# Patient Record
Sex: MALE | Race: WHITE | NOT HISPANIC OR LATINO | Employment: OTHER | URBAN - METROPOLITAN AREA
[De-identification: names, ages, dates, MRNs, and addresses within clinical notes are randomized per-mention and may not be internally consistent; named-entity substitution may affect disease eponyms.]

---

## 2017-11-16 ENCOUNTER — LAB VISIT (OUTPATIENT)
Dept: LAB | Facility: HOSPITAL | Age: 62
End: 2017-11-16
Attending: NURSE PRACTITIONER
Payer: MEDICARE

## 2017-11-16 ENCOUNTER — OFFICE VISIT (OUTPATIENT)
Dept: GASTROENTEROLOGY | Facility: CLINIC | Age: 62
End: 2017-11-16
Payer: MEDICARE

## 2017-11-16 VITALS
BODY MASS INDEX: 17.02 KG/M2 | SYSTOLIC BLOOD PRESSURE: 120 MMHG | HEIGHT: 72 IN | DIASTOLIC BLOOD PRESSURE: 70 MMHG | WEIGHT: 125.69 LBS | HEART RATE: 68 BPM

## 2017-11-16 DIAGNOSIS — R10.13 EPIGASTRIC ABDOMINAL PAIN: ICD-10-CM

## 2017-11-16 DIAGNOSIS — Z78.9 ALCOHOL USE: ICD-10-CM

## 2017-11-16 DIAGNOSIS — R63.4 ABNORMAL WEIGHT LOSS: ICD-10-CM

## 2017-11-16 DIAGNOSIS — R10.13 EPIGASTRIC ABDOMINAL PAIN: Primary | ICD-10-CM

## 2017-11-16 DIAGNOSIS — K70.30 ALCOHOLIC CIRRHOSIS OF LIVER WITHOUT ASCITES: ICD-10-CM

## 2017-11-16 LAB
AFP SERPL-MCNC: 3.1 NG/ML
ALBUMIN SERPL BCP-MCNC: 3.6 G/DL
ALP SERPL-CCNC: 78 U/L
ALT SERPL W/O P-5'-P-CCNC: 14 U/L
ANION GAP SERPL CALC-SCNC: 6 MMOL/L
AST SERPL-CCNC: 17 U/L
BASOPHILS # BLD AUTO: 0.06 K/UL
BASOPHILS NFR BLD: 0.5 %
BILIRUB SERPL-MCNC: 0.2 MG/DL
BUN SERPL-MCNC: 8 MG/DL
CALCIUM SERPL-MCNC: 9.6 MG/DL
CHLORIDE SERPL-SCNC: 102 MMOL/L
CO2 SERPL-SCNC: 29 MMOL/L
CREAT SERPL-MCNC: 0.7 MG/DL
DIFFERENTIAL METHOD: ABNORMAL
EOSINOPHIL # BLD AUTO: 0.3 K/UL
EOSINOPHIL NFR BLD: 2.1 %
ERYTHROCYTE [DISTWIDTH] IN BLOOD BY AUTOMATED COUNT: 20.5 %
EST. GFR  (AFRICAN AMERICAN): >60 ML/MIN/1.73 M^2
EST. GFR  (NON AFRICAN AMERICAN): >60 ML/MIN/1.73 M^2
GLUCOSE SERPL-MCNC: 86 MG/DL
HCT VFR BLD AUTO: 30.5 %
HGB BLD-MCNC: 8.6 G/DL
IMM GRANULOCYTES # BLD AUTO: 0.04 K/UL
IMM GRANULOCYTES NFR BLD AUTO: 0.3 %
INR PPP: 0.9
LYMPHOCYTES # BLD AUTO: 2 K/UL
LYMPHOCYTES NFR BLD: 16.5 %
MCH RBC QN AUTO: 20.1 PG
MCHC RBC AUTO-ENTMCNC: 28.2 G/DL
MCV RBC AUTO: 71 FL
MONOCYTES # BLD AUTO: 1 K/UL
MONOCYTES NFR BLD: 8.6 %
NEUTROPHILS # BLD AUTO: 8.6 K/UL
NEUTROPHILS NFR BLD: 72 %
NRBC BLD-RTO: 0 /100 WBC
PLATELET # BLD AUTO: 649 K/UL
PMV BLD AUTO: 10.5 FL
POTASSIUM SERPL-SCNC: 4.7 MMOL/L
PROT SERPL-MCNC: 7.5 G/DL
PROTHROMBIN TIME: 10.1 SEC
RBC # BLD AUTO: 4.27 M/UL
SODIUM SERPL-SCNC: 137 MMOL/L
WBC # BLD AUTO: 11.97 K/UL

## 2017-11-16 PROCEDURE — 80053 COMPREHEN METABOLIC PANEL: CPT

## 2017-11-16 PROCEDURE — 82105 ALPHA-FETOPROTEIN SERUM: CPT

## 2017-11-16 PROCEDURE — 99204 OFFICE O/P NEW MOD 45 MIN: CPT | Mod: S$GLB,,, | Performed by: NURSE PRACTITIONER

## 2017-11-16 PROCEDURE — 85025 COMPLETE CBC W/AUTO DIFF WBC: CPT

## 2017-11-16 PROCEDURE — 36415 COLL VENOUS BLD VENIPUNCTURE: CPT | Mod: PO

## 2017-11-16 PROCEDURE — 85610 PROTHROMBIN TIME: CPT | Mod: PO

## 2017-11-16 PROCEDURE — 99999 PR PBB SHADOW E&M-EST. PATIENT-LVL III: CPT | Mod: PBBFAC,,, | Performed by: NURSE PRACTITIONER

## 2017-11-16 RX ORDER — OXYCODONE AND ACETAMINOPHEN 10; 325 MG/1; MG/1
TABLET ORAL
Status: ON HOLD | COMMUNITY
Start: 2016-09-29 | End: 2019-09-03 | Stop reason: HOSPADM

## 2017-11-16 RX ORDER — PANTOPRAZOLE SODIUM 40 MG/1
40 TABLET, DELAYED RELEASE ORAL DAILY
Qty: 30 TABLET | Refills: 11 | Status: SHIPPED | OUTPATIENT
Start: 2017-11-16 | End: 2018-04-05 | Stop reason: SDUPTHER

## 2017-11-17 ENCOUNTER — HOSPITAL ENCOUNTER (OUTPATIENT)
Dept: RADIOLOGY | Facility: HOSPITAL | Age: 62
Discharge: HOME | End: 2017-11-17
Attending: NURSE PRACTITIONER
Payer: MEDICARE

## 2017-11-17 DIAGNOSIS — R63.4 ABNORMAL WEIGHT LOSS: ICD-10-CM

## 2017-11-17 DIAGNOSIS — Z78.9 ALCOHOL USE: ICD-10-CM

## 2017-11-17 DIAGNOSIS — R10.13 EPIGASTRIC ABDOMINAL PAIN: ICD-10-CM

## 2017-11-18 ENCOUNTER — HOSPITAL ENCOUNTER (OUTPATIENT)
Dept: RADIOLOGY | Facility: HOSPITAL | Age: 62
Discharge: HOME | End: 2017-11-18
Attending: NURSE PRACTITIONER
Payer: MEDICARE

## 2017-11-18 PROCEDURE — 25500020 PHARM REV CODE 255: Performed by: NURSE PRACTITIONER

## 2017-11-18 PROCEDURE — 74170 CT ABD WO CNTRST FLWD CNTRST: CPT | Mod: TC

## 2017-11-18 RX ADMIN — IOHEXOL 30 ML: 350 INJECTION, SOLUTION INTRAVENOUS at 11:11

## 2017-11-18 RX ADMIN — IOHEXOL 75 ML: 350 INJECTION, SOLUTION INTRAVENOUS at 02:11

## 2017-11-20 NOTE — PROGRESS NOTES
"Clinic Consult:  [facility] Consultation Note    Reason for Consult:  The primary encounter diagnosis was Epigastric abdominal pain. Diagnoses of Alcohol use, Abnormal weight loss, and Alcoholic cirrhosis of liver without ascites  were also pertinent to this visit.    PCP: Primary Doctor No       HPI:  This is a 62 y.o. male here for evaluation of the above  He states that over the last few months, he has had intermittent, severe epigastric abdominal pain of unknown cause.  He denies any known exacerbating or reliving factors.  He describes the pain as a burning, stabbing sensation that lasts a few days and then stops without known reason.  He does admit to daily ETOH intake, but reports that he has stopped since mid-October.  He recalls being told that he has "cirrhosis of his liver" but is unsure of what or where he was told.  He has also had a weight loss of 15 pounds over the last month without a clear reason why.   He denies any melena or hematochezia.  No nausea or vomiting.      Review of Systems   Constitutional: Positive for weight loss. Negative for chills, fever and malaise/fatigue.   Respiratory: Negative for cough.    Cardiovascular: Negative for chest pain.   Gastrointestinal:        Per HPI   Musculoskeletal: Negative for myalgias.   Skin: Negative for itching and rash.   Neurological: Negative for headaches.   Psychiatric/Behavioral: The patient is not nervous/anxious.        Medical History:   Past Medical History:   Diagnosis Date    Alcoholism     Back pain     Hiatal hernia     Hypertension        Surgical History:  Past Surgical History:   Procedure Laterality Date    HIP FRACTURE SURGERY      left hip       Family History:   Family History   Problem Relation Age of Onset    Cataracts Mother        Social History:   Social History   Substance Use Topics    Smoking status: Never Smoker    Smokeless tobacco: Never Used    Alcohol use Yes      Comment: states he drinks " frequently everyday/quit approx 10/2017       Allergies: Reviewed    Home Medications:   Current Outpatient Prescriptions on File Prior to Visit   Medication Sig Dispense Refill    losartan (COZAAR) 50 MG tablet Take 50 mg by mouth.      omeprazole (PRILOSEC) 20 MG capsule Take 1 capsule (20 mg total) by mouth once daily. 30 capsule 0     No current facility-administered medications on file prior to visit.        Physical Exam:  Vital Signs:  /70   Pulse 68   Ht 6' (1.829 m)   Wt 57 kg (125 lb 10.6 oz)   BMI 17.04 kg/m²   Body mass index is 17.04 kg/m².  Physical Exam   Constitutional: He is oriented to person, place, and time. He appears well-developed and well-nourished.   HENT:   Head: Normocephalic.   Eyes: No scleral icterus.   Neck: Normal range of motion.   Cardiovascular: Normal rate and regular rhythm.    Pulmonary/Chest: Effort normal and breath sounds normal.   Abdominal: Soft. Bowel sounds are normal. He exhibits no distension. There is no tenderness.   Musculoskeletal: Normal range of motion.   Neurological: He is alert and oriented to person, place, and time.   Skin: Skin is warm and dry.   Psychiatric: He has a normal mood and affect.   Vitals reviewed.      Labs: Pertinent labs reviewed.      Assessment:  1. Epigastric abdominal pain    2. Alcohol use    3. Abnormal weight loss    4. Alcoholic cirrhosis of liver without ascites          Recommendations:  - Unclear etiology of symptoms.    - GERD vs PUD vs other etiologies of the pain and weight loss  - Will get labs and imaging today for initial workup.  Will likely need an EGD   - Start on PPI once daily  - Continue with cessation of all ETOH  - GERD diet discussed.   Epigastric abdominal pain  -     CBC auto differential; Future; Expected date: 11/16/2017  -     Comprehensive metabolic panel; Future; Expected date: 11/16/2017  -     Protime-INR; Future; Expected date: 11/16/2017  -     AFP tumor marker; Future; Expected date:  11/16/2017  -     CT Abdomen With Without Contrast; Future; Expected date: 11/16/2017  -     pantoprazole (PROTONIX) 40 MG tablet; Take 1 tablet (40 mg total) by mouth once daily.  Dispense: 30 tablet; Refill: 11    Alcohol use  -     CBC auto differential; Future; Expected date: 11/16/2017  -     Comprehensive metabolic panel; Future; Expected date: 11/16/2017  -     Protime-INR; Future; Expected date: 11/16/2017  -     AFP tumor marker; Future; Expected date: 11/16/2017  -     CT Abdomen With Without Contrast; Future; Expected date: 11/16/2017  -     pantoprazole (PROTONIX) 40 MG tablet; Take 1 tablet (40 mg total) by mouth once daily.  Dispense: 30 tablet; Refill: 11    Abnormal weight loss  -     CBC auto differential; Future; Expected date: 11/16/2017  -     Comprehensive metabolic panel; Future; Expected date: 11/16/2017  -     Protime-INR; Future; Expected date: 11/16/2017  -     AFP tumor marker; Future; Expected date: 11/16/2017  -     CT Abdomen With Without Contrast; Future; Expected date: 11/16/2017  -     pantoprazole (PROTONIX) 40 MG tablet; Take 1 tablet (40 mg total) by mouth once daily.  Dispense: 30 tablet; Refill: 11    Alcoholic cirrhosis of liver without ascites   -     AFP tumor marker; Future; Expected date: 11/16/2017        Return in about 4 weeks (around 12/14/2017).        Thank you so much for allowing me to participate in the care of AMY Ferreira

## 2018-04-05 ENCOUNTER — OFFICE VISIT (OUTPATIENT)
Dept: INTERNAL MEDICINE | Facility: CLINIC | Age: 63
End: 2018-04-05
Payer: MEDICARE

## 2018-04-05 VITALS
BODY MASS INDEX: 19.3 KG/M2 | WEIGHT: 130.31 LBS | HEIGHT: 69 IN | RESPIRATION RATE: 12 BRPM | TEMPERATURE: 98 F | DIASTOLIC BLOOD PRESSURE: 58 MMHG | SYSTOLIC BLOOD PRESSURE: 114 MMHG | HEART RATE: 63 BPM

## 2018-04-05 DIAGNOSIS — R63.4 ABNORMAL WEIGHT LOSS: ICD-10-CM

## 2018-04-05 DIAGNOSIS — Z11.59 ENCOUNTER FOR HEPATITIS C SCREENING TEST FOR LOW RISK PATIENT: ICD-10-CM

## 2018-04-05 DIAGNOSIS — E78.5 HYPERLIPIDEMIA, UNSPECIFIED HYPERLIPIDEMIA TYPE: ICD-10-CM

## 2018-04-05 DIAGNOSIS — G89.29 OTHER CHRONIC PAIN: ICD-10-CM

## 2018-04-05 DIAGNOSIS — Z78.9 ALCOHOL USE: ICD-10-CM

## 2018-04-05 DIAGNOSIS — D64.9 ANEMIA, UNSPECIFIED TYPE: ICD-10-CM

## 2018-04-05 DIAGNOSIS — R10.13 EPIGASTRIC ABDOMINAL PAIN: ICD-10-CM

## 2018-04-05 DIAGNOSIS — R79.89 ABNORMAL CBC: Primary | ICD-10-CM

## 2018-04-05 PROCEDURE — 99999 PR PBB SHADOW E&M-EST. PATIENT-LVL IV: CPT | Mod: PBBFAC,,, | Performed by: FAMILY MEDICINE

## 2018-04-05 PROCEDURE — 99203 OFFICE O/P NEW LOW 30 MIN: CPT | Mod: S$GLB,,, | Performed by: FAMILY MEDICINE

## 2018-04-05 RX ORDER — PANTOPRAZOLE SODIUM 40 MG/1
40 TABLET, DELAYED RELEASE ORAL DAILY
Qty: 30 TABLET | Refills: 11 | Status: SHIPPED | OUTPATIENT
Start: 2018-04-05 | End: 2018-10-29 | Stop reason: SDUPTHER

## 2018-04-05 NOTE — PROGRESS NOTES
"Subjective:       Patient ID: Vincent Benton is a 62 y.o. male.    Chief Complaint: Establish Care    HPI   61 yo M    Presents to establish care    Broke hip in '09 - had surgery.  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    HTN  - Takes Losartan  Has history of BP.  Noted BP is low.  I have concerns to high a dose.      Takes Protonix -   Seen GI  Suspected GERD vs PUD  Helps him significantly  States was having bad stomach problems.    Currently taking pain medication   States he is switching MDs.  Pain mngt doctor.      Alcohol :  Drinks between 3-6 beers / day.    Was meant to see Gogo in Dec - He " canceled".     4 months ago H/H 8/6 / 305        Review of Systems   Constitutional: Negative for chills and fever.   HENT: Negative for trouble swallowing.    Respiratory: Negative for cough and shortness of breath.    Cardiovascular: Negative for chest pain.   Gastrointestinal: Negative for blood in stool and constipation.   Skin: Negative for color change.       Objective:       Vitals:    04/05/18 0810   BP: (!) 114/58   Pulse: 63   Resp: 12   Temp: 97.9 °F (36.6 °C)       Physical Exam   Constitutional: He is oriented to person, place, and time. He appears well-developed and well-nourished. No distress.   HENT:   Head: Normocephalic and atraumatic.   Right Ear: Hearing, tympanic membrane, external ear and ear canal normal.   Left Ear: Hearing, tympanic membrane, external ear and ear canal normal.   Nose: Nose normal. Right sinus exhibits no maxillary sinus tenderness and no frontal sinus tenderness. Left sinus exhibits no maxillary sinus tenderness and no frontal sinus tenderness.   Mouth/Throat: Uvula is midline, oropharynx is clear and moist and mucous membranes are normal.   Eyes: Conjunctivae are normal. Right eye exhibits no discharge. Left eye exhibits no discharge.   Neck: Trachea normal, normal range of motion and full passive range of motion without pain.   Cardiovascular: Normal rate, regular " rhythm, normal heart sounds and intact distal pulses.    Pulmonary/Chest: Effort normal and breath sounds normal. No respiratory distress. He has no decreased breath sounds. He has no wheezes.   Abdominal: Soft. Normal appearance and bowel sounds are normal. He exhibits no distension and no mass. There is no tenderness. There is no guarding. No hernia.   Musculoskeletal: Normal range of motion. He exhibits no edema or deformity.   Lymphadenopathy:     He has no cervical adenopathy.   Neurological: He is alert and oriented to person, place, and time.   Skin: Skin is warm, dry and intact. No rash noted. No erythema. No pallor.   Psychiatric: He has a normal mood and affect. His speech is normal and behavior is normal. Thought content normal.       Assessment:       1. Abnormal CBC    2. Anemia, unspecified type    3. Other chronic pain    4. Epigastric abdominal pain    5. Alcohol use    6. Abnormal weight loss    7. Encounter for hepatitis C screening test for low risk patient        Plan:   Abnormal CBC  Anemia, unspecified type    Few months ago was significantly anemic  Suspect gastritis given his description and heavy alcohol use.   PPI has helped him significantly.  Will check CBC today to monitor for improvement.  Advised No NSAID.  Reports had scopes - 6 year or more years ago.    Other chronic pain  Long term opioid  Discussed risk and my preference he stops  Will send to [facility]    HTN  Concerned his BP is too low  Have some lightheadedness.  Has no BP monitor at home.  Will do trial period off medication  Encourage him to check at home.  Will return in 1 week of medication.  Advised no NSAID    Screen Hep C    Weight loss  TSH    Patient to get labs this week. Then return next week for review and BP check.    No Follow-up on file.

## 2018-04-07 ENCOUNTER — LAB VISIT (OUTPATIENT)
Dept: LAB | Facility: HOSPITAL | Age: 63
End: 2018-04-07
Attending: FAMILY MEDICINE
Payer: MEDICARE

## 2018-04-07 DIAGNOSIS — R79.89 ABNORMAL CBC: ICD-10-CM

## 2018-04-07 DIAGNOSIS — Z11.59 ENCOUNTER FOR HEPATITIS C SCREENING TEST FOR LOW RISK PATIENT: ICD-10-CM

## 2018-04-07 DIAGNOSIS — R63.4 ABNORMAL WEIGHT LOSS: ICD-10-CM

## 2018-04-07 DIAGNOSIS — E78.5 HYPERLIPIDEMIA, UNSPECIFIED HYPERLIPIDEMIA TYPE: ICD-10-CM

## 2018-04-07 LAB
ALBUMIN SERPL BCP-MCNC: 3.6 G/DL
ALP SERPL-CCNC: 71 U/L
ALT SERPL W/O P-5'-P-CCNC: 19 U/L
ANION GAP SERPL CALC-SCNC: 10 MMOL/L
AST SERPL-CCNC: 23 U/L
BASOPHILS # BLD AUTO: 0.07 K/UL
BASOPHILS NFR BLD: 0.7 %
BILIRUB SERPL-MCNC: 0.5 MG/DL
BUN SERPL-MCNC: 10 MG/DL
CALCIUM SERPL-MCNC: 9.4 MG/DL
CHLORIDE SERPL-SCNC: 104 MMOL/L
CHOLEST SERPL-MCNC: 138 MG/DL
CHOLEST/HDLC SERPL: 1.9 {RATIO}
CO2 SERPL-SCNC: 26 MMOL/L
CREAT SERPL-MCNC: 0.7 MG/DL
DIFFERENTIAL METHOD: ABNORMAL
EOSINOPHIL # BLD AUTO: 0.2 K/UL
EOSINOPHIL NFR BLD: 1.5 %
ERYTHROCYTE [DISTWIDTH] IN BLOOD BY AUTOMATED COUNT: 20.5 %
EST. GFR  (AFRICAN AMERICAN): >60 ML/MIN/1.73 M^2
EST. GFR  (NON AFRICAN AMERICAN): >60 ML/MIN/1.73 M^2
GLUCOSE SERPL-MCNC: 82 MG/DL
HCT VFR BLD AUTO: 26.4 %
HDLC SERPL-MCNC: 72 MG/DL
HDLC SERPL: 52.2 %
HGB BLD-MCNC: 6.7 G/DL
IMM GRANULOCYTES # BLD AUTO: 0.03 K/UL
IMM GRANULOCYTES NFR BLD AUTO: 0.3 %
LDLC SERPL CALC-MCNC: 58.4 MG/DL
LYMPHOCYTES # BLD AUTO: 1.5 K/UL
LYMPHOCYTES NFR BLD: 15 %
MCH RBC QN AUTO: 17.1 PG
MCHC RBC AUTO-ENTMCNC: 25.4 G/DL
MCV RBC AUTO: 68 FL
MONOCYTES # BLD AUTO: 0.8 K/UL
MONOCYTES NFR BLD: 8.6 %
NEUTROPHILS # BLD AUTO: 7.2 K/UL
NEUTROPHILS NFR BLD: 73.9 %
NONHDLC SERPL-MCNC: 66 MG/DL
NRBC BLD-RTO: 0 /100 WBC
PLATELET # BLD AUTO: 618 K/UL
PMV BLD AUTO: 9.6 FL
POTASSIUM SERPL-SCNC: 4.3 MMOL/L
PROT SERPL-MCNC: 7.1 G/DL
RBC # BLD AUTO: 3.91 M/UL
SODIUM SERPL-SCNC: 140 MMOL/L
TRIGL SERPL-MCNC: 38 MG/DL
TSH SERPL DL<=0.005 MIU/L-ACNC: 0.6 UIU/ML
WBC # BLD AUTO: 9.74 K/UL

## 2018-04-07 PROCEDURE — 84443 ASSAY THYROID STIM HORMONE: CPT

## 2018-04-07 PROCEDURE — 86803 HEPATITIS C AB TEST: CPT

## 2018-04-07 PROCEDURE — 85025 COMPLETE CBC W/AUTO DIFF WBC: CPT

## 2018-04-07 PROCEDURE — 80061 LIPID PANEL: CPT

## 2018-04-07 PROCEDURE — 80053 COMPREHEN METABOLIC PANEL: CPT

## 2018-04-07 PROCEDURE — 36415 COLL VENOUS BLD VENIPUNCTURE: CPT | Mod: PO

## 2018-04-09 LAB — HCV AB SERPL QL IA: NEGATIVE

## 2018-04-13 ENCOUNTER — TELEPHONE (OUTPATIENT)
Dept: INTERNAL MEDICINE | Facility: CLINIC | Age: 63
End: 2018-04-13

## 2018-04-13 ENCOUNTER — HOSPITAL ENCOUNTER (EMERGENCY)
Facility: HOSPITAL | Age: 63
Discharge: HOME | End: 2018-04-13
Attending: EMERGENCY MEDICINE
Payer: MEDICARE

## 2018-04-13 VITALS
BODY MASS INDEX: 19.26 KG/M2 | RESPIRATION RATE: 22 BRPM | TEMPERATURE: 99 F | SYSTOLIC BLOOD PRESSURE: 125 MMHG | OXYGEN SATURATION: 100 % | WEIGHT: 130 LBS | DIASTOLIC BLOOD PRESSURE: 65 MMHG | HEIGHT: 69 IN | HEART RATE: 79 BPM

## 2018-04-13 DIAGNOSIS — D50.9 MICROCYTIC ANEMIA: ICD-10-CM

## 2018-04-13 DIAGNOSIS — D64.9 ANEMIA: Primary | ICD-10-CM

## 2018-04-13 LAB
ABO + RH BLD: NORMAL
ALBUMIN SERPL BCP-MCNC: 3.6 G/DL
ALP SERPL-CCNC: 67 U/L
ALT SERPL W/O P-5'-P-CCNC: 14 U/L
ANION GAP SERPL CALC-SCNC: 5 MMOL/L
ANISOCYTOSIS BLD QL SMEAR: SLIGHT
APTT BLDCRRT: 27.7 SEC
AST SERPL-CCNC: 18 U/L
BASOPHILS # BLD AUTO: 0.06 K/UL
BASOPHILS NFR BLD: 0.7 %
BILIRUB SERPL-MCNC: 0.4 MG/DL
BLD GP AB SCN CELLS X3 SERPL QL: NORMAL
BLD PROD TYP BPU: NORMAL
BLOOD UNIT EXPIRATION DATE: NORMAL
BLOOD UNIT TYPE CODE: 5100
BLOOD UNIT TYPE: NORMAL
BUN SERPL-MCNC: 11 MG/DL
BURR CELLS BLD QL SMEAR: ABNORMAL
CALCIUM SERPL-MCNC: 8.9 MG/DL
CHLORIDE SERPL-SCNC: 104 MMOL/L
CO2 SERPL-SCNC: 29 MMOL/L
CODING SYSTEM: NORMAL
CREAT SERPL-MCNC: 0.7 MG/DL
DIFFERENTIAL METHOD: ABNORMAL
DISPENSE STATUS: NORMAL
EOSINOPHIL # BLD AUTO: 0.1 K/UL
EOSINOPHIL NFR BLD: 1.3 %
ERYTHROCYTE [DISTWIDTH] IN BLOOD BY AUTOMATED COUNT: 19.2 %
EST. GFR  (AFRICAN AMERICAN): >60 ML/MIN/1.73 M^2
EST. GFR  (NON AFRICAN AMERICAN): >60 ML/MIN/1.73 M^2
FERRITIN SERPL-MCNC: 6 NG/ML
GLUCOSE SERPL-MCNC: 115 MG/DL
HCT VFR BLD AUTO: 26 %
HGB BLD-MCNC: 6.8 G/DL
HYPOCHROMIA BLD QL SMEAR: ABNORMAL
INR PPP: 1
IRON SERPL-MCNC: <10 UG/DL
LYMPHOCYTES # BLD AUTO: 1.7 K/UL
LYMPHOCYTES NFR BLD: 20 %
MCH RBC QN AUTO: 17 PG
MCHC RBC AUTO-ENTMCNC: 26.2 G/DL
MCV RBC AUTO: 65 FL
MONOCYTES # BLD AUTO: 1 K/UL
MONOCYTES NFR BLD: 12 %
NEUTROPHILS # BLD AUTO: 5.4 K/UL
NEUTROPHILS NFR BLD: 66.2 %
NUM UNITS TRANS PACKED RBC: NORMAL
OVALOCYTES BLD QL SMEAR: ABNORMAL
PLATELET # BLD AUTO: 471 K/UL
PLATELET BLD QL SMEAR: ABNORMAL
PMV BLD AUTO: 8.9 FL
POIKILOCYTOSIS BLD QL SMEAR: SLIGHT
POTASSIUM SERPL-SCNC: 4.5 MMOL/L
PROT SERPL-MCNC: 7.1 G/DL
PROTHROMBIN TIME: 10.4 SEC
RBC # BLD AUTO: 4 M/UL
SATURATED IRON: ABNORMAL %
SCHISTOCYTES BLD QL SMEAR: PRESENT
SODIUM SERPL-SCNC: 138 MMOL/L
SPHEROCYTES BLD QL SMEAR: ABNORMAL
TARGETS BLD QL SMEAR: ABNORMAL
TOTAL IRON BINDING CAPACITY: 527 UG/DL
TRANSFERRIN SERPL-MCNC: 356 MG/DL
TROPONIN I SERPL DL<=0.01 NG/ML-MCNC: <0.006 NG/ML
WBC # BLD AUTO: 8.23 K/UL

## 2018-04-13 PROCEDURE — 80053 COMPREHEN METABOLIC PANEL: CPT

## 2018-04-13 PROCEDURE — 36430 TRANSFUSION BLD/BLD COMPNT: CPT

## 2018-04-13 PROCEDURE — 86920 COMPATIBILITY TEST SPIN: CPT

## 2018-04-13 PROCEDURE — 25000003 PHARM REV CODE 250: Performed by: EMERGENCY MEDICINE

## 2018-04-13 PROCEDURE — 85025 COMPLETE CBC W/AUTO DIFF WBC: CPT

## 2018-04-13 PROCEDURE — 84484 ASSAY OF TROPONIN QUANT: CPT

## 2018-04-13 PROCEDURE — 99284 EMERGENCY DEPT VISIT MOD MDM: CPT | Mod: 25

## 2018-04-13 PROCEDURE — 93010 ELECTROCARDIOGRAM REPORT: CPT | Mod: ,,, | Performed by: INTERNAL MEDICINE

## 2018-04-13 PROCEDURE — 82728 ASSAY OF FERRITIN: CPT

## 2018-04-13 PROCEDURE — 85610 PROTHROMBIN TIME: CPT

## 2018-04-13 PROCEDURE — 86901 BLOOD TYPING SEROLOGIC RH(D): CPT

## 2018-04-13 PROCEDURE — 85730 THROMBOPLASTIN TIME PARTIAL: CPT

## 2018-04-13 PROCEDURE — P9016 RBC LEUKOCYTES REDUCED: HCPCS

## 2018-04-13 PROCEDURE — 83540 ASSAY OF IRON: CPT

## 2018-04-13 RX ORDER — FERROUS SULFATE 324(65)MG
325 TABLET, DELAYED RELEASE (ENTERIC COATED) ORAL 2 TIMES DAILY
Qty: 60 TABLET | Refills: 0 | Status: SHIPPED | OUTPATIENT
Start: 2018-04-13 | End: 2021-04-22

## 2018-04-13 RX ORDER — HYDROCODONE BITARTRATE AND ACETAMINOPHEN 500; 5 MG/1; MG/1
TABLET ORAL
Status: DISCONTINUED | OUTPATIENT
Start: 2018-04-13 | End: 2018-04-13 | Stop reason: HOSPADM

## 2018-04-13 RX ORDER — DIPHENHYDRAMINE HCL 25 MG
25 CAPSULE ORAL
Status: COMPLETED | OUTPATIENT
Start: 2018-04-13 | End: 2018-04-13

## 2018-04-13 RX ADMIN — DIPHENHYDRAMINE HYDROCHLORIDE 25 MG: 25 CAPSULE ORAL at 10:04

## 2018-04-13 NOTE — ED PROVIDER NOTES
"SCRIBE #1 NOTE: I, Monroe Huizar, am scribing for, and in the presence of, Chris Adhikari Jr., MD. I have scribed the entire note.      History      Chief Complaint   Patient presents with    Abnormal Lab     Pt reports he was told to come here last week for blood transfusion for low "blood count". Reports no symptoms associated.       Review of patient's allergies indicates:  No Known Allergies     HPI   HPI    4/13/2018, 7:05 AM   History obtained from the patient      History of Present Illness: Vincent Benton is a 62 y.o. male patient who presents to the Emergency Department for abnormal labs. Pt was called to come to ED for anemia after pt had lab work drawn 4/7/18. Pt has no current complaints or sxs. There are no mitigating or exacerbating factors noted.  Pt denies any fever, N/V/D, blood in stool, hematuria, ABD pain, CP, SOB, LOC, and all other sxs at this time. No further complaints or concerns at this time.         Arrival mode: Personal vehicle      PCP: Primary Doctor No       Past Medical History:  Past Medical History:   Diagnosis Date    Alcoholism     Back pain     Hiatal hernia     Hypertension        Past Surgical History:  Past Surgical History:   Procedure Laterality Date    HIP FRACTURE SURGERY      left hip         Family History:  Family History   Problem Relation Age of Onset    Cataracts Mother        Social History:  Social History     Social History Main Topics    Smoking status: Never Smoker    Smokeless tobacco: Never Used    Alcohol use Yes      Comment: states he drinks frequently everyday/quit approx 10/2017    Drug use: No    Sexual activity: No       ROS   Review of Systems   Constitutional: Negative for fever.        (+) anemia   HENT: Negative for sore throat.    Respiratory: Negative for shortness of breath.    Cardiovascular: Negative for chest pain.   Gastrointestinal: Negative for nausea.   Genitourinary: Negative for dysuria.   Musculoskeletal: " "Negative for back pain.   Skin: Negative for rash.   Neurological: Negative for weakness.   Hematological: Does not bruise/bleed easily.       Physical Exam      Initial Vitals [04/13/18 0654]   BP Pulse Resp Temp SpO2   (!) 123/91 69 20 99.2 °F (37.3 °C) 98 %      MAP       101.67          Physical Exam  Nursing Notes and Vital Signs Reviewed.  Constitutional: Patient is in no acute distress. Well-developed and well-nourished.  Head: Atraumatic. Normocephalic.  Eyes: PERRL. EOM intact. Conjunctivae are not pale. No scleral icterus.  ENT: Mucous membranes are moist. Oropharynx is clear and symmetric.    Neck: Supple. Full ROM. No lymphadenopathy.  Cardiovascular: Regular rate. Regular rhythm. No murmurs, rubs, or gallops. Distal pulses are 2+ and symmetric.  Pulmonary/Chest: No respiratory distress. Clear to auscultation bilaterally. No wheezing or rales.  Abdominal: Soft and non-distended.  There is no tenderness.  No rebound, guarding, or rigidity. Good bowel sounds.  Genitourinary: No CVA tenderness  Musculoskeletal: Moves all extremities. No obvious deformities. No edema. No calf tenderness.  Skin: Warm and dry.  Neurological:  Alert, awake, and appropriate.  Normal speech.  No acute focal neurological deficits are appreciated.  Psychiatric: Normal affect. Good eye contact. Appropriate in content.    ED Course    Procedures  ED Vital Signs:  Vitals:    04/13/18 0654 04/13/18 0724 04/13/18 0726 04/13/18 0846   BP: (!) 123/91 139/81  128/83   Pulse: 69 66 67 65   Resp: 20 16  16   Temp: 99.2 °F (37.3 °C)      TempSrc: Oral      SpO2: 98% 100%  100%   Weight: 59 kg (130 lb)      Height: 5' 9" (1.753 m)       04/13/18 0901 04/13/18 1017 04/13/18 1032 04/13/18 1117   BP: 128/87 118/78 130/72 113/67   Pulse: 110 77 74 69   Resp: 19 18 16 15   Temp:  98.4 °F (36.9 °C) 98.5 °F (36.9 °C) 99.3 °F (37.4 °C)   TempSrc:  Oral Oral    SpO2: 100% 100% 100% 100%   Weight:       Height:        04/13/18 1326 04/13/18 1336   BP: " 130/75 125/65   Pulse: 85 79   Resp:  (!) 22   Temp:     TempSrc:     SpO2: 100% 100%   Weight:     Height:         Abnormal Lab Results:  Labs Reviewed   CBC W/ AUTO DIFFERENTIAL - Abnormal; Notable for the following:        Result Value    RBC 4.00 (*)     Hemoglobin 6.8 (*)     Hematocrit 26.0 (*)     MCV 65 (*)     MCH 17.0 (*)     MCHC 26.2 (*)     RDW 19.2 (*)     Platelets 471 (*)     MPV 8.9 (*)     Platelet Estimate Increased (*)     All other components within normal limits   COMPREHENSIVE METABOLIC PANEL - Abnormal; Notable for the following:     Glucose 115 (*)     Anion Gap 5 (*)     All other components within normal limits   TROPONIN I   PROTIME-INR   APTT   IRON AND TIBC   FERRITIN   TYPE & SCREEN   PREPARE RBC SOFT        All Lab Results:  Results for orders placed or performed during the hospital encounter of 04/13/18   CBC auto differential   Result Value Ref Range    WBC 8.23 3.90 - 12.70 K/uL    RBC 4.00 (L) 4.60 - 6.20 M/uL    Hemoglobin 6.8 (L) 14.0 - 18.0 g/dL    Hematocrit 26.0 (L) 40.0 - 54.0 %    MCV 65 (L) 82 - 98 fL    MCH 17.0 (L) 27.0 - 31.0 pg    MCHC 26.2 (L) 32.0 - 36.0 g/dL    RDW 19.2 (H) 11.5 - 14.5 %    Platelets 471 (H) 150 - 350 K/uL    MPV 8.9 (L) 9.2 - 12.9 fL    Gran # (ANC) 5.4 1.8 - 7.7 K/uL    Lymph # 1.7 1.0 - 4.8 K/uL    Mono # 1.0 0.3 - 1.0 K/uL    Eos # 0.1 0.0 - 0.5 K/uL    Baso # 0.06 0.00 - 0.20 K/uL    Gran% 66.2 38.0 - 73.0 %    Lymph% 20.0 18.0 - 48.0 %    Mono% 12.0 4.0 - 15.0 %    Eosinophil% 1.3 0.0 - 8.0 %    Basophil% 0.7 0.0 - 1.9 %    Platelet Estimate Increased (A)     Aniso Slight     Poik Slight     Hypo Marked     Ovalocytes Occasional     Target Cells Occasional     Katherine Cells Moderate     Spherocytes Occasional     Schistocytes Present     Differential Method Automated    Comprehensive metabolic panel   Result Value Ref Range    Sodium 138 136 - 145 mmol/L    Potassium 4.5 3.5 - 5.1 mmol/L    Chloride 104 95 - 110 mmol/L    CO2 29 23 - 29 mmol/L     Glucose 115 (H) 70 - 110 mg/dL    BUN, Bld 11 6 - 20 mg/dL    Creatinine 0.7 0.5 - 1.4 mg/dL    Calcium 8.9 8.7 - 10.5 mg/dL    Total Protein 7.1 6.0 - 8.4 g/dL    Albumin 3.6 3.5 - 5.2 g/dL    Total Bilirubin 0.4 0.1 - 1.0 mg/dL    Alkaline Phosphatase 67 55 - 135 U/L    AST 18 10 - 40 U/L    ALT 14 10 - 44 U/L    Anion Gap 5 (L) 8 - 16 mmol/L    eGFR if African American >60 >60 mL/min/1.73 m^2    eGFR if non African American >60 >60 mL/min/1.73 m^2   Troponin I   Result Value Ref Range    Troponin I <0.006 0.000 - 0.026 ng/mL   Protime-INR   Result Value Ref Range    Prothrombin Time 10.4 9.0 - 12.5 sec    INR 1.0 0.8 - 1.2   APTT   Result Value Ref Range    aPTT 27.7 21.0 - 32.0 sec   Type & Screen   Result Value Ref Range    Group & Rh O POS     Indirect Corie NEG    Prepare RBC 1 Unit   Result Value Ref Range    UNIT NUMBER [id number]     PRODUCT CODE D4936T14     DISPENSE STATUS ISSUED     CODING SYSTEM CQFI037     Unit Blood Type Code 5100     Unit Blood Type O POS     Unit Expiration [id number]          Imaging Results:  Imaging Results          X-Ray Chest AP Portable (Final result)  Result time 04/13/18 07:55:02    Final result by Avila Irving MD (04/13/18 07:55:02)                 Impression:       Age indeterminate posterior left rib fractures are noted at the sixth through eighth ribs.        Electronically signed by: AVILA IRVING MD  Date:     04/13/18  Time:    07:55              Narrative:    Clinical Data:Anemia    Comparison:  none    Findings:  Single view of the chest.  Aorta demonstrates atherosclerotic disease.    Cardiac silhouette is normal.  The lungs demonstrate no evidence of active disease.  No evidence of pleural effusion or pneumothorax.  Bones demonstrate moderate degenerative changes. Age indeterminate posterior left rib fractures are noted at the sixth through eighth ribs.                                      The EKG was ordered, reviewed, and independently interpreted by  the ED provider.  Interpretation time: 7:17  Rate: 64 BPM  Rhythm: normal sinus rhythm  Interpretation: No acute ST changes. No STEMI.      The Emergency Provider reviewed the vital signs and test results, which are outlined above.    ED Discussion     2:26 PM: Reassessed pt. Pt states their condition has improved at this time.  Discussed with pt all pertinent ED information and results. Discussed plan of treatment with pt. Gave pt all f/u and return to the ED instructions. All questions and concerns were addressed at this time. Pt understands and agrees to plan as discussed. Pt is stable for discharge.       ED Medication(s):  Medications   0.9%  NaCl infusion (for blood administration) (not administered)   diphenhydrAMINE capsule 25 mg (25 mg Oral Given 4/13/18 1009)       Discharge Medication List as of 4/13/2018  8:34 AM      START taking these medications    Details   ferrous sulfate 324 mg (65 mg iron) TbEC Take 1 tablet (324 mg total) by mouth 2 (two) times daily., Starting Fri 4/13/2018, Print             Follow-up Information     Kolby Carpio MD. Call today.    Specialty:  Family Medicine  Why:  to schedule appt for recheck and for further evaluation of anemia  Contact information:  [address] [facility] [address] [phone]             [facility]. Call today.    Why:  to schedule appt to see GI specialist here at [facility] regarding low blood counts and weakness  Contact information:  [facility] [place]                   Medical Decision Making    Medical Decision Making:   Clinical Tests:   Lab Tests: Reviewed and Ordered  Radiological Study: Reviewed and Ordered           Scribe Attestation:   Scribe #1: I performed the above scribed service and the documentation accurately describes the services I performed. I attest to the accuracy of the note.    Attending:   Physician Attestation Statement for Scribe #1: I, Chris Adhikari Jr., MD, personally performed the  services described in this documentation, as scribed by Monroe Huizar, in my presence, and it is both accurate and complete.          Clinical Impression       ICD-10-CM ICD-9-CM   1. Anemia D64.9 285.9   2. Microcytic anemia D50.9 280.9       Disposition:   Disposition: Discharged  Condition: Stable         Chris Adhikari Jr., MD  04/13/18 5015

## 2018-04-13 NOTE — TELEPHONE ENCOUNTER
Tried calling pt to let him know that he missed his appt with  this morning at 7:20. Pt did not answer and no way to leave a msg. Looks like pt is currently admitted to the hospital.

## 2018-05-21 RX ORDER — LOSARTAN POTASSIUM 50 MG/1
TABLET ORAL
Qty: 30 TABLET | Refills: 2 | Status: SHIPPED | OUTPATIENT
Start: 2018-05-21 | End: 2018-08-13 | Stop reason: SDUPTHER

## 2018-08-02 PROBLEM — D64.9 ANEMIA: Status: ACTIVE | Noted: 2018-08-02

## 2018-08-13 RX ORDER — LOSARTAN POTASSIUM 50 MG/1
TABLET ORAL
Qty: 90 TABLET | Refills: 0 | Status: SHIPPED | OUTPATIENT
Start: 2018-08-13 | End: 2019-02-18 | Stop reason: SDUPTHER

## 2018-08-13 RX ORDER — LOSARTAN POTASSIUM 50 MG/1
TABLET ORAL
Qty: 30 TABLET | Refills: 0 | Status: SHIPPED | OUTPATIENT
Start: 2018-08-13 | End: 2018-08-13 | Stop reason: SDUPTHER

## 2018-10-29 DIAGNOSIS — R10.13 EPIGASTRIC ABDOMINAL PAIN: ICD-10-CM

## 2018-10-29 DIAGNOSIS — R63.4 ABNORMAL WEIGHT LOSS: ICD-10-CM

## 2018-10-29 DIAGNOSIS — Z78.9 ALCOHOL USE: ICD-10-CM

## 2018-10-29 RX ORDER — PANTOPRAZOLE SODIUM 40 MG/1
40 TABLET, DELAYED RELEASE ORAL DAILY
Qty: 30 TABLET | Refills: 0 | Status: SHIPPED | OUTPATIENT
Start: 2018-10-29 | End: 2018-11-06 | Stop reason: SDUPTHER

## 2018-10-29 NOTE — TELEPHONE ENCOUNTER
Pt was last seen in the office in November 2017.  We are receiving a refill request for Pantoprazole.  Called pt and asked that he make an appt to receive further refills, but that we would send in a 30-day supply.  His appt is 11/06/2018 and he is very happy to come in as he wants Lola to know how much he appreciates what she has done for him.

## 2018-11-06 ENCOUNTER — LAB VISIT (OUTPATIENT)
Dept: LAB | Facility: HOSPITAL | Age: 63
End: 2018-11-06
Attending: NURSE PRACTITIONER
Payer: MEDICARE

## 2018-11-06 ENCOUNTER — OFFICE VISIT (OUTPATIENT)
Dept: GASTROENTEROLOGY | Facility: CLINIC | Age: 63
End: 2018-11-06
Payer: MEDICARE

## 2018-11-06 VITALS
SYSTOLIC BLOOD PRESSURE: 134 MMHG | WEIGHT: 132.94 LBS | DIASTOLIC BLOOD PRESSURE: 86 MMHG | HEART RATE: 90 BPM | HEIGHT: 69 IN | BODY MASS INDEX: 19.69 KG/M2

## 2018-11-06 DIAGNOSIS — R10.13 EPIGASTRIC ABDOMINAL PAIN: ICD-10-CM

## 2018-11-06 DIAGNOSIS — K73.9 CHRONIC HEPATITIS: ICD-10-CM

## 2018-11-06 DIAGNOSIS — K21.9 GASTROESOPHAGEAL REFLUX DISEASE, ESOPHAGITIS PRESENCE NOT SPECIFIED: ICD-10-CM

## 2018-11-06 DIAGNOSIS — Z78.9 ALCOHOL USE: ICD-10-CM

## 2018-11-06 DIAGNOSIS — R63.4 ABNORMAL WEIGHT LOSS: ICD-10-CM

## 2018-11-06 DIAGNOSIS — D50.0 IRON DEFICIENCY ANEMIA DUE TO CHRONIC BLOOD LOSS: ICD-10-CM

## 2018-11-06 DIAGNOSIS — D50.0 IRON DEFICIENCY ANEMIA DUE TO CHRONIC BLOOD LOSS: Primary | ICD-10-CM

## 2018-11-06 LAB
INR PPP: 0.9
PROTHROMBIN TIME: 9.8 SEC

## 2018-11-06 PROCEDURE — 3008F BODY MASS INDEX DOCD: CPT | Mod: CPTII,S$GLB,, | Performed by: NURSE PRACTITIONER

## 2018-11-06 PROCEDURE — 80053 COMPREHEN METABOLIC PANEL: CPT

## 2018-11-06 PROCEDURE — 82105 ALPHA-FETOPROTEIN SERUM: CPT

## 2018-11-06 PROCEDURE — 99214 OFFICE O/P EST MOD 30 MIN: CPT | Mod: S$GLB,,, | Performed by: NURSE PRACTITIONER

## 2018-11-06 PROCEDURE — 99999 PR PBB SHADOW E&M-EST. PATIENT-LVL III: CPT | Mod: PBBFAC,,, | Performed by: NURSE PRACTITIONER

## 2018-11-06 PROCEDURE — 36415 COLL VENOUS BLD VENIPUNCTURE: CPT | Mod: PO

## 2018-11-06 PROCEDURE — 85025 COMPLETE CBC W/AUTO DIFF WBC: CPT

## 2018-11-06 PROCEDURE — 85610 PROTHROMBIN TIME: CPT | Mod: PO

## 2018-11-06 RX ORDER — PANTOPRAZOLE SODIUM 40 MG/1
40 TABLET, DELAYED RELEASE ORAL DAILY
Qty: 30 TABLET | Refills: 11 | Status: SHIPPED | OUTPATIENT
Start: 2018-11-06 | End: 2018-12-03 | Stop reason: SDUPTHER

## 2018-11-06 RX ORDER — LANOLIN ALCOHOL/MO/W.PET/CERES
100 CREAM (GRAM) TOPICAL DAILY
Status: ON HOLD | COMMUNITY
End: 2022-08-16 | Stop reason: HOSPADM

## 2018-11-07 PROBLEM — Z78.9 ALCOHOL USE: Status: ACTIVE | Noted: 2018-11-07

## 2018-11-07 PROBLEM — K21.9 GASTROESOPHAGEAL REFLUX DISEASE: Status: ACTIVE | Noted: 2018-11-07

## 2018-11-07 LAB
AFP SERPL-MCNC: 3.4 NG/ML
ALBUMIN SERPL BCP-MCNC: 3.6 G/DL
ALP SERPL-CCNC: 90 U/L
ALT SERPL W/O P-5'-P-CCNC: 20 U/L
ANION GAP SERPL CALC-SCNC: 11 MMOL/L
AST SERPL-CCNC: 29 U/L
BASOPHILS # BLD AUTO: 0.1 K/UL
BASOPHILS NFR BLD: 0.8 %
BILIRUB SERPL-MCNC: 0.2 MG/DL
BUN SERPL-MCNC: 8 MG/DL
CALCIUM SERPL-MCNC: 9.6 MG/DL
CHLORIDE SERPL-SCNC: 101 MMOL/L
CO2 SERPL-SCNC: 26 MMOL/L
CREAT SERPL-MCNC: 0.6 MG/DL
DIFFERENTIAL METHOD: ABNORMAL
EOSINOPHIL # BLD AUTO: 0.2 K/UL
EOSINOPHIL NFR BLD: 1.3 %
ERYTHROCYTE [DISTWIDTH] IN BLOOD BY AUTOMATED COUNT: 18.1 %
EST. GFR  (AFRICAN AMERICAN): >60 ML/MIN/1.73 M^2
EST. GFR  (NON AFRICAN AMERICAN): >60 ML/MIN/1.73 M^2
GLUCOSE SERPL-MCNC: 76 MG/DL
HCT VFR BLD AUTO: 32.1 %
HGB BLD-MCNC: 9.1 G/DL
IMM GRANULOCYTES # BLD AUTO: 0.11 K/UL
IMM GRANULOCYTES NFR BLD AUTO: 0.9 %
LYMPHOCYTES # BLD AUTO: 2.1 K/UL
LYMPHOCYTES NFR BLD: 17 %
MCH RBC QN AUTO: 23.3 PG
MCHC RBC AUTO-ENTMCNC: 28.3 G/DL
MCV RBC AUTO: 82 FL
MONOCYTES # BLD AUTO: 0.8 K/UL
MONOCYTES NFR BLD: 6.1 %
NEUTROPHILS # BLD AUTO: 9.1 K/UL
NEUTROPHILS NFR BLD: 73.9 %
NRBC BLD-RTO: 0 /100 WBC
PLATELET # BLD AUTO: 614 K/UL
PMV BLD AUTO: 10.7 FL
POTASSIUM SERPL-SCNC: 4.6 MMOL/L
PROT SERPL-MCNC: 7.6 G/DL
RBC # BLD AUTO: 3.9 M/UL
SODIUM SERPL-SCNC: 138 MMOL/L
WBC # BLD AUTO: 12.31 K/UL

## 2018-11-07 NOTE — PROGRESS NOTES
Clinic Follow Up:  [facility] Follow Up Note    Reason for Follow Up:  The primary encounter diagnosis was Iron deficiency anemia due to chronic blood loss. Diagnoses of Gastroesophageal reflux disease, esophagitis presence not specified, Alcohol use, Epigastric abdominal pain, Abnormal weight loss, and Chronic hepatitis  were also pertinent to this visit.    PCP: Primary Doctor No       HPI:  This is a 63 y.o. male here for follow up of the above  Pt states that since his last visit, he has been feeling well.  He has had not additional episodes of vomiting since starting the PPI.  He has had recent episodes of rectal bleeding and anemia.  He was seen in the ER for HGB of 6.8 where he received one unit of PRBCs.  He has not had any follow up labs or investigation since then.   He has had one episode of rectal bleeding over the last 6 weeks.  Reports that he stopped all ETOH at that time and has had no additional bleeding since then  He states that his last EGD and colonoscopy were over 5 years ago.  Does not recall the outcome of those procedures or where they were completed.       Review of Systems   Constitutional: Negative for chills, fever, malaise/fatigue and weight loss.   Respiratory: Negative for cough.    Cardiovascular: Negative for chest pain.   Gastrointestinal:        Per HPI   Musculoskeletal: Negative for myalgias.   Skin: Negative for itching and rash.   Neurological: Negative for headaches.   Psychiatric/Behavioral: The patient is not nervous/anxious.        Medical History:  Past Medical History:   Diagnosis Date    Alcoholism     Back pain     Hiatal hernia     Hypertension        Surgical History:   Past Surgical History:   Procedure Laterality Date    HIP FRACTURE SURGERY      left hip       Family History:   Family History   Problem Relation Age of Onset    Cataracts Mother        Social History:   Social History     Tobacco Use    Smoking status: Never Smoker     "Smokeless tobacco: Never Used   Substance Use Topics    Alcohol use: Yes     Comment: states he drinks frequently everyday/quit approx 10/2017    Drug use: No       Allergies: Reviewed    Home Medications:  Current Outpatient Medications on File Prior to Visit   Medication Sig Dispense Refill    cyanocobalamin (VITAMIN B-12) 1000 MCG tablet Take 100 mcg by mouth once daily.      ferrous sulfate 324 mg (65 mg iron) TbEC Take 1 tablet (324 mg total) by mouth 2 (two) times daily. 60 tablet 0    losartan (COZAAR) 50 MG tablet TAKE 1 TABLET BY MOUTH EVERY MORNING 90 tablet 0    oxyCODONE-acetaminophen (PERCOCET)  mg per tablet        No current facility-administered medications on file prior to visit.        Physical Exam:  Vital Signs:  /86   Pulse 90   Ht 5' 9" (1.753 m)   Wt 60.3 kg (132 lb 15 oz)   BMI 19.63 kg/m²   Body mass index is 19.63 kg/m².  Physical Exam   Constitutional: He is oriented to person, place, and time. He appears well-developed.   thin   HENT:   Head: Normocephalic.   Eyes: No scleral icterus.   Neck: Normal range of motion.   Cardiovascular: Normal rate and regular rhythm.   Pulmonary/Chest: Effort normal and breath sounds normal.   Abdominal: Soft. Bowel sounds are normal. He exhibits no distension. There is no tenderness.   Musculoskeletal: Normal range of motion.   Neurological: He is alert and oriented to person, place, and time.   Skin: Skin is warm and dry.   Psychiatric: He has a normal mood and affect.   Vitals reviewed.      Labs: Pertinent labs reviewed.  MELD-Na score: 6 at 4/13/2018  7:24 AM  MELD score: 6 at 4/13/2018  7:24 AM  Calculated from:  Serum Creatinine: 0.7 mg/dL (Rounded to 1 mg/dL) at 4/13/2018  7:24 AM  Serum Sodium: 138 mmol/L (Rounded to 137 mmol/L) at 4/13/2018  7:24 AM  Total Bilirubin: 0.4 mg/dL (Rounded to 1 mg/dL) at 4/13/2018  7:24 AM  INR(ratio): 1 at 4/13/2018  7:24 AM  Age: 62 years    Assessment:  1. Iron deficiency anemia due to chronic " blood loss    2. Gastroesophageal reflux disease, esophagitis presence not specified    3. Alcohol use    4. Epigastric abdominal pain    5. Abnormal weight loss    6. Chronic hepatitis         Recommendations:  -  Labs today for stability  - continue PPI  - Plan for EGD and colonoscopy given his previous anemia and bleeding  - ? Advanced liver disease given his ETOH intake.    - WIll get updated imaging of the liver given his hx of ETOH intake.   Iron deficiency anemia due to chronic blood loss  -     CBC auto differential; Future; Expected date: 11/06/2018  -     Comprehensive metabolic panel; Future; Expected date: 11/06/2018  -     Protime-INR; Future; Expected date: 11/06/2018  -     AFP tumor marker; Future; Expected date: 11/06/2018  -     Case request GI: ESOPHAGOGASTRODUODENOSCOPY (EGD), COLONOSCOPY  -     US Abdomen Complete; Future; Expected date: 11/07/2018    Gastroesophageal reflux disease, esophagitis presence not specified  -     CBC auto differential; Future; Expected date: 11/06/2018  -     Comprehensive metabolic panel; Future; Expected date: 11/06/2018  -     Protime-INR; Future; Expected date: 11/06/2018  -     AFP tumor marker; Future; Expected date: 11/06/2018  -     Case request GI: ESOPHAGOGASTRODUODENOSCOPY (EGD), COLONOSCOPY    Alcohol use  -     CBC auto differential; Future; Expected date: 11/06/2018  -     Comprehensive metabolic panel; Future; Expected date: 11/06/2018  -     Protime-INR; Future; Expected date: 11/06/2018  -     AFP tumor marker; Future; Expected date: 11/06/2018  -     Case request GI: ESOPHAGOGASTRODUODENOSCOPY (EGD), COLONOSCOPY  -     pantoprazole (PROTONIX) 40 MG tablet; Take 1 tablet (40 mg total) by mouth once daily.  Dispense: 30 tablet; Refill: 11  -     US Abdomen Complete; Future; Expected date: 11/07/2018    Epigastric abdominal pain  -     pantoprazole (PROTONIX) 40 MG tablet; Take 1 tablet (40 mg total) by mouth once daily.  Dispense: 30 tablet; Refill:  11    Abnormal weight loss  -     pantoprazole (PROTONIX) 40 MG tablet; Take 1 tablet (40 mg total) by mouth once daily.  Dispense: 30 tablet; Refill: 11    Chronic hepatitis   -     AFP tumor marker; Future; Expected date: 11/06/2018  -     US Abdomen Complete; Future; Expected date: 11/07/2018          Return to Clinic:  2-3 weeks after endoscopy completed for review

## 2018-11-08 ENCOUNTER — TELEPHONE (OUTPATIENT)
Dept: RADIOLOGY | Facility: HOSPITAL | Age: 63
End: 2018-11-08

## 2018-11-09 ENCOUNTER — HOSPITAL ENCOUNTER (OUTPATIENT)
Dept: RADIOLOGY | Facility: HOSPITAL | Age: 63
Discharge: HOME | End: 2018-11-09
Attending: NURSE PRACTITIONER
Payer: MEDICARE

## 2018-11-09 DIAGNOSIS — K73.9 CHRONIC HEPATITIS: ICD-10-CM

## 2018-11-09 DIAGNOSIS — D50.0 IRON DEFICIENCY ANEMIA DUE TO CHRONIC BLOOD LOSS: ICD-10-CM

## 2018-11-09 DIAGNOSIS — Z78.9 ALCOHOL USE: ICD-10-CM

## 2018-11-09 PROCEDURE — 76700 US EXAM ABDOM COMPLETE: CPT | Mod: TC,PO

## 2018-11-09 PROCEDURE — 76700 US EXAM ABDOM COMPLETE: CPT | Mod: 26,,, | Performed by: RADIOLOGY

## 2018-11-27 ENCOUNTER — TELEPHONE (OUTPATIENT)
Dept: GASTROENTEROLOGY | Facility: CLINIC | Age: 63
End: 2018-11-27

## 2018-11-27 RX ORDER — LOSARTAN POTASSIUM 50 MG/1
TABLET ORAL
Qty: 90 TABLET | Refills: 0 | OUTPATIENT
Start: 2018-11-27

## 2018-11-27 NOTE — TELEPHONE ENCOUNTER
Returned pt's call - he is asking if Lola could refill his blood pressure medication for him.  He was seeing a PCP at [facility], but wants to change to another one and needed the refill in the interim.  Told that Lola would not be able to refill his Losartan and that he really needed to find another PCP.  He is given some names of internal medicine doctors with [facility] and he will make an appt today.

## 2018-11-27 NOTE — TELEPHONE ENCOUNTER
----- Message from Neena Brooke sent at 11/27/2018 11:39 AM CST -----  Contact: Patient  Patient called to speak with the nurse; he wants to know if Mrs. Bowens can fill his BP  Medication (Losartan 50 mg - 1 tablet daily - 90 day supply).    [id number] [place] - [address] AT [facility]  [address]  [place] [address]  Phone: [phone] Fax: [phone]    He can be contacted at [phone].    Thanks,  Neena

## 2018-12-03 ENCOUNTER — TELEPHONE (OUTPATIENT)
Dept: GASTROENTEROLOGY | Facility: CLINIC | Age: 63
End: 2018-12-03

## 2018-12-03 DIAGNOSIS — R10.13 EPIGASTRIC ABDOMINAL PAIN: ICD-10-CM

## 2018-12-03 DIAGNOSIS — R63.4 ABNORMAL WEIGHT LOSS: ICD-10-CM

## 2018-12-03 DIAGNOSIS — Z78.9 ALCOHOL USE: ICD-10-CM

## 2018-12-04 RX ORDER — PANTOPRAZOLE SODIUM 40 MG/1
40 TABLET, DELAYED RELEASE ORAL DAILY
Qty: 30 TABLET | Refills: 11 | Status: SHIPPED | OUTPATIENT
Start: 2018-12-04 | End: 2020-06-16 | Stop reason: SDUPTHER

## 2019-02-18 ENCOUNTER — OFFICE VISIT (OUTPATIENT)
Dept: INTERNAL MEDICINE | Facility: CLINIC | Age: 64
End: 2019-02-18
Payer: MEDICARE

## 2019-02-18 VITALS
HEIGHT: 69 IN | HEART RATE: 88 BPM | TEMPERATURE: 98 F | WEIGHT: 139.13 LBS | SYSTOLIC BLOOD PRESSURE: 142 MMHG | BODY MASS INDEX: 20.61 KG/M2 | DIASTOLIC BLOOD PRESSURE: 86 MMHG | OXYGEN SATURATION: 98 %

## 2019-02-18 DIAGNOSIS — Z12.5 PROSTATE CANCER SCREENING: ICD-10-CM

## 2019-02-18 DIAGNOSIS — D50.9 IRON DEFICIENCY ANEMIA, UNSPECIFIED IRON DEFICIENCY ANEMIA TYPE: ICD-10-CM

## 2019-02-18 DIAGNOSIS — I10 HYPERTENSION GOAL BP (BLOOD PRESSURE) < 140/90: Primary | ICD-10-CM

## 2019-02-18 DIAGNOSIS — K21.9 CHRONIC GERD: ICD-10-CM

## 2019-02-18 PROCEDURE — 99999 PR PBB SHADOW E&M-EST. PATIENT-LVL III: CPT | Mod: PBBFAC,,, | Performed by: INTERNAL MEDICINE

## 2019-02-18 PROCEDURE — 99999 PR PBB SHADOW E&M-EST. PATIENT-LVL III: ICD-10-PCS | Mod: PBBFAC,,, | Performed by: INTERNAL MEDICINE

## 2019-02-18 PROCEDURE — 99214 OFFICE O/P EST MOD 30 MIN: CPT | Mod: S$GLB,,, | Performed by: INTERNAL MEDICINE

## 2019-02-18 PROCEDURE — 3077F SYST BP >= 140 MM HG: CPT | Mod: CPTII,S$GLB,, | Performed by: INTERNAL MEDICINE

## 2019-02-18 PROCEDURE — 3008F BODY MASS INDEX DOCD: CPT | Mod: CPTII,S$GLB,, | Performed by: INTERNAL MEDICINE

## 2019-02-18 PROCEDURE — 3008F PR BODY MASS INDEX (BMI) DOCUMENTED: ICD-10-PCS | Mod: CPTII,S$GLB,, | Performed by: INTERNAL MEDICINE

## 2019-02-18 PROCEDURE — 3079F DIAST BP 80-89 MM HG: CPT | Mod: CPTII,S$GLB,, | Performed by: INTERNAL MEDICINE

## 2019-02-18 PROCEDURE — 99214 PR OFFICE/OUTPT VISIT, EST, LEVL IV, 30-39 MIN: ICD-10-PCS | Mod: S$GLB,,, | Performed by: INTERNAL MEDICINE

## 2019-02-18 PROCEDURE — 3077F PR MOST RECENT SYSTOLIC BLOOD PRESSURE >= 140 MM HG: ICD-10-PCS | Mod: CPTII,S$GLB,, | Performed by: INTERNAL MEDICINE

## 2019-02-18 PROCEDURE — 3079F PR MOST RECENT DIASTOLIC BLOOD PRESSURE 80-89 MM HG: ICD-10-PCS | Mod: CPTII,S$GLB,, | Performed by: INTERNAL MEDICINE

## 2019-02-18 RX ORDER — LOSARTAN POTASSIUM 50 MG/1
50 TABLET ORAL EVERY MORNING
Qty: 90 TABLET | Refills: 1 | Status: SHIPPED | OUTPATIENT
Start: 2019-02-18 | End: 2019-08-12 | Stop reason: SDUPTHER

## 2019-02-18 NOTE — PROGRESS NOTES
Subjective:       Patient ID: Vincent Benton is a 63 y.o. male.    Chief Complaint: Establish Care    63 y.o. White male     Patient presents with:  Establish Care    HPI: Here today to establish care.  HTN--slightly elevated. He has been out of losartan for one month. He denies symptoms.                      LDLCALC                  58.4 (L)            04/07/2018            GERD--stable on pantoprazole.   Anemia--compliant with iron. He is scheduled for upper and lower endoscopies next month. He denies symptoms.                HGB                      9.1 (L)             11/06/2018                 HCT                      32.1 (L)            11/06/2018                 MCV                      82                  11/06/2018                             IRON                     <10 (L)             04/13/2018                 TIBC                     527 (H)             04/13/2018                 FERRITIN                 6 (L)               04/13/2018              Past Medical History:  Alcoholism  Anemia  Back pain  GERD (gastroesophageal reflux disease)  Hiatal hernia  Hypertension    Past Surgical History:  HIP FRACTURE SURGERY      Comment:  left hip    Review of patient's family history indicates:  Problem: Cataracts      Relation: Mother          Age of Onset: (Not Specified)  Problem: Stroke      Relation: Father          Age of Onset: (Not Specified)  Problem: Hypertension      Relation: Father          Age of Onset: (Not Specified)      Current Outpatient Medications on File Prior to Visit:  cyanocobalamin (VITAMIN B-12) 1000 MCG tablet, Take 100 mcg by mouth once daily., Disp: , Rfl:   ferrous sulfate 324 mg (65 mg iron) TbEC, Take 1 tablet (324 mg total) by mouth 2 (two) times daily., Disp: 60 tablet, Rfl: 0  oxyCODONE-acetaminophen (PERCOCET)  mg per tablet, , Disp: , Rfl:   pantoprazole (PROTONIX) 40 MG tablet, Take 1 tablet (40 mg total) by mouth once daily., Disp: 30 tablet, Rfl: 11  losartan  (COZAAR) 50 MG tablet, TAKE 1 TABLET BY MOUTH EVERY MORNING, Disp: 90 tablet, Rfl: 0    Allergies:   Review of patient's allergies indicates:  No Known Allergies              Review of Systems   Constitutional: Negative for fatigue, fever and unexpected weight change.   HENT: Negative for trouble swallowing and voice change.    Eyes: Positive for visual disturbance.   Respiratory: Negative for cough and shortness of breath.    Cardiovascular: Negative for chest pain, palpitations and leg swelling.   Gastrointestinal: Negative for abdominal pain, blood in stool, constipation and diarrhea.   Genitourinary: Negative for difficulty urinating.   Musculoskeletal: Positive for back pain. Negative for arthralgias and gait problem.   Neurological: Negative for dizziness and headaches.   Psychiatric/Behavioral: Negative for dysphoric mood and sleep disturbance. The patient is not nervous/anxious.        Objective:      Physical Exam   Constitutional: He is oriented to person, place, and time. He appears well-developed and well-nourished. No distress.   Eyes: No scleral icterus.   Cardiovascular: Normal rate, regular rhythm and normal heart sounds.   Pulmonary/Chest: Effort normal and breath sounds normal. No respiratory distress. He has no wheezes. He has no rales.   Abdominal: Soft. Bowel sounds are normal.   Musculoskeletal: He exhibits no edema.   Neurological: He is alert and oriented to person, place, and time.   Skin: Skin is warm and dry.   Psychiatric: He has a normal mood and affect.   Vitals reviewed.      Assessment:       1. Hypertension goal BP (blood pressure) < 140/90    2. Chronic GERD    3. Iron deficiency anemia, unspecified iron deficiency anemia type    4. Prostate cancer screening        Plan:       Vincent was seen today for establish care.    Diagnoses and all orders for this visit:    Hypertension goal BP (blood pressure) < 140/90  -     Refill losartan (COZAAR) 50 MG tablet; Take 1 tablet (50 mg  total) by mouth every morning.  -     Basic metabolic panel; Standing  -     Lipid panel; Standing    Chronic GERD  -     Continue pantoprazole    Iron deficiency anemia, unspecified iron deficiency anemia type  -     CBC auto differential; Standing  -     Ferritin; Standing    Prostate cancer screening  -     PSA, Screening; Future    RTC in 2 weeks for b/p recheck.

## 2019-03-04 ENCOUNTER — CLINICAL SUPPORT (OUTPATIENT)
Dept: INTERNAL MEDICINE | Facility: CLINIC | Age: 64
End: 2019-03-04
Payer: MEDICARE

## 2019-03-04 VITALS — SYSTOLIC BLOOD PRESSURE: 134 MMHG | DIASTOLIC BLOOD PRESSURE: 82 MMHG

## 2019-03-04 NOTE — PROGRESS NOTES
Patient came in this morning for a blood pressure check.  He said he takes his losartan in the evening because he doesn't want to take it on an empty stomach.    Right arm  134/82

## 2019-06-18 ENCOUNTER — PATIENT OUTREACH (OUTPATIENT)
Dept: ADMINISTRATIVE | Facility: HOSPITAL | Age: 64
End: 2019-06-18

## 2019-08-12 DIAGNOSIS — I10 HYPERTENSION GOAL BP (BLOOD PRESSURE) < 140/90: ICD-10-CM

## 2019-08-14 RX ORDER — LOSARTAN POTASSIUM 50 MG/1
TABLET ORAL
Qty: 90 TABLET | Refills: 0 | Status: SHIPPED | OUTPATIENT
Start: 2019-08-14 | End: 2020-01-07

## 2019-08-29 ENCOUNTER — TELEPHONE (OUTPATIENT)
Dept: ENDOSCOPY | Facility: HOSPITAL | Age: 64
End: 2019-08-29

## 2019-08-29 NOTE — TELEPHONE ENCOUNTER
----- Message from Eleanor Crowder sent at 8/29/2019  9:16 AM CDT -----  Contact: pt   Call pt in regards to some questions that he has about procedure that is schedule for 09/03/19.   [phone] (home)

## 2019-09-02 PROBLEM — D50.0 IRON DEFICIENCY ANEMIA DUE TO CHRONIC BLOOD LOSS: Status: ACTIVE | Noted: 2019-09-02

## 2019-09-03 ENCOUNTER — HOSPITAL ENCOUNTER (OUTPATIENT)
Facility: HOSPITAL | Age: 64
Discharge: HOME | End: 2019-09-03
Attending: FAMILY MEDICINE | Admitting: FAMILY MEDICINE
Payer: MEDICARE

## 2019-09-03 ENCOUNTER — OFFICE VISIT (OUTPATIENT)
Dept: SURGERY | Facility: CLINIC | Age: 64
End: 2019-09-03
Payer: MEDICARE

## 2019-09-03 ENCOUNTER — ANESTHESIA EVENT (OUTPATIENT)
Dept: ENDOSCOPY | Facility: HOSPITAL | Age: 64
End: 2019-09-03
Payer: MEDICARE

## 2019-09-03 ENCOUNTER — ANESTHESIA (OUTPATIENT)
Dept: ENDOSCOPY | Facility: HOSPITAL | Age: 64
End: 2019-09-03
Payer: MEDICARE

## 2019-09-03 VITALS
DIASTOLIC BLOOD PRESSURE: 88 MMHG | TEMPERATURE: 98 F | SYSTOLIC BLOOD PRESSURE: 150 MMHG | HEART RATE: 63 BPM | WEIGHT: 153.88 LBS | BODY MASS INDEX: 22.72 KG/M2

## 2019-09-03 DIAGNOSIS — D50.0 IRON DEFICIENCY ANEMIA DUE TO CHRONIC BLOOD LOSS: Primary | ICD-10-CM

## 2019-09-03 DIAGNOSIS — K29.70 GASTRITIS, PRESENCE OF BLEEDING UNSPECIFIED, UNSPECIFIED CHRONICITY, UNSPECIFIED GASTRITIS TYPE: ICD-10-CM

## 2019-09-03 DIAGNOSIS — D50.9 IRON DEFICIENCY ANEMIA, UNSPECIFIED IRON DEFICIENCY ANEMIA TYPE: ICD-10-CM

## 2019-09-03 DIAGNOSIS — K92.1 HEMATOCHEZIA: ICD-10-CM

## 2019-09-03 DIAGNOSIS — C20 MALIGNANT NEOPLASM OF RECTUM: Primary | ICD-10-CM

## 2019-09-03 DIAGNOSIS — K62.89 RECTAL MASS: ICD-10-CM

## 2019-09-03 DIAGNOSIS — D50.0 IRON DEFICIENCY ANEMIA DUE TO CHRONIC BLOOD LOSS: ICD-10-CM

## 2019-09-03 DIAGNOSIS — K64.8 INTERNAL HEMORRHOIDS: ICD-10-CM

## 2019-09-03 DIAGNOSIS — K44.9 HIATAL HERNIA: ICD-10-CM

## 2019-09-03 DIAGNOSIS — D50.9 IDA (IRON DEFICIENCY ANEMIA): ICD-10-CM

## 2019-09-03 PROCEDURE — 88305 TISSUE SPECIMEN TO PATHOLOGY - SURGERY: ICD-10-PCS | Mod: 26,,, | Performed by: PATHOLOGY

## 2019-09-03 PROCEDURE — 3079F PR MOST RECENT DIASTOLIC BLOOD PRESSURE 80-89 MM HG: ICD-10-PCS | Mod: CPTII,S$GLB,, | Performed by: COLON & RECTAL SURGERY

## 2019-09-03 PROCEDURE — 45380 COLONOSCOPY AND BIOPSY: CPT | Performed by: FAMILY MEDICINE

## 2019-09-03 PROCEDURE — 88341 IMHCHEM/IMCYTCHM EA ADD ANTB: CPT | Mod: 26,,, | Performed by: PATHOLOGY

## 2019-09-03 PROCEDURE — 63600175 PHARM REV CODE 636 W HCPCS: Performed by: FAMILY MEDICINE

## 2019-09-03 PROCEDURE — 99999 PR PBB SHADOW E&M-EST. PATIENT-LVL III: CPT | Mod: PBBFAC,,, | Performed by: COLON & RECTAL SURGERY

## 2019-09-03 PROCEDURE — 45300 PROCTOSIGMOIDOSCOPY DX: CPT | Mod: S$GLB,,, | Performed by: COLON & RECTAL SURGERY

## 2019-09-03 PROCEDURE — 37000008 HC ANESTHESIA 1ST 15 MINUTES: Performed by: FAMILY MEDICINE

## 2019-09-03 PROCEDURE — 3079F DIAST BP 80-89 MM HG: CPT | Mod: CPTII,S$GLB,, | Performed by: COLON & RECTAL SURGERY

## 2019-09-03 PROCEDURE — 43239 PR EGD, FLEX, W/BIOPSY, SGL/MULTI: ICD-10-PCS | Mod: 51,,, | Performed by: FAMILY MEDICINE

## 2019-09-03 PROCEDURE — 45380 PR COLONOSCOPY,BIOPSY: ICD-10-PCS | Mod: 52,,, | Performed by: FAMILY MEDICINE

## 2019-09-03 PROCEDURE — 88342 TISSUE SPECIMEN TO PATHOLOGY - SURGERY: ICD-10-PCS | Mod: 26,,, | Performed by: PATHOLOGY

## 2019-09-03 PROCEDURE — 63600175 PHARM REV CODE 636 W HCPCS: Performed by: NURSE ANESTHETIST, CERTIFIED REGISTERED

## 2019-09-03 PROCEDURE — 27201012 HC FORCEPS, HOT/COLD, DISP: Performed by: FAMILY MEDICINE

## 2019-09-03 PROCEDURE — 37000009 HC ANESTHESIA EA ADD 15 MINS: Performed by: FAMILY MEDICINE

## 2019-09-03 PROCEDURE — 63600175 PHARM REV CODE 636 W HCPCS: Performed by: INTERNAL MEDICINE

## 2019-09-03 PROCEDURE — 88305 TISSUE EXAM BY PATHOLOGIST: CPT | Mod: 26,,, | Performed by: PATHOLOGY

## 2019-09-03 PROCEDURE — 99204 PR OFFICE/OUTPT VISIT, NEW, LEVL IV, 45-59 MIN: ICD-10-PCS | Mod: 25,S$GLB,, | Performed by: COLON & RECTAL SURGERY

## 2019-09-03 PROCEDURE — 43239 EGD BIOPSY SINGLE/MULTIPLE: CPT | Mod: 51,,, | Performed by: FAMILY MEDICINE

## 2019-09-03 PROCEDURE — 45300 PR PROCTOSIGMOIDOSCOPY,RIGID,DIAG2S: ICD-10-PCS | Mod: S$GLB,,, | Performed by: COLON & RECTAL SURGERY

## 2019-09-03 PROCEDURE — 99999 PR PBB SHADOW E&M-EST. PATIENT-LVL III: ICD-10-PCS | Mod: PBBFAC,,, | Performed by: COLON & RECTAL SURGERY

## 2019-09-03 PROCEDURE — 3008F PR BODY MASS INDEX (BMI) DOCUMENTED: ICD-10-PCS | Mod: CPTII,S$GLB,, | Performed by: COLON & RECTAL SURGERY

## 2019-09-03 PROCEDURE — 88342 IMHCHEM/IMCYTCHM 1ST ANTB: CPT | Mod: 26,,, | Performed by: PATHOLOGY

## 2019-09-03 PROCEDURE — 43239 EGD BIOPSY SINGLE/MULTIPLE: CPT | Performed by: FAMILY MEDICINE

## 2019-09-03 PROCEDURE — 3077F SYST BP >= 140 MM HG: CPT | Mod: CPTII,S$GLB,, | Performed by: COLON & RECTAL SURGERY

## 2019-09-03 PROCEDURE — 99204 OFFICE O/P NEW MOD 45 MIN: CPT | Mod: 25,S$GLB,, | Performed by: COLON & RECTAL SURGERY

## 2019-09-03 PROCEDURE — 88341 PR IHC OR ICC EACH ADD'L SINGLE ANTIBODY  STAINPR: ICD-10-PCS | Mod: 26,,, | Performed by: PATHOLOGY

## 2019-09-03 PROCEDURE — 88305 TISSUE EXAM BY PATHOLOGIST: CPT | Mod: 59 | Performed by: PATHOLOGY

## 2019-09-03 PROCEDURE — 45380 COLONOSCOPY AND BIOPSY: CPT | Mod: 52,,, | Performed by: FAMILY MEDICINE

## 2019-09-03 PROCEDURE — 3077F PR MOST RECENT SYSTOLIC BLOOD PRESSURE >= 140 MM HG: ICD-10-PCS | Mod: CPTII,S$GLB,, | Performed by: COLON & RECTAL SURGERY

## 2019-09-03 PROCEDURE — 3008F BODY MASS INDEX DOCD: CPT | Mod: CPTII,S$GLB,, | Performed by: COLON & RECTAL SURGERY

## 2019-09-03 RX ORDER — SODIUM CHLORIDE 0.9 % (FLUSH) 0.9 %
3 SYRINGE (ML) INJECTION
Status: ACTIVE | OUTPATIENT
Start: 2019-09-03

## 2019-09-03 RX ORDER — SODIUM CHLORIDE, SODIUM LACTATE, POTASSIUM CHLORIDE, CALCIUM CHLORIDE 600; 310; 30; 20 MG/100ML; MG/100ML; MG/100ML; MG/100ML
INJECTION, SOLUTION INTRAVENOUS CONTINUOUS
Status: DISCONTINUED | OUTPATIENT
Start: 2019-09-03 | End: 2020-02-05 | Stop reason: ALTCHOICE

## 2019-09-03 RX ORDER — SODIUM CHLORIDE, SODIUM LACTATE, POTASSIUM CHLORIDE, CALCIUM CHLORIDE 600; 310; 30; 20 MG/100ML; MG/100ML; MG/100ML; MG/100ML
INJECTION, SOLUTION INTRAVENOUS CONTINUOUS
Status: DISCONTINUED | OUTPATIENT
Start: 2019-09-03 | End: 2019-09-03 | Stop reason: HOSPADM

## 2019-09-03 RX ORDER — PROPOFOL 10 MG/ML
INJECTION, EMULSION INTRAVENOUS
Status: DISCONTINUED | OUTPATIENT
Start: 2019-09-03 | End: 2019-09-03

## 2019-09-03 RX ORDER — SODIUM CHLORIDE 0.9 % (FLUSH) 0.9 %
10 SYRINGE (ML) INJECTION
Status: DISCONTINUED | OUTPATIENT
Start: 2019-09-03 | End: 2019-09-03 | Stop reason: HOSPADM

## 2019-09-03 RX ORDER — SUCRALFATE 1 G/1
1 TABLET ORAL 4 TIMES DAILY
Qty: 120 TABLET | Refills: 1 | Status: SHIPPED | OUTPATIENT
Start: 2019-09-03 | End: 2019-11-02

## 2019-09-03 RX ADMIN — PROPOFOL 50 MG: 10 INJECTION, EMULSION INTRAVENOUS at 10:09

## 2019-09-03 RX ADMIN — PROPOFOL 100 MG: 10 INJECTION, EMULSION INTRAVENOUS at 10:09

## 2019-09-03 RX ADMIN — SODIUM CHLORIDE, SODIUM LACTATE, POTASSIUM CHLORIDE, AND CALCIUM CHLORIDE: .6; .31; .03; .02 INJECTION, SOLUTION INTRAVENOUS at 09:09

## 2019-09-03 RX ADMIN — SODIUM CHLORIDE, SODIUM LACTATE, POTASSIUM CHLORIDE, AND CALCIUM CHLORIDE: 600; 310; 30; 20 INJECTION, SOLUTION INTRAVENOUS at 09:09

## 2019-09-03 NOTE — ANESTHESIA POSTPROCEDURE EVALUATION
Anesthesia Post Evaluation    Patient: Vincent Benton    Procedure(s) Performed: Procedure(s) (LRB):  ESOPHAGOGASTRODUODENOSCOPY (EGD) (N/A)  COLONOSCOPY (N/A)    Final Anesthesia Type: MAC  Patient location during evaluation: PACU  Patient participation: Yes- Able to Participate  Level of consciousness: awake and alert and oriented  Post-procedure vital signs: reviewed and stable  Pain management: adequate  Airway patency: patent  PONV status at discharge: No PONV  Anesthetic complications: no      Cardiovascular status: blood pressure returned to baseline and stable  Respiratory status: unassisted, room air and spontaneous ventilation  Hydration status: euvolemic  Follow-up not needed.          Vitals Value Taken Time   /99 9/3/2019  9:24 AM   Temp 36.4 °C (97.5 °F) 9/3/2019  9:24 AM   Pulse 67 9/3/2019  9:24 AM   Resp 20 9/3/2019  9:24 AM   SpO2 97 % 9/3/2019  9:24 AM         No case tracking events are documented in the log.      Pain/Ana Score: No data recorded

## 2019-09-03 NOTE — ANESTHESIA RELEASE NOTE
"Anesthesia Release from PACU Note    Patient: Vincent Benton    Procedure(s) Performed: Procedure(s) (LRB):  ESOPHAGOGASTRODUODENOSCOPY (EGD) (N/A)  COLONOSCOPY (N/A)    Anesthesia type: MAC    Post pain: Adequate analgesia    Post assessment: no apparent anesthetic complications and tolerated procedure well    Last Vitals:   Visit Vitals  BP (!) 153/99 (Patient Position: Lying)   Pulse 67   Temp 36.4 °C (97.5 °F)   Resp 20   Ht 5' 9" (1.753 m)   Wt 69.3 kg (152 lb 12.5 oz)   SpO2 97%   BMI 22.56 kg/m²       Post vital signs: stable    Level of consciousness: awake, alert  and oriented    Nausea/Vomiting: no nausea/no vomiting    Complications: none    Airway Patency: patent    Respiratory: unassisted, spontaneous ventilation, room air    Cardiovascular: stable and blood pressure at baseline    Hydration: euvolemic  "

## 2019-09-03 NOTE — H&P
Short Stay Endoscopy History and Physical    PCP - Shakila Moran, DO    Procedure - EGD and colonoscopy  ASA - 2  Mallampati - per anesthesia  History of Anesthesia problems - no  Family history Anesthesia problems -  no     HPI:  This is a 63 y.o. male here for evaluation of :   Active Hospital Problems    Diagnosis  POA    *Iron deficiency anemia due to chronic blood loss [D50.0]  Yes    BASILIA (iron deficiency anemia) [D50.9]  Yes      Resolved Hospital Problems   No resolved problems to display.         Health Maintenance       Date Due Completion Date    TETANUS VACCINE 09/30/1975 ---    Pneumococcal Vaccine (Medium Risk) (1 of 1 - PPSV23) 09/30/1976 ---    Shingles Vaccine (1 of 2) 09/30/2007 ---    Colonoscopy 09/30/2007 ---    Influenza Vaccine (1) 09/01/2019 11/26/2018    Lipid Panel 04/07/2023 4/7/2018          EGD  Reflux - no  Dysphagia - no  Abdominal pain - no  Diarrhea - no  Anemia - yes  GI bleeding - no  Other - no    Colonoscopy  Screening - no  History of polyps - no  Diarrhea - no  Anemia - yes  Blood in stool-no  Abdominal pain - no  Other - no    ROS:  CONSTITUTIONAL: Denies weight change,  fatigue, fevers, chills, night sweats.  CARDIOVASCULAR: Denies chest pain, shortness of breath, orthopnea and edema.  RESPIRATORY: Denies cough, hemoptysis, dyspnea, and wheezing.  GI: See HPI.    Medical History:   Past Medical History:   Diagnosis Date    Alcoholism     Anemia     Back pain     Encounter for blood transfusion     GERD (gastroesophageal reflux disease)     Hiatal hernia     Hypertension        Surgical History:   Past Surgical History:   Procedure Laterality Date    HIP FRACTURE SURGERY      left hip    JOINT REPLACEMENT Left        Family History:   Family History   Problem Relation Age of Onset    Cataracts Mother     Stroke Father     Hypertension Father        Social History:   Social History     Tobacco Use    Smoking status: Never Smoker    Smokeless tobacco: Never  Used   Substance Use Topics    Alcohol use: Yes     Comment: states he drinks frequently everyday/quit approx 10/2017    Drug use: No       Allergies:   Review of patient's allergies indicates:  No Known Allergies    Medications:   No current facility-administered medications on file prior to encounter.      Current Outpatient Medications on File Prior to Encounter   Medication Sig Dispense Refill    cyanocobalamin (VITAMIN B-12) 1000 MCG tablet Take 100 mcg by mouth once daily.      ferrous sulfate 324 mg (65 mg iron) TbEC Take 1 tablet (324 mg total) by mouth 2 (two) times daily. 60 tablet 0    oxyCODONE-acetaminophen (PERCOCET)  mg per tablet          Physical Exam:  Vital Signs:   Vitals:    09/03/19 0924   BP: (!) 153/99   Pulse: 67   Resp: 20   Temp: 97.5 °F (36.4 °C)     General Appearance: Well appearing in no acute distress  ENT: OP clear  Chest: CTA B  CV: RRR, no m/r/g  Abd: s/nt/nd/nabs  Ext: no edema    Labs:Reviewed    IMP:  Active Hospital Problems    Diagnosis  POA    *Iron deficiency anemia due to chronic blood loss [D50.0]  Yes    BASILIA (iron deficiency anemia) [D50.9]  Yes      Resolved Hospital Problems   No resolved problems to display.         Plan:   I have explained the risks and benefits of upper endoscopy and colonoscopy to the patient including but not limited to bleeding, perforation, infection, and death. The patient wishes to proceed.

## 2019-09-03 NOTE — [employer] PATIENT INSTRUCTIONS
Discharge Summary/Instructions after an Endoscopic Procedure  Patient Name: Vincent Benton  Patient MRN: [id number]  Patient YOB: 1955 Tuesday, September 03, 2019 Isai Centeno MD  RESTRICTIONS:  During your procedure today, you received medications for sedation.  These   medications may affect your judgment, balance and coordination.  Therefore,   for 24 hours, you have the following restrictions:   - DO NOT drive a car, operate machinery, make legal/financial decisions,   sign important papers or drink alcohol.    ACTIVITY:  Today: no heavy lifting, straining or running due to procedural   sedation/anesthesia.  The following day: return to full activity including work.  DIET:  Eat and drink normally unless instructed otherwise.     TREATMENT FOR COMMON SIDE EFFECTS:  - Mild abdominal pain, nausea, belching, bloating or excessive gas:  rest,   eat lightly and use a heating pad.  - Sore Throat: treat with throat lozenges and/or gargle with warm salt   water.  - Because air was used during the procedure, expelling large amounts of air   from your rectum or belching is normal.  - If a bowel prep was taken, you may not have a bowel movement for 1-3 days.    This is normal.  SYMPTOMS TO WATCH FOR AND REPORT TO YOUR PHYSICIAN:  1. Abdominal pain or bloating, other than gas cramps.  2. Chest pain.  3. Back pain.  4. Signs of infection such as: chills or fever occurring within 24 hours   after the procedure.  5. Rectal bleeding, which would show as bright red, maroon, or black stools.   (A tablespoon of blood from the rectum is not serious, especially if   hemorrhoids are present.)  6. Vomiting.  7. Weakness or dizziness.  GO DIRECTLY TO THE NEAREST EMERGENCY ROOM IF YOU HAVE ANY OF THE FOLLOWING:      Difficulty breathing              Chills and/or fever over 101 F   Persistent vomiting and/or vomiting blood   Severe abdominal pain   Severe chest pain   Black, tarry stools   Bleeding- more than one  tablespoon   Any other symptom or condition that you feel may need urgent attention  Your doctor recommends these additional instructions:  If any biopsies were taken, your doctors clinic will contact you in 1 to 2   weeks with any results.  - Patient has a contact number available for emergencies.  The signs and   symptoms of potential delayed complications were discussed with the   patient.  Return to normal activities tomorrow.  Written discharge   instructions were provided to the patient.   - The patient should eat a bland diet and avoid caffeine, carbonation,   alcohol, nicotine, aspirin and NSAID's including ibuprofen and advil.    - Continue present medications.   - Use sucralfate tablets 1 gram PO QID for 2 months.   - Telephone my office for pathology results in 1 week.   - Discharge patient to home (via wheelchair).  For questions, problems or results please call your physician Isai Centeno MD at Work:  [phone]  If you have any questions about the above instructions, call the GI   department at [phone] or call the endoscopy unit at [phone]   from 7am until 3 pm.  [facility] [place], EMERGENCY ROOM PHONE NUMBER:   [phone]  IF A COMPLICATION OR EMERGENCY SITUATION ARISES AND YOU ARE UNABLE TO REACH   YOUR PHYSICIAN - GO DIRECTLY TO THE EMERGENCY ROOM.  I have read or have had read to me these discharge instructions for my   procedure and have received a written copy.  I understand these   instructions and will follow-up with my physician if I have any questions.     __________________________________       _____________________________________  Nurse Signature                                          Patient/Designated   Responsible Party Signature  Isai Centeno MD  9/3/2019 10:35:48 AM  This report has been verified and signed electronically.  [employer]

## 2019-09-03 NOTE — ANESTHESIA PREPROCEDURE EVALUATION
09/03/2019  Vincent Benton is a 63 y.o., male.    Anesthesia Evaluation    I have reviewed the Patient Summary Reports.    I have reviewed the Nursing Notes.   I have reviewed the Medications.     Review of Systems  Anesthesia Hx:  No problems with previous Anesthesia  Neg history of prior surgery. Denies Family Hx of Anesthesia complications.   Denies Personal Hx of Anesthesia complications.   Hematology/Oncology:     Oncology Normal    -- Anemia:   EENT/Dental:EENT/Dental Normal   Cardiovascular:   Hypertension, well controlled    Pulmonary:  Pulmonary Normal    Renal/:  Renal/ Normal     Hepatic/GI:   Hiatal Hernia, GERD    Musculoskeletal:  Musculoskeletal Normal    Neurological:   Neuromuscular Disease,    Endocrine:  Endocrine Normal    Dermatological:  Skin Normal    Psych:   Psychiatric History          Physical Exam  General:  Well nourished    Airway/Jaw/Neck:       Eyes/Ears/Nose:  EYES/EARS/NOSE FINDINGS: Normal   Dental:  Dental Findings: In tact   Chest/Lungs:  Chest/Lungs Clear    Heart/Vascular:  Heart Findings: Normal Vascular Findings: Normal    Abdomen:  Abdomen Findings: Normal    Musculoskeletal:  Musculoskeletal Findings: Normal   Skin:  Skin Findings: Normal    Mental Status:  Mental Status Findings: Normal        Anesthesia Plan  Type of Anesthesia, risks & benefits discussed:  Anesthesia Type:  MAC  Patient's Preference:   Intra-op Monitoring Plan: standard ASA monitors  Intra-op Monitoring Plan Comments:   Post Op Pain Control Plan: multimodal analgesia  Post Op Pain Control Plan Comments:   Induction:   IV  Beta Blocker:  Patient is not currently on a Beta-Blocker (No further documentation required).       Informed Consent: Patient understands risks and agrees with Anesthesia plan.  Questions answered. Anesthesia consent signed with patient.  ASA Score: 2     Day of  Surgery Review of History & Physical: I have interviewed and examined the patient. I have reviewed the patient's H&P dated:  There are no significant changes.      Anesthesia Plan Notes: Pt states teeth intact and a few missing. Poor dentition        Ready For Surgery From Anesthesia Perspective.

## 2019-09-03 NOTE — PROGRESS NOTES
History & Physical    SUBJECTIVE:     Chief Complaint   Patient presents with    Consult     Rectal Mass // Obstructing   Ref MD: Isai Centeno MD    History of Present Illness:  Patient is a 63 y.o. male presents for evaluation of a rectal mass.  Patient has been having iron deficiency anemia that did require transfusion around 1 year ago.  He also has had associated hematochezia for over a year now but did improve after he quit drinking beer at 8 months ago but is still intermittently present. This prompted a colonoscopy which was performed on 09/03/2019 where an obstructing rectal mass was seen that appeared malignant was biopsied and could not be traversed.  Patient reports that he had a colonoscopy around 6-7 years ago were some benign polyps were found but no malignancy.  These records are not available.  He states that the hematochezia has recently improved after he quit drinking beer, but is still intermittently present and worsen with the bowel prep from the colonoscopy recently.  He is not on any chronic anticoagulation.  He states he has had normal caliber bowel movements does not feel constipated or obstructed.  He denies any fever, chills, nausea, vomiting, diarrhea, constipation, weight loss, night sweats or any other signs or symptoms.    PMHx: HTN, GERD  PSHx: L hip sx  Fam Hx: No CRC or IBD; +colonic polyps in brother  All: NKDA  SocHx: previous etoh; no tob or illicits; works as a     Review of patient's allergies indicates:  No Known Allergies    Current Outpatient Medications   Medication Sig Dispense Refill    cyanocobalamin (VITAMIN B-12) 1000 MCG tablet Take 100 mcg by mouth once daily.      ferrous sulfate 324 mg (65 mg iron) TbEC Take 1 tablet (324 mg total) by mouth 2 (two) times daily. 60 tablet 0    losartan (COZAAR) 50 MG tablet TAKE 1 TABLET BY MOUTH EVERY MORNING 90 tablet 0    pantoprazole (PROTONIX) 40 MG tablet Take 1 tablet (40 mg total) by mouth once daily. 30 tablet 11     sucralfate (CARAFATE) 1 gram tablet Take 1 tablet (1 g total) by mouth 4 (four) times daily. 120 tablet 1     No current facility-administered medications for this visit.      Facility-Administered Medications Ordered in Other Visits   Medication Dose Route Frequency Provider Last Rate Last Dose    lactated ringers infusion   Intravenous Continuous Shilpa Yee MD        sodium chloride 0.9% flush 3 mL  3 mL Intravenous PRN Shilpa Yee MD           Past Medical History:   Diagnosis Date    Alcoholism     Anemia     Back pain     Encounter for blood transfusion     GERD (gastroesophageal reflux disease)     Hiatal hernia     Hypertension      Past Surgical History:   Procedure Laterality Date    HIP FRACTURE SURGERY      left hip    JOINT REPLACEMENT Left      Family History   Problem Relation Age of Onset    Cataracts Mother     Stroke Father     Hypertension Father      Social History     Tobacco Use    Smoking status: Never Smoker    Smokeless tobacco: Never Used   Substance Use Topics    Alcohol use: Yes     Comment: states he drinks frequently everyday/quit approx 10/2017    Drug use: No        Review of Systems:  Review of Systems   Constitutional: Negative for activity change, appetite change, chills, fatigue, fever and unexpected weight change.   HENT: Negative for congestion, ear pain, sore throat and trouble swallowing.    Eyes: Negative for pain, redness and itching.   Respiratory: Negative for cough, shortness of breath and wheezing.    Cardiovascular: Negative for chest pain, palpitations and leg swelling.   Gastrointestinal: Positive for anal bleeding and blood in stool. Negative for abdominal distention, abdominal pain, constipation, diarrhea, nausea, rectal pain and vomiting.   Endocrine: Negative for cold intolerance, heat intolerance and polyuria.   Genitourinary: Negative for dysuria, flank pain, frequency and hematuria.   Musculoskeletal: Negative for gait problem, joint  swelling and neck pain.   Skin: Negative for color change, rash and wound.   Allergic/Immunologic: Negative for environmental allergies and immunocompromised state.   Neurological: Negative for dizziness, speech difficulty, weakness and numbness.   Psychiatric/Behavioral: Negative for agitation, confusion and hallucinations.       OBJECTIVE:     Vital Signs (Most Recent)  Temp: 97.9 °F (36.6 °C) (09/03/19 1159)  Pulse: 63 (09/03/19 1159)  BP: (!) 150/88 (09/03/19 1159)     69.8 kg (153 lb 14.1 oz)     Physical Exam:  Physical Exam   Constitutional: He is oriented to person, place, and time. He appears well-developed.   HENT:   Head: Normocephalic and atraumatic.   Eyes: Conjunctivae and EOM are normal.   Neck: Normal range of motion. No thyromegaly present.   Cardiovascular: Normal rate and regular rhythm.   Pulmonary/Chest: Effort normal. No respiratory distress.   Abdominal: Soft. He exhibits no distension and no mass. There is no tenderness.   Genitourinary:   Genitourinary Comments: Anorectal: no external lesions; STEVEN: good tone, no blood; no palpable masses or defects   Musculoskeletal: Normal range of motion. He exhibits no edema or tenderness.   Neurological: He is alert and oriented to person, place, and time.   Skin: Skin is warm and dry. Capillary refill takes less than 2 seconds. No rash noted.   Psychiatric: He has a normal mood and affect.     Proctoscopy Procedure Note    Pre-procedure diagnosis: Rectal mass    Post-procedure diagnosis:  Rectal mass    Procedure: Anoscopy    Surgeon: Familia Gonzalez MD    Assistant: Alejandra Sampson LPN    Specimen: none    Findings:  Proctoscope inserted to 10 cm.  Upon insufflation, there is a visible malignant appearing lesion at 9-10 cm from the anal verge.  It is non circumferential but is mostly located in the anterior position and left lateral position. There is oozing and bleeding present from recent biopsies.  The upper extent of this lesion could not be seen due  to patient discomfort.  No lesion distal to this was appreciated.    Patient tolerated procedure well.      Laboratory  Lab Results   Component Value Date    WBC 12.31 11/06/2018    HGB 9.1 (L) 11/06/2018    HCT 32.1 (L) 11/06/2018     (H) 11/06/2018    CHOL 138 04/07/2018    TRIG 38 04/07/2018    HDL 72 04/07/2018    ALT 20 11/06/2018    AST 29 11/06/2018     11/06/2018    K 4.6 11/06/2018     11/06/2018    CREATININE 0.6 11/06/2018    BUN 8 11/06/2018    CO2 26 11/06/2018    TSH 0.601 04/07/2018    INR 0.9 11/06/2018         Diagnostic Results:  Reviewed colonoscopy report and images with rectal mass appreciated    ASSESSMENT/PLAN:     63-year-old male with a rectal mass that appears to be in the mid rectum and malignant-appearing    - discussed with the patient that we would need to await the biopsy results of his rectal mass biopsies on colonoscopy and proceed from there.  Even if the biopsy show that the lesion is non malignant, this is not exclude malignancy.  Discussed that regardless of the biopsy, patient is likely to benefit from management with either surgery versus chemo radiation versus chemotherapy or combination of the 3.  - he will need complete staging workup for this likely malignancy  - will plan for MRI of the pelvis rectal cancer protocol to evaluate the invasion to the rectal wall as well as any suspicious lymph nodes  - will sent for CT chest abdomen pelvis to rule out metastatic disease  - will send for lab workup to include CBC, CMP and CEA level  - once workup is complete, will discuss at [facility] multidisciplinary rectal cancer tumor board  - RTC in 2-3 weeks after staging is complete into a were evaluation is complete for final determination regarding treatment of rectal cancer with either neoadjuvant chemotherapy radiation versus chemotherapy versus surgery    Malignant neoplasm of rectum  -     MRI Rectal Cancer W W/O Contrast; Future; Expected date:  09/03/2019  -     CT Chest Abdoment Pelvis With Contrast; Future; Expected date: 09/03/2019  -     CBC auto differential; Future; Expected date: 09/03/2019  -     Comprehensive metabolic panel; Future; Expected date: 09/03/2019  -     CEA; Future; Expected date: 09/03/2019      Familia Gonzalez MD  Colon and Rectal Surgery  [facility]

## 2019-09-03 NOTE — DISCHARGE INSTRUCTIONS
What Is a Hiatal Hernia?    Hiatal hernia is when the area where the stomach and esophagus meet bulges up through the diaphragm into the chest cavity. In some cases, part of the stomach may bulge above the diaphragm. Stomach acid may move up into the esophagus and cause symptoms. The symptoms are often blamed on gastroesophageal reflux disease (GERD). You may only know about the hernia when it shows up on an X-ray taken for other reasons.   What you may feel  The hiatus is a normal hole in the diaphragm. The esophagus passes through this hole and leads to the stomach. In some cases, part of the stomach may bulge above the diaphragm. This bulge is called a hernia. Stomach acid may move up into the esophagus and cause symptoms.  When you eat, the muscle at the hiatus relaxes to allow food to pass into the stomach. It tightens again to keep food and digestive acids in the stomach.  Many people with hiatal hernias have mild symptoms. You may notice the following GERD symptoms:  · Heartburn or other chest discomfort  · A feeling of chest fullness after a meal  · Frequent burping  · Acid taste in the mouth  · Trouble swallowing  Treating symptoms  If you have been diagnosed with hiatal hernia, these suggestions may help improve symptoms:  · Lose excess weight. Extra weight puts pressure on the stomach and esophagus.  · Dont lie down after eating. Sit up for at least an hour after eating. Lying down after eating can increase symptoms.  · Avoid certain foods and drinks. These include fatty foods, chocolate, coffee, mint, and other foods that cause symptoms for you.  · Dont smoke or drink alcohol. These can worsen symptoms.  · Look at your medicines. Discuss your medicines with your healthcare provider. Many medicines can cause symptoms.  · Consider an antacid medicine. Ask your healthcare provider about over-the-counter and prescription medicines that may help.  · Ask about surgery, if needed. Surgery is a treatment  choice for some people. Your healthcare provider can determine if surgery is an option for you.    Date Last Reviewed: 10/1/2016  © 9378-9578 [employer]. [address] [place] [address], [place] [address]. All rights reserved. This information is not intended as a substitute for professional medical care. Always follow your healthcare professional's instructions.        Gastritis (Adult)    Gastritis is inflammation and irritation of the stomach lining. It can be present for a short time (acute) or be long lasting (chronic). Gastritis is often caused by infection with bacteria called H pylori. More than a third of people in the [place] have this bacteria in their bodies. In many cases, H pylori causes no problems or symptoms. In some people, though, the infection irritates the stomach lining and causes gastritis. Other causes of stomach irritation include drinking alcohol or taking pain-relieving medicines called NSAIDs (such as aspirin or ibuprofen).   Symptoms of gastritis can include:  · Abdominal pain or bloating  · Loss of appetite  · Nausea or vomiting  · Vomiting blood or having black stools  · Feeling more tired than usual  An inflamed and irritated stomach lining is more likely to develop a sore called an ulcer. To help prevent this, gastritis should be treated.  Home care  If needed, medicines may be prescribed. If you have H pylori infection, treating it will likely relieve your symptoms. Other changes can help reduce stomach irritation and help it heal.  · If you have been prescribed medicines for H pylori infection, take them as directed. Take all of the medicine until it is finished or your healthcare provider tells you to stop, even if you feel better.  · Your healthcare provider may recommend avoiding NSAIDs. If you take daily aspirin for your heart or other medical reasons, do not stop without talking to your healthcare provider first.  · Avoid drinking alcohol.  · Stop smoking. Smoking can  irritate the stomach and delay healing. As much as possible, stay away from second hand smoke.  Follow-up care  Follow up with your healthcare provider, or as advised by our staff. Testing may be needed to check for inflammation or an ulcer.  When to seek medical advice  Call your healthcare provider for any of the following:  · Stomach pain that gets worse or moves to the lower right abdomen (appendix area)  · Chest pain that appears or gets worse, or spreads to the back, neck, shoulder, or arm  · Frequent vomiting (cant keep down liquids)  · Blood in the stool or vomit (red or black in color)  · Feeling weak or dizzy  · Fever of 100.4ºF (38ºC) or higher, or as directed by your healthcare provider  Date Last Reviewed: 6/22/2015  © 0531-3332 [employer]. [address] [place] [address], [place] [address]. All rights reserved. This information is not intended as a substitute for professional medical care. Always follow your healthcare professional's instructions.

## 2019-09-03 NOTE — TRANSFER OF CARE
"Anesthesia Transfer of Care Note    Patient: Vincent Benton    Procedure(s) Performed: Procedure(s) (LRB):  ESOPHAGOGASTRODUODENOSCOPY (EGD) (N/A)  COLONOSCOPY (N/A)    Patient location: PACU    Anesthesia Type: MAC    Transport from OR: Transported from OR on room air with adequate spontaneous ventilation    Post pain: adequate analgesia    Post assessment: no apparent anesthetic complications and tolerated procedure well    Post vital signs: stable    Level of consciousness: awake, alert and oriented    Nausea/Vomiting: no nausea/vomiting    Complications: none    Transfer of care protocol was followed      Last vitals:   Visit Vitals  BP (!) 153/99 (Patient Position: Lying)   Pulse 67   Temp 36.4 °C (97.5 °F)   Resp 20   Ht 5' 9" (1.753 m)   Wt 69.3 kg (152 lb 12.5 oz)   SpO2 97%   BMI 22.56 kg/m²     "

## 2019-09-03 NOTE — DISCHARGE SUMMARY
Endoscopy Discharge Summary      Admit Date: 9/3/2019    Discharge Date and Time:  9/3/2019 10:32 AM    Attending Physician: Isai Centeno MD     Discharge Physician: Isai Centeno MD     Principal Admitting Diagnoses: Iron deficiency anemia due to chronic blood loss         Discharge Diagnosis: The primary encounter diagnosis was Iron deficiency anemia due to chronic blood loss. Diagnoses of Iron deficiency anemia, unspecified iron deficiency anemia type, BASILIA (iron deficiency anemia), Rectal mass, Gastritis, presence of bleeding unspecified, unspecified chronicity, unspecified gastritis type, Hiatal hernia, and Internal hemorrhoids were also pertinent to this visit.     Discharged Condition: Good    Indication for Admission: Iron deficiency anemia due to chronic blood loss     Hospital Course: Patient was admitted for an inpatient procedure and tolerated the procedure well with no complications.    Significant Diagnostic Studies: flex sig with biopsy (colonoscopy had to be aborted due to constricting mass) and EGD with cold biopsy    Pathology (if any):  Specimen (12h ago, onward)    Start     Ordered    09/03/19 1008  Specimen to Pathology - Surgery  Once     Comments:  #1 small bowel bx r/o celiac#2 antral gastritis bx r/o h.pylori#3 bx rectal mass     Start Status     09/03/19 1008 Collected (09/03/19 1023) Order ID: [id number]       09/03/19 1022          Estimated Blood Loss: 2 ml.    Discussed with: patient and family.    Disposition: Home.    Follow Up/Patient Instructions:   Current Discharge Medication List      START taking these medications    Details   sucralfate (CARAFATE) 1 gram tablet Take 1 tablet (1 g total) by mouth 4 (four) times daily.  Qty: 120 tablet, Refills: 1         CONTINUE these medications which have NOT CHANGED    Details   cyanocobalamin (VITAMIN B-12) 1000 MCG tablet Take 100 mcg by mouth once daily.      ferrous sulfate 324 mg (65 mg iron) TbEC Take 1 tablet (324 mg total) by  mouth 2 (two) times daily.  Qty: 60 tablet, Refills: 0      losartan (COZAAR) 50 MG tablet TAKE 1 TABLET BY MOUTH EVERY MORNING  Qty: 90 tablet, Refills: 0    Associated Diagnoses: Hypertension goal BP (blood pressure) < 140/90      pantoprazole (PROTONIX) 40 MG tablet Take 1 tablet (40 mg total) by mouth once daily.  Qty: 30 tablet, Refills: 11    Associated Diagnoses: Epigastric abdominal pain; Alcohol use; Abnormal weight loss         STOP taking these medications       oxyCODONE-acetaminophen (PERCOCET)  mg per tablet Comments:   Reason for Stopping:               Discharge Procedure Orders   Diet general     Call MD for:  temperature >100.4     Call MD for:  persistent nausea and vomiting     Call MD for:  severe uncontrolled pain     Call MD for:  difficulty breathing, headache or visual disturbances     Call MD for:  redness, tenderness, or signs of infection (pain, swelling, redness, odor or green/yellow discharge around incision site)     Call MD for:  hives     Call MD for:  persistent dizziness or light-headedness     No dressing needed       Follow-up Information     Isai Centeno MD. Call in 1 week.    Specialty:  Family Medicine  Why:  To receive pathology results.  Contact information:  [id number] [facility] [address]  [place] [address] [phone]             Familia Gonzalez MD. Go today.    Specialty:  Colon and Rectal Surgery  Contact information:  [id number] [place] [address] [address] [place] [address] [phone]

## 2019-09-03 NOTE — [employer] PATIENT INSTRUCTIONS
Discharge Summary/Instructions after an Endoscopic Procedure  Patient Name: Vincent Benton  Patient MRN: [id number]  Patient YOB: 1955 Tuesday, September 03, 2019 Isai Centeno MD  RESTRICTIONS:  During your procedure today, you received medications for sedation.  These   medications may affect your judgment, balance and coordination.  Therefore,   for 24 hours, you have the following restrictions:   - DO NOT drive a car, operate machinery, make legal/financial decisions,   sign important papers or drink alcohol.    ACTIVITY:  Today: no heavy lifting, straining or running due to procedural   sedation/anesthesia.  The following day: return to full activity including work.  DIET:  Eat and drink normally unless instructed otherwise.     TREATMENT FOR COMMON SIDE EFFECTS:  - Mild abdominal pain, nausea, belching, bloating or excessive gas:  rest,   eat lightly and use a heating pad.  - Sore Throat: treat with throat lozenges and/or gargle with warm salt   water.  - Because air was used during the procedure, expelling large amounts of air   from your rectum or belching is normal.  - If a bowel prep was taken, you may not have a bowel movement for 1-3 days.    This is normal.  SYMPTOMS TO WATCH FOR AND REPORT TO YOUR PHYSICIAN:  1. Abdominal pain or bloating, other than gas cramps.  2. Chest pain.  3. Back pain.  4. Signs of infection such as: chills or fever occurring within 24 hours   after the procedure.  5. Rectal bleeding, which would show as bright red, maroon, or black stools.   (A tablespoon of blood from the rectum is not serious, especially if   hemorrhoids are present.)  6. Vomiting.  7. Weakness or dizziness.  GO DIRECTLY TO THE NEAREST EMERGENCY ROOM IF YOU HAVE ANY OF THE FOLLOWING:      Difficulty breathing              Chills and/or fever over 101 F   Persistent vomiting and/or vomiting blood   Severe abdominal pain   Severe chest pain   Black, tarry stools   Bleeding- more than one  tablespoon   Any other symptom or condition that you feel may need urgent attention  Your doctor recommends these additional instructions:  If any biopsies were taken, your doctors clinic will contact you in 1 to 2   weeks with any results.  - Patient has a contact number available for emergencies.  The signs and   symptoms of potential delayed complications were discussed with the   patient.  Return to normal activities tomorrow.  Written discharge   instructions were provided to the patient.   - Resume previous diet.   - Continue present medications.   - Await pathology results.   - Repeat colonoscopy in 1 year for surveillance.   - Telephone my office for pathology results in 1 week.   - Refer to a colo-rectal surgeon today.   - Discharge patient to home (via wheelchair).  For questions, problems or results please call your physician Isai Centeno MD at Work:  [phone]  If you have any questions about the above instructions, call the GI   department at [phone] or call the endoscopy unit at [phone]   from 7am until 3 pm.  [facility], EMERGENCY ROOM PHONE NUMBER:   [phone]  IF A COMPLICATION OR EMERGENCY SITUATION ARISES AND YOU ARE UNABLE TO REACH   YOUR PHYSICIAN - GO DIRECTLY TO THE EMERGENCY ROOM.  I have read or have had read to me these discharge instructions for my   procedure and have received a written copy.  I understand these   instructions and will follow-up with my physician if I have any questions.     __________________________________       _____________________________________  Nurse Signature                                          Patient/Designated   Responsible Party Signature  Isai Centeno MD  9/3/2019 10:29:22 AM  This report has been verified and signed electronically.  [employer]

## 2019-09-04 VITALS
RESPIRATION RATE: 18 BRPM | TEMPERATURE: 98 F | BODY MASS INDEX: 22.63 KG/M2 | DIASTOLIC BLOOD PRESSURE: 87 MMHG | HEART RATE: 70 BPM | SYSTOLIC BLOOD PRESSURE: 156 MMHG | WEIGHT: 152.75 LBS | OXYGEN SATURATION: 97 % | HEIGHT: 69 IN

## 2019-09-06 ENCOUNTER — TELEPHONE (OUTPATIENT)
Dept: RADIOLOGY | Facility: HOSPITAL | Age: 64
End: 2019-09-06

## 2019-09-09 ENCOUNTER — HOSPITAL ENCOUNTER (OUTPATIENT)
Dept: RADIOLOGY | Facility: HOSPITAL | Age: 64
Discharge: HOME | End: 2019-09-09
Attending: COLON & RECTAL SURGERY
Payer: MEDICARE

## 2019-09-09 ENCOUNTER — TELEPHONE (OUTPATIENT)
Dept: RADIOLOGY | Facility: HOSPITAL | Age: 64
End: 2019-09-09

## 2019-09-09 ENCOUNTER — PATIENT OUTREACH (OUTPATIENT)
Dept: ADMINISTRATIVE | Facility: HOSPITAL | Age: 64
End: 2019-09-09

## 2019-09-09 DIAGNOSIS — C20 MALIGNANT NEOPLASM OF RECTUM: ICD-10-CM

## 2019-09-09 PROCEDURE — 74177 CT ABD & PELVIS W/CONTRAST: CPT | Mod: 26,,, | Performed by: RADIOLOGY

## 2019-09-09 PROCEDURE — 71260 CT THORAX DX C+: CPT | Mod: 26,,, | Performed by: RADIOLOGY

## 2019-09-09 PROCEDURE — 25500020 PHARM REV CODE 255: Performed by: COLON & RECTAL SURGERY

## 2019-09-09 PROCEDURE — 72197 MRI RECTAL CANCER W W/O CONTRAST: ICD-10-PCS | Mod: 26,,, | Performed by: RADIOLOGY

## 2019-09-09 PROCEDURE — 74177 CT ABD & PELVIS W/CONTRAST: CPT | Mod: TC

## 2019-09-09 PROCEDURE — 72197 MRI PELVIS W/O & W/DYE: CPT | Mod: TC

## 2019-09-09 PROCEDURE — 72197 MRI PELVIS W/O & W/DYE: CPT | Mod: 26,,, | Performed by: RADIOLOGY

## 2019-09-09 PROCEDURE — A9585 GADOBUTROL INJECTION: HCPCS | Performed by: COLON & RECTAL SURGERY

## 2019-09-09 PROCEDURE — 71260 CT CHEST ABDOMEN PELVIS WITH CONTRAST (XPD): ICD-10-PCS | Mod: 26,,, | Performed by: RADIOLOGY

## 2019-09-09 PROCEDURE — 74177 CT CHEST ABDOMEN PELVIS WITH CONTRAST (XPD): ICD-10-PCS | Mod: 26,,, | Performed by: RADIOLOGY

## 2019-09-09 RX ORDER — GADOBUTROL 604.72 MG/ML
7 INJECTION INTRAVENOUS
Status: COMPLETED | OUTPATIENT
Start: 2019-09-09 | End: 2019-09-09

## 2019-09-09 RX ADMIN — IOHEXOL 30 ML: 350 INJECTION, SOLUTION INTRAVENOUS at 01:09

## 2019-09-09 RX ADMIN — GADOBUTROL 7 ML: 604.72 INJECTION INTRAVENOUS at 12:09

## 2019-09-09 RX ADMIN — IOHEXOL 75 ML: 350 INJECTION, SOLUTION INTRAVENOUS at 02:09

## 2019-09-09 NOTE — PROGRESS NOTES
Uli Jhaveri I called and spoke with the patient to confirm with him that the pathology showed that he has adenocarcinoma.  He has recently had multiple scans and has followed up with Dr. Gonzalez.  He has an appointment next week to follow up with him on all of his testing to make a treatment plan.  I have set his repeat colonoscopy in health maintenance and probation to 1 year.  If that changes after surgery, we will need to change that target date.  Isai Centeno MD

## 2019-09-11 PROBLEM — C20 RECTAL CANCER: Status: ACTIVE | Noted: 2019-09-11

## 2019-09-12 ENCOUNTER — TELEPHONE (OUTPATIENT)
Dept: SURGERY | Facility: CLINIC | Age: 64
End: 2019-09-12

## 2019-09-12 NOTE — TELEPHONE ENCOUNTER
----- Message from Inge Hunter sent at 9/12/2019 12:29 PM CDT -----  Contact: pt   Type:  Needs Medical Advice    Who Called: KATT FAROOQ   Symptoms (please be specific):   How long has patient had these symptoms:   Pharmacy name and phone #:    Would the patient rather a call back or a response via [facility]?  Call   Best Call Back Number:  [phone] (home)    Additional Information: pt is requesting a call back from the nurse in regards to the pt apt not being on the 09/17/52019

## 2019-09-12 NOTE — TELEPHONE ENCOUNTER
----- Message from Leigh Ann Chavarria sent at 9/12/2019  3:23 PM CDT -----  Contact: Pt  Pt would like nurse to contact him regarding an upcoming appointment.

## 2019-09-13 ENCOUNTER — TELEPHONE (OUTPATIENT)
Dept: SURGERY | Facility: CLINIC | Age: 64
End: 2019-09-13

## 2019-09-13 NOTE — TELEPHONE ENCOUNTER
Called pt - No answer // Voicemail box did not  to leave message. Will attempt to reach pt again throughout the day.     ----- Message from Jeni Cadet MA sent at 9/12/2019  3:31 PM CDT -----    Contact: Pt   Please call patient. Has a question about his appointment    ----- Message -----  From: Leigh Ann Chavarria  Sent: 9/12/2019   3:23 PM  To: [facility] Staff    Pt would like nurse to contact him regarding an upcoming appointment.

## 2019-09-17 ENCOUNTER — OFFICE VISIT (OUTPATIENT)
Dept: SURGERY | Facility: CLINIC | Age: 64
End: 2019-09-17
Payer: MEDICARE

## 2019-09-17 VITALS
SYSTOLIC BLOOD PRESSURE: 152 MMHG | WEIGHT: 152.75 LBS | BODY MASS INDEX: 22.56 KG/M2 | HEART RATE: 82 BPM | DIASTOLIC BLOOD PRESSURE: 96 MMHG

## 2019-09-17 DIAGNOSIS — C20 RECTAL ADENOCARCINOMA: Primary | ICD-10-CM

## 2019-09-17 PROCEDURE — 99214 OFFICE O/P EST MOD 30 MIN: CPT | Mod: S$GLB,,, | Performed by: COLON & RECTAL SURGERY

## 2019-09-17 PROCEDURE — 99214 PR OFFICE/OUTPT VISIT, EST, LEVL IV, 30-39 MIN: ICD-10-PCS | Mod: S$GLB,,, | Performed by: COLON & RECTAL SURGERY

## 2019-09-17 PROCEDURE — 3077F SYST BP >= 140 MM HG: CPT | Mod: CPTII,S$GLB,, | Performed by: COLON & RECTAL SURGERY

## 2019-09-17 PROCEDURE — 99999 PR PBB SHADOW E&M-EST. PATIENT-LVL III: CPT | Mod: PBBFAC,,, | Performed by: COLON & RECTAL SURGERY

## 2019-09-17 PROCEDURE — 3080F DIAST BP >= 90 MM HG: CPT | Mod: CPTII,S$GLB,, | Performed by: COLON & RECTAL SURGERY

## 2019-09-17 PROCEDURE — 3008F PR BODY MASS INDEX (BMI) DOCUMENTED: ICD-10-PCS | Mod: CPTII,S$GLB,, | Performed by: COLON & RECTAL SURGERY

## 2019-09-17 PROCEDURE — 3077F PR MOST RECENT SYSTOLIC BLOOD PRESSURE >= 140 MM HG: ICD-10-PCS | Mod: CPTII,S$GLB,, | Performed by: COLON & RECTAL SURGERY

## 2019-09-17 PROCEDURE — 99999 PR PBB SHADOW E&M-EST. PATIENT-LVL III: ICD-10-PCS | Mod: PBBFAC,,, | Performed by: COLON & RECTAL SURGERY

## 2019-09-17 PROCEDURE — 3080F PR MOST RECENT DIASTOLIC BLOOD PRESSURE >= 90 MM HG: ICD-10-PCS | Mod: CPTII,S$GLB,, | Performed by: COLON & RECTAL SURGERY

## 2019-09-17 PROCEDURE — 3008F BODY MASS INDEX DOCD: CPT | Mod: CPTII,S$GLB,, | Performed by: COLON & RECTAL SURGERY

## 2019-09-17 NOTE — PROGRESS NOTES
History & Physical    SUBJECTIVE:     Chief Complaint   Patient presents with    Established Patient     Follow Up    Ref MD: Isai Centeno MD    History of Present Illness:  Patient is a 63 y.o. male presents for evaluation of a rectal mass.  Patient has been having iron deficiency anemia that did require transfusion around 1 year ago.  He also has had associated hematochezia for over a year now but did improve after he quit drinking beer at 8 months ago but is still intermittently present. This prompted a colonoscopy which was performed on 09/03/2019 where an obstructing rectal mass was seen that appeared malignant was biopsied and could not be traversed.  Patient reports that he had a colonoscopy around 6-7 years ago were some benign polyps were found but no malignancy.  These records are not available.  He states that the hematochezia has recently improved after he quit drinking beer, but is still intermittently present and worsen with the bowel prep from the colonoscopy recently.  He is not on any chronic anticoagulation.  He states he has had normal caliber bowel movements does not feel constipated or obstructed.  He denies any fever, chills, nausea, vomiting, diarrhea, constipation, weight loss, night sweats or any other signs or symptoms.    Interval history:  Since last clinic visit, pathology resulted in rectal adenocarcinoma.  The patient has undergone laboratory workup which includes a normal CEA level. Patient also underwent a CT scan of chest abdomen and pelvis concerning for enlarged lymph nodes, as well as a possible lesion in the liver and a possible lesion in the left lower lung.  Patient has continued tolerating regular diet although he has had some increase in epigastric pain. He denies any abdominal distention however.  He is still having bowel movements that are bloody.  He is passing normal flatus.    PMHx: HTN, GERD  PSHx: L hip sx  Fam Hx: No CRC or IBD; +colonic polyps in brother  All:  NKDA  SocHx: previous etoh; no tob or illicits; works as a     Review of patient's allergies indicates:  No Known Allergies    Current Outpatient Medications   Medication Sig Dispense Refill    cyanocobalamin (VITAMIN B-12) 1000 MCG tablet Take 100 mcg by mouth once daily.      ferrous sulfate 324 mg (65 mg iron) TbEC Take 1 tablet (324 mg total) by mouth 2 (two) times daily. 60 tablet 0    losartan (COZAAR) 50 MG tablet TAKE 1 TABLET BY MOUTH EVERY MORNING 90 tablet 0    pantoprazole (PROTONIX) 40 MG tablet Take 1 tablet (40 mg total) by mouth once daily. 30 tablet 11    sucralfate (CARAFATE) 1 gram tablet Take 1 tablet (1 g total) by mouth 4 (four) times daily. 120 tablet 1     No current facility-administered medications for this visit.      Facility-Administered Medications Ordered in Other Visits   Medication Dose Route Frequency Provider Last Rate Last Dose    lactated ringers infusion   Intravenous Continuous Shilpa Yee MD        sodium chloride 0.9% flush 3 mL  3 mL Intravenous PRN Shilpa Yee MD           Past Medical History:   Diagnosis Date    Alcoholism     Anemia     Back pain     Encounter for blood transfusion     GERD (gastroesophageal reflux disease)     Hiatal hernia     Hypertension     Rectal cancer 9/11/2019     Past Surgical History:   Procedure Laterality Date    COLONOSCOPY N/A 9/3/2019    [facility]    ESOPHAGOGASTRODUODENOSCOPY (EGD) N/A 9/3/2019    Performed by [facility] at [facility]    HIP FRACTURE SURGERY      left hip    JOINT REPLACEMENT Left      Family History   Problem Relation Age of Onset    Cataracts Mother     Stroke Father     Hypertension Father      Social History     Tobacco Use    Smoking status: Never Smoker    Smokeless tobacco: Never Used   Substance Use Topics    Alcohol use: Yes     Comment: states he drinks frequently everyday/quit approx 10/2017    Drug use: No        Review of  Systems:  Review of Systems   Constitutional: Negative for activity change, appetite change, chills, fatigue, fever and unexpected weight change.   HENT: Negative for congestion, ear pain, sore throat and trouble swallowing.    Eyes: Negative for pain, redness and itching.   Respiratory: Negative for cough, shortness of breath and wheezing.    Cardiovascular: Negative for chest pain, palpitations and leg swelling.   Gastrointestinal: Positive for anal bleeding and blood in stool. Negative for abdominal distention, abdominal pain, constipation, diarrhea, nausea, rectal pain and vomiting.   Endocrine: Negative for cold intolerance, heat intolerance and polyuria.   Genitourinary: Negative for dysuria, flank pain, frequency and hematuria.   Musculoskeletal: Negative for gait problem, joint swelling and neck pain.   Skin: Negative for color change, rash and wound.   Allergic/Immunologic: Negative for environmental allergies and immunocompromised state.   Neurological: Negative for dizziness, speech difficulty, weakness and numbness.   Psychiatric/Behavioral: Negative for agitation, confusion and hallucinations.       OBJECTIVE:     Vital Signs (Most Recent)  Pulse: 82 (09/17/19 1444)  BP: (!) 152/96 (09/17/19 1444)     69.3 kg (152 lb 12.5 oz)     Physical Exam:  Physical Exam   Constitutional: He is oriented to person, place, and time. He appears well-developed.   HENT:   Head: Normocephalic and atraumatic.   Eyes: Conjunctivae and EOM are normal.   Neck: Normal range of motion. No thyromegaly present.   Cardiovascular: Normal rate and regular rhythm.   Pulmonary/Chest: Effort normal. No respiratory distress.   Abdominal: Soft. He exhibits no distension and no mass. There is tenderness.   Musculoskeletal: Normal range of motion. He exhibits no edema or tenderness.   Neurological: He is alert and oriented to person, place, and time.   Skin: Skin is warm and dry. Capillary refill takes less than 2 seconds. No rash noted.    Psychiatric: He has a normal mood and affect.     Laboratory  Lab Results   Component Value Date    WBC 9.17 09/03/2019    HGB 12.7 (L) 09/03/2019    HCT 41.4 09/03/2019     (H) 09/03/2019    CHOL 138 04/07/2018    TRIG 38 04/07/2018    HDL 72 04/07/2018    ALT 22 09/03/2019    AST 24 09/03/2019     09/03/2019    K 4.2 09/03/2019     09/03/2019    CREATININE 0.7 09/03/2019    BUN 10 09/03/2019    CO2 23 09/03/2019    TSH 0.601 04/07/2018    INR 0.9 11/06/2018         Diagnostic Results:  Reviewed colonoscopy report and images with rectal mass appreciated     MRI Pelvis Sept 2019:  FINDINGS:  Approximately 6.5 cm from the anal verge is a T2 hyperintense mass in the mid rectum.  This results in circumferential narrowing of the rectum.  Tumor extends for approximately 3 cm in oblique craniocaudal dimension.  It measures 3.6 cm and greater such transverse dimension by approximately 2.7 cm AP.  The mass demonstrates a somewhat mushroom appearance along its inferior margin.  There is hyperenhancement and stranding in the adjacent fat with spiculation/nodularity suggestive of extramural spread of disease.  This extends for approximately 5.5 mm minimum.  Distance from the tumor to the mesorectal fascia measures on the order of 2.1 cm    There is a 5.2 mm left perirectal lymph node seen on image 11 of series 8 with heterogeneous appearance.  This is located approximately 1.7 mm from the mesorectal fascia.  Margins appear slightly irregular.    Additional lymph node is seen inferior to the rectum on image 12 of series 8.  This measures 4.7 mm.  Other rounded appearing lymph nodes are seen in the right mesorectum on image 16 of series 8 1 of these measures 5 mm and the other measures 5.1 mm.    The intersphincteric complex is maintained.    Relationship to anterior peritoneal reflection: Appears to partially straddle the APR    Tumor Extent: Appears to extend beyond mucosa.  There is spiculation perirectal  fat.  Distance tumor to the mesorectal fascia is 2.1 cm    Lymph Nodes:    There are perirectal lymph nodes greater than 5 mmin the mesorectal fat. The distance of the closest lymph node to the mesorectal fascia is 1.7 mm.  Please see above discussion.    There are no extra-mesorectal nodes lymph nodes in the visualized pelvis.    There is no evident vascular invasion.      Impression       Mid rectal mass with findings suspicious for extension into the mesorectal fat with tumor extending approximately 5.5 mm with associated spiculation of the fat noted.  There are suspicious mesorectal lymph nodes with the largest 1 measuring just over 5 mm and located 1.7 mm from the mesorectal fascia.       CT C/A/P Sept 2019:  FINDINGS:  Chest:    There is elevation the right hemidiaphragm and bibasilar areas of parenchymal lung scarring or atelectasis.  A more confluent area of irregular masslike opacity and air bronchograms is identified in the posterior left lower lobe which has increased from prior.  There are low-grade ground-glass opacities in the bilateral lower lobes, right middle lobe, and to a lesser degree in the upper lobes.  No pleural effusion.    There is a borderline enlarged 10 mm in short axis precarinal lymph node.  Subcentimeter nodes are present elsewhere in the mediastinum and bilateral axilla.  Aorta, heart, and pericardium are unremarkable.    Abdomen/pelvis:    There is fatty infiltration of the liver.  There is a newly developed ill-defined focal hypodensity in the posteromedial segment of the left hepatic lobe measuring 3.1 x 2.7 x 1.3 cm.  No radiopaque gallstones.  Prominence of the common bile duct measuring up to 12 mm and pancreatic duct measuring up to 6 mm, unchanged from prior.  No visible intraductal stones.  Main portal vein is patent.    The spleen is nonenlarged.  No evidence of pancreatic mass or inflammation.  Kidneys enhance symmetrically.  Adrenals are unremarkable.    There is  circumferential masslike thickening of the rectum.  No evidence of bowel obstruction.  Multiple bilateral subcentimeter in short axis mesorectal and internal iliac lymph nodes are identified.  No evidence of pathologic inguinal or external iliac lymphadenopathy.  There is sigmoid diverticulosis.  Stomach and visualized small bowel loops are unremarkable.  Normal appendix.  No intraperitoneal free air or fluid.  Fat containing periumbilical hernia.    There is aortoiliac atherosclerosis.  No evidence of aneurysm.  Shotty subcentimeter lymph nodes in the periaortic retroperitoneum.    The bladder is unremarkable.  Prostate mildly enlarged with calcifications.  Seminal vesicles unremarkable.    Skeletal:    There is degenerative change in the spine and right hip.  Intramedullary nail present in the left hip.  No suspicious intraosseous lesions.      Impression       1. Rectal mass highly suspicious for malignancy.  2. Multiple bilateral mesorectal and internal iliac lymph nodes concerning for metastasis.  3. Ill-defined hypodensity in the left lobe of the liver, new since 2017 and concerning for metastasis.  4. Indeterminate developing area of masslike opacity in the posterior left lung base.  Possible infection/inflammation versus neoplasm.         ASSESSMENT/PLAN:     63-year-old male with mid-rectal adenocarcinoma with concern for lymphatic spread and possible liver/lung lesions on CT scan    - Will send for MRI liver to evaluate liver area more definitively  - Will send for PET scan to evaluate metastatic disease in lung as well as liver/lymphatics  - Will discuss at [facility] multidisciplinary rectal cancer tumor board for recommendations  - RTC in 2-3 weeks after staging is complete for final determination regarding treatment of rectal cancer with either neoadjuvant chemotherapy radiation versus chemotherapy versus surgery. Instructed the patient to contact us immediately or present to emergency  department if he develops signs or symptoms consistent with obstruction with abdominal distention, increasing abdominal pain, cessation of BM/flatus or lack of tolerance of p.o. intake    Malignant neoplasm of rectum  -     Cancel: MRI Abdomen With Contrast; Future; Expected date: 09/17/2019  -     NM PET CT Whole Body; Future; Expected date: 09/17/2019  -     MRI Abdomen W WO Contrast; Future; Expected date: 09/17/2019      Familia Gonzalez MD  Colon and Rectal Surgery  [facility]

## 2019-09-18 ENCOUNTER — DOCUMENTATION ONLY (OUTPATIENT)
Dept: SURGERY | Facility: HOSPITAL | Age: 64
End: 2019-09-18

## 2019-09-19 ENCOUNTER — HOSPITAL ENCOUNTER (OUTPATIENT)
Dept: RADIOLOGY | Facility: HOSPITAL | Age: 64
Discharge: HOME | End: 2019-09-19
Attending: COLON & RECTAL SURGERY
Payer: MEDICARE

## 2019-09-19 DIAGNOSIS — C20 RECTAL ADENOCARCINOMA: ICD-10-CM

## 2019-09-19 PROCEDURE — 74183 MRI ABD W/O CNTR FLWD CNTR: CPT | Mod: TC

## 2019-09-19 PROCEDURE — A9581 GADOXETATE DISODIUM INJ: HCPCS | Performed by: COLON & RECTAL SURGERY

## 2019-09-19 PROCEDURE — 25500020 PHARM REV CODE 255: Performed by: COLON & RECTAL SURGERY

## 2019-09-19 RX ADMIN — GADOXETATE DISODIUM 6 ML: 181.43 INJECTION, SOLUTION INTRAVENOUS at 07:09

## 2019-09-19 NOTE — PROGRESS NOTES
Multidisciplinary Rectal Cancer Conference - Evaluation and Recommendation Summary    9/17/2019  Vincent Benton  [id number]  63 y.o. male    1. Evaluation    C scope 9/3/19: Obstructing Rectal mass that couldn't be crossed.     Rigid Procto 9/3/19: Left and anterior lesion at 9-10 cm.     Path: Invasive adenocarcinoma, no LVI.     MRI date: 9/9/2019    Tumor Location in Rectum: middle third     Size: 3.6x2.7 cm mass    Nodes: 2 suspected nodes.     Indication of Sphincter Involvement:  Uninvolved    Pretreatment Circumferential Resection Margin (CRM) status:  Not Threatened    Pretreatment (clinical) AJCC Stage:III B vs  IV ?  Stage I  [] I: T1N0M0  [] I: T2N0M0  Stage II  [] IIA: T3N0M0  [] IIB D3dP9J2  [] IIC: N3iU4S3  Stage III  [] IIIA: T1-2N1M0  [] IIIA: K3C7oC4  [] IIIB: T3-P6sS8Q9  [x] IIIB: T2-3N2aM0  [] IIIB: T1-2N2bM0  [] IIIC: F8lK8qJ3  [] IIIC: T3-9xB5nE1  [] IIIC: M3mP2-2Q9   Stage IV  [x] IV: N3-8T4-5I3u-b    CEA level:     Lab Results   Component Value Date    CEA 3.5 09/03/2019       2. Treatment Recommendation    Neoadjuvant Therapy Recommendation:     Short course radiation and Chemotherapy.     PLAN:    MRI and PET if not able to obtain PET will proceed with biopsy of the lung biopsy.     Colonoscopy during Chemotherapy to rule out any other lesions.     Anticipated date and type of surgical procedure:     LAR after    Clinical research study eligibility and/or enrollment: Not eligible

## 2019-09-23 ENCOUNTER — OFFICE VISIT (OUTPATIENT)
Dept: INTERNAL MEDICINE | Facility: CLINIC | Age: 64
End: 2019-09-23
Payer: MEDICARE

## 2019-09-23 VITALS
DIASTOLIC BLOOD PRESSURE: 82 MMHG | TEMPERATURE: 97 F | SYSTOLIC BLOOD PRESSURE: 138 MMHG | HEART RATE: 78 BPM | OXYGEN SATURATION: 98 % | WEIGHT: 155 LBS | HEIGHT: 69 IN | BODY MASS INDEX: 22.96 KG/M2

## 2019-09-23 DIAGNOSIS — C20 RECTAL ADENOCARCINOMA: ICD-10-CM

## 2019-09-23 DIAGNOSIS — I10 HYPERTENSION GOAL BP (BLOOD PRESSURE) < 140/90: Primary | ICD-10-CM

## 2019-09-23 DIAGNOSIS — R91.1 LESION OF LEFT LUNG: ICD-10-CM

## 2019-09-23 DIAGNOSIS — D50.9 IRON DEFICIENCY ANEMIA, UNSPECIFIED IRON DEFICIENCY ANEMIA TYPE: ICD-10-CM

## 2019-09-23 DIAGNOSIS — K76.9 LIVER LESION: ICD-10-CM

## 2019-09-23 PROCEDURE — 3075F SYST BP GE 130 - 139MM HG: CPT | Mod: CPTII,S$GLB,, | Performed by: INTERNAL MEDICINE

## 2019-09-23 PROCEDURE — 3008F PR BODY MASS INDEX (BMI) DOCUMENTED: ICD-10-PCS | Mod: CPTII,S$GLB,, | Performed by: INTERNAL MEDICINE

## 2019-09-23 PROCEDURE — 3079F PR MOST RECENT DIASTOLIC BLOOD PRESSURE 80-89 MM HG: ICD-10-PCS | Mod: CPTII,S$GLB,, | Performed by: INTERNAL MEDICINE

## 2019-09-23 PROCEDURE — 3008F BODY MASS INDEX DOCD: CPT | Mod: CPTII,S$GLB,, | Performed by: INTERNAL MEDICINE

## 2019-09-23 PROCEDURE — 99999 PR PBB SHADOW E&M-EST. PATIENT-LVL III: ICD-10-PCS | Mod: PBBFAC,,, | Performed by: INTERNAL MEDICINE

## 2019-09-23 PROCEDURE — 99214 OFFICE O/P EST MOD 30 MIN: CPT | Mod: S$GLB,,, | Performed by: INTERNAL MEDICINE

## 2019-09-23 PROCEDURE — 3079F DIAST BP 80-89 MM HG: CPT | Mod: CPTII,S$GLB,, | Performed by: INTERNAL MEDICINE

## 2019-09-23 PROCEDURE — 99214 PR OFFICE/OUTPT VISIT, EST, LEVL IV, 30-39 MIN: ICD-10-PCS | Mod: S$GLB,,, | Performed by: INTERNAL MEDICINE

## 2019-09-23 PROCEDURE — 3075F PR MOST RECENT SYSTOLIC BLOOD PRESS GE 130-139MM HG: ICD-10-PCS | Mod: CPTII,S$GLB,, | Performed by: INTERNAL MEDICINE

## 2019-09-23 PROCEDURE — 99999 PR PBB SHADOW E&M-EST. PATIENT-LVL III: CPT | Mod: PBBFAC,,, | Performed by: INTERNAL MEDICINE

## 2019-09-23 NOTE — PROGRESS NOTES
Subjective:       Patient ID: Vincent Benton is a 63 y.o. male.    Chief Complaint: Follow-up (3 month)    Vincent Benton  63 y.o. White male    Patient presents with:  Follow-up: 3 month    HPI: Here today to follow up on chronic conditions.  HTN--stable on losartan.                 LDLCALC                  58.4 (L)            04/07/2018            He was recently found to have a rectal mass and pathology confirms adenocarcinoma. He has a lesion in his liver and left lower lung. He will be having a PET scan this week.   Anemia--iron deficient. He is taking iron.                    IRON                     <10 (L)             04/13/2018                 TIBC                     527 (H)             04/13/2018                 FERRITIN                 6 (L)               04/13/2018                   HGB                      12.7 (L)            09/03/2019                 HCT                      41.4                09/03/2019                 MCV                      92                  09/03/2019              Past Medical History:  Alcoholism  Anemia  Back pain  Encounter for blood transfusion  2/4/2016: Essential hypertension  GERD (gastroesophageal reflux disease)  Hiatal hernia  Hypertension  9/11/2019: Rectal cancer    Current Outpatient Medications on File Prior to Visit:  cyanocobalamin (VITAMIN B-12) 1000 MCG tablet, Take 100 mcg by mouth once daily., Disp: , Rfl:   ferrous sulfate 324 mg (65 mg iron) TbEC, Take 1 tablet (324 mg total) by mouth 2 (two) times daily., Disp: 60 tablet, Rfl: 0  losartan (COZAAR) 50 MG tablet, TAKE 1 TABLET BY MOUTH EVERY MORNING, Disp: 90 tablet, Rfl: 0  pantoprazole (PROTONIX) 40 MG tablet, Take 1 tablet (40 mg total) by mouth once daily., Disp: 30 tablet, Rfl: 11  sucralfate (CARAFATE) 1 gram tablet, Take 1 tablet (1 g total) by mouth 4 (four) times daily., Disp: 120 tablet, Rfl: 1    Allergies:  Review of patient's allergies indicates:  No Known  Allergies          Review of Systems   Constitutional: Negative for fever.   Respiratory: Negative for shortness of breath.    Cardiovascular: Negative for chest pain.   Gastrointestinal: Positive for blood in stool and rectal pain.   Musculoskeletal: Negative for gait problem.   Neurological: Negative for dizziness and headaches.       Objective:      Physical Exam   Constitutional: He is oriented to person, place, and time. He appears well-developed and well-nourished. No distress.   Eyes: No scleral icterus.   Cardiovascular: Normal rate, regular rhythm and normal heart sounds.   Pulmonary/Chest: Effort normal and breath sounds normal. No respiratory distress.   Neurological: He is alert and oriented to person, place, and time.   Skin: Skin is warm and dry.   Psychiatric: He has a normal mood and affect.   Vitals reviewed.      Assessment:       1. Hypertension goal BP (blood pressure) < 140/90    2. Rectal adenocarcinoma    3. Liver lesion    4. Lesion of left lung    5. Iron deficiency anemia, unspecified iron deficiency anemia type        Plan:       Vincent was seen today for follow-up.    Diagnoses and all orders for this visit:    Hypertension goal BP (blood pressure) < 140/90  -     Continue losartan    Rectal adenocarcinoma  -     Management per colorectal surgery    Liver lesion  -     PET scan scheduled    Lesion of left lung  -     PET scan scheduled    Iron deficiency anemia, unspecified iron deficiency anemia type  -     Continue iron    Labs and f/u in 6 months.

## 2019-09-25 ENCOUNTER — HOSPITAL ENCOUNTER (OUTPATIENT)
Dept: RADIOLOGY | Facility: HOSPITAL | Age: 64
Discharge: HOME | End: 2019-09-25
Attending: COLON & RECTAL SURGERY
Payer: MEDICARE

## 2019-09-25 DIAGNOSIS — C20 RECTAL ADENOCARCINOMA: ICD-10-CM

## 2019-09-25 PROCEDURE — 78815 PET IMAGE W/CT SKULL-THIGH: CPT | Mod: TC

## 2019-09-25 PROCEDURE — 78815 PET IMAGE W/CT SKULL-THIGH: CPT | Mod: 26,PS,, | Performed by: RADIOLOGY

## 2019-09-25 PROCEDURE — 78815 NM PET CT ROUTINE: ICD-10-PCS | Mod: 26,PS,, | Performed by: RADIOLOGY

## 2019-09-25 PROCEDURE — A9552 F18 FDG: HCPCS

## 2019-09-26 ENCOUNTER — TELEPHONE (OUTPATIENT)
Dept: SURGERY | Facility: CLINIC | Age: 64
End: 2019-09-26

## 2019-09-26 NOTE — TELEPHONE ENCOUNTER
----- Message from Familia Gonzalez MD sent at 9/26/2019  4:15 PM CDT -----  Please moved this patient has follow-up appointment up to the next 1-2 weeks.  Thank you.

## 2019-10-07 ENCOUNTER — OFFICE VISIT (OUTPATIENT)
Dept: SURGERY | Facility: CLINIC | Age: 64
End: 2019-10-07
Payer: MEDICARE

## 2019-10-07 VITALS
WEIGHT: 156.31 LBS | DIASTOLIC BLOOD PRESSURE: 92 MMHG | SYSTOLIC BLOOD PRESSURE: 130 MMHG | TEMPERATURE: 97 F | BODY MASS INDEX: 23.08 KG/M2 | HEART RATE: 87 BPM

## 2019-10-07 DIAGNOSIS — C20 RECTAL CANCER: Primary | ICD-10-CM

## 2019-10-07 PROCEDURE — 3080F PR MOST RECENT DIASTOLIC BLOOD PRESSURE >= 90 MM HG: ICD-10-PCS | Mod: CPTII,S$GLB,, | Performed by: COLON & RECTAL SURGERY

## 2019-10-07 PROCEDURE — 99999 PR PBB SHADOW E&M-EST. PATIENT-LVL III: ICD-10-PCS | Mod: PBBFAC,,, | Performed by: COLON & RECTAL SURGERY

## 2019-10-07 PROCEDURE — 3080F DIAST BP >= 90 MM HG: CPT | Mod: CPTII,S$GLB,, | Performed by: COLON & RECTAL SURGERY

## 2019-10-07 PROCEDURE — 99214 PR OFFICE/OUTPT VISIT, EST, LEVL IV, 30-39 MIN: ICD-10-PCS | Mod: S$GLB,,, | Performed by: COLON & RECTAL SURGERY

## 2019-10-07 PROCEDURE — 3075F SYST BP GE 130 - 139MM HG: CPT | Mod: CPTII,S$GLB,, | Performed by: COLON & RECTAL SURGERY

## 2019-10-07 PROCEDURE — 3075F PR MOST RECENT SYSTOLIC BLOOD PRESS GE 130-139MM HG: ICD-10-PCS | Mod: CPTII,S$GLB,, | Performed by: COLON & RECTAL SURGERY

## 2019-10-07 PROCEDURE — 3008F BODY MASS INDEX DOCD: CPT | Mod: CPTII,S$GLB,, | Performed by: COLON & RECTAL SURGERY

## 2019-10-07 PROCEDURE — 3008F PR BODY MASS INDEX (BMI) DOCUMENTED: ICD-10-PCS | Mod: CPTII,S$GLB,, | Performed by: COLON & RECTAL SURGERY

## 2019-10-07 PROCEDURE — 99214 OFFICE O/P EST MOD 30 MIN: CPT | Mod: S$GLB,,, | Performed by: COLON & RECTAL SURGERY

## 2019-10-07 PROCEDURE — 99999 PR PBB SHADOW E&M-EST. PATIENT-LVL III: CPT | Mod: PBBFAC,,, | Performed by: COLON & RECTAL SURGERY

## 2019-10-07 NOTE — PROGRESS NOTES
History & Physical    SUBJECTIVE:     Chief Complaint   Patient presents with    Established Patient     Follow Up    Ref MD: Isai Centeno MD    History of Present Illness:  Patient is a 64 y.o. male presents for evaluation of a rectal mass.  Patient has been having iron deficiency anemia that did require transfusion around 1 year ago.  He also has had associated hematochezia for over a year now but did improve after he quit drinking beer at 8 months ago but is still intermittently present. This prompted a colonoscopy which was performed on 09/03/2019 where an obstructing rectal mass was seen that appeared malignant was biopsied and could not be traversed.  Patient reports that he had a colonoscopy around 6-7 years ago were some benign polyps were found but no malignancy.  These records are not available.  He states that the hematochezia has recently improved after he quit drinking beer, but is still intermittently present and worsen with the bowel prep from the colonoscopy recently.  He is not on any chronic anticoagulation.  He states he has had normal caliber bowel movements does not feel constipated or obstructed.  He denies any fever, chills, nausea, vomiting, diarrhea, constipation, weight loss, night sweats or any other signs or symptoms.    Interval history:  Since last clinic visit, patient underwent a PET scan which was not concerning for metastatic disease outside of the rectum.  Also underwent liver MRI that was negative for concern for spread of disease on the liver lesions seen on CT scan.  Continues tolerate a regular diet although still having some rectal pain and frequency issues.  Passing flatus and some bloody stool.    PMHx: HTN, GERD  PSHx: L hip sx  Fam Hx: No CRC or IBD; +colonic polyps in brother  All: NKDA  SocHx: previous etoh; no tob or illicits; works as a     Review of patient's allergies indicates:  No Known Allergies    Current Outpatient Medications   Medication Sig Dispense  Refill    cyanocobalamin (VITAMIN B-12) 1000 MCG tablet Take 100 mcg by mouth once daily.      ferrous sulfate 324 mg (65 mg iron) TbEC Take 1 tablet (324 mg total) by mouth 2 (two) times daily. 60 tablet 0    losartan (COZAAR) 50 MG tablet TAKE 1 TABLET BY MOUTH EVERY MORNING 90 tablet 0    pantoprazole (PROTONIX) 40 MG tablet Take 1 tablet (40 mg total) by mouth once daily. 30 tablet 11    sucralfate (CARAFATE) 1 gram tablet Take 1 tablet (1 g total) by mouth 4 (four) times daily. 120 tablet 1     No current facility-administered medications for this visit.      Facility-Administered Medications Ordered in Other Visits   Medication Dose Route Frequency Provider Last Rate Last Dose    lactated ringers infusion   Intravenous Continuous Shilpa Yee MD        sodium chloride 0.9% flush 3 mL  3 mL Intravenous PRN Shilpa Yee MD           Past Medical History:   Diagnosis Date    Alcoholism     Anemia     Back pain     Encounter for blood transfusion     Essential hypertension 2/4/2016    GERD (gastroesophageal reflux disease)     Hiatal hernia     Hypertension     Rectal cancer 9/11/2019     Past Surgical History:   Procedure Laterality Date    COLONOSCOPY N/A 9/3/2019    Procedure: COLONOSCOPY;  Surgeon: Isai Centeno MD;  Location: [facility];  Service: Endoscopy;  Laterality: N/A;    ESOPHAGOGASTRODUODENOSCOPY N/A 9/3/2019    Procedure: ESOPHAGOGASTRODUODENOSCOPY (EGD);  Surgeon: Isai Centeno MD;  Location: [facility];  Service: Endoscopy;  Laterality: N/A;    HIP FRACTURE SURGERY      left hip    JOINT REPLACEMENT Left      Family History   Problem Relation Age of Onset    Cataracts Mother     Stroke Father     Hypertension Father      Social History     Tobacco Use    Smoking status: Never Smoker    Smokeless tobacco: Never Used   Substance Use Topics    Alcohol use: Yes     Comment: states he drinks frequently everyday/quit approx 10/2017    Drug use: No        Review of  Systems:  Review of Systems   Constitutional: Negative for activity change, appetite change, chills, fatigue, fever and unexpected weight change.   HENT: Negative for congestion, ear pain, sore throat and trouble swallowing.    Eyes: Negative for pain, redness and itching.   Respiratory: Negative for cough, shortness of breath and wheezing.    Cardiovascular: Negative for chest pain, palpitations and leg swelling.   Gastrointestinal: Positive for anal bleeding and blood in stool. Negative for abdominal distention, abdominal pain, constipation, diarrhea, nausea, rectal pain and vomiting.   Endocrine: Negative for cold intolerance, heat intolerance and polyuria.   Genitourinary: Negative for dysuria, flank pain, frequency and hematuria.   Musculoskeletal: Negative for gait problem, joint swelling and neck pain.   Skin: Negative for color change, rash and wound.   Allergic/Immunologic: Negative for environmental allergies and immunocompromised state.   Neurological: Negative for dizziness, speech difficulty, weakness and numbness.   Psychiatric/Behavioral: Negative for agitation, confusion and hallucinations.       OBJECTIVE:     Vital Signs (Most Recent)  Temp: 97.1 °F (36.2 °C) (10/07/19 1611)  Pulse: 87 (10/07/19 1611)  BP: (!) 130/92 (10/07/19 1611)     70.9 kg (156 lb 4.9 oz)     Physical Exam:  Physical Exam   Constitutional: He is oriented to person, place, and time. He appears well-developed.   HENT:   Head: Normocephalic and atraumatic.   Eyes: Conjunctivae and EOM are normal.   Neck: Normal range of motion. No thyromegaly present.   Cardiovascular: Normal rate and regular rhythm.   Pulmonary/Chest: Effort normal. No respiratory distress.   Abdominal: Soft. He exhibits no distension and no mass. There is tenderness.   Musculoskeletal: Normal range of motion. He exhibits no edema or tenderness.   Neurological: He is alert and oriented to person, place, and time.   Skin: Skin is warm and dry. Capillary refill  takes less than 2 seconds. No rash noted.   Psychiatric: He has a normal mood and affect.     Laboratory  Lab Results   Component Value Date    WBC 9.17 09/03/2019    HGB 12.7 (L) 09/03/2019    HCT 41.4 09/03/2019     (H) 09/03/2019    CHOL 138 04/07/2018    TRIG 38 04/07/2018    HDL 72 04/07/2018    ALT 22 09/03/2019    AST 24 09/03/2019     09/03/2019    K 4.2 09/03/2019     09/03/2019    CREATININE 0.7 09/03/2019    BUN 10 09/03/2019    CO2 23 09/03/2019    TSH 0.601 04/07/2018    INR 0.9 11/06/2018         Diagnostic Results:  Reviewed colonoscopy report and images with rectal mass appreciated     MRI Pelvis Sept 2019:  FINDINGS:  Approximately 6.5 cm from the anal verge is a T2 hyperintense mass in the mid rectum.  This results in circumferential narrowing of the rectum.  Tumor extends for approximately 3 cm in oblique craniocaudal dimension.  It measures 3.6 cm and greater such transverse dimension by approximately 2.7 cm AP.  The mass demonstrates a somewhat mushroom appearance along its inferior margin.  There is hyperenhancement and stranding in the adjacent fat with spiculation/nodularity suggestive of extramural spread of disease.  This extends for approximately 5.5 mm minimum.  Distance from the tumor to the mesorectal fascia measures on the order of 2.1 cm    There is a 5.2 mm left perirectal lymph node seen on image 11 of series 8 with heterogeneous appearance.  This is located approximately 1.7 mm from the mesorectal fascia.  Margins appear slightly irregular.    Additional lymph node is seen inferior to the rectum on image 12 of series 8.  This measures 4.7 mm.  Other rounded appearing lymph nodes are seen in the right mesorectum on image 16 of series 8 1 of these measures 5 mm and the other measures 5.1 mm.    The intersphincteric complex is maintained.    Relationship to anterior peritoneal reflection: Appears to partially straddle the APR    Tumor Extent: Appears to extend  beyond mucosa.  There is spiculation perirectal fat.  Distance tumor to the mesorectal fascia is 2.1 cm    Lymph Nodes:    There are perirectal lymph nodes greater than 5 mmin the mesorectal fat. The distance of the closest lymph node to the mesorectal fascia is 1.7 mm.  Please see above discussion.    There are no extra-mesorectal nodes lymph nodes in the visualized pelvis.    There is no evident vascular invasion.      Impression       Mid rectal mass with findings suspicious for extension into the mesorectal fat with tumor extending approximately 5.5 mm with associated spiculation of the fat noted.  There are suspicious mesorectal lymph nodes with the largest 1 measuring just over 5 mm and located 1.7 mm from the mesorectal fascia.       CT C/A/P Sept 2019:  FINDINGS:  Chest:    There is elevation the right hemidiaphragm and bibasilar areas of parenchymal lung scarring or atelectasis.  A more confluent area of irregular masslike opacity and air bronchograms is identified in the posterior left lower lobe which has increased from prior.  There are low-grade ground-glass opacities in the bilateral lower lobes, right middle lobe, and to a lesser degree in the upper lobes.  No pleural effusion.    There is a borderline enlarged 10 mm in short axis precarinal lymph node.  Subcentimeter nodes are present elsewhere in the mediastinum and bilateral axilla.  Aorta, heart, and pericardium are unremarkable.    Abdomen/pelvis:    There is fatty infiltration of the liver.  There is a newly developed ill-defined focal hypodensity in the posteromedial segment of the left hepatic lobe measuring 3.1 x 2.7 x 1.3 cm.  No radiopaque gallstones.  Prominence of the common bile duct measuring up to 12 mm and pancreatic duct measuring up to 6 mm, unchanged from prior.  No visible intraductal stones.  Main portal vein is patent.    The spleen is nonenlarged.  No evidence of pancreatic mass or inflammation.  Kidneys enhance symmetrically.   Adrenals are unremarkable.    There is circumferential masslike thickening of the rectum.  No evidence of bowel obstruction.  Multiple bilateral subcentimeter in short axis mesorectal and internal iliac lymph nodes are identified.  No evidence of pathologic inguinal or external iliac lymphadenopathy.  There is sigmoid diverticulosis.  Stomach and visualized small bowel loops are unremarkable.  Normal appendix.  No intraperitoneal free air or fluid.  Fat containing periumbilical hernia.    There is aortoiliac atherosclerosis.  No evidence of aneurysm.  Shotty subcentimeter lymph nodes in the periaortic retroperitoneum.    The bladder is unremarkable.  Prostate mildly enlarged with calcifications.  Seminal vesicles unremarkable.    Skeletal:    There is degenerative change in the spine and right hip.  Intramedullary nail present in the left hip.  No suspicious intraosseous lesions.      Impression       1. Rectal mass highly suspicious for malignancy.  2. Multiple bilateral mesorectal and internal iliac lymph nodes concerning for metastasis.  3. Ill-defined hypodensity in the left lobe of the liver, new since 2017 and concerning for metastasis.  4. Indeterminate developing area of masslike opacity in the posterior left lung base.  Possible infection/inflammation versus neoplasm.     MRI Liver:  FINDINGS:  The liver demonstrates a subtle hypointense T2 isointense T1 signal area in the deep aspect of the left hepatic lobe, medial segment.  This area measures approximately 11 x 18 mm axially.    The lesion displays a decrease in signal intensity on the out of phase gradient echo sequence suggesting fat containing focal fatty infiltration.    Following contrast administration there is displays mild relative hypoenhancement with respect to the adjacent parenchyma.  No hyperenhancement is seen.    The adjacent gallbladder appears normal.    Bile ducts are nondilated.      Impression       Probable focal fatty infiltration  of the medial segment of the left hepatic lobe.          PET Scan:  FINDINGS:  Head/neck: There is normal physiologic FDG uptake noted within the visualized brain parenchyma. No FDG avid lymphadenopathy within the neck.    Chest: There are ill-defined patchy and nodular parenchymal opacities again noted in the left lower lobe exhibiting low level FDG uptake with an SUV max of 2.5.  A new 13 mm ground-glass opacity is noted in the lateral left upper lobe exhibiting weak FDG avidity with an SUV max of 1.9.  Similar newly developed ground-glass opacity present in the mid right lung adjacent to the major fissure with SUV max of 1.6. No FDG avid mediastinal, hilar or axillary lymph nodes.    Abdomen/Pelvis: Normal physiologic FDG uptake noted within the liver, spleen, urinary tract, and bowel.Focal hypodensity is again noted in the left hepatic lobe which is non FDG avid and favored to represent focal fatty infiltration.  An intermediate length segment of circumferential mural thickening is again noted at the rectosigmoid junction which exhibits intense hypermetabolism and a SUV max of 11.8.  A hypermetabolic lymph node adjacent to the left pelvic sidewall has a SUV max of 5.3.    Skeletal: No FDG avid osseus lesions identified.      Impression       1. Hypermetabolic rectal mass consistent with known rectal malignancy.  2. Hypermetabolic regional lymph node adjacent to left pelvic sidewall.  3. Non FDG avid focal hypodensity in the liver favored to represent focal fatty infiltration.  4. Bilateral lung opacities most likely infectious or inflammatory etiology.  Follow-up recommended.         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 intake    Familia Gonzalez MD  Colon and Rectal Surgery  [facility]

## 2019-10-08 ENCOUNTER — INITIAL CONSULT (OUTPATIENT)
Dept: HEMATOLOGY/ONCOLOGY | Facility: CLINIC | Age: 64
End: 2019-10-08
Payer: MEDICARE

## 2019-10-08 VITALS
DIASTOLIC BLOOD PRESSURE: 90 MMHG | BODY MASS INDEX: 22.15 KG/M2 | RESPIRATION RATE: 14 BRPM | SYSTOLIC BLOOD PRESSURE: 144 MMHG | WEIGHT: 154.75 LBS | HEIGHT: 70 IN | OXYGEN SATURATION: 97 % | TEMPERATURE: 98 F | HEART RATE: 88 BPM

## 2019-10-08 DIAGNOSIS — C20 RECTAL CANCER: Primary | ICD-10-CM

## 2019-10-08 PROCEDURE — 3080F PR MOST RECENT DIASTOLIC BLOOD PRESSURE >= 90 MM HG: ICD-10-PCS | Mod: CPTII,S$GLB,, | Performed by: INTERNAL MEDICINE

## 2019-10-08 PROCEDURE — 99205 PR OFFICE/OUTPT VISIT, NEW, LEVL V, 60-74 MIN: ICD-10-PCS | Mod: S$GLB,,, | Performed by: INTERNAL MEDICINE

## 2019-10-08 PROCEDURE — 99205 OFFICE O/P NEW HI 60 MIN: CPT | Mod: S$GLB,,, | Performed by: INTERNAL MEDICINE

## 2019-10-08 PROCEDURE — 99999 PR PBB SHADOW E&M-EST. PATIENT-LVL IV: CPT | Mod: PBBFAC,,, | Performed by: INTERNAL MEDICINE

## 2019-10-08 PROCEDURE — 3077F SYST BP >= 140 MM HG: CPT | Mod: CPTII,S$GLB,, | Performed by: INTERNAL MEDICINE

## 2019-10-08 PROCEDURE — 3008F PR BODY MASS INDEX (BMI) DOCUMENTED: ICD-10-PCS | Mod: CPTII,S$GLB,, | Performed by: INTERNAL MEDICINE

## 2019-10-08 PROCEDURE — 3077F PR MOST RECENT SYSTOLIC BLOOD PRESSURE >= 140 MM HG: ICD-10-PCS | Mod: CPTII,S$GLB,, | Performed by: INTERNAL MEDICINE

## 2019-10-08 PROCEDURE — 3008F BODY MASS INDEX DOCD: CPT | Mod: CPTII,S$GLB,, | Performed by: INTERNAL MEDICINE

## 2019-10-08 PROCEDURE — 99999 PR PBB SHADOW E&M-EST. PATIENT-LVL IV: ICD-10-PCS | Mod: PBBFAC,,, | Performed by: INTERNAL MEDICINE

## 2019-10-08 PROCEDURE — 3080F DIAST BP >= 90 MM HG: CPT | Mod: CPTII,S$GLB,, | Performed by: INTERNAL MEDICINE

## 2019-10-08 RX ORDER — CAPECITABINE 500 MG/1
825 TABLET, FILM COATED ORAL 2 TIMES DAILY
Qty: 180 TABLET | Refills: 11 | Status: ON HOLD | OUTPATIENT
Start: 2019-10-08 | End: 2020-02-09 | Stop reason: HOSPADM

## 2019-10-08 RX ORDER — HYDROCODONE BITARTRATE AND ACETAMINOPHEN 10; 325 MG/1; MG/1
1 TABLET ORAL EVERY 6 HOURS PRN
Qty: 60 TABLET | Refills: 0 | Status: SHIPPED | OUTPATIENT
Start: 2019-10-08 | End: 2019-10-28

## 2019-10-08 NOTE — LETTER
October 8, 2019      Familia Gonzalez MD  [address] [place] [address] [address] [place] [id number]            [facility]  [address] [place] [address] [address]  [place] [address]  Phone: [phone]  Fax: [phone]          Patient: Vincent Benton   MR Number: [id number]   YOB: 1955   Date of Visit: 10/8/2019       Dear Dr. Familia Gonzalez:    Thank you for referring Vincent Benton to me for evaluation. Attached you will find relevant portions of my assessment and plan of care.    If you have questions, please do not hesitate to call me. I look forward to following Vincent Benton along with you.    Sincerely,    Mikel Sams MD    Enclosure  CC:  No Recipients    If you would like to receive this communication electronically, please contact [email or web address] or [phone] to request more information on [employer] Link access.    For providers and/or their staff who would like to refer a patient to [facility], please contact us through our one-stop-shop provider referral line, [facility] , at [phone].    If you feel you have received this communication in error or would no longer like to receive these types of communications, please e-mail [email or web address]

## 2019-10-08 NOTE — PROGRESS NOTES
Subjective:   Date of Visit: 10/8/19   ?   ?    REFERRING PROVIDER: Familia Gonzalez MD  [address] [place] [address] [place] [address]   ?   CHIEF COMPLAINT:  Locally advanced rectal cancer, Stage IIIB (cT3,cN2a, CM0)  ?     HPI:   was seen at [facility] today. He is a 64 yr old male with recently diagnosed locally advanced rectal cancer.  According to patient for over a year he has been having iron deficiency anemia with associated hematochezia.  Colonoscopy was performed in September 2019 that that was biopsy proven to be rectal adenocarcinoma.  He claims that he has normal bowel movement and does not feel constipated the obstructed.  He also denies any fever, chills, nausea or vomiting, chest pain or shortness of breath, diarrhea, unintentional weight loss, night sweats or dysuria.  Patient underwent PET scan which showed no evidence of metastatic disease. He also underwent liver MRI that was negative for the liver lesion that was seen on CT scan previously.  He was seen by surgery service who referred him to Med Onc and Rad Onc for discussion with regards to neoadjuvant therapy.    Review of Systems   Constitutional: Negative for activity change, appetite change, chills, fatigue, fever and unexpected weight change.   HENT: Negative for hearing loss, mouth sores, nosebleeds, sore throat, tinnitus, trouble swallowing and voice change.    Eyes: Negative for visual disturbance.   Respiratory: Negative for cough, chest tightness, shortness of breath and wheezing.    Cardiovascular: Negative for chest pain, palpitations and leg swelling.   Gastrointestinal: Positive for rectal pain. Negative for abdominal pain, anal bleeding, blood in stool, constipation, diarrhea, nausea and vomiting.   Genitourinary: Negative for frequency, hematuria and testicular pain.   Musculoskeletal: Negative for arthralgias, back pain, gait problem and neck pain.   Skin: Negative for color change, pallor, rash and wound.    Allergic/Immunologic: Negative for immunocompromised state.   Neurological: Negative for seizures, syncope, weakness, numbness and headaches.   Hematological: Negative for adenopathy. Does not bruise/bleed easily.   Psychiatric/Behavioral: Negative for agitation, confusion, decreased concentration, hallucinations and sleep disturbance. The patient is not nervous/anxious.        ?   PAST MEDICAL HISTORY:   Past Medical History:   Diagnosis Date    Alcoholism     Anemia     Back pain     Encounter for blood transfusion     Essential hypertension 2/4/2016    GERD (gastroesophageal reflux disease)     Hiatal hernia     Hypertension     Rectal cancer 9/11/2019    ?     PAST SURGICAL HISTORY:   Past Surgical History:   Procedure Laterality Date    COLONOSCOPY N/A 9/3/2019    Procedure: COLONOSCOPY;  Surgeon: Isai Centeno MD;  Location: [facility];  Service: Endoscopy;  Laterality: N/A;    ESOPHAGOGASTRODUODENOSCOPY N/A 9/3/2019    Procedure: ESOPHAGOGASTRODUODENOSCOPY (EGD);  Surgeon: Isai Centeno MD;  Location: [facility];  Service: Endoscopy;  Laterality: N/A;    HIP FRACTURE SURGERY      left hip    JOINT REPLACEMENT Left       ?   ALLERGIES:   Allergies as of 10/08/2019    (No Known Allergies)      ?   MEDICATIONS:?   Outpatient Medications Marked as Taking for the 10/8/19 encounter (Initial consult) with Mikel Sams MD   Medication Sig Dispense Refill    cyanocobalamin (VITAMIN B-12) 1000 MCG tablet Take 100 mcg by mouth once daily.      ferrous sulfate 324 mg (65 mg iron) TbEC Take 1 tablet (324 mg total) by mouth 2 (two) times daily. 60 tablet 0    losartan (COZAAR) 50 MG tablet TAKE 1 TABLET BY MOUTH EVERY MORNING 90 tablet 0    pantoprazole (PROTONIX) 40 MG tablet Take 1 tablet (40 mg total) by mouth once daily. 30 tablet 11    sucralfate (CARAFATE) 1 gram tablet Take 1 tablet (1 g total) by mouth 4 (four) times daily. 120 tablet 1      ?   SOCIAL HISTORY:?   Social History      Tobacco Use    Smoking status: Never Smoker    Smokeless tobacco: Never Used   Substance Use Topics    Alcohol use: Yes     Comment: states he drinks frequently everyday/quit approx 10/2017        ?   FAMILY HISTORY:   family history includes Cataracts in his mother; Hypertension in his father; Stroke in his father.   ?     Objective:      Physical Exam   Constitutional: He is oriented to person, place, and time. He appears well-developed and well-nourished. No distress.   HENT:   Head: Normocephalic and atraumatic.   Right Ear: External ear normal.   Left Ear: External ear normal.   Mouth/Throat: No oropharyngeal exudate.   Eyes: Pupils are equal, round, and reactive to light. Conjunctivae are normal. Right eye exhibits no discharge. Left eye exhibits no discharge. No scleral icterus.   Neck: Normal range of motion. Neck supple. No thyromegaly present.   Cardiovascular: Normal rate, regular rhythm and normal heart sounds.   No murmur heard.  Pulmonary/Chest: Effort normal and breath sounds normal. No respiratory distress. He has no wheezes. He exhibits no tenderness.   Abdominal: Soft. Bowel sounds are normal. He exhibits no distension and no mass. There is tenderness. There is no rebound.   Musculoskeletal: Normal range of motion. He exhibits no edema or tenderness.   Lymphadenopathy:     He has no cervical adenopathy.        Right cervical: No superficial cervical adenopathy present.       Left cervical: No superficial cervical adenopathy present.        Right axillary: No pectoral adenopathy present.        Left axillary: No pectoral adenopathy present.       Right: No inguinal and no supraclavicular adenopathy present.        Left: No inguinal and no supraclavicular adenopathy present.   Neurological: He is alert and oriented to person, place, and time. No cranial nerve deficit or sensory deficit.   Skin: Skin is warm and dry. Capillary refill takes 2 to 3 seconds. No rash noted. No erythema. No pallor.    Psychiatric: He has a normal mood and affect. His behavior is normal. Judgment normal.       ?   Vitals:    10/08/19 1315   BP: (!) 144/90   Pulse: 88   Resp: 14   Temp: 97.8 °F (36.6 °C)      ?     ECOG SCORE               ?   Laboratory:  ?   No visits with results within 1 Day(s) from this visit.   Latest known visit with results is:   Lab Visit on 09/03/2019   Component Date Value Ref Range Status    WBC 09/03/2019 9.17  3.90 - 12.70 K/uL Final    RBC 09/03/2019 4.50* 4.60 - 6.20 M/uL Final    Hemoglobin 09/03/2019 12.7* 14.0 - 18.0 g/dL Final    Hematocrit 09/03/2019 41.4  40.0 - 54.0 % Final    Mean Corpuscular Volume 09/03/2019 92  82 - 98 fL Final    Mean Corpuscular Hemoglobin 09/03/2019 28.2  27.0 - 31.0 pg Final    Mean Corpuscular Hemoglobin Conc 09/03/2019 30.7* 32.0 - 36.0 g/dL Final    RDW 09/03/2019 15.5* 11.5 - 14.5 % Final    Platelets 09/03/2019 534* 150 - 350 K/uL Final    MPV 09/03/2019 10.3  9.2 - 12.9 fL Final    Immature Granulocytes 09/03/2019 0.3  0.0 - 0.5 % Final    Gran # (ANC) 09/03/2019 5.9  1.8 - 7.7 K/uL Final    Immature Grans (Abs) 09/03/2019 0.03  0.00 - 0.04 K/uL Final    Lymph # 09/03/2019 2.0  1.0 - 4.8 K/uL Final    Mono # 09/03/2019 1.1* 0.3 - 1.0 K/uL Final    Eos # 09/03/2019 0.1  0.0 - 0.5 K/uL Final    Baso # 09/03/2019 0.09  0.00 - 0.20 K/uL Final    nRBC 09/03/2019 0  0 /100 WBC Final    Gran% 09/03/2019 64.3  38.0 - 73.0 % Final    Lymph% 09/03/2019 21.9  18.0 - 48.0 % Final    Mono% 09/03/2019 11.6  4.0 - 15.0 % Final    Eosinophil% 09/03/2019 0.9  0.0 - 8.0 % Final    Basophil% 09/03/2019 1.0  0.0 - 1.9 % Final    Differential Method 09/03/2019 Automated   Final    Sodium 09/03/2019 144  136 - 145 mmol/L Final    Potassium 09/03/2019 4.2  3.5 - 5.1 mmol/L Final    Chloride 09/03/2019 106  95 - 110 mmol/L Final    CO2 09/03/2019 23  23 - 29 mmol/L Final    Glucose 09/03/2019 87  70 - 110 mg/dL Final    BUN, Bld 09/03/2019 10  8 - 23  mg/dL Final    Creatinine 09/03/2019 0.7  0.5 - 1.4 mg/dL Final    Calcium 09/03/2019 9.4  8.7 - 10.5 mg/dL Final    Total Protein 09/03/2019 8.1  6.0 - 8.4 g/dL Final    Albumin 09/03/2019 4.0  3.5 - 5.2 g/dL Final    Total Bilirubin 09/03/2019 0.3  0.1 - 1.0 mg/dL Final    Alkaline Phosphatase 09/03/2019 106  55 - 135 U/L Final    AST 09/03/2019 24  10 - 40 U/L Final    ALT 09/03/2019 22  10 - 44 U/L Final    Anion Gap 09/03/2019 15  8 - 16 mmol/L Final    eGFR if African American 09/03/2019 >60.0  >60 mL/min/1.73 m^2 Final    eGFR if non African American 09/03/2019 >60.0  >60 mL/min/1.73 m^2 Final    CEA 09/03/2019 3.5  0.0 - 5.0 ng/mL Final      ?   Tumor markers   ?   ?   Imaging: NM PET CT Routine FDG  Narrative: EXAMINATION:  NM PET CT ROUTINE    CLINICAL HISTORY:  eval for metastatic disease in lung, liver and lymph nodes seen on recent CT scan for rectal adenocarcinoma;  Malignant neoplasm of rectum    TECHNIQUE:  Segmented attenuation corrected 3-D PET imaging was obtained from the skull base through the mid thighs utilizing 12.20 mCi F-18-FDG.  Noncontrast CT imaging was performed for attenuation correction, diagnosis, and anatomical fusion with PET.    COMPARISON:  CT 09/09/2019.    FINDINGS:  Head/neck: There is normal physiologic FDG uptake noted within the visualized brain parenchyma. No FDG avid lymphadenopathy within the neck.    Chest: There are ill-defined patchy and nodular parenchymal opacities again noted in the left lower lobe exhibiting low level FDG uptake with an SUV max of 2.5.  A new 13 mm ground-glass opacity is noted in the lateral left upper lobe exhibiting weak FDG avidity with an SUV max of 1.9.  Similar newly developed ground-glass opacity present in the mid right lung adjacent to the major fissure with SUV max of 1.6. No FDG avid mediastinal, hilar or axillary lymph nodes.    Abdomen/Pelvis: Normal physiologic FDG uptake noted within the liver, spleen, urinary tract,  and bowel.Focal hypodensity is again noted in the left hepatic lobe which is non FDG avid and favored to represent focal fatty infiltration.  An intermediate length segment of circumferential mural thickening is again noted at the rectosigmoid junction which exhibits intense hypermetabolism and a SUV max of 11.8.  A hypermetabolic lymph node adjacent to the left pelvic sidewall has a SUV max of 5.3.    Skeletal: No FDG avid osseus lesions identified.  Impression: 1. Hypermetabolic rectal mass consistent with known rectal malignancy.  2. Hypermetabolic regional lymph node adjacent to left pelvic sidewall.  3. Non FDG avid focal hypodensity in the liver favored to represent focal fatty infiltration.  4. Bilateral lung opacities most likely infectious or inflammatory etiology.  Follow-up recommended.    Electronically signed by: LORENA Green MD  Date:    09/25/2019  Time:    16:16     ?      Pathology:  Pathology Results  (Last 10 years)    None           ?   Assessment/Plan:       1. Rectal cancer     Had a detailed conversation with patient regarding the prognosis of his recent diagnosis.  Patient is noted to have stage IIIB rectal adenocarcinoma.  Rectal biopsy showed moderately differentiated invasive adenocarcinoma, lymphovascular invasion and perineural invasion where not identified. Initial staging CT scan showed hypodense liver lesion in the left lobe of the liver that was new and concerning for metastasis, however subsequent images including MRI of the liver suggested that the hypodense lesion in the liver was probably focal fatty infiltrate.  PET scan did not show avidity of the liver lesion.  Based on the imaging studies, patient is staged as IIIB (cT3, cN2a, cM0).  I discussed with patient the idea of neoadjuvant treatment with chemotherapy and radiation.  Our plan going forward is to concurrently administer capecitabine 825 milligram/meter sq p.o. b.i.d. Monday through Friday for the duration of  radiation therapy.This will be followed by a reimaging and discussion regarding resection after which we plan on adjuvant chemotherapy with FOLFOX for period of 12-16 weeks.  I discussed some of the common side effects associated with capecitabine including hand and foot syndrome, diarrhea, rash, cytopenia. I will go ahead an order for MediPort placement as well as arrange for chemo teaching with her nurse practitioner.  I have updated the radiation oncologist regarding plan for neoadjuvant concurrent chemo and radiation.  Ample time was given for patient to ask questions.  He knows to contact the clinic with any question or concern.  I also refilled [place] for pain control today.  ?   Follow-Up: Follow up in about 1 week (around 10/15/2019).    HOMA GAMBOA Md., Ph.D  Hematology & Oncology Department  Phone #: [phone]

## 2019-10-09 ENCOUNTER — TELEPHONE (OUTPATIENT)
Dept: PHARMACY | Facility: CLINIC | Age: 64
End: 2019-10-09

## 2019-10-09 ENCOUNTER — TELEPHONE (OUTPATIENT)
Dept: HEMATOLOGY/ONCOLOGY | Facility: CLINIC | Age: 64
End: 2019-10-09

## 2019-10-09 NOTE — TELEPHONE ENCOUNTER
Informed Patient  that [facility] received prescription for Capecitabine and prior authorization is required.  [facility] will be back in touch once insurance determination is received.

## 2019-10-09 NOTE — PLAN OF CARE
START ON PATHWAY REGIMEN - Colorectal    [id number]        Capecitabine (Xeloda(R))     **Always confirm dose/schedule in your pharmacy ordering system**    Patient Characteristics:  Preoperative or Nonsurgical Candidate (Clinical Staging), Rectal, cT3 - cT4, cN0   or Any cT, cN+  Therapeutic Status: Preoperative or Nonsurgical Candidate (Clinical Staging)  Tumor Location: Rectal  AJCC T Category: cT3  AJCC N Category: cN2a  AJCC M Category: cM0  AJCC 8 Stage Grouping: IIIB  Intent of Therapy:  Curative Intent, Discussed with Patient

## 2019-10-09 NOTE — TELEPHONE ENCOUNTER
Called pt to speak with him regarding his treatment plan placed by Dr. Sams yesterday.  Explained to pt my role in the dept as nurse navigator and gave my direct office number.  Discussed with pt that the providers are going to give him the chemotherapy pills Xeloda for him to take daily on his radiation days only.  Notified pt that they had talked about giving it IV at first but have since decided that the oral form would be best prior to surgery.  Explained that he will also need chemotherapy after surgery but that would include multiple drugs given through his port.  Notified pt that I cancelled his mediport consult for tomorrow with surgeon bc it is not needed at this time.  Instructed pt on the process for filling the chemotherapy medication through [facility].  Also notified pt on the need to schedule chemotherapy teaching about this medication for him to know all the info regarding it as well as side effects.  Pt stated that he was supposed to see rad onc yesterday but was tired of being there so he rescheduled the appt to Friday.  Explained to pt that I will meet with him Friday so that we can schedule the teaching appt once we have a better time frame from [facility].  Pt verbalized understanding of all information given.

## 2019-10-11 ENCOUNTER — OFFICE VISIT (OUTPATIENT)
Dept: RADIATION ONCOLOGY | Facility: CLINIC | Age: 64
End: 2019-10-11
Payer: MEDICARE

## 2019-10-11 VITALS
TEMPERATURE: 98 F | HEART RATE: 87 BPM | WEIGHT: 155.81 LBS | OXYGEN SATURATION: 95 % | HEIGHT: 70 IN | SYSTOLIC BLOOD PRESSURE: 139 MMHG | RESPIRATION RATE: 18 BRPM | DIASTOLIC BLOOD PRESSURE: 95 MMHG | BODY MASS INDEX: 22.3 KG/M2

## 2019-10-11 DIAGNOSIS — C20 RECTAL CANCER: Primary | ICD-10-CM

## 2019-10-11 PROCEDURE — 3080F PR MOST RECENT DIASTOLIC BLOOD PRESSURE >= 90 MM HG: ICD-10-PCS | Mod: CPTII,S$GLB,, | Performed by: RADIOLOGY

## 2019-10-11 PROCEDURE — 99999 PR PBB SHADOW E&M-EST. PATIENT-LVL III: CPT | Mod: PBBFAC,,, | Performed by: RADIOLOGY

## 2019-10-11 PROCEDURE — 3008F BODY MASS INDEX DOCD: CPT | Mod: CPTII,S$GLB,, | Performed by: RADIOLOGY

## 2019-10-11 PROCEDURE — 99999 PR PBB SHADOW E&M-EST. PATIENT-LVL III: ICD-10-PCS | Mod: PBBFAC,,, | Performed by: RADIOLOGY

## 2019-10-11 PROCEDURE — 3075F PR MOST RECENT SYSTOLIC BLOOD PRESS GE 130-139MM HG: ICD-10-PCS | Mod: CPTII,S$GLB,, | Performed by: RADIOLOGY

## 2019-10-11 PROCEDURE — 3075F SYST BP GE 130 - 139MM HG: CPT | Mod: CPTII,S$GLB,, | Performed by: RADIOLOGY

## 2019-10-11 PROCEDURE — 99205 OFFICE O/P NEW HI 60 MIN: CPT | Mod: S$GLB,,, | Performed by: RADIOLOGY

## 2019-10-11 PROCEDURE — 3008F PR BODY MASS INDEX (BMI) DOCUMENTED: ICD-10-PCS | Mod: CPTII,S$GLB,, | Performed by: RADIOLOGY

## 2019-10-11 PROCEDURE — 3080F DIAST BP >= 90 MM HG: CPT | Mod: CPTII,S$GLB,, | Performed by: RADIOLOGY

## 2019-10-11 PROCEDURE — 99205 PR OFFICE/OUTPT VISIT, NEW, LEVL V, 60-74 MIN: ICD-10-PCS | Mod: S$GLB,,, | Performed by: RADIOLOGY

## 2019-10-11 NOTE — LETTER
October 11, 2019      Faisal Arias MD  [address]  [place] [address]            [facility]cology  [address] [facility] [address]  Phone: [phone]  Fax: [phone]          Patient: Vincent Benton   MR Number: [id number]   YOB: 1955   Date of Visit: 10/11/2019       Dear Dr. Faisal Arias:    Thank you for referring Vincent Benton to me for evaluation. Attached you will find relevant portions of my assessment and plan of care.    If you have questions, please do not hesitate to call me. I look forward to following Vincent Benton along with you.    Sincerely,    Steven Valles II, MD    Enclosure  CC:  No Recipients    If you would like to receive this communication electronically, please contact [email or web address] or [phone] to request more information on [employer] access.    For providers and/or their staff who would like to refer a patient to [facility], please contact us through our one-stop-shop provider referral line, [facility] , at [phone].    If you feel you have received this communication in error or would no longer like to receive these types of communications, please e-mail [email or web address]

## 2019-10-11 NOTE — TELEPHONE ENCOUNTER
DOCUMENTATION ONLY:  Prior authorization for  Capecitabine 500 mg #120/28 approved from 10/10/2019 to 10/11/2021    Co-pay: $24.46    Patient Assistance IS required. Sending to the financial assistance team to investigate assistance options. Alan ESTRADA

## 2019-10-11 NOTE — PROGRESS NOTES
Name: Vincent Benton  : 1955      Patient Referred To Radiation Oncology By:  Dr. Familia Gonzalez MD  [address] [place] [address] [place] [address]    DIAGNOSIS:  Rectal adenocarcinoma cIIIB: cT3 cN2a cM0  1. 9/3/19 he had a colonoscopy and upper GI endoscopy.  Digital rectal exam showed a palpable rectal mass and non thrombosed internal hemorrhoids with normal sphincter tone.  There was a fungating, infiltrative partially obstructing large mass in the mid rectum which was circumferential and created a stenosis 2nd not be traversed.  There was blood in the rectum.  EGD showed chronic alcoholic gastritis.  Rectal biopsy returned moderately differentiated invasive adenocarcinoma, MMR proteins intact.   2. 9/3/19 proctoscopy showed a malignant lesion 9-10 cm from anal verge, non circumferential, mostly anterior and left lateral with oozing and bleeding recent biopsies.    3. 9/3/19 CEA 3.5.    4. 19 rectal MRI showed a mass 6.5 cm from the anal verge in the mid rectum with circumferential narrowing.  The mass extended 3 cm cranial caudal.  There was spiculation nodularity of adjacent fat suggestive of extramural disease spread extending for 5.5 mm. Distance from tumor to mesorectal fascia was 2.1 cm.  There was a 5.2 mmleft perirectal lymph node.  There was an additional lymph node inferior to the rectum measuring 4.7 mm.  There were other round lymph nodes in the right mesial rectum measuring 5 mm and 5.1 mm.  5. 19 CT chest, abdomen pelvis was compared with 17.  Posterior left lower lobe lung that increase irregular masslike opacity.  There was 10 mm short axis precarinal lymph node.  There was a new focal hypodensity in the posterior medial left hepatic lobe 3.1 x 2.7 x 1.3 cm.  There was circumferential masslike thickening of the rectum.  There were bilateral subcentimeter mesorectal internal iliac lymph nodes.  There was no pathologic inguinal or external iliac adenopathy.     6. 9/19/19 MRI abdomen showed focal fatty infiltration medial segment left hepatic lobe.   7. 9/25/19 PET showed 2.5 SUV in the left lower lobe, new 13 mm ground-glass opacity left upper lobe 1.9 SUV and mid right lung new ground-glass opacity 1.6 SUV.  No FDG avid mediastinal adenopathy.  No FDG uptake in the left hepatic lobe.  11.8 SUV max circumferential mural thickening of rectosigmoid junction, 5.3 SUV left pelvic sidewall lymph node.  He was presented at rectal tumor board.    HISTORY OF PRESENT ILLNESS:  Vincent Benton is a pleasant 64 y.o. male who was diagnosed 1 year ago with iron-deficiency anemia requiring transfusion, and 1 year of hematochezia.  9/3/19 he had a colonoscopy and upper GI endoscopy.  Digital rectal exam showed a palpable rectal mass and non thrombosed internal hemorrhoids with normal sphincter tone.  There was a fungating, infiltrative partially obstructing large mass in the mid rectum which was circumferential and created a stenosis 2nd not be traversed.  There was blood in the rectum.  EGD showed chronic alcoholic gastritis.  Rectal biopsy returned moderately differentiated invasive adenocarcinoma, MMR proteins intact. 9/3/19 proctoscopy showed a malignant lesion 9-10 cm from anal verge, non circumferential, mostly anterior and left lateral with oozing and bleeding recent biopsies.  9/3/19 CEA 3.5.  9/9/19 rectal MRI showed a mass 6.5 cm from the anal verge in the mid rectum with circumferential narrowing.  The mass extended 3 cm cranial caudal.  There was spiculation nodularity of adjacent fat suggestive of extramural disease spread extending for 5.5 mm. Distance from tumor to mesorectal fascia was 2.1 cm.  There was a 5.2 cm left perirectal lymph node.  There was an additional lymph node inferior to the rectum measuring 4.7 cm.  There were other round lymph nodes in the right mesial rectum measuring 5 mm and 5.1 mm. 9/9/19 CT chest, abdomen pelvis was compared with 11/18/17.   Posterior left lower lobe lung that increase irregular masslike opacity.  There was 10 mm short axis precarinal lymph node.  There was a new focal hypodensity in the posterior medial left hepatic lobe 3.1 x 2.7 x 1.3 cm.  There was circumferential masslike thickening of the rectum.  There were bilateral subcentimeter mesorectal internal iliac lymph nodes.  There was no pathologic inguinal or external iliac adenopathy.  9/19/19 MRI abdomen showed focal fatty infiltration medial segment left hepatic lobe.  9/25/19 PET showed 2.5 SUV in the left lower lobe, new 13 mm ground-glass opacity left upper lobe 1.9 SUV and mid right lung new ground-glass opacity 1.6 SUV.  No FDG avid mediastinal adenopathy.  No FDG uptake in the left hepatic lobe.  11.8 SUV max circumferential mural thickening of rectosigmoid junction, 5.3 SUV left pelvic sidewall lymph node.  He was presented at rectal tumor board.    REVIEW OF SYSTEMS: (Positive findings bold, otherwise negative)   Constitutional: fever, fatigue, weight change  Eyes: blurred vision in the past 3 months, double vision   ENT: ear pain, new mouth lesions, jaw pain, difficulty swallowing, sore throat  Cardiovascular: chest pain on exertion, reflux, leg swelling  Respiratory: shortness of breath, dyspnea, cough, hemoptysis.   GI: abdominal pain, diarrhea, constipation, blood in stool, painful bowel movements  : painful or burning urination, blood in urine  Musculoskeletal: new bone or joint pains  Neurologic: headache, seizure, focal numbness or tingling, balance changes, speech changes  Lymph: new or enlarged lymph nodes  Psychiatric: depression, anxiety    PRIOR RADIATION HISTORY: None    PAST MEDICAL HISTORY:  Past Medical History:   Diagnosis Date    Alcoholism     Anemia     Back pain     Encounter for blood transfusion     Essential hypertension 2/4/2016    GERD (gastroesophageal reflux disease)     Hiatal hernia     Hypertension     Rectal cancer 9/11/2019        PAST SURGICAL HISTORY:  Past Surgical History:   Procedure Laterality Date    COLONOSCOPY N/A 9/3/2019    Procedure: COLONOSCOPY;  Surgeon: Isai Centeno MD;  Location: [facility];  Service: Endoscopy;  Laterality: N/A;    ESOPHAGOGASTRODUODENOSCOPY N/A 9/3/2019    Procedure: ESOPHAGOGASTRODUODENOSCOPY (EGD);  Surgeon: Isai Centeno MD;  Location: [facility];  Service: Endoscopy;  Laterality: N/A;    HIP FRACTURE SURGERY      left hip    JOINT REPLACEMENT Left        ALLERGIES:   Review of patient's allergies indicates:  No Known Allergies    MEDICATIONS:    Current Outpatient Medications:     cyanocobalamin (VITAMIN B-12) 1000 MCG tablet, Take 100 mcg by mouth once daily., Disp: , Rfl:     ferrous sulfate 324 mg (65 mg iron) TbEC, Take 1 tablet (324 mg total) by mouth 2 (two) times daily., Disp: 60 tablet, Rfl: 0    losartan (COZAAR) 50 MG tablet, TAKE 1 TABLET BY MOUTH EVERY MORNING, Disp: 90 tablet, Rfl: 0    pantoprazole (PROTONIX) 40 MG tablet, Take 1 tablet (40 mg total) by mouth once daily., Disp: 30 tablet, Rfl: 11    sucralfate (CARAFATE) 1 gram tablet, Take 1 tablet (1 g total) by mouth 4 (four) times daily., Disp: 120 tablet, Rfl: 1  No current facility-administered medications for this visit.     Facility-Administered Medications Ordered in Other Visits:     lactated ringers infusion, , Intravenous, Continuous, Shilpa Yee MD    sodium chloride 0.9% flush 3 mL, 3 mL, Intravenous, PRN, Shilpa Yee MD    SOCIAL HISTORY:  Social History     Socioeconomic History    Marital status:      Spouse name: Not on file    Number of children: Not on file    Years of education: Not on file    Highest education level: Not on file   Occupational History    Not on file   Social Needs    Financial resource strain: Not on file    Food insecurity:     Worry: Not on file     Inability: Not on file    Transportation needs:     Medical: Not on file     Non-medical: Not on file   Tobacco  Use    Smoking status: Never Smoker    Smokeless tobacco: Never Used   Substance and Sexual Activity    Alcohol use: Yes     Comment: states he drinks frequently everyday/quit approx 10/2017    Drug use: No    Sexual activity: Never     Partners: Female   Lifestyle    Physical activity:     Days per week: Not on file     Minutes per session: Not on file    Stress: Not on file   Relationships    Social connections:     Talks on phone: Not on file     Gets together: Not on file     Attends Jew service: Not on file     Active member of club or organization: Not on file     Attends meetings of clubs or organizations: Not on file     Relationship status: Not on file   Other Topics Concern    Not on file   Social History Narrative    Not on file       FAMILY HISTORY:  Family History   Problem Relation Age of Onset    Cataracts Mother     Stroke Father     Hypertension Father        PHYSICAL EXAMINATION:  Constitutional/Vitals: well appearing, no acute distress, ECOG 0 - Fully Active, There were no vitals taken for this visit.  Eyes: sclera anicteric  Lymphatic: no cervical, supraclavicular or inguinal adenopathy  Cardiovascular: regular rate, no murmurs, no edema of the upper or lower extremities, radial pulse 2+  Respiratory: unlabored effort, clear to auscultation, no wheezes  Abdomen: soft, non-tender, no rigidity, no masses, no hepatomegaly  Rectal:  Rectal mass palpable at the distal extent of the inspecting digit, brown stool, tender exam  Musc: non-tender to percussion cervical, thoracic and lumbosacral spine    IMAGING AND LABORATORY FINDINGS: As per HPI; images, labs and medical records reviewed personally in EPIC.    ASSESSMENT:  Node positive rectal adenocarcinoma    PLAN:  We discussed the role of radiation in the treatment of rectal adenocarcinoma.  I do recommend neoadjuvant radiation due to the positive lymph nodes and invasion through the rectal wall.  I have discussed the case with  Medical Oncology, who is planning on Xeloda.  We discussed the rationale for neoadjuvant therapy which is tumor down staging to increased resection, increased sphincter preservation and decrease local failure and toxicity.  The dose will be 45 Gy to the pelvis with a boost of 5.4 Gy to the tumor and positive lymph nodes.  I may also treat the entire pelvis to 50.4 Gy and forego the boost.  I recommend IMRT to spare the small bowel and will treat him prone with a belly board to also spare the small bowel.    We discussed the techniques, toxicities and indications of radiation and I answered the patient's questions to their apparent satisfaction.    CLARA Valles M.D.  Radiation Oncology  [facility] [address] [place] [address]  Ph: [phone]  [email or web address]

## 2019-10-15 ENCOUNTER — TELEPHONE (OUTPATIENT)
Dept: HEMATOLOGY/ONCOLOGY | Facility: CLINIC | Age: 64
End: 2019-10-15

## 2019-10-15 DIAGNOSIS — C20 RECTAL CANCER: ICD-10-CM

## 2019-10-15 DIAGNOSIS — C20 RECTAL CANCER: Primary | ICD-10-CM

## 2019-10-15 RX ORDER — ONDANSETRON HYDROCHLORIDE 8 MG/1
8 TABLET, FILM COATED ORAL EVERY 8 HOURS PRN
Qty: 30 TABLET | Refills: 2 | Status: SHIPPED | OUTPATIENT
Start: 2019-10-15 | End: 2019-12-23 | Stop reason: SDUPTHER

## 2019-10-15 RX ORDER — PROCHLORPERAZINE MALEATE 5 MG
5 TABLET ORAL EVERY 6 HOURS PRN
Qty: 30 TABLET | Refills: 1 | Status: SHIPPED | OUTPATIENT
Start: 2019-10-15 | End: 2019-10-15 | Stop reason: SDUPTHER

## 2019-10-15 NOTE — TELEPHONE ENCOUNTER
Initial capecitabine consult completed on 10/15 . Capecitabine will be shipped on 10/16 to arrive at patient's home on 10/17 via [employer]. $ 24.46 copay. Patient plans to start capecitabine when he starts XRT. Address confirmed. Confirmed 2 patient identifiers - name and . Therapy Appropriate.     Patient was counseled on the administration directions:  -Take 3 tablets (1500mg) by mouth twice daily within 30 minutes of a meal on days of XRT  -Do not chew, crush, or break the tablets.   If possible, patient was instructed tip the tablets from the RX bottle to the cap, and take directly from the cap to the mouth.  Patient may handle the medication with their hands if they wear with a latex or nitrile glove and wash their hands before and after handling the tablets.    Patient was counseled on the following possible side effects, which include, but are not limited to:  swelling, fatigue, weakness/dizziness, hand-foot syndrome, skin irritation, diarrhea/constipation, nausea, vomiting, loss of appetite, mouth sores, hair loss (6%), insomnia, changes in taste, indigestion, increased risk for infection, shortness of breath, and may bleed more easily.  Patient was given Eucerin cream for Hand-Foot Syndrome, and hydrocortisone cream for dermatitis.     DDIs:  Medication list reviewed. Discussed DDI with pantoprazole and Xeloda. Patient aware that pantoprazole can decrease the efficacy of the Xeloda. Discussed with patient to try to use TUMS  as needed as there is no DDI with TUMS.     Patient was given 2 patient education handouts on how to handle oral chemotherapy and specific recommendations- do's and don'ts. Instructed the patient that if they have any remaining oral chemotherapy, not to flush down the toilet or throw away in the trash; The patient or caregiver should return the unused oral chemotherapy to either the clinic or to myself in the Pharmacy where the oral chemotherapy can be disposed of properly.     Patient  confirmed understanding. Patient did not have additional questions.   Consultation included: indication; goals of treatment; administration; storage and handling; side effects; how to handle side effects; the importance of compliance; how to handle missed doses; the importance of laboratory monitoring; the importance of keeping all follow up appointments.  Patient understands to report any medication changes to [facility] and provider. All questions answered and addressed to patients satisfaction. I will f/u with patient in 1 week from start, [facility] to contact patient in 3 weeks for refills.

## 2019-10-15 NOTE — TELEPHONE ENCOUNTER
Called patient to inform him that  Has called in medication to assist w his n/v.  And that medication has been called in to joe at [facility]. Patient verbalized understanding

## 2019-10-15 NOTE — TELEPHONE ENCOUNTER
----- Message from Mikel Sams MD sent at 10/15/2019  2:56 PM CDT -----  Regarding: RE: Xeloda initial consultation  Called in. Please let patient know to go pick it up. Thanks    ----- Message -----  From: Opal Broussard LPN  Sent: 10/15/2019   2:43 PM CDT  To: Mikel Sams MD  Subject: FW: Xeloda initial consultation                  Please advise..   This is  His preferred pharmacy of choice:    [id number] - [place], [place] - [address] AT [facility] [phone] (Phone)  [phone] (Fax)    Thank you    ----- Message -----  From: Juliet Adler PharmD  Sent: 10/15/2019   2:22 PM CDT  To: Fátima Higuera PharmD, #  Subject: Xeloda initial consultation                      Good afternoon,    We were able to reach out to Mr. Benton and complete his initial consultation for his Xeloda. He will receive the medication on 10/17 and is aware to only start the medication once he starts XRT and he will only take the medication on XRT days.     After reviewing his medication list, he did not have any anti-nausea medication on file. Patient is interested in having something on hand if needed. If appropriate, can a script for anti-nausea be sent to his local pharmacy before he starts therapy?    Thank you!    Juliet Adler, PharmD  [facility]   [phone]

## 2019-10-16 RX ORDER — PROCHLORPERAZINE MALEATE 5 MG
TABLET ORAL
Qty: 385 TABLET | Refills: 1 | Status: SHIPPED | OUTPATIENT
Start: 2019-10-16 | End: 2019-12-23 | Stop reason: SDUPTHER

## 2019-10-17 ENCOUNTER — HOSPITAL ENCOUNTER (OUTPATIENT)
Dept: RADIATION THERAPY | Facility: HOSPITAL | Age: 64
Discharge: HOME | End: 2019-10-17
Attending: RADIOLOGY
Payer: MEDICARE

## 2019-10-17 ENCOUNTER — HOSPITAL ENCOUNTER (OUTPATIENT)
Dept: RADIOLOGY | Facility: HOSPITAL | Age: 64
Discharge: HOME | End: 2019-10-17
Attending: RADIOLOGY
Payer: MEDICARE

## 2019-10-17 PROCEDURE — 77263 THER RADIOLOGY TX PLNG CPLX: CPT | Mod: ,,, | Performed by: RADIOLOGY

## 2019-10-17 PROCEDURE — 77290 THER RAD SIMULAJ FIELD CPLX: CPT | Mod: 26,,, | Performed by: RADIOLOGY

## 2019-10-17 PROCEDURE — 77334 RADIATION TREATMENT AID(S): CPT | Mod: TC | Performed by: RADIOLOGY

## 2019-10-17 PROCEDURE — 77290 PR  SET RADN THERAPY FIELD COMPLEX: ICD-10-PCS | Mod: 26,,, | Performed by: RADIOLOGY

## 2019-10-17 PROCEDURE — 77290 THER RAD SIMULAJ FIELD CPLX: CPT | Mod: TC | Performed by: RADIOLOGY

## 2019-10-17 PROCEDURE — 77334 RADIATION TREATMENT AID(S): CPT | Mod: 26,,, | Performed by: RADIOLOGY

## 2019-10-17 PROCEDURE — 77014 HC CT GUIDANCE RADIATION THERAPY FLDS PLACEMENT: CPT | Mod: TC | Performed by: RADIOLOGY

## 2019-10-17 PROCEDURE — 77263 PR  RADIATION THERAPY PLAN COMPLEX: ICD-10-PCS | Mod: ,,, | Performed by: RADIOLOGY

## 2019-10-17 PROCEDURE — 77334 PR  RADN TREATMENT AID(S) COMPLX: ICD-10-PCS | Mod: 26,,, | Performed by: RADIOLOGY

## 2019-10-18 ENCOUNTER — OFFICE VISIT (OUTPATIENT)
Dept: HEMATOLOGY/ONCOLOGY | Facility: CLINIC | Age: 64
End: 2019-10-18
Payer: MEDICARE

## 2019-10-18 DIAGNOSIS — C20 RECTAL CANCER: Primary | ICD-10-CM

## 2019-10-18 PROCEDURE — 99215 PR OFFICE/OUTPT VISIT, EST, LEVL V, 40-54 MIN: ICD-10-PCS | Mod: 25,S$GLB,, | Performed by: NURSE PRACTITIONER

## 2019-10-18 PROCEDURE — 99215 OFFICE O/P EST HI 40 MIN: CPT | Mod: 25,S$GLB,, | Performed by: NURSE PRACTITIONER

## 2019-10-18 PROCEDURE — G0008 ADMIN INFLUENZA VIRUS VAC: HCPCS | Mod: S$GLB,,, | Performed by: NURSE PRACTITIONER

## 2019-10-18 PROCEDURE — 90686 FLU VACCINE (QUAD) GREATER THAN OR EQUAL TO 3YO PRESERVATIVE FREE IM: ICD-10-PCS | Mod: S$GLB,,, | Performed by: NURSE PRACTITIONER

## 2019-10-18 PROCEDURE — 99999 PR PBB SHADOW E&M-EST. PATIENT-LVL I: ICD-10-PCS | Mod: PBBFAC,,, | Performed by: NURSE PRACTITIONER

## 2019-10-18 PROCEDURE — 90686 IIV4 VACC NO PRSV 0.5 ML IM: CPT | Mod: S$GLB,,, | Performed by: NURSE PRACTITIONER

## 2019-10-18 PROCEDURE — 99999 PR PBB SHADOW E&M-EST. PATIENT-LVL I: CPT | Mod: PBBFAC,,, | Performed by: NURSE PRACTITIONER

## 2019-10-18 PROCEDURE — G0008 FLU VACCINE (QUAD) GREATER THAN OR EQUAL TO 3YO PRESERVATIVE FREE IM: ICD-10-PCS | Mod: S$GLB,,, | Performed by: NURSE PRACTITIONER

## 2019-10-18 NOTE — PROGRESS NOTES
65 y/o male for chemotherapy teaching. Patient given the Navigation Notebook. Explained the contents of the chemotherapy binder. Discussed with patient the rationale for chemotherapy, how it works, the process of treatment, potential side effects and symptoms to report. Consent signed, witnessed, and scanned.    Reviewed the specific medication and gave them a handout describing the potential side effects of Xeloda      Discussed potential side effects such as:  Nausea and vomiting  Myelosuppression  Fatigue  Anorexia  Stomatitis  Diarrhea  Gastritis  Fever > 100.4  Antiemetics instructions  Skin care  Rash  Small freq meals  Increased protein  Increased calories  Vitamin support   Taste alterations  Hepatic Toxicity  Hydration  Renal toxicity  Port management  Community resources  Thrombocytopenia precautions  Hand and foot syndrome- symptoms and self care tips    Phone numbers to contact healthcare team provided to patient.  Patient verbalized understanding plan of care and how to contact healthcare team if needed.

## 2019-10-23 ENCOUNTER — TELEPHONE (OUTPATIENT)
Dept: INFUSION THERAPY | Facility: HOSPITAL | Age: 64
End: 2019-10-23

## 2019-10-23 ENCOUNTER — TELEPHONE (OUTPATIENT)
Dept: HEMATOLOGY/ONCOLOGY | Facility: CLINIC | Age: 64
End: 2019-10-23

## 2019-10-23 NOTE — TELEPHONE ENCOUNTER
SW intern spoke w/ pt in regards to distress score. Pt expressed being ready to start treatment and still in shock w/ diagnosis. SW intern offered emotional support and validated feelings. 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 SW intern reviewed SW services and will f/u next visit or as needed.

## 2019-10-24 ENCOUNTER — DOCUMENTATION ONLY (OUTPATIENT)
Dept: RADIATION ONCOLOGY | Facility: CLINIC | Age: 64
End: 2019-10-24

## 2019-10-24 PROCEDURE — 77301 RADIOTHERAPY DOSE PLAN IMRT: CPT | Mod: 26,,, | Performed by: RADIOLOGY

## 2019-10-24 PROCEDURE — 77386 HC IMRT, COMPLEX: CPT | Performed by: RADIOLOGY

## 2019-10-24 PROCEDURE — 77338 PR  MLC IMRT DESIGN & CONSTRUCTION PER IMRT PLAN: ICD-10-PCS | Mod: 26,,, | Performed by: RADIOLOGY

## 2019-10-24 PROCEDURE — 77300 RADIATION THERAPY DOSE PLAN: CPT | Mod: 26,,, | Performed by: RADIOLOGY

## 2019-10-24 PROCEDURE — 77014 HC CT GUIDANCE RADIATION THERAPY FLDS PLACEMENT: CPT | Mod: TC | Performed by: RADIOLOGY

## 2019-10-24 PROCEDURE — 77300 RADIATION THERAPY DOSE PLAN: CPT | Mod: TC | Performed by: RADIOLOGY

## 2019-10-24 PROCEDURE — 77301 RADIOTHERAPY DOSE PLAN IMRT: CPT | Mod: TC | Performed by: RADIOLOGY

## 2019-10-24 PROCEDURE — 77338 DESIGN MLC DEVICE FOR IMRT: CPT | Mod: 26,,, | Performed by: RADIOLOGY

## 2019-10-24 PROCEDURE — 77338 DESIGN MLC DEVICE FOR IMRT: CPT | Mod: TC,59 | Performed by: RADIOLOGY

## 2019-10-24 PROCEDURE — 77300 PR RADIATION THERAPY,DOSIMETRY PLAN: ICD-10-PCS | Mod: 26,,, | Performed by: RADIOLOGY

## 2019-10-24 PROCEDURE — 77301 PR  INTEN MOD RADIOTHER PLAN W/DOSE VOL HIST: ICD-10-PCS | Mod: 26,,, | Performed by: RADIOLOGY

## 2019-10-25 PROCEDURE — 77014 HC CT GUIDANCE RADIATION THERAPY FLDS PLACEMENT: CPT | Mod: TC | Performed by: RADIOLOGY

## 2019-10-25 PROCEDURE — 77386 HC IMRT, COMPLEX: CPT | Performed by: RADIOLOGY

## 2019-10-28 ENCOUNTER — OFFICE VISIT (OUTPATIENT)
Dept: HEMATOLOGY/ONCOLOGY | Facility: CLINIC | Age: 64
End: 2019-10-28
Payer: MEDICARE

## 2019-10-28 VITALS
BODY MASS INDEX: 23.15 KG/M2 | TEMPERATURE: 98 F | HEIGHT: 69 IN | WEIGHT: 156.31 LBS | DIASTOLIC BLOOD PRESSURE: 86 MMHG | SYSTOLIC BLOOD PRESSURE: 139 MMHG | HEART RATE: 74 BPM | OXYGEN SATURATION: 98 %

## 2019-10-28 DIAGNOSIS — C20 RECTAL CANCER: Primary | ICD-10-CM

## 2019-10-28 PROCEDURE — 3075F SYST BP GE 130 - 139MM HG: CPT | Mod: CPTII,S$GLB,, | Performed by: INTERNAL MEDICINE

## 2019-10-28 PROCEDURE — 3079F PR MOST RECENT DIASTOLIC BLOOD PRESSURE 80-89 MM HG: ICD-10-PCS | Mod: CPTII,S$GLB,, | Performed by: INTERNAL MEDICINE

## 2019-10-28 PROCEDURE — 77014 HC CT GUIDANCE RADIATION THERAPY FLDS PLACEMENT: CPT | Mod: TC | Performed by: RADIOLOGY

## 2019-10-28 PROCEDURE — 3075F PR MOST RECENT SYSTOLIC BLOOD PRESS GE 130-139MM HG: ICD-10-PCS | Mod: CPTII,S$GLB,, | Performed by: INTERNAL MEDICINE

## 2019-10-28 PROCEDURE — 3079F DIAST BP 80-89 MM HG: CPT | Mod: CPTII,S$GLB,, | Performed by: INTERNAL MEDICINE

## 2019-10-28 PROCEDURE — 99215 PR OFFICE/OUTPT VISIT, EST, LEVL V, 40-54 MIN: ICD-10-PCS | Mod: S$GLB,,, | Performed by: INTERNAL MEDICINE

## 2019-10-28 PROCEDURE — 99999 PR PBB SHADOW E&M-EST. PATIENT-LVL III: CPT | Mod: PBBFAC,,, | Performed by: INTERNAL MEDICINE

## 2019-10-28 PROCEDURE — 99215 OFFICE O/P EST HI 40 MIN: CPT | Mod: S$GLB,,, | Performed by: INTERNAL MEDICINE

## 2019-10-28 PROCEDURE — 99999 PR PBB SHADOW E&M-EST. PATIENT-LVL III: ICD-10-PCS | Mod: PBBFAC,,, | Performed by: INTERNAL MEDICINE

## 2019-10-28 PROCEDURE — 3008F BODY MASS INDEX DOCD: CPT | Mod: CPTII,S$GLB,, | Performed by: INTERNAL MEDICINE

## 2019-10-28 PROCEDURE — 77386 HC IMRT, COMPLEX: CPT | Performed by: RADIOLOGY

## 2019-10-28 PROCEDURE — 3008F PR BODY MASS INDEX (BMI) DOCUMENTED: ICD-10-PCS | Mod: CPTII,S$GLB,, | Performed by: INTERNAL MEDICINE

## 2019-10-28 RX ORDER — HYDROCODONE BITARTRATE AND ACETAMINOPHEN 10; 325 MG/1; MG/1
1 TABLET ORAL EVERY 6 HOURS PRN
Qty: 60 TABLET | Refills: 0 | Status: SHIPPED | OUTPATIENT
Start: 2019-10-28 | End: 2019-11-11 | Stop reason: SDUPTHER

## 2019-10-28 NOTE — PROGRESS NOTES
Subjective:   Date of Visit: 10/28/19   ?   ?   CHIEF COMPLAINT:  Locally advanced rectal cancer, Stage IIIB (cT3,cN2a, CM0)  ?     HPI:   was seen at [facility] today. He is a 64 yr old male with recently diagnosed locally advanced rectal cancer.  Currently on capecitabine Monday through Friday for neoadjuvant therapy.  Seems to be tolerating effectively well with minimal toxicity including minimal cytopenia.  He also denies any fever, chills, nausea or vomiting, chest pain or shortness of breath, diarrhea, unintentional weight loss, night sweats or dysuria.    Review of Systems   Constitutional: Negative for activity change, appetite change, chills, fatigue, fever and unexpected weight change.   HENT: Negative for hearing loss, mouth sores, nosebleeds, sore throat, tinnitus, trouble swallowing and voice change.    Eyes: Negative for visual disturbance.   Respiratory: Negative for cough, chest tightness, shortness of breath and wheezing.    Cardiovascular: Negative for chest pain, palpitations and leg swelling.   Gastrointestinal: Positive for rectal pain. Negative for abdominal pain, anal bleeding, blood in stool, constipation, diarrhea, nausea and vomiting.   Genitourinary: Negative for frequency, hematuria and testicular pain.   Musculoskeletal: Negative for arthralgias, back pain, gait problem and neck pain.   Skin: Negative for color change, pallor, rash and wound.   Allergic/Immunologic: Negative for immunocompromised state.   Neurological: Negative for seizures, syncope, weakness, numbness and headaches.   Hematological: Negative for adenopathy. Does not bruise/bleed easily.   Psychiatric/Behavioral: Negative for agitation, confusion, decreased concentration, hallucinations and sleep disturbance. The patient is not nervous/anxious.        ?   PAST MEDICAL HISTORY:   Past Medical History:   Diagnosis Date    Alcoholism     Anemia     Back pain     Encounter for blood transfusion      Essential hypertension 2/4/2016    GERD (gastroesophageal reflux disease)     Hiatal hernia     Hypertension     Rectal cancer 9/11/2019    ?     PAST SURGICAL HISTORY:   Past Surgical History:   Procedure Laterality Date    COLONOSCOPY N/A 9/3/2019    Procedure: COLONOSCOPY;  Surgeon: Isai Centeno MD;  Location: [facility];  Service: Endoscopy;  Laterality: N/A;    ESOPHAGOGASTRODUODENOSCOPY N/A 9/3/2019    Procedure: ESOPHAGOGASTRODUODENOSCOPY (EGD);  Surgeon: Isai Centeno MD;  Location: [facility];  Service: Endoscopy;  Laterality: N/A;    HIP FRACTURE SURGERY      left hip    JOINT REPLACEMENT Left       ?   ALLERGIES:   Allergies as of 10/28/2019    (No Known Allergies)      ?   MEDICATIONS:?   Outpatient Medications Marked as Taking for the 10/28/19 encounter (Office Visit) with Mikel Sams MD   Medication Sig Dispense Refill    capecitabine (XELODA) 500 MG Tab Take 3 tablets (1,500 mg total) by mouth 2 (two) times daily on days of radiation only for a total of six weeks. 180 tablet 11    cyanocobalamin (VITAMIN B-12) 1000 MCG tablet Take 100 mcg by mouth once daily.      ferrous sulfate 324 mg (65 mg iron) TbEC Take 1 tablet (324 mg total) by mouth 2 (two) times daily. 60 tablet 0    losartan (COZAAR) 50 MG tablet TAKE 1 TABLET BY MOUTH EVERY MORNING 90 tablet 0    ondansetron (ZOFRAN) 8 MG tablet Take 1 tablet (8 mg total) by mouth every 8 (eight) hours as needed for Nausea. 30 tablet 2    pantoprazole (PROTONIX) 40 MG tablet Take 1 tablet (40 mg total) by mouth once daily. 30 tablet 11    prochlorperazine (COMPAZINE) 5 MG tablet TAKE 1 TABLET(5 MG) BY MOUTH EVERY 6 HOURS AS NEEDED FOR NAUSEA 385 tablet 1    sucralfate (CARAFATE) 1 gram tablet Take 1 tablet (1 g total) by mouth 4 (four) times daily. 120 tablet 1    [DISCONTINUED] HYDROcodone-acetaminophen (NORCO)  mg per tablet Take 1 tablet by mouth every 6 (six) hours as needed for Pain. 60 tablet 0      ?   SOCIAL  HISTORY:?   Social History     Tobacco Use    Smoking status: Never Smoker    Smokeless tobacco: Never Used   Substance Use Topics    Alcohol use: Yes     Comment: states he drinks frequently everyday/quit approx 10/2017        ?   FAMILY HISTORY:   family history includes Cataracts in his mother; Hypertension in his father; Stroke in his father.   ?     Objective:      Physical Exam   Constitutional: He is oriented to person, place, and time. He appears well-developed and well-nourished. No distress.   HENT:   Head: Normocephalic and atraumatic.   Right Ear: External ear normal.   Left Ear: External ear normal.   Mouth/Throat: No oropharyngeal exudate.   Eyes: Pupils are equal, round, and reactive to light. Conjunctivae are normal. Right eye exhibits no discharge. Left eye exhibits no discharge. No scleral icterus.   Neck: Normal range of motion. Neck supple. No thyromegaly present.   Cardiovascular: Normal rate, regular rhythm and normal heart sounds.   No murmur heard.  Pulmonary/Chest: Effort normal and breath sounds normal. No respiratory distress. He has no wheezes. He exhibits no tenderness.   Abdominal: Soft. Bowel sounds are normal. He exhibits no distension and no mass. There is tenderness. There is no rebound.   Musculoskeletal: Normal range of motion. He exhibits no edema or tenderness.   Lymphadenopathy:     He has no cervical adenopathy.        Right cervical: No superficial cervical adenopathy present.       Left cervical: No superficial cervical adenopathy present.        Right axillary: No pectoral adenopathy present.        Left axillary: No pectoral adenopathy present.       Right: No inguinal and no supraclavicular adenopathy present.        Left: No inguinal and no supraclavicular adenopathy present.   Neurological: He is alert and oriented to person, place, and time. No cranial nerve deficit or sensory deficit.   Skin: Skin is warm and dry. Capillary refill takes 2 to 3 seconds. No rash  noted. No erythema. No pallor.   Psychiatric: He has a normal mood and affect. His behavior is normal. Judgment normal.       ?   Vitals:    10/28/19 1322   BP: 139/86   Pulse: 74   Temp: 98.2 °F (36.8 °C)      ?     ECOG SCORE    0 - Fully active-able to carry on all pre-disease performance without restriction             ?   Laboratory:  ?   No visits with results within 1 Day(s) from this visit.   Latest known visit with results is:   Lab Visit on 09/03/2019   Component Date Value Ref Range Status    WBC 09/03/2019 9.17  3.90 - 12.70 K/uL Final    RBC 09/03/2019 4.50* 4.60 - 6.20 M/uL Final    Hemoglobin 09/03/2019 12.7* 14.0 - 18.0 g/dL Final    Hematocrit 09/03/2019 41.4  40.0 - 54.0 % Final    Mean Corpuscular Volume 09/03/2019 92  82 - 98 fL Final    Mean Corpuscular Hemoglobin 09/03/2019 28.2  27.0 - 31.0 pg Final    Mean Corpuscular Hemoglobin Conc 09/03/2019 30.7* 32.0 - 36.0 g/dL Final    RDW 09/03/2019 15.5* 11.5 - 14.5 % Final    Platelets 09/03/2019 534* 150 - 350 K/uL Final    MPV 09/03/2019 10.3  9.2 - 12.9 fL Final    Immature Granulocytes 09/03/2019 0.3  0.0 - 0.5 % Final    Gran # (ANC) 09/03/2019 5.9  1.8 - 7.7 K/uL Final    Immature Grans (Abs) 09/03/2019 0.03  0.00 - 0.04 K/uL Final    Lymph # 09/03/2019 2.0  1.0 - 4.8 K/uL Final    Mono # 09/03/2019 1.1* 0.3 - 1.0 K/uL Final    Eos # 09/03/2019 0.1  0.0 - 0.5 K/uL Final    Baso # 09/03/2019 0.09  0.00 - 0.20 K/uL Final    nRBC 09/03/2019 0  0 /100 WBC Final    Gran% 09/03/2019 64.3  38.0 - 73.0 % Final    Lymph% 09/03/2019 21.9  18.0 - 48.0 % Final    Mono% 09/03/2019 11.6  4.0 - 15.0 % Final    Eosinophil% 09/03/2019 0.9  0.0 - 8.0 % Final    Basophil% 09/03/2019 1.0  0.0 - 1.9 % Final    Differential Method 09/03/2019 Automated   Final    Sodium 09/03/2019 144  136 - 145 mmol/L Final    Potassium 09/03/2019 4.2  3.5 - 5.1 mmol/L Final    Chloride 09/03/2019 106  95 - 110 mmol/L Final    CO2 09/03/2019 23  23 - 29  mmol/L Final    Glucose 09/03/2019 87  70 - 110 mg/dL Final    BUN, Bld 09/03/2019 10  8 - 23 mg/dL Final    Creatinine 09/03/2019 0.7  0.5 - 1.4 mg/dL Final    Calcium 09/03/2019 9.4  8.7 - 10.5 mg/dL Final    Total Protein 09/03/2019 8.1  6.0 - 8.4 g/dL Final    Albumin 09/03/2019 4.0  3.5 - 5.2 g/dL Final    Total Bilirubin 09/03/2019 0.3  0.1 - 1.0 mg/dL Final    Alkaline Phosphatase 09/03/2019 106  55 - 135 U/L Final    AST 09/03/2019 24  10 - 40 U/L Final    ALT 09/03/2019 22  10 - 44 U/L Final    Anion Gap 09/03/2019 15  8 - 16 mmol/L Final    eGFR if African American 09/03/2019 >60.0  >60 mL/min/1.73 m^2 Final    eGFR if non African American 09/03/2019 >60.0  >60 mL/min/1.73 m^2 Final    CEA 09/03/2019 3.5  0.0 - 5.0 ng/mL Final      ?   Tumor markers   ?   ?   Imaging: NM PET CT Routine FDG  Narrative: EXAMINATION:  NM PET CT ROUTINE    CLINICAL HISTORY:  eval for metastatic disease in lung, liver and lymph nodes seen on recent CT scan for rectal adenocarcinoma;  Malignant neoplasm of rectum    TECHNIQUE:  Segmented attenuation corrected 3-D PET imaging was obtained from the skull base through the mid thighs utilizing 12.20 mCi F-18-FDG.  Noncontrast CT imaging was performed for attenuation correction, diagnosis, and anatomical fusion with PET.    COMPARISON:  CT 09/09/2019.    FINDINGS:  Head/neck: There is normal physiologic FDG uptake noted within the visualized brain parenchyma. No FDG avid lymphadenopathy within the neck.    Chest: There are ill-defined patchy and nodular parenchymal opacities again noted in the left lower lobe exhibiting low level FDG uptake with an SUV max of 2.5.  A new 13 mm ground-glass opacity is noted in the lateral left upper lobe exhibiting weak FDG avidity with an SUV max of 1.9.  Similar newly developed ground-glass opacity present in the mid right lung adjacent to the major fissure with SUV max of 1.6. No FDG avid mediastinal, hilar or axillary lymph  nodes.    Abdomen/Pelvis: Normal physiologic FDG uptake noted within the liver, spleen, urinary tract, and bowel.Focal hypodensity is again noted in the left hepatic lobe which is non FDG avid and favored to represent focal fatty infiltration.  An intermediate length segment of circumferential mural thickening is again noted at the rectosigmoid junction which exhibits intense hypermetabolism and a SUV max of 11.8.  A hypermetabolic lymph node adjacent to the left pelvic sidewall has a SUV max of 5.3.    Skeletal: No FDG avid osseus lesions identified.  Impression: 1. Hypermetabolic rectal mass consistent with known rectal malignancy.  2. Hypermetabolic regional lymph node adjacent to left pelvic sidewall.  3. Non FDG avid focal hypodensity in the liver favored to represent focal fatty infiltration.  4. Bilateral lung opacities most likely infectious or inflammatory etiology.  Follow-up recommended.    Electronically signed by: LORENA Green MD  Date:    09/25/2019  Time:    16:16     ?      Pathology:  Pathology Results  (Last 10 years)    None           ?   Assessment/Plan:       1. Rectal cancer     continue capecitabine @ 825 milligram/meter sq p.o. b.i.d. Monday through Friday for the duration of radiation therapy.  Will plan on adjuvant FOLFOX post surgery.  Reviewed labs today.  No concerning cytopenia.  Patient denies any symptomatology from capecitabine.  I reiterated some of the common side effects including GI symptomsm, hand and foot disease as cytopenia.  Ample time was given for patient to ask questions.  He knows to contact the clinic with any question or concern.  I also refilled [place] for pain control today.  See him back in 2 weeks.  ?   Follow-Up: Follow up in about 2 weeks (around 11/11/2019).    HOMA GAMBOA Md., Ph.D  Hematology & Oncology Department  Phone #: [phone]

## 2019-10-29 PROCEDURE — 77014 HC CT GUIDANCE RADIATION THERAPY FLDS PLACEMENT: CPT | Mod: TC | Performed by: RADIOLOGY

## 2019-10-29 PROCEDURE — 77386 HC IMRT, COMPLEX: CPT | Performed by: RADIOLOGY

## 2019-10-30 ENCOUNTER — DOCUMENTATION ONLY (OUTPATIENT)
Dept: RADIATION ONCOLOGY | Facility: CLINIC | Age: 64
End: 2019-10-30

## 2019-10-30 PROCEDURE — 77014 HC CT GUIDANCE RADIATION THERAPY FLDS PLACEMENT: CPT | Mod: TC | Performed by: RADIOLOGY

## 2019-10-30 PROCEDURE — 77386 HC IMRT, COMPLEX: CPT | Performed by: RADIOLOGY

## 2019-10-30 NOTE — PLAN OF CARE
Day 1 of outpatient xrt to the pelvis. Pelvis handout & verbal instructions given. Skin care & side effects were reviewed. Contact info was provided. Patient verbalized understanding.

## 2019-10-31 PROCEDURE — 77014 HC CT GUIDANCE RADIATION THERAPY FLDS PLACEMENT: CPT | Mod: TC | Performed by: RADIOLOGY

## 2019-10-31 PROCEDURE — 77386 HC IMRT, COMPLEX: CPT | Performed by: RADIOLOGY

## 2019-10-31 PROCEDURE — 77336 RADIATION PHYSICS CONSULT: CPT | Performed by: RADIOLOGY

## 2019-10-31 NOTE — PLAN OF CARE
Day 5 of outpatient xrt to the rectum. Doing well, denies any issues today, no pain. Will continue to monitor.

## 2019-11-01 ENCOUNTER — HOSPITAL ENCOUNTER (OUTPATIENT)
Dept: RADIATION THERAPY | Facility: HOSPITAL | Age: 64
Discharge: HOME | End: 2019-11-01
Attending: RADIOLOGY
Payer: MEDICARE

## 2019-11-01 PROCEDURE — 77014 HC CT GUIDANCE RADIATION THERAPY FLDS PLACEMENT: CPT | Mod: TC | Performed by: RADIOLOGY

## 2019-11-01 PROCEDURE — 77386 HC IMRT, COMPLEX: CPT | Performed by: RADIOLOGY

## 2019-11-04 PROCEDURE — 77386 HC IMRT, COMPLEX: CPT | Performed by: RADIOLOGY

## 2019-11-04 PROCEDURE — 77014 HC CT GUIDANCE RADIATION THERAPY FLDS PLACEMENT: CPT | Mod: TC | Performed by: RADIOLOGY

## 2019-11-05 PROCEDURE — 77386 HC IMRT, COMPLEX: CPT | Performed by: RADIOLOGY

## 2019-11-05 PROCEDURE — 77014 HC CT GUIDANCE RADIATION THERAPY FLDS PLACEMENT: CPT | Mod: TC | Performed by: RADIOLOGY

## 2019-11-06 ENCOUNTER — DOCUMENTATION ONLY (OUTPATIENT)
Dept: RADIATION ONCOLOGY | Facility: CLINIC | Age: 64
End: 2019-11-06

## 2019-11-06 ENCOUNTER — SOCIAL WORK (OUTPATIENT)
Dept: HEMATOLOGY/ONCOLOGY | Facility: CLINIC | Age: 64
End: 2019-11-06

## 2019-11-06 PROCEDURE — 77014 HC CT GUIDANCE RADIATION THERAPY FLDS PLACEMENT: CPT | Mod: TC | Performed by: RADIOLOGY

## 2019-11-06 PROCEDURE — 77386 HC IMRT, COMPLEX: CPT | Performed by: RADIOLOGY

## 2019-11-06 NOTE — PLAN OF CARE
Day 10 outpatient xrt to rectum. C/o pain 8-10 to rectum, taking Norco. No blood in stool or urine. Will continue to monitor.

## 2019-11-07 ENCOUNTER — TELEPHONE (OUTPATIENT)
Dept: PHARMACY | Facility: CLINIC | Age: 64
End: 2019-11-07

## 2019-11-07 PROCEDURE — 77336 RADIATION PHYSICS CONSULT: CPT | Performed by: RADIOLOGY

## 2019-11-07 PROCEDURE — 77014 HC CT GUIDANCE RADIATION THERAPY FLDS PLACEMENT: CPT | Mod: TC | Performed by: RADIOLOGY

## 2019-11-07 PROCEDURE — 77386 HC IMRT, COMPLEX: CPT | Performed by: RADIOLOGY

## 2019-11-07 NOTE — PROGRESS NOTES
MSW Intern met with pt after receiving a referral from Dr. Hartman per the pt's request. The pt had concerns about finances and asked for assistance with financial aid. MSW Intern listened to pt's concerns and will be looking into financial assistance for the pt. MSW Intern will f/u with pt in regards to financial assistance and what is available at next appt.

## 2019-11-08 PROCEDURE — 77014 HC CT GUIDANCE RADIATION THERAPY FLDS PLACEMENT: CPT | Mod: TC | Performed by: RADIOLOGY

## 2019-11-08 PROCEDURE — 77386 HC IMRT, COMPLEX: CPT | Performed by: RADIOLOGY

## 2019-11-11 ENCOUNTER — OFFICE VISIT (OUTPATIENT)
Dept: HEMATOLOGY/ONCOLOGY | Facility: CLINIC | Age: 64
End: 2019-11-11
Payer: MEDICARE

## 2019-11-11 ENCOUNTER — LAB VISIT (OUTPATIENT)
Dept: LAB | Facility: HOSPITAL | Age: 64
End: 2019-11-11
Attending: INTERNAL MEDICINE
Payer: MEDICARE

## 2019-11-11 VITALS
HEIGHT: 69 IN | HEART RATE: 72 BPM | OXYGEN SATURATION: 98 % | TEMPERATURE: 97 F | BODY MASS INDEX: 23.02 KG/M2 | SYSTOLIC BLOOD PRESSURE: 129 MMHG | WEIGHT: 155.44 LBS | DIASTOLIC BLOOD PRESSURE: 90 MMHG

## 2019-11-11 DIAGNOSIS — C20 RECTAL CANCER: Primary | ICD-10-CM

## 2019-11-11 DIAGNOSIS — C20 RECTAL CANCER: ICD-10-CM

## 2019-11-11 LAB
ALBUMIN SERPL BCP-MCNC: 3.7 G/DL (ref 3.5–5.2)
ALP SERPL-CCNC: 100 U/L (ref 55–135)
ALT SERPL W/O P-5'-P-CCNC: 13 U/L (ref 10–44)
ANION GAP SERPL CALC-SCNC: 11 MMOL/L (ref 8–16)
AST SERPL-CCNC: 18 U/L (ref 10–40)
BASOPHILS # BLD AUTO: 0.04 K/UL (ref 0–0.2)
BASOPHILS NFR BLD: 0.6 % (ref 0–1.9)
BILIRUB SERPL-MCNC: 0.5 MG/DL (ref 0.1–1)
BUN SERPL-MCNC: 9 MG/DL (ref 8–23)
CALCIUM SERPL-MCNC: 10 MG/DL (ref 8.7–10.5)
CHLORIDE SERPL-SCNC: 100 MMOL/L (ref 95–110)
CO2 SERPL-SCNC: 25 MMOL/L (ref 23–29)
CREAT SERPL-MCNC: 0.7 MG/DL (ref 0.5–1.4)
DIFFERENTIAL METHOD: ABNORMAL
EOSINOPHIL # BLD AUTO: 0.3 K/UL (ref 0–0.5)
EOSINOPHIL NFR BLD: 3.7 % (ref 0–8)
ERYTHROCYTE [DISTWIDTH] IN BLOOD BY AUTOMATED COUNT: 18 % (ref 11.5–14.5)
EST. GFR  (AFRICAN AMERICAN): >60 ML/MIN/1.73 M^2
EST. GFR  (NON AFRICAN AMERICAN): >60 ML/MIN/1.73 M^2
GLUCOSE SERPL-MCNC: 99 MG/DL (ref 70–110)
HCT VFR BLD AUTO: 39.3 % (ref 40–54)
HGB BLD-MCNC: 12.3 G/DL (ref 14–18)
IMM GRANULOCYTES # BLD AUTO: 0.03 K/UL (ref 0–0.04)
IMM GRANULOCYTES NFR BLD AUTO: 0.4 % (ref 0–0.5)
LYMPHOCYTES # BLD AUTO: 0.7 K/UL (ref 1–4.8)
LYMPHOCYTES NFR BLD: 9.1 % (ref 18–48)
MCH RBC QN AUTO: 27.8 PG (ref 27–31)
MCHC RBC AUTO-ENTMCNC: 31.3 G/DL (ref 32–36)
MCV RBC AUTO: 89 FL (ref 82–98)
MONOCYTES # BLD AUTO: 0.9 K/UL (ref 0.3–1)
MONOCYTES NFR BLD: 12.7 % (ref 4–15)
NEUTROPHILS # BLD AUTO: 5.3 K/UL (ref 1.8–7.7)
NEUTROPHILS NFR BLD: 73.5 % (ref 38–73)
NRBC BLD-RTO: 0 /100 WBC
PLATELET # BLD AUTO: 305 K/UL (ref 150–350)
PMV BLD AUTO: 9.3 FL (ref 9.2–12.9)
POTASSIUM SERPL-SCNC: 4.5 MMOL/L (ref 3.5–5.1)
PROT SERPL-MCNC: 7.4 G/DL (ref 6–8.4)
RBC # BLD AUTO: 4.42 M/UL (ref 4.6–6.2)
SODIUM SERPL-SCNC: 136 MMOL/L (ref 136–145)
WBC # BLD AUTO: 7.24 K/UL (ref 3.9–12.7)

## 2019-11-11 PROCEDURE — 3080F PR MOST RECENT DIASTOLIC BLOOD PRESSURE >= 90 MM HG: ICD-10-PCS | Mod: CPTII,S$GLB,, | Performed by: INTERNAL MEDICINE

## 2019-11-11 PROCEDURE — 3080F DIAST BP >= 90 MM HG: CPT | Mod: CPTII,S$GLB,, | Performed by: INTERNAL MEDICINE

## 2019-11-11 PROCEDURE — 99999 PR PBB SHADOW E&M-EST. PATIENT-LVL III: ICD-10-PCS | Mod: PBBFAC,,, | Performed by: INTERNAL MEDICINE

## 2019-11-11 PROCEDURE — 77386 HC IMRT, COMPLEX: CPT | Performed by: RADIOLOGY

## 2019-11-11 PROCEDURE — 99215 PR OFFICE/OUTPT VISIT, EST, LEVL V, 40-54 MIN: ICD-10-PCS | Mod: S$GLB,,, | Performed by: INTERNAL MEDICINE

## 2019-11-11 PROCEDURE — 3008F BODY MASS INDEX DOCD: CPT | Mod: CPTII,S$GLB,, | Performed by: INTERNAL MEDICINE

## 2019-11-11 PROCEDURE — 3074F PR MOST RECENT SYSTOLIC BLOOD PRESSURE < 130 MM HG: ICD-10-PCS | Mod: CPTII,S$GLB,, | Performed by: INTERNAL MEDICINE

## 2019-11-11 PROCEDURE — 80053 COMPREHEN METABOLIC PANEL: CPT

## 2019-11-11 PROCEDURE — 99215 OFFICE O/P EST HI 40 MIN: CPT | Mod: S$GLB,,, | Performed by: INTERNAL MEDICINE

## 2019-11-11 PROCEDURE — 77014 HC CT GUIDANCE RADIATION THERAPY FLDS PLACEMENT: CPT | Mod: TC | Performed by: RADIOLOGY

## 2019-11-11 PROCEDURE — 99999 PR PBB SHADOW E&M-EST. PATIENT-LVL III: CPT | Mod: PBBFAC,,, | Performed by: INTERNAL MEDICINE

## 2019-11-11 PROCEDURE — 3008F PR BODY MASS INDEX (BMI) DOCUMENTED: ICD-10-PCS | Mod: CPTII,S$GLB,, | Performed by: INTERNAL MEDICINE

## 2019-11-11 PROCEDURE — 3074F SYST BP LT 130 MM HG: CPT | Mod: CPTII,S$GLB,, | Performed by: INTERNAL MEDICINE

## 2019-11-11 PROCEDURE — 36415 COLL VENOUS BLD VENIPUNCTURE: CPT

## 2019-11-11 RX ORDER — HYDROCODONE BITARTRATE AND ACETAMINOPHEN 10; 325 MG/1; MG/1
1 TABLET ORAL EVERY 6 HOURS PRN
Qty: 60 TABLET | Refills: 0 | Status: SHIPPED | OUTPATIENT
Start: 2019-11-11 | End: 2019-11-25 | Stop reason: SDUPTHER

## 2019-11-11 NOTE — PROGRESS NOTES
Subjective:   Date of Visit: 11/11/19   ?   ?   CHIEF COMPLAINT:  Locally advanced rectal cancer, Stage IIIB (cT3,cN2a, CM0)  ?     HPI:   was seen at [facility] today. He is a 64 yr old male with recently diagnosed locally advanced rectal cancer.  Currently on capecitabine Monday through Friday for neoadjuvant therapy. He also denies any fever, chills, nausea or vomiting, chest pain or shortness of breath, diarrhea, unintentional weight loss, night sweats or dysuria.    Review of Systems   Constitutional: Negative for activity change, appetite change, chills, fatigue, fever and unexpected weight change.   HENT: Negative for hearing loss, mouth sores, nosebleeds, sore throat, tinnitus, trouble swallowing and voice change.    Eyes: Negative for visual disturbance.   Respiratory: Negative for cough, chest tightness, shortness of breath and wheezing.    Cardiovascular: Negative for chest pain, palpitations and leg swelling.   Gastrointestinal: Positive for rectal pain. Negative for abdominal pain, anal bleeding, blood in stool, constipation, diarrhea, nausea and vomiting.   Genitourinary: Negative for frequency, hematuria and testicular pain.   Musculoskeletal: Negative for arthralgias, back pain, gait problem and neck pain.   Skin: Negative for color change, pallor, rash and wound.   Allergic/Immunologic: Negative for immunocompromised state.   Neurological: Negative for seizures, syncope, weakness, numbness and headaches.   Hematological: Negative for adenopathy. Does not bruise/bleed easily.   Psychiatric/Behavioral: Negative for agitation, confusion, decreased concentration, hallucinations and sleep disturbance. The patient is not nervous/anxious.        ?   PAST MEDICAL HISTORY:   Past Medical History:   Diagnosis Date    Alcoholism     Anemia     Back pain     Encounter for blood transfusion     Essential hypertension 2/4/2016    GERD (gastroesophageal reflux disease)     Hiatal hernia      Hypertension     Rectal cancer 9/11/2019    ?     PAST SURGICAL HISTORY:   Past Surgical History:   Procedure Laterality Date    COLONOSCOPY N/A 9/3/2019    Procedure: COLONOSCOPY;  Surgeon: Isai Centeno MD;  Location: [facility];  Service: Endoscopy;  Laterality: N/A;    ESOPHAGOGASTRODUODENOSCOPY N/A 9/3/2019    Procedure: ESOPHAGOGASTRODUODENOSCOPY (EGD);  Surgeon: Isai Centeno MD;  Location: [facility];  Service: Endoscopy;  Laterality: N/A;    HIP FRACTURE SURGERY      left hip    JOINT REPLACEMENT Left       ?   ALLERGIES:   Allergies as of 11/11/2019    (No Known Allergies)      ?   MEDICATIONS:?   Outpatient Medications Marked as Taking for the 11/11/19 encounter (Office Visit) with Mikel Sams MD   Medication Sig Dispense Refill    capecitabine (XELODA) 500 MG Tab Take 3 tablets (1,500 mg total) by mouth 2 (two) times daily on days of radiation only for a total of six weeks. 180 tablet 11    cyanocobalamin (VITAMIN B-12) 1000 MCG tablet Take 100 mcg by mouth once daily.      ferrous sulfate 324 mg (65 mg iron) TbEC Take 1 tablet (324 mg total) by mouth 2 (two) times daily. 60 tablet 0    HYDROcodone-acetaminophen (NORCO)  mg per tablet Take 1 tablet by mouth every 6 (six) hours as needed for Pain. 60 tablet 0    losartan (COZAAR) 50 MG tablet TAKE 1 TABLET BY MOUTH EVERY MORNING 90 tablet 0    ondansetron (ZOFRAN) 8 MG tablet Take 1 tablet (8 mg total) by mouth every 8 (eight) hours as needed for Nausea. 30 tablet 2    pantoprazole (PROTONIX) 40 MG tablet Take 1 tablet (40 mg total) by mouth once daily. 30 tablet 11    prochlorperazine (COMPAZINE) 5 MG tablet TAKE 1 TABLET(5 MG) BY MOUTH EVERY 6 HOURS AS NEEDED FOR NAUSEA 385 tablet 1    [DISCONTINUED] HYDROcodone-acetaminophen (NORCO)  mg per tablet Take 1 tablet by mouth every 6 (six) hours as needed for Pain. 60 tablet 0      ?   SOCIAL HISTORY:?   Social History     Tobacco Use    Smoking status: Never  Smoker    Smokeless tobacco: Never Used   Substance Use Topics    Alcohol use: Yes     Comment: states he drinks frequently everyday/quit approx 10/2017        ?   FAMILY HISTORY:   family history includes Cataracts in his mother; Hypertension in his father; Stroke in his father.   ?     Objective:      Physical Exam   Constitutional: He is oriented to person, place, and time. He appears well-developed and well-nourished. No distress.   HENT:   Head: Normocephalic and atraumatic.   Right Ear: External ear normal.   Left Ear: External ear normal.   Mouth/Throat: No oropharyngeal exudate.   Eyes: Pupils are equal, round, and reactive to light. Conjunctivae are normal. Right eye exhibits no discharge. Left eye exhibits no discharge. No scleral icterus.   Neck: Normal range of motion. Neck supple. No thyromegaly present.   Cardiovascular: Normal rate, regular rhythm and normal heart sounds.   No murmur heard.  Pulmonary/Chest: Effort normal and breath sounds normal. No respiratory distress. He has no wheezes. He exhibits no tenderness.   Abdominal: Soft. Bowel sounds are normal. He exhibits no distension and no mass. There is tenderness. There is no rebound.   Musculoskeletal: Normal range of motion. He exhibits no edema or tenderness.   Lymphadenopathy:     He has no cervical adenopathy.        Right cervical: No superficial cervical adenopathy present.       Left cervical: No superficial cervical adenopathy present.        Right axillary: No pectoral adenopathy present.        Left axillary: No pectoral adenopathy present.No inguinal adenopathy noted on the right or left side.        Right: No supraclavicular adenopathy present.        Left: No supraclavicular adenopathy present.   Neurological: He is alert and oriented to person, place, and time. No cranial nerve deficit or sensory deficit.   Skin: Skin is warm and dry. Capillary refill takes 2 to 3 seconds. No rash noted. No erythema. No pallor.   Psychiatric: He  has a normal mood and affect. His behavior is normal. Judgment normal.       ?   Vitals:    11/11/19 1101   BP: (!) 129/90   Pulse: 72   Temp: 97.3 °F (36.3 °C)      ?     ECOG SCORE    1 - Restricted in strenuous activity-ambulatory and able to carry out work of a light nature             ?   Laboratory:  ?   Lab Visit on 11/11/2019   Component Date Value Ref Range Status    WBC 11/11/2019 7.24  3.90 - 12.70 K/uL Final    RBC 11/11/2019 4.42* 4.60 - 6.20 M/uL Final    Hemoglobin 11/11/2019 12.3* 14.0 - 18.0 g/dL Final    Hematocrit 11/11/2019 39.3* 40.0 - 54.0 % Final    Mean Corpuscular Volume 11/11/2019 89  82 - 98 fL Final    Mean Corpuscular Hemoglobin 11/11/2019 27.8  27.0 - 31.0 pg Final    Mean Corpuscular Hemoglobin Conc 11/11/2019 31.3* 32.0 - 36.0 g/dL Final    RDW 11/11/2019 18.0* 11.5 - 14.5 % Final    Platelets 11/11/2019 305  150 - 350 K/uL Final    MPV 11/11/2019 9.3  9.2 - 12.9 fL Final    Immature Granulocytes 11/11/2019 0.4  0.0 - 0.5 % Final    Gran # (ANC) 11/11/2019 5.3  1.8 - 7.7 K/uL Final    Immature Grans (Abs) 11/11/2019 0.03  0.00 - 0.04 K/uL Final    Lymph # 11/11/2019 0.7* 1.0 - 4.8 K/uL Final    Mono # 11/11/2019 0.9  0.3 - 1.0 K/uL Final    Eos # 11/11/2019 0.3  0.0 - 0.5 K/uL Final    Baso # 11/11/2019 0.04  0.00 - 0.20 K/uL Final    nRBC 11/11/2019 0  0 /100 WBC Final    Gran% 11/11/2019 73.5* 38.0 - 73.0 % Final    Lymph% 11/11/2019 9.1* 18.0 - 48.0 % Final    Mono% 11/11/2019 12.7  4.0 - 15.0 % Final    Eosinophil% 11/11/2019 3.7  0.0 - 8.0 % Final    Basophil% 11/11/2019 0.6  0.0 - 1.9 % Final    Differential Method 11/11/2019 Automated   Final    Sodium 11/11/2019 136  136 - 145 mmol/L Final    Potassium 11/11/2019 4.5  3.5 - 5.1 mmol/L Final    Chloride 11/11/2019 100  95 - 110 mmol/L Final    CO2 11/11/2019 25  23 - 29 mmol/L Final    Glucose 11/11/2019 99  70 - 110 mg/dL Final    BUN, Bld 11/11/2019 9  8 - 23 mg/dL Final    Creatinine 11/11/2019  0.7  0.5 - 1.4 mg/dL Final    Calcium 11/11/2019 10.0  8.7 - 10.5 mg/dL Final    Total Protein 11/11/2019 7.4  6.0 - 8.4 g/dL Final    Albumin 11/11/2019 3.7  3.5 - 5.2 g/dL Final    Total Bilirubin 11/11/2019 0.5  0.1 - 1.0 mg/dL Final    Alkaline Phosphatase 11/11/2019 100  55 - 135 U/L Final    AST 11/11/2019 18  10 - 40 U/L Final    ALT 11/11/2019 13  10 - 44 U/L Final    Anion Gap 11/11/2019 11  8 - 16 mmol/L Final    eGFR if African American 11/11/2019 >60  >60 mL/min/1.73 m^2 Final    eGFR if non African American 11/11/2019 >60  >60 mL/min/1.73 m^2 Final      ?   Tumor markers   ?   ?   Imaging: NM PET CT Routine FDG  Narrative: EXAMINATION:  NM PET CT ROUTINE    CLINICAL HISTORY:  eval for metastatic disease in lung, liver and lymph nodes seen on recent CT scan for rectal adenocarcinoma;  Malignant neoplasm of rectum    TECHNIQUE:  Segmented attenuation corrected 3-D PET imaging was obtained from the skull base through the mid thighs utilizing 12.20 mCi F-18-FDG.  Noncontrast CT imaging was performed for attenuation correction, diagnosis, and anatomical fusion with PET.    COMPARISON:  CT 09/09/2019.    FINDINGS:  Head/neck: There is normal physiologic FDG uptake noted within the visualized brain parenchyma. No FDG avid lymphadenopathy within the neck.    Chest: There are ill-defined patchy and nodular parenchymal opacities again noted in the left lower lobe exhibiting low level FDG uptake with an SUV max of 2.5.  A new 13 mm ground-glass opacity is noted in the lateral left upper lobe exhibiting weak FDG avidity with an SUV max of 1.9.  Similar newly developed ground-glass opacity present in the mid right lung adjacent to the major fissure with SUV max of 1.6. No FDG avid mediastinal, hilar or axillary lymph nodes.    Abdomen/Pelvis: Normal physiologic FDG uptake noted within the liver, spleen, urinary tract, and bowel.Focal hypodensity is again noted in the left hepatic lobe which is non FDG avid  and favored to represent focal fatty infiltration.  An intermediate length segment of circumferential mural thickening is again noted at the rectosigmoid junction which exhibits intense hypermetabolism and a SUV max of 11.8.  A hypermetabolic lymph node adjacent to the left pelvic sidewall has a SUV max of 5.3.    Skeletal: No FDG avid osseus lesions identified.  Impression: 1. Hypermetabolic rectal mass consistent with known rectal malignancy.  2. Hypermetabolic regional lymph node adjacent to left pelvic sidewall.  3. Non FDG avid focal hypodensity in the liver favored to represent focal fatty infiltration.  4. Bilateral lung opacities most likely infectious or inflammatory etiology.  Follow-up recommended.    Electronically signed by: LORENA Green MD  Date:    09/25/2019  Time:    16:16     ?      Pathology:  Pathology Results  (Last 10 years)    None           ?   Assessment/Plan:       1. Rectal cancer     reviewed labs today and they appear stable.  Continue capecitabine @ 825 milligram/meter sq p.o. b.i.d. Monday through Friday for the duration of radiation therapy.  Will plan on adjuvant FOLFOX post surgery.  Patient denies any symptomatology from capecitabine.  I reiterated some of the common side effects including GI symptomsm, hand and foot disease as cytopenia.    Ordered Norco for rectal pain. Will plan on seeing him back in 2 weeks with repeat labs.    Ample time was given for patient to ask questions.  He knows to contact the clinic with any question or concern.       Follow-Up: Follow up in about 2 weeks (around 11/25/2019).    HOMA GAMBOA Md., Ph.D  Hematology & Oncology Department  Phone #: [phone]

## 2019-11-13 ENCOUNTER — SOCIAL WORK (OUTPATIENT)
Dept: HEMATOLOGY/ONCOLOGY | Facility: CLINIC | Age: 64
End: 2019-11-13

## 2019-11-13 ENCOUNTER — DOCUMENTATION ONLY (OUTPATIENT)
Dept: RADIATION ONCOLOGY | Facility: CLINIC | Age: 64
End: 2019-11-13

## 2019-11-13 PROCEDURE — 77014 HC CT GUIDANCE RADIATION THERAPY FLDS PLACEMENT: CPT | Mod: TC | Performed by: RADIOLOGY

## 2019-11-13 PROCEDURE — 77386 HC IMRT, COMPLEX: CPT | Performed by: RADIOLOGY

## 2019-11-13 NOTE — PROGRESS NOTES
MSW Intern met with patient to talk about financial aid. MSW Intern needed information regarding household income and size in order to apply for grants. Pt specified that three members lived in the house, but was unsure about the total income. MSW Intern will f/u tomorrow via phone call to get total household income from pt.

## 2019-11-13 NOTE — PLAN OF CARE
Day 14 outpatient xrt to rectum. Watery diarrhea yesterday, took imodium and drank Gatorade. No diarrhea today. No blood in stools and no pain with BM. Will continue to monitor.

## 2019-11-14 ENCOUNTER — SOCIAL WORK (OUTPATIENT)
Dept: HEMATOLOGY/ONCOLOGY | Facility: CLINIC | Age: 64
End: 2019-11-14

## 2019-11-14 ENCOUNTER — TELEPHONE (OUTPATIENT)
Dept: HEMATOLOGY/ONCOLOGY | Facility: CLINIC | Age: 64
End: 2019-11-14

## 2019-11-14 PROCEDURE — 77386 HC IMRT, COMPLEX: CPT | Performed by: RADIOLOGY

## 2019-11-14 PROCEDURE — 77014 HC CT GUIDANCE RADIATION THERAPY FLDS PLACEMENT: CPT | Mod: TC | Performed by: RADIOLOGY

## 2019-11-14 NOTE — TELEPHONE ENCOUNTER
MSW Intern spoke with pt over the phone in regards to financial aid. MSW Intern inquired about total household income in order to apply for some financial assistance on behalf of the pt. MSW Intern will be applying for [employer] on behalf of the pt. MSW Intern will f/u as needed

## 2019-11-14 NOTE — PROGRESS NOTES
SW intern briefly met w/ pt on behalf of colleague, Dalia FRAGA to obtain signature for [employer] application and email address. Pt stated he does not have an email address to provide. SW will continue to f/u w/ pt as needed.

## 2019-11-15 PROCEDURE — 77014 HC CT GUIDANCE RADIATION THERAPY FLDS PLACEMENT: CPT | Mod: TC | Performed by: RADIOLOGY

## 2019-11-15 PROCEDURE — 77336 RADIATION PHYSICS CONSULT: CPT | Performed by: RADIOLOGY

## 2019-11-15 PROCEDURE — 77386 HC IMRT, COMPLEX: CPT | Performed by: RADIOLOGY

## 2019-11-15 NOTE — TELEPHONE ENCOUNTER
"Attempted to contact patient in regards to Xeloda refill/follow up - no answer, VM or patient portal. Will continue to attempt to reach.  --  The patient returned call in regards to Xeloda refill and 7 day touchbase. He confirmed start date of 10/24 with concurrent RT. He was unable to obtain an on-hand count but should need a refill around 11/21 based on start date. Will ship refill 11/18 for patient to receive 11/19. We reviewed this will be the final refill from [facility] and will contain the remaining 14 days of tx. $12.23 copay, patient will mail check. No changes in medications, allergies, health conditions or missed doses.    Administration: Patient confirms taking medication as prescribed: 3 tabs BID on Mon-Fri (days of radiation) for six weeks total. Patient takes medication within 30 mins of food. Proper chemo handling procedures are followed (gloves). Medication is stored at room temp.  Adherence: Patient confirms missing a few tablets in the evening time (but not full doses) due to difficulty swallowing that many tablets. We reviewed the importance of taking full prescribed dose - the patient verbalized understanding.  Side effects/disease state management: Patient denies notable side effects such as rash, HF syndrome or stomatitis. He had a "bad case" of diarrhea accompanied by nausea that was resolved by medications from oncologist.    He reported overall doing well on Xeloda. Estimated completion date is around 12/5. Will reassess chart after this date to confirm Xeloda will not be needed again in future and close patient out of [facility] as appropriate. The patient asked how effective Xeloda-RT will be for him - we reviewed that each patient is unique and will be monitored closely for response. Encouraged him to discuss further with oncologist. No further questions/concerns - patient is aware to reach out at any time.  "

## 2019-11-18 PROCEDURE — 77014 HC CT GUIDANCE RADIATION THERAPY FLDS PLACEMENT: CPT | Mod: TC | Performed by: RADIOLOGY

## 2019-11-18 PROCEDURE — 77386 HC IMRT, COMPLEX: CPT | Performed by: RADIOLOGY

## 2019-11-19 PROCEDURE — 77386 HC IMRT, COMPLEX: CPT | Performed by: RADIOLOGY

## 2019-11-19 PROCEDURE — 77014 HC CT GUIDANCE RADIATION THERAPY FLDS PLACEMENT: CPT | Mod: TC | Performed by: RADIOLOGY

## 2019-11-20 ENCOUNTER — DOCUMENTATION ONLY (OUTPATIENT)
Dept: RADIATION ONCOLOGY | Facility: CLINIC | Age: 64
End: 2019-11-20

## 2019-11-20 DIAGNOSIS — L58.9 RADIATION DERMATITIS: Primary | ICD-10-CM

## 2019-11-20 PROCEDURE — 77386 HC IMRT, COMPLEX: CPT | Performed by: RADIOLOGY

## 2019-11-20 PROCEDURE — 77014 HC CT GUIDANCE RADIATION THERAPY FLDS PLACEMENT: CPT | Mod: TC | Performed by: RADIOLOGY

## 2019-11-20 NOTE — PLAN OF CARE
Day 19 outpatient xrt to rectum. C/o pain 8/10 to rectal area. Given lidocaine. No diarrhea today, had diarrhea last week but it is better now. Has fatigue, says he stays tired. No bleeding noted. Will continue to monitor.

## 2019-11-21 PROCEDURE — 77014 HC CT GUIDANCE RADIATION THERAPY FLDS PLACEMENT: CPT | Mod: TC | Performed by: RADIOLOGY

## 2019-11-21 PROCEDURE — 77386 HC IMRT, COMPLEX: CPT | Performed by: RADIOLOGY

## 2019-11-22 DIAGNOSIS — N34.2 URETHRITIS: Primary | ICD-10-CM

## 2019-11-22 PROCEDURE — 77014 HC CT GUIDANCE RADIATION THERAPY FLDS PLACEMENT: CPT | Performed by: RADIOLOGY

## 2019-11-22 PROCEDURE — 77386 HC IMRT, COMPLEX: CPT | Performed by: RADIOLOGY

## 2019-11-22 RX ORDER — TAMSULOSIN HYDROCHLORIDE 0.4 MG/1
0.4 CAPSULE ORAL DAILY
Qty: 30 CAPSULE | Refills: 6 | Status: SHIPPED | OUTPATIENT
Start: 2019-11-22 | End: 2020-06-22

## 2019-11-24 PROCEDURE — 77336 RADIATION PHYSICS CONSULT: CPT | Performed by: RADIOLOGY

## 2019-11-24 PROCEDURE — 77386 HC IMRT, COMPLEX: CPT | Performed by: RADIOLOGY

## 2019-11-24 PROCEDURE — 77014 HC CT GUIDANCE RADIATION THERAPY FLDS PLACEMENT: CPT | Performed by: RADIOLOGY

## 2019-11-24 NOTE — PROGRESS NOTES
Subjective:   Date of Visit: 11/25/19   ?   ?   CHIEF COMPLAINT:  Locally advanced rectal cancer, Stage IIIB (cT3,cN2a, CM0)  ?     HPI:   was seen at [facility] today. He is a 64 yr old male with recently diagnosed locally advanced rectal cancer.  Currently on capecitabine Monday through Friday for neoadjuvant therapy.    Denies any fever, chills, nausea or vomiting, chest pain or shortness of breath, diarrhea, unintentional weight loss, night sweats or dysuria.    Review of Systems   Constitutional: Negative for activity change, appetite change, chills, fatigue, fever and unexpected weight change.   HENT: Negative for hearing loss, mouth sores, nosebleeds, sore throat, tinnitus, trouble swallowing and voice change.    Eyes: Negative for visual disturbance.   Respiratory: Negative for cough, chest tightness, shortness of breath and wheezing.    Cardiovascular: Negative for chest pain, palpitations and leg swelling.   Gastrointestinal: Positive for rectal pain. Negative for abdominal pain, anal bleeding, blood in stool, constipation, diarrhea, nausea and vomiting.   Genitourinary: Negative for frequency, hematuria and testicular pain.   Musculoskeletal: Negative for arthralgias, back pain, gait problem and neck pain.   Skin: Negative for color change, pallor, rash and wound.   Allergic/Immunologic: Negative for immunocompromised state.   Neurological: Negative for seizures, syncope, weakness, numbness and headaches.   Hematological: Negative for adenopathy. Does not bruise/bleed easily.   Psychiatric/Behavioral: Negative for agitation, confusion, decreased concentration, hallucinations and sleep disturbance. The patient is not nervous/anxious.        ?   PAST MEDICAL HISTORY:   Past Medical History:   Diagnosis Date    Alcoholism     Anemia     Back pain     Encounter for blood transfusion     Essential hypertension 2/4/2016    GERD (gastroesophageal reflux disease)     Hiatal hernia      Hypertension     Rectal cancer 9/11/2019    ?     PAST SURGICAL HISTORY:   Past Surgical History:   Procedure Laterality Date    COLONOSCOPY N/A 9/3/2019    Procedure: COLONOSCOPY;  Surgeon: Isai Centeno MD;  Location: [facility];  Service: Endoscopy;  Laterality: N/A;    ESOPHAGOGASTRODUODENOSCOPY N/A 9/3/2019    Procedure: ESOPHAGOGASTRODUODENOSCOPY (EGD);  Surgeon: Isai Centeno MD;  Location: [facility];  Service: Endoscopy;  Laterality: N/A;    HIP FRACTURE SURGERY      left hip    JOINT REPLACEMENT Left       ?   ALLERGIES:   Allergies as of 11/25/2019    (No Known Allergies)      ?   MEDICATIONS:?   Outpatient Medications Marked as Taking for the 11/25/19 encounter (Office Visit) with Mikel Sams MD   Medication Sig Dispense Refill    capecitabine (XELODA) 500 MG Tab Take 3 tablets (1,500 mg total) by mouth 2 (two) times daily on days of radiation only for a total of six weeks. 180 tablet 11    cyanocobalamin (VITAMIN B-12) 1000 MCG tablet Take 100 mcg by mouth once daily.      ferrous sulfate 324 mg (65 mg iron) TbEC Take 1 tablet (324 mg total) by mouth 2 (two) times daily. 60 tablet 0    HYDROcodone-acetaminophen (NORCO)  mg per tablet Take 1 tablet by mouth every 6 (six) hours as needed for Pain. 60 tablet 0    lidocaine 4 % Gel Apply 1 application topically 4 (four) times daily. 30 g 2    losartan (COZAAR) 50 MG tablet TAKE 1 TABLET BY MOUTH EVERY MORNING 90 tablet 0    ondansetron (ZOFRAN) 8 MG tablet Take 1 tablet (8 mg total) by mouth every 8 (eight) hours as needed for Nausea. 30 tablet 2    pantoprazole (PROTONIX) 40 MG tablet Take 1 tablet (40 mg total) by mouth once daily. 30 tablet 11    prochlorperazine (COMPAZINE) 5 MG tablet TAKE 1 TABLET(5 MG) BY MOUTH EVERY 6 HOURS AS NEEDED FOR NAUSEA 385 tablet 1    tamsulosin (FLOMAX) 0.4 mg Cap Take 1 capsule (0.4 mg total) by mouth once daily. 30 capsule 6    [DISCONTINUED] HYDROcodone-acetaminophen (NORCO)  mg  per tablet Take 1 tablet by mouth every 6 (six) hours as needed for Pain. 60 tablet 0      ?   SOCIAL HISTORY:?   Social History     Tobacco Use    Smoking status: Never Smoker    Smokeless tobacco: Never Used   Substance Use Topics    Alcohol use: Yes     Comment: states he drinks frequently everyday/quit approx 10/2017        ?   FAMILY HISTORY:   family history includes Cataracts in his mother; Hypertension in his father; Stroke in his father.   ?     Objective:      Physical Exam   Constitutional: He is oriented to person, place, and time. He appears well-developed and well-nourished. No distress.   HENT:   Head: Normocephalic and atraumatic.   Right Ear: External ear normal.   Left Ear: External ear normal.   Mouth/Throat: No oropharyngeal exudate.   Eyes: Pupils are equal, round, and reactive to light. Conjunctivae are normal. Right eye exhibits no discharge. Left eye exhibits no discharge. No scleral icterus.   Neck: Normal range of motion. Neck supple. No thyromegaly present.   Cardiovascular: Normal rate, regular rhythm and normal heart sounds.   No murmur heard.  Pulmonary/Chest: Effort normal and breath sounds normal. No respiratory distress. He has no wheezes. He exhibits no tenderness.   Abdominal: Soft. Bowel sounds are normal. He exhibits no distension and no mass. There is tenderness. There is no rebound.   Musculoskeletal: Normal range of motion. He exhibits no edema or tenderness.   Lymphadenopathy:     He has no cervical adenopathy.        Right cervical: No superficial cervical adenopathy present.       Left cervical: No superficial cervical adenopathy present.        Right axillary: No pectoral adenopathy present.        Left axillary: No pectoral adenopathy present.No inguinal adenopathy noted on the right or left side.        Right: No supraclavicular adenopathy present.        Left: No supraclavicular adenopathy present.   Neurological: He is alert and oriented to person, place, and time.  No cranial nerve deficit or sensory deficit.   Skin: Skin is warm and dry. Capillary refill takes 2 to 3 seconds. No rash noted. No erythema. No pallor.   Psychiatric: He has a normal mood and affect. His behavior is normal. Judgment normal.       ?   Vitals:    11/25/19 1109   BP: 123/86   Pulse: 103   Resp: 16   Temp: 98.2 °F (36.8 °C)      ?     ECOG SCORE    0 - Fully active-able to carry on all pre-disease performance without restriction             ?   Laboratory:  ?   Lab Visit on 11/25/2019   Component Date Value Ref Range Status    WBC 11/25/2019 7.43  3.90 - 12.70 K/uL Final    RBC 11/25/2019 4.58* 4.60 - 6.20 M/uL Final    Hemoglobin 11/25/2019 13.1* 14.0 - 18.0 g/dL Final    Hematocrit 11/25/2019 42.1  40.0 - 54.0 % Final    Mean Corpuscular Volume 11/25/2019 92  82 - 98 fL Final    Mean Corpuscular Hemoglobin 11/25/2019 28.6  27.0 - 31.0 pg Final    Mean Corpuscular Hemoglobin Conc 11/25/2019 31.1* 32.0 - 36.0 g/dL Final    RDW 11/25/2019 20.3* 11.5 - 14.5 % Final    Platelets 11/25/2019 377* 150 - 350 K/uL Final    MPV 11/25/2019 9.2  9.2 - 12.9 fL Final    Immature Granulocytes 11/25/2019 0.7* 0.0 - 0.5 % Final    Gran # (ANC) 11/25/2019 6.1  1.8 - 7.7 K/uL Final    Immature Grans (Abs) 11/25/2019 0.05* 0.00 - 0.04 K/uL Final    Lymph # 11/25/2019 0.4* 1.0 - 4.8 K/uL Final    Mono # 11/25/2019 0.8  0.3 - 1.0 K/uL Final    Eos # 11/25/2019 0.2  0.0 - 0.5 K/uL Final    Baso # 11/25/2019 0.04  0.00 - 0.20 K/uL Final    nRBC 11/25/2019 0  0 /100 WBC Final    Gran% 11/25/2019 81.9* 38.0 - 73.0 % Final    Lymph% 11/25/2019 4.8* 18.0 - 48.0 % Final    Mono% 11/25/2019 10.1  4.0 - 15.0 % Final    Eosinophil% 11/25/2019 2.7  0.0 - 8.0 % Final    Basophil% 11/25/2019 0.5  0.0 - 1.9 % Final    Differential Method 11/25/2019 Automated   Final    Sodium 11/25/2019 137  136 - 145 mmol/L Final    Potassium 11/25/2019 4.7  3.5 - 5.1 mmol/L Final    Chloride 11/25/2019 101  95 - 110 mmol/L  Final    CO2 11/25/2019 26  23 - 29 mmol/L Final    Glucose 11/25/2019 118* 70 - 110 mg/dL Final    BUN, Bld 11/25/2019 7* 8 - 23 mg/dL Final    Creatinine 11/25/2019 0.7  0.5 - 1.4 mg/dL Final    Calcium 11/25/2019 9.7  8.7 - 10.5 mg/dL Final    Total Protein 11/25/2019 7.6  6.0 - 8.4 g/dL Final    Albumin 11/25/2019 3.7  3.5 - 5.2 g/dL Final    Total Bilirubin 11/25/2019 0.4  0.1 - 1.0 mg/dL Final    Alkaline Phosphatase 11/25/2019 93  55 - 135 U/L Final    AST 11/25/2019 15  10 - 40 U/L Final    ALT 11/25/2019 12  10 - 44 U/L Final    Anion Gap 11/25/2019 10  8 - 16 mmol/L Final    eGFR if African American 11/25/2019 >60  >60 mL/min/1.73 m^2 Final    eGFR if non African American 11/25/2019 >60  >60 mL/min/1.73 m^2 Final      ?   Tumor markers   ?   ?   Imaging: NM PET CT Routine FDG  Narrative: EXAMINATION:  NM PET CT ROUTINE    CLINICAL HISTORY:  eval for metastatic disease in lung, liver and lymph nodes seen on recent CT scan for rectal adenocarcinoma;  Malignant neoplasm of rectum    TECHNIQUE:  Segmented attenuation corrected 3-D PET imaging was obtained from the skull base through the mid thighs utilizing 12.20 mCi [id number].  Noncontrast CT imaging was performed for attenuation correction, diagnosis, and anatomical fusion with PET.    COMPARISON:  CT 09/09/2019.    FINDINGS:  Head/neck: There is normal physiologic FDG uptake noted within the visualized brain parenchyma. No FDG avid lymphadenopathy within the neck.    Chest: There are ill-defined patchy and nodular parenchymal opacities again noted in the left lower lobe exhibiting low level FDG uptake with an SUV max of 2.5.  A new 13 mm ground-glass opacity is noted in the lateral left upper lobe exhibiting weak FDG avidity with an SUV max of 1.9.  Similar newly developed ground-glass opacity present in the mid right lung adjacent to the major fissure with SUV max of 1.6. No FDG avid mediastinal, hilar or axillary lymph  nodes.    Abdomen/Pelvis: Normal physiologic FDG uptake noted within the liver, spleen, urinary tract, and bowel.Focal hypodensity is again noted in the left hepatic lobe which is non FDG avid and favored to represent focal fatty infiltration.  An intermediate length segment of circumferential mural thickening is again noted at the rectosigmoid junction which exhibits intense hypermetabolism and a SUV max of 11.8.  A hypermetabolic lymph node adjacent to the left pelvic sidewall has a SUV max of 5.3.    Skeletal: No FDG avid osseus lesions identified.  Impression: 1. Hypermetabolic rectal mass consistent with known rectal malignancy.  2. Hypermetabolic regional lymph node adjacent to left pelvic sidewall.  3. Non FDG avid focal hypodensity in the liver favored to represent focal fatty infiltration.  4. Bilateral lung opacities most likely infectious or inflammatory etiology.  Follow-up recommended.    Electronically signed by: LORENA Green MD  Date:    09/25/2019  Time:    16:16     ?      Pathology:  Pathology Results  (Last 10 years)    None           ?   Assessment/Plan:         Rectal cancer  reviewed labs today and they appear stable.  Continue capecitabine @ 825 milligram/meter sq p.o. b.i.d. Monday through Friday for the duration of radiation therapy.  Will plan on adjuvant FOLFOX post surgery.  Patient denies any symptomatology from capecitabine.  I reiterated some of the common side effects including GI symptomsm, hand and foot disease as cytopenia.      Rectal pain  Likely related to malignancy versus radiation effect.  Refilled [place] for rectal pain. Will plan on seeing him back in 2 weeks with repeat labs.    Anemia  Anemia noted to be improving.  CBC showed normocytic anemia.  No evidence of rectal bleed.  Will continue to monitor.        Follow-Up: Follow up in about 2 weeks (around 12/9/2019).    HOMA GAMBOA Md., Ph.D  Hematology & Oncology Department  Phone #: [phone]

## 2019-11-25 ENCOUNTER — OFFICE VISIT (OUTPATIENT)
Dept: HEMATOLOGY/ONCOLOGY | Facility: CLINIC | Age: 64
End: 2019-11-25
Payer: MEDICARE

## 2019-11-25 ENCOUNTER — LAB VISIT (OUTPATIENT)
Dept: LAB | Facility: HOSPITAL | Age: 64
End: 2019-11-25
Attending: INTERNAL MEDICINE
Payer: MEDICARE

## 2019-11-25 VITALS
SYSTOLIC BLOOD PRESSURE: 123 MMHG | RESPIRATION RATE: 16 BRPM | TEMPERATURE: 98 F | DIASTOLIC BLOOD PRESSURE: 86 MMHG | OXYGEN SATURATION: 99 % | HEIGHT: 70 IN | HEART RATE: 103 BPM | BODY MASS INDEX: 22.73 KG/M2 | WEIGHT: 158.75 LBS

## 2019-11-25 DIAGNOSIS — C20 RECTAL CANCER: ICD-10-CM

## 2019-11-25 DIAGNOSIS — K62.89 RECTAL PAIN: ICD-10-CM

## 2019-11-25 DIAGNOSIS — D64.9 ANEMIA, UNSPECIFIED TYPE: ICD-10-CM

## 2019-11-25 DIAGNOSIS — C20 RECTAL CANCER: Primary | ICD-10-CM

## 2019-11-25 LAB
ALBUMIN SERPL BCP-MCNC: 3.7 G/DL (ref 3.5–5.2)
ALP SERPL-CCNC: 93 U/L (ref 55–135)
ALT SERPL W/O P-5'-P-CCNC: 12 U/L (ref 10–44)
ANION GAP SERPL CALC-SCNC: 10 MMOL/L (ref 8–16)
AST SERPL-CCNC: 15 U/L (ref 10–40)
BASOPHILS # BLD AUTO: 0.04 K/UL (ref 0–0.2)
BASOPHILS NFR BLD: 0.5 % (ref 0–1.9)
BILIRUB SERPL-MCNC: 0.4 MG/DL (ref 0.1–1)
BUN SERPL-MCNC: 7 MG/DL (ref 8–23)
CALCIUM SERPL-MCNC: 9.7 MG/DL (ref 8.7–10.5)
CHLORIDE SERPL-SCNC: 101 MMOL/L (ref 95–110)
CO2 SERPL-SCNC: 26 MMOL/L (ref 23–29)
CREAT SERPL-MCNC: 0.7 MG/DL (ref 0.5–1.4)
DIFFERENTIAL METHOD: ABNORMAL
EOSINOPHIL # BLD AUTO: 0.2 K/UL (ref 0–0.5)
EOSINOPHIL NFR BLD: 2.7 % (ref 0–8)
ERYTHROCYTE [DISTWIDTH] IN BLOOD BY AUTOMATED COUNT: 20.3 % (ref 11.5–14.5)
EST. GFR  (AFRICAN AMERICAN): >60 ML/MIN/1.73 M^2
EST. GFR  (NON AFRICAN AMERICAN): >60 ML/MIN/1.73 M^2
GLUCOSE SERPL-MCNC: 118 MG/DL (ref 70–110)
HCT VFR BLD AUTO: 42.1 % (ref 40–54)
HGB BLD-MCNC: 13.1 G/DL (ref 14–18)
IMM GRANULOCYTES # BLD AUTO: 0.05 K/UL (ref 0–0.04)
IMM GRANULOCYTES NFR BLD AUTO: 0.7 % (ref 0–0.5)
LYMPHOCYTES # BLD AUTO: 0.4 K/UL (ref 1–4.8)
LYMPHOCYTES NFR BLD: 4.8 % (ref 18–48)
MCH RBC QN AUTO: 28.6 PG (ref 27–31)
MCHC RBC AUTO-ENTMCNC: 31.1 G/DL (ref 32–36)
MCV RBC AUTO: 92 FL (ref 82–98)
MONOCYTES # BLD AUTO: 0.8 K/UL (ref 0.3–1)
MONOCYTES NFR BLD: 10.1 % (ref 4–15)
NEUTROPHILS # BLD AUTO: 6.1 K/UL (ref 1.8–7.7)
NEUTROPHILS NFR BLD: 81.9 % (ref 38–73)
NRBC BLD-RTO: 0 /100 WBC
PLATELET # BLD AUTO: 377 K/UL (ref 150–350)
PMV BLD AUTO: 9.2 FL (ref 9.2–12.9)
POTASSIUM SERPL-SCNC: 4.7 MMOL/L (ref 3.5–5.1)
PROT SERPL-MCNC: 7.6 G/DL (ref 6–8.4)
RBC # BLD AUTO: 4.58 M/UL (ref 4.6–6.2)
SODIUM SERPL-SCNC: 137 MMOL/L (ref 136–145)
WBC # BLD AUTO: 7.43 K/UL (ref 3.9–12.7)

## 2019-11-25 PROCEDURE — 36415 COLL VENOUS BLD VENIPUNCTURE: CPT

## 2019-11-25 PROCEDURE — 99215 PR OFFICE/OUTPT VISIT, EST, LEVL V, 40-54 MIN: ICD-10-PCS | Mod: 25,S$GLB,, | Performed by: INTERNAL MEDICINE

## 2019-11-25 PROCEDURE — 3079F PR MOST RECENT DIASTOLIC BLOOD PRESSURE 80-89 MM HG: ICD-10-PCS | Mod: CPTII,S$GLB,, | Performed by: INTERNAL MEDICINE

## 2019-11-25 PROCEDURE — 85025 COMPLETE CBC W/AUTO DIFF WBC: CPT

## 2019-11-25 PROCEDURE — 99999 PR PBB SHADOW E&M-EST. PATIENT-LVL IV: ICD-10-PCS | Mod: PBBFAC,,, | Performed by: INTERNAL MEDICINE

## 2019-11-25 PROCEDURE — 77386 HC IMRT, COMPLEX: CPT | Performed by: RADIOLOGY

## 2019-11-25 PROCEDURE — 3008F PR BODY MASS INDEX (BMI) DOCUMENTED: ICD-10-PCS | Mod: CPTII,S$GLB,, | Performed by: INTERNAL MEDICINE

## 2019-11-25 PROCEDURE — 3074F SYST BP LT 130 MM HG: CPT | Mod: CPTII,S$GLB,, | Performed by: INTERNAL MEDICINE

## 2019-11-25 PROCEDURE — 80053 COMPREHEN METABOLIC PANEL: CPT

## 2019-11-25 PROCEDURE — 77014 HC CT GUIDANCE RADIATION THERAPY FLDS PLACEMENT: CPT | Performed by: RADIOLOGY

## 2019-11-25 PROCEDURE — 99999 PR PBB SHADOW E&M-EST. PATIENT-LVL IV: CPT | Mod: PBBFAC,,, | Performed by: INTERNAL MEDICINE

## 2019-11-25 PROCEDURE — 99215 OFFICE O/P EST HI 40 MIN: CPT | Mod: 25,S$GLB,, | Performed by: INTERNAL MEDICINE

## 2019-11-25 PROCEDURE — 3008F BODY MASS INDEX DOCD: CPT | Mod: CPTII,S$GLB,, | Performed by: INTERNAL MEDICINE

## 2019-11-25 PROCEDURE — 3079F DIAST BP 80-89 MM HG: CPT | Mod: CPTII,S$GLB,, | Performed by: INTERNAL MEDICINE

## 2019-11-25 PROCEDURE — 3074F PR MOST RECENT SYSTOLIC BLOOD PRESSURE < 130 MM HG: ICD-10-PCS | Mod: CPTII,S$GLB,, | Performed by: INTERNAL MEDICINE

## 2019-11-25 RX ORDER — HYDROCODONE BITARTRATE AND ACETAMINOPHEN 10; 325 MG/1; MG/1
1 TABLET ORAL EVERY 6 HOURS PRN
Qty: 60 TABLET | Refills: 0 | Status: SHIPPED | OUTPATIENT
Start: 2019-11-25 | End: 2019-12-09 | Stop reason: SDUPTHER

## 2019-11-25 NOTE — ASSESSMENT & PLAN NOTE
reviewed labs today and they appear stable.  Continue capecitabine @ 825 milligram/meter sq p.o. b.i.d. Monday through Friday for the duration of radiation therapy.  Will plan on adjuvant FOLFOX post surgery.  Patient denies any symptomatology from capecitabine.  I reiterated some of the common side effects including GI symptomsm, hand and foot disease as cytopenia.

## 2019-11-25 NOTE — ASSESSMENT & PLAN NOTE
Likely related to malignancy versus radiation effect.  Refilled [place] for rectal pain. Will plan on seeing him back in 2 weeks with repeat labs.

## 2019-11-25 NOTE — ASSESSMENT & PLAN NOTE
Anemia noted to be improving.  CBC showed normocytic anemia.  No evidence of rectal bleed.  Will continue to monitor.

## 2019-11-26 PROCEDURE — 77386 HC IMRT, COMPLEX: CPT | Performed by: RADIOLOGY

## 2019-11-26 PROCEDURE — 77014 HC CT GUIDANCE RADIATION THERAPY FLDS PLACEMENT: CPT | Performed by: RADIOLOGY

## 2019-11-27 ENCOUNTER — DOCUMENTATION ONLY (OUTPATIENT)
Dept: RADIATION ONCOLOGY | Facility: CLINIC | Age: 64
End: 2019-11-27

## 2019-11-27 ENCOUNTER — SOCIAL WORK (OUTPATIENT)
Dept: HEMATOLOGY/ONCOLOGY | Facility: CLINIC | Age: 64
End: 2019-11-27

## 2019-11-27 PROCEDURE — 77014 HC CT GUIDANCE RADIATION THERAPY FLDS PLACEMENT: CPT | Performed by: RADIOLOGY

## 2019-11-27 PROCEDURE — 77386 HC IMRT, COMPLEX: CPT | Performed by: RADIOLOGY

## 2019-11-27 NOTE — PROGRESS NOTES
LORENZO intern briefly met with pt to obtain Laura parikh signature on behalf of Cathleen. SW will f/u will pt as needed.

## 2019-11-27 NOTE — PLAN OF CARE
Day 25 of outpatient xrt to the rectum. C/o pain 8/10 to the rectal area. Had diarrhea but Lomitil helped. [place] symptoms improved with Flomax. Will continue to monitor.

## 2019-12-02 ENCOUNTER — HOSPITAL ENCOUNTER (OUTPATIENT)
Dept: RADIATION THERAPY | Facility: HOSPITAL | Age: 64
Discharge: HOME | End: 2019-12-02
Attending: RADIOLOGY
Payer: MEDICARE

## 2019-12-02 PROCEDURE — 77014 HC CT GUIDANCE RADIATION THERAPY FLDS PLACEMENT: CPT | Mod: TC | Performed by: RADIOLOGY

## 2019-12-02 PROCEDURE — 77386 HC IMRT, COMPLEX: CPT | Performed by: RADIOLOGY

## 2019-12-03 PROCEDURE — 77386 HC IMRT, COMPLEX: CPT | Performed by: RADIOLOGY

## 2019-12-03 PROCEDURE — 77336 RADIATION PHYSICS CONSULT: CPT | Performed by: RADIOLOGY

## 2019-12-03 PROCEDURE — 77014 HC CT GUIDANCE RADIATION THERAPY FLDS PLACEMENT: CPT | Mod: TC | Performed by: RADIOLOGY

## 2019-12-04 ENCOUNTER — DOCUMENTATION ONLY (OUTPATIENT)
Dept: RADIATION ONCOLOGY | Facility: CLINIC | Age: 64
End: 2019-12-04

## 2019-12-04 PROCEDURE — 77014 HC CT GUIDANCE RADIATION THERAPY FLDS PLACEMENT: CPT | Mod: TC | Performed by: RADIOLOGY

## 2019-12-04 PROCEDURE — 77386 HC IMRT, COMPLEX: CPT | Performed by: RADIOLOGY

## 2019-12-09 ENCOUNTER — OFFICE VISIT (OUTPATIENT)
Dept: HEMATOLOGY/ONCOLOGY | Facility: CLINIC | Age: 64
End: 2019-12-09
Payer: MEDICARE

## 2019-12-09 ENCOUNTER — LAB VISIT (OUTPATIENT)
Dept: LAB | Facility: HOSPITAL | Age: 64
End: 2019-12-09
Attending: INTERNAL MEDICINE
Payer: MEDICARE

## 2019-12-09 VITALS
SYSTOLIC BLOOD PRESSURE: 132 MMHG | TEMPERATURE: 97 F | HEART RATE: 79 BPM | OXYGEN SATURATION: 99 % | DIASTOLIC BLOOD PRESSURE: 84 MMHG | WEIGHT: 158.94 LBS | HEIGHT: 70 IN | RESPIRATION RATE: 12 BRPM | BODY MASS INDEX: 22.75 KG/M2

## 2019-12-09 DIAGNOSIS — C20 RECTAL CANCER: ICD-10-CM

## 2019-12-09 DIAGNOSIS — K62.89 RECTAL PAIN: ICD-10-CM

## 2019-12-09 LAB
ALBUMIN SERPL BCP-MCNC: 3.6 G/DL (ref 3.5–5.2)
ALP SERPL-CCNC: 107 U/L (ref 55–135)
ALT SERPL W/O P-5'-P-CCNC: 16 U/L (ref 10–44)
ANION GAP SERPL CALC-SCNC: 11 MMOL/L (ref 8–16)
AST SERPL-CCNC: 18 U/L (ref 10–40)
BASOPHILS # BLD AUTO: 0.03 K/UL (ref 0–0.2)
BASOPHILS NFR BLD: 0.4 % (ref 0–1.9)
BILIRUB SERPL-MCNC: 0.3 MG/DL (ref 0.1–1)
BUN SERPL-MCNC: 7 MG/DL (ref 8–23)
CALCIUM SERPL-MCNC: 9.5 MG/DL (ref 8.7–10.5)
CHLORIDE SERPL-SCNC: 99 MMOL/L (ref 95–110)
CO2 SERPL-SCNC: 26 MMOL/L (ref 23–29)
CREAT SERPL-MCNC: 0.7 MG/DL (ref 0.5–1.4)
DIFFERENTIAL METHOD: ABNORMAL
EOSINOPHIL # BLD AUTO: 0.1 K/UL (ref 0–0.5)
EOSINOPHIL NFR BLD: 1.9 % (ref 0–8)
ERYTHROCYTE [DISTWIDTH] IN BLOOD BY AUTOMATED COUNT: 21.6 % (ref 11.5–14.5)
EST. GFR  (AFRICAN AMERICAN): >60 ML/MIN/1.73 M^2
EST. GFR  (NON AFRICAN AMERICAN): >60 ML/MIN/1.73 M^2
GLUCOSE SERPL-MCNC: 90 MG/DL (ref 70–110)
HCT VFR BLD AUTO: 38.8 % (ref 40–54)
HGB BLD-MCNC: 11.7 G/DL (ref 14–18)
IMM GRANULOCYTES # BLD AUTO: 0.04 K/UL (ref 0–0.04)
IMM GRANULOCYTES NFR BLD AUTO: 0.5 % (ref 0–0.5)
LYMPHOCYTES # BLD AUTO: 0.5 K/UL (ref 1–4.8)
LYMPHOCYTES NFR BLD: 6.4 % (ref 18–48)
MCH RBC QN AUTO: 28.1 PG (ref 27–31)
MCHC RBC AUTO-ENTMCNC: 30.2 G/DL (ref 32–36)
MCV RBC AUTO: 93 FL (ref 82–98)
MONOCYTES # BLD AUTO: 1 K/UL (ref 0.3–1)
MONOCYTES NFR BLD: 14.1 % (ref 4–15)
NEUTROPHILS # BLD AUTO: 5.6 K/UL (ref 1.8–7.7)
NEUTROPHILS NFR BLD: 76.7 % (ref 38–73)
NRBC BLD-RTO: 0 /100 WBC
PLATELET # BLD AUTO: 331 K/UL (ref 150–350)
PMV BLD AUTO: 9.4 FL (ref 9.2–12.9)
POTASSIUM SERPL-SCNC: 4 MMOL/L (ref 3.5–5.1)
PROT SERPL-MCNC: 7.5 G/DL (ref 6–8.4)
RBC # BLD AUTO: 4.16 M/UL (ref 4.6–6.2)
SODIUM SERPL-SCNC: 136 MMOL/L (ref 136–145)
WBC # BLD AUTO: 7.36 K/UL (ref 3.9–12.7)

## 2019-12-09 PROCEDURE — 99999 PR PBB SHADOW E&M-EST. PATIENT-LVL IV: CPT | Mod: PBBFAC,,, | Performed by: INTERNAL MEDICINE

## 2019-12-09 PROCEDURE — 3008F BODY MASS INDEX DOCD: CPT | Mod: CPTII,S$GLB,, | Performed by: INTERNAL MEDICINE

## 2019-12-09 PROCEDURE — 36415 COLL VENOUS BLD VENIPUNCTURE: CPT

## 2019-12-09 PROCEDURE — 3075F SYST BP GE 130 - 139MM HG: CPT | Mod: CPTII,S$GLB,, | Performed by: INTERNAL MEDICINE

## 2019-12-09 PROCEDURE — 3079F DIAST BP 80-89 MM HG: CPT | Mod: CPTII,S$GLB,, | Performed by: INTERNAL MEDICINE

## 2019-12-09 PROCEDURE — 3008F PR BODY MASS INDEX (BMI) DOCUMENTED: ICD-10-PCS | Mod: CPTII,S$GLB,, | Performed by: INTERNAL MEDICINE

## 2019-12-09 PROCEDURE — 99999 PR PBB SHADOW E&M-EST. PATIENT-LVL IV: ICD-10-PCS | Mod: PBBFAC,,, | Performed by: INTERNAL MEDICINE

## 2019-12-09 PROCEDURE — 99214 OFFICE O/P EST MOD 30 MIN: CPT | Mod: S$GLB,,, | Performed by: INTERNAL MEDICINE

## 2019-12-09 PROCEDURE — 3075F PR MOST RECENT SYSTOLIC BLOOD PRESS GE 130-139MM HG: ICD-10-PCS | Mod: CPTII,S$GLB,, | Performed by: INTERNAL MEDICINE

## 2019-12-09 PROCEDURE — 3079F PR MOST RECENT DIASTOLIC BLOOD PRESSURE 80-89 MM HG: ICD-10-PCS | Mod: CPTII,S$GLB,, | Performed by: INTERNAL MEDICINE

## 2019-12-09 PROCEDURE — 99214 PR OFFICE/OUTPT VISIT, EST, LEVL IV, 30-39 MIN: ICD-10-PCS | Mod: S$GLB,,, | Performed by: INTERNAL MEDICINE

## 2019-12-09 PROCEDURE — 80053 COMPREHEN METABOLIC PANEL: CPT

## 2019-12-09 RX ORDER — HYDROCODONE BITARTRATE AND ACETAMINOPHEN 10; 325 MG/1; MG/1
1 TABLET ORAL EVERY 6 HOURS PRN
Qty: 60 TABLET | Refills: 0 | Status: SHIPPED | OUTPATIENT
Start: 2019-12-09 | End: 2019-12-23 | Stop reason: SDUPTHER

## 2019-12-09 NOTE — PROGRESS NOTES
Subjective:   Date of Visit: 12/9/19   ?   ?   CHIEF COMPLAINT:  Locally advanced rectal cancer, Stage IIIB (cT3,cN2a, CM0)  ?     HPI:   was seen at [facility] today. He is a 64 yr old male with recently diagnosed locally advanced rectal cancer.  Patient has completed concurrent chemoradiation with capecitabine.  Presents today for routine follow-up.  He has appointment with the General surgery on 12/16/2019 for discussion regarding resection.  He complains of pain around his buttock which she attributed to radiation effect.  Denies any rectal bleed.Denies any fever, chills, nausea or vomiting, chest pain or shortness of breath, diarrhea, unintentional weight loss, night sweats or dysuria.    Review of Systems   Constitutional: Negative for activity change, appetite change, chills, fatigue, fever and unexpected weight change.   HENT: Negative for hearing loss, mouth sores, nosebleeds, sore throat, tinnitus, trouble swallowing and voice change.    Eyes: Negative for visual disturbance.   Respiratory: Negative for cough, chest tightness, shortness of breath and wheezing.    Cardiovascular: Negative for chest pain, palpitations and leg swelling.   Gastrointestinal: Positive for rectal pain. Negative for abdominal pain, anal bleeding, blood in stool, constipation, diarrhea, nausea and vomiting.   Genitourinary: Negative for frequency, hematuria and testicular pain.   Musculoskeletal: Negative for arthralgias, back pain, gait problem and neck pain.   Skin: Negative for color change, pallor, rash and wound.   Allergic/Immunologic: Negative for immunocompromised state.   Neurological: Negative for seizures, syncope, weakness, numbness and headaches.   Hematological: Negative for adenopathy. Does not bruise/bleed easily.   Psychiatric/Behavioral: Negative for agitation, confusion, decreased concentration, hallucinations and sleep disturbance. The patient is not nervous/anxious.        ?   PAST MEDICAL  HISTORY:   Past Medical History:   Diagnosis Date    Alcoholism     Anemia     Back pain     Encounter for blood transfusion     Essential hypertension 2/4/2016    GERD (gastroesophageal reflux disease)     Hiatal hernia     Hypertension     Rectal cancer 9/11/2019    ?     PAST SURGICAL HISTORY:   Past Surgical History:   Procedure Laterality Date    COLONOSCOPY N/A 9/3/2019    Procedure: COLONOSCOPY;  Surgeon: Isai Centeno MD;  Location: [facility];  Service: Endoscopy;  Laterality: N/A;    ESOPHAGOGASTRODUODENOSCOPY N/A 9/3/2019    Procedure: ESOPHAGOGASTRODUODENOSCOPY (EGD);  Surgeon: Isai Centeno MD;  Location: [facility];  Service: Endoscopy;  Laterality: N/A;    HIP FRACTURE SURGERY      left hip    JOINT REPLACEMENT Left       ?   ALLERGIES:   Allergies as of 12/09/2019    (No Known Allergies)      ?   MEDICATIONS:?   Outpatient Medications Marked as Taking for the 12/9/19 encounter (Office Visit) with Mikel Sams MD   Medication Sig Dispense Refill    capecitabine (XELODA) 500 MG Tab Take 3 tablets (1,500 mg total) by mouth 2 (two) times daily on days of radiation only for a total of six weeks. 180 tablet 11    cyanocobalamin (VITAMIN B-12) 1000 MCG tablet Take 100 mcg by mouth once daily.      ferrous sulfate 324 mg (65 mg iron) TbEC Take 1 tablet (324 mg total) by mouth 2 (two) times daily. 60 tablet 0    HYDROcodone-acetaminophen (NORCO)  mg per tablet Take 1 tablet by mouth every 6 (six) hours as needed for Pain. 60 tablet 0    lidocaine 4 % Gel Apply 1 application topically 4 (four) times daily. 30 g 2    losartan (COZAAR) 50 MG tablet TAKE 1 TABLET BY MOUTH EVERY MORNING 90 tablet 0    ondansetron (ZOFRAN) 8 MG tablet Take 1 tablet (8 mg total) by mouth every 8 (eight) hours as needed for Nausea. 30 tablet 2    prochlorperazine (COMPAZINE) 5 MG tablet TAKE 1 TABLET(5 MG) BY MOUTH EVERY 6 HOURS AS NEEDED FOR NAUSEA 385 tablet 1    tamsulosin (FLOMAX) 0.4 mg  Cap Take 1 capsule (0.4 mg total) by mouth once daily. 30 capsule 6    [DISCONTINUED] HYDROcodone-acetaminophen (NORCO)  mg per tablet Take 1 tablet by mouth every 6 (six) hours as needed for Pain. 60 tablet 0      ?   SOCIAL HISTORY:?   Social History     Tobacco Use    Smoking status: Never Smoker    Smokeless tobacco: Never Used   Substance Use Topics    Alcohol use: Yes     Comment: states he drinks frequently everyday/quit approx 10/2017        ?   FAMILY HISTORY:   family history includes Cataracts in his mother; Hypertension in his father; Stroke in his father.   ?     Objective:      Physical Exam   Constitutional: He is oriented to person, place, and time. He appears well-developed and well-nourished. No distress.   HENT:   Head: Normocephalic and atraumatic.   Right Ear: External ear normal.   Left Ear: External ear normal.   Mouth/Throat: No oropharyngeal exudate.   Eyes: Pupils are equal, round, and reactive to light. Conjunctivae are normal. Right eye exhibits no discharge. Left eye exhibits no discharge. No scleral icterus.   Neck: Normal range of motion. Neck supple. No thyromegaly present.   Cardiovascular: Normal rate, regular rhythm and normal heart sounds.   No murmur heard.  Pulmonary/Chest: Effort normal and breath sounds normal. No respiratory distress. He has no wheezes. He exhibits no tenderness.   Abdominal: Soft. Bowel sounds are normal. He exhibits no distension and no mass. There is tenderness. There is no rebound.   Musculoskeletal: Normal range of motion. He exhibits no edema or tenderness.   Lymphadenopathy:     He has no cervical adenopathy.        Right cervical: No superficial cervical adenopathy present.       Left cervical: No superficial cervical adenopathy present.        Right axillary: No pectoral adenopathy present.        Left axillary: No pectoral adenopathy present.No inguinal adenopathy noted on the right or left side.        Right: No supraclavicular adenopathy  present.        Left: No supraclavicular adenopathy present.   Neurological: He is alert and oriented to person, place, and time. No cranial nerve deficit or sensory deficit.   Skin: Skin is warm and dry. Capillary refill takes 2 to 3 seconds. No rash noted. No erythema. No pallor.   Psychiatric: He has a normal mood and affect. His behavior is normal. Judgment normal.       ?   Vitals:    12/09/19 1321   BP: 132/84   Pulse: 79   Resp: 12   Temp: 97.3 °F (36.3 °C)      ?     ECOG SCORE    0 - Fully active-able to carry on all pre-disease performance without restriction             ?   Laboratory:  ?   Lab Visit on 12/09/2019   Component Date Value Ref Range Status    WBC 12/09/2019 7.36  3.90 - 12.70 K/uL Final    RBC 12/09/2019 4.16* 4.60 - 6.20 M/uL Final    Hemoglobin 12/09/2019 11.7* 14.0 - 18.0 g/dL Final    Hematocrit 12/09/2019 38.8* 40.0 - 54.0 % Final    Mean Corpuscular Volume 12/09/2019 93  82 - 98 fL Final    Mean Corpuscular Hemoglobin 12/09/2019 28.1  27.0 - 31.0 pg Final    Mean Corpuscular Hemoglobin Conc 12/09/2019 30.2* 32.0 - 36.0 g/dL Final    RDW 12/09/2019 21.6* 11.5 - 14.5 % Final    Platelets 12/09/2019 331  150 - 350 K/uL Final    MPV 12/09/2019 9.4  9.2 - 12.9 fL Final    Immature Granulocytes 12/09/2019 0.5  0.0 - 0.5 % Final    Gran # (ANC) 12/09/2019 5.6  1.8 - 7.7 K/uL Final    Immature Grans (Abs) 12/09/2019 0.04  0.00 - 0.04 K/uL Final    Lymph # 12/09/2019 0.5* 1.0 - 4.8 K/uL Final    Mono # 12/09/2019 1.0  0.3 - 1.0 K/uL Final    Eos # 12/09/2019 0.1  0.0 - 0.5 K/uL Final    Baso # 12/09/2019 0.03  0.00 - 0.20 K/uL Final    nRBC 12/09/2019 0  0 /100 WBC Final    Gran% 12/09/2019 76.7* 38.0 - 73.0 % Final    Lymph% 12/09/2019 6.4* 18.0 - 48.0 % Final    Mono% 12/09/2019 14.1  4.0 - 15.0 % Final    Eosinophil% 12/09/2019 1.9  0.0 - 8.0 % Final    Basophil% 12/09/2019 0.4  0.0 - 1.9 % Final    Differential Method 12/09/2019 Automated   Final    Sodium  12/09/2019 136  136 - 145 mmol/L Final    Potassium 12/09/2019 4.0  3.5 - 5.1 mmol/L Final    Chloride 12/09/2019 99  95 - 110 mmol/L Final    CO2 12/09/2019 26  23 - 29 mmol/L Final    Glucose 12/09/2019 90  70 - 110 mg/dL Final    BUN, Bld 12/09/2019 7* 8 - 23 mg/dL Final    Creatinine 12/09/2019 0.7  0.5 - 1.4 mg/dL Final    Calcium 12/09/2019 9.5  8.7 - 10.5 mg/dL Final    Total Protein 12/09/2019 7.5  6.0 - 8.4 g/dL Final    Albumin 12/09/2019 3.6  3.5 - 5.2 g/dL Final    Total Bilirubin 12/09/2019 0.3  0.1 - 1.0 mg/dL Final    Alkaline Phosphatase 12/09/2019 107  55 - 135 U/L Final    AST 12/09/2019 18  10 - 40 U/L Final    ALT 12/09/2019 16  10 - 44 U/L Final    Anion Gap 12/09/2019 11  8 - 16 mmol/L Final    eGFR if African American 12/09/2019 >60  >60 mL/min/1.73 m^2 Final    eGFR if non African American 12/09/2019 >60  >60 mL/min/1.73 m^2 Final      ?   Tumor markers   ?   ?   Imaging: NM PET CT Routine FDG  Narrative: EXAMINATION:  NM PET CT ROUTINE    CLINICAL HISTORY:  eval for metastatic disease in lung, liver and lymph nodes seen on recent CT scan for rectal adenocarcinoma;  Malignant neoplasm of rectum    TECHNIQUE:  Segmented attenuation corrected 3-D PET imaging was obtained from the skull base through the mid thighs utilizing 12.20 mCi [id number].  Noncontrast CT imaging was performed for attenuation correction, diagnosis, and anatomical fusion with PET.    COMPARISON:  CT 09/09/2019.    FINDINGS:  Head/neck: There is normal physiologic FDG uptake noted within the visualized brain parenchyma. No FDG avid lymphadenopathy within the neck.    Chest: There are ill-defined patchy and nodular parenchymal opacities again noted in the left lower lobe exhibiting low level FDG uptake with an SUV max of 2.5.  A new 13 mm ground-glass opacity is noted in the lateral left upper lobe exhibiting weak FDG avidity with an SUV max of 1.9.  Similar newly developed ground-glass opacity present in the  mid right lung adjacent to the major fissure with SUV max of 1.6. No FDG avid mediastinal, hilar or axillary lymph nodes.    Abdomen/Pelvis: Normal physiologic FDG uptake noted within the liver, spleen, urinary tract, and bowel.Focal hypodensity is again noted in the left hepatic lobe which is non FDG avid and favored to represent focal fatty infiltration.  An intermediate length segment of circumferential mural thickening is again noted at the rectosigmoid junction which exhibits intense hypermetabolism and a SUV max of 11.8.  A hypermetabolic lymph node adjacent to the left pelvic sidewall has a SUV max of 5.3.    Skeletal: No FDG avid osseus lesions identified.  Impression: 1. Hypermetabolic rectal mass consistent with known rectal malignancy.  2. Hypermetabolic regional lymph node adjacent to left pelvic sidewall.  3. Non FDG avid focal hypodensity in the liver favored to represent focal fatty infiltration.  4. Bilateral lung opacities most likely infectious or inflammatory etiology.  Follow-up recommended.    Electronically signed by: LORENA Green MD  Date:    09/25/2019  Time:    16:16     ?      Pathology:  Pathology Results  (Last 10 years)    None           ?   Assessment/Plan:         Rectal cancer  reviewed labs today and they appear stable.   completed capecitabine concurrently with radiation.   Patient denies any symptomatology from capecitabine. Patient is scheduled with General surgery on 12/16/2019 for discussion regarding resection.    Ordered restaging MRI of abdomen with and without contrast. Plan for adjuvant therapy with dependent on surgical outcome.     Rectal pain  Refilled pain medication.      Follow-Up: Follow up in about 2 weeks (around 12/23/2019).    HOMA GAMBOA Md., Ph.D  Hematology & Oncology Department  Phone #: [phone]

## 2019-12-09 NOTE — ASSESSMENT & PLAN NOTE
reviewed labs today and they appear stable.   completed capecitabine concurrently with radiation.   Patient denies any symptomatology from capecitabine. Patient is scheduled with General surgery on 12/16/2019 for discussion regarding resection.    Ordered restaging MRI of abdomen with and without contrast. Plan for adjuvant therapy with dependent on surgical outcome.

## 2019-12-11 ENCOUNTER — TELEPHONE (OUTPATIENT)
Dept: RADIATION ONCOLOGY | Facility: CLINIC | Age: 64
End: 2019-12-11

## 2019-12-11 NOTE — TELEPHONE ENCOUNTER
----- Message from Rebecca Perez sent at 12/11/2019 12:14 PM CST -----  Contact: pt  .Type:  Patient Returning Call    Who Called:pt  Who Left Message for Patient:Cecilia  Does the patient know what this is regarding?:no  Would the patient rather a call back or a response via [facility]? Call back  Best Call Back Number:[phone]  Additional Information:

## 2019-12-16 ENCOUNTER — OFFICE VISIT (OUTPATIENT)
Dept: SURGERY | Facility: CLINIC | Age: 64
End: 2019-12-16
Payer: MEDICARE

## 2019-12-16 ENCOUNTER — TELEPHONE (OUTPATIENT)
Dept: ENDOSCOPY | Facility: HOSPITAL | Age: 64
End: 2019-12-16

## 2019-12-16 VITALS
WEIGHT: 158.94 LBS | HEART RATE: 77 BPM | BODY MASS INDEX: 22.81 KG/M2 | DIASTOLIC BLOOD PRESSURE: 84 MMHG | TEMPERATURE: 98 F | SYSTOLIC BLOOD PRESSURE: 134 MMHG

## 2019-12-16 DIAGNOSIS — C20 RECTAL ADENOCARCINOMA: Primary | ICD-10-CM

## 2019-12-16 PROCEDURE — 3079F PR MOST RECENT DIASTOLIC BLOOD PRESSURE 80-89 MM HG: ICD-10-PCS | Mod: CPTII,S$GLB,, | Performed by: COLON & RECTAL SURGERY

## 2019-12-16 PROCEDURE — 3008F BODY MASS INDEX DOCD: CPT | Mod: CPTII,S$GLB,, | Performed by: COLON & RECTAL SURGERY

## 2019-12-16 PROCEDURE — 3008F PR BODY MASS INDEX (BMI) DOCUMENTED: ICD-10-PCS | Mod: CPTII,S$GLB,, | Performed by: COLON & RECTAL SURGERY

## 2019-12-16 PROCEDURE — 3075F PR MOST RECENT SYSTOLIC BLOOD PRESS GE 130-139MM HG: ICD-10-PCS | Mod: CPTII,S$GLB,, | Performed by: COLON & RECTAL SURGERY

## 2019-12-16 PROCEDURE — 3079F DIAST BP 80-89 MM HG: CPT | Mod: CPTII,S$GLB,, | Performed by: COLON & RECTAL SURGERY

## 2019-12-16 PROCEDURE — 99999 PR PBB SHADOW E&M-EST. PATIENT-LVL V: ICD-10-PCS | Mod: PBBFAC,,, | Performed by: COLON & RECTAL SURGERY

## 2019-12-16 PROCEDURE — 99214 PR OFFICE/OUTPT VISIT, EST, LEVL IV, 30-39 MIN: ICD-10-PCS | Mod: S$GLB,,, | Performed by: COLON & RECTAL SURGERY

## 2019-12-16 PROCEDURE — 3075F SYST BP GE 130 - 139MM HG: CPT | Mod: CPTII,S$GLB,, | Performed by: COLON & RECTAL SURGERY

## 2019-12-16 PROCEDURE — 99999 PR PBB SHADOW E&M-EST. PATIENT-LVL V: CPT | Mod: PBBFAC,,, | Performed by: COLON & RECTAL SURGERY

## 2019-12-16 PROCEDURE — 99214 OFFICE O/P EST MOD 30 MIN: CPT | Mod: S$GLB,,, | Performed by: COLON & RECTAL SURGERY

## 2019-12-16 RX ORDER — METRONIDAZOLE 500 MG/100ML
500 INJECTION, SOLUTION INTRAVENOUS
Status: CANCELLED | OUTPATIENT
Start: 2019-12-16

## 2019-12-16 RX ORDER — ONDANSETRON 2 MG/ML
4 INJECTION INTRAMUSCULAR; INTRAVENOUS EVERY 12 HOURS PRN
Status: CANCELLED | OUTPATIENT
Start: 2019-12-16

## 2019-12-16 RX ORDER — SODIUM, POTASSIUM,MAG SULFATES 17.5-3.13G
1 SOLUTION, RECONSTITUTED, ORAL ORAL DAILY
Qty: 1 KIT | Refills: 0 | Status: SHIPPED | OUTPATIENT
Start: 2019-12-16 | End: 2019-12-18

## 2019-12-16 RX ORDER — CELECOXIB 100 MG/1
400 CAPSULE ORAL
Status: CANCELLED | OUTPATIENT
Start: 2019-12-16 | End: 2019-12-16

## 2019-12-16 RX ORDER — SODIUM CHLORIDE 9 MG/ML
INJECTION, SOLUTION INTRAVENOUS CONTINUOUS
Status: CANCELLED | OUTPATIENT
Start: 2019-12-16

## 2019-12-16 RX ORDER — LIDOCAINE HYDROCHLORIDE 10 MG/ML
1 INJECTION, SOLUTION EPIDURAL; INFILTRATION; INTRACAUDAL; PERINEURAL ONCE
Status: DISCONTINUED | OUTPATIENT
Start: 2019-12-16 | End: 2020-02-09 | Stop reason: HOSPADM

## 2019-12-16 RX ORDER — HEPARIN SODIUM 5000 [USP'U]/ML
5000 INJECTION, SOLUTION INTRAVENOUS; SUBCUTANEOUS
Status: CANCELLED | OUTPATIENT
Start: 2019-12-16 | End: 2019-12-17

## 2019-12-16 RX ORDER — GABAPENTIN 100 MG/1
800 CAPSULE ORAL
Status: CANCELLED | OUTPATIENT
Start: 2019-12-16 | End: 2019-12-16

## 2019-12-16 RX ORDER — ACETAMINOPHEN 325 MG/1
1000 TABLET ORAL ONCE
Status: CANCELLED | OUTPATIENT
Start: 2019-12-16 | End: 2019-12-16

## 2019-12-16 NOTE — PROGRESS NOTES
History & Physical    SUBJECTIVE:     Chief Complaint   Patient presents with    Follow-up   Ref MD: Isai Centeno MD    History of Present Illness:  Patient is a 64 y.o. male presents for evaluation of a rectal mass.  Patient has been having iron deficiency anemia that did require transfusion around 1 year ago.  He also has had associated hematochezia for over a year now but did improve after he quit drinking beer at 8 months ago but is still intermittently present. This prompted a colonoscopy which was performed on 09/03/2019 where an obstructing rectal mass was seen that appeared malignant was biopsied and could not be traversed.  Patient reports that he had a colonoscopy around 6-7 years ago were some benign polyps were found but no malignancy.  These records are not available.  He states that the hematochezia has recently improved after he quit drinking beer, but is still intermittently present and worsen with the bowel prep from the colonoscopy recently.  He is not on any chronic anticoagulation.  He states he has had normal caliber bowel movements does not feel constipated or obstructed.  He denies any fever, chills, nausea, vomiting, diarrhea, constipation, weight loss, night sweats or any other signs or symptoms.    12/4/19: Completed Neoadj chemoXRT    Interval history:  Since last clinic visit, patient completed neoadjuvant chemo radiation on 12/04/2019.  Since completion, his continued to suffer from some perianal irritation and discomfort.  He does not have any discomfort or pain when having bowel movements.  His frequency and pain with bowel movements from before his treatment has completely resolved.  He states that the stool is more normal size and caliber now.  He denies any current fever, chills, nausea, vomiting.    PMHx: HTN, GERD  PSHx: L hip sx  Fam Hx: No CRC or IBD; +colonic polyps in brother  All: NKDA  SocHx: previous etoh; no tob or illicits; works as a     Review of patient's  allergies indicates:  No Known Allergies    Current Outpatient Medications   Medication Sig Dispense Refill    capecitabine (XELODA) 500 MG Tab Take 3 tablets (1,500 mg total) by mouth 2 (two) times daily on days of radiation only for a total of six weeks. 180 tablet 11    cyanocobalamin (VITAMIN B-12) 1000 MCG tablet Take 100 mcg by mouth once daily.      ferrous sulfate 324 mg (65 mg iron) TbEC Take 1 tablet (324 mg total) by mouth 2 (two) times daily. 60 tablet 0    HYDROcodone-acetaminophen (NORCO)  mg per tablet Take 1 tablet by mouth every 6 (six) hours as needed for Pain. 60 tablet 0    lidocaine 4 % Gel Apply 1 application topically 4 (four) times daily. 30 g 2    losartan (COZAAR) 50 MG tablet TAKE 1 TABLET BY MOUTH EVERY MORNING 90 tablet 0    ondansetron (ZOFRAN) 8 MG tablet Take 1 tablet (8 mg total) by mouth every 8 (eight) hours as needed for Nausea. 30 tablet 2    prochlorperazine (COMPAZINE) 5 MG tablet TAKE 1 TABLET(5 MG) BY MOUTH EVERY 6 HOURS AS NEEDED FOR NAUSEA 385 tablet 1    tamsulosin (FLOMAX) 0.4 mg Cap Take 1 capsule (0.4 mg total) by mouth once daily. 30 capsule 6    pantoprazole (PROTONIX) 40 MG tablet Take 1 tablet (40 mg total) by mouth once daily. 30 tablet 11    sodium,potassium,mag sulfates (SUPREP BOWEL PREP KIT) 17.5-3.13-1.6 gram SolR Take 177 mLs by mouth once daily. for 2 days 1 kit 0     Current Facility-Administered Medications   Medication Dose Route Frequency Provider Last Rate Last Dose    lidocaine (PF) 10 mg/ml (1%) injection 10 mg  1 mL Intradermal Once Familia Gonzalez MD         Facility-Administered Medications Ordered in Other Visits   Medication Dose Route Frequency Provider Last Rate Last Dose    lactated ringers infusion   Intravenous Continuous Shilpa Yee MD        sodium chloride 0.9% flush 3 mL  3 mL Intravenous PRN Shilpa Yee MD           Past Medical History:   Diagnosis Date    Alcoholism     Anemia     Back pain     Encounter  for blood transfusion     Essential hypertension 2/4/2016    GERD (gastroesophageal reflux disease)     Hiatal hernia     Hypertension     Rectal cancer 9/11/2019     Past Surgical History:   Procedure Laterality Date    COLONOSCOPY N/A 9/3/2019    Procedure: COLONOSCOPY;  Surgeon: Isai Centeno MD;  Location: [facility];  Service: Endoscopy;  Laterality: N/A;    ESOPHAGOGASTRODUODENOSCOPY N/A 9/3/2019    Procedure: ESOPHAGOGASTRODUODENOSCOPY (EGD);  Surgeon: Isai Centeno MD;  Location: [facility];  Service: Endoscopy;  Laterality: N/A;    HIP FRACTURE SURGERY      left hip    JOINT REPLACEMENT Left      Family History   Problem Relation Age of Onset    Cataracts Mother     Stroke Father     Hypertension Father      Social History     Tobacco Use    Smoking status: Never Smoker    Smokeless tobacco: Never Used   Substance Use Topics    Alcohol use: Yes     Comment: states he drinks frequently everyday/quit approx 10/2017    Drug use: No        Review of Systems:  Review of Systems   Constitutional: Negative for activity change, appetite change, chills, fatigue, fever and unexpected weight change.   HENT: Negative for congestion, ear pain, sore throat and trouble swallowing.    Eyes: Negative for pain, redness and itching.   Respiratory: Negative for cough, shortness of breath and wheezing.    Cardiovascular: Negative for chest pain, palpitations and leg swelling.   Gastrointestinal: Negative for abdominal distention, abdominal pain, anal bleeding, blood in stool, constipation, diarrhea, nausea, rectal pain and vomiting.        +perianal irritation and pain   Endocrine: Negative for cold intolerance, heat intolerance and polyuria.   Genitourinary: Negative for dysuria, flank pain, frequency and hematuria.   Musculoskeletal: Negative for gait problem, joint swelling and neck pain.   Skin: Negative for color change, rash and wound.   Allergic/Immunologic: Negative for environmental allergies and  immunocompromised state.   Neurological: Negative for dizziness, speech difficulty, weakness and numbness.   Psychiatric/Behavioral: Negative for agitation, confusion and hallucinations.       OBJECTIVE:     Vital Signs (Most Recent)  Temp: 98.1 °F (36.7 °C) (12/16/19 1521)  Pulse: 77 (12/16/19 1521)  BP: 134/84 (12/16/19 1521)     72.1 kg (158 lb 15.2 oz)     Physical Exam:  Physical Exam   Constitutional: He is oriented to person, place, and time. He appears well-developed.   HENT:   Head: Normocephalic and atraumatic.   Eyes: Conjunctivae and EOM are normal.   Neck: Normal range of motion. No thyromegaly present.   Cardiovascular: Normal rate and regular rhythm.   Pulmonary/Chest: Effort normal. No respiratory distress.   Abdominal: Soft. He exhibits no distension and no mass. There is no tenderness. There is no rebound and no guarding. No hernia.   Genitourinary:   Genitourinary Comments: Anorectal: STEVEN deferred due to patient request   Musculoskeletal: Normal range of motion. He exhibits no edema or tenderness.   Neurological: He is alert and oriented to person, place, and time.   Skin: Skin is warm and dry. Capillary refill takes less than 2 seconds. No rash noted.   Psychiatric: He has a normal mood and affect.     Laboratory  Lab Results   Component Value Date    WBC 7.36 12/09/2019    HGB 11.7 (L) 12/09/2019    HCT 38.8 (L) 12/09/2019     12/09/2019    CHOL 138 04/07/2018    TRIG 38 04/07/2018    HDL 72 04/07/2018    ALT 16 12/09/2019    AST 18 12/09/2019     12/09/2019    K 4.0 12/09/2019    CL 99 12/09/2019    CREATININE 0.7 12/09/2019    BUN 7 (L) 12/09/2019    CO2 26 12/09/2019    TSH 0.601 04/07/2018    INR 0.9 11/06/2018         Diagnostic Results:  Reviewed colonoscopy report and images with rectal mass appreciated     MRI Pelvis Sept 2019:  FINDINGS:  Approximately 6.5 cm from the anal verge is a T2 hyperintense mass in the mid rectum.  This results in circumferential narrowing of the  rectum.  Tumor extends for approximately 3 cm in oblique craniocaudal dimension.  It measures 3.6 cm and greater such transverse dimension by approximately 2.7 cm AP.  The mass demonstrates a somewhat mushroom appearance along its inferior margin.  There is hyperenhancement and stranding in the adjacent fat with spiculation/nodularity suggestive of extramural spread of disease.  This extends for approximately 5.5 mm minimum.  Distance from the tumor to the mesorectal fascia measures on the order of 2.1 cm    There is a 5.2 mm left perirectal lymph node seen on image 11 of series 8 with heterogeneous appearance.  This is located approximately 1.7 mm from the mesorectal fascia.  Margins appear slightly irregular.    Additional lymph node is seen inferior to the rectum on image 12 of series 8.  This measures 4.7 mm.  Other rounded appearing lymph nodes are seen in the right mesorectum on image 16 of series 8 1 of these measures 5 mm and the other measures 5.1 mm.    The intersphincteric complex is maintained.    Relationship to anterior peritoneal reflection: Appears to partially straddle the APR    Tumor Extent: Appears to extend beyond mucosa.  There is spiculation perirectal fat.  Distance tumor to the mesorectal fascia is 2.1 cm    Lymph Nodes:    There are perirectal lymph nodes greater than 5 mmin the mesorectal fat. The distance of the closest lymph node to the mesorectal fascia is 1.7 mm.  Please see above discussion.    There are no extra-mesorectal nodes lymph nodes in the visualized pelvis.    There is no evident vascular invasion.      Impression       Mid rectal mass with findings suspicious for extension into the mesorectal fat with tumor extending approximately 5.5 mm with associated spiculation of the fat noted.  There are suspicious mesorectal lymph nodes with the largest 1 measuring just over 5 mm and located 1.7 mm from the mesorectal fascia.       CT C/A/P Sept 2019:  FINDINGS:  Chest:    There is  elevation the right hemidiaphragm and bibasilar areas of parenchymal lung scarring or atelectasis.  A more confluent area of irregular masslike opacity and air bronchograms is identified in the posterior left lower lobe which has increased from prior.  There are low-grade ground-glass opacities in the bilateral lower lobes, right middle lobe, and to a lesser degree in the upper lobes.  No pleural effusion.    There is a borderline enlarged 10 mm in short axis precarinal lymph node.  Subcentimeter nodes are present elsewhere in the mediastinum and bilateral axilla.  Aorta, heart, and pericardium are unremarkable.    Abdomen/pelvis:    There is fatty infiltration of the liver.  There is a newly developed ill-defined focal hypodensity in the posteromedial segment of the left hepatic lobe measuring 3.1 x 2.7 x 1.3 cm.  No radiopaque gallstones.  Prominence of the common bile duct measuring up to 12 mm and pancreatic duct measuring up to 6 mm, unchanged from prior.  No visible intraductal stones.  Main portal vein is patent.    The spleen is nonenlarged.  No evidence of pancreatic mass or inflammation.  Kidneys enhance symmetrically.  Adrenals are unremarkable.    There is circumferential masslike thickening of the rectum.  No evidence of bowel obstruction.  Multiple bilateral subcentimeter in short axis mesorectal and internal iliac lymph nodes are identified.  No evidence of pathologic inguinal or external iliac lymphadenopathy.  There is sigmoid diverticulosis.  Stomach and visualized small bowel loops are unremarkable.  Normal appendix.  No intraperitoneal free air or fluid.  Fat containing periumbilical hernia.    There is aortoiliac atherosclerosis.  No evidence of aneurysm.  Shotty subcentimeter lymph nodes in the periaortic retroperitoneum.    The bladder is unremarkable.  Prostate mildly enlarged with calcifications.  Seminal vesicles unremarkable.    Skeletal:    There is degenerative change in the spine and  right hip.  Intramedullary nail present in the left hip.  No suspicious intraosseous lesions.      Impression       1. Rectal mass highly suspicious for malignancy.  2. Multiple bilateral mesorectal and internal iliac lymph nodes concerning for metastasis.  3. Ill-defined hypodensity in the left lobe of the liver, new since 2017 and concerning for metastasis.  4. Indeterminate developing area of masslike opacity in the posterior left lung base.  Possible infection/inflammation versus neoplasm.     MRI Liver:  FINDINGS:  The liver demonstrates a subtle hypointense T2 isointense T1 signal area in the deep aspect of the left hepatic lobe, medial segment.  This area measures approximately 11 x 18 mm axially.    The lesion displays a decrease in signal intensity on the out of phase gradient echo sequence suggesting fat containing focal fatty infiltration.    Following contrast administration there is displays mild relative hypoenhancement with respect to the adjacent parenchyma.  No hyperenhancement is seen.    The adjacent gallbladder appears normal.    Bile ducts are nondilated.      Impression       Probable focal fatty infiltration of the medial segment of the left hepatic lobe.          PET Scan:  FINDINGS:  Head/neck: There is normal physiologic FDG uptake noted within the visualized brain parenchyma. No FDG avid lymphadenopathy within the neck.    Chest: There are ill-defined patchy and nodular parenchymal opacities again noted in the left lower lobe exhibiting low level FDG uptake with an SUV max of 2.5.  A new 13 mm ground-glass opacity is noted in the lateral left upper lobe exhibiting weak FDG avidity with an SUV max of 1.9.  Similar newly developed ground-glass opacity present in the mid right lung adjacent to the major fissure with SUV max of 1.6. No FDG avid mediastinal, hilar or axillary lymph nodes.    Abdomen/Pelvis: Normal physiologic FDG uptake noted within the liver, spleen, urinary tract, and  bowel.Focal hypodensity is again noted in the left hepatic lobe which is non FDG avid and favored to represent focal fatty infiltration.  An intermediate length segment of circumferential mural thickening is again noted at the rectosigmoid junction which exhibits intense hypermetabolism and a SUV max of 11.8.  A hypermetabolic lymph node adjacent to the left pelvic sidewall has a SUV max of 5.3.    Skeletal: No FDG avid osseus lesions identified.      Impression       1. Hypermetabolic rectal mass consistent with known rectal malignancy.  2. Hypermetabolic regional lymph node adjacent to left pelvic sidewall.  3. Non FDG avid focal hypodensity in the liver favored to represent focal fatty infiltration.  4. Bilateral lung opacities most likely infectious or inflammatory etiology.  Follow-up recommended.         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  This will help rule out any proximal lesions in the colon prior to any surgical intervention.  - will also re-stage the patient with a repeat CT scan of the chest abdomen and pelvis in early January  - will also send for repeat MRI of the pelvis in mid January to evaluate tumor response in the rectum as well as the yuniel disease  - discussed that based upon the repeat imaging in findings, we may proceed with surgical intervention after 8-12 weeks following chemo XRT versus additional chemotherapy and then surgery. We will determine this after the colonoscopy is completed and the repeat staging is completed  - RTC in 4-6 weeks    Familia Gonzalez MD  Colon and Rectal Surgery  [facility] [facility]

## 2019-12-16 NOTE — H&P (VIEW-ONLY)
History & Physical    SUBJECTIVE:     Chief Complaint   Patient presents with    Follow-up   Ref MD: Isai Centeno MD    History of Present Illness:  Patient is a 64 y.o. male presents for evaluation of a rectal mass.  Patient has been having iron deficiency anemia that did require transfusion around 1 year ago.  He also has had associated hematochezia for over a year now but did improve after he quit drinking beer at 8 months ago but is still intermittently present. This prompted a colonoscopy which was performed on 09/03/2019 where an obstructing rectal mass was seen that appeared malignant was biopsied and could not be traversed.  Patient reports that he had a colonoscopy around 6-7 years ago were some benign polyps were found but no malignancy.  These records are not available.  He states that the hematochezia has recently improved after he quit drinking beer, but is still intermittently present and worsen with the bowel prep from the colonoscopy recently.  He is not on any chronic anticoagulation.  He states he has had normal caliber bowel movements does not feel constipated or obstructed.  He denies any fever, chills, nausea, vomiting, diarrhea, constipation, weight loss, night sweats or any other signs or symptoms.    12/4/19: Completed Neoadj chemoXRT    Interval history:  Since last clinic visit, patient completed neoadjuvant chemo radiation on 12/04/2019.  Since completion, his continued to suffer from some perianal irritation and discomfort.  He does not have any discomfort or pain when having bowel movements.  His frequency and pain with bowel movements from before his treatment has completely resolved.  He states that the stool is more normal size and caliber now.  He denies any current fever, chills, nausea, vomiting.    PMHx: HTN, GERD  PSHx: L hip sx  Fam Hx: No CRC or IBD; +colonic polyps in brother  All: NKDA  SocHx: previous etoh; no tob or illicits; works as a     Review of patient's  allergies indicates:  No Known Allergies    Current Outpatient Medications   Medication Sig Dispense Refill    capecitabine (XELODA) 500 MG Tab Take 3 tablets (1,500 mg total) by mouth 2 (two) times daily on days of radiation only for a total of six weeks. 180 tablet 11    cyanocobalamin (VITAMIN B-12) 1000 MCG tablet Take 100 mcg by mouth once daily.      ferrous sulfate 324 mg (65 mg iron) TbEC Take 1 tablet (324 mg total) by mouth 2 (two) times daily. 60 tablet 0    HYDROcodone-acetaminophen (NORCO)  mg per tablet Take 1 tablet by mouth every 6 (six) hours as needed for Pain. 60 tablet 0    lidocaine 4 % Gel Apply 1 application topically 4 (four) times daily. 30 g 2    losartan (COZAAR) 50 MG tablet TAKE 1 TABLET BY MOUTH EVERY MORNING 90 tablet 0    ondansetron (ZOFRAN) 8 MG tablet Take 1 tablet (8 mg total) by mouth every 8 (eight) hours as needed for Nausea. 30 tablet 2    prochlorperazine (COMPAZINE) 5 MG tablet TAKE 1 TABLET(5 MG) BY MOUTH EVERY 6 HOURS AS NEEDED FOR NAUSEA 385 tablet 1    tamsulosin (FLOMAX) 0.4 mg Cap Take 1 capsule (0.4 mg total) by mouth once daily. 30 capsule 6    pantoprazole (PROTONIX) 40 MG tablet Take 1 tablet (40 mg total) by mouth once daily. 30 tablet 11    sodium,potassium,mag sulfates (SUPREP BOWEL PREP KIT) 17.5-3.13-1.6 gram SolR Take 177 mLs by mouth once daily. for 2 days 1 kit 0     Current Facility-Administered Medications   Medication Dose Route Frequency Provider Last Rate Last Dose    lidocaine (PF) 10 mg/ml (1%) injection 10 mg  1 mL Intradermal Once Familia Gonzalez MD         Facility-Administered Medications Ordered in Other Visits   Medication Dose Route Frequency Provider Last Rate Last Dose    lactated ringers infusion   Intravenous Continuous Shilpa Yee MD        sodium chloride 0.9% flush 3 mL  3 mL Intravenous PRN Shilpa Yee MD           Past Medical History:   Diagnosis Date    Alcoholism     Anemia     Back pain     Encounter  for blood transfusion     Essential hypertension 2/4/2016    GERD (gastroesophageal reflux disease)     Hiatal hernia     Hypertension     Rectal cancer 9/11/2019     Past Surgical History:   Procedure Laterality Date    COLONOSCOPY N/A 9/3/2019    Procedure: COLONOSCOPY;  Surgeon: Isai Centeno MD;  Location: [facility];  Service: Endoscopy;  Laterality: N/A;    ESOPHAGOGASTRODUODENOSCOPY N/A 9/3/2019    Procedure: ESOPHAGOGASTRODUODENOSCOPY (EGD);  Surgeon: Isai Centeno MD;  Location: [facility];  Service: Endoscopy;  Laterality: N/A;    HIP FRACTURE SURGERY      left hip    JOINT REPLACEMENT Left      Family History   Problem Relation Age of Onset    Cataracts Mother     Stroke Father     Hypertension Father      Social History     Tobacco Use    Smoking status: Never Smoker    Smokeless tobacco: Never Used   Substance Use Topics    Alcohol use: Yes     Comment: states he drinks frequently everyday/quit approx 10/2017    Drug use: No        Review of Systems:  Review of Systems   Constitutional: Negative for activity change, appetite change, chills, fatigue, fever and unexpected weight change.   HENT: Negative for congestion, ear pain, sore throat and trouble swallowing.    Eyes: Negative for pain, redness and itching.   Respiratory: Negative for cough, shortness of breath and wheezing.    Cardiovascular: Negative for chest pain, palpitations and leg swelling.   Gastrointestinal: Negative for abdominal distention, abdominal pain, anal bleeding, blood in stool, constipation, diarrhea, nausea, rectal pain and vomiting.        +perianal irritation and pain   Endocrine: Negative for cold intolerance, heat intolerance and polyuria.   Genitourinary: Negative for dysuria, flank pain, frequency and hematuria.   Musculoskeletal: Negative for gait problem, joint swelling and neck pain.   Skin: Negative for color change, rash and wound.   Allergic/Immunologic: Negative for environmental allergies and  immunocompromised state.   Neurological: Negative for dizziness, speech difficulty, weakness and numbness.   Psychiatric/Behavioral: Negative for agitation, confusion and hallucinations.       OBJECTIVE:     Vital Signs (Most Recent)  Temp: 98.1 °F (36.7 °C) (12/16/19 1521)  Pulse: 77 (12/16/19 1521)  BP: 134/84 (12/16/19 1521)     72.1 kg (158 lb 15.2 oz)     Physical Exam:  Physical Exam   Constitutional: He is oriented to person, place, and time. He appears well-developed.   HENT:   Head: Normocephalic and atraumatic.   Eyes: Conjunctivae and EOM are normal.   Neck: Normal range of motion. No thyromegaly present.   Cardiovascular: Normal rate and regular rhythm.   Pulmonary/Chest: Effort normal. No respiratory distress.   Abdominal: Soft. He exhibits no distension and no mass. There is no tenderness. There is no rebound and no guarding. No hernia.   Genitourinary:   Genitourinary Comments: Anorectal: STEVEN deferred due to patient request   Musculoskeletal: Normal range of motion. He exhibits no edema or tenderness.   Neurological: He is alert and oriented to person, place, and time.   Skin: Skin is warm and dry. Capillary refill takes less than 2 seconds. No rash noted.   Psychiatric: He has a normal mood and affect.     Laboratory  Lab Results   Component Value Date    WBC 7.36 12/09/2019    HGB 11.7 (L) 12/09/2019    HCT 38.8 (L) 12/09/2019     12/09/2019    CHOL 138 04/07/2018    TRIG 38 04/07/2018    HDL 72 04/07/2018    ALT 16 12/09/2019    AST 18 12/09/2019     12/09/2019    K 4.0 12/09/2019    CL 99 12/09/2019    CREATININE 0.7 12/09/2019    BUN 7 (L) 12/09/2019    CO2 26 12/09/2019    TSH 0.601 04/07/2018    INR 0.9 11/06/2018         Diagnostic Results:  Reviewed colonoscopy report and images with rectal mass appreciated     MRI Pelvis Sept 2019:  FINDINGS:  Approximately 6.5 cm from the anal verge is a T2 hyperintense mass in the mid rectum.  This results in circumferential narrowing of the  rectum.  Tumor extends for approximately 3 cm in oblique craniocaudal dimension.  It measures 3.6 cm and greater such transverse dimension by approximately 2.7 cm AP.  The mass demonstrates a somewhat mushroom appearance along its inferior margin.  There is hyperenhancement and stranding in the adjacent fat with spiculation/nodularity suggestive of extramural spread of disease.  This extends for approximately 5.5 mm minimum.  Distance from the tumor to the mesorectal fascia measures on the order of 2.1 cm    There is a 5.2 mm left perirectal lymph node seen on image 11 of series 8 with heterogeneous appearance.  This is located approximately 1.7 mm from the mesorectal fascia.  Margins appear slightly irregular.    Additional lymph node is seen inferior to the rectum on image 12 of series 8.  This measures 4.7 mm.  Other rounded appearing lymph nodes are seen in the right mesorectum on image 16 of series 8 1 of these measures 5 mm and the other measures 5.1 mm.    The intersphincteric complex is maintained.    Relationship to anterior peritoneal reflection: Appears to partially straddle the APR    Tumor Extent: Appears to extend beyond mucosa.  There is spiculation perirectal fat.  Distance tumor to the mesorectal fascia is 2.1 cm    Lymph Nodes:    There are perirectal lymph nodes greater than 5 mmin the mesorectal fat. The distance of the closest lymph node to the mesorectal fascia is 1.7 mm.  Please see above discussion.    There are no extra-mesorectal nodes lymph nodes in the visualized pelvis.    There is no evident vascular invasion.      Impression       Mid rectal mass with findings suspicious for extension into the mesorectal fat with tumor extending approximately 5.5 mm with associated spiculation of the fat noted.  There are suspicious mesorectal lymph nodes with the largest 1 measuring just over 5 mm and located 1.7 mm from the mesorectal fascia.       CT C/A/P Sept 2019:  FINDINGS:  Chest:    There is  elevation the right hemidiaphragm and bibasilar areas of parenchymal lung scarring or atelectasis.  A more confluent area of irregular masslike opacity and air bronchograms is identified in the posterior left lower lobe which has increased from prior.  There are low-grade ground-glass opacities in the bilateral lower lobes, right middle lobe, and to a lesser degree in the upper lobes.  No pleural effusion.    There is a borderline enlarged 10 mm in short axis precarinal lymph node.  Subcentimeter nodes are present elsewhere in the mediastinum and bilateral axilla.  Aorta, heart, and pericardium are unremarkable.    Abdomen/pelvis:    There is fatty infiltration of the liver.  There is a newly developed ill-defined focal hypodensity in the posteromedial segment of the left hepatic lobe measuring 3.1 x 2.7 x 1.3 cm.  No radiopaque gallstones.  Prominence of the common bile duct measuring up to 12 mm and pancreatic duct measuring up to 6 mm, unchanged from prior.  No visible intraductal stones.  Main portal vein is patent.    The spleen is nonenlarged.  No evidence of pancreatic mass or inflammation.  Kidneys enhance symmetrically.  Adrenals are unremarkable.    There is circumferential masslike thickening of the rectum.  No evidence of bowel obstruction.  Multiple bilateral subcentimeter in short axis mesorectal and internal iliac lymph nodes are identified.  No evidence of pathologic inguinal or external iliac lymphadenopathy.  There is sigmoid diverticulosis.  Stomach and visualized small bowel loops are unremarkable.  Normal appendix.  No intraperitoneal free air or fluid.  Fat containing periumbilical hernia.    There is aortoiliac atherosclerosis.  No evidence of aneurysm.  Shotty subcentimeter lymph nodes in the periaortic retroperitoneum.    The bladder is unremarkable.  Prostate mildly enlarged with calcifications.  Seminal vesicles unremarkable.    Skeletal:    There is degenerative change in the spine and  right hip.  Intramedullary nail present in the left hip.  No suspicious intraosseous lesions.      Impression       1. Rectal mass highly suspicious for malignancy.  2. Multiple bilateral mesorectal and internal iliac lymph nodes concerning for metastasis.  3. Ill-defined hypodensity in the left lobe of the liver, new since 2017 and concerning for metastasis.  4. Indeterminate developing area of masslike opacity in the posterior left lung base.  Possible infection/inflammation versus neoplasm.     MRI Liver:  FINDINGS:  The liver demonstrates a subtle hypointense T2 isointense T1 signal area in the deep aspect of the left hepatic lobe, medial segment.  This area measures approximately 11 x 18 mm axially.    The lesion displays a decrease in signal intensity on the out of phase gradient echo sequence suggesting fat containing focal fatty infiltration.    Following contrast administration there is displays mild relative hypoenhancement with respect to the adjacent parenchyma.  No hyperenhancement is seen.    The adjacent gallbladder appears normal.    Bile ducts are nondilated.      Impression       Probable focal fatty infiltration of the medial segment of the left hepatic lobe.          PET Scan:  FINDINGS:  Head/neck: There is normal physiologic FDG uptake noted within the visualized brain parenchyma. No FDG avid lymphadenopathy within the neck.    Chest: There are ill-defined patchy and nodular parenchymal opacities again noted in the left lower lobe exhibiting low level FDG uptake with an SUV max of 2.5.  A new 13 mm ground-glass opacity is noted in the lateral left upper lobe exhibiting weak FDG avidity with an SUV max of 1.9.  Similar newly developed ground-glass opacity present in the mid right lung adjacent to the major fissure with SUV max of 1.6. No FDG avid mediastinal, hilar or axillary lymph nodes.    Abdomen/Pelvis: Normal physiologic FDG uptake noted within the liver, spleen, urinary tract, and  bowel.Focal hypodensity is again noted in the left hepatic lobe which is non FDG avid and favored to represent focal fatty infiltration.  An intermediate length segment of circumferential mural thickening is again noted at the rectosigmoid junction which exhibits intense hypermetabolism and a SUV max of 11.8.  A hypermetabolic lymph node adjacent to the left pelvic sidewall has a SUV max of 5.3.    Skeletal: No FDG avid osseus lesions identified.      Impression       1. Hypermetabolic rectal mass consistent with known rectal malignancy.  2. Hypermetabolic regional lymph node adjacent to left pelvic sidewall.  3. Non FDG avid focal hypodensity in the liver favored to represent focal fatty infiltration.  4. Bilateral lung opacities most likely infectious or inflammatory etiology.  Follow-up recommended.         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  This will help rule out any proximal lesions in the colon prior to any surgical intervention.  - will also re-stage the patient with a repeat CT scan of the chest abdomen and pelvis in early January  - will also send for repeat MRI of the pelvis in mid January to evaluate tumor response in the rectum as well as the yuniel disease  - discussed that based upon the repeat imaging in findings, we may proceed with surgical intervention after 8-12 weeks following chemo XRT versus additional chemotherapy and then surgery. We will determine this after the colonoscopy is completed and the repeat staging is completed  - RTC in 4-6 weeks    Familia Gonzalez MD  Colon and Rectal Surgery  [facility]

## 2019-12-17 ENCOUNTER — TELEPHONE (OUTPATIENT)
Dept: ENDOSCOPY | Facility: HOSPITAL | Age: 64
End: 2019-12-17

## 2019-12-17 NOTE — TELEPHONE ENCOUNTER
Endoscopy Scheduling Questionnaire:    1. Have you been admitted overnight to the hospital in the past 3 months? no  2. Have you had a cardiac stent placed in the past 12 months? no  3. Have you had a stroke or heart attack in the past 6 months? no  4. Have you had any chest pain in the past 3 months? no      If so, have you been evaluated by your PCP or Cardiologist? no  5. Do you take prescription weight loss medication? no  6. Have you been diagnosed with Diverticulitis within the past 3 months? no  7. Are you on dialysis? no  8. Are you diabetic? no Do you have an insulin pump? no  9. Do you have any other health issues that you feel might limit your ability to safely have the procedure and/or sedation? no  10. Is the patient over 79 yo? no        If so, has the patient been seen by their PCP or GI in the last 6 months? N/A       -I have reviewed the last colonoscopy for recommendations regarding surveillance and bowel prep  Yes  -I have reviewed the patient's medications and allergies. He is not on high risk medication, A clearance request NA  -I have verified the pharmacy information. The prep being used is Suprep. The patient's prep instructions were sent via mail..    Date Endoscopy Scheduled: Date: 01- Bishop

## 2019-12-23 ENCOUNTER — OFFICE VISIT (OUTPATIENT)
Dept: HEMATOLOGY/ONCOLOGY | Facility: CLINIC | Age: 64
End: 2019-12-23
Payer: MEDICARE

## 2019-12-23 ENCOUNTER — LAB VISIT (OUTPATIENT)
Dept: LAB | Facility: HOSPITAL | Age: 64
End: 2019-12-23
Attending: INTERNAL MEDICINE
Payer: MEDICARE

## 2019-12-23 VITALS
HEART RATE: 97 BPM | BODY MASS INDEX: 22.91 KG/M2 | SYSTOLIC BLOOD PRESSURE: 123 MMHG | TEMPERATURE: 98 F | WEIGHT: 160.06 LBS | DIASTOLIC BLOOD PRESSURE: 84 MMHG | HEIGHT: 70 IN | OXYGEN SATURATION: 97 %

## 2019-12-23 DIAGNOSIS — D50.0 IRON DEFICIENCY ANEMIA DUE TO CHRONIC BLOOD LOSS: ICD-10-CM

## 2019-12-23 DIAGNOSIS — D64.9 ANEMIA, UNSPECIFIED TYPE: ICD-10-CM

## 2019-12-23 DIAGNOSIS — K62.89 RECTAL PAIN: ICD-10-CM

## 2019-12-23 DIAGNOSIS — C20 RECTAL CANCER: ICD-10-CM

## 2019-12-23 LAB
ALBUMIN SERPL BCP-MCNC: 3.7 G/DL (ref 3.5–5.2)
ALP SERPL-CCNC: 128 U/L (ref 55–135)
ALT SERPL W/O P-5'-P-CCNC: 17 U/L (ref 10–44)
ANION GAP SERPL CALC-SCNC: 14 MMOL/L (ref 8–16)
AST SERPL-CCNC: 20 U/L (ref 10–40)
BASOPHILS # BLD AUTO: 0.06 K/UL (ref 0–0.2)
BASOPHILS NFR BLD: 0.7 % (ref 0–1.9)
BILIRUB SERPL-MCNC: 0.4 MG/DL (ref 0.1–1)
BUN SERPL-MCNC: 9 MG/DL (ref 8–23)
CALCIUM SERPL-MCNC: 9.4 MG/DL (ref 8.7–10.5)
CHLORIDE SERPL-SCNC: 101 MMOL/L (ref 95–110)
CO2 SERPL-SCNC: 22 MMOL/L (ref 23–29)
CREAT SERPL-MCNC: 0.7 MG/DL (ref 0.5–1.4)
DIFFERENTIAL METHOD: ABNORMAL
EOSINOPHIL # BLD AUTO: 0.1 K/UL (ref 0–0.5)
EOSINOPHIL NFR BLD: 0.6 % (ref 0–8)
ERYTHROCYTE [DISTWIDTH] IN BLOOD BY AUTOMATED COUNT: 21.1 % (ref 11.5–14.5)
EST. GFR  (AFRICAN AMERICAN): >60 ML/MIN/1.73 M^2
EST. GFR  (NON AFRICAN AMERICAN): >60 ML/MIN/1.73 M^2
GLUCOSE SERPL-MCNC: 144 MG/DL (ref 70–110)
HCT VFR BLD AUTO: 41 % (ref 40–54)
HGB BLD-MCNC: 13.1 G/DL (ref 14–18)
IMM GRANULOCYTES # BLD AUTO: 0.05 K/UL (ref 0–0.04)
IMM GRANULOCYTES NFR BLD AUTO: 0.6 % (ref 0–0.5)
LYMPHOCYTES # BLD AUTO: 0.5 K/UL (ref 1–4.8)
LYMPHOCYTES NFR BLD: 6.2 % (ref 18–48)
MCH RBC QN AUTO: 29.4 PG (ref 27–31)
MCHC RBC AUTO-ENTMCNC: 32 G/DL (ref 32–36)
MCV RBC AUTO: 92 FL (ref 82–98)
MONOCYTES # BLD AUTO: 0.7 K/UL (ref 0.3–1)
MONOCYTES NFR BLD: 8.7 % (ref 4–15)
NEUTROPHILS # BLD AUTO: 6.7 K/UL (ref 1.8–7.7)
NEUTROPHILS NFR BLD: 83.8 % (ref 38–73)
NRBC BLD-RTO: 0 /100 WBC
PLATELET # BLD AUTO: 389 K/UL (ref 150–350)
PMV BLD AUTO: 9.4 FL (ref 9.2–12.9)
POTASSIUM SERPL-SCNC: 4.5 MMOL/L (ref 3.5–5.1)
PROT SERPL-MCNC: 8.3 G/DL (ref 6–8.4)
RBC # BLD AUTO: 4.45 M/UL (ref 4.6–6.2)
SODIUM SERPL-SCNC: 137 MMOL/L (ref 136–145)
WBC # BLD AUTO: 8.04 K/UL (ref 3.9–12.7)

## 2019-12-23 PROCEDURE — 99215 OFFICE O/P EST HI 40 MIN: CPT | Mod: S$GLB,,, | Performed by: INTERNAL MEDICINE

## 2019-12-23 PROCEDURE — 3008F PR BODY MASS INDEX (BMI) DOCUMENTED: ICD-10-PCS | Mod: CPTII,S$GLB,, | Performed by: INTERNAL MEDICINE

## 2019-12-23 PROCEDURE — 3079F DIAST BP 80-89 MM HG: CPT | Mod: CPTII,S$GLB,, | Performed by: INTERNAL MEDICINE

## 2019-12-23 PROCEDURE — 3008F BODY MASS INDEX DOCD: CPT | Mod: CPTII,S$GLB,, | Performed by: INTERNAL MEDICINE

## 2019-12-23 PROCEDURE — 80053 COMPREHEN METABOLIC PANEL: CPT

## 2019-12-23 PROCEDURE — 99215 PR OFFICE/OUTPT VISIT, EST, LEVL V, 40-54 MIN: ICD-10-PCS | Mod: S$GLB,,, | Performed by: INTERNAL MEDICINE

## 2019-12-23 PROCEDURE — 99999 PR PBB SHADOW E&M-EST. PATIENT-LVL III: ICD-10-PCS | Mod: PBBFAC,,, | Performed by: INTERNAL MEDICINE

## 2019-12-23 PROCEDURE — 99999 PR PBB SHADOW E&M-EST. PATIENT-LVL III: CPT | Mod: PBBFAC,,, | Performed by: INTERNAL MEDICINE

## 2019-12-23 PROCEDURE — 3074F PR MOST RECENT SYSTOLIC BLOOD PRESSURE < 130 MM HG: ICD-10-PCS | Mod: CPTII,S$GLB,, | Performed by: INTERNAL MEDICINE

## 2019-12-23 PROCEDURE — 36415 COLL VENOUS BLD VENIPUNCTURE: CPT

## 2019-12-23 PROCEDURE — 3074F SYST BP LT 130 MM HG: CPT | Mod: CPTII,S$GLB,, | Performed by: INTERNAL MEDICINE

## 2019-12-23 PROCEDURE — 85025 COMPLETE CBC W/AUTO DIFF WBC: CPT

## 2019-12-23 PROCEDURE — 3079F PR MOST RECENT DIASTOLIC BLOOD PRESSURE 80-89 MM HG: ICD-10-PCS | Mod: CPTII,S$GLB,, | Performed by: INTERNAL MEDICINE

## 2019-12-23 RX ORDER — ONDANSETRON HYDROCHLORIDE 8 MG/1
8 TABLET, FILM COATED ORAL EVERY 8 HOURS PRN
Qty: 30 TABLET | Refills: 2 | Status: SHIPPED | OUTPATIENT
Start: 2019-12-23 | End: 2020-12-22

## 2019-12-23 RX ORDER — PROCHLORPERAZINE MALEATE 5 MG
TABLET ORAL
Qty: 385 TABLET | Refills: 1 | Status: SHIPPED | OUTPATIENT
Start: 2019-12-23 | End: 2021-04-22

## 2019-12-23 RX ORDER — HYDROCODONE BITARTRATE AND ACETAMINOPHEN 10; 325 MG/1; MG/1
1 TABLET ORAL EVERY 6 HOURS PRN
Qty: 60 TABLET | Refills: 0 | Status: SHIPPED | OUTPATIENT
Start: 2019-12-23 | End: 2020-01-06 | Stop reason: SDUPTHER

## 2019-12-23 NOTE — ASSESSMENT & PLAN NOTE
Improving, hemoglobin noted at 13.1 grams/deciliter.  No evidence of active bleed.  I suspect the anemia was related to chemotherapy.  Will continue to monitor.

## 2019-12-23 NOTE — ASSESSMENT & PLAN NOTE
Ago her other CBC, CMP and iron studies.  Patient's iron study performing April of 2019 showed severely low iron storage capacity with ferritin at 4.  Vicente repeat those tests to determine benefit for IV iron therapy.

## 2019-12-23 NOTE — ASSESSMENT & PLAN NOTE
Advanced rectal cancer, status post chemo RT with Xeloda.  Surgery planned for February 20/20.  Will go ahead and hold further chemotherapy.  Patient is being scheduled for MRI of rectal as well as CT chest abdomen and pelvis for restaging.    Given high risk features, our plan is to initiate adjuvant chemotherapy with FOLFOX x8 following surgery.

## 2019-12-23 NOTE — ASSESSMENT & PLAN NOTE
Likely related to radiation side effect.  Continue palliative management with pain medication.  Claims the pain has significantly improved.

## 2019-12-23 NOTE — PROGRESS NOTES
Subjective:   Date of Visit: 12/23/19   ?   ?   CHIEF COMPLAINT:  Locally advanced rectal cancer, Stage IIIB (cT3,cN2a, CM0)  ?     HPI:   was seen at [facility] today. He is a 64 yr old male with recently diagnosed locally advanced rectal cancer.  Patient has completed concurrent chemoradiation with capecitabine.  Presents today for routine follow-up.     He was seen by Dr. Gonzalez on 12/16/2019 at that time was scheduled for restaging MRI of the abdomen as well as CT chest and abdomen.    He has completed concurrent chemotherapy and radiation.  He states that the pain around his buttock has improved.  He denies any rectal bleed.Denies any fever, chills, nausea or vomiting, chest pain or shortness of breath, diarrhea, unintentional weight loss, night sweats or dysuria.    Review of Systems   Constitutional: Negative for activity change, appetite change, chills, fatigue, fever and unexpected weight change.   HENT: Negative for hearing loss, mouth sores, nosebleeds, sore throat, tinnitus, trouble swallowing and voice change.    Eyes: Negative for visual disturbance.   Respiratory: Negative for cough, chest tightness, shortness of breath and wheezing.    Cardiovascular: Negative for chest pain, palpitations and leg swelling.   Gastrointestinal: Positive for rectal pain. Negative for abdominal pain, anal bleeding, blood in stool, constipation, diarrhea, nausea and vomiting.   Genitourinary: Negative for frequency, hematuria and testicular pain.   Musculoskeletal: Negative for arthralgias, back pain, gait problem and neck pain.   Skin: Negative for color change, pallor, rash and wound.   Allergic/Immunologic: Negative for immunocompromised state.   Neurological: Negative for seizures, syncope, weakness, numbness and headaches.   Hematological: Negative for adenopathy. Does not bruise/bleed easily.   Psychiatric/Behavioral: Negative for agitation, confusion, decreased concentration, hallucinations and  sleep disturbance. The patient is not nervous/anxious.        ?   PAST MEDICAL HISTORY:   Past Medical History:   Diagnosis Date    Alcoholism     Anemia     Back pain     Encounter for blood transfusion     Essential hypertension 2/4/2016    GERD (gastroesophageal reflux disease)     Hiatal hernia     Hypertension     Rectal cancer 9/11/2019    ?     PAST SURGICAL HISTORY:   Past Surgical History:   Procedure Laterality Date    COLONOSCOPY N/A 9/3/2019    Procedure: COLONOSCOPY;  Surgeon: Isai Centeno MD;  Location: [facility];  Service: Endoscopy;  Laterality: N/A;    ESOPHAGOGASTRODUODENOSCOPY N/A 9/3/2019    Procedure: ESOPHAGOGASTRODUODENOSCOPY (EGD);  Surgeon: Isai Centeno MD;  Location: [facility];  Service: Endoscopy;  Laterality: N/A;    HIP FRACTURE SURGERY      left hip    JOINT REPLACEMENT Left       ?   ALLERGIES:   Allergies as of 12/23/2019    (No Known Allergies)      ?   MEDICATIONS:?   Outpatient Medications Marked as Taking for the 12/23/19 encounter (Office Visit) with Mikel Sams MD   Medication Sig Dispense Refill    capecitabine (XELODA) 500 MG Tab Take 3 tablets (1,500 mg total) by mouth 2 (two) times daily on days of radiation only for a total of six weeks. 180 tablet 11    cyanocobalamin (VITAMIN B-12) 1000 MCG tablet Take 100 mcg by mouth once daily.      ferrous sulfate 324 mg (65 mg iron) TbEC Take 1 tablet (324 mg total) by mouth 2 (two) times daily. 60 tablet 0    HYDROcodone-acetaminophen (NORCO)  mg per tablet Take 1 tablet by mouth every 6 (six) hours as needed for Pain. 60 tablet 0    lidocaine 4 % Gel Apply 1 application topically 4 (four) times daily. 30 g 2    losartan (COZAAR) 50 MG tablet TAKE 1 TABLET BY MOUTH EVERY MORNING 90 tablet 0    ondansetron (ZOFRAN) 8 MG tablet Take 1 tablet (8 mg total) by mouth every 8 (eight) hours as needed for Nausea. 30 tablet 2    prochlorperazine (COMPAZINE) 5 MG tablet TAKE 1 TABLET(5 MG) BY MOUTH  EVERY 6 HOURS AS NEEDED FOR NAUSEA 385 tablet 1    tamsulosin (FLOMAX) 0.4 mg Cap Take 1 capsule (0.4 mg total) by mouth once daily. 30 capsule 6    [DISCONTINUED] HYDROcodone-acetaminophen (NORCO)  mg per tablet Take 1 tablet by mouth every 6 (six) hours as needed for Pain. 60 tablet 0    [DISCONTINUED] ondansetron (ZOFRAN) 8 MG tablet Take 1 tablet (8 mg total) by mouth every 8 (eight) hours as needed for Nausea. 30 tablet 2    [DISCONTINUED] prochlorperazine (COMPAZINE) 5 MG tablet TAKE 1 TABLET(5 MG) BY MOUTH EVERY 6 HOURS AS NEEDED FOR NAUSEA 385 tablet 1     Current Facility-Administered Medications for the 12/23/19 encounter (Office Visit) with Mikel Sams MD   Medication Dose Route Frequency Provider Last Rate Last Dose    lidocaine (PF) 10 mg/ml (1%) injection 10 mg  1 mL Intradermal Once Familia Gonzalez MD          ?   SOCIAL HISTORY:?   Social History     Tobacco Use    Smoking status: Never Smoker    Smokeless tobacco: Never Used   Substance Use Topics    Alcohol use: Yes     Comment: states he drinks frequently everyday/quit approx 10/2017        ?   FAMILY HISTORY:   family history includes Cataracts in his mother; Hypertension in his father; Stroke in his father.   ?     Objective:      Physical Exam   Constitutional: He is oriented to person, place, and time. He appears well-developed and well-nourished. No distress.   HENT:   Head: Normocephalic and atraumatic.   Right Ear: External ear normal.   Left Ear: External ear normal.   Mouth/Throat: No oropharyngeal exudate.   Eyes: Pupils are equal, round, and reactive to light. Conjunctivae are normal. Right eye exhibits no discharge. Left eye exhibits no discharge. No scleral icterus.   Neck: Normal range of motion. Neck supple. No thyromegaly present.   Cardiovascular: Normal rate, regular rhythm and normal heart sounds.   No murmur heard.  Pulmonary/Chest: Effort normal and breath sounds normal. No respiratory distress. He has  no wheezes. He exhibits no tenderness.   Abdominal: Soft. Bowel sounds are normal. He exhibits no distension and no mass. There is tenderness. There is no rebound.   Musculoskeletal: Normal range of motion. He exhibits no edema or tenderness.   Lymphadenopathy:     He has no cervical adenopathy.        Right cervical: No superficial cervical adenopathy present.       Left cervical: No superficial cervical adenopathy present.        Right axillary: No pectoral adenopathy present.        Left axillary: No pectoral adenopathy present.No inguinal adenopathy noted on the right or left side.        Right: No supraclavicular adenopathy present.        Left: No supraclavicular adenopathy present.   Neurological: He is alert and oriented to person, place, and time. No cranial nerve deficit or sensory deficit.   Skin: Skin is warm and dry. Capillary refill takes 2 to 3 seconds. No rash noted. No erythema. No pallor.   Psychiatric: He has a normal mood and affect. His behavior is normal. Judgment normal.       ?   Vitals:    12/23/19 1311   BP: 123/84   Pulse: 97   Temp: 98.1 °F (36.7 °C)      ?     ECOG SCORE    0 - Fully active-able to carry on all pre-disease performance without restriction             ?   Laboratory:  ?   Lab Visit on 12/23/2019   Component Date Value Ref Range Status    WBC 12/23/2019 8.04  3.90 - 12.70 K/uL Final    RBC 12/23/2019 4.45* 4.60 - 6.20 M/uL Final    Hemoglobin 12/23/2019 13.1* 14.0 - 18.0 g/dL Final    Hematocrit 12/23/2019 41.0  40.0 - 54.0 % Final    Mean Corpuscular Volume 12/23/2019 92  82 - 98 fL Final    Mean Corpuscular Hemoglobin 12/23/2019 29.4  27.0 - 31.0 pg Final    Mean Corpuscular Hemoglobin Conc 12/23/2019 32.0  32.0 - 36.0 g/dL Final    RDW 12/23/2019 21.1* 11.5 - 14.5 % Final    Platelets 12/23/2019 389* 150 - 350 K/uL Final    MPV 12/23/2019 9.4  9.2 - 12.9 fL Final    Immature Granulocytes 12/23/2019 0.6* 0.0 - 0.5 % Final    Gran # (ANC) 12/23/2019 6.7  1.8 -  7.7 K/uL Final    Immature Grans (Abs) 12/23/2019 0.05* 0.00 - 0.04 K/uL Final    Lymph # 12/23/2019 0.5* 1.0 - 4.8 K/uL Final    Mono # 12/23/2019 0.7  0.3 - 1.0 K/uL Final    Eos # 12/23/2019 0.1  0.0 - 0.5 K/uL Final    Baso # 12/23/2019 0.06  0.00 - 0.20 K/uL Final    nRBC 12/23/2019 0  0 /100 WBC Final    Gran% 12/23/2019 83.8* 38.0 - 73.0 % Final    Lymph% 12/23/2019 6.2* 18.0 - 48.0 % Final    Mono% 12/23/2019 8.7  4.0 - 15.0 % Final    Eosinophil% 12/23/2019 0.6  0.0 - 8.0 % Final    Basophil% 12/23/2019 0.7  0.0 - 1.9 % Final    Differential Method 12/23/2019 Automated   Final    Sodium 12/23/2019 137  136 - 145 mmol/L Final    Potassium 12/23/2019 4.5  3.5 - 5.1 mmol/L Final    Chloride 12/23/2019 101  95 - 110 mmol/L Final    CO2 12/23/2019 22* 23 - 29 mmol/L Final    Glucose 12/23/2019 144* 70 - 110 mg/dL Final    BUN, Bld 12/23/2019 9  8 - 23 mg/dL Final    Creatinine 12/23/2019 0.7  0.5 - 1.4 mg/dL Final    Calcium 12/23/2019 9.4  8.7 - 10.5 mg/dL Final    Total Protein 12/23/2019 8.3  6.0 - 8.4 g/dL Final    Albumin 12/23/2019 3.7  3.5 - 5.2 g/dL Final    Total Bilirubin 12/23/2019 0.4  0.1 - 1.0 mg/dL Final    Alkaline Phosphatase 12/23/2019 128  55 - 135 U/L Final    AST 12/23/2019 20  10 - 40 U/L Final    ALT 12/23/2019 17  10 - 44 U/L Final    Anion Gap 12/23/2019 14  8 - 16 mmol/L Final    eGFR if African American 12/23/2019 >60  >60 mL/min/1.73 m^2 Final    eGFR if non African American 12/23/2019 >60  >60 mL/min/1.73 m^2 Final      ?   Tumor markers   ?   ?   Imaging: NM PET CT Routine FDG  Narrative: EXAMINATION:  NM PET CT ROUTINE    CLINICAL HISTORY:  eval for metastatic disease in lung, liver and lymph nodes seen on recent CT scan for rectal adenocarcinoma;  Malignant neoplasm of rectum    TECHNIQUE:  Segmented attenuation corrected 3-D PET imaging was obtained from the skull base through the mid thighs utilizing 12.20 mCi F-[id number].  Noncontrast CT imaging was  performed for attenuation correction, diagnosis, and anatomical fusion with PET.    COMPARISON:  CT 09/09/2019.    FINDINGS:  Head/neck: There is normal physiologic FDG uptake noted within the visualized brain parenchyma. No FDG avid lymphadenopathy within the neck.    Chest: There are ill-defined patchy and nodular parenchymal opacities again noted in the left lower lobe exhibiting low level FDG uptake with an SUV max of 2.5.  A new 13 mm ground-glass opacity is noted in the lateral left upper lobe exhibiting weak FDG avidity with an SUV max of 1.9.  Similar newly developed ground-glass opacity present in the mid right lung adjacent to the major fissure with SUV max of 1.6. No FDG avid mediastinal, hilar or axillary lymph nodes.    Abdomen/Pelvis: Normal physiologic FDG uptake noted within the liver, spleen, urinary tract, and bowel.Focal hypodensity is again noted in the left hepatic lobe which is non FDG avid and favored to represent focal fatty infiltration.  An intermediate length segment of circumferential mural thickening is again noted at the rectosigmoid junction which exhibits intense hypermetabolism and a SUV max of 11.8.  A hypermetabolic lymph node adjacent to the left pelvic sidewall has a SUV max of 5.3.    Skeletal: No FDG avid osseus lesions identified.  Impression: 1. Hypermetabolic rectal mass consistent with known rectal malignancy.  2. Hypermetabolic regional lymph node adjacent to left pelvic sidewall.  3. Non FDG avid focal hypodensity in the liver favored to represent focal fatty infiltration.  4. Bilateral lung opacities most likely infectious or inflammatory etiology.  Follow-up recommended.    Electronically signed by: LORENA Green MD  Date:    09/25/2019  Time:    16:16     ?      Pathology:  Pathology Results  (Last 10 years)    None           ?   Assessment/Plan:         Rectal cancer  Advanced rectal cancer, status post chemo RT with Xeloda.  Surgery planned for February 20/20.   Will go ahead and hold further chemotherapy.  Patient is being scheduled for MRI of rectal as well as CT chest abdomen and pelvis for restaging.    Given high risk features, our plan is to initiate adjuvant chemotherapy with FOLFOX x8 following surgery.        BASILIA (iron deficiency anemia)  Ago her other CBC, CMP and iron studies.  Patient's iron study performing April of 2019 showed severely low iron storage capacity with ferritin at 4.  Vicente repeat those tests to determine benefit for IV iron therapy.    Rectal pain  Likely related to radiation side effect.  Continue palliative management with pain medication.  Claims the pain has significantly improved.    Anemia  Improving, hemoglobin noted at 13.1 grams/deciliter.  No evidence of active bleed.  I suspect the anemia was related to chemotherapy.  Will continue to monitor.      Follow-Up: Follow up in about 4 weeks (around 1/20/2020).    HOMA GAMBOA Md., Ph.D  Hematology & Oncology Department  Phone #: [phone]

## 2019-12-24 ENCOUNTER — TELEPHONE (OUTPATIENT)
Dept: RADIOLOGY | Facility: HOSPITAL | Age: 64
End: 2019-12-24

## 2019-12-24 ENCOUNTER — TELEPHONE (OUTPATIENT)
Dept: HEMATOLOGY/ONCOLOGY | Facility: CLINIC | Age: 64
End: 2019-12-24

## 2019-12-24 NOTE — TELEPHONE ENCOUNTER
Pt contacted SW on yesterday and left a brief voicemail requesting a call back. SW attempted to contact pt today but there was not an answer. A voicemail was not offered as well.

## 2019-12-30 ENCOUNTER — TELEPHONE (OUTPATIENT)
Dept: HEMATOLOGY/ONCOLOGY | Facility: CLINIC | Age: 64
End: 2019-12-30

## 2019-12-30 ENCOUNTER — DOCUMENTATION ONLY (OUTPATIENT)
Dept: HEMATOLOGY/ONCOLOGY | Facility: CLINIC | Age: 64
End: 2019-12-30

## 2019-12-30 ENCOUNTER — HOSPITAL ENCOUNTER (EMERGENCY)
Facility: HOSPITAL | Age: 64
Discharge: HOME | End: 2019-12-30
Attending: EMERGENCY MEDICINE
Payer: MEDICARE

## 2019-12-30 VITALS
RESPIRATION RATE: 21 BRPM | TEMPERATURE: 98 F | HEART RATE: 63 BPM | HEIGHT: 70 IN | SYSTOLIC BLOOD PRESSURE: 164 MMHG | WEIGHT: 153.88 LBS | OXYGEN SATURATION: 98 % | DIASTOLIC BLOOD PRESSURE: 79 MMHG | BODY MASS INDEX: 22.03 KG/M2

## 2019-12-30 DIAGNOSIS — R10.30 LOWER ABDOMINAL PAIN: Primary | ICD-10-CM

## 2019-12-30 DIAGNOSIS — R10.9 ABDOMINAL PAIN: ICD-10-CM

## 2019-12-30 LAB
ALBUMIN SERPL BCP-MCNC: 3.7 G/DL (ref 3.5–5.2)
ALP SERPL-CCNC: 132 U/L (ref 55–135)
ALT SERPL W/O P-5'-P-CCNC: 16 U/L (ref 10–44)
ANION GAP SERPL CALC-SCNC: 13 MMOL/L (ref 8–16)
AST SERPL-CCNC: 17 U/L (ref 10–40)
BASOPHILS # BLD AUTO: 0.05 K/UL (ref 0–0.2)
BASOPHILS NFR BLD: 0.5 % (ref 0–1.9)
BILIRUB SERPL-MCNC: 0.5 MG/DL (ref 0.1–1)
BUN SERPL-MCNC: 12 MG/DL (ref 8–23)
CALCIUM SERPL-MCNC: 9.9 MG/DL (ref 8.7–10.5)
CHLORIDE SERPL-SCNC: 104 MMOL/L (ref 95–110)
CO2 SERPL-SCNC: 23 MMOL/L (ref 23–29)
CREAT SERPL-MCNC: 0.7 MG/DL (ref 0.5–1.4)
DIFFERENTIAL METHOD: ABNORMAL
EOSINOPHIL # BLD AUTO: 0.1 K/UL (ref 0–0.5)
EOSINOPHIL NFR BLD: 0.5 % (ref 0–8)
ERYTHROCYTE [DISTWIDTH] IN BLOOD BY AUTOMATED COUNT: 20.3 % (ref 11.5–14.5)
EST. GFR  (AFRICAN AMERICAN): >60 ML/MIN/1.73 M^2
EST. GFR  (NON AFRICAN AMERICAN): >60 ML/MIN/1.73 M^2
GLUCOSE SERPL-MCNC: 118 MG/DL (ref 70–110)
HCT VFR BLD AUTO: 41.7 % (ref 40–54)
HGB BLD-MCNC: 13.2 G/DL (ref 14–18)
IMM GRANULOCYTES # BLD AUTO: 0.06 K/UL (ref 0–0.04)
IMM GRANULOCYTES NFR BLD AUTO: 0.6 % (ref 0–0.5)
LIPASE SERPL-CCNC: 20 U/L (ref 4–60)
LYMPHOCYTES # BLD AUTO: 0.6 K/UL (ref 1–4.8)
LYMPHOCYTES NFR BLD: 5.7 % (ref 18–48)
MCH RBC QN AUTO: 28.8 PG (ref 27–31)
MCHC RBC AUTO-ENTMCNC: 31.7 G/DL (ref 32–36)
MCV RBC AUTO: 91 FL (ref 82–98)
MONOCYTES # BLD AUTO: 0.8 K/UL (ref 0.3–1)
MONOCYTES NFR BLD: 7.7 % (ref 4–15)
NEUTROPHILS # BLD AUTO: 9.2 K/UL (ref 1.8–7.7)
NEUTROPHILS NFR BLD: 85 % (ref 38–73)
NRBC BLD-RTO: 0 /100 WBC
PLATELET # BLD AUTO: 415 K/UL (ref 150–350)
PMV BLD AUTO: 9.4 FL (ref 9.2–12.9)
POTASSIUM SERPL-SCNC: 4.2 MMOL/L (ref 3.5–5.1)
PROT SERPL-MCNC: 8.4 G/DL (ref 6–8.4)
RBC # BLD AUTO: 4.59 M/UL (ref 4.6–6.2)
SODIUM SERPL-SCNC: 140 MMOL/L (ref 136–145)
WBC # BLD AUTO: 10.86 K/UL (ref 3.9–12.7)

## 2019-12-30 PROCEDURE — 96376 TX/PRO/DX INJ SAME DRUG ADON: CPT

## 2019-12-30 PROCEDURE — 63600175 PHARM REV CODE 636 W HCPCS: Performed by: EMERGENCY MEDICINE

## 2019-12-30 PROCEDURE — 96365 THER/PROPH/DIAG IV INF INIT: CPT | Mod: 59

## 2019-12-30 PROCEDURE — 96361 HYDRATE IV INFUSION ADD-ON: CPT

## 2019-12-30 PROCEDURE — 96375 TX/PRO/DX INJ NEW DRUG ADDON: CPT

## 2019-12-30 PROCEDURE — 80053 COMPREHEN METABOLIC PANEL: CPT

## 2019-12-30 PROCEDURE — 63600175 PHARM REV CODE 636 W HCPCS: Performed by: FAMILY MEDICINE

## 2019-12-30 PROCEDURE — 25500020 PHARM REV CODE 255: Performed by: FAMILY MEDICINE

## 2019-12-30 PROCEDURE — 99285 EMERGENCY DEPT VISIT HI MDM: CPT | Mod: 25

## 2019-12-30 PROCEDURE — 83690 ASSAY OF LIPASE: CPT

## 2019-12-30 PROCEDURE — 85025 COMPLETE CBC W/AUTO DIFF WBC: CPT

## 2019-12-30 RX ORDER — OXYCODONE AND ACETAMINOPHEN 10; 325 MG/1; MG/1
2 TABLET ORAL
Status: DISCONTINUED | OUTPATIENT
Start: 2019-12-30 | End: 2019-12-30

## 2019-12-30 RX ORDER — KETOROLAC TROMETHAMINE 30 MG/ML
30 INJECTION, SOLUTION INTRAMUSCULAR; INTRAVENOUS
Status: COMPLETED | OUTPATIENT
Start: 2019-12-30 | End: 2019-12-30

## 2019-12-30 RX ORDER — MORPHINE SULFATE 4 MG/ML
4 INJECTION, SOLUTION INTRAMUSCULAR; INTRAVENOUS
Status: COMPLETED | OUTPATIENT
Start: 2019-12-30 | End: 2019-12-30

## 2019-12-30 RX ORDER — OXYCODONE AND ACETAMINOPHEN 5; 325 MG/1; MG/1
1 TABLET ORAL EVERY 4 HOURS PRN
Qty: 18 TABLET | Refills: 0 | Status: SHIPPED | OUTPATIENT
Start: 2019-12-30 | End: 2020-02-09

## 2019-12-30 RX ADMIN — LORAZEPAM 1 MG: 2 INJECTION INTRAMUSCULAR; INTRAVENOUS at 05:12

## 2019-12-30 RX ADMIN — IOHEXOL 30 ML: 350 INJECTION, SOLUTION INTRAVENOUS at 04:12

## 2019-12-30 RX ADMIN — MORPHINE SULFATE 4 MG: 4 INJECTION INTRAVENOUS at 03:12

## 2019-12-30 RX ADMIN — MORPHINE SULFATE 4 MG: 4 INJECTION INTRAVENOUS at 02:12

## 2019-12-30 RX ADMIN — KETOROLAC TROMETHAMINE 30 MG: 30 INJECTION, SOLUTION INTRAMUSCULAR at 05:12

## 2019-12-30 RX ADMIN — PROMETHAZINE HYDROCHLORIDE 12.5 MG: 25 INJECTION INTRAMUSCULAR; INTRAVENOUS at 02:12

## 2019-12-30 RX ADMIN — SODIUM CHLORIDE 1000 ML: 0.9 INJECTION, SOLUTION INTRAVENOUS at 02:12

## 2019-12-30 RX ADMIN — IOHEXOL 75 ML: 350 INJECTION, SOLUTION INTRAVENOUS at 04:12

## 2019-12-30 NOTE — PROGRESS NOTES
"Nurse received a call from Mr Serenity wife, and she stated that he was in the tube with nausea and vomiting noted its clear, and + SOB, with severe Abd pain, having incontinent episode of bowel movement on himself. Pt was in the back ground of the phone saying " I am not going to make it" nurse informed with to call 911 to so he can get eval and treated at [facility], Dr Sams is out of town, however, Mr. Kaur is a pt of Dr Lopez, and he is in clinic today, his nurse was notified of this conversation,  is taking call for Dr Parsons and he is aware as well.   "

## 2019-12-30 NOTE — ED PROVIDER NOTES
SCRIBE #1 NOTE: I, Ulysses Early, am scribing for, and in the presence of, Rian Hill MD. I have scribed the HPI, ROS, and PEx.     SCRIBE #2 NOTE: I, Familia Eason, am scribing for, and in the presence of,  Sherri Fischer MD. I have scribed the remaining portions of the note not scribed by Scribe #1.     History      Chief Complaint   Patient presents with    Abdominal Pain     colon CA, receiving radiation. pain unrelieved by norco. 80 MCG fent IVP given by EMS       Review of patient's allergies indicates:  No Known Allergies     HPI   HPI    12/30/2019, 2:01 PM   History obtained from the patient      History of Present Illness: Vincent Bentno is a 64 y.o. male patient with a PMHx of colon cancer who presents to the Emergency Department for generalized abdominal pain, onset this morning. Symptoms are constant and moderate in severity. No mitigating or exacerbating factors reported. No associated sxs reported. Patient denies any fever, chills, n/v/d, SOB, CP, weakness, numbness, dizziness, headache, and all other sxs at this time. Pt was given 80 mcg of Fentanyl en route to the ED. No further complaints or concerns at this time.     Arrival mode: EMS    PCP: Shakila Moran DO       Past Medical History:  Past Medical History:   Diagnosis Date    Alcoholism     Anemia     Back pain     Encounter for blood transfusion     Essential hypertension 2/4/2016    GERD (gastroesophageal reflux disease)     Hiatal hernia     Hypertension     Rectal cancer 9/11/2019       Past Surgical History:  Past Surgical History:   Procedure Laterality Date    COLONOSCOPY N/A 9/3/2019    Procedure: COLONOSCOPY;  Surgeon: Isai Centeno MD;  Location: [facility];  Service: Endoscopy;  Laterality: N/A;    ESOPHAGOGASTRODUODENOSCOPY N/A 9/3/2019    Procedure: ESOPHAGOGASTRODUODENOSCOPY (EGD);  Surgeon: Isai Centeno MD;  Location: [facility];  Service: Endoscopy;  Laterality: N/A;    HIP FRACTURE SURGERY       left hip    JOINT REPLACEMENT Left          Family History:  Family History   Problem Relation Age of Onset    Cataracts Mother     Stroke Father     Hypertension Father        Social History:  Social History     Tobacco Use    Smoking status: Never Smoker    Smokeless tobacco: Never Used   Substance and Sexual Activity    Alcohol use: Yes     Comment: states he drinks frequently everyday/quit approx 10/2017    Drug use: No    Sexual activity: Never     Partners: Female       ROS   Review of Systems   Constitutional: Negative for chills, diaphoresis, fatigue and fever.   HENT: Negative for sore throat.    Respiratory: Negative for shortness of breath.    Cardiovascular: Negative for chest pain.   Gastrointestinal: Positive for abdominal pain (generalized). Negative for diarrhea, nausea and vomiting.   Genitourinary: Negative for dysuria.   Musculoskeletal: Negative for back pain.   Skin: Negative for rash.   Neurological: Negative for dizziness, weakness, light-headedness, numbness and headaches.   Hematological: Does not bruise/bleed easily.   All other systems reviewed and are negative.    Physical Exam      Initial Vitals [12/30/19 1355]   BP Pulse Resp Temp SpO2   (!) 146/89 80 (!) 26 97.7 °F (36.5 °C) 97 %      MAP       --          Physical Exam  Nursing Notes and Vital Signs Reviewed.  Constitutional: Patient is in mild distress. Well-developed and well-nourished.  Head: Atraumatic. Normocephalic.  Eyes: PERRL. EOM intact. Conjunctivae are not pale. No scleral icterus.  ENT: Mucous membranes are moist. Oropharynx is clear and symmetric.    Neck: Supple. Full ROM. No lymphadenopathy.  Cardiovascular: Regular rate. Regular rhythm. No murmurs, rubs, or gallops. Distal pulses are 2+ and symmetric.  Pulmonary/Chest: No respiratory distress. Clear to auscultation bilaterally. No wheezing or rales.  Abdominal: Soft and non-distended.  There is mild diffuse abdominal tenderness. Umbilical hernia. No  "rebound, guarding, or rigidity.   Musculoskeletal: Moves all extremities. No obvious deformities. No edema.  Skin: Warm and dry.  Neurological:  Alert, awake, and appropriate.  Normal speech.  No acute focal neurological deficits are appreciated.  Psychiatric: Normal affect. Good eye contact. Appropriate in content.    ED Course    Procedures  ED Vital Signs:  Vitals:    12/30/19 1355 12/30/19 1402 12/30/19 1430 12/30/19 1521   BP: (!) 146/89  (!) 165/92 (!) 158/91   Pulse: 80 75 63 61   Resp: (!) 26  (!) 25 (!) 21   Temp: 97.7 °F (36.5 °C)      TempSrc: Oral      SpO2: 97%  98% 100%   Weight:       Height: 5' 10" (1.778 m)       12/30/19 1611 12/30/19 1752 12/30/19 1803 12/30/19 1831   BP: (!) 166/96 (!) 140/89 (!) 158/67 (!) 147/76   Pulse: 67 60 (!) 57 61   Resp: (!) 26 20 (!) 23 (!) 22   Temp:       TempSrc:       SpO2: 98% 99% 98% 97%   Weight: 69.8 kg (153 lb 14.4 oz)      Height:        12/30/19 1900 12/30/19 1902   BP:  (!) 164/79   Pulse: 63    Resp: (!) 21    Temp:     TempSrc:     SpO2: 98%    Weight:     Height:         Abnormal Lab Results:  Labs Reviewed   CBC W/ AUTO DIFFERENTIAL - Abnormal; Notable for the following components:       Result Value    RBC 4.59 (*)     Hemoglobin 13.2 (*)     Mean Corpuscular Hemoglobin Conc 31.7 (*)     RDW 20.3 (*)     Platelets 415 (*)     Immature Granulocytes 0.6 (*)     Gran # (ANC) 9.2 (*)     Immature Grans (Abs) 0.06 (*)     Lymph # 0.6 (*)     Gran% 85.0 (*)     Lymph% 5.7 (*)     All other components within normal limits   COMPREHENSIVE METABOLIC PANEL - Abnormal; Notable for the following components:    Glucose 118 (*)     All other components within normal limits   LIPASE        All Lab Results:  Results for orders placed or performed during the hospital encounter of 12/30/19   CBC auto differential   Result Value Ref Range    WBC 10.86 3.90 - 12.70 K/uL    RBC 4.59 (L) 4.60 - 6.20 M/uL    Hemoglobin 13.2 (L) 14.0 - 18.0 g/dL    Hematocrit 41.7 40.0 - 54.0 " %    Mean Corpuscular Volume 91 82 - 98 fL    Mean Corpuscular Hemoglobin 28.8 27.0 - 31.0 pg    Mean Corpuscular Hemoglobin Conc 31.7 (L) 32.0 - 36.0 g/dL    RDW 20.3 (H) 11.5 - 14.5 %    Platelets 415 (H) 150 - 350 K/uL    MPV 9.4 9.2 - 12.9 fL    Immature Granulocytes 0.6 (H) 0.0 - 0.5 %    Gran # (ANC) 9.2 (H) 1.8 - 7.7 K/uL    Immature Grans (Abs) 0.06 (H) 0.00 - 0.04 K/uL    Lymph # 0.6 (L) 1.0 - 4.8 K/uL    Mono # 0.8 0.3 - 1.0 K/uL    Eos # 0.1 0.0 - 0.5 K/uL    Baso # 0.05 0.00 - 0.20 K/uL    nRBC 0 0 /100 WBC    Gran% 85.0 (H) 38.0 - 73.0 %    Lymph% 5.7 (L) 18.0 - 48.0 %    Mono% 7.7 4.0 - 15.0 %    Eosinophil% 0.5 0.0 - 8.0 %    Basophil% 0.5 0.0 - 1.9 %    Differential Method Automated    Comprehensive metabolic panel   Result Value Ref Range    Sodium 140 136 - 145 mmol/L    Potassium 4.2 3.5 - 5.1 mmol/L    Chloride 104 95 - 110 mmol/L    CO2 23 23 - 29 mmol/L    Glucose 118 (H) 70 - 110 mg/dL    BUN, Bld 12 8 - 23 mg/dL    Creatinine 0.7 0.5 - 1.4 mg/dL    Calcium 9.9 8.7 - 10.5 mg/dL    Total Protein 8.4 6.0 - 8.4 g/dL    Albumin 3.7 3.5 - 5.2 g/dL    Total Bilirubin 0.5 0.1 - 1.0 mg/dL    Alkaline Phosphatase 132 55 - 135 U/L    AST 17 10 - 40 U/L    ALT 16 10 - 44 U/L    Anion Gap 13 8 - 16 mmol/L    eGFR if African American >60 >60 mL/min/1.73 m^2    eGFR if non African American >60 >60 mL/min/1.73 m^2   Lipase   Result Value Ref Range    Lipase 20 4 - 60 U/L     Imaging Results:  Imaging Results          CT Abdomen Pelvis With Contrast (Final result)  Result time 12/30/19 16:59:33    Final result by Avila Ku MD (12/30/19 16:59:33)                 Impression:      No evidence of a small-bowel obstruction.  Distension of the duodenum to the level of the 2nd stage.  Distal to that point no small bowel obstruction.  Diverticulosis without radiographic evidence of diverticulitis.  Normal appendix.  Normal gallbladder.  Low-density fatty infiltrated liver.  Increased density at the left lung  base can not exclude a focal infiltrate/pneumonia versus atelectasis.    All CT scans at this facility use dose modulation, iterative reconstruction and/or weight based dosing when appropriate to reduce radiation dose to as low as reasonably achievable.      Electronically signed by: Avila Ku MD  Date:    12/30/2019  Time:    16:59             Narrative:    EXAMINATION:  CT ABDOMEN PELVIS WITH CONTRAST    CLINICAL HISTORY:  Bowel obstruction, high-grade;    TECHNIQUE:  Low dose axial images, sagittal and coronal reformations were obtained from the lung bases to the pubic symphysis following the IV administration of 100 mL of Omnipaque 350 and the oral administration of 30 ml of Omnipaque 350.    COMPARISON:  None.    FINDINGS:  ABDOMEN:    - Lung bases: Atelectasis at the lung bases.  Airspace disease at the left lung base consistent with a left lower lobe infiltrate/pneumonia.    - Liver: Low-density fatty infiltrated liver.    - Gallbladder: No calcified gallstones.    - Bile Ducts: No evidence of intra or extra hepatic biliary ductal dilation.    - Spleen: Negative.    - Kidneys: No mass or hydronephrosis.    - Adrenals: Unremarkable.    - Pancreas: Fatty infiltrated pancreas.    - Retroperitoneum:  No significant adenopathy.    - Vascular: Scattered atherosclerotic calcifications of the abdominal aorta and proximal iliac vessels.    - Abdominal wall:  4 cm fat containing periumbilical hernia.    PELVIS:    No pelvic mass, adenopathy, or free fluid.    BOWEL/MESENTERY:    Scattered sigmoidal diverticulum.  Diverticulum of the ascending transverse and descending colon.  No radiographic evidence of diverticulitis.  The appendix appears normal.    BONES:  Multilevel degenerative changes of the thoracolumbar spine with disc space narrowing seen most significantly at the L1-2 L4-5 and L5-S1 levels.  Dynamic hip screw placement on the left                               X-Ray Abdomen Flat And Erect (Final result)   Result time 12/30/19 14:33:33    Final result by Avila Ku MD (12/30/19 14:33:33)                 Impression:      Distention of the duodenum and proximal jejunum.      Electronically signed by: Avila Ku MD  Date:    12/30/2019  Time:    14:33             Narrative:    EXAMINATION:  XR ABDOMEN FLAT AND ERECT    CLINICAL HISTORY:  Unspecified abdominal pain    TECHNIQUE:  Flat and erect AP views of the abdomen were performed.    COMPARISON:  None    FINDINGS:  Nonspecific abdominal gas pattern with evidence of distension of the duodenum and proximal jejunum.  Lung bases are clear.No abnormal calcifications.                                        The Emergency Provider reviewed the vital signs and test results, which are outlined above.    ED Discussion     4:00 PM: Dr. Hill transfers care of pt to Dr. Fischer pending lab and imaging results.    6:10 PM: Reassessed pt at this time.  Pt states his condition has improved at this time. Discussed with pt all pertinent ED information and results. Discussed pt dx and plan of tx. Gave pt all f/u and return to the ED instructions. All questions and concerns were addressed at this time. Pt expresses understanding of information and instructions, and is comfortable with plan to discharge. Pt is stable for discharge.    I discussed with patient and/or family/caretaker that evaluation in the ED does not suggest any emergent or life threatening medical conditions requiring immediate intervention beyond what was provided in the ED, and I believe patient is safe for discharge.  Regardless, an unremarkable evaluation in the ED does not preclude the development or presence of a serious of life threatening condition. As such, patient was instructed to return immediately for any worsening or change in current symptoms.    Driving or other activities under influence of medications - Patient and/or family/caretaker was given a prescription for, or instructed to use a medicine  that may impair ability to drive, operate machinery, or participate in other potentially dangerous activities.  Patient was instructed not to participate in these activities while under the influence of these medications.      ED Medication(s):  Medications   sodium chloride 0.9% bolus 1,000 mL (0 mLs Intravenous Stopped 12/30/19 1556)   morphine injection 4 mg (4 mg Intravenous Given 12/30/19 1410)   promethazine (PHENERGAN) 12.5 mg in dextrose 5 % 50 mL IVPB (0 mg Intravenous Stopped 12/30/19 1429)   morphine injection 4 mg (4 mg Intravenous Given 12/30/19 1552)   iohexol (OMNIPAQUE 350) injection 100 mL (75 mLs Intravenous Given 12/30/19 1647)   iohexol (OMNIPAQUE 350) injection 30 mL (30 mLs Oral Given 12/30/19 1646)   ketorolac injection 30 mg (30 mg Intravenous Given 12/30/19 1749)   lorazepam (ATIVAN) injection 1 mg (1 mg Intravenous Given 12/30/19 1750)       Follow-up Information     Schedule an appointment as soon as possible for a visit  with Shakila Moran DO.    Specialty:  Internal Medicine  Contact information:  [address] [place] [address] [address] Rolan [place] [id number]  [phone]                  Discharge Medication List as of 12/30/2019  6:55 PM      START taking these medications    Details   oxyCODONE-acetaminophen (PERCOCET) 5-325 mg per tablet Take 1 tablet by mouth every 4 (four) hours as needed for Pain., Starting Mon 12/30/2019, Print                Medication List      START taking these medications    oxyCODONE-acetaminophen 5-325 mg per tablet  Commonly known as:  PERCOCET  Take 1 tablet by mouth every 4 (four) hours as needed for Pain.        ASK your doctor about these medications    capecitabine 500 MG Tab  Commonly known as:  XELODA  Take 3 tablets (1,500 mg total) by mouth 2 (two) times daily on days of radiation only for a total of six weeks.     cyanocobalamin 1000 MCG tablet  Commonly known as:  VITAMIN B-12     ferrous sulfate 324 mg (65 mg iron) Tbec  Take 1 tablet (324 mg total) by mouth  2 (two) times daily.     HYDROcodone-acetaminophen  mg per tablet  Commonly known as:  NORCO  Take 1 tablet by mouth every 6 (six) hours as needed for Pain.     lidocaine 4 % Gel  Apply 1 application topically 4 (four) times daily.     losartan 50 MG tablet  Commonly known as:  COZAAR  TAKE 1 TABLET BY MOUTH EVERY MORNING     ondansetron 8 MG tablet  Commonly known as:  ZOFRAN  Take 1 tablet (8 mg total) by mouth every 8 (eight) hours as needed for Nausea.     pantoprazole 40 MG tablet  Commonly known as:  PROTONIX  Take 1 tablet (40 mg total) by mouth once daily.     prochlorperazine 5 MG tablet  Commonly known as:  COMPAZINE  TAKE 1 TABLET(5 MG) BY MOUTH EVERY 6 HOURS AS NEEDED FOR NAUSEA     tamsulosin 0.4 mg Cap  Commonly known as:  FLOMAX  Take 1 capsule (0.4 mg total) by mouth once daily.           Where to Get Your Medications      You can get these medications from any pharmacy    Bring a paper prescription for each of these medications  · oxyCODONE-acetaminophen 5-325 mg per tablet           Medical Decision Making    Medical Decision Making:   Clinical Tests:   Lab Tests: Ordered and Reviewed  Radiological Study: Ordered and Reviewed           Scribe Attestation:   Scribe #1: I performed the above scribed service and the documentation accurately describes the services I performed. I attest to the accuracy of the note.    Attending:   Physician Attestation Statement for Scribe #1: I, Rian Hill MD, personally performed the services described in this documentation, as scribed by Ulysses Early, in my presence, and it is both accurate and complete.       Scribe Attestation:   Scribe #2: I performed the above scribed service and the documentation accurately describes the services I performed. I attest to the accuracy of the note.    Attending Attestation:           Physician Attestation for Scribe:    Physician Attestation Statement for Scribe #2: I, Sherri Fischer MD, reviewed documentation, as  scribed by Familia Eason in my presence, and it is both accurate and complete. I also acknowledge and confirm the content of the note done by Scribe #1.          Clinical Impression       ICD-10-CM ICD-9-CM   1. Lower abdominal pain R10.30 789.09   2. Abdominal pain R10.9 789.00       Disposition:   Disposition: Discharged  Condition: Stable         Sherri Fischer MD  01/01/20 1021

## 2019-12-31 NOTE — ED NOTES
Entered room to find patient leaning over siderail, emesis bag in hand, with finger down throat attempting to induce vomiting.  Stopped promptly as I entered room.

## 2020-01-03 ENCOUNTER — TELEPHONE (OUTPATIENT)
Dept: ENDOSCOPY | Facility: HOSPITAL | Age: 65
End: 2020-01-03

## 2020-01-03 NOTE — TELEPHONE ENCOUNTER
----- Message from Eleanor Crowder sent at 1/3/2020  9:04 AM CST -----  Contact: pt   Call pt in regards to his procedure that is schedule for today. Pt states that he has a question.  .[phone] (home)

## 2020-01-03 NOTE — TELEPHONE ENCOUNTER
Spoke with patient regarding colonoscopy scheduled for 1-3-2020.  Patient stated he was still passing stool and needed to reschedule.  Reschedule procedure to 1-8-2020 with dr villareal and new prescription sent to pharmacy on file.

## 2020-01-05 RX ORDER — SODIUM, POTASSIUM,MAG SULFATES 17.5-3.13G
1 SOLUTION, RECONSTITUTED, ORAL ORAL DAILY
Qty: 354 ML | Refills: 0 | Status: SHIPPED | OUTPATIENT
Start: 2020-01-05 | End: 2020-01-07

## 2020-01-06 DIAGNOSIS — C20 RECTAL CANCER: ICD-10-CM

## 2020-01-06 DIAGNOSIS — I10 HYPERTENSION GOAL BP (BLOOD PRESSURE) < 140/90: ICD-10-CM

## 2020-01-06 RX ORDER — HYDROCODONE BITARTRATE AND ACETAMINOPHEN 10; 325 MG/1; MG/1
1 TABLET ORAL EVERY 6 HOURS PRN
Qty: 60 TABLET | Refills: 0 | Status: SHIPPED | OUTPATIENT
Start: 2020-01-06 | End: 2020-01-20 | Stop reason: SDUPTHER

## 2020-01-06 NOTE — TELEPHONE ENCOUNTER
Called pt to inform I did receive his request for norco refill, and it has been submitted to Dr Arreola. And that script will be sent to [facility]. Patient verbalized understanding.

## 2020-01-06 NOTE — TELEPHONE ENCOUNTER
----- Message from Jesus Albright sent at 1/6/2020  9:35 AM CST -----  Contact: self  Type:  RX Refill Request    Who Called: pt  Refill or New Rx:refill  RX Name and Strength:narco-10mg  How is the patient currently taking it? (ex. 1XDay):4xday  Is this a 30 day or 90 day RX:n/a  Preferred Pharmacy with phone number:  [facility] [address] [place] [address]  Phone: [phone] Fax: [phone]  Local or Mail Order:local  Ordering Provider:naveen  Would the patient rather a call back or a response via [facility]? Call back  Best Call Back Number:[phone]  Additional Information: none    Thanks,  Jesus Albright

## 2020-01-07 ENCOUNTER — TELEPHONE (OUTPATIENT)
Dept: SURGERY | Facility: CLINIC | Age: 65
End: 2020-01-07

## 2020-01-07 RX ORDER — LOSARTAN POTASSIUM 50 MG/1
TABLET ORAL
Qty: 90 TABLET | Refills: 1 | Status: SHIPPED | OUTPATIENT
Start: 2020-01-07 | End: 2021-04-22

## 2020-01-07 NOTE — TELEPHONE ENCOUNTER
After speaking to pt I called and spoke to Lorri in the Endoscopy Dept - Advised her that pt is stating he will not be able to make his scheduled Colonoscopy with Dr Gonzalez tomorrow 1/8/2020 - Advised Lorri that pt needs to reschedule but it needs to be prior to the scheduled   Sx he is having on 2/4/2020 - Lorri advised me she would contact pt and take care of it    ----- Message from Lola Rodriguez sent at 1/7/2020 10:53 AM CST -----  Contact: Patient   Patient called in regards to r/s his colonoscopy . Please call back at [phone].      Thanks,  Lola Rodriguez

## 2020-01-10 ENCOUNTER — HOSPITAL ENCOUNTER (OUTPATIENT)
Facility: HOSPITAL | Age: 65
Discharge: HOME | End: 2020-01-10
Attending: COLON & RECTAL SURGERY | Admitting: COLON & RECTAL SURGERY
Payer: MEDICARE

## 2020-01-10 ENCOUNTER — ANESTHESIA EVENT (OUTPATIENT)
Dept: ENDOSCOPY | Facility: HOSPITAL | Age: 65
End: 2020-01-10
Payer: MEDICARE

## 2020-01-10 ENCOUNTER — ANESTHESIA (OUTPATIENT)
Dept: ENDOSCOPY | Facility: HOSPITAL | Age: 65
End: 2020-01-10
Payer: MEDICARE

## 2020-01-10 ENCOUNTER — TELEPHONE (OUTPATIENT)
Dept: RADIOLOGY | Facility: HOSPITAL | Age: 65
End: 2020-01-10

## 2020-01-10 VITALS
TEMPERATURE: 99 F | HEART RATE: 65 BPM | OXYGEN SATURATION: 95 % | DIASTOLIC BLOOD PRESSURE: 84 MMHG | WEIGHT: 156.31 LBS | RESPIRATION RATE: 19 BRPM | HEIGHT: 70 IN | BODY MASS INDEX: 22.38 KG/M2 | SYSTOLIC BLOOD PRESSURE: 139 MMHG

## 2020-01-10 DIAGNOSIS — Z12.11 SCREENING FOR COLON CANCER: ICD-10-CM

## 2020-01-10 DIAGNOSIS — C20 RECTAL CANCER: Primary | ICD-10-CM

## 2020-01-10 PROCEDURE — 45381 PR COLONOSCPY,FLEX,W/DIR SUBMUC INJECT: ICD-10-PCS | Mod: 51,,, | Performed by: COLON & RECTAL SURGERY

## 2020-01-10 PROCEDURE — 45385 COLONOSCOPY W/LESION REMOVAL: CPT | Performed by: COLON & RECTAL SURGERY

## 2020-01-10 PROCEDURE — 88305 TISSUE EXAM BY PATHOLOGIST: CPT | Mod: 26,,, | Performed by: PATHOLOGY

## 2020-01-10 PROCEDURE — 37000009 HC ANESTHESIA EA ADD 15 MINS: Performed by: COLON & RECTAL SURGERY

## 2020-01-10 PROCEDURE — 45385 PR COLONOSCOPY,REMV LESN,SNARE: ICD-10-PCS | Mod: ,,, | Performed by: COLON & RECTAL SURGERY

## 2020-01-10 PROCEDURE — 63600175 PHARM REV CODE 636 W HCPCS: Performed by: NURSE ANESTHETIST, CERTIFIED REGISTERED

## 2020-01-10 PROCEDURE — 37000008 HC ANESTHESIA 1ST 15 MINUTES: Performed by: COLON & RECTAL SURGERY

## 2020-01-10 PROCEDURE — 45381 COLONOSCOPY SUBMUCOUS NJX: CPT | Mod: 51,,, | Performed by: COLON & RECTAL SURGERY

## 2020-01-10 PROCEDURE — 88305 TISSUE EXAM BY PATHOLOGIST: ICD-10-PCS | Mod: 26,,, | Performed by: PATHOLOGY

## 2020-01-10 PROCEDURE — 88305 TISSUE EXAM BY PATHOLOGIST: CPT | Performed by: PATHOLOGY

## 2020-01-10 PROCEDURE — 45381 COLONOSCOPY SUBMUCOUS NJX: CPT | Performed by: COLON & RECTAL SURGERY

## 2020-01-10 PROCEDURE — 45385 COLONOSCOPY W/LESION REMOVAL: CPT | Mod: ,,, | Performed by: COLON & RECTAL SURGERY

## 2020-01-10 PROCEDURE — 27201089 HC SNARE, DISP (ANY): Performed by: COLON & RECTAL SURGERY

## 2020-01-10 PROCEDURE — 25000003 PHARM REV CODE 250: Performed by: NURSE ANESTHETIST, CERTIFIED REGISTERED

## 2020-01-10 RX ORDER — LIDOCAINE HYDROCHLORIDE 10 MG/ML
INJECTION, SOLUTION EPIDURAL; INFILTRATION; INTRACAUDAL; PERINEURAL
Status: DISCONTINUED | OUTPATIENT
Start: 2020-01-10 | End: 2020-01-10

## 2020-01-10 RX ORDER — PROPOFOL 10 MG/ML
VIAL (ML) INTRAVENOUS
Status: DISCONTINUED | OUTPATIENT
Start: 2020-01-10 | End: 2020-01-10

## 2020-01-10 RX ORDER — SODIUM CHLORIDE, SODIUM LACTATE, POTASSIUM CHLORIDE, CALCIUM CHLORIDE 600; 310; 30; 20 MG/100ML; MG/100ML; MG/100ML; MG/100ML
INJECTION, SOLUTION INTRAVENOUS CONTINUOUS
Status: DISCONTINUED | OUTPATIENT
Start: 2020-01-10 | End: 2020-01-10 | Stop reason: SDUPTHER

## 2020-01-10 RX ORDER — SODIUM CHLORIDE, SODIUM LACTATE, POTASSIUM CHLORIDE, CALCIUM CHLORIDE 600; 310; 30; 20 MG/100ML; MG/100ML; MG/100ML; MG/100ML
INJECTION, SOLUTION INTRAVENOUS CONTINUOUS PRN
Status: DISCONTINUED | OUTPATIENT
Start: 2020-01-10 | End: 2020-01-10

## 2020-01-10 RX ORDER — SODIUM CHLORIDE, SODIUM LACTATE, POTASSIUM CHLORIDE, CALCIUM CHLORIDE 600; 310; 30; 20 MG/100ML; MG/100ML; MG/100ML; MG/100ML
INJECTION, SOLUTION INTRAVENOUS CONTINUOUS
Status: DISCONTINUED | OUTPATIENT
Start: 2020-01-10 | End: 2020-02-05 | Stop reason: ALTCHOICE

## 2020-01-10 RX ADMIN — PROPOFOL 40 MG: 10 INJECTION, EMULSION INTRAVENOUS at 01:01

## 2020-01-10 RX ADMIN — PROPOFOL 50 MG: 10 INJECTION, EMULSION INTRAVENOUS at 01:01

## 2020-01-10 RX ADMIN — LIDOCAINE HYDROCHLORIDE 50 MG: 10 INJECTION, SOLUTION EPIDURAL; INFILTRATION; INTRACAUDAL; PERINEURAL at 01:01

## 2020-01-10 RX ADMIN — SODIUM CHLORIDE, SODIUM LACTATE, POTASSIUM CHLORIDE, AND CALCIUM CHLORIDE: 600; 310; 30; 20 INJECTION, SOLUTION INTRAVENOUS at 01:01

## 2020-01-10 RX ADMIN — PROPOFOL 20 MG: 10 INJECTION, EMULSION INTRAVENOUS at 01:01

## 2020-01-10 NOTE — ANESTHESIA RELEASE NOTE
"Anesthesia Release from PACU Note    Patient: Vincent Benton    Procedure(s) Performed: Procedure(s) (LRB):  COLONOSCOPY (N/A)    Anesthesia type: MAC    Post pain: Adequate analgesia    Post assessment: no apparent anesthetic complications and tolerated procedure well    Last Vitals:   Visit Vitals  BP (!) 128/94 (BP Location: Left arm, Patient Position: Sitting)   Pulse 76   Temp 37 °C (98.6 °F) (Temporal)   Resp 18   Ht 5' 10" (1.778 m)   Wt 70.9 kg (156 lb 4.9 oz)   SpO2 98%   BMI 22.43 kg/m²       Post vital signs: stable    Level of consciousness: awake, alert  and oriented    Nausea/Vomiting: no nausea/no vomiting    Complications: none    Airway Patency: patent    Respiratory: unassisted, spontaneous ventilation, room air    Cardiovascular: stable and blood pressure at baseline    Hydration: euvolemic  "

## 2020-01-10 NOTE — ANESTHESIA POSTPROCEDURE EVALUATION
Anesthesia Post Evaluation    Patient: Vincent Benton    Procedure(s) Performed: Procedure(s) (LRB):  COLONOSCOPY (N/A)    Final Anesthesia Type: MAC    Patient location during evaluation: GI PACU  Patient participation: Yes- Able to Participate  Level of consciousness: awake and alert and oriented  Post-procedure vital signs: reviewed and stable  Pain management: adequate  Airway patency: patent    PONV status at discharge: No PONV  Anesthetic complications: no      Cardiovascular status: hemodynamically stable  Respiratory status: unassisted, spontaneous ventilation and room air  Hydration status: euvolemic  Follow-up not needed.          Vitals Value Taken Time   /94 1/10/2020 12:08 PM   Temp 37 °C (98.6 °F) 1/10/2020 12:08 PM   Pulse 76 1/10/2020 12:08 PM   Resp 18 1/10/2020 12:08 PM   SpO2 98 % 1/10/2020 12:08 PM         No case tracking events are documented in the log.      Pain/Ana Score: Ana Score: 8 (1/10/2020  1:55 PM)

## 2020-01-10 NOTE — [employer] PATIENT INSTRUCTIONS
Discharge Summary/Instructions after an Endoscopic Procedure  Patient Name: Vincent Benton  Patient MRN: [id number]  Patient YOB: 1955  Friday, January 10, 2020 Familia Gonzalez MD  RESTRICTIONS:  During your procedure today, you received medications for sedation.  These   medications may affect your judgment, balance and coordination.  Therefore,   for 24 hours, you have the following restrictions:   - DO NOT drive a car, operate machinery, make legal/financial decisions,   sign important papers or drink alcohol.    ACTIVITY:  Today: no heavy lifting, straining or running due to procedural   sedation/anesthesia.  The following day: return to full activity including work.  DIET:  Eat and drink normally unless instructed otherwise.     TREATMENT FOR COMMON SIDE EFFECTS:  - Mild abdominal pain, nausea, belching, bloating or excessive gas:  rest,   eat lightly and use a heating pad.  - Sore Throat: treat with throat lozenges and/or gargle with warm salt   water.  - Because air was used during the procedure, expelling large amounts of air   from your rectum or belching is normal.  - If a bowel prep was taken, you may not have a bowel movement for 1-3 days.    This is normal.  SYMPTOMS TO WATCH FOR AND REPORT TO YOUR PHYSICIAN:  1. Abdominal pain or bloating, other than gas cramps.  2. Chest pain.  3. Back pain.  4. Signs of infection such as: chills or fever occurring within 24 hours   after the procedure.  5. Rectal bleeding, which would show as bright red, maroon, or black stools.   (A tablespoon of blood from the rectum is not serious, especially if   hemorrhoids are present.)  6. Vomiting.  7. Weakness or dizziness.  GO DIRECTLY TO THE NEAREST EMERGENCY ROOM IF YOU HAVE ANY OF THE FOLLOWING:      Difficulty breathing              Chills and/or fever over 101 F   Persistent vomiting and/or vomiting blood   Severe abdominal pain   Severe chest pain   Black, tarry stools   Bleeding- more than one  tablespoon   Any other symptom or condition that you feel may need urgent attention  Your doctor recommends these additional instructions:  If any biopsies were taken, your doctors clinic will contact you in 1 to 2   weeks with any results.  - Discharge patient to home.   - High fiber diet.   - Continue present medications.   - Await pathology results.   - Repeat colonoscopy in 1 year for surveillance.   - Return to my office as previously scheduled.  For questions, problems or results please call your physician Familia Gonzalez MD at Work:  [phone]  If you have any questions about the above instructions, call the GI   department at [phone] or call the endoscopy unit at [phone]   from 7am until 3 pm.  [facility], EMERGENCY ROOM PHONE NUMBER:   [phone]  IF A COMPLICATION OR EMERGENCY SITUATION ARISES AND YOU ARE UNABLE TO REACH   YOUR PHYSICIAN - GO DIRECTLY TO THE EMERGENCY ROOM.  I have read or have had read to me these discharge instructions for my   procedure and have received a written copy.  I understand these   instructions and will follow-up with my physician if I have any questions.     __________________________________       _____________________________________  Nurse Signature                                          Patient/Designated   Responsible Party Signature  MD Familia Mcintyre MD  1/10/2020 2:03:44 PM  This report has been verified and signed electronically.  [employer]

## 2020-01-10 NOTE — TRANSFER OF CARE
"Anesthesia Transfer of Care Note    Patient: Vincent Benton    Procedure(s) Performed: Procedure(s) (LRB):  COLONOSCOPY (N/A)    Patient location: GI    Anesthesia Type: MAC    Transport from OR: Transported from OR on room air with adequate spontaneous ventilation    Post pain: adequate analgesia    Post assessment: no apparent anesthetic complications and tolerated procedure well    Post vital signs: stable    Level of consciousness: awake, alert and oriented    Nausea/Vomiting: no nausea/vomiting    Complications: none    Transfer of care protocol was followed      Last vitals:   Visit Vitals  BP (!) 128/94 (BP Location: Left arm, Patient Position: Sitting)   Pulse 76   Temp 37 °C (98.6 °F) (Temporal)   Resp 18   Ht 5' 10" (1.778 m)   Wt 70.9 kg (156 lb 4.9 oz)   SpO2 98%   BMI 22.43 kg/m²     "

## 2020-01-10 NOTE — BRIEF OP NOTE
[facility] -   Brief Operative Note     SUMMARY     Surgery Date: 1/10/2020     Surgeon(s) and Role:     * Familia Gonzalez MD - Primary    Assisting Surgeon: None    Pre-op Diagnosis:  Rectal adenocarcinoma [C20]    Post-op Diagnosis:  Post-Op Diagnosis Codes:     * Rectal adenocarcinoma [C20]    Procedure(s) (LRB):  COLONOSCOPY (N/A)    Anesthesia: Choice    Description of the findings of the procedure: See Op Note    Findings/Key Components: See Op Note    Estimated Blood Loss: * No values recorded between 1/10/2020 12:00 AM and 1/10/2020  2:04 PM *         Specimens:   Specimen (12h ago, onward)    None          Discharge Note    SUMMARY     Admit Date: 1/10/2020    Discharge Date and Time: 1/10/2020 2:04 PM    Hospital Course Patient was seen in the preoperative area by both myself and anesthesia. All consents were verified and all questions appropriately answered. All risks, benefits and alternatives explained to patient. Patient proceeded to endoscopy suite for colonoscopy and was discharged home postoperative once cleared by anesthesia.    Final Diagnosis: Post-Op Diagnosis Codes:     * Rectal adenocarcinoma [C20]    Disposition: Home or Self Care    Follow Up/Patient Instructions: See Provation report    Medications:  Reconciled Home Medications:      Medication List      ASK your doctor about these medications    capecitabine 500 MG Tab  Commonly known as:  XELODA  Take 3 tablets (1,500 mg total) by mouth 2 (two) times daily on days of radiation only for a total of six weeks.     cyanocobalamin 1000 MCG tablet  Commonly known as:  VITAMIN B-12  Take 100 mcg by mouth once daily.     ferrous sulfate 324 mg (65 mg iron) Tbec  Take 1 tablet (324 mg total) by mouth 2 (two) times daily.     HYDROcodone-acetaminophen  mg per tablet  Commonly known as:  NORCO  Take 1 tablet by mouth every 6 (six) hours as needed for Pain.     lidocaine 4 % Gel  Apply 1 application topically 4 (four) times  daily.     losartan 50 MG tablet  Commonly known as:  COZAAR  TAKE 1 TABLET BY MOUTH EVERY MORNING     ondansetron 8 MG tablet  Commonly known as:  ZOFRAN  Take 1 tablet (8 mg total) by mouth every 8 (eight) hours as needed for Nausea.     oxyCODONE-acetaminophen 5-325 mg per tablet  Commonly known as:  PERCOCET  Take 1 tablet by mouth every 4 (four) hours as needed for Pain.     pantoprazole 40 MG tablet  Commonly known as:  PROTONIX  Take 1 tablet (40 mg total) by mouth once daily.     prochlorperazine 5 MG tablet  Commonly known as:  COMPAZINE  TAKE 1 TABLET(5 MG) BY MOUTH EVERY 6 HOURS AS NEEDED FOR NAUSEA     tamsulosin 0.4 mg Cap  Commonly known as:  FLOMAX  Take 1 capsule (0.4 mg total) by mouth once daily.          No discharge procedures on file.

## 2020-01-10 NOTE — DISCHARGE INSTRUCTIONS
Diverticulosis    Diverticulosis means that small pouches have formed in the wall of your large intestine (colon). Most often, this problem causes no symptoms and is common as people age. But the pouches in the colon are at risk of becoming infected. When this happens, the condition is called diverticulitis. Although most people with diverticulosis never develop diverticulitis, it is still not uncommon. Rectal bleeding can also occur and in less common situations, a type of colon inflammation called colitis.  While most people do not have symptoms, some people with diverticulosis may have:  · Abdominal cramps and pain  · Bloating  · Constipation  · Change in bowel habits  Causes  The exact cause of diverticulosis (and diverticulitis) has not been proved, but a few things are associated with the condition:  · Low-fiber diet  · Constipation  · Lack of exercise  Your healthcare provider will talk with you about how to manage your condition. Diet changes may be all that are needed to help control diverticulosis and prevent progression to diverticulitis. If you develop diverticulitis, you will likely need other treatments.  Home care  You may be told to take fiber supplements daily. Fiber adds bulk to the stool so that it passes through the colon more easily. Stool softeners may be recommended. You may also be given medications for pain relief. Be sure to take all medications as directed.  In the past, people were told to avoid corn, nuts, and seeds. This is no longer necessary.  Follow these guidelines when caring for yourself at home:  · Eat unprocessed foods that are high in fiber. Whole grains, fruits, and vegetables are good choices.  · Drink 6 to 8 glasses of water every day unless your healthcare provider has you limit how much fluid you should have.  · Watch for changes in your bowel movements. Tell your provider if you notice any changes.  · Begin an exercise program. Ask your provider how to get started.  Generally, walking is the best.  · Get plenty of rest and sleep.  Follow-up care  Follow up with your healthcare provider, or as advised. Regular visits may be needed to check on your health. Sometimes special procedures such as colonoscopy, are needed after an episode of diverticulitis or blooding. Be sure to keep all your appointments.  If a stool sample was taken, or cultures were done, you should be told if they are positive, or if your treatment needs to be changed. You can call as directed for the results.  If X-rays were done, a radiologist will look at them. You will be told if there is a change in your treatment.  If antibiotics were prescribed, be sure to finish them all.  When to seek medical advice  Call your healthcare provider right away if any of these occur:  · Fever of 100.4°F (38°C) or higher, or as directed by your healthcare provider  · Severe cramps in the lower left side of the abdomen or pain that is getting worse  · Tenderness in the lower left side of the abdomen or worsening pain throughout the abdomen  · Diarrhea or constipation that doesn't get better within 24 hours  · Nausea and vomiting  · Bleeding from the rectum  Call 911  Call emergency services if any of the following occur:  · Trouble breathing  · Confusion  · Very drowsy or trouble awakening  · Fainting or loss of consciousness  · Rapid heart rate  · Chest pain  Date Last Reviewed: 12/30/2015 © 2000-2017 [employer] [address] [place] [address]. All rights reserved. This information is not intended as a substitute for professional medical care. Always follow your healthcare professional's instructions.        Understanding Colon and Rectal Polyps    The colon (also called the large intestine) is a muscular tube that forms the last part of the digestive tract. It absorbs water and stores food waste. The colon is about 4 to 6 feet long. The rectum is the last 6 inches of the colon. The colon and rectum  have a smooth lining composed of millions of cells. Changes in these cells can lead to growths in the colon that can become cancerous and should be removed. Multiple tests are available to screen for colon cancer, but the colonoscopy is the most recommended test. During colonoscopy, these polyps can be removed. How often you need this test depends on many things including your condition, your family history, symptoms, and what the findings were at the previous colonoscopy.   When the colon lining changes  Changes that happen in the cells that line the colon or rectum can lead to growths called polyps. Over a period of years, polyps can turn cancerous. Removing polyps early may prevent cancer from ever forming.  Polyps  Polyps are fleshy clumps of tissue that form on the lining of the colon or rectum. Small polyps are usually benign (not cancerous). However, over time, cells in a polyp can change and become cancerous. Certain types of polyps known as adenomatous polyps are premalignant. The risk for invasive cancer increases with the size of the polyp and certain cell and gene features. This means that they can become cancerous if they're not removed. Hyperplastic polyps are benign. They can grow quite large and not turn cancerous.   Cancer  Almost all colorectal cancers start when polyp cells begin growing abnormally. As a cancerous tumor grows, it may involve more and more of the colon or rectum. In time, cancer can also grow beyond the colon or rectum and spread to nearby organs or to glands called lymph nodes. The cells can also travel to other parts of the body. This is known as metastasis. The earlier a cancerous tumor is removed, the better the chance of preventing its spread.    Date Last Reviewed: 8/1/2016  © 0751-8290 [employer]. [address] [place] [address], [place] [address]. All rights reserved. This information is not intended as a substitute for professional medical care. Always follow your  healthcare professional's instructions.

## 2020-01-10 NOTE — ANESTHESIA PREPROCEDURE EVALUATION
01/10/2020  Vincent Benton is a 64 y.o., male.    Anesthesia Evaluation    I have reviewed the Patient Summary Reports.    I have reviewed the Nursing Notes.   I have reviewed the Medications.     Review of Systems  Anesthesia Hx:  No problems with previous Anesthesia    Social:  Non-Smoker, Alcohol Use    Hematology/Oncology:  Hematology Normal       -- Cancer in past history:  Other (see Oncology comments) radiation and chemotherapy    EENT/Dental:EENT/Dental Normal   Cardiovascular:   Hypertension, well controlled    Pulmonary:  Pulmonary Normal    Renal/:  Renal/ Normal     Hepatic/GI:   Hiatal Hernia, GERD, well controlled    Musculoskeletal:  Musculoskeletal Normal    Neurological:  Neurology Normal    Endocrine:  Endocrine Normal    Dermatological:  Skin Normal    Psych:  Psychiatric Normal           Physical Exam  General:  Well nourished    Airway/Jaw/Neck:  Airway Findings: Mouth Opening: Small, but > 3cm Tongue: Normal  General Airway Assessment: Adult  Mallampati: III  TM Distance: Normal, at least 6 cm  Jaw/Neck Findings:  Neck ROM: Normal ROM      Dental:  Dental Findings: In tact   Chest/Lungs:  Chest/Lungs Findings: Clear to auscultation, Normal Respiratory Rate     Heart/Vascular:  Heart Findings: Rate: Normal  Rhythm: Regular Rhythm  Sounds: Normal        Mental Status:  Mental Status Findings:  Cooperative, Alert and Oriented         Anesthesia Plan  Type of Anesthesia, risks & benefits discussed:  Anesthesia Type:  MAC  Patient's Preference:   Intra-op Monitoring Plan: standard ASA monitors  Intra-op Monitoring Plan Comments:   Post Op Pain Control Plan:   Post Op Pain Control Plan Comments:   Induction:   IV  Beta Blocker:  Patient is not currently on a Beta-Blocker (No further documentation required).       Informed Consent: Patient understands risks and agrees with Anesthesia  plan.  Questions answered. Anesthesia consent signed with patient.  ASA Score: 3     Day of Surgery Review of History & Physical: I have interviewed and examined the patient. I have reviewed the patient's H&P dated:  There are no significant changes.          Ready For Surgery From Anesthesia Perspective.

## 2020-01-10 NOTE — INTERVAL H&P NOTE
The patient has been examined and the H&P has been reviewed:    I concur with the findings and no changes have occurred since H&P was written.   - Ok to proceed to endoscopy suite for colonoscopy  - Consent obtained. All risks, benefits and alternatives fully explained to patient, including but not limited to bleeding, infection, perforation, and missed polyps. All questions appropriately answered to patient's satisfaction. Consent signed and placed on chart.    Anesthesia/Surgery risks, benefits and alternative options discussed and understood by patient/family.          There are no hospital problems to display for this patient.

## 2020-01-13 ENCOUNTER — HOSPITAL ENCOUNTER (OUTPATIENT)
Dept: RADIOLOGY | Facility: HOSPITAL | Age: 65
Discharge: HOME | End: 2020-01-13
Attending: COLON & RECTAL SURGERY
Payer: MEDICARE

## 2020-01-13 ENCOUNTER — HOSPITAL ENCOUNTER (OUTPATIENT)
Dept: RADIOLOGY | Facility: HOSPITAL | Age: 65
Discharge: HOME | End: 2020-01-13
Attending: INTERNAL MEDICINE
Payer: MEDICARE

## 2020-01-13 DIAGNOSIS — C20 RECTAL ADENOCARCINOMA: ICD-10-CM

## 2020-01-13 DIAGNOSIS — C20 RECTAL CANCER: ICD-10-CM

## 2020-01-13 PROCEDURE — 72197 MRI PELVIS W/O & W/DYE: CPT | Mod: 26,,, | Performed by: RADIOLOGY

## 2020-01-13 PROCEDURE — 74183 MRI ABDOMEN W WO CONTRAST: ICD-10-PCS | Mod: 26,,, | Performed by: RADIOLOGY

## 2020-01-13 PROCEDURE — 72197 MRI RECTAL CANCER W W/O CONTRAST: ICD-10-PCS | Mod: 26,,, | Performed by: RADIOLOGY

## 2020-01-13 PROCEDURE — 72197 MRI PELVIS W/O & W/DYE: CPT | Mod: TC

## 2020-01-13 PROCEDURE — A9585 GADOBUTROL INJECTION: HCPCS | Performed by: INTERNAL MEDICINE

## 2020-01-13 PROCEDURE — 74183 MRI ABD W/O CNTR FLWD CNTR: CPT | Mod: TC

## 2020-01-13 PROCEDURE — 25500020 PHARM REV CODE 255: Performed by: INTERNAL MEDICINE

## 2020-01-13 PROCEDURE — 74183 MRI ABD W/O CNTR FLWD CNTR: CPT | Mod: 26,,, | Performed by: RADIOLOGY

## 2020-01-13 RX ORDER — GADOBUTROL 604.72 MG/ML
7 INJECTION INTRAVENOUS
Status: COMPLETED | OUTPATIENT
Start: 2020-01-13 | End: 2020-01-13

## 2020-01-13 RX ADMIN — GADOBUTROL 7 ML: 604.72 INJECTION INTRAVENOUS at 11:01

## 2020-01-15 LAB
FINAL PATHOLOGIC DIAGNOSIS: NORMAL
GROSS: NORMAL

## 2020-01-20 DIAGNOSIS — C20 RECTAL CANCER: ICD-10-CM

## 2020-01-20 RX ORDER — HYDROCODONE BITARTRATE AND ACETAMINOPHEN 10; 325 MG/1; MG/1
1 TABLET ORAL EVERY 6 HOURS PRN
Qty: 60 TABLET | Refills: 0 | Status: SHIPPED | OUTPATIENT
Start: 2020-01-20 | End: 2020-01-31 | Stop reason: SDUPTHER

## 2020-01-20 NOTE — TELEPHONE ENCOUNTER
Called patient to inform that med refill has been sent to [facility] for refill. Patient verbalized understanding

## 2020-01-20 NOTE — TELEPHONE ENCOUNTER
----- Message from Staci Fuentes sent at 1/20/2020 11:11 AM CST -----  .Type:  RX Refill Request    Who Called: SELF  Refill or New Rx:REFILL  RX Name and Strength:NORCO 10MG  How is the patient currently taking it? (ex. 1XDay):4 EVERY 12 HRS  Is this a 30 day or 90 day RX:30  Preferred Pharmacy with phone number:.  [address] [address]  Phone: [phone] Fax: [phone]    [address] [address]  Phone: [phone] Fax: [phone]    [facility] [address] [place] [address] [place] [address]  Phone: [phone] Fax: [phone]    Local or Mail Order:LOCAL  Ordering Provider:BEE  Would the patient rather a call back or a response via [facility]?CALL  Best Call Back Number:.[phone] ([place])   Additional Information:

## 2020-01-21 ENCOUNTER — DOCUMENTATION ONLY (OUTPATIENT)
Dept: HEMATOLOGY/ONCOLOGY | Facility: CLINIC | Age: 65
End: 2020-01-21

## 2020-01-21 ENCOUNTER — LAB VISIT (OUTPATIENT)
Dept: LAB | Facility: HOSPITAL | Age: 65
End: 2020-01-21
Attending: INTERNAL MEDICINE
Payer: MEDICARE

## 2020-01-21 ENCOUNTER — OFFICE VISIT (OUTPATIENT)
Dept: HEMATOLOGY/ONCOLOGY | Facility: CLINIC | Age: 65
End: 2020-01-21
Payer: MEDICARE

## 2020-01-21 VITALS
HEART RATE: 81 BPM | RESPIRATION RATE: 18 BRPM | BODY MASS INDEX: 22.79 KG/M2 | OXYGEN SATURATION: 97 % | WEIGHT: 159.19 LBS | DIASTOLIC BLOOD PRESSURE: 81 MMHG | TEMPERATURE: 98 F | SYSTOLIC BLOOD PRESSURE: 134 MMHG | HEIGHT: 70 IN

## 2020-01-21 DIAGNOSIS — C20 RECTAL CANCER: ICD-10-CM

## 2020-01-21 DIAGNOSIS — K62.89 RECTAL PAIN: ICD-10-CM

## 2020-01-21 LAB
ALBUMIN SERPL BCP-MCNC: 3.5 G/DL (ref 3.5–5.2)
ALP SERPL-CCNC: 115 U/L (ref 55–135)
ALT SERPL W/O P-5'-P-CCNC: 7 U/L (ref 10–44)
ANION GAP SERPL CALC-SCNC: 13 MMOL/L (ref 8–16)
AST SERPL-CCNC: 11 U/L (ref 10–40)
BASOPHILS # BLD AUTO: 0.04 K/UL (ref 0–0.2)
BASOPHILS NFR BLD: 0.6 % (ref 0–1.9)
BILIRUB SERPL-MCNC: 0.4 MG/DL (ref 0.1–1)
BUN SERPL-MCNC: 10 MG/DL (ref 8–23)
CALCIUM SERPL-MCNC: 9.4 MG/DL (ref 8.7–10.5)
CHLORIDE SERPL-SCNC: 100 MMOL/L (ref 95–110)
CO2 SERPL-SCNC: 24 MMOL/L (ref 23–29)
CREAT SERPL-MCNC: 0.7 MG/DL (ref 0.5–1.4)
DIFFERENTIAL METHOD: ABNORMAL
EOSINOPHIL # BLD AUTO: 0.1 K/UL (ref 0–0.5)
EOSINOPHIL NFR BLD: 1.1 % (ref 0–8)
ERYTHROCYTE [DISTWIDTH] IN BLOOD BY AUTOMATED COUNT: 18.2 % (ref 11.5–14.5)
EST. GFR  (AFRICAN AMERICAN): >60 ML/MIN/1.73 M^2
EST. GFR  (NON AFRICAN AMERICAN): >60 ML/MIN/1.73 M^2
GLUCOSE SERPL-MCNC: 132 MG/DL (ref 70–110)
HCT VFR BLD AUTO: 39.9 % (ref 40–54)
HGB BLD-MCNC: 12.3 G/DL (ref 14–18)
IMM GRANULOCYTES # BLD AUTO: 0.04 K/UL (ref 0–0.04)
IMM GRANULOCYTES NFR BLD AUTO: 0.6 % (ref 0–0.5)
LYMPHOCYTES # BLD AUTO: 0.6 K/UL (ref 1–4.8)
LYMPHOCYTES NFR BLD: 9.5 % (ref 18–48)
MCH RBC QN AUTO: 28.6 PG (ref 27–31)
MCHC RBC AUTO-ENTMCNC: 30.8 G/DL (ref 32–36)
MCV RBC AUTO: 93 FL (ref 82–98)
MONOCYTES # BLD AUTO: 0.6 K/UL (ref 0.3–1)
MONOCYTES NFR BLD: 8.6 % (ref 4–15)
NEUTROPHILS # BLD AUTO: 5.1 K/UL (ref 1.8–7.7)
NEUTROPHILS NFR BLD: 79.6 % (ref 38–73)
NRBC BLD-RTO: 0 /100 WBC
PLATELET # BLD AUTO: 341 K/UL (ref 150–350)
PMV BLD AUTO: 9.1 FL (ref 9.2–12.9)
POTASSIUM SERPL-SCNC: 3.9 MMOL/L (ref 3.5–5.1)
PROT SERPL-MCNC: 7.8 G/DL (ref 6–8.4)
RBC # BLD AUTO: 4.3 M/UL (ref 4.6–6.2)
SODIUM SERPL-SCNC: 137 MMOL/L (ref 136–145)
WBC # BLD AUTO: 6.42 K/UL (ref 3.9–12.7)

## 2020-01-21 PROCEDURE — 99999 PR PBB SHADOW E&M-EST. PATIENT-LVL IV: ICD-10-PCS | Mod: PBBFAC,,, | Performed by: INTERNAL MEDICINE

## 2020-01-21 PROCEDURE — 80053 COMPREHEN METABOLIC PANEL: CPT

## 2020-01-21 PROCEDURE — 36415 COLL VENOUS BLD VENIPUNCTURE: CPT

## 2020-01-21 PROCEDURE — 3008F PR BODY MASS INDEX (BMI) DOCUMENTED: ICD-10-PCS | Mod: CPTII,S$GLB,, | Performed by: INTERNAL MEDICINE

## 2020-01-21 PROCEDURE — 3075F SYST BP GE 130 - 139MM HG: CPT | Mod: CPTII,S$GLB,, | Performed by: INTERNAL MEDICINE

## 2020-01-21 PROCEDURE — 3079F DIAST BP 80-89 MM HG: CPT | Mod: CPTII,S$GLB,, | Performed by: INTERNAL MEDICINE

## 2020-01-21 PROCEDURE — 99215 OFFICE O/P EST HI 40 MIN: CPT | Mod: S$GLB,,, | Performed by: INTERNAL MEDICINE

## 2020-01-21 PROCEDURE — 99215 PR OFFICE/OUTPT VISIT, EST, LEVL V, 40-54 MIN: ICD-10-PCS | Mod: S$GLB,,, | Performed by: INTERNAL MEDICINE

## 2020-01-21 PROCEDURE — 3079F PR MOST RECENT DIASTOLIC BLOOD PRESSURE 80-89 MM HG: ICD-10-PCS | Mod: CPTII,S$GLB,, | Performed by: INTERNAL MEDICINE

## 2020-01-21 PROCEDURE — 3008F BODY MASS INDEX DOCD: CPT | Mod: CPTII,S$GLB,, | Performed by: INTERNAL MEDICINE

## 2020-01-21 PROCEDURE — 3075F PR MOST RECENT SYSTOLIC BLOOD PRESS GE 130-139MM HG: ICD-10-PCS | Mod: CPTII,S$GLB,, | Performed by: INTERNAL MEDICINE

## 2020-01-21 PROCEDURE — 99999 PR PBB SHADOW E&M-EST. PATIENT-LVL IV: CPT | Mod: PBBFAC,,, | Performed by: INTERNAL MEDICINE

## 2020-01-21 NOTE — ASSESSMENT & PLAN NOTE
Plan for surgical resection on 2/4/2020. Recent MRI of the rectum showed redemonstration of an enhancing rectal mass with persistent spiculation in the mesorectal fat. The mass measures slightly smaller. There is interval decrease in restricted diffusion in the mass suggesting at least partial treatment response.  Interval decrease in size of multiple mesorectal lymph nodes.     Plan is to initiate adjuvant therapy with FOLFOX. D/w surgery service.

## 2020-01-21 NOTE — PROGRESS NOTES
Subjective:   Date of Visit: 1/21/20   ?   ?   CHIEF COMPLAINT:  Locally advanced rectal cancer, Stage IIIB (cT3,cN2a, CM0)  ?     HPI:   was seen at [facility] today. He is a 64 yr old male with recently diagnosed locally advanced rectal cancer.  Patient has completed concurrent chemoradiation with capecitabine.  Presents today for routine follow-up.     He was seen by Dr. Gonzalez on 12/16/2019 at that time, MRI rectal cancer as well as MRI of the abdomen and without contrast were ordered.  He did undergo surgical resection on 02/04/2020.    He has completed concurrent chemotherapy and radiation.  He states that the pain around his buttock has improved.  He denies any rectal bleed.Denies any fever, chills, nausea or vomiting, chest pain or shortness of breath, diarrhea, unintentional weight loss, night sweats or dysuria.    Review of Systems   Constitutional: Negative for activity change, appetite change, chills, fatigue, fever and unexpected weight change.   HENT: Negative for hearing loss, mouth sores, nosebleeds, sore throat, tinnitus, trouble swallowing and voice change.    Eyes: Negative for visual disturbance.   Respiratory: Negative for cough, chest tightness, shortness of breath and wheezing.    Cardiovascular: Negative for chest pain, palpitations and leg swelling.   Gastrointestinal: Positive for rectal pain. Negative for abdominal pain, anal bleeding, blood in stool, constipation, diarrhea, nausea and vomiting.   Genitourinary: Negative for frequency, hematuria and testicular pain.   Musculoskeletal: Negative for arthralgias, back pain, gait problem and neck pain.   Skin: Negative for color change, pallor, rash and wound.   Allergic/Immunologic: Negative for immunocompromised state.   Neurological: Negative for seizures, syncope, weakness, numbness and headaches.   Hematological: Negative for adenopathy. Does not bruise/bleed easily.   Psychiatric/Behavioral: Negative for agitation,  confusion, decreased concentration, hallucinations and sleep disturbance. The patient is not nervous/anxious.        ?   PAST MEDICAL HISTORY:   Past Medical History:   Diagnosis Date    Alcoholism     Anemia     Back pain     Encounter for blood transfusion     Essential hypertension 2/4/2016    GERD (gastroesophageal reflux disease)     Hiatal hernia     Hypertension     Rectal cancer 9/11/2019    ?     PAST SURGICAL HISTORY:   Past Surgical History:   Procedure Laterality Date    COLONOSCOPY N/A 9/3/2019    Procedure: COLONOSCOPY;  Surgeon: Isai Centeno MD;  Location: [facility];  Service: Endoscopy;  Laterality: N/A;    COLONOSCOPY N/A 1/10/2020    Procedure: COLONOSCOPY;  Surgeon: Familia Gonzalez MD;  Location: [facility];  Service: General;  Laterality: N/A;    ESOPHAGOGASTRODUODENOSCOPY N/A 9/3/2019    Procedure: ESOPHAGOGASTRODUODENOSCOPY (EGD);  Surgeon: Isai Centeno MD;  Location: [facility];  Service: Endoscopy;  Laterality: N/A;    HIP FRACTURE SURGERY      left hip    JOINT REPLACEMENT Left       ?   ALLERGIES:   Allergies as of 01/21/2020    (No Known Allergies)      ?   MEDICATIONS:?   Outpatient Medications Marked as Taking for the 1/21/20 encounter (Office Visit) with Mikel Sams MD   Medication Sig Dispense Refill    capecitabine (XELODA) 500 MG Tab Take 3 tablets (1,500 mg total) by mouth 2 (two) times daily on days of radiation only for a total of six weeks. 180 tablet 11    cyanocobalamin (VITAMIN B-12) 1000 MCG tablet Take 100 mcg by mouth once daily.      ferrous sulfate 324 mg (65 mg iron) TbEC Take 1 tablet (324 mg total) by mouth 2 (two) times daily. 60 tablet 0    HYDROcodone-acetaminophen (NORCO)  mg per tablet Take 1 tablet by mouth every 6 (six) hours as needed for Pain. 60 tablet 0    lidocaine 4 % Gel Apply 1 application topically 4 (four) times daily. 30 g 2    losartan (COZAAR) 50 MG tablet TAKE 1 TABLET BY MOUTH EVERY MORNING 90 tablet 1     ondansetron (ZOFRAN) 8 MG tablet Take 1 tablet (8 mg total) by mouth every 8 (eight) hours as needed for Nausea. 30 tablet 2    oxyCODONE-acetaminophen (PERCOCET) 5-325 mg per tablet Take 1 tablet by mouth every 4 (four) hours as needed for Pain. 18 tablet 0    prochlorperazine (COMPAZINE) 5 MG tablet TAKE 1 TABLET(5 MG) BY MOUTH EVERY 6 HOURS AS NEEDED FOR NAUSEA 385 tablet 1    tamsulosin (FLOMAX) 0.4 mg Cap Take 1 capsule (0.4 mg total) by mouth once daily. 30 capsule 6     Current Facility-Administered Medications for the 1/21/20 encounter (Office Visit) with Mikel Sams MD   Medication Dose Route Frequency Provider Last Rate Last Dose    lidocaine (PF) 10 mg/ml (1%) injection 10 mg  1 mL Intradermal Once Familia Gonzalez MD          ?   SOCIAL HISTORY:?   Social History     Tobacco Use    Smoking status: Never Smoker    Smokeless tobacco: Never Used   Substance Use Topics    Alcohol use: Yes     Comment: states he drinks frequently everyday/quit approx 10/2017        ?   FAMILY HISTORY:   family history includes Cataracts in his mother; Hypertension in his father; Stroke in his father.   ?     Objective:      Physical Exam   Constitutional: He is oriented to person, place, and time. He appears well-developed and well-nourished. No distress.   HENT:   Head: Normocephalic and atraumatic.   Right Ear: External ear normal.   Left Ear: External ear normal.   Mouth/Throat: No oropharyngeal exudate.   Eyes: Pupils are equal, round, and reactive to light. Conjunctivae are normal. Right eye exhibits no discharge. Left eye exhibits no discharge. No scleral icterus.   Neck: Normal range of motion. Neck supple. No thyromegaly present.   Cardiovascular: Normal rate, regular rhythm and normal heart sounds.   No murmur heard.  Pulmonary/Chest: Effort normal and breath sounds normal. No respiratory distress. He has no wheezes. He exhibits no tenderness.   Abdominal: Soft. Bowel sounds are normal. He  exhibits no distension and no mass. There is tenderness. There is no rebound.   Musculoskeletal: Normal range of motion. He exhibits no edema or tenderness.   Lymphadenopathy:     He has no cervical adenopathy.        Right cervical: No superficial cervical adenopathy present.       Left cervical: No superficial cervical adenopathy present.        Right axillary: No pectoral adenopathy present.        Left axillary: No pectoral adenopathy present.No inguinal adenopathy noted on the right or left side.        Right: No supraclavicular adenopathy present.        Left: No supraclavicular adenopathy present.   Neurological: He is alert and oriented to person, place, and time. No cranial nerve deficit or sensory deficit.   Skin: Skin is warm and dry. Capillary refill takes 2 to 3 seconds. No rash noted. No erythema. No pallor.   Psychiatric: He has a normal mood and affect. His behavior is normal. Judgment normal.       ?   Vitals:    01/21/20 1013   BP: 134/81   Pulse: 81   Resp: 18   Temp: 97.9 °F (36.6 °C)      ?     ECOG SCORE               ?   Laboratory:  ?   Lab Visit on 01/21/2020   Component Date Value Ref Range Status    WBC 01/21/2020 6.42  3.90 - 12.70 K/uL Final    RBC 01/21/2020 4.30* 4.60 - 6.20 M/uL Final    Hemoglobin 01/21/2020 12.3* 14.0 - 18.0 g/dL Final    Hematocrit 01/21/2020 39.9* 40.0 - 54.0 % Final    Mean Corpuscular Volume 01/21/2020 93  82 - 98 fL Final    Mean Corpuscular Hemoglobin 01/21/2020 28.6  27.0 - 31.0 pg Final    Mean Corpuscular Hemoglobin Conc 01/21/2020 30.8* 32.0 - 36.0 g/dL Final    RDW 01/21/2020 18.2* 11.5 - 14.5 % Final    Platelets 01/21/2020 341  150 - 350 K/uL Final    MPV 01/21/2020 9.1* 9.2 - 12.9 fL Final    Immature Granulocytes 01/21/2020 0.6* 0.0 - 0.5 % Final    Gran # (ANC) 01/21/2020 5.1  1.8 - 7.7 K/uL Final    Immature Grans (Abs) 01/21/2020 0.04  0.00 - 0.04 K/uL Final    Lymph # 01/21/2020 0.6* 1.0 - 4.8 K/uL Final    Mono # 01/21/2020 0.6   0.3 - 1.0 K/uL Final    Eos # 01/21/2020 0.1  0.0 - 0.5 K/uL Final    Baso # 01/21/2020 0.04  0.00 - 0.20 K/uL Final    nRBC 01/21/2020 0  0 /100 WBC Final    Gran% 01/21/2020 79.6* 38.0 - 73.0 % Final    Lymph% 01/21/2020 9.5* 18.0 - 48.0 % Final    Mono% 01/21/2020 8.6  4.0 - 15.0 % Final    Eosinophil% 01/21/2020 1.1  0.0 - 8.0 % Final    Basophil% 01/21/2020 0.6  0.0 - 1.9 % Final    Differential Method 01/21/2020 Automated   Final      ?   Tumor markers   ?   ?   Imaging: MRI Rectal Cancer W W/O Contrast  Narrative: EXAMINATION:  MRI RECTAL CANCER W W/O CONTRAST    CLINICAL HISTORY:  known rectal cancer, s/p chemoXRT;  Malignant neoplasm of rectum    TECHNIQUE:  Multiplanar multi sequence images of the pelvis were obtained per rectal tumor protocol. 7 cc of IV Gadavist was injected.    COMPARISON:  None    FINDINGS:  Again seen is mid rectal mass.  There is persistent spiculation within the surrounding fat adjacent to the mass.  Spiculation extends to within 7 mm from the mesorectal fascia.  The mass demonstrates persistent enhancement with interval decrease in restricted diffusion suggesting partial treatment response.  No significant loss of normal mass signal intensity or fibrosis.  The mass currently on the order of 3.7 x 3.2 cm.  I remeasure the mass on the previous exam at 4.0 x 3.0 cm in axial dimension.    There is interval decrease in size of a 5.2 mm left perirectal lymph node with the node not clearly visualized on today's exam.  Additional lymph node in the right mesorectum measures 2.4 mm, decreased from 4.7 mm.  A lymph node in the right mesorectum on image 5 of series 12 measures 3.5 mm in short axis, also decreased.  It previously measured 5 mm in short axis.  Other nodes also appear smaller.  Small amount of pelvic fluid is noted.  Impression: Redemonstration of an enhancing rectal mass with persistent spiculation in the mesorectal fat.  The mass measures slightly smaller. There is  interval decrease in restricted diffusion in the mass suggesting at least partial treatment response.  Interval decrease in size of multiple mesorectal lymph nodes as above.    Electronically signed by: Yariel Frank MD  Date:    01/13/2020  Time:    12:23  MRI Abdomen W WO Contrast  Narrative: EXAMINATION:  MRI ABDOMEN W WO CONTRAST    CLINICAL HISTORY:  rectal cancer;  Malignant neoplasm of rectum    TECHNIQUE:  Multiplanar multisequence the abdomen was performed before and after the administration of 7 cc IV Gadavist    COMPARISON:  CT from last and from September 2019. PET-CT from 09/25/2019.    FINDINGS:  A previously noted area of focal fatty infiltration in the left hepatic lobe is less conspicuous on today's exam.  No corresponding enhancement is seen in this region.  No concerning focal hepatic lesion.  Hepatic steatosis is noted.    No gallstones.  The spleen is within normal limits.  Mild prominence of the pancreatic duct which measures up to 3.8 mm.  No obstructing lesion is seen.  Please correlate with history and lab values.  Adrenals and kidneys are within normal limits.  No bowel obstruction.  No ascites.  No lymphadenopathy.    Aorta and IVC are within normal limits.  Main portal vein remains patent.  Common bile duct tapers normally.    Patchy opacities at the left base.  This may relate to scarring infiltrate could also mimic such an appearance..  Bones remain intact with scoliosis of the spine visualized.  Impression: No detrimental change since 09/19/2019. No evidence of metastatic disease.  Additional findings as above.    Electronically signed by: Yariel Frank MD  Date:    01/13/2020  Time:    11:49     ?      Pathology:  Pathology Results  (Last 10 years)               01/10/20 1354  Specimen to Pathology, Surgery Gastrointestinal tract Final result    Narrative:  Pre-op Diagnosis: Rectal adenocarcinoma [C20]   Procedure(s):   COLONOSCOPY   Number of specimens: 1   Name of specimens:   1.   Descending colon polyp   Specimen total (fresh, frozen, permanent):->1              ?   Assessment/Plan:         Rectal cancer  Plan for surgical resection on 2/4/2020. Recent MRI of the rectum showed redemonstration of an enhancing rectal mass with persistent spiculation in the mesorectal fat. The mass measures slightly smaller. There is interval decrease in restricted diffusion in the mass suggesting at least partial treatment response.  Interval decrease in size of multiple mesorectal lymph nodes.     Plan is to initiate adjuvant therapy with FOLFOX. D/w surgery service.     Rectal pain  Improving per patient.       Follow-Up: No follow-ups on file.    HOMA GAMBOA Md., Ph.D  Hematology & Oncology Department  Phone #: [phone]

## 2020-01-21 NOTE — PROGRESS NOTES
Met with pt to f/u. He asked about additional financial assistance. [employer] helped with [employer] last year; will assist with [employer] jl to help offset cost of treatment by keeping mortgage current. He was made aware this is a one-time jl. He will bring a mortgage receipt to [facility] this week and he signed the application. Should he need continued financial assistance, he can work with [facility] and [facility] will also look to see if anything else comes available to assist him. He expressed appreciation. Will continue to f/u.

## 2020-01-27 ENCOUNTER — LAB VISIT (OUTPATIENT)
Dept: LAB | Facility: HOSPITAL | Age: 65
End: 2020-01-27
Attending: COLON & RECTAL SURGERY
Payer: MEDICARE

## 2020-01-27 ENCOUNTER — OFFICE VISIT (OUTPATIENT)
Dept: SURGERY | Facility: CLINIC | Age: 65
End: 2020-01-27
Payer: MEDICARE

## 2020-01-27 ENCOUNTER — CLINICAL SUPPORT (OUTPATIENT)
Dept: CARDIOLOGY | Facility: CLINIC | Age: 65
End: 2020-01-27
Payer: MEDICARE

## 2020-01-27 VITALS
DIASTOLIC BLOOD PRESSURE: 75 MMHG | WEIGHT: 156.94 LBS | SYSTOLIC BLOOD PRESSURE: 129 MMHG | HEART RATE: 102 BPM | BODY MASS INDEX: 22.52 KG/M2 | TEMPERATURE: 98 F

## 2020-01-27 DIAGNOSIS — C20 RECTAL ADENOCARCINOMA: ICD-10-CM

## 2020-01-27 DIAGNOSIS — Z01.818 PRE-OP TESTING: ICD-10-CM

## 2020-01-27 DIAGNOSIS — C20 RECTAL ADENOCARCINOMA: Primary | ICD-10-CM

## 2020-01-27 DIAGNOSIS — Z01.818 PRE-OP TESTING: Primary | ICD-10-CM

## 2020-01-27 DIAGNOSIS — D50.0 IRON DEFICIENCY ANEMIA DUE TO CHRONIC BLOOD LOSS: ICD-10-CM

## 2020-01-27 PROCEDURE — 3008F PR BODY MASS INDEX (BMI) DOCUMENTED: ICD-10-PCS | Mod: CPTII,S$GLB,, | Performed by: COLON & RECTAL SURGERY

## 2020-01-27 PROCEDURE — 3008F BODY MASS INDEX DOCD: CPT | Mod: CPTII,S$GLB,, | Performed by: COLON & RECTAL SURGERY

## 2020-01-27 PROCEDURE — 99214 OFFICE O/P EST MOD 30 MIN: CPT | Mod: S$GLB,,, | Performed by: COLON & RECTAL SURGERY

## 2020-01-27 PROCEDURE — 99214 PR OFFICE/OUTPT VISIT, EST, LEVL IV, 30-39 MIN: ICD-10-PCS | Mod: S$GLB,,, | Performed by: COLON & RECTAL SURGERY

## 2020-01-27 PROCEDURE — 93010 EKG 12-LEAD: ICD-10-PCS | Mod: S$GLB,,, | Performed by: INTERNAL MEDICINE

## 2020-01-27 PROCEDURE — 93010 ELECTROCARDIOGRAM REPORT: CPT | Mod: S$GLB,,, | Performed by: INTERNAL MEDICINE

## 2020-01-27 PROCEDURE — 3074F SYST BP LT 130 MM HG: CPT | Mod: CPTII,S$GLB,, | Performed by: COLON & RECTAL SURGERY

## 2020-01-27 PROCEDURE — 36415 COLL VENOUS BLD VENIPUNCTURE: CPT

## 2020-01-27 PROCEDURE — 3078F PR MOST RECENT DIASTOLIC BLOOD PRESSURE < 80 MM HG: ICD-10-PCS | Mod: CPTII,S$GLB,, | Performed by: COLON & RECTAL SURGERY

## 2020-01-27 PROCEDURE — 3074F PR MOST RECENT SYSTOLIC BLOOD PRESSURE < 130 MM HG: ICD-10-PCS | Mod: CPTII,S$GLB,, | Performed by: COLON & RECTAL SURGERY

## 2020-01-27 PROCEDURE — 82378 CARCINOEMBRYONIC ANTIGEN: CPT

## 2020-01-27 PROCEDURE — 93005 EKG 12-LEAD: ICD-10-PCS | Mod: S$GLB,,, | Performed by: COLON & RECTAL SURGERY

## 2020-01-27 PROCEDURE — 3078F DIAST BP <80 MM HG: CPT | Mod: CPTII,S$GLB,, | Performed by: COLON & RECTAL SURGERY

## 2020-01-27 PROCEDURE — 93005 ELECTROCARDIOGRAM TRACING: CPT | Mod: S$GLB,,, | Performed by: COLON & RECTAL SURGERY

## 2020-01-27 PROCEDURE — 99999 PR PBB SHADOW E&M-EST. PATIENT-LVL III: CPT | Mod: PBBFAC,,, | Performed by: COLON & RECTAL SURGERY

## 2020-01-27 PROCEDURE — 99999 PR PBB SHADOW E&M-EST. PATIENT-LVL III: ICD-10-PCS | Mod: PBBFAC,,, | Performed by: COLON & RECTAL SURGERY

## 2020-01-27 RX ORDER — NEOMYCIN SULFATE 500 MG/1
1000 TABLET ORAL 3 TIMES DAILY
Qty: 6 TABLET | Refills: 0 | Status: ON HOLD | OUTPATIENT
Start: 2020-01-27 | End: 2020-02-04

## 2020-01-27 RX ORDER — METRONIDAZOLE 500 MG/1
500 TABLET ORAL 3 TIMES DAILY
Qty: 3 TABLET | Refills: 0 | Status: ON HOLD | OUTPATIENT
Start: 2020-01-27 | End: 2020-02-04

## 2020-01-27 RX ORDER — POLYETHYLENE GLYCOL 3350 17 G/17G
238 POWDER, FOR SOLUTION ORAL DAILY
Qty: 238 G | Refills: 0 | Status: ON HOLD | OUTPATIENT
Start: 2020-01-27 | End: 2020-02-04

## 2020-01-27 NOTE — PROGRESS NOTES
History & Physical    SUBJECTIVE:     Chief Complaint   Patient presents with    Post-op Evaluation     6 Week Follow Up   Ref MD: Isai Centeno MD    History of Present Illness:  Patient is a 64 y.o. male presents for evaluation of a rectal mass.  Patient has been having iron deficiency anemia that did require transfusion around 1 year ago.  He also has had associated hematochezia for over a year now but did improve after he quit drinking beer at 8 months ago but is still intermittently present. This prompted a colonoscopy which was performed on 09/03/2019 where an obstructing rectal mass was seen that appeared malignant was biopsied and could not be traversed.  Patient reports that he had a colonoscopy around 6-7 years ago were some benign polyps were found but no malignancy.  These records are not available.  He states that the hematochezia has recently improved after he quit drinking beer, but is still intermittently present and worsen with the bowel prep from the colonoscopy recently.  He is not on any chronic anticoagulation.  He states he has had normal caliber bowel movements does not feel constipated or obstructed.  He denies any fever, chills, nausea, vomiting, diarrhea, constipation, weight loss, night sweats or any other signs or symptoms.    12/4/19: Completed Neoadj chemoXRT    Interval history:  Since last clinic visit, the patient underwent repeat MRI showing partial response to neoadjuvant chemo XRT.  Continues to have some difficulties with bowel movements although the stools more normal in size in caliber than pre treatment.  He is tolerating regular diet.  He denies any fever, chills, nausea, vomiting.    PMHx: HTN, GERD  PSHx: L hip sx  Fam Hx: No CRC or IBD; +colonic polyps in brother  All: NKDA  SocHx: previous etoh; no tob or illicits; works as a     Review of patient's allergies indicates:  No Known Allergies    Current Outpatient Medications   Medication Sig Dispense Refill     capecitabine (XELODA) 500 MG Tab Take 3 tablets (1,500 mg total) by mouth 2 (two) times daily on days of radiation only for a total of six weeks. 180 tablet 11    cyanocobalamin (VITAMIN B-12) 1000 MCG tablet Take 100 mcg by mouth once daily.      ferrous sulfate 324 mg (65 mg iron) TbEC Take 1 tablet (324 mg total) by mouth 2 (two) times daily. 60 tablet 0    HYDROcodone-acetaminophen (NORCO)  mg per tablet Take 1 tablet by mouth every 6 (six) hours as needed for Pain. 60 tablet 0    lidocaine 4 % Gel Apply 1 application topically 4 (four) times daily. 30 g 2    losartan (COZAAR) 50 MG tablet TAKE 1 TABLET BY MOUTH EVERY MORNING 90 tablet 1    ondansetron (ZOFRAN) 8 MG tablet Take 1 tablet (8 mg total) by mouth every 8 (eight) hours as needed for Nausea. 30 tablet 2    oxyCODONE-acetaminophen (PERCOCET) 5-325 mg per tablet Take 1 tablet by mouth every 4 (four) hours as needed for Pain. 18 tablet 0    prochlorperazine (COMPAZINE) 5 MG tablet TAKE 1 TABLET(5 MG) BY MOUTH EVERY 6 HOURS AS NEEDED FOR NAUSEA 385 tablet 1    tamsulosin (FLOMAX) 0.4 mg Cap Take 1 capsule (0.4 mg total) by mouth once daily. 30 capsule 6    metroNIDAZOLE (FLAGYL) 500 MG tablet Take 1 tablet (500 mg total) by mouth 3 (three) times daily. Take initial dose@2pm, second dose@3pm and final dose@10pm day before surgery 3 tablet 0    neomycin (MYCIFRADIN) 500 mg Tab Take 2 tablets (1,000 mg total) by mouth 3 (three) times daily. Take 1st dose@2pm,take 2nd dose@3pm, take last dose@10pm day before surgery 6 tablet 0    pantoprazole (PROTONIX) 40 MG tablet Take 1 tablet (40 mg total) by mouth once daily. 30 tablet 11    polyethylene glycol (GLYCOLAX) 17 gram/dose powder Take 238 g by mouth once daily. Mix with 2L of gatorade, drink all mixed solution starting@12pm day before surgery, finish by 10pm 238 g 0     Current Facility-Administered Medications   Medication Dose Route Frequency Provider Last Rate Last Dose    lidocaine (PF)  10 mg/ml (1%) injection 10 mg  1 mL Intradermal Once Familia Gonzalez MD         Facility-Administered Medications Ordered in Other Visits   Medication Dose Route Frequency Provider Last Rate Last Dose    lactated ringers infusion   Intravenous Continuous Shilpa Yee MD        lactated ringers infusion   Intravenous Continuous Familia Gonzalez MD        sodium chloride 0.9% flush 3 mL  3 mL Intravenous PRN Shilpa Yee MD           Past Medical History:   Diagnosis Date    Alcoholism     Anemia     Back pain     Encounter for blood transfusion     Essential hypertension 2/4/2016    GERD (gastroesophageal reflux disease)     Hiatal hernia     Hypertension     Rectal cancer 9/11/2019     Past Surgical History:   Procedure Laterality Date    COLONOSCOPY N/A 9/3/2019    Procedure: COLONOSCOPY;  Surgeon: Isai Centeno MD;  Location: [facility];  Service: Endoscopy;  Laterality: N/A;    COLONOSCOPY N/A 1/10/2020    Procedure: COLONOSCOPY;  Surgeon: Familia Gonzalez MD;  Location: [facility];  Service: General;  Laterality: N/A;    ESOPHAGOGASTRODUODENOSCOPY N/A 9/3/2019    Procedure: ESOPHAGOGASTRODUODENOSCOPY (EGD);  Surgeon: Isai Centeno MD;  Location: [facility];  Service: Endoscopy;  Laterality: N/A;    HIP FRACTURE SURGERY      left hip    JOINT REPLACEMENT Left      Family History   Problem Relation Age of Onset    Cataracts Mother     Stroke Father     Hypertension Father      Social History     Tobacco Use    Smoking status: Never Smoker    Smokeless tobacco: Never Used   Substance Use Topics    Alcohol use: Yes     Comment: states he drinks frequently everyday/quit approx 10/2017    Drug use: No        Review of Systems:  Review of Systems   Constitutional: Negative for activity change, appetite change, chills, fatigue, fever and unexpected weight change.   HENT: Negative for congestion, ear pain, sore throat and trouble swallowing.    Eyes: Negative for pain, redness and  itching.   Respiratory: Negative for cough, shortness of breath and wheezing.    Cardiovascular: Negative for chest pain, palpitations and leg swelling.   Gastrointestinal: Negative for abdominal distention, abdominal pain, anal bleeding, blood in stool, constipation, diarrhea, nausea, rectal pain and vomiting.   Endocrine: Negative for cold intolerance, heat intolerance and polyuria.   Genitourinary: Negative for dysuria, flank pain, frequency and hematuria.   Musculoskeletal: Negative for gait problem, joint swelling and neck pain.   Skin: Negative for color change, rash and wound.   Allergic/Immunologic: Negative for environmental allergies and immunocompromised state.   Neurological: Negative for dizziness, speech difficulty, weakness and numbness.   Psychiatric/Behavioral: Negative for agitation, confusion and hallucinations.       OBJECTIVE:     Vital Signs (Most Recent)  Temp: 97.6 °F (36.4 °C) (01/27/20 1021)  Pulse: 102 (01/27/20 1021)  BP: 129/75 (01/27/20 1021)     71.2 kg (156 lb 15.5 oz)     Physical Exam:  Physical Exam   Constitutional: He is oriented to person, place, and time. He appears well-developed.   HENT:   Head: Normocephalic and atraumatic.   Eyes: Conjunctivae and EOM are normal.   Neck: Normal range of motion. No thyromegaly present.   Cardiovascular: Normal rate and regular rhythm.   Pulmonary/Chest: Effort normal. No respiratory distress.   Abdominal: Soft. He exhibits no distension and no mass. There is no tenderness. There is no rebound and no guarding. No hernia.   Genitourinary:   Genitourinary Comments: Anorectal: STEVEN deferred due to patient request   Musculoskeletal: Normal range of motion. He exhibits no edema or tenderness.   Neurological: He is alert and oriented to person, place, and time.   Skin: Skin is warm and dry. Capillary refill takes less than 2 seconds. No rash noted.   Psychiatric: He has a normal mood and affect.     Laboratory  Lab Results   Component Value Date     WBC 6.42 01/21/2020    HGB 12.3 (L) 01/21/2020    HCT 39.9 (L) 01/21/2020     01/21/2020    CHOL 138 04/07/2018    TRIG 38 04/07/2018    HDL 72 04/07/2018    ALT 7 (L) 01/21/2020    AST 11 01/21/2020     01/21/2020    K 3.9 01/21/2020     01/21/2020    CREATININE 0.7 01/21/2020    BUN 10 01/21/2020    CO2 24 01/21/2020    TSH 0.601 04/07/2018    INR 0.9 11/06/2018         Diagnostic Results:  Reviewed colonoscopy report and images with rectal mass appreciated     MRI Pelvis Sept 2019:  FINDINGS:  Approximately 6.5 cm from the anal verge is a T2 hyperintense mass in the mid rectum.  This results in circumferential narrowing of the rectum.  Tumor extends for approximately 3 cm in oblique craniocaudal dimension.  It measures 3.6 cm and greater such transverse dimension by approximately 2.7 cm AP.  The mass demonstrates a somewhat mushroom appearance along its inferior margin.  There is hyperenhancement and stranding in the adjacent fat with spiculation/nodularity suggestive of extramural spread of disease.  This extends for approximately 5.5 mm minimum.  Distance from the tumor to the mesorectal fascia measures on the order of 2.1 cm    There is a 5.2 mm left perirectal lymph node seen on image 11 of series 8 with heterogeneous appearance.  This is located approximately 1.7 mm from the mesorectal fascia.  Margins appear slightly irregular.    Additional lymph node is seen inferior to the rectum on image 12 of series 8.  This measures 4.7 mm.  Other rounded appearing lymph nodes are seen in the right mesorectum on image 16 of series 8 1 of these measures 5 mm and the other measures 5.1 mm.    The intersphincteric complex is maintained.    Relationship to anterior peritoneal reflection: Appears to partially straddle the APR    Tumor Extent: Appears to extend beyond mucosa.  There is spiculation perirectal fat.  Distance tumor to the mesorectal fascia is 2.1 cm    Lymph Nodes:    There are perirectal  lymph nodes greater than 5 mmin the mesorectal fat. The distance of the closest lymph node to the mesorectal fascia is 1.7 mm.  Please see above discussion.    There are no extra-mesorectal nodes lymph nodes in the visualized pelvis.    There is no evident vascular invasion.      Impression       Mid rectal mass with findings suspicious for extension into the mesorectal fat with tumor extending approximately 5.5 mm with associated spiculation of the fat noted.  There are suspicious mesorectal lymph nodes with the largest 1 measuring just over 5 mm and located 1.7 mm from the mesorectal fascia.       CT C/A/P Sept 2019:  FINDINGS:  Chest:    There is elevation the right hemidiaphragm and bibasilar areas of parenchymal lung scarring or atelectasis.  A more confluent area of irregular masslike opacity and air bronchograms is identified in the posterior left lower lobe which has increased from prior.  There are low-grade ground-glass opacities in the bilateral lower lobes, right middle lobe, and to a lesser degree in the upper lobes.  No pleural effusion.    There is a borderline enlarged 10 mm in short axis precarinal lymph node.  Subcentimeter nodes are present elsewhere in the mediastinum and bilateral axilla.  Aorta, heart, and pericardium are unremarkable.    Abdomen/pelvis:    There is fatty infiltration of the liver.  There is a newly developed ill-defined focal hypodensity in the posteromedial segment of the left hepatic lobe measuring 3.1 x 2.7 x 1.3 cm.  No radiopaque gallstones.  Prominence of the common bile duct measuring up to 12 mm and pancreatic duct measuring up to 6 mm, unchanged from prior.  No visible intraductal stones.  Main portal vein is patent.    The spleen is nonenlarged.  No evidence of pancreatic mass or inflammation.  Kidneys enhance symmetrically.  Adrenals are unremarkable.    There is circumferential masslike thickening of the rectum.  No evidence of bowel obstruction.  Multiple  bilateral subcentimeter in short axis mesorectal and internal iliac lymph nodes are identified.  No evidence of pathologic inguinal or external iliac lymphadenopathy.  There is sigmoid diverticulosis.  Stomach and visualized small bowel loops are unremarkable.  Normal appendix.  No intraperitoneal free air or fluid.  Fat containing periumbilical hernia.    There is aortoiliac atherosclerosis.  No evidence of aneurysm.  Shotty subcentimeter lymph nodes in the periaortic retroperitoneum.    The bladder is unremarkable.  Prostate mildly enlarged with calcifications.  Seminal vesicles unremarkable.    Skeletal:    There is degenerative change in the spine and right hip.  Intramedullary nail present in the left hip.  No suspicious intraosseous lesions.      Impression       1. Rectal mass highly suspicious for malignancy.  2. Multiple bilateral mesorectal and internal iliac lymph nodes concerning for metastasis.  3. Ill-defined hypodensity in the left lobe of the liver, new since 2017 and concerning for metastasis.  4. Indeterminate developing area of masslike opacity in the posterior left lung base.  Possible infection/inflammation versus neoplasm.     MRI Liver:  FINDINGS:  The liver demonstrates a subtle hypointense T2 isointense T1 signal area in the deep aspect of the left hepatic lobe, medial segment.  This area measures approximately 11 x 18 mm axially.    The lesion displays a decrease in signal intensity on the out of phase gradient echo sequence suggesting fat containing focal fatty infiltration.    Following contrast administration there is displays mild relative hypoenhancement with respect to the adjacent parenchyma.  No hyperenhancement is seen.    The adjacent gallbladder appears normal.    Bile ducts are nondilated.      Impression       Probable focal fatty infiltration of the medial segment of the left hepatic lobe.          PET Scan:  FINDINGS:  Head/neck: There is normal physiologic FDG uptake noted  within the visualized brain parenchyma. No FDG avid lymphadenopathy within the neck.    Chest: There are ill-defined patchy and nodular parenchymal opacities again noted in the left lower lobe exhibiting low level FDG uptake with an SUV max of 2.5.  A new 13 mm ground-glass opacity is noted in the lateral left upper lobe exhibiting weak FDG avidity with an SUV max of 1.9.  Similar newly developed ground-glass opacity present in the mid right lung adjacent to the major fissure with SUV max of 1.6. No FDG avid mediastinal, hilar or axillary lymph nodes.    Abdomen/Pelvis: Normal physiologic FDG uptake noted within the liver, spleen, urinary tract, and bowel.Focal hypodensity is again noted in the left hepatic lobe which is non FDG avid and favored to represent focal fatty infiltration.  An intermediate length segment of circumferential mural thickening is again noted at the rectosigmoid junction which exhibits intense hypermetabolism and a SUV max of 11.8.  A hypermetabolic lymph node adjacent to the left pelvic sidewall has a SUV max of 5.3.    Skeletal: No FDG avid osseus lesions identified.      Impression       1. Hypermetabolic rectal mass consistent with known rectal malignancy.  2. Hypermetabolic regional lymph node adjacent to left pelvic sidewall.  3. Non FDG avid focal hypodensity in the liver favored to represent focal fatty infiltration.  4. Bilateral lung opacities most likely infectious or inflammatory etiology.  Follow-up recommended.     MRI Rectal Cancer/Pelvis Jan 2020:  FINDINGS:  Again seen is mid rectal mass.  There is persistent spiculation within the surrounding fat adjacent to the mass.  Spiculation extends to within 7 mm from the mesorectal fascia.  The mass demonstrates persistent enhancement with interval decrease in restricted diffusion suggesting partial treatment response.  No significant loss of normal mass signal intensity or fibrosis.  The mass currently on the order of 3.7 x 3.2 cm.   I remeasure the mass on the previous exam at 4.0 x 3.0 cm in axial dimension.    There is interval decrease in size of a 5.2 mm left perirectal lymph node with the node not clearly visualized on today's exam.  Additional lymph node in the right mesorectum measures 2.4 mm, decreased from 4.7 mm.  A lymph node in the right mesorectum on image 5 of series 12 measures 3.5 mm in short axis, also decreased.  It previously measured 5 mm in short axis.  Other nodes also appear smaller.  Small amount of pelvic fluid is noted.      Impression       Redemonstration of an enhancing rectal mass with persistent spiculation in the mesorectal fat.  The mass measures slightly smaller. There is interval decrease in restricted diffusion in the mass suggesting at least partial treatment response.  Interval decrease in size of multiple mesorectal lymph nodes as above.     CT Abd/Pelvis (Dec 2020):  FINDINGS:  ABDOMEN:    - Lung bases: Atelectasis at the lung bases.  Airspace disease at the left lung base consistent with a left lower lobe infiltrate/pneumonia.    - Liver: Low-density fatty infiltrated liver.    - Gallbladder: No calcified gallstones.    - Bile Ducts: No evidence of intra or extra hepatic biliary ductal dilation.    - Spleen: Negative.    - Kidneys: No mass or hydronephrosis.    - Adrenals: Unremarkable.    - Pancreas: Fatty infiltrated pancreas.    - Retroperitoneum:  No significant adenopathy.    - Vascular: Scattered atherosclerotic calcifications of the abdominal aorta and proximal iliac vessels.    - Abdominal wall:  4 cm fat containing periumbilical hernia.    PELVIS:    No pelvic mass, adenopathy, or free fluid.    BOWEL/MESENTERY:    Scattered sigmoidal diverticulum.  Diverticulum of the ascending transverse and descending colon.  No radiographic evidence of diverticulitis.  The appendix appears normal.    BONES:  Multilevel degenerative changes of the thoracolumbar spine with disc space narrowing seen most  significantly at the L1-2 L4-5 and L5-S1 levels.  Dynamic hip screw placement on the left      Impression       No evidence of a small-bowel obstruction.  Distension of the duodenum to the level of the 2nd stage.  Distal to that point no small bowel obstruction.  Diverticulosis without radiographic evidence of diverticulitis.  Normal appendix.  Normal gallbladder.  Low-density fatty infiltrated liver.  Increased density at the left lung base can not exclude a focal infiltrate/pneumonia versus atelectasis.       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  - Pt will require CT chest to rule out any metastatic disease of the lungs is was not included on his recent CT scan  - will send CEA level as well today  - All risks, benefits and alternatives fully explained to patient. Risks include, but are not limited to, bleeding, infection, anastomotic leak, damage to ureter, damage to other intra-abdominal organs such as colon, rectum, small bowel, stomach, liver, bladder, reproductive organs, sexual dysfunction, urinary dysfunction, postoperative abscess, conversion to open operation, perioperative MI, CVA and death. Mediport insertion carries additional risk of damage to vessels, arrhythmias, port malposition, port infection requiring removal, postoperative abscess, pneumothorax requiring chest tube insertion, among others.  All questions field and appropriately answered to patient's satisfaction.  Consent signed and placed on chart.  - mechanical and oral antibiotic bowel prep prior to surgery  - ERAS protocol  - RTC postop    Familia Gonzalez MD  Colon and Rectal Surgery  [facility]  [place] [facility] [place]

## 2020-01-27 NOTE — H&P (VIEW-ONLY)
History & Physical    SUBJECTIVE:     Chief Complaint   Patient presents with    Post-op Evaluation     6 Week Follow Up   Ref MD: Isai Centeno MD    History of Present Illness:  Patient is a 64 y.o. male presents for evaluation of a rectal mass.  Patient has been having iron deficiency anemia that did require transfusion around 1 year ago.  He also has had associated hematochezia for over a year now but did improve after he quit drinking beer at 8 months ago but is still intermittently present. This prompted a colonoscopy which was performed on 09/03/2019 where an obstructing rectal mass was seen that appeared malignant was biopsied and could not be traversed.  Patient reports that he had a colonoscopy around 6-7 years ago were some benign polyps were found but no malignancy.  These records are not available.  He states that the hematochezia has recently improved after he quit drinking beer, but is still intermittently present and worsen with the bowel prep from the colonoscopy recently.  He is not on any chronic anticoagulation.  He states he has had normal caliber bowel movements does not feel constipated or obstructed.  He denies any fever, chills, nausea, vomiting, diarrhea, constipation, weight loss, night sweats or any other signs or symptoms.    12/4/19: Completed Neoadj chemoXRT    Interval history:  Since last clinic visit, the patient underwent repeat MRI showing partial response to neoadjuvant chemo XRT.  Continues to have some difficulties with bowel movements although the stools more normal in size in caliber than pre treatment.  He is tolerating regular diet.  He denies any fever, chills, nausea, vomiting.    PMHx: HTN, GERD  PSHx: L hip sx  Fam Hx: No CRC or IBD; +colonic polyps in brother  All: NKDA  SocHx: previous etoh; no tob or illicits; works as a     Review of patient's allergies indicates:  No Known Allergies    Current Outpatient Medications   Medication Sig Dispense Refill     capecitabine (XELODA) 500 MG Tab Take 3 tablets (1,500 mg total) by mouth 2 (two) times daily on days of radiation only for a total of six weeks. 180 tablet 11    cyanocobalamin (VITAMIN B-12) 1000 MCG tablet Take 100 mcg by mouth once daily.      ferrous sulfate 324 mg (65 mg iron) TbEC Take 1 tablet (324 mg total) by mouth 2 (two) times daily. 60 tablet 0    HYDROcodone-acetaminophen (NORCO)  mg per tablet Take 1 tablet by mouth every 6 (six) hours as needed for Pain. 60 tablet 0    lidocaine 4 % Gel Apply 1 application topically 4 (four) times daily. 30 g 2    losartan (COZAAR) 50 MG tablet TAKE 1 TABLET BY MOUTH EVERY MORNING 90 tablet 1    ondansetron (ZOFRAN) 8 MG tablet Take 1 tablet (8 mg total) by mouth every 8 (eight) hours as needed for Nausea. 30 tablet 2    oxyCODONE-acetaminophen (PERCOCET) 5-325 mg per tablet Take 1 tablet by mouth every 4 (four) hours as needed for Pain. 18 tablet 0    prochlorperazine (COMPAZINE) 5 MG tablet TAKE 1 TABLET(5 MG) BY MOUTH EVERY 6 HOURS AS NEEDED FOR NAUSEA 385 tablet 1    tamsulosin (FLOMAX) 0.4 mg Cap Take 1 capsule (0.4 mg total) by mouth once daily. 30 capsule 6    metroNIDAZOLE (FLAGYL) 500 MG tablet Take 1 tablet (500 mg total) by mouth 3 (three) times daily. Take initial dose@2pm, second dose@3pm and final dose@10pm day before surgery 3 tablet 0    neomycin (MYCIFRADIN) 500 mg Tab Take 2 tablets (1,000 mg total) by mouth 3 (three) times daily. Take 1st dose@2pm,take 2nd dose@3pm, take last dose@10pm day before surgery 6 tablet 0    pantoprazole (PROTONIX) 40 MG tablet Take 1 tablet (40 mg total) by mouth once daily. 30 tablet 11    polyethylene glycol (GLYCOLAX) 17 gram/dose powder Take 238 g by mouth once daily. Mix with 2L of gatorade, drink all mixed solution starting@12pm day before surgery, finish by 10pm 238 g 0     Current Facility-Administered Medications   Medication Dose Route Frequency Provider Last Rate Last Dose    lidocaine (PF)  10 mg/ml (1%) injection 10 mg  1 mL Intradermal Once Familia Gonzalez MD         Facility-Administered Medications Ordered in Other Visits   Medication Dose Route Frequency Provider Last Rate Last Dose    lactated ringers infusion   Intravenous Continuous Shilpa Yee MD        lactated ringers infusion   Intravenous Continuous Familia Gonzalez MD        sodium chloride 0.9% flush 3 mL  3 mL Intravenous PRN Shilpa Yee MD           Past Medical History:   Diagnosis Date    Alcoholism     Anemia     Back pain     Encounter for blood transfusion     Essential hypertension 2/4/2016    GERD (gastroesophageal reflux disease)     Hiatal hernia     Hypertension     Rectal cancer 9/11/2019     Past Surgical History:   Procedure Laterality Date    COLONOSCOPY N/A 9/3/2019    Procedure: COLONOSCOPY;  Surgeon: Isai Centeno MD;  Location: [facility];  Service: Endoscopy;  Laterality: N/A;    COLONOSCOPY N/A 1/10/2020    Procedure: COLONOSCOPY;  Surgeon: Familia Gonzalez MD;  Location: [facility];  Service: General;  Laterality: N/A;    ESOPHAGOGASTRODUODENOSCOPY N/A 9/3/2019    Procedure: ESOPHAGOGASTRODUODENOSCOPY (EGD);  Surgeon: Isai Centeno MD;  Location: [facility];  Service: Endoscopy;  Laterality: N/A;    HIP FRACTURE SURGERY      left hip    JOINT REPLACEMENT Left      Family History   Problem Relation Age of Onset    Cataracts Mother     Stroke Father     Hypertension Father      Social History     Tobacco Use    Smoking status: Never Smoker    Smokeless tobacco: Never Used   Substance Use Topics    Alcohol use: Yes     Comment: states he drinks frequently everyday/quit approx 10/2017    Drug use: No        Review of Systems:  Review of Systems   Constitutional: Negative for activity change, appetite change, chills, fatigue, fever and unexpected weight change.   HENT: Negative for congestion, ear pain, sore throat and trouble swallowing.    Eyes: Negative for pain, redness and  itching.   Respiratory: Negative for cough, shortness of breath and wheezing.    Cardiovascular: Negative for chest pain, palpitations and leg swelling.   Gastrointestinal: Negative for abdominal distention, abdominal pain, anal bleeding, blood in stool, constipation, diarrhea, nausea, rectal pain and vomiting.   Endocrine: Negative for cold intolerance, heat intolerance and polyuria.   Genitourinary: Negative for dysuria, flank pain, frequency and hematuria.   Musculoskeletal: Negative for gait problem, joint swelling and neck pain.   Skin: Negative for color change, rash and wound.   Allergic/Immunologic: Negative for environmental allergies and immunocompromised state.   Neurological: Negative for dizziness, speech difficulty, weakness and numbness.   Psychiatric/Behavioral: Negative for agitation, confusion and hallucinations.       OBJECTIVE:     Vital Signs (Most Recent)  Temp: 97.6 °F (36.4 °C) (01/27/20 1021)  Pulse: 102 (01/27/20 1021)  BP: 129/75 (01/27/20 1021)     71.2 kg (156 lb 15.5 oz)     Physical Exam:  Physical Exam   Constitutional: He is oriented to person, place, and time. He appears well-developed.   HENT:   Head: Normocephalic and atraumatic.   Eyes: Conjunctivae and EOM are normal.   Neck: Normal range of motion. No thyromegaly present.   Cardiovascular: Normal rate and regular rhythm.   Pulmonary/Chest: Effort normal. No respiratory distress.   Abdominal: Soft. He exhibits no distension and no mass. There is no tenderness. There is no rebound and no guarding. No hernia.   Genitourinary:   Genitourinary Comments: Anorectal: STEVEN deferred due to patient request   Musculoskeletal: Normal range of motion. He exhibits no edema or tenderness.   Neurological: He is alert and oriented to person, place, and time.   Skin: Skin is warm and dry. Capillary refill takes less than 2 seconds. No rash noted.   Psychiatric: He has a normal mood and affect.     Laboratory  Lab Results   Component Value Date     WBC 6.42 01/21/2020    HGB 12.3 (L) 01/21/2020    HCT 39.9 (L) 01/21/2020     01/21/2020    CHOL 138 04/07/2018    TRIG 38 04/07/2018    HDL 72 04/07/2018    ALT 7 (L) 01/21/2020    AST 11 01/21/2020     01/21/2020    K 3.9 01/21/2020     01/21/2020    CREATININE 0.7 01/21/2020    BUN 10 01/21/2020    CO2 24 01/21/2020    TSH 0.601 04/07/2018    INR 0.9 11/06/2018         Diagnostic Results:  Reviewed colonoscopy report and images with rectal mass appreciated     MRI Pelvis Sept 2019:  FINDINGS:  Approximately 6.5 cm from the anal verge is a T2 hyperintense mass in the mid rectum.  This results in circumferential narrowing of the rectum.  Tumor extends for approximately 3 cm in oblique craniocaudal dimension.  It measures 3.6 cm and greater such transverse dimension by approximately 2.7 cm AP.  The mass demonstrates a somewhat mushroom appearance along its inferior margin.  There is hyperenhancement and stranding in the adjacent fat with spiculation/nodularity suggestive of extramural spread of disease.  This extends for approximately 5.5 mm minimum.  Distance from the tumor to the mesorectal fascia measures on the order of 2.1 cm    There is a 5.2 mm left perirectal lymph node seen on image 11 of series 8 with heterogeneous appearance.  This is located approximately 1.7 mm from the mesorectal fascia.  Margins appear slightly irregular.    Additional lymph node is seen inferior to the rectum on image 12 of series 8.  This measures 4.7 mm.  Other rounded appearing lymph nodes are seen in the right mesorectum on image 16 of series 8 1 of these measures 5 mm and the other measures 5.1 mm.    The intersphincteric complex is maintained.    Relationship to anterior peritoneal reflection: Appears to partially straddle the APR    Tumor Extent: Appears to extend beyond mucosa.  There is spiculation perirectal fat.  Distance tumor to the mesorectal fascia is 2.1 cm    Lymph Nodes:    There are perirectal  lymph nodes greater than 5 mmin the mesorectal fat. The distance of the closest lymph node to the mesorectal fascia is 1.7 mm.  Please see above discussion.    There are no extra-mesorectal nodes lymph nodes in the visualized pelvis.    There is no evident vascular invasion.      Impression       Mid rectal mass with findings suspicious for extension into the mesorectal fat with tumor extending approximately 5.5 mm with associated spiculation of the fat noted.  There are suspicious mesorectal lymph nodes with the largest 1 measuring just over 5 mm and located 1.7 mm from the mesorectal fascia.       CT C/A/P Sept 2019:  FINDINGS:  Chest:    There is elevation the right hemidiaphragm and bibasilar areas of parenchymal lung scarring or atelectasis.  A more confluent area of irregular masslike opacity and air bronchograms is identified in the posterior left lower lobe which has increased from prior.  There are low-grade ground-glass opacities in the bilateral lower lobes, right middle lobe, and to a lesser degree in the upper lobes.  No pleural effusion.    There is a borderline enlarged 10 mm in short axis precarinal lymph node.  Subcentimeter nodes are present elsewhere in the mediastinum and bilateral axilla.  Aorta, heart, and pericardium are unremarkable.    Abdomen/pelvis:    There is fatty infiltration of the liver.  There is a newly developed ill-defined focal hypodensity in the posteromedial segment of the left hepatic lobe measuring 3.1 x 2.7 x 1.3 cm.  No radiopaque gallstones.  Prominence of the common bile duct measuring up to 12 mm and pancreatic duct measuring up to 6 mm, unchanged from prior.  No visible intraductal stones.  Main portal vein is patent.    The spleen is nonenlarged.  No evidence of pancreatic mass or inflammation.  Kidneys enhance symmetrically.  Adrenals are unremarkable.    There is circumferential masslike thickening of the rectum.  No evidence of bowel obstruction.  Multiple  bilateral subcentimeter in short axis mesorectal and internal iliac lymph nodes are identified.  No evidence of pathologic inguinal or external iliac lymphadenopathy.  There is sigmoid diverticulosis.  Stomach and visualized small bowel loops are unremarkable.  Normal appendix.  No intraperitoneal free air or fluid.  Fat containing periumbilical hernia.    There is aortoiliac atherosclerosis.  No evidence of aneurysm.  Shotty subcentimeter lymph nodes in the periaortic retroperitoneum.    The bladder is unremarkable.  Prostate mildly enlarged with calcifications.  Seminal vesicles unremarkable.    Skeletal:    There is degenerative change in the spine and right hip.  Intramedullary nail present in the left hip.  No suspicious intraosseous lesions.      Impression       1. Rectal mass highly suspicious for malignancy.  2. Multiple bilateral mesorectal and internal iliac lymph nodes concerning for metastasis.  3. Ill-defined hypodensity in the left lobe of the liver, new since 2017 and concerning for metastasis.  4. Indeterminate developing area of masslike opacity in the posterior left lung base.  Possible infection/inflammation versus neoplasm.     MRI Liver:  FINDINGS:  The liver demonstrates a subtle hypointense T2 isointense T1 signal area in the deep aspect of the left hepatic lobe, medial segment.  This area measures approximately 11 x 18 mm axially.    The lesion displays a decrease in signal intensity on the out of phase gradient echo sequence suggesting fat containing focal fatty infiltration.    Following contrast administration there is displays mild relative hypoenhancement with respect to the adjacent parenchyma.  No hyperenhancement is seen.    The adjacent gallbladder appears normal.    Bile ducts are nondilated.      Impression       Probable focal fatty infiltration of the medial segment of the left hepatic lobe.          PET Scan:  FINDINGS:  Head/neck: There is normal physiologic FDG uptake noted  within the visualized brain parenchyma. No FDG avid lymphadenopathy within the neck.    Chest: There are ill-defined patchy and nodular parenchymal opacities again noted in the left lower lobe exhibiting low level FDG uptake with an SUV max of 2.5.  A new 13 mm ground-glass opacity is noted in the lateral left upper lobe exhibiting weak FDG avidity with an SUV max of 1.9.  Similar newly developed ground-glass opacity present in the mid right lung adjacent to the major fissure with SUV max of 1.6. No FDG avid mediastinal, hilar or axillary lymph nodes.    Abdomen/Pelvis: Normal physiologic FDG uptake noted within the liver, spleen, urinary tract, and bowel.Focal hypodensity is again noted in the left hepatic lobe which is non FDG avid and favored to represent focal fatty infiltration.  An intermediate length segment of circumferential mural thickening is again noted at the rectosigmoid junction which exhibits intense hypermetabolism and a SUV max of 11.8.  A hypermetabolic lymph node adjacent to the left pelvic sidewall has a SUV max of 5.3.    Skeletal: No FDG avid osseus lesions identified.      Impression       1. Hypermetabolic rectal mass consistent with known rectal malignancy.  2. Hypermetabolic regional lymph node adjacent to left pelvic sidewall.  3. Non FDG avid focal hypodensity in the liver favored to represent focal fatty infiltration.  4. Bilateral lung opacities most likely infectious or inflammatory etiology.  Follow-up recommended.     MRI Rectal Cancer/Pelvis Jan 2020:  FINDINGS:  Again seen is mid rectal mass.  There is persistent spiculation within the surrounding fat adjacent to the mass.  Spiculation extends to within 7 mm from the mesorectal fascia.  The mass demonstrates persistent enhancement with interval decrease in restricted diffusion suggesting partial treatment response.  No significant loss of normal mass signal intensity or fibrosis.  The mass currently on the order of 3.7 x 3.2 cm.   I remeasure the mass on the previous exam at 4.0 x 3.0 cm in axial dimension.    There is interval decrease in size of a 5.2 mm left perirectal lymph node with the node not clearly visualized on today's exam.  Additional lymph node in the right mesorectum measures 2.4 mm, decreased from 4.7 mm.  A lymph node in the right mesorectum on image 5 of series 12 measures 3.5 mm in short axis, also decreased.  It previously measured 5 mm in short axis.  Other nodes also appear smaller.  Small amount of pelvic fluid is noted.      Impression       Redemonstration of an enhancing rectal mass with persistent spiculation in the mesorectal fat.  The mass measures slightly smaller. There is interval decrease in restricted diffusion in the mass suggesting at least partial treatment response.  Interval decrease in size of multiple mesorectal lymph nodes as above.     CT Abd/Pelvis (Dec 2020):  FINDINGS:  ABDOMEN:    - Lung bases: Atelectasis at the lung bases.  Airspace disease at the left lung base consistent with a left lower lobe infiltrate/pneumonia.    - Liver: Low-density fatty infiltrated liver.    - Gallbladder: No calcified gallstones.    - Bile Ducts: No evidence of intra or extra hepatic biliary ductal dilation.    - Spleen: Negative.    - Kidneys: No mass or hydronephrosis.    - Adrenals: Unremarkable.    - Pancreas: Fatty infiltrated pancreas.    - Retroperitoneum:  No significant adenopathy.    - Vascular: Scattered atherosclerotic calcifications of the abdominal aorta and proximal iliac vessels.    - Abdominal wall:  4 cm fat containing periumbilical hernia.    PELVIS:    No pelvic mass, adenopathy, or free fluid.    BOWEL/MESENTERY:    Scattered sigmoidal diverticulum.  Diverticulum of the ascending transverse and descending colon.  No radiographic evidence of diverticulitis.  The appendix appears normal.    BONES:  Multilevel degenerative changes of the thoracolumbar spine with disc space narrowing seen most  significantly at the L1-2 L4-5 and L5-S1 levels.  Dynamic hip screw placement on the left      Impression       No evidence of a small-bowel obstruction.  Distension of the duodenum to the level of the 2nd stage.  Distal to that point no small bowel obstruction.  Diverticulosis without radiographic evidence of diverticulitis.  Normal appendix.  Normal gallbladder.  Low-density fatty infiltrated liver.  Increased density at the left lung base can not exclude a focal infiltrate/pneumonia versus atelectasis.       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  - Pt will require CT chest to rule out any metastatic disease of the lungs is was not included on his recent CT scan  - will send CEA level as well today  - All risks, benefits and alternatives fully explained to patient. Risks include, but are not limited to, bleeding, infection, anastomotic leak, damage to ureter, damage to other intra-abdominal organs such as colon, rectum, small bowel, stomach, liver, bladder, reproductive organs, sexual dysfunction, urinary dysfunction, postoperative abscess, conversion to open operation, perioperative MI, CVA and death. Mediport insertion carries additional risk of damage to vessels, arrhythmias, port malposition, port infection requiring removal, postoperative abscess, pneumothorax requiring chest tube insertion, among others.  All questions field and appropriately answered to patient's satisfaction.  Consent signed and placed on chart.  - mechanical and oral antibiotic bowel prep prior to surgery  - ERAS protocol  - RTC postop    Familia Gonzalez MD  Colon and Rectal Surgery  [facility] [place]

## 2020-01-28 LAB — CEA SERPL-MCNC: 4.5 NG/ML (ref 0–5)

## 2020-01-31 ENCOUNTER — HOSPITAL ENCOUNTER (OUTPATIENT)
Dept: RADIOLOGY | Facility: HOSPITAL | Age: 65
Discharge: HOME | End: 2020-01-31
Attending: COLON & RECTAL SURGERY
Payer: MEDICARE

## 2020-01-31 DIAGNOSIS — C20 RECTAL CANCER: ICD-10-CM

## 2020-01-31 DIAGNOSIS — C20 RECTAL ADENOCARCINOMA: ICD-10-CM

## 2020-01-31 PROCEDURE — 71250 CT THORAX DX C-: CPT | Mod: TC

## 2020-01-31 RX ORDER — HYDROCODONE BITARTRATE AND ACETAMINOPHEN 10; 325 MG/1; MG/1
1 TABLET ORAL EVERY 6 HOURS PRN
Qty: 60 TABLET | Refills: 0 | Status: ON HOLD | OUTPATIENT
Start: 2020-01-31 | End: 2020-02-09 | Stop reason: HOSPADM

## 2020-01-31 NOTE — PRE ADMISSION SCREENING
Pre op instructions reviewed with patient per phone:    To confirm, Your surgeon has instructed you:  Surgery is scheduled 02/04/20 at 0700.      [facility] main lobby by 0530.   Pre admit office to call afternoon prior to surgery with final arrival time      INSTRUCTIONS IMPORTANT!!!  ¨ Do not eat, drink, or smoke after 12 midnight-including water. OK to brush teeth, no gum, candy or mints!    Pt instructed to be on a clear liquid diet the entire day prior to surgery, 02/03/20. Pt verbalized understanding.     ¨ Take only these medicines with a small swallow of water-morning of surgery.    INSTRUCTED PT TO TAKE THE FOLLOWING MEDICATIONS THE NIGHT PRIOR TO SURGERY, 01/03/20: FLAGYL: 1 TAB @1400, 1500 & 2200; NEOMYCIN: 2 TABS @ 1400, 1500 & 2200. FURTHER INSTRUCTED TO MIX GLYCOLAX POWDER WITH 2L OF GATORADE, BEGIN DRINKING @ 1200 AND FINISH BY 2200. PT VERBALIZED UNDERSTANDING ALL INSTRUCTIONS.    N/A  ____  Do not wear makeup, including mascara.  ____  No powder, lotions or creams to surgical area.  ____  Please remove all jewelry, including piercings and leave at home.  ____  No money or valuables needed. Please leave at home.  ____  Please bring identification and insurance information to hospital.  ____  If going home the same day, arrange for a ride home. You will not be able to   drive if Anesthesia was used.  ____  Children, under 12 years old, must remain in the waiting room with an adult.  They are not allowed in patient areas.  ____  Wear loose fitting clothing. Allow for dressings, bandages.  ____  Stop Aspirin, Ibuprofen, Motrin and Aleve at least 5-7 days before surgery, unless otherwise instructed by your doctor, or the nurse.   You MAY use Tylenol/acetaminophen until day of surgery.  ____  If you take diabetic medication, do not take am of surgery unless instructed by   Doctor.  ____ Stop taking any Fish Oil supplement or any Vitamins that contain  Vitamin E at least 5 days prior to surgery.          Bathing Instructions-- The night before surgery and the morning prior to coming to the hospital:   -Do not shave the surgical area.   -Shower and wash your hair and body as usual with anti-bacterial  soap and shampoo.   -Rinse your hair and body completely.   -Use one packet of hibiclens to wash the surgical site (using your hand) gently for 5 minutes.  Do not scrub you skin too hard.   -Do not use hibiclens on your head, face, or genitals.   -Do not wash with anti-bacterial soap after you use the hibiclens.   -Rinse your body thoroughly.   -Dry with clean, soft towel.  Do not use lotion, cream, deodorant, or powders on   the surgical site.    Use antibacterial soap in place of hibiclens if your surgery is on the head, face or genitals.         Surgical Site Infection    Prevention of surgical site infections:     -Keep incisions clean and dry.   -Do not soak/submerge incisions in water until completely healed.   -Do not apply lotions, powders, creams, or deodorants to site.   -Always make sure hands are cleaned with antibacterial soap/ alcohol-based   prior to touching the surgical site.  (This includes doctors, nurses, staff, and yourself.)    Signs and symptoms:   -Redness and pain around the area where you had surgery   -Drainage of cloudy fluid from your surgical wound   -Fever over 100.4  I have read or had read and explained to me, and understand the above information.

## 2020-02-03 DIAGNOSIS — C20 RECTAL CANCER: ICD-10-CM

## 2020-02-03 DIAGNOSIS — R91.1 LUNG NODULE: ICD-10-CM

## 2020-02-03 DIAGNOSIS — C20 RECTAL CANCER: Primary | ICD-10-CM

## 2020-02-03 RX ORDER — HYDROCODONE BITARTRATE AND ACETAMINOPHEN 10; 325 MG/1; MG/1
1 TABLET ORAL EVERY 6 HOURS PRN
Qty: 60 TABLET | Refills: 0 | OUTPATIENT
Start: 2020-02-03

## 2020-02-04 ENCOUNTER — ANESTHESIA EVENT (OUTPATIENT)
Dept: SURGERY | Facility: HOSPITAL | Age: 65
DRG: 331 | End: 2020-02-04
Payer: MEDICARE

## 2020-02-04 ENCOUNTER — HOSPITAL ENCOUNTER (INPATIENT)
Facility: HOSPITAL | Age: 65
LOS: 5 days | Discharge: HOME HEALTH | DRG: 331 | End: 2020-02-09
Attending: COLON & RECTAL SURGERY | Admitting: COLON & RECTAL SURGERY
Payer: MEDICARE

## 2020-02-04 ENCOUNTER — ANESTHESIA (OUTPATIENT)
Dept: SURGERY | Facility: HOSPITAL | Age: 65
DRG: 331 | End: 2020-02-04
Payer: MEDICARE

## 2020-02-04 DIAGNOSIS — C20 RECTAL ADENOCARCINOMA: ICD-10-CM

## 2020-02-04 DIAGNOSIS — C20 RECTAL CANCER: Primary | ICD-10-CM

## 2020-02-04 PROBLEM — I10 ESSENTIAL HYPERTENSION: Chronic | Status: ACTIVE | Noted: 2020-02-04

## 2020-02-04 LAB
ABO + RH BLD: NORMAL
BLD GP AB SCN CELLS X3 SERPL QL: NORMAL

## 2020-02-04 PROCEDURE — 63600175 PHARM REV CODE 636 W HCPCS: Performed by: NURSE ANESTHETIST, CERTIFIED REGISTERED

## 2020-02-04 PROCEDURE — 36561 INSERT TUNNELED CV CATH: CPT | Mod: 51,RT,, | Performed by: COLON & RECTAL SURGERY

## 2020-02-04 PROCEDURE — 36561 PR INSERT TUNNELED CV CATH WITH PORT: ICD-10-PCS | Mod: 51,RT,, | Performed by: COLON & RECTAL SURGERY

## 2020-02-04 PROCEDURE — 77001 FLUOROGUIDE FOR VEIN DEVICE: CPT | Mod: 26,59,, | Performed by: COLON & RECTAL SURGERY

## 2020-02-04 PROCEDURE — 88309 TISSUE EXAM BY PATHOLOGIST: CPT | Performed by: PATHOLOGY

## 2020-02-04 PROCEDURE — 44213 LAP MOBIL SPLENIC FL ADD-ON: CPT | Mod: ,,, | Performed by: COLON & RECTAL SURGERY

## 2020-02-04 PROCEDURE — 88305 TISSUE EXAM BY PATHOLOGIST: ICD-10-PCS | Mod: 26,,, | Performed by: PATHOLOGY

## 2020-02-04 PROCEDURE — 36000712 HC OR TIME LEV V 1ST 15 MIN: Performed by: COLON & RECTAL SURGERY

## 2020-02-04 PROCEDURE — 86850 RBC ANTIBODY SCREEN: CPT

## 2020-02-04 PROCEDURE — C1788 PORT, INDWELLING, IMP: HCPCS | Performed by: COLON & RECTAL SURGERY

## 2020-02-04 PROCEDURE — 25000003 PHARM REV CODE 250: Performed by: NURSE ANESTHETIST, CERTIFIED REGISTERED

## 2020-02-04 PROCEDURE — 25000003 PHARM REV CODE 250: Performed by: COLON & RECTAL SURGERY

## 2020-02-04 PROCEDURE — 88309 PR  SURG PATH,LEVEL VI: ICD-10-PCS | Mod: 26,,, | Performed by: PATHOLOGY

## 2020-02-04 PROCEDURE — S0030 INJECTION, METRONIDAZOLE: HCPCS | Performed by: COLON & RECTAL SURGERY

## 2020-02-04 PROCEDURE — 63600175 PHARM REV CODE 636 W HCPCS: Performed by: COLON & RECTAL SURGERY

## 2020-02-04 PROCEDURE — 27201423 OPTIME MED/SURG SUP & DEVICES STERILE SUPPLY: Performed by: COLON & RECTAL SURGERY

## 2020-02-04 PROCEDURE — 44213 PR LAP, SURG MOBIL SPLENIC FL DUR PTL COLECTOMY: ICD-10-PCS | Mod: ,,, | Performed by: COLON & RECTAL SURGERY

## 2020-02-04 PROCEDURE — 88305 TISSUE EXAM BY PATHOLOGIST: CPT | Mod: 59 | Performed by: PATHOLOGY

## 2020-02-04 PROCEDURE — 63600175 PHARM REV CODE 636 W HCPCS: Performed by: ANESTHESIOLOGY

## 2020-02-04 PROCEDURE — 44187 PR LAP, SURG ILEO/JEJUNO-STOMY: ICD-10-PCS | Mod: 51,,, | Performed by: COLON & RECTAL SURGERY

## 2020-02-04 PROCEDURE — 44187 LAP ILEO/JEJUNO-STOMY: CPT | Mod: 51,,, | Performed by: COLON & RECTAL SURGERY

## 2020-02-04 PROCEDURE — 99900035 HC TECH TIME PER 15 MIN (STAT)

## 2020-02-04 PROCEDURE — 36000713 HC OR TIME LEV V EA ADD 15 MIN: Performed by: COLON & RECTAL SURGERY

## 2020-02-04 PROCEDURE — 44207 PR LAP,SURG,COLECTOMY,W/ANAST: ICD-10-PCS | Mod: ,,, | Performed by: COLON & RECTAL SURGERY

## 2020-02-04 PROCEDURE — 88309 TISSUE EXAM BY PATHOLOGIST: CPT | Mod: 26,,, | Performed by: PATHOLOGY

## 2020-02-04 PROCEDURE — 37000008 HC ANESTHESIA 1ST 15 MINUTES: Performed by: COLON & RECTAL SURGERY

## 2020-02-04 PROCEDURE — 11000001 HC ACUTE MED/SURG PRIVATE ROOM

## 2020-02-04 PROCEDURE — 37000009 HC ANESTHESIA EA ADD 15 MINS: Performed by: COLON & RECTAL SURGERY

## 2020-02-04 PROCEDURE — 44207 L COLECTOMY/COLOPROCTOSTOMY: CPT | Mod: ,,, | Performed by: COLON & RECTAL SURGERY

## 2020-02-04 PROCEDURE — 71000033 HC RECOVERY, INTIAL HOUR: Performed by: COLON & RECTAL SURGERY

## 2020-02-04 PROCEDURE — 77001 CHG FLUOROGUIDE CNTRL VEN ACCESS,PLACE,REPLACE,REMOVE: ICD-10-PCS | Mod: 26,59,, | Performed by: COLON & RECTAL SURGERY

## 2020-02-04 PROCEDURE — 71000039 HC RECOVERY, EACH ADD'L HOUR: Performed by: COLON & RECTAL SURGERY

## 2020-02-04 PROCEDURE — C9290 INJ, BUPIVACAINE LIPOSOME: HCPCS | Performed by: COLON & RECTAL SURGERY

## 2020-02-04 PROCEDURE — 88305 TISSUE EXAM BY PATHOLOGIST: CPT | Mod: 26,,, | Performed by: PATHOLOGY

## 2020-02-04 DEVICE — PORT POWER CLEAR VIEW
Type: IMPLANTABLE DEVICE | Site: SUBCLAVIAN | Status: NON-FUNCTIONAL
Removed: 2020-10-09

## 2020-02-04 RX ORDER — METRONIDAZOLE 500 MG/100ML
500 INJECTION, SOLUTION INTRAVENOUS
Status: COMPLETED | OUTPATIENT
Start: 2020-02-04 | End: 2020-02-04

## 2020-02-04 RX ORDER — PANTOPRAZOLE SODIUM 40 MG/1
40 TABLET, DELAYED RELEASE ORAL DAILY
Status: DISCONTINUED | OUTPATIENT
Start: 2020-02-05 | End: 2020-02-08

## 2020-02-04 RX ORDER — PROPOFOL 10 MG/ML
VIAL (ML) INTRAVENOUS
Status: DISCONTINUED | OUTPATIENT
Start: 2020-02-04 | End: 2020-02-04

## 2020-02-04 RX ORDER — GABAPENTIN 300 MG/1
300 CAPSULE ORAL 3 TIMES DAILY
Status: DISCONTINUED | OUTPATIENT
Start: 2020-02-04 | End: 2020-02-09 | Stop reason: HOSPADM

## 2020-02-04 RX ORDER — AMOXICILLIN 250 MG
1 CAPSULE ORAL 2 TIMES DAILY
Status: DISCONTINUED | OUTPATIENT
Start: 2020-02-04 | End: 2020-02-09 | Stop reason: HOSPADM

## 2020-02-04 RX ORDER — LIDOCAINE HYDROCHLORIDE 10 MG/ML
INJECTION, SOLUTION EPIDURAL; INFILTRATION; INTRACAUDAL; PERINEURAL
Status: DISCONTINUED | OUTPATIENT
Start: 2020-02-04 | End: 2020-02-04

## 2020-02-04 RX ORDER — CELECOXIB 100 MG/1
400 CAPSULE ORAL
Status: COMPLETED | OUTPATIENT
Start: 2020-02-04 | End: 2020-02-04

## 2020-02-04 RX ORDER — OXYCODONE HYDROCHLORIDE 5 MG/1
5 TABLET ORAL EVERY 4 HOURS PRN
Status: DISCONTINUED | OUTPATIENT
Start: 2020-02-04 | End: 2020-02-05

## 2020-02-04 RX ORDER — ACETAMINOPHEN 500 MG
1000 TABLET ORAL ONCE
Status: COMPLETED | OUTPATIENT
Start: 2020-02-04 | End: 2020-02-04

## 2020-02-04 RX ORDER — LOSARTAN POTASSIUM 50 MG/1
50 TABLET ORAL DAILY
Status: DISCONTINUED | OUTPATIENT
Start: 2020-02-05 | End: 2020-02-08

## 2020-02-04 RX ORDER — HEPARIN SODIUM 5000 [USP'U]/ML
5000 INJECTION, SOLUTION INTRAVENOUS; SUBCUTANEOUS
Status: COMPLETED | OUTPATIENT
Start: 2020-02-04 | End: 2020-02-04

## 2020-02-04 RX ORDER — ONDANSETRON 2 MG/ML
4 INJECTION INTRAMUSCULAR; INTRAVENOUS EVERY 6 HOURS PRN
Status: DISCONTINUED | OUTPATIENT
Start: 2020-02-04 | End: 2020-02-09 | Stop reason: HOSPADM

## 2020-02-04 RX ORDER — FENTANYL CITRATE 50 UG/ML
INJECTION, SOLUTION INTRAMUSCULAR; INTRAVENOUS
Status: DISCONTINUED | OUTPATIENT
Start: 2020-02-04 | End: 2020-02-04

## 2020-02-04 RX ORDER — BUPIVACAINE HYDROCHLORIDE 2.5 MG/ML
INJECTION, SOLUTION EPIDURAL; INFILTRATION; INTRACAUDAL
Status: DISCONTINUED | OUTPATIENT
Start: 2020-02-04 | End: 2020-02-04 | Stop reason: HOSPADM

## 2020-02-04 RX ORDER — SODIUM CHLORIDE, SODIUM LACTATE, POTASSIUM CHLORIDE, CALCIUM CHLORIDE 600; 310; 30; 20 MG/100ML; MG/100ML; MG/100ML; MG/100ML
INJECTION, SOLUTION INTRAVENOUS CONTINUOUS
Status: DISCONTINUED | OUTPATIENT
Start: 2020-02-04 | End: 2020-02-07

## 2020-02-04 RX ORDER — SODIUM CHLORIDE, SODIUM LACTATE, POTASSIUM CHLORIDE, CALCIUM CHLORIDE 600; 310; 30; 20 MG/100ML; MG/100ML; MG/100ML; MG/100ML
INJECTION, SOLUTION INTRAVENOUS CONTINUOUS PRN
Status: DISCONTINUED | OUTPATIENT
Start: 2020-02-04 | End: 2020-02-04

## 2020-02-04 RX ORDER — ONDANSETRON 2 MG/ML
4 INJECTION INTRAMUSCULAR; INTRAVENOUS EVERY 12 HOURS PRN
Status: DISCONTINUED | OUTPATIENT
Start: 2020-02-04 | End: 2020-02-04 | Stop reason: HOSPADM

## 2020-02-04 RX ORDER — ONDANSETRON 2 MG/ML
INJECTION INTRAMUSCULAR; INTRAVENOUS
Status: DISCONTINUED | OUTPATIENT
Start: 2020-02-04 | End: 2020-02-04

## 2020-02-04 RX ORDER — TAMSULOSIN HYDROCHLORIDE 0.4 MG/1
0.4 CAPSULE ORAL DAILY
Status: DISCONTINUED | OUTPATIENT
Start: 2020-02-05 | End: 2020-02-08

## 2020-02-04 RX ORDER — OXYCODONE HYDROCHLORIDE 5 MG/1
10 TABLET ORAL EVERY 4 HOURS PRN
Status: DISCONTINUED | OUTPATIENT
Start: 2020-02-04 | End: 2020-02-05

## 2020-02-04 RX ORDER — DEXAMETHASONE SODIUM PHOSPHATE 4 MG/ML
INJECTION, SOLUTION INTRA-ARTICULAR; INTRALESIONAL; INTRAMUSCULAR; INTRAVENOUS; SOFT TISSUE
Status: DISCONTINUED | OUTPATIENT
Start: 2020-02-04 | End: 2020-02-04

## 2020-02-04 RX ORDER — CHLORHEXIDINE GLUCONATE ORAL RINSE 1.2 MG/ML
10 SOLUTION DENTAL 2 TIMES DAILY
Status: DISCONTINUED | OUTPATIENT
Start: 2020-02-04 | End: 2020-02-09 | Stop reason: HOSPADM

## 2020-02-04 RX ORDER — ACETAMINOPHEN 10 MG/ML
1000 INJECTION, SOLUTION INTRAVENOUS EVERY 8 HOURS
Status: COMPLETED | OUTPATIENT
Start: 2020-02-04 | End: 2020-02-05

## 2020-02-04 RX ORDER — GABAPENTIN 400 MG/1
800 CAPSULE ORAL
Status: COMPLETED | OUTPATIENT
Start: 2020-02-04 | End: 2020-02-04

## 2020-02-04 RX ORDER — HYDROMORPHONE HYDROCHLORIDE 2 MG/ML
0.2 INJECTION, SOLUTION INTRAMUSCULAR; INTRAVENOUS; SUBCUTANEOUS EVERY 5 MIN PRN
Status: DISCONTINUED | OUTPATIENT
Start: 2020-02-04 | End: 2020-02-04 | Stop reason: HOSPADM

## 2020-02-04 RX ORDER — ENOXAPARIN SODIUM 100 MG/ML
40 INJECTION SUBCUTANEOUS EVERY 24 HOURS
Status: DISCONTINUED | OUTPATIENT
Start: 2020-02-05 | End: 2020-02-09 | Stop reason: HOSPADM

## 2020-02-04 RX ORDER — KETOROLAC TROMETHAMINE 30 MG/ML
INJECTION, SOLUTION INTRAMUSCULAR; INTRAVENOUS
Status: DISCONTINUED | OUTPATIENT
Start: 2020-02-04 | End: 2020-02-04

## 2020-02-04 RX ORDER — SODIUM CHLORIDE 9 MG/ML
INJECTION, SOLUTION INTRAVENOUS CONTINUOUS
Status: DISCONTINUED | OUTPATIENT
Start: 2020-02-04 | End: 2020-02-04

## 2020-02-04 RX ORDER — DIPHENHYDRAMINE HYDROCHLORIDE 50 MG/ML
12.5 INJECTION INTRAMUSCULAR; INTRAVENOUS EVERY 6 HOURS PRN
Status: DISCONTINUED | OUTPATIENT
Start: 2020-02-04 | End: 2020-02-09 | Stop reason: HOSPADM

## 2020-02-04 RX ORDER — ONDANSETRON 2 MG/ML
4 INJECTION INTRAMUSCULAR; INTRAVENOUS DAILY PRN
Status: DISCONTINUED | OUTPATIENT
Start: 2020-02-04 | End: 2020-02-04 | Stop reason: HOSPADM

## 2020-02-04 RX ORDER — MIDAZOLAM HYDROCHLORIDE 1 MG/ML
INJECTION, SOLUTION INTRAMUSCULAR; INTRAVENOUS
Status: DISCONTINUED | OUTPATIENT
Start: 2020-02-04 | End: 2020-02-04

## 2020-02-04 RX ORDER — HEPARIN 100 UNIT/ML
SYRINGE INTRAVENOUS
Status: DISCONTINUED | OUTPATIENT
Start: 2020-02-04 | End: 2020-02-04 | Stop reason: HOSPADM

## 2020-02-04 RX ORDER — ROCURONIUM BROMIDE 10 MG/ML
INJECTION, SOLUTION INTRAVENOUS
Status: DISCONTINUED | OUTPATIENT
Start: 2020-02-04 | End: 2020-02-04

## 2020-02-04 RX ADMIN — FENTANYL CITRATE 100 MCG: 50 INJECTION, SOLUTION INTRAMUSCULAR; INTRAVENOUS at 02:02

## 2020-02-04 RX ADMIN — DEXAMETHASONE SODIUM PHOSPHATE 8 MG: 4 INJECTION, SOLUTION INTRA-ARTICULAR; INTRALESIONAL; INTRAMUSCULAR; INTRAVENOUS; SOFT TISSUE at 01:02

## 2020-02-04 RX ADMIN — FENTANYL CITRATE 150 MCG: 50 INJECTION, SOLUTION INTRAMUSCULAR; INTRAVENOUS at 07:02

## 2020-02-04 RX ADMIN — ROCURONIUM BROMIDE 40 MG: 10 INJECTION, SOLUTION INTRAVENOUS at 07:02

## 2020-02-04 RX ADMIN — ROCURONIUM BROMIDE 10 MG: 10 INJECTION, SOLUTION INTRAVENOUS at 12:02

## 2020-02-04 RX ADMIN — ROCURONIUM BROMIDE 10 MG: 10 INJECTION, SOLUTION INTRAVENOUS at 01:02

## 2020-02-04 RX ADMIN — ONDANSETRON 4 MG: 2 INJECTION INTRAMUSCULAR; INTRAVENOUS at 08:02

## 2020-02-04 RX ADMIN — OXYCODONE HYDROCHLORIDE 10 MG: 5 TABLET ORAL at 11:02

## 2020-02-04 RX ADMIN — ROCURONIUM BROMIDE 10 MG: 10 INJECTION, SOLUTION INTRAVENOUS at 08:02

## 2020-02-04 RX ADMIN — ACETAMINOPHEN 1000 MG: 500 TABLET ORAL at 06:02

## 2020-02-04 RX ADMIN — HYDROMORPHONE HYDROCHLORIDE 0.2 MG: 2 INJECTION INTRAMUSCULAR; INTRAVENOUS; SUBCUTANEOUS at 06:02

## 2020-02-04 RX ADMIN — ROCURONIUM BROMIDE 20 MG: 10 INJECTION, SOLUTION INTRAVENOUS at 03:02

## 2020-02-04 RX ADMIN — SODIUM CHLORIDE, SODIUM LACTATE, POTASSIUM CHLORIDE, AND CALCIUM CHLORIDE: 600; 310; 30; 20 INJECTION, SOLUTION INTRAVENOUS at 12:02

## 2020-02-04 RX ADMIN — MIDAZOLAM 2 MG: 1 INJECTION INTRAMUSCULAR; INTRAVENOUS at 07:02

## 2020-02-04 RX ADMIN — KETOROLAC TROMETHAMINE 30 MG: 30 INJECTION, SOLUTION INTRAMUSCULAR; INTRAVENOUS at 05:02

## 2020-02-04 RX ADMIN — SODIUM CHLORIDE, SODIUM LACTATE, POTASSIUM CHLORIDE, AND CALCIUM CHLORIDE: 600; 310; 30; 20 INJECTION, SOLUTION INTRAVENOUS at 05:02

## 2020-02-04 RX ADMIN — METRONIDAZOLE 500 MG: 500 SOLUTION INTRAVENOUS at 07:02

## 2020-02-04 RX ADMIN — FENTANYL CITRATE 50 MCG: 50 INJECTION, SOLUTION INTRAMUSCULAR; INTRAVENOUS at 04:02

## 2020-02-04 RX ADMIN — CELECOXIB 400 MG: 100 CAPSULE ORAL at 06:02

## 2020-02-04 RX ADMIN — PROPOFOL 50 MG: 10 INJECTION, EMULSION INTRAVENOUS at 04:02

## 2020-02-04 RX ADMIN — FENTANYL CITRATE 100 MCG: 50 INJECTION, SOLUTION INTRAMUSCULAR; INTRAVENOUS at 08:02

## 2020-02-04 RX ADMIN — PHENYLEPHRINE HYDROCHLORIDE 5 MCG/MIN: 10 INJECTION INTRAVENOUS at 09:02

## 2020-02-04 RX ADMIN — PROPOFOL 150 MG: 10 INJECTION, EMULSION INTRAVENOUS at 07:02

## 2020-02-04 RX ADMIN — SODIUM CHLORIDE, SODIUM LACTATE, POTASSIUM CHLORIDE, AND CALCIUM CHLORIDE: 600; 310; 30; 20 INJECTION, SOLUTION INTRAVENOUS at 08:02

## 2020-02-04 RX ADMIN — HYDROMORPHONE HYDROCHLORIDE 0.2 MG: 2 INJECTION INTRAMUSCULAR; INTRAVENOUS; SUBCUTANEOUS at 07:02

## 2020-02-04 RX ADMIN — LIDOCAINE HYDROCHLORIDE 50 MG: 10 INJECTION, SOLUTION EPIDURAL; INFILTRATION; INTRACAUDAL; PERINEURAL at 07:02

## 2020-02-04 RX ADMIN — BUPIVACAINE 266 MG: 13.3 INJECTION, SUSPENSION, LIPOSOMAL INFILTRATION at 08:02

## 2020-02-04 RX ADMIN — ROCURONIUM BROMIDE 20 MG: 10 INJECTION, SOLUTION INTRAVENOUS at 11:02

## 2020-02-04 RX ADMIN — SODIUM CHLORIDE, SODIUM LACTATE, POTASSIUM CHLORIDE, AND CALCIUM CHLORIDE: 600; 310; 30; 20 INJECTION, SOLUTION INTRAVENOUS at 10:02

## 2020-02-04 RX ADMIN — HEPARIN SODIUM 5000 UNITS: 5000 INJECTION INTRAVENOUS; SUBCUTANEOUS at 06:02

## 2020-02-04 RX ADMIN — GABAPENTIN 800 MG: 400 CAPSULE ORAL at 06:02

## 2020-02-04 RX ADMIN — CEFTRIAXONE SODIUM 2 G: 2 INJECTION, SOLUTION INTRAVENOUS at 07:02

## 2020-02-04 RX ADMIN — ACETAMINOPHEN 1000 MG: 10 INJECTION, SOLUTION INTRAVENOUS at 09:02

## 2020-02-04 RX ADMIN — SODIUM CHLORIDE, SODIUM LACTATE, POTASSIUM CHLORIDE, AND CALCIUM CHLORIDE: .6; .31; .03; .02 INJECTION, SOLUTION INTRAVENOUS at 08:02

## 2020-02-04 RX ADMIN — ONDANSETRON 4 MG: 2 INJECTION, SOLUTION INTRAMUSCULAR; INTRAVENOUS at 01:02

## 2020-02-04 RX ADMIN — ROCURONIUM BROMIDE 10 MG: 10 INJECTION, SOLUTION INTRAVENOUS at 09:02

## 2020-02-04 RX ADMIN — SODIUM CHLORIDE, SODIUM LACTATE, POTASSIUM CHLORIDE, AND CALCIUM CHLORIDE: 600; 310; 30; 20 INJECTION, SOLUTION INTRAVENOUS at 07:02

## 2020-02-04 NOTE — CONSULTS
Verbal consult received from Dr. Gonzalez for pre-op Ileostomy marking. Patient awaiting creation of loop-ileostomy. Patient dx. With mid-rectal adenocarcinoma now s/p long-course neoadj chemoXRT, completed 12/4/19. Today, he is to undergo tumor resection, loop ileostomy construction, and mediport placement.  He is awake and alert, denies pain presently. He is alone today, his wife is at work. He states he is disabled, but works part-time as a . He lives at home with his wife. They are primary caregivers for his granddaughter who lives with them. His wife works as a . Abdomen assessed. Small umbilical hernia noted to abdomen. No obvious folds or creases to abdomen, mild distention is noted. Patient states he wears his pants just below the umbilicus.  Abdominal hair clipped with surgical clippers. Scrubbed with chloraprep and allowed to air dry. Sterile surgical marker used to gil RUQ and RLQ for Ileostomy site. Covered with sterile tegaderm. Plans made with patient for new Ileostomy education to begin tomorrow.

## 2020-02-04 NOTE — INTERVAL H&P NOTE
The patient has been examined and the H&P has been reviewed:    I concur with the findings and changes have been noted since the H&P was written: Pt did undergo CT chest which did show a new 0.6cm nodule. [facility] in [place], felt best to still proceed with surgery as there is not a safe way to biopsy this at this point and it is likely too small to evaluate with PET scan, as well as this is the best surgical window following chemoXRT. Discussed with patient as well and he is agreeable to proceed. No other changes to medical history since seen in the office. No recent illnesses. Did prep well. Ready for surgery this AM.    Anesthesia/Surgery risks, benefits and alternative options discussed and understood by patient/family.          Active Hospital Problems    Diagnosis  POA    Rectal adenocarcinoma [C20]  Yes      Resolved Hospital Problems   No resolved problems to display.

## 2020-02-04 NOTE — ANESTHESIA PREPROCEDURE EVALUATION
02/04/2020  Vincent Benton is a 64 y.o., male.    Anesthesia Evaluation    I have reviewed the Patient Summary Reports.    I have reviewed the Nursing Notes.   I have reviewed the Medications.     Review of Systems  Anesthesia Hx:  No problems with previous Anesthesia Denies Hx of Anesthetic complications  Denies Family Hx of Anesthesia complications.   Denies Personal Hx of Anesthesia complications.   Social:  Non-Smoker  Alcohol Use:   Alcohol Abuse:   Cardiovascular:   Hypertension ECG has been reviewed.    Pulmonary:  Pulmonary Normal    Renal/:  Renal/ Normal     Hepatic/GI:   Hiatal Hernia, GERD    Neurological:  Neurology Normal    Endocrine:  Endocrine Normal    Psych:   Psychiatric History        Patient Active Problem List   Diagnosis    Chronic pain    Anemia    Gastroesophageal reflux disease    Alcohol use    Iron deficiency anemia due to chronic blood loss    BASILIA (iron deficiency anemia)    Rectal cancer    Rectal pain    Screening for colon cancer    Rectal adenocarcinoma           Physical Exam  General:  Well nourished    Airway/Jaw/Neck:  Airway Findings: Mouth Opening: Normal Tongue: Normal  General Airway Assessment: Adult  Mallampati: II  TM Distance: 4 - 6 cm  Jaw/Neck Findings:  Neck ROM: Normal ROM      Dental:  Dental Findings: In tact   Chest/Lungs:  Chest/Lungs Findings: Clear to auscultation, Normal Respiratory Rate     Heart/Vascular:  Heart Findings: Rate: Normal  Rhythm: Regular Rhythm  Sounds: Normal        Mental Status:  Mental Status Findings:  Cooperative, Alert and Oriented       Lab Results   Component Value Date    WBC 6.42 01/21/2020    HGB 12.3 (L) 01/21/2020    HCT 39.9 (L) 01/21/2020    MCV 93 01/21/2020     01/21/2020         Chemistry        Component Value Date/Time     01/21/2020 1003    K 3.9 01/21/2020 1003     01/21/2020  1003    CO2 24 01/21/2020 1003    BUN 10 01/21/2020 1003    CREATININE 0.7 01/21/2020 1003     (H) 01/21/2020 1003        Component Value Date/Time    CALCIUM 9.4 01/21/2020 1003    ALKPHOS 115 01/21/2020 1003    AST 11 01/21/2020 1003    ALT 7 (L) 01/21/2020 1003    BILITOT 0.4 01/21/2020 1003    ESTGFRAFRICA >60 01/21/2020 1003    EGFRNONAA >60 01/21/2020 1003        ECG  Normal sinus rhythm  Incomplete right bundle branch block  Borderline Abnormal ECG  When compared with ECG of 13-APR-2018 07:17,  No significant change was found  Confirmed by DELIA WHITTEN MD ([id number]) on 1/28/2020 6:38:27 PM      Anesthesia Plan  Type of Anesthesia, risks & benefits discussed:  Anesthesia Type:  general  Patient's Preference:   Intra-op Monitoring Plan: standard ASA monitors  Intra-op Monitoring Plan Comments:   Post Op Pain Control Plan: per primary service following discharge from PACU  Post Op Pain Control Plan Comments:   Induction:   IV  Beta Blocker:  Patient is not currently on a Beta-Blocker (No further documentation required).       Informed Consent: Patient understands risks and agrees with Anesthesia plan.  Questions answered. Anesthesia consent signed with patient.  ASA Score: 2     Day of Surgery Review of History & Physical: I have interviewed and examined the patient. I have reviewed the patient's H&P dated:  There are no significant changes.  H&P update referred to the surgeon.         Ready For Surgery From Anesthesia Perspective.

## 2020-02-04 NOTE — OP NOTE
[facility]  Surgery Department  Operative Note    SUMMARY     Date of Procedure: 2/4/2020     Procedure:  1.  Robotic ultra-low anterior resection  2.  Diverting loop ileostomy construction  3.  Mobilization of splenic flexure  4.  MediPort placement  5.  Transverse abdominis plane block  6.  Flexible sigmoidoscopy  7.  Fluoroscopic guidance for MediPort insertion    Surgeon(s) and Role:     * Familia Gonzalez MD - Primary    Assisting Surgeon: None    Pre-Operative Diagnosis: Rectal adenocarcinoma [C20]    Post-Operative Diagnosis: Post-Op Diagnosis Codes:     * Rectal adenocarcinoma [C20]    Anesthesia: General    Technical Procedures Used:   1.  Robotic ultra-low anterior resection  2.  Diverting loop ileostomy construction  3.  Mobilization of splenic flexure  4.  MediPort placement  5.  Transverse abdominis plane block  6.  Flexible sigmoidoscopy  7.  Fluoroscopic guidance for MediPort insertion    Indications for Procedure:  64-year-old male who was previously diagnosed with mid to low rectal adenocarcinoma was completed neoadjuvant chemoradiation who presents for definitive surgical management    Findings of the Procedure:  No evidence of metastatic disease.  Tumor at the expected location the mid to low rectum with tattoo present distal to the tumor.  Normal anatomy of the right subclavian vein where the MediPort was placed    Description of the Procedure:    Patient brought to the operating placed supine on table. General tracheal anesthesia was then induced. The chest and neck was then prepped and draped in usual sterile fashion. A preoperative surgical time-out was then performed confirming correct patient, procedure and preoperative medications given.  Small incision was made just beneath the right clavicle lateral to the curve of the clavicle.  A needle and syringe was then used to access the right subclavian vein on the 1st attempt, where dark red blood and non pulsatile flow  observed.  A guidewire was inserted through the needle until ectopy was confirmed on cardiac monitoring.  Fluoroscopy was then used to confirm location of the wire into the subclavian vein terminating at the SVC.  Needle was removed and guidewire left in place. The vein dilator and insertion sheath were then guided over the guidewire.  The guidewire and inner portions of the insertion sheath/dilator were then removed leaving only the insertion sheath present.  A finger was placed over the opening to the insertion sheath to prevent air embolus.  Catheter for the port was then injected with heparinized saline and inserted directly into the insertion sheath.  Fluoroscopy was then used to confirm location of the catheter at the tip of the SVC and right atrium.  The catheter was held in place with forceps while the insertion sheath was peeled away until was fully removed. The distal end of the catheter remained clamped with the hemostats to prevent air embolus.  An incision 2 cm beneath the needlestick site was then fashioned horizontal direction that was approximately 4 cm wide.  A subcutaneous pocket was then created with sharp and blunt dissection above the level of the muscle fascia beneath the subcutaneous fat.  This was fashion wide enough to accommodate the port. The catheter was then attached to a tunneling device was then tunneled from the needle insertion site to the port site. Once there it was cut to length to allow no kinking after insertion into the port.  Prior to cutting, a hemostat was placed on the proximal portion of the catheter to prevent air embolus.  The remaining catheter was then attached to the port which was inserted beneath the skin in the pocket previously created and the hemostat was removed. The Courtney needle was then used to access the port through the skin and blood was easily aspirated and was then flushed with heparinized saline. A final fluoroscopic image was obtained showing the port  in good position without kinking of the catheter with termination near the SVC and right atrium junction.  Hemostasis was ensured in the port pocket.  Deep dermal stitches of 3-0 Vicryl suture then used to close the defect where the port placement was made.  Skin was then closed with a running 4-0 Monocryl suture.  Interrupted 4-0 Monocryl suture was then used to close the site at the needle insertion. Skin glue was then applied to the sites.     Attention was then turned to the low anterior resection portion of the operation.  Patient was moved to lithotomy position. A rectal washout was performed in usual fashion.  The abdomen was then prepped and draped usual sterile fashion. A left upper quadrant 8 mm incision was made beneath the left subcostal margin and the abdomen was insufflated using a Veress needle which was confirmed good location using saline drop test and aspiration.  The abdomen is insufflated to a pressure 15 mm of mercury.  The 8 mm robotic trocar was inserted through this defect using Optiview technique in usual fashion. Once in place, the abdomen was checked for any signs of metastatic disease which there was none.  A laparoscopic transversus abdominis plane block was then performed using a mixture of 20 cc of Exparel, 30 cc of 0.25% bupivacaine plain and 10 cc of injectable saline. 12.5 cc was injected into each abdominal quadrant under direct visualization into the transversus abdominis plain for a total of 50 cc given for the block.  Remaining 10 cc was injected at the end of the case for local infiltration.  Additional 8 mm trocars were then placed in usual fashion for a left upper quadrant to the right lower quadrant for total of 4 trocars placed. There was no injury upon entry of any trocar.     Attention was turned to the splenic flexure 1st for mobilization.  Robot was then docked in usual fashion. There was somewhat limited visibility in the abdominal cavity at this time given the lack of  large insufflation of his abdominal wall therefore only 3 arms of the robot could be used.  Additional 5 mm laparoscopic trocar was placed in the low right upper quadrant for bedside assistance.  There was no injury upon entry.  The ligament of Treitz was identified and the colon was elevated.  The IMV was identified near the ligament of Treitz and a medial to lateral dissection of the splenic flexure was undertaken via a retro IMV approach.  The peritoneum was incised just medial to the IMV but lateral to the ligament of Treitz. The ligament Treitz was mobilized off this peritoneum and the plane posterior to the IMV was developed  the IMV and colonic mesentery from the retroperitoneal structures.  This dissection was carried out laterally to the lateral abdominal wall and up to the splenic flexure via blunt dissection. Once this was fully completed, the IMV was taken via high ligation above the ligament of Treitz using the robotic vessel sealer.  The IMV stump was found to be hemostatic. The dissection was then turned to the superior portion above the transverse colon.  The lesser sac was entered in usual fashion.  The remaining attachments between the colon and superior structures were then divided using the vessel sealer including the gastrocolic, splenocolic and phrenocolic ligaments.  Once all adhesions of the transverse colon and splenic flexure portion of the colon were completely lysed, attention was turned to descending colon mobilization.  The dissection was carried around laterally to the white line of Toldt along the descending colon from the splenic flexure and this was sharply divided using the vessel sealer.  Once this was accomplished, all adhesions between the splenic flexure were taken down.  With the splenic flexure fully mobilized, attention was then turned to the CHER.    The robot was then moved into the pelvic position. The sigmoid colon and CHER pedicle was grasped and elevated and a  medial to lateral dissection was undertaken in usual fashion.  The ureter could not be identified safely through this medial to lateral dissection and therefore a lateral to medial dissection was undertaken.  The white line of Toldt was incised down the descending colon to the sigmoid colon and all the retroperitoneal structures were protected during this time.  Once the sigmoid colon was fully mobilized laterally, the ureter was easily identified in the retroperitoneum and had not been injured.  With the ureter identified, attention was turned back to the medial dissection and the ureter was then identified and a medial to lateral dissection in the same area underneath the CHER pedicle.  With the ureter safely dissected out, a high ligation of the CHER was performed with the vessel sealer just distal to the takeoff from the aorta.  There was a small bleed from the CHER stump which did require suture ligation with a 2-0 silk suture. This was likely due to calcifications within artery.  Once the suture was then placed, the CHER pedicle was found to be completely hemostatic. The remaining mesentery between the CHER pedicle and the previous dissection of the IMV near the ligament Treitz was then connected using the vessel sealer taking care to re-take the IMV in usual fashion. With this completed, the left-sided colonic mobilization was completed.    Attention was then turned to the rectosigmoid junction. The posterior plane in the rectosigmoid junction was developed and the retrorectal space was entered.  A total mesorectal excision was then performed using sharp dissection all the way down posteriorly as far as could be done and then laterally. Care was taken to identify both the left and right ureters which were protected throughout.  Once the dissection reach the anterior peritoneal reflection, the peritoneal reflection was incised and the anterior plane was developed in usual fashion. The tattoo was identified distal  to the anterior peritoneal reflection and there was palpable tumor just proximal to this tattoo site. The posterior dissection was carried down to the level of the pelvic floor.  The lateral dissection was carried down to similar location on either side. The area beneath the tumor where the tattoo was was chosen as a point of transection as this was at least 1 cm below the tumor site. A mesenteric window was made beneath this location and the rectal mesentery was taken using the vessel sealer to isolate the rectal lumen.  A 12 mm robotic trocar was then used in the right lower quadrant by up sizing it from the 8 mm 1 was then placed. A robotic 45 mm blue load was then used to clamp across the rectum at the chosen point of transection. A digital rectal exam was then performed confirming that this was below the level of the tumor.  The stapler was then fired.  Additional 2 loads of the stapler were used to transect the rectum at this location.  Once this was completed, the proctectomy portion of the operation have been completed. The CHER pedicle and the rectal stump were found to be completely hemostatic at this point.    Attention was then turned to exteriorization of the specimen for reanastomosis.  The robot was then de docked.  The right lower quadrant 12 mm port was removed and this incision was lengthened medially to allow for a small Ethan wound protector to be placed. The specimen was exteriorized in usual fashion. An area at the descending/sigmoid junction was chosen for transection and the mesentery was flashed this location confirming good pulsatile blood flow.  The remaining mesentery was taken with 0 silk suture. The bowel was clamped using Anali clamps and was divided with electrocautery. The specimen was passed off the field for final pathology. It was opened on the back table and found to have at least a 1.5 cm margin at the distal resection margin.  Attention was then turned to the descending  colon.  The Anali clamp was removed and the 29 mm EEA stapler was brought onto the field and the anvil was brought through a point on the anti mesenteric border 5 cm from the distal resection.  The distal resection area was then stapled using a FORTINO 75 blue load. This suture line was then oversewn with a 2-0 Vicryl suture. The anvil was then reinforced with a 2-0 Prolene pursestring stitch. The descending colon was found to be hemostatic and placed back within the abdominal cavity with the anvil in place. The abdomen was then re-insufflated.  The pelvis was checked and found to be hemostatic. The 29 mm EEA stapler was then inserted through the anus to the short rectal stump and the spike brought out just posterior to the staple line. The anvil was then  to the spike for a side-to-end 29 mm EEA anastomosis. The stapler was then fired.  The stapler was then removed from the anus and the anastomotic donuts were checked and found to be completely intact. The pelvis was then filled with irrigation and the proximal descending colon was clamped.  A flexible sigmoidoscope was inserted through the anus and the anastomosis was found to be completely hemostatic and there was a negative air leak test. The colon and rectum were then desufflated.  The pelvis was then irrigated and found to be hemostatic again.  The remainder of the abdomen was checked and found to be hemostatic as well as irrigated.    The abdomen was then desufflated. All trocars were removed, as was the wound protector.  A previous point of marking for the right lower quadrant ileostomy was identified. This was at the site of 1 of the trocar sites which was circumferentially extended to allow for full visualization of the ileostomy site. Once the skin was excised, dissection was carried down to level the fascia which was sharply incised vertically, the rectus muscle was split with a Juliet clamp and the posterior fascia and peritoneum were then incised  sharply using electrocautery vertically with a sponge being placed up against the abdominal wall to protect the undersurface of the abdominal contents which were.  Two fingerbreadths easily fit through this defect. The terminal ileum was identified through the right lower quadrant extraction site where inserted into the cecum.  A point 20 cm proximal to this was chosen for the loop ileostomy and was brought through the ileostomy defect using a Perry clamp.  It was not matured at this time.    The remaining local infiltration was then inserted. The peritoneum of the right lower quadrant incision was then run closed using a 0 Vicryl suture. The fascia of the right lower quadrant scissor was then run closed using 0 Prolene suture. The subcutaneous tissues were copiously irrigated. The skin overlying the port sites and extraction site were then closed with 4-0 Monocryl suture.  Skin glue was applied. Attention was then turned to the ileostomy which was matured in standard Christine fashion. 4 Brooking sutures using 3-0 Vicryl suture were placed for the loop ileostomy after the bowel was opened 75% of the way.  Once the Brooking stitches were completed, the remaining mucocutaneous junction was approximated using 3-0 Vicryl suture in usual fashion. The ostomy was found to be viable and hemostatic.  An ostomy appliance was applied. The patient was then moved back into the supine position.  He was woken from general endotracheal anesthesia and taken to the postanesthesia care in stable condition. He tolerated procedure well.  All sponge, needle and instrument counts were correct at the end of the case. A chest x-ray will be obtained in the recovery room to confirm good port placement and rule out pneumothorax.    Significant Surgical Tasks Conducted by the Assistant(s), if Applicable: N/A    Complications: No    Estimated Blood Loss (EBL): 100mL           Implants:   Implant Name Type Inv. Item Serial No.  Lot  No. LRB No. Used   PORT POWER CLEAR VIEW - [id number]  PORT POWER CLEAR VIEW  [employer] [place] [id number] 1       Specimens:   Specimen (12h ago, onward)    None                  Condition: Good    Disposition: PACU - hemodynamically stable.    Attestation: I performed the procedure.

## 2020-02-04 NOTE — PLAN OF CARE
Ambulated without difficulty to preop. Pt's wife dropped him off and will come back later. Clothes in locker 1.

## 2020-02-04 NOTE — TRANSFER OF CARE
"Anesthesia Transfer of Care Note    Patient: iVncent Benton    Procedure(s) Performed: Procedure(s) (LRB):  XI ROBOTIC LOW ANTERIOR RESECTION (N/A)  INSERTION, PORT-A-CATH (Right)  BLOCK, TRANSVERSUS ABDOMINIS PLANE (N/A)  CREATION, ILEOSTOMY (Right)    Patient location: PACU    Anesthesia Type: general    Transport from OR: Transported from OR on room air with adequate spontaneous ventilation    Post pain: adequate analgesia    Post assessment: no apparent anesthetic complications    Post vital signs: stable    Level of consciousness: awake, alert and oriented    Nausea/Vomiting: no nausea/vomiting    Complications: none    Transfer of care protocol was followed      Last vitals:   Visit Vitals  /71 (Patient Position: Sitting)   Pulse 81   Temp 36.8 °C (98.2 °F) (Skin)   Resp 16   Ht 5' 10" (1.778 m)   Wt 69.6 kg (153 lb 7 oz)   SpO2 99%   BMI 22.02 kg/m²     "

## 2020-02-05 LAB
ANION GAP SERPL CALC-SCNC: 13 MMOL/L (ref 8–16)
BASOPHILS # BLD AUTO: 0.02 K/UL (ref 0–0.2)
BASOPHILS NFR BLD: 0.2 % (ref 0–1.9)
BUN SERPL-MCNC: 4 MG/DL (ref 8–23)
CALCIUM SERPL-MCNC: 8.6 MG/DL (ref 8.7–10.5)
CHLORIDE SERPL-SCNC: 101 MMOL/L (ref 95–110)
CO2 SERPL-SCNC: 23 MMOL/L (ref 23–29)
CREAT SERPL-MCNC: 0.6 MG/DL (ref 0.5–1.4)
DIFFERENTIAL METHOD: ABNORMAL
EOSINOPHIL # BLD AUTO: 0 K/UL (ref 0–0.5)
EOSINOPHIL NFR BLD: 0 % (ref 0–8)
ERYTHROCYTE [DISTWIDTH] IN BLOOD BY AUTOMATED COUNT: 15.8 % (ref 11.5–14.5)
EST. GFR  (AFRICAN AMERICAN): >60 ML/MIN/1.73 M^2
EST. GFR  (NON AFRICAN AMERICAN): >60 ML/MIN/1.73 M^2
GLUCOSE SERPL-MCNC: 94 MG/DL (ref 70–110)
HCT VFR BLD AUTO: 36.3 % (ref 40–54)
HGB BLD-MCNC: 11.1 G/DL (ref 14–18)
IMM GRANULOCYTES # BLD AUTO: 0.05 K/UL (ref 0–0.04)
IMM GRANULOCYTES NFR BLD AUTO: 0.5 % (ref 0–0.5)
LYMPHOCYTES # BLD AUTO: 0.4 K/UL (ref 1–4.8)
LYMPHOCYTES NFR BLD: 4.5 % (ref 18–48)
MAGNESIUM SERPL-MCNC: 1.6 MG/DL (ref 1.6–2.6)
MCH RBC QN AUTO: 28.3 PG (ref 27–31)
MCHC RBC AUTO-ENTMCNC: 30.6 G/DL (ref 32–36)
MCV RBC AUTO: 93 FL (ref 82–98)
MONOCYTES # BLD AUTO: 1 K/UL (ref 0.3–1)
MONOCYTES NFR BLD: 10.3 % (ref 4–15)
NEUTROPHILS # BLD AUTO: 8.2 K/UL (ref 1.8–7.7)
NEUTROPHILS NFR BLD: 84.5 % (ref 38–73)
NRBC BLD-RTO: 0 /100 WBC
PHOSPHATE SERPL-MCNC: 3.1 MG/DL (ref 2.7–4.5)
PLATELET # BLD AUTO: 366 K/UL (ref 150–350)
PMV BLD AUTO: 10.5 FL (ref 9.2–12.9)
POTASSIUM SERPL-SCNC: 3.5 MMOL/L (ref 3.5–5.1)
RBC # BLD AUTO: 3.92 M/UL (ref 4.6–6.2)
SODIUM SERPL-SCNC: 137 MMOL/L (ref 136–145)
WBC # BLD AUTO: 9.68 K/UL (ref 3.9–12.7)

## 2020-02-05 PROCEDURE — 63600175 PHARM REV CODE 636 W HCPCS: Performed by: INTERNAL MEDICINE

## 2020-02-05 PROCEDURE — 83735 ASSAY OF MAGNESIUM: CPT

## 2020-02-05 PROCEDURE — 36415 COLL VENOUS BLD VENIPUNCTURE: CPT

## 2020-02-05 PROCEDURE — 25000003 PHARM REV CODE 250: Performed by: COLON & RECTAL SURGERY

## 2020-02-05 PROCEDURE — 63600175 PHARM REV CODE 636 W HCPCS: Performed by: COLON & RECTAL SURGERY

## 2020-02-05 PROCEDURE — 63600175 PHARM REV CODE 636 W HCPCS: Performed by: PHYSICIAN ASSISTANT

## 2020-02-05 PROCEDURE — 94799 UNLISTED PULMONARY SVC/PX: CPT

## 2020-02-05 PROCEDURE — 85025 COMPLETE CBC W/AUTO DIFF WBC: CPT

## 2020-02-05 PROCEDURE — 99900035 HC TECH TIME PER 15 MIN (STAT)

## 2020-02-05 PROCEDURE — 80048 BASIC METABOLIC PNL TOTAL CA: CPT

## 2020-02-05 PROCEDURE — 84100 ASSAY OF PHOSPHORUS: CPT

## 2020-02-05 PROCEDURE — 11000001 HC ACUTE MED/SURG PRIVATE ROOM

## 2020-02-05 PROCEDURE — 25000003 PHARM REV CODE 250: Performed by: NURSE PRACTITIONER

## 2020-02-05 PROCEDURE — 27000221 HC OXYGEN, UP TO 24 HOURS

## 2020-02-05 RX ORDER — HYDROMORPHONE HYDROCHLORIDE 1 MG/ML
0.5 INJECTION, SOLUTION INTRAMUSCULAR; INTRAVENOUS; SUBCUTANEOUS EVERY 6 HOURS PRN
Status: DISCONTINUED | OUTPATIENT
Start: 2020-02-05 | End: 2020-02-05

## 2020-02-05 RX ORDER — NALOXONE HCL 0.4 MG/ML
0.02 VIAL (ML) INJECTION
Status: DISCONTINUED | OUTPATIENT
Start: 2020-02-05 | End: 2020-02-09 | Stop reason: HOSPADM

## 2020-02-05 RX ORDER — HYDROMORPHONE HCL IN 0.9% NACL 6 MG/30 ML
PATIENT CONTROLLED ANALGESIA SYRINGE INTRAVENOUS CONTINUOUS
Status: DISCONTINUED | OUTPATIENT
Start: 2020-02-05 | End: 2020-02-07

## 2020-02-05 RX ORDER — MORPHINE SULFATE 2 MG/ML
2 INJECTION, SOLUTION INTRAMUSCULAR; INTRAVENOUS EVERY 4 HOURS PRN
Status: DISCONTINUED | OUTPATIENT
Start: 2020-02-05 | End: 2020-02-05

## 2020-02-05 RX ADMIN — GABAPENTIN 300 MG: 300 CAPSULE ORAL at 02:02

## 2020-02-05 RX ADMIN — GABAPENTIN 300 MG: 300 CAPSULE ORAL at 08:02

## 2020-02-05 RX ADMIN — Medication: at 03:02

## 2020-02-05 RX ADMIN — ACETAMINOPHEN 1000 MG: 10 INJECTION, SOLUTION INTRAVENOUS at 01:02

## 2020-02-05 RX ADMIN — CHLORHEXIDINE GLUCONATE 0.12% ORAL RINSE 10 ML: 1.2 LIQUID ORAL at 08:02

## 2020-02-05 RX ADMIN — SENNOSIDES, DOCUSATE SODIUM 1 TABLET: 50; 8.6 TABLET, FILM COATED ORAL at 08:02

## 2020-02-05 RX ADMIN — PANTOPRAZOLE SODIUM 40 MG: 40 TABLET, DELAYED RELEASE ORAL at 08:02

## 2020-02-05 RX ADMIN — MORPHINE SULFATE 2 MG: 2 INJECTION, SOLUTION INTRAMUSCULAR; INTRAVENOUS at 02:02

## 2020-02-05 RX ADMIN — Medication: at 08:02

## 2020-02-05 RX ADMIN — ENOXAPARIN SODIUM 40 MG: 100 INJECTION SUBCUTANEOUS at 08:02

## 2020-02-05 RX ADMIN — SODIUM CHLORIDE, SODIUM LACTATE, POTASSIUM CHLORIDE, AND CALCIUM CHLORIDE: .6; .31; .03; .02 INJECTION, SOLUTION INTRAVENOUS at 04:02

## 2020-02-05 RX ADMIN — ACETAMINOPHEN 1000 MG: 10 INJECTION, SOLUTION INTRAVENOUS at 05:02

## 2020-02-05 RX ADMIN — Medication: at 11:02

## 2020-02-05 RX ADMIN — PROMETHAZINE HYDROCHLORIDE 6.25 MG: 25 INJECTION INTRAMUSCULAR; INTRAVENOUS at 08:02

## 2020-02-05 RX ADMIN — ONDANSETRON 4 MG: 2 INJECTION INTRAMUSCULAR; INTRAVENOUS at 04:02

## 2020-02-05 RX ADMIN — SODIUM CHLORIDE, SODIUM LACTATE, POTASSIUM CHLORIDE, AND CALCIUM CHLORIDE: .6; .31; .03; .02 INJECTION, SOLUTION INTRAVENOUS at 05:02

## 2020-02-05 RX ADMIN — OXYCODONE HYDROCHLORIDE 10 MG: 5 TABLET ORAL at 08:02

## 2020-02-05 RX ADMIN — HYDROMORPHONE HYDROCHLORIDE 0.5 MG: 1 INJECTION, SOLUTION INTRAMUSCULAR; INTRAVENOUS; SUBCUTANEOUS at 06:02

## 2020-02-05 RX ADMIN — OXYCODONE HYDROCHLORIDE 10 MG: 5 TABLET ORAL at 04:02

## 2020-02-05 NOTE — ANESTHESIA POSTPROCEDURE EVALUATION
Anesthesia Post Evaluation    Patient: Vincent Benton    Procedure(s) Performed: Procedure(s) (LRB):  XI ROBOTIC LOW ANTERIOR RESECTION (N/A)  INSERTION, PORT-A-CATH (Right)  BLOCK, TRANSVERSUS ABDOMINIS PLANE (N/A)  CREATION, ILEOSTOMY (Right)    Final Anesthesia Type: general    Patient location during evaluation: PACU  Patient participation: Yes- Able to Participate  Level of consciousness: awake and alert  Post-procedure vital signs: reviewed and stable  Pain management: adequate  Airway patency: patent    PONV status at discharge: No PONV  Anesthetic complications: no      Cardiovascular status: hemodynamically stable  Respiratory status: spontaneous ventilation  Hydration status: euvolemic  Follow-up not needed.          Vitals Value Taken Time   /78 2/5/2020  7:57 AM   Temp 36.5 °C (97.7 °F) 2/5/2020  7:57 AM   Pulse 82 2/5/2020  7:57 AM   Resp 21 2/5/2020  7:57 AM   SpO2 97 % 2/5/2020  7:57 AM         Event Time     Out of Recovery 19:33:13          Pain/Ana Score: Pain Rating Prior to Med Admin: 10 (2/5/2020  8:04 AM)  Pain Rating Post Med Admin: 7 (2/5/2020  5:00 AM)  Ana Score: 10 (2/4/2020  7:15 PM)

## 2020-02-05 NOTE — HPI
He presented for robotic ultra low anterior resection with DLI for rectal adenocarcinoma following completion of neoadj chemoXRT.

## 2020-02-05 NOTE — SUBJECTIVE & OBJECTIVE
Interval History: c/o severe, poorly controlled pain. No nausea. Tearful 2/2 pain    Medications:  Continuous Infusions:   hydromorphone in 0.9 % NaCl 6 mg/30 ml      lactated ringers 75 mL/hr at 02/05/20 0454     Scheduled Meds:   acetaminophen  1,000 mg Intravenous Q8H    chlorhexidine  10 mL Mouth/Throat BID    enoxaparin  40 mg Subcutaneous Daily    gabapentin  300 mg Oral TID    losartan  50 mg Oral Daily    nozaseptin   Each Nostril BID    pantoprazole  40 mg Oral Daily    senna-docusate 8.6-50 mg  1 tablet Oral BID    tamsulosin  0.4 mg Oral Daily     PRN Meds:diphenhydrAMINE, naloxone, ondansetron, promethazine (PHENERGAN) IVPB     Review of patient's allergies indicates:  No Known Allergies  Objective:     Vital Signs (Most Recent):  Temp: 97.7 °F (36.5 °C) (02/05/20 0757)  Pulse: 82 (02/05/20 0757)  Resp: (!) 21 (02/05/20 0757)  BP: (!) 143/78 (02/05/20 0757)  SpO2: 97 % (02/05/20 0757) Vital Signs (24h Range):  Temp:  [97.6 °F (36.4 °C)-98.1 °F (36.7 °C)] 97.7 °F (36.5 °C)  Pulse:  [74-86] 82  Resp:  [7-25] 21  SpO2:  [93 %-100 %] 97 %  BP: (122-177)/(69-92) 143/78     Weight: 69.6 kg (153 lb 7 oz)  Body mass index is 22.02 kg/m².    Intake/Output - Last 3 Shifts       02/03 0700 - 02/04 0659 02/04 0700 - 02/05 0659 02/05 0700 - 02/06 0659    P.O.  300     I.V. (mL/kg)  4736.3 (68)     IV Piggyback  200     Total Intake(mL/kg)  5236.3 (75.2)     Urine (mL/kg/hr)  850 (0.5)     Stool  150 50    Blood  200     Total Output  1200 50    Net  +4036.3 -50                 Physical Exam   Constitutional: He appears well-developed and well-nourished.   Tearful 2/2 pain   HENT:   Head: Normocephalic and atraumatic.   Eyes: EOM are normal.   Cardiovascular: Normal rate and regular rhythm.   Pulmonary/Chest: Effort normal. No respiratory distress.   Abdominal: He exhibits no distension. There is tenderness.   Ostomy pink, viable with liquid brown effluent. Incisions c/d/i   Musculoskeletal: Normal range of  motion.   Skin: Skin is warm and dry.   Psychiatric: He has a normal mood and affect. Thought content normal.   Vitals reviewed.      Significant Labs:  CBC:   Recent Labs   Lab 02/05/20  0439   WBC 9.68   RBC 3.92*   HGB 11.1*   HCT 36.3*   *   MCV 93   MCH 28.3   MCHC 30.6*     CMP:   Recent Labs   Lab 02/05/20  0439   GLU 94   CALCIUM 8.6*      K 3.5   CO2 23      BUN 4*   CREATININE 0.6       Significant Diagnostics:  I have reviewed all pertinent imaging results/findings within the past 24 hours.

## 2020-02-05 NOTE — PLAN OF CARE
02/05/20 1332   Discharge Assessment   Assessment Type Discharge Planning Assessment   Confirmed/corrected address and phone number on facesheet? Yes   Assessment information obtained from? Patient   Prior to hospitilization cognitive status: Alert/Oriented   Prior to hospitalization functional status: Independent   Current cognitive status: Alert/Oriented   Current Functional Status: Assistive Equipment;Needs Assistance   Lives With spouse;grandchild(jean pierre)   Able to Return to Prior Arrangements yes   Is patient able to care for self after discharge? Yes   Who are your caregiver(s) and their phone number(s)? Fara Benton (sister) [phone]   Patient's perception of discharge disposition home health   Readmission Within the Last 30 Days no previous admission in last 30 days   Patient currently being followed by outpatient case management? No   Patient currently receives any other outside agency services? No   Equipment Currently Used at Home none   Do you have any problems affording any of your prescribed medications? No   Is the patient taking medications as prescribed? yes   Does the patient have transportation home? Yes   Transportation Anticipated family or friend will provide   Does the patient receive services at the [facility]? No   Discharge Plan A Home Health   Discharge Plan B Skilled Nursing Facility   DME Needed Upon Discharge  other (see comments)  (TBD)   Patient/Family in Agreement with Plan yes     Met with pt at bedside for DC assessment. Pt lives at home with his wife and grand daughter (13) and previously did not make use of any DME. Pt is presently complaining of severe pain and a malfunctioning hospital bed. Complaints forwarded to pt's RN. Pt will need to DC with ostomy supplies but other DME needs are unknown at this time. Pt's DC plan is likely to be home vs . SWer provided a transitional care folder, information on advanced directives, information on pharmacy bedside delivery,  and discharge planning begins on admission with contact information for any needs/questions. Pt opted for bedside medication delivery. He typically uses [facility].  PCP:DO Sohail Henson LMSW 2/5/2020 1:40 PM

## 2020-02-05 NOTE — CONSULTS
[facility] Hospital Medicine  Consult Note    Patient Name: Vincent Benton  MRN: [id number]  Admission Date: 2/4/2020  Hospital Length of Stay: 0 days  Attending Physician: Familia Gonzalez MD   Primary Care Provider: Shakila Moran DO           Patient information was obtained from patient and past medical records.     Inpatient consult to Internal Medicine  Consult performed by: Bhumi Baez NP  Consult ordered by: Familia Gonzalez MD        Subjective:     Principal Problem: Rectal adenocarcinoma    Chief Complaint: Post-op ABD pain.       HPI: Mr. Benton is a 63yo male with a PMHx of HTN, HLD, GERD, anemia, remote EtOH abuse (quit 8 months ago), and rectal CA currently receiving chemo and radiation.  Today, he underwent elective robotic resection with ileostomy construction and MediPort insertion for his mid-rectal adenocarcinoma.  He tolerated the procedure well, and was transferred to the Med-Surg unit for recovery.  Patient c/o post-op ABD pain that is adequately controlled by ordered oral analgesics.  He denies any N/V, CP, SOB, back pain, HA, lightheadedness, dizziness, fever or chills.  Hospital Medicine was consulted for post-op medical management.      Past Medical History:   Diagnosis Date    Alcoholism     Anemia     Back pain     Encounter for blood transfusion 2018    Essential hypertension 2/4/2016    GERD (gastroesophageal reflux disease)     Hiatal hernia     Hypertension     Rectal cancer 9/11/2019       Past Surgical History:   Procedure Laterality Date    COLONOSCOPY N/A 9/3/2019    Procedure: COLONOSCOPY;  Surgeon: Isai Centeno MD;  Location: [facility];  Service: Endoscopy;  Laterality: N/A;    COLONOSCOPY N/A 1/10/2020    Procedure: COLONOSCOPY;  Surgeon: Familia Gonzalez MD;  Location: [facility];  Service: General;  Laterality: N/A;    ESOPHAGOGASTRODUODENOSCOPY N/A 9/3/2019    Procedure: ESOPHAGOGASTRODUODENOSCOPY (EGD);  Surgeon: Isai ELIZABETH  MD Taryn;  Location: [facility];  Service: Endoscopy;  Laterality: N/A;    JOINT REPLACEMENT Left 2009       Review of patient's allergies indicates:  No Known Allergies    Current Facility-Administered Medications on File Prior to Encounter   Medication    lactated ringers infusion    lactated ringers infusion    sodium chloride 0.9% flush 3 mL     Current Outpatient Medications on File Prior to Encounter   Medication Sig    cyanocobalamin (VITAMIN B-12) 1000 MCG tablet Take 100 mcg by mouth once daily.    tamsulosin (FLOMAX) 0.4 mg Cap Take 1 capsule (0.4 mg total) by mouth once daily.    capecitabine (XELODA) 500 MG Tab Take 3 tablets (1,500 mg total) by mouth 2 (two) times daily on days of radiation only for a total of six weeks.    ferrous sulfate 324 mg (65 mg iron) TbEC Take 1 tablet (324 mg total) by mouth 2 (two) times daily.    lidocaine 4 % Gel Apply 1 application topically 4 (four) times daily.    pantoprazole (PROTONIX) 40 MG tablet Take 1 tablet (40 mg total) by mouth once daily.     Family History     Problem Relation (Age of Onset)    Cataracts Mother    Hypertension Father    Stroke Father        Tobacco Use    Smoking status: Never Smoker    Smokeless tobacco: Never Used   Substance and Sexual Activity    Alcohol use: Yes     Comment: HOLD 72H PRIOR TO SURGERY    Drug use: No    Sexual activity: Never     Partners: Female     Review of Systems   Constitutional: Negative for chills, diaphoresis, fatigue and fever.   Respiratory: Negative for cough, shortness of breath and wheezing.    Cardiovascular: Negative for chest pain, palpitations and leg swelling.   Gastrointestinal: Positive for abdominal pain. Negative for nausea and vomiting.   Genitourinary: Negative for dysuria, frequency, hematuria and urgency.   Musculoskeletal: Negative for arthralgias and myalgias.   Skin: Negative for pallor and rash.   Neurological: Negative for dizziness, weakness, light-headedness, numbness and  headaches.   All other systems reviewed and are negative.    Objective:     Vital Signs (Most Recent):  Temp: 97.6 °F (36.4 °C) (02/04/20 1946)  Pulse: 74 (02/04/20 1946)  Resp: 16 (02/04/20 2054)  BP: 133/77 (02/04/20 1946)  SpO2: 98 % (02/04/20 2054) Vital Signs (24h Range):  Temp:  [97.6 °F (36.4 °C)-98.2 °F (36.8 °C)] 97.6 °F (36.4 °C)  Pulse:  [74-86] 74  Resp:  [7-25] 16  SpO2:  [96 %-100 %] 98 %  BP: (122-158)/(69-92) 133/77     Weight: 69.6 kg (153 lb 7 oz)  Body mass index is 22.02 kg/m².    Physical Exam   Constitutional: He is oriented to person, place, and time. He appears well-developed and well-nourished. No distress.   HENT:   Head: Normocephalic and atraumatic.   Eyes: Conjunctivae are normal.   PERRL; EOM intact.   Neck: Normal range of motion. Neck supple.   Cardiovascular: Normal rate, regular rhythm, S1 normal, S2 normal and intact distal pulses.  No extrasystoles are present. Exam reveals no gallop.   No murmur heard.  Pulses:       Radial pulses are 2+ on the right side, and 2+ on the left side.        Dorsalis pedis pulses are 2+ on the right side, and 2+ on the left side.        Posterior tibial pulses are 2+ on the right side, and 2+ on the left side.   Pulmonary/Chest: Effort normal and breath sounds normal. No accessory muscle usage. No tachypnea. No respiratory distress. He has no wheezes. He has no rhonchi. He has no rales.   Abdominal: Soft. Bowel sounds are decreased. There is generalized tenderness. There is no rebound, no guarding and no CVA tenderness.   RLQ ileostomy in place, stoma pink.   Musculoskeletal: Normal range of motion. He exhibits no edema, tenderness or deformity.   Neurological: He is alert and oriented to person, place, and time. No cranial nerve deficit or sensory deficit.   Skin: Skin is warm, dry and intact. Capillary refill takes less than 2 seconds. No rash noted. He is not diaphoretic. No cyanosis or erythema.   ABD surgical incision sites CDI.   Psychiatric:  He has a normal mood and affect. His speech is normal and behavior is normal. Cognition and memory are normal.   Nursing note and vitals reviewed.      Significant Labs:  Results for orders placed or performed during the hospital encounter of 02/04/20   Type And Screen Preop   Result Value Ref Range    Group & Rh O POS     Indirect Croie NEG       All pertinent labs within the past 24 hours have been reviewed.    Significant Imaging:     I have reviewed all pertinent imaging results/findings within the past 24 hours.             Assessment/Plan:     * Rectal adenocarcinoma  - s/p robotic resection with ileostomy construction and MediPort insertion on 2/4.  - Routine post-op care per primary team.  - On continuous IV fluids.  - Analgesics as needed.    Essential hypertension  - BP stable.  Continue home losartan.      VTE Risk Mitigation (From admission, onward)         Ordered     enoxaparin injection 40 mg  Daily      02/04/20 1949     IP VTE HIGH RISK PATIENT  Once      02/04/20 1949     Place sequential compression device  Until discontinued      02/04/20 1949                    Thank you for your consult. I will follow-up with patient. Please contact us if you have any additional questions.    Bhumi Baez NP  Department of Hospital Medicine   [facility]

## 2020-02-05 NOTE — SUBJECTIVE & OBJECTIVE
Interval History: pt stable post procedure.  Pt reports pain controlled on PCA.  Colostomy intact with ostomy pink with viable tissue.  Vital signs and H/H stable post procedure.  General Surgery primary team.         Review of Systems   Constitutional: Negative for chills, diaphoresis, fatigue and fever.   Respiratory: Negative for cough, shortness of breath and wheezing.    Cardiovascular: Negative for chest pain, palpitations and leg swelling.   Gastrointestinal: Positive for abdominal pain. Negative for nausea and vomiting.   Genitourinary: Negative for dysuria, frequency, hematuria and urgency.   Musculoskeletal: Negative for arthralgias and myalgias.   Skin: Positive for wound. Negative for color change, pallor and rash.   Neurological: Negative for dizziness, weakness, light-headedness, numbness and headaches.   Psychiatric/Behavioral: Negative for agitation, confusion and sleep disturbance. The patient is not nervous/anxious.    All other systems reviewed and are negative.    Objective:     Vital Signs (Most Recent):  Temp: 97.8 °F (36.6 °C) (02/05/20 1206)  Pulse: 82 (02/05/20 1454)  Resp: 20 (02/05/20 1454)  BP: (!) 140/79 (02/05/20 1454)  SpO2: 97 % (02/05/20 1454) Vital Signs (24h Range):  Temp:  [97.6 °F (36.4 °C)-98.1 °F (36.7 °C)] 97.8 °F (36.6 °C)  Pulse:  [74-86] 82  Resp:  [7-25] 20  SpO2:  [93 %-100 %] 97 %  BP: (122-177)/(69-92) 140/79     Weight: 69.6 kg (153 lb 7 oz)  Body mass index is 22.02 kg/m².    Intake/Output Summary (Last 24 hours) at 2/5/2020 1531  Last data filed at 2/5/2020 1527  Gross per 24 hour   Intake 3169 ml   Output 1405 ml   Net 1764 ml      Physical Exam   Constitutional: He is oriented to person, place, and time. He appears well-developed and well-nourished. He appears lethargic.   HENT:   Head: Normocephalic and atraumatic.   Eyes: EOM are normal.   Neck: Normal range of motion. Neck supple.   Cardiovascular: Normal rate and regular rhythm.   Pulmonary/Chest: Effort  normal. No respiratory distress.   Abdominal: He exhibits no distension. There is tenderness.   Ostomy pink, viable with liquid brown effluent. Incisions c/d/i   Genitourinary:   Genitourinary Comments: Deferred    Musculoskeletal: Normal range of motion.   Neurological: He is oriented to person, place, and time. He appears lethargic.   Skin: Skin is warm and dry.   Psychiatric: He has a normal mood and affect. Thought content normal.   Vitals reviewed.      Significant Labs:   CBC:   Recent Labs   Lab 02/05/20  0439   WBC 9.68   HGB 11.1*   HCT 36.3*   *     CMP:   Recent Labs   Lab 02/05/20  0439      K 3.5      CO2 23   GLU 94   BUN 4*   CREATININE 0.6   CALCIUM 8.6*   ANIONGAP 13   EGFRNONAA >60       Significant Imaging:

## 2020-02-05 NOTE — CONSULTS
"   02/05/20 1400        Ileostomy 02/04/20 1623 RLQ   Placement Date/Time: 02/04/20 1623   Present Prior to Hospital Arrival?: No  Inserted by: MD  Location: RLQ   Stoma Appearance round;moist;pink   Appliance 1-piece;intact;no leakage   Stoma Function green;thin liquid  (bilious effluent)     Consulted on this 65 y/o M patient for new Ileostomy education and management. He is lying with eyes closed, moaning, tearful, c/o poorly controlled pain and has just been started on PCA. Family present at bedside. Due to pain, unable to initiate education. Printed educational material left at bedside. Abdomen assessed. One piece flat KAMARI pouch intact, no leak noted. Small amount bilious effluent noted in pouch. Stoma is moist and pink. Will re-visit tomorrow to start education.    Please review Kiana's procedure "Pouching : Colostomy or Ileostomy" for instructions on ostomy.stoma care. Change ostomy appliance weekly or IMMEDIATELY IF LEAKING. All ostomy care supplies can be requested from materials management.    OSTOMY CARE SUPPLIES:  One Piece Flat Kamari Pouch #[id number]   Brava Stoma Ring #[id number]   Cavilon Spray #[id number]              "

## 2020-02-05 NOTE — HOSPITAL COURSE
Pt admitted to Medical Surgical Unit s/p S/p robotic ultra LAR with loop ileostomy construction and mediport placement on 2/4/2020 per General Surgery in the setting of rectal adenocarcinoma.   Hospital Medicine contacted for medical management.  Vital signs stable on prescribed medications and H/H stable post procedure. PCA continued.  On 2/6/2020, H/H stable with mild decline noted.  BP controlled with episodic elevated likely due to pain.  Pt reports pain controlled on prescribed regime. Pt reports difficulty ambulating today.  On 2/7/2020, pt verbalized symptom improvement on POD3.    PCA discontinued and transitioned to oral dosing with IV for breakthrough.  More frequent ambulation and compliance with IS encouraged.  Ostomy output monitored.

## 2020-02-05 NOTE — PROGRESS NOTES
Secure message sent to Dr. Gonzalez about Rapid/ MEWs score popping up due to patient's VS. No rapid need ryley called at this time. Will monitor.

## 2020-02-05 NOTE — PROGRESS NOTES
Upon shift assessment, patient's sister at bedside asked primary nurse if everything could wait because patient just fell asleep. Primary nurse explained that VS and a full assessment are done at the beginning of each shift and that no medication can be administered until VS are taken and an assessment of patient is done. Patient's sister agreed. Primary nurse will try again later.

## 2020-02-05 NOTE — ASSESSMENT & PLAN NOTE
- s/p robotic resection with ileostomy construction and MediPort insertion on 2/4.  - Routine post-op care per primary team.  - On continuous IV fluids.  - Analgesics as needed.

## 2020-02-05 NOTE — HPI
Mr. Benton is a 65yo male with a PMHx of HTN, HLD, GERD, anemia, remote EtOH abuse (quit 8 months ago), and rectal CA currently receiving chemo and radiation.  Today, he underwent elective robotic resection with ileostomy construction and MediPort insertion for his mid-rectal adenocarcinoma.  He tolerated the procedure well, and was transferred to the Med-Surg unit for recovery.  Patient c/o post-op ABD pain that is adequately controlled by ordered oral analgesics.  He denies any N/V, CP, SOB, back pain, HA, lightheadedness, dizziness, fever or chills.  Hospital Medicine was consulted for post-op medical management.

## 2020-02-05 NOTE — PLAN OF CARE
Patient remains free of falls and safety precautions maintained. Afebrile. Pain controlled with PCA. IV fluids per order. Q2 turn with wedge in place. RLQ ileostomy intact with small drainage throughout shift. Green in color. X5 lap sites. Will continue to monitor.12 hour chart check.

## 2020-02-05 NOTE — ASSESSMENT & PLAN NOTE
S/p robotic ultra LAR with loop ileostomy construction. Mediport placement  POD1  No ptx on post op CXR  Start dilaudid PCA for analgesia  Regular diet as tolerated  Ambulate, OOB once pain control improved  IS

## 2020-02-05 NOTE — SUBJECTIVE & OBJECTIVE
Past Medical History:   Diagnosis Date    Alcoholism     Anemia     Back pain     Encounter for blood transfusion 2018    Essential hypertension 2/4/2016    GERD (gastroesophageal reflux disease)     Hiatal hernia     Hypertension     Rectal cancer 9/11/2019       Past Surgical History:   Procedure Laterality Date    COLONOSCOPY N/A 9/3/2019    Procedure: COLONOSCOPY;  Surgeon: Isai Centeno MD;  Location: [facility];  Service: Endoscopy;  Laterality: N/A;    COLONOSCOPY N/A 1/10/2020    Procedure: COLONOSCOPY;  Surgeon: Familia Gonzalez MD;  Location: [facility] ENDO;  Service: General;  Laterality: N/A;    ESOPHAGOGASTRODUODENOSCOPY N/A 9/3/2019    Procedure: ESOPHAGOGASTRODUODENOSCOPY (EGD);  Surgeon: Isai Centeno MD;  Location: [facility];  Service: Endoscopy;  Laterality: N/A;    JOINT REPLACEMENT Left 2009       Review of patient's allergies indicates:  No Known Allergies    Current Facility-Administered Medications on File Prior to Encounter   Medication    lactated ringers infusion    lactated ringers infusion    sodium chloride 0.9% flush 3 mL     Current Outpatient Medications on File Prior to Encounter   Medication Sig    cyanocobalamin (VITAMIN B-12) 1000 MCG tablet Take 100 mcg by mouth once daily.    tamsulosin (FLOMAX) 0.4 mg Cap Take 1 capsule (0.4 mg total) by mouth once daily.    capecitabine (XELODA) 500 MG Tab Take 3 tablets (1,500 mg total) by mouth 2 (two) times daily on days of radiation only for a total of six weeks.    ferrous sulfate 324 mg (65 mg iron) TbEC Take 1 tablet (324 mg total) by mouth 2 (two) times daily.    lidocaine 4 % Gel Apply 1 application topically 4 (four) times daily.    pantoprazole (PROTONIX) 40 MG tablet Take 1 tablet (40 mg total) by mouth once daily.     Family History     Problem Relation (Age of Onset)    Cataracts Mother    Hypertension Father    Stroke Father        Tobacco Use    Smoking status: Never Smoker    Smokeless tobacco: Never  Used   Substance and Sexual Activity    Alcohol use: Yes     Comment: HOLD 72H PRIOR TO SURGERY    Drug use: No    Sexual activity: Never     Partners: Female     Review of Systems   Constitutional: Negative for chills, diaphoresis, fatigue and fever.   Respiratory: Negative for cough, shortness of breath and wheezing.    Cardiovascular: Negative for chest pain, palpitations and leg swelling.   Gastrointestinal: Positive for abdominal pain. Negative for nausea and vomiting.   Genitourinary: Negative for dysuria, frequency, hematuria and urgency.   Musculoskeletal: Negative for arthralgias and myalgias.   Skin: Negative for pallor and rash.   Neurological: Negative for dizziness, weakness, light-headedness, numbness and headaches.   All other systems reviewed and are negative.    Objective:     Vital Signs (Most Recent):  Temp: 97.6 °F (36.4 °C) (02/04/20 1946)  Pulse: 74 (02/04/20 1946)  Resp: 16 (02/04/20 2054)  BP: 133/77 (02/04/20 1946)  SpO2: 98 % (02/04/20 2054) Vital Signs (24h Range):  Temp:  [97.6 °F (36.4 °C)-98.2 °F (36.8 °C)] 97.6 °F (36.4 °C)  Pulse:  [74-86] 74  Resp:  [7-25] 16  SpO2:  [96 %-100 %] 98 %  BP: (122-158)/(69-92) 133/77     Weight: 69.6 kg (153 lb 7 oz)  Body mass index is 22.02 kg/m².    Physical Exam   Constitutional: He is oriented to person, place, and time. He appears well-developed and well-nourished. No distress.   HENT:   Head: Normocephalic and atraumatic.   Eyes: Conjunctivae are normal.   PERRL; EOM intact.   Neck: Normal range of motion. Neck supple.   Cardiovascular: Normal rate, regular rhythm, S1 normal, S2 normal and intact distal pulses.  No extrasystoles are present. Exam reveals no gallop.   No murmur heard.  Pulses:       Radial pulses are 2+ on the right side, and 2+ on the left side.        Dorsalis pedis pulses are 2+ on the right side, and 2+ on the left side.        Posterior tibial pulses are 2+ on the right side, and 2+ on the left side.   Pulmonary/Chest:  Effort normal and breath sounds normal. No accessory muscle usage. No tachypnea. No respiratory distress. He has no wheezes. He has no rhonchi. He has no rales.   Abdominal: Soft. Bowel sounds are decreased. There is generalized tenderness. There is no rebound, no guarding and no CVA tenderness.   RLQ ileostomy in place, stoma pink.   Musculoskeletal: Normal range of motion. He exhibits no edema, tenderness or deformity.   Neurological: He is alert and oriented to person, place, and time. No cranial nerve deficit or sensory deficit.   Skin: Skin is warm, dry and intact. Capillary refill takes less than 2 seconds. No rash noted. He is not diaphoretic. No cyanosis or erythema.   ABD surgical incision sites CDI.   Psychiatric: He has a normal mood and affect. His speech is normal and behavior is normal. Cognition and memory are normal.   Nursing note and vitals reviewed.      Significant Labs:  Results for orders placed or performed during the hospital encounter of 02/04/20   Type And Screen Preop   Result Value Ref Range    Group & Rh O POS     Indirect Corie NEG       All pertinent labs within the past 24 hours have been reviewed.    Significant Imaging:     I have reviewed all pertinent imaging results/findings within the past 24 hours.

## 2020-02-05 NOTE — PROGRESS NOTES
[facility] -   General Surgery  Progress Note    Subjective:     History of Present Illness:  He presented for LAR for rectal adenocarcinoma.     Post-Op Info:  Procedure(s) (LRB):  XI ROBOTIC LOW ANTERIOR RESECTION (N/A)  INSERTION, PORT-A-CATH (Right)  BLOCK, TRANSVERSUS ABDOMINIS PLANE (N/A)  CREATION, ILEOSTOMY (Right)   1 Day Post-Op     Interval History: c/o severe, poorly controlled pain. No nausea. Tearful 2/2 pain    Medications:  Continuous Infusions:   hydromorphone in 0.9 % NaCl 6 mg/30 ml      lactated ringers 75 mL/hr at 02/05/20 0454     Scheduled Meds:   acetaminophen  1,000 mg Intravenous Q8H    chlorhexidine  10 mL Mouth/Throat BID    enoxaparin  40 mg Subcutaneous Daily    gabapentin  300 mg Oral TID    losartan  50 mg Oral Daily    nozaseptin   Each Nostril BID    pantoprazole  40 mg Oral Daily    senna-docusate 8.6-50 mg  1 tablet Oral BID    tamsulosin  0.4 mg Oral Daily     PRN Meds:diphenhydrAMINE, naloxone, ondansetron, promethazine (PHENERGAN) IVPB     Review of patient's allergies indicates:  No Known Allergies  Objective:     Vital Signs (Most Recent):  Temp: 97.7 °F (36.5 °C) (02/05/20 0757)  Pulse: 82 (02/05/20 0757)  Resp: (!) 21 (02/05/20 0757)  BP: (!) 143/78 (02/05/20 0757)  SpO2: 97 % (02/05/20 0757) Vital Signs (24h Range):  Temp:  [97.6 °F (36.4 °C)-98.1 °F (36.7 °C)] 97.7 °F (36.5 °C)  Pulse:  [74-86] 82  Resp:  [7-25] 21  SpO2:  [93 %-100 %] 97 %  BP: (122-177)/(69-92) 143/78     Weight: 69.6 kg (153 lb 7 oz)  Body mass index is 22.02 kg/m².    Intake/Output - Last 3 Shifts       02/03 0700 - 02/04 0659 02/04 0700 - 02/05 0659 02/05 0700 - 02/06 0659    P.O.  300     I.V. (mL/kg)  4736.3 (68)     IV Piggyback  200     Total Intake(mL/kg)  5236.3 (75.2)     Urine (mL/kg/hr)  850 (0.5)     Stool  150 50    Blood  200     Total Output  1200 50    Net  +4036.3 -50                 Physical Exam   Constitutional: He appears well-developed and well-nourished.    Tearful 2/2 pain   HENT:   Head: Normocephalic and atraumatic.   Eyes: EOM are normal.   Cardiovascular: Normal rate and regular rhythm.   Pulmonary/Chest: Effort normal. No respiratory distress.   Abdominal: He exhibits no distension. There is tenderness.   Ostomy pink, viable with liquid brown effluent. Incisions c/d/i   Musculoskeletal: Normal range of motion.   Skin: Skin is warm and dry.   Psychiatric: He has a normal mood and affect. Thought content normal.   Vitals reviewed.      Significant Labs:  CBC:   Recent Labs   Lab 02/05/20  0439   WBC 9.68   RBC 3.92*   HGB 11.1*   HCT 36.3*   *   MCV 93   MCH 28.3   MCHC 30.6*     CMP:   Recent Labs   Lab 02/05/20  0439   GLU 94   CALCIUM 8.6*      K 3.5   CO2 23      BUN 4*   CREATININE 0.6       Significant Diagnostics:  I have reviewed all pertinent imaging results/findings within the past 24 hours.    Assessment/Plan:     * Rectal adenocarcinoma  S/p robotic ultra LAR with loop ileostomy construction. Mediport placement  POD1  No ptx on post op CXR  Start dilaudid PCA for analgesia  Regular diet as tolerated  Ambulate, OOB once pain control improved  IS        Essential hypertension  hospital medicine was consulted.     Alcohol use  Monitor for withdrawal signs, symptoms          Cathleen Skinner PA-C  General Surgery  [facility]

## 2020-02-05 NOTE — PROGRESS NOTES
[facility] Hospital Medicine  Progress Note    Patient Name: Vincent Benton  MRN: [id number]  Patient Class: IP- Surgery Admit   Admission Date: 2/4/2020  Length of Stay: 1 days  Attending Physician: Familia Gonzalez MD  Primary Care Provider: Shakila Moran DO        Subjective:     Principal Problem:Rectal adenocarcinoma        HPI:  Mr. Benton is a 63yo male with a PMHx of HTN, HLD, GERD, anemia, remote EtOH abuse (quit 8 months ago), and rectal CA currently receiving chemo and radiation.  Today, he underwent elective robotic resection with ileostomy construction and MediPort insertion for his mid-rectal adenocarcinoma.  He tolerated the procedure well, and was transferred to the Med-Surg unit for recovery.  Patient c/o post-op ABD pain that is adequately controlled by ordered oral analgesics.  He denies any N/V, CP, SOB, back pain, HA, lightheadedness, dizziness, fever or chills.  Hospital Medicine was consulted for post-op medical management.      Overview/Hospital Course:  Pt admitted to Medical Surgical Unit s/p S/p robotic ultra LAR with loop ileostomy construction and mediport placement on 2/4/2020 per General Surgery in the setting of rectal adenocarcinoma.   Hospital Medicine contacted for medical management.  Vital signs stable on prescribed medications and H/H stable post procedure. PCA continued.      Interval History: pt stable post procedure.  Pt reports pain controlled on PCA.  Colostomy intact with ostomy pink with viable tissue.  Vital signs and H/H stable post procedure.  General Surgery primary team.         Review of Systems   Constitutional: Negative for chills, diaphoresis, fatigue and fever.   Respiratory: Negative for cough, shortness of breath and wheezing.    Cardiovascular: Negative for chest pain, palpitations and leg swelling.   Gastrointestinal: Positive for abdominal pain. Negative for nausea and vomiting.   Genitourinary: Negative for dysuria, frequency,  hematuria and urgency.   Musculoskeletal: Negative for arthralgias and myalgias.   Skin: Positive for wound. Negative for color change, pallor and rash.   Neurological: Negative for dizziness, weakness, light-headedness, numbness and headaches.   Psychiatric/Behavioral: Negative for agitation, confusion and sleep disturbance. The patient is not nervous/anxious.    All other systems reviewed and are negative.    Objective:     Vital Signs (Most Recent):  Temp: 97.8 °F (36.6 °C) (02/05/20 1206)  Pulse: 82 (02/05/20 1454)  Resp: 20 (02/05/20 1454)  BP: (!) 140/79 (02/05/20 1454)  SpO2: 97 % (02/05/20 1454) Vital Signs (24h Range):  Temp:  [97.6 °F (36.4 °C)-98.1 °F (36.7 °C)] 97.8 °F (36.6 °C)  Pulse:  [74-86] 82  Resp:  [7-25] 20  SpO2:  [93 %-100 %] 97 %  BP: (122-177)/(69-92) 140/79     Weight: 69.6 kg (153 lb 7 oz)  Body mass index is 22.02 kg/m².    Intake/Output Summary (Last 24 hours) at 2/5/2020 1531  Last data filed at 2/5/2020 1527  Gross per 24 hour   Intake 3169 ml   Output 1405 ml   Net 1764 ml      Physical Exam   Constitutional: He is oriented to person, place, and time. He appears well-developed and well-nourished. He appears lethargic.   HENT:   Head: Normocephalic and atraumatic.   Eyes: EOM are normal.   Neck: Normal range of motion. Neck supple.   Cardiovascular: Normal rate and regular rhythm.   Pulmonary/Chest: Effort normal. No respiratory distress.   Abdominal: He exhibits no distension. There is tenderness.   Ostomy pink, viable with liquid brown effluent. Incisions c/d/i   Genitourinary:   Genitourinary Comments: Deferred    Musculoskeletal: Normal range of motion.   Neurological: He is oriented to person, place, and time. He appears lethargic.   Skin: Skin is warm and dry.   Psychiatric: He has a normal mood and affect. Thought content normal.   Vitals reviewed.      Significant Labs:   CBC:   Recent Labs   Lab 02/05/20  0439   WBC 9.68   HGB 11.1*   HCT 36.3*   *     CMP:   Recent  Labs   Lab 02/05/20  0439      K 3.5      CO2 23   GLU 94   BUN 4*   CREATININE 0.6   CALCIUM 8.6*   ANIONGAP 13   EGFRNONAA >60       Significant Imaging:           Assessment/Plan:      * Rectal adenocarcinoma  - s/p robotic resection with ileostomy construction and MediPort insertion on 2/4.  - Routine post-op care per primary team.  - On continuous IV fluids.  - Analgesics as needed  -antiemetics as needed     Essential hypertension  - BP stable.  Continue home losartan.    Alcohol use  -will monitor for signs of withdrawal       Anemia  H/H stable post procedure   -will monitor   -repeat CBC in am         VTE Risk Mitigation (From admission, onward)         Ordered     enoxaparin injection 40 mg  Daily      02/04/20 1949     IP VTE HIGH RISK PATIENT  Once      02/04/20 1949     Place sequential compression device  Until discontinued      02/04/20 1949                      Beatriz Elder NP  Department of Hospital Medicine   [facility]

## 2020-02-05 NOTE — PLAN OF CARE
Pt complains of generalized abdominal pain. Pain medication given per order. Abdomen is distended. Ileostomy output is bile. Pt complains of nausea, pt is not tolerating diet. Lap sites are intact. Iv fluids are infusing. No injuries. Will continue to monitor. 12 hour chart check is completed.

## 2020-02-05 NOTE — PROGRESS NOTES
Wasted 5.2ml of dilauded PCA syringe in pyxsis with LESLEE Nagel. New syringe of dilaudid PCA administered.

## 2020-02-05 NOTE — HOSPITAL COURSE
02/04/2020: Underwent robotic ultra low anterior resection with DLI.   02/05/2020: POD1 s/p Robotic ultra low LAR with construction of loop ileostomy. Mediport placement. He c/o severe uncontrolled pain so PCA started. He denied nausea. Ostomy pink, viable with brown liquid effluent  02/06/2020: pod2 he is tolerating a diet and having ostomy output. No nausea. Pain well controlled with PCA.   02/07/2020: POD3 not ambulating much. D/c pca. Discharge planning  02/08/2020.  Postop day 4.  Not feeling well.  Feels nauseated and bloated.  Did not eat much of his breakfast.  There is liquid ileostomy output approximately 800  02/09/2020.  Tolerating a regular diet.  Approximately 1 L of semi-liquid stool of the ileostomy.  Will discharge home.  Discussed ileostomy care

## 2020-02-06 LAB
ANION GAP SERPL CALC-SCNC: 8 MMOL/L (ref 8–16)
BASOPHILS # BLD AUTO: 0.05 K/UL (ref 0–0.2)
BASOPHILS NFR BLD: 0.7 % (ref 0–1.9)
BUN SERPL-MCNC: 3 MG/DL (ref 8–23)
CALCIUM SERPL-MCNC: 8.5 MG/DL (ref 8.7–10.5)
CHLORIDE SERPL-SCNC: 103 MMOL/L (ref 95–110)
CO2 SERPL-SCNC: 27 MMOL/L (ref 23–29)
CREAT SERPL-MCNC: 0.5 MG/DL (ref 0.5–1.4)
DIFFERENTIAL METHOD: ABNORMAL
EOSINOPHIL # BLD AUTO: 0.2 K/UL (ref 0–0.5)
EOSINOPHIL NFR BLD: 2.9 % (ref 0–8)
ERYTHROCYTE [DISTWIDTH] IN BLOOD BY AUTOMATED COUNT: 15.9 % (ref 11.5–14.5)
EST. GFR  (AFRICAN AMERICAN): >60 ML/MIN/1.73 M^2
EST. GFR  (NON AFRICAN AMERICAN): >60 ML/MIN/1.73 M^2
GLUCOSE SERPL-MCNC: 86 MG/DL (ref 70–110)
HCT VFR BLD AUTO: 31.8 % (ref 40–54)
HGB BLD-MCNC: 9.4 G/DL (ref 14–18)
IMM GRANULOCYTES # BLD AUTO: 0.03 K/UL (ref 0–0.04)
IMM GRANULOCYTES NFR BLD AUTO: 0.4 % (ref 0–0.5)
LYMPHOCYTES # BLD AUTO: 0.5 K/UL (ref 1–4.8)
LYMPHOCYTES NFR BLD: 7.2 % (ref 18–48)
MAGNESIUM SERPL-MCNC: 1.7 MG/DL (ref 1.6–2.6)
MCH RBC QN AUTO: 28.1 PG (ref 27–31)
MCHC RBC AUTO-ENTMCNC: 29.6 G/DL (ref 32–36)
MCV RBC AUTO: 95 FL (ref 82–98)
MONOCYTES # BLD AUTO: 0.9 K/UL (ref 0.3–1)
MONOCYTES NFR BLD: 12.2 % (ref 4–15)
NEUTROPHILS # BLD AUTO: 5.3 K/UL (ref 1.8–7.7)
NEUTROPHILS NFR BLD: 76.6 % (ref 38–73)
NRBC BLD-RTO: 0 /100 WBC
PHOSPHATE SERPL-MCNC: 2.2 MG/DL (ref 2.7–4.5)
PLATELET # BLD AUTO: 332 K/UL (ref 150–350)
PMV BLD AUTO: 10 FL (ref 9.2–12.9)
POTASSIUM SERPL-SCNC: 4.1 MMOL/L (ref 3.5–5.1)
RBC # BLD AUTO: 3.34 M/UL (ref 4.6–6.2)
SODIUM SERPL-SCNC: 138 MMOL/L (ref 136–145)
WBC # BLD AUTO: 6.96 K/UL (ref 3.9–12.7)

## 2020-02-06 PROCEDURE — 94770 HC EXHALED C02 TEST: CPT

## 2020-02-06 PROCEDURE — 11000001 HC ACUTE MED/SURG PRIVATE ROOM

## 2020-02-06 PROCEDURE — 94761 N-INVAS EAR/PLS OXIMETRY MLT: CPT

## 2020-02-06 PROCEDURE — 36415 COLL VENOUS BLD VENIPUNCTURE: CPT

## 2020-02-06 PROCEDURE — 94799 UNLISTED PULMONARY SVC/PX: CPT

## 2020-02-06 PROCEDURE — 63600175 PHARM REV CODE 636 W HCPCS: Performed by: COLON & RECTAL SURGERY

## 2020-02-06 PROCEDURE — 83735 ASSAY OF MAGNESIUM: CPT

## 2020-02-06 PROCEDURE — 63600175 PHARM REV CODE 636 W HCPCS: Performed by: PHYSICIAN ASSISTANT

## 2020-02-06 PROCEDURE — 84100 ASSAY OF PHOSPHORUS: CPT

## 2020-02-06 PROCEDURE — 96372 THER/PROPH/DIAG INJ SC/IM: CPT

## 2020-02-06 PROCEDURE — 80048 BASIC METABOLIC PNL TOTAL CA: CPT

## 2020-02-06 PROCEDURE — 85025 COMPLETE CBC W/AUTO DIFF WBC: CPT

## 2020-02-06 PROCEDURE — 25000003 PHARM REV CODE 250: Performed by: NURSE PRACTITIONER

## 2020-02-06 PROCEDURE — 25000003 PHARM REV CODE 250: Performed by: COLON & RECTAL SURGERY

## 2020-02-06 PROCEDURE — 99900035 HC TECH TIME PER 15 MIN (STAT)

## 2020-02-06 RX ADMIN — GABAPENTIN 300 MG: 300 CAPSULE ORAL at 09:02

## 2020-02-06 RX ADMIN — CHLORHEXIDINE GLUCONATE 0.12% ORAL RINSE 10 ML: 1.2 LIQUID ORAL at 09:02

## 2020-02-06 RX ADMIN — TAMSULOSIN HYDROCHLORIDE 0.4 MG: 0.4 CAPSULE ORAL at 09:02

## 2020-02-06 RX ADMIN — PROMETHAZINE HYDROCHLORIDE 6.25 MG: 25 INJECTION INTRAMUSCULAR; INTRAVENOUS at 10:02

## 2020-02-06 RX ADMIN — PANTOPRAZOLE SODIUM 40 MG: 40 TABLET, DELAYED RELEASE ORAL at 09:02

## 2020-02-06 RX ADMIN — Medication: at 07:02

## 2020-02-06 RX ADMIN — ENOXAPARIN SODIUM 40 MG: 100 INJECTION SUBCUTANEOUS at 04:02

## 2020-02-06 RX ADMIN — SENNOSIDES, DOCUSATE SODIUM 1 TABLET: 50; 8.6 TABLET, FILM COATED ORAL at 09:02

## 2020-02-06 RX ADMIN — Medication: at 09:02

## 2020-02-06 RX ADMIN — ONDANSETRON 4 MG: 2 INJECTION INTRAMUSCULAR; INTRAVENOUS at 07:02

## 2020-02-06 RX ADMIN — GABAPENTIN 300 MG: 300 CAPSULE ORAL at 04:02

## 2020-02-06 RX ADMIN — LOSARTAN POTASSIUM 50 MG: 50 TABLET, FILM COATED ORAL at 09:02

## 2020-02-06 RX ADMIN — SODIUM CHLORIDE, SODIUM LACTATE, POTASSIUM CHLORIDE, AND CALCIUM CHLORIDE: .6; .31; .03; .02 INJECTION, SOLUTION INTRAVENOUS at 07:02

## 2020-02-06 NOTE — SUBJECTIVE & OBJECTIVE
Interval History: H/H stable with decline noted.  BP mostly controlled with episodic increase likely due to pain.  Pt reports pain controlled on PCA.  Pt states he had difficulty ambulating today.  Ileostomy intact.  General Surgery following.      Review of Systems   Constitutional: Negative for chills, diaphoresis, fatigue and fever.   Respiratory: Negative for cough, shortness of breath and wheezing.    Cardiovascular: Negative for chest pain, palpitations and leg swelling.   Gastrointestinal: Positive for abdominal pain. Negative for nausea and vomiting.   Genitourinary: Negative for dysuria, frequency, hematuria and urgency.   Musculoskeletal: Negative for arthralgias and myalgias.   Skin: Positive for wound. Negative for color change, pallor and rash.   Neurological: Negative for dizziness, weakness, light-headedness, numbness and headaches.   Psychiatric/Behavioral: Negative for agitation, confusion and sleep disturbance. The patient is not nervous/anxious.    All other systems reviewed and are negative.    Objective:     Vital Signs (Most Recent):  Temp: 98 °F (36.7 °C) (02/06/20 1150)  Pulse: 84 (02/06/20 1401)  Resp: 20 (02/06/20 1401)  BP: 139/73 (02/06/20 1401)  SpO2: 98 % (02/06/20 1401) Vital Signs (24h Range):  Temp:  [97.6 °F (36.4 °C)-98.5 °F (36.9 °C)] 98 °F (36.7 °C)  Pulse:  [69-84] 84  Resp:  [12-20] 20  SpO2:  [96 %-100 %] 98 %  BP: (124-160)/(71-85) 139/73     Weight: 69.6 kg (153 lb 7 oz)  Body mass index is 22.02 kg/m².    Intake/Output Summary (Last 24 hours) at 2/6/2020 1545  Last data filed at 2/6/2020 1100  Gross per 24 hour   Intake 1847.5 ml   Output 2350 ml   Net -502.5 ml      Physical Exam   Constitutional: He is oriented to person, place, and time. He appears well-developed and well-nourished. He appears lethargic.   HENT:   Head: Normocephalic and atraumatic.   Eyes: EOM are normal.   Neck: Normal range of motion. Neck supple.   Cardiovascular: Normal rate and regular rhythm.    Pulmonary/Chest: Effort normal. No respiratory distress.   Abdominal: He exhibits no distension. There is tenderness.   Ostomy pink, viable with liquid brown effluent. Incisions c/d/i   Genitourinary:   Genitourinary Comments: Deferred    Musculoskeletal: Normal range of motion.   Neurological: He is oriented to person, place, and time. He appears lethargic.   Skin: Skin is warm and dry.   Psychiatric: He has a normal mood and affect. Thought content normal.   Vitals reviewed.      Significant Labs:   CBC:   Recent Labs   Lab 02/05/20 0439 02/06/20 0435   WBC 9.68 6.96   HGB 11.1* 9.4*   HCT 36.3* 31.8*   * 332     CMP:   Recent Labs   Lab 02/05/20 0439 02/06/20 0435    138   K 3.5 4.1    103   CO2 23 27   GLU 94 86   BUN 4* 3*   CREATININE 0.6 0.5   CALCIUM 8.6* 8.5*   ANIONGAP 13 8   EGFRNONAA >60 >60       Significant Imaging:

## 2020-02-06 NOTE — PROGRESS NOTES
[facility] -   General Surgery  Progress Note    Subjective:     History of Present Illness:  He presented for LAR for rectal adenocarcinoma.     Post-Op Info:  Procedure(s) (LRB):  XI ROBOTIC LOW ANTERIOR RESECTION (N/A)  INSERTION, PORT-A-CATH (Right)  BLOCK, TRANSVERSUS ABDOMINIS PLANE (N/A)  CREATION, ILEOSTOMY (Right)  SIGMOIDOSCOPY, FLEXIBLE  MOBILIZATION, SPLENIC FLEXURE   2 Days Post-Op     Interval History: no complaints. Pain controlled. No nausea. Neumann removed.    Medications:  Continuous Infusions:   hydromorphone in 0.9 % NaCl 6 mg/30 ml      lactated ringers 75 mL/hr at 02/05/20 5118     Scheduled Meds:   chlorhexidine  10 mL Mouth/Throat BID    enoxaparin  40 mg Subcutaneous Daily    gabapentin  300 mg Oral TID    losartan  50 mg Oral Daily    nozaseptin   Each Nostril BID    pantoprazole  40 mg Oral Daily    senna-docusate 8.6-50 mg  1 tablet Oral BID    tamsulosin  0.4 mg Oral Daily     PRN Meds:diphenhydrAMINE, naloxone, ondansetron, promethazine (PHENERGAN) IVPB     Review of patient's allergies indicates:  No Known Allergies  Objective:     Vital Signs (Most Recent):  Temp: 98.5 °F (36.9 °C) (02/06/20 0726)  Pulse: 78 (02/06/20 0752)  Resp: 18 (02/06/20 0752)  BP: (!) 160/81 (02/06/20 0726)  SpO2: 98 % (02/06/20 0752) Vital Signs (24h Range):  Temp:  [97.6 °F (36.4 °C)-98.5 °F (36.9 °C)] 98.5 °F (36.9 °C)  Pulse:  [69-84] 78  Resp:  [12-20] 18  SpO2:  [97 %-100 %] 98 %  BP: (124-170)/(74-87) 160/81     Weight: 69.6 kg (153 lb 7 oz)  Body mass index is 22.02 kg/m².    Intake/Output - Last 3 Shifts       02/04 0700 - 02/05 0659 02/05 0700 - 02/06 0659 02/06 0700 - 02/07 0659    P.O. 300 840     I.V. (mL/kg) 4736.3 (68) 1875.3 (26.9) 8 (0.1)    IV Piggyback 200 50     Total Intake(mL/kg) 5236.3 (75.2) 2765.3 (39.7) 8 (0.1)    Urine (mL/kg/hr) 850 (0.5) 1350 (0.8)     Stool 150 975 75    Blood 200      Total Output 1200 2325 75    Net +4036.3 +440.3 -67                  Physical Exam   Constitutional: He is oriented to person, place, and time. He appears well-developed and well-nourished.   HENT:   Head: Normocephalic and atraumatic.   Eyes: EOM are normal.   Cardiovascular: Normal rate and regular rhythm.   Pulmonary/Chest: Effort normal. No respiratory distress.   Right chest wall port site is clean, dry, intact   Abdominal: Soft. There is tenderness (appropriate incisional  ttp. ostomy viable and functioning with liquid brown stool. ).   Incisions c/d/i   Musculoskeletal: Normal range of motion.   Neurological: He is alert and oriented to person, place, and time.   Skin: Skin is warm and dry.   Psychiatric: He has a normal mood and affect. Thought content normal.   Vitals reviewed.      Significant Labs:  CBC:   Recent Labs   Lab 02/06/20  0435   WBC 6.96   RBC 3.34*   HGB 9.4*   HCT 31.8*      MCV 95   MCH 28.1   MCHC 29.6*     CMP:   Recent Labs   Lab 02/06/20  0435   GLU 86   CALCIUM 8.5*      K 4.1   CO2 27      BUN 3*   CREATININE 0.5       Significant Diagnostics:  I have reviewed all pertinent imaging results/findings within the past 24 hours.    Assessment/Plan:     * Rectal adenocarcinoma  S/p robotic ultra LAR with loop ileostomy construction. Mediport placement  POD2  continue dilaudid PCA for analgesia  Regular diet as tolerated  Ambulate, OOB once pain control improved  IS  Neumann removed  Monitor ostomy output        Essential hypertension  hospital medicine was consulted.     Alcohol use  Monitor for withdrawal signs, symptoms      Gastroesophageal reflux disease  PPI        Cathleen Skinner PA-C  General Surgery  [facility]

## 2020-02-06 NOTE — NURSING
Pt ambulated to bathroom. Now c/o severe abdominal pain, 30/10. Abdomen is round, firm, distended, and extremely tender to touch. Dr. Gonzalez notified via secure chat.

## 2020-02-06 NOTE — PROGRESS NOTES
Follow up visit with Mr. Benton this morning for continued Ileostomy management and education. He is awake and alert, states his pain is well controlled with PCA today. Abdomen assessed. One piece flat KAMARI pouch remains intact with no leak. Stoma is moist and pink, mildly engorged. Moderate amount bilious effluent noted in pouch, + flatus. Ileostomy education initiated including: diet, hygiene, and activity guidelines, how/when to change pouch, how/when to empty pouch. Demonstrated emptying and re-sealing pouch and patient return-demonstrated. Pouch left at bedside for practice. Plans made to change pouch tomorrow and continue education. Received verbal consent enroll in sample program x3. Printed educational material left at bedside.

## 2020-02-06 NOTE — ASSESSMENT & PLAN NOTE
H/H stable post procedure- decline noted   -will monitor and transfuse as needed   -repeat CBC in am

## 2020-02-06 NOTE — SUBJECTIVE & OBJECTIVE
Interval History: no complaints. Pain controlled. No nausea. Neumann removed.    Medications:  Continuous Infusions:   hydromorphone in 0.9 % NaCl 6 mg/30 ml      lactated ringers 75 mL/hr at 02/05/20 1734     Scheduled Meds:   chlorhexidine  10 mL Mouth/Throat BID    enoxaparin  40 mg Subcutaneous Daily    gabapentin  300 mg Oral TID    losartan  50 mg Oral Daily    nozaseptin   Each Nostril BID    pantoprazole  40 mg Oral Daily    senna-docusate 8.6-50 mg  1 tablet Oral BID    tamsulosin  0.4 mg Oral Daily     PRN Meds:diphenhydrAMINE, naloxone, ondansetron, promethazine (PHENERGAN) IVPB     Review of patient's allergies indicates:  No Known Allergies  Objective:     Vital Signs (Most Recent):  Temp: 98.5 °F (36.9 °C) (02/06/20 0726)  Pulse: 78 (02/06/20 0752)  Resp: 18 (02/06/20 0752)  BP: (!) 160/81 (02/06/20 0726)  SpO2: 98 % (02/06/20 0752) Vital Signs (24h Range):  Temp:  [97.6 °F (36.4 °C)-98.5 °F (36.9 °C)] 98.5 °F (36.9 °C)  Pulse:  [69-84] 78  Resp:  [12-20] 18  SpO2:  [97 %-100 %] 98 %  BP: (124-170)/(74-87) 160/81     Weight: 69.6 kg (153 lb 7 oz)  Body mass index is 22.02 kg/m².    Intake/Output - Last 3 Shifts       02/04 0700 - 02/05 0659 02/05 0700 - 02/06 0659 02/06 0700 - 02/07 0659    P.O. 300 840     I.V. (mL/kg) 4736.3 (68) 1875.3 (26.9) 8 (0.1)    IV Piggyback 200 50     Total Intake(mL/kg) 5236.3 (75.2) 2765.3 (39.7) 8 (0.1)    Urine (mL/kg/hr) 850 (0.5) 1350 (0.8)     Stool 150 975 75    Blood 200      Total Output 1200 2325 75    Net +4036.3 +440.3 -67                 Physical Exam   Constitutional: He is oriented to person, place, and time. He appears well-developed and well-nourished.   HENT:   Head: Normocephalic and atraumatic.   Eyes: EOM are normal.   Cardiovascular: Normal rate and regular rhythm.   Pulmonary/Chest: Effort normal. No respiratory distress.   Right chest wall port site is clean, dry, intact   Abdominal: Soft. There is tenderness (appropriate incisional  ttp.  ostomy viable and functioning with liquid brown stool. ).   Incisions c/d/i   Musculoskeletal: Normal range of motion.   Neurological: He is alert and oriented to person, place, and time.   Skin: Skin is warm and dry.   Psychiatric: He has a normal mood and affect. Thought content normal.   Vitals reviewed.      Significant Labs:  CBC:   Recent Labs   Lab 02/06/20  0435   WBC 6.96   RBC 3.34*   HGB 9.4*   HCT 31.8*      MCV 95   MCH 28.1   MCHC 29.6*     CMP:   Recent Labs   Lab 02/06/20  0435   GLU 86   CALCIUM 8.5*      K 4.1   CO2 27      BUN 3*   CREATININE 0.5       Significant Diagnostics:  I have reviewed all pertinent imaging results/findings within the past 24 hours.

## 2020-02-06 NOTE — PLAN OF CARE
Fall prevention precautions in place, pt remains free of falls throughout shift, call bell and personal items within reach, pt's pain adequately controlled with pca pump, IV fluids per order, pt turned q2 with wedge in place, ileostomy intact and producing stool and flatus, 24 hour chart check completed. Will continue to monitor.

## 2020-02-06 NOTE — PROGRESS NOTES
[facility] Hospital Medicine  Progress Note    Patient Name: Vincent Benton  MRN: [id number]  Patient Class: IP- Surgery Admit   Admission Date: 2/4/2020  Length of Stay: 2 days  Attending Physician: Familia Gonzalez MD  Primary Care Provider: Shakila Moran DO        Subjective:     Principal Problem:Rectal adenocarcinoma        HPI:  Mr. Benton is a 65yo male with a PMHx of HTN, HLD, GERD, anemia, remote EtOH abuse (quit 8 months ago), and rectal CA currently receiving chemo and radiation.  Today, he underwent elective robotic resection with ileostomy construction and MediPort insertion for his mid-rectal adenocarcinoma.  He tolerated the procedure well, and was transferred to the Med-Surg unit for recovery.  Patient c/o post-op ABD pain that is adequately controlled by ordered oral analgesics.  He denies any N/V, CP, SOB, back pain, HA, lightheadedness, dizziness, fever or chills.  Hospital Medicine was consulted for post-op medical management.      Overview/Hospital Course:  Pt admitted to Medical Surgical Unit s/p S/p robotic ultra LAR with loop ileostomy construction and mediport placement on 2/4/2020 per General Surgery in the setting of rectal adenocarcinoma.   Hospital Medicine contacted for medical management.  Vital signs stable on prescribed medications and H/H stable post procedure. PCA continued.  On 2/6/2020, H/H stable with mild decline noted.  BP controlled with episodic elevated likely due to pain.  Pt reports pain controlled on prescribed regime. Pt reports difficulty ambulating today.      Interval History: H/H stable with decline noted.  BP mostly controlled with episodic increase likely due to pain.  Pt reports pain controlled on PCA.  Pt states he had difficulty ambulating today.  Ileostomy intact.  General Surgery following.      Review of Systems   Constitutional: Negative for chills, diaphoresis, fatigue and fever.   Respiratory: Negative for cough, shortness of  breath and wheezing.    Cardiovascular: Negative for chest pain, palpitations and leg swelling.   Gastrointestinal: Positive for abdominal pain. Negative for nausea and vomiting.   Genitourinary: Negative for dysuria, frequency, hematuria and urgency.   Musculoskeletal: Negative for arthralgias and myalgias.   Skin: Positive for wound. Negative for color change, pallor and rash.   Neurological: Negative for dizziness, weakness, light-headedness, numbness and headaches.   Psychiatric/Behavioral: Negative for agitation, confusion and sleep disturbance. The patient is not nervous/anxious.    All other systems reviewed and are negative.    Objective:     Vital Signs (Most Recent):  Temp: 98 °F (36.7 °C) (02/06/20 1150)  Pulse: 84 (02/06/20 1401)  Resp: 20 (02/06/20 1401)  BP: 139/73 (02/06/20 1401)  SpO2: 98 % (02/06/20 1401) Vital Signs (24h Range):  Temp:  [97.6 °F (36.4 °C)-98.5 °F (36.9 °C)] 98 °F (36.7 °C)  Pulse:  [69-84] 84  Resp:  [12-20] 20  SpO2:  [96 %-100 %] 98 %  BP: (124-160)/(71-85) 139/73     Weight: 69.6 kg (153 lb 7 oz)  Body mass index is 22.02 kg/m².    Intake/Output Summary (Last 24 hours) at 2/6/2020 1545  Last data filed at 2/6/2020 1100  Gross per 24 hour   Intake 1847.5 ml   Output 2350 ml   Net -502.5 ml      Physical Exam   Constitutional: He is oriented to person, place, and time. He appears well-developed and well-nourished. He appears lethargic.   HENT:   Head: Normocephalic and atraumatic.   Eyes: EOM are normal.   Neck: Normal range of motion. Neck supple.   Cardiovascular: Normal rate and regular rhythm.   Pulmonary/Chest: Effort normal. No respiratory distress.   Abdominal: He exhibits no distension. There is tenderness.   Ostomy pink, viable with liquid brown effluent. Incisions c/d/i   Genitourinary:   Genitourinary Comments: Deferred    Musculoskeletal: Normal range of motion.   Neurological: He is oriented to person, place, and time. He appears lethargic.   Skin: Skin is warm and  dry.   Psychiatric: He has a normal mood and affect. Thought content normal.   Vitals reviewed.      Significant Labs:   CBC:   Recent Labs   Lab 02/05/20 0439 02/06/20 0435   WBC 9.68 6.96   HGB 11.1* 9.4*   HCT 36.3* 31.8*   * 332     CMP:   Recent Labs   Lab 02/05/20 0439 02/06/20 0435    138   K 3.5 4.1    103   CO2 23 27   GLU 94 86   BUN 4* 3*   CREATININE 0.6 0.5   CALCIUM 8.6* 8.5*   ANIONGAP 13 8   EGFRNONAA >60 >60       Significant Imaging:           Assessment/Plan:      * Rectal adenocarcinoma  - s/p robotic resection with ileostomy construction and MediPort insertion on 2/4.  - Routine post-op care per primary team.  - On continuous IV fluids.  - Analgesics as needed per PCA  -antiemetics as needed   -ambulation encouraged     Essential hypertension  - BP stable.  Continue home losartan.    Alcohol use  -will monitor for signs of withdrawal       Gastroesophageal reflux disease  -PPI       Anemia  H/H stable post procedure- decline noted   -will monitor and transfuse as needed   -repeat CBC in am         VTE Risk Mitigation (From admission, onward)         Ordered     enoxaparin injection 40 mg  Daily      02/04/20 1949     IP VTE HIGH RISK PATIENT  Once      02/04/20 1949     Place sequential compression device  Until discontinued      02/04/20 1949                      Beatriz Elder NP  Department of Hospital Medicine   [facility]

## 2020-02-06 NOTE — ASSESSMENT & PLAN NOTE
- s/p robotic resection with ileostomy construction and MediPort insertion on 2/4.  - Routine post-op care per primary team.  - On continuous IV fluids.  - Analgesics as needed per PCA  -antiemetics as needed   -ambulation encouraged

## 2020-02-06 NOTE — ASSESSMENT & PLAN NOTE
S/p robotic ultra LAR with loop ileostomy construction. Mediport placement  POD2  continue dilaudid PCA for analgesia  Regular diet as tolerated  Ambulate, OOB once pain control improved  IS  Neumann removed  Monitor ostomy output

## 2020-02-07 LAB
ANION GAP SERPL CALC-SCNC: 12 MMOL/L (ref 8–16)
BASOPHILS # BLD AUTO: 0.04 K/UL (ref 0–0.2)
BASOPHILS NFR BLD: 0.5 % (ref 0–1.9)
BUN SERPL-MCNC: 2 MG/DL (ref 8–23)
CALCIUM SERPL-MCNC: 8.9 MG/DL (ref 8.7–10.5)
CHLORIDE SERPL-SCNC: 98 MMOL/L (ref 95–110)
CO2 SERPL-SCNC: 29 MMOL/L (ref 23–29)
CREAT SERPL-MCNC: 0.6 MG/DL (ref 0.5–1.4)
DIFFERENTIAL METHOD: ABNORMAL
EOSINOPHIL # BLD AUTO: 0.1 K/UL (ref 0–0.5)
EOSINOPHIL NFR BLD: 1.2 % (ref 0–8)
ERYTHROCYTE [DISTWIDTH] IN BLOOD BY AUTOMATED COUNT: 15.4 % (ref 11.5–14.5)
EST. GFR  (AFRICAN AMERICAN): >60 ML/MIN/1.73 M^2
EST. GFR  (NON AFRICAN AMERICAN): >60 ML/MIN/1.73 M^2
GLUCOSE SERPL-MCNC: 93 MG/DL (ref 70–110)
HCT VFR BLD AUTO: 34.7 % (ref 40–54)
HGB BLD-MCNC: 10.4 G/DL (ref 14–18)
IMM GRANULOCYTES # BLD AUTO: 0.04 K/UL (ref 0–0.04)
IMM GRANULOCYTES NFR BLD AUTO: 0.5 % (ref 0–0.5)
LYMPHOCYTES # BLD AUTO: 0.3 K/UL (ref 1–4.8)
LYMPHOCYTES NFR BLD: 3.8 % (ref 18–48)
MAGNESIUM SERPL-MCNC: 1.7 MG/DL (ref 1.6–2.6)
MCH RBC QN AUTO: 28 PG (ref 27–31)
MCHC RBC AUTO-ENTMCNC: 30 G/DL (ref 32–36)
MCV RBC AUTO: 94 FL (ref 82–98)
MONOCYTES # BLD AUTO: 0.8 K/UL (ref 0.3–1)
MONOCYTES NFR BLD: 9.6 % (ref 4–15)
NEUTROPHILS # BLD AUTO: 7 K/UL (ref 1.8–7.7)
NEUTROPHILS NFR BLD: 84.4 % (ref 38–73)
NRBC BLD-RTO: 0 /100 WBC
PHOSPHATE SERPL-MCNC: 2.4 MG/DL (ref 2.7–4.5)
PLATELET # BLD AUTO: 373 K/UL (ref 150–350)
PMV BLD AUTO: 10.2 FL (ref 9.2–12.9)
POTASSIUM SERPL-SCNC: 4.1 MMOL/L (ref 3.5–5.1)
RBC # BLD AUTO: 3.71 M/UL (ref 4.6–6.2)
SODIUM SERPL-SCNC: 139 MMOL/L (ref 136–145)
WBC # BLD AUTO: 8.24 K/UL (ref 3.9–12.7)

## 2020-02-07 PROCEDURE — 85025 COMPLETE CBC W/AUTO DIFF WBC: CPT

## 2020-02-07 PROCEDURE — 36415 COLL VENOUS BLD VENIPUNCTURE: CPT

## 2020-02-07 PROCEDURE — 25000003 PHARM REV CODE 250: Performed by: PHYSICIAN ASSISTANT

## 2020-02-07 PROCEDURE — 94770 HC EXHALED C02 TEST: CPT

## 2020-02-07 PROCEDURE — 94799 UNLISTED PULMONARY SVC/PX: CPT

## 2020-02-07 PROCEDURE — 25000003 PHARM REV CODE 250: Performed by: NURSE PRACTITIONER

## 2020-02-07 PROCEDURE — 63600175 PHARM REV CODE 636 W HCPCS: Performed by: PHYSICIAN ASSISTANT

## 2020-02-07 PROCEDURE — 11000001 HC ACUTE MED/SURG PRIVATE ROOM

## 2020-02-07 PROCEDURE — 63600175 PHARM REV CODE 636 W HCPCS: Performed by: COLON & RECTAL SURGERY

## 2020-02-07 PROCEDURE — 80048 BASIC METABOLIC PNL TOTAL CA: CPT

## 2020-02-07 PROCEDURE — 99900035 HC TECH TIME PER 15 MIN (STAT)

## 2020-02-07 PROCEDURE — 25000003 PHARM REV CODE 250: Performed by: COLON & RECTAL SURGERY

## 2020-02-07 PROCEDURE — 84100 ASSAY OF PHOSPHORUS: CPT

## 2020-02-07 PROCEDURE — 83735 ASSAY OF MAGNESIUM: CPT

## 2020-02-07 PROCEDURE — 94761 N-INVAS EAR/PLS OXIMETRY MLT: CPT

## 2020-02-07 RX ORDER — OXYCODONE AND ACETAMINOPHEN 10; 325 MG/1; MG/1
1 TABLET ORAL EVERY 4 HOURS PRN
Status: DISCONTINUED | OUTPATIENT
Start: 2020-02-07 | End: 2020-02-09

## 2020-02-07 RX ORDER — HYDROMORPHONE HYDROCHLORIDE 1 MG/ML
0.5 INJECTION, SOLUTION INTRAMUSCULAR; INTRAVENOUS; SUBCUTANEOUS
Status: DISCONTINUED | OUTPATIENT
Start: 2020-02-07 | End: 2020-02-09 | Stop reason: HOSPADM

## 2020-02-07 RX ORDER — HYDRALAZINE HYDROCHLORIDE 20 MG/ML
10 INJECTION INTRAMUSCULAR; INTRAVENOUS EVERY 8 HOURS PRN
Status: DISCONTINUED | OUTPATIENT
Start: 2020-02-07 | End: 2020-02-09 | Stop reason: HOSPADM

## 2020-02-07 RX ADMIN — OXYCODONE HYDROCHLORIDE AND ACETAMINOPHEN 1 TABLET: 10; 325 TABLET ORAL at 02:02

## 2020-02-07 RX ADMIN — SODIUM CHLORIDE, SODIUM LACTATE, POTASSIUM CHLORIDE, AND CALCIUM CHLORIDE: .6; .31; .03; .02 INJECTION, SOLUTION INTRAVENOUS at 09:02

## 2020-02-07 RX ADMIN — GABAPENTIN 300 MG: 300 CAPSULE ORAL at 08:02

## 2020-02-07 RX ADMIN — PANTOPRAZOLE SODIUM 40 MG: 40 TABLET, DELAYED RELEASE ORAL at 08:02

## 2020-02-07 RX ADMIN — HYDROMORPHONE HYDROCHLORIDE 0.5 MG: 1 INJECTION, SOLUTION INTRAMUSCULAR; INTRAVENOUS; SUBCUTANEOUS at 03:02

## 2020-02-07 RX ADMIN — SENNOSIDES, DOCUSATE SODIUM 1 TABLET: 50; 8.6 TABLET, FILM COATED ORAL at 08:02

## 2020-02-07 RX ADMIN — LOSARTAN POTASSIUM 50 MG: 50 TABLET, FILM COATED ORAL at 08:02

## 2020-02-07 RX ADMIN — Medication: at 09:02

## 2020-02-07 RX ADMIN — GABAPENTIN 300 MG: 300 CAPSULE ORAL at 02:02

## 2020-02-07 RX ADMIN — DIPHENHYDRAMINE HYDROCHLORIDE 12.5 MG: 50 INJECTION, SOLUTION INTRAMUSCULAR; INTRAVENOUS at 03:02

## 2020-02-07 RX ADMIN — CHLORHEXIDINE GLUCONATE 0.12% ORAL RINSE 10 ML: 1.2 LIQUID ORAL at 08:02

## 2020-02-07 RX ADMIN — ONDANSETRON 4 MG: 2 INJECTION INTRAMUSCULAR; INTRAVENOUS at 03:02

## 2020-02-07 RX ADMIN — TAMSULOSIN HYDROCHLORIDE 0.4 MG: 0.4 CAPSULE ORAL at 08:02

## 2020-02-07 RX ADMIN — ENOXAPARIN SODIUM 40 MG: 100 INJECTION SUBCUTANEOUS at 05:02

## 2020-02-07 RX ADMIN — OXYCODONE HYDROCHLORIDE AND ACETAMINOPHEN 1 TABLET: 10; 325 TABLET ORAL at 07:02

## 2020-02-07 RX ADMIN — ONDANSETRON 4 MG: 2 INJECTION INTRAMUSCULAR; INTRAVENOUS at 05:02

## 2020-02-07 RX ADMIN — PROMETHAZINE HYDROCHLORIDE 6.25 MG: 25 INJECTION INTRAMUSCULAR; INTRAVENOUS at 05:02

## 2020-02-07 NOTE — SUBJECTIVE & OBJECTIVE
Interval History: c/o pain, denies nausea or emesis. + ostomy function.     Medications:  Continuous Infusions:   lactated ringers 75 mL/hr at 02/07/20 0900     Scheduled Meds:   chlorhexidine  10 mL Mouth/Throat BID    enoxaparin  40 mg Subcutaneous Daily    gabapentin  300 mg Oral TID    losartan  50 mg Oral Daily    nozaseptin   Each Nostril BID    pantoprazole  40 mg Oral Daily    senna-docusate 8.6-50 mg  1 tablet Oral BID    tamsulosin  0.4 mg Oral Daily     PRN Meds:diphenhydrAMINE, HYDROmorphone, naloxone, ondansetron, oxyCODONE-acetaminophen, promethazine (PHENERGAN) IVPB     Review of patient's allergies indicates:  No Known Allergies  Objective:     Vital Signs (Most Recent):  Temp: 97.9 °F (36.6 °C) (02/07/20 0706)  Pulse: 75 (02/07/20 0734)  Resp: 20 (02/07/20 0706)  BP: (!) 167/91 (02/07/20 0734)  SpO2: 95 % (02/07/20 0706) Vital Signs (24h Range):  Temp:  [97.9 °F (36.6 °C)-98.9 °F (37.2 °C)] 97.9 °F (36.6 °C)  Pulse:  [72-84] 75  Resp:  [14-20] 20  SpO2:  [92 %-98 %] 95 %  BP: (120-170)/(63-91) 167/91     Weight: 69.6 kg (153 lb 7 oz)  Body mass index is 22.02 kg/m².    Intake/Output - Last 3 Shifts       02/05 0700 - 02/06 0659 02/06 0700 - 02/07 0659 02/07 0700 - 02/08 0659    P.O. 840 360     I.V. (mL/kg) 1875.3 (26.9) 1828 (26.3) 7.5 (0.1)    IV Piggyback 50 100     Total Intake(mL/kg) 2765.3 (39.7) 2288 (32.9) 7.5 (0.1)    Urine (mL/kg/hr) 1350 (0.8) 700 (0.4) 425 (1.5)    Stool 975 325 250    Blood       Total Output 2325 1025 675    Net +440.3 +1263 -667.5           Urine Occurrence  1 x           Physical Exam   Constitutional: He is oriented to person, place, and time. He appears well-developed and well-nourished.   HENT:   Head: Normocephalic and atraumatic.   Eyes: EOM are normal.   Cardiovascular: Normal rate and regular rhythm.   Pulmonary/Chest: Effort normal. No respiratory distress.   Abdominal: Soft. There is tenderness.   Ostomy viable, liquid brown effluent. Incisions  c/d/i   Musculoskeletal: Normal range of motion.   Neurological: He is alert and oriented to person, place, and time.   Skin: Skin is warm and dry.   Psychiatric: He has a normal mood and affect. Thought content normal.   Vitals reviewed.      Significant Labs:  CBC:   Recent Labs   Lab 02/07/20 0418   WBC 8.24   RBC 3.71*   HGB 10.4*   HCT 34.7*   *   MCV 94   MCH 28.0   MCHC 30.0*     CMP:   Recent Labs   Lab 02/07/20 0418   GLU 93   CALCIUM 8.9      K 4.1   CO2 29   CL 98   BUN 2*   CREATININE 0.6       Significant Diagnostics:  I have reviewed all pertinent imaging results/findings within the past 24 hours.

## 2020-02-07 NOTE — PLAN OF CARE
Fall prevention precautions in place, pt remains free of falls throughout shift, call bell and personal items within reach, pt's pain adequately controlled with PCA pump, IV fluids administered per order, 24 hour chart check completed. Will continue to monitor.

## 2020-02-07 NOTE — PLAN OF CARE
Consult received for home health. Met with patient, reviewed CMS and post acute tool of list of home health agencies Preference letter obtained for [facility]. Referral faxed via [employer] to [facility]. Message sent to provider.       02/07/20 3414   Post-Acute Status   Post-Acute Authorization Placement;Home Health/Hospice   Home Health/Hospice Status Referrals Sent   Patient choice form signed by patient/caregiver List from CMS Compare;List from System Post-Acute Care

## 2020-02-07 NOTE — PLAN OF CARE
Pt remains free from falls/injuries this shift. Safety precautions maintained. Pain being managed with oral meds and IV meds. Ileostomy checked every two hours and emptied as needed. Pt demonstrated emptying ileostomy bag. No s/s of acute distress noted. Will continue to monitor. 12 hour chart check completed.

## 2020-02-07 NOTE — ASSESSMENT & PLAN NOTE
- s/p robotic resection with ileostomy construction and MediPort insertion on 2/4.  - Routine post-op care per primary team.  - On continuous IV fluids.  - Analgesics as needed per PCA  -antiemetics as needed   -ambulation encouraged   -compliance with IS encouraged

## 2020-02-07 NOTE — SUBJECTIVE & OBJECTIVE
Interval History: pt stable and verbalized symptom improvement.  H/H stable.  IS and more frequent ambulation encouraged.  PCA dose discontinued and transitioned to oral dosing.  Ostomy site intact with liquid stool.  BP mildly elevated in the setting of pain and anxiety.  General Surgery primary team.        Review of Systems   Constitutional: Negative for chills, diaphoresis, fatigue and fever.   Respiratory: Negative for cough, shortness of breath and wheezing.    Cardiovascular: Negative for chest pain, palpitations and leg swelling.   Gastrointestinal: Positive for abdominal pain. Negative for nausea and vomiting.   Endocrine: Negative for cold intolerance and heat intolerance.   Genitourinary: Negative for dysuria, frequency, hematuria and urgency.   Musculoskeletal: Negative for arthralgias and myalgias.   Skin: Positive for wound. Negative for color change, pallor and rash.   Allergic/Immunologic: Negative for environmental allergies and food allergies.   Neurological: Negative for dizziness, weakness, light-headedness, numbness and headaches.   Psychiatric/Behavioral: Negative for agitation, confusion and sleep disturbance. The patient is not nervous/anxious.    All other systems reviewed and are negative.    Objective:     Vital Signs (Most Recent):  Temp: 98.3 °F (36.8 °C) (02/07/20 1203)  Pulse: 71 (02/07/20 1203)  Resp: 18 (02/07/20 1203)  BP: (!) 147/85 (02/07/20 1203)  SpO2: 97 % (02/07/20 1203) Vital Signs (24h Range):  Temp:  [97.9 °F (36.6 °C)-98.9 °F (37.2 °C)] 98.3 °F (36.8 °C)  Pulse:  [71-83] 71  Resp:  [14-20] 18  SpO2:  [92 %-97 %] 97 %  BP: (120-170)/(63-91) 147/85     Weight: 69.6 kg (153 lb 7 oz)  Body mass index is 22.02 kg/m².    Intake/Output Summary (Last 24 hours) at 2/7/2020 1437  Last data filed at 2/7/2020 1105  Gross per 24 hour   Intake 2289.5 ml   Output 1275 ml   Net 1014.5 ml      Physical Exam   Constitutional: He is oriented to person, place, and time. He appears  well-developed and well-nourished.   HENT:   Head: Normocephalic and atraumatic.   Eyes: EOM are normal.   Neck: Normal range of motion. Neck supple.   Cardiovascular: Normal rate and regular rhythm.   Pulmonary/Chest: Effort normal. No respiratory distress.   Abdominal: He exhibits no distension. There is tenderness.   Ostomy pink, viable with liquid brown effluent. Incisions c/d/i   Genitourinary:   Genitourinary Comments: Deferred    Musculoskeletal: Normal range of motion.   Neurological: He is alert and oriented to person, place, and time.   Skin: Skin is warm and dry.   Psychiatric: He has a normal mood and affect. Thought content normal.   Vitals reviewed.      Significant Labs:   CBC:   Recent Labs   Lab 02/06/20  0435 02/07/20  0418   WBC 6.96 8.24   HGB 9.4* 10.4*   HCT 31.8* 34.7*    373*     CMP:   Recent Labs   Lab 02/06/20  0435 02/07/20  0418    139   K 4.1 4.1    98   CO2 27 29   GLU 86 93   BUN 3* 2*   CREATININE 0.5 0.6   CALCIUM 8.5* 8.9   ANIONGAP 8 12   EGFRNONAA >60 >60       Significant Imaging:

## 2020-02-07 NOTE — PLAN OF CARE
Remains injury free. Pain managed with PCA. Ambulated today, c/o abdominal pain afterwards. Shortly after returning to bed, stated relief. Tolerating diet. Ileostomy with gas and liquid stool. No s/s acute distress.

## 2020-02-07 NOTE — PLAN OF CARE
Ostomy POC reviewed with patient including self-care of ileostomy, self-emptying of stool, recording I&O, discharge follow up with HH.

## 2020-02-07 NOTE — PROGRESS NOTES
[facility] Hospital Medicine  Progress Note    Patient Name: Vincent Benton  MRN: [id number]  Patient Class: IP- Surgery Admit   Admission Date: 2/4/2020  Length of Stay: 3 days  Attending Physician: Familia Gonzalez MD  Primary Care Provider: Shakila Moran DO        Subjective:     Principal Problem:Rectal adenocarcinoma        HPI:  Mr. Benton is a 63yo male with a PMHx of HTN, HLD, GERD, anemia, remote EtOH abuse (quit 8 months ago), and rectal CA currently receiving chemo and radiation.  Today, he underwent elective robotic resection with ileostomy construction and MediPort insertion for his mid-rectal adenocarcinoma.  He tolerated the procedure well, and was transferred to the Med-Surg unit for recovery.  Patient c/o post-op ABD pain that is adequately controlled by ordered oral analgesics.  He denies any N/V, CP, SOB, back pain, HA, lightheadedness, dizziness, fever or chills.  Hospital Medicine was consulted for post-op medical management.      Overview/Hospital Course:  Pt admitted to Medical Surgical Unit s/p S/p robotic ultra LAR with loop ileostomy construction and mediport placement on 2/4/2020 per General Surgery in the setting of rectal adenocarcinoma.   Hospital Medicine contacted for medical management.  Vital signs stable on prescribed medications and H/H stable post procedure. PCA continued.  On 2/6/2020, H/H stable with mild decline noted.  BP controlled with episodic elevated likely due to pain.  Pt reports pain controlled on prescribed regime. Pt reports difficulty ambulating today.  On 2/7/2020, pt verbalized symptom improvement on POD3.  PCA discontinued and transitioned to oral dosing with IV for breakthrough.  More frequent ambulation and compliance with IS encouraged.  Ostomy output monitored.        Interval History: pt stable and verbalized symptom improvement.  H/H stable.  IS and more frequent ambulation encouraged.  PCA dose discontinued and transitioned to  oral dosing.  Ostomy site intact with liquid stool.  BP mildly elevated in the setting of pain and anxiety.  General Surgery primary team.        Review of Systems   Constitutional: Negative for chills, diaphoresis, fatigue and fever.   Respiratory: Negative for cough, shortness of breath and wheezing.    Cardiovascular: Negative for chest pain, palpitations and leg swelling.   Gastrointestinal: Positive for abdominal pain. Negative for nausea and vomiting.   Endocrine: Negative for cold intolerance and heat intolerance.   Genitourinary: Negative for dysuria, frequency, hematuria and urgency.   Musculoskeletal: Negative for arthralgias and myalgias.   Skin: Positive for wound. Negative for color change, pallor and rash.   Allergic/Immunologic: Negative for environmental allergies and food allergies.   Neurological: Negative for dizziness, weakness, light-headedness, numbness and headaches.   Psychiatric/Behavioral: Negative for agitation, confusion and sleep disturbance. The patient is not nervous/anxious.    All other systems reviewed and are negative.    Objective:     Vital Signs (Most Recent):  Temp: 98.3 °F (36.8 °C) (02/07/20 1203)  Pulse: 71 (02/07/20 1203)  Resp: 18 (02/07/20 1203)  BP: (!) 147/85 (02/07/20 1203)  SpO2: 97 % (02/07/20 1203) Vital Signs (24h Range):  Temp:  [97.9 °F (36.6 °C)-98.9 °F (37.2 °C)] 98.3 °F (36.8 °C)  Pulse:  [71-83] 71  Resp:  [14-20] 18  SpO2:  [92 %-97 %] 97 %  BP: (120-170)/(63-91) 147/85     Weight: 69.6 kg (153 lb 7 oz)  Body mass index is 22.02 kg/m².    Intake/Output Summary (Last 24 hours) at 2/7/2020 1437  Last data filed at 2/7/2020 1105  Gross per 24 hour   Intake 2289.5 ml   Output 1275 ml   Net 1014.5 ml      Physical Exam   Constitutional: He is oriented to person, place, and time. He appears well-developed and well-nourished.   HENT:   Head: Normocephalic and atraumatic.   Eyes: EOM are normal. Ptosis to R eye  Neck: Normal range of motion. Neck supple.    Cardiovascular: Normal rate and regular rhythm.   Pulmonary/Chest: Effort normal. No respiratory distress.   Abdominal: He exhibits no distension. There is tenderness.   Ostomy pink, viable with liquid brown effluent. Incisions c/d/i   Genitourinary:   Genitourinary Comments: Deferred    Musculoskeletal: Normal range of motion.   Neurological: He is alert and oriented to person, place, and time.   Skin: Skin is warm and dry.   Psychiatric: He has a normal mood and affect. Thought content normal.   Vitals reviewed.      Significant Labs:   CBC:   Recent Labs   Lab 02/06/20 0435 02/07/20 0418   WBC 6.96 8.24   HGB 9.4* 10.4*   HCT 31.8* 34.7*    373*     CMP:   Recent Labs   Lab 02/06/20 0435 02/07/20 0418    139   K 4.1 4.1    98   CO2 27 29   GLU 86 93   BUN 3* 2*   CREATININE 0.5 0.6   CALCIUM 8.5* 8.9   ANIONGAP 8 12   EGFRNONAA >60 >60       Significant Imaging:           Assessment/Plan:      * Rectal adenocarcinoma  - s/p robotic resection with ileostomy construction and MediPort insertion on 2/4.  - Routine post-op care per primary team.  - On continuous IV fluids.  - Analgesics as needed per PCA  -antiemetics as needed   -ambulation encouraged   -compliance with IS encouraged     Essential hypertension  - BP intermittently elevated   - Continue home losartan.    Alcohol use  -will monitor for signs of withdrawal       Gastroesophageal reflux disease  -PPI       Anemia  H/H stable post procedure- decline noted   -will monitor and transfuse as needed   -repeat CBC in am         VTE Risk Mitigation (From admission, onward)         Ordered     enoxaparin injection 40 mg  Daily      02/04/20 1949     IP VTE HIGH RISK PATIENT  Once      02/04/20 1949     Place sequential compression device  Until discontinued      02/04/20 1949                      Beatriz Elder NP  Department of Hospital Medicine   [facility]

## 2020-02-07 NOTE — PROGRESS NOTES
[facility]  General Surgery  Progress Note    Subjective:     History of Present Illness:  He presented for robotic ultra low anterior resection with DLI for rectal adenocarcinoma following completion of neoadj chemoXRT.    Post-Op Info:  Procedure(s) (LRB):  XI ROBOTIC LOW ANTERIOR RESECTION (N/A)  INSERTION, PORT-A-CATH (Right)  BLOCK, TRANSVERSUS ABDOMINIS PLANE (N/A)  CREATION, ILEOSTOMY (Right)  SIGMOIDOSCOPY, FLEXIBLE  MOBILIZATION, SPLENIC FLEXURE   3 Days Post-Op     Interval History: c/o pain, denies nausea or emesis. + ostomy function.     Medications:  Continuous Infusions:   lactated ringers 75 mL/hr at 02/07/20 0900     Scheduled Meds:   chlorhexidine  10 mL Mouth/Throat BID    enoxaparin  40 mg Subcutaneous Daily    gabapentin  300 mg Oral TID    losartan  50 mg Oral Daily    nozaseptin   Each Nostril BID    pantoprazole  40 mg Oral Daily    senna-docusate 8.6-50 mg  1 tablet Oral BID    tamsulosin  0.4 mg Oral Daily     PRN Meds:diphenhydrAMINE, HYDROmorphone, naloxone, ondansetron, oxyCODONE-acetaminophen, promethazine (PHENERGAN) IVPB     Review of patient's allergies indicates:  No Known Allergies  Objective:     Vital Signs (Most Recent):  Temp: 97.9 °F (36.6 °C) (02/07/20 0706)  Pulse: 75 (02/07/20 0734)  Resp: 20 (02/07/20 0706)  BP: (!) 167/91 (02/07/20 0734)  SpO2: 95 % (02/07/20 0706) Vital Signs (24h Range):  Temp:  [97.9 °F (36.6 °C)-98.9 °F (37.2 °C)] 97.9 °F (36.6 °C)  Pulse:  [72-84] 75  Resp:  [14-20] 20  SpO2:  [92 %-98 %] 95 %  BP: (120-170)/(63-91) 167/91     Weight: 69.6 kg (153 lb 7 oz)  Body mass index is 22.02 kg/m².    Intake/Output - Last 3 Shifts       02/05 0700 - 02/06 0659 02/06 0700 - 02/07 0659 02/07 0700 - 02/08 0659    P.O. 840 360     I.V. (mL/kg) 1875.3 (26.9) 1828 (26.3) 7.5 (0.1)    IV Piggyback 50 100     Total Intake(mL/kg) 2765.3 (39.7) 2288 (32.9) 7.5 (0.1)    Urine (mL/kg/hr) 1350 (0.8) 700 (0.4) 425 (1.5)    Stool 975 325 250    Blood        Total Output 2325 1025 675    Net +440.3 +1263 -667.5           Urine Occurrence  1 x           Physical Exam   Constitutional: He is oriented to person, place, and time. He appears well-developed and well-nourished.   HENT:   Head: Normocephalic and atraumatic.   Eyes: EOM are normal.   Cardiovascular: Normal rate and regular rhythm.   Pulmonary/Chest: Effort normal. No respiratory distress.   Abdominal: Soft. There is tenderness.   Ostomy viable, liquid brown effluent. Incisions c/d/i   Musculoskeletal: Normal range of motion.   Neurological: He is alert and oriented to person, place, and time.   Skin: Skin is warm and dry.   Psychiatric: He has a normal mood and affect. Thought content normal.   Vitals reviewed.      Significant Labs:  CBC:   Recent Labs   Lab 02/07/20 0418   WBC 8.24   RBC 3.71*   HGB 10.4*   HCT 34.7*   *   MCV 94   MCH 28.0   MCHC 30.0*     CMP:   Recent Labs   Lab 02/07/20 0418   GLU 93   CALCIUM 8.9      K 4.1   CO2 29   CL 98   BUN 2*   CREATININE 0.6       Significant Diagnostics:  I have reviewed all pertinent imaging results/findings within the past 24 hours.    Assessment/Plan:     * Rectal adenocarcinoma  S/p robotic ultra LAR with loop ileostomy construction. Mediport placement  POD3  D/c PCA and start PO with IV breakthrough  Ambulate, OOB. Encouraged more frequent ambulation  IS  Monitor ostomy output  Discharge planning      Essential hypertension  hospital medicine was consulted.     Alcohol use  Monitor for withdrawal signs, symptoms      Gastroesophageal reflux disease  PPI        Cathleen Skinner PA-C  General Surgery  [facility]

## 2020-02-07 NOTE — ASSESSMENT & PLAN NOTE
S/p robotic ultra LAR with loop ileostomy construction. Mediport placement  POD3  D/c PCA and start PO with IV breakthrough  Ambulate, OOB. Encouraged more frequent ambulation  IS  Monitor ostomy output  Discharge planning

## 2020-02-07 NOTE — PROGRESS NOTES
Follow up visit with Mr. Benton this morning for continued Ileostomy management and education. He is awake and alert,sitting up in bed, denies pain. Abdomen assessed. One piece flat KAMARI pouch remains intact with no leak. Stoma is moist and pink, mildly engorged. Moderate amount brown liquid stool noted, + flatus. Ileostomy education continued including: diet, hygiene, and activity guidelines, how/when to change pouch, how/when to empty pouch. Patient emptied, cleansed, and re-sealed pouch with min coaching. Reviewed how and when to change pouch. Pouch change then completed by patient with my assistance/coaching. Patient removed pouch and cleansed pj-stomal skin with water, gauze. Patted dry with gauze. Patient sprayed pj-stomal skin with cavilon skin barrier film spray. Stoma measuring demonstrated by this nurse: measures [id number], oval shape. Moldable ostomy ring applied to stoma by this nurse. One piece KAMARI flat pouch cut to size, warmed with heat pack, and applied with patient assistance to RLQ Ileostomy.  Patient participated in care. Patient will need HH at discharged for continued Ileostomy management. I requested patient to arrange for his wife to be present Monday morning for further education, however he is unsure if she will be able to come due to her work schedule. Discussed with Dr. Gonzalez and patient may discharge over the weekend. He has 1 box KAMARI flat pouches, 1 box moldable rings, 1 cavilon spray at bedside. will enroll x3 in sample programs, received verbal consent.  Printed educational material left at bedside.

## 2020-02-08 PROBLEM — E87.6 HYPOKALEMIA: Status: ACTIVE | Noted: 2020-02-08

## 2020-02-08 LAB
ANION GAP SERPL CALC-SCNC: 15 MMOL/L (ref 8–16)
BASOPHILS # BLD AUTO: 0.03 K/UL (ref 0–0.2)
BASOPHILS NFR BLD: 0.5 % (ref 0–1.9)
BUN SERPL-MCNC: 3 MG/DL (ref 8–23)
CALCIUM SERPL-MCNC: 8.5 MG/DL (ref 8.7–10.5)
CHLORIDE SERPL-SCNC: 100 MMOL/L (ref 95–110)
CO2 SERPL-SCNC: 22 MMOL/L (ref 23–29)
CREAT SERPL-MCNC: 0.6 MG/DL (ref 0.5–1.4)
DIFFERENTIAL METHOD: ABNORMAL
EOSINOPHIL # BLD AUTO: 0.3 K/UL (ref 0–0.5)
EOSINOPHIL NFR BLD: 4.5 % (ref 0–8)
ERYTHROCYTE [DISTWIDTH] IN BLOOD BY AUTOMATED COUNT: 15 % (ref 11.5–14.5)
EST. GFR  (AFRICAN AMERICAN): >60 ML/MIN/1.73 M^2
EST. GFR  (NON AFRICAN AMERICAN): >60 ML/MIN/1.73 M^2
GLUCOSE SERPL-MCNC: 74 MG/DL (ref 70–110)
HCT VFR BLD AUTO: 34 % (ref 40–54)
HGB BLD-MCNC: 10.5 G/DL (ref 14–18)
IMM GRANULOCYTES # BLD AUTO: 0.04 K/UL (ref 0–0.04)
IMM GRANULOCYTES NFR BLD AUTO: 0.6 % (ref 0–0.5)
LYMPHOCYTES # BLD AUTO: 0.6 K/UL (ref 1–4.8)
LYMPHOCYTES NFR BLD: 9.6 % (ref 18–48)
MAGNESIUM SERPL-MCNC: 1.7 MG/DL (ref 1.6–2.6)
MCH RBC QN AUTO: 28.5 PG (ref 27–31)
MCHC RBC AUTO-ENTMCNC: 30.9 G/DL (ref 32–36)
MCV RBC AUTO: 92 FL (ref 82–98)
MONOCYTES # BLD AUTO: 0.7 K/UL (ref 0.3–1)
MONOCYTES NFR BLD: 11.1 % (ref 4–15)
NEUTROPHILS # BLD AUTO: 4.8 K/UL (ref 1.8–7.7)
NEUTROPHILS NFR BLD: 73.7 % (ref 38–73)
NRBC BLD-RTO: 0 /100 WBC
PHOSPHATE SERPL-MCNC: 3 MG/DL (ref 2.7–4.5)
PLATELET # BLD AUTO: 392 K/UL (ref 150–350)
PMV BLD AUTO: 9.4 FL (ref 9.2–12.9)
POTASSIUM SERPL-SCNC: 3.3 MMOL/L (ref 3.5–5.1)
RBC # BLD AUTO: 3.68 M/UL (ref 4.6–6.2)
SODIUM SERPL-SCNC: 137 MMOL/L (ref 136–145)
WBC # BLD AUTO: 6.49 K/UL (ref 3.9–12.7)

## 2020-02-08 PROCEDURE — 63600175 PHARM REV CODE 636 W HCPCS: Performed by: SURGERY

## 2020-02-08 PROCEDURE — 94761 N-INVAS EAR/PLS OXIMETRY MLT: CPT

## 2020-02-08 PROCEDURE — 25000003 PHARM REV CODE 250: Performed by: COLON & RECTAL SURGERY

## 2020-02-08 PROCEDURE — 80048 BASIC METABOLIC PNL TOTAL CA: CPT

## 2020-02-08 PROCEDURE — 63600175 PHARM REV CODE 636 W HCPCS: Performed by: COLON & RECTAL SURGERY

## 2020-02-08 PROCEDURE — 83735 ASSAY OF MAGNESIUM: CPT

## 2020-02-08 PROCEDURE — 36415 COLL VENOUS BLD VENIPUNCTURE: CPT

## 2020-02-08 PROCEDURE — 11000001 HC ACUTE MED/SURG PRIVATE ROOM

## 2020-02-08 PROCEDURE — 84100 ASSAY OF PHOSPHORUS: CPT

## 2020-02-08 PROCEDURE — 85025 COMPLETE CBC W/AUTO DIFF WBC: CPT

## 2020-02-08 PROCEDURE — 25000003 PHARM REV CODE 250: Performed by: SURGERY

## 2020-02-08 PROCEDURE — 25000003 PHARM REV CODE 250: Performed by: PHYSICIAN ASSISTANT

## 2020-02-08 PROCEDURE — 25000003 PHARM REV CODE 250: Performed by: NURSE PRACTITIONER

## 2020-02-08 RX ORDER — POTASSIUM CHLORIDE 7.45 MG/ML
10 INJECTION INTRAVENOUS EVERY 6 HOURS
Status: COMPLETED | OUTPATIENT
Start: 2020-02-08 | End: 2020-02-08

## 2020-02-08 RX ORDER — DEXTROSE MONOHYDRATE, SODIUM CHLORIDE, AND POTASSIUM CHLORIDE 50; 1.49; 4.5 G/1000ML; G/1000ML; G/1000ML
INJECTION, SOLUTION INTRAVENOUS CONTINUOUS
Status: DISCONTINUED | OUTPATIENT
Start: 2020-02-08 | End: 2020-02-09 | Stop reason: HOSPADM

## 2020-02-08 RX ORDER — LOSARTAN POTASSIUM 50 MG/1
50 TABLET ORAL DAILY
Status: DISCONTINUED | OUTPATIENT
Start: 2020-02-09 | End: 2020-02-09 | Stop reason: HOSPADM

## 2020-02-08 RX ORDER — TAMSULOSIN HYDROCHLORIDE 0.4 MG/1
0.4 CAPSULE ORAL DAILY
Status: DISCONTINUED | OUTPATIENT
Start: 2020-02-09 | End: 2020-02-09 | Stop reason: HOSPADM

## 2020-02-08 RX ADMIN — CHLORHEXIDINE GLUCONATE 0.12% ORAL RINSE 10 ML: 1.2 LIQUID ORAL at 08:02

## 2020-02-08 RX ADMIN — OXYCODONE HYDROCHLORIDE AND ACETAMINOPHEN 1 TABLET: 10; 325 TABLET ORAL at 08:02

## 2020-02-08 RX ADMIN — OXYCODONE HYDROCHLORIDE AND ACETAMINOPHEN 1 TABLET: 10; 325 TABLET ORAL at 09:02

## 2020-02-08 RX ADMIN — ENOXAPARIN SODIUM 40 MG: 100 INJECTION SUBCUTANEOUS at 05:02

## 2020-02-08 RX ADMIN — CHLORHEXIDINE GLUCONATE 0.12% ORAL RINSE 10 ML: 1.2 LIQUID ORAL at 09:02

## 2020-02-08 RX ADMIN — LOSARTAN POTASSIUM 50 MG: 50 TABLET, FILM COATED ORAL at 08:02

## 2020-02-08 RX ADMIN — GABAPENTIN 300 MG: 300 CAPSULE ORAL at 02:02

## 2020-02-08 RX ADMIN — OXYCODONE HYDROCHLORIDE AND ACETAMINOPHEN 1 TABLET: 10; 325 TABLET ORAL at 12:02

## 2020-02-08 RX ADMIN — DEXTROSE, SODIUM CHLORIDE, AND POTASSIUM CHLORIDE: 5; .45; .15 INJECTION INTRAVENOUS at 11:02

## 2020-02-08 RX ADMIN — POTASSIUM CHLORIDE 10 MEQ: 7.46 INJECTION, SOLUTION INTRAVENOUS at 05:02

## 2020-02-08 RX ADMIN — OXYCODONE HYDROCHLORIDE AND ACETAMINOPHEN 1 TABLET: 10; 325 TABLET ORAL at 05:02

## 2020-02-08 RX ADMIN — SENNOSIDES, DOCUSATE SODIUM 1 TABLET: 50; 8.6 TABLET, FILM COATED ORAL at 08:02

## 2020-02-08 RX ADMIN — TAMSULOSIN HYDROCHLORIDE 0.4 MG: 0.4 CAPSULE ORAL at 08:02

## 2020-02-08 RX ADMIN — GABAPENTIN 300 MG: 300 CAPSULE ORAL at 09:02

## 2020-02-08 RX ADMIN — POTASSIUM CHLORIDE 10 MEQ: 7.46 INJECTION, SOLUTION INTRAVENOUS at 11:02

## 2020-02-08 RX ADMIN — ONDANSETRON 4 MG: 2 INJECTION INTRAMUSCULAR; INTRAVENOUS at 09:02

## 2020-02-08 RX ADMIN — GABAPENTIN 300 MG: 300 CAPSULE ORAL at 08:02

## 2020-02-08 RX ADMIN — SENNOSIDES, DOCUSATE SODIUM 1 TABLET: 50; 8.6 TABLET, FILM COATED ORAL at 09:02

## 2020-02-08 RX ADMIN — PANTOPRAZOLE SODIUM 40 MG: 40 TABLET, DELAYED RELEASE ORAL at 08:02

## 2020-02-08 NOTE — NURSING
Informed dr burnett again about pt wanting to eat and stating he only got nauseated this am but has now resolved and thast he isnt a morning person and didn't want to eat brkfast earlier

## 2020-02-08 NOTE — PROGRESS NOTES
[facility] -   General Surgery  Progress Note    Subjective:     History of Present Illness:  He presented for robotic ultra low anterior resection with DLI for rectal adenocarcinoma following completion of neoadj chemoXRT.    Post-Op Info:  Procedure(s) (LRB):  XI ROBOTIC LOW ANTERIOR RESECTION (N/A)  INSERTION, PORT-A-CATH (Right)  BLOCK, TRANSVERSUS ABDOMINIS PLANE (N/A)  CREATION, ILEOSTOMY (Right)  SIGMOIDOSCOPY, FLEXIBLE  MOBILIZATION, SPLENIC FLEXURE   4 Days Post-Op     Interval History:  Patient feels bloated and nauseated.  He did not eat breakfast.  He has some pain. We will return to the NPO status.  IV fluids.  If the ileostomy has significant output is abdomen is softer restart liquids tomorrow.  Replace potassium    Medications:  Continuous Infusions:   dextrose 5 % and 0.45 % NaCl with KCl 20 mEq       Scheduled Meds:   chlorhexidine  10 mL Mouth/Throat BID    enoxaparin  40 mg Subcutaneous Daily    gabapentin  300 mg Oral TID    losartan  50 mg Oral Daily    nozaseptin   Each Nostril BID    pantoprazole  40 mg Oral Daily    senna-docusate 8.6-50 mg  1 tablet Oral BID    tamsulosin  0.4 mg Oral Daily     PRN Meds:diphenhydrAMINE, hydrALAZINE, HYDROmorphone, naloxone, ondansetron, oxyCODONE-acetaminophen, promethazine (PHENERGAN) IVPB     Review of patient's allergies indicates:  No Known Allergies  Objective:     Vital Signs (Most Recent):  Temp: 98.4 °F (36.9 °C) (02/08/20 0742)  Pulse: 66 (02/08/20 0742)  Resp: 18 (02/08/20 0742)  BP: (!) 160/77 (02/08/20 0742)  SpO2: 97 % (02/08/20 0742) Vital Signs (24h Range):  Temp:  [97.8 °F (36.6 °C)-98.4 °F (36.9 °C)] 98.4 °F (36.9 °C)  Pulse:  [66-78] 66  Resp:  [16-20] 18  SpO2:  [94 %-97 %] 97 %  BP: (131-166)/(77-94) 160/77     Weight: 69.6 kg (153 lb 7 oz)  Body mass index is 22.02 kg/m².    Intake/Output - Last 3 Shifts       02/06 0700 - 02/07 0659 02/07 0700 - 02/08 0659 02/08 0700 - 02/09 0659    P.O. 360 300 294    I.V.  (mL/kg) 1828 (26.3) 16.5 (0.2)     IV Piggyback 100 0.5     Total Intake(mL/kg) 2288 (32.9) 497 (7.1) 260 (3.7)    Urine (mL/kg/hr) 700 (0.4) 1075 (0.6) 300 (1.1)    Stool 325 1050 125    Total Output 1025 2125 425    Net +1263 -1628 -165           Urine Occurrence 1 x            Physical Exam   Constitutional: He is oriented to person, place, and time. No distress.   Thin   HENT:   Head: Normocephalic and atraumatic.   Neck: Normal range of motion.   Cardiovascular: Normal rate, regular rhythm and normal heart sounds.   Pulmonary/Chest: Effort normal and breath sounds normal.   Abdominal: He exhibits no distension. There is no tenderness.   Decreased bowel sounds, right-sided pink ileostomy with output, liquid    Incisions are clean   Neurological: He is alert and oriented to person, place, and time.   Skin: Skin is warm and dry. Capillary refill takes less than 2 seconds.   Psychiatric: He has a normal mood and affect. His behavior is normal. Judgment and thought content normal.   Vitals reviewed.      Significant Labs:  CBC:   Recent Labs   Lab 02/08/20  0536   WBC 6.49   RBC 3.68*   HGB 10.5*   HCT 34.0*   *   MCV 92   MCH 28.5   MCHC 30.9*     BMP:   Recent Labs   Lab 02/08/20  0536   GLU 74      K 3.3*      CO2 22*   BUN 3*   CREATININE 0.6   CALCIUM 8.5*   MG 1.7     CMP:   Recent Labs   Lab 02/08/20  0536   GLU 74   CALCIUM 8.5*      K 3.3*   CO2 22*      BUN 3*   CREATININE 0.6       Significant Diagnostics:  None    Assessment/Plan:     * Rectal adenocarcinoma  S/p robotic ultra LAR with loop ileostomy construction. Mediport placement  POD 4  D/c PCA and start PO with IV breakthrough  Ambulate, OOB. Encouraged more frequent ambulation  IS  Nauseated abdominal distension, returned to NPO and IV fluids  Monitor ostomy output  Discharge planning      Essential hypertension  hospital medicine was consulted.     Alcohol use  Monitor for withdrawal signs,  symptoms      Gastroesophageal reflux disease  PPI        Faisal Arias MD  General Surgery  [facility] [place]

## 2020-02-08 NOTE — PLAN OF CARE
Advised patient of rights to appeal , patient received written notification of discharge appeal process . Patient signed and dated PT understood rights.

## 2020-02-08 NOTE — PLAN OF CARE
Pt remained free of injury during shift, stable condition, pain adequately controlled with PRN medication and sleeping between care, Pt ambulated once in hallways in evening, no acute distress,incisions to abdomen CDI, ileostomy CDI with thin liquid green/brown output, and will continue to monitor. 24hr chart review performed.

## 2020-02-08 NOTE — SUBJECTIVE & OBJECTIVE
Interval History:  Patient feels bloated and nauseated.  He did not eat breakfast.  He has some pain. We will return to the NPO status.  IV fluids.  If the ileostomy has significant output is abdomen is softer restart liquids tomorrow.  Replace potassium    Medications:  Continuous Infusions:   dextrose 5 % and 0.45 % NaCl with KCl 20 mEq       Scheduled Meds:   chlorhexidine  10 mL Mouth/Throat BID    enoxaparin  40 mg Subcutaneous Daily    gabapentin  300 mg Oral TID    losartan  50 mg Oral Daily    nozaseptin   Each Nostril BID    pantoprazole  40 mg Oral Daily    senna-docusate 8.6-50 mg  1 tablet Oral BID    tamsulosin  0.4 mg Oral Daily     PRN Meds:diphenhydrAMINE, hydrALAZINE, HYDROmorphone, naloxone, ondansetron, oxyCODONE-acetaminophen, promethazine (PHENERGAN) IVPB     Review of patient's allergies indicates:  No Known Allergies  Objective:     Vital Signs (Most Recent):  Temp: 98.4 °F (36.9 °C) (02/08/20 0742)  Pulse: 66 (02/08/20 0742)  Resp: 18 (02/08/20 0742)  BP: (!) 160/77 (02/08/20 0742)  SpO2: 97 % (02/08/20 0742) Vital Signs (24h Range):  Temp:  [97.8 °F (36.6 °C)-98.4 °F (36.9 °C)] 98.4 °F (36.9 °C)  Pulse:  [66-78] 66  Resp:  [16-20] 18  SpO2:  [94 %-97 %] 97 %  BP: (131-166)/(77-94) 160/77     Weight: 69.6 kg (153 lb 7 oz)  Body mass index is 22.02 kg/m².    Intake/Output - Last 3 Shifts       02/06 0700 - 02/07 0659 02/07 0700 - 02/08 0659 02/08 0700 - 02/09 0659    P.O. 360 480 260    I.V. (mL/kg) 1828 (26.3) 16.5 (0.2)     IV Piggyback 100 0.5     Total Intake(mL/kg) 2288 (32.9) 497 (7.1) 260 (3.7)    Urine (mL/kg/hr) 700 (0.4) 1075 (0.6) 300 (1.1)    Stool 325 1050 125    Total Output 1025 2125 425    Net +1263 -1628 -165           Urine Occurrence 1 x            Physical Exam   Constitutional: He is oriented to person, place, and time. No distress.   Thin   HENT:   Head: Normocephalic and atraumatic.   Neck: Normal range of motion.   Cardiovascular: Normal rate, regular rhythm  and normal heart sounds.   Pulmonary/Chest: Effort normal and breath sounds normal.   Abdominal: He exhibits no distension. There is no tenderness.   Decreased bowel sounds, right-sided pink ileostomy with output, liquid    Incisions are clean   Neurological: He is alert and oriented to person, place, and time.   Skin: Skin is warm and dry. Capillary refill takes less than 2 seconds.   Psychiatric: He has a normal mood and affect. His behavior is normal. Judgment and thought content normal.   Vitals reviewed.      Significant Labs:  CBC:   Recent Labs   Lab 02/08/20  0536   WBC 6.49   RBC 3.68*   HGB 10.5*   HCT 34.0*   *   MCV 92   MCH 28.5   MCHC 30.9*     BMP:   Recent Labs   Lab 02/08/20  0536   GLU 74      K 3.3*      CO2 22*   BUN 3*   CREATININE 0.6   CALCIUM 8.5*   MG 1.7     CMP:   Recent Labs   Lab 02/08/20  0536   GLU 74   CALCIUM 8.5*      K 3.3*   CO2 22*      BUN 3*   CREATININE 0.6       Significant Diagnostics:  None

## 2020-02-08 NOTE — NURSING
otis burnett the following    alyson burnett i see you made this pt npo, i discussed this with him and he states he just isnt a morning brkfst person but is hungry and wants to eat later on, i still intiated his d5kcl drip but can we advance his diet back to regular?

## 2020-02-08 NOTE — NURSING
msyoel HM the following    per pt req, any chance we can change all of his daily meds to hs/at night, instead of 9am he states he doesnt like to take pills early in the am...cause he isnt a am person, has diff swallowing and eating early in the am.....[id number]

## 2020-02-08 NOTE — PLAN OF CARE
Discussed poc with pt, reg diet changed to npo status, ivfs started, kcl replacement initiated, vs wnl, no acute distress noted, pain under control with meds, IS trmnts reinforced, ileostomy output being recorded, remains injury free fall precautions inplace, chart check complete will cont to monitor

## 2020-02-08 NOTE — PROGRESS NOTES
Patient states he is feeling better.  The nausea is resolved.  At ileostomy output and feels less bloated.  Like to try eating regular food again.    We will order a regular diet.  However if the patient develops nausea, vomiting or becomes more bloated he will return to the NPO status, this was discussed with the patient

## 2020-02-08 NOTE — ASSESSMENT & PLAN NOTE
S/p robotic ultra LAR with loop ileostomy construction. Mediport placement  POD 4  D/c PCA and start PO with IV breakthrough  Ambulate, OOB. Encouraged more frequent ambulation  IS  Nauseated abdominal distension, returned to NPO and IV fluids  Monitor ostomy output  Discharge planning

## 2020-02-09 VITALS
OXYGEN SATURATION: 99 % | TEMPERATURE: 98 F | RESPIRATION RATE: 18 BRPM | BODY MASS INDEX: 21.97 KG/M2 | DIASTOLIC BLOOD PRESSURE: 68 MMHG | SYSTOLIC BLOOD PRESSURE: 139 MMHG | HEART RATE: 89 BPM | WEIGHT: 153.44 LBS | HEIGHT: 70 IN

## 2020-02-09 LAB
ALBUMIN SERPL BCP-MCNC: 2.6 G/DL (ref 3.5–5.2)
ANION GAP SERPL CALC-SCNC: 12 MMOL/L (ref 8–16)
BASOPHILS # BLD AUTO: 0.04 K/UL (ref 0–0.2)
BASOPHILS NFR BLD: 0.7 % (ref 0–1.9)
BUN SERPL-MCNC: 4 MG/DL (ref 8–23)
CALCIUM SERPL-MCNC: 8.8 MG/DL (ref 8.7–10.5)
CHLORIDE SERPL-SCNC: 99 MMOL/L (ref 95–110)
CO2 SERPL-SCNC: 26 MMOL/L (ref 23–29)
CREAT SERPL-MCNC: 0.6 MG/DL (ref 0.5–1.4)
DIFFERENTIAL METHOD: ABNORMAL
EOSINOPHIL # BLD AUTO: 0.3 K/UL (ref 0–0.5)
EOSINOPHIL NFR BLD: 5 % (ref 0–8)
ERYTHROCYTE [DISTWIDTH] IN BLOOD BY AUTOMATED COUNT: 15.1 % (ref 11.5–14.5)
EST. GFR  (AFRICAN AMERICAN): >60 ML/MIN/1.73 M^2
EST. GFR  (NON AFRICAN AMERICAN): >60 ML/MIN/1.73 M^2
GLUCOSE SERPL-MCNC: 89 MG/DL (ref 70–110)
HCT VFR BLD AUTO: 32 % (ref 40–54)
HGB BLD-MCNC: 9.9 G/DL (ref 14–18)
IMM GRANULOCYTES # BLD AUTO: 0.02 K/UL (ref 0–0.04)
IMM GRANULOCYTES NFR BLD AUTO: 0.4 % (ref 0–0.5)
LYMPHOCYTES # BLD AUTO: 0.6 K/UL (ref 1–4.8)
LYMPHOCYTES NFR BLD: 10.8 % (ref 18–48)
MAGNESIUM SERPL-MCNC: 1.5 MG/DL (ref 1.6–2.6)
MCH RBC QN AUTO: 28.6 PG (ref 27–31)
MCHC RBC AUTO-ENTMCNC: 30.9 G/DL (ref 32–36)
MCV RBC AUTO: 93 FL (ref 82–98)
MONOCYTES # BLD AUTO: 0.8 K/UL (ref 0.3–1)
MONOCYTES NFR BLD: 14.3 % (ref 4–15)
NEUTROPHILS # BLD AUTO: 3.9 K/UL (ref 1.8–7.7)
NEUTROPHILS NFR BLD: 68.8 % (ref 38–73)
NRBC BLD-RTO: 0 /100 WBC
PHOSPHATE SERPL-MCNC: 3 MG/DL (ref 2.7–4.5)
PLATELET # BLD AUTO: 402 K/UL (ref 150–350)
PMV BLD AUTO: 9.4 FL (ref 9.2–12.9)
POTASSIUM SERPL-SCNC: 3.7 MMOL/L (ref 3.5–5.1)
RBC # BLD AUTO: 3.46 M/UL (ref 4.6–6.2)
SODIUM SERPL-SCNC: 137 MMOL/L (ref 136–145)
WBC # BLD AUTO: 5.65 K/UL (ref 3.9–12.7)

## 2020-02-09 PROCEDURE — 63600175 PHARM REV CODE 636 W HCPCS: Performed by: NURSE PRACTITIONER

## 2020-02-09 PROCEDURE — 83735 ASSAY OF MAGNESIUM: CPT

## 2020-02-09 PROCEDURE — 85025 COMPLETE CBC W/AUTO DIFF WBC: CPT

## 2020-02-09 PROCEDURE — 25000003 PHARM REV CODE 250: Performed by: COLON & RECTAL SURGERY

## 2020-02-09 PROCEDURE — 25000003 PHARM REV CODE 250: Performed by: INTERNAL MEDICINE

## 2020-02-09 PROCEDURE — 94761 N-INVAS EAR/PLS OXIMETRY MLT: CPT

## 2020-02-09 PROCEDURE — 25000003 PHARM REV CODE 250: Performed by: PHYSICIAN ASSISTANT

## 2020-02-09 PROCEDURE — 80069 RENAL FUNCTION PANEL: CPT

## 2020-02-09 PROCEDURE — 36415 COLL VENOUS BLD VENIPUNCTURE: CPT

## 2020-02-09 RX ORDER — TAMSULOSIN HYDROCHLORIDE 0.4 MG/1
0.4 CAPSULE ORAL DAILY
Qty: 30 CAPSULE | Refills: 0 | Status: SHIPPED | OUTPATIENT
Start: 2020-02-09 | End: 2020-03-02 | Stop reason: SDUPTHER

## 2020-02-09 RX ORDER — TAMSULOSIN HYDROCHLORIDE 0.4 MG/1
0.4 CAPSULE ORAL DAILY
Qty: 30 CAPSULE | Refills: 0 | Status: SHIPPED | OUTPATIENT
Start: 2020-02-09 | End: 2020-06-22 | Stop reason: SDUPTHER

## 2020-02-09 RX ORDER — DIPHENOXYLATE HYDROCHLORIDE AND ATROPINE SULFATE 2.5; .025 MG/1; MG/1
1 TABLET ORAL
Qty: 90 TABLET | Refills: 0 | Status: SHIPPED | OUTPATIENT
Start: 2020-02-09 | End: 2020-02-19

## 2020-02-09 RX ORDER — DIPHENOXYLATE HYDROCHLORIDE AND ATROPINE SULFATE 2.5; .025 MG/1; MG/1
1 TABLET ORAL
Qty: 90 TABLET | Refills: 0 | Status: SHIPPED | OUTPATIENT
Start: 2020-02-09 | End: 2020-03-10

## 2020-02-09 RX ORDER — OXYCODONE AND ACETAMINOPHEN 7.5; 325 MG/1; MG/1
1 TABLET ORAL EVERY 6 HOURS PRN
Qty: 20 TABLET | Refills: 0 | Status: SHIPPED | OUTPATIENT
Start: 2020-02-09 | End: 2020-02-09

## 2020-02-09 RX ORDER — OXYCODONE AND ACETAMINOPHEN 7.5; 325 MG/1; MG/1
1 TABLET ORAL EVERY 4 HOURS PRN
Qty: 20 TABLET | Refills: 0 | Status: SHIPPED | OUTPATIENT
Start: 2020-02-09 | End: 2020-02-09

## 2020-02-09 RX ADMIN — GABAPENTIN 300 MG: 300 CAPSULE ORAL at 08:02

## 2020-02-09 RX ADMIN — HYDRALAZINE HYDROCHLORIDE 10 MG: 20 INJECTION INTRAMUSCULAR; INTRAVENOUS at 12:02

## 2020-02-09 RX ADMIN — LOSARTAN POTASSIUM 50 MG: 50 TABLET ORAL at 09:02

## 2020-02-09 RX ADMIN — CHLORHEXIDINE GLUCONATE 0.12% ORAL RINSE 10 ML: 1.2 LIQUID ORAL at 08:02

## 2020-02-09 RX ADMIN — OXYCODONE HYDROCHLORIDE AND ACETAMINOPHEN 1 TABLET: 10; 325 TABLET ORAL at 01:02

## 2020-02-09 RX ADMIN — SENNOSIDES, DOCUSATE SODIUM 1 TABLET: 50; 8.6 TABLET, FILM COATED ORAL at 08:02

## 2020-02-09 RX ADMIN — OXYCODONE HYDROCHLORIDE AND ACETAMINOPHEN 1 TABLET: 10; 325 TABLET ORAL at 09:02

## 2020-02-09 RX ADMIN — OXYCODONE HYDROCHLORIDE AND ACETAMINOPHEN 1 TABLET: 10; 325 TABLET ORAL at 05:02

## 2020-02-09 NOTE — PROGRESS NOTES
[facility] -   General Surgery  Progress Note    Subjective:     History of Present Illness:  He presented for robotic ultra low anterior resection with DLI for rectal adenocarcinoma following completion of neoadj chemoXRT.    Post-Op Info:  Procedure(s) (LRB):  XI ROBOTIC LOW ANTERIOR RESECTION (N/A)  INSERTION, PORT-A-CATH (Right)  BLOCK, TRANSVERSUS ABDOMINIS PLANE (N/A)  CREATION, ILEOSTOMY (Right)  SIGMOIDOSCOPY, FLEXIBLE  MOBILIZATION, SPLENIC FLEXURE   5 Days Post-Op     Interval History:  Tolerating a regular diet.  Able to change his ileostomy.  Will discharge home.  Discuss ileostomy output monitoring    Medications:  Continuous Infusions:   dextrose 5 % and 0.45 % NaCl with KCl 20 mEq Stopped (02/08/20 1920)     Scheduled Meds:   chlorhexidine  10 mL Mouth/Throat BID    enoxaparin  40 mg Subcutaneous Daily    gabapentin  300 mg Oral TID    losartan  50 mg Oral Daily    nozaseptin   Each Nostril BID    senna-docusate 8.6-50 mg  1 tablet Oral BID    tamsulosin  0.4 mg Oral Daily     PRN Meds:diphenhydrAMINE, hydrALAZINE, HYDROmorphone, naloxone, ondansetron, oxyCODONE-acetaminophen, promethazine (PHENERGAN) IVPB     Review of patient's allergies indicates:  No Known Allergies  Objective:     Vital Signs (Most Recent):  Temp: 97.5 °F (36.4 °C) (02/09/20 0804)  Pulse: 73 (02/09/20 0804)  Resp: 18 (02/09/20 0804)  BP: (!) 155/86 (02/09/20 0804)  SpO2: 95 % (02/09/20 0804) Vital Signs (24h Range):  Temp:  [97.5 °F (36.4 °C)-98.2 °F (36.8 °C)] 97.5 °F (36.4 °C)  Pulse:  [68-79] 73  Resp:  [16-18] 18  SpO2:  [95 %-99 %] 95 %  BP: (122-161)/(70-91) 155/86     Weight: 69.6 kg (153 lb 7 oz)  Body mass index is 22.02 kg/m².    Intake/Output - Last 3 Shifts       02/07 0700 - 02/08 0659 02/08 0700 - 02/09 0659 02/09 0700 - 02/10 0659    P.O. 480 260 360    I.V. (mL/kg) 16.5 (0.2) 798.3 (11.5)     IV Piggyback 0.5 200     Total Intake(mL/kg) 497 (7.1) 1258.3 (18.1) 360 (5.2)    Urine (mL/kg/hr)  1075 (0.6) 775 (0.5)     Stool 1050 800 200    Total Output 2125 1575 200    Net -1628 -316.7 +160           Urine Occurrence  500 x     Stool Occurrence  1 x           Physical Exam   Constitutional: He is oriented to person, place, and time. He appears well-developed and well-nourished.   HENT:   Head: Normocephalic and atraumatic.   Neck: Normal range of motion. Neck supple.   Cardiovascular: Normal rate, regular rhythm, normal heart sounds and intact distal pulses.   Pulmonary/Chest: Effort normal and breath sounds normal.   Abdominal: Soft. Bowel sounds are normal. He exhibits no distension. There is tenderness (Minimal incision).   All incisions are clean.  There is a well-formed ileostomy output   Neurological: He is alert and oriented to person, place, and time.   Skin: Skin is warm and dry. Capillary refill takes less than 2 seconds.   Psychiatric: He has a normal mood and affect. His behavior is normal. Judgment and thought content normal.   Vitals reviewed.      Significant Labs:  CBC:   Recent Labs   Lab 02/09/20  0503   WBC 5.65   RBC 3.46*   HGB 9.9*   HCT 32.0*   *   MCV 93   MCH 28.6   MCHC 30.9*     BMP:   Recent Labs   Lab 02/09/20  0503   GLU 89      K 3.7   CL 99   CO2 26   BUN 4*   CREATININE 0.6   CALCIUM 8.8   MG 1.5*     CMP:   Recent Labs   Lab 02/09/20  0503   GLU 89   CALCIUM 8.8   ALBUMIN 2.6*      K 3.7   CO2 26   CL 99   BUN 4*   CREATININE 0.6       Significant Diagnostics:  No new    Assessment/Plan:     * Rectal adenocarcinoma  S/p robotic ultra LAR with loop ileostomy construction. Mediport placement  POD 5  Patient stable to care for his ileostomy.  His Madhu and regular diet.  Will discharge home with home health      Essential hypertension  hospital medicine was consulted.     Alcohol use  Monitor for withdrawal signs, symptoms      Gastroesophageal reflux disease  PPI        Faisal Arias MD  General Surgery  [facility]

## 2020-02-09 NOTE — PLAN OF CARE
Pt remains free from falls/injuries this shift. Safety precautions maintained. Pain managed with oral meds. Ileostomy intact, emptied by patient. IV removed. Catheter intact. Patient educated on ostomy care and emptying. Supplies at bedside, will be sent with pt. Case management informed pt discharging today. Discharge instructions given to pt. Pt verbalized understanding.

## 2020-02-09 NOTE — DISCHARGE SUMMARY
[facility] -   General Surgery  Discharge Summary      Patient Name: Vincent Benton  MRN: [id number]  Admission Date: 2/4/2020  Hospital Length of Stay: 5 days  Discharge Date and Time:  02/09/2020 11:24 AM  Attending Physician: Familia Gonzalez MD   Discharging Provider: Faisal Arias MD  Primary Care Provider: Shakila Moran DO    HPI:   He presented for robotic ultra low anterior resection with DLI for rectal adenocarcinoma following completion of neoadj chemoXRT.    Procedure(s) (LRB):  XI ROBOTIC LOW ANTERIOR RESECTION (N/A)  INSERTION, PORT-A-CATH (Right)  BLOCK, TRANSVERSUS ABDOMINIS PLANE (N/A)  CREATION, ILEOSTOMY (Right)  SIGMOIDOSCOPY, FLEXIBLE  MOBILIZATION, SPLENIC FLEXURE      Indwelling Lines/Drains at time of discharge:   Lines/Drains/Airways     Central Venous Catheter Line                 Port A Cath Single Lumen 02/04/20 0800 right subclavian 5 days          Drain                 Ileostomy 02/04/20 1623 RLQ 4 days              Hospital Course: 02/04/2020: Underwent robotic ultra low anterior resection with DLI.   02/05/2020: POD1 s/p Robotic ultra low LAR with construction of loop ileostomy. Mediport placement. He c/o severe uncontrolled pain so PCA started. He denied nausea. Ostomy pink, viable with brown liquid effluent  02/06/2020: pod2 he is tolerating a diet and having ostomy output. No nausea. Pain well controlled with PCA.   02/07/2020: POD3 not ambulating much. D/c pca. Discharge planning  02/08/2020.  Postop day 4.  Not feeling well.  Feels nauseated and bloated.  Did not eat much of his breakfast.  There is liquid ileostomy output approximately 800  02/09/2020.  Tolerating a regular diet.  Approximately 1 L of semi-liquid stool of the ileostomy.  Will discharge home.  Discussed ileostomy care    Consults:   Consults (From admission, onward)        Status Ordering Provider     Inpatient consult to Internal Medicine  Once     Provider:  Bhumi Baez NP     Completed ESAU GONZALEZ     IP consult to case management  Once     Provider:  (Not yet assigned)    Completed ESAU GONZALEZ          Significant Diagnostic Studies: Labs:   BMP:   Recent Labs   Lab 02/08/20  0536 02/09/20  0503   GLU 74 89    137   K 3.3* 3.7    99   CO2 22* 26   BUN 3* 4*   CREATININE 0.6 0.6   CALCIUM 8.5* 8.8   MG 1.7 1.5*   , CMP   Recent Labs   Lab 02/08/20  0536 02/09/20  0503    137   K 3.3* 3.7    99   CO2 22* 26   GLU 74 89   BUN 3* 4*   CREATININE 0.6 0.6   CALCIUM 8.5* 8.8   ALBUMIN  --  2.6*   ANIONGAP 15 12   ESTGFRAFRICA >60 >60   EGFRNONAA >60 >60    and CBC   Recent Labs   Lab 02/08/20  0536 02/09/20  0503   WBC 6.49 5.65   HGB 10.5* 9.9*   HCT 34.0* 32.0*   * 402*       Pending Diagnostic Studies:     Procedure Component Value Units Date/Time    Specimen to Pathology, Surgery General Surgery [[id number]] Collected:  02/04/20 1704    Order Status:  Sent Lab Status:  In process Updated:  02/05/20 0848        Final Active Diagnoses:    Diagnosis Date Noted POA    PRINCIPAL PROBLEM:  Rectal adenocarcinoma [C20] 02/04/2020 Yes    Hypokalemia [E87.6] 02/08/2020 No    Essential hypertension [I10] 02/04/2020 Yes     Chronic    Alcohol use [Z72.89] 11/07/2018 Yes    Gastroesophageal reflux disease [K21.9] 11/07/2018 Yes    Anemia [D64.9] 08/02/2018 Yes      Problems Resolved During this Admission:      Discharged Condition: stable    Disposition: Home-Health Care [facility] [place]    Follow Up:  Follow-up Information     [facility]    Specialty:  Home Health Services  Why:  Home Health  Contact information:  [address] [facility], [address]  [place] [id number]  [phone]             Esau Gonzalez MD In 10 days.    Specialty:  Colon and Rectal Surgery  Why:  post op appt, or as scheduled  Contact information:  [id number] [place] [address] [address] [place] [id number]  [phone]                 Patient Instructions:      Diet Adult  Regular     HOME HEALTH ORDERS   Order Comments: Subsequent Home Health Orders    Current Medications:  Current Facility-Administered Medications:  chlorhexidine 0.12 % solution 10 mL, 10 mL, Mouth/Throat, BID, Familia Gonzalez MD, 10 mL at 02/07/20 0849  diphenhydrAMINE injection 12.5 mg, 12.5 mg, Intravenous, Q6H PRN, Familia Gonzalez MD, 12.5 mg at 02/07/20 0337  enoxaparin injection 40 mg, 40 mg, Subcutaneous, Daily, Familia Gonzalez MD, 40 mg at 02/06/20 1640  gabapentin capsule 300 mg, 300 mg, Oral, TID, Familia Gonzalez MD, 300 mg at 02/07/20 1425  hydrALAZINE injection 10 mg, 10 mg, Intravenous, Q8H PRN, Beatriz Elder, PETE  HYDROmorphone injection Syrg 0.5 mg, 0.5 mg, Intravenous, Q2H PRN, Cathleen Skinner PA-C  losartan tablet 50 mg, 50 mg, Oral, Daily, Bhumi Baez, PETE, 50 mg at 02/07/20 0848  naloxone 0.4 mg/mL injection 0.02 mg, 0.02 mg, Intravenous, PRN, Cathleen Skinner PA-C  nozaseptin (NOZIN) nasal , , Each Nostril, BID, Familia Gonzalez MD  ondansetron injection 4 mg, 4 mg, Intravenous, Q6H PRN, Familia Gonzalez MD, 4 mg at 02/07/20 0335  oxyCODONE-acetaminophen  mg per tablet 1 tablet, 1 tablet, Oral, Q4H PRN, Cathleen Skinner PA-C, 1 tablet at 02/07/20 1425  pantoprazole EC tablet 40 mg, 40 mg, Oral, Daily, Familia Gonzalez MD, 40 mg at 02/07/20 0848  promethazine (PHENERGAN) 6.25 mg in dextrose 5 % 50 mL IVPB, 6.25 mg, Intravenous, Q6H PRN, Familia Gonzalez MD, Last Rate: 150 mL/hr at 02/07/20 0532, 6.25 mg at 02/07/20 0532  senna-docusate 8.6-50 mg per tablet 1 tablet, 1 tablet, Oral, BID, Familia Gonzalez MD, 1 tablet at 02/06/20 2134  tamsulosin 24 hr capsule 0.4 mg, 0.4 mg, Oral, Daily, Familia Gonzalez MD, 0.4 mg at 02/07/20 0848    Facility-Administered Medications Ordered in Other Encounters:  sodium chloride 0.9% flush 3 mL, 3 mL, Intravenous, PRN, Shilpa Yee MD        Nursing:   Colostomy Care:  Instruct patient/caregiver to empty bag when full and  PRN., Change and clean site every 48 hours and Monitor skin integrity.    Diet:   regular diet    Activities:   no lifting for 6 weeks >10lbs    Referrals/ Consults   to evaluate for community resources/long-range planning.  Aide to provide assistance with personal care, ADLs, and vital signs.    Home Health Aide:  Nursing Three times weekly     Order Specific Question Answer Comments   What Home Health Agency is the patient currently using? [facility]      Lifting restrictions   Order Comments: No lifting more than 30 pounds for 2 weeks     Notify your health care provider if you experience any of the following:  temperature >100.4     Notify your health care provider if you experience any of the following:  persistent nausea and vomiting or diarrhea     Notify your health care provider if you experience any of the following:  severe uncontrolled pain     Notify your health care provider if you experience any of the following:  redness, tenderness, or signs of infection (pain, swelling, redness, odor or green/yellow discharge around incision site)     Notify your health care provider if you experience any of the following:   Order Comments: Ileostomy output of greater than 1000 mL /1 L for 2 consecutive days     No dressing needed     Medications:  Reconciled Home Medications:      Medication List      START taking these medications    diphenoxylate-atropine 2.5-0.025 mg 2.5-0.025 mg per tablet  Commonly known as:  LOMOTIL  Take 1 tablet by mouth 3 (three) times daily before meals.        CHANGE how you take these medications    * oxyCODONE-acetaminophen 5-325 mg per tablet  Commonly known as:  PERCOCET  Take 1 tablet by mouth every 4 (four) hours as needed for Pain.  What changed:  Another medication with the same name was added. Make sure you understand how and when to take each.     * oxyCODONE-acetaminophen 7.5-325 mg per tablet  Commonly known as:  PERCOCET  Take 1 tablet by mouth every 4  (four) hours as needed for Pain.  What changed:  You were already taking a medication with the same name, and this prescription was added. Make sure you understand how and when to take each.     * tamsulosin 0.4 mg Cap  Commonly known as:  FLOMAX  Take 1 capsule (0.4 mg total) by mouth once daily.  What changed:  Another medication with the same name was added. Make sure you understand how and when to take each.     * tamsulosin 0.4 mg Cap  Commonly known as:  FLOMAX  Take 1 capsule (0.4 mg total) by mouth once daily.  What changed:  You were already taking a medication with the same name, and this prescription was added. Make sure you understand how and when to take each.         * This list has 4 medication(s) that are the same as other medications prescribed for you. Read the directions carefully, and ask your doctor or other care provider to review them with you.            CONTINUE taking these medications    cyanocobalamin 1000 MCG tablet  Commonly known as:  VITAMIN B-12  Take 100 mcg by mouth once daily.     ferrous sulfate 324 mg (65 mg iron) Tbec  Take 1 tablet (324 mg total) by mouth 2 (two) times daily.     losartan 50 MG tablet  Commonly known as:  COZAAR  TAKE 1 TABLET BY MOUTH EVERY MORNING     ondansetron 8 MG tablet  Commonly known as:  ZOFRAN  Take 1 tablet (8 mg total) by mouth every 8 (eight) hours as needed for Nausea.     pantoprazole 40 MG tablet  Commonly known as:  PROTONIX  Take 1 tablet (40 mg total) by mouth once daily.     prochlorperazine 5 MG tablet  Commonly known as:  COMPAZINE  TAKE 1 TABLET(5 MG) BY MOUTH EVERY 6 HOURS AS NEEDED FOR NAUSEA        STOP taking these medications    capecitabine 500 MG Tab  Commonly known as:  XELODA     HYDROcodone-acetaminophen  mg per tablet  Commonly known as:  NORCO     lidocaine 4 % Gel     metroNIDAZOLE 500 MG tablet  Commonly known as:  FLAGYL     neomycin 500 mg Tab  Commonly known as:  MYCIFRADIN     polyethylene glycol 17 gram/dose  powder  Commonly known as:  GLYCOLAX          Time spent on the discharge of patient: 14 minutes    Faisal Arias MD  General Surgery  [facility]

## 2020-02-09 NOTE — PLAN OF CARE
02/09/20 1234   Final Note   Assessment Type Final Discharge Note   Anticipated Discharge Disposition Home-Health   Hospital Follow Up  Appt(s) scheduled? Yes   Right Care Referral Info   Post Acute Recommendation Home-care   Post-Acute Status   Post-Acute Authorization Home Health/Hospice   Home Health/Hospice Status Set-up Complete   Discharge Delays None known at this time

## 2020-02-09 NOTE — SUBJECTIVE & OBJECTIVE
Interval History:  Expected surgical site pain.  Ambulating well and tolerating normal meals.  Passing semi-solid stool into colostomy appliance.    Review of Systems   Constitutional: Negative for chills, diaphoresis, fatigue and fever.   Respiratory: Negative for cough, shortness of breath and wheezing.    Cardiovascular: Negative for chest pain, palpitations and leg swelling.   Gastrointestinal: Positive for abdominal pain. Negative for nausea and vomiting.   Endocrine: Negative for cold intolerance and heat intolerance.   Genitourinary: Negative for dysuria, frequency, hematuria and urgency.   Musculoskeletal: Negative for arthralgias and myalgias.   Skin: Positive for wound. Negative for color change, pallor and rash.   Allergic/Immunologic: Negative for environmental allergies and food allergies.   Neurological: Negative for dizziness, weakness, light-headedness, numbness and headaches.   Psychiatric/Behavioral: Negative for agitation, confusion and sleep disturbance. The patient is not nervous/anxious.    All other systems reviewed and are negative.    Objective:     Vital Signs (Most Recent):  Temp: 98.1 °F (36.7 °C) (02/08/20 2110)  Pulse: 79 (02/08/20 2110)  Resp: 16 (02/08/20 2110)  BP: 122/70 (02/08/20 2110)  SpO2: 97 % (02/08/20 2110) Vital Signs (24h Range):  Temp:  [97.5 °F (36.4 °C)-98.4 °F (36.9 °C)] 98.1 °F (36.7 °C)  Pulse:  [66-79] 79  Resp:  [16-18] 16  SpO2:  [94 %-99 %] 97 %  BP: (122-160)/(70-91) 122/70     Weight: 69.6 kg (153 lb 7 oz)  Body mass index is 22.02 kg/m².    Intake/Output Summary (Last 24 hours) at 2/8/2020 2208  Last data filed at 2/8/2020 1713  Gross per 24 hour   Intake 1205 ml   Output 1225 ml   Net -20 ml      Physical Exam   Constitutional: He is oriented to person, place, and time. He appears well-developed and well-nourished.   HENT:   Head: Normocephalic and atraumatic.   Eyes: EOM are normal.   Neck: Normal range of motion. Neck supple.   Cardiovascular: Normal rate  and regular rhythm.   Pulmonary/Chest: Effort normal. No respiratory distress.   Abdominal: He exhibits no distension. There is tenderness.   Ostomy pink, viable with liquid brown effluent. Incisions c/d/i   Genitourinary:   Genitourinary Comments: Deferred    Musculoskeletal: Normal range of motion.   Neurological: He is alert and oriented to person, place, and time.   Skin: Skin is warm and dry.   Psychiatric: He has a normal mood and affect. Thought content normal.   Vitals reviewed.      Significant Labs:   CBC:   Recent Labs   Lab 02/07/20  0418 02/08/20  0536   WBC 8.24 6.49   HGB 10.4* 10.5*   HCT 34.7* 34.0*   * 392*     CMP:   Recent Labs   Lab 02/07/20  0418 02/08/20  0536    137   K 4.1 3.3*   CL 98 100   CO2 29 22*   GLU 93 74   BUN 2* 3*   CREATININE 0.6 0.6   CALCIUM 8.9 8.5*   ANIONGAP 12 15   EGFRNONAA >60 >60       Significant Imaging:

## 2020-02-09 NOTE — ASSESSMENT & PLAN NOTE
- s/p robotic resection with ileostomy construction and MediPort insertion on 2/4.  - Routine post-op care per primary team.  - On continuous IV fluids.  - Weaned off PCA  -antiemetics as needed   -ambulation encouraged   -compliance with IS encouraged

## 2020-02-09 NOTE — PLAN OF CARE
Problem: Adult Inpatient Plan of Care  Goal: Plan of Care Review  Outcome: Ongoing, Progressing     POC reviewed, including indications and possible side effects of administered medications. Patient verbalized understanding and teach back. No adverse reactions noted. Patient c/o back and abdominal pain. Administered medications per order. Incisions remain CDI. Ostomy care performed. Tolerating diet well. Denies n/v. Ambulation and IS use encouraged. VSS. NADN. Patient remains free of falls and injuries during shift. Will continue to monitor.    Chart check complete.

## 2020-02-09 NOTE — ASSESSMENT & PLAN NOTE
S/p robotic ultra LAR with loop ileostomy construction. Mediport placement  POD 5  Patient stable to care for his ileostomy.  His Madhu and regular diet.  Will discharge home with home health

## 2020-02-09 NOTE — PROGRESS NOTES
[facility] Hospital Medicine  Progress Note    Patient Name: Vincent Benton  MRN: [id number]  Patient Class: IP- Surgery Admit   Admission Date: 2/4/2020  Length of Stay: 4 days  Attending Physician: Familia Gonzalez MD  Primary Care Provider: Shakila Moran DO        Subjective:     Principal Problem:Rectal adenocarcinoma    HPI:  Mr. Benton is a 65yo male with a PMHx of HTN, HLD, GERD, anemia, remote EtOH abuse (quit 8 months ago), and rectal CA currently receiving chemo and radiation.  Today, he underwent elective robotic resection with ileostomy construction and MediPort insertion for his mid-rectal adenocarcinoma.  He tolerated the procedure well, and was transferred to the Med-Surg unit for recovery.  Patient c/o post-op ABD pain that is adequately controlled by ordered oral analgesics.  He denies any N/V, CP, SOB, back pain, HA, lightheadedness, dizziness, fever or chills.  Hospital Medicine was consulted for post-op medical management.      Overview/Hospital Course:  Pt admitted to Medical Surgical Unit s/p S/p robotic ultra LAR with loop ileostomy construction and mediport placement on 2/4/2020 per General Surgery in the setting of rectal adenocarcinoma.   Hospital Medicine contacted for medical management.  Vital signs stable on prescribed medications and H/H stable post procedure. PCA continued.  On 2/6/2020, H/H stable with mild decline noted.  BP controlled with episodic elevated likely due to pain.  Pt reports pain controlled on prescribed regime. Pt reports difficulty ambulating today.  On 2/7/2020, pt verbalized symptom improvement on POD3.    PCA discontinued and transitioned to oral dosing with IV for breakthrough.  More frequent ambulation and compliance with IS encouraged.  Ostomy output monitored.        Interval History:  Expected surgical site pain.  Ambulating well and tolerating normal meals.  Passing semi-solid stool into colostomy appliance.    Review of Systems    Constitutional: Negative for chills, diaphoresis, fatigue and fever.   Respiratory: Negative for cough, shortness of breath and wheezing.    Cardiovascular: Negative for chest pain, palpitations and leg swelling.   Gastrointestinal: Positive for abdominal pain. Negative for nausea and vomiting.   Endocrine: Negative for cold intolerance and heat intolerance.   Genitourinary: Negative for dysuria, frequency, hematuria and urgency.   Musculoskeletal: Negative for arthralgias and myalgias.   Skin: Positive for wound. Negative for color change, pallor and rash.   Allergic/Immunologic: Negative for environmental allergies and food allergies.   Neurological: Negative for dizziness, weakness, light-headedness, numbness and headaches.   Psychiatric/Behavioral: Negative for agitation, confusion and sleep disturbance. The patient is not nervous/anxious.    All other systems reviewed and are negative.    Objective:     Vital Signs (Most Recent):  Temp: 98.1 °F (36.7 °C) (02/08/20 2110)  Pulse: 79 (02/08/20 2110)  Resp: 16 (02/08/20 2110)  BP: 122/70 (02/08/20 2110)  SpO2: 97 % (02/08/20 2110) Vital Signs (24h Range):  Temp:  [97.5 °F (36.4 °C)-98.4 °F (36.9 °C)] 98.1 °F (36.7 °C)  Pulse:  [66-79] 79  Resp:  [16-18] 16  SpO2:  [94 %-99 %] 97 %  BP: (122-160)/(70-91) 122/70     Weight: 69.6 kg (153 lb 7 oz)  Body mass index is 22.02 kg/m².    Intake/Output Summary (Last 24 hours) at 2/8/2020 2208  Last data filed at 2/8/2020 1713  Gross per 24 hour   Intake 1205 ml   Output 1225 ml   Net -20 ml      Physical Exam   Constitutional: He is oriented to person, place, and time. He appears well-developed and well-nourished.   HENT:   Head: Normocephalic and atraumatic.   Eyes: EOM are normal.   Neck: Normal range of motion. Neck supple.   Cardiovascular: Normal rate and regular rhythm.   Pulmonary/Chest: Effort normal. No respiratory distress.   Abdominal: He exhibits no distension. There is tenderness.   Ostomy pink, viable with  liquid brown effluent. Incisions c/d/i   Genitourinary:   Genitourinary Comments: Deferred    Musculoskeletal: Normal range of motion.   Neurological: He is alert and oriented to person, place, and time.   Skin: Skin is warm and dry.   Psychiatric: He has a normal mood and affect. Thought content normal.   Vitals reviewed.      Significant Labs:   CBC:   Recent Labs   Lab 02/07/20  0418 02/08/20  0536   WBC 8.24 6.49   HGB 10.4* 10.5*   HCT 34.7* 34.0*   * 392*     CMP:   Recent Labs   Lab 02/07/20  0418 02/08/20  0536    137   K 4.1 3.3*   CL 98 100   CO2 29 22*   GLU 93 74   BUN 2* 3*   CREATININE 0.6 0.6   CALCIUM 8.9 8.5*   ANIONGAP 12 15   EGFRNONAA >60 >60       Significant Imaging:           Assessment/Plan:      * Rectal adenocarcinoma  - s/p robotic resection with ileostomy construction and MediPort insertion on 2/4.  - Routine post-op care per primary team.  - On continuous IV fluids.  - Weaned off PCA  -antiemetics as needed   -ambulation encouraged   -compliance with IS encouraged     Essential hypertension  - BP intermittently elevated   - Continue home losartan.    Alcohol use  -will monitor for signs of withdrawal       Gastroesophageal reflux disease  -PPI       Anemia  H/H stable post procedure- decline noted   -will monitor and transfuse as needed   -repeat CBC in am         VTE Risk Mitigation (From admission, onward)         Ordered     enoxaparin injection 40 mg  Daily      02/04/20 1949     IP VTE HIGH RISK PATIENT  Once      02/04/20 1949     Place sequential compression device  Until discontinued      02/04/20 1949                      James Keen MD  Department of Hospital Medicine   [facility]

## 2020-02-09 NOTE — SUBJECTIVE & OBJECTIVE
Interval History:  Tolerating a regular diet.  Able to change his ileostomy.  Will discharge home.  Discuss ileostomy output monitoring    Medications:  Continuous Infusions:   dextrose 5 % and 0.45 % NaCl with KCl 20 mEq Stopped (02/08/20 1920)     Scheduled Meds:   chlorhexidine  10 mL Mouth/Throat BID    enoxaparin  40 mg Subcutaneous Daily    gabapentin  300 mg Oral TID    losartan  50 mg Oral Daily    nozaseptin   Each Nostril BID    senna-docusate 8.6-50 mg  1 tablet Oral BID    tamsulosin  0.4 mg Oral Daily     PRN Meds:diphenhydrAMINE, hydrALAZINE, HYDROmorphone, naloxone, ondansetron, oxyCODONE-acetaminophen, promethazine (PHENERGAN) IVPB     Review of patient's allergies indicates:  No Known Allergies  Objective:     Vital Signs (Most Recent):  Temp: 97.5 °F (36.4 °C) (02/09/20 0804)  Pulse: 73 (02/09/20 0804)  Resp: 18 (02/09/20 0804)  BP: (!) 155/86 (02/09/20 0804)  SpO2: 95 % (02/09/20 0804) Vital Signs (24h Range):  Temp:  [97.5 °F (36.4 °C)-98.2 °F (36.8 °C)] 97.5 °F (36.4 °C)  Pulse:  [68-79] 73  Resp:  [16-18] 18  SpO2:  [95 %-99 %] 95 %  BP: (122-161)/(70-91) 155/86     Weight: 69.6 kg (153 lb 7 oz)  Body mass index is 22.02 kg/m².    Intake/Output - Last 3 Shifts       02/07 0700 - 02/08 0659 02/08 0700 - 02/09 0659 02/09 0700 - 02/10 0659    P.O. 480 260 360    I.V. (mL/kg) 16.5 (0.2) 798.3 (11.5)     IV Piggyback 0.5 200     Total Intake(mL/kg) 497 (7.1) 1258.3 (18.1) 360 (5.2)    Urine (mL/kg/hr) 1075 (0.6) 775 (0.5)     Stool 1050 800 200    Total Output 2125 1575 200    Net -1628 -316.7 +160           Urine Occurrence  500 x     Stool Occurrence  1 x           Physical Exam   Constitutional: He is oriented to person, place, and time. He appears well-developed and well-nourished.   HENT:   Head: Normocephalic and atraumatic.   Neck: Normal range of motion. Neck supple.   Cardiovascular: Normal rate, regular rhythm, normal heart sounds and intact distal pulses.   Pulmonary/Chest: Effort  normal and breath sounds normal.   Abdominal: Soft. Bowel sounds are normal. He exhibits no distension. There is tenderness (Minimal incision).   All incisions are clean.  There is a well-formed ileostomy output   Neurological: He is alert and oriented to person, place, and time.   Skin: Skin is warm and dry. Capillary refill takes less than 2 seconds.   Psychiatric: He has a normal mood and affect. His behavior is normal. Judgment and thought content normal.   Vitals reviewed.      Significant Labs:  CBC:   Recent Labs   Lab 02/09/20  0503   WBC 5.65   RBC 3.46*   HGB 9.9*   HCT 32.0*   *   MCV 93   MCH 28.6   MCHC 30.9*     BMP:   Recent Labs   Lab 02/09/20  0503   GLU 89      K 3.7   CL 99   CO2 26   BUN 4*   CREATININE 0.6   CALCIUM 8.8   MG 1.5*     CMP:   Recent Labs   Lab 02/09/20  0503   GLU 89   CALCIUM 8.8   ALBUMIN 2.6*      K 3.7   CO2 26   CL 99   BUN 4*   CREATININE 0.6       Significant Diagnostics:  No new

## 2020-02-10 PROCEDURE — G0180 MD CERTIFICATION HHA PATIENT: HCPCS | Mod: ,,, | Performed by: COLON & RECTAL SURGERY

## 2020-02-10 PROCEDURE — G0180 PR HOME HEALTH MD CERTIFICATION: ICD-10-PCS | Mod: ,,, | Performed by: COLON & RECTAL SURGERY

## 2020-02-10 NOTE — PROGRESS NOTES
[facility] Hospital Medicine  Progress Note    Patient Name: Vincent Benton  MRN: [id number]  Patient Class: IP- Surgery Admit   Admission Date: 2/4/2020  Length of Stay: 5 days  Attending Physician: No att. providers found  Primary Care Provider: Shakila Moran DO        Subjective:     Principal Problem:Rectal adenocarcinoma    HPI:  Mr. Benton is a 65yo male with a PMHx of HTN, HLD, GERD, anemia, remote EtOH abuse (quit 8 months ago), and rectal CA currently receiving chemo and radiation.  Today, he underwent elective robotic resection with ileostomy construction and MediPort insertion for his mid-rectal adenocarcinoma.  He tolerated the procedure well, and was transferred to the Med-Surg unit for recovery.  Patient c/o post-op ABD pain that is adequately controlled by ordered oral analgesics.  He denies any N/V, CP, SOB, back pain, HA, lightheadedness, dizziness, fever or chills.  Hospital Medicine was consulted for post-op medical management.      Overview/Hospital Course:  Pt admitted to Medical Surgical Unit s/p S/p robotic ultra LAR with loop ileostomy construction and mediport placement on 2/4/2020 per General Surgery in the setting of rectal adenocarcinoma.   Hospital Medicine contacted for medical management.  Vital signs stable on prescribed medications and H/H stable post procedure. PCA continued.  On 2/6/2020, H/H stable with mild decline noted.  BP controlled with episodic elevated likely due to pain.  Pt reports pain controlled on prescribed regime. Pt reports difficulty ambulating today.  On 2/7/2020, pt verbalized symptom improvement on POD3.    PCA discontinued and transitioned to oral dosing with IV for breakthrough.  More frequent ambulation and compliance with IS encouraged.  Ostomy output monitored.        Interval History:  Expected surgical site pain but tolerating.  Ambulating well and tolerating normal meals.  Passing semi-solid stool into colostomy  appliance.    Review of Systems   Constitutional: Negative for chills, diaphoresis, fatigue and fever.   Respiratory: Negative for cough, shortness of breath and wheezing.    Cardiovascular: Negative for chest pain, palpitations and leg swelling.   Gastrointestinal: Positive for abdominal pain. Negative for nausea and vomiting.   Endocrine: Negative for cold intolerance and heat intolerance.   Genitourinary: Negative for dysuria, frequency, hematuria and urgency.   Musculoskeletal: Negative for arthralgias and myalgias.   Skin: Positive for wound. Negative for color change, pallor and rash.   Allergic/Immunologic: Negative for environmental allergies and food allergies.   Neurological: Negative for dizziness, weakness, light-headedness, numbness and headaches.   Psychiatric/Behavioral: Negative for agitation, confusion and sleep disturbance. The patient is not nervous/anxious.    All other systems reviewed and are negative.    Objective:     Vital Signs (Most Recent):  Temp: 97.9 °F (36.6 °C) (02/09/20 1210)  Pulse: 89 (02/09/20 1321)  Resp: 18 (02/09/20 1210)  BP: 139/68 (02/09/20 1321)  SpO2: 99 % (02/09/20 1210) Vital Signs (24h Range):  Temp:  [97.5 °F (36.4 °C)-98.2 °F (36.8 °C)] 97.9 °F (36.6 °C)  Pulse:  [68-89] 89  Resp:  [16-18] 18  SpO2:  [95 %-99 %] 99 %  BP: (122-170)/(68-90) 139/68     Weight: 69.6 kg (153 lb 7 oz)  Body mass index is 22.02 kg/m².    Intake/Output Summary (Last 24 hours) at 2/9/2020 1838  Last data filed at 2/9/2020 1400  Gross per 24 hour   Intake 1633.33 ml   Output 625 ml   Net 1008.33 ml      Physical Exam   Constitutional: He is oriented to person, place, and time. He appears well-developed and well-nourished.   HENT:   Head: Normocephalic and atraumatic.   Eyes: EOM are normal.   Neck: Normal range of motion. Neck supple.   Cardiovascular: Normal rate and regular rhythm.   Pulmonary/Chest: Effort normal. No respiratory distress.   Abdominal: He exhibits no distension. There is  tenderness.   Ostomy pink, viable with liquid brown effluent. Incisions c/d/i   Genitourinary:   Genitourinary Comments: Deferred    Musculoskeletal: Normal range of motion.   Neurological: He is alert and oriented to person, place, and time.   Skin: Skin is warm and dry.   Psychiatric: He has a normal mood and affect. Thought content normal.   Vitals reviewed.      Significant Labs:   CBC:   Recent Labs   Lab 02/08/20  0536 02/09/20  0503   WBC 6.49 5.65   HGB 10.5* 9.9*   HCT 34.0* 32.0*   * 402*     CMP:   Recent Labs   Lab 02/08/20  0536 02/09/20  0503    137   K 3.3* 3.7    99   CO2 22* 26   GLU 74 89   BUN 3* 4*   CREATININE 0.6 0.6   CALCIUM 8.5* 8.8   ALBUMIN  --  2.6*   ANIONGAP 15 12   EGFRNONAA >60 >60       Significant Imaging:           Assessment/Plan:      * Rectal adenocarcinoma  - s/p robotic resection with ileostomy construction and MediPort insertion on 2/4.  - Routine post-op care per primary team.  - On continuous IV fluids.  - Weaned off PCA  -antiemetics as needed   -ambulation encouraged   -compliance with IS encouraged     Essential hypertension  - BP intermittently elevated   - Continue home losartan.    Alcohol use  -will monitor for signs of withdrawal       Gastroesophageal reflux disease  -PPI       Anemia  H/H stable post procedure- decline noted   -will monitor and transfuse as needed   -repeat CBC in am         VTE Risk Mitigation (From admission, onward)         Ordered     IP VTE HIGH RISK PATIENT  Once      02/04/20 1949                      James Keen MD  Department of Hospital Medicine   [facility]

## 2020-02-10 NOTE — SUBJECTIVE & OBJECTIVE
Interval History:  Expected surgical site pain but tolerating.  Ambulating well and tolerating normal meals.  Passing semi-solid stool into colostomy appliance.    Review of Systems   Constitutional: Negative for chills, diaphoresis, fatigue and fever.   Respiratory: Negative for cough, shortness of breath and wheezing.    Cardiovascular: Negative for chest pain, palpitations and leg swelling.   Gastrointestinal: Positive for abdominal pain. Negative for nausea and vomiting.   Endocrine: Negative for cold intolerance and heat intolerance.   Genitourinary: Negative for dysuria, frequency, hematuria and urgency.   Musculoskeletal: Negative for arthralgias and myalgias.   Skin: Positive for wound. Negative for color change, pallor and rash.   Allergic/Immunologic: Negative for environmental allergies and food allergies.   Neurological: Negative for dizziness, weakness, light-headedness, numbness and headaches.   Psychiatric/Behavioral: Negative for agitation, confusion and sleep disturbance. The patient is not nervous/anxious.    All other systems reviewed and are negative.    Objective:     Vital Signs (Most Recent):  Temp: 97.9 °F (36.6 °C) (02/09/20 1210)  Pulse: 89 (02/09/20 1321)  Resp: 18 (02/09/20 1210)  BP: 139/68 (02/09/20 1321)  SpO2: 99 % (02/09/20 1210) Vital Signs (24h Range):  Temp:  [97.5 °F (36.4 °C)-98.2 °F (36.8 °C)] 97.9 °F (36.6 °C)  Pulse:  [68-89] 89  Resp:  [16-18] 18  SpO2:  [95 %-99 %] 99 %  BP: (122-170)/(68-90) 139/68     Weight: 69.6 kg (153 lb 7 oz)  Body mass index is 22.02 kg/m².    Intake/Output Summary (Last 24 hours) at 2/9/2020 1838  Last data filed at 2/9/2020 1400  Gross per 24 hour   Intake 1633.33 ml   Output 625 ml   Net 1008.33 ml      Physical Exam   Constitutional: He is oriented to person, place, and time. He appears well-developed and well-nourished.   HENT:   Head: Normocephalic and atraumatic.   Eyes: EOM are normal.   Neck: Normal range of motion. Neck supple.    Cardiovascular: Normal rate and regular rhythm.   Pulmonary/Chest: Effort normal. No respiratory distress.   Abdominal: He exhibits no distension. There is tenderness.   Ostomy pink, viable with liquid brown effluent. Incisions c/d/i   Genitourinary:   Genitourinary Comments: Deferred    Musculoskeletal: Normal range of motion.   Neurological: He is alert and oriented to person, place, and time.   Skin: Skin is warm and dry.   Psychiatric: He has a normal mood and affect. Thought content normal.   Vitals reviewed.      Significant Labs:   CBC:   Recent Labs   Lab 02/08/20  0536 02/09/20  0503   WBC 6.49 5.65   HGB 10.5* 9.9*   HCT 34.0* 32.0*   * 402*     CMP:   Recent Labs   Lab 02/08/20  0536 02/09/20  0503    137   K 3.3* 3.7    99   CO2 22* 26   GLU 74 89   BUN 3* 4*   CREATININE 0.6 0.6   CALCIUM 8.5* 8.8   ALBUMIN  --  2.6*   ANIONGAP 15 12   EGFRNONAA >60 >60       Significant Imaging:

## 2020-02-10 NOTE — PLAN OF CARE
02/10/20 1428   Final Note   Assessment Type Final Discharge Note   Anticipated Discharge Disposition Home-Health   Right Care Referral Info   Post Acute Recommendation Home-care   Facility Name [facility]

## 2020-02-11 ENCOUNTER — TELEPHONE (OUTPATIENT)
Dept: INTERNAL MEDICINE | Facility: CLINIC | Age: 65
End: 2020-02-11

## 2020-02-11 LAB
FINAL PATHOLOGIC DIAGNOSIS: ABNORMAL
GROSS: ABNORMAL
MICROSCOPIC EXAM: ABNORMAL

## 2020-02-11 NOTE — TELEPHONE ENCOUNTER
----- Message from Elaine Cadet sent at 2/11/2020  9:49 AM CST -----  Contact: Mr Bishop [facility] [phone]  Would like to consult with the nurse, Mr Bishop will be sending some papers over Concerning the  Please call back at  [phone], thanks sj

## 2020-02-13 ENCOUNTER — EXTERNAL HOME HEALTH (OUTPATIENT)
Dept: HOME HEALTH SERVICES | Facility: HOSPITAL | Age: 65
End: 2020-02-13
Payer: MEDICARE

## 2020-02-17 ENCOUNTER — DOCUMENTATION ONLY (OUTPATIENT)
Dept: HEMATOLOGY/ONCOLOGY | Facility: CLINIC | Age: 65
End: 2020-02-17

## 2020-02-17 DIAGNOSIS — C20 RECTAL CANCER: Primary | ICD-10-CM

## 2020-02-17 RX ORDER — HYDROCODONE BITARTRATE AND ACETAMINOPHEN 10; 325 MG/1; MG/1
1 TABLET ORAL EVERY 6 HOURS PRN
Qty: 60 TABLET | Refills: 0 | Status: SHIPPED | OUTPATIENT
Start: 2020-02-17 | End: 2020-03-02 | Stop reason: SDUPTHER

## 2020-02-17 NOTE — PROGRESS NOTES
Checked status of pt's assistance check. It was unfortunately overlooked so it will be processed immediately. Will update co-worker on pt's case and/or f/u when pt comes in or calls with an update.

## 2020-02-17 NOTE — TELEPHONE ENCOUNTER
----- Message from Rebecca Perez sent at 2/17/2020 11:36 AM CST -----  Contact: pt   Would like to consult with nurse regarding something personal, will not give any additional  information. Please give a call back at [phone].            Thanks,  Rebecca ALTMAN

## 2020-02-17 NOTE — TELEPHONE ENCOUNTER
Called pt in regards to norco refill. Informed pt that Dr. PUENTES Has auth refill request for norco.

## 2020-02-24 ENCOUNTER — LAB VISIT (OUTPATIENT)
Dept: LAB | Facility: HOSPITAL | Age: 65
End: 2020-02-24
Attending: INTERNAL MEDICINE
Payer: MEDICARE

## 2020-02-24 ENCOUNTER — OFFICE VISIT (OUTPATIENT)
Dept: HEMATOLOGY/ONCOLOGY | Facility: CLINIC | Age: 65
End: 2020-02-24
Payer: MEDICARE

## 2020-02-24 ENCOUNTER — DOCUMENTATION ONLY (OUTPATIENT)
Dept: HEMATOLOGY/ONCOLOGY | Facility: CLINIC | Age: 65
End: 2020-02-24

## 2020-02-24 VITALS
TEMPERATURE: 98 F | HEIGHT: 70 IN | SYSTOLIC BLOOD PRESSURE: 124 MMHG | DIASTOLIC BLOOD PRESSURE: 85 MMHG | OXYGEN SATURATION: 99 % | WEIGHT: 142.19 LBS | BODY MASS INDEX: 20.36 KG/M2 | HEART RATE: 98 BPM

## 2020-02-24 DIAGNOSIS — I10 ESSENTIAL HYPERTENSION: Chronic | ICD-10-CM

## 2020-02-24 DIAGNOSIS — C20 RECTAL CANCER: ICD-10-CM

## 2020-02-24 DIAGNOSIS — D64.9 ANEMIA, UNSPECIFIED TYPE: ICD-10-CM

## 2020-02-24 LAB
ALBUMIN SERPL BCP-MCNC: 4 G/DL (ref 3.5–5.2)
ALP SERPL-CCNC: 120 U/L (ref 55–135)
ALT SERPL W/O P-5'-P-CCNC: 13 U/L (ref 10–44)
ANION GAP SERPL CALC-SCNC: 13 MMOL/L (ref 8–16)
AST SERPL-CCNC: 17 U/L (ref 10–40)
BASOPHILS # BLD AUTO: 0.08 K/UL (ref 0–0.2)
BASOPHILS NFR BLD: 1.1 % (ref 0–1.9)
BILIRUB SERPL-MCNC: 0.7 MG/DL (ref 0.1–1)
BUN SERPL-MCNC: 8 MG/DL (ref 8–23)
CALCIUM SERPL-MCNC: 10.3 MG/DL (ref 8.7–10.5)
CHLORIDE SERPL-SCNC: 97 MMOL/L (ref 95–110)
CO2 SERPL-SCNC: 22 MMOL/L (ref 23–29)
CREAT SERPL-MCNC: 0.8 MG/DL (ref 0.5–1.4)
DIFFERENTIAL METHOD: ABNORMAL
EOSINOPHIL # BLD AUTO: 0.1 K/UL (ref 0–0.5)
EOSINOPHIL NFR BLD: 1 % (ref 0–8)
ERYTHROCYTE [DISTWIDTH] IN BLOOD BY AUTOMATED COUNT: 14.6 % (ref 11.5–14.5)
EST. GFR  (AFRICAN AMERICAN): >60 ML/MIN/1.73 M^2
EST. GFR  (NON AFRICAN AMERICAN): >60 ML/MIN/1.73 M^2
GLUCOSE SERPL-MCNC: 134 MG/DL (ref 70–110)
HCT VFR BLD AUTO: 40.3 % (ref 40–54)
HGB BLD-MCNC: 12.6 G/DL (ref 14–18)
IMM GRANULOCYTES # BLD AUTO: 0.06 K/UL (ref 0–0.04)
IMM GRANULOCYTES NFR BLD AUTO: 0.9 % (ref 0–0.5)
LYMPHOCYTES # BLD AUTO: 0.9 K/UL (ref 1–4.8)
LYMPHOCYTES NFR BLD: 12.3 % (ref 18–48)
MCH RBC QN AUTO: 28.2 PG (ref 27–31)
MCHC RBC AUTO-ENTMCNC: 31.3 G/DL (ref 32–36)
MCV RBC AUTO: 90 FL (ref 82–98)
MONOCYTES # BLD AUTO: 0.9 K/UL (ref 0.3–1)
MONOCYTES NFR BLD: 12.1 % (ref 4–15)
NEUTROPHILS # BLD AUTO: 5.1 K/UL (ref 1.8–7.7)
NEUTROPHILS NFR BLD: 72.6 % (ref 38–73)
NRBC BLD-RTO: 0 /100 WBC
PLATELET # BLD AUTO: 550 K/UL (ref 150–350)
PMV BLD AUTO: 9.4 FL (ref 9.2–12.9)
POTASSIUM SERPL-SCNC: 4.6 MMOL/L (ref 3.5–5.1)
PROT SERPL-MCNC: 8.4 G/DL (ref 6–8.4)
RBC # BLD AUTO: 4.47 M/UL (ref 4.6–6.2)
SODIUM SERPL-SCNC: 132 MMOL/L (ref 136–145)
WBC # BLD AUTO: 7.01 K/UL (ref 3.9–12.7)

## 2020-02-24 PROCEDURE — 80053 COMPREHEN METABOLIC PANEL: CPT

## 2020-02-24 PROCEDURE — 3008F BODY MASS INDEX DOCD: CPT | Mod: CPTII,S$GLB,, | Performed by: INTERNAL MEDICINE

## 2020-02-24 PROCEDURE — 99999 PR PBB SHADOW E&M-EST. PATIENT-LVL III: ICD-10-PCS | Mod: PBBFAC,,, | Performed by: INTERNAL MEDICINE

## 2020-02-24 PROCEDURE — 36415 COLL VENOUS BLD VENIPUNCTURE: CPT

## 2020-02-24 PROCEDURE — 3074F SYST BP LT 130 MM HG: CPT | Mod: CPTII,S$GLB,, | Performed by: INTERNAL MEDICINE

## 2020-02-24 PROCEDURE — 3079F PR MOST RECENT DIASTOLIC BLOOD PRESSURE 80-89 MM HG: ICD-10-PCS | Mod: CPTII,S$GLB,, | Performed by: INTERNAL MEDICINE

## 2020-02-24 PROCEDURE — 99215 OFFICE O/P EST HI 40 MIN: CPT | Mod: S$GLB,,, | Performed by: INTERNAL MEDICINE

## 2020-02-24 PROCEDURE — 3008F PR BODY MASS INDEX (BMI) DOCUMENTED: ICD-10-PCS | Mod: CPTII,S$GLB,, | Performed by: INTERNAL MEDICINE

## 2020-02-24 PROCEDURE — 99999 PR PBB SHADOW E&M-EST. PATIENT-LVL III: CPT | Mod: PBBFAC,,, | Performed by: INTERNAL MEDICINE

## 2020-02-24 PROCEDURE — 99215 PR OFFICE/OUTPT VISIT, EST, LEVL V, 40-54 MIN: ICD-10-PCS | Mod: S$GLB,,, | Performed by: INTERNAL MEDICINE

## 2020-02-24 PROCEDURE — 3079F DIAST BP 80-89 MM HG: CPT | Mod: CPTII,S$GLB,, | Performed by: INTERNAL MEDICINE

## 2020-02-24 PROCEDURE — 3074F PR MOST RECENT SYSTOLIC BLOOD PRESSURE < 130 MM HG: ICD-10-PCS | Mod: CPTII,S$GLB,, | Performed by: INTERNAL MEDICINE

## 2020-02-24 NOTE — PROGRESS NOTES
Subjective:   Date of Visit: 2/24/20   ?   ?   CHIEF COMPLAINT:  Locally advanced rectal cancer, Stage IIIB (cT3,cN2a, CM0)  ?     HPI:   was seen at [facility] today. He is a 64 yr old male with recently diagnosed locally advanced rectal cancer.  Patient has completed concurrent chemoradiation with capecitabine.  Status post low anterior resection.  Final path noted at ypT3N0.  Presents today to discuss adjuvant therapy.  Overall doing well denies any systemic symptoms including fever, chills, mucositis, worsening rectal pain, chest pain, shortness of breath, abdominal pain, diarrhea or dysuria.      Review of Systems   Constitutional: Negative for activity change, appetite change, chills, fatigue, fever and unexpected weight change.   HENT: Negative for hearing loss, mouth sores, nosebleeds, sore throat, tinnitus, trouble swallowing and voice change.    Eyes: Negative for visual disturbance.   Respiratory: Negative for cough, chest tightness, shortness of breath and wheezing.    Cardiovascular: Negative for chest pain, palpitations and leg swelling.   Gastrointestinal: Positive for rectal pain. Negative for abdominal pain, anal bleeding, blood in stool, constipation, diarrhea, nausea and vomiting.   Genitourinary: Negative for frequency, hematuria and testicular pain.   Musculoskeletal: Negative for arthralgias, back pain, gait problem and neck pain.   Skin: Negative for color change, pallor, rash and wound.   Allergic/Immunologic: Negative for immunocompromised state.   Neurological: Negative for seizures, syncope, weakness, numbness and headaches.   Hematological: Negative for adenopathy. Does not bruise/bleed easily.   Psychiatric/Behavioral: Negative for agitation, confusion, decreased concentration, hallucinations and sleep disturbance. The patient is not nervous/anxious.        ?   PAST MEDICAL HISTORY:   Past Medical History:   Diagnosis Date    Alcoholism     Anemia     Back pain      Encounter for blood transfusion 2018    Essential hypertension 2/4/2016    GERD (gastroesophageal reflux disease)     Hiatal hernia     Hypertension     Rectal cancer 9/11/2019    ?     PAST SURGICAL HISTORY:   Past Surgical History:   Procedure Laterality Date    COLONOSCOPY N/A 9/3/2019    Procedure: COLONOSCOPY;  Surgeon: Isai Centeno MD;  Location: [facility] ENDO;  Service: Endoscopy;  Laterality: N/A;    COLONOSCOPY N/A 1/10/2020    Procedure: COLONOSCOPY;  Surgeon: Familia Gonzalez MD;  Location: [facility] ENDO;  Service: General;  Laterality: N/A;    ESOPHAGOGASTRODUODENOSCOPY N/A 9/3/2019    Procedure: ESOPHAGOGASTRODUODENOSCOPY (EGD);  Surgeon: Isai Centeno MD;  Location: [facility] ENDO;  Service: Endoscopy;  Laterality: N/A;    FLEXIBLE SIGMOIDOSCOPY  2/4/2020    Procedure: SIGMOIDOSCOPY, FLEXIBLE;  Surgeon: Familia Gonzalez MD;  Location: [facility] OR;  Service: General;;    ILEOSTOMY Right 2/4/2020    Procedure: CREATION, ILEOSTOMY;  Surgeon: Familia Gonzalez MD;  Location: [facility] OR;  Service: General;  Laterality: Right;    INJECTION OF ANESTHETIC AGENT INTO TISSUE PLANE DEFINED BY TRANSVERSUS ABDOMINIS MUSCLE N/A 2/4/2020    Procedure: BLOCK, TRANSVERSUS ABDOMINIS PLANE;  Surgeon: Familia Gonzalez MD;  Location: [facility] OR;  Service: General;  Laterality: N/A;    INSERTION OF TUNNELED CENTRAL VENOUS CATHETER (CVC) WITH SUBCUTANEOUS PORT Right 2/4/2020    Procedure: INSERTION, PORT-A-CATH;  Surgeon: Familia Gonzalez MD;  Location: [facility] OR;  Service: General;  Laterality: Right;    JOINT REPLACEMENT Left 2009    MOBILIZATION OF SPLENIC FLEXURE  2/4/2020    Procedure: MOBILIZATION, SPLENIC FLEXURE;  Surgeon: Familia Gonzalez MD;  Location: [facility] OR;  Service: General;;      ?   ALLERGIES:   Allergies as of 02/24/2020    (No Known Allergies)      ?   MEDICATIONS:?   Outpatient Medications Marked as Taking for the 2/24/20 encounter (Office Visit) with Mikel Sams MD   Medication Sig  Dispense Refill    cyanocobalamin (VITAMIN B-12) 1000 MCG tablet Take 100 mcg by mouth once daily.      diphenoxylate-atropine 2.5-0.025 mg (LOMOTIL) 2.5-0.025 mg per tablet Take 1 tablet by mouth 3 (three) times daily before meals. 90 tablet 0    ferrous sulfate 324 mg (65 mg iron) TbEC Take 1 tablet (324 mg total) by mouth 2 (two) times daily. 60 tablet 0    HYDROcodone-acetaminophen (NORCO)  mg per tablet Take 1 tablet by mouth every 6 (six) hours as needed for Pain. 60 tablet 0    losartan (COZAAR) 50 MG tablet TAKE 1 TABLET BY MOUTH EVERY MORNING 90 tablet 1    ondansetron (ZOFRAN) 8 MG tablet Take 1 tablet (8 mg total) by mouth every 8 (eight) hours as needed for Nausea. 30 tablet 2    prochlorperazine (COMPAZINE) 5 MG tablet TAKE 1 TABLET(5 MG) BY MOUTH EVERY 6 HOURS AS NEEDED FOR NAUSEA 385 tablet 1    tamsulosin (FLOMAX) 0.4 mg Cap Take 1 capsule (0.4 mg total) by mouth once daily. 30 capsule 6    tamsulosin (FLOMAX) 0.4 mg Cap Take 1 capsule (0.4 mg total) by mouth once daily. 30 capsule 0    tamsulosin (FLOMAX) 0.4 mg Cap Take 1 capsule (0.4 mg total) by mouth once daily. 30 capsule 0      ?   SOCIAL HISTORY:?   Social History     Tobacco Use    Smoking status: Never Smoker    Smokeless tobacco: Never Used   Substance Use Topics    Alcohol use: Yes     Comment: HOLD 72H PRIOR TO SURGERY        ?   FAMILY HISTORY:   family history includes Cataracts in his mother; Hypertension in his father; Stroke in his father.   ?     Objective:      Physical Exam   Constitutional: He is oriented to person, place, and time. He appears well-developed and well-nourished. No distress.   HENT:   Head: Normocephalic and atraumatic.   Right Ear: External ear normal.   Left Ear: External ear normal.   Mouth/Throat: No oropharyngeal exudate.   Eyes: Pupils are equal, round, and reactive to light. Conjunctivae are normal. Right eye exhibits no discharge. Left eye exhibits no discharge. No scleral icterus.    Neck: Normal range of motion. Neck supple. No thyromegaly present.   Cardiovascular: Normal rate, regular rhythm and normal heart sounds.   No murmur heard.  Pulmonary/Chest: Effort normal and breath sounds normal. No respiratory distress. He has no wheezes. He exhibits no tenderness.   Abdominal: Soft. Bowel sounds are normal. He exhibits no distension and no mass. There is tenderness. There is no rebound.   Musculoskeletal: Normal range of motion. He exhibits no edema or tenderness.   Lymphadenopathy:     He has no cervical adenopathy.        Right cervical: No superficial cervical adenopathy present.       Left cervical: No superficial cervical adenopathy present.        Right axillary: No pectoral adenopathy present.        Left axillary: No pectoral adenopathy present.No inguinal adenopathy noted on the right or left side.        Right: No supraclavicular adenopathy present.        Left: No supraclavicular adenopathy present.   Neurological: He is alert and oriented to person, place, and time. No cranial nerve deficit or sensory deficit.   Skin: Skin is warm and dry. Capillary refill takes 2 to 3 seconds. No rash noted. No erythema. No pallor.   Psychiatric: He has a normal mood and affect. His behavior is normal. Judgment normal.       ?   Vitals:    02/24/20 0951   BP: 124/85   Pulse: 98   Temp: 98.2 °F (36.8 °C)      ?     ECOG SCORE    0 - Fully active-able to carry on all pre-disease performance without restriction             ?   Laboratory:  ?   Lab Visit on 02/24/2020   Component Date Value Ref Range Status    WBC 02/24/2020 7.01  3.90 - 12.70 K/uL Final    RBC 02/24/2020 4.47* 4.60 - 6.20 M/uL Final    Hemoglobin 02/24/2020 12.6* 14.0 - 18.0 g/dL Final    Hematocrit 02/24/2020 40.3  40.0 - 54.0 % Final    Mean Corpuscular Volume 02/24/2020 90  82 - 98 fL Final    Mean Corpuscular Hemoglobin 02/24/2020 28.2  27.0 - 31.0 pg Final    Mean Corpuscular Hemoglobin Conc 02/24/2020 31.3* 32.0 - 36.0  g/dL Final    RDW 02/24/2020 14.6* 11.5 - 14.5 % Final    Platelets 02/24/2020 550* 150 - 350 K/uL Final    MPV 02/24/2020 9.4  9.2 - 12.9 fL Final    Immature Granulocytes 02/24/2020 0.9* 0.0 - 0.5 % Final    Gran # (ANC) 02/24/2020 5.1  1.8 - 7.7 K/uL Final    Immature Grans (Abs) 02/24/2020 0.06* 0.00 - 0.04 K/uL Final    Lymph # 02/24/2020 0.9* 1.0 - 4.8 K/uL Final    Mono # 02/24/2020 0.9  0.3 - 1.0 K/uL Final    Eos # 02/24/2020 0.1  0.0 - 0.5 K/uL Final    Baso # 02/24/2020 0.08  0.00 - 0.20 K/uL Final    nRBC 02/24/2020 0  0 /100 WBC Final    Gran% 02/24/2020 72.6  38.0 - 73.0 % Final    Lymph% 02/24/2020 12.3* 18.0 - 48.0 % Final    Mono% 02/24/2020 12.1  4.0 - 15.0 % Final    Eosinophil% 02/24/2020 1.0  0.0 - 8.0 % Final    Basophil% 02/24/2020 1.1  0.0 - 1.9 % Final    Differential Method 02/24/2020 Automated   Final    Sodium 02/24/2020 132* 136 - 145 mmol/L Final    Potassium 02/24/2020 4.6  3.5 - 5.1 mmol/L Final    Chloride 02/24/2020 97  95 - 110 mmol/L Final    CO2 02/24/2020 22* 23 - 29 mmol/L Final    Glucose 02/24/2020 134* 70 - 110 mg/dL Final    BUN, Bld 02/24/2020 8  8 - 23 mg/dL Final    Creatinine 02/24/2020 0.8  0.5 - 1.4 mg/dL Final    Calcium 02/24/2020 10.3  8.7 - 10.5 mg/dL Final    Total Protein 02/24/2020 8.4  6.0 - 8.4 g/dL Final    Albumin 02/24/2020 4.0  3.5 - 5.2 g/dL Final    Total Bilirubin 02/24/2020 0.7  0.1 - 1.0 mg/dL Final    Alkaline Phosphatase 02/24/2020 120  55 - 135 U/L Final    AST 02/24/2020 17  10 - 40 U/L Final    ALT 02/24/2020 13  10 - 44 U/L Final    Anion Gap 02/24/2020 13  8 - 16 mmol/L Final    eGFR if  02/24/2020 >60  >60 mL/min/1.73 m^2 Final    eGFR if non African American 02/24/2020 >60  >60 mL/min/1.73 m^2 Final      ?   Tumor markers   ?   ?   Imaging: X-Ray Chest PA And Lateral  Narrative: EXAMINATION:  XR CHEST PA AND LATERAL    CLINICAL HISTORY:  s/p port placement, rule out ptx;    TECHNIQUE:  Single  frontal view of the chest was performed.    COMPARISON:  02/04/2020    FINDINGS:  Right subclavian chest port placement with the tip overlying the SVC.  No pneumothorax.  In comparison to the prior study, there is no adverse interval changes  Impression: In comparison to the prior study, there is no adverse interval changes    Electronically signed by: Avila Edward MD  Date:    02/05/2020  Time:    08:00     ?      Pathology:  Pathology Results  (Last 10 years)               02/04/20 1704 (A) Specimen to Pathology, Surgery General Surgery (Abnormal) Final result    Narrative:  Pre-op Diagnosis: Rectal adenocarcinoma [C20]   Procedure(s):   XI ROBOTIC LOW ANTERIOR RESECTION   INSERTION, PORT-A-CATH   BLOCK, TRANSVERSUS ABDOMINIS PLANE   Number of specimens: 3   Name of specimens: 1. Sigmoid colon and rectum (perm) 2.   Proximal margin (perm) 3. Anastomotic ring (perm)   Specimen total (fresh, frozen, permanent):->3       01/10/20 1354  Specimen to Pathology, Surgery Gastrointestinal tract Final result    Narrative:  Pre-op Diagnosis: Rectal adenocarcinoma [C20]   Procedure(s):   COLONOSCOPY   Number of specimens: 1   Name of specimens:   1.  Descending colon polyp   Specimen total (fresh, frozen, permanent):->1              ?   Assessment/Plan:         Rectal cancer  Stage IIA- [id number]- postop diagnosis.  Plan for adjuvant treatment with chemotherapy.  Discussed the option of modified FOLFOX x 4 months.  Patient will be set up with our nurse practitioner for chemotherapy teaching which be followed by chemotherapy initiation.  MediPort placed.    Anemia  Stable.  Hemoglobin noted at 12.6 grams/deciliter.  Normal MCV.  No evidence of active bleed.  Will continue to monitor.    Essential hypertension  Management per PCP.      Follow-Up: Follow up in about 1 week (around 3/2/2020).    HOMA GAMBOA Md., Ph.D  Hematology & Oncology Department  Phone #: [phone]

## 2020-02-24 NOTE — PLAN OF CARE
DISCONTINUE ON PATHWAY REGIMEN - Colorectal    [id number]        Capecitabine (Xeloda(R))     **Always confirm dose/schedule in your pharmacy ordering system**    REASON: Other Reason  PRIOR TREATMENT: [id number]: Capecitabine 825 mg/m2  PO BID (Monday - Friday) for   Duration of Radiation Therapy  TREATMENT RESPONSE: Partial Response (GA)    START ON PATHWAY REGIMEN - Colorectal    [id number]        Oxaliplatin (Eloxatin(R))       Leucovorin       5-Fluorouracil       5-Fluorouracil           Additional Orders: **Note: order sheet contains two q14 day cycles**    **Always confirm dose/schedule in your pharmacy ordering system**    Patient Characteristics:  Post-Neoadjuvant Therapy and Resection (Neoadjuvant Pathologic Staging), Rectal,   Prior Neoadjuvant ChemoRT Only, Node Positive (Preoperative or Pathological)  Therapeutic Status: Post-Neoadjuvant Therapy and Resection (Neoadjuvant   Pathologic Staging)  Tumor Location: Rectal  AJCC M Category: cM0  AJCC T Category: ypT3  AJCC N Category: ypN0  AJCC 8 Stage Grouping: IIA  Intent of Therapy:  Curative Intent, Discussed with Patient

## 2020-02-24 NOTE — NURSING
Met with pt following appt with Dr. Sams.  Discussed the need for new chemotherapy following the surgery.  Pt verbalized understanding.  Pt scheduled by clinic staff for teaching on 2/28 which the pt was in agreement with.  Pt stated that he would like to start his treatment next Monday 3/2.  Notified pt that he has an appt with Dr. Gonzalez that morning here at the [facility].  Explained that Dr. Sams would be at the [facility].  Pt reported that he would like to go to the [place] following the appt with Dr. Gonzalez at the [facility].  Pt scheduled as requested.  Copy of schedule given and gone over with pt.  Pt stated that he would like appts at the [facility] when possible because he lives right off of [place]. Explained to pt tat we can schedule him to have the pump off at the [facility] on Wednesday.  Pt stated that would be great.  Pt to call with any questions or concerns.  Message sent to Bhanu Shah to work medication authorization.

## 2020-02-24 NOTE — ASSESSMENT & PLAN NOTE
Stage IIA- [id number]- postop diagnosis.  Plan for adjuvant treatment with chemotherapy.  Discussed the option of modified FOLFOX x 4 months.  Patient will be set up with our nurse practitioner for chemotherapy teaching which be followed by chemotherapy initiation.  MediPort placed.

## 2020-02-24 NOTE — ASSESSMENT & PLAN NOTE
Stable.  Hemoglobin noted at 12.6 grams/deciliter.  Normal MCV.  No evidence of active bleed.  Will continue to monitor.

## 2020-02-26 ENCOUNTER — DOCUMENTATION ONLY (OUTPATIENT)
Dept: PHARMACY | Facility: HOSPITAL | Age: 65
End: 2020-02-26

## 2020-03-02 ENCOUNTER — OFFICE VISIT (OUTPATIENT)
Dept: HEMATOLOGY/ONCOLOGY | Facility: CLINIC | Age: 65
End: 2020-03-02
Payer: MEDICARE

## 2020-03-02 ENCOUNTER — OFFICE VISIT (OUTPATIENT)
Dept: SURGERY | Facility: CLINIC | Age: 65
End: 2020-03-02
Payer: MEDICARE

## 2020-03-02 ENCOUNTER — INFUSION (OUTPATIENT)
Dept: INFUSION THERAPY | Facility: HOSPITAL | Age: 65
End: 2020-03-02
Attending: INTERNAL MEDICINE
Payer: MEDICARE

## 2020-03-02 VITALS
WEIGHT: 143.31 LBS | BODY MASS INDEX: 20.56 KG/M2 | TEMPERATURE: 98 F | HEART RATE: 88 BPM | SYSTOLIC BLOOD PRESSURE: 102 MMHG | DIASTOLIC BLOOD PRESSURE: 78 MMHG

## 2020-03-02 VITALS
WEIGHT: 145.5 LBS | OXYGEN SATURATION: 97 % | SYSTOLIC BLOOD PRESSURE: 93 MMHG | RESPIRATION RATE: 18 BRPM | HEART RATE: 97 BPM | DIASTOLIC BLOOD PRESSURE: 61 MMHG | HEIGHT: 70 IN | BODY MASS INDEX: 20.83 KG/M2 | TEMPERATURE: 97 F

## 2020-03-02 VITALS — HEIGHT: 70 IN | WEIGHT: 145 LBS | BODY MASS INDEX: 20.76 KG/M2

## 2020-03-02 DIAGNOSIS — I10 ESSENTIAL HYPERTENSION: Chronic | ICD-10-CM

## 2020-03-02 DIAGNOSIS — C20 RECTAL ADENOCARCINOMA: Primary | ICD-10-CM

## 2020-03-02 DIAGNOSIS — K62.89 RECTAL PAIN: ICD-10-CM

## 2020-03-02 DIAGNOSIS — C20 RECTAL CANCER: Primary | ICD-10-CM

## 2020-03-02 DIAGNOSIS — C20 RECTAL CANCER: ICD-10-CM

## 2020-03-02 PROCEDURE — 3078F PR MOST RECENT DIASTOLIC BLOOD PRESSURE < 80 MM HG: ICD-10-PCS | Mod: CPTII,S$GLB,, | Performed by: INTERNAL MEDICINE

## 2020-03-02 PROCEDURE — 3074F SYST BP LT 130 MM HG: CPT | Mod: CPTII,S$GLB,, | Performed by: INTERNAL MEDICINE

## 2020-03-02 PROCEDURE — 96367 TX/PROPH/DG ADDL SEQ IV INF: CPT

## 2020-03-02 PROCEDURE — 63600175 PHARM REV CODE 636 W HCPCS: Performed by: INTERNAL MEDICINE

## 2020-03-02 PROCEDURE — 3008F BODY MASS INDEX DOCD: CPT | Mod: CPTII,S$GLB,, | Performed by: INTERNAL MEDICINE

## 2020-03-02 PROCEDURE — 96411 CHEMO IV PUSH ADDL DRUG: CPT

## 2020-03-02 PROCEDURE — 99215 OFFICE O/P EST HI 40 MIN: CPT | Mod: 25,S$GLB,, | Performed by: INTERNAL MEDICINE

## 2020-03-02 PROCEDURE — 99999 PR PBB SHADOW E&M-EST. PATIENT-LVL IV: CPT | Mod: PBBFAC,,, | Performed by: INTERNAL MEDICINE

## 2020-03-02 PROCEDURE — 96415 CHEMO IV INFUSION ADDL HR: CPT

## 2020-03-02 PROCEDURE — 99024 PR POST-OP FOLLOW-UP VISIT: ICD-10-PCS | Mod: S$GLB,,, | Performed by: COLON & RECTAL SURGERY

## 2020-03-02 PROCEDURE — 99024 POSTOP FOLLOW-UP VISIT: CPT | Mod: S$GLB,,, | Performed by: COLON & RECTAL SURGERY

## 2020-03-02 PROCEDURE — 3078F DIAST BP <80 MM HG: CPT | Mod: CPTII,S$GLB,, | Performed by: INTERNAL MEDICINE

## 2020-03-02 PROCEDURE — 99999 PR PBB SHADOW E&M-EST. PATIENT-LVL III: ICD-10-PCS | Mod: PBBFAC,,, | Performed by: COLON & RECTAL SURGERY

## 2020-03-02 PROCEDURE — 96368 THER/DIAG CONCURRENT INF: CPT

## 2020-03-02 PROCEDURE — 96413 CHEMO IV INFUSION 1 HR: CPT

## 2020-03-02 PROCEDURE — 99999 PR PBB SHADOW E&M-EST. PATIENT-LVL IV: ICD-10-PCS | Mod: PBBFAC,,, | Performed by: INTERNAL MEDICINE

## 2020-03-02 PROCEDURE — 3008F PR BODY MASS INDEX (BMI) DOCUMENTED: ICD-10-PCS | Mod: CPTII,S$GLB,, | Performed by: INTERNAL MEDICINE

## 2020-03-02 PROCEDURE — 99999 PR PBB SHADOW E&M-EST. PATIENT-LVL III: CPT | Mod: PBBFAC,,, | Performed by: COLON & RECTAL SURGERY

## 2020-03-02 PROCEDURE — 99215 PR OFFICE/OUTPT VISIT, EST, LEVL V, 40-54 MIN: ICD-10-PCS | Mod: 25,S$GLB,, | Performed by: INTERNAL MEDICINE

## 2020-03-02 PROCEDURE — 96416 CHEMO PROLONG INFUSE W/PUMP: CPT

## 2020-03-02 PROCEDURE — 3074F PR MOST RECENT SYSTOLIC BLOOD PRESSURE < 130 MM HG: ICD-10-PCS | Mod: CPTII,S$GLB,, | Performed by: INTERNAL MEDICINE

## 2020-03-02 RX ORDER — FLUOROURACIL 50 MG/ML
400 INJECTION, SOLUTION INTRAVENOUS
Status: COMPLETED | OUTPATIENT
Start: 2020-03-02 | End: 2020-03-02

## 2020-03-02 RX ORDER — HEPARIN 100 UNIT/ML
500 SYRINGE INTRAVENOUS
Status: CANCELLED | OUTPATIENT
Start: 2020-03-02

## 2020-03-02 RX ORDER — SODIUM CHLORIDE 0.9 % (FLUSH) 0.9 %
10 SYRINGE (ML) INJECTION
Status: CANCELLED | OUTPATIENT
Start: 2020-03-02

## 2020-03-02 RX ORDER — FLUOROURACIL 50 MG/ML
400 INJECTION, SOLUTION INTRAVENOUS
Status: CANCELLED | OUTPATIENT
Start: 2020-03-02

## 2020-03-02 RX ORDER — HYDROCODONE BITARTRATE AND ACETAMINOPHEN 10; 325 MG/1; MG/1
1 TABLET ORAL EVERY 6 HOURS PRN
Qty: 60 TABLET | Refills: 0 | Status: SHIPPED | OUTPATIENT
Start: 2020-03-02 | End: 2020-03-16 | Stop reason: SDUPTHER

## 2020-03-02 RX ORDER — EPINEPHRINE 0.3 MG/.3ML
0.3 INJECTION SUBCUTANEOUS ONCE AS NEEDED
Status: CANCELLED | OUTPATIENT
Start: 2020-03-02

## 2020-03-02 RX ORDER — SODIUM CHLORIDE 0.9 % (FLUSH) 0.9 %
10 SYRINGE (ML) INJECTION
Status: CANCELLED | OUTPATIENT
Start: 2020-03-04

## 2020-03-02 RX ORDER — HEPARIN 100 UNIT/ML
500 SYRINGE INTRAVENOUS
Status: CANCELLED | OUTPATIENT
Start: 2020-03-04

## 2020-03-02 RX ORDER — DIPHENHYDRAMINE HYDROCHLORIDE 50 MG/ML
50 INJECTION INTRAMUSCULAR; INTRAVENOUS ONCE AS NEEDED
Status: CANCELLED | OUTPATIENT
Start: 2020-03-02

## 2020-03-02 RX ADMIN — FLUOROURACIL 4270 MG: 50 INJECTION, SOLUTION INTRAVENOUS at 02:03

## 2020-03-02 RX ADMIN — OXALIPLATIN 151 MG: 5 INJECTION, SOLUTION INTRAVENOUS at 12:03

## 2020-03-02 RX ADMIN — DEXTROSE: 5 SOLUTION INTRAVENOUS at 11:03

## 2020-03-02 RX ADMIN — DEXAMETHASONE SODIUM PHOSPHATE: 4 INJECTION, SOLUTION INTRAMUSCULAR; INTRAVENOUS at 11:03

## 2020-03-02 RX ADMIN — FLUOROURACIL 710 MG: 50 INJECTION, SOLUTION INTRAVENOUS at 02:03

## 2020-03-02 RX ADMIN — LEUCOVORIN CALCIUM 710 MG: 350 INJECTION, POWDER, LYOPHILIZED, FOR SOLUTION INTRAMUSCULAR; INTRAVENOUS at 12:03

## 2020-03-02 NOTE — DISCHARGE INSTRUCTIONS
.[facility]  [address] [facility]  [address]  [phone] phone     [phone] fax  Hours of Operation: Monday- Friday 8:00am- 5:00pm  After hours phone  [phone]  Hematology / Oncology Physicians on call      Dr. Thor Boone, PETE Miller NP Tyesha Taylor, NP    Please call with any concerns regarding your appointment today.  .FALL PREVENTION   Falls often occur due to slipping, tripping or losing your balance. Here are ways to reduce your risk of falling again.   Was there anything that caused your fall that can be fixed, removed or replaced?   Make your home safe by keeping walkways clear of objects you may trip over.   Use non-slip pads under rugs.   Do not walk in poorly lit areas.   Do not stand on chairs or wobbly ladders.   Use caution when reaching overhead or looking upward. This position can cause a loss of balance.   Be sure your shoes fit properly, have non-slip bottoms and are in good condition.   Be cautious when going up and down stairs, curbs, and when walking on uneven sidewalks.   If your balance is poor, consider using a cane or walker.   If your fall was related to alcohol use, stop or limit alcohol intake.   If your fall was related to use of sleeping medicines, talk to your doctor about this. You may need to reduce your dosage at bedtime if you awaken during the night to go to the bathroom.   To reduce the need for nighttime bathroom trips:   Avoid drinking fluids for several hours before going to bed   Empty your bladder before going to bed   Men can keep a urinal at the bedside   © 5659-7208 [facility], [address] [place] [address], [place] [address]. All rights reserved. This information is not intended as a substitute for professional medical care. Always follow your healthcare professional's instructions.    .WAYS TO HELP PREVENT  INFECTION         WASH YOUR HANDS OFTEN DURING THE DAY, ESPECIALLY BEFORE YOU EAT, AFTER USING THE BATHROOM, AND AFTER TOUCHING ANIMALS     STAY AWAY FROM PEOPLE WHO HAVE ILLNESSES YOU CAN CATCH; SUCH AS COLDS, FLU, CHICKEN POX     TRY TO AVOID CROWDS     STAY AWAY FROM CHILDREN WHO RECENTLY HAVE RECEIVED LIVE VIRUS VACCINES     MAINTAIN GOOD MOUTH CARE     DO NOT SQUEEZE OR SCRATCH PIMPLES     CLEAN CUTS & SCRAPES RIGHT AWAY AND DAILY UNTIL HEALED WITH WARM WATER, SOAP & AN ANTISEPTIC     AVOID CONTACT WITH LITTER BOXES, BIRD CAGES, & FISH TANKS     AVOID STANDING WATER, IE., BIRD BATHS, FLOWER POTS/VASES, OR HUMIDIFIERS     WEAR GLOVES WHEN GARDENING OR CLEANING UP AFTER OTHERS, ESPECIALLY BABIES & SMALL CHILDREN     DO NOT EAT RAW FISH, SEAFOOD, MEAT, OR EGGS  .HOME CARE AFTER CHEMOTHERAPY   Meals   Many patients feel sick and lose their appetites during treatment. Eat small meals several times a day. Choose bland foods with little taste or smell if you have problems with nausea. Be sure to cook all food thoroughly. This kills bacteria and helps you avoid intestinal infection. Soft foods are easier to swallow and digest.   Activity   Exercise keeps you strong and keeps your heart and lungs active. Talk to your doctor about an appropriate exercise program for you.   Skin Care   To prevent a skin infection, bathe or shower once a day. Use a moisturizing soap and wash with warm water. Avoid very hot or cold water. Chemotherapy can make your skin dry . Apply moisturizing lotion to help relieve dry skin. Some drugs used in high doses can cause slight burns to appear (like sunburn). Ask for a special cream to help relieve the burn and protect your skin.   Prevent Mouth Sores   During chemotherapy, many people get mouth sores. Do the following to help prevent mouth sores or to ease discomfort.   Brush your teeth with a soft-bristle toothbrush after every meal.  Don't use dental floss if your platelet count  "is below 50,000. Your doctor or nurse will tell you if this is the case.  Use an oral swab or special soft toothbrush if your gums bleed during regular brushing.  Use mouthwash as directed. If you can't tolerate commercial mouthwash, use salt and baking soda to clean your mouth. Mix 1 teaspoon of salt and 1 teaspoon of baking soda into a glass of water. Swish and spit.  Call your doctor or return to this facility if you develop any of the following:   Sore throat   White patches in the mouth or throat   Fever of 100.4ºF (38ºC) or higher, or as directed by your healthcare provider  © 4800-5204 [facility], [address] [place] [address], [place] [id number]. All rights reserved. This information is not intended as a substitute for professional medical care. Always follow your healthcare professional's     .Support Groups/Classes    Support groups and classes are being offered at the   [facility] and [facility]!!    "Cooking with Cancer" (Nutrition Class):  Second Wednesday of each month   at noon at the [facility].  Metastatic Support Group:  Third Tuesday of each month   at noon at the [facility].  Next Steps Class/Group: Second and fourth Thursday of each month at noon at the [facility].  [facility] (Breast Cancer Support Group): First Tuesday of each month   at 5:30pm at the [facility] location.  [facility]: [facility]: Second and third Tuesday of each month from 7:30am - 2pm.  [facility]: First and fourth Tuesday of each month from 7:30am - 2pm    If you are interested in attending or would like more information please ask our social workers or your nurse!    "

## 2020-03-02 NOTE — PROGRESS NOTES
History & Physical    SUBJECTIVE:     Chief Complaint   Patient presents with    Post-op Evaluation   Ref MD: Isai Centeno MD    History of Present Illness:  Patient is a 64 y.o. male presents for evaluation of a rectal mass.  Patient has been having iron deficiency anemia that did require transfusion around 1 year ago.  He also has had associated hematochezia for over a year now but did improve after he quit drinking beer at 8 months ago but is still intermittently present. This prompted a colonoscopy which was performed on 09/03/2019 where an obstructing rectal mass was seen that appeared malignant was biopsied and could not be traversed.  Patient reports that he had a colonoscopy around 6-7 years ago were some benign polyps were found but no malignancy.  These records are not available.  He states that the hematochezia has recently improved after he quit drinking beer, but is still intermittently present and worsen with the bowel prep from the colonoscopy recently.  He is not on any chronic anticoagulation.  He states he has had normal caliber bowel movements does not feel constipated or obstructed.  He denies any fever, chills, nausea, vomiting, diarrhea, constipation, weight loss, night sweats or any other signs or symptoms.    12/4/19: Completed Neoadj chemoXRT  2/4/2020 : Robotic ultra-low anterior resection with DLI and Mediport placement    Interval history:  Since last clinic visit, the patient robotic ultra-low anterior resection with DLI and Mediport placement on 2/4/2020.  He did well hospital postoperatively and has recovered completely at home.  He is doing well with his ostomy having good function and changing his bag without leaks.  He is tolerating regular diet.  He denies any fever, chills, nausea, vomiting.  He is scheduled to start chemotherapy this week.    PMHx: HTN, GERD  PSHx: L hip sx  Fam Hx: No CRC or IBD; +colonic polyps in brother  All: NKDA  SocHx: previous etoh; no tob or illicits;  works as a     Review of patient's allergies indicates:  No Known Allergies    Current Outpatient Medications   Medication Sig Dispense Refill    cyanocobalamin (VITAMIN B-12) 1000 MCG tablet Take 100 mcg by mouth once daily.      diphenoxylate-atropine 2.5-0.025 mg (LOMOTIL) 2.5-0.025 mg per tablet Take 1 tablet by mouth 3 (three) times daily before meals. 90 tablet 0    ferrous sulfate 324 mg (65 mg iron) TbEC Take 1 tablet (324 mg total) by mouth 2 (two) times daily. 60 tablet 0    HYDROcodone-acetaminophen (NORCO)  mg per tablet Take 1 tablet by mouth every 6 (six) hours as needed for Pain. 60 tablet 0    losartan (COZAAR) 50 MG tablet TAKE 1 TABLET BY MOUTH EVERY MORNING 90 tablet 1    ondansetron (ZOFRAN) 8 MG tablet Take 1 tablet (8 mg total) by mouth every 8 (eight) hours as needed for Nausea. 30 tablet 2    prochlorperazine (COMPAZINE) 5 MG tablet TAKE 1 TABLET(5 MG) BY MOUTH EVERY 6 HOURS AS NEEDED FOR NAUSEA 385 tablet 1    tamsulosin (FLOMAX) 0.4 mg Cap Take 1 capsule (0.4 mg total) by mouth once daily. 30 capsule 6    tamsulosin (FLOMAX) 0.4 mg Cap Take 1 capsule (0.4 mg total) by mouth once daily. 30 capsule 0    tamsulosin (FLOMAX) 0.4 mg Cap Take 1 capsule (0.4 mg total) by mouth once daily. 30 capsule 0    pantoprazole (PROTONIX) 40 MG tablet Take 1 tablet (40 mg total) by mouth once daily. 30 tablet 11     No current facility-administered medications for this visit.      Facility-Administered Medications Ordered in Other Visits   Medication Dose Route Frequency Provider Last Rate Last Dose    sodium chloride 0.9% flush 3 mL  3 mL Intravenous PRN Shilpa Yee MD           Past Medical History:   Diagnosis Date    Alcoholism     Anemia     Back pain     Encounter for blood transfusion 2018    Essential hypertension 2/4/2016    GERD (gastroesophageal reflux disease)     Hiatal hernia     Hypertension     Rectal cancer 9/11/2019     Past Surgical History:   Procedure  Laterality Date    COLONOSCOPY N/A 9/3/2019    Procedure: COLONOSCOPY;  Surgeon: Isai Centeno MD;  Location: [facility] ENDO;  Service: Endoscopy;  Laterality: N/A;    COLONOSCOPY N/A 1/10/2020    Procedure: COLONOSCOPY;  Surgeon: Familia Gonzalez MD;  Location: [facility] ENDO;  Service: General;  Laterality: N/A;    ESOPHAGOGASTRODUODENOSCOPY N/A 9/3/2019    Procedure: ESOPHAGOGASTRODUODENOSCOPY (EGD);  Surgeon: Isai Centeno MD;  Location: [facility] ENDO;  Service: Endoscopy;  Laterality: N/A;    FLEXIBLE SIGMOIDOSCOPY  2/4/2020    Procedure: SIGMOIDOSCOPY, FLEXIBLE;  Surgeon: Familia Gonzalez MD;  Location: [facility] OR;  Service: General;;    ILEOSTOMY Right 2/4/2020    Procedure: CREATION, ILEOSTOMY;  Surgeon: Familia Gonzalez MD;  Location: [facility] OR;  Service: General;  Laterality: Right;    INJECTION OF ANESTHETIC AGENT INTO TISSUE PLANE DEFINED BY TRANSVERSUS ABDOMINIS MUSCLE N/A 2/4/2020    Procedure: BLOCK, TRANSVERSUS ABDOMINIS PLANE;  Surgeon: Familia Gonzalez MD;  Location: [facility] OR;  Service: General;  Laterality: N/A;    INSERTION OF TUNNELED CENTRAL VENOUS CATHETER (CVC) WITH SUBCUTANEOUS PORT Right 2/4/2020    Procedure: INSERTION, PORT-A-CATH;  Surgeon: Familia Gonzalez MD;  Location: [facility] OR;  Service: General;  Laterality: Right;    JOINT REPLACEMENT Left 2009    MOBILIZATION OF SPLENIC FLEXURE  2/4/2020    Procedure: MOBILIZATION, SPLENIC FLEXURE;  Surgeon: Familia Gonzalez MD;  Location: [facility] OR;  Service: General;;     Family History   Problem Relation Age of Onset    Cataracts Mother     Stroke Father     Hypertension Father      Social History     Tobacco Use    Smoking status: Never Smoker    Smokeless tobacco: Never Used   Substance Use Topics    Alcohol use: Yes     Comment: HOLD 72H PRIOR TO SURGERY    Drug use: No        Review of Systems:  Review of Systems   Constitutional: Negative for activity change, appetite change, chills, fatigue, fever and unexpected weight change.    HENT: Negative for congestion, ear pain, sore throat and trouble swallowing.    Eyes: Negative for pain, redness and itching.   Respiratory: Negative for cough, shortness of breath and wheezing.    Cardiovascular: Negative for chest pain, palpitations and leg swelling.   Gastrointestinal: Negative for abdominal distention, abdominal pain, anal bleeding, blood in stool, constipation, diarrhea, nausea, rectal pain and vomiting.   Endocrine: Negative for cold intolerance, heat intolerance and polyuria.   Genitourinary: Negative for dysuria, flank pain, frequency and hematuria.   Musculoskeletal: Negative for gait problem, joint swelling and neck pain.   Skin: Negative for color change, rash and wound.   Allergic/Immunologic: Negative for environmental allergies and immunocompromised state.   Neurological: Negative for dizziness, speech difficulty, weakness and numbness.   Psychiatric/Behavioral: Negative for agitation, confusion and hallucinations.       OBJECTIVE:     Vital Signs (Most Recent)  Temp: 98.3 °F (36.8 °C) (03/02/20 0846)  Pulse: 88 (03/02/20 0846)  BP: 102/78 (03/02/20 0846)     65 kg (143 lb 4.8 oz)     Physical Exam:  Physical Exam   Constitutional: He is oriented to person, place, and time. He appears well-developed.   HENT:   Head: Normocephalic and atraumatic.   Eyes: Conjunctivae and EOM are normal.   Neck: Normal range of motion. No thyromegaly present.   Cardiovascular: Normal rate and regular rhythm.   Pulmonary/Chest: Effort normal. No respiratory distress.   R Chest mediport in place with incision completely healed   Abdominal: Soft. He exhibits no distension and no mass. There is no tenderness. There is no rebound and no guarding. No hernia.   Incisions clean and dry, well-healed without open defects; ostomy pink and viable with semisolid stool in bag   Musculoskeletal: Normal range of motion. He exhibits no edema or tenderness.   Neurological: He is alert and oriented to person, place, and  time.   Skin: Skin is warm and dry. Capillary refill takes less than 2 seconds. No rash noted.   Psychiatric: He has a normal mood and affect.     Laboratory  Lab Results   Component Value Date    WBC 7.01 02/24/2020    HGB 12.6 (L) 02/24/2020    HCT 40.3 02/24/2020     (H) 02/24/2020    CHOL 138 04/07/2018    TRIG 38 04/07/2018    HDL 72 04/07/2018    ALT 13 02/24/2020    AST 17 02/24/2020     (L) 02/24/2020    K 4.6 02/24/2020    CL 97 02/24/2020    CREATININE 0.8 02/24/2020    BUN 8 02/24/2020    CO2 22 (L) 02/24/2020    TSH 0.601 04/07/2018    INR 0.9 11/06/2018         Diagnostic Results:  Reviewed colonoscopy report and images with rectal mass appreciated     MRI Pelvis Sept 2019:  FINDINGS:  Approximately 6.5 cm from the anal verge is a T2 hyperintense mass in the mid rectum.  This results in circumferential narrowing of the rectum.  Tumor extends for approximately 3 cm in oblique craniocaudal dimension.  It measures 3.6 cm and greater such transverse dimension by approximately 2.7 cm AP.  The mass demonstrates a somewhat mushroom appearance along its inferior margin.  There is hyperenhancement and stranding in the adjacent fat with spiculation/nodularity suggestive of extramural spread of disease.  This extends for approximately 5.5 mm minimum.  Distance from the tumor to the mesorectal fascia measures on the order of 2.1 cm    There is a 5.2 mm left perirectal lymph node seen on image 11 of series 8 with heterogeneous appearance.  This is located approximately 1.7 mm from the mesorectal fascia.  Margins appear slightly irregular.    Additional lymph node is seen inferior to the rectum on image 12 of series 8.  This measures 4.7 mm.  Other rounded appearing lymph nodes are seen in the right mesorectum on image 16 of series 8 1 of these measures 5 mm and the other measures 5.1 mm.    The intersphincteric complex is maintained.    Relationship to anterior peritoneal reflection: Appears to  partially straddle the APR    Tumor Extent: Appears to extend beyond mucosa.  There is spiculation perirectal fat.  Distance tumor to the mesorectal fascia is 2.1 cm    Lymph Nodes:    There are perirectal lymph nodes greater than 5 mmin the mesorectal fat. The distance of the closest lymph node to the mesorectal fascia is 1.7 mm.  Please see above discussion.    There are no extra-mesorectal nodes lymph nodes in the visualized pelvis.    There is no evident vascular invasion.      Impression       Mid rectal mass with findings suspicious for extension into the mesorectal fat with tumor extending approximately 5.5 mm with associated spiculation of the fat noted.  There are suspicious mesorectal lymph nodes with the largest 1 measuring just over 5 mm and located 1.7 mm from the mesorectal fascia.       CT C/A/P Sept 2019:  FINDINGS:  Chest:    There is elevation the right hemidiaphragm and bibasilar areas of parenchymal lung scarring or atelectasis.  A more confluent area of irregular masslike opacity and air bronchograms is identified in the posterior left lower lobe which has increased from prior.  There are low-grade ground-glass opacities in the bilateral lower lobes, right middle lobe, and to a lesser degree in the upper lobes.  No pleural effusion.    There is a borderline enlarged 10 mm in short axis precarinal lymph node.  Subcentimeter nodes are present elsewhere in the mediastinum and bilateral axilla.  Aorta, heart, and pericardium are unremarkable.    Abdomen/pelvis:    There is fatty infiltration of the liver.  There is a newly developed ill-defined focal hypodensity in the posteromedial segment of the left hepatic lobe measuring 3.1 x 2.7 x 1.3 cm.  No radiopaque gallstones.  Prominence of the common bile duct measuring up to 12 mm and pancreatic duct measuring up to 6 mm, unchanged from prior.  No visible intraductal stones.  Main portal vein is patent.    The spleen is nonenlarged.  No evidence of  pancreatic mass or inflammation.  Kidneys enhance symmetrically.  Adrenals are unremarkable.    There is circumferential masslike thickening of the rectum.  No evidence of bowel obstruction.  Multiple bilateral subcentimeter in short axis mesorectal and internal iliac lymph nodes are identified.  No evidence of pathologic inguinal or external iliac lymphadenopathy.  There is sigmoid diverticulosis.  Stomach and visualized small bowel loops are unremarkable.  Normal appendix.  No intraperitoneal free air or fluid.  Fat containing periumbilical hernia.    There is aortoiliac atherosclerosis.  No evidence of aneurysm.  Shotty subcentimeter lymph nodes in the periaortic retroperitoneum.    The bladder is unremarkable.  Prostate mildly enlarged with calcifications.  Seminal vesicles unremarkable.    Skeletal:    There is degenerative change in the spine and right hip.  Intramedullary nail present in the left hip.  No suspicious intraosseous lesions.      Impression       1. Rectal mass highly suspicious for malignancy.  2. Multiple bilateral mesorectal and internal iliac lymph nodes concerning for metastasis.  3. Ill-defined hypodensity in the left lobe of the liver, new since 2017 and concerning for metastasis.  4. Indeterminate developing area of masslike opacity in the posterior left lung base.  Possible infection/inflammation versus neoplasm.     MRI Liver:  FINDINGS:  The liver demonstrates a subtle hypointense T2 isointense T1 signal area in the deep aspect of the left hepatic lobe, medial segment.  This area measures approximately 11 x 18 mm axially.    The lesion displays a decrease in signal intensity on the out of phase gradient echo sequence suggesting fat containing focal fatty infiltration.    Following contrast administration there is displays mild relative hypoenhancement with respect to the adjacent parenchyma.  No hyperenhancement is seen.    The adjacent gallbladder appears normal.    Bile ducts are  nondilated.      Impression       Probable focal fatty infiltration of the medial segment of the left hepatic lobe.          PET Scan:  FINDINGS:  Head/neck: There is normal physiologic FDG uptake noted within the visualized brain parenchyma. No FDG avid lymphadenopathy within the neck.    Chest: There are ill-defined patchy and nodular parenchymal opacities again noted in the left lower lobe exhibiting low level FDG uptake with an SUV max of 2.5.  A new 13 mm ground-glass opacity is noted in the lateral left upper lobe exhibiting weak FDG avidity with an SUV max of 1.9.  Similar newly developed ground-glass opacity present in the mid right lung adjacent to the major fissure with SUV max of 1.6. No FDG avid mediastinal, hilar or axillary lymph nodes.    Abdomen/Pelvis: Normal physiologic FDG uptake noted within the liver, spleen, urinary tract, and bowel.Focal hypodensity is again noted in the left hepatic lobe which is non FDG avid and favored to represent focal fatty infiltration.  An intermediate length segment of circumferential mural thickening is again noted at the rectosigmoid junction which exhibits intense hypermetabolism and a SUV max of 11.8.  A hypermetabolic lymph node adjacent to the left pelvic sidewall has a SUV max of 5.3.    Skeletal: No FDG avid osseus lesions identified.      Impression       1. Hypermetabolic rectal mass consistent with known rectal malignancy.  2. Hypermetabolic regional lymph node adjacent to left pelvic sidewall.  3. Non FDG avid focal hypodensity in the liver favored to represent focal fatty infiltration.  4. Bilateral lung opacities most likely infectious or inflammatory etiology.  Follow-up recommended.     MRI Rectal Cancer/Pelvis Jan 2020:  FINDINGS:  Again seen is mid rectal mass.  There is persistent spiculation within the surrounding fat adjacent to the mass.  Spiculation extends to within 7 mm from the mesorectal fascia.  The mass demonstrates persistent enhancement  with interval decrease in restricted diffusion suggesting partial treatment response.  No significant loss of normal mass signal intensity or fibrosis.  The mass currently on the order of 3.7 x 3.2 cm.  I remeasure the mass on the previous exam at 4.0 x 3.0 cm in axial dimension.    There is interval decrease in size of a 5.2 mm left perirectal lymph node with the node not clearly visualized on today's exam.  Additional lymph node in the right mesorectum measures 2.4 mm, decreased from 4.7 mm.  A lymph node in the right mesorectum on image 5 of series 12 measures 3.5 mm in short axis, also decreased.  It previously measured 5 mm in short axis.  Other nodes also appear smaller.  Small amount of pelvic fluid is noted.      Impression       Redemonstration of an enhancing rectal mass with persistent spiculation in the mesorectal fat.  The mass measures slightly smaller. There is interval decrease in restricted diffusion in the mass suggesting at least partial treatment response.  Interval decrease in size of multiple mesorectal lymph nodes as above.     CT Abd/Pelvis (Dec 2020):  FINDINGS:  ABDOMEN:    - Lung bases: Atelectasis at the lung bases.  Airspace disease at the left lung base consistent with a left lower lobe infiltrate/pneumonia.    - Liver: Low-density fatty infiltrated liver.    - Gallbladder: No calcified gallstones.    - Bile Ducts: No evidence of intra or extra hepatic biliary ductal dilation.    - Spleen: Negative.    - Kidneys: No mass or hydronephrosis.    - Adrenals: Unremarkable.    - Pancreas: Fatty infiltrated pancreas.    - Retroperitoneum:  No significant adenopathy.    - Vascular: Scattered atherosclerotic calcifications of the abdominal aorta and proximal iliac vessels.    - Abdominal wall:  4 cm fat containing periumbilical hernia.    PELVIS:    No pelvic mass, adenopathy, or free fluid.    BOWEL/MESENTERY:    Scattered sigmoidal diverticulum.  Diverticulum of the ascending transverse and  descending colon.  No radiographic evidence of diverticulitis.  The appendix appears normal.    BONES:  Multilevel degenerative changes of the thoracolumbar spine with disc space narrowing seen most significantly at the L1-2 L4-5 and L5-S1 levels.  Dynamic hip screw placement on the left      Impression       No evidence of a small-bowel obstruction.  Distension of the duodenum to the level of the 2nd stage.  Distal to that point no small bowel obstruction.  Diverticulosis without radiographic evidence of diverticulitis.  Normal appendix.  Normal gallbladder.  Low-density fatty infiltrated liver.  Increased density at the left lung base can not exclude a focal infiltrate/pneumonia versus atelectasis.     PATHOLOGY Feb 2020:  1. Sigmoid colon and rectum, low anterior resection:  Residual moderately differentiated invasive adenocarcinoma, 4 cm greatest dimension  Tumor is located in the rectum  The distal, radial, and proximal margin are uninvolved by carcinoma  15 lymph nodes are negative for metastatic carcinoma (0/15)  Pathologic staging (pTNM): [id number]  See surgical pathology cancer case summary below  Surgical pathology cancer case summary: Colon and rectum. Protocol posting date: June 2017  Procedure: Low anterior resection  Tumor site: Rectum  Tumor size: 4 x 3 cm  Macroscopic tumor perforation: Not identified  Macroscopic intactness of mesorectum: Incomplete  Histologic type: Adenocarcinoma  Histologic grade: G2: Moderately differentiated  Tumor extension: Tumor invades through the muscularis propria into pericolorectal tissue  Margins: All margins are uninvolved by invasive carcinoma, high-grade dysplasia, intramucosal  adenocarcinoma, and adenoma  Margins examined: Proximal, distal, and radial  Distance of tumor from radial margin: 0.4 cm  Distance of tumor from distal margin: 0.5 cm  Distance of tumor from proximal margin: 17 cm  Treatment effect: Present: Residual cancer with evident tumor  regression  Lymphovascular invasion: Present: Large vessel: Intramural  Perineural invasion: Not identified  Tumor deposits: Not identified  Regional lymph nodes: Number of lymph nodes involved: 0. Number of lymph nodes examined: 15  Pathologic stage classification (pTNM): [id number]  Immunohistochemistry testing for mismatch repair (MMR) proteins was performed on previous biopsy of the  rectal mass (surgical pathology case [id number]. Result as follows: No loss of nuclear expression of MMR  proteins: Low probability of microsatellite instability.  2. Proximal margin:  Fragment of colon with no evidence of malignancy  3. Anastomotic ring:  Fragment of colon with no evidence of malignancy  ASSESSMENT/PLAN:     64-year-old male with mid-rectal adenocarcinoma who is now s/p long-course neoadj chemoXRT which completed on 12/4/19 with partial treatment response on repeat MRI and no evidence of metastatic disease on repeat CT Abd/pelvis who is now s/p robotic ultra-low anterior resection, DLI and mediport placement on 2/4/2020 who has recovered well postop with final path showing [id number]    - Okay to start chemotx from surgical perspective  - RTC after chemotx completed to schedule ostomy reversal if no progression  - Will need GGE prior to ostomy reversal to evaluate anastomosis    Familia Gonzalez MD  Colon and Rectal Surgery  [facility] [facility]

## 2020-03-02 NOTE — PROGRESS NOTES
Subjective:   Date of Visit: 3/2/20   ?   ?   CHIEF COMPLAINT:  Locally advanced rectal cancer, Stage IIIB (cT3,cN2a, CM0)  ?     HPI:   was seen at [facility] today. He is a 64 yr old male with recently diagnosed locally advanced rectal cancer.  Patient has completed concurrent chemoradiation with capecitabine.  Status post low anterior resection.  Final path noted at ypT3N0.      Presents today to initiate cycle 1 of her his adjuvant therapy with FOLFOX.  Denies systemic symptoms including fever, chills, nausea or vomiting, chest pain, shortness of breath, abdominal pain, diarrhea or dysuria.  He was recently seen by Colorectal surgery service who agreed with adjuvant therapy with FOLFOX x4 months.    Review of Systems   Constitutional: Negative for activity change, appetite change, chills, fatigue, fever and unexpected weight change.   HENT: Negative for hearing loss, mouth sores, nosebleeds, sore throat, tinnitus, trouble swallowing and voice change.    Eyes: Negative for visual disturbance.   Respiratory: Negative for cough, chest tightness, shortness of breath and wheezing.    Cardiovascular: Negative for chest pain, palpitations and leg swelling.   Gastrointestinal: Positive for rectal pain. Negative for abdominal pain, anal bleeding, blood in stool, constipation, diarrhea, nausea and vomiting.   Genitourinary: Negative for frequency, hematuria and testicular pain.   Musculoskeletal: Negative for arthralgias, back pain, gait problem and neck pain.   Skin: Negative for color change, pallor, rash and wound.   Allergic/Immunologic: Negative for immunocompromised state.   Neurological: Negative for seizures, syncope, weakness, numbness and headaches.   Hematological: Negative for adenopathy. Does not bruise/bleed easily.   Psychiatric/Behavioral: Negative for agitation, confusion, decreased concentration, hallucinations and sleep disturbance. The patient is not nervous/anxious.        ?   PAST  MEDICAL HISTORY:   Past Medical History:   Diagnosis Date    Alcoholism     Anemia     Back pain     Encounter for blood transfusion 2018    Essential hypertension 2/4/2016    GERD (gastroesophageal reflux disease)     Hiatal hernia     Hypertension     Rectal cancer 9/11/2019    ?     PAST SURGICAL HISTORY:   Past Surgical History:   Procedure Laterality Date    COLONOSCOPY N/A 9/3/2019    Procedure: COLONOSCOPY;  Surgeon: Isai Centeno MD;  Location: [facility] ENDO;  Service: Endoscopy;  Laterality: N/A;    COLONOSCOPY N/A 1/10/2020    Procedure: COLONOSCOPY;  Surgeon: Familia Gonzalez MD;  Location: [facility];  Service: General;  Laterality: N/A;    ESOPHAGOGASTRODUODENOSCOPY N/A 9/3/2019    Procedure: ESOPHAGOGASTRODUODENOSCOPY (EGD);  Surgeon: Isai Centeno MD;  Location: [facility];  Service: Endoscopy;  Laterality: N/A;    FLEXIBLE SIGMOIDOSCOPY  2/4/2020    Procedure: SIGMOIDOSCOPY, FLEXIBLE;  Surgeon: Familia Gonzalez MD;  Location: [facility] OR;  Service: General;;    ILEOSTOMY Right 2/4/2020    Procedure: CREATION, ILEOSTOMY;  Surgeon: Familia Gonzalez MD;  Location: [facility];  Service: General;  Laterality: Right;    INJECTION OF ANESTHETIC AGENT INTO TISSUE PLANE DEFINED BY TRANSVERSUS ABDOMINIS MUSCLE N/A 2/4/2020    Procedure: BLOCK, TRANSVERSUS ABDOMINIS PLANE;  Surgeon: Familia Gonzalez MD;  Location: [facility];  Service: General;  Laterality: N/A;    INSERTION OF TUNNELED CENTRAL VENOUS CATHETER (CVC) WITH SUBCUTANEOUS PORT Right 2/4/2020    Procedure: INSERTION, PORT-A-CATH;  Surgeon: Familia Gonzalez MD;  Location: [facility];  Service: General;  Laterality: Right;    JOINT REPLACEMENT Left 2009    MOBILIZATION OF SPLENIC FLEXURE  2/4/2020    Procedure: MOBILIZATION, SPLENIC FLEXURE;  Surgeon: Familia Gonzalez MD;  Location: [facility] OR;  Service: General;;      ?   ALLERGIES:   Allergies as of 03/02/2020    (No Known Allergies)      ?   MEDICATIONS:?   Outpatient Medications  Marked as Taking for the 3/2/20 encounter (Office Visit) with Mikel Sams MD   Medication Sig Dispense Refill    cyanocobalamin (VITAMIN B-12) 1000 MCG tablet Take 100 mcg by mouth once daily.      diphenoxylate-atropine 2.5-0.025 mg (LOMOTIL) 2.5-0.025 mg per tablet Take 1 tablet by mouth 3 (three) times daily before meals. 90 tablet 0    ferrous sulfate 324 mg (65 mg iron) TbEC Take 1 tablet (324 mg total) by mouth 2 (two) times daily. 60 tablet 0    HYDROcodone-acetaminophen (NORCO)  mg per tablet Take 1 tablet by mouth every 6 (six) hours as needed for Pain. 60 tablet 0    losartan (COZAAR) 50 MG tablet TAKE 1 TABLET BY MOUTH EVERY MORNING 90 tablet 1    ondansetron (ZOFRAN) 8 MG tablet Take 1 tablet (8 mg total) by mouth every 8 (eight) hours as needed for Nausea. 30 tablet 2    prochlorperazine (COMPAZINE) 5 MG tablet TAKE 1 TABLET(5 MG) BY MOUTH EVERY 6 HOURS AS NEEDED FOR NAUSEA 385 tablet 1    tamsulosin (FLOMAX) 0.4 mg Cap Take 1 capsule (0.4 mg total) by mouth once daily. 30 capsule 6    tamsulosin (FLOMAX) 0.4 mg Cap Take 1 capsule (0.4 mg total) by mouth once daily. 30 capsule 0    [DISCONTINUED] HYDROcodone-acetaminophen (NORCO)  mg per tablet Take 1 tablet by mouth every 6 (six) hours as needed for Pain. 60 tablet 0    [DISCONTINUED] tamsulosin (FLOMAX) 0.4 mg Cap Take 1 capsule (0.4 mg total) by mouth once daily. 30 capsule 0      ?   SOCIAL HISTORY:?   Social History     Tobacco Use    Smoking status: Never Smoker    Smokeless tobacco: Never Used   Substance Use Topics    Alcohol use: Yes     Comment: HOLD 72H PRIOR TO SURGERY        ?   FAMILY HISTORY:   family history includes Cataracts in his mother; Hypertension in his father; Stroke in his father.   ?     Objective:      Physical Exam   Constitutional: He is oriented to person, place, and time. He appears well-developed and well-nourished. No distress.   HENT:   Head: Normocephalic and atraumatic.   Right Ear:  External ear normal.   Left Ear: External ear normal.   Mouth/Throat: No oropharyngeal exudate.   Eyes: Pupils are equal, round, and reactive to light. Conjunctivae are normal. Right eye exhibits no discharge. Left eye exhibits no discharge. No scleral icterus.   Neck: Normal range of motion. Neck supple. No thyromegaly present.   Cardiovascular: Normal rate, regular rhythm and normal heart sounds.   No murmur heard.  Pulmonary/Chest: Effort normal and breath sounds normal. No respiratory distress. He has no wheezes. He exhibits no tenderness.   Abdominal: Soft. Bowel sounds are normal. He exhibits no distension and no mass. There is tenderness. There is no rebound.   Musculoskeletal: Normal range of motion. He exhibits no edema or tenderness.   Lymphadenopathy:     He has no cervical adenopathy.        Right cervical: No superficial cervical adenopathy present.       Left cervical: No superficial cervical adenopathy present.        Right axillary: No pectoral adenopathy present.        Left axillary: No pectoral adenopathy present.No inguinal adenopathy noted on the right or left side.        Right: No supraclavicular adenopathy present.        Left: No supraclavicular adenopathy present.   Neurological: He is alert and oriented to person, place, and time. No cranial nerve deficit or sensory deficit.   Skin: Skin is warm and dry. Capillary refill takes 2 to 3 seconds. No rash noted. No erythema. No pallor.   Psychiatric: He has a normal mood and affect. His behavior is normal. Judgment normal.       ?   Vitals:    03/02/20 1048   BP: 93/61   Pulse: 97   Resp: 18   Temp: 97.1 °F (36.2 °C)      ?     ECOG SCORE    0 - Fully active-able to carry on all pre-disease performance without restriction             ?   Laboratory:  ?   Lab Visit on 03/02/2020   Component Date Value Ref Range Status    WBC 03/02/2020 6.72  3.90 - 12.70 K/uL Final    RBC 03/02/2020 4.43* 4.60 - 6.20 M/uL Final    Hemoglobin 03/02/2020 12.6*  14.0 - 18.0 g/dL Final    Hematocrit 03/02/2020 39.7* 40.0 - 54.0 % Final    Mean Corpuscular Volume 03/02/2020 90  82 - 98 fL Final    Mean Corpuscular Hemoglobin 03/02/2020 28.4  27.0 - 31.0 pg Final    Mean Corpuscular Hemoglobin Conc 03/02/2020 31.7* 32.0 - 36.0 g/dL Final    RDW 03/02/2020 14.7* 11.5 - 14.5 % Final    Platelets 03/02/2020 343  150 - 350 K/uL Final    MPV 03/02/2020 9.2  9.2 - 12.9 fL Final    Immature Granulocytes 03/02/2020 0.4  0.0 - 0.5 % Final    Gran # (ANC) 03/02/2020 5.3  1.8 - 7.7 K/uL Final    Immature Grans (Abs) 03/02/2020 0.03  0.00 - 0.04 K/uL Final    Lymph # 03/02/2020 0.6* 1.0 - 4.8 K/uL Final    Mono # 03/02/2020 0.8  0.3 - 1.0 K/uL Final    Eos # 03/02/2020 0.1  0.0 - 0.5 K/uL Final    Baso # 03/02/2020 0.05  0.00 - 0.20 K/uL Final    nRBC 03/02/2020 0  0 /100 WBC Final    Gran% 03/02/2020 78.3* 38.0 - 73.0 % Final    Lymph% 03/02/2020 8.8* 18.0 - 48.0 % Final    Mono% 03/02/2020 11.2  4.0 - 15.0 % Final    Eosinophil% 03/02/2020 1.0  0.0 - 8.0 % Final    Basophil% 03/02/2020 0.7  0.0 - 1.9 % Final    Differential Method 03/02/2020 Automated   Final    Sodium 03/02/2020 132* 136 - 145 mmol/L Final    Potassium 03/02/2020 5.2* 3.5 - 5.1 mmol/L Final    Chloride 03/02/2020 97  95 - 110 mmol/L Final    CO2 03/02/2020 24  23 - 29 mmol/L Final    Glucose 03/02/2020 98  70 - 110 mg/dL Final    BUN, Bld 03/02/2020 17  8 - 23 mg/dL Final    Creatinine 03/02/2020 0.8  0.5 - 1.4 mg/dL Final    Calcium 03/02/2020 10.1  8.7 - 10.5 mg/dL Final    Total Protein 03/02/2020 8.3  6.0 - 8.4 g/dL Final    Albumin 03/02/2020 3.8  3.5 - 5.2 g/dL Final    Total Bilirubin 03/02/2020 0.4  0.1 - 1.0 mg/dL Final    Alkaline Phosphatase 03/02/2020 119  55 - 135 U/L Final    AST 03/02/2020 14  10 - 40 U/L Final    ALT 03/02/2020 12  10 - 44 U/L Final    Anion Gap 03/02/2020 11  8 - 16 mmol/L Final    eGFR if African American 03/02/2020 >60  >60 mL/min/1.73 m^2 Final     eGFR if non African American 03/02/2020 >60  >60 mL/min/1.73 m^2 Final      ?   Tumor markers   ?   ?   Imaging: X-Ray Chest PA And Lateral  Narrative: EXAMINATION:  XR CHEST PA AND LATERAL    CLINICAL HISTORY:  s/p port placement, rule out ptx;    TECHNIQUE:  Single frontal view of the chest was performed.    COMPARISON:  02/04/2020    FINDINGS:  Right subclavian chest port placement with the tip overlying the SVC.  No pneumothorax.  In comparison to the prior study, there is no adverse interval changes  Impression: In comparison to the prior study, there is no adverse interval changes    Electronically signed by: Avila Edward MD  Date:    02/05/2020  Time:    08:00     ?      Pathology:  Pathology Results  (Last 10 years)               02/04/20 1704 (A) Specimen to Pathology, Surgery General Surgery (Abnormal) Final result    Narrative:  Pre-op Diagnosis: Rectal adenocarcinoma [C20]   Procedure(s):   XI ROBOTIC LOW ANTERIOR RESECTION   INSERTION, PORT-A-CATH   BLOCK, TRANSVERSUS ABDOMINIS PLANE   Number of specimens: 3   Name of specimens: 1. Sigmoid colon and rectum (perm) 2.   Proximal margin (perm) 3. Anastomotic ring (perm)   Specimen total (fresh, frozen, permanent):->3       01/10/20 1354  Specimen to Pathology, Surgery Gastrointestinal tract Final result    Narrative:  Pre-op Diagnosis: Rectal adenocarcinoma [C20]   Procedure(s):   COLONOSCOPY   Number of specimens: 1   Name of specimens:   1.  Descending colon polyp   Specimen total (fresh, frozen, permanent):->1              ?   Assessment/Plan:         Rectal cancer  Advanced stage rectal adenocarcinoma-stage IIIB.  Reviewed labs today, no concern cytopenia.  Will proceed with treatments with cycle 1 of FOLFOX.  Adequate antiemetics provided.  Chemo consent obtained.  Plan to see him back in 2 weeks with repeat labs.    Rectal pain  Referral placed to palliative service for pain management.    Essential hypertension  Management per  PCP.      Follow-Up: Follow up in about 2 weeks (around 3/16/2020).    HOMA GAMBOA Md., Ph.D  Hematology & Oncology Department  Phone #: [phone]

## 2020-03-02 NOTE — ASSESSMENT & PLAN NOTE
Advanced stage rectal adenocarcinoma-stage IIIB.  Reviewed labs today, no concern cytopenia.  Will proceed with treatments with cycle 1 of FOLFOX.  Adequate antiemetics provided.  Chemo consent obtained.  Plan to see him back in 2 weeks with repeat labs.

## 2020-03-04 ENCOUNTER — INFUSION (OUTPATIENT)
Dept: INFUSION THERAPY | Facility: HOSPITAL | Age: 65
End: 2020-03-04
Attending: INTERNAL MEDICINE
Payer: MEDICARE

## 2020-03-04 VITALS
HEIGHT: 70 IN | BODY MASS INDEX: 20.77 KG/M2 | RESPIRATION RATE: 20 BRPM | TEMPERATURE: 99 F | WEIGHT: 145.06 LBS | SYSTOLIC BLOOD PRESSURE: 117 MMHG | DIASTOLIC BLOOD PRESSURE: 75 MMHG | OXYGEN SATURATION: 99 % | HEART RATE: 86 BPM

## 2020-03-04 DIAGNOSIS — C20 RECTAL CANCER: Primary | ICD-10-CM

## 2020-03-04 PROCEDURE — A4216 STERILE WATER/SALINE, 10 ML: HCPCS | Performed by: INTERNAL MEDICINE

## 2020-03-04 PROCEDURE — 25000003 PHARM REV CODE 250: Performed by: INTERNAL MEDICINE

## 2020-03-04 PROCEDURE — 63600175 PHARM REV CODE 636 W HCPCS: Performed by: INTERNAL MEDICINE

## 2020-03-04 PROCEDURE — 96523 IRRIG DRUG DELIVERY DEVICE: CPT

## 2020-03-04 RX ORDER — HEPARIN 100 UNIT/ML
500 SYRINGE INTRAVENOUS
Status: DISCONTINUED | OUTPATIENT
Start: 2020-03-04 | End: 2020-03-04 | Stop reason: HOSPADM

## 2020-03-04 RX ORDER — SODIUM CHLORIDE 0.9 % (FLUSH) 0.9 %
10 SYRINGE (ML) INJECTION
Status: DISCONTINUED | OUTPATIENT
Start: 2020-03-04 | End: 2020-03-04 | Stop reason: HOSPADM

## 2020-03-04 RX ADMIN — Medication 10 ML: at 12:03

## 2020-03-04 RX ADMIN — HEPARIN 500 UNITS: 100 SYRINGE at 12:03

## 2020-03-04 NOTE — NURSING
1236pm: Pt here for 5FU continuous pump d/c. Pump stopped and disconnected.  Existing dressing appeared clean and intact.  Right chestwall mediport  Flushed with 10ml NS and 5 ml heparin solution.  Needle D/C, site without redness, swelling, or drainage noted.  Dressing applied.  Patient tolerated well.  Patient to return to clinic in 2 weeks.

## 2020-03-04 NOTE — DISCHARGE INSTRUCTIONS
[facility]  [address] [address]  [address]  [phone] phone     [phone] fax  Hours of Operation: Monday- Friday 8:00am- 5:00pm  After hours phone  [phone]  Hematology / Oncology Physicians on call      PETE Kumar Dr., Dr., Dr., Dr., NP Sydney Prescott, NP Tyesha Taylor, NP    Please call with any concerns regarding your appointment today.HOME CARE AFTER CHEMOTHERAPY   Meals   Many patients feel sick and lose their appetites during treatment. Eat small meals several times a day. Choose bland foods with little taste or smell if you have problems with nausea. Be sure to cook all food thoroughly. This kills bacteria and helps you avoid intestinal infection. Soft foods are easier to swallow and digest.   Activity   Exercise keeps you strong and keeps your heart and lungs active. Talk to your doctor about an appropriate exercise program for you.   Skin Care   To prevent a skin infection, bathe or shower once a day. Use a moisturizing soap and wash with warm water. Avoid very hot or cold water. Chemotherapy can make your skin dry . Apply moisturizing lotion to help relieve dry skin. Some drugs used in high doses can cause slight burns to appear (like sunburn). Ask for a special cream to help relieve the burn and protect your skin.   Prevent Mouth Sores   During chemotherapy, many people get mouth sores. Do the following to help prevent mouth sores or to ease discomfort.   Brush your teeth with a soft-bristle toothbrush after every meal.  Don't use dental floss if your platelet count is below 50,000. Your doctor or nurse will tell you if this is the case.  Use an oral swab or special soft toothbrush if your gums bleed during regular brushing.  Use mouthwash as directed. If you can't tolerate commercial mouthwash, use salt and baking soda to clean your mouth. Mix 1 teaspoon of salt and 1  teaspoon of baking soda into a glass of water. Swish and spit.  Call your doctor or return to this facility if you develop any of the following:   Sore throat   White patches in the mouth or throat   Fever of 100.4ºF (38ºC) or higher, or as directed by your healthcare provider  © 2000-2011 [facility], [address] [place] [address]. All rights reserved. This information is not intended as a substitute for professional medical care. Always follow your healthcare professional's   FALL PREVENTION   Falls often occur due to slipping, tripping or losing your balance. Here are ways to reduce your risk of falling again.   Was there anything that caused your fall that can be fixed, removed or replaced?   Make your home safe by keeping walkways clear of objects you may trip over.   Use non-slip pads under rugs.   Do not walk in poorly lit areas.   Do not stand on chairs or wobbly ladders.   Use caution when reaching overhead or looking upward. This position can cause a loss of balance.   Be sure your shoes fit properly, have non-slip bottoms and are in good condition.   Be cautious when going up and down stairs, curbs, and when walking on uneven sidewalks.   If your balance is poor, consider using a cane or walker.   If your fall was related to alcohol use, stop or limit alcohol intake.   If your fall was related to use of sleeping medicines, talk to your doctor about this. You may need to reduce your dosage at bedtime if you awaken during the night to go to the bathroom.   To reduce the need for nighttime bathroom trips:   Avoid drinking fluids for several hours before going to bed   Empty your bladder before going to bed   Men can keep a urinal at the bedside   © 2000-2011 [facility], [address] [place] [address]. All rights reserved. This information is not intended as a substitute for professional medical care. Always follow your healthcare professional's instructions.  Support  "Groups/Classes    Support groups and classes are being offered at the   [facility] and [facility]!!    "Cooking with Cancer" (Nutrition Class):  Second Wednesday of each month   at noon at the Cancer Center.  Metastatic Support Group:  Third Tuesday of each month   at noon at the Cancer Center.  Next Steps Class/Group: Second and fourth Thursday of each month at noon at the Cancer Center.  [facility] (Breast Cancer Support Group): First Tuesday of each month   at 5:30pm at the [facility] location.  [email or web address]: [facility]: Second and third Tuesday of each month from 7:30am - 2pm.  [facility]: First and fourth Tuesday of each month from 7:30am - 2pm    If you are interested in attending or would like more information please ask our social workers or your nurse!  "

## 2020-03-09 ENCOUNTER — OFFICE VISIT (OUTPATIENT)
Dept: HEMATOLOGY/ONCOLOGY | Facility: CLINIC | Age: 65
End: 2020-03-09
Payer: MEDICARE

## 2020-03-09 DIAGNOSIS — C20 RECTAL CANCER: ICD-10-CM

## 2020-03-09 DIAGNOSIS — Z71.9 ENCOUNTER FOR EDUCATION: Primary | ICD-10-CM

## 2020-03-09 PROCEDURE — 99215 OFFICE O/P EST HI 40 MIN: CPT | Mod: S$GLB,,, | Performed by: NURSE PRACTITIONER

## 2020-03-09 PROCEDURE — 99999 PR PBB SHADOW E&M-EST. PATIENT-LVL I: CPT | Mod: PBBFAC,,, | Performed by: NURSE PRACTITIONER

## 2020-03-09 PROCEDURE — 99215 PR OFFICE/OUTPT VISIT, EST, LEVL V, 40-54 MIN: ICD-10-PCS | Mod: S$GLB,,, | Performed by: NURSE PRACTITIONER

## 2020-03-09 PROCEDURE — 99999 PR PBB SHADOW E&M-EST. PATIENT-LVL I: ICD-10-PCS | Mod: PBBFAC,,, | Performed by: NURSE PRACTITIONER

## 2020-03-10 ENCOUNTER — DOCUMENTATION ONLY (OUTPATIENT)
Dept: HEMATOLOGY/ONCOLOGY | Facility: CLINIC | Age: 65
End: 2020-03-10

## 2020-03-10 NOTE — ASSESSMENT & PLAN NOTE
Chemotherapy education done. He knows to keep all follow up appointments as previously scheduled.

## 2020-03-10 NOTE — PROGRESS NOTES
Patient came in on yesterday for financial assistance. Sw reports he applied for both of the in house grants and they are one time only. Sw referred patient to Cancer Services. He reports they provided him with $150. Sw reports she can apply him for [employer]. Pt provided Sw his monthly income. Ela reports she will apply and print him out a copy of application for his record.

## 2020-03-10 NOTE — PROGRESS NOTES
63 y/o male who presents to the [facility] today for chemotherapy teaching.  Patient given the Navigation Notebook.  I had a detailed discussion with patient in regards to how to use notebook.  I had a detailed discussion with patient regarding the rationale for chemotherapy, how the chemotherapy works, the process of treatment, potential side effects along with managing the potential side effects.  Also discussed with patient signs and symptoms to report.     I had a detailed discussion with the patient and reviewed the specific medication(s) and gave them a handout describing the potential side effects of FOLFOX along with the management of those potential side effects. Also discussed with patient signs and symptoms to report.     Discussed in detail potential side effects such as:  Nausea and vomiting  Bone Marrow Suppression  Thrombocytopenia precautions  Anemia  Leukopenia  Fatigue  Anorexia  Alopecia  Stomatitis  Diarrhea  Cystitis  Gastritis  Fever > 100.4  Antiemetics instructions  Skin care  Constipation  Hyperpigmentation  Rash  Photosensitivity  Sunscreen SPF 30   Small frequent meals  Increased protein  Increased calories  Vitamin support   Taste alterations  Neuropathys  Increased Hydration  Renal toxicity   DVTs  Stress   Depression   Port management  Peripheral IV   Community resources  Hand and foot syndrome- symptoms and self care tips  Importance of monitoring blood sugars if diabetic and reporting elevations or lows    60 minutes total face to face time spent with patient. 55 minutes spent completing chemotherapy education. 5 minutes spent addressing questions & concerns

## 2020-03-16 ENCOUNTER — DOCUMENTATION ONLY (OUTPATIENT)
Dept: HEMATOLOGY/ONCOLOGY | Facility: CLINIC | Age: 65
End: 2020-03-16

## 2020-03-16 ENCOUNTER — OFFICE VISIT (OUTPATIENT)
Dept: HEMATOLOGY/ONCOLOGY | Facility: CLINIC | Age: 65
End: 2020-03-16
Payer: MEDICARE

## 2020-03-16 ENCOUNTER — INFUSION (OUTPATIENT)
Dept: INFUSION THERAPY | Facility: HOSPITAL | Age: 65
End: 2020-03-16
Attending: INTERNAL MEDICINE
Payer: MEDICARE

## 2020-03-16 VITALS
OXYGEN SATURATION: 99 % | WEIGHT: 140.88 LBS | DIASTOLIC BLOOD PRESSURE: 65 MMHG | SYSTOLIC BLOOD PRESSURE: 94 MMHG | RESPIRATION RATE: 18 BRPM | BODY MASS INDEX: 20.17 KG/M2 | HEART RATE: 74 BPM | TEMPERATURE: 97 F | HEIGHT: 70 IN

## 2020-03-16 VITALS
TEMPERATURE: 98 F | HEIGHT: 70 IN | SYSTOLIC BLOOD PRESSURE: 107 MMHG | DIASTOLIC BLOOD PRESSURE: 75 MMHG | HEART RATE: 86 BPM | WEIGHT: 140.88 LBS | BODY MASS INDEX: 20.17 KG/M2 | OXYGEN SATURATION: 98 %

## 2020-03-16 DIAGNOSIS — D64.9 ANEMIA, UNSPECIFIED TYPE: ICD-10-CM

## 2020-03-16 DIAGNOSIS — R63.0 NO APPETITE: Primary | ICD-10-CM

## 2020-03-16 DIAGNOSIS — C20 RECTAL CANCER: Primary | ICD-10-CM

## 2020-03-16 DIAGNOSIS — I10 ESSENTIAL HYPERTENSION: Chronic | ICD-10-CM

## 2020-03-16 DIAGNOSIS — K62.89 RECTAL PAIN: ICD-10-CM

## 2020-03-16 DIAGNOSIS — R63.0 DECREASED APPETITE: ICD-10-CM

## 2020-03-16 DIAGNOSIS — C20 RECTAL CANCER: ICD-10-CM

## 2020-03-16 PROCEDURE — 3074F SYST BP LT 130 MM HG: CPT | Mod: CPTII,S$GLB,, | Performed by: INTERNAL MEDICINE

## 2020-03-16 PROCEDURE — 3078F PR MOST RECENT DIASTOLIC BLOOD PRESSURE < 80 MM HG: ICD-10-PCS | Mod: CPTII,S$GLB,, | Performed by: INTERNAL MEDICINE

## 2020-03-16 PROCEDURE — 99215 OFFICE O/P EST HI 40 MIN: CPT | Mod: 25,S$GLB,, | Performed by: INTERNAL MEDICINE

## 2020-03-16 PROCEDURE — 99999 PR PBB SHADOW E&M-EST. PATIENT-LVL III: CPT | Mod: PBBFAC,,, | Performed by: INTERNAL MEDICINE

## 2020-03-16 PROCEDURE — 3078F DIAST BP <80 MM HG: CPT | Mod: CPTII,S$GLB,, | Performed by: INTERNAL MEDICINE

## 2020-03-16 PROCEDURE — 3074F PR MOST RECENT SYSTOLIC BLOOD PRESSURE < 130 MM HG: ICD-10-PCS | Mod: CPTII,S$GLB,, | Performed by: INTERNAL MEDICINE

## 2020-03-16 PROCEDURE — 3008F BODY MASS INDEX DOCD: CPT | Mod: CPTII,S$GLB,, | Performed by: INTERNAL MEDICINE

## 2020-03-16 PROCEDURE — 99215 PR OFFICE/OUTPT VISIT, EST, LEVL V, 40-54 MIN: ICD-10-PCS | Mod: 25,S$GLB,, | Performed by: INTERNAL MEDICINE

## 2020-03-16 PROCEDURE — 96415 CHEMO IV INFUSION ADDL HR: CPT

## 2020-03-16 PROCEDURE — 63600175 PHARM REV CODE 636 W HCPCS: Performed by: INTERNAL MEDICINE

## 2020-03-16 PROCEDURE — 96411 CHEMO IV PUSH ADDL DRUG: CPT

## 2020-03-16 PROCEDURE — 3008F PR BODY MASS INDEX (BMI) DOCUMENTED: ICD-10-PCS | Mod: CPTII,S$GLB,, | Performed by: INTERNAL MEDICINE

## 2020-03-16 PROCEDURE — 96413 CHEMO IV INFUSION 1 HR: CPT

## 2020-03-16 PROCEDURE — 96368 THER/DIAG CONCURRENT INF: CPT

## 2020-03-16 PROCEDURE — 96416 CHEMO PROLONG INFUSE W/PUMP: CPT

## 2020-03-16 PROCEDURE — 96367 TX/PROPH/DG ADDL SEQ IV INF: CPT

## 2020-03-16 PROCEDURE — 99999 PR PBB SHADOW E&M-EST. PATIENT-LVL III: ICD-10-PCS | Mod: PBBFAC,,, | Performed by: INTERNAL MEDICINE

## 2020-03-16 RX ORDER — HEPARIN 100 UNIT/ML
500 SYRINGE INTRAVENOUS
Status: CANCELLED | OUTPATIENT
Start: 2020-03-16

## 2020-03-16 RX ORDER — SODIUM CHLORIDE 0.9 % (FLUSH) 0.9 %
10 SYRINGE (ML) INJECTION
Status: CANCELLED | OUTPATIENT
Start: 2020-03-18

## 2020-03-16 RX ORDER — HEPARIN 100 UNIT/ML
500 SYRINGE INTRAVENOUS
Status: CANCELLED | OUTPATIENT
Start: 2020-03-18

## 2020-03-16 RX ORDER — FLUOROURACIL 50 MG/ML
400 INJECTION, SOLUTION INTRAVENOUS
Status: CANCELLED | OUTPATIENT
Start: 2020-03-16

## 2020-03-16 RX ORDER — EPINEPHRINE 0.3 MG/.3ML
0.3 INJECTION SUBCUTANEOUS ONCE AS NEEDED
Status: CANCELLED | OUTPATIENT
Start: 2020-03-16

## 2020-03-16 RX ORDER — DIPHENHYDRAMINE HYDROCHLORIDE 50 MG/ML
50 INJECTION INTRAMUSCULAR; INTRAVENOUS ONCE AS NEEDED
Status: CANCELLED | OUTPATIENT
Start: 2020-03-16

## 2020-03-16 RX ORDER — DRONABINOL 2.5 MG/1
2.5 CAPSULE ORAL
Qty: 30 CAPSULE | Refills: 0 | Status: SHIPPED | OUTPATIENT
Start: 2020-03-16 | End: 2021-04-22

## 2020-03-16 RX ORDER — DIPHENHYDRAMINE HYDROCHLORIDE 50 MG/ML
50 INJECTION INTRAMUSCULAR; INTRAVENOUS ONCE AS NEEDED
Status: DISCONTINUED | OUTPATIENT
Start: 2020-03-16 | End: 2020-03-16 | Stop reason: HOSPADM

## 2020-03-16 RX ORDER — EPINEPHRINE 1 MG/ML
0.3 INJECTION, SOLUTION INTRACARDIAC; INTRAMUSCULAR; INTRAVENOUS; SUBCUTANEOUS ONCE AS NEEDED
Status: DISCONTINUED | OUTPATIENT
Start: 2020-03-16 | End: 2020-03-16 | Stop reason: HOSPADM

## 2020-03-16 RX ORDER — SODIUM CHLORIDE 0.9 % (FLUSH) 0.9 %
10 SYRINGE (ML) INJECTION
Status: CANCELLED | OUTPATIENT
Start: 2020-03-16

## 2020-03-16 RX ORDER — FLUOROURACIL 50 MG/ML
400 INJECTION, SOLUTION INTRAVENOUS
Status: COMPLETED | OUTPATIENT
Start: 2020-03-16 | End: 2020-03-16

## 2020-03-16 RX ORDER — HYDROCODONE BITARTRATE AND ACETAMINOPHEN 10; 325 MG/1; MG/1
1 TABLET ORAL EVERY 6 HOURS PRN
Qty: 60 TABLET | Refills: 0 | Status: SHIPPED | OUTPATIENT
Start: 2020-03-16 | End: 2020-03-30 | Stop reason: SDUPTHER

## 2020-03-16 RX ADMIN — FLUOROURACIL 4270 MG: 50 INJECTION, SOLUTION INTRAVENOUS at 02:03

## 2020-03-16 RX ADMIN — DEXTROSE: 5 SOLUTION INTRAVENOUS at 11:03

## 2020-03-16 RX ADMIN — FLUOROURACIL 710 MG: 50 INJECTION, SOLUTION INTRAVENOUS at 02:03

## 2020-03-16 RX ADMIN — OXALIPLATIN 151 MG: 5 INJECTION, SOLUTION INTRAVENOUS at 12:03

## 2020-03-16 RX ADMIN — LEUCOVORIN CALCIUM 710 MG: 200 INJECTION, POWDER, LYOPHILIZED, FOR SOLUTION INTRAMUSCULAR; INTRAVENOUS at 12:03

## 2020-03-16 RX ADMIN — PALONOSETRON HYDROCHLORIDE: 0.25 INJECTION, SOLUTION INTRAVENOUS at 11:03

## 2020-03-16 NOTE — DISCHARGE INSTRUCTIONS
[facility]  [address] [facility]  [address] [address]  [phone] phone     [phone] fax  Hours of Operation: Monday- Friday 8:00am- 5:00pm  After hours phone  [phone]  Hematology / Oncology Physicians on call      PETE Kumar Dr., Dr., Dr., Dr., NP Sydney Prescott, NP Tyesha Taylor, NP    Please call with any concerns regarding your appointment today.    Discharge Instructions for Chemotherapy   Your doctor prescribed a type of medication therapy for you called chemotherapy. Doctors prescribe chemotherapy for many different types of illnesses, including cancer. There are many types of chemotherapy. This sheet provides general guidelines on how you can take care of yourself after your chemotherapy.   Mouth Care   Dont be discouraged if you get mouth sores, even if you are following all your doctors instructions. Many people get mouth sores as a side effect of chemotherapy. Heres what you can do to prevent mouth sores:   Keep your mouth clean. Brush your teeth with a soft-bristle toothbrush after every meal.   Use an oral swab or special soft toothbrush if your gums bleed during regular brushing.   Dont use dental floss if your platelet count is below 50,000. Your doctor or nurse will tell you if this is the case.   Use any mouthwashes given to you as directed.   If you cant tolerate regular methods, use salt and baking soda to clean your mouth. Mix 1 teaspoon(s) of salt and 1 teaspoon(s) of baking soda into an 8-ounce glass of warm water. Swish and spit.   Watch your mouth and tongue for white patches. This is a sign of fungal infection, a common side effect of chemotherapy. Be sure to tell your doctor about these patches. Medication can be prescribed to help you fight the fungal infection.  Other Home Care   Try to exercise. Exercise keeps you strong and keeps your heart and lungs active.  Walk as much as you can without becoming dizzy or weak.   Keep clean. During chemotherapy, your body cant fight infection very well. Take short baths or showers.   Use moisturizing soap. Chemotherapy can make your skin dry.   Apply moisturizing lotion several times a day to help relieve dry skin.   Dont take very hot or very cold showers or baths.   Dont be surprised if your chemotherapy causes slight burns to your skin--usually on the hands and feet. Some drugs used in high doses cause this to happen. Ask for a special cream to help relieve the burn and protect your skin.   Let your doctor know if your throat is sore. You may have an infection that needs treatment.   Remember, many patients feel sick and lose their appetites during treatment. Eat small meals several times a day to keep your strength up.   Choose bland foods with little taste or smell if you are reacting strongly to food.   Be sure to cook all food thoroughly. This kills bacteria and helps you avoid infection.   Eat foods that are soft. Soft foods are less likely to cause stomach irritation.   Follow-Up   Make a follow-up appointment as directed by our staff.   When to Call Your Doctor   Call your doctor right away if you have any of the following:   Unexplained bleeding   Trouble concentrating   Ongoing fatigue   Shortness of breath, wheezing, or trouble breathing   Rapid, irregular heartbeat; chest pain   Dizziness, lightheadedness   Constant feeling of being cold   Hives or a cut or rash that swells, turns red, feels hot or painful, or begins to ooze   Fever of 100.4°F or higher, or chills    © 9939-3858 [facility], [address] [place] [address], [place] [address]. All rights reserved. This information is not intended as a substitute for professional medical care. Always follow your    FALL PREVENTION   Falls often occur due to slipping, tripping or losing your balance. Here are ways to reduce your risk of falling again.   Was there anything  that caused your fall that can be fixed, removed or replaced?   Make your home safe by keeping walkways clear of objects you may trip over.   Use non-slip pads under rugs.   Do not walk in poorly lit areas.   Do not stand on chairs or wobbly ladders.   Use caution when reaching overhead or looking upward. This position can cause a loss of balance.   Be sure your shoes fit properly, have non-slip bottoms and are in good condition.   Be cautious when going up and down stairs, curbs, and when walking on uneven sidewalks.   If your balance is poor, consider using a cane or walker.   If your fall was related to alcohol use, stop or limit alcohol intake.   If your fall was related to use of sleeping medicines, talk to your doctor about this. You may need to reduce your dosage at bedtime if you awaken during the night to go to the bathroom.   To reduce the need for nighttime bathroom trips:   Avoid drinking fluids for several hours before going to bed   Empty your bladder before going to bed   Men can keep a urinal at the bedside   © 3312-8199 [facility], [address] [place] [address], [place] [address]. All rights reserved. This information is not intended as a substitute for professional medical care. Always follow your healthcare professional's instructions.

## 2020-03-16 NOTE — ASSESSMENT & PLAN NOTE
Advanced stage rectal cancer on adjuvant chemotherapy with FOLFOX.  Seems to be tolerating well with no concerning side effect.  I have reviewed labs today, no significant cytopenia.  Will proceed with treatment.    Plan to see him back in 2 weeks repeat labs.

## 2020-03-16 NOTE — PROGRESS NOTES
Sw called patient on today to obtain his email address. Pt reports he does not have an email address. Sw used coworker email address to apply patient for [employer] financial assistance. Sw will place print out of application in patient's file. Sw will continue to follow up.

## 2020-03-16 NOTE — NURSING
Pt tolerated FOLFOX well. No adverse reaction noted. Pt education reinforced on chemo regimen, side effects, what to expect, and when to call . Pt verbalized understanding. I reviewed pt calendar w/ pt and understanding verbalized. Right chest PAC remains accessed with 5FU pump connected infusing at a continuous rate of 2.2ml/hr.  Dressing clean, dry, and intact.

## 2020-03-16 NOTE — PROGRESS NOTES
Subjective:   Date of Visit: 3/16/20   ?   ?   CHIEF COMPLAINT:  Locally advanced rectal cancer, Stage IIIB (cT3,cN2a, CM0)  ?     HPI:   was seen at [facility] today. He is a 64 yr old male with recently diagnosed locally advanced rectal cancer.  Patient has completed concurrent chemoradiation with capecitabine.  Status post low anterior resection.  Final path noted at ypT3N0.      Status post cycle 1 of adjuvant therapy with FOLFOX.  He did complain of decreased appetite but denies fever, chills, nausea or vomiting, chest pain, shortness of breath, abdominal pain, diarrhea or dysuria.     Review of Systems   Constitutional: Negative for activity change, appetite change, chills, fatigue, fever and unexpected weight change.   HENT: Negative for hearing loss, mouth sores, nosebleeds, sore throat, tinnitus, trouble swallowing and voice change.    Eyes: Negative for visual disturbance.   Respiratory: Negative for cough, chest tightness, shortness of breath and wheezing.    Cardiovascular: Negative for chest pain, palpitations and leg swelling.   Gastrointestinal: Positive for rectal pain. Negative for abdominal pain, anal bleeding, blood in stool, constipation, diarrhea, nausea and vomiting.   Genitourinary: Negative for frequency, hematuria and testicular pain.   Musculoskeletal: Negative for arthralgias, back pain, gait problem and neck pain.   Skin: Negative for color change, pallor, rash and wound.   Allergic/Immunologic: Negative for immunocompromised state.   Neurological: Negative for seizures, syncope, weakness, numbness and headaches.   Hematological: Negative for adenopathy. Does not bruise/bleed easily.   Psychiatric/Behavioral: Negative for agitation, confusion, decreased concentration, hallucinations and sleep disturbance. The patient is not nervous/anxious.        ?   PAST MEDICAL HISTORY:   Past Medical History:   Diagnosis Date    Alcoholism     Anemia     Back pain     Encounter  for blood transfusion 2018    Essential hypertension 2/4/2016    GERD (gastroesophageal reflux disease)     Hiatal hernia     Hypertension     Rectal cancer 9/11/2019    ?     PAST SURGICAL HISTORY:   Past Surgical History:   Procedure Laterality Date    COLONOSCOPY N/A 9/3/2019    Procedure: COLONOSCOPY;  Surgeon: Isai Centeno MD;  Location: [facility] ENDO;  Service: Endoscopy;  Laterality: N/A;    COLONOSCOPY N/A 1/10/2020    Procedure: COLONOSCOPY;  Surgeon: Familia Gonzalez MD;  Location: [facility] ENDO;  Service: General;  Laterality: N/A;    ESOPHAGOGASTRODUODENOSCOPY N/A 9/3/2019    Procedure: ESOPHAGOGASTRODUODENOSCOPY (EGD);  Surgeon: Isai Centeno MD;  Location: [facility] ENDO;  Service: Endoscopy;  Laterality: N/A;    FLEXIBLE SIGMOIDOSCOPY  2/4/2020    Procedure: SIGMOIDOSCOPY, FLEXIBLE;  Surgeon: Familia Gonzalez MD;  Location: [facility] OR;  Service: General;;    ILEOSTOMY Right 2/4/2020    Procedure: CREATION, ILEOSTOMY;  Surgeon: Familia Gonzalez MD;  Location: [facility] OR;  Service: General;  Laterality: Right;    INJECTION OF ANESTHETIC AGENT INTO TISSUE PLANE DEFINED BY TRANSVERSUS ABDOMINIS MUSCLE N/A 2/4/2020    Procedure: BLOCK, TRANSVERSUS ABDOMINIS PLANE;  Surgeon: Familia Gonzalez MD;  Location: [facility] OR;  Service: General;  Laterality: N/A;    INSERTION OF TUNNELED CENTRAL VENOUS CATHETER (CVC) WITH SUBCUTANEOUS PORT Right 2/4/2020    Procedure: INSERTION, PORT-A-CATH;  Surgeon: Familia Gonzalez MD;  Location: [facility] OR;  Service: General;  Laterality: Right;    JOINT REPLACEMENT Left 2009    MOBILIZATION OF SPLENIC FLEXURE  2/4/2020    Procedure: MOBILIZATION, SPLENIC FLEXURE;  Surgeon: Familia Gonzalez MD;  Location: [facility] OR;  Service: General;;      ?   ALLERGIES:   Allergies as of 03/16/2020    (No Known Allergies)      ?   MEDICATIONS:?   Outpatient Medications Marked as Taking for the 3/16/20 encounter (Office Visit) with Mikel Sams MD   Medication Sig Dispense Refill     cyanocobalamin (VITAMIN B-12) 1000 MCG tablet Take 100 mcg by mouth once daily.      ferrous sulfate 324 mg (65 mg iron) TbEC Take 1 tablet (324 mg total) by mouth 2 (two) times daily. 60 tablet 0    HYDROcodone-acetaminophen (NORCO)  mg per tablet Take 1 tablet by mouth every 6 (six) hours as needed for Pain. 60 tablet 0    losartan (COZAAR) 50 MG tablet TAKE 1 TABLET BY MOUTH EVERY MORNING 90 tablet 1    ondansetron (ZOFRAN) 8 MG tablet Take 1 tablet (8 mg total) by mouth every 8 (eight) hours as needed for Nausea. 30 tablet 2    prochlorperazine (COMPAZINE) 5 MG tablet TAKE 1 TABLET(5 MG) BY MOUTH EVERY 6 HOURS AS NEEDED FOR NAUSEA 385 tablet 1    tamsulosin (FLOMAX) 0.4 mg Cap Take 1 capsule (0.4 mg total) by mouth once daily. 30 capsule 6    tamsulosin (FLOMAX) 0.4 mg Cap Take 1 capsule (0.4 mg total) by mouth once daily. 30 capsule 0    [DISCONTINUED] HYDROcodone-acetaminophen (NORCO)  mg per tablet Take 1 tablet by mouth every 6 (six) hours as needed for Pain. 60 tablet 0      ?   SOCIAL HISTORY:?   Social History     Tobacco Use    Smoking status: Never Smoker    Smokeless tobacco: Never Used   Substance Use Topics    Alcohol use: Yes     Comment: HOLD 72H PRIOR TO SURGERY        ?   FAMILY HISTORY:   family history includes Cataracts in his mother; Hypertension in his father; Stroke in his father.   ?     Objective:      Physical Exam   Constitutional: He is oriented to person, place, and time. He appears well-developed and well-nourished. No distress.   HENT:   Head: Normocephalic and atraumatic.   Right Ear: External ear normal.   Left Ear: External ear normal.   Mouth/Throat: No oropharyngeal exudate.   Eyes: Pupils are equal, round, and reactive to light. Conjunctivae are normal. Right eye exhibits no discharge. Left eye exhibits no discharge. No scleral icterus.   Neck: Normal range of motion. Neck supple. No thyromegaly present.   Cardiovascular: Normal rate, regular rhythm  and normal heart sounds.   No murmur heard.  Pulmonary/Chest: Effort normal and breath sounds normal. No respiratory distress. He has no wheezes. He exhibits no tenderness.   Abdominal: Soft. Bowel sounds are normal. He exhibits no distension and no mass. There is tenderness. There is no rebound.   Musculoskeletal: Normal range of motion. He exhibits no edema or tenderness.   Lymphadenopathy:     He has no cervical adenopathy.        Right cervical: No superficial cervical adenopathy present.       Left cervical: No superficial cervical adenopathy present.        Right axillary: No pectoral adenopathy present.        Left axillary: No pectoral adenopathy present.No inguinal adenopathy noted on the right or left side.        Right: No supraclavicular adenopathy present.        Left: No supraclavicular adenopathy present.   Neurological: He is alert and oriented to person, place, and time. No cranial nerve deficit or sensory deficit.   Skin: Skin is warm and dry. Capillary refill takes 2 to 3 seconds. No rash noted. No erythema. No pallor.   Psychiatric: He has a normal mood and affect. His behavior is normal. Judgment normal.       ?   Vitals:    03/16/20 0949   BP: 107/75   Pulse: 86   Temp: 98 °F (36.7 °C)      ?     ECOG SCORE    1 - Restricted in strenuous activity-ambulatory and able to carry out work of a light nature             ?   Laboratory:  ?   Lab Visit on 03/16/2020   Component Date Value Ref Range Status    Sodium 03/16/2020 133* 136 - 145 mmol/L Final    Potassium 03/16/2020 4.9  3.5 - 5.1 mmol/L Final    Chloride 03/16/2020 98  95 - 110 mmol/L Final    CO2 03/16/2020 24  23 - 29 mmol/L Final    Glucose 03/16/2020 122* 70 - 110 mg/dL Final    BUN, Bld 03/16/2020 7* 8 - 23 mg/dL Final    Creatinine 03/16/2020 0.7  0.5 - 1.4 mg/dL Final    Calcium 03/16/2020 10.2  8.7 - 10.5 mg/dL Final    Anion Gap 03/16/2020 11  8 - 16 mmol/L Final    eGFR if African American 03/16/2020 >60  >60 mL/min/1.73  m^2 Final    eGFR if non African American 03/16/2020 >60  >60 mL/min/1.73 m^2 Final    WBC 03/16/2020 4.21  3.90 - 12.70 K/uL Final    RBC 03/16/2020 4.15* 4.60 - 6.20 M/uL Final    Hemoglobin 03/16/2020 11.3* 14.0 - 18.0 g/dL Final    Hematocrit 03/16/2020 36.4* 40.0 - 54.0 % Final    Mean Corpuscular Volume 03/16/2020 88  82 - 98 fL Final    Mean Corpuscular Hemoglobin 03/16/2020 27.2  27.0 - 31.0 pg Final    Mean Corpuscular Hemoglobin Conc 03/16/2020 31.0* 32.0 - 36.0 g/dL Final    RDW 03/16/2020 15.0* 11.5 - 14.5 % Final    Platelets 03/16/2020 365* 150 - 350 K/uL Final    MPV 03/16/2020 9.2  9.2 - 12.9 fL Final    Immature Granulocytes 03/16/2020 0.2  0.0 - 0.5 % Final    Gran # (ANC) 03/16/2020 3.1  1.8 - 7.7 K/uL Final    Immature Grans (Abs) 03/16/2020 0.01  0.00 - 0.04 K/uL Final    Lymph # 03/16/2020 0.4* 1.0 - 4.8 K/uL Final    Mono # 03/16/2020 0.7  0.3 - 1.0 K/uL Final    Eos # 03/16/2020 0.0  0.0 - 0.5 K/uL Final    Baso # 03/16/2020 0.01  0.00 - 0.20 K/uL Final    nRBC 03/16/2020 0  0 /100 WBC Final    Gran% 03/16/2020 74.1* 38.0 - 73.0 % Final    Lymph% 03/16/2020 8.8* 18.0 - 48.0 % Final    Mono% 03/16/2020 15.7* 4.0 - 15.0 % Final    Eosinophil% 03/16/2020 1.0  0.0 - 8.0 % Final    Basophil% 03/16/2020 0.2  0.0 - 1.9 % Final    Differential Method 03/16/2020 Automated   Final    WBC 03/16/2020 4.21  3.90 - 12.70 K/uL Final    RBC 03/16/2020 4.15* 4.60 - 6.20 M/uL Final    Hemoglobin 03/16/2020 11.3* 14.0 - 18.0 g/dL Final    Hematocrit 03/16/2020 36.4* 40.0 - 54.0 % Final    Mean Corpuscular Volume 03/16/2020 88  82 - 98 fL Final    Mean Corpuscular Hemoglobin 03/16/2020 27.2  27.0 - 31.0 pg Final    Mean Corpuscular Hemoglobin Conc 03/16/2020 31.0* 32.0 - 36.0 g/dL Final    RDW 03/16/2020 15.0* 11.5 - 14.5 % Final    Platelets 03/16/2020 365* 150 - 350 K/uL Final    MPV 03/16/2020 9.2  9.2 - 12.9 fL Final    Immature Granulocytes 03/16/2020 0.2  0.0 - 0.5 %  Final    Gran # (ANC) 03/16/2020 3.1  1.8 - 7.7 K/uL Final    Immature Grans (Abs) 03/16/2020 0.01  0.00 - 0.04 K/uL Final    Lymph # 03/16/2020 0.4* 1.0 - 4.8 K/uL Final    Mono # 03/16/2020 0.7  0.3 - 1.0 K/uL Final    Eos # 03/16/2020 0.0  0.0 - 0.5 K/uL Final    Baso # 03/16/2020 0.01  0.00 - 0.20 K/uL Final    nRBC 03/16/2020 0  0 /100 WBC Final    Gran% 03/16/2020 74.1* 38.0 - 73.0 % Final    Lymph% 03/16/2020 8.8* 18.0 - 48.0 % Final    Mono% 03/16/2020 15.7* 4.0 - 15.0 % Final    Eosinophil% 03/16/2020 1.0  0.0 - 8.0 % Final    Basophil% 03/16/2020 0.2  0.0 - 1.9 % Final    Differential Method 03/16/2020 Automated   Final    Sodium 03/16/2020 133* 136 - 145 mmol/L Final    Potassium 03/16/2020 4.9  3.5 - 5.1 mmol/L Final    Chloride 03/16/2020 98  95 - 110 mmol/L Final    CO2 03/16/2020 24  23 - 29 mmol/L Final    Glucose 03/16/2020 122* 70 - 110 mg/dL Final    BUN, Bld 03/16/2020 7* 8 - 23 mg/dL Final    Creatinine 03/16/2020 0.7  0.5 - 1.4 mg/dL Final    Calcium 03/16/2020 10.2  8.7 - 10.5 mg/dL Final    Total Protein 03/16/2020 8.2  6.0 - 8.4 g/dL Final    Albumin 03/16/2020 3.7  3.5 - 5.2 g/dL Final    Total Bilirubin 03/16/2020 0.3  0.1 - 1.0 mg/dL Final    Alkaline Phosphatase 03/16/2020 107  55 - 135 U/L Final    AST 03/16/2020 22  10 - 40 U/L Final    ALT 03/16/2020 14  10 - 44 U/L Final    Anion Gap 03/16/2020 11  8 - 16 mmol/L Final    eGFR if African American 03/16/2020 >60  >60 mL/min/1.73 m^2 Final    eGFR if non African American 03/16/2020 >60  >60 mL/min/1.73 m^2 Final      ?   Tumor markers   ?   ?   Imaging: X-Ray Chest PA And Lateral  Narrative: EXAMINATION:  XR CHEST PA AND LATERAL    CLINICAL HISTORY:  s/p port placement, rule out ptx;    TECHNIQUE:  Single frontal view of the chest was performed.    COMPARISON:  02/04/2020    FINDINGS:  Right subclavian chest port placement with the tip overlying the SVC.  No pneumothorax.  In comparison to the prior study,  there is no adverse interval changes  Impression: In comparison to the prior study, there is no adverse interval changes    Electronically signed by: Avila Edward MD  Date:    02/05/2020  Time:    08:00     ?      Pathology:  Pathology Results  (Last 10 years)               02/04/20 1704 (A) Specimen to Pathology, Surgery General Surgery (Abnormal) Final result    Narrative:  Pre-op Diagnosis: Rectal adenocarcinoma [C20]   Procedure(s):   XI ROBOTIC LOW ANTERIOR RESECTION   INSERTION, PORT-A-CATH   BLOCK, TRANSVERSUS ABDOMINIS PLANE   Number of specimens: 3   Name of specimens: 1. Sigmoid colon and rectum (perm) 2.   Proximal margin (perm) 3. Anastomotic ring (perm)   Specimen total (fresh, frozen, permanent):->3       01/10/20 1354  Specimen to Pathology, Surgery Gastrointestinal tract Final result    Narrative:  Pre-op Diagnosis: Rectal adenocarcinoma [C20]   Procedure(s):   COLONOSCOPY   Number of specimens: 1   Name of specimens:   1.  Descending colon polyp   Specimen total (fresh, frozen, permanent):->1              ?   Assessment/Plan:         Rectal cancer  Advanced stage rectal cancer on adjuvant chemotherapy with FOLFOX.  Seems to be tolerating well with no concerning side effect.  I have reviewed labs today, no significant cytopenia.  Will proceed with treatment.    Plan to see him back in 2 weeks repeat labs.    Rectal pain  Refilled Norco.    Essential hypertension  Management per PCP.  On Ace inhibitors.    Anemia  Likely secondary to anemia of chronic inflammation.  No active bleed will monitor.    Decreased appetite is likely related to chemotherapy.  Will prescribe Marinol to improve appetite and maintained weight.    Follow-Up: Follow up in about 2 weeks (around 3/30/2020).    HOMA GAMBOA Md., Ph.D  Hematology & Oncology Department  Phone #: [phone]

## 2020-03-17 ENCOUNTER — TELEPHONE (OUTPATIENT)
Dept: PALLIATIVE MEDICINE | Facility: CLINIC | Age: 65
End: 2020-03-17

## 2020-03-17 NOTE — TELEPHONE ENCOUNTER
[facility] - Palliative Care  Medical Specialty       Patient Name: Vincent Benton  MRN: [id number]  Primary Care Physician: Shakila Moran DO    Attempted to contact patient to inform him his palliative care appt has been rescheduled. No answer/no voicemaile available.       Keerthi Bowen, MSW, [facility]  [phone]

## 2020-03-18 ENCOUNTER — INFUSION (OUTPATIENT)
Dept: INFUSION THERAPY | Facility: HOSPITAL | Age: 65
End: 2020-03-18
Attending: INTERNAL MEDICINE
Payer: MEDICARE

## 2020-03-18 VITALS
OXYGEN SATURATION: 98 % | DIASTOLIC BLOOD PRESSURE: 77 MMHG | RESPIRATION RATE: 18 BRPM | SYSTOLIC BLOOD PRESSURE: 118 MMHG | TEMPERATURE: 98 F | HEART RATE: 68 BPM

## 2020-03-18 DIAGNOSIS — C20 RECTAL CANCER: Primary | ICD-10-CM

## 2020-03-18 PROCEDURE — 63600175 PHARM REV CODE 636 W HCPCS: Performed by: INTERNAL MEDICINE

## 2020-03-18 PROCEDURE — 25000003 PHARM REV CODE 250: Performed by: INTERNAL MEDICINE

## 2020-03-18 PROCEDURE — A4216 STERILE WATER/SALINE, 10 ML: HCPCS | Performed by: INTERNAL MEDICINE

## 2020-03-18 PROCEDURE — 96523 IRRIG DRUG DELIVERY DEVICE: CPT

## 2020-03-18 RX ORDER — HEPARIN 100 UNIT/ML
500 SYRINGE INTRAVENOUS
Status: DISCONTINUED | OUTPATIENT
Start: 2020-03-18 | End: 2020-03-18 | Stop reason: HOSPADM

## 2020-03-18 RX ORDER — SODIUM CHLORIDE 0.9 % (FLUSH) 0.9 %
10 SYRINGE (ML) INJECTION
Status: DISCONTINUED | OUTPATIENT
Start: 2020-03-18 | End: 2020-03-18 | Stop reason: HOSPADM

## 2020-03-18 RX ADMIN — Medication 10 ML: at 11:03

## 2020-03-18 RX ADMIN — HEPARIN 500 UNITS: 100 SYRINGE at 11:03

## 2020-03-18 NOTE — DISCHARGE INSTRUCTIONS
[facility]  [address] [address]  [address] [address]  [phone] phone     [phone] fax  Hours of Operation: Monday- Friday 8:00am- 5:00pm  After hours phone  [phone]  Hematology / Oncology Physicians on call      PETE Kumar Dr., Dr., Dr., Dr., NP Sydney Prescott, NP Tyesha Taylor, NP    Please call with any concerns regarding your appointment today.

## 2020-03-18 NOTE — NURSING
Pt here for 5FU continuous pump d/c. Pump stopped and disconnected.  Existing dressing appeared clean and intact.  Right chestwall mediport  Flushed with 10ml NS and 5 ml heparin solution.  Needle D/C, site without redness, swelling, or drainage noted.  Dressing applied.  Patient tolerated well.  Patient to return to clinic in 2 weeks.

## 2020-03-30 ENCOUNTER — INFUSION (OUTPATIENT)
Dept: INFUSION THERAPY | Facility: HOSPITAL | Age: 65
End: 2020-03-30
Attending: INTERNAL MEDICINE
Payer: MEDICARE

## 2020-03-30 ENCOUNTER — OFFICE VISIT (OUTPATIENT)
Dept: HEMATOLOGY/ONCOLOGY | Facility: CLINIC | Age: 65
End: 2020-03-30
Payer: MEDICARE

## 2020-03-30 VITALS
TEMPERATURE: 98 F | HEART RATE: 98 BPM | BODY MASS INDEX: 18.91 KG/M2 | OXYGEN SATURATION: 98 % | WEIGHT: 132.06 LBS | SYSTOLIC BLOOD PRESSURE: 106 MMHG | DIASTOLIC BLOOD PRESSURE: 73 MMHG | HEIGHT: 70 IN

## 2020-03-30 VITALS
BODY MASS INDEX: 18.91 KG/M2 | RESPIRATION RATE: 16 BRPM | DIASTOLIC BLOOD PRESSURE: 73 MMHG | HEART RATE: 60 BPM | SYSTOLIC BLOOD PRESSURE: 107 MMHG | OXYGEN SATURATION: 99 % | WEIGHT: 132.06 LBS | HEIGHT: 70 IN

## 2020-03-30 DIAGNOSIS — D50.0 IRON DEFICIENCY ANEMIA DUE TO CHRONIC BLOOD LOSS: ICD-10-CM

## 2020-03-30 DIAGNOSIS — C20 RECTAL CANCER: ICD-10-CM

## 2020-03-30 DIAGNOSIS — G89.3 CHRONIC PAIN DUE TO NEOPLASM: ICD-10-CM

## 2020-03-30 DIAGNOSIS — Z51.11 CHEMOTHERAPY MANAGEMENT, ENCOUNTER FOR: Primary | ICD-10-CM

## 2020-03-30 DIAGNOSIS — Z43.3 COLOSTOMY CARE: ICD-10-CM

## 2020-03-30 PROCEDURE — 3074F SYST BP LT 130 MM HG: CPT | Mod: CPTII,S$GLB,, | Performed by: INTERNAL MEDICINE

## 2020-03-30 PROCEDURE — 99999 PR PBB SHADOW E&M-EST. PATIENT-LVL III: CPT | Mod: PBBFAC,,, | Performed by: INTERNAL MEDICINE

## 2020-03-30 PROCEDURE — 3078F PR MOST RECENT DIASTOLIC BLOOD PRESSURE < 80 MM HG: ICD-10-PCS | Mod: CPTII,S$GLB,, | Performed by: INTERNAL MEDICINE

## 2020-03-30 PROCEDURE — 3008F BODY MASS INDEX DOCD: CPT | Mod: CPTII,S$GLB,, | Performed by: INTERNAL MEDICINE

## 2020-03-30 PROCEDURE — 99215 PR OFFICE/OUTPT VISIT, EST, LEVL V, 40-54 MIN: ICD-10-PCS | Mod: 25,S$GLB,, | Performed by: INTERNAL MEDICINE

## 2020-03-30 PROCEDURE — 99215 OFFICE O/P EST HI 40 MIN: CPT | Mod: 25,S$GLB,, | Performed by: INTERNAL MEDICINE

## 2020-03-30 PROCEDURE — 63600175 PHARM REV CODE 636 W HCPCS: Performed by: INTERNAL MEDICINE

## 2020-03-30 PROCEDURE — 3078F DIAST BP <80 MM HG: CPT | Mod: CPTII,S$GLB,, | Performed by: INTERNAL MEDICINE

## 2020-03-30 PROCEDURE — 96360 HYDRATION IV INFUSION INIT: CPT

## 2020-03-30 PROCEDURE — 96361 HYDRATE IV INFUSION ADD-ON: CPT

## 2020-03-30 PROCEDURE — 3008F PR BODY MASS INDEX (BMI) DOCUMENTED: ICD-10-PCS | Mod: CPTII,S$GLB,, | Performed by: INTERNAL MEDICINE

## 2020-03-30 PROCEDURE — 3074F PR MOST RECENT SYSTOLIC BLOOD PRESSURE < 130 MM HG: ICD-10-PCS | Mod: CPTII,S$GLB,, | Performed by: INTERNAL MEDICINE

## 2020-03-30 PROCEDURE — 99999 PR PBB SHADOW E&M-EST. PATIENT-LVL III: ICD-10-PCS | Mod: PBBFAC,,, | Performed by: INTERNAL MEDICINE

## 2020-03-30 RX ORDER — HYDROCODONE BITARTRATE AND ACETAMINOPHEN 10; 325 MG/1; MG/1
1 TABLET ORAL EVERY 6 HOURS PRN
Qty: 60 TABLET | Refills: 0 | Status: SHIPPED | OUTPATIENT
Start: 2020-03-30 | End: 2020-04-13 | Stop reason: SDUPTHER

## 2020-03-30 RX ORDER — HEPARIN 100 UNIT/ML
500 SYRINGE INTRAVENOUS
Status: DISCONTINUED | OUTPATIENT
Start: 2020-03-30 | End: 2020-03-30 | Stop reason: HOSPADM

## 2020-03-30 RX ORDER — SODIUM CHLORIDE 0.9 % (FLUSH) 0.9 %
10 SYRINGE (ML) INJECTION
Status: CANCELLED | OUTPATIENT
Start: 2020-03-30

## 2020-03-30 RX ORDER — HEPARIN 100 UNIT/ML
500 SYRINGE INTRAVENOUS
Status: CANCELLED | OUTPATIENT
Start: 2020-03-30

## 2020-03-30 RX ORDER — SODIUM CHLORIDE 9 MG/ML
1000 INJECTION, SOLUTION INTRAVENOUS
Status: COMPLETED | OUTPATIENT
Start: 2020-03-30 | End: 2020-03-30

## 2020-03-30 RX ADMIN — HEPARIN 500 UNITS: 100 SYRINGE at 12:03

## 2020-03-30 RX ADMIN — SODIUM CHLORIDE 1000 ML: 0.9 INJECTION, SOLUTION INTRAVENOUS at 10:03

## 2020-03-30 NOTE — PROGRESS NOTES
Subjective:       Patient ID: Vincent Benton is a 64 y.o. male.    Chief Complaint: Results; Chemotherapy; and Colon Cancer    HPI 64-year-old male history of T3 N2 colon carcinoma receiving adjuvant FOLFOX therapy resected rectal carcinoma with colostomy present ECOG status 2; patient seen during Coronavirus event cycle 2 day 1 of treatment adjuvant    Past Medical History:   Diagnosis Date    Alcoholism     Anemia     Back pain     Encounter for blood transfusion 2018    Essential hypertension 2/4/2016    GERD (gastroesophageal reflux disease)     Hiatal hernia     Hypertension     Rectal cancer 9/11/2019     Family History   Problem Relation Age of Onset    Cataracts Mother     Stroke Father     Hypertension Father      Social History     Socioeconomic History    Marital status:      Spouse name: Not on file    Number of children: Not on file    Years of education: Not on file    Highest education level: Not on file   Occupational History    Not on file   Social Needs    Financial resource strain: Not on file    Food insecurity:     Worry: Not on file     Inability: Not on file    Transportation needs:     Medical: Not on file     Non-medical: Not on file   Tobacco Use    Smoking status: Never Smoker    Smokeless tobacco: Never Used   Substance and Sexual Activity    Alcohol use: Yes     Comment: HOLD 72H PRIOR TO SURGERY    Drug use: No    Sexual activity: Never     Partners: Female   Lifestyle    Physical activity:     Days per week: Not on file     Minutes per session: Not on file    Stress: Not on file   Relationships    Social connections:     Talks on phone: Not on file     Gets together: Not on file     Attends [employer] service: Not on file     Active member of club or organization: Not on file     Attends meetings of clubs or organizations: Not on file     Relationship status: Not on file   Other Topics Concern    Not on file   Social History Narrative    Not on  file     Past Surgical History:   Procedure Laterality Date    COLONOSCOPY N/A 9/3/2019    Procedure: COLONOSCOPY;  Surgeon: Isai Centeno MD;  Location: [facility];  Service: Endoscopy;  Laterality: N/A;    COLONOSCOPY N/A 1/10/2020    Procedure: COLONOSCOPY;  Surgeon: Familia Gonzalez MD;  Location: [facility] ENDO;  Service: General;  Laterality: N/A;    ESOPHAGOGASTRODUODENOSCOPY N/A 9/3/2019    Procedure: ESOPHAGOGASTRODUODENOSCOPY (EGD);  Surgeon: Isai Centeno MD;  Location: [facility] END[facility];  Service: Endoscopy;  Laterality: N/A;    FLEXIBLE SIGMOIDOSCOPY  2/4/2020    Procedure: SIGMOIDOSCOPY, FLEXIBLE;  Surgeon: Familia Gonzalez MD;  Location: [facility] OR;  Service: General;;    ILEOSTOMY Right 2/4/2020    Procedure: CREATION, ILEOSTOMY;  Surgeon: Familia Gonzalez MD;  Location: [facility] OR;  Service: General;  Laterality: Right;    INJECTION OF ANESTHETIC AGENT INTO TISSUE PLANE DEFINED BY TRANSVERSUS ABDOMINIS MUSCLE N/A 2/4/2020    Procedure: BLOCK, TRANSVERSUS ABDOMINIS PLANE;  Surgeon: Familia Gonzalez MD;  Location: [facility] OR;  Service: General;  Laterality: N/A;    INSERTION OF TUNNELED CENTRAL VENOUS CATHETER (CVC) WITH SUBCUTANEOUS PORT Right 2/4/2020    Procedure: INSERTION, PORT-A-CATH;  Surgeon: Familia Gonzalez MD;  Location: [facility] OR;  Service: General;  Laterality: Right;    JOINT REPLACEMENT Left 2009    MOBILIZATION OF SPLENIC FLEXURE  2/4/2020    Procedure: MOBILIZATION, SPLENIC FLEXURE;  Surgeon: Familia Gonzalez MD;  Location: [facility] OR;  Service: General;;       Labs:  Lab Results   Component Value Date    WBC 5.87 03/30/2020    HGB 13.1 (L) 03/30/2020    HCT 40.9 03/30/2020    MCV 86 03/30/2020     03/30/2020     BMP  Lab Results   Component Value Date     (L) 03/16/2020     (L) 03/16/2020    K 4.9 03/16/2020    K 4.9 03/16/2020    CL 98 03/16/2020    CL 98 03/16/2020    CO2 24 03/16/2020    CO2 24 03/16/2020    BUN 7 (L) 03/16/2020    BUN 7 (L) 03/16/2020     CREATININE 0.7 03/16/2020    CREATININE 0.7 03/16/2020    CALCIUM 10.2 03/16/2020    CALCIUM 10.2 03/16/2020    ANIONGAP 11 03/16/2020    ANIONGAP 11 03/16/2020    ESTGFRAFRICA >60 03/16/2020    ESTGFRAFRICA >60 03/16/2020    EGFRNONAA >60 03/16/2020    EGFRNONAA >60 03/16/2020     Lab Results   Component Value Date    ALT 14 03/16/2020    AST 22 03/16/2020    ALKPHOS 107 03/16/2020    BILITOT 0.3 03/16/2020       Lab Results   Component Value Date    IRON <10 (L) 04/13/2018    TIBC 527 (H) 04/13/2018    FERRITIN 241 03/16/2020     No results found for: FHEXTQSP65  No results found for: FOLATE  Lab Results   Component Value Date    TSH 0.601 04/07/2018         Review of Systems   Constitutional: Positive for activity change, appetite change and fatigue. Negative for chills, diaphoresis, fever and unexpected weight change.   HENT: Negative for congestion, dental problem, drooling, ear discharge, ear pain, facial swelling, hearing loss, mouth sores, nosebleeds, postnasal drip, rhinorrhea, sinus pressure, sneezing, sore throat, tinnitus, trouble swallowing and voice change.    Eyes: Negative for photophobia, pain, discharge, redness, itching and visual disturbance.   Respiratory: Negative for apnea, cough, choking, chest tightness, shortness of breath, wheezing and stridor.    Cardiovascular: Negative for chest pain, palpitations and leg swelling.   Gastrointestinal: Negative for abdominal distention, abdominal pain, anal bleeding, blood in stool, constipation, diarrhea, nausea, rectal pain and vomiting.        Colostomy present and functioning   Endocrine: Negative for cold intolerance, heat intolerance, polydipsia, polyphagia and polyuria.   Genitourinary: Negative for decreased urine volume, difficulty urinating, discharge, dysuria, enuresis, flank pain, frequency, genital sores, hematuria, penile pain, penile swelling, scrotal swelling, testicular pain and urgency.   Musculoskeletal: Negative for arthralgias, back  pain, gait problem, joint swelling, myalgias, neck pain and neck stiffness.   Skin: Negative for color change, pallor, rash and wound.   Allergic/Immunologic: Negative for environmental allergies, food allergies and immunocompromised state.   Neurological: Positive for weakness. Negative for dizziness, tremors, seizures, syncope, facial asymmetry, speech difficulty, light-headedness, numbness and headaches.   Hematological: Negative for adenopathy. Does not bruise/bleed easily.   Psychiatric/Behavioral: Positive for dysphoric mood. Negative for agitation, behavioral problems, confusion, decreased concentration, hallucinations, self-injury, sleep disturbance and suicidal ideas. The patient is nervous/anxious. The patient is not hyperactive.        Objective:      Physical Exam   Constitutional: He is oriented to person, place, and time. He appears cachectic. He has a sickly appearance. He appears ill. He appears distressed.   HENT:   Head: Normocephalic.   Right Ear: External ear normal.   Left Ear: External ear normal.   Nose: Nose normal. Right sinus exhibits no maxillary sinus tenderness and no frontal sinus tenderness. Left sinus exhibits no maxillary sinus tenderness and no frontal sinus tenderness.   Mouth/Throat: Oropharynx is clear and moist. No oropharyngeal exudate.   Eyes: Pupils are equal, round, and reactive to light. EOM and lids are normal. Right eye exhibits no discharge. Left eye exhibits no discharge. Right conjunctiva is not injected. Right conjunctiva has no hemorrhage. Left conjunctiva is not injected. Left conjunctiva has no hemorrhage. No scleral icterus. Right eye exhibits normal extraocular motion. Left eye exhibits normal extraocular motion.   Neck: Normal range of motion. Neck supple. No JVD present. No tracheal deviation present. No thyromegaly present.   Cardiovascular: Normal rate and regular rhythm.   Pulmonary/Chest: Effort normal. No stridor. No respiratory distress.   Abdominal:  Soft. He exhibits no mass. There is no hepatosplenomegaly, splenomegaly or hepatomegaly. There is no tenderness.   Colostomy present and functioning   Musculoskeletal: Normal range of motion. He exhibits no edema or tenderness.   Lymphadenopathy:        Head (right side): No posterior auricular and no occipital adenopathy present.        Head (left side): No posterior auricular and no occipital adenopathy present.     He has no cervical adenopathy.        Right cervical: No superficial cervical, no deep cervical and no posterior cervical adenopathy present.       Left cervical: No superficial cervical, no deep cervical and no posterior cervical adenopathy present.     He has no axillary adenopathy.        Right: No supraclavicular adenopathy present.        Left: No supraclavicular adenopathy present.   Neurological: He is alert and oriented to person, place, and time. He has normal strength. No cranial nerve deficit. Coordination normal.   Skin: Skin is dry. No rash noted. He is not diaphoretic. No cyanosis or erythema. Nails show no clubbing.   Psychiatric: His behavior is normal. Judgment and thought content normal. His mood appears anxious. Cognition and memory are normal. He exhibits a depressed mood.   Vitals reviewed.          Assessment:      1. Rectal cancer    2. Chemotherapy management, encounter for    3. Colostomy care    4. Iron deficiency anemia due to chronic blood loss    5. Chronic pain due to neoplasm           Plan:     Patient tolerating therapy well without major complications other than persistent anorexia.  Discussed implications with him counts are adequate for therapy proceed with cycle 2 day 1 of treatment continue with chronic stable narcotic use state  M checked sent [facility] patient seen evaluated and treated during corona virus event; infusion nurse brought to me results of chemistry studies today demonstrating pre renal azotemia.  At this point hold chemotherapy today will  proceed with 2 L of fluids over 2 hr and return in 1 week for next cycle of therapy        Timbo Mcrae Jr, MD FACP

## 2020-04-06 ENCOUNTER — INFUSION (OUTPATIENT)
Dept: INFUSION THERAPY | Facility: HOSPITAL | Age: 65
End: 2020-04-06
Attending: INTERNAL MEDICINE
Payer: MEDICARE

## 2020-04-06 ENCOUNTER — DOCUMENTATION ONLY (OUTPATIENT)
Dept: HEMATOLOGY/ONCOLOGY | Facility: CLINIC | Age: 65
End: 2020-04-06

## 2020-04-06 ENCOUNTER — OFFICE VISIT (OUTPATIENT)
Dept: HEMATOLOGY/ONCOLOGY | Facility: CLINIC | Age: 65
End: 2020-04-06
Payer: MEDICARE

## 2020-04-06 VITALS
HEIGHT: 70 IN | BODY MASS INDEX: 19.22 KG/M2 | DIASTOLIC BLOOD PRESSURE: 75 MMHG | HEART RATE: 78 BPM | RESPIRATION RATE: 14 BRPM | WEIGHT: 134.25 LBS | SYSTOLIC BLOOD PRESSURE: 119 MMHG | OXYGEN SATURATION: 100 % | TEMPERATURE: 98 F

## 2020-04-06 VITALS
SYSTOLIC BLOOD PRESSURE: 116 MMHG | WEIGHT: 134.25 LBS | DIASTOLIC BLOOD PRESSURE: 74 MMHG | HEART RATE: 58 BPM | BODY MASS INDEX: 19.26 KG/M2

## 2020-04-06 DIAGNOSIS — C20 RECTAL CANCER: ICD-10-CM

## 2020-04-06 DIAGNOSIS — D50.0 IRON DEFICIENCY ANEMIA DUE TO CHRONIC BLOOD LOSS: ICD-10-CM

## 2020-04-06 DIAGNOSIS — C20 RECTAL CANCER: Primary | ICD-10-CM

## 2020-04-06 PROCEDURE — 96415 CHEMO IV INFUSION ADDL HR: CPT

## 2020-04-06 PROCEDURE — 96368 THER/DIAG CONCURRENT INF: CPT

## 2020-04-06 PROCEDURE — 3074F PR MOST RECENT SYSTOLIC BLOOD PRESSURE < 130 MM HG: ICD-10-PCS | Mod: CPTII,S$GLB,, | Performed by: INTERNAL MEDICINE

## 2020-04-06 PROCEDURE — 3074F SYST BP LT 130 MM HG: CPT | Mod: CPTII,S$GLB,, | Performed by: INTERNAL MEDICINE

## 2020-04-06 PROCEDURE — 99215 PR OFFICE/OUTPT VISIT, EST, LEVL V, 40-54 MIN: ICD-10-PCS | Mod: 25,S$GLB,, | Performed by: INTERNAL MEDICINE

## 2020-04-06 PROCEDURE — 96411 CHEMO IV PUSH ADDL DRUG: CPT

## 2020-04-06 PROCEDURE — 96413 CHEMO IV INFUSION 1 HR: CPT

## 2020-04-06 PROCEDURE — 99215 OFFICE O/P EST HI 40 MIN: CPT | Mod: 25,S$GLB,, | Performed by: INTERNAL MEDICINE

## 2020-04-06 PROCEDURE — 99999 PR PBB SHADOW E&M-EST. PATIENT-LVL III: CPT | Mod: PBBFAC,,, | Performed by: INTERNAL MEDICINE

## 2020-04-06 PROCEDURE — 63600175 PHARM REV CODE 636 W HCPCS: Performed by: INTERNAL MEDICINE

## 2020-04-06 PROCEDURE — 3008F PR BODY MASS INDEX (BMI) DOCUMENTED: ICD-10-PCS | Mod: CPTII,S$GLB,, | Performed by: INTERNAL MEDICINE

## 2020-04-06 PROCEDURE — 3078F PR MOST RECENT DIASTOLIC BLOOD PRESSURE < 80 MM HG: ICD-10-PCS | Mod: CPTII,S$GLB,, | Performed by: INTERNAL MEDICINE

## 2020-04-06 PROCEDURE — 3008F BODY MASS INDEX DOCD: CPT | Mod: CPTII,S$GLB,, | Performed by: INTERNAL MEDICINE

## 2020-04-06 PROCEDURE — 25000003 PHARM REV CODE 250: Performed by: INTERNAL MEDICINE

## 2020-04-06 PROCEDURE — 3078F DIAST BP <80 MM HG: CPT | Mod: CPTII,S$GLB,, | Performed by: INTERNAL MEDICINE

## 2020-04-06 PROCEDURE — 99999 PR PBB SHADOW E&M-EST. PATIENT-LVL III: ICD-10-PCS | Mod: PBBFAC,,, | Performed by: INTERNAL MEDICINE

## 2020-04-06 PROCEDURE — 96416 CHEMO PROLONG INFUSE W/PUMP: CPT

## 2020-04-06 PROCEDURE — 96367 TX/PROPH/DG ADDL SEQ IV INF: CPT

## 2020-04-06 RX ORDER — SODIUM CHLORIDE 0.9 % (FLUSH) 0.9 %
10 SYRINGE (ML) INJECTION
Status: CANCELLED | OUTPATIENT
Start: 2020-04-08

## 2020-04-06 RX ORDER — EPINEPHRINE 0.3 MG/.3ML
0.3 INJECTION SUBCUTANEOUS ONCE AS NEEDED
Status: CANCELLED | OUTPATIENT
Start: 2020-04-06

## 2020-04-06 RX ORDER — FLUOROURACIL 50 MG/ML
400 INJECTION, SOLUTION INTRAVENOUS
Status: COMPLETED | OUTPATIENT
Start: 2020-04-06 | End: 2020-04-06

## 2020-04-06 RX ORDER — DIPHENHYDRAMINE HYDROCHLORIDE 50 MG/ML
50 INJECTION INTRAMUSCULAR; INTRAVENOUS ONCE AS NEEDED
Status: CANCELLED | OUTPATIENT
Start: 2020-04-06

## 2020-04-06 RX ORDER — FLUOROURACIL 50 MG/ML
400 INJECTION, SOLUTION INTRAVENOUS
Status: CANCELLED | OUTPATIENT
Start: 2020-04-06

## 2020-04-06 RX ORDER — HEPARIN 100 UNIT/ML
500 SYRINGE INTRAVENOUS
Status: CANCELLED | OUTPATIENT
Start: 2020-04-06

## 2020-04-06 RX ORDER — SODIUM CHLORIDE 0.9 % (FLUSH) 0.9 %
10 SYRINGE (ML) INJECTION
Status: CANCELLED | OUTPATIENT
Start: 2020-04-06

## 2020-04-06 RX ORDER — HEPARIN 100 UNIT/ML
500 SYRINGE INTRAVENOUS
Status: CANCELLED | OUTPATIENT
Start: 2020-04-08

## 2020-04-06 RX ADMIN — FLUOROURACIL 710 MG: 50 INJECTION, SOLUTION INTRAVENOUS at 04:04

## 2020-04-06 RX ADMIN — LEUCOVORIN CALCIUM 700 MG: 500 INJECTION, POWDER, LYOPHILIZED, FOR SOLUTION INTRAMUSCULAR; INTRAVENOUS at 02:04

## 2020-04-06 RX ADMIN — PALONOSETRON HYDROCHLORIDE: 0.25 INJECTION, SOLUTION INTRAVENOUS at 01:04

## 2020-04-06 RX ADMIN — FLUOROURACIL 4270 MG: 50 INJECTION, SOLUTION INTRAVENOUS at 04:04

## 2020-04-06 RX ADMIN — OXALIPLATIN 150 MG: 5 INJECTION, SOLUTION, CONCENTRATE INTRAVENOUS at 02:04

## 2020-04-06 NOTE — PROGRESS NOTES
Ela met with patient today at chairside to provide copy of [place] jl she applied for him. Sw also discussed cancer care transportation jl with patient. Sw printed out directions on how he can apply. Ela will continue to f/u as needed.

## 2020-04-06 NOTE — ASSESSMENT & PLAN NOTE
Stage IIIB rectal cancer, currently on adjuvant FOLFOX.  Status post 2 cycles.  Reviewed labs today, no concern cytopenia will proceed with treatment.  Plan to see him back in 2 weeks with repeat labs.  Will plan for restaging scan after 2 -3 more cycles.

## 2020-04-06 NOTE — PROGRESS NOTES
Subjective:   Date of Visit: 4/6/20   ?   ?   CHIEF COMPLAINT:  Locally advanced rectal cancer, Stage IIIB (cT3,cN2a, CM0)  ?     HPI:   was seen at [facility] today. He is a 64 yr old male with recently diagnosed locally advanced rectal cancer.  Patient has completed concurrent chemoradiation with capecitabine.  Status post low anterior resection.  Final path noted at ypT3N0.      Due to COVID-19 concerns, patient is joining through secured telephone for this visit.  Status post cycle 2 of adjuvant therapy with FOLFOX.  Presents for cycle 3.  Today.  Denies denies fever, chills, nausea or vomiting, chest pain, shortness of breath, abdominal pain, diarrhea or dysuria.  Appetite seems stable per patient.    Review of Systems   Constitutional: Negative for activity change, appetite change, chills, fatigue, fever and unexpected weight change.   HENT: Negative for hearing loss, mouth sores, nosebleeds, sore throat, tinnitus, trouble swallowing and voice change.    Eyes: Negative for visual disturbance.   Respiratory: Negative for cough, chest tightness, shortness of breath and wheezing.    Cardiovascular: Negative for chest pain, palpitations and leg swelling.   Gastrointestinal: Positive for rectal pain. Negative for abdominal pain, anal bleeding, blood in stool, constipation, diarrhea, nausea and vomiting.   Genitourinary: Negative for frequency, hematuria and testicular pain.   Musculoskeletal: Negative for arthralgias, back pain, gait problem and neck pain.   Skin: Negative for color change, pallor, rash and wound.   Allergic/Immunologic: Negative for immunocompromised state.   Neurological: Negative for seizures, syncope, weakness, numbness and headaches.   Hematological: Negative for adenopathy. Does not bruise/bleed easily.   Psychiatric/Behavioral: Negative for agitation, confusion, decreased concentration, hallucinations and sleep disturbance. The patient is not nervous/anxious.        ?    PAST MEDICAL HISTORY:   Past Medical History:   Diagnosis Date    Alcoholism     Anemia     Back pain     Encounter for blood transfusion 2018    Essential hypertension 2/4/2016    GERD (gastroesophageal reflux disease)     Hiatal hernia     Hypertension     Rectal cancer 9/11/2019    ?     PAST SURGICAL HISTORY:   Past Surgical History:   Procedure Laterality Date    COLONOSCOPY N/A 9/3/2019    Procedure: COLONOSCOPY;  Surgeon: Isai Centeno MD;  Location: [facility] ENDO;  Service: Endoscopy;  Laterality: N/A;    COLONOSCOPY N/A 1/10/2020    Procedure: COLONOSCOPY;  Surgeon: Familia Gonzalez MD;  Location: [facility];  Service: General;  Laterality: N/A;    ESOPHAGOGASTRODUODENOSCOPY N/A 9/3/2019    Procedure: ESOPHAGOGASTRODUODENOSCOPY (EGD);  Surgeon: Isai Centeno MD;  Location: [facility];  Service: Endoscopy;  Laterality: N/A;    FLEXIBLE SIGMOIDOSCOPY  2/4/2020    Procedure: SIGMOIDOSCOPY, FLEXIBLE;  Surgeon: Familia Gonzalez MD;  Location: [facility] OR;  Service: General;;    ILEOSTOMY Right 2/4/2020    Procedure: CREATION, ILEOSTOMY;  Surgeon: Familia Gonzalez MD;  Location: [facility];  Service: General;  Laterality: Right;    INJECTION OF ANESTHETIC AGENT INTO TISSUE PLANE DEFINED BY TRANSVERSUS ABDOMINIS MUSCLE N/A 2/4/2020    Procedure: BLOCK, TRANSVERSUS ABDOMINIS PLANE;  Surgeon: Familia Gonzalez MD;  Location: [facility];  Service: General;  Laterality: N/A;    INSERTION OF TUNNELED CENTRAL VENOUS CATHETER (CVC) WITH SUBCUTANEOUS PORT Right 2/4/2020    Procedure: INSERTION, PORT-A-CATH;  Surgeon: Familia Gonzalez MD;  Location: [facility];  Service: General;  Laterality: Right;    JOINT REPLACEMENT Left 2009    MOBILIZATION OF SPLENIC FLEXURE  2/4/2020    Procedure: MOBILIZATION, SPLENIC FLEXURE;  Surgeon: Familia Gonzalez MD;  Location: [facility] OR;  Service: General;;      ?   ALLERGIES:   Allergies as of 04/06/2020    (No Known Allergies)      ?   MEDICATIONS:?   No outpatient  medications have been marked as taking for the 4/6/20 encounter (Office Visit) with Mikel Sams MD.      ?   SOCIAL HISTORY:?   Social History     Tobacco Use    Smoking status: Never Smoker    Smokeless tobacco: Never Used   Substance Use Topics    Alcohol use: Yes     Comment: HOLD 72H PRIOR TO SURGERY        ?   FAMILY HISTORY:   family history includes Cataracts in his mother; Hypertension in his father; Stroke in his father.   ?     Objective:      Physical exam:  Unable to perform  ?   Vitals:    04/06/20 1313   BP: 119/75   Pulse: 78   Resp: 14   Temp: 97.7 °F (36.5 °C)      ?     ECOG SCORE               ?   Laboratory:  ?   Lab Visit on 04/06/2020   Component Date Value Ref Range Status    WBC 04/06/2020 4.33  3.90 - 12.70 K/uL Final    RBC 04/06/2020 4.20* 4.60 - 6.20 M/uL Final    Hemoglobin 04/06/2020 11.4* 14.0 - 18.0 g/dL Final    Hematocrit 04/06/2020 37.0* 40.0 - 54.0 % Final    Mean Corpuscular Volume 04/06/2020 88  82 - 98 fL Final    Mean Corpuscular Hemoglobin 04/06/2020 27.1  27.0 - 31.0 pg Final    Mean Corpuscular Hemoglobin Conc 04/06/2020 30.8* 32.0 - 36.0 g/dL Final    RDW 04/06/2020 17.6* 11.5 - 14.5 % Final    Platelets 04/06/2020 397* 150 - 350 K/uL Final    MPV 04/06/2020 9.4  9.2 - 12.9 fL Final    Immature Granulocytes 04/06/2020 0.7* 0.0 - 0.5 % Final    Gran # (ANC) 04/06/2020 3.0  1.8 - 7.7 K/uL Final    Immature Grans (Abs) 04/06/2020 0.03  0.00 - 0.04 K/uL Final    Lymph # 04/06/2020 0.5* 1.0 - 4.8 K/uL Final    Mono # 04/06/2020 0.9  0.3 - 1.0 K/uL Final    Eos # 04/06/2020 0.0  0.0 - 0.5 K/uL Final    Baso # 04/06/2020 0.02  0.00 - 0.20 K/uL Final    nRBC 04/06/2020 0  0 /100 WBC Final    Gran% 04/06/2020 68.0  38.0 - 73.0 % Final    Lymph% 04/06/2020 10.4* 18.0 - 48.0 % Final    Mono% 04/06/2020 19.9* 4.0 - 15.0 % Final    Eosinophil% 04/06/2020 0.5  0.0 - 8.0 % Final    Basophil% 04/06/2020 0.5  0.0 - 1.9 % Final    Differential Method  04/06/2020 Automated   Final      ?   Tumor markers   ?   ?   Imaging: X-Ray Chest PA And Lateral  Narrative: EXAMINATION:  XR CHEST PA AND LATERAL    CLINICAL HISTORY:  s/p port placement, rule out ptx;    TECHNIQUE:  Single frontal view of the chest was performed.    COMPARISON:  02/04/2020    FINDINGS:  Right subclavian chest port placement with the tip overlying the SVC.  No pneumothorax.  In comparison to the prior study, there is no adverse interval changes  Impression: In comparison to the prior study, there is no adverse interval changes    Electronically signed by: Avila Edward MD  Date:    02/05/2020  Time:    08:00     ?      Pathology:  Pathology Results  (Last 10 years)               02/04/20 1704 (A) Specimen to Pathology, Surgery General Surgery (Abnormal) Final result    Narrative:  Pre-op Diagnosis: Rectal adenocarcinoma [C20]   Procedure(s):   XI ROBOTIC LOW ANTERIOR RESECTION   INSERTION, PORT-A-CATH   BLOCK, TRANSVERSUS ABDOMINIS PLANE   Number of specimens: 3   Name of specimens: 1. Sigmoid colon and rectum (perm) 2.   Proximal margin (perm) 3. Anastomotic ring (perm)   Specimen total (fresh, frozen, permanent):->3       01/10/20 1354  Specimen to Pathology, Surgery Gastrointestinal tract Final result    Narrative:  Pre-op Diagnosis: Rectal adenocarcinoma [C20]   Procedure(s):   COLONOSCOPY   Number of specimens: 1   Name of specimens:   1.  Descending colon polyp   Specimen total (fresh, frozen, permanent):->1              ?   Assessment/Plan:         Rectal cancer  Stage IIIB rectal cancer, currently on adjuvant FOLFOX.  Status post 2 cycles.  Reviewed labs today, no concern cytopenia will proceed with treatment.  Plan to see him back in 2 weeks with repeat labs.  Will plan for restaging scan after 2 -3 more cycles.    Iron deficiency anemia due to chronic blood loss  Stable hemoglobin noted at 11 grams/deciliter.  No evidence of active bleed.    Appetite improving with Marinol.    Follow-Up:  Follow up in about 2 weeks (around 4/20/2020).    Consult Start Time: 04/06/2020 13:00  Consult End Time: 04/06/2020 13:20      The patient location is: office  The chief complaint leading to consultation is:  Stage IIIB rectal cancer  Visit type: Virtual visit with synchronous audio and video  Total time spent with patient:  20 min  Each patient to whom he or she provides medical services by telemedicine is:  (1) informed of the relationship between the physician and patient and the respective role of any other health care provider with respect to management of the patient; and (2) notified that he or she may decline to receive medical services by telemedicine and may withdraw from such care at any time.    HOMA GAMBOA Md., Ph.D  Hematology & Oncology Department  Phone #: [phone]

## 2020-04-08 ENCOUNTER — INFUSION (OUTPATIENT)
Dept: INFUSION THERAPY | Facility: HOSPITAL | Age: 65
End: 2020-04-08
Attending: INTERNAL MEDICINE
Payer: MEDICARE

## 2020-04-08 VITALS
RESPIRATION RATE: 18 BRPM | OXYGEN SATURATION: 98 % | DIASTOLIC BLOOD PRESSURE: 73 MMHG | SYSTOLIC BLOOD PRESSURE: 107 MMHG | TEMPERATURE: 99 F | HEART RATE: 73 BPM

## 2020-04-08 DIAGNOSIS — C20 RECTAL CANCER: Primary | ICD-10-CM

## 2020-04-08 PROCEDURE — 96523 IRRIG DRUG DELIVERY DEVICE: CPT

## 2020-04-08 PROCEDURE — A4216 STERILE WATER/SALINE, 10 ML: HCPCS | Performed by: INTERNAL MEDICINE

## 2020-04-08 PROCEDURE — 63600175 PHARM REV CODE 636 W HCPCS: Performed by: INTERNAL MEDICINE

## 2020-04-08 PROCEDURE — 25000003 PHARM REV CODE 250: Performed by: INTERNAL MEDICINE

## 2020-04-08 RX ORDER — SODIUM CHLORIDE 0.9 % (FLUSH) 0.9 %
10 SYRINGE (ML) INJECTION
Status: DISCONTINUED | OUTPATIENT
Start: 2020-04-08 | End: 2020-04-08 | Stop reason: HOSPADM

## 2020-04-08 RX ORDER — HEPARIN 100 UNIT/ML
500 SYRINGE INTRAVENOUS
Status: DISCONTINUED | OUTPATIENT
Start: 2020-04-08 | End: 2020-04-08 | Stop reason: HOSPADM

## 2020-04-08 RX ADMIN — Medication 10 ML: at 02:04

## 2020-04-08 RX ADMIN — HEPARIN 500 UNITS: 100 SYRINGE at 02:04

## 2020-04-13 DIAGNOSIS — C20 RECTAL CANCER: ICD-10-CM

## 2020-04-13 DIAGNOSIS — C20 RECTAL CANCER: Primary | ICD-10-CM

## 2020-04-13 RX ORDER — HYDROCODONE BITARTRATE AND ACETAMINOPHEN 10; 325 MG/1; MG/1
1 TABLET ORAL EVERY 6 HOURS PRN
Qty: 60 TABLET | Refills: 0 | Status: SHIPPED | OUTPATIENT
Start: 2020-04-13 | End: 2020-04-13 | Stop reason: SDUPTHER

## 2020-04-13 RX ORDER — HYDROCODONE BITARTRATE AND ACETAMINOPHEN 10; 325 MG/1; MG/1
1 TABLET ORAL EVERY 6 HOURS PRN
Qty: 60 TABLET | Refills: 0 | Status: SHIPPED | OUTPATIENT
Start: 2020-04-13 | End: 2020-04-27 | Stop reason: SDUPTHER

## 2020-04-13 NOTE — TELEPHONE ENCOUNTER
----- Message from Elis Crowder sent at 4/13/2020  2:59 PM CDT -----  Contact: Patient   Vincent would like a call back at [phone], Regards to the status of his pain med refills.    Thanks  td

## 2020-04-13 NOTE — TELEPHONE ENCOUNTER
----- Message from Frank Harris sent at 4/13/2020 12:03 PM CDT -----  ..Type:  RX Refill Request    Who Called:  Pt   Refill or New Rx: refill   RX Name and Strength: norco  How is the patient currently taking it? (ex. 1XDay): call back   Is this a 30 day or 90 day RX: 30  Preferred Pharmacy with phone number:   Local or Mail Order:local   Ordering Provider:  Would the patient rather a call back or a response via [facility]? Call back  Best Call Back Number:[phone]  Additional Information:  Refill

## 2020-04-13 NOTE — TELEPHONE ENCOUNTER
----- Message from Elis Crowder sent at 4/13/2020  2:59 PM CDT -----  Contact: Patient   Vincent would like a call back at [phone], Regards to the status of his pain med refills.    Thanks  td

## 2020-04-13 NOTE — TELEPHONE ENCOUNTER
----- Message from Nuris Beebe sent at 4/13/2020  8:21 AM CDT -----  Contact: pt  Pt would like to get refill of HYDROcodone-acetaminophen (NORCO)  mg per tablet today and can be reached at [phone]    [facility] [address] [place] [address]  Phone: [phone] Fax: [phone]    Thanks,  Nuris Beebe

## 2020-04-15 ENCOUNTER — DOCUMENTATION ONLY (OUTPATIENT)
Dept: HEMATOLOGY/ONCOLOGY | Facility: CLINIC | Age: 65
End: 2020-04-15

## 2020-04-15 DIAGNOSIS — Z03.818 ENCOUNTER FOR OBSERVATION FOR SUSPECTED EXPOSURE TO OTHER BIOLOGICAL AGENTS RULED OUT: ICD-10-CM

## 2020-04-15 DIAGNOSIS — C20 RECTAL CANCER: Primary | ICD-10-CM

## 2020-04-15 NOTE — PROGRESS NOTES
Ela applied patient on today for COVID assistance jl through [facility] Financial Assistance for colorectal patients. Ela will f/u.

## 2020-04-16 ENCOUNTER — TELEPHONE (OUTPATIENT)
Dept: HEMATOLOGY/ONCOLOGY | Facility: CLINIC | Age: 65
End: 2020-04-16

## 2020-04-16 NOTE — TELEPHONE ENCOUNTER
Sw received call from patient on today. Pt called to provide SW with an email address she can use to apply for his jl. Pt provided Sw with his granddaughter's email which is [email or web address].    Sw email Ms. Saldana from [facility] and provided her with his email address.    No

## 2020-04-20 ENCOUNTER — INFUSION (OUTPATIENT)
Dept: INFUSION THERAPY | Facility: HOSPITAL | Age: 65
End: 2020-04-20
Attending: INTERNAL MEDICINE
Payer: MEDICARE

## 2020-04-20 ENCOUNTER — OFFICE VISIT (OUTPATIENT)
Dept: HEMATOLOGY/ONCOLOGY | Facility: CLINIC | Age: 65
End: 2020-04-20
Payer: MEDICARE

## 2020-04-20 VITALS
HEART RATE: 96 BPM | SYSTOLIC BLOOD PRESSURE: 114 MMHG | BODY MASS INDEX: 19.34 KG/M2 | OXYGEN SATURATION: 100 % | RESPIRATION RATE: 14 BRPM | HEIGHT: 70 IN | TEMPERATURE: 97 F | WEIGHT: 135.13 LBS | DIASTOLIC BLOOD PRESSURE: 74 MMHG

## 2020-04-20 VITALS
DIASTOLIC BLOOD PRESSURE: 65 MMHG | SYSTOLIC BLOOD PRESSURE: 102 MMHG | OXYGEN SATURATION: 99 % | TEMPERATURE: 97 F | HEART RATE: 66 BPM | RESPIRATION RATE: 18 BRPM

## 2020-04-20 DIAGNOSIS — Z03.818 ENCNTR FOR OBS FOR SUSP EXPSR TO OTH BIOLG AGENTS RULED OUT: Primary | ICD-10-CM

## 2020-04-20 DIAGNOSIS — C20 RECTAL CANCER: ICD-10-CM

## 2020-04-20 DIAGNOSIS — D50.0 IRON DEFICIENCY ANEMIA DUE TO CHRONIC BLOOD LOSS: ICD-10-CM

## 2020-04-20 DIAGNOSIS — C20 RECTAL CANCER: Primary | ICD-10-CM

## 2020-04-20 PROCEDURE — 99999 PR PBB SHADOW E&M-EST. PATIENT-LVL III: CPT | Mod: PBBFAC,,, | Performed by: INTERNAL MEDICINE

## 2020-04-20 PROCEDURE — 96368 THER/DIAG CONCURRENT INF: CPT

## 2020-04-20 PROCEDURE — 3078F PR MOST RECENT DIASTOLIC BLOOD PRESSURE < 80 MM HG: ICD-10-PCS | Mod: CPTII,S$GLB,, | Performed by: INTERNAL MEDICINE

## 2020-04-20 PROCEDURE — 99215 OFFICE O/P EST HI 40 MIN: CPT | Mod: 25,S$GLB,, | Performed by: INTERNAL MEDICINE

## 2020-04-20 PROCEDURE — 3074F PR MOST RECENT SYSTOLIC BLOOD PRESSURE < 130 MM HG: ICD-10-PCS | Mod: CPTII,S$GLB,, | Performed by: INTERNAL MEDICINE

## 2020-04-20 PROCEDURE — 96415 CHEMO IV INFUSION ADDL HR: CPT

## 2020-04-20 PROCEDURE — 96411 CHEMO IV PUSH ADDL DRUG: CPT

## 2020-04-20 PROCEDURE — 99999 PR PBB SHADOW E&M-EST. PATIENT-LVL III: ICD-10-PCS | Mod: PBBFAC,,, | Performed by: INTERNAL MEDICINE

## 2020-04-20 PROCEDURE — 96416 CHEMO PROLONG INFUSE W/PUMP: CPT

## 2020-04-20 PROCEDURE — 25000003 PHARM REV CODE 250: Performed by: INTERNAL MEDICINE

## 2020-04-20 PROCEDURE — 96413 CHEMO IV INFUSION 1 HR: CPT

## 2020-04-20 PROCEDURE — 3008F BODY MASS INDEX DOCD: CPT | Mod: CPTII,S$GLB,, | Performed by: INTERNAL MEDICINE

## 2020-04-20 PROCEDURE — 3078F DIAST BP <80 MM HG: CPT | Mod: CPTII,S$GLB,, | Performed by: INTERNAL MEDICINE

## 2020-04-20 PROCEDURE — 63600175 PHARM REV CODE 636 W HCPCS: Performed by: INTERNAL MEDICINE

## 2020-04-20 PROCEDURE — 3008F PR BODY MASS INDEX (BMI) DOCUMENTED: ICD-10-PCS | Mod: CPTII,S$GLB,, | Performed by: INTERNAL MEDICINE

## 2020-04-20 PROCEDURE — 99215 PR OFFICE/OUTPT VISIT, EST, LEVL V, 40-54 MIN: ICD-10-PCS | Mod: 25,S$GLB,, | Performed by: INTERNAL MEDICINE

## 2020-04-20 PROCEDURE — 3074F SYST BP LT 130 MM HG: CPT | Mod: CPTII,S$GLB,, | Performed by: INTERNAL MEDICINE

## 2020-04-20 PROCEDURE — 96367 TX/PROPH/DG ADDL SEQ IV INF: CPT

## 2020-04-20 RX ORDER — FLUOROURACIL 50 MG/ML
400 INJECTION, SOLUTION INTRAVENOUS
Status: COMPLETED | OUTPATIENT
Start: 2020-04-20 | End: 2020-04-20

## 2020-04-20 RX ORDER — HEPARIN 100 UNIT/ML
500 SYRINGE INTRAVENOUS
Status: CANCELLED | OUTPATIENT
Start: 2020-04-22

## 2020-04-20 RX ORDER — EPINEPHRINE 0.3 MG/.3ML
0.3 INJECTION SUBCUTANEOUS ONCE AS NEEDED
Status: CANCELLED | OUTPATIENT
Start: 2020-04-20

## 2020-04-20 RX ORDER — FLUOROURACIL 50 MG/ML
400 INJECTION, SOLUTION INTRAVENOUS
Status: CANCELLED | OUTPATIENT
Start: 2020-04-20

## 2020-04-20 RX ORDER — SODIUM CHLORIDE 0.9 % (FLUSH) 0.9 %
10 SYRINGE (ML) INJECTION
Status: DISCONTINUED | OUTPATIENT
Start: 2020-04-20 | End: 2020-04-20 | Stop reason: HOSPADM

## 2020-04-20 RX ORDER — DIPHENHYDRAMINE HYDROCHLORIDE 50 MG/ML
50 INJECTION INTRAMUSCULAR; INTRAVENOUS ONCE AS NEEDED
Status: CANCELLED | OUTPATIENT
Start: 2020-04-20

## 2020-04-20 RX ORDER — EPINEPHRINE 1 MG/ML
0.3 INJECTION, SOLUTION INTRACARDIAC; INTRAMUSCULAR; INTRAVENOUS; SUBCUTANEOUS ONCE AS NEEDED
Status: DISCONTINUED | OUTPATIENT
Start: 2020-04-20 | End: 2020-04-20 | Stop reason: HOSPADM

## 2020-04-20 RX ORDER — HEPARIN 100 UNIT/ML
500 SYRINGE INTRAVENOUS
Status: CANCELLED | OUTPATIENT
Start: 2020-04-20

## 2020-04-20 RX ORDER — SODIUM CHLORIDE 0.9 % (FLUSH) 0.9 %
10 SYRINGE (ML) INJECTION
Status: CANCELLED | OUTPATIENT
Start: 2020-04-22

## 2020-04-20 RX ORDER — DIPHENHYDRAMINE HYDROCHLORIDE 50 MG/ML
50 INJECTION INTRAMUSCULAR; INTRAVENOUS ONCE AS NEEDED
Status: DISCONTINUED | OUTPATIENT
Start: 2020-04-20 | End: 2020-04-20 | Stop reason: HOSPADM

## 2020-04-20 RX ORDER — SODIUM CHLORIDE 0.9 % (FLUSH) 0.9 %
10 SYRINGE (ML) INJECTION
Status: CANCELLED | OUTPATIENT
Start: 2020-04-20

## 2020-04-20 RX ADMIN — LEUCOVORIN CALCIUM 710 MG: 500 INJECTION, POWDER, LYOPHILIZED, FOR SOLUTION INTRAMUSCULAR; INTRAVENOUS at 12:04

## 2020-04-20 RX ADMIN — FLUOROURACIL 710 MG: 50 INJECTION, SOLUTION INTRAVENOUS at 02:04

## 2020-04-20 RX ADMIN — FLUOROURACIL 4270 MG: 50 INJECTION, SOLUTION INTRAVENOUS at 02:04

## 2020-04-20 RX ADMIN — OXALIPLATIN 151 MG: 5 INJECTION, SOLUTION, CONCENTRATE INTRAVENOUS at 12:04

## 2020-04-20 RX ADMIN — SODIUM CHLORIDE: 0.9 INJECTION, SOLUTION INTRAVENOUS at 11:04

## 2020-04-20 RX ADMIN — DEXTROSE MONOHYDRATE: 50 INJECTION, SOLUTION INTRAVENOUS at 12:04

## 2020-04-20 RX ADMIN — DEXAMETHASONE SODIUM PHOSPHATE: 4 INJECTION, SOLUTION INTRA-ARTICULAR; INTRALESIONAL; INTRAMUSCULAR; INTRAVENOUS; SOFT TISSUE at 11:04

## 2020-04-20 NOTE — DISCHARGE INSTRUCTIONS
[facility]  [id number] [facility]  [address]  [phone] phone     [phone] fax  Hours of Operation: Monday- Friday 8:00am- 5:00pm  After hours phone  [phone]  Hematology / Oncology Physicians on call      PETE Kumar Dr., Dr., Dr., Dr., NP Sydney Prescott, NP Tyesha Taylor, NP    Please call with any concerns regarding your appointment today.        FALL PREVENTION   Falls often occur due to slipping, tripping or losing your balance. Here are ways to reduce your risk of falling again.   Was there anything that caused your fall that can be fixed, removed or replaced?   Make your home safe by keeping walkways clear of objects you may trip over.   Use non-slip pads under rugs.   Do not walk in poorly lit areas.   Do not stand on chairs or wobbly ladders.   Use caution when reaching overhead or looking upward. This position can cause a loss of balance.   Be sure your shoes fit properly, have non-slip bottoms and are in good condition.   Be cautious when going up and down stairs, curbs, and when walking on uneven sidewalks.   If your balance is poor, consider using a cane or walker.   If your fall was related to alcohol use, stop or limit alcohol intake.   If your fall was related to use of sleeping medicines, talk to your doctor about this. You may need to reduce your dosage at bedtime if you awaken during the night to go to the bathroom.   To reduce the need for nighttime bathroom trips:   Avoid drinking fluids for several hours before going to bed   Empty your bladder before going to bed   Men can keep a urinal at the bedside   © [id number] [facility], [address] [place] [address], [place] [address]. All rights reserved. This information is not intended as a substitute for professional medical care. Always follow your healthcare professional's instructions.          HOME CARE AFTER  CHEMOTHERAPY   Meals   Many patients feel sick and lose their appetites during treatment. Eat small meals several times a day. Choose bland foods with little taste or smell if you have problems with nausea. Be sure to cook all food thoroughly. This kills bacteria and helps you avoid intestinal infection. Soft foods are easier to swallow and digest.   Activity   Exercise keeps you strong and keeps your heart and lungs active. Talk to your doctor about an appropriate exercise program for you.   Skin Care   To prevent a skin infection, bathe or shower once a day. Use a moisturizing soap and wash with warm water. Avoid very hot or cold water. Chemotherapy can make your skin dry . Apply moisturizing lotion to help relieve dry skin. Some drugs used in high doses can cause slight burns to appear (like sunburn). Ask for a special cream to help relieve the burn and protect your skin.   Prevent Mouth Sores   During chemotherapy, many people get mouth sores. Do the following to help prevent mouth sores or to ease discomfort.   Brush your teeth with a soft-bristle toothbrush after every meal.  Don't use dental floss if your platelet count is below 50,000. Your doctor or nurse will tell you if this is the case.  Use an oral swab or special soft toothbrush if your gums bleed during regular brushing.  Use mouthwash as directed. If you can't tolerate commercial mouthwash, use salt and baking soda to clean your mouth. Mix 1 teaspoon of salt and 1 teaspoon of baking soda into a glass of water. Swish and spit.  Call your doctor or return to this facility if you develop any of the following:   Sore throat   White patches in the mouth or throat   Fever of 100.4ºF (38ºC) or higher, or as directed by your healthcare provider  © 9118-0934 [employer], [address] [place] [address], [place] [address]. All rights reserved. This information is not intended as a substitute for professional medical care. Always follow your healthcare  professional's

## 2020-04-20 NOTE — ASSESSMENT & PLAN NOTE
Stage IIIB rectal adenocarcinoma, status post surgical resection and currently on adjuvant therapy with FOLFOX.  Present for cycle 4, reviewed labs, no concern cytopenia, proceed with treatment.  Plan to see him back in 2 weeks with repeat labs.

## 2020-04-20 NOTE — PLAN OF CARE
"  Problem: Adult Inpatient Plan of Care  Goal: Plan of Care Review  Outcome: Ongoing, Progressing  Flowsheets (Taken 4/20/2020 1523)  Plan of Care Reviewed With: patient  Goal: Patient-Specific Goal (Individualization)  Outcome: Ongoing, Progressing  Flowsheets (Taken 4/20/2020 1523)  Individualized Care Needs: pillow, blanket, juice, snack  Anxieties, Fears or Concerns: None  Patient-Specific Goals (Include Timeframe): patient will tolerate chemo today    Patient states, "I feel much better today than last visit."     "

## 2020-04-20 NOTE — PROGRESS NOTES
Subjective:   Date of Visit: 4/20/20   ?   ?   CHIEF COMPLAINT:  Locally advanced rectal cancer, Stage IIIB (cT3,cN2a, CM0)  ?     HPI:   was seen at [facility] today. He is a 64 yr old male with recently diagnosed locally advanced rectal cancer.  Patient has completed concurrent chemoradiation with capecitabine.  Status post low anterior resection.  Final path noted at ypT3N0.      Status post 3 cycles of adjuvant therapy with FOLFOX and presents for cycle 4.  He did complain of decreased appetite but denies fever, chills, nausea or vomiting, chest pain, shortness of breath, abdominal pain, diarrhea or dysuria.     Review of Systems   Constitutional: Negative for activity change, appetite change, chills, fatigue, fever and unexpected weight change.   HENT: Negative for hearing loss, mouth sores, nosebleeds, sore throat, tinnitus, trouble swallowing and voice change.    Eyes: Negative for visual disturbance.   Respiratory: Negative for cough, chest tightness, shortness of breath and wheezing.    Cardiovascular: Negative for chest pain, palpitations and leg swelling.   Gastrointestinal: Positive for rectal pain. Negative for abdominal pain, anal bleeding, blood in stool, constipation, diarrhea, nausea and vomiting.   Genitourinary: Negative for frequency, hematuria and testicular pain.   Musculoskeletal: Negative for arthralgias, back pain, gait problem and neck pain.   Skin: Negative for color change, pallor, rash and wound.   Allergic/Immunologic: Negative for immunocompromised state.   Neurological: Negative for seizures, syncope, weakness, numbness and headaches.   Hematological: Negative for adenopathy. Does not bruise/bleed easily.   Psychiatric/Behavioral: Negative for agitation, confusion, decreased concentration, hallucinations and sleep disturbance. The patient is not nervous/anxious.        ?   PAST MEDICAL HISTORY:   Past Medical History:   Diagnosis Date    Alcoholism     Anemia      Back pain     Encounter for blood transfusion 2018    Essential hypertension 2/4/2016    GERD (gastroesophageal reflux disease)     Hiatal hernia     Hypertension     Rectal cancer 9/11/2019    ?     PAST SURGICAL HISTORY:   Past Surgical History:   Procedure Laterality Date    COLONOSCOPY N/A 9/3/2019    Procedure: COLONOSCOPY;  Surgeon: Isai Centeno MD;  Location: [facility] ENDO;  Service: Endoscopy;  Laterality: N/A;    COLONOSCOPY N/A 1/10/2020    Procedure: COLONOSCOPY;  Surgeon: Familia Gonzalez MD;  Location: [facility] ENDO;  Service: General;  Laterality: N/A;    ESOPHAGOGASTRODUODENOSCOPY N/A 9/3/2019    Procedure: ESOPHAGOGASTRODUODENOSCOPY (EGD);  Surgeon: Isai Centeno MD;  Location: [facility] ENDO;  Service: Endoscopy;  Laterality: N/A;    FLEXIBLE SIGMOIDOSCOPY  2/4/2020    Procedure: SIGMOIDOSCOPY, FLEXIBLE;  Surgeon: Familia Gonzalez MD;  Location: [facility] OR;  Service: General;;    ILEOSTOMY Right 2/4/2020    Procedure: CREATION, ILEOSTOMY;  Surgeon: Familia Gonzalez MD;  Location: [facility] OR;  Service: General;  Laterality: Right;    INJECTION OF ANESTHETIC AGENT INTO TISSUE PLANE DEFINED BY TRANSVERSUS ABDOMINIS MUSCLE N/A 2/4/2020    Procedure: BLOCK, TRANSVERSUS ABDOMINIS PLANE;  Surgeon: Familia Gonzalez MD;  Location: [facility] OR;  Service: General;  Laterality: N/A;    INSERTION OF TUNNELED CENTRAL VENOUS CATHETER (CVC) WITH SUBCUTANEOUS PORT Right 2/4/2020    Procedure: INSERTION, PORT-A-CATH;  Surgeon: Familia Gonzalez MD;  Location: [facility] OR;  Service: General;  Laterality: Right;    JOINT REPLACEMENT Left 2009    MOBILIZATION OF SPLENIC FLEXURE  2/4/2020    Procedure: MOBILIZATION, SPLENIC FLEXURE;  Surgeon: Familia Gonzalez MD;  Location: [facility] OR;  Service: General;;      ?   ALLERGIES:   Allergies as of 04/20/2020    (No Known Allergies)      ?   MEDICATIONS:?   Outpatient Medications Marked as Taking for the 4/20/20 encounter (Office Visit) with Mikel Sams MD    Medication Sig Dispense Refill    cyanocobalamin (VITAMIN B-12) 1000 MCG tablet Take 100 mcg by mouth once daily.      dronabinoL (MARINOL) 2.5 MG capsule Take 1 capsule (2.5 mg total) by mouth 2 (two) times daily before meals. 30 capsule 0    ferrous sulfate 324 mg (65 mg iron) TbEC Take 1 tablet (324 mg total) by mouth 2 (two) times daily. 60 tablet 0    HYDROcodone-acetaminophen (NORCO)  mg per tablet Take 1 tablet by mouth every 6 (six) hours as needed for Pain. 60 tablet 0    losartan (COZAAR) 50 MG tablet TAKE 1 TABLET BY MOUTH EVERY MORNING 90 tablet 1    ondansetron (ZOFRAN) 8 MG tablet Take 1 tablet (8 mg total) by mouth every 8 (eight) hours as needed for Nausea. 30 tablet 2    prochlorperazine (COMPAZINE) 5 MG tablet TAKE 1 TABLET(5 MG) BY MOUTH EVERY 6 HOURS AS NEEDED FOR NAUSEA 385 tablet 1    tamsulosin (FLOMAX) 0.4 mg Cap Take 1 capsule (0.4 mg total) by mouth once daily. 30 capsule 6    tamsulosin (FLOMAX) 0.4 mg Cap Take 1 capsule (0.4 mg total) by mouth once daily. 30 capsule 0      ?   SOCIAL HISTORY:?   Social History     Tobacco Use    Smoking status: Never Smoker    Smokeless tobacco: Never Used   Substance Use Topics    Alcohol use: Yes     Comment: HOLD 72H PRIOR TO SURGERY        ?   FAMILY HISTORY:   family history includes Cataracts in his mother; Hypertension in his father; Stroke in his father.   ?     Objective:      Physical Exam   Constitutional: He is oriented to person, place, and time. He appears well-developed and well-nourished. No distress.   HENT:   Head: Normocephalic and atraumatic.   Right Ear: External ear normal.   Left Ear: External ear normal.   Mouth/Throat: No oropharyngeal exudate.   Eyes: Pupils are equal, round, and reactive to light. Conjunctivae are normal. Right eye exhibits no discharge. Left eye exhibits no discharge. No scleral icterus.   Neck: Normal range of motion. Neck supple. No thyromegaly present.   Cardiovascular: Normal rate,  regular rhythm and normal heart sounds.   No murmur heard.  Pulmonary/Chest: Effort normal and breath sounds normal. No respiratory distress. He has no wheezes. He exhibits no tenderness.   Abdominal: Soft. Bowel sounds are normal. He exhibits no distension and no mass. There is no rebound.   Musculoskeletal: Normal range of motion. He exhibits no edema or tenderness.   Lymphadenopathy:     He has no cervical adenopathy.        Right cervical: No superficial cervical adenopathy present.       Left cervical: No superficial cervical adenopathy present.        Right axillary: No pectoral adenopathy present.        Left axillary: No pectoral adenopathy present.No inguinal adenopathy noted on the right or left side.        Right: No supraclavicular adenopathy present.        Left: No supraclavicular adenopathy present.   Neurological: He is alert and oriented to person, place, and time. No cranial nerve deficit or sensory deficit.   Skin: Skin is warm and dry. Capillary refill takes 2 to 3 seconds. No rash noted. No erythema. No pallor.   Psychiatric: He has a normal mood and affect. His behavior is normal. Judgment normal.       ?   Vitals:    04/20/20 1014   BP: 114/74   Pulse: 96   Resp: 14   Temp: 97.3 °F (36.3 °C)      ?     ECOG SCORE    1 - Restricted in strenuous activity-ambulatory and able to carry out work of a light nature           Laboratory:  ?   Lab Visit on 04/20/2020   Component Date Value Ref Range Status    WBC 04/20/2020 3.32* 3.90 - 12.70 K/uL Final    RBC 04/20/2020 3.95* 4.60 - 6.20 M/uL Final    Hemoglobin 04/20/2020 11.0* 14.0 - 18.0 g/dL Final    Hematocrit 04/20/2020 34.3* 40.0 - 54.0 % Final    Mean Corpuscular Volume 04/20/2020 87  82 - 98 fL Final    Mean Corpuscular Hemoglobin 04/20/2020 27.8  27.0 - 31.0 pg Final    Mean Corpuscular Hemoglobin Conc 04/20/2020 32.1  32.0 - 36.0 g/dL Final    RDW 04/20/2020 18.9* 11.5 - 14.5 % Final    Platelets 04/20/2020 271  150 - 350 K/uL Final     MPV 04/20/2020 8.9* 9.2 - 12.9 fL Final    Immature Granulocytes 04/20/2020 0.6* 0.0 - 0.5 % Final    Gran # (ANC) 04/20/2020 2.2  1.8 - 7.7 K/uL Final    Immature Grans (Abs) 04/20/2020 0.02  0.00 - 0.04 K/uL Final    Lymph # 04/20/2020 0.3* 1.0 - 4.8 K/uL Final    Mono # 04/20/2020 0.7  0.3 - 1.0 K/uL Final    Eos # 04/20/2020 0.0  0.0 - 0.5 K/uL Final    Baso # 04/20/2020 0.02  0.00 - 0.20 K/uL Final    nRBC 04/20/2020 0  0 /100 WBC Final    Gran% 04/20/2020 66.0  38.0 - 73.0 % Final    Lymph% 04/20/2020 9.9* 18.0 - 48.0 % Final    Mono% 04/20/2020 21.7* 4.0 - 15.0 % Final    Eosinophil% 04/20/2020 1.2  0.0 - 8.0 % Final    Basophil% 04/20/2020 0.6  0.0 - 1.9 % Final    Differential Method 04/20/2020 Automated   Final      ?   Tumor markers   ?   ?   Imaging: X-Ray Chest PA And Lateral  Narrative: EXAMINATION:  XR CHEST PA AND LATERAL    CLINICAL HISTORY:  s/p port placement, rule out ptx;    TECHNIQUE:  Single frontal view of the chest was performed.    COMPARISON:  02/04/2020    FINDINGS:  Right subclavian chest port placement with the tip overlying the SVC.  No pneumothorax.  In comparison to the prior study, there is no adverse interval changes  Impression: In comparison to the prior study, there is no adverse interval changes    Electronically signed by: Avila Edward MD  Date:    02/05/2020  Time:    08:00     ?      Pathology:  Pathology Results  (Last 10 years)               02/04/20 1704 (A) Specimen to Pathology, Surgery General Surgery (Abnormal) Final result    Narrative:  Pre-op Diagnosis: Rectal adenocarcinoma [C20]   Procedure(s):   XI ROBOTIC LOW ANTERIOR RESECTION   INSERTION, PORT-A-CATH   BLOCK, TRANSVERSUS ABDOMINIS PLANE   Number of specimens: 3   Name of specimens: 1. Sigmoid colon and rectum (perm) 2.   Proximal margin (perm) 3. Anastomotic ring (perm)   Specimen total (fresh, frozen, permanent):->3       01/10/20 1352  Specimen to Pathology, Surgery Gastrointestinal tract  Final result    Narrative:  Pre-op Diagnosis: Rectal adenocarcinoma [C20]   Procedure(s):   COLONOSCOPY   Number of specimens: 1   Name of specimens:   1.  Descending colon polyp   Specimen total (fresh, frozen, permanent):->1              ?   Assessment/Plan:         Rectal cancer  Stage IIIB rectal adenocarcinoma, status post surgical resection and currently on adjuvant therapy with FOLFOX.  Present for cycle 4, reviewed labs, no concern cytopenia, proceed with treatment.  Plan to see him back in 2 weeks with repeat labs.    Iron deficiency anemia due to chronic blood loss  Stable.  Hemoglobin noted at 11 grams/deciliter.  No evidence of active bleed.        Follow-Up: Follow up in about 2 weeks (around 5/4/2020).    HOMA GAMBOA Md., Ph.D  Hematology & Oncology Department  Phone #: [phone]

## 2020-04-22 ENCOUNTER — INFUSION (OUTPATIENT)
Dept: INFUSION THERAPY | Facility: HOSPITAL | Age: 65
End: 2020-04-22
Attending: INTERNAL MEDICINE
Payer: MEDICARE

## 2020-04-22 VITALS
DIASTOLIC BLOOD PRESSURE: 78 MMHG | RESPIRATION RATE: 18 BRPM | TEMPERATURE: 98 F | HEART RATE: 86 BPM | SYSTOLIC BLOOD PRESSURE: 100 MMHG | OXYGEN SATURATION: 99 %

## 2020-04-22 DIAGNOSIS — C20 RECTAL CANCER: Primary | ICD-10-CM

## 2020-04-22 PROCEDURE — 63600175 PHARM REV CODE 636 W HCPCS: Performed by: INTERNAL MEDICINE

## 2020-04-22 PROCEDURE — 96523 IRRIG DRUG DELIVERY DEVICE: CPT

## 2020-04-22 PROCEDURE — A4216 STERILE WATER/SALINE, 10 ML: HCPCS | Performed by: INTERNAL MEDICINE

## 2020-04-22 PROCEDURE — 25000003 PHARM REV CODE 250: Performed by: INTERNAL MEDICINE

## 2020-04-22 RX ORDER — SODIUM CHLORIDE 0.9 % (FLUSH) 0.9 %
10 SYRINGE (ML) INJECTION
Status: DISCONTINUED | OUTPATIENT
Start: 2020-04-22 | End: 2020-04-22 | Stop reason: HOSPADM

## 2020-04-22 RX ORDER — HEPARIN 100 UNIT/ML
500 SYRINGE INTRAVENOUS
Status: DISCONTINUED | OUTPATIENT
Start: 2020-04-22 | End: 2020-04-22 | Stop reason: HOSPADM

## 2020-04-22 RX ADMIN — HEPARIN 500 UNITS: 100 SYRINGE at 01:04

## 2020-04-22 RX ADMIN — Medication 10 ML: at 01:04

## 2020-04-27 DIAGNOSIS — C20 RECTAL CANCER: ICD-10-CM

## 2020-04-27 RX ORDER — HYDROCODONE BITARTRATE AND ACETAMINOPHEN 10; 325 MG/1; MG/1
1 TABLET ORAL EVERY 6 HOURS PRN
Qty: 60 TABLET | Refills: 0 | Status: SHIPPED | OUTPATIENT
Start: 2020-04-27 | End: 2020-05-11 | Stop reason: SDUPTHER

## 2020-04-27 NOTE — TELEPHONE ENCOUNTER
----- Message from Lou Wiggins sent at 4/27/2020  8:11 AM CDT -----  Contact: pt   Type:  RX Refill Request    Who Called: pt  Refill or New Rx:refill  RX Name and Strength:HYDROcodone-acetaminophen (NORCO)  mg per tablet  How is the patient currently taking it? (ex. 1XDay):4XDay  Is this a 30 day or 90 day RX:   Preferred Pharmacy with phone number: [facility]   Local or Mail Order: local   Ordering Provider: Dr Sams   Would the patient rather a call back or a response via [facility]? Call back   Best Call Back Number: [phone]  Additional Information:

## 2020-04-27 NOTE — TELEPHONE ENCOUNTER
Returned pt's call bk,. Informed him that refill req. Has been sent to Dr. Sams for auth..the patient verbalized understanding, ok. Thank you.

## 2020-04-28 ENCOUNTER — TELEPHONE (OUTPATIENT)
Dept: HEMATOLOGY/ONCOLOGY | Facility: CLINIC | Age: 65
End: 2020-04-28

## 2020-04-28 ENCOUNTER — TELEPHONE (OUTPATIENT)
Dept: INTERNAL MEDICINE | Facility: CLINIC | Age: 65
End: 2020-04-28

## 2020-04-28 NOTE — TELEPHONE ENCOUNTER
called patient to report on open fund through [employer] that patient qualifies for. Sw reports she will email information to patient on how to apply. Pt verified spouse email address. He reports his granddaughter run the account due to he and spouse being elderly. Ela reports granddaughter can call and apply for him. Ela emailed patient instructions for jl at   [email or web address]. Ela will continue to f/u.

## 2020-04-28 NOTE — TELEPHONE ENCOUNTER
----- Message from Genny Nguyen RN sent at 4/28/2020 11:58 AM CDT -----  Dr. Hill,  This patient is a schedule consult tomorrow at 11:20am from Hem/Onc.  He does not have virtual capability but is experiencing some problems related to his condition that he would like to address with you so I left him on the schedule as a phone call.  Just wanted to give you a heads up.    Genny

## 2020-04-28 NOTE — TELEPHONE ENCOUNTER
I tried calling the patient earlier but the phone just rang . I will change him to a audio only visit

## 2020-04-29 ENCOUNTER — OFFICE VISIT (OUTPATIENT)
Dept: INTERNAL MEDICINE | Facility: CLINIC | Age: 65
End: 2020-04-29
Payer: MEDICARE

## 2020-04-29 ENCOUNTER — LAB VISIT (OUTPATIENT)
Dept: OTOLARYNGOLOGY | Facility: CLINIC | Age: 65
End: 2020-04-29
Attending: INTERNAL MEDICINE
Payer: MEDICARE

## 2020-04-29 DIAGNOSIS — R13.10 DYSPHAGIA, UNSPECIFIED TYPE: ICD-10-CM

## 2020-04-29 DIAGNOSIS — G89.3 CANCER ASSOCIATED PAIN: Primary | ICD-10-CM

## 2020-04-29 DIAGNOSIS — Z03.818 ENCNTR FOR OBS FOR SUSP EXPSR TO OTH BIOLG AGENTS RULED OUT: ICD-10-CM

## 2020-04-29 PROCEDURE — 99443 PR PHYSICIAN TELEPHONE EVALUATION 21-30 MIN: ICD-10-PCS | Mod: 95,,, | Performed by: FAMILY MEDICINE

## 2020-04-29 PROCEDURE — 99443 PR PHYSICIAN TELEPHONE EVALUATION 21-30 MIN: CPT | Mod: 95,,, | Performed by: FAMILY MEDICINE

## 2020-04-29 PROCEDURE — U0002 COVID-19 LAB TEST NON-CDC: HCPCS

## 2020-04-29 RX ORDER — MIRTAZAPINE 15 MG/1
15 TABLET, FILM COATED ORAL NIGHTLY
Qty: 30 TABLET | Refills: 11 | Status: SHIPPED | OUTPATIENT
Start: 2020-04-29 | End: 2020-11-03

## 2020-04-29 RX ORDER — MORPHINE SULFATE 30 MG/1
30 TABLET, FILM COATED, EXTENDED RELEASE ORAL 2 TIMES DAILY
Qty: 60 TABLET | Refills: 0 | Status: SHIPPED | OUTPATIENT
Start: 2020-04-29 | End: 2020-06-01 | Stop reason: SDUPTHER

## 2020-04-29 NOTE — PROGRESS NOTES
Established Patient - Audio Only Telehealth Visit     The patient location is: [place]  The chief complaint leading to consultation is: palliative medicine  Visit type: Virtual visit with audio only (telephone)     The reason for the audio only service rather than synchronous audio and video virtual visit was related to technical difficulties or patient preference/necessity.     Each patient to whom I provide medical services by telemedicine is:  (1) informed of the relationship between the physician and patient and the respective role of any other health care provider with respect to management of the patient; and (2) notified that they may decline to receive medical services by telemedicine and may withdraw from such care at any time. Patient verbally consented to receive this service via voice-only telephone call.       HPI: 63 yo male with rectal cancer (stage III) currently receiving chemotherapy. Main concerns today are uncontrolled pain, poor appetite and weight loss, as well; Walks about 30 minutes per day. Has gone from 160 lbs. down 125 lbs. Due to poor appetite and difficult recovery from surgery; has pscyhological challenge related to the colostomy pouch and home chemotherapy infusions which seem to be constant external reminders of his illness.      He takes Norco 4 tablets per day without much relief. Reports difficulty with swallowing foods as well as pills since his surgery but has not had ST evaluation.      Assessment and plan:  Will start on long-acting pain medication (MS 30) based on daily PRN usage; continue Norco if needed for breakthrough; mirtazapine added for sleep and appetite;  Patient to let me know if he has difficulty swallowing the MS Contin; fentanyl TD may be problematic due to low body fat.   Will see back next week.                      This service was not originating from a related E/M service provided within the previous 7 days nor will  to an E/M service or procedure within  the next 24 hours or my soonest available appointment.  Prevailing standard of care was able to be met in this audio-only visit.      30 minutes spent with the patient in consultation.

## 2020-04-30 LAB — SARS-COV-2 RNA RESP QL NAA+PROBE: NOT DETECTED

## 2020-05-04 ENCOUNTER — INFUSION (OUTPATIENT)
Dept: INFUSION THERAPY | Facility: HOSPITAL | Age: 65
End: 2020-05-04
Attending: INTERNAL MEDICINE
Payer: MEDICARE

## 2020-05-04 ENCOUNTER — OFFICE VISIT (OUTPATIENT)
Dept: HEMATOLOGY/ONCOLOGY | Facility: CLINIC | Age: 65
End: 2020-05-04
Payer: MEDICARE

## 2020-05-04 VITALS
TEMPERATURE: 97 F | HEIGHT: 70 IN | OXYGEN SATURATION: 98 % | HEART RATE: 93 BPM | SYSTOLIC BLOOD PRESSURE: 101 MMHG | DIASTOLIC BLOOD PRESSURE: 77 MMHG | WEIGHT: 133.38 LBS | BODY MASS INDEX: 19.09 KG/M2

## 2020-05-04 VITALS
HEART RATE: 65 BPM | TEMPERATURE: 97 F | SYSTOLIC BLOOD PRESSURE: 94 MMHG | RESPIRATION RATE: 18 BRPM | DIASTOLIC BLOOD PRESSURE: 63 MMHG | OXYGEN SATURATION: 100 %

## 2020-05-04 DIAGNOSIS — R64 CACHEXIA: ICD-10-CM

## 2020-05-04 DIAGNOSIS — D50.0 IRON DEFICIENCY ANEMIA DUE TO CHRONIC BLOOD LOSS: ICD-10-CM

## 2020-05-04 DIAGNOSIS — Z51.11 CHEMOTHERAPY MANAGEMENT, ENCOUNTER FOR: ICD-10-CM

## 2020-05-04 DIAGNOSIS — Z51.11 CHEMOTHERAPY MANAGEMENT, ENCOUNTER FOR: Primary | ICD-10-CM

## 2020-05-04 DIAGNOSIS — K21.9 GASTROESOPHAGEAL REFLUX DISEASE, ESOPHAGITIS PRESENCE NOT SPECIFIED: ICD-10-CM

## 2020-05-04 DIAGNOSIS — C20 RECTAL CANCER: Primary | ICD-10-CM

## 2020-05-04 PROCEDURE — 3008F BODY MASS INDEX DOCD: CPT | Mod: CPTII,S$GLB,, | Performed by: NURSE PRACTITIONER

## 2020-05-04 PROCEDURE — 3074F SYST BP LT 130 MM HG: CPT | Mod: CPTII,S$GLB,, | Performed by: NURSE PRACTITIONER

## 2020-05-04 PROCEDURE — 3074F PR MOST RECENT SYSTOLIC BLOOD PRESSURE < 130 MM HG: ICD-10-PCS | Mod: CPTII,S$GLB,, | Performed by: NURSE PRACTITIONER

## 2020-05-04 PROCEDURE — 96361 HYDRATE IV INFUSION ADD-ON: CPT

## 2020-05-04 PROCEDURE — 99215 PR OFFICE/OUTPT VISIT, EST, LEVL V, 40-54 MIN: ICD-10-PCS | Mod: S$GLB,,, | Performed by: NURSE PRACTITIONER

## 2020-05-04 PROCEDURE — 25000003 PHARM REV CODE 250: Performed by: NURSE PRACTITIONER

## 2020-05-04 PROCEDURE — 63600175 PHARM REV CODE 636 W HCPCS: Performed by: NURSE PRACTITIONER

## 2020-05-04 PROCEDURE — 99215 OFFICE O/P EST HI 40 MIN: CPT | Mod: S$GLB,,, | Performed by: NURSE PRACTITIONER

## 2020-05-04 PROCEDURE — 3078F PR MOST RECENT DIASTOLIC BLOOD PRESSURE < 80 MM HG: ICD-10-PCS | Mod: CPTII,S$GLB,, | Performed by: NURSE PRACTITIONER

## 2020-05-04 PROCEDURE — 3008F PR BODY MASS INDEX (BMI) DOCUMENTED: ICD-10-PCS | Mod: CPTII,S$GLB,, | Performed by: NURSE PRACTITIONER

## 2020-05-04 PROCEDURE — 99999 PR PBB SHADOW E&M-EST. PATIENT-LVL III: CPT | Mod: PBBFAC,,, | Performed by: NURSE PRACTITIONER

## 2020-05-04 PROCEDURE — 99999 PR PBB SHADOW E&M-EST. PATIENT-LVL III: ICD-10-PCS | Mod: PBBFAC,,, | Performed by: NURSE PRACTITIONER

## 2020-05-04 PROCEDURE — 96360 HYDRATION IV INFUSION INIT: CPT

## 2020-05-04 PROCEDURE — 3078F DIAST BP <80 MM HG: CPT | Mod: CPTII,S$GLB,, | Performed by: NURSE PRACTITIONER

## 2020-05-04 RX ORDER — HEPARIN 100 UNIT/ML
500 SYRINGE INTRAVENOUS
Status: CANCELLED | OUTPATIENT
Start: 2020-05-04

## 2020-05-04 RX ORDER — SODIUM CHLORIDE 9 MG/ML
1000 INJECTION, SOLUTION INTRAVENOUS
Status: COMPLETED | OUTPATIENT
Start: 2020-05-04 | End: 2020-05-04

## 2020-05-04 RX ORDER — SODIUM CHLORIDE 0.9 % (FLUSH) 0.9 %
10 SYRINGE (ML) INJECTION
Status: CANCELLED | OUTPATIENT
Start: 2020-05-04

## 2020-05-04 RX ORDER — HEPARIN 100 UNIT/ML
500 SYRINGE INTRAVENOUS
Status: DISCONTINUED | OUTPATIENT
Start: 2020-05-04 | End: 2020-05-04 | Stop reason: HOSPADM

## 2020-05-04 RX ADMIN — HEPARIN 500 UNITS: 100 SYRINGE at 01:05

## 2020-05-04 RX ADMIN — SODIUM CHLORIDE 1000 ML: 0.9 INJECTION, SOLUTION INTRAVENOUS at 10:05

## 2020-05-04 RX ADMIN — SODIUM CHLORIDE 1000 ML: 0.9 INJECTION, SOLUTION INTRAVENOUS at 12:05

## 2020-05-04 NOTE — PLAN OF CARE
"  Problem: Adult Inpatient Plan of Care  Goal: Plan of Care Review  Outcome: Ongoing, Progressing  Flowsheets (Taken 5/4/2020 1418)  Plan of Care Reviewed With: patient  Goal: Patient-Specific Goal (Individualization)  Outcome: Ongoing, Progressing  Flowsheets (Taken 5/4/2020 1418)  Individualized Care Needs: pillow, blanket, juice, snack  Anxieties, Fears or Concerns: None  Patient-Specific Goals (Include Timeframe): patient will "feel better" after NS infusion today.     Problem: Fluid Volume Deficit  Goal: Fluid Balance  Outcome: Ongoing, Progressing    Patient states, "I just feel really tired today."     "

## 2020-05-04 NOTE — DISCHARGE INSTRUCTIONS
[facility]  [address] [facility]  [address]  [phone] phone     [phone] fax  Hours of Operation: Monday- Friday 8:00am- 5:00pm  After hours phone  [phone]  Hematology / Oncology Physicians on call      Dr. Thor Boone, PETE Miller NP Tyesha Taylor, NP    Please call with any concerns regarding your appointment today.              FALL PREVENTION   Falls often occur due to slipping, tripping or losing your balance. Here are ways to reduce your risk of falling again.   Was there anything that caused your fall that can be fixed, removed or replaced?   Make your home safe by keeping walkways clear of objects you may trip over.   Use non-slip pads under rugs.   Do not walk in poorly lit areas.   Do not stand on chairs or wobbly ladders.   Use caution when reaching overhead or looking upward. This position can cause a loss of balance.   Be sure your shoes fit properly, have non-slip bottoms and are in good condition.   Be cautious when going up and down stairs, curbs, and when walking on uneven sidewalks.   If your balance is poor, consider using a cane or walker.   If your fall was related to alcohol use, stop or limit alcohol intake.   If your fall was related to use of sleeping medicines, talk to your doctor about this. You may need to reduce your dosage at bedtime if you awaken during the night to go to the bathroom.   To reduce the need for nighttime bathroom trips:   Avoid drinking fluids for several hours before going to bed   Empty your bladder before going to bed   Men can keep a urinal at the bedside   © 7099-7339 [facility], [address] [place] [address], [place] [address]. All rights reserved. This information is not intended as a substitute for professional medical care. Always follow your healthcare professional's instructions.        DEHYDRATION [Adult]    Dehydration occurs when the body loses too much fluid. This may be the result of vomiting a lot or from diarrhea, profuse sweating or a high fever. It may also occur if you dont drink enough fluid when youre sick. Misuse of diuretics (water pills) can also be a cause.   Symptoms include thirst and feeling dizzy, weak, fatigued, or very drowsy. The diet described below is usually enough to treat most cases. Sometimes medicine is also needed.   HOME CARE:   Adults should drink at least 12 eight-ounce glasses of fluid per day to correct dehydration. The fluid may include water, orange juice and lemonade, apple, grape and cranberry juice, clear fruit drinks, electrolyte replacement and sports drinks, and teas and coffee without caffeine.   If you have fever, muscle aching or headache from a viral syndrome, you may use Tylenol (acetaminophen) or ibuprofen (Motrin, Advil), unless another medicine was prescribed for this. [ NOTE : If you have chronic liver or kidney disease or ever had a stomach ulcer or GI bleeding, talk with your doctor before using these medicines.] (If under 18 years old, do not use aspirin for fever. There is a chance of severe liver injury.)  FOLLOW UP with your doctor or this facility if you do not improve over the next 24-48 hours.   GET PROMPT MEDICAL ATTENTION if any of the following occur:   Continued vomiting (cant keep liquids down)   Frequent diarrhea (more than 5 times a day); blood (red or black color) or mucus in diarrhea   Blood in vomit or stool   Swollen abdomen or increasing abdominal pain   Weakness, dizziness or fainting   Unusually drowsy or confused   Reduced urine output or extreme thirst   Fever of 100.4°F (38°C) oral or higher, not better with fever medication  © 1363-1634 [employer], [address] [place] [address], [place] [address]. All rights reserved. This information is not intended as a substitute for professional medical care. Always follow your healthcare  professional's instructions.

## 2020-05-04 NOTE — PATIENT INSTRUCTIONS
COVID-19 and Cancer Patients    What is COVID-19?  COVID-19 is a new type of virus that can cause mild to severe infections in the lungs. Like other viruses, it can lead to serious infections for people with weakened immune systems. COVID-19 may cause more severe infections than other viruses. We do not have a vaccine to help control its spread, but experts are working to make a vaccine.    How does COVID-19 spread?  The virus can spread easily, just like the common cold or flu. It spreads when an infected person coughs or sneezes droplets that can get into the eyes, nose, or mouth of people nearby. Droplets also land on surfaces that people touch before touching their own eyes, nose, or mouth.    How can I protect myself?  These are some of the best ways to protect yourself and others from the virus:  - Wash your hands often with soap and water for at least 20 seconds.  - Use hand  with 60% or more alcohol until you can wash your hands with soap and water.  - Avoid touching your eyes, nose, and mouth without washing your hands first.  - Clean and disinfect surfaces often. Regular household wipes and sprays will kill the virus. Be sure to  clean places that people touch a lot, such as door handles, phones, keyboards, and light switches.  - Avoid handshakes, hugging, and standing or sitting close to people who are coughing or sneezing.  - Be as healthy as you can. Get plenty of sleep, eat healthy, exercise, and manage your stress.  If you are sick, follow these steps:  - Stay home.  - Cover your nose and mouth when you cough or sneeze. If you use a tissue, put it in the garbage right  away. If you do not have a tissue, cough or sneeze into your elbow crease.  - Call before going to your medical appointments. Let them know about recent travel or if you have had  contact with a person with COVID-19.    What should I do if I have cancer?  Some people with cancer might have a higher risk of getting COVID-19 or  having a serious infection from it. Do  your best to follow the steps listed above to protect yourself. Ask your doctor or nurse if they have special  recommendations based on your health or type of treatment.  Call your doctor right away if any of these happen to you:  - You have a fever higher than 100.4 degrees F.  - You feel short of breath.  - You develop a cough, runny nose, or congestion.    Call anytime [phone]--day, night, or weekend.     As of 3/12/2020  Should I wear a mask?  Experts don't believe that wearing a mask is helpful for the general public. Some people should use certain types of masks because of their own health or the type of work they do. Talk to your doctor or nurse to see if you would benefit from wearing a mask. If you arrive at the hospital with respiratory symptoms, please ask for a mask.    What if I care for or live with a cancer patient?  If you are caring for or living with someone with cancer, do your best to keep them from getting the virus.  Follow the steps to protect yourself listed on this sheet.  If you become sick yourself, call your doctor to see what more you should do to protect your loved one.    What about people visiting?   If you are sick or have been exposed to COVID-19, we ask that you not come to [facility].    If patients have symptoms, call the [facility] Covid-19 Line ([phone])    We ask that you find someone who isn't sick to join your loved one at their appointments.  To protect all patients, we may limit the number of people who can visit someone staying in the hospital.  Please check with your care team.    How will [facility] protect me from getting COVID-19?  Our hospital and clinics are taking steps to keep infected patients separate from those who may be at risk. At every appointment, your care team will ask questions about overall health and recent travel. We may ask some patients to wait in a separate room or to  reschedule until they are feeling better if they have symptoms.  We are also taking extra steps to clean and disinfect surfaces throughout our hospital and clinics.     Will you still care for me if I get sick?  Yes. Your care is our top priority. Although we may change some ways we care for you, we will never put your care or health at risk.            CALL BEFORE YOU ACT   Dial 211 or Text keyword LACOVID to [phone] for general questions and information.   If patients have symptoms, call the [facility] Covid-19 Line ([phone])               [facility] ([email or web address])    [email or web address]

## 2020-05-04 NOTE — PROGRESS NOTES
Subjective:       Patient ID: Vincent Benton is a 64 y.o. male.    Chief Complaint: Chemotherapy    64 yr old male with recently diagnosed locally advanced rectal cancer.  Patient has completed concurrent chemoradiation with capecitabine.  Status post low anterior resection.  Final path noted at ypT3N0.       Status post 3 cycles of adjuvant therapy with FOLFOX and presents for cycle 5.  He did complain of decreased appetite but denies fever, chills, nausea or vomiting, chest pain, shortness of breath, abdominal pain, diarrhea or dysuria.     Review of Systems   Constitutional: Positive for activity change, appetite change, fatigue and unexpected weight change. Negative for chills, diaphoresis and fever.   HENT: Positive for trouble swallowing. Negative for congestion, hearing loss, nosebleeds and postnasal drip.    Eyes: Negative for discharge and visual disturbance.   Respiratory: Positive for shortness of breath. Negative for cough and chest tightness.    Cardiovascular: Negative for chest pain, palpitations and leg swelling.   Gastrointestinal: Positive for abdominal distention, abdominal pain and diarrhea. Negative for constipation, nausea and vomiting.   Endocrine: Negative for cold intolerance and heat intolerance.   Genitourinary: Negative for difficulty urinating, dysuria, flank pain and hematuria.   Musculoskeletal: Positive for arthralgias and back pain. Negative for myalgias.   Skin: Negative.    Neurological: Negative for dizziness, weakness, light-headedness and headaches.   Hematological: Negative for adenopathy. Does not bruise/bleed easily.   Psychiatric/Behavioral: Negative for agitation, behavioral problems and confusion. The patient is nervous/anxious.        Medication List with Changes/Refills   Current Medications    CYANOCOBALAMIN (VITAMIN B-12) 1000 MCG TABLET    Take 100 mcg by mouth once daily.    DRONABINOL (MARINOL) 2.5 MG CAPSULE    Take 1 capsule (2.5 mg total) by mouth 2 (two)  times daily before meals.    FERROUS SULFATE 324 MG (65 MG IRON) TBEC    Take 1 tablet (324 mg total) by mouth 2 (two) times daily.    HYDROCODONE-ACETAMINOPHEN (NORCO)  MG PER TABLET    Take 1 tablet by mouth every 6 (six) hours as needed for Pain.    LOSARTAN (COZAAR) 50 MG TABLET    TAKE 1 TABLET BY MOUTH EVERY MORNING    MIRTAZAPINE (REMERON) 15 MG TABLET    Take 1 tablet (15 mg total) by mouth every evening. For sleep and appetite    MORPHINE (MS CONTIN) 30 MG 12 HR TABLET    Take 1 tablet (30 mg total) by mouth 2 (two) times daily.    ONDANSETRON (ZOFRAN) 8 MG TABLET    Take 1 tablet (8 mg total) by mouth every 8 (eight) hours as needed for Nausea.    PANTOPRAZOLE (PROTONIX) 40 MG TABLET    Take 1 tablet (40 mg total) by mouth once daily.    PROCHLORPERAZINE (COMPAZINE) 5 MG TABLET    TAKE 1 TABLET(5 MG) BY MOUTH EVERY 6 HOURS AS NEEDED FOR NAUSEA    TAMSULOSIN (FLOMAX) 0.4 MG CAP    Take 1 capsule (0.4 mg total) by mouth once daily.    TAMSULOSIN (FLOMAX) 0.4 MG CAP    Take 1 capsule (0.4 mg total) by mouth once daily.     Objective:     Vitals:    05/04/20 0917   BP: 101/77   Pulse: 93   Temp: 97.3 °F (36.3 °C)     Physical Exam   Constitutional: He is oriented to person, place, and time. He appears cachectic. No distress.   HENT:   Head: Normocephalic and atraumatic.   Right Ear: Hearing and external ear normal.   Left Ear: Hearing and external ear normal.   Nose: Nose normal. No mucosal edema or rhinorrhea. No epistaxis.   Mouth/Throat: Uvula is midline, oropharynx is clear and moist and mucous membranes are normal.   Eyes: Pupils are equal, round, and reactive to light. Conjunctivae and EOM are normal. Right eye exhibits no chemosis and no discharge. Left eye exhibits no chemosis and no discharge.   Neck: Trachea normal and normal range of motion. Neck supple. No thyroid mass and no thyromegaly present.   Cardiovascular: Normal rate, regular rhythm, normal heart sounds and intact distal pulses.   No  murmur heard.  Pulses:       Dorsalis pedis pulses are 2+ on the right side, and 2+ on the left side.   Pulmonary/Chest: Effort normal and breath sounds normal. No respiratory distress. He has no decreased breath sounds. He has no wheezes.   Abdominal: Soft. Bowel sounds are normal. He exhibits no distension. There is no tenderness.       Musculoskeletal: Normal range of motion.   Lymphadenopathy:     He has no cervical adenopathy.     He has no axillary adenopathy.        Right: No supraclavicular adenopathy present.        Left: No supraclavicular adenopathy present.   Neurological: He is alert and oriented to person, place, and time. He has normal strength.   Skin: Skin is warm, dry and intact. Capillary refill takes less than 2 seconds. No rash noted. He is not diaphoretic. There is pallor.   Psychiatric: His speech is normal and behavior is normal. Judgment and thought content normal. His mood appears anxious. Cognition and memory are normal.   Vitals reviewed.      Assessment:       Problem List Items Addressed This Visit        Oncology    Iron deficiency anemia due to chronic blood loss    Relevant Orders    CBC auto differential    Comprehensive metabolic panel    Rectal cancer - Primary    Relevant Orders    CBC auto differential    Comprehensive metabolic panel    Chemotherapy management, encounter for       GI    Gastroesophageal reflux disease       Other    Cachexia          Plan:         Rectal Cancer:  --WBC count decreased to 2.11 since last treatment  --Discussed case with Dr. Mcrae, recommended to hold FOLFOX today and return in 1 week with labs and possible FOLFOX  --Patient reports decrease in weight and decreased appetite, consumes 1 meal per day, does not have appetite until late afternoon, unable to eat meat due to fatigue in chewing and difficulty swallowing. Discussed making gravy with ground meat and mashed potatoes for ease of swallowing and to obtain needed calories and protein.  Consumes some boost shakes, can not tolerate more than 1-2 per day. Educated patient to snack or graze all day day, eating small amounts throughout the day may be more tolerable than full meals. Patient stated he will attempt to add these strategies in.    Dehydration:  --Patient reports difficulty in consuming adequate fluids due to foul taste when drinking water, discussed options including crystal light for flavoring and Body Allgood for adequate hydration. Will give 1 ltr NS today over 2 hours can give additional liter if needed.

## 2020-05-10 NOTE — PROGRESS NOTES
Subjective:       Patient ID: Vincent Benton is a 64 y.o. male.    Chief Complaint: Rectal cancer [C20]  HPI: We have an opportunity to see Mr. Vincent Benton in Hematology Oncology clinic at [facility] on 05/11/2020.  Mr. Vincent Benton is a 64 y.o. gentleman with rectal cancer.  Patient has completed concurrent chemoradiation with capecitabine.  Status post low anterior resection.  Final path noted at ypT3N0. Status post 4 cycles of adjuvant therapy with FOLFOX.    Patient was last seen on 05/04/2020 and at that time chemotherapy was withheld due to difficulty swallowing, shortness of breath, abdominal pain and overall debility.  He received 1 L of normal saline and was discharged home.  He presents today for resumption of chemotherapy.  Denies dysphagia or odynophagia, mucositis, fever, chills, nausea or vomiting, chest pain, worsening shortness of breath, abdominal pain.  Also denies any abnormal bleeding.       Rectal cancer    9/11/2019 Initial Diagnosis     Rectal cancer      9/18/2019 Tumor Conference     Multidisciplinary Rectal Cancer Conference - Evaluation and Recommendation Summary    9/17/2019  Vincent Benton  [id number]  63 y.o. male    1. Evaluation    C scope 9/3/19: Obstructing Rectal mass that couldn't be crossed.     Rigid Procto 9/3/19: Left and anterior lesion at 9-10 cm.     Path: Invasive adenocarcinoma, no LVI.     MRI date: 9/9/2019    Tumor Location in Rectum: middle third     Size: 3.6x2.7 cm mass    Nodes: 2 suspected nodes.     Indication of Sphincter Involvement:  Uninvolved    Pretreatment Circumferential Resection Margin (CRM) status:  Not Threatened    Pretreatment (clinical) AJCC Stage:III B vs  IV ?  Stage I  [] I: T1N0M0  [] I: T2N0M0  Stage II  [] IIA: T3N0M0  [] IIB X9tQ1U0  [] IIC: E2qZ2Q3  Stage III  [] IIIA: T1-2N1M0  [] IIIA: C3J9sQ5  [] IIIB: T3-S5dI7E6  [x] IIIB: T2-3N2aM0  [] IIIB: T1-2N2bM0  [] IIIC: Y3lU5fT4  [] IIIC: T3-8yH6gR6  []  IIIC: T8uF4-1V8   Stage IV  [x] IV: E1-6Q8-7X0o-b    CEA level:     Lab Results   Component Value Date    CEA 3.5 09/03/2019       2. Treatment Recommendation    Neoadjuvant Therapy Recommendation:     Short course radiation and Chemotherapy.     PLAN:    MRI and PET if not able to obtain PET will proceed with biopsy of the lung biopsy.     Colonoscopy during Chemotherapy to rule out any other lesions.     Anticipated date and type of surgical procedure:     LAR after    Clinical research study eligibility and/or enrollment: Not eligible      10/8/2019 Cancer Staged     Staging form: Colon and Rectum, AJCC 8th Edition  - Clinical stage from 10/8/2019: Stage IIIB (cT3, cN2a, cM0)      10/15/2019 - 10/15/2019 Chemotherapy     Treatment Summary   Plan Name: OP CAPECITABINE 5 DAYS + RADIOTHERAPY  Treatment Goal: Curative  Status: Inactive  Start Date: [No treatment day found]  End Date: [No treatment day found]  Provider: Mikel Sams MD  Chemotherapy: [No matching medication found in this treatment plan]      10/24/2019 - 12/4/2019 Radiation Therapy     Treatment Site Ref. ID Energy Dose/Fx (Gy) #Fx Dose Correction (Gy) Total Dose (Gy) Start Date End Date Elapsed Days   Pelvis Pelvis 6X 1.8 28 / 28 0 50.4 10/24/2019 12/4/2019 41           3/2/2020 -  Chemotherapy     Treatment Summary   Plan Name: OP FOLFOX 6 Q2W  Treatment Goal: Curative  Status: Active  Start Date: 3/2/2020  End Date: 8/12/2020 (Planned)  Provider: Mikel Sams MD  Chemotherapy: fluorouracil injection 710 mg, 400 mg/m2 = 710 mg, Intravenous, Clinic/HOD 1 time, 4 of 12 cycles  Administration: 710 mg (3/2/2020), 710 mg (3/16/2020), 710 mg (4/6/2020), 710 mg (4/20/2020)  fluorouracil (ADRUCIL) 2,400 mg/m2 = 4,270 mg in sodium chloride 0.9% 101.2 mL chemo infusion, 2,400 mg/m2 = 4,270 mg, Intravenous, Over 46 hours, 4 of 12 cycles  Administration: 4,270 mg (3/2/2020), 4,270 mg (3/16/2020), 4,270 mg (4/6/2020), 4,270 mg  (4/20/2020)  leucovorin calcium 400 mg/m2 = 710 mg in dextrose 5 % 285.5 mL infusion, 400 mg/m2 = 710 mg, Intravenous, Clinic/HOD 1 time, 4 of 12 cycles  Administration: 710 mg (3/2/2020), 710 mg (3/16/2020), 700 mg (4/6/2020), 710 mg (4/20/2020)  oxaliplatin (ELOXATIN) 85 mg/m2 = 151 mg in dextrose 5 % 530.2 mL chemo infusion, 85 mg/m2 = 151 mg, Intravenous, Clinic/HOD 1 time, 4 of 12 cycles  Administration: 151 mg (3/2/2020), 151 mg (3/16/2020), 150 mg (4/6/2020), 151 mg (4/20/2020)       Past Medical History:   Diagnosis Date    Alcoholism     Anemia     Back pain     Encounter for blood transfusion 2018    Essential hypertension 2/4/2016    GERD (gastroesophageal reflux disease)     Hiatal hernia     Hypertension     Rectal cancer 9/11/2019     Family History   Problem Relation Age of Onset    Cataracts Mother     Stroke Father     Hypertension Father      Social History     Socioeconomic History    Marital status:      Spouse name: Not on file    Number of children: Not on file    Years of education: Not on file    Highest education level: Not on file   Occupational History    Not on file   Social Needs    Financial resource strain: Not on file    Food insecurity:     Worry: Not on file     Inability: Not on file    Transportation needs:     Medical: Not on file     Non-medical: Not on file   Tobacco Use    Smoking status: Never Smoker    Smokeless tobacco: Never Used   Substance and Sexual Activity    Alcohol use: Yes     Comment: HOLD 72H PRIOR TO SURGERY    Drug use: No    Sexual activity: Never     Partners: Female   Lifestyle    Physical activity:     Days per week: Not on file     Minutes per session: Not on file    Stress: Not on file   Relationships    Social connections:     Talks on phone: Not on file     Gets together: Not on file     Attends L[employer]-day service: Not on file     Active member of club or organization: Not on file     Attends meetings of clubs or  organizations: Not on file     Relationship status: Not on file   Other Topics Concern    Not on file   Social History Narrative    Not on file     Past Surgical History:   Procedure Laterality Date    COLONOSCOPY N/A 9/3/2019    Procedure: COLONOSCOPY;  Surgeon: Isai Centeno MD;  Location: [facility] ENDO;  Service: Endoscopy;  Laterality: N/A;    COLONOSCOPY N/A 1/10/2020    Procedure: COLONOSCOPY;  Surgeon: Familia Gonzalez MD;  Location: [facility] ENDO;  Service: General;  Laterality: N/A;    ESOPHAGOGASTRODUODENOSCOPY N/A 9/3/2019    Procedure: ESOPHAGOGASTRODUODENOSCOPY (EGD);  Surgeon: Isai Centeno MD;  Location: [facility] ENDO;  Service: Endoscopy;  Laterality: N/A;    FLEXIBLE SIGMOIDOSCOPY  2/4/2020    Procedure: SIGMOIDOSCOPY, FLEXIBLE;  Surgeon: Familia Gonzalez MD;  Location: [facility] OR;  Service: General;;    ILEOSTOMY Right 2/4/2020    Procedure: CREATION, ILEOSTOMY;  Surgeon: Familia Gonzalez MD;  Location: [facility] OR;  Service: General;  Laterality: Right;    INJECTION OF ANESTHETIC AGENT INTO TISSUE PLANE DEFINED BY TRANSVERSUS ABDOMINIS MUSCLE N/A 2/4/2020    Procedure: BLOCK, TRANSVERSUS ABDOMINIS PLANE;  Surgeon: Familia Gonzalez MD;  Location: [facility] OR;  Service: General;  Laterality: N/A;    INSERTION OF TUNNELED CENTRAL VENOUS CATHETER (CVC) WITH SUBCUTANEOUS PORT Right 2/4/2020    Procedure: INSERTION, PORT-A-CATH;  Surgeon: Familia Gonzalez MD;  Location: [facility] OR;  Service: General;  Laterality: Right;    JOINT REPLACEMENT Left 2009    MOBILIZATION OF SPLENIC FLEXURE  2/4/2020    Procedure: MOBILIZATION, SPLENIC FLEXURE;  Surgeon: Familia Gonzalez MD;  Location: [facility] OR;  Service: General;;     Current Outpatient Medications   Medication Sig Dispense Refill    cyanocobalamin (VITAMIN B-12) 1000 MCG tablet Take 100 mcg by mouth once daily.      dronabinoL (MARINOL) 2.5 MG capsule Take 1 capsule (2.5 mg total) by mouth 2 (two) times daily before meals. 30 capsule 0    ferrous  sulfate 324 mg (65 mg iron) TbEC Take 1 tablet (324 mg total) by mouth 2 (two) times daily. 60 tablet 0    HYDROcodone-acetaminophen (NORCO)  mg per tablet Take 1 tablet by mouth every 6 (six) hours as needed for Pain. 60 tablet 0    losartan (COZAAR) 50 MG tablet TAKE 1 TABLET BY MOUTH EVERY MORNING 90 tablet 1    mirtazapine (REMERON) 15 MG tablet Take 1 tablet (15 mg total) by mouth every evening. For sleep and appetite 30 tablet 11    morphine (MS CONTIN) 30 MG 12 hr tablet Take 1 tablet (30 mg total) by mouth 2 (two) times daily. 60 tablet 0    ondansetron (ZOFRAN) 8 MG tablet Take 1 tablet (8 mg total) by mouth every 8 (eight) hours as needed for Nausea. 30 tablet 2    prochlorperazine (COMPAZINE) 5 MG tablet TAKE 1 TABLET(5 MG) BY MOUTH EVERY 6 HOURS AS NEEDED FOR NAUSEA 385 tablet 1    tamsulosin (FLOMAX) 0.4 mg Cap Take 1 capsule (0.4 mg total) by mouth once daily. 30 capsule 6    tamsulosin (FLOMAX) 0.4 mg Cap Take 1 capsule (0.4 mg total) by mouth once daily. 30 capsule 0    pantoprazole (PROTONIX) 40 MG tablet Take 1 tablet (40 mg total) by mouth once daily. 30 tablet 11     No current facility-administered medications for this visit.      Facility-Administered Medications Ordered in Other Visits   Medication Dose Route Frequency Provider Last Rate Last Dose    sodium chloride 0.9% flush 3 mL  3 mL Intravenous PRN Shilpa Yee MD           Labs:  Lab Results   Component Value Date    WBC 12.45 05/11/2020    HGB 10.8 (L) 05/11/2020    HCT 34.3 (L) 05/11/2020    MCV 88 05/11/2020     (H) 05/11/2020     BMP  Lab Results   Component Value Date     (L) 05/04/2020    K 5.8 (H) 05/04/2020    CL 90 (L) 05/04/2020    CO2 25 05/04/2020    BUN 17 05/04/2020    CREATININE 0.9 05/04/2020    CALCIUM 10.9 (H) 05/04/2020    ANIONGAP 9 05/04/2020    ESTGFRAFRICA >60 05/04/2020    EGFRNONAA >60 05/04/2020     Lab Results   Component Value Date    ALT 22 05/04/2020    AST 28 05/04/2020     ALKPHOS 183 (H) 05/04/2020    BILITOT 0.3 05/04/2020       Lab Results   Component Value Date    IRON <10 (L) 04/13/2018    TIBC 527 (H) 04/13/2018    FERRITIN 241 03/16/2020     No results found for: [id number]  No results found for: FOLATE  Lab Results   Component Value Date    TSH 0.601 04/07/2018       I have reviewed the radiology reports and examined the scan/xray images.    Review of Systems  ECOG SCORE    1 - Restricted in strenuous activity-ambulatory and able to carry out work of a light nature            Objective:     Vitals:    05/11/20 0909   BP: 99/69   Pulse: 87   Resp: 18   Temp: 98 °F (36.7 °C)   Body mass index is 19.99 kg/m².  Physical Exam      Assessment:      1. Rectal cancer    2. Iron deficiency anemia due to chronic blood loss           Plan:     Rectal cancer  Stage IIIB rectal adenocarcinoma, status post surgical resection and currently on adjuvant therapy with FOLFOX.  Present for cycle 5, reviewed labs, no concern cytopenia, proceed with treatment.  Clinically stable compared to 05/04/2020.   Plan to see him back in 2 weeks with repeat labs.    Iron deficiency anemia due to chronic blood loss  Stable hemoglobin.  No abnormal bleed.  Continue to monitor.  Noted evidence of reactive thrombocytosis likely due to iron deficiency.      Mikel Sams MD

## 2020-05-11 ENCOUNTER — INFUSION (OUTPATIENT)
Dept: INFUSION THERAPY | Facility: HOSPITAL | Age: 65
End: 2020-05-11
Attending: INTERNAL MEDICINE
Payer: MEDICARE

## 2020-05-11 ENCOUNTER — OFFICE VISIT (OUTPATIENT)
Dept: HEMATOLOGY/ONCOLOGY | Facility: CLINIC | Age: 65
End: 2020-05-11
Payer: MEDICARE

## 2020-05-11 ENCOUNTER — TELEPHONE (OUTPATIENT)
Dept: HEMATOLOGY/ONCOLOGY | Facility: CLINIC | Age: 65
End: 2020-05-11

## 2020-05-11 VITALS
RESPIRATION RATE: 18 BRPM | TEMPERATURE: 98 F | WEIGHT: 139.31 LBS | SYSTOLIC BLOOD PRESSURE: 99 MMHG | DIASTOLIC BLOOD PRESSURE: 69 MMHG | HEART RATE: 87 BPM | BODY MASS INDEX: 19.94 KG/M2 | HEIGHT: 70 IN | OXYGEN SATURATION: 97 %

## 2020-05-11 VITALS
SYSTOLIC BLOOD PRESSURE: 93 MMHG | OXYGEN SATURATION: 98 % | TEMPERATURE: 98 F | DIASTOLIC BLOOD PRESSURE: 67 MMHG | RESPIRATION RATE: 18 BRPM | HEART RATE: 64 BPM

## 2020-05-11 DIAGNOSIS — E87.5 HYPERKALEMIA: ICD-10-CM

## 2020-05-11 DIAGNOSIS — Z03.818 ENCOUNTER FOR OBSERVATION FOR SUSPECTED EXPOSURE TO OTHER BIOLOGICAL AGENTS RULED OUT: ICD-10-CM

## 2020-05-11 DIAGNOSIS — C20 RECTAL CANCER: Primary | ICD-10-CM

## 2020-05-11 DIAGNOSIS — D50.0 IRON DEFICIENCY ANEMIA DUE TO CHRONIC BLOOD LOSS: ICD-10-CM

## 2020-05-11 LAB — GLUCOSE SERPL-MCNC: 100 MG/DL (ref 70–110)

## 2020-05-11 PROCEDURE — 96372 THER/PROPH/DIAG INJ SC/IM: CPT

## 2020-05-11 PROCEDURE — 3074F PR MOST RECENT SYSTOLIC BLOOD PRESSURE < 130 MM HG: ICD-10-PCS | Mod: CPTII,S$GLB,, | Performed by: INTERNAL MEDICINE

## 2020-05-11 PROCEDURE — 3074F SYST BP LT 130 MM HG: CPT | Mod: CPTII,S$GLB,, | Performed by: INTERNAL MEDICINE

## 2020-05-11 PROCEDURE — 3008F PR BODY MASS INDEX (BMI) DOCUMENTED: ICD-10-PCS | Mod: CPTII,S$GLB,, | Performed by: INTERNAL MEDICINE

## 2020-05-11 PROCEDURE — 99215 OFFICE O/P EST HI 40 MIN: CPT | Mod: 25,S$GLB,, | Performed by: INTERNAL MEDICINE

## 2020-05-11 PROCEDURE — 96365 THER/PROPH/DIAG IV INF INIT: CPT

## 2020-05-11 PROCEDURE — 3008F BODY MASS INDEX DOCD: CPT | Mod: CPTII,S$GLB,, | Performed by: INTERNAL MEDICINE

## 2020-05-11 PROCEDURE — 3078F PR MOST RECENT DIASTOLIC BLOOD PRESSURE < 80 MM HG: ICD-10-PCS | Mod: CPTII,S$GLB,, | Performed by: INTERNAL MEDICINE

## 2020-05-11 PROCEDURE — 25000003 PHARM REV CODE 250: Performed by: INTERNAL MEDICINE

## 2020-05-11 PROCEDURE — 99999 PR PBB SHADOW E&M-EST. PATIENT-LVL III: CPT | Mod: PBBFAC,,, | Performed by: INTERNAL MEDICINE

## 2020-05-11 PROCEDURE — 3078F DIAST BP <80 MM HG: CPT | Mod: CPTII,S$GLB,, | Performed by: INTERNAL MEDICINE

## 2020-05-11 PROCEDURE — 63600175 PHARM REV CODE 636 W HCPCS: Performed by: INTERNAL MEDICINE

## 2020-05-11 PROCEDURE — 96361 HYDRATE IV INFUSION ADD-ON: CPT

## 2020-05-11 PROCEDURE — 99215 PR OFFICE/OUTPT VISIT, EST, LEVL V, 40-54 MIN: ICD-10-PCS | Mod: 25,S$GLB,, | Performed by: INTERNAL MEDICINE

## 2020-05-11 PROCEDURE — 99999 PR PBB SHADOW E&M-EST. PATIENT-LVL III: ICD-10-PCS | Mod: PBBFAC,,, | Performed by: INTERNAL MEDICINE

## 2020-05-11 RX ORDER — FLUOROURACIL 50 MG/ML
400 INJECTION, SOLUTION INTRAVENOUS
Status: CANCELLED | OUTPATIENT
Start: 2020-05-11

## 2020-05-11 RX ORDER — EPINEPHRINE 1 MG/ML
0.3 INJECTION, SOLUTION INTRACARDIAC; INTRAMUSCULAR; INTRAVENOUS; SUBCUTANEOUS ONCE AS NEEDED
Status: DISCONTINUED | OUTPATIENT
Start: 2020-05-11 | End: 2020-05-11 | Stop reason: HOSPADM

## 2020-05-11 RX ORDER — HYDROCODONE BITARTRATE AND ACETAMINOPHEN 10; 325 MG/1; MG/1
1 TABLET ORAL EVERY 6 HOURS PRN
Qty: 60 TABLET | Refills: 0 | Status: SHIPPED | OUTPATIENT
Start: 2020-05-11 | End: 2020-05-25 | Stop reason: SDUPTHER

## 2020-05-11 RX ORDER — DIPHENHYDRAMINE HYDROCHLORIDE 50 MG/ML
50 INJECTION INTRAMUSCULAR; INTRAVENOUS ONCE AS NEEDED
Status: DISCONTINUED | OUTPATIENT
Start: 2020-05-11 | End: 2020-05-11 | Stop reason: HOSPADM

## 2020-05-11 RX ORDER — INSULIN ASPART 100 [IU]/ML
10 INJECTION, SOLUTION INTRAVENOUS; SUBCUTANEOUS
Status: COMPLETED | OUTPATIENT
Start: 2020-05-11 | End: 2020-05-11

## 2020-05-11 RX ORDER — SODIUM CHLORIDE 0.9 % (FLUSH) 0.9 %
10 SYRINGE (ML) INJECTION
Status: CANCELLED | OUTPATIENT
Start: 2020-05-13

## 2020-05-11 RX ORDER — HEPARIN 100 UNIT/ML
500 SYRINGE INTRAVENOUS
Status: CANCELLED | OUTPATIENT
Start: 2020-05-11

## 2020-05-11 RX ORDER — HEPARIN 100 UNIT/ML
500 SYRINGE INTRAVENOUS
Status: CANCELLED | OUTPATIENT
Start: 2020-05-13

## 2020-05-11 RX ORDER — SODIUM CHLORIDE 0.9 % (FLUSH) 0.9 %
10 SYRINGE (ML) INJECTION
Status: CANCELLED | OUTPATIENT
Start: 2020-05-11

## 2020-05-11 RX ORDER — EPINEPHRINE 0.3 MG/.3ML
0.3 INJECTION SUBCUTANEOUS ONCE AS NEEDED
Status: CANCELLED | OUTPATIENT
Start: 2020-05-11

## 2020-05-11 RX ORDER — SODIUM CHLORIDE 9 MG/ML
500 INJECTION, SOLUTION INTRAVENOUS
Status: COMPLETED | OUTPATIENT
Start: 2020-05-11 | End: 2020-05-11

## 2020-05-11 RX ORDER — HEPARIN 100 UNIT/ML
500 SYRINGE INTRAVENOUS
Status: DISCONTINUED | OUTPATIENT
Start: 2020-05-11 | End: 2020-05-11 | Stop reason: HOSPADM

## 2020-05-11 RX ORDER — FLUOROURACIL 50 MG/ML
400 INJECTION, SOLUTION INTRAVENOUS
Status: DISCONTINUED | OUTPATIENT
Start: 2020-05-11 | End: 2020-05-11 | Stop reason: HOSPADM

## 2020-05-11 RX ORDER — DIPHENHYDRAMINE HYDROCHLORIDE 50 MG/ML
50 INJECTION INTRAMUSCULAR; INTRAVENOUS ONCE AS NEEDED
Status: CANCELLED | OUTPATIENT
Start: 2020-05-11

## 2020-05-11 RX ADMIN — INSULIN ASPART 10 UNITS: 100 INJECTION, SOLUTION INTRAVENOUS; SUBCUTANEOUS at 10:05

## 2020-05-11 RX ADMIN — SODIUM CHLORIDE 500 ML: 0.9 INJECTION, SOLUTION INTRAVENOUS at 10:05

## 2020-05-11 RX ADMIN — SODIUM CHLORIDE: 9 INJECTION, SOLUTION INTRAVENOUS at 12:05

## 2020-05-11 RX ADMIN — HEPARIN 500 UNITS: 100 SYRINGE at 02:05

## 2020-05-11 RX ADMIN — PALONOSETRON HYDROCHLORIDE: 0.25 INJECTION, SOLUTION INTRAVENOUS at 12:05

## 2020-05-11 RX ADMIN — SODIUM CHLORIDE 500 ML: 9 INJECTION, SOLUTION INTRAVENOUS at 12:05

## 2020-05-11 NOTE — ASSESSMENT & PLAN NOTE
Stable hemoglobin.  No abnormal bleed.  Continue to monitor.  Noted evidence of reactive thrombocytosis likely due to iron deficiency.

## 2020-05-11 NOTE — PLAN OF CARE
"  Problem: Adult Inpatient Plan of Care  Goal: Plan of Care Review  Outcome: Ongoing, Progressing  Flowsheets (Taken 5/11/2020 1817)  Plan of Care Reviewed With: patient  Goal: Patient-Specific Goal (Individualization)  Outcome: Ongoing, Progressing  Flowsheets (Taken 5/11/2020 1457)  Individualized Care Needs: pillow, blanket, juice, snack  Anxieties, Fears or Concerns: None  Patient-Specific Goals (Include Timeframe): patient will tolerate infusion today.      Patient states, "I have my normal pain to my right abdomen today." Patient is tired and drowsy today.      "

## 2020-05-11 NOTE — DISCHARGE INSTRUCTIONS
[facility]  [address] [facility]  [address]  [phone] phone     [phone] fax  Hours of Operation: Monday- Friday 8:00am- 5:00pm  After hours phone  [phone]  Hematology / Oncology Physicians on call      Dr. Thor Boone, PETE Miller NP Tyesha Taylor, NP    Please call with any concerns regarding your appointment today.        FALL PREVENTION   Falls often occur due to slipping, tripping or losing your balance. Here are ways to reduce your risk of falling again.   Was there anything that caused your fall that can be fixed, removed or replaced?   Make your home safe by keeping walkways clear of objects you may trip over.   Use non-slip pads under rugs.   Do not walk in poorly lit areas.   Do not stand on chairs or wobbly ladders.   Use caution when reaching overhead or looking upward. This position can cause a loss of balance.   Be sure your shoes fit properly, have non-slip bottoms and are in good condition.   Be cautious when going up and down stairs, curbs, and when walking on uneven sidewalks.   If your balance is poor, consider using a cane or walker.   If your fall was related to alcohol use, stop or limit alcohol intake.   If your fall was related to use of sleeping medicines, talk to your doctor about this. You may need to reduce your dosage at bedtime if you awaken during the night to go to the bathroom.   To reduce the need for nighttime bathroom trips:   Avoid drinking fluids for several hours before going to bed   Empty your bladder before going to bed   Men can keep a urinal at the bedside   © 7976-4435 [facility], [address] [place] [address], [place] [address]. All rights reserved. This information is not intended as a substitute for professional medical care. Always follow your healthcare professional's instructions.

## 2020-05-11 NOTE — NURSING
Patient BP low today, two 500 cc NS bolus given. Patient denies dizziness or weakness, but reports he took a MS Contin at approximately 0830 this morning, and it causes him drowsiness. Patient requested to come back tomorrow, 5/12/2020, for chemo infusion. Patient states he has been here all day, and is tired and hungry. Dr. Sams notified of patient request to receive chemo tomorrow, and he is ok with patient returning tomorrow. Chong Hernandez in pharmacy notified as well. Patient instructed to return to infusion tomorrow at 0815 for infusion.

## 2020-05-11 NOTE — TELEPHONE ENCOUNTER
Called pt., informed him  Advises to seek his dentisit, bc if infection, he wouldn't be able to resume w the chemo. If infection in the body.. Pt verbalized understanding..

## 2020-05-11 NOTE — TELEPHONE ENCOUNTER
----- Message from Mikel Sams MD sent at 5/8/2020  8:05 AM CDT -----  Contact: self  Called patient and no answer. Please ask him to see a dentist immediately. He cannot continue to receive chemo if there is an infection going on. Chemo will suppress his immune system further. Thanks      ----- Message -----  From: Opal Broussard LPN  Sent: 5/7/2020   4:20 PM CDT  To: Mikel Sams MD    I returned patients call. He asks for advice. Wants you to know that he's with a toothache and has high concerns about infection. With him receiving chemo, will infection make it worst for him and his body. Has not been able to make an appt w his dentist..  ----- Message -----  From: Staci Fuentes  Sent: 5/7/2020   3:52 PM CDT  To: Latrell RAMEY Staff    states that he has toothache, wants to know if he'll be infected....[phone] (urgent per pt)

## 2020-05-11 NOTE — ASSESSMENT & PLAN NOTE
Stage IIIB rectal adenocarcinoma, status post surgical resection and currently on adjuvant therapy with FOLFOX.  Present for cycle 5, reviewed labs, no concern cytopenia, proceed with treatment.  Clinically stable compared to 05/04/2020.   Plan to see him back in 2 weeks with repeat labs.

## 2020-05-12 ENCOUNTER — DOCUMENTATION ONLY (OUTPATIENT)
Dept: HEMATOLOGY/ONCOLOGY | Facility: CLINIC | Age: 65
End: 2020-05-12

## 2020-05-12 ENCOUNTER — INFUSION (OUTPATIENT)
Dept: INFUSION THERAPY | Facility: HOSPITAL | Age: 65
End: 2020-05-12
Attending: INTERNAL MEDICINE
Payer: MEDICARE

## 2020-05-12 VITALS
OXYGEN SATURATION: 98 % | SYSTOLIC BLOOD PRESSURE: 123 MMHG | RESPIRATION RATE: 18 BRPM | HEART RATE: 57 BPM | DIASTOLIC BLOOD PRESSURE: 71 MMHG | TEMPERATURE: 98 F

## 2020-05-12 DIAGNOSIS — C20 RECTAL CANCER: Primary | ICD-10-CM

## 2020-05-12 PROCEDURE — 96368 THER/DIAG CONCURRENT INF: CPT

## 2020-05-12 PROCEDURE — 96411 CHEMO IV PUSH ADDL DRUG: CPT

## 2020-05-12 PROCEDURE — 96367 TX/PROPH/DG ADDL SEQ IV INF: CPT

## 2020-05-12 PROCEDURE — 96416 CHEMO PROLONG INFUSE W/PUMP: CPT

## 2020-05-12 PROCEDURE — 25000003 PHARM REV CODE 250: Performed by: INTERNAL MEDICINE

## 2020-05-12 PROCEDURE — 96413 CHEMO IV INFUSION 1 HR: CPT

## 2020-05-12 PROCEDURE — 63600175 PHARM REV CODE 636 W HCPCS: Performed by: INTERNAL MEDICINE

## 2020-05-12 PROCEDURE — 96415 CHEMO IV INFUSION ADDL HR: CPT

## 2020-05-12 RX ORDER — SODIUM CHLORIDE 0.9 % (FLUSH) 0.9 %
10 SYRINGE (ML) INJECTION
Status: CANCELLED | OUTPATIENT
Start: 2020-05-12

## 2020-05-12 RX ORDER — FLUOROURACIL 50 MG/ML
400 INJECTION, SOLUTION INTRAVENOUS
Status: COMPLETED | OUTPATIENT
Start: 2020-05-12 | End: 2020-05-12

## 2020-05-12 RX ORDER — EPINEPHRINE 1 MG/ML
0.3 INJECTION, SOLUTION INTRACARDIAC; INTRAMUSCULAR; INTRAVENOUS; SUBCUTANEOUS ONCE AS NEEDED
Status: CANCELLED | OUTPATIENT
Start: 2020-05-12

## 2020-05-12 RX ORDER — DIPHENHYDRAMINE HYDROCHLORIDE 50 MG/ML
50 INJECTION INTRAMUSCULAR; INTRAVENOUS ONCE AS NEEDED
Status: CANCELLED | OUTPATIENT
Start: 2020-05-12

## 2020-05-12 RX ORDER — HEPARIN 100 UNIT/ML
500 SYRINGE INTRAVENOUS
Status: CANCELLED | OUTPATIENT
Start: 2020-05-12

## 2020-05-12 RX ORDER — FLUOROURACIL 50 MG/ML
400 INJECTION, SOLUTION INTRAVENOUS
Status: CANCELLED | OUTPATIENT
Start: 2020-05-12

## 2020-05-12 RX ADMIN — DEXTROSE MONOHYDRATE: 50 INJECTION, SOLUTION INTRAVENOUS at 09:05

## 2020-05-12 RX ADMIN — SODIUM CHLORIDE 4270 MG: 9 INJECTION, SOLUTION INTRAVENOUS at 12:05

## 2020-05-12 RX ADMIN — LEUCOVORIN CALCIUM 710 MG: 500 INJECTION, POWDER, LYOPHILIZED, FOR SOLUTION INTRAMUSCULAR; INTRAVENOUS at 10:05

## 2020-05-12 RX ADMIN — FLUOROURACIL 710 MG: 50 INJECTION, SOLUTION INTRAVENOUS at 12:05

## 2020-05-12 RX ADMIN — SODIUM CHLORIDE: 9 INJECTION, SOLUTION INTRAVENOUS at 09:05

## 2020-05-12 RX ADMIN — DEXAMETHASONE SODIUM PHOSPHATE: 4 INJECTION, SOLUTION INTRA-ARTICULAR; INTRALESIONAL; INTRAMUSCULAR; INTRAVENOUS; SOFT TISSUE at 09:05

## 2020-05-12 RX ADMIN — OXALIPLATIN 151 MG: 5 INJECTION, SOLUTION INTRAVENOUS at 10:05

## 2020-05-12 NOTE — DISCHARGE INSTRUCTIONS
[facility]  [address] [facility]  [address]  [phone] phone     [phone] fax  Hours of Operation: Monday- Friday 8:00am- 5:00pm  After hours phone  [phone]  Hematology / Oncology Physicians on call      Dr. Thor Boone, PETE Miller NP Tyesha Taylor, NP    Please call with any concerns regarding your appointment today.        FALL PREVENTION   Falls often occur due to slipping, tripping or losing your balance. Here are ways to reduce your risk of falling again.   Was there anything that caused your fall that can be fixed, removed or replaced?   Make your home safe by keeping walkways clear of objects you may trip over.   Use non-slip pads under rugs.   Do not walk in poorly lit areas.   Do not stand on chairs or wobbly ladders.   Use caution when reaching overhead or looking upward. This position can cause a loss of balance.   Be sure your shoes fit properly, have non-slip bottoms and are in good condition.   Be cautious when going up and down stairs, curbs, and when walking on uneven sidewalks.   If your balance is poor, consider using a cane or walker.   If your fall was related to alcohol use, stop or limit alcohol intake.   If your fall was related to use of sleeping medicines, talk to your doctor about this. You may need to reduce your dosage at bedtime if you awaken during the night to go to the bathroom.   To reduce the need for nighttime bathroom trips:   Avoid drinking fluids for several hours before going to bed   Empty your bladder before going to bed   Men can keep a urinal at the bedside   © 1169-3015 [employer] [facility], [address] [place] [address], [place] [address]. All rights reserved. This information is not intended as a substitute for professional medical care. Always follow your healthcare professional's instructions.        HOME CARE AFTER CHEMOTHERAPY    Meals   Many patients feel sick and lose their appetites during treatment. Eat small meals several times a day. Choose bland foods with little taste or smell if you have problems with nausea. Be sure to cook all food thoroughly. This kills bacteria and helps you avoid intestinal infection. Soft foods are easier to swallow and digest.   Activity   Exercise keeps you strong and keeps your heart and lungs active. Talk to your doctor about an appropriate exercise program for you.   Skin Care   To prevent a skin infection, bathe or shower once a day. Use a moisturizing soap and wash with warm water. Avoid very hot or cold water. Chemotherapy can make your skin dry . Apply moisturizing lotion to help relieve dry skin. Some drugs used in high doses can cause slight burns to appear (like sunburn). Ask for a special cream to help relieve the burn and protect your skin.   Prevent Mouth Sores   During chemotherapy, many people get mouth sores. Do the following to help prevent mouth sores or to ease discomfort.   Brush your teeth with a soft-bristle toothbrush after every meal.  Don't use dental floss if your platelet count is below 50,000. Your doctor or nurse will tell you if this is the case.  Use an oral swab or special soft toothbrush if your gums bleed during regular brushing.  Use mouthwash as directed. If you can't tolerate commercial mouthwash, use salt and baking soda to clean your mouth. Mix 1 teaspoon of salt and 1 teaspoon of baking soda into a glass of water. Swish and spit.  Call your doctor or return to this facility if you develop any of the following:   Sore throat   White patches in the mouth or throat   Fever of 100.4ºF (38ºC) or higher, or as directed by your healthcare provider  © 6159-1984 [employer], [address] [place] [address], [place] [address]. All rights reserved. This information is not intended as a substitute for professional medical care. Always follow your healthcare professional's  instructions.

## 2020-05-12 NOTE — PLAN OF CARE
"  Problem: Adult Inpatient Plan of Care  Goal: Plan of Care Review  Outcome: Ongoing, Progressing  Flowsheets (Taken 5/12/2020 1016)  Plan of Care Reviewed With: patient  Goal: Patient-Specific Goal (Individualization)  Outcome: Ongoing, Progressing  Flowsheets (Taken 5/12/2020 1016)  Individualized Care Needs: pillow, blanket, juice, snack  Anxieties, Fears or Concerns: None  Patient-Specific Goals (Include Timeframe): patient will tolerate chemo today.      Patient states, "I feel much better today than yesterday."   "

## 2020-05-12 NOTE — PROGRESS NOTES
Ela met with patient at chairside to offer donated Vanilla Bean meal replacement shake to try. Patient reports he liked it. Ela provided patient with a few to take home. Ela will f/u with pt at a later date.

## 2020-05-14 ENCOUNTER — INFUSION (OUTPATIENT)
Dept: INFUSION THERAPY | Facility: HOSPITAL | Age: 65
End: 2020-05-14
Attending: INTERNAL MEDICINE
Payer: MEDICARE

## 2020-05-14 VITALS — SYSTOLIC BLOOD PRESSURE: 99 MMHG | DIASTOLIC BLOOD PRESSURE: 72 MMHG | TEMPERATURE: 98 F | OXYGEN SATURATION: 98 %

## 2020-05-14 DIAGNOSIS — C20 RECTAL CANCER: Primary | ICD-10-CM

## 2020-05-14 PROCEDURE — A4216 STERILE WATER/SALINE, 10 ML: HCPCS | Performed by: INTERNAL MEDICINE

## 2020-05-14 PROCEDURE — 63600175 PHARM REV CODE 636 W HCPCS: Performed by: INTERNAL MEDICINE

## 2020-05-14 PROCEDURE — 25000003 PHARM REV CODE 250: Performed by: INTERNAL MEDICINE

## 2020-05-14 PROCEDURE — 96523 IRRIG DRUG DELIVERY DEVICE: CPT

## 2020-05-14 RX ORDER — HEPARIN 100 UNIT/ML
500 SYRINGE INTRAVENOUS
Status: DISCONTINUED | OUTPATIENT
Start: 2020-05-14 | End: 2020-05-14 | Stop reason: HOSPADM

## 2020-05-14 RX ORDER — SODIUM CHLORIDE 0.9 % (FLUSH) 0.9 %
10 SYRINGE (ML) INJECTION
Status: DISCONTINUED | OUTPATIENT
Start: 2020-05-14 | End: 2020-05-14 | Stop reason: HOSPADM

## 2020-05-14 RX ADMIN — Medication 10 ML: at 11:05

## 2020-05-14 RX ADMIN — HEPARIN 500 UNITS: 100 SYRINGE at 11:05

## 2020-05-15 ENCOUNTER — DOCUMENTATION ONLY (OUTPATIENT)
Dept: HEMATOLOGY/ONCOLOGY | Facility: CLINIC | Age: 65
End: 2020-05-15

## 2020-05-15 NOTE — PROGRESS NOTES
reapplied patient today for [employer] Sw printed copy of application and placed in patient's folder. Sw will continue to follow up.

## 2020-05-22 ENCOUNTER — LAB VISIT (OUTPATIENT)
Dept: OTOLARYNGOLOGY | Facility: CLINIC | Age: 65
End: 2020-05-22
Payer: MEDICARE

## 2020-05-22 DIAGNOSIS — Z03.818 ENCOUNTER FOR OBSERVATION FOR SUSPECTED EXPOSURE TO OTHER BIOLOGICAL AGENTS RULED OUT: ICD-10-CM

## 2020-05-22 PROCEDURE — U0003 INFECTIOUS AGENT DETECTION BY NUCLEIC ACID (DNA OR RNA); SEVERE ACUTE RESPIRATORY SYNDROME CORONAVIRUS 2 (SARS-COV-2) (CORONAVIRUS DISEASE [COVID-19]), AMPLIFIED PROBE TECHNIQUE, MAKING USE OF HIGH THROUGHPUT TECHNOLOGIES AS DESCRIBED BY CMS-2020-01-R: HCPCS

## 2020-05-23 LAB — SARS-COV-2 RNA RESP QL NAA+PROBE: NOT DETECTED

## 2020-05-25 ENCOUNTER — OFFICE VISIT (OUTPATIENT)
Dept: HEMATOLOGY/ONCOLOGY | Facility: CLINIC | Age: 65
End: 2020-05-25
Payer: MEDICARE

## 2020-05-25 ENCOUNTER — INFUSION (OUTPATIENT)
Dept: INFUSION THERAPY | Facility: HOSPITAL | Age: 65
End: 2020-05-25
Attending: INTERNAL MEDICINE
Payer: MEDICARE

## 2020-05-25 VITALS
DIASTOLIC BLOOD PRESSURE: 80 MMHG | RESPIRATION RATE: 18 BRPM | WEIGHT: 136.25 LBS | SYSTOLIC BLOOD PRESSURE: 142 MMHG | BODY MASS INDEX: 19.51 KG/M2 | HEIGHT: 70 IN | TEMPERATURE: 97 F | OXYGEN SATURATION: 99 % | HEART RATE: 65 BPM

## 2020-05-25 VITALS
TEMPERATURE: 97 F | HEIGHT: 70 IN | SYSTOLIC BLOOD PRESSURE: 137 MMHG | BODY MASS INDEX: 19.51 KG/M2 | RESPIRATION RATE: 14 BRPM | HEART RATE: 76 BPM | WEIGHT: 136.25 LBS | OXYGEN SATURATION: 98 % | DIASTOLIC BLOOD PRESSURE: 87 MMHG

## 2020-05-25 DIAGNOSIS — C20 RECTAL CANCER: Primary | ICD-10-CM

## 2020-05-25 DIAGNOSIS — I10 ESSENTIAL HYPERTENSION: Chronic | ICD-10-CM

## 2020-05-25 DIAGNOSIS — Z03.818 ENCOUNTER FOR OBSERVATION FOR SUSPECTED EXPOSURE TO OTHER BIOLOGICAL AGENTS RULED OUT: ICD-10-CM

## 2020-05-25 DIAGNOSIS — C20 RECTAL CANCER: ICD-10-CM

## 2020-05-25 PROCEDURE — 96413 CHEMO IV INFUSION 1 HR: CPT

## 2020-05-25 PROCEDURE — 96367 TX/PROPH/DG ADDL SEQ IV INF: CPT

## 2020-05-25 PROCEDURE — 96416 CHEMO PROLONG INFUSE W/PUMP: CPT

## 2020-05-25 PROCEDURE — 99999 PR PBB SHADOW E&M-EST. PATIENT-LVL III: ICD-10-PCS | Mod: PBBFAC,,, | Performed by: NURSE PRACTITIONER

## 2020-05-25 PROCEDURE — 99214 PR OFFICE/OUTPT VISIT, EST, LEVL IV, 30-39 MIN: ICD-10-PCS | Mod: 25,S$GLB,, | Performed by: NURSE PRACTITIONER

## 2020-05-25 PROCEDURE — 96415 CHEMO IV INFUSION ADDL HR: CPT

## 2020-05-25 PROCEDURE — 25000003 PHARM REV CODE 250: Performed by: INTERNAL MEDICINE

## 2020-05-25 PROCEDURE — 96411 CHEMO IV PUSH ADDL DRUG: CPT

## 2020-05-25 PROCEDURE — 3008F PR BODY MASS INDEX (BMI) DOCUMENTED: ICD-10-PCS | Mod: CPTII,S$GLB,, | Performed by: NURSE PRACTITIONER

## 2020-05-25 PROCEDURE — 99214 OFFICE O/P EST MOD 30 MIN: CPT | Mod: 25,S$GLB,, | Performed by: NURSE PRACTITIONER

## 2020-05-25 PROCEDURE — 3075F SYST BP GE 130 - 139MM HG: CPT | Mod: CPTII,S$GLB,, | Performed by: NURSE PRACTITIONER

## 2020-05-25 PROCEDURE — 96368 THER/DIAG CONCURRENT INF: CPT

## 2020-05-25 PROCEDURE — 3079F DIAST BP 80-89 MM HG: CPT | Mod: CPTII,S$GLB,, | Performed by: NURSE PRACTITIONER

## 2020-05-25 PROCEDURE — 3008F BODY MASS INDEX DOCD: CPT | Mod: CPTII,S$GLB,, | Performed by: NURSE PRACTITIONER

## 2020-05-25 PROCEDURE — 99999 PR PBB SHADOW E&M-EST. PATIENT-LVL III: CPT | Mod: PBBFAC,,, | Performed by: NURSE PRACTITIONER

## 2020-05-25 PROCEDURE — 3075F PR MOST RECENT SYSTOLIC BLOOD PRESS GE 130-139MM HG: ICD-10-PCS | Mod: CPTII,S$GLB,, | Performed by: NURSE PRACTITIONER

## 2020-05-25 PROCEDURE — 63600175 PHARM REV CODE 636 W HCPCS: Performed by: INTERNAL MEDICINE

## 2020-05-25 PROCEDURE — 3079F PR MOST RECENT DIASTOLIC BLOOD PRESSURE 80-89 MM HG: ICD-10-PCS | Mod: CPTII,S$GLB,, | Performed by: NURSE PRACTITIONER

## 2020-05-25 RX ORDER — HEPARIN 100 UNIT/ML
500 SYRINGE INTRAVENOUS
Status: CANCELLED | OUTPATIENT
Start: 2020-05-25

## 2020-05-25 RX ORDER — EPINEPHRINE 0.3 MG/.3ML
0.3 INJECTION SUBCUTANEOUS ONCE AS NEEDED
Status: CANCELLED | OUTPATIENT
Start: 2020-05-25

## 2020-05-25 RX ORDER — SODIUM CHLORIDE 0.9 % (FLUSH) 0.9 %
10 SYRINGE (ML) INJECTION
Status: CANCELLED | OUTPATIENT
Start: 2020-05-25

## 2020-05-25 RX ORDER — HEPARIN 100 UNIT/ML
500 SYRINGE INTRAVENOUS
Status: CANCELLED | OUTPATIENT
Start: 2020-05-27

## 2020-05-25 RX ORDER — FLUOROURACIL 50 MG/ML
400 INJECTION, SOLUTION INTRAVENOUS
Status: COMPLETED | OUTPATIENT
Start: 2020-05-25 | End: 2020-05-25

## 2020-05-25 RX ORDER — HYDROCODONE BITARTRATE AND ACETAMINOPHEN 10; 325 MG/1; MG/1
1 TABLET ORAL EVERY 6 HOURS PRN
Qty: 60 TABLET | Refills: 0 | Status: SHIPPED | OUTPATIENT
Start: 2020-05-25 | End: 2020-06-08 | Stop reason: SDUPTHER

## 2020-05-25 RX ORDER — SODIUM CHLORIDE 0.9 % (FLUSH) 0.9 %
10 SYRINGE (ML) INJECTION
Status: CANCELLED | OUTPATIENT
Start: 2020-05-27

## 2020-05-25 RX ORDER — DIPHENHYDRAMINE HYDROCHLORIDE 50 MG/ML
50 INJECTION INTRAMUSCULAR; INTRAVENOUS ONCE AS NEEDED
Status: CANCELLED | OUTPATIENT
Start: 2020-05-25

## 2020-05-25 RX ORDER — FLUOROURACIL 50 MG/ML
400 INJECTION, SOLUTION INTRAVENOUS
Status: CANCELLED | OUTPATIENT
Start: 2020-05-25

## 2020-05-25 RX ADMIN — PALONOSETRON HYDROCHLORIDE: 0.25 INJECTION, SOLUTION INTRAVENOUS at 09:05

## 2020-05-25 RX ADMIN — OXALIPLATIN 151 MG: 5 INJECTION, SOLUTION, CONCENTRATE INTRAVENOUS at 09:05

## 2020-05-25 RX ADMIN — FLUOROURACIL 710 MG: 50 INJECTION, SOLUTION INTRAVENOUS at 12:05

## 2020-05-25 RX ADMIN — FLUOROURACIL 4270 MG: 50 INJECTION, SOLUTION INTRAVENOUS at 12:05

## 2020-05-25 RX ADMIN — LEUCOVORIN CALCIUM 710 MG: 500 INJECTION, POWDER, LYOPHILIZED, FOR SOLUTION INTRAMUSCULAR; INTRAVENOUS at 09:05

## 2020-05-25 RX ADMIN — DEXTROSE: 5 SOLUTION INTRAVENOUS at 09:05

## 2020-05-25 NOTE — DISCHARGE INSTRUCTIONS
[facility]  [address] [facility]  [address]  [phone] phone     [phone] fax  Hours of Operation: Monday- Friday 8:00am- 5:00pm  After hours phone  [phone]  Hematology / Oncology Physicians on call      PETE Kumar Dr., Dr., Dr., Dr., NP Sydney Prescott, NP Tyesha Taylor, NP    Please call with any concerns regarding your appointment today.

## 2020-05-25 NOTE — NURSING
Pt tolerated chemo well. No adverse reaction noted. Pt education reinforced on chemo regimen, side effects, what to expect, and when to call his MD. Pt verbalized understanding. I reviewed pt calendar w/ pt and understanding verbalized.   Right chest PAC remains accessed with 5FU pump connected infusing at a continuous rate of 2.2 ml/hr.  Dressing clean, dry, and intact.

## 2020-05-25 NOTE — ASSESSMENT & PLAN NOTE
Laboratory review stable. Patient reports feeling well overall.     Proceed with Cycle 6 FOLFOX chemotherapy today. Infusion appointment 5/27 for pump disconnect. Refilled Norco prescription.  F/u 2 weeks with Dr. Sams for next chemotherapy treatment. Discussed S&S to report sooner

## 2020-05-25 NOTE — PROGRESS NOTES
Subjective:       Patient ID: Vincent Benton is a 64 y.o. male.    Chief Complaint: Lab results. FOLFOX chemo    HPI: 64 y.o male with rectal cancer.  Patient has completed concurrent chemoradiation with capecitabine.  Status post low anterior resection.  Final path noted at ypT3N0. Status post 4 cycles of adjuvant therapy with FOLFOX.     Patient was last seen on 05/04/2020 and at that time chemotherapy was withheld due to difficulty swallowing, shortness of breath, abdominal pain and overall debility.  He received 1 L of normal saline and was discharged home.  On 5/11 patient presented with resolution of symptoms and proceeded with cycle 5 chemotherapy. He reports tolerating this well without any significant side effects    Patient presents today for labs and to proceed with cycle 6 FOLFOX. Feels well overall and is without complaints except chronic pain.   Social History     Socioeconomic History    Marital status:      Spouse name: Not on file    Number of children: Not on file    Years of education: Not on file    Highest education level: Not on file   Occupational History    Not on file   Social Needs    Financial resource strain: Not on file    Food insecurity:     Worry: Not on file     Inability: Not on file    Transportation needs:     Medical: Not on file     Non-medical: Not on file   Tobacco Use    Smoking status: Never Smoker    Smokeless tobacco: Never Used   Substance and Sexual Activity    Alcohol use: Yes     Comment: HOLD 72H PRIOR TO SURGERY    Drug use: No    Sexual activity: Never     Partners: Female   Lifestyle    Physical activity:     Days per week: Not on file     Minutes per session: Not on file    Stress: Not on file   Relationships    Social connections:     Talks on phone: Not on file     Gets together: Not on file     Attends Gnosticism service: Not on file     Active member of club or organization: Not on file     Attends meetings of clubs or  organizations: Not on file     Relationship status: Not on file   Other Topics Concern    Not on file   Social History Narrative    Not on file       Past Medical History:   Diagnosis Date    Alcoholism     Anemia     Back pain     Encounter for blood transfusion 2018    Essential hypertension 2/4/2016    GERD (gastroesophageal reflux disease)     Hiatal hernia     Hypertension     Rectal cancer 9/11/2019       Family History   Problem Relation Age of Onset    Cataracts Mother     Stroke Father     Hypertension Father        Past Surgical History:   Procedure Laterality Date    COLONOSCOPY N/A 9/3/2019    Procedure: COLONOSCOPY;  Surgeon: Isai Centeno MD;  Location: [facility] ENDO;  Service: Endoscopy;  Laterality: N/A;    COLONOSCOPY N/A 1/10/2020    Procedure: COLONOSCOPY;  Surgeon: Familia Gonzalez MD;  Location: [facility] ENDO;  Service: General;  Laterality: N/A;    ESOPHAGOGASTRODUODENOSCOPY N/A 9/3/2019    Procedure: ESOPHAGOGASTRODUODENOSCOPY (EGD);  Surgeon: Isai Centeno MD;  Location: [facility] ENDO;  Service: Endoscopy;  Laterality: N/A;    FLEXIBLE SIGMOIDOSCOPY  2/4/2020    Procedure: SIGMOIDOSCOPY, FLEXIBLE;  Surgeon: Familia Gonzalez MD;  Location: [facility] OR;  Service: General;;    ILEOSTOMY Right 2/4/2020    Procedure: CREATION, ILEOSTOMY;  Surgeon: Familia Gonzalez MD;  Location: [facility] OR;  Service: General;  Laterality: Right;    INJECTION OF ANESTHETIC AGENT INTO TISSUE PLANE DEFINED BY TRANSVERSUS ABDOMINIS MUSCLE N/A 2/4/2020    Procedure: BLOCK, TRANSVERSUS ABDOMINIS PLANE;  Surgeon: Familia Gonzalez MD;  Location: [facility] OR;  Service: General;  Laterality: N/A;    INSERTION OF TUNNELED CENTRAL VENOUS CATHETER (CVC) WITH SUBCUTANEOUS PORT Right 2/4/2020    Procedure: INSERTION, PORT-A-CATH;  Surgeon: Familia Gonzalez MD;  Location: [facility] OR;  Service: General;  Laterality: Right;    JOINT REPLACEMENT Left 2009    MOBILIZATION OF SPLENIC FLEXURE  2/4/2020    Procedure:  MOBILIZATION, SPLENIC FLEXURE;  Surgeon: Familia Gonzalez MD;  Location: [facility] OR;  Service: General;;       Review of Systems   Constitutional: Negative for activity change, appetite change, chills, diaphoresis, fatigue, fever and unexpected weight change.   HENT: Negative for congestion, mouth sores, nosebleeds, sore throat, trouble swallowing and voice change.    Eyes: Negative for photophobia and visual disturbance.   Respiratory: Negative for cough, chest tightness, shortness of breath and wheezing.    Cardiovascular: Negative for chest pain, palpitations and leg swelling.   Gastrointestinal: Negative for abdominal distention, abdominal pain, anal bleeding, blood in stool, constipation, diarrhea, nausea and vomiting.   Genitourinary: Negative for difficulty urinating, dysuria and hematuria.   Musculoskeletal: Positive for arthralgias. Negative for back pain and myalgias.   Skin: Negative for pallor, rash and wound.   Neurological: Negative for dizziness, syncope, weakness and headaches.   Hematological: Negative for adenopathy. Does not bruise/bleed easily.   Psychiatric/Behavioral: The patient is not nervous/anxious.          Medication List with Changes/Refills   Current Medications    CYANOCOBALAMIN (VITAMIN B-12) 1000 MCG TABLET    Take 100 mcg by mouth once daily.    DRONABINOL (MARINOL) 2.5 MG CAPSULE    Take 1 capsule (2.5 mg total) by mouth 2 (two) times daily before meals.    FERROUS SULFATE 324 MG (65 MG IRON) TBEC    Take 1 tablet (324 mg total) by mouth 2 (two) times daily.    LOSARTAN (COZAAR) 50 MG TABLET    TAKE 1 TABLET BY MOUTH EVERY MORNING    MIRTAZAPINE (REMERON) 15 MG TABLET    Take 1 tablet (15 mg total) by mouth every evening. For sleep and appetite    MORPHINE (MS CONTIN) 30 MG 12 HR TABLET    Take 1 tablet (30 mg total) by mouth 2 (two) times daily.    ONDANSETRON (ZOFRAN) 8 MG TABLET    Take 1 tablet (8 mg total) by mouth every 8 (eight) hours as needed for Nausea.    PANTOPRAZOLE  (PROTONIX) 40 MG TABLET    Take 1 tablet (40 mg total) by mouth once daily.    PROCHLORPERAZINE (COMPAZINE) 5 MG TABLET    TAKE 1 TABLET(5 MG) BY MOUTH EVERY 6 HOURS AS NEEDED FOR NAUSEA    TAMSULOSIN (FLOMAX) 0.4 MG CAP    Take 1 capsule (0.4 mg total) by mouth once daily.    TAMSULOSIN (FLOMAX) 0.4 MG CAP    Take 1 capsule (0.4 mg total) by mouth once daily.   Changed and/or Refilled Medications    Modified Medication Previous Medication    HYDROCODONE-ACETAMINOPHEN (NORCO)  MG PER TABLET HYDROcodone-acetaminophen (NORCO)  mg per tablet       Take 1 tablet by mouth every 6 (six) hours as needed for Pain.    Take 1 tablet by mouth every 6 (six) hours as needed for Pain.     Objective:     Vitals:    05/25/20 0820   BP: 137/87   Pulse: 76   Resp: 14   Temp: 97.3 °F (36.3 °C)     Lab Results   Component Value Date    WBC 5.55 05/25/2020    HGB 10.3 (L) 05/25/2020    HCT 33.3 (L) 05/25/2020    MCV 92 05/25/2020     05/25/2020       BMP  Lab Results   Component Value Date     (L) 05/25/2020    K 4.7 05/25/2020    CL 97 05/25/2020    CO2 26 05/25/2020    BUN 14 05/25/2020    CREATININE 0.9 05/25/2020    CALCIUM 9.8 05/25/2020    ANIONGAP 9 05/25/2020    ESTGFRAFRICA >60 05/25/2020    EGFRNONAA >60 05/25/2020     Lab Results   Component Value Date    ALT 20 05/25/2020    AST 30 05/25/2020    ALKPHOS 193 (H) 05/25/2020    BILITOT 0.3 05/25/2020         Physical Exam   Constitutional: He is oriented to person, place, and time. He appears well-developed. He is cooperative.   HENT:   Head: Normocephalic.   Right Ear: External ear normal.   Left Ear: External ear normal.   Nose: Nose normal.   Mouth/Throat: Oropharynx is clear and moist.   Eyes: Conjunctivae, EOM and lids are normal. Right eye exhibits no discharge. Left eye exhibits no discharge. No scleral icterus.   Neck: Normal range of motion. No thyroid mass present.   Cardiovascular: Normal rate, regular rhythm and normal heart sounds.   No  murmur heard.  Pulmonary/Chest: Effort normal and breath sounds normal. No respiratory distress. He has no wheezes. He has no rhonchi. He has no rales.   Abdominal: Soft. Bowel sounds are normal. He exhibits no distension. There is no tenderness.       Genitourinary:   Genitourinary Comments: deferred   Musculoskeletal: Normal range of motion. He exhibits no edema.   Lymphadenopathy:        Head (right side): No submandibular, no preauricular and no posterior auricular adenopathy present.        Head (left side): No submandibular, no preauricular and no posterior auricular adenopathy present.        Right cervical: No superficial cervical adenopathy present.       Left cervical: No superficial cervical adenopathy present.   Neurological: He is alert and oriented to person, place, and time.   Skin: Skin is warm, dry and intact.   Psychiatric: He has a normal mood and affect. His speech is normal and behavior is normal. Thought content normal.   Vitals reviewed.       Assessment:     Problem List Items Addressed This Visit        Cardiac/Vascular    Essential hypertension (Chronic)     --currently off BP meds due to hypotension since starting chemotherapy. BP stable            Oncology    Rectal cancer     Laboratory review stable. Patient reports feeling well overall.     Proceed with Cycle 6 FOLFOX chemotherapy today. Infusion appointment 5/27 for pump disconnect. Refilled Norco prescription.  F/u 2 weeks with Dr. Sams for next chemotherapy treatment. Discussed S&S to report sooner         Relevant Medications    HYDROcodone-acetaminophen (NORCO)  mg per tablet    Other Relevant Orders    CBC auto differential    Comprehensive metabolic panel            Plan:     Rectal cancer  -     CBC auto differential; Future; Expected date: 05/25/2020  -     Comprehensive metabolic panel; Future; Expected date: 05/25/2020  -     HYDROcodone-acetaminophen (NORCO)  mg per tablet; Take 1 tablet by mouth every 6  (six) hours as needed for Pain.  Dispense: 60 tablet; Refill: 0    Essential hypertension              SKYLAR Brody-C

## 2020-05-27 ENCOUNTER — INFUSION (OUTPATIENT)
Dept: INFUSION THERAPY | Facility: HOSPITAL | Age: 65
End: 2020-05-27
Attending: INTERNAL MEDICINE
Payer: MEDICARE

## 2020-05-27 ENCOUNTER — DOCUMENTATION ONLY (OUTPATIENT)
Dept: HEMATOLOGY/ONCOLOGY | Facility: CLINIC | Age: 65
End: 2020-05-27

## 2020-05-27 VITALS
OXYGEN SATURATION: 99 % | RESPIRATION RATE: 18 BRPM | HEART RATE: 90 BPM | SYSTOLIC BLOOD PRESSURE: 104 MMHG | DIASTOLIC BLOOD PRESSURE: 71 MMHG | TEMPERATURE: 98 F

## 2020-05-27 DIAGNOSIS — C20 RECTAL CANCER: Primary | ICD-10-CM

## 2020-05-27 PROCEDURE — 63600175 PHARM REV CODE 636 W HCPCS: Performed by: INTERNAL MEDICINE

## 2020-05-27 PROCEDURE — 96523 IRRIG DRUG DELIVERY DEVICE: CPT

## 2020-05-27 RX ORDER — HEPARIN 100 UNIT/ML
500 SYRINGE INTRAVENOUS
Status: DISCONTINUED | OUTPATIENT
Start: 2020-05-27 | End: 2020-05-27 | Stop reason: HOSPADM

## 2020-05-27 RX ADMIN — HEPARIN 500 UNITS: 100 SYRINGE at 10:05

## 2020-05-27 NOTE — PROGRESS NOTES
Patient requested to see Sw. Patient had questions about the code on his AVS. Sw explained to pt it is to sign up for the patient portal. Pt has an outdated phone and he cannot download apps. Sw reports he does not have to use it. Ela provided patient with a $25 gas card on today to assist with transportation to and from treatment. Ela will continue to f/u.

## 2020-05-27 NOTE — NURSING
..Pt here for 5FU continuous pump d/c. Pump stopped and disconnected.  Existing dressing appeared clean and intact.  Right chestwall mediport  Flushed with 10ml NS and 5 ml heparin solution.  Needle D/C, site without redness, swelling, or drainage noted.  Dressing applied.  Patient tolerated well.  Patient to return to clinic in 2 weeks.

## 2020-05-29 ENCOUNTER — TELEPHONE (OUTPATIENT)
Dept: INTERNAL MEDICINE | Facility: CLINIC | Age: 65
End: 2020-05-29

## 2020-05-29 DIAGNOSIS — R73.01 IMPAIRED FASTING GLUCOSE: Primary | ICD-10-CM

## 2020-05-29 NOTE — TELEPHONE ENCOUNTER
Spoke with patient and scheduled him for labs on Monday at 3:10pm. Patient thanked me for calling.

## 2020-05-30 NOTE — TELEPHONE ENCOUNTER
----- Message from Richard Pickard sent at 5/29/2020  3:18 PM CDT -----  Contact: PT   Type:  RX Refill Request    Who Called: Pt   Refill or New Rx:refill   RX Name and Strength:morphine (MS CONTIN) 30 MG 12 hr tablet   How is the patient currently taking it? (ex. 1XDay): two times daily   Is this a 30 day or 90 day RX: 30  Preferred Pharmacy with phone number:  [facility]  [id number] [place] [address] Dr Elam [address] [place] [id number]  Phone: [phone] Fax: [phone]  Local or Mail Order:local   Ordering Provider: holli   Would the patient rather a call back or a response via [facility]? Call back   Best Call Back Number: [phone] (home)   Additional Information: n/a

## 2020-06-01 RX ORDER — MORPHINE SULFATE 30 MG/1
30 TABLET, FILM COATED, EXTENDED RELEASE ORAL 2 TIMES DAILY
Qty: 60 TABLET | Refills: 0 | OUTPATIENT
Start: 2020-06-01

## 2020-06-01 RX ORDER — MORPHINE SULFATE 30 MG/1
30 TABLET, FILM COATED, EXTENDED RELEASE ORAL 2 TIMES DAILY
Qty: 60 TABLET | Refills: 0 | Status: SHIPPED | OUTPATIENT
Start: 2020-06-01 | End: 2020-06-22 | Stop reason: SDUPTHER

## 2020-06-01 NOTE — TELEPHONE ENCOUNTER
----- Message from Jennyfer Honeycutt sent at 6/1/2020 11:22 AM CDT -----  Contact: Pt self Mobile [phone] or Home [phone]  Requesting an RX refill or new RX.  Is this a refill or new RX:  Refill  RX name and strength: morphine (MS CONTIN) 30 MG 12 hr tablet  Directions (copy/paste from chart):  N/A  Is this a 30 day or 90 day RX:  30  Local pharmacy or mail order pharmacy:  [facility]  Pharmacy name and phone # [phone] Fax# [phone]   Comment: Patient would like for you to give him a call when his script is filled please.

## 2020-06-07 NOTE — PROGRESS NOTES
Subjective:       Patient ID: Vincent Benton is a 64 y.o. male.    Chief Complaint: No primary diagnosis found.  HPI: We have an opportunity to see Mr. Vincent Benton in Hematology Oncology clinic at [facility] on 06/08/2020.  Mr. Vincent Benton is a 64 y.o. gentleman with rectal cancer.  Patient has completed concurrent chemoradiation with capecitabine.  Status post low anterior resection.  Final path noted at ypT3N0. Status post 6 cycles of adjuvant therapy with FOLFOX.    Interval history:  64-year-old male with stage III rectal cancer, status post neoadjuvant chemotherapy and surgery and currently on adjuvant FOLFOX.  Plan for 6 months of pj op chemotherapy.  Overall tolerating well except for decreased appetite.  Patient takes about 2 protein boost daily.  Denies nausea, vomiting or diarrhea.  Also denies fever, mucositis, chills, shortness of breath or abdominal pain.         Rectal cancer    9/11/2019 Initial Diagnosis     Rectal cancer      9/18/2019 Tumor Conference     Multidisciplinary Rectal Cancer Conference - Evaluation and Recommendation Summary    9/17/2019  Vincent Benton  [id number]  63 y.o. male    1. Evaluation    C scope 9/3/19: Obstructing Rectal mass that couldn't be crossed.     Rigid Procto 9/3/19: Left and anterior lesion at 9-10 cm.     Path: Invasive adenocarcinoma, no LVI.     MRI date: 9/9/2019    Tumor Location in Rectum: middle third     Size: 3.6x2.7 cm mass    Nodes: 2 suspected nodes.     Indication of Sphincter Involvement:  Uninvolved    Pretreatment Circumferential Resection Margin (CRM) status:  Not Threatened    Pretreatment (clinical) AJCC Stage:III B vs  IV ?  Stage I  [] I: T1N0M0  [] I: T2N0M0  Stage II  [] IIA: T3N0M0  [] IIB W0dH4X1  [] IIC: W1sG5D4  Stage III  [] IIIA: T1-2N1M0  [] IIIA: X4Q3qE4  [] IIIB: T3-C1eR1I8  [x] IIIB: T2-3N2aM0  [] IIIB: T1-2N2bM0  [] IIIC: A0cB7bN9  [] IIIC: T3-0yE8oN7  [] IIIC: Z1iZ5-8M0   Stage IV  [x] IV:  [id number]-b    CEA level:     Lab Results   Component Value Date    CEA 3.5 09/03/2019       2. Treatment Recommendation    Neoadjuvant Therapy Recommendation:     Short course radiation and Chemotherapy.     PLAN:    MRI and PET if not able to obtain PET will proceed with biopsy of the lung biopsy.     Colonoscopy during Chemotherapy to rule out any other lesions.     Anticipated date and type of surgical procedure:     LAR after    Clinical research study eligibility and/or enrollment: Not eligible      10/8/2019 Cancer Staged     Staging form: Colon and Rectum, AJCC 8th Edition  - Clinical stage from 10/8/2019: Stage IIIB (cT3, cN2a, cM0)      10/15/2019 - 10/15/2019 Chemotherapy     Treatment Summary   Plan Name: OP CAPECITABINE 5 DAYS + RADIOTHERAPY  Treatment Goal: Curative  Status: Inactive  Start Date: [No treatment day found]  End Date: [No treatment day found]  Provider: Mikel Sams MD  Chemotherapy: [No matching medication found in this treatment plan]      10/24/2019 - 12/4/2019 Radiation Therapy     Treatment Site Ref. ID Energy Dose/Fx (Gy) #Fx Dose Correction (Gy) Total Dose (Gy) Start Date End Date Elapsed Days   Pelvis Pelvis 6X 1.8 28 / 28 0 50.4 10/24/2019 12/4/2019 41           3/2/2020 -  Chemotherapy     Treatment Summary   Plan Name: OP FOLFOX 6 Q2W  Treatment Goal: Curative  Status: Active  Start Date: 3/2/2020  End Date: 8/19/2020 (Planned)  Provider: Mikel Sams MD  Chemotherapy: fluorouracil injection 710 mg, 400 mg/m2 = 710 mg, Intravenous, Clinic/HOD 1 time, 6 of 12 cycles  Administration: 710 mg (3/2/2020), 710 mg (3/16/2020), 710 mg (4/6/2020), 710 mg (4/20/2020), 710 mg (5/25/2020), 710 mg (5/12/2020)  fluorouracil (ADRUCIL) 2,400 mg/m2 = 4,270 mg in sodium chloride 0.9% 101.2 mL chemo infusion, 2,400 mg/m2 = 4,270 mg, Intravenous, Over 46 hours, 6 of 12 cycles  Administration: 4,270 mg (3/2/2020), 4,270 mg (3/16/2020), 4,270 mg (4/6/2020), 4,270 mg (4/20/2020),  4,270 mg (5/25/2020), 4,270 mg (5/12/2020)  leucovorin calcium 400 mg/m2 = 710 mg in dextrose 5 % 285.5 mL infusion, 400 mg/m2 = 710 mg, Intravenous, Clinic/HOD 1 time, 6 of 12 cycles  Administration: 710 mg (3/2/2020), 710 mg (3/16/2020), 700 mg (4/6/2020), 710 mg (4/20/2020), 710 mg (5/25/2020), 710 mg (5/12/2020)  oxaliplatin (ELOXATIN) 85 mg/m2 = 151 mg in dextrose 5 % 530.2 mL chemo infusion, 85 mg/m2 = 151 mg, Intravenous, Clinic/HOD 1 time, 6 of 12 cycles  Administration: 151 mg (3/2/2020), 151 mg (3/16/2020), 150 mg (4/6/2020), 151 mg (4/20/2020), 151 mg (5/25/2020), 151 mg (5/12/2020)       Past Medical History:   Diagnosis Date    Alcoholism     Anemia     Back pain     Encounter for blood transfusion 2018    Essential hypertension 2/4/2016    GERD (gastroesophageal reflux disease)     Hiatal hernia     Hypertension     Rectal cancer 9/11/2019     Family History   Problem Relation Age of Onset    Cataracts Mother     Stroke Father     Hypertension Father      Social History     Socioeconomic History    Marital status:      Spouse name: Not on file    Number of children: Not on file    Years of education: Not on file    Highest education level: Not on file   Occupational History    Not on file   Social Needs    Financial resource strain: Not on file    Food insecurity:     Worry: Not on file     Inability: Not on file    Transportation needs:     Medical: Not on file     Non-medical: Not on file   Tobacco Use    Smoking status: Never Smoker    Smokeless tobacco: Never Used   Substance and Sexual Activity    Alcohol use: Yes     Comment: HOLD 72H PRIOR TO SURGERY    Drug use: No    Sexual activity: Never     Partners: Female   Lifestyle    Physical activity:     Days per week: Not on file     Minutes per session: Not on file    Stress: Not on file   Relationships    Social connections:     Talks on phone: Not on file     Gets together: Not on file     Attends Buddhism  service: Not on file     Active member of club or organization: Not on file     Attends meetings of clubs or organizations: Not on file     Relationship status: Not on file   Other Topics Concern    Not on file   Social History Narrative    Not on file     Past Surgical History:   Procedure Laterality Date    COLONOSCOPY N/A 9/3/2019    Procedure: COLONOSCOPY;  Surgeon: Isai Centeno MD;  Location: [facility];  Service: Endoscopy;  Laterality: N/A;    COLONOSCOPY N/A 1/10/2020    Procedure: COLONOSCOPY;  Surgeon: Familia Gonzalez MD;  Location: [facility];  Service: General;  Laterality: N/A;    ESOPHAGOGASTRODUODENOSCOPY N/A 9/3/2019    Procedure: ESOPHAGOGASTRODUODENOSCOPY (EGD);  Surgeon: Isai Centeno MD;  Location: [facility];  Service: Endoscopy;  Laterality: N/A;    FLEXIBLE SIGMOIDOSCOPY  2/4/2020    Procedure: SIGMOIDOSCOPY, FLEXIBLE;  Surgeon: Familia Gonzalez MD;  Location: [facility];  Service: General;;    ILEOSTOMY Right 2/4/2020    Procedure: CREATION, ILEOSTOMY;  Surgeon: Familia Gonzalez MD;  Location: [facility];  Service: General;  Laterality: Right;    INJECTION OF ANESTHETIC AGENT INTO TISSUE PLANE DEFINED BY TRANSVERSUS ABDOMINIS MUSCLE N/A 2/4/2020    Procedure: BLOCK, TRANSVERSUS ABDOMINIS PLANE;  Surgeon: Familia Gonzalez MD;  Location: [facility];  Service: General;  Laterality: N/A;    INSERTION OF TUNNELED CENTRAL VENOUS CATHETER (CVC) WITH SUBCUTANEOUS PORT Right 2/4/2020    Procedure: INSERTION, PORT-A-CATH;  Surgeon: Familia Gonzalez MD;  Location: [facility];  Service: General;  Laterality: Right;    JOINT REPLACEMENT Left 2009    MOBILIZATION OF SPLENIC FLEXURE  2/4/2020    Procedure: MOBILIZATION, SPLENIC FLEXURE;  Surgeon: Familia Gonzalez MD;  Location: [facility];  Service: General;;     Current Outpatient Medications   Medication Sig Dispense Refill    cyanocobalamin (VITAMIN B-12) 1000 MCG tablet Take 100 mcg by mouth once daily.      dronabinoL (MARINOL) 2.5 MG  capsule Take 1 capsule (2.5 mg total) by mouth 2 (two) times daily before meals. 30 capsule 0    ferrous sulfate 324 mg (65 mg iron) TbEC Take 1 tablet (324 mg total) by mouth 2 (two) times daily. 60 tablet 0    HYDROcodone-acetaminophen (NORCO)  mg per tablet Take 1 tablet by mouth every 6 (six) hours as needed for Pain. 60 tablet 0    losartan (COZAAR) 50 MG tablet TAKE 1 TABLET BY MOUTH EVERY MORNING 90 tablet 1    mirtazapine (REMERON) 15 MG tablet Take 1 tablet (15 mg total) by mouth every evening. For sleep and appetite 30 tablet 11    morphine (MS CONTIN) 30 MG 12 hr tablet Take 1 tablet (30 mg total) by mouth 2 (two) times daily. 60 tablet 0    ondansetron (ZOFRAN) 8 MG tablet Take 1 tablet (8 mg total) by mouth every 8 (eight) hours as needed for Nausea. 30 tablet 2    pantoprazole (PROTONIX) 40 MG tablet Take 1 tablet (40 mg total) by mouth once daily. 30 tablet 11    prochlorperazine (COMPAZINE) 5 MG tablet TAKE 1 TABLET(5 MG) BY MOUTH EVERY 6 HOURS AS NEEDED FOR NAUSEA 385 tablet 1    tamsulosin (FLOMAX) 0.4 mg Cap Take 1 capsule (0.4 mg total) by mouth once daily. 30 capsule 6    tamsulosin (FLOMAX) 0.4 mg Cap Take 1 capsule (0.4 mg total) by mouth once daily. 30 capsule 0     No current facility-administered medications for this visit.      Facility-Administered Medications Ordered in Other Visits   Medication Dose Route Frequency Provider Last Rate Last Dose    sodium chloride 0.9% flush 3 mL  3 mL Intravenous PRN Shilpa Yee MD           Labs:  Lab Results   Component Value Date    WBC 4.39 06/08/2020    HGB 10.1 (L) 06/08/2020    HCT 30.8 (L) 06/08/2020    MCV 92 06/08/2020     (L) 06/08/2020     BMP  Lab Results   Component Value Date     (L) 06/08/2020    K 5.0 06/08/2020    CL 96 06/08/2020    CO2 25 06/08/2020    BUN 6 (L) 06/08/2020    CREATININE 0.8 06/08/2020    CALCIUM 9.8 06/08/2020    ANIONGAP 9 06/08/2020    ESTGFRAFRICA >60 06/08/2020    EGFRNONAA >60  06/08/2020     Lab Results   Component Value Date    ALT 17 06/08/2020    AST 34 06/08/2020    ALKPHOS 167 (H) 06/08/2020    BILITOT 0.5 06/08/2020       Lab Results   Component Value Date    IRON <10 (L) 04/13/2018    TIBC 527 (H) 04/13/2018    FERRITIN 241 03/16/2020     No results found for: [id number]  No results found for: FOLATE  Lab Results   Component Value Date    TSH 0.601 04/07/2018       I have reviewed the radiology reports and examined the scan/xray images.    Review of Systems   Constitutional: Positive for appetite change (Decreased ). Negative for activity change, chills, fatigue, fever and unexpected weight change.   HENT: Negative for hearing loss, mouth sores, nosebleeds, sore throat, tinnitus, trouble swallowing and voice change.    Eyes: Negative for visual disturbance.   Respiratory: Negative for cough, chest tightness, shortness of breath and wheezing.    Cardiovascular: Negative for chest pain, palpitations and leg swelling.   Gastrointestinal: Negative for abdominal pain, anal bleeding, blood in stool, constipation, diarrhea, nausea and vomiting.   Genitourinary: Negative for frequency, hematuria and testicular pain.   Musculoskeletal: Negative for arthralgias, back pain, gait problem and neck pain.   Skin: Negative for color change, pallor, rash and wound.   Allergic/Immunologic: Negative for immunocompromised state.   Neurological: Negative for seizures, syncope, weakness, numbness and headaches.   Hematological: Negative for adenopathy. Does not bruise/bleed easily.   Psychiatric/Behavioral: Negative for agitation, confusion, decreased concentration, hallucinations and sleep disturbance. The patient is not nervous/anxious.      ECOG SCORE    1 - Restricted in strenuous activity-ambulatory and able to carry out work of a light nature            Objective:     Vitals:    06/08/20 0802   BP: 111/77   Pulse: 81   Resp: 19   Temp: 98.3 °F (36.8 °C)   Body mass index is 19.55 kg/m².  Physical  Exam   Constitutional: He is oriented to person, place, and time. He appears well-developed and well-nourished. He appears cachectic. He appears ill. He appears distressed.   HENT:   Head: Normocephalic and atraumatic.   Right Ear: External ear normal.   Left Ear: External ear normal.   Mouth/Throat: No oropharyngeal exudate.   Eyes: Pupils are equal, round, and reactive to light. Conjunctivae are normal. Right eye exhibits no discharge. Left eye exhibits no discharge. No scleral icterus.   Neck: Normal range of motion. Neck supple. No thyromegaly present.   Cardiovascular: Normal rate, regular rhythm and normal heart sounds.   No murmur heard.  Pulmonary/Chest: Effort normal and breath sounds normal. No respiratory distress. He has no wheezes. He exhibits no tenderness.   Abdominal: Soft. Bowel sounds are normal. He exhibits no distension and no mass. There is no tenderness. There is no rebound.   Musculoskeletal: Normal range of motion. He exhibits no edema or tenderness.   Lymphadenopathy:     He has no cervical adenopathy.        Right cervical: No superficial cervical adenopathy present.       Left cervical: No superficial cervical adenopathy present.        Right axillary: No pectoral adenopathy present.        Left axillary: No pectoral adenopathy present.       Right: No inguinal and no supraclavicular adenopathy present.        Left: No inguinal and no supraclavicular adenopathy present.   Neurological: He is alert and oriented to person, place, and time. No cranial nerve deficit or sensory deficit.   Skin: Skin is warm and dry. Capillary refill takes 2 to 3 seconds. No rash noted. No erythema. No pallor.   Psychiatric: He has a normal mood and affect. His behavior is normal. Judgment normal.         Assessment:      1. Rectal cancer    2. Iron deficiency anemia due to chronic blood loss    3. Cachexia           Plan:     Rectal cancer  Stage IIIB rectal adenocarcinoma, status post 6 cycles of adjuvant  chemotherapy and presents today for cycle 7.  Reviewed labs, no concerning cytopenia.  Will proceed with treatment.    In addition will also obtain a CT scan of chest abdomen and pelvis for restaging to assess for disease response to therapy.  Plan to see him back in 2 weeks with repeat labs or sooner if needed.        Iron deficiency anemia due to chronic blood loss  Of normocytic anemia, likely related to chemotherapy versus chronic inflammation.  No evidence of abnormal bleed.  Will continue to monitor.  Noted hemoglobin at 10.1 grams/deciliter.    Cachexia  Decreased appetite.  Recommend increasing boost intake to 3 per day.  Discussed with , supplemental protein boost to be provided.      Mikel Sams MD

## 2020-06-07 NOTE — ASSESSMENT & PLAN NOTE
Stage IIIB rectal adenocarcinoma, status post 6 cycles of adjuvant chemotherapy and presents today for cycle 7.  Reviewed labs, no concerning cytopenia.  Will proceed with treatment.    In addition will also obtain a CT scan of chest abdomen and pelvis for restaging to assess for disease response to therapy.  Plan to see him back in 2 weeks with repeat labs or sooner if needed.

## 2020-06-08 ENCOUNTER — DOCUMENTATION ONLY (OUTPATIENT)
Dept: HEMATOLOGY/ONCOLOGY | Facility: CLINIC | Age: 65
End: 2020-06-08

## 2020-06-08 ENCOUNTER — INFUSION (OUTPATIENT)
Dept: INFUSION THERAPY | Facility: HOSPITAL | Age: 65
End: 2020-06-08
Attending: INTERNAL MEDICINE
Payer: MEDICARE

## 2020-06-08 ENCOUNTER — OFFICE VISIT (OUTPATIENT)
Dept: HEMATOLOGY/ONCOLOGY | Facility: CLINIC | Age: 65
End: 2020-06-08
Payer: MEDICARE

## 2020-06-08 VITALS
DIASTOLIC BLOOD PRESSURE: 77 MMHG | RESPIRATION RATE: 19 BRPM | WEIGHT: 136.25 LBS | SYSTOLIC BLOOD PRESSURE: 111 MMHG | HEART RATE: 81 BPM | HEIGHT: 70 IN | TEMPERATURE: 98 F | OXYGEN SATURATION: 96 % | BODY MASS INDEX: 19.51 KG/M2

## 2020-06-08 VITALS
TEMPERATURE: 98 F | RESPIRATION RATE: 18 BRPM | DIASTOLIC BLOOD PRESSURE: 72 MMHG | BODY MASS INDEX: 19.51 KG/M2 | HEIGHT: 70 IN | SYSTOLIC BLOOD PRESSURE: 124 MMHG | HEART RATE: 80 BPM | WEIGHT: 136.25 LBS | OXYGEN SATURATION: 98 %

## 2020-06-08 DIAGNOSIS — R64 CACHEXIA: ICD-10-CM

## 2020-06-08 DIAGNOSIS — C20 RECTAL CANCER: ICD-10-CM

## 2020-06-08 DIAGNOSIS — C20 RECTAL CANCER: Primary | ICD-10-CM

## 2020-06-08 DIAGNOSIS — D50.0 IRON DEFICIENCY ANEMIA DUE TO CHRONIC BLOOD LOSS: ICD-10-CM

## 2020-06-08 PROCEDURE — 99215 OFFICE O/P EST HI 40 MIN: CPT | Mod: 25,S$GLB,, | Performed by: INTERNAL MEDICINE

## 2020-06-08 PROCEDURE — 3078F DIAST BP <80 MM HG: CPT | Mod: CPTII,S$GLB,, | Performed by: INTERNAL MEDICINE

## 2020-06-08 PROCEDURE — 96367 TX/PROPH/DG ADDL SEQ IV INF: CPT

## 2020-06-08 PROCEDURE — 96413 CHEMO IV INFUSION 1 HR: CPT

## 2020-06-08 PROCEDURE — 96411 CHEMO IV PUSH ADDL DRUG: CPT

## 2020-06-08 PROCEDURE — 3074F SYST BP LT 130 MM HG: CPT | Mod: CPTII,S$GLB,, | Performed by: INTERNAL MEDICINE

## 2020-06-08 PROCEDURE — 99999 PR PBB SHADOW E&M-EST. PATIENT-LVL III: CPT | Mod: PBBFAC,,, | Performed by: INTERNAL MEDICINE

## 2020-06-08 PROCEDURE — 99999 PR PBB SHADOW E&M-EST. PATIENT-LVL III: ICD-10-PCS | Mod: PBBFAC,,, | Performed by: INTERNAL MEDICINE

## 2020-06-08 PROCEDURE — 96416 CHEMO PROLONG INFUSE W/PUMP: CPT

## 2020-06-08 PROCEDURE — 3008F PR BODY MASS INDEX (BMI) DOCUMENTED: ICD-10-PCS | Mod: CPTII,S$GLB,, | Performed by: INTERNAL MEDICINE

## 2020-06-08 PROCEDURE — 3078F PR MOST RECENT DIASTOLIC BLOOD PRESSURE < 80 MM HG: ICD-10-PCS | Mod: CPTII,S$GLB,, | Performed by: INTERNAL MEDICINE

## 2020-06-08 PROCEDURE — 3008F BODY MASS INDEX DOCD: CPT | Mod: CPTII,S$GLB,, | Performed by: INTERNAL MEDICINE

## 2020-06-08 PROCEDURE — 99499 RISK ADDL DX/OHS AUDIT: ICD-10-PCS | Mod: HCNC,S$GLB,, | Performed by: INTERNAL MEDICINE

## 2020-06-08 PROCEDURE — 96415 CHEMO IV INFUSION ADDL HR: CPT

## 2020-06-08 PROCEDURE — 63600175 PHARM REV CODE 636 W HCPCS: Performed by: INTERNAL MEDICINE

## 2020-06-08 PROCEDURE — 99215 PR OFFICE/OUTPT VISIT, EST, LEVL V, 40-54 MIN: ICD-10-PCS | Mod: 25,S$GLB,, | Performed by: INTERNAL MEDICINE

## 2020-06-08 PROCEDURE — 99499 UNLISTED E&M SERVICE: CPT | Mod: HCNC,S$GLB,, | Performed by: INTERNAL MEDICINE

## 2020-06-08 PROCEDURE — 3074F PR MOST RECENT SYSTOLIC BLOOD PRESSURE < 130 MM HG: ICD-10-PCS | Mod: CPTII,S$GLB,, | Performed by: INTERNAL MEDICINE

## 2020-06-08 PROCEDURE — 25000003 PHARM REV CODE 250: Performed by: INTERNAL MEDICINE

## 2020-06-08 PROCEDURE — 96368 THER/DIAG CONCURRENT INF: CPT

## 2020-06-08 RX ORDER — EPINEPHRINE 0.3 MG/.3ML
0.3 INJECTION SUBCUTANEOUS ONCE AS NEEDED
Status: CANCELLED | OUTPATIENT
Start: 2020-06-08

## 2020-06-08 RX ORDER — HEPARIN 100 UNIT/ML
500 SYRINGE INTRAVENOUS
Status: CANCELLED | OUTPATIENT
Start: 2020-06-10

## 2020-06-08 RX ORDER — DIPHENHYDRAMINE HYDROCHLORIDE 50 MG/ML
50 INJECTION INTRAMUSCULAR; INTRAVENOUS ONCE AS NEEDED
Status: CANCELLED | OUTPATIENT
Start: 2020-06-08

## 2020-06-08 RX ORDER — HYDROCODONE BITARTRATE AND ACETAMINOPHEN 10; 325 MG/1; MG/1
1 TABLET ORAL EVERY 6 HOURS PRN
Qty: 60 TABLET | Refills: 0 | Status: SHIPPED | OUTPATIENT
Start: 2020-06-08 | End: 2020-06-22 | Stop reason: SDUPTHER

## 2020-06-08 RX ORDER — HEPARIN 100 UNIT/ML
500 SYRINGE INTRAVENOUS
Status: CANCELLED | OUTPATIENT
Start: 2020-06-08

## 2020-06-08 RX ORDER — SODIUM CHLORIDE 0.9 % (FLUSH) 0.9 %
10 SYRINGE (ML) INJECTION
Status: CANCELLED | OUTPATIENT
Start: 2020-06-08

## 2020-06-08 RX ORDER — FLUOROURACIL 50 MG/ML
400 INJECTION, SOLUTION INTRAVENOUS
Status: COMPLETED | OUTPATIENT
Start: 2020-06-08 | End: 2020-06-08

## 2020-06-08 RX ORDER — FLUOROURACIL 50 MG/ML
400 INJECTION, SOLUTION INTRAVENOUS
Status: CANCELLED | OUTPATIENT
Start: 2020-06-08

## 2020-06-08 RX ORDER — SODIUM CHLORIDE 0.9 % (FLUSH) 0.9 %
10 SYRINGE (ML) INJECTION
Status: CANCELLED | OUTPATIENT
Start: 2020-06-10

## 2020-06-08 RX ADMIN — DEXTROSE MONOHYDRATE 710 MG: 50 INJECTION, SOLUTION INTRAVENOUS at 09:06

## 2020-06-08 RX ADMIN — DEXAMETHASONE SODIUM PHOSPHATE: 4 INJECTION, SOLUTION INTRA-ARTICULAR; INTRALESIONAL; INTRAMUSCULAR; INTRAVENOUS; SOFT TISSUE at 09:06

## 2020-06-08 RX ADMIN — DEXTROSE: 5 SOLUTION INTRAVENOUS at 09:06

## 2020-06-08 RX ADMIN — FLUOROURACIL 710 MG: 50 INJECTION, SOLUTION INTRAVENOUS at 12:06

## 2020-06-08 RX ADMIN — OXALIPLATIN 151 MG: 5 INJECTION, SOLUTION, CONCENTRATE INTRAVENOUS at 09:06

## 2020-06-08 RX ADMIN — SODIUM CHLORIDE 4270 MG: 9 INJECTION, SOLUTION INTRAVENOUS at 12:06

## 2020-06-08 NOTE — PROGRESS NOTES
Rec'd referral from Dr. Sams about pt needing to increase his Boost intake. SW met with pt, he is in good spirits. He is getting [facility] but it will not be enough to increase to 2/day. Unfortunately we are awaiting a shipment and do not have pt's preferred flavor (Vanilla) in stock. Let him know someone from SW team would reach out to him when next shipment is rec'd, hopefully within the week, and he can  some Boost. Meanwhile, he had no other needs for SW today. SW team will remain available to assist pt with ongoing needs as they arise.

## 2020-06-08 NOTE — NURSING
Right chest PAC remains accessed with 5FU pump connected infusing at a continuous rate of 2.2 ml/hr for 46 hours.  Dressing clean, dry, and intact.

## 2020-06-08 NOTE — PROGRESS NOTES
Patient, Vincent Benton (MRN #[id number]), presented with a recent Platelet count less than 150 K/uL consistent with the definition of thrombocytopenia (ICD10 - D69.6).    Platelets   Date Value Ref Range Status   06/08/2020 143 (L) 150 - 350 K/uL Final     The patient's thrombocytopenia was monitored, evaluated, addressed and/or treated. This addendum to the medical record is made on 06/08/2020.

## 2020-06-08 NOTE — ASSESSMENT & PLAN NOTE
Of normocytic anemia, likely related to chemotherapy versus chronic inflammation.  No evidence of abnormal bleed.  Will continue to monitor.  Noted hemoglobin at 10.1 grams/deciliter.

## 2020-06-08 NOTE — DISCHARGE INSTRUCTIONS
[facility]  [address] [facility]  [address]  [phone] phone     [phone] fax  Hours of Operation: Monday- Friday 8:00am- 5:00pm  After hours phone  [phone]  Hematology / Oncology Physicians on call      PETE Kumar Dr., Dr., Dr., Dr., NP Sydney Prescott, NP Tyesha Taylor, NP    Please call with any concerns regarding your appointment today.    Discharge Instructions for Chemotherapy   Your doctor prescribed a type of medication therapy for you called chemotherapy. Doctors prescribe chemotherapy for many different types of illnesses, including cancer. There are many types of chemotherapy. This sheet provides general guidelines on how you can take care of yourself after your chemotherapy.   Mouth Care   Dont be discouraged if you get mouth sores, even if you are following all your doctors instructions. Many people get mouth sores as a side effect of chemotherapy. Heres what you can do to prevent mouth sores:   Keep your mouth clean. Brush your teeth with a soft-bristle toothbrush after every meal.   Use an oral swab or special soft toothbrush if your gums bleed during regular brushing.   Dont use dental floss if your platelet count is below 50,000. Your doctor or nurse will tell you if this is the case.   Use any mouthwashes given to you as directed.   If you cant tolerate regular methods, use salt and baking soda to clean your mouth. Mix 1 teaspoon(s) of salt and 1 teaspoon(s) of baking soda into an 8-ounce glass of warm water. Swish and spit.   Watch your mouth and tongue for white patches. This is a sign of fungal infection, a common side effect of chemotherapy. Be sure to tell your doctor about these patches. Medication can be prescribed to help you fight the fungal infection.  Other Home Care   Try to exercise. Exercise keeps you strong and keeps your heart and lungs active.  Walk as much as you can without becoming dizzy or weak.   Keep clean. During chemotherapy, your body cant fight infection very well. Take short baths or showers.   Use moisturizing soap. Chemotherapy can make your skin dry.   Apply moisturizing lotion several times a day to help relieve dry skin.   Dont take very hot or very cold showers or baths.   Dont be surprised if your chemotherapy causes slight burns to your skin--usually on the hands and feet. Some drugs used in high doses cause this to happen. Ask for a special cream to help relieve the burn and protect your skin.   Let your doctor know if your throat is sore. You may have an infection that needs treatment.   Remember, many patients feel sick and lose their appetites during treatment. Eat small meals several times a day to keep your strength up.   Choose bland foods with little taste or smell if you are reacting strongly to food.   Be sure to cook all food thoroughly. This kills bacteria and helps you avoid infection.   Eat foods that are soft. Soft foods are less likely to cause stomach irritation.   Follow-Up   Make a follow-up appointment as directed by our staff.   When to Call Your Doctor   Call your doctor right away if you have any of the following:   Unexplained bleeding   Trouble concentrating   Ongoing fatigue   Shortness of breath, wheezing, or trouble breathing   Rapid, irregular heartbeat; chest pain   Dizziness, lightheadedness   Constant feeling of being cold   Hives or a cut or rash that swells, turns red, feels hot or painful, or begins to ooze   Fever of 100.4°F or higher, or chills    © 0070-4939 [facility], [address] [place] [address], [place] [address]. All rights reserved. This information is not intended as a substitute for professional medical care. Always follow your

## 2020-06-08 NOTE — ASSESSMENT & PLAN NOTE
Decreased appetite.  Recommend increasing boost intake to 3 per day.  Discussed with , supplemental protein boost to be provided.

## 2020-06-08 NOTE — PLAN OF CARE
"Pt states "I feel weak and tired all the time because of this chemo in my body".  Infection precautions reviewed.  Pt states full understanding to call with any sign of infection, including temp >100.4.    "

## 2020-06-09 ENCOUNTER — HOSPITAL ENCOUNTER (OUTPATIENT)
Dept: RADIOLOGY | Facility: HOSPITAL | Age: 65
Discharge: HOME | End: 2020-06-09
Attending: INTERNAL MEDICINE
Payer: MEDICARE

## 2020-06-09 DIAGNOSIS — C20 RECTAL CANCER: ICD-10-CM

## 2020-06-09 PROCEDURE — 74177 CT ABD & PELVIS W/CONTRAST: CPT | Mod: TC

## 2020-06-09 PROCEDURE — 74177 CT ABD & PELVIS W/CONTRAST: CPT | Mod: 26,,, | Performed by: RADIOLOGY

## 2020-06-09 PROCEDURE — 74177 CT CHEST ABDOMEN PELVIS WITH CONTRAST (XPD): ICD-10-PCS | Mod: 26,,, | Performed by: RADIOLOGY

## 2020-06-09 PROCEDURE — 25500020 PHARM REV CODE 255: Performed by: INTERNAL MEDICINE

## 2020-06-09 PROCEDURE — 71260 CT THORAX DX C+: CPT | Mod: 26,,, | Performed by: RADIOLOGY

## 2020-06-09 PROCEDURE — 71260 CT CHEST ABDOMEN PELVIS WITH CONTRAST (XPD): ICD-10-PCS | Mod: 26,,, | Performed by: RADIOLOGY

## 2020-06-09 RX ADMIN — IOHEXOL 30 ML: 350 INJECTION, SOLUTION INTRAVENOUS at 10:06

## 2020-06-09 RX ADMIN — IOHEXOL 100 ML: 350 INJECTION, SOLUTION INTRAVENOUS at 11:06

## 2020-06-10 ENCOUNTER — INFUSION (OUTPATIENT)
Dept: INFUSION THERAPY | Facility: HOSPITAL | Age: 65
End: 2020-06-10
Attending: INTERNAL MEDICINE
Payer: MEDICARE

## 2020-06-10 ENCOUNTER — TELEPHONE (OUTPATIENT)
Dept: INTERNAL MEDICINE | Facility: CLINIC | Age: 65
End: 2020-06-10

## 2020-06-10 VITALS
DIASTOLIC BLOOD PRESSURE: 75 MMHG | SYSTOLIC BLOOD PRESSURE: 113 MMHG | HEART RATE: 75 BPM | TEMPERATURE: 98 F | OXYGEN SATURATION: 97 % | RESPIRATION RATE: 18 BRPM

## 2020-06-10 DIAGNOSIS — C20 RECTAL CANCER: Primary | ICD-10-CM

## 2020-06-10 DIAGNOSIS — R73.03 PREDIABETES: Primary | ICD-10-CM

## 2020-06-10 DIAGNOSIS — I10 ESSENTIAL HYPERTENSION: ICD-10-CM

## 2020-06-10 PROCEDURE — 96523 IRRIG DRUG DELIVERY DEVICE: CPT | Mod: HCNC

## 2020-06-10 PROCEDURE — 63600175 PHARM REV CODE 636 W HCPCS: Mod: HCNC | Performed by: INTERNAL MEDICINE

## 2020-06-10 PROCEDURE — A4216 STERILE WATER/SALINE, 10 ML: HCPCS | Mod: HCNC | Performed by: INTERNAL MEDICINE

## 2020-06-10 PROCEDURE — 25000003 PHARM REV CODE 250: Mod: HCNC | Performed by: INTERNAL MEDICINE

## 2020-06-10 RX ORDER — SODIUM CHLORIDE 0.9 % (FLUSH) 0.9 %
10 SYRINGE (ML) INJECTION
Status: DISCONTINUED | OUTPATIENT
Start: 2020-06-10 | End: 2020-06-10 | Stop reason: HOSPADM

## 2020-06-10 RX ORDER — HEPARIN 100 UNIT/ML
500 SYRINGE INTRAVENOUS
Status: DISCONTINUED | OUTPATIENT
Start: 2020-06-10 | End: 2020-06-10 | Stop reason: HOSPADM

## 2020-06-10 RX ADMIN — HEPARIN 500 UNITS: 100 SYRINGE at 11:06

## 2020-06-10 RX ADMIN — Medication 10 ML: at 11:06

## 2020-06-11 ENCOUNTER — TELEPHONE (OUTPATIENT)
Dept: INTERNAL MEDICINE | Facility: CLINIC | Age: 65
End: 2020-06-11

## 2020-06-11 NOTE — TELEPHONE ENCOUNTER
Spoke with pt about recent labs and ' recommendations. Also, scheduled pt labs and f/u appt with . Pt verbalized understanding. Appt reminder mailed today.

## 2020-06-11 NOTE — TELEPHONE ENCOUNTER
----- Message from Shakila Moran DO sent at 6/10/2020  6:34 PM CDT -----  Notify patient labs show prediabetes. I recommend he follow a diet low in carbohydrates (sweets/starches), fat and cholesterol along with regular physical activity to prevent progression to diabetes.   Schedule A1C, CMP, lipid panel and f/u in 6 months.

## 2020-06-15 ENCOUNTER — TELEPHONE (OUTPATIENT)
Dept: HEMATOLOGY/ONCOLOGY | Facility: CLINIC | Age: 65
End: 2020-06-15

## 2020-06-15 NOTE — TELEPHONE ENCOUNTER
----- Message from Christen George sent at 6/15/2020 11:48 AM CDT -----  Regarding: heartburn  Contact: patient  Type:  Needs Medical Advice    Who Called: patient  Symptoms (please be specific): heart burn severe   How long has patient had these symptoms:  week  Pharmacy name and phone #:    [facility]  [address]  [facility] [address]  Phone: [phone] Fax: [phone]    Would the patient rather a call back or a response via [facility]? call  Best Call Back Number: [phone]  Additional Information: please call asap today

## 2020-06-16 ENCOUNTER — DOCUMENTATION ONLY (OUTPATIENT)
Dept: HEMATOLOGY/ONCOLOGY | Facility: CLINIC | Age: 65
End: 2020-06-16

## 2020-06-16 ENCOUNTER — TELEPHONE (OUTPATIENT)
Dept: HEMATOLOGY/ONCOLOGY | Facility: CLINIC | Age: 65
End: 2020-06-16

## 2020-06-16 DIAGNOSIS — R63.4 ABNORMAL WEIGHT LOSS: ICD-10-CM

## 2020-06-16 DIAGNOSIS — Z78.9 ALCOHOL USE: ICD-10-CM

## 2020-06-16 DIAGNOSIS — R10.13 EPIGASTRIC ABDOMINAL PAIN: ICD-10-CM

## 2020-06-16 RX ORDER — PANTOPRAZOLE SODIUM 40 MG/1
40 TABLET, DELAYED RELEASE ORAL DAILY
Qty: 30 TABLET | Refills: 11 | Status: SHIPPED | OUTPATIENT
Start: 2020-06-16 | End: 2021-04-22

## 2020-06-16 RX ORDER — PANTOPRAZOLE SODIUM 40 MG/1
40 TABLET, DELAYED RELEASE ORAL DAILY
Qty: 30 TABLET | Refills: 11 | Status: SHIPPED | OUTPATIENT
Start: 2020-06-16 | End: 2020-06-16 | Stop reason: SDUPTHER

## 2020-06-16 NOTE — TELEPHONE ENCOUNTER
----- Message from Mikel Sams MD sent at 6/16/2020 11:52 AM CDT -----  Regarding: RE: heartburn  Contact: patient  Called in for protonix at [facility]  ----- Message -----  From: Lisa Cannon LPN  Sent: 6/15/2020   4:29 PM CDT  To: Mikel Sams MD  Subject: FW: heartburn                                    Spoke to patient request something for heart burn x1 week.     ----- Message -----  From: Christen George  Sent: 6/15/2020  11:48 AM CDT  To: [facility] Staff  Subject: heartburn                                        Type:  Needs Medical Advice    Who Called: patient  Symptoms (please be specific): heart burn severe   How long has patient had these symptoms:  week  Pharmacy name and phone #:    [facility] [address]  [facility] [address]  Phone: [phone] Fax: [phone]    Would the patient rather a call back or a response via [facility]? call  Best Call Back Number: [phone]  Additional Information: please call asap today

## 2020-06-16 NOTE — PROGRESS NOTES
applied for [employer] assistance jl for patient on today. Sw printed copy of completed application and placed in patient's folder. Sw will continue to f/u.

## 2020-06-22 ENCOUNTER — OFFICE VISIT (OUTPATIENT)
Dept: HEMATOLOGY/ONCOLOGY | Facility: CLINIC | Age: 65
End: 2020-06-22
Payer: MEDICARE

## 2020-06-22 ENCOUNTER — LAB VISIT (OUTPATIENT)
Dept: LAB | Facility: HOSPITAL | Age: 65
End: 2020-06-22
Attending: INTERNAL MEDICINE
Payer: MEDICARE

## 2020-06-22 VITALS
WEIGHT: 132.06 LBS | DIASTOLIC BLOOD PRESSURE: 78 MMHG | OXYGEN SATURATION: 97 % | BODY MASS INDEX: 18.91 KG/M2 | HEIGHT: 70 IN | SYSTOLIC BLOOD PRESSURE: 115 MMHG | TEMPERATURE: 98 F | HEART RATE: 75 BPM

## 2020-06-22 DIAGNOSIS — C20 RECTAL CANCER: ICD-10-CM

## 2020-06-22 DIAGNOSIS — Z43.3 COLOSTOMY CARE: ICD-10-CM

## 2020-06-22 DIAGNOSIS — C20 RECTAL CANCER: Primary | ICD-10-CM

## 2020-06-22 DIAGNOSIS — Z51.11 CHEMOTHERAPY MANAGEMENT, ENCOUNTER FOR: ICD-10-CM

## 2020-06-22 LAB
ALBUMIN SERPL BCP-MCNC: 3.2 G/DL (ref 3.5–5.2)
ALP SERPL-CCNC: 174 U/L (ref 55–135)
ALT SERPL W/O P-5'-P-CCNC: 16 U/L (ref 10–44)
ANION GAP SERPL CALC-SCNC: 10 MMOL/L (ref 8–16)
AST SERPL-CCNC: 31 U/L (ref 10–40)
BASOPHILS # BLD AUTO: 0.02 K/UL (ref 0–0.2)
BASOPHILS NFR BLD: 0.9 % (ref 0–1.9)
BILIRUB SERPL-MCNC: 0.4 MG/DL (ref 0.1–1)
BUN SERPL-MCNC: 8 MG/DL (ref 8–23)
CALCIUM SERPL-MCNC: 9.8 MG/DL (ref 8.7–10.5)
CEA SERPL-MCNC: 5.7 NG/ML (ref 0–5)
CHLORIDE SERPL-SCNC: 101 MMOL/L (ref 95–110)
CO2 SERPL-SCNC: 25 MMOL/L (ref 23–29)
CREAT SERPL-MCNC: 0.7 MG/DL (ref 0.5–1.4)
DIFFERENTIAL METHOD: ABNORMAL
EOSINOPHIL # BLD AUTO: 0 K/UL (ref 0–0.5)
EOSINOPHIL NFR BLD: 1.9 % (ref 0–8)
ERYTHROCYTE [DISTWIDTH] IN BLOOD BY AUTOMATED COUNT: 22.9 % (ref 11.5–14.5)
EST. GFR  (AFRICAN AMERICAN): >60 ML/MIN/1.73 M^2
EST. GFR  (NON AFRICAN AMERICAN): >60 ML/MIN/1.73 M^2
GLUCOSE SERPL-MCNC: 106 MG/DL (ref 70–110)
HCT VFR BLD AUTO: 33 % (ref 40–54)
HGB BLD-MCNC: 10 G/DL (ref 14–18)
IMM GRANULOCYTES # BLD AUTO: 0.01 K/UL (ref 0–0.04)
IMM GRANULOCYTES NFR BLD AUTO: 0.5 % (ref 0–0.5)
LYMPHOCYTES # BLD AUTO: 0.6 K/UL (ref 1–4.8)
LYMPHOCYTES NFR BLD: 26 % (ref 18–48)
MCH RBC QN AUTO: 30.1 PG (ref 27–31)
MCHC RBC AUTO-ENTMCNC: 30.3 G/DL (ref 32–36)
MCV RBC AUTO: 99 FL (ref 82–98)
MONOCYTES # BLD AUTO: 0.7 K/UL (ref 0.3–1)
MONOCYTES NFR BLD: 33 % (ref 4–15)
NEUTROPHILS # BLD AUTO: 0.8 K/UL (ref 1.8–7.7)
NEUTROPHILS NFR BLD: 37.7 % (ref 38–73)
NRBC BLD-RTO: 0 /100 WBC
PLATELET # BLD AUTO: 218 K/UL (ref 150–350)
PMV BLD AUTO: 10 FL (ref 9.2–12.9)
POTASSIUM SERPL-SCNC: 5.1 MMOL/L (ref 3.5–5.1)
PROT SERPL-MCNC: 8 G/DL (ref 6–8.4)
RBC # BLD AUTO: 3.32 M/UL (ref 4.6–6.2)
SODIUM SERPL-SCNC: 136 MMOL/L (ref 136–145)
WBC # BLD AUTO: 2.15 K/UL (ref 3.9–12.7)

## 2020-06-22 PROCEDURE — 99999 PR PBB SHADOW E&M-EST. PATIENT-LVL IV: ICD-10-PCS | Mod: PBBFAC,HCNC,, | Performed by: INTERNAL MEDICINE

## 2020-06-22 PROCEDURE — 99215 PR OFFICE/OUTPT VISIT, EST, LEVL V, 40-54 MIN: ICD-10-PCS | Mod: 25,HCNC,S$GLB, | Performed by: INTERNAL MEDICINE

## 2020-06-22 PROCEDURE — 3074F SYST BP LT 130 MM HG: CPT | Mod: HCNC,CPTII,S$GLB, | Performed by: INTERNAL MEDICINE

## 2020-06-22 PROCEDURE — 85025 COMPLETE CBC W/AUTO DIFF WBC: CPT | Mod: HCNC

## 2020-06-22 PROCEDURE — 3074F PR MOST RECENT SYSTOLIC BLOOD PRESSURE < 130 MM HG: ICD-10-PCS | Mod: HCNC,CPTII,S$GLB, | Performed by: INTERNAL MEDICINE

## 2020-06-22 PROCEDURE — 80053 COMPREHEN METABOLIC PANEL: CPT | Mod: HCNC

## 2020-06-22 PROCEDURE — 3078F DIAST BP <80 MM HG: CPT | Mod: HCNC,CPTII,S$GLB, | Performed by: INTERNAL MEDICINE

## 2020-06-22 PROCEDURE — 99215 OFFICE O/P EST HI 40 MIN: CPT | Mod: 25,HCNC,S$GLB, | Performed by: INTERNAL MEDICINE

## 2020-06-22 PROCEDURE — 3008F PR BODY MASS INDEX (BMI) DOCUMENTED: ICD-10-PCS | Mod: HCNC,CPTII,S$GLB, | Performed by: INTERNAL MEDICINE

## 2020-06-22 PROCEDURE — 82378 CARCINOEMBRYONIC ANTIGEN: CPT | Mod: HCNC

## 2020-06-22 PROCEDURE — 3008F BODY MASS INDEX DOCD: CPT | Mod: HCNC,CPTII,S$GLB, | Performed by: INTERNAL MEDICINE

## 2020-06-22 PROCEDURE — 3078F PR MOST RECENT DIASTOLIC BLOOD PRESSURE < 80 MM HG: ICD-10-PCS | Mod: HCNC,CPTII,S$GLB, | Performed by: INTERNAL MEDICINE

## 2020-06-22 PROCEDURE — 36415 COLL VENOUS BLD VENIPUNCTURE: CPT | Mod: HCNC

## 2020-06-22 PROCEDURE — 99999 PR PBB SHADOW E&M-EST. PATIENT-LVL IV: CPT | Mod: PBBFAC,HCNC,, | Performed by: INTERNAL MEDICINE

## 2020-06-22 RX ORDER — MORPHINE SULFATE 30 MG/1
30 TABLET, FILM COATED, EXTENDED RELEASE ORAL 2 TIMES DAILY
Qty: 60 TABLET | Refills: 0 | Status: SHIPPED | OUTPATIENT
Start: 2020-06-22 | End: 2020-06-23

## 2020-06-22 RX ORDER — HYDROCODONE BITARTRATE AND ACETAMINOPHEN 10; 325 MG/1; MG/1
1 TABLET ORAL EVERY 6 HOURS PRN
Qty: 60 TABLET | Refills: 0 | Status: SHIPPED | OUTPATIENT
Start: 2020-06-22 | End: 2020-06-23

## 2020-06-22 RX ORDER — TAMSULOSIN HYDROCHLORIDE 0.4 MG/1
0.4 CAPSULE ORAL DAILY
Qty: 30 CAPSULE | Refills: 11 | Status: SHIPPED | OUTPATIENT
Start: 2020-06-22 | End: 2022-08-16

## 2020-06-22 NOTE — PROGRESS NOTES
Subjective:       Patient ID: Vincent Benton is a 64 y.o. male.    Chief Complaint: Results, Chemotherapy, and Colon Cancer    HPI 64-year-old male cycle FOLFOX plus Avastin recent CT demonstrated no evidence of progressive disease patient presents for additional systemic chemotherapy today ECOG status 1  Past Medical History:   Diagnosis Date    Alcoholism     Anemia     Back pain     Encounter for blood transfusion 2018    Essential hypertension 2/4/2016    GERD (gastroesophageal reflux disease)     Hiatal hernia     Hypertension     Rectal cancer 9/11/2019     Family History   Problem Relation Age of Onset    Cataracts Mother     Stroke Father     Hypertension Father      Social History     Socioeconomic History    Marital status:      Spouse name: Not on file    Number of children: Not on file    Years of education: Not on file    Highest education level: Not on file   Occupational History    Not on file   Social Needs    Financial resource strain: Not on file    Food insecurity     Worry: Not on file     Inability: Not on file    Transportation needs     Medical: Not on file     Non-medical: Not on file   Tobacco Use    Smoking status: Never Smoker    Smokeless tobacco: Never Used   Substance and Sexual Activity    Alcohol use: Yes     Comment: HOLD 72H PRIOR TO SURGERY    Drug use: No    Sexual activity: Never     Partners: Female   Lifestyle    Physical activity     Days per week: Not on file     Minutes per session: Not on file    Stress: Not on file   Relationships    Social connections     Talks on phone: Not on file     Gets together: Not on file     Attends Rastafarian service: Not on file     Active member of club or organization: Not on file     Attends meetings of clubs or organizations: Not on file     Relationship status: Not on file   Other Topics Concern    Not on file   Social History Narrative    Not on file     Past Surgical History:   Procedure  Laterality Date    COLONOSCOPY N/A 9/3/2019    Procedure: COLONOSCOPY;  Surgeon: Isai Centeno MD;  Location: [facility] ENDO;  Service: Endoscopy;  Laterality: N/A;    COLONOSCOPY N/A 1/10/2020    Procedure: COLONOSCOPY;  Surgeon: Familia Gonzalez MD;  Location: [facility] ENDO;  Service: General;  Laterality: N/A;    ESOPHAGOGASTRODUODENOSCOPY N/A 9/3/2019    Procedure: ESOPHAGOGASTRODUODENOSCOPY (EGD);  Surgeon: Isai Centeno MD;  Location: [facility] ENDO;  Service: Endoscopy;  Laterality: N/A;    FLEXIBLE SIGMOIDOSCOPY  2/4/2020    Procedure: SIGMOIDOSCOPY, FLEXIBLE;  Surgeon: Familia Gonzalez MD;  Location: [facility] OR;  Service: General;;    ILEOSTOMY Right 2/4/2020    Procedure: CREATION, ILEOSTOMY;  Surgeon: Familia Gonzalez MD;  Location: [facility] OR;  Service: General;  Laterality: Right;    INJECTION OF ANESTHETIC AGENT INTO TISSUE PLANE DEFINED BY TRANSVERSUS ABDOMINIS MUSCLE N/A 2/4/2020    Procedure: BLOCK, TRANSVERSUS ABDOMINIS PLANE;  Surgeon: Familia Gonzalez MD;  Location: [facility] OR;  Service: General;  Laterality: N/A;    INSERTION OF TUNNELED CENTRAL VENOUS CATHETER (CVC) WITH SUBCUTANEOUS PORT Right 2/4/2020    Procedure: INSERTION, PORT-A-CATH;  Surgeon: Familia Gonzalez MD;  Location: [facility] OR;  Service: General;  Laterality: Right;    JOINT REPLACEMENT Left 2009    MOBILIZATION OF SPLENIC FLEXURE  2/4/2020    Procedure: MOBILIZATION, SPLENIC FLEXURE;  Surgeon: Familia Gonzalez MD;  Location: [facility] OR;  Service: General;;       Labs:  Lab Results   Component Value Date    WBC 4.39 06/08/2020    HGB 10.1 (L) 06/08/2020    HCT 30.8 (L) 06/08/2020    MCV 92 06/08/2020     (L) 06/08/2020     BMP  Lab Results   Component Value Date     (L) 06/08/2020    K 5.0 06/08/2020    CL 96 06/08/2020    CO2 25 06/08/2020    BUN 6 (L) 06/08/2020    CREATININE 0.8 06/08/2020    CALCIUM 9.8 06/08/2020    ANIONGAP 9 06/08/2020    ESTGFRAFRICA >60 06/08/2020    EGFRNONAA >60 06/08/2020     Lab Results    Component Value Date    ALT 17 06/08/2020    AST 34 06/08/2020    ALKPHOS 167 (H) 06/08/2020    BILITOT 0.5 06/08/2020       Lab Results   Component Value Date    IRON <10 (L) 04/13/2018    TIBC 527 (H) 04/13/2018    FERRITIN 241 03/16/2020     No results found for: [id number]  No results found for: FOLATE  Lab Results   Component Value Date    TSH 0.601 04/07/2018         Review of Systems   Constitutional: Positive for fatigue. Negative for activity change, appetite change, chills, diaphoresis, fever and unexpected weight change.   HENT: Negative for congestion, dental problem, drooling, ear discharge, ear pain, facial swelling, hearing loss, mouth sores, nosebleeds, postnasal drip, rhinorrhea, sinus pressure, sneezing, sore throat, tinnitus, trouble swallowing and voice change.    Eyes: Negative for photophobia, pain, discharge, redness, itching and visual disturbance.   Respiratory: Negative for apnea, cough, choking, chest tightness, shortness of breath, wheezing and stridor.    Cardiovascular: Negative for chest pain, palpitations and leg swelling.   Gastrointestinal: Negative for abdominal distention, abdominal pain, anal bleeding, blood in stool, constipation, diarrhea, nausea, rectal pain and vomiting.   Endocrine: Negative for cold intolerance, heat intolerance, polydipsia, polyphagia and polyuria.   Genitourinary: Negative for decreased urine volume, difficulty urinating, discharge, dysuria, enuresis, flank pain, frequency, genital sores, hematuria, penile pain, penile swelling, scrotal swelling, testicular pain and urgency.   Musculoskeletal: Negative for arthralgias, back pain, gait problem, joint swelling, myalgias, neck pain and neck stiffness.   Skin: Negative for color change, pallor, rash and wound.   Allergic/Immunologic: Negative for environmental allergies, food allergies and immunocompromised state.   Neurological: Positive for weakness. Negative for dizziness, tremors, seizures, syncope,  facial asymmetry, speech difficulty, light-headedness, numbness and headaches.   Hematological: Negative for adenopathy. Does not bruise/bleed easily.   Psychiatric/Behavioral: Positive for dysphoric mood. Negative for agitation, behavioral problems, confusion, decreased concentration, hallucinations, self-injury, sleep disturbance and suicidal ideas. The patient is nervous/anxious. The patient is not hyperactive.        Objective:      Physical Exam  Vitals signs reviewed.   Constitutional:       General: He is not in acute distress.     Appearance: He is well-developed. He is ill-appearing. He is not diaphoretic.   HENT:      Head: Normocephalic.      Right Ear: External ear normal.      Left Ear: External ear normal.      Nose: Nose normal.      Right Sinus: No maxillary sinus tenderness or frontal sinus tenderness.      Left Sinus: No maxillary sinus tenderness or frontal sinus tenderness.      Mouth/Throat:      Pharynx: No oropharyngeal exudate.   Eyes:      General: Lids are normal. No scleral icterus.        Right eye: No discharge.         Left eye: No discharge.      Extraocular Movements:      Right eye: Normal extraocular motion.      Left eye: Normal extraocular motion.      Conjunctiva/sclera:      Right eye: Right conjunctiva is not injected. No hemorrhage.     Left eye: Left conjunctiva is not injected. No hemorrhage.     Pupils: Pupils are equal, round, and reactive to light.   Neck:      Musculoskeletal: Normal range of motion and neck supple.      Thyroid: No thyromegaly.      Vascular: No JVD.      Trachea: No tracheal deviation.   Cardiovascular:      Rate and Rhythm: Normal rate.   Pulmonary:      Effort: Pulmonary effort is normal. No respiratory distress.      Breath sounds: No stridor.   Abdominal:      General: Bowel sounds are normal.      Palpations: Abdomen is soft. There is no hepatomegaly, splenomegaly or mass.      Tenderness: There is no abdominal tenderness.   Musculoskeletal:  Normal range of motion.         General: No tenderness.   Lymphadenopathy:      Head:      Right side of head: No posterior auricular or occipital adenopathy.      Left side of head: No posterior auricular or occipital adenopathy.      Cervical: No cervical adenopathy.      Right cervical: No superficial, deep or posterior cervical adenopathy.     Left cervical: No superficial, deep or posterior cervical adenopathy.      Upper Body:      Right upper body: No supraclavicular adenopathy.      Left upper body: No supraclavicular adenopathy.   Skin:     General: Skin is dry.      Findings: No erythema or rash.      Nails: There is no clubbing.     Neurological:      Mental Status: He is alert and oriented to person, place, and time.      Cranial Nerves: No cranial nerve deficit.      Coordination: Coordination normal.   Psychiatric:         Behavior: Behavior normal.         Thought Content: Thought content normal.         Judgment: Judgment normal.             Assessment:      1. Rectal cancer    2. Chemotherapy management, encounter for    3. Colostomy care           Plan:       Will proceed with cycle 7/12 FOLFOX plus Avastin.  CT demonstrates no evidence of progressive disease at this time will proceed with follow-up with primary oncologist to discuss long-term plans and can number of cycles but at this point appear to be will treat with 12 cycles of therapy for N2 disease.  Palliative care consultation requested and for documentation stage III disease; results of laboratory study return today will not be able to treat would recommend return to see primary oncologist to discuss length of completion of treatment prior to initiation of next cycle      Timbo Mcrae Jr, MD FACP

## 2020-06-23 ENCOUNTER — INITIAL CONSULT (OUTPATIENT)
Dept: PALLIATIVE MEDICINE | Facility: CLINIC | Age: 65
End: 2020-06-23
Payer: MEDICARE

## 2020-06-23 VITALS
OXYGEN SATURATION: 99 % | WEIGHT: 134.5 LBS | HEIGHT: 70 IN | DIASTOLIC BLOOD PRESSURE: 71 MMHG | BODY MASS INDEX: 19.26 KG/M2 | SYSTOLIC BLOOD PRESSURE: 116 MMHG | HEART RATE: 80 BPM | RESPIRATION RATE: 18 BRPM | TEMPERATURE: 98 F

## 2020-06-23 DIAGNOSIS — C20 RECTAL CANCER: ICD-10-CM

## 2020-06-23 DIAGNOSIS — G89.3 CANCER ASSOCIATED PAIN: Primary | ICD-10-CM

## 2020-06-23 DIAGNOSIS — R23.4 SKIN FISSURE: ICD-10-CM

## 2020-06-23 DIAGNOSIS — Z51.11 CHEMOTHERAPY MANAGEMENT, ENCOUNTER FOR: ICD-10-CM

## 2020-06-23 PROCEDURE — 3078F PR MOST RECENT DIASTOLIC BLOOD PRESSURE < 80 MM HG: ICD-10-PCS | Mod: HCNC,CPTII,S$GLB, | Performed by: FAMILY MEDICINE

## 2020-06-23 PROCEDURE — 99497 PR ADVNCD CARE PLAN 30 MIN: ICD-10-PCS | Mod: 25,HCNC,S$GLB, | Performed by: FAMILY MEDICINE

## 2020-06-23 PROCEDURE — 3008F BODY MASS INDEX DOCD: CPT | Mod: HCNC,CPTII,S$GLB, | Performed by: FAMILY MEDICINE

## 2020-06-23 PROCEDURE — 3074F SYST BP LT 130 MM HG: CPT | Mod: HCNC,CPTII,S$GLB, | Performed by: FAMILY MEDICINE

## 2020-06-23 PROCEDURE — 3008F PR BODY MASS INDEX (BMI) DOCUMENTED: ICD-10-PCS | Mod: HCNC,CPTII,S$GLB, | Performed by: FAMILY MEDICINE

## 2020-06-23 PROCEDURE — 99214 OFFICE O/P EST MOD 30 MIN: CPT | Mod: HCNC,S$GLB,, | Performed by: FAMILY MEDICINE

## 2020-06-23 PROCEDURE — 99999 PR PBB SHADOW E&M-EST. PATIENT-LVL IV: CPT | Mod: PBBFAC,HCNC,, | Performed by: FAMILY MEDICINE

## 2020-06-23 PROCEDURE — 3078F DIAST BP <80 MM HG: CPT | Mod: HCNC,CPTII,S$GLB, | Performed by: FAMILY MEDICINE

## 2020-06-23 PROCEDURE — 3074F PR MOST RECENT SYSTOLIC BLOOD PRESSURE < 130 MM HG: ICD-10-PCS | Mod: HCNC,CPTII,S$GLB, | Performed by: FAMILY MEDICINE

## 2020-06-23 PROCEDURE — 99214 PR OFFICE/OUTPT VISIT, EST, LEVL IV, 30-39 MIN: ICD-10-PCS | Mod: HCNC,S$GLB,, | Performed by: FAMILY MEDICINE

## 2020-06-23 PROCEDURE — 99497 ADVNCD CARE PLAN 30 MIN: CPT | Mod: 25,HCNC,S$GLB, | Performed by: FAMILY MEDICINE

## 2020-06-23 PROCEDURE — 99999 PR PBB SHADOW E&M-EST. PATIENT-LVL IV: ICD-10-PCS | Mod: PBBFAC,HCNC,, | Performed by: FAMILY MEDICINE

## 2020-06-23 RX ORDER — OXYCODONE AND ACETAMINOPHEN 10; 325 MG/1; MG/1
1 TABLET ORAL EVERY 6 HOURS PRN
Qty: 45 TABLET | Refills: 0 | Status: SHIPPED | OUTPATIENT
Start: 2020-06-23 | End: 2020-07-06 | Stop reason: SDUPTHER

## 2020-06-23 RX ORDER — MORPHINE SULFATE 15 MG/1
15 TABLET, FILM COATED, EXTENDED RELEASE ORAL 2 TIMES DAILY
Qty: 60 TABLET | Refills: 0 | Status: SHIPPED | OUTPATIENT
Start: 2020-06-23 | End: 2020-07-23 | Stop reason: SDUPTHER

## 2020-06-23 RX ORDER — MORPHINE SULFATE 30 MG/1
30 TABLET, FILM COATED, EXTENDED RELEASE ORAL EVERY 12 HOURS
Qty: 60 TABLET | Refills: 0 | Status: SHIPPED | OUTPATIENT
Start: 2020-06-23 | End: 2020-07-23 | Stop reason: SDUPTHER

## 2020-06-23 NOTE — LETTER
June 24, 2020      Timbo Mcrae MD  [address]  [place] [address]           [address] [place] [address]  Phone: [phone]  Fax: [phone]          Patient: Vincent Benton   MR Number: [id number]   YOB: 1955   Date of Visit: 6/23/2020       Dear Dr. Timbo Mcrae:    Thank you for referring Vincent Benton to me for evaluation. Attached you will find relevant portions of my assessment and plan of care.    If you have questions, please do not hesitate to call me. I look forward to following Vincent Benton along with you.    Sincerely,    Fátima Hill MD    Enclosure  CC:  No Recipients    If you would like to receive this communication electronically, please contact [email or web address] or [phone] to request more information on [employer] access.    For providers and/or their staff who would like to refer a patient to [facility], please contact us through our one-stop-shop provider referral line, [facility], at [phone].    If you feel you have received this communication in error or would no longer like to receive these types of communications, please e-mail [email or web address]

## 2020-06-23 NOTE — PROGRESS NOTES
Subjective:       Patient ID: Vincent Benton is a 64 y.o. male.    Chief Complaint: palliative consult    63 yo male with rectal cancer currently undergoing chemotherapy. He has ongoing rectal pain as well as pain at ostomy site. He had no improvement with fentanyl TD, perhaps due to low subcutaneous fat. He is taking MS Contin 30 q12 and hydrocodone 10 4 times per day; states he does not get any relief from hydrocodone. He has tolerated chemo, although now neutropenic and having to delay next treatment.     Advance Care Planning    Power of   I initiated the process of advance care planning today and explained the importance of this process to the patient.  I introduced the concept of advance directives to the patient, as well. Then the patient received detailed information about the importance of designating a Health Care Power of  (HCPOA). He was also instructed to communicate with this person about their wishes for future healthcare, should he become sick and lose decision-making capacity. The patient has not previously appointed a HCPOA. After our discussion, the patient has decided to complete a HCPOA and has appointed his wife Fara Benton: I spent a total time of 20 minutes discussing this issue with the patient.       Advance Care Planning    Living Will  During this visit, I engaged the patient  in the advance care planning process.  The patient and I reviewed the role for advance directives and their purpose in directing future healthcare if the patient's unable to speak for him/herself.  At this point in time, the patient does have full decision-making capacity.  We discussed different extreme health states that he could experience, and reviewed what kind of medical care he would want in those situations.  The patient communicated that if he were comatose and had little chance of a meaningful recovery, he would not want machines/life-sustaining treatments used.  The patient has  completed a living will to reflect these preferences.  I spent a total of 15 minutes engaging the patient in this advance care planning discussion.                    does not have any pertinent problems on file.  Past Medical History:   Diagnosis Date    Alcoholism     Anemia     Back pain     Encounter for blood transfusion 2018    Essential hypertension 2/4/2016    GERD (gastroesophageal reflux disease)     Hiatal hernia     Hypertension     Rectal cancer 9/11/2019     Past Surgical History:   Procedure Laterality Date    COLONOSCOPY N/A 9/3/2019    Procedure: COLONOSCOPY;  Surgeon: Isai Centeno MD;  Location: [facility] ENDO;  Service: Endoscopy;  Laterality: N/A;    COLONOSCOPY N/A 1/10/2020    Procedure: COLONOSCOPY;  Surgeon: Familia Gonzalez MD;  Location: [facility] ENDO;  Service: General;  Laterality: N/A;    ESOPHAGOGASTRODUODENOSCOPY N/A 9/3/2019    Procedure: ESOPHAGOGASTRODUODENOSCOPY (EGD);  Surgeon: Isai Centeno MD;  Location: [facility];  Service: Endoscopy;  Laterality: N/A;    FLEXIBLE SIGMOIDOSCOPY  2/4/2020    Procedure: SIGMOIDOSCOPY, FLEXIBLE;  Surgeon: Familia Gonzalez MD;  Location: [facility] OR;  Service: General;;    ILEOSTOMY Right 2/4/2020    Procedure: CREATION, ILEOSTOMY;  Surgeon: Familia Gonzalez MD;  Location: [facility] OR;  Service: General;  Laterality: Right;    INJECTION OF ANESTHETIC AGENT INTO TISSUE PLANE DEFINED BY TRANSVERSUS ABDOMINIS MUSCLE N/A 2/4/2020    Procedure: BLOCK, TRANSVERSUS ABDOMINIS PLANE;  Surgeon: Familia Gonzalez MD;  Location: [facility] OR;  Service: General;  Laterality: N/A;    INSERTION OF TUNNELED CENTRAL VENOUS CATHETER (CVC) WITH SUBCUTANEOUS PORT Right 2/4/2020    Procedure: INSERTION, PORT-A-CATH;  Surgeon: Familia Gonzalez MD;  Location: [facility] OR;  Service: General;  Laterality: Right;    JOINT REPLACEMENT Left 2009    MOBILIZATION OF SPLENIC FLEXURE  2/4/2020    Procedure: MOBILIZATION, SPLENIC FLEXURE;  Surgeon: Familia Gonzalez MD;   Location: [facility] OR;  Service: General;;     Family History   Problem Relation Age of Onset    Cataracts Mother     Stroke Father     Hypertension Father      Social History     Socioeconomic History    Marital status:      Spouse name: Not on file    Number of children: Not on file    Years of education: Not on file    Highest education level: Not on file   Occupational History    Not on file   Social Needs    Financial resource strain: Not on file    Food insecurity     Worry: Not on file     Inability: Not on file    Transportation needs     Medical: Not on file     Non-medical: Not on file   Tobacco Use    Smoking status: Never Smoker    Smokeless tobacco: Never Used   Substance and Sexual Activity    Alcohol use: Yes     Comment: HOLD 72H PRIOR TO SURGERY    Drug use: No    Sexual activity: Never     Partners: Female   Lifestyle    Physical activity     Days per week: Not on file     Minutes per session: Not on file    Stress: Not on file   Relationships    Social connections     Talks on phone: Not on file     Gets together: Not on file     Attends [employer] service: Not on file     Active member of club or organization: Not on file     Attends meetings of clubs or organizations: Not on file     Relationship status: Not on file   Other Topics Concern    Not on file   Social History Narrative    Not on file     Review of Systems   A comprehensive 14-point review of systems was reviewed with patient and was negative other than as specified above.     Objective:     Vitals:    06/23/20 1536   BP: 116/71   Pulse: 80   Resp: 18   Temp: 97.5 °F (36.4 °C)        Physical Exam  Vitals signs and nursing note reviewed.   Constitutional:       General: He is not in acute distress.     Appearance: He is well-developed. He is not ill-appearing.      Comments: thin   HENT:      Head: Normocephalic and atraumatic.   Eyes:      Pupils: Pupils are equal, round, and reactive to light.   Neck:       Musculoskeletal: Normal range of motion and neck supple.   Cardiovascular:      Rate and Rhythm: Normal rate and regular rhythm.   Pulmonary:      Effort: Pulmonary effort is normal.      Breath sounds: Normal breath sounds.   Abdominal:      General: Bowel sounds are normal.      Palpations: Abdomen is soft.   Musculoskeletal: Normal range of motion.         General: No deformity.   Skin:     General: Skin is warm and dry.      Comments: Fissures to palm of hand   Neurological:      Mental Status: He is alert and oriented to person, place, and time.   Psychiatric:         Behavior: Behavior normal.         Assessment:       1. Cancer associated pain    2. Rectal cancer    3. Skin fissure    4. Chemotherapy management, encounter for        Plan:           Problem List Items Addressed This Visit        Oncology    Rectal cancer    Chemotherapy management, encounter for      Other Visit Diagnoses     Cancer associated pain    -  Primary    Relevant Medications    morphine (MS CONTIN) 30 MG 12 hr tablet    morphine (MS CONTIN) 15 MG 12 hr tablet    oxyCODONE-acetaminophen (PERCOCET)  mg per tablet    Skin fissure          Increase MS contin to 45 mg Q 12 based on hydrocodone use; will rotate to oxycodone for breakthrough pain. RTC in 2-4 weeks.     Offered lidocaine jelly for skin fissures; he declined. Has been using neosporin; advised adding aquaphor.

## 2020-06-25 ENCOUNTER — DOCUMENTATION ONLY (OUTPATIENT)
Dept: HEMATOLOGY/ONCOLOGY | Facility: CLINIC | Age: 65
End: 2020-06-25

## 2020-06-25 NOTE — PROGRESS NOTES
LORENZO was contacted by chemo staff on behalf of pt requesting vanilla boost. LORENZO notified chemo staff that a case of boost at  would be available for pt to pickup today. LORENZO notified registration at [facility] that pt will stop by for his case of vanilla boost. Registration verbalized understanding.

## 2020-06-29 ENCOUNTER — OFFICE VISIT (OUTPATIENT)
Dept: HEMATOLOGY/ONCOLOGY | Facility: CLINIC | Age: 65
End: 2020-06-29
Payer: MEDICARE

## 2020-06-29 ENCOUNTER — INFUSION (OUTPATIENT)
Dept: INFUSION THERAPY | Facility: HOSPITAL | Age: 65
End: 2020-06-29
Attending: PSYCHIATRY & NEUROLOGY
Payer: MEDICARE

## 2020-06-29 VITALS
OXYGEN SATURATION: 98 % | WEIGHT: 132.69 LBS | BODY MASS INDEX: 19 KG/M2 | HEIGHT: 70 IN | SYSTOLIC BLOOD PRESSURE: 104 MMHG | TEMPERATURE: 99 F | RESPIRATION RATE: 18 BRPM | DIASTOLIC BLOOD PRESSURE: 66 MMHG | HEART RATE: 81 BPM

## 2020-06-29 VITALS
DIASTOLIC BLOOD PRESSURE: 80 MMHG | TEMPERATURE: 98 F | HEART RATE: 76 BPM | HEIGHT: 70 IN | SYSTOLIC BLOOD PRESSURE: 116 MMHG | BODY MASS INDEX: 19 KG/M2 | WEIGHT: 132.69 LBS | OXYGEN SATURATION: 99 % | RESPIRATION RATE: 18 BRPM

## 2020-06-29 DIAGNOSIS — C20 RECTAL CANCER: Primary | ICD-10-CM

## 2020-06-29 DIAGNOSIS — C20 RECTAL CANCER: ICD-10-CM

## 2020-06-29 PROCEDURE — 25000003 PHARM REV CODE 250: Mod: HCNC | Performed by: INTERNAL MEDICINE

## 2020-06-29 PROCEDURE — 99999 PR PBB SHADOW E&M-EST. PATIENT-LVL III: CPT | Mod: PBBFAC,HCNC,, | Performed by: INTERNAL MEDICINE

## 2020-06-29 PROCEDURE — 3078F DIAST BP <80 MM HG: CPT | Mod: HCNC,CPTII,S$GLB, | Performed by: INTERNAL MEDICINE

## 2020-06-29 PROCEDURE — 99215 OFFICE O/P EST HI 40 MIN: CPT | Mod: 25,HCNC,S$GLB, | Performed by: INTERNAL MEDICINE

## 2020-06-29 PROCEDURE — 3074F PR MOST RECENT SYSTOLIC BLOOD PRESSURE < 130 MM HG: ICD-10-PCS | Mod: HCNC,CPTII,S$GLB, | Performed by: INTERNAL MEDICINE

## 2020-06-29 PROCEDURE — 96368 THER/DIAG CONCURRENT INF: CPT | Mod: HCNC

## 2020-06-29 PROCEDURE — 96367 TX/PROPH/DG ADDL SEQ IV INF: CPT | Mod: HCNC

## 2020-06-29 PROCEDURE — 99999 PR PBB SHADOW E&M-EST. PATIENT-LVL III: ICD-10-PCS | Mod: PBBFAC,HCNC,, | Performed by: INTERNAL MEDICINE

## 2020-06-29 PROCEDURE — 99215 PR OFFICE/OUTPT VISIT, EST, LEVL V, 40-54 MIN: ICD-10-PCS | Mod: 25,HCNC,S$GLB, | Performed by: INTERNAL MEDICINE

## 2020-06-29 PROCEDURE — 3078F PR MOST RECENT DIASTOLIC BLOOD PRESSURE < 80 MM HG: ICD-10-PCS | Mod: HCNC,CPTII,S$GLB, | Performed by: INTERNAL MEDICINE

## 2020-06-29 PROCEDURE — 63600175 PHARM REV CODE 636 W HCPCS: Mod: HCNC | Performed by: INTERNAL MEDICINE

## 2020-06-29 PROCEDURE — 96415 CHEMO IV INFUSION ADDL HR: CPT | Mod: HCNC

## 2020-06-29 PROCEDURE — 96411 CHEMO IV PUSH ADDL DRUG: CPT | Mod: HCNC

## 2020-06-29 PROCEDURE — 96416 CHEMO PROLONG INFUSE W/PUMP: CPT | Mod: HCNC

## 2020-06-29 PROCEDURE — 3074F SYST BP LT 130 MM HG: CPT | Mod: HCNC,CPTII,S$GLB, | Performed by: INTERNAL MEDICINE

## 2020-06-29 PROCEDURE — 3008F PR BODY MASS INDEX (BMI) DOCUMENTED: ICD-10-PCS | Mod: HCNC,CPTII,S$GLB, | Performed by: INTERNAL MEDICINE

## 2020-06-29 PROCEDURE — 96413 CHEMO IV INFUSION 1 HR: CPT | Mod: HCNC

## 2020-06-29 PROCEDURE — 3008F BODY MASS INDEX DOCD: CPT | Mod: HCNC,CPTII,S$GLB, | Performed by: INTERNAL MEDICINE

## 2020-06-29 RX ORDER — FLUOROURACIL 50 MG/ML
400 INJECTION, SOLUTION INTRAVENOUS
Status: COMPLETED | OUTPATIENT
Start: 2020-06-29 | End: 2020-06-29

## 2020-06-29 RX ORDER — EPINEPHRINE 0.3 MG/.3ML
0.3 INJECTION SUBCUTANEOUS ONCE AS NEEDED
Status: CANCELLED | OUTPATIENT
Start: 2020-06-29

## 2020-06-29 RX ORDER — SODIUM CHLORIDE 0.9 % (FLUSH) 0.9 %
10 SYRINGE (ML) INJECTION
Status: CANCELLED | OUTPATIENT
Start: 2020-06-29

## 2020-06-29 RX ORDER — DIPHENHYDRAMINE HYDROCHLORIDE 50 MG/ML
50 INJECTION INTRAMUSCULAR; INTRAVENOUS ONCE AS NEEDED
Status: CANCELLED | OUTPATIENT
Start: 2020-06-29

## 2020-06-29 RX ORDER — FLUOROURACIL 50 MG/ML
400 INJECTION, SOLUTION INTRAVENOUS
Status: CANCELLED | OUTPATIENT
Start: 2020-06-29

## 2020-06-29 RX ORDER — HEPARIN 100 UNIT/ML
500 SYRINGE INTRAVENOUS
Status: CANCELLED | OUTPATIENT
Start: 2020-06-29

## 2020-06-29 RX ADMIN — FLUOROURACIL 4270 MG: 50 INJECTION, SOLUTION INTRAVENOUS at 01:06

## 2020-06-29 RX ADMIN — FLUOROURACIL 710 MG: 50 INJECTION, SOLUTION INTRAVENOUS at 12:06

## 2020-06-29 RX ADMIN — DEXTROSE MONOHYDRATE 700 MG: 50 INJECTION, SOLUTION INTRAVENOUS at 10:06

## 2020-06-29 RX ADMIN — OXALIPLATIN 151 MG: 5 INJECTION, SOLUTION, CONCENTRATE INTRAVENOUS at 10:06

## 2020-06-29 RX ADMIN — PALONOSETRON: 0.25 INJECTION, SOLUTION INTRAVENOUS at 10:06

## 2020-06-29 RX ADMIN — DEXTROSE MONOHYDRATE: 50 INJECTION, SOLUTION INTRAVENOUS at 09:06

## 2020-06-29 NOTE — ASSESSMENT & PLAN NOTE
Stage IIIB rectal adenocarcinoma, status post 7 cycles of adjuvant chemotherapy and presents today for cycle 8. Reviewed labs, no concerning cytopenia.  Will proceed with treatment.  Today is cycle with complete 6 months of pj op chemotherapy.     Reviewed recent restaging CT scan of the chest abdomen and pelvis showed the following;     1. Surgical changes of the rectum with primary anastomosis.  No CT evidence of local recurrence with presacral postoperative fluid collection.  No pelvic bulky adenopathy appreciated.  2. No new pulmonary nodules visualized with previously described 6 mm lingular nodule not visualized.  3. Left greater than right lung patchy ground-glass findings with small patchy airspace changes within the left lower lobe.  Findings concerning for infectious or inflammatory etiology. He denies cough, fever or any sign of pneumonia.  At this time will hold off on antibiotics therapy.      Going forward, will continue with surveillance including H&P as well as basic blood work CBC, CMP and Ca levels every 3 months.  He is to follow-up with colorectal surgeon to discuss ostomy reversal and surveillance colonoscopy.    He is to follow up with us in about 2-3 weeks for post chemotherapy evaluation.  He knows to contact us sooner if needed.

## 2020-06-29 NOTE — PROGRESS NOTES
Subjective:       Patient ID: Vincent Benton is a 64 y.o. male.    Chief Complaint: No primary diagnosis found.  HPI: We have an opportunity to see Mr. Vincent Benton in Hematology Oncology clinic at [facility] on 06/29/2020.  Mr. Vincent Benton is a 64 y.o. gentleman with rectal cancer.  Patient has completed concurrent chemoradiation with capecitabine.  Status post low anterior resection.  Final path noted at ypT3N0. Status post 7 cycles of adjuvant therapy with FOLFOX.    Interval history:  64-year-old male with stage III rectal cancer, status post neoadjuvant chemotherapy and surgery and currently on adjuvant FOLFOX.  Plan for 6 months of pj op chemotherapy.  Presents today for cycle 8 which will gil the last cycle of adjuvant chemotherapy.  Overall tolerating well except for decreased appetite.  Patient takes about 2 protein boost daily.  Denies nausea, vomiting or diarrhea.  Also denies fever, mucositis, chills, shortness of breath or abdominal pain.      Oncology History   Rectal cancer   9/11/2019 Initial Diagnosis    Rectal cancer     9/18/2019 Tumor Conference    Multidisciplinary Rectal Cancer Conference - Evaluation and Recommendation Summary    9/17/2019  Vincent Benton  [id number]  63 y.o. male    1. Evaluation    C scope 9/3/19: Obstructing Rectal mass that couldn't be crossed.     Rigid Procto 9/3/19: Left and anterior lesion at 9-10 cm.     Path: Invasive adenocarcinoma, no LVI.     MRI date: 9/9/2019    Tumor Location in Rectum: middle third     Size: 3.6x2.7 cm mass    Nodes: 2 suspected nodes.     Indication of Sphincter Involvement:  Uninvolved    Pretreatment Circumferential Resection Margin (CRM) status:  Not Threatened    Pretreatment (clinical) AJCC Stage:III B vs  IV ?  Stage I  [] I: T1N0M0  [] I: T2N0M0  Stage II  [] IIA: T3N0M0  [] IIB J6gE1O4  [] IIC: H1gZ3V3  Stage III  [] IIIA: T1-2N1M0  [] IIIA: R8A0cO9  [] IIIB: T3-A0fT5D6  [x] IIIB: T2-3N2aM0  []  IIIB: T1-2N2bM0  [] IIIC: J8yN5fS0  [] IIIC: T3-1qP3fH0  [] IIIC: L1fN7-5R0   Stage IV  [x] IV: U2-3E3-2C2m-b    CEA level:     Lab Results   Component Value Date    CEA 3.5 09/03/2019       2. Treatment Recommendation    Neoadjuvant Therapy Recommendation:     Short course radiation and Chemotherapy.     PLAN:    MRI and PET if not able to obtain PET will proceed with biopsy of the lung biopsy.     Colonoscopy during Chemotherapy to rule out any other lesions.     Anticipated date and type of surgical procedure:     LAR after    Clinical research study eligibility and/or enrollment: Not eligible     10/8/2019 Cancer Staged    Staging form: Colon and Rectum, AJCC 8th Edition  - Clinical stage from 10/8/2019: Stage IIIB (cT3, cN2a, cM0)     10/15/2019 - 10/15/2019 Chemotherapy    Treatment Summary   Plan Name: OP CAPECITABINE 5 DAYS + RADIOTHERAPY  Treatment Goal: Curative  Status: Inactive  Start Date: [No treatment day found]  End Date: [No treatment day found]  Provider: Mikel Sams MD  Chemotherapy: [No matching medication found in this treatment plan]     10/24/2019 - 12/4/2019 Radiation Therapy    Treatment Site Ref. ID Energy Dose/Fx (Gy) #Fx Dose Correction (Gy) Total Dose (Gy) Start Date End Date Elapsed Days   Pelvis Pelvis 6X 1.8 28 / 28 0 50.4 10/24/2019 12/4/2019 41          3/2/2020 -  Chemotherapy    Treatment Summary   Plan Name: OP FOLFOX 6 Q2W  Treatment Goal: Curative  Status: Active  Start Date: 3/2/2020  End Date: 8/26/2020 (Planned)  Provider: Mikel Sams MD  Chemotherapy: fluorouracil injection 710 mg, 400 mg/m2 = 710 mg, Intravenous, Clinic/HOD 1 time, 8 of 12 cycles  Administration: 710 mg (3/2/2020), 710 mg (3/16/2020), 710 mg (4/6/2020), 710 mg (4/20/2020), 710 mg (5/25/2020), 710 mg (5/12/2020), 710 mg (6/8/2020)  fluorouracil (ADRUCIL) 2,400 mg/m2 = 4,270 mg in sodium chloride 0.9% 101.2 mL chemo infusion, 2,400 mg/m2 = 4,270 mg, Intravenous, Over 46 hours, 8 of 12  cycles  Administration: 4,270 mg (3/2/2020), 4,270 mg (3/16/2020), 4,270 mg (4/6/2020), 4,270 mg (4/20/2020), 4,270 mg (5/25/2020), 4,270 mg (5/12/2020), 4,270 mg (6/8/2020)  leucovorin calcium 400 mg/m2 = 710 mg in dextrose 5 % 285.5 mL infusion, 400 mg/m2 = 710 mg, Intravenous, Clinic/HOD 1 time, 8 of 12 cycles  Administration: 710 mg (3/2/2020), 710 mg (3/16/2020), 700 mg (4/6/2020), 710 mg (4/20/2020), 710 mg (5/25/2020), 710 mg (5/12/2020), 710 mg (6/8/2020)  oxaliplatin (ELOXATIN) 85 mg/m2 = 151 mg in dextrose 5 % 530.2 mL chemo infusion, 85 mg/m2 = 151 mg, Intravenous, Clinic/HOD 1 time, 8 of 12 cycles  Administration: 151 mg (3/2/2020), 151 mg (3/16/2020), 150 mg (4/6/2020), 151 mg (4/20/2020), 151 mg (5/25/2020), 151 mg (5/12/2020), 151 mg (6/8/2020)       Past Medical History:   Diagnosis Date    Alcoholism     Anemia     Back pain     Encounter for blood transfusion 2018    Essential hypertension 2/4/2016    GERD (gastroesophageal reflux disease)     Hiatal hernia     Hypertension     Rectal cancer 9/11/2019     Family History   Problem Relation Age of Onset    Cataracts Mother     Stroke Father     Hypertension Father      Social History     Socioeconomic History    Marital status:      Spouse name: Not on file    Number of children: Not on file    Years of education: Not on file    Highest education level: Not on file   Occupational History    Not on file   Social Needs    Financial resource strain: Not on file    Food insecurity     Worry: Not on file     Inability: Not on file    Transportation needs     Medical: Not on file     Non-medical: Not on file   Tobacco Use    Smoking status: Never Smoker    Smokeless tobacco: Never Used   Substance and Sexual Activity    Alcohol use: Yes     Comment: HOLD 72H PRIOR TO SURGERY    Drug use: No    Sexual activity: Never     Partners: Female   Lifestyle    Physical activity     Days per week: Not on file     Minutes per session:  Not on file    Stress: Not on file   Relationships    Social connections     Talks on phone: Not on file     Gets together: Not on file     Attends Rastafari service: Not on file     Active member of club or organization: Not on file     Attends meetings of clubs or organizations: Not on file     Relationship status: Not on file   Other Topics Concern    Not on file   Social History Narrative    Not on file     Past Surgical History:   Procedure Laterality Date    COLONOSCOPY N/A 9/3/2019    Procedure: COLONOSCOPY;  Surgeon: Isai Centeno MD;  Location: [facility] ENDO;  Service: Endoscopy;  Laterality: N/A;    COLONOSCOPY N/A 1/10/2020    Procedure: COLONOSCOPY;  Surgeon: Familia Gonzalez MD;  Location: [facility] ENDO;  Service: General;  Laterality: N/A;    ESOPHAGOGASTRODUODENOSCOPY N/A 9/3/2019    Procedure: ESOPHAGOGASTRODUODENOSCOPY (EGD);  Surgeon: Isai Centeno MD;  Location: [facility];  Service: Endoscopy;  Laterality: N/A;    FLEXIBLE SIGMOIDOSCOPY  2/4/2020    Procedure: SIGMOIDOSCOPY, FLEXIBLE;  Surgeon: Familia Gonzalez MD;  Location: [facility] OR;  Service: General;;    ILEOSTOMY Right 2/4/2020    Procedure: CREATION, ILEOSTOMY;  Surgeon: Familia Gonzalez MD;  Location: [facility] OR;  Service: General;  Laterality: Right;    INJECTION OF ANESTHETIC AGENT INTO TISSUE PLANE DEFINED BY TRANSVERSUS ABDOMINIS MUSCLE N/A 2/4/2020    Procedure: BLOCK, TRANSVERSUS ABDOMINIS PLANE;  Surgeon: Familia Gonzalez MD;  Location: [facility] OR;  Service: General;  Laterality: N/A;    INSERTION OF TUNNELED CENTRAL VENOUS CATHETER (CVC) WITH SUBCUTANEOUS PORT Right 2/4/2020    Procedure: INSERTION, PORT-A-CATH;  Surgeon: Familia Gonzalez MD;  Location: [facility] OR;  Service: General;  Laterality: Right;    JOINT REPLACEMENT Left 2009    MOBILIZATION OF SPLENIC FLEXURE  2/4/2020    Procedure: MOBILIZATION, SPLENIC FLEXURE;  Surgeon: Familia Gonzalez MD;  Location: [facility] OR;  Service: General;;     Current Outpatient  Medications   Medication Sig Dispense Refill    cyanocobalamin (VITAMIN B-12) 1000 MCG tablet Take 100 mcg by mouth once daily.      dronabinoL (MARINOL) 2.5 MG capsule Take 1 capsule (2.5 mg total) by mouth 2 (two) times daily before meals. 30 capsule 0    ferrous sulfate 324 mg (65 mg iron) TbEC Take 1 tablet (324 mg total) by mouth 2 (two) times daily. 60 tablet 0    losartan (COZAAR) 50 MG tablet TAKE 1 TABLET BY MOUTH EVERY MORNING 90 tablet 1    mirtazapine (REMERON) 15 MG tablet Take 1 tablet (15 mg total) by mouth every evening. For sleep and appetite 30 tablet 11    morphine (MS CONTIN) 15 MG 12 hr tablet Take 1 tablet (15 mg total) by mouth 2 (two) times daily. Take with Morphine 30 60 tablet 0    morphine (MS CONTIN) 30 MG 12 hr tablet Take 1 tablet (30 mg total) by mouth every 12 (twelve) hours. 60 tablet 0    ondansetron (ZOFRAN) 8 MG tablet Take 1 tablet (8 mg total) by mouth every 8 (eight) hours as needed for Nausea. 30 tablet 2    oxyCODONE-acetaminophen (PERCOCET)  mg per tablet Take 1 tablet by mouth every 6 (six) hours as needed for Pain. 45 tablet 0    pantoprazole (PROTONIX) 40 MG tablet Take 1 tablet (40 mg total) by mouth once daily. 30 tablet 11    prochlorperazine (COMPAZINE) 5 MG tablet TAKE 1 TABLET(5 MG) BY MOUTH EVERY 6 HOURS AS NEEDED FOR NAUSEA 385 tablet 1    tamsulosin (FLOMAX) 0.4 mg Cap Take 1 capsule (0.4 mg total) by mouth once daily. 30 capsule 11     No current facility-administered medications for this visit.      Facility-Administered Medications Ordered in Other Visits   Medication Dose Route Frequency Provider Last Rate Last Dose    fluorouracil (ADRUCIL) 2,400 mg/m2 = 4,270 mg in sodium chloride 0.9% 101.2 mL chemo infusion  2,400 mg/m2 (Treatment Plan Recorded) Intravenous over 46 hr Mikel Sams MD        fluorouraciL injection 710 mg  400 mg/m2 (Treatment Plan Recorded) Intravenous 1 time in Clinic/HOD Mikel Sams MD         leucovorin calcium 700 mg in dextrose 5 % 285 mL infusion  700 mg Intravenous 1 time in Clinic/HOD Mikel Sams .5 mL/hr at 06/29/20 1030 700 mg at 06/29/20 1030    oxaliplatin (ELOXATIN) 85 mg/m2 = 151 mg in dextrose 5 % 530.2 mL chemo infusion  85 mg/m2 (Treatment Plan Recorded) Intravenous 1 time in Clinic/HOD Mikel Sams .1 mL/hr at 06/29/20 1030 151 mg at 06/29/20 1030    sodium chloride 0.9% flush 3 mL  3 mL Intravenous PRN Shilpa Yee MD           Labs:  Lab Results   Component Value Date    WBC 10.57 06/29/2020    HGB 11.0 (L) 06/29/2020    HCT 34.7 (L) 06/29/2020    MCV 98 06/29/2020     (H) 06/29/2020     BMP  Lab Results   Component Value Date     (L) 06/29/2020    K 5.2 (H) 06/29/2020    CL 99 06/29/2020    CO2 25 06/29/2020    BUN 10 06/29/2020    CREATININE 0.7 06/29/2020    CALCIUM 10.1 06/29/2020    ANIONGAP 11 06/29/2020    ESTGFRAFRICA >60 06/29/2020    EGFRNONAA >60 06/29/2020     Lab Results   Component Value Date    ALT 17 06/29/2020    AST 33 06/29/2020    ALKPHOS 237 (H) 06/29/2020    BILITOT 0.3 06/29/2020       Lab Results   Component Value Date    IRON <10 (L) 04/13/2018    TIBC 527 (H) 04/13/2018    FERRITIN 241 03/16/2020     No results found for: [id number]  No results found for: FOLATE  Lab Results   Component Value Date    TSH 0.601 04/07/2018       I have reviewed the radiology reports and examined the scan/xray images.    Review of Systems   Constitutional: Positive for appetite change (Decreased ). Negative for activity change, chills, fatigue, fever and unexpected weight change.   HENT: Negative for hearing loss, mouth sores, nosebleeds, sore throat, tinnitus, trouble swallowing and voice change.    Eyes: Negative for visual disturbance.   Respiratory: Negative for cough, chest tightness, shortness of breath and wheezing.    Cardiovascular: Negative for chest pain, palpitations and leg swelling.   Gastrointestinal: Negative for abdominal  pain, anal bleeding, blood in stool, constipation, diarrhea, nausea and vomiting.   Genitourinary: Negative for frequency, hematuria and testicular pain.   Musculoskeletal: Negative for arthralgias, back pain, gait problem and neck pain.   Skin: Negative for color change, pallor, rash and wound.   Allergic/Immunologic: Negative for immunocompromised state.   Neurological: Negative for seizures, syncope, weakness, numbness and headaches.   Hematological: Negative for adenopathy. Does not bruise/bleed easily.   Psychiatric/Behavioral: Negative for agitation, confusion, decreased concentration, hallucinations and sleep disturbance. The patient is not nervous/anxious.      ECOG SCORE    1 - Restricted in strenuous activity-ambulatory and able to carry out work of a light nature            Objective:     Vitals:    06/29/20 0847   BP: 104/66   Pulse: 81   Resp: 18   Temp: 98.8 °F (37.1 °C)   Body mass index is 19.04 kg/m².  Physical Exam  Constitutional:       Appearance: He is well-developed. He is cachectic.   HENT:      Head: Normocephalic and atraumatic.      Right Ear: External ear normal.      Left Ear: External ear normal.      Mouth/Throat:      Pharynx: No oropharyngeal exudate.   Eyes:      General: No scleral icterus.        Right eye: No discharge.         Left eye: No discharge.      Conjunctiva/sclera: Conjunctivae normal.      Pupils: Pupils are equal, round, and reactive to light.   Neck:      Musculoskeletal: Normal range of motion and neck supple.      Thyroid: No thyromegaly.   Cardiovascular:      Rate and Rhythm: Normal rate and regular rhythm.      Heart sounds: Normal heart sounds. No murmur.   Pulmonary:      Effort: Pulmonary effort is normal. No respiratory distress.      Breath sounds: Normal breath sounds. No wheezing.   Chest:      Chest wall: No tenderness.   Abdominal:      General: Bowel sounds are normal. There is no distension.      Palpations: Abdomen is soft. There is no mass.       Tenderness: There is no abdominal tenderness. There is no rebound.   Musculoskeletal: Normal range of motion.         General: No tenderness.   Lymphadenopathy:      Cervical: No cervical adenopathy.      Right cervical: No superficial cervical adenopathy.     Left cervical: No superficial cervical adenopathy.      Upper Body:      Right upper body: No supraclavicular or pectoral adenopathy.      Left upper body: No supraclavicular or pectoral adenopathy.      Lower Body: No right inguinal adenopathy. No left inguinal adenopathy.   Skin:     General: Skin is warm and dry.      Capillary Refill: Capillary refill takes 2 to 3 seconds.      Coloration: Skin is not pale.      Findings: No erythema or rash.   Neurological:      Mental Status: He is alert and oriented to person, place, and time.      Cranial Nerves: No cranial nerve deficit.      Sensory: No sensory deficit.   Psychiatric:         Behavior: Behavior normal.         Judgment: Judgment normal.           Assessment:      1. Rectal cancer           Plan:     Rectal cancer  Stage IIIB rectal adenocarcinoma, status post 7 cycles of adjuvant chemotherapy and presents today for cycle 8. Reviewed labs, no concerning cytopenia.  Will proceed with treatment.  Today is cycle with complete 6 months of pj op chemotherapy.     Reviewed recent restaging CT scan of the chest abdomen and pelvis showed the following;     1. Surgical changes of the rectum with primary anastomosis.  No CT evidence of local recurrence with presacral postoperative fluid collection.  No pelvic bulky adenopathy appreciated.  2. No new pulmonary nodules visualized with previously described 6 mm lingular nodule not visualized.  3. Left greater than right lung patchy ground-glass findings with small patchy airspace changes within the left lower lobe.  Findings concerning for infectious or inflammatory etiology. He denies cough, fever or any sign of pneumonia.  At this time will hold off on  antibiotics therapy.      Going forward, will continue with surveillance including H&P as well as basic blood work CBC, CMP and Ca levels every 3 months.  He is to follow-up with colorectal surgeon to discuss ostomy reversal and surveillance colonoscopy.    He is to follow up with us in about 2-3 weeks for post chemotherapy evaluation.  He knows to contact us sooner if needed.      Mikel Sams MD

## 2020-07-01 ENCOUNTER — INFUSION (OUTPATIENT)
Dept: INFUSION THERAPY | Facility: HOSPITAL | Age: 65
End: 2020-07-01
Attending: INTERNAL MEDICINE
Payer: MEDICARE

## 2020-07-01 VITALS
TEMPERATURE: 98 F | RESPIRATION RATE: 18 BRPM | OXYGEN SATURATION: 99 % | DIASTOLIC BLOOD PRESSURE: 74 MMHG | HEART RATE: 74 BPM | SYSTOLIC BLOOD PRESSURE: 115 MMHG

## 2020-07-01 DIAGNOSIS — C20 RECTAL CANCER: Primary | ICD-10-CM

## 2020-07-01 PROCEDURE — A4216 STERILE WATER/SALINE, 10 ML: HCPCS | Mod: HCNC | Performed by: INTERNAL MEDICINE

## 2020-07-01 PROCEDURE — 63600175 PHARM REV CODE 636 W HCPCS: Mod: HCNC | Performed by: INTERNAL MEDICINE

## 2020-07-01 PROCEDURE — 25000003 PHARM REV CODE 250: Mod: HCNC | Performed by: INTERNAL MEDICINE

## 2020-07-01 PROCEDURE — 96523 IRRIG DRUG DELIVERY DEVICE: CPT | Mod: HCNC

## 2020-07-01 RX ORDER — HEPARIN 100 UNIT/ML
500 SYRINGE INTRAVENOUS
Status: DISCONTINUED | OUTPATIENT
Start: 2020-07-01 | End: 2020-07-01 | Stop reason: HOSPADM

## 2020-07-01 RX ORDER — SODIUM CHLORIDE 0.9 % (FLUSH) 0.9 %
10 SYRINGE (ML) INJECTION
Status: ACTIVE | OUTPATIENT
Start: 2020-07-01

## 2020-07-01 RX ORDER — SODIUM CHLORIDE 0.9 % (FLUSH) 0.9 %
10 SYRINGE (ML) INJECTION
Status: DISCONTINUED | OUTPATIENT
Start: 2020-07-01 | End: 2020-07-01 | Stop reason: HOSPADM

## 2020-07-01 RX ORDER — HEPARIN 100 UNIT/ML
500 SYRINGE INTRAVENOUS
Status: DISCONTINUED | OUTPATIENT
Start: 2020-07-01 | End: 2021-04-22

## 2020-07-01 RX ADMIN — Medication 10 ML: at 11:07

## 2020-07-01 RX ADMIN — HEPARIN 500 UNITS: 100 SYRINGE at 11:07

## 2020-07-01 NOTE — NURSING
Pt came in for pump D/C. Pt tolerated home 5FU well and had no complaints. PAC flushed w/ NS and heparin and deaccessed. Pt education reinforced and explained what to expect in the next couple of days. Pt verbalized understanding.

## 2020-07-06 ENCOUNTER — OFFICE VISIT (OUTPATIENT)
Dept: SURGERY | Facility: CLINIC | Age: 65
End: 2020-07-06
Payer: MEDICARE

## 2020-07-06 VITALS
BODY MASS INDEX: 18.32 KG/M2 | SYSTOLIC BLOOD PRESSURE: 133 MMHG | TEMPERATURE: 99 F | HEART RATE: 84 BPM | DIASTOLIC BLOOD PRESSURE: 90 MMHG | WEIGHT: 127.63 LBS

## 2020-07-06 DIAGNOSIS — G89.3 CANCER ASSOCIATED PAIN: ICD-10-CM

## 2020-07-06 DIAGNOSIS — Z01.818 PRE-OP TESTING: Primary | ICD-10-CM

## 2020-07-06 DIAGNOSIS — C20 RECTAL ADENOCARCINOMA: Primary | ICD-10-CM

## 2020-07-06 PROCEDURE — 3080F DIAST BP >= 90 MM HG: CPT | Mod: HCNC,CPTII,S$GLB, | Performed by: COLON & RECTAL SURGERY

## 2020-07-06 PROCEDURE — 99214 PR OFFICE/OUTPT VISIT, EST, LEVL IV, 30-39 MIN: ICD-10-PCS | Mod: HCNC,S$GLB,, | Performed by: COLON & RECTAL SURGERY

## 2020-07-06 PROCEDURE — 99999 PR PBB SHADOW E&M-EST. PATIENT-LVL IV: CPT | Mod: PBBFAC,HCNC,, | Performed by: COLON & RECTAL SURGERY

## 2020-07-06 PROCEDURE — 3008F PR BODY MASS INDEX (BMI) DOCUMENTED: ICD-10-PCS | Mod: HCNC,CPTII,S$GLB, | Performed by: COLON & RECTAL SURGERY

## 2020-07-06 PROCEDURE — 3075F SYST BP GE 130 - 139MM HG: CPT | Mod: HCNC,CPTII,S$GLB, | Performed by: COLON & RECTAL SURGERY

## 2020-07-06 PROCEDURE — 3080F PR MOST RECENT DIASTOLIC BLOOD PRESSURE >= 90 MM HG: ICD-10-PCS | Mod: HCNC,CPTII,S$GLB, | Performed by: COLON & RECTAL SURGERY

## 2020-07-06 PROCEDURE — 3008F BODY MASS INDEX DOCD: CPT | Mod: HCNC,CPTII,S$GLB, | Performed by: COLON & RECTAL SURGERY

## 2020-07-06 PROCEDURE — 99999 PR PBB SHADOW E&M-EST. PATIENT-LVL IV: ICD-10-PCS | Mod: PBBFAC,HCNC,, | Performed by: COLON & RECTAL SURGERY

## 2020-07-06 PROCEDURE — 99499 RISK ADDL DX/OHS AUDIT: ICD-10-PCS | Mod: HCNC,S$GLB,, | Performed by: COLON & RECTAL SURGERY

## 2020-07-06 PROCEDURE — 99214 OFFICE O/P EST MOD 30 MIN: CPT | Mod: HCNC,S$GLB,, | Performed by: COLON & RECTAL SURGERY

## 2020-07-06 PROCEDURE — 99499 UNLISTED E&M SERVICE: CPT | Mod: HCNC,S$GLB,, | Performed by: COLON & RECTAL SURGERY

## 2020-07-06 PROCEDURE — 3075F PR MOST RECENT SYSTOLIC BLOOD PRESS GE 130-139MM HG: ICD-10-PCS | Mod: HCNC,CPTII,S$GLB, | Performed by: COLON & RECTAL SURGERY

## 2020-07-06 RX ORDER — ONDANSETRON 2 MG/ML
4 INJECTION INTRAMUSCULAR; INTRAVENOUS EVERY 12 HOURS PRN
Status: CANCELLED | OUTPATIENT
Start: 2020-07-06

## 2020-07-06 RX ORDER — SODIUM CHLORIDE, SODIUM LACTATE, POTASSIUM CHLORIDE, CALCIUM CHLORIDE 600; 310; 30; 20 MG/100ML; MG/100ML; MG/100ML; MG/100ML
INJECTION, SOLUTION INTRAVENOUS CONTINUOUS
Status: CANCELLED | OUTPATIENT
Start: 2020-07-06

## 2020-07-06 RX ORDER — OXYCODONE AND ACETAMINOPHEN 10; 325 MG/1; MG/1
1 TABLET ORAL EVERY 6 HOURS PRN
Qty: 45 TABLET | Refills: 0 | Status: SHIPPED | OUTPATIENT
Start: 2020-07-06 | End: 2020-07-17 | Stop reason: SDUPTHER

## 2020-07-06 RX ORDER — HEPARIN SODIUM 5000 [USP'U]/ML
5000 INJECTION, SOLUTION INTRAVENOUS; SUBCUTANEOUS
Status: CANCELLED | OUTPATIENT
Start: 2020-07-06 | End: 2020-07-06

## 2020-07-06 RX ORDER — CELECOXIB 100 MG/1
400 CAPSULE ORAL
Status: CANCELLED | OUTPATIENT
Start: 2020-07-06 | End: 2020-07-06

## 2020-07-06 RX ORDER — METRONIDAZOLE 500 MG/100ML
500 INJECTION, SOLUTION INTRAVENOUS
Status: CANCELLED | OUTPATIENT
Start: 2020-07-06

## 2020-07-06 RX ORDER — ACETAMINOPHEN 325 MG/1
650 TABLET ORAL EVERY 6 HOURS
Status: DISCONTINUED | OUTPATIENT
Start: 2020-07-06 | End: 2021-04-22

## 2020-07-06 RX ORDER — GABAPENTIN 100 MG/1
800 CAPSULE ORAL
Status: CANCELLED | OUTPATIENT
Start: 2020-07-06 | End: 2020-07-06

## 2020-07-06 NOTE — TELEPHONE ENCOUNTER
----- Message from Inge Hunter sent at 7/6/2020  8:34 AM CDT -----  Type:  RX Refill Request    Who Called: KATT FAROOQ A   Refill or New Rx: refill   RX Name and Strength:oxyCODONE 10 mg  How is the patient currently taking it? (ex. 1XDay): 1 tab every 6 hours   Is this a 30 day or 90 day Rx: 2 week  Preferred Pharmacy with phone number:     [facility] [address] [place] [address] [place] [id number]  Phone: [phone] Fax: [phone]     Local or Mail Order:local  Ordering Provider: Dr john   Would the patient rather a call back or a response via [facility]?  Call   Best Call Back Number: [phone] (home)    Additional Information:

## 2020-07-06 NOTE — H&P (VIEW-ONLY)
History & Physical    SUBJECTIVE:     Chief Complaint   Patient presents with    Follow-up     4 Month Follow Up    Ref MD: Isai Centeno MD    History of Present Illness:  Patient is a 64 y.o. male presents for evaluation of a rectal mass.  Patient has been having iron deficiency anemia that did require transfusion around 1 year ago.  He also has had associated hematochezia for over a year now but did improve after he quit drinking beer at 8 months ago but is still intermittently present. This prompted a colonoscopy which was performed on 09/03/2019 where an obstructing rectal mass was seen that appeared malignant was biopsied and could not be traversed.  Patient reports that he had a colonoscopy around 6-7 years ago were some benign polyps were found but no malignancy.  These records are not available.  He states that the hematochezia has recently improved after he quit drinking beer, but is still intermittently present and worsen with the bowel prep from the colonoscopy recently.  He is not on any chronic anticoagulation.  He states he has had normal caliber bowel movements does not feel constipated or obstructed.  He denies any fever, chills, nausea, vomiting, diarrhea, constipation, weight loss, night sweats or any other signs or symptoms.    12/4/19: Completed Neoadj chemoXRT  2/4/2020 : Robotic ultra-low anterior resection with DLI and Mediport placement  June 2020: Completed adjuvant chemotx    Interval history:  Since last clinic visit, the patient completed a full course of adjuvant chemotherapy last week.  He continues to tolerate a regular diet without any nausea or vomiting.  He is having good ileostomy function.  He denies any fever, chills, nausea, vomiting.    PMHx: HTN, GERD  PSHx: L hip sx  Fam Hx: No CRC or IBD; +colonic polyps in brother  All: NKDA  SocHx: previous etoh; no tob or illicits; works as a     Review of patient's allergies indicates:  No Known Allergies    Current Outpatient  Medications   Medication Sig Dispense Refill    cyanocobalamin (VITAMIN B-12) 1000 MCG tablet Take 100 mcg by mouth once daily.      dronabinoL (MARINOL) 2.5 MG capsule Take 1 capsule (2.5 mg total) by mouth 2 (two) times daily before meals. 30 capsule 0    ferrous sulfate 324 mg (65 mg iron) TbEC Take 1 tablet (324 mg total) by mouth 2 (two) times daily. 60 tablet 0    losartan (COZAAR) 50 MG tablet TAKE 1 TABLET BY MOUTH EVERY MORNING 90 tablet 1    mirtazapine (REMERON) 15 MG tablet Take 1 tablet (15 mg total) by mouth every evening. For sleep and appetite 30 tablet 11    morphine (MS CONTIN) 15 MG 12 hr tablet Take 1 tablet (15 mg total) by mouth 2 (two) times daily. Take with Morphine 30 60 tablet 0    morphine (MS CONTIN) 30 MG 12 hr tablet Take 1 tablet (30 mg total) by mouth every 12 (twelve) hours. 60 tablet 0    ondansetron (ZOFRAN) 8 MG tablet Take 1 tablet (8 mg total) by mouth every 8 (eight) hours as needed for Nausea. 30 tablet 2    oxyCODONE-acetaminophen (PERCOCET)  mg per tablet Take 1 tablet by mouth every 6 (six) hours as needed for Pain. 45 tablet 0    pantoprazole (PROTONIX) 40 MG tablet Take 1 tablet (40 mg total) by mouth once daily. 30 tablet 11    prochlorperazine (COMPAZINE) 5 MG tablet TAKE 1 TABLET(5 MG) BY MOUTH EVERY 6 HOURS AS NEEDED FOR NAUSEA 385 tablet 1    tamsulosin (FLOMAX) 0.4 mg Cap Take 1 capsule (0.4 mg total) by mouth once daily. 30 capsule 11     Current Facility-Administered Medications   Medication Dose Route Frequency Provider Last Rate Last Dose    acetaminophen tablet 650 mg  650 mg Oral Q6H Familia Gonzalez MD         Facility-Administered Medications Ordered in Other Visits   Medication Dose Route Frequency Provider Last Rate Last Dose    alteplase injection 2 mg  2 mg Intra-Catheter PRN Mikel Sams MD        heparin, porcine (PF) 100 unit/mL injection flush 500 Units  500 Units Intravenous PRN Mikel Sams MD        sodium  chloride 0.9% flush 10 mL  10 mL Intravenous PRN Mikel Sams MD        sodium chloride 0.9% flush 3 mL  3 mL Intravenous PRN Shilpa Yee MD           Past Medical History:   Diagnosis Date    Alcoholism     Anemia     Back pain     Encounter for blood transfusion 2018    Essential hypertension 2/4/2016    GERD (gastroesophageal reflux disease)     Hiatal hernia     Hypertension     Rectal cancer 9/11/2019     Past Surgical History:   Procedure Laterality Date    COLONOSCOPY N/A 9/3/2019    Procedure: COLONOSCOPY;  Surgeon: Isai Centeno MD;  Location: [facility] ENDO;  Service: Endoscopy;  Laterality: N/A;    COLONOSCOPY N/A 1/10/2020    Procedure: COLONOSCOPY;  Surgeon: Familia Gonzalez MD;  Location: [facility] ENDO;  Service: General;  Laterality: N/A;    ESOPHAGOGASTRODUODENOSCOPY N/A 9/3/2019    Procedure: ESOPHAGOGASTRODUODENOSCOPY (EGD);  Surgeon: Isai Centeno MD;  Location: [facility] ENDO;  Service: Endoscopy;  Laterality: N/A;    FLEXIBLE SIGMOIDOSCOPY  2/4/2020    Procedure: SIGMOIDOSCOPY, FLEXIBLE;  Surgeon: Familia Gonzalez MD;  Location: [facility];  Service: General;;    ILEOSTOMY Right 2/4/2020    Procedure: CREATION, ILEOSTOMY;  Surgeon: Familia Gonzalez MD;  Location: [facility] OR;  Service: General;  Laterality: Right;    INJECTION OF ANESTHETIC AGENT INTO TISSUE PLANE DEFINED BY TRANSVERSUS ABDOMINIS MUSCLE N/A 2/4/2020    Procedure: BLOCK, TRANSVERSUS ABDOMINIS PLANE;  Surgeon: Familia Gonzalez MD;  Location: [facility] OR;  Service: General;  Laterality: N/A;    INSERTION OF TUNNELED CENTRAL VENOUS CATHETER (CVC) WITH SUBCUTANEOUS PORT Right 2/4/2020    Procedure: INSERTION, PORT-A-CATH;  Surgeon: Familia Gonzalez MD;  Location: [facility] OR;  Service: General;  Laterality: Right;    JOINT REPLACEMENT Left 2009    MOBILIZATION OF SPLENIC FLEXURE  2/4/2020    Procedure: MOBILIZATION, SPLENIC FLEXURE;  Surgeon: Familia Gonzalez MD;  Location: [facility] OR;  Service: General;;     Family  History   Problem Relation Age of Onset    Cataracts Mother     Stroke Father     Hypertension Father      Social History     Tobacco Use    Smoking status: Never Smoker    Smokeless tobacco: Never Used   Substance Use Topics    Alcohol use: Yes     Comment: HOLD 72H PRIOR TO SURGERY    Drug use: No        Review of Systems:  Review of Systems   Constitutional: Negative for activity change, appetite change, chills, fatigue, fever and unexpected weight change.   HENT: Negative for congestion, ear pain, sore throat and trouble swallowing.    Eyes: Negative for pain, redness and itching.   Respiratory: Negative for cough, shortness of breath and wheezing.    Cardiovascular: Negative for chest pain, palpitations and leg swelling.   Gastrointestinal: Negative for abdominal distention, abdominal pain, anal bleeding, blood in stool, constipation, diarrhea, nausea, rectal pain and vomiting.   Endocrine: Negative for cold intolerance, heat intolerance and polyuria.   Genitourinary: Negative for dysuria, flank pain, frequency and hematuria.   Musculoskeletal: Negative for gait problem, joint swelling and neck pain.   Skin: Negative for color change, rash and wound.   Allergic/Immunologic: Negative for environmental allergies and immunocompromised state.   Neurological: Negative for dizziness, speech difficulty, weakness and numbness.   Psychiatric/Behavioral: Negative for agitation, confusion and hallucinations.       OBJECTIVE:     Vital Signs (Most Recent)  Temp: 98.6 °F (37 °C) (07/06/20 0811)  Pulse: 84 (07/06/20 0811)  BP: (!) 133/90 (07/06/20 0811)     57.9 kg (127 lb 10.3 oz)     Physical Exam:  Physical Exam  Constitutional:       Appearance: He is well-developed.   HENT:      Head: Normocephalic and atraumatic.   Eyes:      Conjunctiva/sclera: Conjunctivae normal.   Neck:      Musculoskeletal: Normal range of motion.      Thyroid: No thyromegaly.   Cardiovascular:      Rate and Rhythm: Normal rate and regular  rhythm.   Pulmonary:      Effort: Pulmonary effort is normal. No respiratory distress.   Abdominal:      General: There is no distension.      Palpations: Abdomen is soft. There is no mass.      Tenderness: There is no abdominal tenderness. There is no guarding or rebound.      Hernia: No hernia is present.      Comments: Incisions well-healed; ostomy pink and viable with semisolid stool in bag   Genitourinary:     Comments: Anorectal: STEVEN with good tone, no blood, palpable anastomosis 5cm from verge which appears widely patent without defect or residual tumor  Musculoskeletal: Normal range of motion.         General: No tenderness.   Skin:     General: Skin is warm and dry.      Capillary Refill: Capillary refill takes less than 2 seconds.      Findings: No rash.   Neurological:      Mental Status: He is alert and oriented to person, place, and time.       Laboratory  Lab Results   Component Value Date    WBC 10.57 06/29/2020    HGB 11.0 (L) 06/29/2020    HCT 34.7 (L) 06/29/2020     (H) 06/29/2020    CHOL 180 03/16/2020    TRIG 43 03/16/2020    HDL 81 (H) 03/16/2020    ALT 17 06/29/2020    AST 33 06/29/2020     (L) 06/29/2020    K 5.2 (H) 06/29/2020    CL 99 06/29/2020    CREATININE 0.7 06/29/2020    BUN 10 06/29/2020    CO2 25 06/29/2020    TSH 0.601 04/07/2018    PSA 1.4 03/16/2020    INR 0.9 11/06/2018    HGBA1C 5.7 (H) 06/08/2020         Diagnostic Results:  Reviewed colonoscopy report and images with rectal mass appreciated     MRI Pelvis Sept 2019:  FINDINGS:  Approximately 6.5 cm from the anal verge is a T2 hyperintense mass in the mid rectum.  This results in circumferential narrowing of the rectum.  Tumor extends for approximately 3 cm in oblique craniocaudal dimension.  It measures 3.6 cm and greater such transverse dimension by approximately 2.7 cm AP.  The mass demonstrates a somewhat mushroom appearance along its inferior margin.  There is hyperenhancement and stranding in the adjacent fat  with spiculation/nodularity suggestive of extramural spread of disease.  This extends for approximately 5.5 mm minimum.  Distance from the tumor to the mesorectal fascia measures on the order of 2.1 cm    There is a 5.2 mm left perirectal lymph node seen on image 11 of series 8 with heterogeneous appearance.  This is located approximately 1.7 mm from the mesorectal fascia.  Margins appear slightly irregular.    Additional lymph node is seen inferior to the rectum on image 12 of series 8.  This measures 4.7 mm.  Other rounded appearing lymph nodes are seen in the right mesorectum on image 16 of series 8 1 of these measures 5 mm and the other measures 5.1 mm.    The intersphincteric complex is maintained.    Relationship to anterior peritoneal reflection: Appears to partially straddle the APR    Tumor Extent: Appears to extend beyond mucosa.  There is spiculation perirectal fat.  Distance tumor to the mesorectal fascia is 2.1 cm    Lymph Nodes:    There are perirectal lymph nodes greater than 5 mmin the mesorectal fat. The distance of the closest lymph node to the mesorectal fascia is 1.7 mm.  Please see above discussion.    There are no extra-mesorectal nodes lymph nodes in the visualized pelvis.    There is no evident vascular invasion.      Impression       Mid rectal mass with findings suspicious for extension into the mesorectal fat with tumor extending approximately 5.5 mm with associated spiculation of the fat noted.  There are suspicious mesorectal lymph nodes with the largest 1 measuring just over 5 mm and located 1.7 mm from the mesorectal fascia.       CT C/A/P Sept 2019:  FINDINGS:  Chest:    There is elevation the right hemidiaphragm and bibasilar areas of parenchymal lung scarring or atelectasis.  A more confluent area of irregular masslike opacity and air bronchograms is identified in the posterior left lower lobe which has increased from prior.  There are low-grade ground-glass opacities in the  bilateral lower lobes, right middle lobe, and to a lesser degree in the upper lobes.  No pleural effusion.    There is a borderline enlarged 10 mm in short axis precarinal lymph node.  Subcentimeter nodes are present elsewhere in the mediastinum and bilateral axilla.  Aorta, heart, and pericardium are unremarkable.    Abdomen/pelvis:    There is fatty infiltration of the liver.  There is a newly developed ill-defined focal hypodensity in the posteromedial segment of the left hepatic lobe measuring 3.1 x 2.7 x 1.3 cm.  No radiopaque gallstones.  Prominence of the common bile duct measuring up to 12 mm and pancreatic duct measuring up to 6 mm, unchanged from prior.  No visible intraductal stones.  Main portal vein is patent.    The spleen is nonenlarged.  No evidence of pancreatic mass or inflammation.  Kidneys enhance symmetrically.  Adrenals are unremarkable.    There is circumferential masslike thickening of the rectum.  No evidence of bowel obstruction.  Multiple bilateral subcentimeter in short axis mesorectal and internal iliac lymph nodes are identified.  No evidence of pathologic inguinal or external iliac lymphadenopathy.  There is sigmoid diverticulosis.  Stomach and visualized small bowel loops are unremarkable.  Normal appendix.  No intraperitoneal free air or fluid.  Fat containing periumbilical hernia.    There is aortoiliac atherosclerosis.  No evidence of aneurysm.  Shotty subcentimeter lymph nodes in the periaortic retroperitoneum.    The bladder is unremarkable.  Prostate mildly enlarged with calcifications.  Seminal vesicles unremarkable.    Skeletal:    There is degenerative change in the spine and right hip.  Intramedullary nail present in the left hip.  No suspicious intraosseous lesions.      Impression       1. Rectal mass highly suspicious for malignancy.  2. Multiple bilateral mesorectal and internal iliac lymph nodes concerning for metastasis.  3. Ill-defined hypodensity in the left lobe of  the liver, new since 2017 and concerning for metastasis.  4. Indeterminate developing area of masslike opacity in the posterior left lung base.  Possible infection/inflammation versus neoplasm.     MRI Liver:  FINDINGS:  The liver demonstrates a subtle hypointense T2 isointense T1 signal area in the deep aspect of the left hepatic lobe, medial segment.  This area measures approximately 11 x 18 mm axially.    The lesion displays a decrease in signal intensity on the out of phase gradient echo sequence suggesting fat containing focal fatty infiltration.    Following contrast administration there is displays mild relative hypoenhancement with respect to the adjacent parenchyma.  No hyperenhancement is seen.    The adjacent gallbladder appears normal.    Bile ducts are nondilated.      Impression       Probable focal fatty infiltration of the medial segment of the left hepatic lobe.          PET Scan:  FINDINGS:  Head/neck: There is normal physiologic FDG uptake noted within the visualized brain parenchyma. No FDG avid lymphadenopathy within the neck.    Chest: There are ill-defined patchy and nodular parenchymal opacities again noted in the left lower lobe exhibiting low level FDG uptake with an SUV max of 2.5.  A new 13 mm ground-glass opacity is noted in the lateral left upper lobe exhibiting weak FDG avidity with an SUV max of 1.9.  Similar newly developed ground-glass opacity present in the mid right lung adjacent to the major fissure with SUV max of 1.6. No FDG avid mediastinal, hilar or axillary lymph nodes.    Abdomen/Pelvis: Normal physiologic FDG uptake noted within the liver, spleen, urinary tract, and bowel.Focal hypodensity is again noted in the left hepatic lobe which is non FDG avid and favored to represent focal fatty infiltration.  An intermediate length segment of circumferential mural thickening is again noted at the rectosigmoid junction which exhibits intense hypermetabolism and a SUV max of 11.8.   A hypermetabolic lymph node adjacent to the left pelvic sidewall has a SUV max of 5.3.    Skeletal: No FDG avid osseus lesions identified.      Impression       1. Hypermetabolic rectal mass consistent with known rectal malignancy.  2. Hypermetabolic regional lymph node adjacent to left pelvic sidewall.  3. Non FDG avid focal hypodensity in the liver favored to represent focal fatty infiltration.  4. Bilateral lung opacities most likely infectious or inflammatory etiology.  Follow-up recommended.     MRI Rectal Cancer/Pelvis Jan 2020:  FINDINGS:  Again seen is mid rectal mass.  There is persistent spiculation within the surrounding fat adjacent to the mass.  Spiculation extends to within 7 mm from the mesorectal fascia.  The mass demonstrates persistent enhancement with interval decrease in restricted diffusion suggesting partial treatment response.  No significant loss of normal mass signal intensity or fibrosis.  The mass currently on the order of 3.7 x 3.2 cm.  I remeasure the mass on the previous exam at 4.0 x 3.0 cm in axial dimension.    There is interval decrease in size of a 5.2 mm left perirectal lymph node with the node not clearly visualized on today's exam.  Additional lymph node in the right mesorectum measures 2.4 mm, decreased from 4.7 mm.  A lymph node in the right mesorectum on image 5 of series 12 measures 3.5 mm in short axis, also decreased.  It previously measured 5 mm in short axis.  Other nodes also appear smaller.  Small amount of pelvic fluid is noted.      Impression       Redemonstration of an enhancing rectal mass with persistent spiculation in the mesorectal fat.  The mass measures slightly smaller. There is interval decrease in restricted diffusion in the mass suggesting at least partial treatment response.  Interval decrease in size of multiple mesorectal lymph nodes as above.     CT Abd/Pelvis (Dec 2020):  FINDINGS:  ABDOMEN:    - Lung bases: Atelectasis at the lung bases.  Airspace  disease at the left lung base consistent with a left lower lobe infiltrate/pneumonia.    - Liver: Low-density fatty infiltrated liver.    - Gallbladder: No calcified gallstones.    - Bile Ducts: No evidence of intra or extra hepatic biliary ductal dilation.    - Spleen: Negative.    - Kidneys: No mass or hydronephrosis.    - Adrenals: Unremarkable.    - Pancreas: Fatty infiltrated pancreas.    - Retroperitoneum:  No significant adenopathy.    - Vascular: Scattered atherosclerotic calcifications of the abdominal aorta and proximal iliac vessels.    - Abdominal wall:  4 cm fat containing periumbilical hernia.    PELVIS:    No pelvic mass, adenopathy, or free fluid.    BOWEL/MESENTERY:    Scattered sigmoidal diverticulum.  Diverticulum of the ascending transverse and descending colon.  No radiographic evidence of diverticulitis.  The appendix appears normal.    BONES:  Multilevel degenerative changes of the thoracolumbar spine with disc space narrowing seen most significantly at the L1-2 L4-5 and L5-S1 levels.  Dynamic hip screw placement on the left      Impression       No evidence of a small-bowel obstruction.  Distension of the duodenum to the level of the 2nd stage.  Distal to that point no small bowel obstruction.  Diverticulosis without radiographic evidence of diverticulitis.  Normal appendix.  Normal gallbladder.  Low-density fatty infiltrated liver.  Increased density at the left lung base can not exclude a focal infiltrate/pneumonia versus atelectasis.     PATHOLOGY Feb 2020:  1. Sigmoid colon and rectum, low anterior resection:  Residual moderately differentiated invasive adenocarcinoma, 4 cm greatest dimension  Tumor is located in the rectum  The distal, radial, and proximal margin are uninvolved by carcinoma  15 lymph nodes are negative for metastatic carcinoma (0/15)  Pathologic staging (pTNM): [id number]  See surgical pathology cancer case summary below  Surgical pathology cancer case summary: Colon and  rectum. Protocol posting date: June 2017  Procedure: Low anterior resection  Tumor site: Rectum  Tumor size: 4 x 3 cm  Macroscopic tumor perforation: Not identified  Macroscopic intactness of mesorectum: Incomplete  Histologic type: Adenocarcinoma  Histologic grade: G2: Moderately differentiated  Tumor extension: Tumor invades through the muscularis propria into pericolorectal tissue  Margins: All margins are uninvolved by invasive carcinoma, high-grade dysplasia, intramucosal  adenocarcinoma, and adenoma  Margins examined: Proximal, distal, and radial  Distance of tumor from radial margin: 0.4 cm  Distance of tumor from distal margin: 0.5 cm  Distance of tumor from proximal margin: 17 cm  Treatment effect: Present: Residual cancer with evident tumor regression  Lymphovascular invasion: Present: Large vessel: Intramural  Perineural invasion: Not identified  Tumor deposits: Not identified  Regional lymph nodes: Number of lymph nodes involved: 0. Number of lymph nodes examined: 15  Pathologic stage classification (pTNM): [id number]  Immunohistochemistry testing for mismatch repair (MMR) proteins was performed on previous biopsy of the  rectal mass (surgical pathology case [id number]. Result as follows: No loss of nuclear expression of MMR  proteins: Low probability of microsatellite instability.  2. Proximal margin:  Fragment of colon with no evidence of malignancy  3. Anastomotic ring:  Fragment of colon with no evidence of malignancy  ASSESSMENT/PLAN:     64-year-old male with mid-rectal adenocarcinoma who is now s/p long-course neoadj chemoXRT which completed on 12/4/19 with partial treatment response on repeat MRI and no evidence of metastatic disease on repeat CT Abd/pelvis who is now s/p robotic ultra-low anterior resection, DLI and mediport placement on 2/4/2020 who has recovered well postop with final path showing T3N0 who has now completed adjuvant chemotx in late June 2020    - needs GGE prior to ostomy  reversal. Will instruct radiology to not blow up the balloon during test  - if a GGE negative for leak, will proceed with ileostomy reversal  - All risks, benefits and alternatives fully explained to patient. Risks include, but are not limited to, bleeding, infection, anastomotic leak, damage to ureter, damage to other intra-abdominal organs such as colon, rectum, small bowel, stomach, liver, bladder, reproductive organs, sexual dysfunction, urinary dysfunction, postoperative abscess, conversion to open operation, perioperative MI, CVA and death.  All questions field and appropriately answered to patient's satisfaction.  Consent signed and placed on chart.  - No bowel prep necessary for ostomy reversal  - RTC postop or after GGE if needed    Familia Gonzalez MD  Colon and Rectal Surgery  [facility]

## 2020-07-06 NOTE — PROGRESS NOTES
History & Physical    SUBJECTIVE:     Chief Complaint   Patient presents with    Follow-up     4 Month Follow Up    Ref MD: Isai Centeno MD    History of Present Illness:  Patient is a 64 y.o. male presents for evaluation of a rectal mass.  Patient has been having iron deficiency anemia that did require transfusion around 1 year ago.  He also has had associated hematochezia for over a year now but did improve after he quit drinking beer at 8 months ago but is still intermittently present. This prompted a colonoscopy which was performed on 09/03/2019 where an obstructing rectal mass was seen that appeared malignant was biopsied and could not be traversed.  Patient reports that he had a colonoscopy around 6-7 years ago were some benign polyps were found but no malignancy.  These records are not available.  He states that the hematochezia has recently improved after he quit drinking beer, but is still intermittently present and worsen with the bowel prep from the colonoscopy recently.  He is not on any chronic anticoagulation.  He states he has had normal caliber bowel movements does not feel constipated or obstructed.  He denies any fever, chills, nausea, vomiting, diarrhea, constipation, weight loss, night sweats or any other signs or symptoms.    12/4/19: Completed Neoadj chemoXRT  2/4/2020 : Robotic ultra-low anterior resection with DLI and Mediport placement  June 2020: Completed adjuvant chemotx    Interval history:  Since last clinic visit, the patient completed a full course of adjuvant chemotherapy last week.  He continues to tolerate a regular diet without any nausea or vomiting.  He is having good ileostomy function.  He denies any fever, chills, nausea, vomiting.    PMHx: HTN, GERD  PSHx: L hip sx  Fam Hx: No CRC or IBD; +colonic polyps in brother  All: NKDA  SocHx: previous etoh; no tob or illicits; works as a     Review of patient's allergies indicates:  No Known Allergies    Current Outpatient  Medications   Medication Sig Dispense Refill    cyanocobalamin (VITAMIN B-12) 1000 MCG tablet Take 100 mcg by mouth once daily.      dronabinoL (MARINOL) 2.5 MG capsule Take 1 capsule (2.5 mg total) by mouth 2 (two) times daily before meals. 30 capsule 0    ferrous sulfate 324 mg (65 mg iron) TbEC Take 1 tablet (324 mg total) by mouth 2 (two) times daily. 60 tablet 0    losartan (COZAAR) 50 MG tablet TAKE 1 TABLET BY MOUTH EVERY MORNING 90 tablet 1    mirtazapine (REMERON) 15 MG tablet Take 1 tablet (15 mg total) by mouth every evening. For sleep and appetite 30 tablet 11    morphine (MS CONTIN) 15 MG 12 hr tablet Take 1 tablet (15 mg total) by mouth 2 (two) times daily. Take with Morphine 30 60 tablet 0    morphine (MS CONTIN) 30 MG 12 hr tablet Take 1 tablet (30 mg total) by mouth every 12 (twelve) hours. 60 tablet 0    ondansetron (ZOFRAN) 8 MG tablet Take 1 tablet (8 mg total) by mouth every 8 (eight) hours as needed for Nausea. 30 tablet 2    oxyCODONE-acetaminophen (PERCOCET)  mg per tablet Take 1 tablet by mouth every 6 (six) hours as needed for Pain. 45 tablet 0    pantoprazole (PROTONIX) 40 MG tablet Take 1 tablet (40 mg total) by mouth once daily. 30 tablet 11    prochlorperazine (COMPAZINE) 5 MG tablet TAKE 1 TABLET(5 MG) BY MOUTH EVERY 6 HOURS AS NEEDED FOR NAUSEA 385 tablet 1    tamsulosin (FLOMAX) 0.4 mg Cap Take 1 capsule (0.4 mg total) by mouth once daily. 30 capsule 11     Current Facility-Administered Medications   Medication Dose Route Frequency Provider Last Rate Last Dose    acetaminophen tablet 650 mg  650 mg Oral Q6H Familia Gonzalez MD         Facility-Administered Medications Ordered in Other Visits   Medication Dose Route Frequency Provider Last Rate Last Dose    alteplase injection 2 mg  2 mg Intra-Catheter PRN Mikel Sams MD        heparin, porcine (PF) 100 unit/mL injection flush 500 Units  500 Units Intravenous PRN Mikel Sams MD        sodium  chloride 0.9% flush 10 mL  10 mL Intravenous PRN Mikel Sams MD        sodium chloride 0.9% flush 3 mL  3 mL Intravenous PRN Shilpa Yee MD           Past Medical History:   Diagnosis Date    Alcoholism     Anemia     Back pain     Encounter for blood transfusion 2018    Essential hypertension 2/4/2016    GERD (gastroesophageal reflux disease)     Hiatal hernia     Hypertension     Rectal cancer 9/11/2019     Past Surgical History:   Procedure Laterality Date    COLONOSCOPY N/A 9/3/2019    Procedure: COLONOSCOPY;  Surgeon: Isai Centeno MD;  Location: [facility] ENDO;  Service: Endoscopy;  Laterality: N/A;    COLONOSCOPY N/A 1/10/2020    Procedure: COLONOSCOPY;  Surgeon: Familia Gonzalez MD;  Location: [facility] ENDO;  Service: General;  Laterality: N/A;    ESOPHAGOGASTRODUODENOSCOPY N/A 9/3/2019    Procedure: ESOPHAGOGASTRODUODENOSCOPY (EGD);  Surgeon: Isai Centeno MD;  Location: [facility] ENDO;  Service: Endoscopy;  Laterality: N/A;    FLEXIBLE SIGMOIDOSCOPY  2/4/2020    Procedure: SIGMOIDOSCOPY, FLEXIBLE;  Surgeon: Familia Gonzalez MD;  Location: [facility];  Service: General;;    ILEOSTOMY Right 2/4/2020    Procedure: CREATION, ILEOSTOMY;  Surgeon: Familia Gonzalez MD;  Location: [facility] OR;  Service: General;  Laterality: Right;    INJECTION OF ANESTHETIC AGENT INTO TISSUE PLANE DEFINED BY TRANSVERSUS ABDOMINIS MUSCLE N/A 2/4/2020    Procedure: BLOCK, TRANSVERSUS ABDOMINIS PLANE;  Surgeon: Familia Gonzalez MD;  Location: [facility] OR;  Service: General;  Laterality: N/A;    INSERTION OF TUNNELED CENTRAL VENOUS CATHETER (CVC) WITH SUBCUTANEOUS PORT Right 2/4/2020    Procedure: INSERTION, PORT-A-CATH;  Surgeon: Familia Gonzalez MD;  Location: [facility] OR;  Service: General;  Laterality: Right;    JOINT REPLACEMENT Left 2009    MOBILIZATION OF SPLENIC FLEXURE  2/4/2020    Procedure: MOBILIZATION, SPLENIC FLEXURE;  Surgeon: Familia Gonzalez MD;  Location: [facility] OR;  Service: General;;     Family  History   Problem Relation Age of Onset    Cataracts Mother     Stroke Father     Hypertension Father      Social History     Tobacco Use    Smoking status: Never Smoker    Smokeless tobacco: Never Used   Substance Use Topics    Alcohol use: Yes     Comment: HOLD 72H PRIOR TO SURGERY    Drug use: No        Review of Systems:  Review of Systems   Constitutional: Negative for activity change, appetite change, chills, fatigue, fever and unexpected weight change.   HENT: Negative for congestion, ear pain, sore throat and trouble swallowing.    Eyes: Negative for pain, redness and itching.   Respiratory: Negative for cough, shortness of breath and wheezing.    Cardiovascular: Negative for chest pain, palpitations and leg swelling.   Gastrointestinal: Negative for abdominal distention, abdominal pain, anal bleeding, blood in stool, constipation, diarrhea, nausea, rectal pain and vomiting.   Endocrine: Negative for cold intolerance, heat intolerance and polyuria.   Genitourinary: Negative for dysuria, flank pain, frequency and hematuria.   Musculoskeletal: Negative for gait problem, joint swelling and neck pain.   Skin: Negative for color change, rash and wound.   Allergic/Immunologic: Negative for environmental allergies and immunocompromised state.   Neurological: Negative for dizziness, speech difficulty, weakness and numbness.   Psychiatric/Behavioral: Negative for agitation, confusion and hallucinations.       OBJECTIVE:     Vital Signs (Most Recent)  Temp: 98.6 °F (37 °C) (07/06/20 0811)  Pulse: 84 (07/06/20 0811)  BP: (!) 133/90 (07/06/20 0811)     57.9 kg (127 lb 10.3 oz)     Physical Exam:  Physical Exam  Constitutional:       Appearance: He is well-developed.   HENT:      Head: Normocephalic and atraumatic.   Eyes:      Conjunctiva/sclera: Conjunctivae normal.   Neck:      Musculoskeletal: Normal range of motion.      Thyroid: No thyromegaly.   Cardiovascular:      Rate and Rhythm: Normal rate and regular  rhythm.   Pulmonary:      Effort: Pulmonary effort is normal. No respiratory distress.   Abdominal:      General: There is no distension.      Palpations: Abdomen is soft. There is no mass.      Tenderness: There is no abdominal tenderness. There is no guarding or rebound.      Hernia: No hernia is present.      Comments: Incisions well-healed; ostomy pink and viable with semisolid stool in bag   Genitourinary:     Comments: Anorectal: STEVEN with good tone, no blood, palpable anastomosis 5cm from verge which appears widely patent without defect or residual tumor  Musculoskeletal: Normal range of motion.         General: No tenderness.   Skin:     General: Skin is warm and dry.      Capillary Refill: Capillary refill takes less than 2 seconds.      Findings: No rash.   Neurological:      Mental Status: He is alert and oriented to person, place, and time.       Laboratory  Lab Results   Component Value Date    WBC 10.57 06/29/2020    HGB 11.0 (L) 06/29/2020    HCT 34.7 (L) 06/29/2020     (H) 06/29/2020    CHOL 180 03/16/2020    TRIG 43 03/16/2020    HDL 81 (H) 03/16/2020    ALT 17 06/29/2020    AST 33 06/29/2020     (L) 06/29/2020    K 5.2 (H) 06/29/2020    CL 99 06/29/2020    CREATININE 0.7 06/29/2020    BUN 10 06/29/2020    CO2 25 06/29/2020    TSH 0.601 04/07/2018    PSA 1.4 03/16/2020    INR 0.9 11/06/2018    HGBA1C 5.7 (H) 06/08/2020         Diagnostic Results:  Reviewed colonoscopy report and images with rectal mass appreciated     MRI Pelvis Sept 2019:  FINDINGS:  Approximately 6.5 cm from the anal verge is a T2 hyperintense mass in the mid rectum.  This results in circumferential narrowing of the rectum.  Tumor extends for approximately 3 cm in oblique craniocaudal dimension.  It measures 3.6 cm and greater such transverse dimension by approximately 2.7 cm AP.  The mass demonstrates a somewhat mushroom appearance along its inferior margin.  There is hyperenhancement and stranding in the adjacent fat  with spiculation/nodularity suggestive of extramural spread of disease.  This extends for approximately 5.5 mm minimum.  Distance from the tumor to the mesorectal fascia measures on the order of 2.1 cm    There is a 5.2 mm left perirectal lymph node seen on image 11 of series 8 with heterogeneous appearance.  This is located approximately 1.7 mm from the mesorectal fascia.  Margins appear slightly irregular.    Additional lymph node is seen inferior to the rectum on image 12 of series 8.  This measures 4.7 mm.  Other rounded appearing lymph nodes are seen in the right mesorectum on image 16 of series 8 1 of these measures 5 mm and the other measures 5.1 mm.    The intersphincteric complex is maintained.    Relationship to anterior peritoneal reflection: Appears to partially straddle the APR    Tumor Extent: Appears to extend beyond mucosa.  There is spiculation perirectal fat.  Distance tumor to the mesorectal fascia is 2.1 cm    Lymph Nodes:    There are perirectal lymph nodes greater than 5 mmin the mesorectal fat. The distance of the closest lymph node to the mesorectal fascia is 1.7 mm.  Please see above discussion.    There are no extra-mesorectal nodes lymph nodes in the visualized pelvis.    There is no evident vascular invasion.      Impression       Mid rectal mass with findings suspicious for extension into the mesorectal fat with tumor extending approximately 5.5 mm with associated spiculation of the fat noted.  There are suspicious mesorectal lymph nodes with the largest 1 measuring just over 5 mm and located 1.7 mm from the mesorectal fascia.       CT C/A/P Sept 2019:  FINDINGS:  Chest:    There is elevation the right hemidiaphragm and bibasilar areas of parenchymal lung scarring or atelectasis.  A more confluent area of irregular masslike opacity and air bronchograms is identified in the posterior left lower lobe which has increased from prior.  There are low-grade ground-glass opacities in the  bilateral lower lobes, right middle lobe, and to a lesser degree in the upper lobes.  No pleural effusion.    There is a borderline enlarged 10 mm in short axis precarinal lymph node.  Subcentimeter nodes are present elsewhere in the mediastinum and bilateral axilla.  Aorta, heart, and pericardium are unremarkable.    Abdomen/pelvis:    There is fatty infiltration of the liver.  There is a newly developed ill-defined focal hypodensity in the posteromedial segment of the left hepatic lobe measuring 3.1 x 2.7 x 1.3 cm.  No radiopaque gallstones.  Prominence of the common bile duct measuring up to 12 mm and pancreatic duct measuring up to 6 mm, unchanged from prior.  No visible intraductal stones.  Main portal vein is patent.    The spleen is nonenlarged.  No evidence of pancreatic mass or inflammation.  Kidneys enhance symmetrically.  Adrenals are unremarkable.    There is circumferential masslike thickening of the rectum.  No evidence of bowel obstruction.  Multiple bilateral subcentimeter in short axis mesorectal and internal iliac lymph nodes are identified.  No evidence of pathologic inguinal or external iliac lymphadenopathy.  There is sigmoid diverticulosis.  Stomach and visualized small bowel loops are unremarkable.  Normal appendix.  No intraperitoneal free air or fluid.  Fat containing periumbilical hernia.    There is aortoiliac atherosclerosis.  No evidence of aneurysm.  Shotty subcentimeter lymph nodes in the periaortic retroperitoneum.    The bladder is unremarkable.  Prostate mildly enlarged with calcifications.  Seminal vesicles unremarkable.    Skeletal:    There is degenerative change in the spine and right hip.  Intramedullary nail present in the left hip.  No suspicious intraosseous lesions.      Impression       1. Rectal mass highly suspicious for malignancy.  2. Multiple bilateral mesorectal and internal iliac lymph nodes concerning for metastasis.  3. Ill-defined hypodensity in the left lobe of  the liver, new since 2017 and concerning for metastasis.  4. Indeterminate developing area of masslike opacity in the posterior left lung base.  Possible infection/inflammation versus neoplasm.     MRI Liver:  FINDINGS:  The liver demonstrates a subtle hypointense T2 isointense T1 signal area in the deep aspect of the left hepatic lobe, medial segment.  This area measures approximately 11 x 18 mm axially.    The lesion displays a decrease in signal intensity on the out of phase gradient echo sequence suggesting fat containing focal fatty infiltration.    Following contrast administration there is displays mild relative hypoenhancement with respect to the adjacent parenchyma.  No hyperenhancement is seen.    The adjacent gallbladder appears normal.    Bile ducts are nondilated.      Impression       Probable focal fatty infiltration of the medial segment of the left hepatic lobe.          PET Scan:  FINDINGS:  Head/neck: There is normal physiologic FDG uptake noted within the visualized brain parenchyma. No FDG avid lymphadenopathy within the neck.    Chest: There are ill-defined patchy and nodular parenchymal opacities again noted in the left lower lobe exhibiting low level FDG uptake with an SUV max of 2.5.  A new 13 mm ground-glass opacity is noted in the lateral left upper lobe exhibiting weak FDG avidity with an SUV max of 1.9.  Similar newly developed ground-glass opacity present in the mid right lung adjacent to the major fissure with SUV max of 1.6. No FDG avid mediastinal, hilar or axillary lymph nodes.    Abdomen/Pelvis: Normal physiologic FDG uptake noted within the liver, spleen, urinary tract, and bowel.Focal hypodensity is again noted in the left hepatic lobe which is non FDG avid and favored to represent focal fatty infiltration.  An intermediate length segment of circumferential mural thickening is again noted at the rectosigmoid junction which exhibits intense hypermetabolism and a SUV max of 11.8.   A hypermetabolic lymph node adjacent to the left pelvic sidewall has a SUV max of 5.3.    Skeletal: No FDG avid osseus lesions identified.      Impression       1. Hypermetabolic rectal mass consistent with known rectal malignancy.  2. Hypermetabolic regional lymph node adjacent to left pelvic sidewall.  3. Non FDG avid focal hypodensity in the liver favored to represent focal fatty infiltration.  4. Bilateral lung opacities most likely infectious or inflammatory etiology.  Follow-up recommended.     MRI Rectal Cancer/Pelvis Jan 2020:  FINDINGS:  Again seen is mid rectal mass.  There is persistent spiculation within the surrounding fat adjacent to the mass.  Spiculation extends to within 7 mm from the mesorectal fascia.  The mass demonstrates persistent enhancement with interval decrease in restricted diffusion suggesting partial treatment response.  No significant loss of normal mass signal intensity or fibrosis.  The mass currently on the order of 3.7 x 3.2 cm.  I remeasure the mass on the previous exam at 4.0 x 3.0 cm in axial dimension.    There is interval decrease in size of a 5.2 mm left perirectal lymph node with the node not clearly visualized on today's exam.  Additional lymph node in the right mesorectum measures 2.4 mm, decreased from 4.7 mm.  A lymph node in the right mesorectum on image 5 of series 12 measures 3.5 mm in short axis, also decreased.  It previously measured 5 mm in short axis.  Other nodes also appear smaller.  Small amount of pelvic fluid is noted.      Impression       Redemonstration of an enhancing rectal mass with persistent spiculation in the mesorectal fat.  The mass measures slightly smaller. There is interval decrease in restricted diffusion in the mass suggesting at least partial treatment response.  Interval decrease in size of multiple mesorectal lymph nodes as above.     CT Abd/Pelvis (Dec 2020):  FINDINGS:  ABDOMEN:    - Lung bases: Atelectasis at the lung bases.  Airspace  disease at the left lung base consistent with a left lower lobe infiltrate/pneumonia.    - Liver: Low-density fatty infiltrated liver.    - Gallbladder: No calcified gallstones.    - Bile Ducts: No evidence of intra or extra hepatic biliary ductal dilation.    - Spleen: Negative.    - Kidneys: No mass or hydronephrosis.    - Adrenals: Unremarkable.    - Pancreas: Fatty infiltrated pancreas.    - Retroperitoneum:  No significant adenopathy.    - Vascular: Scattered atherosclerotic calcifications of the abdominal aorta and proximal iliac vessels.    - Abdominal wall:  4 cm fat containing periumbilical hernia.    PELVIS:    No pelvic mass, adenopathy, or free fluid.    BOWEL/MESENTERY:    Scattered sigmoidal diverticulum.  Diverticulum of the ascending transverse and descending colon.  No radiographic evidence of diverticulitis.  The appendix appears normal.    BONES:  Multilevel degenerative changes of the thoracolumbar spine with disc space narrowing seen most significantly at the L1-2 L4-5 and L5-S1 levels.  Dynamic hip screw placement on the left      Impression       No evidence of a small-bowel obstruction.  Distension of the duodenum to the level of the 2nd stage.  Distal to that point no small bowel obstruction.  Diverticulosis without radiographic evidence of diverticulitis.  Normal appendix.  Normal gallbladder.  Low-density fatty infiltrated liver.  Increased density at the left lung base can not exclude a focal infiltrate/pneumonia versus atelectasis.     PATHOLOGY Feb 2020:  1. Sigmoid colon and rectum, low anterior resection:  Residual moderately differentiated invasive adenocarcinoma, 4 cm greatest dimension  Tumor is located in the rectum  The distal, radial, and proximal margin are uninvolved by carcinoma  15 lymph nodes are negative for metastatic carcinoma (0/15)  Pathologic staging (pTNM): [id number]  See surgical pathology cancer case summary below  Surgical pathology cancer case summary: Colon and  rectum. Protocol posting date: June 2017  Procedure: Low anterior resection  Tumor site: Rectum  Tumor size: 4 x 3 cm  Macroscopic tumor perforation: Not identified  Macroscopic intactness of mesorectum: Incomplete  Histologic type: Adenocarcinoma  Histologic grade: G2: Moderately differentiated  Tumor extension: Tumor invades through the muscularis propria into pericolorectal tissue  Margins: All margins are uninvolved by invasive carcinoma, high-grade dysplasia, intramucosal  adenocarcinoma, and adenoma  Margins examined: Proximal, distal, and radial  Distance of tumor from radial margin: 0.4 cm  Distance of tumor from distal margin: 0.5 cm  Distance of tumor from proximal margin: 17 cm  Treatment effect: Present: Residual cancer with evident tumor regression  Lymphovascular invasion: Present: Large vessel: Intramural  Perineural invasion: Not identified  Tumor deposits: Not identified  Regional lymph nodes: Number of lymph nodes involved: 0. Number of lymph nodes examined: 15  Pathologic stage classification (pTNM): [id number]  Immunohistochemistry testing for mismatch repair (MMR) proteins was performed on previous biopsy of the  rectal mass (surgical pathology case [id number]. Result as follows: No loss of nuclear expression of MMR  proteins: Low probability of microsatellite instability.  2. Proximal margin:  Fragment of colon with no evidence of malignancy  3. Anastomotic ring:  Fragment of colon with no evidence of malignancy  ASSESSMENT/PLAN:     64-year-old male with mid-rectal adenocarcinoma who is now s/p long-course neoadj chemoXRT which completed on 12/4/19 with partial treatment response on repeat MRI and no evidence of metastatic disease on repeat CT Abd/pelvis who is now s/p robotic ultra-low anterior resection, DLI and mediport placement on 2/4/2020 who has recovered well postop with final path showing T3N0 who has now completed adjuvant chemotx in late June 2020    - needs GGE prior to ostomy  reversal. Will instruct radiology to not blow up the balloon during test  - if a GGE negative for leak, will proceed with ileostomy reversal  - All risks, benefits and alternatives fully explained to patient. Risks include, but are not limited to, bleeding, infection, anastomotic leak, damage to ureter, damage to other intra-abdominal organs such as colon, rectum, small bowel, stomach, liver, bladder, reproductive organs, sexual dysfunction, urinary dysfunction, postoperative abscess, conversion to open operation, perioperative MI, CVA and death.  All questions field and appropriately answered to patient's satisfaction.  Consent signed and placed on chart.  - No bowel prep necessary for ostomy reversal  - RTC postop or after GGE if needed    Familia Gonzalez MD  Colon and Rectal Surgery  [facility]

## 2020-07-12 NOTE — PROGRESS NOTES
Subjective:       Patient ID: Vincent Benton is a 64 y.o. male.    Chief Complaint: Rectal cancer [C20]  HPI: We have an opportunity to see Mr. Vincent Benton in Hematology Oncology clinic at [facility] on 07/13/2020.  Mr. Vincent Benton is a 64 y.o. gentleman with rectal cancer.  Patient has completed concurrent chemoradiation with capecitabine.  Status post low anterior resection.  Final path noted at ypT3N0. Status post 7 cycles of adjuvant therapy with FOLFOX.    Interval history:  64-year-old male with stage III rectal cancer status post pj op chemotherapy with FOLFOX.      Recently seen by colorectal surgeon and planning for reversal ileostomy.  Patient continues to complain of low appetite and weakness. Still takes about 2 protein boost daily.  Denies nausea, vomiting or diarrhea.  Also denies fever, mucositis, chills, shortness of breath or abdominal pain.      Oncology History   Rectal cancer   9/11/2019 Initial Diagnosis    Rectal cancer     9/18/2019 Tumor Conference    Multidisciplinary Rectal Cancer Conference - Evaluation and Recommendation Summary    9/17/2019  Vincent Benton  [id number]  63 y.o. male    1. Evaluation    C scope 9/3/19: Obstructing Rectal mass that couldn't be crossed.     Rigid Procto 9/3/19: Left and anterior lesion at 9-10 cm.     Path: Invasive adenocarcinoma, no LVI.     MRI date: 9/9/2019    Tumor Location in Rectum: middle third     Size: 3.6x2.7 cm mass    Nodes: 2 suspected nodes.     Indication of Sphincter Involvement:  Uninvolved    Pretreatment Circumferential Resection Margin (CRM) status:  Not Threatened    Pretreatment (clinical) AJCC Stage:III B vs  IV ?  Stage I  [] I: T1N0M0  [] I: T2N0M0  Stage II  [] IIA: T3N0M0  [] IIB G3vV5A4  [] IIC: B2oA9R9  Stage III  [] IIIA: T1-2N1M0  [] IIIA: G9D5wI2  [] IIIB: T3-R4sL2G8  [x] IIIB: T2-3N2aM0  [] IIIB: T1-2N2bM0  [] IIIC: B3lG6fL1  [] IIIC: T3-2tF8jP2  [] IIIC: H3hK1-6P9   Stage IV  [x]  IV: C6-3F8-7G2v-b    CEA level:     Lab Results   Component Value Date    CEA 3.5 09/03/2019       2. Treatment Recommendation    Neoadjuvant Therapy Recommendation:     Short course radiation and Chemotherapy.     PLAN:    MRI and PET if not able to obtain PET will proceed with biopsy of the lung biopsy.     Colonoscopy during Chemotherapy to rule out any other lesions.     Anticipated date and type of surgical procedure:     LAR after    Clinical research study eligibility and/or enrollment: Not eligible     10/8/2019 Cancer Staged    Staging form: Colon and Rectum, AJCC 8th Edition  - Clinical stage from 10/8/2019: Stage IIIB (cT3, cN2a, cM0)     10/15/2019 - 10/15/2019 Chemotherapy    Treatment Summary   Plan Name: OP CAPECITABINE 5 DAYS + RADIOTHERAPY  Treatment Goal: Curative  Status: Inactive  Start Date: [No treatment day found]  End Date: [No treatment day found]  Provider: Mikel Sams MD  Chemotherapy: [No matching medication found in this treatment plan]     10/24/2019 - 12/4/2019 Radiation Therapy    Treatment Site Ref. ID Energy Dose/Fx (Gy) #Fx Dose Correction (Gy) Total Dose (Gy) Start Date End Date Elapsed Days   Pelvis Pelvis 6X 1.8 28 / 28 0 50.4 10/24/2019 12/4/2019 41          3/2/2020 -  Chemotherapy    Treatment Summary   Plan Name: OP FOLFOX 6 Q2W  Treatment Goal: Curative  Status: Active  Start Date: 3/2/2020  End Date: 8/26/2020 (Planned)  Provider: Mikel Sams MD  Chemotherapy: fluorouracil injection 710 mg, 400 mg/m2 = 710 mg, Intravenous, Clinic/HOD 1 time, 8 of 12 cycles  Administration: 710 mg (3/2/2020), 710 mg (3/16/2020), 710 mg (4/6/2020), 710 mg (4/20/2020), 710 mg (5/25/2020), 710 mg (5/12/2020), 710 mg (6/8/2020), 710 mg (6/29/2020)  fluorouracil (ADRUCIL) 2,400 mg/m2 = 4,270 mg in sodium chloride 0.9% 101.2 mL chemo infusion, 2,400 mg/m2 = 4,270 mg, Intravenous, Over 46 hours, 8 of 12 cycles  Administration: 4,270 mg (3/2/2020), 4,270 mg (3/16/2020), 4,270 mg  (4/6/2020), 4,270 mg (4/20/2020), 4,270 mg (5/25/2020), 4,270 mg (5/12/2020), 4,270 mg (6/8/2020), 4,270 mg (6/29/2020)  leucovorin calcium 400 mg/m2 = 710 mg in dextrose 5 % 285.5 mL infusion, 400 mg/m2 = 710 mg, Intravenous, Clinic/HOD 1 time, 8 of 12 cycles  Administration: 710 mg (3/2/2020), 710 mg (3/16/2020), 700 mg (4/6/2020), 710 mg (4/20/2020), 710 mg (5/25/2020), 710 mg (5/12/2020), 710 mg (6/8/2020), 700 mg (6/29/2020)  oxaliplatin (ELOXATIN) 85 mg/m2 = 151 mg in dextrose 5 % 530.2 mL chemo infusion, 85 mg/m2 = 151 mg, Intravenous, Clinic/HOD 1 time, 8 of 12 cycles  Administration: 151 mg (3/2/2020), 151 mg (3/16/2020), 150 mg (4/6/2020), 151 mg (4/20/2020), 151 mg (5/25/2020), 151 mg (5/12/2020), 151 mg (6/8/2020), 151 mg (6/29/2020)       Past Medical History:   Diagnosis Date    Alcoholism     Anemia     Back pain     Encounter for blood transfusion 2018    Essential hypertension 2/4/2016    GERD (gastroesophageal reflux disease)     Hiatal hernia     Hypertension     Rectal cancer 9/11/2019     Family History   Problem Relation Age of Onset    Cataracts Mother     Stroke Father     Hypertension Father      Social History     Socioeconomic History    Marital status:      Spouse name: Not on file    Number of children: Not on file    Years of education: Not on file    Highest education level: Not on file   Occupational History    Not on file   Social Needs    Financial resource strain: Not on file    Food insecurity     Worry: Not on file     Inability: Not on file    Transportation needs     Medical: Not on file     Non-medical: Not on file   Tobacco Use    Smoking status: Never Smoker    Smokeless tobacco: Never Used   Substance and Sexual Activity    Alcohol use: Yes     Comment: HOLD 72H PRIOR TO SURGERY    Drug use: No    Sexual activity: Never     Partners: Female   Lifestyle    Physical activity     Days per week: Not on file     Minutes per session: Not on file     Stress: Not on file   Relationships    Social connections     Talks on phone: Not on file     Gets together: Not on file     Attends Yarsanism service: Not on file     Active member of club or organization: Not on file     Attends meetings of clubs or organizations: Not on file     Relationship status: Not on file   Other Topics Concern    Not on file   Social History Narrative    Not on file     Past Surgical History:   Procedure Laterality Date    COLONOSCOPY N/A 9/3/2019    Procedure: COLONOSCOPY;  Surgeon: Isai Centeno MD;  Location: [facility] ENDO;  Service: Endoscopy;  Laterality: N/A;    COLONOSCOPY N/A 1/10/2020    Procedure: COLONOSCOPY;  Surgeon: Familia Gonzalez MD;  Location: [facility] ENDO;  Service: General;  Laterality: N/A;    ESOPHAGOGASTRODUODENOSCOPY N/A 9/3/2019    Procedure: ESOPHAGOGASTRODUODENOSCOPY (EGD);  Surgeon: Isai Centeno MD;  Location: [facility];  Service: Endoscopy;  Laterality: N/A;    FLEXIBLE SIGMOIDOSCOPY  2/4/2020    Procedure: SIGMOIDOSCOPY, FLEXIBLE;  Surgeon: Familia Gonzalez MD;  Location: [facility] OR;  Service: General;;    ILEOSTOMY Right 2/4/2020    Procedure: CREATION, ILEOSTOMY;  Surgeon: Familia Gonzalez MD;  Location: [facility] OR;  Service: General;  Laterality: Right;    INJECTION OF ANESTHETIC AGENT INTO TISSUE PLANE DEFINED BY TRANSVERSUS ABDOMINIS MUSCLE N/A 2/4/2020    Procedure: BLOCK, TRANSVERSUS ABDOMINIS PLANE;  Surgeon: Familia Gonzalez MD;  Location: [facility] OR;  Service: General;  Laterality: N/A;    INSERTION OF TUNNELED CENTRAL VENOUS CATHETER (CVC) WITH SUBCUTANEOUS PORT Right 2/4/2020    Procedure: INSERTION, PORT-A-CATH;  Surgeon: Familia Gonzalez MD;  Location: [facility] OR;  Service: General;  Laterality: Right;    JOINT REPLACEMENT Left 2009    MOBILIZATION OF SPLENIC FLEXURE  2/4/2020    Procedure: MOBILIZATION, SPLENIC FLEXURE;  Surgeon: Familia Gonzalez MD;  Location: [facility] OR;  Service: General;;     Current Outpatient Medications    Medication Sig Dispense Refill    cyanocobalamin (VITAMIN B-12) 1000 MCG tablet Take 100 mcg by mouth once daily.      dronabinoL (MARINOL) 2.5 MG capsule Take 1 capsule (2.5 mg total) by mouth 2 (two) times daily before meals. 30 capsule 0    ferrous sulfate 324 mg (65 mg iron) TbEC Take 1 tablet (324 mg total) by mouth 2 (two) times daily. 60 tablet 0    losartan (COZAAR) 50 MG tablet TAKE 1 TABLET BY MOUTH EVERY MORNING 90 tablet 1    mirtazapine (REMERON) 15 MG tablet Take 1 tablet (15 mg total) by mouth every evening. For sleep and appetite 30 tablet 11    morphine (MS CONTIN) 15 MG 12 hr tablet Take 1 tablet (15 mg total) by mouth 2 (two) times daily. Take with Morphine 30 60 tablet 0    morphine (MS CONTIN) 30 MG 12 hr tablet Take 1 tablet (30 mg total) by mouth every 12 (twelve) hours. 60 tablet 0    ondansetron (ZOFRAN) 8 MG tablet Take 1 tablet (8 mg total) by mouth every 8 (eight) hours as needed for Nausea. 30 tablet 2    oxyCODONE-acetaminophen (PERCOCET)  mg per tablet Take 1 tablet by mouth every 6 (six) hours as needed for Pain. 45 tablet 0    pantoprazole (PROTONIX) 40 MG tablet Take 1 tablet (40 mg total) by mouth once daily. 30 tablet 11    prochlorperazine (COMPAZINE) 5 MG tablet TAKE 1 TABLET(5 MG) BY MOUTH EVERY 6 HOURS AS NEEDED FOR NAUSEA 385 tablet 1    tamsulosin (FLOMAX) 0.4 mg Cap Take 1 capsule (0.4 mg total) by mouth once daily. 30 capsule 11     Current Facility-Administered Medications   Medication Dose Route Frequency Provider Last Rate Last Dose    acetaminophen tablet 650 mg  650 mg Oral Q6H Familia Gonzalez MD         Facility-Administered Medications Ordered in Other Visits   Medication Dose Route Frequency Provider Last Rate Last Dose    alteplase injection 2 mg  2 mg Intra-Catheter PRN Mikel Sams MD        heparin, porcine (PF) 100 unit/mL injection flush 500 Units  500 Units Intravenous PRN Mikel Sams MD        sodium chloride 0.9%  flush 10 mL  10 mL Intravenous PRN Mikel Sams MD        sodium chloride 0.9% flush 3 mL  3 mL Intravenous PRN Shilpa Yee MD           Labs:  Lab Results   Component Value Date    WBC 4.69 07/13/2020    HGB 10.6 (L) 07/13/2020    HCT 34.6 (L) 07/13/2020     (H) 07/13/2020     07/13/2020     BMP  Lab Results   Component Value Date     (L) 07/13/2020    K 5.6 (H) 07/13/2020    CL 99 07/13/2020    CO2 23 07/13/2020    BUN 9 07/13/2020    CREATININE 0.8 07/13/2020    CALCIUM 10.3 07/13/2020    ANIONGAP 12 07/13/2020    ESTGFRAFRICA >60 07/13/2020    EGFRNONAA >60 07/13/2020     Lab Results   Component Value Date    ALT 18 07/13/2020    AST 33 07/13/2020    ALKPHOS 258 (H) 07/13/2020    BILITOT 0.5 07/13/2020       Lab Results   Component Value Date    IRON <10 (L) 04/13/2018    TIBC 527 (H) 04/13/2018    FERRITIN 241 03/16/2020     No results found for: [id number]  No results found for: FOLATE  Lab Results   Component Value Date    TSH 0.601 04/07/2018       I have reviewed the radiology reports and examined the scan/xray images.    Review of Systems   Constitutional: Positive for appetite change (Decreased ). Negative for activity change, chills, fatigue, fever and unexpected weight change.   HENT: Negative for hearing loss, mouth sores, nosebleeds, sore throat, tinnitus, trouble swallowing and voice change.    Eyes: Negative for visual disturbance.   Respiratory: Negative for cough, chest tightness, shortness of breath and wheezing.    Cardiovascular: Negative for chest pain, palpitations and leg swelling.   Gastrointestinal: Negative for abdominal pain, anal bleeding, blood in stool, constipation, diarrhea, nausea and vomiting.   Genitourinary: Negative for frequency, hematuria and testicular pain.   Musculoskeletal: Negative for arthralgias, back pain, gait problem and neck pain.   Skin: Negative for color change, pallor, rash and wound.   Allergic/Immunologic: Negative for  immunocompromised state.   Neurological: Negative for seizures, syncope, weakness, numbness and headaches.   Hematological: Negative for adenopathy. Does not bruise/bleed easily.   Psychiatric/Behavioral: Negative for agitation, confusion, decreased concentration, hallucinations and sleep disturbance. The patient is not nervous/anxious.      ECOG SCORE    1 - Restricted in strenuous activity-ambulatory and able to carry out work of a light nature            Objective:     Vitals:    07/13/20 1202   BP: 112/76   Pulse: 67   Resp: 18   Temp: 97.4 °F (36.3 °C)   Body mass index is 18.51 kg/m².  Physical Exam  Constitutional:       Appearance: He is well-developed. He is cachectic.   HENT:      Head: Normocephalic and atraumatic.      Right Ear: External ear normal.      Left Ear: External ear normal.      Mouth/Throat:      Pharynx: No oropharyngeal exudate.   Eyes:      General: No scleral icterus.        Right eye: No discharge.         Left eye: No discharge.      Conjunctiva/sclera: Conjunctivae normal.      Pupils: Pupils are equal, round, and reactive to light.   Neck:      Musculoskeletal: Normal range of motion and neck supple.      Thyroid: No thyromegaly.   Cardiovascular:      Rate and Rhythm: Normal rate and regular rhythm.      Heart sounds: Normal heart sounds. No murmur.   Pulmonary:      Effort: Pulmonary effort is normal. No respiratory distress.      Breath sounds: Normal breath sounds. No wheezing.   Chest:      Chest wall: No tenderness.   Abdominal:      General: Bowel sounds are normal. There is no distension.      Palpations: Abdomen is soft. There is no mass.      Tenderness: There is no abdominal tenderness. There is no rebound.   Musculoskeletal: Normal range of motion.         General: No tenderness.   Lymphadenopathy:      Cervical: No cervical adenopathy.      Right cervical: No superficial cervical adenopathy.     Left cervical: No superficial cervical adenopathy.      Upper Body:       Right upper body: No supraclavicular or pectoral adenopathy.      Left upper body: No supraclavicular or pectoral adenopathy.      Lower Body: No right inguinal adenopathy. No left inguinal adenopathy.   Skin:     General: Skin is warm and dry.      Capillary Refill: Capillary refill takes 2 to 3 seconds.      Coloration: Skin is not pale.      Findings: No erythema or rash.   Neurological:      Mental Status: He is alert and oriented to person, place, and time.      Cranial Nerves: No cranial nerve deficit.      Sensory: No sensory deficit.   Psychiatric:         Behavior: Behavior normal.         Judgment: Judgment normal.           Assessment:      1. Rectal cancer    2. Iron deficiency anemia due to chronic blood loss    3. Essential hypertension    4. Cachexia           Plan:     Rectal cancer  Stage IIIB rectal cancer, status post pj op chemotherapy.  Noted mild increase in recent CEA.  Will continue to trend.  Planned for refer so ileostomy with colorectal surgeon.  Reviewed recent CT that showed no evidence of metastatic disease.  Will see him back in about 6 weeks or sooner if needed.    Iron deficiency anemia due to chronic blood loss  Status post IV iron therapy with improvement in iron saturation.  Reviewed iron studies from March of 2020.  No evidence of active bleed.  Hemoglobin noted at above 10 grams/deciliter.  Will continue to monitor.    Essential hypertension  Management per PCP.  On Ace inhibitors.    Cachexia  Continue to encourage small but frequent meals.  Continue protein boost.      Mikel Sams MD

## 2020-07-13 ENCOUNTER — LAB VISIT (OUTPATIENT)
Dept: LAB | Facility: HOSPITAL | Age: 65
End: 2020-07-13
Attending: INTERNAL MEDICINE
Payer: MEDICARE

## 2020-07-13 ENCOUNTER — OFFICE VISIT (OUTPATIENT)
Dept: HEMATOLOGY/ONCOLOGY | Facility: CLINIC | Age: 65
End: 2020-07-13
Payer: MEDICARE

## 2020-07-13 VITALS
SYSTOLIC BLOOD PRESSURE: 112 MMHG | RESPIRATION RATE: 18 BRPM | WEIGHT: 129 LBS | OXYGEN SATURATION: 98 % | HEART RATE: 67 BPM | DIASTOLIC BLOOD PRESSURE: 76 MMHG | HEIGHT: 70 IN | BODY MASS INDEX: 18.47 KG/M2 | TEMPERATURE: 97 F

## 2020-07-13 DIAGNOSIS — R64 CACHEXIA: ICD-10-CM

## 2020-07-13 DIAGNOSIS — I10 ESSENTIAL HYPERTENSION: Chronic | ICD-10-CM

## 2020-07-13 DIAGNOSIS — C20 RECTAL CANCER: Primary | ICD-10-CM

## 2020-07-13 DIAGNOSIS — C20 RECTAL CANCER: ICD-10-CM

## 2020-07-13 DIAGNOSIS — D50.0 IRON DEFICIENCY ANEMIA DUE TO CHRONIC BLOOD LOSS: ICD-10-CM

## 2020-07-13 LAB
ALBUMIN SERPL BCP-MCNC: 3.6 G/DL (ref 3.5–5.2)
ALP SERPL-CCNC: 258 U/L (ref 55–135)
ALT SERPL W/O P-5'-P-CCNC: 18 U/L (ref 10–44)
ANION GAP SERPL CALC-SCNC: 12 MMOL/L (ref 8–16)
AST SERPL-CCNC: 33 U/L (ref 10–40)
BASOPHILS # BLD AUTO: 0.03 K/UL (ref 0–0.2)
BASOPHILS NFR BLD: 0.6 % (ref 0–1.9)
BILIRUB SERPL-MCNC: 0.5 MG/DL (ref 0.1–1)
BUN SERPL-MCNC: 9 MG/DL (ref 8–23)
CALCIUM SERPL-MCNC: 10.3 MG/DL (ref 8.7–10.5)
CEA SERPL-MCNC: 6 NG/ML (ref 0–5)
CHLORIDE SERPL-SCNC: 99 MMOL/L (ref 95–110)
CO2 SERPL-SCNC: 23 MMOL/L (ref 23–29)
CREAT SERPL-MCNC: 0.8 MG/DL (ref 0.5–1.4)
DIFFERENTIAL METHOD: ABNORMAL
EOSINOPHIL # BLD AUTO: 0.1 K/UL (ref 0–0.5)
EOSINOPHIL NFR BLD: 1.5 % (ref 0–8)
ERYTHROCYTE [DISTWIDTH] IN BLOOD BY AUTOMATED COUNT: 20.7 % (ref 11.5–14.5)
EST. GFR  (AFRICAN AMERICAN): >60 ML/MIN/1.73 M^2
EST. GFR  (NON AFRICAN AMERICAN): >60 ML/MIN/1.73 M^2
GLUCOSE SERPL-MCNC: 127 MG/DL (ref 70–110)
HCT VFR BLD AUTO: 34.6 % (ref 40–54)
HGB BLD-MCNC: 10.6 G/DL (ref 14–18)
IMM GRANULOCYTES # BLD AUTO: 0.02 K/UL (ref 0–0.04)
IMM GRANULOCYTES NFR BLD AUTO: 0.4 % (ref 0–0.5)
LYMPHOCYTES # BLD AUTO: 0.5 K/UL (ref 1–4.8)
LYMPHOCYTES NFR BLD: 9.6 % (ref 18–48)
MCH RBC QN AUTO: 30.8 PG (ref 27–31)
MCHC RBC AUTO-ENTMCNC: 30.6 G/DL (ref 32–36)
MCV RBC AUTO: 101 FL (ref 82–98)
MONOCYTES # BLD AUTO: 1.1 K/UL (ref 0.3–1)
MONOCYTES NFR BLD: 23.9 % (ref 4–15)
NEUTROPHILS # BLD AUTO: 3 K/UL (ref 1.8–7.7)
NEUTROPHILS NFR BLD: 64 % (ref 38–73)
NRBC BLD-RTO: 0 /100 WBC
PLATELET # BLD AUTO: 260 K/UL (ref 150–350)
PMV BLD AUTO: 9.8 FL (ref 9.2–12.9)
POTASSIUM SERPL-SCNC: 5.6 MMOL/L (ref 3.5–5.1)
PROT SERPL-MCNC: 8.9 G/DL (ref 6–8.4)
RBC # BLD AUTO: 3.44 M/UL (ref 4.6–6.2)
SODIUM SERPL-SCNC: 134 MMOL/L (ref 136–145)
WBC # BLD AUTO: 4.69 K/UL (ref 3.9–12.7)

## 2020-07-13 PROCEDURE — 3008F BODY MASS INDEX DOCD: CPT | Mod: HCNC,CPTII,S$GLB, | Performed by: INTERNAL MEDICINE

## 2020-07-13 PROCEDURE — 99499 UNLISTED E&M SERVICE: CPT | Mod: HCNC,S$GLB,, | Performed by: INTERNAL MEDICINE

## 2020-07-13 PROCEDURE — 85025 COMPLETE CBC W/AUTO DIFF WBC: CPT | Mod: HCNC

## 2020-07-13 PROCEDURE — 80053 COMPREHEN METABOLIC PANEL: CPT | Mod: HCNC

## 2020-07-13 PROCEDURE — 99215 OFFICE O/P EST HI 40 MIN: CPT | Mod: HCNC,S$GLB,, | Performed by: INTERNAL MEDICINE

## 2020-07-13 PROCEDURE — 82378 CARCINOEMBRYONIC ANTIGEN: CPT | Mod: HCNC

## 2020-07-13 PROCEDURE — 3074F PR MOST RECENT SYSTOLIC BLOOD PRESSURE < 130 MM HG: ICD-10-PCS | Mod: HCNC,CPTII,S$GLB, | Performed by: INTERNAL MEDICINE

## 2020-07-13 PROCEDURE — 3078F PR MOST RECENT DIASTOLIC BLOOD PRESSURE < 80 MM HG: ICD-10-PCS | Mod: HCNC,CPTII,S$GLB, | Performed by: INTERNAL MEDICINE

## 2020-07-13 PROCEDURE — 36415 COLL VENOUS BLD VENIPUNCTURE: CPT | Mod: HCNC

## 2020-07-13 PROCEDURE — 99499 RISK ADDL DX/OHS AUDIT: ICD-10-PCS | Mod: HCNC,S$GLB,, | Performed by: INTERNAL MEDICINE

## 2020-07-13 PROCEDURE — 99999 PR PBB SHADOW E&M-EST. PATIENT-LVL IV: ICD-10-PCS | Mod: PBBFAC,HCNC,, | Performed by: INTERNAL MEDICINE

## 2020-07-13 PROCEDURE — 3008F PR BODY MASS INDEX (BMI) DOCUMENTED: ICD-10-PCS | Mod: HCNC,CPTII,S$GLB, | Performed by: INTERNAL MEDICINE

## 2020-07-13 PROCEDURE — 3078F DIAST BP <80 MM HG: CPT | Mod: HCNC,CPTII,S$GLB, | Performed by: INTERNAL MEDICINE

## 2020-07-13 PROCEDURE — 3074F SYST BP LT 130 MM HG: CPT | Mod: HCNC,CPTII,S$GLB, | Performed by: INTERNAL MEDICINE

## 2020-07-13 PROCEDURE — 99215 PR OFFICE/OUTPT VISIT, EST, LEVL V, 40-54 MIN: ICD-10-PCS | Mod: HCNC,S$GLB,, | Performed by: INTERNAL MEDICINE

## 2020-07-13 PROCEDURE — 99999 PR PBB SHADOW E&M-EST. PATIENT-LVL IV: CPT | Mod: PBBFAC,HCNC,, | Performed by: INTERNAL MEDICINE

## 2020-07-13 NOTE — ASSESSMENT & PLAN NOTE
Stage IIIB rectal cancer, status post pj op chemotherapy.  Noted mild increase in recent CEA.  Will continue to trend.  Planned for refer so ileostomy with colorectal surgeon.  Reviewed recent CT that showed no evidence of metastatic disease.  Will see him back in about 6 weeks or sooner if needed.

## 2020-07-13 NOTE — ASSESSMENT & PLAN NOTE
Status post IV iron therapy with improvement in iron saturation.  Reviewed iron studies from March of 2020.  No evidence of active bleed.  Hemoglobin noted at above 10 grams/deciliter.  Will continue to monitor.

## 2020-07-16 DIAGNOSIS — G89.3 CANCER ASSOCIATED PAIN: ICD-10-CM

## 2020-07-16 RX ORDER — OXYCODONE AND ACETAMINOPHEN 10; 325 MG/1; MG/1
1 TABLET ORAL EVERY 6 HOURS PRN
Qty: 45 TABLET | Refills: 0 | Status: CANCELLED | OUTPATIENT
Start: 2020-07-16 | End: 2021-07-16

## 2020-07-16 NOTE — TELEPHONE ENCOUNTER
----- Message from Yady Fan sent at 7/16/2020 12:40 PM CDT -----  Contact: [phone]/Self  Patient is requesting a refill for oxyCODONE-acetaminophen (PERCOCET)  mg per tablet. [facility]. Please advise.

## 2020-07-17 DIAGNOSIS — G89.3 CANCER ASSOCIATED PAIN: ICD-10-CM

## 2020-07-17 NOTE — TELEPHONE ENCOUNTER
----- Message from Madi Rodriguez sent at 7/17/2020  2:30 PM CDT -----  Contact: self  Would like to consult with nurse regarding Rx refill.  Pt states he has tried to reach doctor twice and is currently out og his medication.  Please contact Vincent Benton @ [phone].  Thanks/As

## 2020-07-20 ENCOUNTER — TELEPHONE (OUTPATIENT)
Dept: PALLIATIVE MEDICINE | Facility: CLINIC | Age: 65
End: 2020-07-20

## 2020-07-20 RX ORDER — OXYCODONE AND ACETAMINOPHEN 10; 325 MG/1; MG/1
1 TABLET ORAL EVERY 6 HOURS PRN
Qty: 90 TABLET | Refills: 0 | Status: ON HOLD | OUTPATIENT
Start: 2020-07-20 | End: 2020-08-05 | Stop reason: SDUPTHER

## 2020-07-20 NOTE — TELEPHONE ENCOUNTER
Palliative Care:    Called patients home number and spoke with his spouse, Fara.  Let her know that Dr. Hill has sent in the requested refill on his pain medication. She states the patient is sleeping now but will let him know and will have him call back to schedule a follow up appointment. She read back my direct contact number.      Genny Nguyen RN  Palliative Care

## 2020-07-23 DIAGNOSIS — G89.3 CANCER ASSOCIATED PAIN: ICD-10-CM

## 2020-07-23 NOTE — TELEPHONE ENCOUNTER
----- Message from [facility] sent at 7/22/2020  4:51 PM CDT -----  Pt called to get refills on the following     morphine (MS CONTIN) 30 MG 12 hr tablet    morphine (MS CONTIN) 15 MG 12 hr tablet    Pharmacy [facility] [phone]

## 2020-07-24 ENCOUNTER — LAB VISIT (OUTPATIENT)
Dept: OTOLARYNGOLOGY | Facility: CLINIC | Age: 65
End: 2020-07-24
Payer: MEDICARE

## 2020-07-24 DIAGNOSIS — Z01.818 PRE-OP TESTING: ICD-10-CM

## 2020-07-24 PROCEDURE — U0003 INFECTIOUS AGENT DETECTION BY NUCLEIC ACID (DNA OR RNA); SEVERE ACUTE RESPIRATORY SYNDROME CORONAVIRUS 2 (SARS-COV-2) (CORONAVIRUS DISEASE [COVID-19]), AMPLIFIED PROBE TECHNIQUE, MAKING USE OF HIGH THROUGHPUT TECHNOLOGIES AS DESCRIBED BY CMS-2020-01-R: HCPCS | Mod: HCNC

## 2020-07-25 LAB — SARS-COV-2 RNA RESP QL NAA+PROBE: NOT DETECTED

## 2020-07-27 ENCOUNTER — HOSPITAL ENCOUNTER (OUTPATIENT)
Dept: RADIOLOGY | Facility: HOSPITAL | Age: 65
Discharge: HOME | End: 2020-07-27
Attending: COLON & RECTAL SURGERY
Payer: MEDICARE

## 2020-07-27 DIAGNOSIS — C20 RECTAL ADENOCARCINOMA: ICD-10-CM

## 2020-07-27 PROCEDURE — 25500020 PHARM REV CODE 255: Mod: HCNC | Performed by: COLON & RECTAL SURGERY

## 2020-07-27 PROCEDURE — 74270 X-RAY XM COLON 1CNTRST STD: CPT | Mod: TC,HCNC

## 2020-07-27 RX ADMIN — DIATRIZOATE MEGLUMINE AND DIATRIZOATE SODIUM 480 ML: 660; 100 LIQUID ORAL; RECTAL at 01:07

## 2020-07-28 DIAGNOSIS — Z01.818 PRE-OP TESTING: Primary | ICD-10-CM

## 2020-07-30 ENCOUNTER — TELEPHONE (OUTPATIENT)
Dept: SURGERY | Facility: CLINIC | Age: 65
End: 2020-07-30

## 2020-07-30 DIAGNOSIS — Z01.818 PREOP TESTING: Primary | ICD-10-CM

## 2020-07-30 DIAGNOSIS — G89.3 CANCER ASSOCIATED PAIN: ICD-10-CM

## 2020-07-30 NOTE — PRE ADMISSION SCREENING
Dr. Stanton informed of pt's Lab results. Orders for repeat CMP noted. Jeni with Dr. Gonzalez informed.

## 2020-07-30 NOTE — TELEPHONE ENCOUNTER
Called patient per Pre Admit to schedule a repeat CMP. Per Anesthesiologist Potassium level is elevated. Scheduled him for tomorrow

## 2020-07-30 NOTE — PRE ADMISSION SCREENING
Pre op instructions reviewed with patient per phone:    To confirm, Your surgeon has instructed you:  Surgery is scheduled 08/04/20 at 0700.      Please report to [facility] 1[facility] floor Emergency Entrance lobby by 0530  .   Pre admit office to call afternoon prior to surgery with final arrival time    Covid 19 testing is scheduled for 08/01/20 at 1150  @ [facility]  Please self quarantine after Covid testing, prior to surgery      INSTRUCTIONS IMPORTANT!!!  ¨ Do not eat, drink, or smoke after 12 midnight prior to surgery, including water. OK to brush teeth, no gum, candy or mints!    ¨ Take only these medicines with a small swallow of water-morning of surgery.  n/a      ____   Due to COVID 19 concerns, 1 visitor will be allowed in the pre operative area, and must adhere to social distancing guidelines.  One visitor/family member is currently allowed to visit in-patient rooms from 08:00 a.m - 6:00 p.m    ____   Family/caregivers will be updated re pt status via text/cell phone      ____  Do not wear makeup, including mascara.  ____  No powder, lotions or creams to surgical area.  ____  Please remove all jewelry, including piercings and leave at home.  ____  No money or valuables needed. Please leave at home.  ____  Please bring identification and insurance information to hospital.  ____  If going home the same day, arrange for a ride home. You will not be able to   drive if Anesthesia was used.  ____  Children, under 12 years old, must remain in the waiting room with an adult.  They are not allowed in patient areas.  ____  Wear loose fitting clothing. Allow for dressings, bandages.  ____  Stop Aspirin, Ibuprofen, Motrin and Aleve at least 5-7 days before surgery, unless otherwise instructed by your doctor, or the nurse.   You MAY use Tylenol/acetaminophen until day of surgery.  ____  If you take diabetic medication, do not take am of surgery unless instructed by   Doctor.  ____ Stop taking any Fish  Oil supplement or any Vitamins that contain Vitamin E at least 5 days prior to surgery.          Bathing Instructions-- The night before surgery and the morning prior to coming to the hospital:   -Do not shave the surgical area.   -Shower and wash your hair and body as usual with anti-bacterial  soap and shampoo.   -Rinse your hair and body completely.   -Use one packet of hibiclens to wash the surgical site (using your hand) gently for 5 minutes.  Do not scrub you skin too hard.   -Do not use hibiclens on your head, face, or genitals.   -Do not wash with anti-bacterial soap after you use the hibiclens.   -Rinse your body thoroughly.   -Dry with clean, soft towel.  Do not use lotion, cream, deodorant, or powders on   the surgical site.    Use antibacterial soap in place of hibiclens if your surgery is on the head, face or genitals.         Surgical Site Infection    Prevention of surgical site infections:     -Keep incisions clean and dry.   -Do not soak/submerge incisions in water until completely healed.   -Do not apply lotions, powders, creams, or deodorants to site.   -Always make sure hands are cleaned with antibacterial soap/ alcohol-based   prior to touching the surgical site.  (This includes doctors, nurses, staff, and yourself.)    Signs and symptoms:   -Redness and pain around the area where you had surgery   -Drainage of cloudy fluid from your surgical wound   -Fever over 100.4  I have read or had read and explained to me, and understand the above information.

## 2020-07-31 ENCOUNTER — LAB VISIT (OUTPATIENT)
Dept: LAB | Facility: HOSPITAL | Age: 65
End: 2020-07-31
Attending: ANESTHESIOLOGY
Payer: MEDICARE

## 2020-07-31 ENCOUNTER — TELEPHONE (OUTPATIENT)
Dept: SURGERY | Facility: CLINIC | Age: 65
End: 2020-07-31

## 2020-07-31 DIAGNOSIS — Z01.818 PREOP TESTING: ICD-10-CM

## 2020-07-31 LAB
ALBUMIN SERPL BCP-MCNC: 3.3 G/DL (ref 3.5–5.2)
ALP SERPL-CCNC: 217 U/L (ref 55–135)
ALT SERPL W/O P-5'-P-CCNC: 17 U/L (ref 10–44)
ANION GAP SERPL CALC-SCNC: 9 MMOL/L (ref 8–16)
AST SERPL-CCNC: 29 U/L (ref 10–40)
BILIRUB SERPL-MCNC: 0.3 MG/DL (ref 0.1–1)
BUN SERPL-MCNC: 12 MG/DL (ref 8–23)
CALCIUM SERPL-MCNC: 10.1 MG/DL (ref 8.7–10.5)
CHLORIDE SERPL-SCNC: 101 MMOL/L (ref 95–110)
CO2 SERPL-SCNC: 26 MMOL/L (ref 23–29)
CREAT SERPL-MCNC: 0.7 MG/DL (ref 0.5–1.4)
EST. GFR  (AFRICAN AMERICAN): >60 ML/MIN/1.73 M^2
EST. GFR  (NON AFRICAN AMERICAN): >60 ML/MIN/1.73 M^2
GLUCOSE SERPL-MCNC: 98 MG/DL (ref 70–110)
POTASSIUM SERPL-SCNC: 5 MMOL/L (ref 3.5–5.1)
PROT SERPL-MCNC: 8.2 G/DL (ref 6–8.4)
SODIUM SERPL-SCNC: 136 MMOL/L (ref 136–145)

## 2020-07-31 PROCEDURE — 36415 COLL VENOUS BLD VENIPUNCTURE: CPT | Mod: HCNC

## 2020-07-31 PROCEDURE — 80053 COMPREHEN METABOLIC PANEL: CPT | Mod: HCNC

## 2020-07-31 NOTE — TELEPHONE ENCOUNTER
----- Message from Kyara Hawk sent at 7/31/2020 12:09 PM CDT -----  Regarding: visitor policy  Type:  Needs Medical Advice    Who Called: pt  Symptoms (please be specific): n/a   How long has patient had these symptoms:  n/a  Pharmacy name and phone #:  n/a  Would the patient rather a call back or a response via [facility]? Call back   Best Call Back Number: [phone]  Additional Information: Please call back. Thanks

## 2020-08-03 ENCOUNTER — ANESTHESIA EVENT (OUTPATIENT)
Dept: SURGERY | Facility: HOSPITAL | Age: 65
DRG: 330 | End: 2020-08-03
Payer: MEDICARE

## 2020-08-03 ENCOUNTER — OFFICE VISIT (OUTPATIENT)
Dept: DERMATOLOGY | Facility: CLINIC | Age: 65
End: 2020-08-03
Payer: MEDICARE

## 2020-08-03 DIAGNOSIS — L72.0 EPIDERMAL INCLUSION CYST: Primary | ICD-10-CM

## 2020-08-03 PROCEDURE — 99202 OFFICE O/P NEW SF 15 MIN: CPT | Mod: HCNC,S$GLB,, | Performed by: DERMATOLOGY

## 2020-08-03 PROCEDURE — 99999 PR PBB SHADOW E&M-EST. PATIENT-LVL III: CPT | Mod: PBBFAC,HCNC,, | Performed by: DERMATOLOGY

## 2020-08-03 PROCEDURE — 99202 PR OFFICE/OUTPT VISIT, NEW, LEVL II, 15-29 MIN: ICD-10-PCS | Mod: HCNC,S$GLB,, | Performed by: DERMATOLOGY

## 2020-08-03 PROCEDURE — 99999 PR PBB SHADOW E&M-EST. PATIENT-LVL III: ICD-10-PCS | Mod: PBBFAC,HCNC,, | Performed by: DERMATOLOGY

## 2020-08-03 RX ORDER — DOXYCYCLINE 100 MG/1
100 CAPSULE ORAL 2 TIMES DAILY WITH MEALS
Qty: 28 CAPSULE | Refills: 0 | Status: SHIPPED | OUTPATIENT
Start: 2020-08-03 | End: 2020-08-17

## 2020-08-03 RX ORDER — MORPHINE SULFATE 30 MG/1
30 TABLET, FILM COATED, EXTENDED RELEASE ORAL EVERY 12 HOURS
Qty: 60 TABLET | Refills: 0 | Status: SHIPPED | OUTPATIENT
Start: 2020-08-03 | End: 2020-08-20 | Stop reason: SDUPTHER

## 2020-08-03 RX ORDER — MORPHINE SULFATE 15 MG/1
15 TABLET, FILM COATED, EXTENDED RELEASE ORAL 2 TIMES DAILY
Qty: 60 TABLET | Refills: 0 | Status: SHIPPED | OUTPATIENT
Start: 2020-08-03 | End: 2020-08-20 | Stop reason: SDUPTHER

## 2020-08-03 NOTE — PROGRESS NOTES
Subjective:       Patient ID:  Vincent Benton is a 64 y.o. male who presents for   Chief Complaint   Patient presents with    Mass     [id number] on left back      History of Present Illness: The patient presents with chief complaint of lumps.  Location: back  Duration: present for 20 years without issue, starting causing pain a couple days ago  Signs/Symptoms: redness, pain; denies drainage    Prior treatments: neosporin      Hx of stage IIIB rectal cancer s/p pj op chemo with FOLFOX      Review of Systems   Constitutional: Negative for fever.   Skin: Negative for rash.   Hematologic/Lymphatic: Positive for adenopathy.        Objective:    Physical Exam   Constitutional: He appears well-developed and well-nourished. No distress.   Neurological: He is alert and oriented to person, place, and time. He is not disoriented.   Psychiatric: He has a normal mood and affect.   Skin:   Areas Examined (abnormalities noted in diagram):   Back Inspection Performed              Diagram Legend     Erythematous scaling macule/papule c/w actinic keratosis       Vascular papule c/w angioma      Pigmented verrucoid papule/plaque c/w seborrheic keratosis      Yellow umbilicated papule c/w sebaceous hyperplasia      Irregularly shaped tan macule c/w lentigo     1-2 mm smooth white papules consistent with Milia      Movable subcutaneous cyst with punctum c/w epidermal inclusion cyst      Subcutaneous movable cyst c/w pilar cyst      Firm pink to brown papule c/w dermatofibroma      Pedunculated fleshy papule(s) c/w skin tag(s)      Evenly pigmented macule c/w junctional nevus     Mildly variegated pigmented, slightly irregular-bordered macule c/w mildly atypical nevus      Flesh colored to evenly pigmented papule c/w intradermal nevus       Pink pearly papule/plaque c/w basal cell carcinoma      Erythematous hyperkeratotic cursted plaque c/w SCC      Surgical scar with no sign of skin cancer recurrence      Open and closed  comedones      Inflammatory papules and pustules      Verrucoid papule consistent consistent with wart     Erythematous eczematous patches and plaques     Dystrophic onycholytic nail with subungual debris c/w onychomycosis     Umbilicated papule    Erythematous-base heme-crusted tan verrucoid plaque consistent with inflamed seborrheic keratosis     Erythematous Silvery Scaling Plaque c/w Psoriasis     See annotation      Assessment / Plan:        Epidermal inclusion cyst- inflamed  - discussed options: attempt to decrease inflammation with doxycycline x 2 weeks followed by excision with intent to remove entire cyst/cyst wall after inflammation has resolved, vs I&D today to relieve pressure, but would not be able to remove entire cyst and close with sutures.  Patient elects antibiotics followed by excision.  Given size, will refer to gen surg  -     doxycycline (VIBRAMYCIN) 100 MG Cap; Take 1 capsule (100 mg total) by mouth 2 (two) times daily with meals. Avoid lying down for 1 hour after taking. Avoid the sun. for 14 days  Dispense: 28 capsule; Refill: 0  -     Ambulatory referral/consult to General Surgery; Future; Expected date: 08/10/2020    Discussed benefits and risks of doxycyline therapy including but not limited to GI discomfort, esophageal irritation/ulceration, and increased sun sensitivity. Patient was counseled to take medicine with meals and at least 1 hour before lying down.          **20 minutes spent in counseling and coordination of care**    No follow-ups on file.

## 2020-08-03 NOTE — ANESTHESIA PREPROCEDURE EVALUATION
08/03/2020  Vincent Benton is a 64 y.o., male.  Patient Active Problem List   Diagnosis    Chronic pain    Gastroesophageal reflux disease    Alcohol use    Iron deficiency anemia due to chronic blood loss    Rectal cancer    Rectal pain    Essential hypertension    Chemotherapy management, encounter for    Colostomy care    Cachexia     Current Facility-Administered Medications on File Prior to Encounter   Medication Dose Route Frequency Provider Last Rate Last Dose    acetaminophen tablet 650 mg  650 mg Oral Q6H Familia Gonzalez MD        alteplase injection 2 mg  2 mg Intra-Catheter PRN Mikel Sams MD        heparin, porcine (PF) 100 unit/mL injection flush 500 Units  500 Units Intravenous PRN Mikel Sams MD        sodium chloride 0.9% flush 10 mL  10 mL Intravenous PRN Mikel Sams MD        sodium chloride 0.9% flush 3 mL  3 mL Intravenous PRN Shilpa Yee MD         Current Outpatient Medications on File Prior to Encounter   Medication Sig Dispense Refill    mirtazapine (REMERON) 15 MG tablet Take 1 tablet (15 mg total) by mouth every evening. For sleep and appetite 30 tablet 11    tamsulosin (FLOMAX) 0.4 mg Cap Take 1 capsule (0.4 mg total) by mouth once daily. 30 capsule 11    cyanocobalamin (VITAMIN B-12) 1000 MCG tablet Take 100 mcg by mouth once daily.      dronabinoL (MARINOL) 2.5 MG capsule Take 1 capsule (2.5 mg total) by mouth 2 (two) times daily before meals. 30 capsule 0    ferrous sulfate 324 mg (65 mg iron) TbEC Take 1 tablet (324 mg total) by mouth 2 (two) times daily. (Patient not taking: Reported on 8/3/2020) 60 tablet 0    losartan (COZAAR) 50 MG tablet TAKE 1 TABLET BY MOUTH EVERY MORNING 90 tablet 1    ondansetron (ZOFRAN) 8 MG tablet Take 1 tablet (8 mg total) by mouth every 8 (eight) hours as needed for Nausea. 30 tablet 2     pantoprazole (PROTONIX) 40 MG tablet Take 1 tablet (40 mg total) by mouth once daily. 30 tablet 11    prochlorperazine (COMPAZINE) 5 MG tablet TAKE 1 TABLET(5 MG) BY MOUTH EVERY 6 HOURS AS NEEDED FOR NAUSEA 385 tablet 1     Past Surgical History:   Procedure Laterality Date    COLONOSCOPY N/A 9/3/2019    Procedure: COLONOSCOPY;  Surgeon: Isai Centeno MD;  Location: [facility] ENDO;  Service: Endoscopy;  Laterality: N/A;    COLONOSCOPY N/A 1/10/2020    Procedure: COLONOSCOPY;  Surgeon: Familia Gonzalez MD;  Location: [facility] ENDO;  Service: General;  Laterality: N/A;    ESOPHAGOGASTRODUODENOSCOPY N/A 9/3/2019    Procedure: ESOPHAGOGASTRODUODENOSCOPY (EGD);  Surgeon: Isai Centeno MD;  Location: [facility] ENDO;  Service: Endoscopy;  Laterality: N/A;    FLEXIBLE SIGMOIDOSCOPY  2/4/2020    Procedure: SIGMOIDOSCOPY, FLEXIBLE;  Surgeon: Familia Gonzalez MD;  Location: [facility] OR;  Service: General;;    ILEOSTOMY Right 2/4/2020    Procedure: CREATION, ILEOSTOMY;  Surgeon: Familia Gonzalez MD;  Location: [facility] OR;  Service: General;  Laterality: Right;    INJECTION OF ANESTHETIC AGENT INTO TISSUE PLANE DEFINED BY TRANSVERSUS ABDOMINIS MUSCLE N/A 2/4/2020    Procedure: BLOCK, TRANSVERSUS ABDOMINIS PLANE;  Surgeon: Familia Gonzalez MD;  Location: [facility] OR;  Service: General;  Laterality: N/A;    INSERTION OF TUNNELED CENTRAL VENOUS CATHETER (CVC) WITH SUBCUTANEOUS PORT Right 2/4/2020    Procedure: INSERTION, PORT-A-CATH;  Surgeon: Familia Gonzalez MD;  Location: [facility] OR;  Service: General;  Laterality: Right;    JOINT REPLACEMENT Left 2009    MOBILIZATION OF SPLENIC FLEXURE  2/4/2020    Procedure: MOBILIZATION, SPLENIC FLEXURE;  Surgeon: Familia Gonzalez MD;  Location: [facility] OR;  Service: General;;       Anesthesia Evaluation    I have reviewed the Patient Summary Reports.    I have reviewed the Nursing Notes.    I have reviewed the Medications.     Review of Systems  Anesthesia Hx:  No problems with previous  Anesthesia  History of prior surgery of interest to airway management or planning: Previous anesthesia: General  Denies Personal Hx of Anesthesia complications.   Social:  Non-Smoker  Alcohol Use:    Hematology/Oncology:  Hematology Normal   Oncology Normal     EENT/Dental:EENT/Dental Normal   Cardiovascular:   Hypertension ECG has been reviewed.    Pulmonary:  Pulmonary Normal    Renal/:  Renal/ Normal     Hepatic/GI:   Hiatal Hernia, GERD    Musculoskeletal:  Musculoskeletal Normal    Neurological:  Neurology Normal    Endocrine:  Endocrine Normal    Dermatological:  Skin Normal    Psych:  Psychiatric Normal           Chemistry        Component Value Date/Time     07/31/2020 1031    K 5.0 07/31/2020 1031     07/31/2020 1031    CO2 26 07/31/2020 1031    BUN 12 07/31/2020 1031    CREATININE 0.7 07/31/2020 1031    GLU 98 07/31/2020 1031        Component Value Date/Time    CALCIUM 10.1 07/31/2020 1031    ALKPHOS 217 (H) 07/31/2020 1031    AST 29 07/31/2020 1031    ALT 17 07/31/2020 1031    BILITOT 0.3 07/31/2020 1031    ESTGFRAFRICA >60.0 07/31/2020 1031    EGFRNONAA >60.0 07/31/2020 1031        Lab Results   Component Value Date    WBC 4.69 07/13/2020    HGB 10.6 (L) 07/13/2020    HCT 34.6 (L) 07/13/2020     (H) 07/13/2020     07/13/2020       \    Physical Exam  General:  Well nourished    Airway/Jaw/Neck:  Airway Findings: Mouth Opening: Normal Tongue: Normal  Mallampati: III      Dental:  Dental Findings: In tact   Chest/Lungs:  Chest/Lungs Clear    Heart/Vascular:  Heart Findings: Normal Heart murmur: negative       Mental Status:  Mental Status Findings:  Cooperative, Alert and Oriented         Anesthesia Plan  Type of Anesthesia, risks & benefits discussed:  Anesthesia Type:  general  Patient's Preference:   Intra-op Monitoring Plan: standard ASA monitors  Intra-op Monitoring Plan Comments:   Post Op Pain Control Plan: multimodal analgesia  Post Op Pain Control Plan Comments:    Induction:   IV  Beta Blocker:  Patient is not currently on a Beta-Blocker (No further documentation required).       Informed Consent: Patient understands risks and agrees with Anesthesia plan.  Questions answered. Anesthesia consent signed with patient.  ASA Score: 2     Day of Surgery Review of History & Physical: I have interviewed and examined the patient. I have reviewed the patient's H&P dated:    H&P update referred to the surgeon.         Ready For Surgery From Anesthesia Perspective.

## 2020-08-04 ENCOUNTER — ANESTHESIA (OUTPATIENT)
Dept: SURGERY | Facility: HOSPITAL | Age: 65
DRG: 330 | End: 2020-08-04
Payer: MEDICARE

## 2020-08-04 ENCOUNTER — HOSPITAL ENCOUNTER (INPATIENT)
Facility: HOSPITAL | Age: 65
LOS: 3 days | Discharge: HOME HEALTH | DRG: 330 | End: 2020-08-07
Attending: COLON & RECTAL SURGERY | Admitting: COLON & RECTAL SURGERY
Payer: MEDICARE

## 2020-08-04 DIAGNOSIS — L02.212 BACK ABSCESS: ICD-10-CM

## 2020-08-04 DIAGNOSIS — C20 RECTAL ADENOCARCINOMA: Primary | ICD-10-CM

## 2020-08-04 DIAGNOSIS — G89.3 CANCER ASSOCIATED PAIN: ICD-10-CM

## 2020-08-04 DIAGNOSIS — Z93.2 ILEOSTOMY IN PLACE: Chronic | ICD-10-CM

## 2020-08-04 PROCEDURE — 63600175 PHARM REV CODE 636 W HCPCS: Mod: HCNC | Performed by: COLON & RECTAL SURGERY

## 2020-08-04 PROCEDURE — 11000001 HC ACUTE MED/SURG PRIVATE ROOM: Mod: HCNC

## 2020-08-04 PROCEDURE — S0030 INJECTION, METRONIDAZOLE: HCPCS | Mod: HCNC | Performed by: COLON & RECTAL SURGERY

## 2020-08-04 PROCEDURE — 44620 PR CLOSE ENTEROSTOMY: ICD-10-PCS | Mod: HCNC,,, | Performed by: COLON & RECTAL SURGERY

## 2020-08-04 PROCEDURE — C9290 INJ, BUPIVACAINE LIPOSOME: HCPCS | Mod: HCNC | Performed by: COLON & RECTAL SURGERY

## 2020-08-04 PROCEDURE — 94799 UNLISTED PULMONARY SVC/PX: CPT | Mod: HCNC

## 2020-08-04 PROCEDURE — 25000003 PHARM REV CODE 250: Mod: HCNC | Performed by: COLON & RECTAL SURGERY

## 2020-08-04 PROCEDURE — 71000039 HC RECOVERY, EACH ADD'L HOUR: Mod: HCNC | Performed by: COLON & RECTAL SURGERY

## 2020-08-04 PROCEDURE — 44620 REPAIR BOWEL OPENING: CPT | Mod: HCNC,,, | Performed by: COLON & RECTAL SURGERY

## 2020-08-04 PROCEDURE — 25000003 PHARM REV CODE 250: Mod: HCNC | Performed by: NURSE ANESTHETIST, CERTIFIED REGISTERED

## 2020-08-04 PROCEDURE — 27201423 OPTIME MED/SURG SUP & DEVICES STERILE SUPPLY: Mod: HCNC | Performed by: COLON & RECTAL SURGERY

## 2020-08-04 PROCEDURE — 71000033 HC RECOVERY, INTIAL HOUR: Mod: HCNC | Performed by: COLON & RECTAL SURGERY

## 2020-08-04 PROCEDURE — 88309 TISSUE EXAM BY PATHOLOGIST: CPT | Mod: HCNC | Performed by: PATHOLOGY

## 2020-08-04 PROCEDURE — 63600175 PHARM REV CODE 636 W HCPCS: Mod: HCNC | Performed by: ANESTHESIOLOGY

## 2020-08-04 PROCEDURE — 63600175 PHARM REV CODE 636 W HCPCS: Mod: HCNC | Performed by: NURSE ANESTHETIST, CERTIFIED REGISTERED

## 2020-08-04 PROCEDURE — 37000009 HC ANESTHESIA EA ADD 15 MINS: Mod: HCNC | Performed by: COLON & RECTAL SURGERY

## 2020-08-04 PROCEDURE — 36000706: Mod: HCNC | Performed by: COLON & RECTAL SURGERY

## 2020-08-04 PROCEDURE — 37000008 HC ANESTHESIA 1ST 15 MINUTES: Mod: HCNC | Performed by: COLON & RECTAL SURGERY

## 2020-08-04 PROCEDURE — 36000707: Mod: HCNC | Performed by: COLON & RECTAL SURGERY

## 2020-08-04 RX ORDER — PHENYLEPHRINE HYDROCHLORIDE 10 MG/ML
INJECTION INTRAVENOUS
Status: DISCONTINUED | OUTPATIENT
Start: 2020-08-04 | End: 2020-08-04

## 2020-08-04 RX ORDER — LIDOCAINE HYDROCHLORIDE 10 MG/ML
INJECTION, SOLUTION EPIDURAL; INFILTRATION; INTRACAUDAL; PERINEURAL
Status: DISCONTINUED | OUTPATIENT
Start: 2020-08-04 | End: 2020-08-04

## 2020-08-04 RX ORDER — SODIUM CHLORIDE, SODIUM LACTATE, POTASSIUM CHLORIDE, CALCIUM CHLORIDE 600; 310; 30; 20 MG/100ML; MG/100ML; MG/100ML; MG/100ML
INJECTION, SOLUTION INTRAVENOUS CONTINUOUS
Status: DISCONTINUED | OUTPATIENT
Start: 2020-08-04 | End: 2020-08-05

## 2020-08-04 RX ORDER — ENOXAPARIN SODIUM 100 MG/ML
40 INJECTION SUBCUTANEOUS EVERY 24 HOURS
Status: DISCONTINUED | OUTPATIENT
Start: 2020-08-05 | End: 2020-08-07 | Stop reason: HOSPADM

## 2020-08-04 RX ORDER — SODIUM CHLORIDE 0.9 % (FLUSH) 0.9 %
3 SYRINGE (ML) INJECTION EVERY 8 HOURS
Status: DISCONTINUED | OUTPATIENT
Start: 2020-08-04 | End: 2020-08-04 | Stop reason: HOSPADM

## 2020-08-04 RX ORDER — GABAPENTIN 400 MG/1
800 CAPSULE ORAL
Status: COMPLETED | OUTPATIENT
Start: 2020-08-04 | End: 2020-08-04

## 2020-08-04 RX ORDER — GLYCOPYRROLATE 0.2 MG/ML
INJECTION INTRAMUSCULAR; INTRAVENOUS
Status: DISCONTINUED | OUTPATIENT
Start: 2020-08-04 | End: 2020-08-04

## 2020-08-04 RX ORDER — HYDROMORPHONE HYDROCHLORIDE 2 MG/ML
0.2 INJECTION, SOLUTION INTRAMUSCULAR; INTRAVENOUS; SUBCUTANEOUS EVERY 5 MIN PRN
Status: DISCONTINUED | OUTPATIENT
Start: 2020-08-04 | End: 2020-08-04 | Stop reason: HOSPADM

## 2020-08-04 RX ORDER — HYDROMORPHONE HYDROCHLORIDE 2 MG/ML
0.2 INJECTION, SOLUTION INTRAMUSCULAR; INTRAVENOUS; SUBCUTANEOUS EVERY 5 MIN PRN
Status: COMPLETED | OUTPATIENT
Start: 2020-08-04 | End: 2020-08-04

## 2020-08-04 RX ORDER — ROCURONIUM BROMIDE 10 MG/ML
INJECTION, SOLUTION INTRAVENOUS
Status: DISCONTINUED | OUTPATIENT
Start: 2020-08-04 | End: 2020-08-04

## 2020-08-04 RX ORDER — DRONABINOL 2.5 MG/1
2.5 CAPSULE ORAL
Status: DISCONTINUED | OUTPATIENT
Start: 2020-08-04 | End: 2020-08-07 | Stop reason: HOSPADM

## 2020-08-04 RX ORDER — CELECOXIB 100 MG/1
400 CAPSULE ORAL
Status: COMPLETED | OUTPATIENT
Start: 2020-08-04 | End: 2020-08-04

## 2020-08-04 RX ORDER — ONDANSETRON 2 MG/ML
INJECTION INTRAMUSCULAR; INTRAVENOUS
Status: DISCONTINUED | OUTPATIENT
Start: 2020-08-04 | End: 2020-08-04

## 2020-08-04 RX ORDER — METOCLOPRAMIDE HYDROCHLORIDE 5 MG/ML
10 INJECTION INTRAMUSCULAR; INTRAVENOUS EVERY 10 MIN PRN
Status: COMPLETED | OUTPATIENT
Start: 2020-08-04 | End: 2020-08-04

## 2020-08-04 RX ORDER — PANTOPRAZOLE SODIUM 40 MG/1
40 TABLET, DELAYED RELEASE ORAL DAILY
Status: DISCONTINUED | OUTPATIENT
Start: 2020-08-04 | End: 2020-08-07 | Stop reason: HOSPADM

## 2020-08-04 RX ORDER — PNV NO.95/FERROUS FUM/FOLIC AC 28MG-0.8MG
100 TABLET ORAL DAILY
Status: DISCONTINUED | OUTPATIENT
Start: 2020-08-04 | End: 2020-08-07 | Stop reason: HOSPADM

## 2020-08-04 RX ORDER — OXYCODONE HYDROCHLORIDE 5 MG/1
5 TABLET ORAL EVERY 4 HOURS PRN
Status: DISCONTINUED | OUTPATIENT
Start: 2020-08-04 | End: 2020-08-05

## 2020-08-04 RX ORDER — OXYCODONE HYDROCHLORIDE 5 MG/1
10 TABLET ORAL EVERY 4 HOURS PRN
Status: DISCONTINUED | OUTPATIENT
Start: 2020-08-04 | End: 2020-08-05

## 2020-08-04 RX ORDER — SUCCINYLCHOLINE CHLORIDE 20 MG/ML
INJECTION INTRAMUSCULAR; INTRAVENOUS
Status: DISCONTINUED | OUTPATIENT
Start: 2020-08-04 | End: 2020-08-04

## 2020-08-04 RX ORDER — HEPARIN SODIUM 5000 [USP'U]/ML
5000 INJECTION, SOLUTION INTRAVENOUS; SUBCUTANEOUS
Status: COMPLETED | OUTPATIENT
Start: 2020-08-04 | End: 2020-08-04

## 2020-08-04 RX ORDER — DIPHENHYDRAMINE HYDROCHLORIDE 50 MG/ML
25 INJECTION INTRAMUSCULAR; INTRAVENOUS EVERY 6 HOURS PRN
Status: DISCONTINUED | OUTPATIENT
Start: 2020-08-04 | End: 2020-08-04 | Stop reason: HOSPADM

## 2020-08-04 RX ORDER — MEPERIDINE HYDROCHLORIDE 25 MG/ML
12.5 INJECTION INTRAMUSCULAR; INTRAVENOUS; SUBCUTANEOUS ONCE AS NEEDED
Status: COMPLETED | OUTPATIENT
Start: 2020-08-04 | End: 2020-08-04

## 2020-08-04 RX ORDER — ONDANSETRON 2 MG/ML
4 INJECTION INTRAMUSCULAR; INTRAVENOUS EVERY 6 HOURS PRN
Status: DISCONTINUED | OUTPATIENT
Start: 2020-08-04 | End: 2020-08-07 | Stop reason: HOSPADM

## 2020-08-04 RX ORDER — ACETAMINOPHEN 10 MG/ML
1000 INJECTION, SOLUTION INTRAVENOUS EVERY 8 HOURS
Status: DISPENSED | OUTPATIENT
Start: 2020-08-04 | End: 2020-08-05

## 2020-08-04 RX ORDER — CHLORHEXIDINE GLUCONATE ORAL RINSE 1.2 MG/ML
10 SOLUTION DENTAL 2 TIMES DAILY
Status: DISCONTINUED | OUTPATIENT
Start: 2020-08-04 | End: 2020-08-07 | Stop reason: HOSPADM

## 2020-08-04 RX ORDER — GABAPENTIN 300 MG/1
300 CAPSULE ORAL 3 TIMES DAILY
Status: DISCONTINUED | OUTPATIENT
Start: 2020-08-04 | End: 2020-08-07 | Stop reason: HOSPADM

## 2020-08-04 RX ORDER — BUPIVACAINE HYDROCHLORIDE 2.5 MG/ML
INJECTION, SOLUTION EPIDURAL; INFILTRATION; INTRACAUDAL
Status: DISCONTINUED | OUTPATIENT
Start: 2020-08-04 | End: 2020-08-04 | Stop reason: HOSPADM

## 2020-08-04 RX ORDER — MIDAZOLAM HYDROCHLORIDE 1 MG/ML
INJECTION, SOLUTION INTRAMUSCULAR; INTRAVENOUS
Status: DISCONTINUED | OUTPATIENT
Start: 2020-08-04 | End: 2020-08-04

## 2020-08-04 RX ORDER — DEXAMETHASONE SODIUM PHOSPHATE 4 MG/ML
INJECTION, SOLUTION INTRA-ARTICULAR; INTRALESIONAL; INTRAMUSCULAR; INTRAVENOUS; SOFT TISSUE
Status: DISCONTINUED | OUTPATIENT
Start: 2020-08-04 | End: 2020-08-04

## 2020-08-04 RX ORDER — SODIUM CHLORIDE, SODIUM LACTATE, POTASSIUM CHLORIDE, CALCIUM CHLORIDE 600; 310; 30; 20 MG/100ML; MG/100ML; MG/100ML; MG/100ML
INJECTION, SOLUTION INTRAVENOUS CONTINUOUS
Status: DISCONTINUED | OUTPATIENT
Start: 2020-08-04 | End: 2020-08-04

## 2020-08-04 RX ORDER — SODIUM CHLORIDE, SODIUM LACTATE, POTASSIUM CHLORIDE, CALCIUM CHLORIDE 600; 310; 30; 20 MG/100ML; MG/100ML; MG/100ML; MG/100ML
INJECTION, SOLUTION INTRAVENOUS CONTINUOUS PRN
Status: DISCONTINUED | OUTPATIENT
Start: 2020-08-04 | End: 2020-08-04

## 2020-08-04 RX ORDER — TAMSULOSIN HYDROCHLORIDE 0.4 MG/1
0.4 CAPSULE ORAL DAILY
Status: DISCONTINUED | OUTPATIENT
Start: 2020-08-04 | End: 2020-08-07 | Stop reason: HOSPADM

## 2020-08-04 RX ORDER — FENTANYL CITRATE 50 UG/ML
INJECTION, SOLUTION INTRAMUSCULAR; INTRAVENOUS
Status: DISCONTINUED | OUTPATIENT
Start: 2020-08-04 | End: 2020-08-04

## 2020-08-04 RX ORDER — PROPOFOL 10 MG/ML
VIAL (ML) INTRAVENOUS
Status: DISCONTINUED | OUTPATIENT
Start: 2020-08-04 | End: 2020-08-04

## 2020-08-04 RX ORDER — METRONIDAZOLE 500 MG/100ML
500 INJECTION, SOLUTION INTRAVENOUS
Status: COMPLETED | OUTPATIENT
Start: 2020-08-04 | End: 2020-08-04

## 2020-08-04 RX ORDER — NEOSTIGMINE METHYLSULFATE 1 MG/ML
INJECTION, SOLUTION INTRAVENOUS
Status: DISCONTINUED | OUTPATIENT
Start: 2020-08-04 | End: 2020-08-04

## 2020-08-04 RX ORDER — ONDANSETRON 2 MG/ML
4 INJECTION INTRAMUSCULAR; INTRAVENOUS EVERY 12 HOURS PRN
Status: DISCONTINUED | OUTPATIENT
Start: 2020-08-04 | End: 2020-08-04 | Stop reason: HOSPADM

## 2020-08-04 RX ADMIN — HYDROMORPHONE HYDROCHLORIDE 0.2 MG: 2 INJECTION INTRAMUSCULAR; INTRAVENOUS; SUBCUTANEOUS at 10:08

## 2020-08-04 RX ADMIN — VITAMIN B12 0.1 MG ORAL TABLET 100 MCG: 0.1 TABLET ORAL at 12:08

## 2020-08-04 RX ADMIN — SODIUM CHLORIDE, SODIUM LACTATE, POTASSIUM CHLORIDE, AND CALCIUM CHLORIDE: 600; 310; 30; 20 INJECTION, SOLUTION INTRAVENOUS at 09:08

## 2020-08-04 RX ADMIN — SUCCINYLCHOLINE CHLORIDE 100 MG: 20 INJECTION, SOLUTION INTRAMUSCULAR; INTRAVENOUS at 07:08

## 2020-08-04 RX ADMIN — PHENYLEPHRINE HYDROCHLORIDE 100 MCG: 10 INJECTION INTRAVENOUS at 07:08

## 2020-08-04 RX ADMIN — CHLORHEXIDINE GLUCONATE 0.12% ORAL RINSE 10 ML: 1.2 LIQUID ORAL at 12:08

## 2020-08-04 RX ADMIN — HYDROMORPHONE HYDROCHLORIDE 0.2 MG: 2 INJECTION INTRAMUSCULAR; INTRAVENOUS; SUBCUTANEOUS at 09:08

## 2020-08-04 RX ADMIN — PANTOPRAZOLE SODIUM 40 MG: 40 TABLET, DELAYED RELEASE ORAL at 12:08

## 2020-08-04 RX ADMIN — DRONABINOL 2.5 MG: 2.5 CAPSULE ORAL at 04:08

## 2020-08-04 RX ADMIN — CELECOXIB 400 MG: 100 CAPSULE ORAL at 06:08

## 2020-08-04 RX ADMIN — FENTANYL CITRATE 50 MCG: 50 INJECTION, SOLUTION INTRAMUSCULAR; INTRAVENOUS at 07:08

## 2020-08-04 RX ADMIN — CHLORHEXIDINE GLUCONATE 0.12% ORAL RINSE 10 ML: 1.2 LIQUID ORAL at 09:08

## 2020-08-04 RX ADMIN — BUPIVACAINE 266 MG: 13.3 INJECTION, SUSPENSION, LIPOSOMAL INFILTRATION at 08:08

## 2020-08-04 RX ADMIN — GABAPENTIN 800 MG: 400 CAPSULE ORAL at 06:08

## 2020-08-04 RX ADMIN — FENTANYL CITRATE 50 MCG: 50 INJECTION, SOLUTION INTRAMUSCULAR; INTRAVENOUS at 09:08

## 2020-08-04 RX ADMIN — CEFTRIAXONE SODIUM 2 G: 2 INJECTION, SOLUTION INTRAVENOUS at 07:08

## 2020-08-04 RX ADMIN — HEPARIN SODIUM 5000 UNITS: 5000 INJECTION INTRAVENOUS; SUBCUTANEOUS at 06:08

## 2020-08-04 RX ADMIN — TAMSULOSIN HYDROCHLORIDE 0.4 MG: 0.4 CAPSULE ORAL at 12:08

## 2020-08-04 RX ADMIN — PROPOFOL 140 MG: 10 INJECTION, EMULSION INTRAVENOUS at 07:08

## 2020-08-04 RX ADMIN — SODIUM CHLORIDE, SODIUM LACTATE, POTASSIUM CHLORIDE, AND CALCIUM CHLORIDE: .6; .31; .03; .02 INJECTION, SOLUTION INTRAVENOUS at 10:08

## 2020-08-04 RX ADMIN — MEPERIDINE HYDROCHLORIDE 12.5 MG: 25 INJECTION INTRAMUSCULAR; INTRAVENOUS; SUBCUTANEOUS at 09:08

## 2020-08-04 RX ADMIN — SODIUM CHLORIDE, SODIUM LACTATE, POTASSIUM CHLORIDE, AND CALCIUM CHLORIDE: 600; 310; 30; 20 INJECTION, SOLUTION INTRAVENOUS at 07:08

## 2020-08-04 RX ADMIN — METOCLOPRAMIDE 10 MG: 5 INJECTION, SOLUTION INTRAMUSCULAR; INTRAVENOUS at 09:08

## 2020-08-04 RX ADMIN — MIDAZOLAM 2 MG: 1 INJECTION INTRAMUSCULAR; INTRAVENOUS at 07:08

## 2020-08-04 RX ADMIN — SODIUM CHLORIDE, SODIUM LACTATE, POTASSIUM CHLORIDE, AND CALCIUM CHLORIDE: .6; .31; .03; .02 INJECTION, SOLUTION INTRAVENOUS at 11:08

## 2020-08-04 RX ADMIN — DEXAMETHASONE SODIUM PHOSPHATE 4 MG: 4 INJECTION, SOLUTION INTRA-ARTICULAR; INTRALESIONAL; INTRAMUSCULAR; INTRAVENOUS; SOFT TISSUE at 08:08

## 2020-08-04 RX ADMIN — OXYCODONE 10 MG: 5 TABLET ORAL at 12:08

## 2020-08-04 RX ADMIN — METRONIDAZOLE 500 MG: 500 SOLUTION INTRAVENOUS at 07:08

## 2020-08-04 RX ADMIN — ROCURONIUM BROMIDE 5 MG: 10 INJECTION, SOLUTION INTRAVENOUS at 07:08

## 2020-08-04 RX ADMIN — LIDOCAINE HYDROCHLORIDE 50 MG: 10 INJECTION, SOLUTION EPIDURAL; INFILTRATION; INTRACAUDAL; PERINEURAL at 07:08

## 2020-08-04 RX ADMIN — ROCURONIUM BROMIDE 25 MG: 10 INJECTION, SOLUTION INTRAVENOUS at 07:08

## 2020-08-04 RX ADMIN — OXYCODONE 5 MG: 5 TABLET ORAL at 07:08

## 2020-08-04 RX ADMIN — GABAPENTIN 300 MG: 300 CAPSULE ORAL at 09:08

## 2020-08-04 RX ADMIN — ACETAMINOPHEN 1000 MG: 10 INJECTION, SOLUTION INTRAVENOUS at 01:08

## 2020-08-04 RX ADMIN — ROBINUL 0.6 MG: 0.2 INJECTION INTRAMUSCULAR; INTRAVENOUS at 09:08

## 2020-08-04 RX ADMIN — NEOSTIGMINE METHYLSULFATE 4 MG: 1 INJECTION INTRAVENOUS at 09:08

## 2020-08-04 RX ADMIN — ACETAMINOPHEN 1000 MG: 10 INJECTION, SOLUTION INTRAVENOUS at 09:08

## 2020-08-04 RX ADMIN — ONDANSETRON 4 MG: 2 INJECTION, SOLUTION INTRAMUSCULAR; INTRAVENOUS at 08:08

## 2020-08-04 NOTE — ANESTHESIA PROCEDURE NOTES
Intubation  Performed by: Reed Avila CRNA  Authorized by: Adriel Stanton MD     Intubation:     Induction:  Intravenous    Intubated:  Postinduction    Mask Ventilation:  Easy mask    Attempts:  1    Attempted By:  CRNA    Method of Intubation:  Direct    Blade:  East 2    Laryngeal View Grade: Grade I - full view of chords      Difficult Airway Encountered?: No      Complications:  None    Airway Device:  Oral endotracheal tube    Airway Device Size:  7.5    Style/Cuff Inflation:  Cuffed

## 2020-08-04 NOTE — PLAN OF CARE
Patient remains free of falls and safety precautions maintained. Afebrile. Pain controlled. Dressing to RLQ intact. Patient voiding spontaneously. IV fluids per order. X2 abscess/boils noted to left upper back, wound care consult put in. Patient tolerating regular diet. Will continue to monitor. 12 hour chart check.

## 2020-08-04 NOTE — PLAN OF CARE
Ambulated slowly to preop. Pt has a little unsteady gait. Very frail. Also has difficulty swallowing r/t genetic trait. His mother had it. preop completed without incident.

## 2020-08-04 NOTE — OP NOTE
[facility]  Surgery Department  Operative Note    SUMMARY     Date of Procedure: 8/4/2020     Procedure:  1. Ileostomy closure     Surgeon(s) and Role:     * Familia Gonzalez MD - Primary    Assisting Surgeon: None    Pre-Operative Diagnosis: Rectal adenocarcinoma [C20]    Post-Operative Diagnosis: Post-Op Diagnosis Codes:     * Rectal adenocarcinoma [C20]    Anesthesia: General    Technical Procedures Used:   1. Ileostomy closure     Indications for Procedure:  64-year-old male with previous rectal adenocarcinoma and underwent ultra-low anterior resection with loop ileostomy who presents for definitive ileostomy closure    Findings of the Procedure:  Normal appearing ileostomy with some hernia sac around the loop of ileum consistent with a peristomal hernia without evidence of other organ herniation through this defect    Description of the Procedure:  Patient was brought to the operating placed supine on table.  General tracheal anesthesia was induced.  Patient was moved to lithotomy position.  The abdomen was then prepped and draped usual sterile fashion.  Preop surgical time-out was then performed confirming the correct patient, procedure and preop medications given.  Incision was made around the mucocutaneous junction circumferentially around the loop ileostomy.  Dissection was carried down and all of the adhesions were lysed between the loop of ileum and the fascia and subcutaneous tissues.  There was noted to be a small hernia sac associated with this consistent with a peristomal hernia although there was no other organs within this hernia sac.  Once the ileum was completely free of all adhesions with sharp dissection using a knife and Metzenbaum scissors, adhesions were checked within the abdominal cavity and there were none to the underlying fascia.  Loop of ileum was identified and an area just below where the subcutaneous adhesions were was chosen for an area of transection on each loop  of the ileum.  Mesenteric window was made at these locations and a FORTINO 75 blue load was used to transect the ileum on each side.  The mesentery between these cut ends were then taken with 0 silk sutures in usual fashion.  The terminal ileum small intestine were then taken and passed off the field for final pathology.  A side-to-side functional end-to-end stapled anastomosis was then performed between the cut ends of the ileum using a FORTINO 75 blue load for the common anastomosis and then a TA 60 blue load for the closure of the common enterotomy.  The TA staple line was then oversewn with a 2-0 Vicryl suture.  A 3-0 Vicryl suture was used to place a crotch stitch.  The mesenteric defect was then closed with a 3-0 Vicryl suture.  The anastomosis was found to be widely patent and hemostatic.  This placed back within the abdominal cavity.  Local infiltration was then completed with a mixture of Exparel, 0.25% bupivacaine plain and injectable saline.  Once this was accomplished, the fascia of the defect was closed with figure-of-eight 0 Prolene sutures.  Once this was accomplished the subcutaneous tissues were copiously irrigated.  The subcutaneous and skin was partially closed using a 2-0 Vicryl suture in a pursestring fashion in the deep dermal layer.  A Betadine-soaked Telfa wick was placed within the defect surgical dressings were applied.  The patient was then moved back in the supine position.  He was woken from general endotracheal anesthesia and taken to the postanesthesia care in stable condition.  He tolerated procedure well.  All sponge, needle and instrument counts were correct at the end of the case.    Significant Surgical Tasks Conducted by the Assistant(s), if Applicable: N/A    Complications: No    Estimated Blood Loss (EBL): 10 mL           Implants: * No implants in log *    Specimens:   Specimen (12h ago, onward)    None                  Condition: Good    Disposition: PACU - hemodynamically  stable.    Attestation: I performed the procedure.

## 2020-08-04 NOTE — ANESTHESIA RELEASE NOTE
"Anesthesia Release from PACU Note    Patient: Vincent Benton    Procedure(s) Performed: Procedure(s) (LRB):  CLOSURE, ILEOSTOMY (N/A)    Anesthesia type: general    Post pain: Adequate analgesia    Post assessment: no apparent anesthetic complications, tolerated procedure well and no evidence of recall    Last Vitals:   Visit Vitals  /74 (BP Location: Right arm, Patient Position: Lying)   Pulse 64   Temp 36.7 °C (98.1 °F) (Skin)   Resp 16   Ht 5' 10" (1.778 m)   Wt 58.8 kg (129 lb 10.1 oz)   SpO2 98%   BMI 18.60 kg/m²       Post vital signs: stable    Level of consciousness: responds to stimulation    Nausea/Vomiting: no nausea/no vomiting    Complications: none    Airway Patency: patent    Respiratory: unassisted    Cardiovascular: stable and blood pressure at baseline    Hydration: euvolemic     "

## 2020-08-04 NOTE — INTERVAL H&P NOTE
The patient has been examined and the H&P has been reviewed:    I concur with the findings and no changes have occurred since H&P was written.    Surgery risks, benefits and alternative options discussed and understood by patient/family.          Active Hospital Problems    Diagnosis  POA    Rectal adenocarcinoma [C20]  Yes      Resolved Hospital Problems   No resolved problems to display.

## 2020-08-04 NOTE — TRANSFER OF CARE
"Anesthesia Transfer of Care Note    Patient: Vincent Benton    Procedure(s) Performed: Procedure(s) (LRB):  CLOSURE, ILEOSTOMY (N/A)    Patient location: PACU    Anesthesia Type: general    Transport from OR: Transported from OR on room air with adequate spontaneous ventilation    Post pain: adequate analgesia    Post assessment: no apparent anesthetic complications    Post vital signs: stable    Level of consciousness: responds to stimulation    Nausea/Vomiting: no nausea/vomiting    Complications: none    Transfer of care protocol was followed      Last vitals:   Visit Vitals  /74 (BP Location: Right arm, Patient Position: Lying)   Pulse 64   Temp 36.7 °C (98.1 °F) (Skin)   Resp 16   Ht 5' 10" (1.778 m)   Wt 58.8 kg (129 lb 10.1 oz)   SpO2 98%   BMI 18.60 kg/m²     "

## 2020-08-04 NOTE — ANESTHESIA POSTPROCEDURE EVALUATION
Anesthesia Post Evaluation    Patient: Vincent Benton    Procedure(s) Performed: Procedure(s) (LRB):  CLOSURE, ILEOSTOMY (N/A)    Final Anesthesia Type: general    Patient location during evaluation: PACU  Patient participation: Yes- Able to Participate  Level of consciousness: awake and alert  Post-procedure vital signs: reviewed and stable  Pain management: adequate  Airway patency: patent  RENEA mitigation strategies: Extubation while patient is awake  PONV status at discharge: No PONV  Anesthetic complications: no      Cardiovascular status: hemodynamically stable  Respiratory status: spontaneous ventilation  Hydration status: euvolemic  Follow-up not needed.          Vitals Value Taken Time   BP 90/60 08/04/20 1553   Temp 36.6 °C (97.8 °F) 08/04/20 1553   Pulse 54 08/04/20 1553   Resp 18 08/04/20 1553   SpO2 97 % 08/04/20 1553         Event Time   Out of Recovery 11:40:00         Pain/Ana Score: Pain Rating Prior to Med Admin: 4 (8/4/2020  1:11 PM)  Pain Rating Post Med Admin: 4 (8/4/2020  1:11 PM)  Ana Score: 9 (8/4/2020 11:30 AM)

## 2020-08-05 ENCOUNTER — ANESTHESIA (OUTPATIENT)
Dept: SURGERY | Facility: HOSPITAL | Age: 65
DRG: 330 | End: 2020-08-05
Payer: MEDICARE

## 2020-08-05 ENCOUNTER — ANESTHESIA EVENT (OUTPATIENT)
Dept: SURGERY | Facility: HOSPITAL | Age: 65
DRG: 330 | End: 2020-08-05
Payer: MEDICARE

## 2020-08-05 PROBLEM — Z43.3 COLOSTOMY CARE: Status: RESOLVED | Noted: 2020-03-30 | Resolved: 2020-08-05

## 2020-08-05 PROBLEM — Z51.5 ENCOUNTER FOR PALLIATIVE CARE: Status: ACTIVE | Noted: 2020-08-05

## 2020-08-05 PROBLEM — L02.212 BACK ABSCESS: Status: ACTIVE | Noted: 2020-08-05

## 2020-08-05 PROBLEM — Z93.2 ILEOSTOMY IN PLACE: Chronic | Status: ACTIVE | Noted: 2020-08-05

## 2020-08-05 LAB
ANION GAP SERPL CALC-SCNC: 10 MMOL/L (ref 8–16)
BASOPHILS # BLD AUTO: 0.04 K/UL (ref 0–0.2)
BASOPHILS NFR BLD: 0.3 % (ref 0–1.9)
BUN SERPL-MCNC: 6 MG/DL (ref 8–23)
CALCIUM SERPL-MCNC: 8.9 MG/DL (ref 8.7–10.5)
CHLORIDE SERPL-SCNC: 101 MMOL/L (ref 95–110)
CO2 SERPL-SCNC: 26 MMOL/L (ref 23–29)
CREAT SERPL-MCNC: 0.6 MG/DL (ref 0.5–1.4)
CREAT SERPL-MCNC: 0.6 MG/DL (ref 0.5–1.4)
DIFFERENTIAL METHOD: ABNORMAL
EOSINOPHIL # BLD AUTO: 0.1 K/UL (ref 0–0.5)
EOSINOPHIL NFR BLD: 0.3 % (ref 0–8)
ERYTHROCYTE [DISTWIDTH] IN BLOOD BY AUTOMATED COUNT: 18.2 % (ref 11.5–14.5)
EST. GFR  (AFRICAN AMERICAN): >60 ML/MIN/1.73 M^2
EST. GFR  (AFRICAN AMERICAN): >60 ML/MIN/1.73 M^2
EST. GFR  (NON AFRICAN AMERICAN): >60 ML/MIN/1.73 M^2
EST. GFR  (NON AFRICAN AMERICAN): >60 ML/MIN/1.73 M^2
GLUCOSE SERPL-MCNC: 78 MG/DL (ref 70–110)
GRAM STN SPEC: NORMAL
HCT VFR BLD AUTO: 29.9 % (ref 40–54)
HGB BLD-MCNC: 8.9 G/DL (ref 14–18)
IMM GRANULOCYTES # BLD AUTO: 0.07 K/UL (ref 0–0.04)
IMM GRANULOCYTES NFR BLD AUTO: 0.5 % (ref 0–0.5)
LYMPHOCYTES # BLD AUTO: 0.6 K/UL (ref 1–4.8)
LYMPHOCYTES NFR BLD: 4.3 % (ref 18–48)
MCH RBC QN AUTO: 29.8 PG (ref 27–31)
MCHC RBC AUTO-ENTMCNC: 29.8 G/DL (ref 32–36)
MCV RBC AUTO: 100 FL (ref 82–98)
MONOCYTES # BLD AUTO: 1.2 K/UL (ref 0.3–1)
MONOCYTES NFR BLD: 8.1 % (ref 4–15)
NEUTROPHILS # BLD AUTO: 12.5 K/UL (ref 1.8–7.7)
NEUTROPHILS NFR BLD: 86.5 % (ref 38–73)
NRBC BLD-RTO: 0 /100 WBC
PLATELET # BLD AUTO: 287 K/UL (ref 150–350)
PMV BLD AUTO: 10 FL (ref 9.2–12.9)
POTASSIUM SERPL-SCNC: 3.8 MMOL/L (ref 3.5–5.1)
RBC # BLD AUTO: 2.99 M/UL (ref 4.6–6.2)
SODIUM SERPL-SCNC: 137 MMOL/L (ref 136–145)
WBC # BLD AUTO: 14.49 K/UL (ref 3.9–12.7)

## 2020-08-05 PROCEDURE — 99223 PR INITIAL HOSPITAL CARE,LEVL III: ICD-10-PCS | Mod: HCNC,,, | Performed by: PHYSICIAN ASSISTANT

## 2020-08-05 PROCEDURE — 87102 FUNGUS ISOLATION CULTURE: CPT | Mod: HCNC

## 2020-08-05 PROCEDURE — 63600175 PHARM REV CODE 636 W HCPCS: Mod: HCNC | Performed by: ANESTHESIOLOGY

## 2020-08-05 PROCEDURE — 99024 POSTOP FOLLOW-UP VISIT: CPT | Mod: HCNC,,, | Performed by: COLON & RECTAL SURGERY

## 2020-08-05 PROCEDURE — 10061 PR DRAIN SKIN ABSCESS COMPLIC: ICD-10-PCS | Mod: 79,HCNC,, | Performed by: COLON & RECTAL SURGERY

## 2020-08-05 PROCEDURE — 82565 ASSAY OF CREATININE: CPT | Mod: HCNC

## 2020-08-05 PROCEDURE — 99223 1ST HOSP IP/OBS HIGH 75: CPT | Mod: HCNC,,, | Performed by: PHYSICIAN ASSISTANT

## 2020-08-05 PROCEDURE — 63600175 PHARM REV CODE 636 W HCPCS: Mod: HCNC | Performed by: COLON & RECTAL SURGERY

## 2020-08-05 PROCEDURE — 36000704 HC OR TIME LEV I 1ST 15 MIN: Mod: HCNC | Performed by: COLON & RECTAL SURGERY

## 2020-08-05 PROCEDURE — 94760 N-INVAS EAR/PLS OXIMETRY 1: CPT | Mod: HCNC

## 2020-08-05 PROCEDURE — 87070 CULTURE OTHR SPECIMN AEROBIC: CPT | Mod: HCNC

## 2020-08-05 PROCEDURE — 37000009 HC ANESTHESIA EA ADD 15 MINS: Mod: HCNC | Performed by: COLON & RECTAL SURGERY

## 2020-08-05 PROCEDURE — 87076 CULTURE ANAEROBE IDENT EACH: CPT | Mod: 59,HCNC

## 2020-08-05 PROCEDURE — 37000008 HC ANESTHESIA 1ST 15 MINUTES: Mod: HCNC | Performed by: COLON & RECTAL SURGERY

## 2020-08-05 PROCEDURE — 10061 I&D ABSCESS COMP/MULTIPLE: CPT | Mod: 79,HCNC,, | Performed by: COLON & RECTAL SURGERY

## 2020-08-05 PROCEDURE — 25000003 PHARM REV CODE 250: Mod: HCNC | Performed by: PHYSICIAN ASSISTANT

## 2020-08-05 PROCEDURE — 87205 SMEAR GRAM STAIN: CPT | Mod: HCNC

## 2020-08-05 PROCEDURE — 36000705 HC OR TIME LEV I EA ADD 15 MIN: Mod: HCNC | Performed by: COLON & RECTAL SURGERY

## 2020-08-05 PROCEDURE — 25000003 PHARM REV CODE 250: Mod: HCNC | Performed by: COLON & RECTAL SURGERY

## 2020-08-05 PROCEDURE — 63600175 PHARM REV CODE 636 W HCPCS: Mod: HCNC | Performed by: NURSE ANESTHETIST, CERTIFIED REGISTERED

## 2020-08-05 PROCEDURE — 11000001 HC ACUTE MED/SURG PRIVATE ROOM: Mod: HCNC

## 2020-08-05 PROCEDURE — 71000033 HC RECOVERY, INTIAL HOUR: Mod: HCNC | Performed by: COLON & RECTAL SURGERY

## 2020-08-05 PROCEDURE — 87075 CULTR BACTERIA EXCEPT BLOOD: CPT | Mod: HCNC

## 2020-08-05 PROCEDURE — 99900035 HC TECH TIME PER 15 MIN (STAT): Mod: HCNC

## 2020-08-05 PROCEDURE — 25000003 PHARM REV CODE 250: Mod: HCNC | Performed by: NURSE ANESTHETIST, CERTIFIED REGISTERED

## 2020-08-05 PROCEDURE — 85025 COMPLETE CBC W/AUTO DIFF WBC: CPT | Mod: HCNC

## 2020-08-05 PROCEDURE — 80048 BASIC METABOLIC PNL TOTAL CA: CPT | Mod: HCNC

## 2020-08-05 PROCEDURE — 99024 PR POST-OP FOLLOW-UP VISIT: ICD-10-PCS | Mod: HCNC,,, | Performed by: COLON & RECTAL SURGERY

## 2020-08-05 RX ORDER — OXYCODONE AND ACETAMINOPHEN 10; 325 MG/1; MG/1
1 TABLET ORAL EVERY 6 HOURS PRN
Qty: 90 TABLET | Refills: 0 | Status: SHIPPED | OUTPATIENT
Start: 2020-08-05 | End: 2020-08-20 | Stop reason: SDUPTHER

## 2020-08-05 RX ORDER — OXYCODONE AND ACETAMINOPHEN 5; 325 MG/1; MG/1
1 TABLET ORAL
Status: DISCONTINUED | OUTPATIENT
Start: 2020-08-05 | End: 2020-08-05 | Stop reason: HOSPADM

## 2020-08-05 RX ORDER — MIDAZOLAM HYDROCHLORIDE 1 MG/ML
INJECTION, SOLUTION INTRAMUSCULAR; INTRAVENOUS
Status: DISCONTINUED | OUTPATIENT
Start: 2020-08-05 | End: 2020-08-05

## 2020-08-05 RX ORDER — ONDANSETRON 2 MG/ML
INJECTION INTRAMUSCULAR; INTRAVENOUS
Status: DISCONTINUED | OUTPATIENT
Start: 2020-08-05 | End: 2020-08-05

## 2020-08-05 RX ORDER — PROPOFOL 10 MG/ML
VIAL (ML) INTRAVENOUS
Status: DISCONTINUED | OUTPATIENT
Start: 2020-08-05 | End: 2020-08-05

## 2020-08-05 RX ORDER — OXYCODONE HYDROCHLORIDE 5 MG/1
10 TABLET ORAL EVERY 4 HOURS PRN
Status: DISCONTINUED | OUTPATIENT
Start: 2020-08-05 | End: 2020-08-07 | Stop reason: HOSPADM

## 2020-08-05 RX ORDER — BUPIVACAINE HYDROCHLORIDE AND EPINEPHRINE 2.5; 5 MG/ML; UG/ML
INJECTION, SOLUTION EPIDURAL; INFILTRATION; INTRACAUDAL; PERINEURAL
Status: DISCONTINUED | OUTPATIENT
Start: 2020-08-05 | End: 2020-08-05 | Stop reason: HOSPADM

## 2020-08-05 RX ORDER — HYDROMORPHONE HYDROCHLORIDE 2 MG/ML
0.2 INJECTION, SOLUTION INTRAMUSCULAR; INTRAVENOUS; SUBCUTANEOUS EVERY 5 MIN PRN
Status: DISCONTINUED | OUTPATIENT
Start: 2020-08-05 | End: 2020-08-05 | Stop reason: HOSPADM

## 2020-08-05 RX ORDER — KETOROLAC TROMETHAMINE 30 MG/ML
15 INJECTION, SOLUTION INTRAMUSCULAR; INTRAVENOUS EVERY 8 HOURS PRN
Status: DISCONTINUED | OUTPATIENT
Start: 2020-08-05 | End: 2020-08-05 | Stop reason: HOSPADM

## 2020-08-05 RX ORDER — MORPHINE SULFATE 2 MG/ML
5 INJECTION, SOLUTION INTRAMUSCULAR; INTRAVENOUS EVERY 6 HOURS PRN
Status: DISCONTINUED | OUTPATIENT
Start: 2020-08-05 | End: 2020-08-07 | Stop reason: HOSPADM

## 2020-08-05 RX ORDER — FENTANYL CITRATE 50 UG/ML
INJECTION, SOLUTION INTRAMUSCULAR; INTRAVENOUS
Status: DISCONTINUED | OUTPATIENT
Start: 2020-08-05 | End: 2020-08-05

## 2020-08-05 RX ORDER — KETAMINE HYDROCHLORIDE 10 MG/ML
INJECTION, SOLUTION INTRAMUSCULAR; INTRAVENOUS
Status: DISCONTINUED | OUTPATIENT
Start: 2020-08-05 | End: 2020-08-05

## 2020-08-05 RX ORDER — ONDANSETRON 2 MG/ML
4 INJECTION INTRAMUSCULAR; INTRAVENOUS DAILY PRN
Status: DISCONTINUED | OUTPATIENT
Start: 2020-08-05 | End: 2020-08-05 | Stop reason: HOSPADM

## 2020-08-05 RX ORDER — LIDOCAINE HYDROCHLORIDE 10 MG/ML
INJECTION, SOLUTION EPIDURAL; INFILTRATION; INTRACAUDAL; PERINEURAL
Status: DISCONTINUED | OUTPATIENT
Start: 2020-08-05 | End: 2020-08-05

## 2020-08-05 RX ORDER — SODIUM CHLORIDE, SODIUM LACTATE, POTASSIUM CHLORIDE, CALCIUM CHLORIDE 600; 310; 30; 20 MG/100ML; MG/100ML; MG/100ML; MG/100ML
INJECTION, SOLUTION INTRAVENOUS CONTINUOUS PRN
Status: DISCONTINUED | OUTPATIENT
Start: 2020-08-05 | End: 2020-08-05

## 2020-08-05 RX ADMIN — ENOXAPARIN SODIUM 40 MG: 40 INJECTION SUBCUTANEOUS at 09:08

## 2020-08-05 RX ADMIN — PANTOPRAZOLE SODIUM 40 MG: 40 TABLET, DELAYED RELEASE ORAL at 09:08

## 2020-08-05 RX ADMIN — HYDROMORPHONE HYDROCHLORIDE 0.2 MG: 2 INJECTION, SOLUTION INTRAMUSCULAR; INTRAVENOUS; SUBCUTANEOUS at 08:08

## 2020-08-05 RX ADMIN — HYDROMORPHONE HYDROCHLORIDE 0.2 MG: 2 INJECTION, SOLUTION INTRAMUSCULAR; INTRAVENOUS; SUBCUTANEOUS at 07:08

## 2020-08-05 RX ADMIN — PROPOFOL 50 MG: 10 INJECTION, EMULSION INTRAVENOUS at 07:08

## 2020-08-05 RX ADMIN — DRONABINOL 2.5 MG: 2.5 CAPSULE ORAL at 04:08

## 2020-08-05 RX ADMIN — KETAMINE HYDROCHLORIDE 20 MG: 10 INJECTION, SOLUTION INTRAMUSCULAR; INTRAVENOUS at 07:08

## 2020-08-05 RX ADMIN — MIDAZOLAM 2 MG: 1 INJECTION INTRAMUSCULAR; INTRAVENOUS at 06:08

## 2020-08-05 RX ADMIN — GABAPENTIN 300 MG: 300 CAPSULE ORAL at 09:08

## 2020-08-05 RX ADMIN — OXYCODONE 10 MG: 5 TABLET ORAL at 08:08

## 2020-08-05 RX ADMIN — GABAPENTIN 300 MG: 300 CAPSULE ORAL at 03:08

## 2020-08-05 RX ADMIN — CHLORHEXIDINE GLUCONATE 0.12% ORAL RINSE 10 ML: 1.2 LIQUID ORAL at 09:08

## 2020-08-05 RX ADMIN — LIDOCAINE HYDROCHLORIDE 50 MG: 10 INJECTION, SOLUTION EPIDURAL; INFILTRATION; INTRACAUDAL; PERINEURAL at 07:08

## 2020-08-05 RX ADMIN — VITAMIN B12 0.1 MG ORAL TABLET 100 MCG: 0.1 TABLET ORAL at 09:08

## 2020-08-05 RX ADMIN — FENTANYL CITRATE 50 MCG: 50 INJECTION, SOLUTION INTRAMUSCULAR; INTRAVENOUS at 07:08

## 2020-08-05 RX ADMIN — ONDANSETRON 4 MG: 2 INJECTION, SOLUTION INTRAMUSCULAR; INTRAVENOUS at 07:08

## 2020-08-05 RX ADMIN — MORPHINE SULFATE 45 MG: 30 TABLET, FILM COATED, EXTENDED RELEASE ORAL at 03:08

## 2020-08-05 RX ADMIN — OXYCODONE 5 MG: 5 TABLET ORAL at 04:08

## 2020-08-05 RX ADMIN — OXYCODONE 10 MG: 5 TABLET ORAL at 05:08

## 2020-08-05 RX ADMIN — TAMSULOSIN HYDROCHLORIDE 0.4 MG: 0.4 CAPSULE ORAL at 09:08

## 2020-08-05 RX ADMIN — SODIUM CHLORIDE, SODIUM LACTATE, POTASSIUM CHLORIDE, AND CALCIUM CHLORIDE: 600; 310; 30; 20 INJECTION, SOLUTION INTRAVENOUS at 07:08

## 2020-08-05 RX ADMIN — OXYCODONE 10 MG: 5 TABLET ORAL at 09:08

## 2020-08-05 NOTE — CONSULTS
Advance Care Planning    Consult Note  Palliative Medicine      Consult Requested By: Familia Gonzalez MD  Reason for Consult: Pain management     SUBJECTIVE:     History of Present Illness:  Vincent Bentno is a 64 y.o. year old with a history of stage III rectal cancer s/p completion of chemoradiation and low anterior resection who presented for planned ileostomy reversal. His neoplasm pain is managed in the Palliative Medicine clinic and we were consulted to assist with pain management while admitted. I met Mr. Benton in his room without family present. He reports his pain is currently unbearable at the anterior incision. He says that he was okay yesterday but today is so sore that is is preventing him from coughing and he is worried that getting up to walk will be intolerable. He reports a profound improvement in his back pain following I&D today. The pain was so severe that it was preventing him from sleeping because he could not lie on his back. He is prescribed MSER 45mg BID and oxycodone/ APAP 10mg q6hr PRN but was taking more than prescribed due to the abscess pain. He has since ran out of the Percocet and does not remember how many doses a day he was taking. He knows he should call us if he is needing more than prescribed in the future. He is hopeful with resuming his home regimen he will be able to get out of bed and abide by Dr. Gonzalez's recommendations. He has not passed gas yet and is hoping the Armenian food his daughter is bringing him today will help with bowel function. We discussed his pain regimen while admitted and my expectation that his pain will improve each day after surgery. Since he is out of the oxycodone/ APAP I have sent an early refill to the pharmacy. Since he does not have a code status on file I engaged him this discussion. He elects to be a FULL code especially since his cancer is now in remission. He has already completed a living will and HCPOA naming his wife Fara as  primary and sister Fara as alternate.      Past Medical History:   Diagnosis Date    Alcoholism     Anemia     Back pain     Encounter for blood transfusion 2018    Essential hypertension 2/4/2016    GERD (gastroesophageal reflux disease)     Hiatal hernia     Hypertension     Rectal cancer 9/11/2019     Past Surgical History:   Procedure Laterality Date    COLONOSCOPY N/A 9/3/2019    Procedure: COLONOSCOPY;  Surgeon: Isai Cetneno MD;  Location: [facility] ENDO;  Service: Endoscopy;  Laterality: N/A;    COLONOSCOPY N/A 1/10/2020    Procedure: COLONOSCOPY;  Surgeon: Familia Gonzalez MD;  Location: [facility] ENDO;  Service: General;  Laterality: N/A;    ESOPHAGOGASTRODUODENOSCOPY N/A 9/3/2019    Procedure: ESOPHAGOGASTRODUODENOSCOPY (EGD);  Surgeon: Isai Centeno MD;  Location: [facility];  Service: Endoscopy;  Laterality: N/A;    FLEXIBLE SIGMOIDOSCOPY  2/4/2020    Procedure: SIGMOIDOSCOPY, FLEXIBLE;  Surgeon: Familia Gonzalez MD;  Location: [facility];  Service: General;;    ILEOSTOMY Right 2/4/2020    Procedure: CREATION, ILEOSTOMY;  Surgeon: Familia Gonzalez MD;  Location: [facility] OR;  Service: General;  Laterality: Right;    ILEOSTOMY CLOSURE N/A 8/4/2020    Procedure: CLOSURE, ILEOSTOMY;  Surgeon: Familia Gonzalez MD;  Location: [facility] OR;  Service: General;  Laterality: N/A;    INJECTION OF ANESTHETIC AGENT INTO TISSUE PLANE DEFINED BY TRANSVERSUS ABDOMINIS MUSCLE N/A 2/4/2020    Procedure: BLOCK, TRANSVERSUS ABDOMINIS PLANE;  Surgeon: Familia Gonzalez MD;  Location: [facility] OR;  Service: General;  Laterality: N/A;    INSERTION OF TUNNELED CENTRAL VENOUS CATHETER (CVC) WITH SUBCUTANEOUS PORT Right 2/4/2020    Procedure: INSERTION, PORT-A-CATH;  Surgeon: Familia Gonzalez MD;  Location: [facility];  Service: General;  Laterality: Right;    JOINT REPLACEMENT Left 2009    MOBILIZATION OF SPLENIC FLEXURE  2/4/2020    Procedure: MOBILIZATION, SPLENIC FLEXURE;  Surgeon: Familia Gonzalez MD;  Location:  [facility] OR;  Service: General;;     Family History   Problem Relation Age of Onset    Cataracts Mother     Stroke Father     Hypertension Father      Social History     Socioeconomic History    Marital status:      Spouse name: Not on file    Number of children: Not on file    Years of education: Not on file    Highest education level: Not on file   Occupational History    Not on file   Social Needs    Financial resource strain: Not on file    Food insecurity     Worry: Not on file     Inability: Not on file    Transportation needs     Medical: Not on file     Non-medical: Not on file   Tobacco Use    Smoking status: Never Smoker    Smokeless tobacco: Never Used   Substance and Sexual Activity    Alcohol use: Yes     Comment: HOLD 72H PRIOR TO SURGERY    Drug use: No    Sexual activity: Never     Partners: Female   Lifestyle    Physical activity     Days per week: Not on file     Minutes per session: Not on file    Stress: Not on file   Relationships    Social connections     Talks on phone: Not on file     Gets together: Not on file     Attends Bahai service: Not on file     Active member of club or organization: Not on file     Attends meetings of clubs or organizations: Not on file     Relationship status: Not on file   Other Topics Concern    Not on file   Social History Narrative    Not on file      Review of patient's allergies indicates:  No Known Allergies    Medications:    Current Facility-Administered Medications:     acetaminophen 1,000 mg/100 mL (10 mg/mL) injection 1,000 mg, 1,000 mg, Intravenous, Q8H, Familia Gonzalez MD, Last Rate: 400 mL/hr at 08/04/20 2157, 1,000 mg at 08/04/20 2157    chlorhexidine 0.12 % solution 10 mL, 10 mL, Mouth/Throat, BID, Familia Gonzalez MD, 10 mL at 08/05/20 0947    cyanocobalamin tablet 100 mcg, 100 mcg, Oral, Daily, Familia Gonzalez MD, 100 mcg at 08/05/20 0947    dronabinoL capsule 2.5 mg, 2.5 mg, Oral, BID AC, Familia Gonzalez,  MD, 2.5 mg at 08/04/20 1649    enoxaparin injection 40 mg, 40 mg, Subcutaneous, Q24H, Familia Gonzalez MD, 40 mg at 08/05/20 0947    gabapentin capsule 300 mg, 300 mg, Oral, TID, Familia Gonzalez MD, 300 mg at 08/05/20 0947    morphine 12 hr tablet 45 mg, 45 mg, Oral, Once, Kristi Wing PA-C    [START ON 8/6/2020] morphine 12 hr tablet 45 mg, 45 mg, Oral, Q12H, Kristi Wing PA-C    morphine injection 5 mg, 5 mg, Intravenous, Q6H PRN, Kristi Wing PA-C    nozaseptin (NOZIN) nasal , , Each Nostril, BID, Familia Gonzalez MD    ondansetron injection 4 mg, 4 mg, Intravenous, Q6H PRN, Familia Gonzalez MD    oxyCODONE immediate release tablet 10 mg, 10 mg, Oral, Q4H PRN, Kristi Wing PA-C    pantoprazole EC tablet 40 mg, 40 mg, Oral, Daily, Familia Gonzalez MD, 40 mg at 08/05/20 0947    tamsulosin 24 hr capsule 0.4 mg, 0.4 mg, Oral, Daily, Familia Gonzalez MD, 0.4 mg at 08/05/20 0947    Facility-Administered Medications Ordered in Other Encounters:     alteplase injection 2 mg, 2 mg, Intra-Catheter, PRN, Mikel Sams MD    heparin, porcine (PF) 100 unit/mL injection flush 500 Units, 500 Units, Intravenous, PRN, Mikel Sams MD    sodium chloride 0.9% flush 10 mL, 10 mL, Intravenous, PRN, Mikel Sams MD    sodium chloride 0.9% flush 3 mL, 3 mL, Intravenous, PRN, Shilpa Yee MD    ROS:  Review of Systems   Constitutional: Positive for activity change. Negative for appetite change and fever.   HENT: Negative for sore throat and trouble swallowing.    Eyes: Negative for photophobia and visual disturbance.   Respiratory: Negative for cough and shortness of breath.    Cardiovascular: Negative for chest pain and leg swelling.   Gastrointestinal: Positive for abdominal pain. Negative for nausea.   Endocrine: Negative for polydipsia and polyphagia.   Genitourinary: Negative for difficulty urinating and dysuria.   Musculoskeletal: Negative for back  pain and gait problem.   Neurological: Negative for weakness and numbness.   Psychiatric/Behavioral: Negative for confusion. The patient is not nervous/anxious.        OBJECTIVE:     Physical Exam:  Vitals: Temp: 97.9 °F (36.6 °C) (08/05/20 1130)  Pulse: 65 (08/05/20 1130)  Resp: 18 (08/05/20 1130)  BP: 115/72 (08/05/20 1130)  SpO2: 97 % (08/05/20 1130)    Gen: well-developed, well-nourished, NAD  Head: atraumatic, normocephalic  Eyes: conjuctiva and sclera clear  Ears: hearing grossly intact with no external abnormality  Mouth: no mucositis, good dentition  Respiratory: CTAB, no wheezing or rhonchi  Heart: RRR  Abdomen: soft, NTTP, nondistended, normoactive BS  Pulses: 2+ in DP  Extremities: no edema, full ROM of all joints, no mottling  Neurologic: no focal deficits, CN II-XII grossly intact, normal coordination  Skin: no rashes or lesions  Psych: cooperative, normal mood and affect, normal attention span and concentration    Review of Symptoms    Symptom Assessment (ESAS 0-10 Scale)  Pain:  10  Dyspnea:  0  Anxiety:  0  Nausea:  0  Depression:  0  Anorexia:  0  Fatigue:  0  Insomnia:  0  Restlessness:  0  Agitation:  0     CAM / Delirium:  Negative  Diarrhea:  Negative    Bowel Management Plan (BMP):  No (postoperative bowel status)      Pain Assessment:  Location(s): abdomen    Abdomen       Abdomen Location:  Left and anterior       Quality (sore):        Quantity:  10/10 in intensity       Aggravating Factors:  Movement    ECOG Performance Status Grade:  1 - Ambulates, capable of light work    Advanced Directives:  Living Will:  Yes.  Copy on chart:  Yes    LaPOST: No    Do Not Resuscitate Status: No    Medical Power of : Yes     Agent's Name:  Fara Benton.  Agent's Contact Number:  [phone]    Decision Making:  Patient answered questions    Living Arrangements:  Lives with spouse      Labs:  WBC   Date Value Ref Range Status   08/05/2020 14.49 (H) 3.90 - 12.70 K/uL Final     Hemoglobin   Date  Value Ref Range Status   08/05/2020 8.9 (L) 14.0 - 18.0 g/dL Final     Hematocrit   Date Value Ref Range Status   08/05/2020 29.9 (L) 40.0 - 54.0 % Final     Mean Corpuscular Volume   Date Value Ref Range Status   08/05/2020 100 (H) 82 - 98 fL Final     Platelets   Date Value Ref Range Status   08/05/2020 287 150 - 350 K/uL Final       BMP  Lab Results   Component Value Date     08/05/2020    K 3.8 08/05/2020     08/05/2020    CO2 26 08/05/2020    BUN 6 (L) 08/05/2020    CREATININE 0.6 08/05/2020    CREATININE 0.6 08/05/2020    CALCIUM 8.9 08/05/2020    ANIONGAP 10 08/05/2020    ESTGFRAFRICA >60 08/05/2020    ESTGFRAFRICA >60 08/05/2020    EGFRNONAA >60 08/05/2020    EGFRNONAA >60 08/05/2020       Lab Results   Component Value Date    AST 29 07/31/2020    ALKPHOS 217 (H) 07/31/2020    BILITOT 0.3 07/31/2020       Albumin   Date Value Ref Range Status   07/31/2020 3.3 (L) 3.5 - 5.2 g/dL Final       Radiology:I have reviewed all pertinent imaging results/findings within the past 24 hours.  - Gastrograffin enema 7/6/20:  Ultra low rectal anastomosis appears intact. No contrast extravasation or leakage.    - CT chest, abdomen, pelvis 6/8/20:  1. Surgical changes of the rectum with primary anastomosis.  No CT evidence of local recurrence with presacral postoperative fluid collection.  No pelvic bulky adenopathy appreciated.  2. No new pulmonary nodules visualized with previously described 6 mm lingular nodule not visualized.  3. Left greater than right lung patchy ground-glass findings with small patchy airspace changes within the left lower lobe.  Findings concerning for infectious or inflammatory etiology.  4. Other findings as above.     ASSESSMENT   Vincent Benton is a 64 y.o. year old with a history of stage III rectal cancer s/p completion of chemoradiation and low anterior resection who presented for planned ileostomy reversal. His neoplasm pain is managed in the Palliative Medicine clinic and we  were consulted to assist with pain management while admitted.    PLAN   1. Neoplasm related pain  - Resume Morphine ER 45mg BID - one dose today since it is later in the day then start q12hr dosing tomorrow  - Agree with oxycodone 10mg q4hr PRN pain  - Start Morphine 5mg IV q6hr PRN pain unrelieved by oxycodone  - Continue gabapentin 300mg TID  - A record of the controlled substances prescribed and dispensed to the patient in [place] was reviewed in  by me (or my delegate). Last received MSER 15 and 30mg #60 on 8/2, and oxycodone/ APAP 10mg #90 on 7/20.   - I have refilled his oxycodone/ APAP 10mg and sent to [facility] pharmacy  - He understands we expect his pain to improve each day following surgery so will expect him to continue q6hr dosing with max 3 doses/ day when he is discharged.    2. Rectal cancer s/p ileostomy reversal  - Defer to Dr. Gonzalez  - He understands to resume bowel regimen once cleared by surgeon    3. Abscess on the back  - He reports immediate and profound improvement in pain following I&D  - Expect postoperative pain which is why I have increased his regimen from home regimen    4. Encounter for Palliative Care  - Code status: FULL  - HCPOA: wife Fara, alternate: sister Fara Benton  - We will arrange follow up in our clinic    Discussed case and visit details with Dr. Gonzalez.     Thank you for allowing Palliative Medicine to be involved in the care of Vincent Benton.         Medical decision making: HIGH based on high risk of death, untreated symptoms, high risk medications, managing side effects of medications or polypharmacy.      Plan required increased review of medication choice, interaction, dosing, frequency, and route due to patient complexity. Patient complexity increased by: Continuous use of opioids, At risk for delirium    16 min spent discussing code status and confirming surrogacy assignments.    Kristi Wing PA-C  Palliative Medicine

## 2020-08-05 NOTE — ANESTHESIA POSTPROCEDURE EVALUATION
Anesthesia Post Evaluation    Patient: Vincent Benton    Procedure(s) Performed: Procedure(s) (LRB):  INCISION AND DRAINAGE, BACK (Left)    Final Anesthesia Type: MAC    Patient location during evaluation: PACU  Patient participation: Yes- Able to Participate  Level of consciousness: awake and alert  Post-procedure vital signs: reviewed and stable  Pain management: adequate  Airway patency: patent  RENEA mitigation strategies: Extubation while patient is awake  PONV status at discharge: No PONV  Anesthetic complications: no      Cardiovascular status: hemodynamically stable  Respiratory status: spontaneous ventilation  Hydration status: euvolemic  Follow-up not needed.          Vitals Value Taken Time   /81 08/05/20 0807   Temp 36.2 °C (97.2 °F) 08/05/20 0807   Pulse 65 08/05/20 0809   Resp 24 08/05/20 0809   SpO2 96 % 08/05/20 0809   Vitals shown include unvalidated device data.      No case tracking events are documented in the log.      Pain/Ana Score: Pain Rating Prior to Med Admin: 7 (8/5/2020  8:05 AM)  Pain Rating Post Med Admin: 4 (8/4/2020 10:27 PM)  Ana Score: 10 (8/5/2020  8:00 AM)

## 2020-08-05 NOTE — ANESTHESIA PREPROCEDURE EVALUATION
08/05/2020  Vincent Benton is a 64 y.o., male.  Patient Active Problem List   Diagnosis    Chronic pain    Gastroesophageal reflux disease    Alcohol use    Iron deficiency anemia due to chronic blood loss    Rectal cancer    Rectal pain    Essential hypertension    Chemotherapy management, encounter for    Colostomy care    Cachexia    Rectal adenocarcinoma     Current Facility-Administered Medications on File Prior to Visit   Medication Dose Route Frequency Provider Last Rate Last Dose    acetaminophen 1,000 mg/100 mL (10 mg/mL) injection 1,000 mg  1,000 mg Intravenous Q8H Familia Gonzalez  mL/hr at 08/04/20 2157 1,000 mg at 08/04/20 2157    alteplase injection 2 mg  2 mg Intra-Catheter PRN Mikel Sams MD        chlorhexidine 0.12 % solution 10 mL  10 mL Mouth/Throat BID Familia Gonzalez MD   10 mL at 08/04/20 2157    cyanocobalamin tablet 100 mcg  100 mcg Oral Daily Familia oGnzalez MD   100 mcg at 08/04/20 1200    dronabinoL capsule 2.5 mg  2.5 mg Oral BID AC Familia Gonzalez MD   2.5 mg at 08/04/20 1649    enoxaparin injection 40 mg  40 mg Subcutaneous Q24H Familia Gonzalez MD        gabapentin capsule 300 mg  300 mg Oral TID Familia Gonzalez MD   300 mg at 08/04/20 2157    heparin, porcine (PF) 100 unit/mL injection flush 500 Units  500 Units Intravenous PRN Mikel Sams MD        lactated ringers infusion   Intravenous Continuous Familia Gonzalez MD 75 mL/hr at 08/04/20 2200      nozaseptin (NOZIN) nasal    Each Nostril BID Familia Gonzalez MD        ondansetron injection 4 mg  4 mg Intravenous Q6H PRN Familia Gonzalez MD        oxyCODONE immediate release tablet 10 mg  10 mg Oral Q4H PRN Familia Gonzalez MD   10 mg at 08/04/20 1200    oxyCODONE immediate release tablet 5 mg  5 mg Oral Q4H PRN Familia Gonzalez MD   5 mg  at 08/05/20 0445    pantoprazole EC tablet 40 mg  40 mg Oral Daily Familia Gonzalez MD   40 mg at 08/04/20 1200    sodium chloride 0.9% flush 10 mL  10 mL Intravenous PRN Mikel Sams MD        sodium chloride 0.9% flush 3 mL  3 mL Intravenous PRN Shilpa Yee MD        tamsulosin 24 hr capsule 0.4 mg  0.4 mg Oral Daily Familia Gonzalez MD   0.4 mg at 08/04/20 1200     Current Outpatient Medications on File Prior to Visit   Medication Sig Dispense Refill    amoxicillin (AMOXIL) 500 MG capsule Take 1 capsule (500 mg total) by mouth 3 (three) times daily. for 10 days 30 capsule 0    cyanocobalamin (VITAMIN B-12) 1000 MCG tablet Take 100 mcg by mouth once daily.      doxycycline (VIBRAMYCIN) 100 MG Cap Take 1 capsule (100 mg total) by mouth 2 (two) times daily with meals. Avoid lying down for 1 hour after taking. Avoid the sun. for 14 days 28 capsule 0    dronabinoL (MARINOL) 2.5 MG capsule Take 1 capsule (2.5 mg total) by mouth 2 (two) times daily before meals. 30 capsule 0    ferrous sulfate 324 mg (65 mg iron) TbEC Take 1 tablet (324 mg total) by mouth 2 (two) times daily. (Patient not taking: Reported on 8/3/2020) 60 tablet 0    losartan (COZAAR) 50 MG tablet TAKE 1 TABLET BY MOUTH EVERY MORNING 90 tablet 1    mirtazapine (REMERON) 15 MG tablet Take 1 tablet (15 mg total) by mouth every evening. For sleep and appetite 30 tablet 11    morphine (MS CONTIN) 15 MG 12 hr tablet Take 1 tablet (15 mg total) by mouth 2 (two) times daily. Take with Morphine 30 60 tablet 0    morphine (MS CONTIN) 30 MG 12 hr tablet Take 1 tablet (30 mg total) by mouth every 12 (twelve) hours. 60 tablet 0    ondansetron (ZOFRAN) 8 MG tablet Take 1 tablet (8 mg total) by mouth every 8 (eight) hours as needed for Nausea. 30 tablet 2    oxyCODONE-acetaminophen (PERCOCET)  mg per tablet Take 1 tablet by mouth every 6 (six) hours as needed for Pain. 90 tablet 0    pantoprazole (PROTONIX) 40 MG tablet Take 1 tablet  (40 mg total) by mouth once daily. 30 tablet 11    prochlorperazine (COMPAZINE) 5 MG tablet TAKE 1 TABLET(5 MG) BY MOUTH EVERY 6 HOURS AS NEEDED FOR NAUSEA 385 tablet 1    tamsulosin (FLOMAX) 0.4 mg Cap Take 1 capsule (0.4 mg total) by mouth once daily. 30 capsule 11     Past Surgical History:   Procedure Laterality Date    COLONOSCOPY N/A 9/3/2019    Procedure: COLONOSCOPY;  Surgeon: Isai Centeno MD;  Location: [facility] ENDO;  Service: Endoscopy;  Laterality: N/A;    COLONOSCOPY N/A 1/10/2020    Procedure: COLONOSCOPY;  Surgeon: Familia Gonzalez MD;  Location: [facility] ENDO;  Service: General;  Laterality: N/A;    ESOPHAGOGASTRODUODENOSCOPY N/A 9/3/2019    Procedure: ESOPHAGOGASTRODUODENOSCOPY (EGD);  Surgeon: Isai Centeno MD;  Location: [facility];  Service: Endoscopy;  Laterality: N/A;    FLEXIBLE SIGMOIDOSCOPY  2/4/2020    Procedure: SIGMOIDOSCOPY, FLEXIBLE;  Surgeon: Familia Gonzalez MD;  Location: [facility] OR;  Service: General;;    ILEOSTOMY Right 2/4/2020    Procedure: CREATION, ILEOSTOMY;  Surgeon: Familia Gonzalez MD;  Location: [facility] OR;  Service: General;  Laterality: Right;    INJECTION OF ANESTHETIC AGENT INTO TISSUE PLANE DEFINED BY TRANSVERSUS ABDOMINIS MUSCLE N/A 2/4/2020    Procedure: BLOCK, TRANSVERSUS ABDOMINIS PLANE;  Surgeon: Familia Gonzalez MD;  Location: [facility] OR;  Service: General;  Laterality: N/A;    INSERTION OF TUNNELED CENTRAL VENOUS CATHETER (CVC) WITH SUBCUTANEOUS PORT Right 2/4/2020    Procedure: INSERTION, PORT-A-CATH;  Surgeon: Familia Gonzalez MD;  Location: [facility] OR;  Service: General;  Laterality: Right;    JOINT REPLACEMENT Left 2009    MOBILIZATION OF SPLENIC FLEXURE  2/4/2020    Procedure: MOBILIZATION, SPLENIC FLEXURE;  Surgeon: Familia Gonzalez MD;  Location: [facility] OR;  Service: General;;       Pre-op Assessment    I have reviewed the Patient Summary Reports.     I have reviewed the Nursing Notes.    I have reviewed the Medications.     Review of  Systems  Anesthesia Hx:  No problems with previous Anesthesia  History of prior surgery of interest to airway management or planning: Previous anesthesia: General  Denies Personal Hx of Anesthesia complications.   Social:  Non-Smoker  Alcohol Use:    Hematology/Oncology:  Hematology Normal   Oncology Normal     EENT/Dental:EENT/Dental Normal   Cardiovascular:   Hypertension ECG has been reviewed.    Pulmonary:  Pulmonary Normal    Renal/:  Renal/ Normal     Hepatic/GI:   Hiatal Hernia, GERD    Musculoskeletal:  Musculoskeletal Normal    Neurological:  Neurology Normal    Endocrine:  Endocrine Normal    Dermatological:  Skin Normal    Psych:  Psychiatric Normal           Chemistry        Component Value Date/Time     07/31/2020 1031    K 5.0 07/31/2020 1031     07/31/2020 1031    CO2 26 07/31/2020 1031    BUN 12 07/31/2020 1031    CREATININE 0.7 07/31/2020 1031    GLU 98 07/31/2020 1031        Component Value Date/Time    CALCIUM 10.1 07/31/2020 1031    ALKPHOS 217 (H) 07/31/2020 1031    AST 29 07/31/2020 1031    ALT 17 07/31/2020 1031    BILITOT 0.3 07/31/2020 1031    ESTGFRAFRICA >60.0 07/31/2020 1031    EGFRNONAA >60.0 07/31/2020 1031          Chemistry        Component Value Date/Time     07/31/2020 1031    K 5.0 07/31/2020 1031     07/31/2020 1031    CO2 26 07/31/2020 1031    BUN 12 07/31/2020 1031    CREATININE 0.7 07/31/2020 1031    GLU 98 07/31/2020 1031        Component Value Date/Time    CALCIUM 10.1 07/31/2020 1031    ALKPHOS 217 (H) 07/31/2020 1031    AST 29 07/31/2020 1031    ALT 17 07/31/2020 1031    BILITOT 0.3 07/31/2020 1031    ESTGFRAFRICA >60.0 07/31/2020 1031    EGFRNONAA >60.0 07/31/2020 1031          \    Physical Exam  General:  Well nourished    Airway/Jaw/Neck:  Airway Findings: Mouth Opening: Normal Tongue: Normal  Mallampati: III      Dental:  Dental Findings: In tact   Chest/Lungs:  Chest/Lungs Clear    Heart/Vascular:  Heart Findings: Normal Heart murmur:  negative       Mental Status:  Mental Status Findings:  Cooperative, Alert and Oriented         Anesthesia Plan  Type of Anesthesia, risks & benefits discussed:  Anesthesia Type:  MAC  Patient's Preference:   Intra-op Monitoring Plan: standard ASA monitors  Intra-op Monitoring Plan Comments:   Post Op Pain Control Plan: multimodal analgesia  Post Op Pain Control Plan Comments:   Induction:   IV  Beta Blocker:  Patient is not currently on a Beta-Blocker (No further documentation required).       Informed Consent: Patient understands risks and agrees with Anesthesia plan.  Questions answered. Anesthesia consent signed with patient.  ASA Score: 2     Day of Surgery Review of History & Physical: I have interviewed and examined the patient. I have reviewed the patient's H&P dated:    H&P update referred to the surgeon.     Anesthesia Plan Notes: Grade I on 8/4/20        Ready For Surgery From Anesthesia Perspective.     Normal sinus rhythm   Incomplete right bundle branch block   Borderline Abnormal ECG   When compared with ECG of 13-APR-2018 07:17,   No significant change was found   Confirmed by DELIA WHITTEN MD ([id number]) on 1/28/2020 6:38:27 PM

## 2020-08-05 NOTE — PROGRESS NOTES
[facility]  Colorectal Surgery  Progress Note    Subjective:     History of Present Illness:  64-year-old male with previous rectal adenocarcinoma who previously had neoadjuvant chemo radiation and then underwent a low anterior resection with loop ileostomy followed by adjuvant chemotherapy who presents for ileostomy reversal after Gastrografin enema was negative for anastomotic leak    Post-Op Info:  Procedure(s) (LRB):  INCISION AND DRAINAGE, BACK (N/A)   Day of Surgery     Interval History: Tolerating diet. Pain well controlled. No bowel movement or flatus yet. Pt complained of pain at back abscess that was present prior to admission that was not mentioned prior to surgery.    Medications:  Continuous Infusions:   lactated ringers 75 mL/hr at 08/04/20 2200     Scheduled Meds:   acetaminophen  1,000 mg Intravenous Q8H    chlorhexidine  10 mL Mouth/Throat BID    cyanocobalamin  100 mcg Oral Daily    dronabinoL  2.5 mg Oral BID AC    enoxparin  40 mg Subcutaneous Q24H    gabapentin  300 mg Oral TID    nozaseptin   Each Nostril BID    pantoprazole  40 mg Oral Daily    tamsulosin  0.4 mg Oral Daily     PRN Meds:ondansetron, oxyCODONE, oxyCODONE     Review of patient's allergies indicates:  No Known Allergies  Objective:     Vital Signs (Most Recent):  Temp: 97.6 °F (36.4 °C) (08/05/20 0426)  Pulse: 67 (08/05/20 0426)  Resp: 18 (08/05/20 0445)  BP: (!) 142/76 (08/05/20 0426)  SpO2: 98 % (08/05/20 0426) Vital Signs (24h Range):  Temp:  [97.5 °F (36.4 °C)-98 °F (36.7 °C)] 97.6 °F (36.4 °C)  Pulse:  [50-75] 67  Resp:  [14-18] 18  SpO2:  [94 %-100 %] 98 %  BP: ()/(59-88) 142/76     Weight: 58.8 kg (129 lb 10.1 oz)  Body mass index is 18.6 kg/m².    Intake/Output - Last 3 Shifts       08/03 0700 - 08/04 0659 08/04 0700 - 08/05 0659    P.O.  100    I.V. (mL/kg)  2940 (50)    IV Piggyback  200    Total Intake(mL/kg)  3240 (55.1)    Urine (mL/kg/hr)  890 (0.6)    Blood  10    Total Output  900     Net  +2340          Urine Occurrence  1 x          Physical Exam  Constitutional:       Appearance: He is well-developed.   HENT:      Head: Normocephalic and atraumatic.   Eyes:      Conjunctiva/sclera: Conjunctivae normal.   Neck:      Musculoskeletal: Normal range of motion.      Thyroid: No thyromegaly.   Cardiovascular:      Rate and Rhythm: Normal rate and regular rhythm.   Pulmonary:      Effort: Pulmonary effort is normal. No respiratory distress.   Abdominal:      Comments: Soft, appropriately TTP; incision clean and dry with betadine soaked telf wick in place   Musculoskeletal: Normal range of motion.         General: No tenderness.   Skin:     General: Skin is warm and dry.      Capillary Refill: Capillary refill takes less than 2 seconds.      Findings: No rash.      Comments: + left lower back 7x4cm abscess which is fluctuant and very TTP; 2cm lipomatous mass 5cm superior to this which is round, soft and mobile, nontender   Neurological:      Mental Status: He is alert and oriented to person, place, and time.         Significant Labs:  CBC: No results for input(s): WBC, RBC, HGB, HCT, PLT, MCV, MCH, MCHC in the last 168 hours.  BMP:   Recent Labs   Lab 07/31/20  1031   GLU 98      K 5.0      CO2 26   BUN 12   CREATININE 0.7   CALCIUM 10.1     CMP:   Recent Labs   Lab 07/31/20  1031   GLU 98   CALCIUM 10.1   ALBUMIN 3.3*   PROT 8.2      K 5.0   CO2 26      BUN 12   CREATININE 0.7   ALKPHOS 217*   ALT 17   AST 29   BILITOT 0.3         Assessment/Plan:     * Ileostomy in place  S/p ileostomy closure on 8/4/2020    - Reg diet following I&D  - OOB, ambulation  - PO pain control  - Awaiting bowel function  - IS 10xs/hr while awake    Back abscess  Present prior to admission per patient, but not revealed to staff until following initial surgery for ileostomy closure.    - Will take to OR today for I&D of abscess  - NPO for now, diet after  - Will send cx's from OR  - Local wound care  postop     Rectal adenocarcinoma  Stable.  - no current treatment as pt has completed neoadj chemoXRT, Ultra low anterior resection with loop ileostomy, adj chemotx and now loop ileostomy reversal  - see plan for ileostomy in place    Essential hypertension  Continue appropriate home meds    Gastroesophageal reflux disease  Continue appropriate home meds    I&D of back abscess today.  -All risks, benefits and alternatives fully explained to patient. Risks include, but are not limited to, bleeding, infection, recurrent abscess, need for additional operation, damage to nerves, muscles perioperative MI, CVA and death.  All questions field and appropriately answered to patient's satisfaction.  Consent signed and placed on chart.      Familia Gonzalez MD  Colorectal Surgery  [facility]

## 2020-08-05 NOTE — CONSULTS
Received consult on this 65 y/o M patient due to present on admission wounds to left upper back. Per chart review, these wounds were large abscesses. Dr. Gonzalez was made aware and is bringing patient to OR this am for I&D. Will defer care to general surgery service. Please re-consult PRN.

## 2020-08-05 NOTE — TRANSFER OF CARE
"Anesthesia Transfer of Care Note    Patient: Vincent Benton    Procedure(s) Performed: Procedure(s) (LRB):  INCISION AND DRAINAGE, BACK (Left)    Patient location: PACU    Anesthesia Type: MAC    Transport from OR: Transported from OR on room air with adequate spontaneous ventilation    Post pain: adequate analgesia    Post assessment: no apparent anesthetic complications    Post vital signs: stable    Level of consciousness: responds to stimulation    Nausea/Vomiting: no nausea/vomiting    Complications: none    Transfer of care protocol was followed      Last vitals:   Visit Vitals  BP (!) 142/76 (BP Location: Right arm, Patient Position: Lying)   Pulse 67   Temp 36.4 °C (97.6 °F) (Oral)   Resp 18   Ht 5' 10" (1.778 m)   Wt 58.8 kg (129 lb 10.1 oz)   SpO2 98%   BMI 18.60 kg/m²     "

## 2020-08-05 NOTE — HPI
64-year-old male with previous rectal adenocarcinoma who previously had neoadjuvant chemo radiation and then underwent a low anterior resection with loop ileostomy followed by adjuvant chemotherapy who presents for ileostomy reversal after Gastrografin enema was negative for anastomotic leak

## 2020-08-05 NOTE — SUBJECTIVE & OBJECTIVE
Interval History: Tolerating diet. Pain well controlled. No bowel movement or flatus yet. Pt complained of pain at back abscess that was present prior to admission that was not mentioned prior to surgery.    Medications:  Continuous Infusions:   lactated ringers 75 mL/hr at 08/04/20 2200     Scheduled Meds:   acetaminophen  1,000 mg Intravenous Q8H    chlorhexidine  10 mL Mouth/Throat BID    cyanocobalamin  100 mcg Oral Daily    dronabinoL  2.5 mg Oral BID AC    enoxparin  40 mg Subcutaneous Q24H    gabapentin  300 mg Oral TID    nozaseptin   Each Nostril BID    pantoprazole  40 mg Oral Daily    tamsulosin  0.4 mg Oral Daily     PRN Meds:ondansetron, oxyCODONE, oxyCODONE     Review of patient's allergies indicates:  No Known Allergies  Objective:     Vital Signs (Most Recent):  Temp: 97.6 °F (36.4 °C) (08/05/20 0426)  Pulse: 67 (08/05/20 0426)  Resp: 18 (08/05/20 0445)  BP: (!) 142/76 (08/05/20 0426)  SpO2: 98 % (08/05/20 0426) Vital Signs (24h Range):  Temp:  [97.5 °F (36.4 °C)-98 °F (36.7 °C)] 97.6 °F (36.4 °C)  Pulse:  [50-75] 67  Resp:  [14-18] 18  SpO2:  [94 %-100 %] 98 %  BP: ()/(59-88) 142/76     Weight: 58.8 kg (129 lb 10.1 oz)  Body mass index is 18.6 kg/m².    Intake/Output - Last 3 Shifts       08/03 0700 - 08/04 0659 08/04 0700 - 08/05 0659    P.O.  100    I.V. (mL/kg)  2940 (50)    IV Piggyback  200    Total Intake(mL/kg)  3240 (55.1)    Urine (mL/kg/hr)  890 (0.6)    Blood  10    Total Output  900    Net  +2340          Urine Occurrence  1 x          Physical Exam  Constitutional:       Appearance: He is well-developed.   HENT:      Head: Normocephalic and atraumatic.   Eyes:      Conjunctiva/sclera: Conjunctivae normal.   Neck:      Musculoskeletal: Normal range of motion.      Thyroid: No thyromegaly.   Cardiovascular:      Rate and Rhythm: Normal rate and regular rhythm.   Pulmonary:      Effort: Pulmonary effort is normal. No respiratory distress.   Abdominal:      Comments: Soft,  appropriately TTP; incision clean and dry with betadine soaked telf wick in place   Musculoskeletal: Normal range of motion.         General: No tenderness.   Skin:     General: Skin is warm and dry.      Capillary Refill: Capillary refill takes less than 2 seconds.      Findings: No rash.      Comments: + left lower back 7x4cm abscess which is fluctuant and very TTP; 2cm lipomatous mass 5cm superior to this which is round, soft and mobile, nontender   Neurological:      Mental Status: He is alert and oriented to person, place, and time.         Significant Labs:  CBC: No results for input(s): WBC, RBC, HGB, HCT, PLT, MCV, MCH, MCHC in the last 168 hours.  BMP:   Recent Labs   Lab 07/31/20  1031   GLU 98      K 5.0      CO2 26   BUN 12   CREATININE 0.7   CALCIUM 10.1     CMP:   Recent Labs   Lab 07/31/20  1031   GLU 98   CALCIUM 10.1   ALBUMIN 3.3*   PROT 8.2      K 5.0   CO2 26      BUN 12   CREATININE 0.7   ALKPHOS 217*   ALT 17   AST 29   BILITOT 0.3

## 2020-08-05 NOTE — ASSESSMENT & PLAN NOTE
Stable.  - no current treatment as pt has completed neoadj chemoXRT, Ultra low anterior resection with loop ileostomy, adj chemotx and now loop ileostomy reversal  - see plan for ileostomy in place

## 2020-08-05 NOTE — PLAN OF CARE
Problem: Adult Inpatient Plan of Care  Goal: Plan of Care Review  Outcome: Ongoing, Not Progressing  Flowsheets (Taken 8/5/2020 9838)  Plan of Care Reviewed With: patient  Pt had no adverse events during shift. Pt free from falls. Call light in reach. SR's x2. Pain well controlled w/ PRN meds. Pt repositions independently and with encouragement. IVF's administered as ordered. VTE prophylaxis - pt refusing SCD's, ambulation encouraged, fluids promoted. NPO diet effective this morning, pt tolerating well. Abdominal incision dressing intact. Dried drainage noted, scant amount. Area marked. Abscesses on L upper back red with minimal moisture. VSS. Chart reviewed. Will continue to monitor.

## 2020-08-05 NOTE — ASSESSMENT & PLAN NOTE
Present prior to admission per patient, but not revealed to staff until following initial surgery for ileostomy closure.    - Will take to OR today for I&D of abscess  - NPO for now, diet after  - Will send cx's from OR  - Local wound care postop

## 2020-08-05 NOTE — HOSPITAL COURSE
08/04/2020: Underwent ileostomy closure.    08/05/2020: POD1. Tolerating diet. Pain well controlled. No bowel movement or flatus yet. Pt complained of pain at back abscess that was present prior to admission that was not mentioned prior to surgery. Taken to OR for left back abscess I&D.    08/06/2020: POD2. Tolerating diet. Pain much improved in back and abdomen. Passing flatus, no BM.    08/07/2020: POD3. Tolerating diet. Erythema and induration much improved, no discharge from wounds.Having BMs/flatus. Ready for discharge.

## 2020-08-05 NOTE — PLAN OF CARE
Pt resting on stretcher s/p I&D lt upper back performed under MAC anesthesia by Dr. Gonzalez. Respirations even and unlabored on room air with O2 sats of 96%. VSS. Will cont to monitor. See flow sheet for detailed assessment.

## 2020-08-05 NOTE — ASSESSMENT & PLAN NOTE
S/p ileostomy closure on 8/4/2020    - Reg diet following I&D  - OOB, ambulation  - PO pain control  - Awaiting bowel function  - IS 10xs/hr while awake

## 2020-08-06 LAB
ANION GAP SERPL CALC-SCNC: 10 MMOL/L (ref 8–16)
BASOPHILS # BLD AUTO: 0.03 K/UL (ref 0–0.2)
BASOPHILS NFR BLD: 0.3 % (ref 0–1.9)
BUN SERPL-MCNC: 4 MG/DL (ref 8–23)
CALCIUM SERPL-MCNC: 8.9 MG/DL (ref 8.7–10.5)
CHLORIDE SERPL-SCNC: 102 MMOL/L (ref 95–110)
CO2 SERPL-SCNC: 27 MMOL/L (ref 23–29)
CREAT SERPL-MCNC: 0.6 MG/DL (ref 0.5–1.4)
DIFFERENTIAL METHOD: ABNORMAL
EOSINOPHIL # BLD AUTO: 0.2 K/UL (ref 0–0.5)
EOSINOPHIL NFR BLD: 1.7 % (ref 0–8)
ERYTHROCYTE [DISTWIDTH] IN BLOOD BY AUTOMATED COUNT: 18.3 % (ref 11.5–14.5)
EST. GFR  (AFRICAN AMERICAN): >60 ML/MIN/1.73 M^2
EST. GFR  (NON AFRICAN AMERICAN): >60 ML/MIN/1.73 M^2
GLUCOSE SERPL-MCNC: 79 MG/DL (ref 70–110)
HCT VFR BLD AUTO: 30.5 % (ref 40–54)
HGB BLD-MCNC: 9.2 G/DL (ref 14–18)
IMM GRANULOCYTES # BLD AUTO: 0.06 K/UL (ref 0–0.04)
IMM GRANULOCYTES NFR BLD AUTO: 0.6 % (ref 0–0.5)
LYMPHOCYTES # BLD AUTO: 0.6 K/UL (ref 1–4.8)
LYMPHOCYTES NFR BLD: 6 % (ref 18–48)
MCH RBC QN AUTO: 30.3 PG (ref 27–31)
MCHC RBC AUTO-ENTMCNC: 30.2 G/DL (ref 32–36)
MCV RBC AUTO: 100 FL (ref 82–98)
MONOCYTES # BLD AUTO: 1 K/UL (ref 0.3–1)
MONOCYTES NFR BLD: 10 % (ref 4–15)
NEUTROPHILS # BLD AUTO: 7.8 K/UL (ref 1.8–7.7)
NEUTROPHILS NFR BLD: 81.4 % (ref 38–73)
NRBC BLD-RTO: 0 /100 WBC
PLATELET # BLD AUTO: 320 K/UL (ref 150–350)
PMV BLD AUTO: 10 FL (ref 9.2–12.9)
POTASSIUM SERPL-SCNC: 3.6 MMOL/L (ref 3.5–5.1)
RBC # BLD AUTO: 3.04 M/UL (ref 4.6–6.2)
SODIUM SERPL-SCNC: 139 MMOL/L (ref 136–145)
WBC # BLD AUTO: 9.61 K/UL (ref 3.9–12.7)

## 2020-08-06 PROCEDURE — 63600175 PHARM REV CODE 636 W HCPCS: Mod: HCNC | Performed by: COLON & RECTAL SURGERY

## 2020-08-06 PROCEDURE — 97116 GAIT TRAINING THERAPY: CPT | Mod: HCNC

## 2020-08-06 PROCEDURE — 25000003 PHARM REV CODE 250: Mod: HCNC | Performed by: PHYSICIAN ASSISTANT

## 2020-08-06 PROCEDURE — 99024 POSTOP FOLLOW-UP VISIT: CPT | Mod: HCNC,,, | Performed by: COLON & RECTAL SURGERY

## 2020-08-06 PROCEDURE — 99024 PR POST-OP FOLLOW-UP VISIT: ICD-10-PCS | Mod: HCNC,,, | Performed by: COLON & RECTAL SURGERY

## 2020-08-06 PROCEDURE — 97165 OT EVAL LOW COMPLEX 30 MIN: CPT | Mod: HCNC | Performed by: PHYSICAL THERAPIST

## 2020-08-06 PROCEDURE — 11000001 HC ACUTE MED/SURG PRIVATE ROOM: Mod: HCNC

## 2020-08-06 PROCEDURE — 36415 COLL VENOUS BLD VENIPUNCTURE: CPT | Mod: HCNC

## 2020-08-06 PROCEDURE — 85025 COMPLETE CBC W/AUTO DIFF WBC: CPT | Mod: HCNC

## 2020-08-06 PROCEDURE — 25000003 PHARM REV CODE 250: Mod: HCNC | Performed by: COLON & RECTAL SURGERY

## 2020-08-06 PROCEDURE — 63600175 PHARM REV CODE 636 W HCPCS: Mod: HCNC | Performed by: PHYSICIAN ASSISTANT

## 2020-08-06 PROCEDURE — 97530 THERAPEUTIC ACTIVITIES: CPT | Mod: HCNC | Performed by: PHYSICAL THERAPIST

## 2020-08-06 PROCEDURE — 99900035 HC TECH TIME PER 15 MIN (STAT): Mod: HCNC

## 2020-08-06 PROCEDURE — 21400001 HC TELEMETRY ROOM: Mod: HCNC

## 2020-08-06 PROCEDURE — 80048 BASIC METABOLIC PNL TOTAL CA: CPT | Mod: HCNC

## 2020-08-06 PROCEDURE — 97162 PT EVAL MOD COMPLEX 30 MIN: CPT | Mod: HCNC

## 2020-08-06 RX ORDER — VANCOMYCIN HCL IN 5 % DEXTROSE 1G/250ML
1000 PLASTIC BAG, INJECTION (ML) INTRAVENOUS
Status: DISCONTINUED | OUTPATIENT
Start: 2020-08-07 | End: 2020-08-07 | Stop reason: HOSPADM

## 2020-08-06 RX ADMIN — ENOXAPARIN SODIUM 40 MG: 40 INJECTION SUBCUTANEOUS at 04:08

## 2020-08-06 RX ADMIN — MORPHINE SULFATE 5 MG: 2 INJECTION, SOLUTION INTRAMUSCULAR; INTRAVENOUS at 06:08

## 2020-08-06 RX ADMIN — TAMSULOSIN HYDROCHLORIDE 0.4 MG: 0.4 CAPSULE ORAL at 08:08

## 2020-08-06 RX ADMIN — CHLORHEXIDINE GLUCONATE 0.12% ORAL RINSE 10 ML: 1.2 LIQUID ORAL at 08:08

## 2020-08-06 RX ADMIN — VITAMIN B12 0.1 MG ORAL TABLET 100 MCG: 0.1 TABLET ORAL at 08:08

## 2020-08-06 RX ADMIN — MORPHINE SULFATE 45 MG: 30 TABLET, FILM COATED, EXTENDED RELEASE ORAL at 08:08

## 2020-08-06 RX ADMIN — GABAPENTIN 300 MG: 300 CAPSULE ORAL at 02:08

## 2020-08-06 RX ADMIN — OXYCODONE 10 MG: 5 TABLET ORAL at 03:08

## 2020-08-06 RX ADMIN — OXYCODONE 10 MG: 5 TABLET ORAL at 04:08

## 2020-08-06 RX ADMIN — OXYCODONE 10 MG: 5 TABLET ORAL at 07:08

## 2020-08-06 RX ADMIN — DRONABINOL 2.5 MG: 2.5 CAPSULE ORAL at 06:08

## 2020-08-06 RX ADMIN — VANCOMYCIN HYDROCHLORIDE 1500 MG: 1.5 INJECTION, POWDER, LYOPHILIZED, FOR SOLUTION INTRAVENOUS at 02:08

## 2020-08-06 RX ADMIN — GABAPENTIN 300 MG: 300 CAPSULE ORAL at 08:08

## 2020-08-06 RX ADMIN — DRONABINOL 2.5 MG: 2.5 CAPSULE ORAL at 05:08

## 2020-08-06 RX ADMIN — MORPHINE SULFATE 5 MG: 2 INJECTION, SOLUTION INTRAMUSCULAR; INTRAVENOUS at 10:08

## 2020-08-06 RX ADMIN — OXYCODONE 10 MG: 5 TABLET ORAL at 12:08

## 2020-08-06 RX ADMIN — PANTOPRAZOLE SODIUM 40 MG: 40 TABLET, DELAYED RELEASE ORAL at 08:08

## 2020-08-06 NOTE — PLAN OF CARE
CM obtained preference for [facility] if indicated.    08/06/20 0800   Post-Acute Status   Post-Acute Authorization Home Health   Home Health Status   (preference for [facility])   Discharge Plan   Discharge Plan A Home with family   Discharge Plan B Home with family;Home Health

## 2020-08-06 NOTE — PLAN OF CARE
P.T. EVAL COMPLETE, PT CURRENTLY REQUIRES SBA FOR BED MOBILITY, CGA FOR TF'S AND GAIT, PT DECLINES RW USE, P.T. RECOMMENDS HHPT

## 2020-08-06 NOTE — PLAN OF CARE
Problem: Adult Inpatient Plan of Care  Goal: Plan of Care Review  Outcome: Ongoing, Progressing  Flowsheets (Taken 8/6/2020 0690)  Plan of Care Reviewed With: patient  Pt had no adverse events during shift. Pt free from falls. Call light in reach. SR's x2. Pain well controlled w/ PRN meds. Pt repositions independently and with encouragement. IV saline locked as ordered. VTE prophylaxis - SCD's in place, ambulation encouraged, fluids promoted. Abdominal incision dressing intact. Dried drainage noted, scant amount. Area marked. Back dressing intact. Moderate dried drainage noted, area marked, will continue to monitor. VSS. Chart reviewed. Will continue to monitor.

## 2020-08-06 NOTE — ASSESSMENT & PLAN NOTE
S/p ileostomy closure on 8/4/2020    - Reg diet   - HLIV  - OOB, ambulation  - PO pain control  - Awaiting full return of bowel function  - IS 10xs/hr while awake

## 2020-08-06 NOTE — PT/OT/SLP EVAL
Physical Therapy Evaluation    Patient Name:  Vincent Benton   MRN:  [id number]    Recommendations:     Discharge Recommendations:  home health PT   Discharge Equipment Recommendations: none(PT REPORTS HE DOES NOT WANT DME BECAUSE HE DOES NOT WANT TO BECOME DEPENDENT ON IT)   Barriers to discharge: None    Assessment:     Vincent Benton is a 64 y.o. male admitted with a medical diagnosis of Ileostomy in place.  He presents with the following impairments/functional limitations:  weakness, impaired endurance, impaired balance, gait instability, impaired functional mobilty, pain.    Rehab Prognosis: Good; patient would benefit from acute skilled PT services to address these deficits and reach maximum level of function.    Recent Surgery: Procedure(s) (LRB):  INCISION AND DRAINAGE, BACK (Left) 1 Day Post-Op    Plan:     During this hospitalization, patient to be seen 5 x/week to address the identified rehab impairments via therapeutic activities, therapeutic exercises, gait training and progress toward the following goals:    · Plan of Care Expires:  08/13/20    Subjective     Chief Complaint: PAIN  Patient/Family Comments/goals: RETURN HOME WITH FAMILY  Pain/Comfort:  · Pain Rating 1: 9/10  · Location 1: abdomen  · Pain Addressed 1: Nurse notified  · Pain Rating 2: 9/10  · Location 2: back  · Pain Addressed 2: Nurse notified    Patients cultural, spiritual, Druze conflicts given the current situation:      Living Environment:  PT LIVES WITH WIFE AND GRAND DAUGHTER, 1 STORY HOUSE NO STEPS, AMB INDEP LIMITED COMMUNITY DISTANCES, DOES NOT WORK, STILL DRIVES, INDEP WITH ADL'S  Prior to admission, patients level of function was INDEP.  Equipment used at home: grab bar.  DME owned (not currently used): none.  Upon discharge, patient will have assistance from WIFE.    Objective:     Communicated with NURSE JENNINSG prior to session.  Patient found supine with peripheral IV  upon PT entry to room.    General  Precautions: Standard, fall(PT HAS LLE LEG LENGTH DISCREPENCY, REQUIRED HEEL LIFT FOR LLE), PT HAS LAZY EYE FOR R EYE IN WHICH HE REPORTS VISION IS IMPAIRED  Orthopedic Precautions:    Braces: N/A     Exams:  · Cognitive Exam:  Patient is oriented to Person, Place, Time and Situation  · Postural Exam:  Patient presented with the following abnormalities:    · -       Rounded shoulders  · Sensation:    · -       Intact  · RLE ROM: WFL  · RLE Strength: GROSSLY 4/5  · LLE ROM: WFL  · LLE Strength: GROSSLY 4/5    Functional Mobility:  · Bed Mobility:     · Rolling Left:  stand by assistance  · Scooting: stand by assistance  · Supine to Sit: stand by assistance  · Transfers:     · Sit to Stand:  contact guard assistance with no AD  · Bed to Chair: contact guard assistance with  no AD  using  Step Transfer  · Gait: PT ' WITH CGA, NO AD PER HIS REQUEST, PT HOLDING ONTO RAIL IN HALLWAY FOR SAFETY, NO LOB OR SOB, C/O SEVERE PAIN TO ABD., LIMPING GAIT DUE TO NO HEEL LIFT PRESENT AT TIME OF EVAL  · Balance: FAIR    Therapeutic Activities and Exercises:   PT EDUCATED IN ROLE OF P.T. AND POC, PT DONNED SOCKS WITH JIMI, PT EDUCATED IN BLE THEREX TO PERFORM WHILE SEATED IN CHAIR, PT ENCOURAGED TO INCREASE TIME OOB IN CHAIR, PT AGREEABLE    AM-PAC 6 CLICK MOBILITY  Total Score:18     Patient left up in chair with all lines intact, call button in reach, chair alarm on and NURSE notified.    GOALS:   Multidisciplinary Problems     Physical Therapy Goals        Problem: Physical Therapy Goal    Goal Priority Disciplines Outcome Goal Variances Interventions   Physical Therapy Goal     PT, PT/OT      Description: LTG'S TO BE MET IN 7 DAYS (8-13-20)  1. PT WILL BE DANA WITH BED MOBILITY  2. PT WILL BE DANA WITH TF'S  3. PT WILL ' WITH SPV                   History:     Past Medical History:   Diagnosis Date    Alcoholism     Anemia     Back pain     Encounter for blood transfusion 2018    Essential hypertension 2/4/2016     GERD (gastroesophageal reflux disease)     Hiatal hernia     Hypertension     Rectal cancer 9/11/2019       Past Surgical History:   Procedure Laterality Date    COLONOSCOPY N/A 9/3/2019    Procedure: COLONOSCOPY;  Surgeon: Isai Centeno MD;  Location: [facility] ENDO;  Service: Endoscopy;  Laterality: N/A;    COLONOSCOPY N/A 1/10/2020    Procedure: COLONOSCOPY;  Surgeon: Familia Gonzalez MD;  Location: [facility] ENDO;  Service: General;  Laterality: N/A;    ESOPHAGOGASTRODUODENOSCOPY N/A 9/3/2019    Procedure: ESOPHAGOGASTRODUODENOSCOPY (EGD);  Surgeon: Isai Centeno MD;  Location: [facility] ENDO;  Service: Endoscopy;  Laterality: N/A;    FLEXIBLE SIGMOIDOSCOPY  2/4/2020    Procedure: SIGMOIDOSCOPY, FLEXIBLE;  Surgeon: Familia Gonzalez MD;  Location: [facility];  Service: General;;    ILEOSTOMY Right 2/4/2020    Procedure: CREATION, ILEOSTOMY;  Surgeon: Familia Gonzalez MD;  Location: [facility] OR;  Service: General;  Laterality: Right;    ILEOSTOMY CLOSURE N/A 8/4/2020    Procedure: CLOSURE, ILEOSTOMY;  Surgeon: Familia Gonzalez MD;  Location: [facility] OR;  Service: General;  Laterality: N/A;    INCISION AND DRAINAGE OF BACK Left 8/5/2020    Procedure: INCISION AND DRAINAGE, BACK;  Surgeon: Familia Gonzalez MD;  Location: [facility] OR;  Service: General;  Laterality: Left;    INJECTION OF ANESTHETIC AGENT INTO TISSUE PLANE DEFINED BY TRANSVERSUS ABDOMINIS MUSCLE N/A 2/4/2020    Procedure: BLOCK, TRANSVERSUS ABDOMINIS PLANE;  Surgeon: Familia Gonzalez MD;  Location: [facility] OR;  Service: General;  Laterality: N/A;    INSERTION OF TUNNELED CENTRAL VENOUS CATHETER (CVC) WITH SUBCUTANEOUS PORT Right 2/4/2020    Procedure: INSERTION, PORT-A-CATH;  Surgeon: Familia Gonzalez MD;  Location: [facility];  Service: General;  Laterality: Right;    JOINT REPLACEMENT Left 2009    MOBILIZATION OF SPLENIC FLEXURE  2/4/2020    Procedure: MOBILIZATION, SPLENIC FLEXURE;  Surgeon: Familia Gonzalez MD;  Location: [facility] OR;  Service:  General;;       Time Tracking:     PT Received On: 08/06/20  PT Start Time: 1025     PT Stop Time: 1050  PT Total Time (min): 25 min     Billable Minutes: Evaluation 15 and Gait Training 10    Sherri Worrell, PT  08/06/2020

## 2020-08-06 NOTE — PROGRESS NOTES
[facility]  Colorectal Surgery  Progress Note    Subjective:     History of Present Illness:  64-year-old male with previous rectal adenocarcinoma who previously had neoadjuvant chemo radiation and then underwent a low anterior resection with loop ileostomy followed by adjuvant chemotherapy who presents for ileostomy reversal after Gastrografin enema was negative for anastomotic leak    Post-Op Info:  Procedure(s) (LRB):  INCISION AND DRAINAGE, BACK (Left)   1 Day Post-Op     Interval History: Tolerating diet. Pain much improved in back and abdomen. Passing flatus, no BM.    Medications:  Continuous Infusions:  Scheduled Meds:   chlorhexidine  10 mL Mouth/Throat BID    cyanocobalamin  100 mcg Oral Daily    dronabinoL  2.5 mg Oral BID AC    enoxparin  40 mg Subcutaneous Q24H    gabapentin  300 mg Oral TID    morphine  45 mg Oral Q12H    nozaseptin   Each Nostril BID    pantoprazole  40 mg Oral Daily    tamsulosin  0.4 mg Oral Daily    vancomycin (VANCOCIN) IVPB  1,500 mg Intravenous Once     PRN Meds:morphine, ondansetron, oxyCODONE, Pharmacy to dose Vancomycin consult **AND** vancomycin - pharmacy to dose     Review of patient's allergies indicates:  No Known Allergies  Objective:     Vital Signs (Most Recent):  Temp: 98.5 °F (36.9 °C) (08/06/20 0734)  Pulse: 75 (08/06/20 0734)  Resp: 18 (08/06/20 1253)  BP: 128/74 (08/06/20 0734)  SpO2: 95 % (08/06/20 0734) Vital Signs (24h Range):  Temp:  [97.9 °F (36.6 °C)-98.5 °F (36.9 °C)] 98.5 °F (36.9 °C)  Pulse:  [63-88] 75  Resp:  [14-20] 18  SpO2:  [95 %-97 %] 95 %  BP: (106-128)/(61-74) 128/74     Weight: 58.8 kg (129 lb 10.1 oz)  Body mass index is 18.6 kg/m².    Intake/Output - Last 3 Shifts       08/04 0700 - 08/05 0659 08/05 0700 - 08/06 0659 08/06 0700 - 08/07 0659    P.O. 100 340     I.V. (mL/kg) 3355 (57.1) 900 (15.3)     IV Piggyback 200      Total Intake(mL/kg) 3655 (62.2) 1240 (21.1)     Urine (mL/kg/hr) 890 (0.6) 600 (0.4)     Blood  10 10     Total Output 900 610     Net +2755 +630            Urine Occurrence 1 x 2 x           Physical Exam  Constitutional:       Appearance: He is well-developed.   HENT:      Head: Normocephalic and atraumatic.   Eyes:      Conjunctiva/sclera: Conjunctivae normal.   Neck:      Musculoskeletal: Normal range of motion.      Thyroid: No thyromegaly.   Cardiovascular:      Rate and Rhythm: Normal rate and regular rhythm.   Pulmonary:      Effort: Pulmonary effort is normal. No respiratory distress.   Abdominal:      Comments: Soft, appropriately TTP; incision clean and dry without erythema or drainage   Musculoskeletal: Normal range of motion.         General: No tenderness.   Skin:     General: Skin is warm and dry.      Capillary Refill: Capillary refill takes less than 2 seconds.      Findings: No rash.      Comments: left lower back I&D site clean and dry with still significant surrounding erythema and induration, no fluctuance or purulence;2cm lipomatous mass 5cm superior to this which is round, soft and mobile, nontender   Neurological:      Mental Status: He is alert and oriented to person, place, and time.         Significant Labs:  CBC:   Recent Labs   Lab 08/06/20  0726   WBC 9.61   RBC 3.04*   HGB 9.2*   HCT 30.5*      *   MCH 30.3   MCHC 30.2*     BMP:   Recent Labs   Lab 08/06/20  0726   GLU 79      K 3.6      CO2 27   BUN 4*   CREATININE 0.6   CALCIUM 8.9     CMP:   Recent Labs   Lab 07/31/20  1031  08/06/20  0726   GLU 98   < > 79   CALCIUM 10.1   < > 8.9   ALBUMIN 3.3*  --   --    PROT 8.2  --   --       < > 139   K 5.0   < > 3.6   CO2 26   < > 27      < > 102   BUN 12   < > 4*   CREATININE 0.7   < > 0.6   ALKPHOS 217*  --   --    ALT 17  --   --    AST 29  --   --    BILITOT 0.3  --   --     < > = values in this interval not displayed.     Assessment/Plan:     * Ileostomy in place  S/p ileostomy closure on 8/4/2020    - Reg diet   - HLIV  - OOB, ambulation  -  PO pain control  - Awaiting full return of bowel function  - IS 10xs/hr while awake    Back abscess  Present prior to admission per patient, but not revealed to staff until following initial surgery for ileostomy closure, now s/p left back abscess I&D on 8/5/2020    - Cont Vanc given surrounding erythema/induration. No further drainage necessary given lack of purulence or fluctuance today  - Follow cultures  - Local wound care postop     Rectal adenocarcinoma  Stable.  - no current treatment as pt has completed neoadj chemoXRT, Ultra low anterior resection with loop ileostomy, adj chemotx and now loop ileostomy reversal  - see plan for ileostomy in place    Essential hypertension  Continue appropriate home meds    Gastroesophageal reflux disease  Continue appropriate home meds        Familia Gonzalez MD  Colorectal Surgery  [facility]

## 2020-08-06 NOTE — CONSULTS
Consulted to this 64 year old male patient who is s/p I&D to his back. PMHx of HTN, back pain, ileostomy. He underwent ileostomy reversal 8/4. S/P I&D to back yesterday for abscess. Left back I&D site measures about 1b9v7pc with moist red and yellow adipose wound bed. periwund indurated and erythematic, no fluctuance noted. Cleansed with saline and patted dry. Packed with saline moistened gauze, padded with dry gauze, then secured with tape. Patient tolerated well. Surgical dressing in place to abdomen, CDI. Will follow.    I&D site to left back:  1. Cleanse with saline  2. Pat dry  3. Pack with saline moistened gauze   4. Pad with dry gauze and secure with tape  5. Change daily

## 2020-08-06 NOTE — SUBJECTIVE & OBJECTIVE
Interval History: Tolerating diet. Pain much improved in back and abdomen. Passing flatus, no BM.    Medications:  Continuous Infusions:  Scheduled Meds:   chlorhexidine  10 mL Mouth/Throat BID    cyanocobalamin  100 mcg Oral Daily    dronabinoL  2.5 mg Oral BID AC    enoxparin  40 mg Subcutaneous Q24H    gabapentin  300 mg Oral TID    morphine  45 mg Oral Q12H    nozaseptin   Each Nostril BID    pantoprazole  40 mg Oral Daily    tamsulosin  0.4 mg Oral Daily    vancomycin (VANCOCIN) IVPB  1,500 mg Intravenous Once     PRN Meds:morphine, ondansetron, oxyCODONE, Pharmacy to dose Vancomycin consult **AND** vancomycin - pharmacy to dose     Review of patient's allergies indicates:  No Known Allergies  Objective:     Vital Signs (Most Recent):  Temp: 98.5 °F (36.9 °C) (08/06/20 0734)  Pulse: 75 (08/06/20 0734)  Resp: 18 (08/06/20 1253)  BP: 128/74 (08/06/20 0734)  SpO2: 95 % (08/06/20 0734) Vital Signs (24h Range):  Temp:  [97.9 °F (36.6 °C)-98.5 °F (36.9 °C)] 98.5 °F (36.9 °C)  Pulse:  [63-88] 75  Resp:  [14-20] 18  SpO2:  [95 %-97 %] 95 %  BP: (106-128)/(61-74) 128/74     Weight: 58.8 kg (129 lb 10.1 oz)  Body mass index is 18.6 kg/m².    Intake/Output - Last 3 Shifts       08/04 0700 - 08/05 0659 08/05 0700 - 08/06 0659 08/06 0700 - 08/07 0659    P.O. 100 340     I.V. (mL/kg) 3355 (57.1) 900 (15.3)     IV Piggyback 200      Total Intake(mL/kg) 3655 (62.2) 1240 (21.1)     Urine (mL/kg/hr) 890 (0.6) 600 (0.4)     Blood 10 10     Total Output 900 610     Net +2755 +630            Urine Occurrence 1 x 2 x           Physical Exam  Constitutional:       Appearance: He is well-developed.   HENT:      Head: Normocephalic and atraumatic.   Eyes:      Conjunctiva/sclera: Conjunctivae normal.   Neck:      Musculoskeletal: Normal range of motion.      Thyroid: No thyromegaly.   Cardiovascular:      Rate and Rhythm: Normal rate and regular rhythm.   Pulmonary:      Effort: Pulmonary effort is normal. No respiratory  distress.   Abdominal:      Comments: Soft, appropriately TTP; incision clean and dry without erythema or drainage   Musculoskeletal: Normal range of motion.         General: No tenderness.   Skin:     General: Skin is warm and dry.      Capillary Refill: Capillary refill takes less than 2 seconds.      Findings: No rash.      Comments: left lower back I&D site clean and dry with still significant surrounding erythema and induration, no fluctuance or purulence;2cm lipomatous mass 5cm superior to this which is round, soft and mobile, nontender   Neurological:      Mental Status: He is alert and oriented to person, place, and time.         Significant Labs:  CBC:   Recent Labs   Lab 08/06/20  0726   WBC 9.61   RBC 3.04*   HGB 9.2*   HCT 30.5*      *   MCH 30.3   MCHC 30.2*     BMP:   Recent Labs   Lab 08/06/20  0726   GLU 79      K 3.6      CO2 27   BUN 4*   CREATININE 0.6   CALCIUM 8.9     CMP:   Recent Labs   Lab 07/31/20  1031  08/06/20  0726   GLU 98   < > 79   CALCIUM 10.1   < > 8.9   ALBUMIN 3.3*  --   --    PROT 8.2  --   --       < > 139   K 5.0   < > 3.6   CO2 26   < > 27      < > 102   BUN 12   < > 4*   CREATININE 0.7   < > 0.6   ALKPHOS 217*  --   --    ALT 17  --   --    AST 29  --   --    BILITOT 0.3  --   --     < > = values in this interval not displayed.

## 2020-08-06 NOTE — PROGRESS NOTES
"[facility] -   Palliative Care       Patient Name: Vincent Benton  MRN: [id number]  Admission Date: 8/4/2020  Hospital Length of Stay: 2 days  Code Status: Full Code   Attending Provider: Familia Gonzalez MD  Palliative Care Provider: Kristi Wing PA-C  Primary Care Physician: Shakila Moran DO  Principal Problem:Ileostomy in place  Primary CM/SW: Elza Lorenzo, LESLEE    Met with patient to follow up from palliative med visit yesterday. Patient reports he is sore from surgery, but overall feeling better since Kristi Wing PA-C saw him. While he is feeling okay right now, he is nervous that getting up to walk around will cause pain to be intolerable. Patient also reports the food his daughter brought him did not help with bowel function as he thought it would. Patient states he knows he will have to "take things one day at a time". Provided support and encouragement. Scheduled palliative med f/u with Dr. Hill for same day as his next appt with Dr. Sams. Also provided patient with our direct contact information for any questions/needs that may arise prior to f/u.        Keerthi Bowen, MSW, [facility]  [phone]  "

## 2020-08-06 NOTE — PLAN OF CARE
Referral for [facility] Rolan Peng sent via go and Kimber, [facility] Liaison updated.    08/06/20 9514   Post-Acute Status   Post-Acute Authorization Home Health   Home Health Status Referrals Sent   Discharge Plan   Discharge Plan A Home Health

## 2020-08-06 NOTE — PLAN OF CARE
CM spoke with pt and to complete initial assessment. Independent of ADLs. Lives with wife and MPOA [phone] and 14 year old grand-daughter whom they are raising. Pt stated that his wife will be able to assist him when discharged home. Also states that he generally has HH when dc home. Informed will return to obtain preference if ordered by MD. Pt stated he would like to use [facility]. Denies discharge needs at this time. Updated white board with CM contact information provided. Transitional care folder given to pt, information on bedside delivery, [facility], discharge begins on admit pamphlet, and  advance directive information provided to pt. Instructed to call CM with questions or concerns.       D/c plan: home with family vs home health   Transport home: wife  PCP: [facility]  Bedside delivery: yes  [facility]: refused       08/06/20 0748   Discharge Assessment   Assessment Type Discharge Planning Assessment   Confirmed/corrected address and phone number on facesheet? Yes   Assessment information obtained from? Patient   Expected Length of Stay (days)   (TBD)   Communicated expected length of stay with patient/caregiver yes   Prior to hospitilization cognitive status: Alert/Oriented   Prior to hospitalization functional status: Independent   Current cognitive status: Alert/Oriented   Current Functional Status: Independent   Facility Arrived From: home   Lives With grandchild(jean pierre);spouse   Able to Return to Prior Arrangements yes   Is patient able to care for self after discharge? Yes   Who are your caregiver(s) and their phone number(s)? Akilah Ayala, wife and MPOA, [phone]   Patient's perception of discharge disposition home or selfcare   Readmission Within the Last 30 Days no previous admission in last 30 days   Patient currently being followed by outpatient case management? No   Patient currently receives any other outside agency services? No   Equipment Currently  Used at Home none   Do you have any problems affording any of your prescribed medications? No   Is the patient taking medications as prescribed? yes   Does the patient have transportation home? Yes   Transportation Anticipated family or friend will provide   Dialysis Name and Scheduled days na   Does the patient receive services at the [facility]? No   Discharge Plan A Home with family   Discharge Plan B Home with family;Home Health   DME Needed Upon Discharge  none   Patient/Family in Agreement with Plan yes

## 2020-08-06 NOTE — PROGRESS NOTES
Pharmacokinetic Initial Assessment: IV Vancomycin    Assessment/Plan:    Initiate intravenous vancomycin with loading dose of 1500 mg once followed by a maintenance dose of vancomycin 1000mg IV every 12 hours  Desired empiric serum trough concentration is 15 to 20 mcg/mL  Draw vancomycin trough level 30 min prior to fourth dose on 8/8 at approximately 0200  Pharmacy will continue to follow and monitor vancomycin.      Please contact pharmacy at extension [phone] with any questions regarding this assessment.     Thank you for the consult,   Vernell Peng       Patient brief summary:  Vincent Benton is a 64 y.o. male initiated on antimicrobial therapy with IV Vancomycin for treatment of suspected skin & soft tissue infection    Drug Allergies:   Review of patient's allergies indicates:  No Known Allergies    Actual Body Weight:   58.8kg    Renal Function:   Estimated Creatinine Clearance: 106.2 mL/min (based on SCr of 0.6 mg/dL).,     Dialysis Method (if applicable):  N/A    CBC (last 72 hours):  Recent Labs   Lab Result Units 08/05/20  0952 08/06/20  0726   WBC K/uL 14.49* 9.61   Hemoglobin g/dL 8.9* 9.2*   Hematocrit % 29.9* 30.5*   Platelets K/uL 287 320   Gran% % 86.5* 81.4*   Lymph% % 4.3* 6.0*   Mono% % 8.1 10.0   Eosinophil% % 0.3 1.7   Basophil% % 0.3 0.3   Differential Method  Automated Automated       Metabolic Panel (last 72 hours):  Recent Labs   Lab Result Units 08/05/20  0952 08/06/20  0726   Sodium mmol/L 137 139   Potassium mmol/L 3.8 3.6   Chloride mmol/L 101 102   CO2 mmol/L 26 27   Glucose mg/dL 78 79   BUN, Bld mg/dL 6* 4*   Creatinine mg/dL 0.6  0.6 0.6       Drug levels (last 3 results):  No results for input(s): VANCOMYCINRA, VANCOMYCINPE, VANCOMYCINTR in the last 72 hours.    Microbiologic Results:  Microbiology Results (last 7 days)     Procedure Component Value Units Date/Time    Fungus culture [[id number]] Collected: 08/05/20 0715    Order Status: Completed Specimen: Abscess from  Back Updated: 08/06/20 1353     Fungus (Mycology) Culture Culture in progress    Culture, Anaerobe [[id number]] Collected: 08/05/20 0715    Order Status: Completed Specimen: Abscess from Back Updated: 08/06/20 0731     Anaerobic Culture Culture in progress    Aerobic culture [[id number]] Collected: 08/05/20 0715    Order Status: Completed Specimen: Abscess from Back Updated: 08/06/20 0703     Aerobic Bacterial Culture No growth    Gram stain [[id number]] Collected: 08/05/20 0715    Order Status: Completed Specimen: Abscess from Back Updated: 08/05/20 1836     Gram Stain Result Few WBC's      Few Gram positive cocci      Few Gram negative rods

## 2020-08-06 NOTE — PT/OT/SLP EVAL
Occupational Therapy   Evaluation and Discharge Note    Name: Vincent Benton  MRN: [id number]  Admitting Diagnosis:  Ileostomy in place 1 Day Post-Op    Recommendations:     Discharge Recommendations: home health PT  Discharge Equipment Recommendations:  none  Barriers to discharge:  None    Assessment:     Vincent Benton is a 64 y.o. male with a medical diagnosis of Ileostomy in place. At this time, patient is functioning at their prior level of function and does not require further acute OT services.     Plan:     During this hospitalization, patient does not require further acute OT services.  Please re-consult if situation changes.    · Plan of Care Reviewed with: patient    Subjective     Chief Complaint: abdominal and back pain  Patient/Family Comments/goals: to return home     Occupational Profile:  Living Environment: lives with wife & 13 y/o grand-daughter in 1 story house with no steps  Previous level of function: (I) with ADLs and Ambulation  Roles and Routines: Drives, disabled  Equipment Used at home:  grab bar  Assistance upon Discharge: family    Pain/Comfort:  · Pain Rating 1: 9/10  · Location - Orientation 1: generalized  · Location 1: abdomen  · Pain Addressed 1: Nurse notified, Reposition, Distraction  · Pain Rating Post-Intervention 1: 9/10  · Pain Rating 2: 9/10  · Location - Orientation 2: upper  · Location 2: back  · Pain Addressed 2: Distraction, Reposition, Nurse notified  · Pain Rating Post-Intervention 2: 9/10    Patients cultural, spiritual, Church conflicts given the current situation:      Objective:     Communicated with: Nurse Caldwell and epic chart review prior to session.  Patient found HOB elevated with peripheral IV upon OT entry to room.    General Precautions: Standard, fall   Orthopedic Precautions:N/A   Braces: N/A     Occupational Performance:    Bed Mobility:    · Patient completed Rolling/Turning to Left with  stand by assistance  · Patient completed  "Rolling/Turning to Right with stand by assistance  · Patient completed Scooting/Bridging with stand by assistance  · Patient completed Supine to Sit with stand by assistance    Functional Mobility/Transfers:  · Patient completed Sit <> Stand Transfer with contact guard assistance  with  no assistive device   · Patient completed Bed <> Chair Transfer using Step Transfer technique with contact guard assistance with no assistive device  · Functional Mobility: ~150ft holding onto rail in [address] with CGA; pt refused RW and stated "I don't want to become dependent on that."     Activities of Daily Living:  · Grooming: independence - standing at sink  · Upper Body Dressing: modified independence - in standing  · Lower Body Dressing: modified independence - sitting in chair    Cognitive/Visual Perceptual:  Cognitive/Psychosocial Skills:     -       Oriented to: Person, Place, Time and Situation   -       Follows Commands/attention:Follows multistep  commands  -       Communication: clear/fluent  -       Memory: No Deficits noted  -       Safety awareness/insight to disability: intact   Visual/Perceptual:      -difficulty seeing out of (R) eye ,    Physical Exam:  Dominant hand:    -       right  Upper Extremity Range of Motion:     -       Right Upper Extremity: WFL  -       Left Upper Extremity: WFL  Upper Extremity Strength:    -       Right Upper Extremity: WFL  -       Left Upper Extremity: WFL   Strength:    -       Right Upper Extremity: WFL  -       Left Upper Extremity: WFL    AMPAC 6 Click ADL:  AMPAC Total Score: 24    Treatment & Education:  OT Eval completed as listed above. Pt functioning at high level and does not require OT services at this time, therefore discharged from OT.   Education:    Patient left up in chair with all lines intact, call button in reach, chair alarm on and Nurse Paulette notified    GOALS:   Multidisciplinary Problems     Occupational Therapy Goals     Not on file          "       History:     Past Medical History:   Diagnosis Date    Alcoholism     Anemia     Back pain     Encounter for blood transfusion 2018    Essential hypertension 2/4/2016    GERD (gastroesophageal reflux disease)     Hiatal hernia     Hypertension     Rectal cancer 9/11/2019       Past Surgical History:   Procedure Laterality Date    COLONOSCOPY N/A 9/3/2019    Procedure: COLONOSCOPY;  Surgeon: Isai Centeno MD;  Location: [facility] ENDO;  Service: Endoscopy;  Laterality: N/A;    COLONOSCOPY N/A 1/10/2020    Procedure: COLONOSCOPY;  Surgeon: Familia Gonzalez MD;  Location: [facility] ENDO;  Service: General;  Laterality: N/A;    ESOPHAGOGASTRODUODENOSCOPY N/A 9/3/2019    Procedure: ESOPHAGOGASTRODUODENOSCOPY (EGD);  Surgeon: Isai Centeno MD;  Location: [facility] ENDO;  Service: Endoscopy;  Laterality: N/A;    FLEXIBLE SIGMOIDOSCOPY  2/4/2020    Procedure: SIGMOIDOSCOPY, FLEXIBLE;  Surgeon: Familia Gonzalez MD;  Location: [facility];  Service: General;;    ILEOSTOMY Right 2/4/2020    Procedure: CREATION, ILEOSTOMY;  Surgeon: Familia Gonzalez MD;  Location: [facility] OR;  Service: General;  Laterality: Right;    ILEOSTOMY CLOSURE N/A 8/4/2020    Procedure: CLOSURE, ILEOSTOMY;  Surgeon: Familia Gonzalez MD;  Location: [facility] OR;  Service: General;  Laterality: N/A;    INCISION AND DRAINAGE OF BACK Left 8/5/2020    Procedure: INCISION AND DRAINAGE, BACK;  Surgeon: Familia Gonzaelz MD;  Location: [facility] OR;  Service: General;  Laterality: Left;    INJECTION OF ANESTHETIC AGENT INTO TISSUE PLANE DEFINED BY TRANSVERSUS ABDOMINIS MUSCLE N/A 2/4/2020    Procedure: BLOCK, TRANSVERSUS ABDOMINIS PLANE;  Surgeon: Familia Gonzalez MD;  Location: [facility] OR;  Service: General;  Laterality: N/A;    INSERTION OF TUNNELED CENTRAL VENOUS CATHETER (CVC) WITH SUBCUTANEOUS PORT Right 2/4/2020    Procedure: INSERTION, PORT-A-CATH;  Surgeon: Familia Gonzalez MD;  Location: [facility] OR;  Service: General;  Laterality: Right;    JOINT  REPLACEMENT Left 2009    MOBILIZATION OF SPLENIC FLEXURE  2/4/2020    Procedure: MOBILIZATION, SPLENIC FLEXURE;  Surgeon: Familia Gonzalez MD;  Location: [facility];  Service: General;;       Time Tracking:     OT Date of Treatment: 08/06/20  OT Start Time: 1020  OT Stop Time: 1045  OT Total Time (min): 25 min    Billable Minutes:Evaluation 15 min  Therapeutic Activity 10 min    Aleksandra Franco, PT/OT  8/6/2020

## 2020-08-06 NOTE — PLAN OF CARE
Patient remains free from falls and injuries. Iv antibiotics provided. Pain managed with oral and Iv pain medication. Surgical site clean dry and intact. Patient reports a bowel movement. Will continue to monitor.

## 2020-08-07 ENCOUNTER — TELEPHONE (OUTPATIENT)
Dept: SURGERY | Facility: CLINIC | Age: 65
End: 2020-08-07

## 2020-08-07 ENCOUNTER — DOCUMENTATION ONLY (OUTPATIENT)
Dept: HEMATOLOGY/ONCOLOGY | Facility: CLINIC | Age: 65
End: 2020-08-07

## 2020-08-07 VITALS
OXYGEN SATURATION: 97 % | HEART RATE: 77 BPM | BODY MASS INDEX: 18.56 KG/M2 | RESPIRATION RATE: 18 BRPM | HEIGHT: 70 IN | DIASTOLIC BLOOD PRESSURE: 76 MMHG | WEIGHT: 129.63 LBS | TEMPERATURE: 98 F | SYSTOLIC BLOOD PRESSURE: 127 MMHG

## 2020-08-07 LAB
ANION GAP SERPL CALC-SCNC: 9 MMOL/L (ref 8–16)
BASOPHILS # BLD AUTO: 0.05 K/UL (ref 0–0.2)
BASOPHILS NFR BLD: 0.8 % (ref 0–1.9)
BUN SERPL-MCNC: 3 MG/DL (ref 8–23)
CALCIUM SERPL-MCNC: 8.7 MG/DL (ref 8.7–10.5)
CHLORIDE SERPL-SCNC: 101 MMOL/L (ref 95–110)
CO2 SERPL-SCNC: 29 MMOL/L (ref 23–29)
CREAT SERPL-MCNC: 0.5 MG/DL (ref 0.5–1.4)
DIFFERENTIAL METHOD: ABNORMAL
EOSINOPHIL # BLD AUTO: 0.3 K/UL (ref 0–0.5)
EOSINOPHIL NFR BLD: 4.9 % (ref 0–8)
ERYTHROCYTE [DISTWIDTH] IN BLOOD BY AUTOMATED COUNT: 18.2 % (ref 11.5–14.5)
EST. GFR  (AFRICAN AMERICAN): >60 ML/MIN/1.73 M^2
EST. GFR  (NON AFRICAN AMERICAN): >60 ML/MIN/1.73 M^2
FINAL PATHOLOGIC DIAGNOSIS: NORMAL
GLUCOSE SERPL-MCNC: 85 MG/DL (ref 70–110)
GROSS: NORMAL
HCT VFR BLD AUTO: 29.7 % (ref 40–54)
HGB BLD-MCNC: 9 G/DL (ref 14–18)
IMM GRANULOCYTES # BLD AUTO: 0.04 K/UL (ref 0–0.04)
IMM GRANULOCYTES NFR BLD AUTO: 0.6 % (ref 0–0.5)
LYMPHOCYTES # BLD AUTO: 0.6 K/UL (ref 1–4.8)
LYMPHOCYTES NFR BLD: 9.3 % (ref 18–48)
MCH RBC QN AUTO: 30.4 PG (ref 27–31)
MCHC RBC AUTO-ENTMCNC: 30.3 G/DL (ref 32–36)
MCV RBC AUTO: 100 FL (ref 82–98)
MONOCYTES # BLD AUTO: 0.8 K/UL (ref 0.3–1)
MONOCYTES NFR BLD: 12.1 % (ref 4–15)
NEUTROPHILS # BLD AUTO: 4.6 K/UL (ref 1.8–7.7)
NEUTROPHILS NFR BLD: 72.3 % (ref 38–73)
NRBC BLD-RTO: 0 /100 WBC
PLATELET # BLD AUTO: 353 K/UL (ref 150–350)
PMV BLD AUTO: 10.1 FL (ref 9.2–12.9)
POTASSIUM SERPL-SCNC: 3.9 MMOL/L (ref 3.5–5.1)
RBC # BLD AUTO: 2.96 M/UL (ref 4.6–6.2)
SODIUM SERPL-SCNC: 139 MMOL/L (ref 136–145)
WBC # BLD AUTO: 6.37 K/UL (ref 3.9–12.7)

## 2020-08-07 PROCEDURE — 25000003 PHARM REV CODE 250: Mod: HCNC | Performed by: COLON & RECTAL SURGERY

## 2020-08-07 PROCEDURE — 25000003 PHARM REV CODE 250: Mod: HCNC | Performed by: PHYSICIAN ASSISTANT

## 2020-08-07 PROCEDURE — 80048 BASIC METABOLIC PNL TOTAL CA: CPT | Mod: HCNC

## 2020-08-07 PROCEDURE — 85025 COMPLETE CBC W/AUTO DIFF WBC: CPT | Mod: HCNC

## 2020-08-07 PROCEDURE — 99024 PR POST-OP FOLLOW-UP VISIT: ICD-10-PCS | Mod: HCNC,,, | Performed by: COLON & RECTAL SURGERY

## 2020-08-07 PROCEDURE — 63600175 PHARM REV CODE 636 W HCPCS: Mod: HCNC | Performed by: COLON & RECTAL SURGERY

## 2020-08-07 PROCEDURE — 97116 GAIT TRAINING THERAPY: CPT | Mod: HCNC

## 2020-08-07 PROCEDURE — 36415 COLL VENOUS BLD VENIPUNCTURE: CPT | Mod: HCNC

## 2020-08-07 PROCEDURE — 97110 THERAPEUTIC EXERCISES: CPT | Mod: HCNC

## 2020-08-07 PROCEDURE — 94799 UNLISTED PULMONARY SVC/PX: CPT | Mod: HCNC

## 2020-08-07 PROCEDURE — 99024 POSTOP FOLLOW-UP VISIT: CPT | Mod: HCNC,,, | Performed by: COLON & RECTAL SURGERY

## 2020-08-07 RX ORDER — SULFAMETHOXAZOLE AND TRIMETHOPRIM 800; 160 MG/1; MG/1
1 TABLET ORAL 2 TIMES DAILY
Qty: 20 TABLET | Refills: 0 | Status: SHIPPED | OUTPATIENT
Start: 2020-08-07 | End: 2020-08-17

## 2020-08-07 RX ADMIN — VITAMIN B12 0.1 MG ORAL TABLET 100 MCG: 0.1 TABLET ORAL at 08:08

## 2020-08-07 RX ADMIN — OXYCODONE 10 MG: 5 TABLET ORAL at 12:08

## 2020-08-07 RX ADMIN — MORPHINE SULFATE 45 MG: 30 TABLET, FILM COATED, EXTENDED RELEASE ORAL at 08:08

## 2020-08-07 RX ADMIN — CHLORHEXIDINE GLUCONATE 0.12% ORAL RINSE 10 ML: 1.2 LIQUID ORAL at 08:08

## 2020-08-07 RX ADMIN — OXYCODONE 10 MG: 5 TABLET ORAL at 11:08

## 2020-08-07 RX ADMIN — PANTOPRAZOLE SODIUM 40 MG: 40 TABLET, DELAYED RELEASE ORAL at 08:08

## 2020-08-07 RX ADMIN — VANCOMYCIN HYDROCHLORIDE 1000 MG: 1 INJECTION, POWDER, LYOPHILIZED, FOR SOLUTION INTRAVENOUS at 03:08

## 2020-08-07 RX ADMIN — OXYCODONE 10 MG: 5 TABLET ORAL at 06:08

## 2020-08-07 RX ADMIN — TAMSULOSIN HYDROCHLORIDE 0.4 MG: 0.4 CAPSULE ORAL at 08:08

## 2020-08-07 RX ADMIN — GABAPENTIN 300 MG: 300 CAPSULE ORAL at 11:08

## 2020-08-07 RX ADMIN — DRONABINOL 2.5 MG: 2.5 CAPSULE ORAL at 06:08

## 2020-08-07 NOTE — PT/OT/SLP PROGRESS
Physical Therapy  Treatment    Vincent Benton   MRN: [id number]   Admitting Diagnosis: Ileostomy in place    PT Received On: 08/07/20  PT Start Time: 0730     PT Stop Time: 0755    PT Total Time (min): 25 min       Billable Minutes:  Gait Training 15 and Therapeutic Exercise 10    Treatment Type: Treatment  PT/PTA: PT     PTA Visit Number: 0       General Precautions: Standard, fall  Orthopedic Precautions: N/A   Braces: N/A         Subjective:  Communicated with NURSE JENNINGS AND Epic CHART REVIEW  prior to session.  PT MET IN RM SUP IN BED AND AGREED TO TX     Pain/Comfort  Pain Rating 1: 7/10  Location 1: abdomen  Pain Rating Post-Intervention 1: 7/10  Pain Rating 2: 7/10  Location - Orientation 2: upper  Location 2: back  Pain Addressed 2: Reposition  Pain Rating Post-Intervention 2: 7/10    Objective:   Patient found with: peripheral IV    Functional Mobility:  PT MET IN RM SUP>SIT EOB WITH SBA. PT STOOD WITH NO AD AND GT BELT ON FOR SAFETY. PT GT TRAINED X 200' WITH NO AD HOWEVER USE OF HALLWAY RAILING WITH ANTALGIC GT WITH CGA>SBA. PT RETURNED TO RM T/F TO CHAIR WITH SBA. PT SEATED FOR REST AND THEN COMPLETED B LE TE X 15 REPS OF MIP, TKE, AP WITH EMPHASIS ON STRENGTHENING. PT LEFT SEATED IN CHAIR WITH ALL NEEDS MET AND CALL BELL IN REACH.     AM-PAC 6 CLICK MOBILITY  How much help from another person does this patient currently need?   1 = Unable, Total/Dependent Assistance  2 = A lot, Maximum/Moderate Assistance  3 = A little, Minimum/Contact Guard/Supervision  4 = None, Modified [place]/Independent    Turning over in bed (including adjusting bedclothes, sheets and blankets)?: 4  Sitting down on and standing up from a chair with arms (e.g., wheelchair, bedside commode, etc.): 4  Moving from lying on back to sitting on the side of the bed?: 4  Moving to and from a bed to a chair (including a wheelchair)?: 3  Need to walk in hospital room?: 3  Climbing 3-5 steps with a railing?: 1  Basic Mobility Total  Score: 19    AM-PAC Raw Score CMS G-Code Modifier Level of Impairment Assistance   6 % Total / Unable   7 - 9 CM 80 - 100% Maximal Assist   10 - 14 CL 60 - 80% Moderate Assist   15 - 19 CK 40 - 60% Moderate Assist   20 - 22 CJ 20 - 40% Minimal Assist   23 CI 1-20% SBA / CGA   24 CH 0% Independent/ Mod I     Patient left up in chair with call button in reach and chair alarm on.    Assessment:  PT DL TX WELL PROGRESSING WITH GT.     Rehab identified problem list/impairments: Rehab identified problem list/impairments: weakness, impaired endurance, gait instability, pain, impaired balance, impaired functional mobilty, decreased lower extremity function    Rehab potential is good.    Activity tolerance: Good    Discharge recommendations: Discharge Facility/Level of Care Needs: home health PT     Barriers to discharge:      Equipment recommendations: Equipment Needed After Discharge: walker, rolling     GOALS:   Multidisciplinary Problems     Physical Therapy Goals        Problem: Physical Therapy Goal    Goal Priority Disciplines Outcome Goal Variances Interventions   Physical Therapy Goal     PT, PT/OT Ongoing, Progressing     Description: LTG'S TO BE MET IN 7 DAYS (8-13-20)  1. PT WILL BE DANA WITH BED MOBILITY  2. PT WILL BE DANA WITH TF'S  3. PT WILL ' WITH SPV                   PLAN:    Patient to be seen 5 x/week  to address the above listed problems via gait training, therapeutic activities, therapeutic exercises  Plan of Care expires: 08/13/20  Plan of Care reviewed with: patient         Nataliia Hamilton, PT  08/07/2020

## 2020-08-07 NOTE — PLAN OF CARE
Pt to discharge home with [facility]. Set-up complete.    08/07/20 1400   Post-Acute Status   Post-Acute Authorization Home Health   Home Health Status Set-up Complete   Discharge Plan   Discharge Plan A Home with family;Home Health

## 2020-08-07 NOTE — TELEPHONE ENCOUNTER
----- Message from Paulette Adhikari RN sent at 8/7/2020  1:08 PM CDT -----  Patient will need a post op appointment set within 3 weeks.

## 2020-08-07 NOTE — NURSING
Patient stable for discharge per MD. IV removed. Patient received prescriptions from pharmacy and discharge instructions.Wet to dry dressing explained to wife. Patient discharging home.

## 2020-08-07 NOTE — PROGRESS NOTES
[facility] -   Palliative Care       Patient Name: Vincent Benton  MRN: [id number]  Admission Date: 8/4/2020  Hospital Length of Stay: 3 days  Code Status: Full Code   Attending Provider: Familia Gonzalez MD  Palliative Care Provider: Kristi Wing PA-C  Primary Care Physician: Shakila Moran DO  Principal Problem:Ileostomy in place  Primary CM/SW: Elza Lorenzo RN    Received call from patient regarding discharge medications. He states pharmacy did not have prescription for percocet. Contact pharmacy and resolved this issues. Called patient back to let him know percocet has been filled and will be delivered to bedside with his other medication. Also reminded patient about this f/u palliative care appt and encouraged him to contact us if any needs arise before then.        Keerthi Bowen, MSW, [facility]  [phone]

## 2020-08-07 NOTE — PLAN OF CARE
Plan of care reviewed with pt, pt verbalized understanding. IV site clean, dry, intact, no redness or swelling noted. Pt remains free of fall/trauma. Dressing to abdomen and back clean, dry, intact. Pain is controlled with current regimen. VSS, afebrile. Purposeful q2h rounding done throughout shift, safety maintained, call light in reach, bed locked and in lowest position, side rails up x2. Will continue to monitor, observe and report any changes.

## 2020-08-07 NOTE — PROGRESS NOTES
Sw met with patient at bedside to see if he had any discharge needs. Patient asked for a case of boost. Sw provided a case of Vanilla Boost to the bedside. Pt reports he feels much better and excited for discharge. Pt reports he is now cancer free and thanked  for all her help. Sw will f/u as needed.

## 2020-08-07 NOTE — PHYSICIAN QUERY
PT Name: Vincent Benton  MR #: [id number]    Pathology Findings Clarification     CDS/: Karol Naik               Contact information: [email or web address]  This form is a permanent document in the medical record.     Query Date: August 7, 2020    By submitting this query, we are merely seeking further clarification of documentation.  Please utilize your independent clinical judgment when addressing the question(s) below.    The medical record contains the following:  Pathology Findings Location in Medical Record   Final Pathologic Diagnosis:  Ileostomy and small intestines, segmental resection:  Intact ileostomy with mild chronic mucosal erosion at ileostomy site, minimal chronic serositis  and submucosal vascular congestion  Margins histologically viable  Negative for dysplasia or malignancy  Pathology Report Collection Date: 08/04, Reported Date: 08/07       Please clarify:  [x  ] Pathology findings noted above are ruled in/confirmed as diagnoses   [  ] Pathology findings noted above are not confirmed as diagnoses   [  ] Other diagnosis (please specify): ___________   [  ] Clinically Undetermined       Please document in your progress notes daily for the duration of treatment until resolved and include in your discharge summary.

## 2020-08-07 NOTE — DISCHARGE SUMMARY
[facility] -   Colorectal Surgery  Discharge Summary      Patient Name: Vincent Benton  MRN: [id number]  Admission Date: 8/4/2020  Hospital Length of Stay: 3 days  Discharge Date and Time:  08/07/2020 12:53 PM  Attending Physician: Esau Mendoza MD   Discharging Provider: Esau Mendoza MD  Primary Care Provider: Shakila Moran DO    HPI:   64-year-old male with previous rectal adenocarcinoma who previously had neoadjuvant chemo radiation and then underwent a low anterior resection with loop ileostomy followed by adjuvant chemotherapy who presents for ileostomy reversal after Gastrografin enema was negative for anastomotic leak    Procedure(s) (LRB):  INCISION AND DRAINAGE, BACK (Left)      Indwelling Lines/Drains at time of discharge:   Lines/Drains/Airways     Central Venous Catheter Line            Port A Cath Single Lumen right subclavian -- days         Port A Cath Single Lumen 02/04/20 0800 right subclavian 185 days          Drain                 Ileostomy 02/04/20 1623  days              Hospital Course: 08/04/2020: Underwent ileostomy closure.    08/05/2020: POD1. Tolerating diet. Pain well controlled. No bowel movement or flatus yet. Pt complained of pain at back abscess that was present prior to admission that was not mentioned prior to surgery. Taken to OR for left back abscess I&D.    08/06/2020: POD2. Tolerating diet. Pain much improved in back and abdomen. Passing flatus, no BM.    08/07/2020: POD3. Tolerating diet. Erythema and induration much improved, no discharge from wounds.Having BMs/flatus. Ready for discharge.     Consults:   Consults (From admission, onward)        Status Ordering Provider     Inpatient consult to Palliative Care  Once     Provider:  Kristi Wing PA-C    Completed ESAU MENDOZA     Pharmacy to dose Vancomycin consult  Once     Provider:  (Not yet assigned)    Acknowledged ESAU MENDOZA          Significant Diagnostic Studies:  Labs:   BMP:   Recent Labs   Lab 08/06/20  0726 08/07/20  0713   GLU 79 85    139   K 3.6 3.9    101   CO2 27 29   BUN 4* 3*   CREATININE 0.6 0.5   CALCIUM 8.9 8.7   , CMP   Recent Labs   Lab 08/06/20  0726 08/07/20  0713    139   K 3.6 3.9    101   CO2 27 29   GLU 79 85   BUN 4* 3*   CREATININE 0.6 0.5   CALCIUM 8.9 8.7   ANIONGAP 10 9   ESTGFRAFRICA >60 >60   EGFRNONAA >60 >60    and INR   Lab Results   Component Value Date    INR 0.9 11/06/2018    INR 1.0 04/13/2018    INR 0.9 11/16/2017       Pending Diagnostic Studies:     None        Final Active Diagnoses:    Diagnosis Date Noted POA    PRINCIPAL PROBLEM:  Ileostomy in place [Z93.2] 08/05/2020 Not Applicable     Chronic    Back abscess [L02.212] 08/05/2020 Yes    Encounter for palliative care [Z51.5] 08/05/2020 Not Applicable    Rectal adenocarcinoma [C20] 08/04/2020 Yes    Essential hypertension [I10] 02/04/2020 Yes     Chronic    Gastroesophageal reflux disease [K21.9] 11/07/2018 Yes    Neoplasm related pain [G89.3] 02/04/2016 Yes      Problems Resolved During this Admission:      Discharged Condition: good    Disposition: Home or Self Care    Follow Up:  Follow-up Information     Familia Gonzalez MD In 3 weeks.    Specialties: Colon and Rectal Surgery, General Surgery  Why: postop check  Contact information:  [address] [place] [address] [address] Rolan [place] [id number] [phone]                 Patient Instructions:      Diet Adult Regular     Other restrictions (specify):   Order Comments: No lifting greater than 10 lb for 6 weeks after surgery    Showers only for 4 weeks, no baths or submerging incisions underneath water     Notify your health care provider if you experience any of the following:  temperature >100.4     Notify your health care provider if you experience any of the following:  increased confusion or weakness     Notify your health care provider if you experience any of the following:  persistent dizziness,  light-headedness, or visual disturbances     Notify your health care provider if you experience any of the following:  worsening rash     Notify your health care provider if you experience any of the following:  severe persistent headache     Notify your health care provider if you experience any of the following:  difficulty breathing or increased cough     Notify your health care provider if you experience any of the following:  redness, tenderness, or signs of infection (pain, swelling, redness, odor or green/yellow discharge around incision site)     Notify your health care provider if you experience any of the following:  severe uncontrolled pain     Notify your health care provider if you experience any of the following:  persistent nausea and vomiting or diarrhea     Change dressing (specify)   Order Comments: Dressing change: 2 times per day using wet-to-dry.     Activity as tolerated     Medications:  Reconciled Home Medications:      Medication List      START taking these medications    nozaseptin  nasal   Commonly known as: NOZIN  1 each by Each Nostril route 2 (two) times daily.     sulfamethoxazole-trimethoprim 800-160mg 800-160 mg Tab  Commonly known as: BACTRIM DS  Take 1 tablet by mouth 2 (two) times daily. for 10 days        CONTINUE taking these medications    amoxicillin 500 MG capsule  Commonly known as: AMOXIL  Take 1 capsule (500 mg total) by mouth 3 (three) times daily. for 10 days     cyanocobalamin 1000 MCG tablet  Commonly known as: VITAMIN B-12  Take 100 mcg by mouth once daily.     doxycycline 100 MG Cap  Commonly known as: VIBRAMYCIN  Take 1 capsule (100 mg total) by mouth 2 (two) times daily with meals. Avoid lying down for 1 hour after taking. Avoid the sun. for 14 days     dronabinoL 2.5 MG capsule  Commonly known as: MARINOL  Take 1 capsule (2.5 mg total) by mouth 2 (two) times daily before meals.     ferrous sulfate 324 mg (65 mg iron) Tbec  Take 1 tablet (324 mg total) by  mouth 2 (two) times daily.     losartan 50 MG tablet  Commonly known as: COZAAR  TAKE 1 TABLET BY MOUTH EVERY MORNING     mirtazapine 15 MG tablet  Commonly known as: REMERON  Take 1 tablet (15 mg total) by mouth every evening. For sleep and appetite     * morphine 15 MG 12 hr tablet  Commonly known as: MS CONTIN  Take 1 tablet (15 mg total) by mouth 2 (two) times daily. Take with Morphine 30     * morphine 30 MG 12 hr tablet  Commonly known as: MS CONTIN  Take 1 tablet (30 mg total) by mouth every 12 (twelve) hours.     ondansetron 8 MG tablet  Commonly known as: ZOFRAN  Take 1 tablet (8 mg total) by mouth every 8 (eight) hours as needed for Nausea.     oxyCODONE-acetaminophen  mg per tablet  Commonly known as: PERCOCET  Take 1 tablet by mouth every 6 (six) hours as needed for Pain.     pantoprazole 40 MG tablet  Commonly known as: PROTONIX  Take 1 tablet (40 mg total) by mouth once daily.     prochlorperazine 5 MG tablet  Commonly known as: COMPAZINE  TAKE 1 TABLET(5 MG) BY MOUTH EVERY 6 HOURS AS NEEDED FOR NAUSEA     tamsulosin 0.4 mg Cap  Commonly known as: FLOMAX  Take 1 capsule (0.4 mg total) by mouth once daily.         * This list has 2 medication(s) that are the same as other medications prescribed for you. Read the directions carefully, and ask your doctor or other care provider to review them with you.              Time spent on the discharge of patient: 35 minutes    Familia Gonzalez MD  Colorectal Surgery  [facility]

## 2020-08-08 LAB — BACTERIA SPEC AEROBE CULT: NO GROWTH

## 2020-08-08 PROCEDURE — G0180 MD CERTIFICATION HHA PATIENT: HCPCS | Mod: ,,, | Performed by: COLON & RECTAL SURGERY

## 2020-08-08 PROCEDURE — G0180 PR HOME HEALTH MD CERTIFICATION: ICD-10-PCS | Mod: ,,, | Performed by: COLON & RECTAL SURGERY

## 2020-08-10 LAB
BACTERIA SPEC ANAEROBE CULT: ABNORMAL
BACTERIA SPEC ANAEROBE CULT: ABNORMAL

## 2020-08-10 NOTE — PHYSICIAN QUERY
PT Name: Vincent Benton  MR #: [id number]     Documentation Clarification      CDS/: Karol Naik               Contact information: [email or web address]    This form is a permanent document in the medical record.     Query Date: August 10, 2020    By submitting this query, we are merely seeking further clarification of documentation. Please utilize your independent clinical judgment when addressing the question(s) below.    The Medical Record reflects the following:    Supporting Clinical Findings Location in Medical Record   Ileostomy closure    Previous rectal adenocarcinoma and underwent ultra-low anterior resection with loop ileostomy who presents for definitive ileostomy closure    Findings of the Procedure:  Normal appearing ileostomy with some hernia sac around the loop of ileum consistent with a peristomal hernia without evidence of other organ herniation through this defect    The terminal ileum small intestine were then taken and passed off the field for final pathology.      Op Note 08/04   Final Pathologic Diagnosis:   Ileostomy and small intestines, segmental resection:  Intact ileostomy with mild chronic mucosal erosion at ileostomy site, minimal chronic serositis  and submucosal vascular congestion  Margins histologically viable  Negative for dysplasia or malignancy     Pathology Report Collection Date 08/04, Reported Date: 08/07   Pathology findings noted above are ruled in/confirmed as diagnoses     Other Documentation, Pathology Findings Clarification Physician Query 08/07                                                                            Provider, please provide diagnosis or diagnoses associated with above clinical findings.    Please further clarify the clinical significance of above diagnosis of mild, chronic mucosal erosion at ileostomy site:     [   ]  Present and clinically significant for this admission   [ x  ] Present but not clinically significant   [   ] Other  clarification (please specify): ____________   [  ] Clinically undetermined

## 2020-08-10 NOTE — PLAN OF CARE
Pt dc home with OH.   08/10/20 1527   Final Note   Assessment Type Final Discharge Note   Anticipated Discharge Disposition Home-Health   Right Care Referral Info   Post Acute Recommendation Home-care   Facility Name [facility]   Post-Acute Status   Post-Acute Authorization Home Health   Home Health Status Set-up Complete

## 2020-08-18 ENCOUNTER — EXTERNAL HOME HEALTH (OUTPATIENT)
Dept: HOME HEALTH SERVICES | Facility: HOSPITAL | Age: 65
End: 2020-08-18
Payer: MEDICARE

## 2020-08-19 ENCOUNTER — TELEPHONE (OUTPATIENT)
Dept: PALLIATIVE MEDICINE | Facility: CLINIC | Age: 65
End: 2020-08-19

## 2020-08-19 NOTE — TELEPHONE ENCOUNTER
[facility] - Palliative Care  Medical Specialty       Patient Name: Vincent Benton  MRN: [id number]  Primary Care Physician: Shakila Moran, DO    Patient called stating he is in need of refills on his pain medication. Patient has enough to get him to at least Friday, but he wanted to call before Friday as the pharmacy is closed on weekends. Message sent to Dr. Hill about refills. Also reminded patient of his next appt with Dr. Hill on 9/1. Patient verbalized understanding.       Keerthi Bowen, MSW, [facility]  [phone]

## 2020-08-20 ENCOUNTER — TELEPHONE (OUTPATIENT)
Dept: PALLIATIVE MEDICINE | Facility: CLINIC | Age: 65
End: 2020-08-20

## 2020-08-20 RX ORDER — OXYCODONE AND ACETAMINOPHEN 10; 325 MG/1; MG/1
1 TABLET ORAL EVERY 6 HOURS PRN
Qty: 90 TABLET | Refills: 0 | Status: SHIPPED | OUTPATIENT
Start: 2020-08-20 | End: 2020-08-21 | Stop reason: SDUPTHER

## 2020-08-20 RX ORDER — MORPHINE SULFATE 15 MG/1
15 TABLET, FILM COATED, EXTENDED RELEASE ORAL 2 TIMES DAILY
Qty: 60 TABLET | Refills: 0 | Status: SHIPPED | OUTPATIENT
Start: 2020-08-20 | End: 2020-10-02 | Stop reason: SDUPTHER

## 2020-08-20 RX ORDER — MORPHINE SULFATE 30 MG/1
30 TABLET, FILM COATED, EXTENDED RELEASE ORAL EVERY 12 HOURS
Qty: 60 TABLET | Refills: 0 | Status: SHIPPED | OUTPATIENT
Start: 2020-08-20 | End: 2020-10-02

## 2020-08-20 NOTE — TELEPHONE ENCOUNTER
[facility] - Palliative Care  Medical Specialty       Patient Name: Vincent Benton  MRN: [id number]  Primary Care Physician: Shakila Moran, DO    Received call back from patient stating pharmacy told him it is too soon to refill pain medication. Contacted pharmacy who confirmed this. Patient concerned, because he will be out by this weekend. Secure chat sent to Dr. Hill and Genny Nguyen RN as I am not sure if patient is taking his medication correctly. Genny will reach out to patient to discuss.    Keerthi Bowen, MSW, [facility]  [phone]

## 2020-08-21 ENCOUNTER — TELEPHONE (OUTPATIENT)
Dept: PALLIATIVE MEDICINE | Facility: CLINIC | Age: 65
End: 2020-08-21

## 2020-08-21 ENCOUNTER — TELEPHONE (OUTPATIENT)
Dept: INTERNAL MEDICINE | Facility: CLINIC | Age: 65
End: 2020-08-21

## 2020-08-21 DIAGNOSIS — G89.3 CANCER ASSOCIATED PAIN: ICD-10-CM

## 2020-08-21 RX ORDER — OXYCODONE AND ACETAMINOPHEN 10; 325 MG/1; MG/1
1 TABLET ORAL EVERY 4 HOURS PRN
Qty: 180 TABLET | Refills: 0 | Status: SHIPPED | OUTPATIENT
Start: 2020-08-21 | End: 2020-09-17 | Stop reason: SDUPTHER

## 2020-08-21 NOTE — TELEPHONE ENCOUNTER
Palliative Care:    Called patient to notify him that his new pain medicine prescription has been sent to [facility] ([place]).  No answer, no voicemail.    Genny Nguyen RN

## 2020-08-31 ENCOUNTER — OFFICE VISIT (OUTPATIENT)
Dept: SURGERY | Facility: CLINIC | Age: 65
End: 2020-08-31
Payer: MEDICARE

## 2020-08-31 VITALS
HEART RATE: 75 BPM | WEIGHT: 135.81 LBS | BODY MASS INDEX: 19.49 KG/M2 | DIASTOLIC BLOOD PRESSURE: 81 MMHG | TEMPERATURE: 98 F | SYSTOLIC BLOOD PRESSURE: 132 MMHG

## 2020-08-31 DIAGNOSIS — C20 RECTAL ADENOCARCINOMA: Primary | ICD-10-CM

## 2020-08-31 PROCEDURE — 99999 PR PBB SHADOW E&M-EST. PATIENT-LVL III: ICD-10-PCS | Mod: PBBFAC,HCNC,, | Performed by: COLON & RECTAL SURGERY

## 2020-08-31 PROCEDURE — 99024 POSTOP FOLLOW-UP VISIT: CPT | Mod: HCNC,S$GLB,, | Performed by: COLON & RECTAL SURGERY

## 2020-08-31 PROCEDURE — 99024 PR POST-OP FOLLOW-UP VISIT: ICD-10-PCS | Mod: HCNC,S$GLB,, | Performed by: COLON & RECTAL SURGERY

## 2020-08-31 PROCEDURE — 99499 RISK ADDL DX/OHS AUDIT: ICD-10-PCS | Mod: HCNC,S$GLB,, | Performed by: COLON & RECTAL SURGERY

## 2020-08-31 PROCEDURE — 99499 UNLISTED E&M SERVICE: CPT | Mod: HCNC,S$GLB,, | Performed by: COLON & RECTAL SURGERY

## 2020-08-31 PROCEDURE — 99999 PR PBB SHADOW E&M-EST. PATIENT-LVL III: CPT | Mod: PBBFAC,HCNC,, | Performed by: COLON & RECTAL SURGERY

## 2020-08-31 NOTE — PROGRESS NOTES
History & Physical    SUBJECTIVE:     No chief complaint on file.  Ref MD: Isai Centeno MD    History of Present Illness:  Patient is a 64 y.o. male presents for evaluation of a rectal mass.  Patient has been having iron deficiency anemia that did require transfusion around 1 year ago.  He also has had associated hematochezia for over a year now but did improve after he quit drinking beer at 8 months ago but is still intermittently present. This prompted a colonoscopy which was performed on 09/03/2019 where an obstructing rectal mass was seen that appeared malignant was biopsied and could not be traversed.  Patient reports that he had a colonoscopy around 6-7 years ago were some benign polyps were found but no malignancy.  These records are not available.  He states that the hematochezia has recently improved after he quit drinking beer, but is still intermittently present and worsen with the bowel prep from the colonoscopy recently.  He is not on any chronic anticoagulation.  He states he has had normal caliber bowel movements does not feel constipated or obstructed.  He denies any fever, chills, nausea, vomiting, diarrhea, constipation, weight loss, night sweats or any other signs or symptoms.    12/4/19: Completed Neoadj chemoXRT  2/4/2020 : Robotic ultra-low anterior resection with DLI and Mediport placement  June 2020: Completed adjuvant chemotx  8/4/2020: DLI reversal    Interval history:  Since last clinic visit, the patient underwent loop ileostomy reversal on 08/04/2020.  During the same hospitalization, he did require incision and drainage of a back abscess which was successful.  Since discharge, he has done very well.  He is tolerating regular diet without nausea or vomiting.  He is having normal bowel function that he is having good control of.  He states his left back incision has almost completely healed.  He denies any fever, chills, nausea, vomiting.      Review of patient's allergies indicates:  No  Known Allergies    Current Outpatient Medications   Medication Sig Dispense Refill    cyanocobalamin (VITAMIN B-12) 1000 MCG tablet Take 100 mcg by mouth once daily.      dronabinoL (MARINOL) 2.5 MG capsule Take 1 capsule (2.5 mg total) by mouth 2 (two) times daily before meals. 30 capsule 0    ferrous sulfate 324 mg (65 mg iron) TbEC Take 1 tablet (324 mg total) by mouth 2 (two) times daily. 60 tablet 0    losartan (COZAAR) 50 MG tablet TAKE 1 TABLET BY MOUTH EVERY MORNING 90 tablet 1    mirtazapine (REMERON) 15 MG tablet Take 1 tablet (15 mg total) by mouth every evening. For sleep and appetite 30 tablet 11    morphine (MS CONTIN) 15 MG 12 hr tablet Take 1 tablet (15 mg total) by mouth 2 (two) times daily. Take with Morphine 30 60 tablet 0    morphine (MS CONTIN) 30 MG 12 hr tablet Take 1 tablet (30 mg total) by mouth every 12 (twelve) hours. 60 tablet 0    nozaseptin (NOZIN) nasal  1 each by Each Nostril route 2 (two) times daily. 1 applicator 0    ondansetron (ZOFRAN) 8 MG tablet Take 1 tablet (8 mg total) by mouth every 8 (eight) hours as needed for Nausea. 30 tablet 2    oxyCODONE-acetaminophen (PERCOCET)  mg per tablet Take 1 tablet by mouth every 4 (four) hours as needed for Pain. 180 tablet 0    pantoprazole (PROTONIX) 40 MG tablet Take 1 tablet (40 mg total) by mouth once daily. 30 tablet 11    prochlorperazine (COMPAZINE) 5 MG tablet TAKE 1 TABLET(5 MG) BY MOUTH EVERY 6 HOURS AS NEEDED FOR NAUSEA 385 tablet 1    tamsulosin (FLOMAX) 0.4 mg Cap Take 1 capsule (0.4 mg total) by mouth once daily. 30 capsule 11     Current Facility-Administered Medications   Medication Dose Route Frequency Provider Last Rate Last Dose    acetaminophen tablet 650 mg  650 mg Oral Q6H Familia Gonzalez MD         Facility-Administered Medications Ordered in Other Visits   Medication Dose Route Frequency Provider Last Rate Last Dose    alteplase injection 2 mg  2 mg Intra-Catheter PRN Mikel PUENTES  MD Latrell        heparin, porcine (PF) 100 unit/mL injection flush 500 Units  500 Units Intravenous PRN Mikel Sams MD        sodium chloride 0.9% flush 10 mL  10 mL Intravenous PRN Mikel Sams MD        sodium chloride 0.9% flush 3 mL  3 mL Intravenous PRN Shilpa Yee MD           Past Medical History:   Diagnosis Date    Alcoholism     Anemia     Back pain     Encounter for blood transfusion 2018    Essential hypertension 2/4/2016    GERD (gastroesophageal reflux disease)     Hiatal hernia     Hypertension     Rectal cancer 9/11/2019     Past Surgical History:   Procedure Laterality Date    COLONOSCOPY N/A 9/3/2019    Procedure: COLONOSCOPY;  Surgeon: Isai Centeno MD;  Location: [facility];  Service: Endoscopy;  Laterality: N/A;    COLONOSCOPY N/A 1/10/2020    Procedure: COLONOSCOPY;  Surgeon: Familia Gonzalez MD;  Location: [facility];  Service: General;  Laterality: N/A;    ESOPHAGOGASTRODUODENOSCOPY N/A 9/3/2019    Procedure: ESOPHAGOGASTRODUODENOSCOPY (EGD);  Surgeon: Isai Centeno MD;  Location: [facility];  Service: Endoscopy;  Laterality: N/A;    FLEXIBLE SIGMOIDOSCOPY  2/4/2020    Procedure: SIGMOIDOSCOPY, FLEXIBLE;  Surgeon: Familia Gonzalez MD;  Location: [facility];  Service: General;;    ILEOSTOMY Right 2/4/2020    Procedure: CREATION, ILEOSTOMY;  Surgeon: Familia Gonzaelz MD;  Location: [facility] OR;  Service: General;  Laterality: Right;    ILEOSTOMY CLOSURE N/A 8/4/2020    Procedure: CLOSURE, ILEOSTOMY;  Surgeon: Familia Gonzalez MD;  Location: [facility] OR;  Service: General;  Laterality: N/A;    INCISION AND DRAINAGE OF BACK Left 8/5/2020    Procedure: INCISION AND DRAINAGE, BACK;  Surgeon: aFmilia Gonzalez MD;  Location: [facility];  Service: General;  Laterality: Left;    INJECTION OF ANESTHETIC AGENT INTO TISSUE PLANE DEFINED BY TRANSVERSUS ABDOMINIS MUSCLE N/A 2/4/2020    Procedure: BLOCK, TRANSVERSUS ABDOMINIS PLANE;  Surgeon: Familia Gonzalez MD;   Location: [facility] OR;  Service: General;  Laterality: N/A;    INSERTION OF TUNNELED CENTRAL VENOUS CATHETER (CVC) WITH SUBCUTANEOUS PORT Right 2/4/2020    Procedure: INSERTION, PORT-A-CATH;  Surgeon: Familia Gonzalez MD;  Location: [facility] OR;  Service: General;  Laterality: Right;    JOINT REPLACEMENT Left 2009    MOBILIZATION OF SPLENIC FLEXURE  2/4/2020    Procedure: MOBILIZATION, SPLENIC FLEXURE;  Surgeon: Familia Gonzalez MD;  Location: [facility] OR;  Service: General;;     Family History   Problem Relation Age of Onset    Cataracts Mother     Stroke Father     Hypertension Father      Social History     Tobacco Use    Smoking status: Never Smoker    Smokeless tobacco: Never Used   Substance Use Topics    Alcohol use: Yes     Comment: HOLD 72H PRIOR TO SURGERY    Drug use: No        Review of Systems:  Review of Systems   Constitutional: Negative for activity change, appetite change, chills, fatigue, fever and unexpected weight change.   HENT: Negative for congestion, ear pain, sore throat and trouble swallowing.    Eyes: Negative for pain, redness and itching.   Respiratory: Negative for cough, shortness of breath and wheezing.    Cardiovascular: Negative for chest pain, palpitations and leg swelling.   Gastrointestinal: Negative for abdominal distention, abdominal pain, anal bleeding, blood in stool, constipation, diarrhea, nausea, rectal pain and vomiting.   Endocrine: Negative for cold intolerance, heat intolerance and polyuria.   Genitourinary: Negative for dysuria, flank pain, frequency and hematuria.   Musculoskeletal: Negative for gait problem, joint swelling and neck pain.   Skin: Positive for wound. Negative for color change and rash.   Allergic/Immunologic: Negative for environmental allergies and immunocompromised state.   Neurological: Negative for dizziness, speech difficulty, weakness and numbness.   Psychiatric/Behavioral: Negative for agitation, confusion and hallucinations.       OBJECTIVE:      Vital Signs (Most Recent)  Temp: 97.8 °F (36.6 °C) (08/31/20 1116)  Pulse: 75 (08/31/20 1116)  BP: 132/81 (08/31/20 1116)     61.6 kg (135 lb 12.9 oz)     Physical Exam:  Physical Exam  Constitutional:       Appearance: He is well-developed.   HENT:      Head: Normocephalic and atraumatic.   Eyes:      Conjunctiva/sclera: Conjunctivae normal.   Neck:      Musculoskeletal: Normal range of motion.      Thyroid: No thyromegaly.   Cardiovascular:      Rate and Rhythm: Normal rate and regular rhythm.   Pulmonary:      Effort: Pulmonary effort is normal. No respiratory distress.   Abdominal:      General: There is no distension.      Palpations: Abdomen is soft. There is no mass.      Tenderness: There is no abdominal tenderness. There is no guarding or rebound.      Hernia: No hernia is present.      Comments: Ileostomy site clean and dry and almost completely healed with minor skin separation of medial <1cm aspect; no drainage or surrounding erythema   Musculoskeletal: Normal range of motion.         General: No tenderness.   Skin:     General: Skin is warm and dry.      Capillary Refill: Capillary refill takes less than 2 seconds.      Findings: No rash.      Comments: + L back I&D site clean and dry without active drainage and almost completely healed; 2cm cyst superior to this incision consistent with sebaceous cyst   Neurological:      Mental Status: He is alert and oriented to person, place, and time.       Laboratory  Lab Results   Component Value Date    WBC 6.37 08/07/2020    HGB 9.0 (L) 08/07/2020    HCT 29.7 (L) 08/07/2020     (H) 08/07/2020    CHOL 180 03/16/2020    TRIG 43 03/16/2020    HDL 81 (H) 03/16/2020    ALT 17 07/31/2020    AST 29 07/31/2020     08/07/2020    K 3.9 08/07/2020     08/07/2020    CREATININE 0.5 08/07/2020    BUN 3 (L) 08/07/2020    CO2 29 08/07/2020    TSH 0.601 04/07/2018    PSA 1.4 03/16/2020    INR 0.9 11/06/2018    HGBA1C 5.7 (H) 06/08/2020         Diagnostic  Results:  Reviewed colonoscopy report and images with rectal mass appreciated     MRI Pelvis Sept 2019:  FINDINGS:  Approximately 6.5 cm from the anal verge is a T2 hyperintense mass in the mid rectum.  This results in circumferential narrowing of the rectum.  Tumor extends for approximately 3 cm in oblique craniocaudal dimension.  It measures 3.6 cm and greater such transverse dimension by approximately 2.7 cm AP.  The mass demonstrates a somewhat mushroom appearance along its inferior margin.  There is hyperenhancement and stranding in the adjacent fat with spiculation/nodularity suggestive of extramural spread of disease.  This extends for approximately 5.5 mm minimum.  Distance from the tumor to the mesorectal fascia measures on the order of 2.1 cm    There is a 5.2 mm left perirectal lymph node seen on image 11 of series 8 with heterogeneous appearance.  This is located approximately 1.7 mm from the mesorectal fascia.  Margins appear slightly irregular.    Additional lymph node is seen inferior to the rectum on image 12 of series 8.  This measures 4.7 mm.  Other rounded appearing lymph nodes are seen in the right mesorectum on image 16 of series 8 1 of these measures 5 mm and the other measures 5.1 mm.    The intersphincteric complex is maintained.    Relationship to anterior peritoneal reflection: Appears to partially straddle the APR    Tumor Extent: Appears to extend beyond mucosa.  There is spiculation perirectal fat.  Distance tumor to the mesorectal fascia is 2.1 cm    Lymph Nodes:    There are perirectal lymph nodes greater than 5 mmin the mesorectal fat. The distance of the closest lymph node to the mesorectal fascia is 1.7 mm.  Please see above discussion.    There are no extra-mesorectal nodes lymph nodes in the visualized pelvis.    There is no evident vascular invasion.      Impression       Mid rectal mass with findings suspicious for extension into the mesorectal fat with tumor extending  approximately 5.5 mm with associated spiculation of the fat noted.  There are suspicious mesorectal lymph nodes with the largest 1 measuring just over 5 mm and located 1.7 mm from the mesorectal fascia.       CT C/A/P Sept 2019:  FINDINGS:  Chest:    There is elevation the right hemidiaphragm and bibasilar areas of parenchymal lung scarring or atelectasis.  A more confluent area of irregular masslike opacity and air bronchograms is identified in the posterior left lower lobe which has increased from prior.  There are low-grade ground-glass opacities in the bilateral lower lobes, right middle lobe, and to a lesser degree in the upper lobes.  No pleural effusion.    There is a borderline enlarged 10 mm in short axis precarinal lymph node.  Subcentimeter nodes are present elsewhere in the mediastinum and bilateral axilla.  Aorta, heart, and pericardium are unremarkable.    Abdomen/pelvis:    There is fatty infiltration of the liver.  There is a newly developed ill-defined focal hypodensity in the posteromedial segment of the left hepatic lobe measuring 3.1 x 2.7 x 1.3 cm.  No radiopaque gallstones.  Prominence of the common bile duct measuring up to 12 mm and pancreatic duct measuring up to 6 mm, unchanged from prior.  No visible intraductal stones.  Main portal vein is patent.    The spleen is nonenlarged.  No evidence of pancreatic mass or inflammation.  Kidneys enhance symmetrically.  Adrenals are unremarkable.    There is circumferential masslike thickening of the rectum.  No evidence of bowel obstruction.  Multiple bilateral subcentimeter in short axis mesorectal and internal iliac lymph nodes are identified.  No evidence of pathologic inguinal or external iliac lymphadenopathy.  There is sigmoid diverticulosis.  Stomach and visualized small bowel loops are unremarkable.  Normal appendix.  No intraperitoneal free air or fluid.  Fat containing periumbilical hernia.    There is aortoiliac atherosclerosis.  No  evidence of aneurysm.  Shotty subcentimeter lymph nodes in the periaortic retroperitoneum.    The bladder is unremarkable.  Prostate mildly enlarged with calcifications.  Seminal vesicles unremarkable.    Skeletal:    There is degenerative change in the spine and right hip.  Intramedullary nail present in the left hip.  No suspicious intraosseous lesions.      Impression       1. Rectal mass highly suspicious for malignancy.  2. Multiple bilateral mesorectal and internal iliac lymph nodes concerning for metastasis.  3. Ill-defined hypodensity in the left lobe of the liver, new since 2017 and concerning for metastasis.  4. Indeterminate developing area of masslike opacity in the posterior left lung base.  Possible infection/inflammation versus neoplasm.     MRI Liver:  FINDINGS:  The liver demonstrates a subtle hypointense T2 isointense T1 signal area in the deep aspect of the left hepatic lobe, medial segment.  This area measures approximately 11 x 18 mm axially.    The lesion displays a decrease in signal intensity on the out of phase gradient echo sequence suggesting fat containing focal fatty infiltration.    Following contrast administration there is displays mild relative hypoenhancement with respect to the adjacent parenchyma.  No hyperenhancement is seen.    The adjacent gallbladder appears normal.    Bile ducts are nondilated.      Impression       Probable focal fatty infiltration of the medial segment of the left hepatic lobe.          PET Scan:  FINDINGS:  Head/neck: There is normal physiologic FDG uptake noted within the visualized brain parenchyma. No FDG avid lymphadenopathy within the neck.    Chest: There are ill-defined patchy and nodular parenchymal opacities again noted in the left lower lobe exhibiting low level FDG uptake with an SUV max of 2.5.  A new 13 mm ground-glass opacity is noted in the lateral left upper lobe exhibiting weak FDG avidity with an SUV max of 1.9.  Similar newly developed  ground-glass opacity present in the mid right lung adjacent to the major fissure with SUV max of 1.6. No FDG avid mediastinal, hilar or axillary lymph nodes.    Abdomen/Pelvis: Normal physiologic FDG uptake noted within the liver, spleen, urinary tract, and bowel.Focal hypodensity is again noted in the left hepatic lobe which is non FDG avid and favored to represent focal fatty infiltration.  An intermediate length segment of circumferential mural thickening is again noted at the rectosigmoid junction which exhibits intense hypermetabolism and a SUV max of 11.8.  A hypermetabolic lymph node adjacent to the left pelvic sidewall has a SUV max of 5.3.    Skeletal: No FDG avid osseus lesions identified.      Impression       1. Hypermetabolic rectal mass consistent with known rectal malignancy.  2. Hypermetabolic regional lymph node adjacent to left pelvic sidewall.  3. Non FDG avid focal hypodensity in the liver favored to represent focal fatty infiltration.  4. Bilateral lung opacities most likely infectious or inflammatory etiology.  Follow-up recommended.     MRI Rectal Cancer/Pelvis Jan 2020:  FINDINGS:  Again seen is mid rectal mass.  There is persistent spiculation within the surrounding fat adjacent to the mass.  Spiculation extends to within 7 mm from the mesorectal fascia.  The mass demonstrates persistent enhancement with interval decrease in restricted diffusion suggesting partial treatment response.  No significant loss of normal mass signal intensity or fibrosis.  The mass currently on the order of 3.7 x 3.2 cm.  I remeasure the mass on the previous exam at 4.0 x 3.0 cm in axial dimension.    There is interval decrease in size of a 5.2 mm left perirectal lymph node with the node not clearly visualized on today's exam.  Additional lymph node in the right mesorectum measures 2.4 mm, decreased from 4.7 mm.  A lymph node in the right mesorectum on image 5 of series 12 measures 3.5 mm in short axis, also  decreased.  It previously measured 5 mm in short axis.  Other nodes also appear smaller.  Small amount of pelvic fluid is noted.      Impression       Redemonstration of an enhancing rectal mass with persistent spiculation in the mesorectal fat.  The mass measures slightly smaller. There is interval decrease in restricted diffusion in the mass suggesting at least partial treatment response.  Interval decrease in size of multiple mesorectal lymph nodes as above.     CT Abd/Pelvis (Dec 2020):  FINDINGS:  ABDOMEN:    - Lung bases: Atelectasis at the lung bases.  Airspace disease at the left lung base consistent with a left lower lobe infiltrate/pneumonia.    - Liver: Low-density fatty infiltrated liver.    - Gallbladder: No calcified gallstones.    - Bile Ducts: No evidence of intra or extra hepatic biliary ductal dilation.    - Spleen: Negative.    - Kidneys: No mass or hydronephrosis.    - Adrenals: Unremarkable.    - Pancreas: Fatty infiltrated pancreas.    - Retroperitoneum:  No significant adenopathy.    - Vascular: Scattered atherosclerotic calcifications of the abdominal aorta and proximal iliac vessels.    - Abdominal wall:  4 cm fat containing periumbilical hernia.    PELVIS:    No pelvic mass, adenopathy, or free fluid.    BOWEL/MESENTERY:    Scattered sigmoidal diverticulum.  Diverticulum of the ascending transverse and descending colon.  No radiographic evidence of diverticulitis.  The appendix appears normal.    BONES:  Multilevel degenerative changes of the thoracolumbar spine with disc space narrowing seen most significantly at the L1-2 L4-5 and L5-S1 levels.  Dynamic hip screw placement on the left      Impression       No evidence of a small-bowel obstruction.  Distension of the duodenum to the level of the 2nd stage.  Distal to that point no small bowel obstruction.  Diverticulosis without radiographic evidence of diverticulitis.  Normal appendix.  Normal gallbladder.  Low-density fatty infiltrated  liver.  Increased density at the left lung base can not exclude a focal infiltrate/pneumonia versus atelectasis.     PATHOLOGY Feb 2020:  1. Sigmoid colon and rectum, low anterior resection:  Residual moderately differentiated invasive adenocarcinoma, 4 cm greatest dimension  Tumor is located in the rectum  The distal, radial, and proximal margin are uninvolved by carcinoma  15 lymph nodes are negative for metastatic carcinoma (0/15)  Pathologic staging (pTNM): [id number]  See surgical pathology cancer case summary below  Surgical pathology cancer case summary: Colon and rectum. Protocol posting date: June 2017  Procedure: Low anterior resection  Tumor site: Rectum  Tumor size: 4 x 3 cm  Macroscopic tumor perforation: Not identified  Macroscopic intactness of mesorectum: Incomplete  Histologic type: Adenocarcinoma  Histologic grade: G2: Moderately differentiated  Tumor extension: Tumor invades through the muscularis propria into pericolorectal tissue  Margins: All margins are uninvolved by invasive carcinoma, high-grade dysplasia, intramucosal  adenocarcinoma, and adenoma  Margins examined: Proximal, distal, and radial  Distance of tumor from radial margin: 0.4 cm  Distance of tumor from distal margin: 0.5 cm  Distance of tumor from proximal margin: 17 cm  Treatment effect: Present: Residual cancer with evident tumor regression  Lymphovascular invasion: Present: Large vessel: Intramural  Perineural invasion: Not identified  Tumor deposits: Not identified  Regional lymph nodes: Number of lymph nodes involved: 0. Number of lymph nodes examined: 15  Pathologic stage classification (pTNM): [id number]  Immunohistochemistry testing for mismatch repair (MMR) proteins was performed on previous biopsy of the  rectal mass (surgical pathology case [id number]. Result as follows: No loss of nuclear expression of MMR  proteins: Low probability of microsatellite instability.  2. Proximal margin:  Fragment of colon with no evidence of  malignancy  3. Anastomotic ring:  Fragment of colon with no evidence of malignancy  ASSESSMENT/PLAN:     64-year-old male with mid-rectal adenocarcinoma who is now s/p long-course neoadj chemoXRT which completed on 12/4/19 with partial treatment response on repeat MRI and no evidence of metastatic disease on repeat CT Abd/pelvis who is now s/p robotic ultra-low anterior resection, DLI and mediport placement on 2/4/2020 who has recovered well postop with final path showing T3N0 who has now completed adjuvant chemotx in late June 2020 and is now s/p DLI closure on 8/4/2020    - Cont local wound care  - May need excision of back cysts in future once heals completely from I&D  - RTC in 3 months for surveillance    Familia Gonzalez MD  Colon and Rectal Surgery  [facility] [place]

## 2020-09-01 ENCOUNTER — OFFICE VISIT (OUTPATIENT)
Dept: PALLIATIVE MEDICINE | Facility: CLINIC | Age: 65
End: 2020-09-01
Payer: MEDICARE

## 2020-09-01 ENCOUNTER — INFUSION (OUTPATIENT)
Dept: INFUSION THERAPY | Facility: HOSPITAL | Age: 65
End: 2020-09-01
Attending: INTERNAL MEDICINE
Payer: MEDICARE

## 2020-09-01 ENCOUNTER — OFFICE VISIT (OUTPATIENT)
Dept: HEMATOLOGY/ONCOLOGY | Facility: CLINIC | Age: 65
End: 2020-09-01
Payer: MEDICARE

## 2020-09-01 VITALS
OXYGEN SATURATION: 97 % | DIASTOLIC BLOOD PRESSURE: 79 MMHG | HEIGHT: 70 IN | BODY MASS INDEX: 19.6 KG/M2 | HEART RATE: 76 BPM | WEIGHT: 136.88 LBS | SYSTOLIC BLOOD PRESSURE: 143 MMHG | TEMPERATURE: 97 F

## 2020-09-01 DIAGNOSIS — L02.212 BACK ABSCESS: ICD-10-CM

## 2020-09-01 DIAGNOSIS — C20 RECTAL CANCER: Primary | ICD-10-CM

## 2020-09-01 DIAGNOSIS — G89.3 NEOPLASM RELATED PAIN: ICD-10-CM

## 2020-09-01 DIAGNOSIS — C20 RECTAL CANCER: ICD-10-CM

## 2020-09-01 PROCEDURE — 3077F PR MOST RECENT SYSTOLIC BLOOD PRESSURE >= 140 MM HG: ICD-10-PCS | Mod: HCNC,CPTII,S$GLB, | Performed by: INTERNAL MEDICINE

## 2020-09-01 PROCEDURE — 3078F DIAST BP <80 MM HG: CPT | Mod: HCNC,CPTII,S$GLB, | Performed by: FAMILY MEDICINE

## 2020-09-01 PROCEDURE — 3077F PR MOST RECENT SYSTOLIC BLOOD PRESSURE >= 140 MM HG: ICD-10-PCS | Mod: HCNC,CPTII,S$GLB, | Performed by: FAMILY MEDICINE

## 2020-09-01 PROCEDURE — 99215 OFFICE O/P EST HI 40 MIN: CPT | Mod: HCNC,S$GLB,, | Performed by: INTERNAL MEDICINE

## 2020-09-01 PROCEDURE — 3078F DIAST BP <80 MM HG: CPT | Mod: HCNC,CPTII,S$GLB, | Performed by: INTERNAL MEDICINE

## 2020-09-01 PROCEDURE — 96523 IRRIG DRUG DELIVERY DEVICE: CPT | Mod: HCNC

## 2020-09-01 PROCEDURE — 3077F SYST BP >= 140 MM HG: CPT | Mod: HCNC,CPTII,S$GLB, | Performed by: FAMILY MEDICINE

## 2020-09-01 PROCEDURE — 3078F PR MOST RECENT DIASTOLIC BLOOD PRESSURE < 80 MM HG: ICD-10-PCS | Mod: HCNC,CPTII,S$GLB, | Performed by: FAMILY MEDICINE

## 2020-09-01 PROCEDURE — 3078F PR MOST RECENT DIASTOLIC BLOOD PRESSURE < 80 MM HG: ICD-10-PCS | Mod: HCNC,CPTII,S$GLB, | Performed by: INTERNAL MEDICINE

## 2020-09-01 PROCEDURE — A4216 STERILE WATER/SALINE, 10 ML: HCPCS | Mod: HCNC | Performed by: INTERNAL MEDICINE

## 2020-09-01 PROCEDURE — 63600175 PHARM REV CODE 636 W HCPCS: Mod: HCNC | Performed by: INTERNAL MEDICINE

## 2020-09-01 PROCEDURE — 3008F BODY MASS INDEX DOCD: CPT | Mod: HCNC,CPTII,S$GLB, | Performed by: INTERNAL MEDICINE

## 2020-09-01 PROCEDURE — 99999 PR PBB SHADOW E&M-EST. PATIENT-LVL IV: CPT | Mod: PBBFAC,HCNC,, | Performed by: INTERNAL MEDICINE

## 2020-09-01 PROCEDURE — 99999 PR PBB SHADOW E&M-EST. PATIENT-LVL II: ICD-10-PCS | Mod: PBBFAC,HCNC,, | Performed by: FAMILY MEDICINE

## 2020-09-01 PROCEDURE — 3008F PR BODY MASS INDEX (BMI) DOCUMENTED: ICD-10-PCS | Mod: HCNC,CPTII,S$GLB, | Performed by: INTERNAL MEDICINE

## 2020-09-01 PROCEDURE — 99999 PR PBB SHADOW E&M-EST. PATIENT-LVL IV: ICD-10-PCS | Mod: PBBFAC,HCNC,, | Performed by: INTERNAL MEDICINE

## 2020-09-01 PROCEDURE — 99215 PR OFFICE/OUTPT VISIT, EST, LEVL V, 40-54 MIN: ICD-10-PCS | Mod: HCNC,S$GLB,, | Performed by: INTERNAL MEDICINE

## 2020-09-01 PROCEDURE — 99499 RISK ADDL DX/OHS AUDIT: ICD-10-PCS | Mod: HCNC,S$GLB,, | Performed by: INTERNAL MEDICINE

## 2020-09-01 PROCEDURE — 99214 PR OFFICE/OUTPT VISIT, EST, LEVL IV, 30-39 MIN: ICD-10-PCS | Mod: HCNC,S$GLB,, | Performed by: FAMILY MEDICINE

## 2020-09-01 PROCEDURE — 25000003 PHARM REV CODE 250: Mod: HCNC | Performed by: INTERNAL MEDICINE

## 2020-09-01 PROCEDURE — 99499 UNLISTED E&M SERVICE: CPT | Mod: HCNC,S$GLB,, | Performed by: INTERNAL MEDICINE

## 2020-09-01 PROCEDURE — 99214 OFFICE O/P EST MOD 30 MIN: CPT | Mod: HCNC,S$GLB,, | Performed by: FAMILY MEDICINE

## 2020-09-01 PROCEDURE — 3077F SYST BP >= 140 MM HG: CPT | Mod: HCNC,CPTII,S$GLB, | Performed by: INTERNAL MEDICINE

## 2020-09-01 PROCEDURE — 99999 PR PBB SHADOW E&M-EST. PATIENT-LVL II: CPT | Mod: PBBFAC,HCNC,, | Performed by: FAMILY MEDICINE

## 2020-09-01 RX ORDER — SODIUM CHLORIDE 0.9 % (FLUSH) 0.9 %
10 SYRINGE (ML) INJECTION
Status: DISCONTINUED | OUTPATIENT
Start: 2020-09-01 | End: 2020-09-01 | Stop reason: HOSPADM

## 2020-09-01 RX ORDER — HEPARIN 100 UNIT/ML
5 SYRINGE INTRAVENOUS
Status: DISCONTINUED | OUTPATIENT
Start: 2020-09-01 | End: 2020-09-01 | Stop reason: HOSPADM

## 2020-09-01 RX ADMIN — HEPARIN 500 UNITS: 100 SYRINGE at 03:09

## 2020-09-01 RX ADMIN — SODIUM CHLORIDE, PRESERVATIVE FREE 10 ML: 5 INJECTION INTRAVENOUS at 03:09

## 2020-09-01 NOTE — PROGRESS NOTES
Subjective:       Patient ID: Vincent Benton is a 64 y.o. male.    Chief Complaint: palliative f/u    65 yo male with h/o rectal cancer who has completed excision, chemotherapy and most recently surgery to reverse his colostomy. He has ongoing post-operative rectal pain managed with MS Contin 45 mg Q12; he reports good relief with his current regimen and doesn't want to try to deescalate yet. Denies any nausea/vomit. His appetite remains fairly good. He has noticed a  in his appetite since starting mirtazapine.     does not have any pertinent problems on file.  Past Medical History:   Diagnosis Date    Alcoholism     Anemia     Back pain     Encounter for blood transfusion 2018    Essential hypertension 2/4/2016    GERD (gastroesophageal reflux disease)     Hiatal hernia     Hypertension     Rectal cancer 9/11/2019     Past Surgical History:   Procedure Laterality Date    COLONOSCOPY N/A 9/3/2019    Procedure: COLONOSCOPY;  Surgeon: Isai Centeno MD;  Location: [facility];  Service: Endoscopy;  Laterality: N/A;    COLONOSCOPY N/A 1/10/2020    Procedure: COLONOSCOPY;  Surgeon: Familia Gonzalez MD;  Location: [facility];  Service: General;  Laterality: N/A;    ESOPHAGOGASTRODUODENOSCOPY N/A 9/3/2019    Procedure: ESOPHAGOGASTRODUODENOSCOPY (EGD);  Surgeon: Isai Centeno MD;  Location: [facility];  Service: Endoscopy;  Laterality: N/A;    FLEXIBLE SIGMOIDOSCOPY  2/4/2020    Procedure: SIGMOIDOSCOPY, FLEXIBLE;  Surgeon: Familia Gonzalez MD;  Location: [facility] OR;  Service: General;;    ILEOSTOMY Right 2/4/2020    Procedure: CREATION, ILEOSTOMY;  Surgeon: Familia Gonzalez MD;  Location: [facility] OR;  Service: General;  Laterality: Right;    ILEOSTOMY CLOSURE N/A 8/4/2020    Procedure: CLOSURE, ILEOSTOMY;  Surgeon: Familia Gonzalez MD;  Location: [facility] OR;  Service: General;  Laterality: N/A;    INCISION AND DRAINAGE OF BACK Left 8/5/2020    Procedure: INCISION AND DRAINAGE, BACK;  Surgeon:  Familia Gonzalez MD;  Location: [facility];  Service: General;  Laterality: Left;    INJECTION OF ANESTHETIC AGENT INTO TISSUE PLANE DEFINED BY TRANSVERSUS ABDOMINIS MUSCLE N/A 2/4/2020    Procedure: BLOCK, TRANSVERSUS ABDOMINIS PLANE;  Surgeon: Familia Gonzalez MD;  Location: [facility];  Service: General;  Laterality: N/A;    INSERTION OF TUNNELED CENTRAL VENOUS CATHETER (CVC) WITH SUBCUTANEOUS PORT Right 2/4/2020    Procedure: INSERTION, PORT-A-CATH;  Surgeon: Familia Gonzalez MD;  Location: [facility] OR;  Service: General;  Laterality: Right;    JOINT REPLACEMENT Left 2009    MOBILIZATION OF SPLENIC FLEXURE  2/4/2020    Procedure: MOBILIZATION, SPLENIC FLEXURE;  Surgeon: Familia Gonzalez MD;  Location: [facility];  Service: General;;     Family History   Problem Relation Age of Onset    Cataracts Mother     Stroke Father     Hypertension Father      Social History     Socioeconomic History    Marital status:      Spouse name: Not on file    Number of children: Not on file    Years of education: Not on file    Highest education level: Not on file   Occupational History    Not on file   Social Needs    Financial resource strain: Not on file    Food insecurity     Worry: Not on file     Inability: Not on file    Transportation needs     Medical: Not on file     Non-medical: Not on file   Tobacco Use    Smoking status: Never Smoker    Smokeless tobacco: Never Used   Substance and Sexual Activity    Alcohol use: Yes     Comment: HOLD 72H PRIOR TO SURGERY    Drug use: No    Sexual activity: Never     Partners: Female   Lifestyle    Physical activity     Days per week: Not on file     Minutes per session: Not on file    Stress: Not on file   Relationships    Social connections     Talks on phone: Not on file     Gets together: Not on file     Attends Zoroastrian service: Not on file     Active member of club or organization: Not on file     Attends meetings of clubs or organizations: Not on file      Relationship status: Not on file   Other Topics Concern    Not on file   Social History Narrative    Not on file     Review of Systems   A comprehensive 14-point review of systems was reviewed with patient and was negative other than as specified above.     Objective:   There were no vitals filed for this visit.     Physical Exam  Vitals signs and nursing note reviewed.   Constitutional:       Appearance: He is well-developed.   HENT:      Head: Normocephalic and atraumatic.   Eyes:      Pupils: Pupils are equal, round, and reactive to light.   Neck:      Musculoskeletal: Normal range of motion and neck supple.   Cardiovascular:      Rate and Rhythm: Normal rate and regular rhythm.   Pulmonary:      Effort: Pulmonary effort is normal.      Breath sounds: Normal breath sounds.   Abdominal:      General: Bowel sounds are normal.      Palpations: Abdomen is soft.   Musculoskeletal: Normal range of motion.         General: No deformity.   Skin:     General: Skin is warm and dry.   Neurological:      Mental Status: He is alert and oriented to person, place, and time.   Psychiatric:         Behavior: Behavior normal.         Review of Symptoms    Symptom Assessment (ESAS 0-10 Scale)  Pain:  4         Comments:  Surgical pain today    Pain Assessment:  Location(s): abdomen      ECOG Performance Status Grade:  1 - Ambulates, capable of light work    Advanced Directives:  Living Will:  Yes.   Do Not Resuscitate Status: No    Medical Power of : Yes      Decision Making:  Patient answered questions    Living Arrangements:  Lives with spouse      Assessment:       1. Neoplasm related pain        Plan:           Problem List Items Addressed This Visit        Oncology    Neoplasm related pain    Overview     Overview:   Neck/Hip- Dr. Valentine         Current Assessment & Plan     Doing well on current MS Contin 45 mg q12; still with rectal pain and              Doing well on current medications; we talked about  tapering his pain medication dose at some point. Still with rectal pain and some postoperative pain. Will continue and see back in 2 months.

## 2020-09-01 NOTE — PROGRESS NOTES
Subjective:       Patient ID: Vincent Benton is a 64 y.o. male.    Chief Complaint: Rectal cancer [C20]  HPI: We have an opportunity to see Mr. Vincent Benton in Hematology Oncology clinic at [facility] on 09/01/2020.  Mr. Vincent Benton is a 64 y.o. gentleman with rectal cancer.  Patient has completed concurrent chemoradiation with capecitabine.  Status post low anterior resection.  Final path noted at ypT3N0. Status post 7 cycles of adjuvant therapy with FOLFOX.    Interval history:  64-year-old male with stage III rectal cancer status post pj op chemotherapy with FOLFOX.  Status post loop ileostomy and reversal on 08/04/2020.  Also had a recent incision and drainage of back abscess during the same hospitalization.    Presents today for routine follow-up.  Overall feels well except for mild bilateral lower extremity numbness and tingling of digits. Denies nausea, vomiting or diarrhea.  Also denies fever, mucositis, chills, shortness of breath or abdominal pain.  Appetite has improved per patient.    Oncology History   Rectal cancer   9/11/2019 Initial Diagnosis    Rectal cancer     9/18/2019 Tumor Conference    Multidisciplinary Rectal Cancer Conference - Evaluation and Recommendation Summary    9/17/2019  Vincent Benton  [id number]  63 y.o. male    1. Evaluation    C scope 9/3/19: Obstructing Rectal mass that couldn't be crossed.     Rigid Procto 9/3/19: Left and anterior lesion at 9-10 cm.     Path: Invasive adenocarcinoma, no LVI.     MRI date: 9/9/2019    Tumor Location in Rectum: middle third     Size: 3.6x2.7 cm mass    Nodes: 2 suspected nodes.     Indication of Sphincter Involvement:  Uninvolved    Pretreatment Circumferential Resection Margin (CRM) status:  Not Threatened    Pretreatment (clinical) AJCC Stage:III B vs  IV ?  Stage I  [] I: T1N0M0  [] I: T2N0M0  Stage II  [] IIA: T3N0M0  [] IIB J6zE5G7  [] IIC: M2eP5V2  Stage III  [] IIIA: T1-2N1M0  [] IIIA: I7M7zY7  []  IIIB: T3-V9yL5I1  [x] IIIB: T2-3N2aM0  [] IIIB: T1-2N2bM0  [] IIIC: V5rR3wL8  [] IIIC: T3-6jT2iR8  [] IIIC: X3fR4-3M0   Stage IV  [x] IV: H3-7L5-7Z9q-b    CEA level:     Lab Results   Component Value Date    CEA 3.5 09/03/2019       2. Treatment Recommendation    Neoadjuvant Therapy Recommendation:     Short course radiation and Chemotherapy.     PLAN:    MRI and PET if not able to obtain PET will proceed with biopsy of the lung biopsy.     Colonoscopy during Chemotherapy to rule out any other lesions.     Anticipated date and type of surgical procedure:     LAR after    Clinical research study eligibility and/or enrollment: Not eligible     10/8/2019 Cancer Staged    Staging form: Colon and Rectum, AJCC 8th Edition  - Clinical stage from 10/8/2019: Stage IIIB (cT3, cN2a, cM0)     10/15/2019 - 10/15/2019 Chemotherapy    Treatment Summary   Plan Name: OP CAPECITABINE 5 DAYS + RADIOTHERAPY  Treatment Goal: Curative  Status: Inactive  Start Date: [No treatment day found]  End Date: [No treatment day found]  Provider: Mikel Sams MD  Chemotherapy: [No matching medication found in this treatment plan]     10/24/2019 - 12/4/2019 Radiation Therapy    Treatment Site Ref. ID Energy Dose/Fx (Gy) #Fx Dose Correction (Gy) Total Dose (Gy) Start Date End Date Elapsed Days   Pelvis Pelvis 6X 1.8 28 / 28 0 50.4 10/24/2019 12/4/2019 41          3/2/2020 -  Chemotherapy    Treatment Summary   Plan Name: OP FOLFOX 6 Q2W  Treatment Goal: Curative  Status: Active  Start Date: 3/2/2020  End Date: 8/26/2020 (Planned)  Provider: Mikel Sams MD  Chemotherapy: fluorouracil injection 710 mg, 400 mg/m2 = 710 mg, Intravenous, Clinic/HOD 1 time, 8 of 12 cycles  Administration: 710 mg (3/2/2020), 710 mg (3/16/2020), 710 mg (4/6/2020), 710 mg (4/20/2020), 710 mg (5/25/2020), 710 mg (5/12/2020), 710 mg (6/8/2020), 710 mg (6/29/2020)  fluorouracil (ADRUCIL) 2,400 mg/m2 = 4,270 mg in sodium chloride 0.9% 101.2 mL chemo infusion, 2,400  mg/m2 = 4,270 mg, Intravenous, Over 46 hours, 8 of 12 cycles  Administration: 4,270 mg (3/2/2020), 4,270 mg (3/16/2020), 4,270 mg (4/6/2020), 4,270 mg (4/20/2020), 4,270 mg (5/25/2020), 4,270 mg (5/12/2020), 4,270 mg (6/8/2020), 4,270 mg (6/29/2020)  leucovorin calcium 400 mg/m2 = 710 mg in dextrose 5 % 285.5 mL infusion, 400 mg/m2 = 710 mg, Intravenous, Clinic/HOD 1 time, 8 of 12 cycles  Administration: 710 mg (3/2/2020), 710 mg (3/16/2020), 700 mg (4/6/2020), 710 mg (4/20/2020), 710 mg (5/25/2020), 710 mg (5/12/2020), 710 mg (6/8/2020), 700 mg (6/29/2020)  oxaliplatin (ELOXATIN) 85 mg/m2 = 151 mg in dextrose 5 % 530.2 mL chemo infusion, 85 mg/m2 = 151 mg, Intravenous, Clinic/HOD 1 time, 8 of 12 cycles  Administration: 151 mg (3/2/2020), 151 mg (3/16/2020), 150 mg (4/6/2020), 151 mg (4/20/2020), 151 mg (5/25/2020), 151 mg (5/12/2020), 151 mg (6/8/2020), 151 mg (6/29/2020)       Past Medical History:   Diagnosis Date    Alcoholism     Anemia     Back pain     Encounter for blood transfusion 2018    Essential hypertension 2/4/2016    GERD (gastroesophageal reflux disease)     Hiatal hernia     Hypertension     Rectal cancer 9/11/2019     Family History   Problem Relation Age of Onset    Cataracts Mother     Stroke Father     Hypertension Father      Social History     Socioeconomic History    Marital status:      Spouse name: Not on file    Number of children: Not on file    Years of education: Not on file    Highest education level: Not on file   Occupational History    Not on file   Social Needs    Financial resource strain: Not on file    Food insecurity     Worry: Not on file     Inability: Not on file    Transportation needs     Medical: Not on file     Non-medical: Not on file   Tobacco Use    Smoking status: Never Smoker    Smokeless tobacco: Never Used   Substance and Sexual Activity    Alcohol use: Yes     Comment: HOLD 72H PRIOR TO SURGERY    Drug use: No    Sexual activity:  Never     Partners: Female   Lifestyle    Physical activity     Days per week: Not on file     Minutes per session: Not on file    Stress: Not on file   Relationships    Social connections     Talks on phone: Not on file     Gets together: Not on file     Attends Hoahaoism service: Not on file     Active member of club or organization: Not on file     Attends meetings of clubs or organizations: Not on file     Relationship status: Not on file   Other Topics Concern    Not on file   Social History Narrative    Not on file     Past Surgical History:   Procedure Laterality Date    COLONOSCOPY N/A 9/3/2019    Procedure: COLONOSCOPY;  Surgeon: Isai Centeno MD;  Location: [facility] ENDO;  Service: Endoscopy;  Laterality: N/A;    COLONOSCOPY N/A 1/10/2020    Procedure: COLONOSCOPY;  Surgeon: Familia Gonzalez MD;  Location: [facility];  Service: General;  Laterality: N/A;    ESOPHAGOGASTRODUODENOSCOPY N/A 9/3/2019    Procedure: ESOPHAGOGASTRODUODENOSCOPY (EGD);  Surgeon: Isai Centeno MD;  Location: [facility];  Service: Endoscopy;  Laterality: N/A;    FLEXIBLE SIGMOIDOSCOPY  2/4/2020    Procedure: SIGMOIDOSCOPY, FLEXIBLE;  Surgeon: Familia Gonzalez MD;  Location: [facility];  Service: General;;    ILEOSTOMY Right 2/4/2020    Procedure: CREATION, ILEOSTOMY;  Surgeon: Familia Gonzalez MD;  Location: [facility] OR;  Service: General;  Laterality: Right;    ILEOSTOMY CLOSURE N/A 8/4/2020    Procedure: CLOSURE, ILEOSTOMY;  Surgeon: Familia Gonzalez MD;  Location: [facility] OR;  Service: General;  Laterality: N/A;    INCISION AND DRAINAGE OF BACK Left 8/5/2020    Procedure: INCISION AND DRAINAGE, BACK;  Surgeon: Familia Gonzalez MD;  Location: [facility] OR;  Service: General;  Laterality: Left;    INJECTION OF ANESTHETIC AGENT INTO TISSUE PLANE DEFINED BY TRANSVERSUS ABDOMINIS MUSCLE N/A 2/4/2020    Procedure: BLOCK, TRANSVERSUS ABDOMINIS PLANE;  Surgeon: Familia Gonzalez MD;  Location: [facility] OR;  Service: General;   Laterality: N/A;    INSERTION OF TUNNELED CENTRAL VENOUS CATHETER (CVC) WITH SUBCUTANEOUS PORT Right 2/4/2020    Procedure: INSERTION, PORT-A-CATH;  Surgeon: Familia Gonzalez MD;  Location: [facility] OR;  Service: General;  Laterality: Right;    JOINT REPLACEMENT Left 2009    MOBILIZATION OF SPLENIC FLEXURE  2/4/2020    Procedure: MOBILIZATION, SPLENIC FLEXURE;  Surgeon: Familia Gonzalez MD;  Location: [facility] OR;  Service: General;;     Current Outpatient Medications   Medication Sig Dispense Refill    cyanocobalamin (VITAMIN B-12) 1000 MCG tablet Take 100 mcg by mouth once daily.      dronabinoL (MARINOL) 2.5 MG capsule Take 1 capsule (2.5 mg total) by mouth 2 (two) times daily before meals. 30 capsule 0    ferrous sulfate 324 mg (65 mg iron) TbEC Take 1 tablet (324 mg total) by mouth 2 (two) times daily. 60 tablet 0    losartan (COZAAR) 50 MG tablet TAKE 1 TABLET BY MOUTH EVERY MORNING 90 tablet 1    mirtazapine (REMERON) 15 MG tablet Take 1 tablet (15 mg total) by mouth every evening. For sleep and appetite 30 tablet 11    morphine (MS CONTIN) 15 MG 12 hr tablet Take 1 tablet (15 mg total) by mouth 2 (two) times daily. Take with Morphine 30 60 tablet 0    morphine (MS CONTIN) 30 MG 12 hr tablet Take 1 tablet (30 mg total) by mouth every 12 (twelve) hours. 60 tablet 0    nozaseptin (NOZIN) nasal  1 each by Each Nostril route 2 (two) times daily. 1 applicator 0    ondansetron (ZOFRAN) 8 MG tablet Take 1 tablet (8 mg total) by mouth every 8 (eight) hours as needed for Nausea. 30 tablet 2    oxyCODONE-acetaminophen (PERCOCET)  mg per tablet Take 1 tablet by mouth every 4 (four) hours as needed for Pain. 180 tablet 0    pantoprazole (PROTONIX) 40 MG tablet Take 1 tablet (40 mg total) by mouth once daily. 30 tablet 11    prochlorperazine (COMPAZINE) 5 MG tablet TAKE 1 TABLET(5 MG) BY MOUTH EVERY 6 HOURS AS NEEDED FOR NAUSEA 385 tablet 1    tamsulosin (FLOMAX) 0.4 mg Cap Take 1 capsule (0.4  mg total) by mouth once daily. 30 capsule 11     Current Facility-Administered Medications   Medication Dose Route Frequency Provider Last Rate Last Dose    acetaminophen tablet 650 mg  650 mg Oral Q6H Familia Gonzalez MD         Facility-Administered Medications Ordered in Other Visits   Medication Dose Route Frequency Provider Last Rate Last Dose    alteplase injection 2 mg  2 mg Intra-Catheter PRN Mikel Sams MD        heparin, porcine (PF) 100 unit/mL injection flush 500 Units  500 Units Intravenous PRN Mikel Sams MD        sodium chloride 0.9% flush 10 mL  10 mL Intravenous PRN Mikel Sams MD        sodium chloride 0.9% flush 3 mL  3 mL Intravenous PRN Shilpa Yee MD           Labs:  Lab Results   Component Value Date    WBC 7.08 09/01/2020    HGB 10.0 (L) 09/01/2020    HCT 33.7 (L) 09/01/2020    MCV 96 09/01/2020     (H) 09/01/2020     BMP  Lab Results   Component Value Date     09/01/2020    K 4.1 09/01/2020    CL 97 09/01/2020    CO2 28 09/01/2020    BUN 7 (L) 09/01/2020    CREATININE 0.7 09/01/2020    CALCIUM 9.6 09/01/2020    ANIONGAP 12 09/01/2020    ESTGFRAFRICA >60 09/01/2020    EGFRNONAA >60 09/01/2020     Lab Results   Component Value Date    ALT 10 09/01/2020    AST 25 09/01/2020    ALKPHOS 190 (H) 09/01/2020    BILITOT 0.4 09/01/2020       Lab Results   Component Value Date    IRON <10 (L) 04/13/2018    TIBC 527 (H) 04/13/2018    FERRITIN 241 03/16/2020     No results found for: [id number]  No results found for: FOLATE  Lab Results   Component Value Date    TSH 0.601 04/07/2018       I have reviewed the radiology reports and examined the scan/xray images.    Review of Systems   Constitutional: Positive for appetite change (Improved). Negative for activity change, chills, fatigue, fever and unexpected weight change.   HENT: Negative for hearing loss, mouth sores, nosebleeds, sore throat, tinnitus, trouble swallowing and voice change.    Eyes: Negative for  visual disturbance.   Respiratory: Negative for cough, chest tightness, shortness of breath and wheezing.    Cardiovascular: Negative for chest pain, palpitations and leg swelling.   Gastrointestinal: Negative for abdominal pain, anal bleeding, blood in stool, constipation, diarrhea, nausea and vomiting.   Genitourinary: Negative for frequency, hematuria and testicular pain.   Musculoskeletal: Negative for arthralgias, back pain, gait problem and neck pain.   Skin: Positive for wound. Negative for color change, pallor and rash.   Allergic/Immunologic: Negative for immunocompromised state.   Neurological: Positive for numbness (Bilateral lower extremity). Negative for seizures, syncope, weakness and headaches.   Hematological: Negative for adenopathy. Does not bruise/bleed easily.   Psychiatric/Behavioral: Negative for agitation, confusion, decreased concentration, hallucinations and sleep disturbance. The patient is not nervous/anxious.      ECOG SCORE    2 - Capable of all selfcare but unable to carry out any work activities, active > 50% of hours            Objective:     Vitals:    09/01/20 1312   BP: (!) 143/79   Pulse: 76   Temp: 97.1 °F (36.2 °C)   Body mass index is 19.64 kg/m².  Physical Exam  Constitutional:       Appearance: He is well-developed. He is cachectic.   HENT:      Head: Normocephalic and atraumatic.      Right Ear: External ear normal.      Left Ear: External ear normal.      Mouth/Throat:      Pharynx: No oropharyngeal exudate.   Eyes:      General: No scleral icterus.        Right eye: No discharge.         Left eye: No discharge.      Conjunctiva/sclera: Conjunctivae normal.      Pupils: Pupils are equal, round, and reactive to light.   Neck:      Musculoskeletal: Normal range of motion and neck supple.      Thyroid: No thyromegaly.   Cardiovascular:      Rate and Rhythm: Normal rate and regular rhythm.      Heart sounds: Normal heart sounds. No murmur.   Pulmonary:      Effort: Pulmonary  effort is normal. No respiratory distress.      Breath sounds: Normal breath sounds. No wheezing.   Chest:      Chest wall: No tenderness.   Abdominal:      General: Bowel sounds are normal. There is no distension.      Palpations: Abdomen is soft. There is no mass.      Tenderness: There is no abdominal tenderness. There is no rebound.      Comments: Clean ileostomy, no drainage noted on exam.   Musculoskeletal: Normal range of motion.         General: No tenderness.   Lymphadenopathy:      Cervical: No cervical adenopathy.      Right cervical: No superficial cervical adenopathy.     Left cervical: No superficial cervical adenopathy.      Upper Body:      Right upper body: No supraclavicular or pectoral adenopathy.      Left upper body: No supraclavicular or pectoral adenopathy.      Lower Body: No right inguinal adenopathy. No left inguinal adenopathy.   Skin:     General: Skin is warm and dry.      Capillary Refill: Capillary refill takes 2 to 3 seconds.      Coloration: Skin is not pale.      Findings: Lesion (Left upper back, status post incision and drainage of abscess.  Noted proximal cyst to the incision site.) present. No erythema or rash.   Neurological:      Mental Status: He is alert and oriented to person, place, and time.      Cranial Nerves: No cranial nerve deficit.      Sensory: No sensory deficit.      Gait: Gait abnormal.   Psychiatric:         Behavior: Behavior normal.         Judgment: Judgment normal.           Assessment:      1. Rectal cancer    2. Back abscess           Plan:     Rectal cancer  Stage IIIB rectal cancer, status post pj op chemotherapy with FOLFOX.  Status post loop ileostomy and reversal on 08/04/2020.  Plan to continue surveillance.  Reviewed labs today, noted mild anemia and reactive thrombocytosis.  Will continue to monitor with subsequent labs in 1 month.  CEA pending.    Back abscess  Status post incision and drainage.    Due for MediPort flush today.    Mikel PUENTES  MD L[place]

## 2020-09-01 NOTE — DISCHARGE INSTRUCTIONS
[facility]  [address]  [address]  [phone] phone     [phone] fax  Hours of Operation: Monday- Friday 8:00am- 5:00pm  After hours phone  [phone]  Hematology / Oncology Physicians on call      Dr. Thor Arreola      Please call with any concerns regarding your appointment today.        WAYS TO HELP PREVENT INFECTION         WASH YOUR HANDS OFTEN DURING THE DAY, ESPECIALLY BEFORE YOU EAT, AFTER USING THE BATHROOM, AND AFTER TOUCHING ANIMALS     STAY AWAY FROM PEOPLE WHO HAVE ILLNESSES YOU CAN CATCH; SUCH AS COLDS, FLU, CHICKEN POX     TRY TO AVOID CROWDS     STAY AWAY FROM CHILDREN WHO RECENTLY HAVE RECEIVED LIVE VIRUS VACCINES     MAINTAIN GOOD MOUTH CARE     DO NOT SQUEEZE OR SCRATCH PIMPLES     CLEAN CUTS & SCRAPES RIGHT AWAY AND DAILY UNTIL HEALED WITH WARM WATER, SOAP & AN ANTISEPTIC     AVOID CONTACT WITH LITTER BOXES, BIRD CAGES, & FISH TANKS     AVOID STANDING WATER, IE., BIRD BATHS, FLOWER POTS/VASES, OR HUMIDIFIERS     WEAR GLOVES WHEN GARDENING OR CLEANING UP AFTER OTHERS, ESPECIALLY BABIES & SMALL CHILDREN     DO NOT EAT RAW FISH, SEAFOOD, MEAT, OR EGGS  

## 2020-09-01 NOTE — ASSESSMENT & PLAN NOTE
Stage IIIB rectal cancer, status post pj op chemotherapy with FOLFOX.  Status post loop ileostomy and reversal on 08/04/2020.  Plan to continue surveillance.  Reviewed labs today, noted mild anemia and reactive thrombocytosis.  Will continue to monitor with subsequent labs in 1 month.  CEA pending.

## 2020-09-03 ENCOUNTER — TELEPHONE (OUTPATIENT)
Dept: HEMATOLOGY/ONCOLOGY | Facility: CLINIC | Age: 65
End: 2020-09-03

## 2020-09-09 ENCOUNTER — DOCUMENT SCAN (OUTPATIENT)
Dept: HOME HEALTH SERVICES | Facility: HOSPITAL | Age: 65
End: 2020-09-09
Payer: MEDICARE

## 2020-09-09 LAB — FUNGUS SPEC CULT: NORMAL

## 2020-09-17 DIAGNOSIS — G89.3 CANCER ASSOCIATED PAIN: ICD-10-CM

## 2020-09-17 RX ORDER — OXYCODONE AND ACETAMINOPHEN 10; 325 MG/1; MG/1
1 TABLET ORAL EVERY 4 HOURS PRN
Qty: 180 TABLET | Refills: 0 | Status: SHIPPED | OUTPATIENT
Start: 2020-09-21 | End: 2020-10-19 | Stop reason: SDUPTHER

## 2020-09-17 RX ORDER — OXYCODONE AND ACETAMINOPHEN 10; 325 MG/1; MG/1
1 TABLET ORAL EVERY 4 HOURS PRN
Qty: 180 TABLET | Refills: 0 | OUTPATIENT
Start: 2020-09-21 | End: 2021-09-21

## 2020-09-17 NOTE — TELEPHONE ENCOUNTER
----- Message from Brenda Ramirez sent at 9/17/2020  8:30 AM CDT -----  Type:  RX Refill Request    Who Called: patient  Refill or New Rx:refill  RX Name and Strength:Perocet 10  How is the patient currently taking it? (ex. 1XDay):na  Is this a 30 day or 90 day RX:na  Preferred Pharmacy with phone number:[facility]  Local or Mail Order:local  Ordering Provider:Dr Hill  Would the patient rather a call back or a response via [facility]? Call back  Best Call Back [phone]  Additional Information: mark

## 2020-10-02 ENCOUNTER — TELEPHONE (OUTPATIENT)
Dept: PALLIATIVE MEDICINE | Facility: CLINIC | Age: 65
End: 2020-10-02

## 2020-10-02 DIAGNOSIS — G89.3 CANCER ASSOCIATED PAIN: ICD-10-CM

## 2020-10-02 NOTE — TELEPHONE ENCOUNTER
Palliative Care:    Patient calling to request a refill on his MS Contin, both 30mg and 15mg.  He states he has a few pills left and has Percocet on hand.  Reminded him to call several days in advance when possible to avoid running short on the weekend.  Patient states that he is aware of this however it slipped his mind.  Pending for review by Dr. Hill.    Genny Nguyen RN

## 2020-10-02 NOTE — TELEPHONE ENCOUNTER
[facility] - Palliative Care  Medical Specialty       Patient Name: Vincent Benton  MRN: [id number]  Primary Care Physician: Shakila Moran DO    Received call from patient requesting refill on ms contin 15mg and 30mg. Message sent to Dr. Hill and Genny CAMILO.    Keerthi Bowen, MSW, LCSW  [phone]

## 2020-10-03 ENCOUNTER — IMMUNIZATION (OUTPATIENT)
Dept: INTERNAL MEDICINE | Facility: CLINIC | Age: 65
End: 2020-10-03
Payer: MEDICARE

## 2020-10-03 PROCEDURE — G0008 FLU VACCINE (QUAD) GREATER THAN OR EQUAL TO 3YO PRESERVATIVE FREE IM: ICD-10-PCS | Mod: HCNC,S$GLB,, | Performed by: FAMILY MEDICINE

## 2020-10-03 PROCEDURE — 90686 FLU VACCINE (QUAD) GREATER THAN OR EQUAL TO 3YO PRESERVATIVE FREE IM: ICD-10-PCS | Mod: HCNC,S$GLB,, | Performed by: FAMILY MEDICINE

## 2020-10-03 PROCEDURE — G0008 ADMIN INFLUENZA VIRUS VAC: HCPCS | Mod: HCNC,S$GLB,, | Performed by: FAMILY MEDICINE

## 2020-10-03 PROCEDURE — 90686 IIV4 VACC NO PRSV 0.5 ML IM: CPT | Mod: HCNC,S$GLB,, | Performed by: FAMILY MEDICINE

## 2020-10-05 RX ORDER — MORPHINE SULFATE 30 MG/1
30 TABLET, FILM COATED, EXTENDED RELEASE ORAL EVERY 12 HOURS
Qty: 60 TABLET | Refills: 0 | Status: SHIPPED | OUTPATIENT
Start: 2020-10-05 | End: 2020-11-03 | Stop reason: SDUPTHER

## 2020-10-05 RX ORDER — MORPHINE SULFATE 15 MG/1
15 TABLET, FILM COATED, EXTENDED RELEASE ORAL 2 TIMES DAILY
Qty: 60 TABLET | Refills: 0 | Status: SHIPPED | OUTPATIENT
Start: 2020-10-05 | End: 2020-11-03

## 2020-10-05 NOTE — TELEPHONE ENCOUNTER
Palliative Care:    Called patient to let him know that his refill requests have been approved and sent to [facility] on [address].  Patient verbalizes understanding.      Genny Nguyen Rn

## 2020-10-19 DIAGNOSIS — G89.3 CANCER ASSOCIATED PAIN: ICD-10-CM

## 2020-10-19 RX ORDER — OXYCODONE AND ACETAMINOPHEN 10; 325 MG/1; MG/1
1 TABLET ORAL EVERY 4 HOURS PRN
Qty: 180 TABLET | Refills: 0 | Status: SHIPPED | OUTPATIENT
Start: 2020-10-19 | End: 2020-11-03 | Stop reason: SDUPTHER

## 2020-10-26 ENCOUNTER — TELEPHONE (OUTPATIENT)
Dept: HEMATOLOGY/ONCOLOGY | Facility: CLINIC | Age: 65
End: 2020-10-26

## 2020-11-03 ENCOUNTER — OFFICE VISIT (OUTPATIENT)
Dept: PALLIATIVE MEDICINE | Facility: CLINIC | Age: 65
End: 2020-11-03
Payer: MEDICARE

## 2020-11-03 VITALS
SYSTOLIC BLOOD PRESSURE: 126 MMHG | HEART RATE: 83 BPM | TEMPERATURE: 99 F | DIASTOLIC BLOOD PRESSURE: 82 MMHG | RESPIRATION RATE: 20 BRPM

## 2020-11-03 DIAGNOSIS — G89.3 CANCER ASSOCIATED PAIN: ICD-10-CM

## 2020-11-03 PROCEDURE — 99999 PR PBB SHADOW E&M-EST. PATIENT-LVL III: ICD-10-PCS | Mod: PBBFAC,HCNC,, | Performed by: FAMILY MEDICINE

## 2020-11-03 PROCEDURE — 99215 OFFICE O/P EST HI 40 MIN: CPT | Mod: HCNC,S$GLB,, | Performed by: FAMILY MEDICINE

## 2020-11-03 PROCEDURE — 3074F PR MOST RECENT SYSTOLIC BLOOD PRESSURE < 130 MM HG: ICD-10-PCS | Mod: HCNC,CPTII,S$GLB, | Performed by: FAMILY MEDICINE

## 2020-11-03 PROCEDURE — 3074F SYST BP LT 130 MM HG: CPT | Mod: HCNC,CPTII,S$GLB, | Performed by: FAMILY MEDICINE

## 2020-11-03 PROCEDURE — 3079F DIAST BP 80-89 MM HG: CPT | Mod: HCNC,CPTII,S$GLB, | Performed by: FAMILY MEDICINE

## 2020-11-03 PROCEDURE — 99999 PR PBB SHADOW E&M-EST. PATIENT-LVL III: CPT | Mod: PBBFAC,HCNC,, | Performed by: FAMILY MEDICINE

## 2020-11-03 PROCEDURE — 99215 PR OFFICE/OUTPT VISIT, EST, LEVL V, 40-54 MIN: ICD-10-PCS | Mod: HCNC,S$GLB,, | Performed by: FAMILY MEDICINE

## 2020-11-03 PROCEDURE — 3079F PR MOST RECENT DIASTOLIC BLOOD PRESSURE 80-89 MM HG: ICD-10-PCS | Mod: HCNC,CPTII,S$GLB, | Performed by: FAMILY MEDICINE

## 2020-11-03 RX ORDER — OXYCODONE AND ACETAMINOPHEN 10; 325 MG/1; MG/1
1 TABLET ORAL EVERY 8 HOURS PRN
Qty: 90 TABLET | Refills: 0 | Status: SHIPPED | OUTPATIENT
Start: 2020-11-19 | End: 2020-12-17 | Stop reason: SDUPTHER

## 2020-11-03 RX ORDER — MORPHINE SULFATE 30 MG/1
30 TABLET, FILM COATED, EXTENDED RELEASE ORAL EVERY 12 HOURS
Qty: 60 TABLET | Refills: 0 | Status: SHIPPED | OUTPATIENT
Start: 2020-11-03 | End: 2021-04-12

## 2020-11-03 NOTE — PROGRESS NOTES
Subjective:       Patient ID: Vincent Benton is a 65 y.o. male.    Chief Complaint: palliative f/u    66 yo male here for f/u palliative consult. He has been through treatment with Stage IIIB rectal cancer, status post pj op chemotherapy with FOLFOX.  Status post loop ileostomy and reversal on 08/04/2020.  Plan to continue surveillance. He reports resolution of rectal pain; now just with mild post-op pain when he bends over, such as to put on his shoes. He has been eating well and having normal bowel movements. We discussed wean off of his pain regimen as his cancer treatment is complete and he has done well post-op. 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     does not have any pertinent problems on file.  Past Medical History:   Diagnosis Date    Alcoholism     Anemia     Back pain     Encounter for blood transfusion 2018    Essential hypertension 2/4/2016    GERD (gastroesophageal reflux disease)     Hiatal hernia     Hypertension     Rectal cancer 9/11/2019     Past Surgical History:   Procedure Laterality Date    COLONOSCOPY N/A 9/3/2019    Procedure: COLONOSCOPY;  Surgeon: Isai Centeno MD;  Location: [facility] ENDO;  Service: Endoscopy;  Laterality: N/A;    COLONOSCOPY N/A 1/10/2020    Procedure: COLONOSCOPY;  Surgeon: Familia Gonzalez MD;  Location: [facility] ENDO;  Service: General;  Laterality: N/A;    ESOPHAGOGASTRODUODENOSCOPY N/A 9/3/2019    Procedure: ESOPHAGOGASTRODUODENOSCOPY (EGD);  Surgeon: Isai Centeno MD;  Location: [facility] ENDO;  Service: Endoscopy;  Laterality: N/A;    FLEXIBLE SIGMOIDOSCOPY  2/4/2020    Procedure: SIGMOIDOSCOPY, FLEXIBLE;  Surgeon: Familia Gonzalez MD;  Location: [facility] OR;  Service: General;;    ILEOSTOMY Right 2/4/2020    Procedure: CREATION, ILEOSTOMY;  Surgeon: Familia Gonzalez MD;  Location: [facility] OR;  Service:  General;  Laterality: Right;    ILEOSTOMY CLOSURE N/A 8/4/2020    Procedure: CLOSURE, ILEOSTOMY;  Surgeon: Familia Gonzalez MD;  Location: [facility] OR;  Service: General;  Laterality: N/A;    INCISION AND DRAINAGE OF BACK Left 8/5/2020    Procedure: INCISION AND DRAINAGE, BACK;  Surgeon: Familia Gonzalez MD;  Location: [facility] OR;  Service: General;  Laterality: Left;    INJECTION OF ANESTHETIC AGENT INTO TISSUE PLANE DEFINED BY TRANSVERSUS ABDOMINIS MUSCLE N/A 2/4/2020    Procedure: BLOCK, TRANSVERSUS ABDOMINIS PLANE;  Surgeon: Familia Gonzalez MD;  Location: [facility] OR;  Service: General;  Laterality: N/A;    INSERTION OF TUNNELED CENTRAL VENOUS CATHETER (CVC) WITH SUBCUTANEOUS PORT Right 2/4/2020    Procedure: INSERTION, PORT-A-CATH;  Surgeon: Familia Gonzalez MD;  Location: [facility];  Service: General;  Laterality: Right;    JOINT REPLACEMENT Left 2009    MOBILIZATION OF SPLENIC FLEXURE  2/4/2020    Procedure: MOBILIZATION, SPLENIC FLEXURE;  Surgeon: Familia Gonzalez MD;  Location: [facility];  Service: General;;     Family History   Problem Relation Age of Onset    Cataracts Mother     Stroke Father     Hypertension Father      Social History     Socioeconomic History    Marital status:      Spouse name: Not on file    Number of children: Not on file    Years of education: Not on file    Highest education level: Not on file   Occupational History    Not on file   Social Needs    Financial resource strain: Not on file    Food insecurity     Worry: Not on file     Inability: Not on file    Transportation needs     Medical: Not on file     Non-medical: Not on file   Tobacco Use    Smoking status: Never Smoker    Smokeless tobacco: Never Used   Substance and Sexual Activity    Alcohol use: Yes     Comment: HOLD [address]H PRIOR TO SURGERY    Drug use: No    Sexual activity: Never     Partners: Female   Lifestyle    Physical activity     Days per week: Not on file     Minutes per session: Not on  file    Stress: Not on file   Relationships    Social connections     Talks on phone: Not on file     Gets together: Not on file     Attends Rastafari service: Not on file     Active member of club or organization: Not on file     Attends meetings of clubs or organizations: Not on file     Relationship status: Not on file   Other Topics Concern    Not on file   Social History Narrative    Not on file     Review of Systems   A comprehensive 14-point review of systems was reviewed with patient and was negative other than as specified above.     Objective:     Vitals:    11/03/20 1316   BP: 126/82   Pulse: 83   Resp: 20   Temp: 98.6 °F (37 °C)        Physical Exam  Vitals signs and nursing note reviewed.   Constitutional:       Appearance: He is well-developed.   HENT:      Head: Normocephalic and atraumatic.   Eyes:      Pupils: Pupils are equal, round, and reactive to light.   Neck:      Musculoskeletal: Normal range of motion and neck supple.   Cardiovascular:      Rate and Rhythm: Normal rate and regular rhythm.   Pulmonary:      Effort: Pulmonary effort is normal.      Breath sounds: Normal breath sounds.   Abdominal:      General: Bowel sounds are normal.      Palpations: Abdomen is soft.      Tenderness: There is abdominal tenderness (RLQ pain).   Musculoskeletal: Normal range of motion.         General: No deformity.   Skin:     General: Skin is warm and dry.   Neurological:      Mental Status: He is alert and oriented to person, place, and time. Mental status is at baseline.   Psychiatric:         Mood and Affect: Mood normal.         Behavior: Behavior normal.         Review of Symptoms    Symptom Assessment (ESAS 0-10 Scale)  Pain:  0  Dyspnea:  0  Anxiety:  0  Nausea:  0  Depression:  0  Anorexia:  0  Fatigue:  0  Insomnia:  0  Restlessness:  0  Agitation:  0         Comments:  LBM today; takes imodium occasionally. Denies any episodes of constipation    Pain Assessment:  Location(s):  abdomen    Abdomen       Abdomen Location:  Right    ECOG Performance Status Grade:  0 - Fully Active    Living Arrangements:  Lives with spouse    Advance Care Planning   Advance Directives:   Living Will: Yes    Medical Power of : Yes      Decision Making:  Patient answered questions         Assessment:       1. Cancer associated pain        Plan:           Problem List Items Addressed This Visit     None      Visit Diagnoses     Cancer associated pain        Relevant Medications    morphine (MS CONTIN) 30 MG 12 hr tablet    oxyCODONE-acetaminophen (PERCOCET)  mg per tablet (Start on 11/19/2020)        Advised patient that with completion of his chemotherapy and uncomplicated post-op course, we should begin wean of opiate medication. He has follow-up with oncology and surgery planned. Will start by decreasing MS Contin form 45 mg to 30 mg and decreasing oxycodone utilization to no more than 3 times daily. Will plan to continue wean over next few months with goal of completely stopping opiates.     Will RTC in 3 months.     Complexity of decision making HIGH based on severity of patients illnesss, advanced age, chronic medical conditions. High risk medication use requires careful dosing and monitoring due to risk of serious side effects.

## 2020-11-10 ENCOUNTER — PATIENT OUTREACH (OUTPATIENT)
Dept: ADMINISTRATIVE | Facility: HOSPITAL | Age: 65
End: 2020-11-10

## 2020-11-16 ENCOUNTER — TELEPHONE (OUTPATIENT)
Dept: HEMATOLOGY/ONCOLOGY | Facility: CLINIC | Age: 65
End: 2020-11-16

## 2020-11-16 NOTE — TELEPHONE ENCOUNTER
----- Message from Sumaya West sent at 11/16/2020 12:08 PM CST -----  Contact: Vincent Tirado called in regards to rescheduling appt today. Please call back at [phone]. Thanks jh

## 2020-11-30 ENCOUNTER — TELEPHONE (OUTPATIENT)
Dept: INTERNAL MEDICINE | Facility: CLINIC | Age: 65
End: 2020-11-30

## 2020-11-30 ENCOUNTER — OFFICE VISIT (OUTPATIENT)
Dept: SURGERY | Facility: CLINIC | Age: 65
End: 2020-11-30
Payer: MEDICARE

## 2020-11-30 VITALS
BODY MASS INDEX: 21 KG/M2 | DIASTOLIC BLOOD PRESSURE: 90 MMHG | SYSTOLIC BLOOD PRESSURE: 122 MMHG | TEMPERATURE: 99 F | WEIGHT: 146.38 LBS

## 2020-11-30 DIAGNOSIS — L72.3 SEBACEOUS CYST: ICD-10-CM

## 2020-11-30 DIAGNOSIS — C20 RECTAL ADENOCARCINOMA: Primary | ICD-10-CM

## 2020-11-30 DIAGNOSIS — Z01.818 PRE-OP TESTING: Primary | ICD-10-CM

## 2020-11-30 DIAGNOSIS — G89.3 CANCER ASSOCIATED PAIN: ICD-10-CM

## 2020-11-30 PROCEDURE — 1126F PR PAIN SEVERITY QUANTIFIED, NO PAIN PRESENT: ICD-10-PCS | Mod: HCNC,S$GLB,, | Performed by: COLON & RECTAL SURGERY

## 2020-11-30 PROCEDURE — 99999 PR PBB SHADOW E&M-EST. PATIENT-LVL IV: CPT | Mod: PBBFAC,HCNC,, | Performed by: COLON & RECTAL SURGERY

## 2020-11-30 PROCEDURE — 1126F AMNT PAIN NOTED NONE PRSNT: CPT | Mod: HCNC,S$GLB,, | Performed by: COLON & RECTAL SURGERY

## 2020-11-30 PROCEDURE — 3080F DIAST BP >= 90 MM HG: CPT | Mod: HCNC,CPTII,S$GLB, | Performed by: COLON & RECTAL SURGERY

## 2020-11-30 PROCEDURE — 3074F PR MOST RECENT SYSTOLIC BLOOD PRESSURE < 130 MM HG: ICD-10-PCS | Mod: HCNC,CPTII,S$GLB, | Performed by: COLON & RECTAL SURGERY

## 2020-11-30 PROCEDURE — 3008F BODY MASS INDEX DOCD: CPT | Mod: HCNC,CPTII,S$GLB, | Performed by: COLON & RECTAL SURGERY

## 2020-11-30 PROCEDURE — 3008F PR BODY MASS INDEX (BMI) DOCUMENTED: ICD-10-PCS | Mod: HCNC,CPTII,S$GLB, | Performed by: COLON & RECTAL SURGERY

## 2020-11-30 PROCEDURE — 3074F SYST BP LT 130 MM HG: CPT | Mod: HCNC,CPTII,S$GLB, | Performed by: COLON & RECTAL SURGERY

## 2020-11-30 PROCEDURE — 99214 PR OFFICE/OUTPT VISIT, EST, LEVL IV, 30-39 MIN: ICD-10-PCS | Mod: HCNC,S$GLB,, | Performed by: COLON & RECTAL SURGERY

## 2020-11-30 PROCEDURE — 99214 OFFICE O/P EST MOD 30 MIN: CPT | Mod: HCNC,S$GLB,, | Performed by: COLON & RECTAL SURGERY

## 2020-11-30 PROCEDURE — 99499 UNLISTED E&M SERVICE: CPT | Mod: S$GLB,,, | Performed by: COLON & RECTAL SURGERY

## 2020-11-30 PROCEDURE — 99499 RISK ADDL DX/OHS AUDIT: ICD-10-PCS | Mod: S$GLB,,, | Performed by: COLON & RECTAL SURGERY

## 2020-11-30 PROCEDURE — 99999 PR PBB SHADOW E&M-EST. PATIENT-LVL IV: ICD-10-PCS | Mod: PBBFAC,HCNC,, | Performed by: COLON & RECTAL SURGERY

## 2020-11-30 PROCEDURE — 3080F PR MOST RECENT DIASTOLIC BLOOD PRESSURE >= 90 MM HG: ICD-10-PCS | Mod: HCNC,CPTII,S$GLB, | Performed by: COLON & RECTAL SURGERY

## 2020-11-30 RX ORDER — SODIUM CHLORIDE, SODIUM LACTATE, POTASSIUM CHLORIDE, CALCIUM CHLORIDE 600; 310; 30; 20 MG/100ML; MG/100ML; MG/100ML; MG/100ML
INJECTION, SOLUTION INTRAVENOUS CONTINUOUS
Status: CANCELLED | OUTPATIENT
Start: 2020-11-30

## 2020-11-30 RX ORDER — ONDANSETRON 2 MG/ML
4 INJECTION INTRAMUSCULAR; INTRAVENOUS EVERY 12 HOURS PRN
Status: CANCELLED | OUTPATIENT
Start: 2020-11-30

## 2020-11-30 NOTE — TELEPHONE ENCOUNTER
Palliative Care:    Patient requesting refill on MS Contin 30mg 12 hr tablets.  Pending request for Dr. Hill.  Preferred pharmacy: [facility].    Genny Nguyen RN

## 2020-11-30 NOTE — PROGRESS NOTES
History & Physical    SUBJECTIVE:     No chief complaint on file.  Ref MD: Isai Centeno MD    History of Present Illness:  Patient is a 65 y.o. male presents for evaluation of a rectal mass.  Patient has been having iron deficiency anemia that did require transfusion around 1 year ago.  He also has had associated hematochezia for over a year now but did improve after he quit drinking beer at 8 months ago but is still intermittently present. This prompted a colonoscopy which was performed on 09/03/2019 where an obstructing rectal mass was seen that appeared malignant was biopsied and could not be traversed.  Patient reports that he had a colonoscopy around 6-7 years ago were some benign polyps were found but no malignancy.  These records are not available.  He states that the hematochezia has recently improved after he quit drinking beer, but is still intermittently present and worsen with the bowel prep from the colonoscopy recently.  He is not on any chronic anticoagulation.  He states he has had normal caliber bowel movements does not feel constipated or obstructed.  He denies any fever, chills, nausea, vomiting, diarrhea, constipation, weight loss, night sweats or any other signs or symptoms.    12/4/19: Completed Neoadj chemoXRT  2/4/2020 : Robotic ultra-low anterior resection with DLI and Mediport placement  June 2020: Completed adjuvant chemotx  8/4/2020: DLI reversal    Interval history:  Since last clinic visit, the patient continues to do well.  His incision and drainage procedure site has healed well.  He still has the superiorly based benign appearing lesion on his back that he would like removed.  He is tolerating regular diet without nausea or vomiting.  He is having normal bowel function without evidence of incontinence.  He denies any hematochezia or melena.  He has gained a little weight.  No fever chills nausea or vomiting.      Review of patient's allergies indicates:  No Known Allergies    Current  Outpatient Medications   Medication Sig Dispense Refill    cyanocobalamin (VITAMIN B-12) 1000 MCG tablet Take 100 mcg by mouth once daily.      morphine (MS CONTIN) 30 MG 12 hr tablet Take 1 tablet (30 mg total) by mouth every 12 (twelve) hours. 60 tablet 0    nozaseptin (NOZIN) nasal  1  application      ondansetron (ZOFRAN) 8 MG tablet Take 1 tablet (8 mg total) by mouth every 8 (eight) hours as needed for Nausea. 30 tablet 2    oxyCODONE-acetaminophen (PERCOCET)  mg per tablet Take 1 tablet by mouth every 8 (eight) hours as needed for Pain. No more than 3 doses/day 90 tablet 0    pantoprazole (PROTONIX) 40 MG tablet Take 1 tablet (40 mg total) by mouth once daily. 30 tablet 11    tamsulosin (FLOMAX) 0.4 mg Cap Take 1 capsule (0.4 mg total) by mouth once daily. 30 capsule 11    dronabinoL (MARINOL) 2.5 MG capsule Take 1 capsule (2.5 mg total) by mouth 2 (two) times daily before meals. (Patient not taking: Reported on 11/30/2020) 30 capsule 0    ferrous sulfate 324 mg (65 mg iron) TbEC Take 1 tablet (324 mg total) by mouth 2 (two) times daily. (Patient not taking: Reported on 11/30/2020) 60 tablet 0    losartan (COZAAR) 50 MG tablet TAKE 1 TABLET BY MOUTH EVERY MORNING (Patient not taking: Reported on 11/30/2020) 90 tablet 1    mirtazapine (REMERON) 15 MG tablet 1 tablet      morphine sulfate (MORPHINE ORAL) 15 mg.      prochlorperazine (COMPAZINE) 5 MG tablet TAKE 1 TABLET(5 MG) BY MOUTH EVERY 6 HOURS AS NEEDED FOR NAUSEA (Patient not taking: Reported on 11/30/2020) 385 tablet 1     Current Facility-Administered Medications   Medication Dose Route Frequency Provider Last Rate Last Dose    acetaminophen tablet 650 mg  650 mg Oral Q6H Familia Gonzalez MD         Facility-Administered Medications Ordered in Other Visits   Medication Dose Route Frequency Provider Last Rate Last Dose    alteplase injection 2 mg  2 mg Intra-Catheter PRN Mikel Sams MD        heparin, porcine (PF)  100 unit/mL injection flush 500 Units  500 Units Intravenous PRN Mikel Sams MD        sodium chloride 0.9% flush 10 mL  10 mL Intravenous PRN Mikel Sams MD        sodium chloride 0.9% flush 3 mL  3 mL Intravenous PRN Shilpa Yee MD           Past Medical History:   Diagnosis Date    Alcoholism     Anemia     Back pain     Encounter for blood transfusion 2018    Essential hypertension 2/4/2016    GERD (gastroesophageal reflux disease)     Hiatal hernia     Hypertension     Rectal cancer 9/11/2019     Past Surgical History:   Procedure Laterality Date    COLONOSCOPY N/A 9/3/2019    Procedure: COLONOSCOPY;  Surgeon: Isai Centeno MD;  Location: [facility];  Service: Endoscopy;  Laterality: N/A;    COLONOSCOPY N/A 1/10/2020    Procedure: COLONOSCOPY;  Surgeon: Familia Gonzalez MD;  Location: [facility];  Service: General;  Laterality: N/A;    ESOPHAGOGASTRODUODENOSCOPY N/A 9/3/2019    Procedure: ESOPHAGOGASTRODUODENOSCOPY (EGD);  Surgeon: Isai Centeno MD;  Location: [facility];  Service: Endoscopy;  Laterality: N/A;    FLEXIBLE SIGMOIDOSCOPY  2/4/2020    Procedure: SIGMOIDOSCOPY, FLEXIBLE;  Surgeon: Familia Gonzalez MD;  Location: [facility];  Service: General;;    ILEOSTOMY Right 2/4/2020    Procedure: CREATION, ILEOSTOMY;  Surgeon: Familia Gonzalez MD;  Location: [facility];  Service: General;  Laterality: Right;    ILEOSTOMY CLOSURE N/A 8/4/2020    Procedure: CLOSURE, ILEOSTOMY;  Surgeon: Familia Gonzalez MD;  Location: [facility] OR;  Service: General;  Laterality: N/A;    INCISION AND DRAINAGE OF BACK Left 8/5/2020    Procedure: INCISION AND DRAINAGE, BACK;  Surgeon: Familia Gonzalez MD;  Location: [facility] OR;  Service: General;  Laterality: Left;    INJECTION OF ANESTHETIC AGENT INTO TISSUE PLANE DEFINED BY TRANSVERSUS ABDOMINIS MUSCLE N/A 2/4/2020    Procedure: BLOCK, TRANSVERSUS ABDOMINIS PLANE;  Surgeon: Familia Gonzalez MD;  Location: [facility] OR;  Service: General;   Laterality: N/A;    INSERTION OF TUNNELED CENTRAL VENOUS CATHETER (CVC) WITH SUBCUTANEOUS PORT Right 2/4/2020    Procedure: INSERTION, PORT-A-CATH;  Surgeon: Familia Gonzalez MD;  Location: [facility] OR;  Service: General;  Laterality: Right;    JOINT REPLACEMENT Left 2009    MOBILIZATION OF SPLENIC FLEXURE  2/4/2020    Procedure: MOBILIZATION, SPLENIC FLEXURE;  Surgeon: Familia Gonzalez MD;  Location: [facility] OR;  Service: General;;     Family History   Problem Relation Age of Onset    Cataracts Mother     Stroke Father     Hypertension Father      Social History     Tobacco Use    Smoking status: Never Smoker    Smokeless tobacco: Never Used   Substance Use Topics    Alcohol use: Yes     Comment: HOLD 72H PRIOR TO SURGERY    Drug use: No        Review of Systems:  Review of Systems   Constitutional: Negative for activity change, appetite change, chills, fatigue, fever and unexpected weight change.   HENT: Negative for congestion, ear pain, sore throat and trouble swallowing.    Eyes: Negative for pain, redness and itching.   Respiratory: Negative for cough, shortness of breath and wheezing.    Cardiovascular: Negative for chest pain, palpitations and leg swelling.   Gastrointestinal: Negative for abdominal distention, abdominal pain, anal bleeding, blood in stool, constipation, diarrhea, nausea, rectal pain and vomiting.   Endocrine: Negative for cold intolerance, heat intolerance and polyuria.   Genitourinary: Negative for dysuria, flank pain, frequency and hematuria.   Musculoskeletal: Negative for gait problem, joint swelling and neck pain.   Skin: Negative for color change, rash and wound.   Allergic/Immunologic: Negative for environmental allergies and immunocompromised state.   Neurological: Negative for dizziness, speech difficulty, weakness and numbness.   Psychiatric/Behavioral: Negative for agitation, confusion and hallucinations.       OBJECTIVE:     Vital Signs (Most Recent)  Temp: 98.6 °F (37  °C) (11/30/20 1046)  BP: (!) 122/90 (11/30/20 1046)     66.4 kg (146 lb 6.2 oz)     Physical Exam:  Physical Exam  Constitutional:       Appearance: He is well-developed.   HENT:      Head: Normocephalic and atraumatic.   Eyes:      Conjunctiva/sclera: Conjunctivae normal.   Neck:      Musculoskeletal: Normal range of motion.      Thyroid: No thyromegaly.   Cardiovascular:      Rate and Rhythm: Normal rate and regular rhythm.   Pulmonary:      Effort: Pulmonary effort is normal. No respiratory distress.   Abdominal:      General: There is no distension.      Palpations: Abdomen is soft. There is no mass.      Tenderness: There is no abdominal tenderness. There is no guarding or rebound.      Hernia: No hernia is present.      Comments: Ileostomy site well-healed; no drainage or surrounding erythema   Musculoskeletal: Normal range of motion.         General: No tenderness.   Skin:     General: Skin is warm and dry.      Capillary Refill: Capillary refill takes less than 2 seconds.      Findings: No rash.      Comments: + L back I&D site well-healed; 2cm subcutaneous cyst superior to this incision consistent with sebaceous cyst   Neurological:      Mental Status: He is alert and oriented to person, place, and time.       Laboratory  Lab Results   Component Value Date    WBC 7.08 09/01/2020    HGB 10.0 (L) 09/01/2020    HCT 33.7 (L) 09/01/2020     (H) 09/01/2020    CHOL 180 03/16/2020    TRIG 43 03/16/2020    HDL 81 (H) 03/16/2020    ALT 10 09/01/2020    AST 25 09/01/2020     09/01/2020    K 4.1 09/01/2020    CL 97 09/01/2020    CREATININE 0.7 09/01/2020    BUN 7 (L) 09/01/2020    CO2 28 09/01/2020    TSH 0.601 04/07/2018    PSA 1.4 03/16/2020    INR 0.9 11/06/2018    HGBA1C 5.7 (H) 06/08/2020         Diagnostic Results:  Reviewed colonoscopy report and images with rectal mass appreciated     MRI Pelvis Sept 2019:  FINDINGS:  Approximately 6.5 cm from the anal verge is a T2 hyperintense mass in the mid  rectum.  This results in circumferential narrowing of the rectum.  Tumor extends for approximately 3 cm in oblique craniocaudal dimension.  It measures 3.6 cm and greater such transverse dimension by approximately 2.7 cm AP.  The mass demonstrates a somewhat mushroom appearance along its inferior margin.  There is hyperenhancement and stranding in the adjacent fat with spiculation/nodularity suggestive of extramural spread of disease.  This extends for approximately 5.5 mm minimum.  Distance from the tumor to the mesorectal fascia measures on the order of 2.1 cm    There is a 5.2 mm left perirectal lymph node seen on image 11 of series 8 with heterogeneous appearance.  This is located approximately 1.7 mm from the mesorectal fascia.  Margins appear slightly irregular.    Additional lymph node is seen inferior to the rectum on image 12 of series 8.  This measures 4.7 mm.  Other rounded appearing lymph nodes are seen in the right mesorectum on image 16 of series 8 1 of these measures 5 mm and the other measures 5.1 mm.    The intersphincteric complex is maintained.    Relationship to anterior peritoneal reflection: Appears to partially straddle the APR    Tumor Extent: Appears to extend beyond mucosa.  There is spiculation perirectal fat.  Distance tumor to the mesorectal fascia is 2.1 cm    Lymph Nodes:    There are perirectal lymph nodes greater than 5 mmin the mesorectal fat. The distance of the closest lymph node to the mesorectal fascia is 1.7 mm.  Please see above discussion.    There are no extra-mesorectal nodes lymph nodes in the visualized pelvis.    There is no evident vascular invasion.      Impression       Mid rectal mass with findings suspicious for extension into the mesorectal fat with tumor extending approximately 5.5 mm with associated spiculation of the fat noted.  There are suspicious mesorectal lymph nodes with the largest 1 measuring just over 5 mm and located 1.7 mm from the mesorectal fascia.        CT C/A/P Sept 2019:  FINDINGS:  Chest:    There is elevation the right hemidiaphragm and bibasilar areas of parenchymal lung scarring or atelectasis.  A more confluent area of irregular masslike opacity and air bronchograms is identified in the posterior left lower lobe which has increased from prior.  There are low-grade ground-glass opacities in the bilateral lower lobes, right middle lobe, and to a lesser degree in the upper lobes.  No pleural effusion.    There is a borderline enlarged 10 mm in short axis precarinal lymph node.  Subcentimeter nodes are present elsewhere in the mediastinum and bilateral axilla.  Aorta, heart, and pericardium are unremarkable.    Abdomen/pelvis:    There is fatty infiltration of the liver.  There is a newly developed ill-defined focal hypodensity in the posteromedial segment of the left hepatic lobe measuring 3.1 x 2.7 x 1.3 cm.  No radiopaque gallstones.  Prominence of the common bile duct measuring up to 12 mm and pancreatic duct measuring up to 6 mm, unchanged from prior.  No visible intraductal stones.  Main portal vein is patent.    The spleen is nonenlarged.  No evidence of pancreatic mass or inflammation.  Kidneys enhance symmetrically.  Adrenals are unremarkable.    There is circumferential masslike thickening of the rectum.  No evidence of bowel obstruction.  Multiple bilateral subcentimeter in short axis mesorectal and internal iliac lymph nodes are identified.  No evidence of pathologic inguinal or external iliac lymphadenopathy.  There is sigmoid diverticulosis.  Stomach and visualized small bowel loops are unremarkable.  Normal appendix.  No intraperitoneal free air or fluid.  Fat containing periumbilical hernia.    There is aortoiliac atherosclerosis.  No evidence of aneurysm.  Shotty subcentimeter lymph nodes in the periaortic retroperitoneum.    The bladder is unremarkable.  Prostate mildly enlarged with calcifications.  Seminal vesicles  unremarkable.    Skeletal:    There is degenerative change in the spine and right hip.  Intramedullary nail present in the left hip.  No suspicious intraosseous lesions.      Impression       1. Rectal mass highly suspicious for malignancy.  2. Multiple bilateral mesorectal and internal iliac lymph nodes concerning for metastasis.  3. Ill-defined hypodensity in the left lobe of the liver, new since 2017 and concerning for metastasis.  4. Indeterminate developing area of masslike opacity in the posterior left lung base.  Possible infection/inflammation versus neoplasm.     MRI Liver:  FINDINGS:  The liver demonstrates a subtle hypointense T2 isointense T1 signal area in the deep aspect of the left hepatic lobe, medial segment.  This area measures approximately 11 x 18 mm axially.    The lesion displays a decrease in signal intensity on the out of phase gradient echo sequence suggesting fat containing focal fatty infiltration.    Following contrast administration there is displays mild relative hypoenhancement with respect to the adjacent parenchyma.  No hyperenhancement is seen.    The adjacent gallbladder appears normal.    Bile ducts are nondilated.      Impression       Probable focal fatty infiltration of the medial segment of the left hepatic lobe.          PET Scan:  FINDINGS:  Head/neck: There is normal physiologic FDG uptake noted within the visualized brain parenchyma. No FDG avid lymphadenopathy within the neck.    Chest: There are ill-defined patchy and nodular parenchymal opacities again noted in the left lower lobe exhibiting low level FDG uptake with an SUV max of 2.5.  A new 13 mm ground-glass opacity is noted in the lateral left upper lobe exhibiting weak FDG avidity with an SUV max of 1.9.  Similar newly developed ground-glass opacity present in the mid right lung adjacent to the major fissure with SUV max of 1.6. No FDG avid mediastinal, hilar or axillary lymph nodes.    Abdomen/Pelvis: Normal  physiologic FDG uptake noted within the liver, spleen, urinary tract, and bowel.Focal hypodensity is again noted in the left hepatic lobe which is non FDG avid and favored to represent focal fatty infiltration.  An intermediate length segment of circumferential mural thickening is again noted at the rectosigmoid junction which exhibits intense hypermetabolism and a SUV max of 11.8.  A hypermetabolic lymph node adjacent to the left pelvic sidewall has a SUV max of 5.3.    Skeletal: No FDG avid osseus lesions identified.      Impression       1. Hypermetabolic rectal mass consistent with known rectal malignancy.  2. Hypermetabolic regional lymph node adjacent to left pelvic sidewall.  3. Non FDG avid focal hypodensity in the liver favored to represent focal fatty infiltration.  4. Bilateral lung opacities most likely infectious or inflammatory etiology.  Follow-up recommended.     MRI Rectal Cancer/Pelvis Jan 2020:  FINDINGS:  Again seen is mid rectal mass.  There is persistent spiculation within the surrounding fat adjacent to the mass.  Spiculation extends to within 7 mm from the mesorectal fascia.  The mass demonstrates persistent enhancement with interval decrease in restricted diffusion suggesting partial treatment response.  No significant loss of normal mass signal intensity or fibrosis.  The mass currently on the order of 3.7 x 3.2 cm.  I remeasure the mass on the previous exam at 4.0 x 3.0 cm in axial dimension.    There is interval decrease in size of a 5.2 mm left perirectal lymph node with the node not clearly visualized on today's exam.  Additional lymph node in the right mesorectum measures 2.4 mm, decreased from 4.7 mm.  A lymph node in the right mesorectum on image 5 of series 12 measures 3.5 mm in short axis, also decreased.  It previously measured 5 mm in short axis.  Other nodes also appear smaller.  Small amount of pelvic fluid is noted.      Impression       Redemonstration of an enhancing rectal  mass with persistent spiculation in the mesorectal fat.  The mass measures slightly smaller. There is interval decrease in restricted diffusion in the mass suggesting at least partial treatment response.  Interval decrease in size of multiple mesorectal lymph nodes as above.     CT Abd/Pelvis (Dec 2020):  FINDINGS:  ABDOMEN:    - Lung bases: Atelectasis at the lung bases.  Airspace disease at the left lung base consistent with a left lower lobe infiltrate/pneumonia.    - Liver: Low-density fatty infiltrated liver.    - Gallbladder: No calcified gallstones.    - Bile Ducts: No evidence of intra or extra hepatic biliary ductal dilation.    - Spleen: Negative.    - Kidneys: No mass or hydronephrosis.    - Adrenals: Unremarkable.    - Pancreas: Fatty infiltrated pancreas.    - Retroperitoneum:  No significant adenopathy.    - Vascular: Scattered atherosclerotic calcifications of the abdominal aorta and proximal iliac vessels.    - Abdominal wall:  4 cm fat containing periumbilical hernia.    PELVIS:    No pelvic mass, adenopathy, or free fluid.    BOWEL/MESENTERY:    Scattered sigmoidal diverticulum.  Diverticulum of the ascending transverse and descending colon.  No radiographic evidence of diverticulitis.  The appendix appears normal.    BONES:  Multilevel degenerative changes of the thoracolumbar spine with disc space narrowing seen most significantly at the L1-2 L4-5 and L5-S1 levels.  Dynamic hip screw placement on the left      Impression       No evidence of a small-bowel obstruction.  Distension of the duodenum to the level of the 2nd stage.  Distal to that point no small bowel obstruction.  Diverticulosis without radiographic evidence of diverticulitis.  Normal appendix.  Normal gallbladder.  Low-density fatty infiltrated liver.  Increased density at the left lung base can not exclude a focal infiltrate/pneumonia versus atelectasis.     PATHOLOGY Feb 2020:  1. Sigmoid colon and rectum, low anterior  resection:  Residual moderately differentiated invasive adenocarcinoma, 4 cm greatest dimension  Tumor is located in the rectum  The distal, radial, and proximal margin are uninvolved by carcinoma  15 lymph nodes are negative for metastatic carcinoma (0/15)  Pathologic staging (pTNM): [id number]  See surgical pathology cancer case summary below  Surgical pathology cancer case summary: Colon and rectum. Protocol posting date: June 2017  Procedure: Low anterior resection  Tumor site: Rectum  Tumor size: 4 x 3 cm  Macroscopic tumor perforation: Not identified  Macroscopic intactness of mesorectum: Incomplete  Histologic type: Adenocarcinoma  Histologic grade: G2: Moderately differentiated  Tumor extension: Tumor invades through the muscularis propria into pericolorectal tissue  Margins: All margins are uninvolved by invasive carcinoma, high-grade dysplasia, intramucosal  adenocarcinoma, and adenoma  Margins examined: Proximal, distal, and radial  Distance of tumor from radial margin: 0.4 cm  Distance of tumor from distal margin: 0.5 cm  Distance of tumor from proximal margin: 17 cm  Treatment effect: Present: Residual cancer with evident tumor regression  Lymphovascular invasion: Present: Large vessel: Intramural  Perineural invasion: Not identified  Tumor deposits: Not identified  Regional lymph nodes: Number of lymph nodes involved: 0. Number of lymph nodes examined: 15  Pathologic stage classification (pTNM): [id number]  Immunohistochemistry testing for mismatch repair (MMR) proteins was performed on previous biopsy of the  rectal mass (surgical pathology case [id number]. Result as follows: No loss of nuclear expression of MMR  proteins: Low probability of microsatellite instability.  2. Proximal margin:  Fragment of colon with no evidence of malignancy  3. Anastomotic ring:  Fragment of colon with no evidence of malignancy  ASSESSMENT/PLAN:     65-year-old male with mid-rectal adenocarcinoma who is now s/p long-course  neoadj chemoXRT which completed on 12/4/19 with partial treatment response on repeat MRI and no evidence of metastatic disease on repeat CT Abd/pelvis who is now s/p robotic ultra-low anterior resection, DLI and mediport placement on 2/4/2020 who has recovered well postop with final path showing T3N0 who has now completed adjuvant chemotx in late June 2020 and is now s/p DLI closure on 8/4/2020 with a left back sebaceous cyst    - Discussed removal of mediport. Pt would also like to have remaining sebaceous cyst removed. Discussed office based procedure but pt would like to have anesthesia for it so will plan for operative mediport removal and sebaceous cyst excision at same time in a few weeks  - All risks, benefits and alternatives fully explained to patient. Risks include, but are not limited to, bleeding, infection, damage to surrounding tissues, air embolism, postoperative abscess, conversion to open operation, perioperative MI, CVA and death.  All questions field and appropriately answered to patient's satisfaction.  Consent signed and placed on chart.  - Needs continued surveillance for rectal cancer. Will need colonoscopy early next year and repeat CT scans  - RTC 3 months for surveillance    Over 25 minutes were spent in face to face contact with the patient with greater than 50% spent discussing their diagnosis and coordination of care.     Familia Gonzalez MD  Colon and Rectal Surgery  [facility]

## 2020-11-30 NOTE — TELEPHONE ENCOUNTER
----- Message from Aurelia Carvalho sent at 11/30/2020  9:25 AM CST -----  Type: RX Refill Request    Who Called:  Self     Have you contacted your pharmacy: no     Refill or New Rx: Refill     RX Name and Strength:morphine (MS CONTIN) 30 MG 12 hr tablet    Preferred Pharmacy with phone number:[facility] [phone] (Phone)  [phone] (Fax)        Local or Mail Order: Local     Ordering Provider: Dr. Hill    Would the patient rather a call back or a response via [facility]?  Call     Best Call Back Number:.[phone] (home)

## 2020-12-01 DIAGNOSIS — G89.3 NEOPLASM RELATED PAIN: Primary | ICD-10-CM

## 2020-12-01 RX ORDER — MORPHINE SULFATE 15 MG/1
15 TABLET, FILM COATED, EXTENDED RELEASE ORAL 2 TIMES DAILY
Qty: 60 TABLET | Refills: 0 | Status: SHIPPED | OUTPATIENT
Start: 2020-12-01 | End: 2021-04-12

## 2020-12-01 RX ORDER — MORPHINE SULFATE 30 MG/1
30 TABLET, FILM COATED, EXTENDED RELEASE ORAL EVERY 12 HOURS
Qty: 60 TABLET | Refills: 0 | OUTPATIENT
Start: 2020-12-01

## 2020-12-01 NOTE — PROGRESS NOTES
Please let him know I've sent a refill on his MS Contin at a reduced as per our plan to wean his medications.

## 2020-12-02 ENCOUNTER — TELEPHONE (OUTPATIENT)
Dept: SURGERY | Facility: CLINIC | Age: 65
End: 2020-12-02

## 2020-12-02 NOTE — TELEPHONE ENCOUNTER
----- Message from Elena Alcaraz sent at 12/2/2020  4:19 PM CST -----  Contact: pt  The pt request a return call, no additional info given and can be reached at [phone]///[id number]

## 2020-12-15 ENCOUNTER — TELEPHONE (OUTPATIENT)
Dept: ORTHOPEDICS | Facility: CLINIC | Age: 65
End: 2020-12-15

## 2020-12-15 DIAGNOSIS — M25.561 PAIN IN BOTH KNEES, UNSPECIFIED CHRONICITY: Primary | ICD-10-CM

## 2020-12-15 DIAGNOSIS — M25.562 PAIN IN BOTH KNEES, UNSPECIFIED CHRONICITY: Primary | ICD-10-CM

## 2020-12-15 NOTE — TELEPHONE ENCOUNTER
----- Message from Kyara Hawk sent at 12/15/2020  3:43 PM CST -----  Regarding: twisted knee/ swollen  Type:  Sooner Apoointment Request    Caller is requesting a sooner appointment.  Caller declined first available appointment listed below.  Caller will not accept being placed on the waitlist and is requesting a message be sent to doctor.  Name of Caller:pt  When is the first available appointment?12/29/2020  Symptoms:knee issues/ cant hardly get around  Would the patient rather a call back or a response via [facility]? Call back   Best Call Back Number:[phone]  Additional Information: please call back.Thanks

## 2020-12-15 NOTE — TELEPHONE ENCOUNTER
Spoke with patient and scheduled him an appointment. Understanding verbalized of appointment date, time and location.

## 2020-12-16 ENCOUNTER — HOSPITAL ENCOUNTER (OUTPATIENT)
Dept: RADIOLOGY | Facility: HOSPITAL | Age: 65
Discharge: HOME | End: 2020-12-16
Attending: PHYSICIAN ASSISTANT
Payer: MEDICARE

## 2020-12-16 ENCOUNTER — PATIENT OUTREACH (OUTPATIENT)
Dept: ADMINISTRATIVE | Facility: OTHER | Age: 65
End: 2020-12-16

## 2020-12-16 ENCOUNTER — OFFICE VISIT (OUTPATIENT)
Dept: ORTHOPEDICS | Facility: CLINIC | Age: 65
End: 2020-12-16
Payer: MEDICARE

## 2020-12-16 VITALS
DIASTOLIC BLOOD PRESSURE: 88 MMHG | BODY MASS INDEX: 20.96 KG/M2 | SYSTOLIC BLOOD PRESSURE: 145 MMHG | HEART RATE: 75 BPM | WEIGHT: 146.38 LBS | HEIGHT: 70 IN

## 2020-12-16 DIAGNOSIS — M25.562 PAIN IN BOTH KNEES, UNSPECIFIED CHRONICITY: ICD-10-CM

## 2020-12-16 DIAGNOSIS — M11.261 CHONDROCALCINOSIS OF RIGHT KNEE: ICD-10-CM

## 2020-12-16 DIAGNOSIS — M25.561 PAIN IN BOTH KNEES, UNSPECIFIED CHRONICITY: ICD-10-CM

## 2020-12-16 DIAGNOSIS — M25.461 EFFUSION OF RIGHT KNEE: ICD-10-CM

## 2020-12-16 DIAGNOSIS — M17.11 PRIMARY OSTEOARTHRITIS OF RIGHT KNEE: Primary | ICD-10-CM

## 2020-12-16 PROCEDURE — 99203 OFFICE O/P NEW LOW 30 MIN: CPT | Mod: 25,S$GLB,, | Performed by: PHYSICIAN ASSISTANT

## 2020-12-16 PROCEDURE — 3079F DIAST BP 80-89 MM HG: CPT | Mod: CPTII,S$GLB,, | Performed by: PHYSICIAN ASSISTANT

## 2020-12-16 PROCEDURE — 99999 PR PBB SHADOW E&M-EST. PATIENT-LVL IV: ICD-10-PCS | Mod: PBBFAC,,, | Performed by: PHYSICIAN ASSISTANT

## 2020-12-16 PROCEDURE — 1125F PR PAIN SEVERITY QUANTIFIED, PAIN PRESENT: ICD-10-PCS | Mod: S$GLB,,, | Performed by: PHYSICIAN ASSISTANT

## 2020-12-16 PROCEDURE — 20610 DRAIN/INJ JOINT/BURSA W/O US: CPT | Mod: RT,S$GLB,, | Performed by: PHYSICIAN ASSISTANT

## 2020-12-16 PROCEDURE — 73564 XR KNEE ORTHO BILAT WITH FLEXION: ICD-10-PCS | Mod: 26,50,, | Performed by: RADIOLOGY

## 2020-12-16 PROCEDURE — 99203 PR OFFICE/OUTPT VISIT, NEW, LEVL III, 30-44 MIN: ICD-10-PCS | Mod: 25,S$GLB,, | Performed by: PHYSICIAN ASSISTANT

## 2020-12-16 PROCEDURE — 99999 PR PBB SHADOW E&M-EST. PATIENT-LVL IV: CPT | Mod: PBBFAC,,, | Performed by: PHYSICIAN ASSISTANT

## 2020-12-16 PROCEDURE — 3008F PR BODY MASS INDEX (BMI) DOCUMENTED: ICD-10-PCS | Mod: CPTII,S$GLB,, | Performed by: PHYSICIAN ASSISTANT

## 2020-12-16 PROCEDURE — 73564 X-RAY EXAM KNEE 4 OR MORE: CPT | Mod: 26,50,, | Performed by: RADIOLOGY

## 2020-12-16 PROCEDURE — 3077F SYST BP >= 140 MM HG: CPT | Mod: CPTII,S$GLB,, | Performed by: PHYSICIAN ASSISTANT

## 2020-12-16 PROCEDURE — 3079F PR MOST RECENT DIASTOLIC BLOOD PRESSURE 80-89 MM HG: ICD-10-PCS | Mod: CPTII,S$GLB,, | Performed by: PHYSICIAN ASSISTANT

## 2020-12-16 PROCEDURE — 20610 LARGE JOINT ASPIRATION/INJECTION: R KNEE: ICD-10-PCS | Mod: RT,S$GLB,, | Performed by: PHYSICIAN ASSISTANT

## 2020-12-16 PROCEDURE — 3008F BODY MASS INDEX DOCD: CPT | Mod: CPTII,S$GLB,, | Performed by: PHYSICIAN ASSISTANT

## 2020-12-16 PROCEDURE — 3077F PR MOST RECENT SYSTOLIC BLOOD PRESSURE >= 140 MM HG: ICD-10-PCS | Mod: CPTII,S$GLB,, | Performed by: PHYSICIAN ASSISTANT

## 2020-12-16 PROCEDURE — 1125F AMNT PAIN NOTED PAIN PRSNT: CPT | Mod: S$GLB,,, | Performed by: PHYSICIAN ASSISTANT

## 2020-12-16 PROCEDURE — 73564 X-RAY EXAM KNEE 4 OR MORE: CPT | Mod: TC,50

## 2020-12-16 RX ORDER — DICLOFENAC SODIUM 10 MG/G
2 GEL TOPICAL 3 TIMES DAILY PRN
Qty: 2 TUBE | Refills: 6 | Status: ON HOLD | OUTPATIENT
Start: 2020-12-16 | End: 2022-08-16 | Stop reason: HOSPADM

## 2020-12-16 RX ORDER — METHYLPREDNISOLONE ACETATE 80 MG/ML
80 INJECTION, SUSPENSION INTRA-ARTICULAR; INTRALESIONAL; INTRAMUSCULAR; SOFT TISSUE
Status: DISCONTINUED | OUTPATIENT
Start: 2020-12-16 | End: 2020-12-16 | Stop reason: HOSPADM

## 2020-12-16 RX ADMIN — METHYLPREDNISOLONE ACETATE 80 MG: 80 INJECTION, SUSPENSION INTRA-ARTICULAR; INTRALESIONAL; INTRAMUSCULAR; SOFT TISSUE at 02:12

## 2020-12-16 NOTE — PROGRESS NOTES
Subjective:      Patient ID: Vincent Benton is a 65 y.o. male.    Chief Complaint: Pain of the Right Knee      HPI: Vincent Benton  is a 65 y.o. male who c/o Pain of the Right Knee   for duration of .  He assisted in the knee and has had swelling and stiffness since then.  It is 8 or 9/10 in severity.  Quality is sharp and intermittent.  Improved at worst.  Worsened with flexion.  Also worsened with weight-bearing.  He has no personal history of inflammatory arthritis.    Past Medical History:   Diagnosis Date    Alcoholism     Anemia     Back pain     Encounter for blood transfusion 2018    Essential hypertension 2/4/2016    GERD (gastroesophageal reflux disease)     Hiatal hernia     Hypertension     Rectal cancer 9/11/2019     Past Surgical History:   Procedure Laterality Date    COLONOSCOPY N/A 9/3/2019    Procedure: COLONOSCOPY;  Surgeon: Isai Centeno MD;  Location: [facility];  Service: Endoscopy;  Laterality: N/A;    COLONOSCOPY N/A 1/10/2020    Procedure: COLONOSCOPY;  Surgeon: Familia Gonzalez MD;  Location: [facility];  Service: General;  Laterality: N/A;    ESOPHAGOGASTRODUODENOSCOPY N/A 9/3/2019    Procedure: ESOPHAGOGASTRODUODENOSCOPY (EGD);  Surgeon: Isai Centeno MD;  Location: [facility];  Service: Endoscopy;  Laterality: N/A;    FLEXIBLE SIGMOIDOSCOPY  2/4/2020    Procedure: SIGMOIDOSCOPY, FLEXIBLE;  Surgeon: Familia Gonzalez MD;  Location: [facility] OR;  Service: General;;    ILEOSTOMY Right 2/4/2020    Procedure: CREATION, ILEOSTOMY;  Surgeon: Familia Gonzalez MD;  Location: [facility] OR;  Service: General;  Laterality: Right;    ILEOSTOMY CLOSURE N/A 8/4/2020    Procedure: CLOSURE, ILEOSTOMY;  Surgeon: Familia Gonzalez MD;  Location: [facility] OR;  Service: General;  Laterality: N/A;    INCISION AND DRAINAGE OF BACK Left 8/5/2020    Procedure: INCISION AND DRAINAGE, BACK;  Surgeon: Familia Gonzalez MD;  Location: [facility] OR;  Service: General;  Laterality: Left;     INJECTION OF ANESTHETIC AGENT INTO TISSUE PLANE DEFINED BY TRANSVERSUS ABDOMINIS MUSCLE N/A 2/4/2020    Procedure: BLOCK, TRANSVERSUS ABDOMINIS PLANE;  Surgeon: Familia Gonzalez MD;  Location: [facility];  Service: General;  Laterality: N/A;    INSERTION OF TUNNELED CENTRAL VENOUS CATHETER (CVC) WITH SUBCUTANEOUS PORT Right 2/4/2020    Procedure: INSERTION, PORT-A-CATH;  Surgeon: Familia Gonzalez MD;  Location: [facility] OR;  Service: General;  Laterality: Right;    JOINT REPLACEMENT Left 2009    MOBILIZATION OF SPLENIC FLEXURE  2/4/2020    Procedure: MOBILIZATION, SPLENIC FLEXURE;  Surgeon: Familia Gonzalez MD;  Location: [facility];  Service: General;;     Family History   Problem Relation Age of Onset    Cataracts Mother     Stroke Father     Hypertension Father      Social History     Socioeconomic History    Marital status:      Spouse name: Not on file    Number of children: Not on file    Years of education: Not on file    Highest education level: Not on file   Occupational History    Not on file   Social Needs    Financial resource strain: Not on file    Food insecurity     Worry: Not on file     Inability: Not on file    Transportation needs     Medical: Not on file     Non-medical: Not on file   Tobacco Use    Smoking status: Never Smoker    Smokeless tobacco: Never Used   Substance and Sexual Activity    Alcohol use: Yes     Comment: occassional HOLD 72H PRIOR TO SURGERY    Drug use: No    Sexual activity: Never     Partners: Female   Lifestyle    Physical activity     Days per week: Not on file     Minutes per session: Not on file    Stress: Not on file   Relationships    Social connections     Talks on phone: Not on file     Gets together: Not on file     Attends Yazdanism service: Not on file     Active member of club or organization: Not on file     Attends meetings of clubs or organizations: Not on file     Relationship status: Not on file   Other Topics Concern    Not on  file   Social History Narrative    Not on file     Medication List with Changes/Refills   New Medications    DICLOFENAC SODIUM (VOLTAREN) 1 % GEL    Apply 2 g topically 3 (three) times daily as needed.    DIPHTH,PERTUS,ACELL,,TETANUS (BOOSTRIX TDAP) 2.5-8-5 LF-MCG-LF/0.5ML SYRG INJECTION    Inject 0.5 mLs into the muscle once. For one dose. for 1 dose    PNEUMOC 13-GRACIELA CONJ-DIP CR,PF, (PREVNAR 13, PF,) 0.5 ML SYRG    Inject 0.5 mLs into the muscle once. For one dose. for 1 dose   Current Medications    CYANOCOBALAMIN (VITAMIN B-12) 1000 MCG TABLET    Take 100 mcg by mouth once daily.    DRONABINOL (MARINOL) 2.5 MG CAPSULE    Take 1 capsule (2.5 mg total) by mouth 2 (two) times daily before meals.    FERROUS SULFATE 324 MG (65 MG IRON) TBEC    Take 1 tablet (324 mg total) by mouth 2 (two) times daily.    LOSARTAN (COZAAR) 50 MG TABLET    TAKE 1 TABLET BY MOUTH EVERY MORNING    MIRTAZAPINE (REMERON) 15 MG TABLET    1 tablet    MORPHINE (MS CONTIN) 15 MG 12 HR TABLET    Take 1 tablet (15 mg total) by mouth 2 (two) times daily.    MORPHINE (MS CONTIN) 30 MG 12 HR TABLET    Take 1 tablet (30 mg total) by mouth every 12 (twelve) hours.    NOZASEPTIN (NOZIN) NASAL     1  application    ONDANSETRON (ZOFRAN) 8 MG TABLET    Take 1 tablet (8 mg total) by mouth every 8 (eight) hours as needed for Nausea.    OXYCODONE-ACETAMINOPHEN (PERCOCET)  MG PER TABLET    Take 1 tablet by mouth every 8 (eight) hours as needed for Pain. No more than 3 doses/day    PANTOPRAZOLE (PROTONIX) 40 MG TABLET    Take 1 tablet (40 mg total) by mouth once daily.    PROCHLORPERAZINE (COMPAZINE) 5 MG TABLET    TAKE 1 TABLET(5 MG) BY MOUTH EVERY 6 HOURS AS NEEDED FOR NAUSEA    TAMSULOSIN (FLOMAX) 0.4 MG CAP    Take 1 capsule (0.4 mg total) by mouth once daily.     Review of patient's allergies indicates:  No Known Allergies    Review of Systems   Constitution: Negative for fever.   Cardiovascular: Negative for chest pain.   Respiratory:  Negative for cough and shortness of breath.    Skin: Negative for rash.   Musculoskeletal: Positive for joint pain, joint swelling and stiffness.   Gastrointestinal: Negative for heartburn.   Neurological: Negative for headaches and numbness.         Objective:        General    Nursing note and vitals reviewed.  Constitutional: He is oriented to person, place, and time. He appears well-developed and well-nourished.   HENT:   Head: Normocephalic and atraumatic.   Eyes: EOM are normal.   Cardiovascular: Normal rate.    Pulmonary/Chest: Effort normal.   Neurological: He is alert and oriented to person, place, and time.   Psychiatric: He has a normal mood and affect. His behavior is normal.     General Musculoskeletal Exam   Gait: antalgic       Right Knee Exam     Inspection   Erythema: absent  Swelling: absent  Effusion: present (3+)  Deformity: absent  Bruising: absent    Tenderness   The patient is tender to palpation of the medial joint line, condyle and lateral joint line.    Crepitus   The patient has crepitus of the patella.    Range of Motion   Extension:  10 (painful) abnormal   Flexion:  90 abnormal     Tests   Meniscus   Lisa:  Medial - negative Lateral - negative  Ligament Examination Lachman: normal (-1 to 2mm) PCL-Posterior Drawer: normal (0 to 2mm)     MCL - Valgus: normal (0 to 2mm)  LCL - Varus: normal  Patella   Patellar apprehension: negative  Patellar Tracking: normal  Patellar Grind: positive    Other   Meniscal Cyst: absent  Popliteal (Baker's) Cyst: absent  Sensation: normal    Comments:  Comp soft, cap refill < 2 sec.    Post aspiration   ROM improved 0-120 pain free    Left Knee Exam     Range of Motion   Extension: normal   Flexion: normal     Tests   Stability Lachman: normal (-1 to 2mm) PCL-Posterior Drawer: normal (0 to 2mm)  MCL - Valgus: normal (0 to 2mm)  LCL - Varus: normal (0 to 2mm)    Other   Sensation: normal    Muscle Strength   Right Lower Extremity   Quadriceps:  4/5    Hamstrin/5   Left Lower Extremity   Quadriceps:  5/5   Hamstrin/5     Vascular Exam       Edema  Right Lower Leg: absent  Left Lower Leg: absent              Xray images and report were reviewed today.  I agree with the radiologist's interpretation.    X-ray Knee Ortho Bilateral with Flexion  Narrative: EXAMINATION:  XR KNEE ORTHO BILAT WITH FLEXION    CLINICAL HISTORY:  Pain in right knee    TECHNIQUE:  AP standing of both knees, PA flexion standing views of both knees, and Merchant views of both knees were performed.  Lateral views of both knees were also performed.    COMPARISON:  None    FINDINGS:  Right knee:    No right knee fracture.  Mild lateral compartment joint space narrowing of the right knee.  Joint spaces of the medial and patellofemoral compartments are maintained.  Chondrocalcinosis is seen within all 3 right knee compartments.  No suspicious osseous lesion.  Small moderate right knee effusion.  Atherosclerotic calcifications noted about the right knee.    Left knee:    No left knee fracture.  Joint spaces of the left knee are normal.  No suspicious osseous lesion.  No left knee effusion.  Atherosclerotic calcifications noted about the left knee.  Impression: As above.    Electronically signed by: Laureano Ladd  Date:    2020  Time:    14:13        Assessment:       Encounter Diagnoses   Name Primary?    Primary osteoarthritis of right knee Yes    Chondrocalcinosis of right knee     Effusion of right knee           Plan:       Vincent was seen today for pain.    Diagnoses and all orders for this visit:    Primary osteoarthritis of right knee  -     diclofenac sodium (VOLTAREN) 1 % Gel; Apply 2 g topically 3 (three) times daily as needed.  -     Large Joint Aspiration/Injection: R knee    Chondrocalcinosis of right knee  -     diclofenac sodium (VOLTAREN) 1 % Gel; Apply 2 g topically 3 (three) times daily as needed.  -     Large Joint Aspiration/Injection: R knee    Effusion  of right knee  -     diclofenac sodium (VOLTAREN) 1 % Gel; Apply 2 g topically 3 (three) times daily as needed.  -     Large Joint Aspiration/Injection: R knee        Vincent Benton is a new pt who comes in today for the above problems.  We have discussed risks and benefits of aspirating and injecting the right knee.  He wishes to proceed.  I have also set him up with a neoprene hinged knee brace.  I would recommend Voltaren gel 2 g topically 3 times a day p.r.n..  I have also put him on a home exercise program for quad strengthening.  He does also have chondrocalcinosis, in addition to arthritis.  Aspiration today was completely clear.  I have offered to see him back in the office in 1-2 months to re-evaluate his progress.  He says he will call if needed.  He verbalizes understanding and agrees with the above plan.    Follow up if symptoms worsen or fail to improve - per pt request.      The patient understands, chooses and consents to this plan and accepts all   the risks which include but are not limited to the risks mentioned above.     Disclaimer: This note was prepared using a voice recognition system and is likely to have sound alike errors within the text.

## 2020-12-16 NOTE — PROCEDURES
Large Joint Aspiration/Injection: R knee    Date/Time: 12/16/2020 2:20 PM  Performed by: Eri Boyer PA-C  Authorized by: Eri Boyer PA-C     Consent Done?:  Yes (Verbal)  Indications:  Pain  Site marked: the procedure site was marked    Timeout: prior to procedure the correct patient, procedure, and site was verified    Prep: patient was prepped and draped in usual sterile fashion      Local anesthesia used?: Yes    Local anesthetic:  Lidocaine 1% without epinephrine  Anesthetic total (ml):  2      Details:  Needle Size:  22 G and 18 G  Ultrasonic Guidance for needle placement?: No    Location:  Knee  Site:  R knee  Medications:  80 mg methylPREDNISolone acetate 80 mg/mL  Aspirate amount (mL):  65  Aspirate:  Clear and yellow  Patient tolerance:  Patient tolerated the procedure well with no immediate complications     Right knee aspiration and injection report  Treatment options were discussed.  After verbal consent was obtained and the sight was identified the right knee was prepped in sterile fashion. Under sterile conditions the right knee was injected with 20 mg of lidocaine plain utilizing the superolateral approach. The right knee was reprepped sterilely and aspirated using the same anterolateral portal with return of 65 cc's of serous fluid.  The patient was then given an injection of 80 mg depomedrol.  After injection a sterile Band-Aid was applied.  The patient tolerated the procedure well and was sent home in stable condition.  The patient was instructed to apply an ice pack for approximately 10 minutes once arriving at home and not to do anything strenuous for the next 48 hours.  The patient was instructed to call if there were any problems at the aspiration sight.

## 2020-12-16 NOTE — LETTER
December 16, 2020      Shakila Moran DO  [address] [place] [address] [address] [place] [id number]           [facility]  [address] [place] [address] [address] [place] [id number]  Phone: [phone]  Fax: [phone]          Patient: Vincent Benton   MR Number: [id number]   YOB: 1955   Date of Visit: 12/16/2020       Dear Dr. Shakila Moran:    Thank you for referring Vincent Benton to me for evaluation. Attached you will find relevant portions of my assessment and plan of care.    If you have questions, please do not hesitate to call me. I look forward to following Vincent Benton along with you.    Sincerely,    Eri Boyer PA-C    Enclosure  CC:  No Recipients    If you would like to receive this communication electronically, please contact [email or web address] or [phone] to request more information on [employer] Link access.    For providers and/or their staff who would like to refer a patient to [facility], please contact us through our one-stop-shop provider referral line, [facility], at [phone].    If you feel you have received this communication in error or would no longer like to receive these types of communications, please e-mail [email or web address]

## 2020-12-16 NOTE — PROGRESS NOTES
Health Maintenance Due   Topic Date Due    HIV Screening  09/30/1972    TETANUS VACCINE  09/30/1975    Pneumococcal Vaccine (Highest Risk) (1 of 3 - PCV13) 09/30/1976    Shingles Vaccine (1 of 2) 09/30/2007    Colorectal Cancer Screening  01/10/2021     Updates were requested from care everywhere.  Chart was reviewed for overdue Proactive [facility] Encounters (JUAN) topics (CRS, Breast Cancer Screening, Eye exam)  Health Maintenance has been updated.  LINKS immunization registry triggered.  LINKS not responding.

## 2020-12-17 ENCOUNTER — IMMUNIZATION (OUTPATIENT)
Dept: PHARMACY | Facility: CLINIC | Age: 65
End: 2020-12-17
Payer: MEDICARE

## 2020-12-17 ENCOUNTER — TELEPHONE (OUTPATIENT)
Dept: PALLIATIVE MEDICINE | Facility: CLINIC | Age: 65
End: 2020-12-17

## 2020-12-17 DIAGNOSIS — G89.3 CANCER ASSOCIATED PAIN: ICD-10-CM

## 2020-12-17 RX ORDER — OXYCODONE AND ACETAMINOPHEN 10; 325 MG/1; MG/1
1 TABLET ORAL EVERY 12 HOURS PRN
Qty: 60 TABLET | Refills: 0 | Status: SHIPPED | OUTPATIENT
Start: 2020-12-17 | End: 2021-01-19 | Stop reason: SDUPTHER

## 2020-12-17 NOTE — TELEPHONE ENCOUNTER
[place] - Palliative Care  Medical Specialty       Patient Name: Vincent Benton  MRN: [id number]  Primary Care Physician: Shakila Moran DO    Received call from patient requested refill on his oxycodone. He reports he will be out by this weekend, and the pharmacy is closed on weekends. Request for refill sent to Dr. Hill via secure chat.      Keerthi Bowen, GISELA, [facility]  [phone]

## 2020-12-17 NOTE — PROGRESS NOTES
Patient called with oxycodone refill request; will continue slow opiate wean. Rx sent for same dose short acting oxycodone q12 PRN

## 2021-01-11 ENCOUNTER — OFFICE VISIT (OUTPATIENT)
Dept: SURGERY | Facility: CLINIC | Age: 66
End: 2021-01-11
Payer: MEDICARE

## 2021-01-11 VITALS
HEART RATE: 85 BPM | SYSTOLIC BLOOD PRESSURE: 142 MMHG | DIASTOLIC BLOOD PRESSURE: 86 MMHG | TEMPERATURE: 99 F | WEIGHT: 148.56 LBS | BODY MASS INDEX: 21.32 KG/M2

## 2021-01-11 DIAGNOSIS — C20 RECTAL ADENOCARCINOMA: Primary | ICD-10-CM

## 2021-01-11 PROCEDURE — 3079F PR MOST RECENT DIASTOLIC BLOOD PRESSURE 80-89 MM HG: ICD-10-PCS | Mod: HCNC,CPTII,S$GLB, | Performed by: COLON & RECTAL SURGERY

## 2021-01-11 PROCEDURE — 36590 REMOVAL TUNNELED CV CATH: CPT | Mod: HCNC,S$GLB,, | Performed by: COLON & RECTAL SURGERY

## 2021-01-11 PROCEDURE — 3008F PR BODY MASS INDEX (BMI) DOCUMENTED: ICD-10-PCS | Mod: HCNC,CPTII,S$GLB, | Performed by: COLON & RECTAL SURGERY

## 2021-01-11 PROCEDURE — 99999 PR PBB SHADOW E&M-EST. PATIENT-LVL III: ICD-10-PCS | Mod: PBBFAC,HCNC,, | Performed by: COLON & RECTAL SURGERY

## 2021-01-11 PROCEDURE — 1126F AMNT PAIN NOTED NONE PRSNT: CPT | Mod: HCNC,S$GLB,, | Performed by: COLON & RECTAL SURGERY

## 2021-01-11 PROCEDURE — 3077F SYST BP >= 140 MM HG: CPT | Mod: HCNC,CPTII,S$GLB, | Performed by: COLON & RECTAL SURGERY

## 2021-01-11 PROCEDURE — 3079F DIAST BP 80-89 MM HG: CPT | Mod: HCNC,CPTII,S$GLB, | Performed by: COLON & RECTAL SURGERY

## 2021-01-11 PROCEDURE — 99999 PR PBB SHADOW E&M-EST. PATIENT-LVL III: CPT | Mod: PBBFAC,HCNC,, | Performed by: COLON & RECTAL SURGERY

## 2021-01-11 PROCEDURE — 3077F PR MOST RECENT SYSTOLIC BLOOD PRESSURE >= 140 MM HG: ICD-10-PCS | Mod: HCNC,CPTII,S$GLB, | Performed by: COLON & RECTAL SURGERY

## 2021-01-11 PROCEDURE — 3008F BODY MASS INDEX DOCD: CPT | Mod: HCNC,CPTII,S$GLB, | Performed by: COLON & RECTAL SURGERY

## 2021-01-11 PROCEDURE — 1126F PR PAIN SEVERITY QUANTIFIED, NO PAIN PRESENT: ICD-10-PCS | Mod: HCNC,S$GLB,, | Performed by: COLON & RECTAL SURGERY

## 2021-01-11 PROCEDURE — 36590 PR REMOVAL TUNNELED CV CATH W SUBQ PORT OR PUMP: ICD-10-PCS | Mod: HCNC,S$GLB,, | Performed by: COLON & RECTAL SURGERY

## 2021-01-15 ENCOUNTER — TELEPHONE (OUTPATIENT)
Dept: PALLIATIVE MEDICINE | Facility: CLINIC | Age: 66
End: 2021-01-15

## 2021-01-19 ENCOUNTER — TELEPHONE (OUTPATIENT)
Dept: PALLIATIVE MEDICINE | Facility: CLINIC | Age: 66
End: 2021-01-19

## 2021-01-19 DIAGNOSIS — G89.3 CANCER ASSOCIATED PAIN: ICD-10-CM

## 2021-01-19 RX ORDER — OXYCODONE AND ACETAMINOPHEN 10; 325 MG/1; MG/1
1 TABLET ORAL DAILY PRN
Qty: 30 TABLET | Refills: 0 | Status: SHIPPED | OUTPATIENT
Start: 2021-01-19 | End: 2021-04-12

## 2021-02-02 ENCOUNTER — PATIENT OUTREACH (OUTPATIENT)
Dept: ADMINISTRATIVE | Facility: OTHER | Age: 66
End: 2021-02-02

## 2021-02-02 ENCOUNTER — OFFICE VISIT (OUTPATIENT)
Dept: ORTHOPEDICS | Facility: CLINIC | Age: 66
End: 2021-02-02
Payer: MEDICARE

## 2021-02-02 VITALS
HEART RATE: 91 BPM | HEIGHT: 70 IN | BODY MASS INDEX: 21.27 KG/M2 | WEIGHT: 148.56 LBS | SYSTOLIC BLOOD PRESSURE: 144 MMHG | DIASTOLIC BLOOD PRESSURE: 79 MMHG

## 2021-02-02 DIAGNOSIS — M11.261 CHONDROCALCINOSIS OF RIGHT KNEE: ICD-10-CM

## 2021-02-02 DIAGNOSIS — M17.11 PRIMARY OSTEOARTHRITIS OF RIGHT KNEE: Primary | ICD-10-CM

## 2021-02-02 DIAGNOSIS — M25.461 EFFUSION OF RIGHT KNEE: ICD-10-CM

## 2021-02-02 LAB
APPEARANCE FLD: NORMAL
BODY FLD TYPE: NORMAL
COLOR FLD: NORMAL
LYMPHOCYTES NFR FLD MANUAL: 26 %
MONOS+MACROS NFR FLD MANUAL: 67 %
NEUTROPHILS NFR FLD MANUAL: 7 %
WBC # FLD: 447 /CU MM

## 2021-02-02 PROCEDURE — 3008F PR BODY MASS INDEX (BMI) DOCUMENTED: ICD-10-PCS | Mod: CPTII,S$GLB,, | Performed by: PHYSICIAN ASSISTANT

## 2021-02-02 PROCEDURE — 1125F AMNT PAIN NOTED PAIN PRSNT: CPT | Mod: S$GLB,,, | Performed by: PHYSICIAN ASSISTANT

## 2021-02-02 PROCEDURE — 1125F PR PAIN SEVERITY QUANTIFIED, PAIN PRESENT: ICD-10-PCS | Mod: S$GLB,,, | Performed by: PHYSICIAN ASSISTANT

## 2021-02-02 PROCEDURE — 87075 CULTR BACTERIA EXCEPT BLOOD: CPT

## 2021-02-02 PROCEDURE — 20610 LARGE JOINT ASPIRATION/INJECTION: R KNEE: ICD-10-PCS | Mod: RT,S$GLB,, | Performed by: PHYSICIAN ASSISTANT

## 2021-02-02 PROCEDURE — 89060 EXAM SYNOVIAL FLUID CRYSTALS: CPT

## 2021-02-02 PROCEDURE — 20610 DRAIN/INJ JOINT/BURSA W/O US: CPT | Mod: RT,S$GLB,, | Performed by: PHYSICIAN ASSISTANT

## 2021-02-02 PROCEDURE — 87070 CULTURE OTHR SPECIMN AEROBIC: CPT

## 2021-02-02 PROCEDURE — 3078F DIAST BP <80 MM HG: CPT | Mod: CPTII,S$GLB,, | Performed by: PHYSICIAN ASSISTANT

## 2021-02-02 PROCEDURE — 99999 PR PBB SHADOW E&M-EST. PATIENT-LVL III: ICD-10-PCS | Mod: PBBFAC,,, | Performed by: PHYSICIAN ASSISTANT

## 2021-02-02 PROCEDURE — 3008F BODY MASS INDEX DOCD: CPT | Mod: CPTII,S$GLB,, | Performed by: PHYSICIAN ASSISTANT

## 2021-02-02 PROCEDURE — 3077F PR MOST RECENT SYSTOLIC BLOOD PRESSURE >= 140 MM HG: ICD-10-PCS | Mod: CPTII,S$GLB,, | Performed by: PHYSICIAN ASSISTANT

## 2021-02-02 PROCEDURE — 89051 BODY FLUID CELL COUNT: CPT

## 2021-02-02 PROCEDURE — 3077F SYST BP >= 140 MM HG: CPT | Mod: CPTII,S$GLB,, | Performed by: PHYSICIAN ASSISTANT

## 2021-02-02 PROCEDURE — 99214 PR OFFICE/OUTPT VISIT, EST, LEVL IV, 30-39 MIN: ICD-10-PCS | Mod: 25,S$GLB,, | Performed by: PHYSICIAN ASSISTANT

## 2021-02-02 PROCEDURE — 87205 SMEAR GRAM STAIN: CPT

## 2021-02-02 PROCEDURE — 99214 OFFICE O/P EST MOD 30 MIN: CPT | Mod: 25,S$GLB,, | Performed by: PHYSICIAN ASSISTANT

## 2021-02-02 PROCEDURE — 3078F PR MOST RECENT DIASTOLIC BLOOD PRESSURE < 80 MM HG: ICD-10-PCS | Mod: CPTII,S$GLB,, | Performed by: PHYSICIAN ASSISTANT

## 2021-02-02 PROCEDURE — 99999 PR PBB SHADOW E&M-EST. PATIENT-LVL III: CPT | Mod: PBBFAC,,, | Performed by: PHYSICIAN ASSISTANT

## 2021-02-02 RX ORDER — METHYLPREDNISOLONE 4 MG/1
TABLET ORAL
Qty: 21 TABLET | Refills: 0 | Status: SHIPPED | OUTPATIENT
Start: 2021-02-02 | End: 2021-03-05 | Stop reason: ALTCHOICE

## 2021-02-03 LAB
BODY FLD TYPE: NORMAL
CRYSTALS FLD MICRO: NEGATIVE
PATH INTERP FLD-IMP: NORMAL
PATH INTERP FLD-IMP: NORMAL

## 2021-02-08 LAB
BACTERIA FLD AEROBE CULT: NO GROWTH
GRAM STN SPEC: NORMAL
GRAM STN SPEC: NORMAL

## 2021-02-10 LAB — BACTERIA SPEC ANAEROBE CULT: NORMAL

## 2021-02-12 ENCOUNTER — TELEPHONE (OUTPATIENT)
Dept: ORTHOPEDICS | Facility: CLINIC | Age: 66
End: 2021-02-12

## 2021-02-22 ENCOUNTER — OFFICE VISIT (OUTPATIENT)
Dept: SURGERY | Facility: CLINIC | Age: 66
End: 2021-02-22
Payer: MEDICARE

## 2021-02-22 ENCOUNTER — LAB VISIT (OUTPATIENT)
Dept: LAB | Facility: HOSPITAL | Age: 66
End: 2021-02-22
Attending: COLON & RECTAL SURGERY
Payer: MEDICARE

## 2021-02-22 ENCOUNTER — TELEPHONE (OUTPATIENT)
Dept: ENDOSCOPY | Facility: HOSPITAL | Age: 66
End: 2021-02-22

## 2021-02-22 VITALS
TEMPERATURE: 99 F | DIASTOLIC BLOOD PRESSURE: 93 MMHG | SYSTOLIC BLOOD PRESSURE: 136 MMHG | WEIGHT: 151 LBS | BODY MASS INDEX: 21.67 KG/M2 | HEART RATE: 68 BPM

## 2021-02-22 DIAGNOSIS — C20 RECTAL ADENOCARCINOMA: Primary | ICD-10-CM

## 2021-02-22 DIAGNOSIS — C20 RECTAL ADENOCARCINOMA: ICD-10-CM

## 2021-02-22 DIAGNOSIS — Z01.818 PRE-OP TESTING: Primary | ICD-10-CM

## 2021-02-22 DIAGNOSIS — D50.0 IRON DEFICIENCY ANEMIA DUE TO CHRONIC BLOOD LOSS: ICD-10-CM

## 2021-02-22 DIAGNOSIS — C20 ADENOCARCINOMA OF RECTUM: Primary | ICD-10-CM

## 2021-02-22 PROCEDURE — 99214 OFFICE O/P EST MOD 30 MIN: CPT | Mod: S$GLB,,, | Performed by: COLON & RECTAL SURGERY

## 2021-02-22 PROCEDURE — 3008F BODY MASS INDEX DOCD: CPT | Mod: CPTII,S$GLB,, | Performed by: COLON & RECTAL SURGERY

## 2021-02-22 PROCEDURE — 99999 PR PBB SHADOW E&M-EST. PATIENT-LVL IV: ICD-10-PCS | Mod: PBBFAC,,, | Performed by: COLON & RECTAL SURGERY

## 2021-02-22 PROCEDURE — 3008F PR BODY MASS INDEX (BMI) DOCUMENTED: ICD-10-PCS | Mod: CPTII,S$GLB,, | Performed by: COLON & RECTAL SURGERY

## 2021-02-22 PROCEDURE — 1126F AMNT PAIN NOTED NONE PRSNT: CPT | Mod: S$GLB,,, | Performed by: COLON & RECTAL SURGERY

## 2021-02-22 PROCEDURE — 99499 RISK ADDL DX/OHS AUDIT: ICD-10-PCS | Mod: ,,, | Performed by: COLON & RECTAL SURGERY

## 2021-02-22 PROCEDURE — 3075F SYST BP GE 130 - 139MM HG: CPT | Mod: CPTII,S$GLB,, | Performed by: COLON & RECTAL SURGERY

## 2021-02-22 PROCEDURE — 1126F PR PAIN SEVERITY QUANTIFIED, NO PAIN PRESENT: ICD-10-PCS | Mod: S$GLB,,, | Performed by: COLON & RECTAL SURGERY

## 2021-02-22 PROCEDURE — 99499 RISK ADDL DX/OHS AUDIT: ICD-10-PCS | Mod: S$GLB,,, | Performed by: COLON & RECTAL SURGERY

## 2021-02-22 PROCEDURE — 99999 PR PBB SHADOW E&M-EST. PATIENT-LVL IV: CPT | Mod: PBBFAC,,, | Performed by: COLON & RECTAL SURGERY

## 2021-02-22 PROCEDURE — 3075F PR MOST RECENT SYSTOLIC BLOOD PRESS GE 130-139MM HG: ICD-10-PCS | Mod: CPTII,S$GLB,, | Performed by: COLON & RECTAL SURGERY

## 2021-02-22 PROCEDURE — 3080F DIAST BP >= 90 MM HG: CPT | Mod: CPTII,S$GLB,, | Performed by: COLON & RECTAL SURGERY

## 2021-02-22 PROCEDURE — 82378 CARCINOEMBRYONIC ANTIGEN: CPT

## 2021-02-22 PROCEDURE — 36415 COLL VENOUS BLD VENIPUNCTURE: CPT

## 2021-02-22 PROCEDURE — 99499 UNLISTED E&M SERVICE: CPT | Mod: S$GLB,,, | Performed by: COLON & RECTAL SURGERY

## 2021-02-22 PROCEDURE — 3080F PR MOST RECENT DIASTOLIC BLOOD PRESSURE >= 90 MM HG: ICD-10-PCS | Mod: CPTII,S$GLB,, | Performed by: COLON & RECTAL SURGERY

## 2021-02-22 PROCEDURE — 99499 UNLISTED E&M SERVICE: CPT | Mod: ,,, | Performed by: COLON & RECTAL SURGERY

## 2021-02-22 PROCEDURE — 99214 PR OFFICE/OUTPT VISIT, EST, LEVL IV, 30-39 MIN: ICD-10-PCS | Mod: S$GLB,,, | Performed by: COLON & RECTAL SURGERY

## 2021-02-22 RX ORDER — SODIUM, POTASSIUM,MAG SULFATES 17.5-3.13G
1 SOLUTION, RECONSTITUTED, ORAL ORAL DAILY
Qty: 354 ML | Refills: 0 | Status: SHIPPED | OUTPATIENT
Start: 2021-02-22 | End: 2021-03-04

## 2021-02-23 LAB — CEA SERPL-MCNC: 3 NG/ML (ref 0–5)

## 2021-02-24 ENCOUNTER — IMMUNIZATION (OUTPATIENT)
Dept: INTERNAL MEDICINE | Facility: CLINIC | Age: 66
End: 2021-02-24
Payer: MEDICARE

## 2021-02-24 ENCOUNTER — TELEPHONE (OUTPATIENT)
Dept: ORTHOPEDICS | Facility: CLINIC | Age: 66
End: 2021-02-24

## 2021-02-24 DIAGNOSIS — Z23 NEED FOR VACCINATION: Primary | ICD-10-CM

## 2021-02-24 PROCEDURE — 91300 COVID-19, MRNA, LNP-S, PF, 30 MCG/0.3 ML DOSE VACCINE: CPT | Mod: PBBFAC | Performed by: FAMILY MEDICINE

## 2021-02-26 ENCOUNTER — TELEPHONE (OUTPATIENT)
Dept: ORTHOPEDICS | Facility: CLINIC | Age: 66
End: 2021-02-26

## 2021-03-05 ENCOUNTER — OFFICE VISIT (OUTPATIENT)
Dept: ORTHOPEDICS | Facility: CLINIC | Age: 66
End: 2021-03-05
Payer: MEDICARE

## 2021-03-05 ENCOUNTER — TELEPHONE (OUTPATIENT)
Dept: ENDOSCOPY | Facility: HOSPITAL | Age: 66
End: 2021-03-05

## 2021-03-05 ENCOUNTER — TELEPHONE (OUTPATIENT)
Dept: ORTHOPEDICS | Facility: CLINIC | Age: 66
End: 2021-03-05

## 2021-03-05 VITALS
BODY MASS INDEX: 21.62 KG/M2 | SYSTOLIC BLOOD PRESSURE: 123 MMHG | HEIGHT: 70 IN | DIASTOLIC BLOOD PRESSURE: 83 MMHG | WEIGHT: 151 LBS | HEART RATE: 74 BPM

## 2021-03-05 DIAGNOSIS — M11.261 CHONDROCALCINOSIS OF RIGHT KNEE: ICD-10-CM

## 2021-03-05 DIAGNOSIS — M25.461 EFFUSION OF RIGHT KNEE: Primary | ICD-10-CM

## 2021-03-05 DIAGNOSIS — M25.561 CHRONIC PAIN OF RIGHT KNEE: ICD-10-CM

## 2021-03-05 DIAGNOSIS — G89.29 CHRONIC PAIN OF RIGHT KNEE: ICD-10-CM

## 2021-03-05 PROCEDURE — 99999 PR PBB SHADOW E&M-EST. PATIENT-LVL IV: CPT | Mod: PBBFAC,,, | Performed by: PHYSICIAN ASSISTANT

## 2021-03-05 PROCEDURE — 20610 LARGE JOINT ASPIRATION/INJECTION: R KNEE: ICD-10-PCS | Mod: RT,S$GLB,, | Performed by: PHYSICIAN ASSISTANT

## 2021-03-05 PROCEDURE — 1125F AMNT PAIN NOTED PAIN PRSNT: CPT | Mod: S$GLB,,, | Performed by: PHYSICIAN ASSISTANT

## 2021-03-05 PROCEDURE — 99214 OFFICE O/P EST MOD 30 MIN: CPT | Mod: 25,S$GLB,, | Performed by: PHYSICIAN ASSISTANT

## 2021-03-05 PROCEDURE — 3008F BODY MASS INDEX DOCD: CPT | Mod: CPTII,S$GLB,, | Performed by: PHYSICIAN ASSISTANT

## 2021-03-05 PROCEDURE — 20610 DRAIN/INJ JOINT/BURSA W/O US: CPT | Mod: RT,S$GLB,, | Performed by: PHYSICIAN ASSISTANT

## 2021-03-05 PROCEDURE — 3074F PR MOST RECENT SYSTOLIC BLOOD PRESSURE < 130 MM HG: ICD-10-PCS | Mod: CPTII,S$GLB,, | Performed by: PHYSICIAN ASSISTANT

## 2021-03-05 PROCEDURE — 1125F PR PAIN SEVERITY QUANTIFIED, PAIN PRESENT: ICD-10-PCS | Mod: S$GLB,,, | Performed by: PHYSICIAN ASSISTANT

## 2021-03-05 PROCEDURE — 3074F SYST BP LT 130 MM HG: CPT | Mod: CPTII,S$GLB,, | Performed by: PHYSICIAN ASSISTANT

## 2021-03-05 PROCEDURE — 99214 PR OFFICE/OUTPT VISIT, EST, LEVL IV, 30-39 MIN: ICD-10-PCS | Mod: 25,S$GLB,, | Performed by: PHYSICIAN ASSISTANT

## 2021-03-05 PROCEDURE — 3008F PR BODY MASS INDEX (BMI) DOCUMENTED: ICD-10-PCS | Mod: CPTII,S$GLB,, | Performed by: PHYSICIAN ASSISTANT

## 2021-03-05 PROCEDURE — 99999 PR PBB SHADOW E&M-EST. PATIENT-LVL IV: ICD-10-PCS | Mod: PBBFAC,,, | Performed by: PHYSICIAN ASSISTANT

## 2021-03-05 PROCEDURE — 3079F PR MOST RECENT DIASTOLIC BLOOD PRESSURE 80-89 MM HG: ICD-10-PCS | Mod: CPTII,S$GLB,, | Performed by: PHYSICIAN ASSISTANT

## 2021-03-05 PROCEDURE — 3079F DIAST BP 80-89 MM HG: CPT | Mod: CPTII,S$GLB,, | Performed by: PHYSICIAN ASSISTANT

## 2021-03-05 RX ORDER — METHYLPREDNISOLONE 4 MG/1
TABLET ORAL
Qty: 21 TABLET | Refills: 0 | Status: SHIPPED | OUTPATIENT
Start: 2021-03-05 | End: 2021-04-12

## 2021-03-07 ENCOUNTER — LAB VISIT (OUTPATIENT)
Dept: OTOLARYNGOLOGY | Facility: CLINIC | Age: 66
End: 2021-03-07
Payer: MEDICARE

## 2021-03-07 DIAGNOSIS — Z01.818 PRE-OP TESTING: ICD-10-CM

## 2021-03-07 PROCEDURE — U0005 INFEC AGEN DETEC AMPLI PROBE: HCPCS | Performed by: COLON & RECTAL SURGERY

## 2021-03-07 PROCEDURE — U0003 INFECTIOUS AGENT DETECTION BY NUCLEIC ACID (DNA OR RNA); SEVERE ACUTE RESPIRATORY SYNDROME CORONAVIRUS 2 (SARS-COV-2) (CORONAVIRUS DISEASE [COVID-19]), AMPLIFIED PROBE TECHNIQUE, MAKING USE OF HIGH THROUGHPUT TECHNOLOGIES AS DESCRIBED BY CMS-2020-01-R: HCPCS | Performed by: COLON & RECTAL SURGERY

## 2021-03-08 LAB — SARS-COV-2 RNA RESP QL NAA+PROBE: NOT DETECTED

## 2021-03-09 ENCOUNTER — TELEPHONE (OUTPATIENT)
Dept: ENDOSCOPY | Facility: HOSPITAL | Age: 66
End: 2021-03-09

## 2021-03-15 ENCOUNTER — PATIENT OUTREACH (OUTPATIENT)
Dept: ADMINISTRATIVE | Facility: HOSPITAL | Age: 66
End: 2021-03-15

## 2021-03-17 ENCOUNTER — IMMUNIZATION (OUTPATIENT)
Dept: INTERNAL MEDICINE | Facility: CLINIC | Age: 66
End: 2021-03-17
Payer: MEDICARE

## 2021-03-17 DIAGNOSIS — Z23 NEED FOR VACCINATION: Primary | ICD-10-CM

## 2021-03-17 PROCEDURE — 91300 COVID-19, MRNA, LNP-S, PF, 30 MCG/0.3 ML DOSE VACCINE: CPT | Mod: PBBFAC | Performed by: FAMILY MEDICINE

## 2021-03-17 PROCEDURE — 0002A COVID-19, MRNA, LNP-S, PF, 30 MCG/0.3 ML DOSE VACCINE: CPT | Mod: PBBFAC | Performed by: FAMILY MEDICINE

## 2021-03-21 ENCOUNTER — LAB VISIT (OUTPATIENT)
Dept: URGENT CARE | Facility: CLINIC | Age: 66
End: 2021-03-21
Payer: MEDICARE

## 2021-03-21 DIAGNOSIS — Z03.818 ENCOUNTER FOR OBSERVATION FOR SUSPECTED EXPOSURE TO OTHER BIOLOGICAL AGENTS RULED OUT: ICD-10-CM

## 2021-03-21 DIAGNOSIS — C20 RECTAL CANCER: ICD-10-CM

## 2021-03-21 PROCEDURE — U0003 INFECTIOUS AGENT DETECTION BY NUCLEIC ACID (DNA OR RNA); SEVERE ACUTE RESPIRATORY SYNDROME CORONAVIRUS 2 (SARS-COV-2) (CORONAVIRUS DISEASE [COVID-19]), AMPLIFIED PROBE TECHNIQUE, MAKING USE OF HIGH THROUGHPUT TECHNOLOGIES AS DESCRIBED BY CMS-2020-01-R: HCPCS | Performed by: INTERNAL MEDICINE

## 2021-03-21 PROCEDURE — U0005 INFEC AGEN DETEC AMPLI PROBE: HCPCS | Performed by: INTERNAL MEDICINE

## 2021-03-22 ENCOUNTER — HOSPITAL ENCOUNTER (OUTPATIENT)
Dept: RADIOLOGY | Facility: HOSPITAL | Age: 66
Discharge: HOME | End: 2021-03-22
Attending: PHYSICIAN ASSISTANT
Payer: MEDICARE

## 2021-03-22 ENCOUNTER — TELEPHONE (OUTPATIENT)
Dept: ENDOSCOPY | Facility: HOSPITAL | Age: 66
End: 2021-03-22

## 2021-03-22 DIAGNOSIS — M11.261 CHONDROCALCINOSIS OF RIGHT KNEE: ICD-10-CM

## 2021-03-22 DIAGNOSIS — M25.561 CHRONIC PAIN OF RIGHT KNEE: ICD-10-CM

## 2021-03-22 DIAGNOSIS — M25.461 EFFUSION OF RIGHT KNEE: ICD-10-CM

## 2021-03-22 DIAGNOSIS — G89.29 CHRONIC PAIN OF RIGHT KNEE: ICD-10-CM

## 2021-03-22 LAB — SARS-COV-2 RNA RESP QL NAA+PROBE: NOT DETECTED

## 2021-03-22 PROCEDURE — 73721 MRI JNT OF LWR EXTRE W/O DYE: CPT | Mod: TC,RT

## 2021-03-24 ENCOUNTER — ANESTHESIA (OUTPATIENT)
Dept: ENDOSCOPY | Facility: HOSPITAL | Age: 66
End: 2021-03-24
Payer: MEDICARE

## 2021-03-24 ENCOUNTER — HOSPITAL ENCOUNTER (OUTPATIENT)
Facility: HOSPITAL | Age: 66
Discharge: HOME | End: 2021-03-24
Attending: COLON & RECTAL SURGERY | Admitting: COLON & RECTAL SURGERY
Payer: MEDICARE

## 2021-03-24 ENCOUNTER — ANESTHESIA EVENT (OUTPATIENT)
Dept: ENDOSCOPY | Facility: HOSPITAL | Age: 66
End: 2021-03-24
Payer: MEDICARE

## 2021-03-24 DIAGNOSIS — Z12.11 SCREENING FOR COLON CANCER: ICD-10-CM

## 2021-03-24 DIAGNOSIS — C20 RECTAL ADENOCARCINOMA: Primary | ICD-10-CM

## 2021-03-24 PROCEDURE — 45380 COLONOSCOPY AND BIOPSY: CPT | Performed by: COLON & RECTAL SURGERY

## 2021-03-24 PROCEDURE — 63600175 PHARM REV CODE 636 W HCPCS: Performed by: COLON & RECTAL SURGERY

## 2021-03-24 PROCEDURE — 27201089 HC SNARE, DISP (ANY): Performed by: COLON & RECTAL SURGERY

## 2021-03-24 PROCEDURE — 45385 PR COLONOSCOPY,REMV LESN,SNARE: ICD-10-PCS | Mod: PT,,, | Performed by: COLON & RECTAL SURGERY

## 2021-03-24 PROCEDURE — 88305 TISSUE EXAM BY PATHOLOGIST: CPT | Mod: 59 | Performed by: PATHOLOGY

## 2021-03-24 PROCEDURE — 63600175 PHARM REV CODE 636 W HCPCS: Performed by: NURSE ANESTHETIST, CERTIFIED REGISTERED

## 2021-03-24 PROCEDURE — 27201012 HC FORCEPS, HOT/COLD, DISP: Performed by: COLON & RECTAL SURGERY

## 2021-03-24 PROCEDURE — 45380 PR COLONOSCOPY,BIOPSY: ICD-10-PCS | Mod: 59,,, | Performed by: COLON & RECTAL SURGERY

## 2021-03-24 PROCEDURE — 37000008 HC ANESTHESIA 1ST 15 MINUTES: Performed by: COLON & RECTAL SURGERY

## 2021-03-24 PROCEDURE — 45385 COLONOSCOPY W/LESION REMOVAL: CPT | Performed by: COLON & RECTAL SURGERY

## 2021-03-24 PROCEDURE — 88305 TISSUE EXAM BY PATHOLOGIST: ICD-10-PCS | Mod: 26,,, | Performed by: PATHOLOGY

## 2021-03-24 PROCEDURE — 88305 TISSUE EXAM BY PATHOLOGIST: CPT | Mod: 26,,, | Performed by: PATHOLOGY

## 2021-03-24 PROCEDURE — 45385 COLONOSCOPY W/LESION REMOVAL: CPT | Mod: PT,,, | Performed by: COLON & RECTAL SURGERY

## 2021-03-24 PROCEDURE — 45380 COLONOSCOPY AND BIOPSY: CPT | Mod: 59,,, | Performed by: COLON & RECTAL SURGERY

## 2021-03-24 PROCEDURE — 37000009 HC ANESTHESIA EA ADD 15 MINS: Performed by: COLON & RECTAL SURGERY

## 2021-03-24 PROCEDURE — 25000003 PHARM REV CODE 250: Performed by: NURSE ANESTHETIST, CERTIFIED REGISTERED

## 2021-03-24 RX ORDER — SODIUM CHLORIDE, SODIUM LACTATE, POTASSIUM CHLORIDE, CALCIUM CHLORIDE 600; 310; 30; 20 MG/100ML; MG/100ML; MG/100ML; MG/100ML
INJECTION, SOLUTION INTRAVENOUS CONTINUOUS
Status: ACTIVE | OUTPATIENT
Start: 2021-03-24

## 2021-03-24 RX ORDER — SODIUM CHLORIDE, SODIUM LACTATE, POTASSIUM CHLORIDE, CALCIUM CHLORIDE 600; 310; 30; 20 MG/100ML; MG/100ML; MG/100ML; MG/100ML
INJECTION, SOLUTION INTRAVENOUS CONTINUOUS
Status: DISCONTINUED | OUTPATIENT
Start: 2021-03-24 | End: 2021-03-24 | Stop reason: HOSPADM

## 2021-03-24 RX ORDER — LIDOCAINE HYDROCHLORIDE 10 MG/ML
INJECTION, SOLUTION EPIDURAL; INFILTRATION; INTRACAUDAL; PERINEURAL
Status: DISCONTINUED | OUTPATIENT
Start: 2021-03-24 | End: 2021-03-24

## 2021-03-24 RX ORDER — PROPOFOL 10 MG/ML
VIAL (ML) INTRAVENOUS
Status: DISCONTINUED | OUTPATIENT
Start: 2021-03-24 | End: 2021-03-24

## 2021-03-24 RX ADMIN — SODIUM CHLORIDE, SODIUM LACTATE, POTASSIUM CHLORIDE, AND CALCIUM CHLORIDE: 600; 310; 30; 20 INJECTION, SOLUTION INTRAVENOUS at 08:03

## 2021-03-24 RX ADMIN — PROPOFOL 20 MG: 10 INJECTION, EMULSION INTRAVENOUS at 08:03

## 2021-03-24 RX ADMIN — LIDOCAINE HYDROCHLORIDE 50 MG: 10 INJECTION, SOLUTION EPIDURAL; INFILTRATION; INTRACAUDAL; PERINEURAL at 08:03

## 2021-03-24 RX ADMIN — PROPOFOL 100 MG: 10 INJECTION, EMULSION INTRAVENOUS at 08:03

## 2021-03-26 ENCOUNTER — TELEPHONE (OUTPATIENT)
Dept: PALLIATIVE MEDICINE | Facility: HOSPITAL | Age: 66
End: 2021-03-26

## 2021-03-26 DIAGNOSIS — R64 CACHEXIA: Primary | ICD-10-CM

## 2021-03-26 RX ORDER — MIRTAZAPINE 15 MG/1
15 TABLET, FILM COATED ORAL NIGHTLY
Qty: 30 TABLET | Refills: 0 | Status: SHIPPED | OUTPATIENT
Start: 2021-03-26 | End: 2021-05-11 | Stop reason: SDUPTHER

## 2021-03-27 VITALS
DIASTOLIC BLOOD PRESSURE: 94 MMHG | HEIGHT: 71 IN | HEART RATE: 64 BPM | OXYGEN SATURATION: 100 % | TEMPERATURE: 98 F | RESPIRATION RATE: 17 BRPM | BODY MASS INDEX: 20.61 KG/M2 | WEIGHT: 147.25 LBS | SYSTOLIC BLOOD PRESSURE: 137 MMHG

## 2021-03-31 LAB
FINAL PATHOLOGIC DIAGNOSIS: NORMAL
GROSS: NORMAL
Lab: NORMAL
MICROSCOPIC EXAM: NORMAL

## 2021-04-08 ENCOUNTER — PATIENT OUTREACH (OUTPATIENT)
Dept: ADMINISTRATIVE | Facility: OTHER | Age: 66
End: 2021-04-08

## 2021-04-12 ENCOUNTER — OFFICE VISIT (OUTPATIENT)
Dept: ORTHOPEDICS | Facility: CLINIC | Age: 66
End: 2021-04-12
Payer: MEDICARE

## 2021-04-12 VITALS
BODY MASS INDEX: 20.58 KG/M2 | DIASTOLIC BLOOD PRESSURE: 86 MMHG | HEIGHT: 71 IN | SYSTOLIC BLOOD PRESSURE: 151 MMHG | HEART RATE: 81 BPM | WEIGHT: 147 LBS

## 2021-04-12 DIAGNOSIS — S83.001D: ICD-10-CM

## 2021-04-12 DIAGNOSIS — S83.241D ACUTE MEDIAL MENISCUS TEAR OF RIGHT KNEE, SUBSEQUENT ENCOUNTER: ICD-10-CM

## 2021-04-12 DIAGNOSIS — T45.1X5A PERIPHERAL NEUROPATHY DUE TO CHEMOTHERAPY: Primary | ICD-10-CM

## 2021-04-12 DIAGNOSIS — Z01.818 PREOP TESTING: Primary | ICD-10-CM

## 2021-04-12 DIAGNOSIS — M17.11 ARTHRITIS OF KNEE, RIGHT: ICD-10-CM

## 2021-04-12 DIAGNOSIS — Z01.818 PREOP TESTING: ICD-10-CM

## 2021-04-12 DIAGNOSIS — G62.0 PERIPHERAL NEUROPATHY DUE TO CHEMOTHERAPY: Primary | ICD-10-CM

## 2021-04-12 DIAGNOSIS — M11.261 CHONDROCALCINOSIS OF RIGHT KNEE: ICD-10-CM

## 2021-04-12 DIAGNOSIS — M21.061 ACQUIRED VALGUS DEFORMITY KNEE, RIGHT: ICD-10-CM

## 2021-04-12 DIAGNOSIS — S83.241D ACUTE MEDIAL MENISCUS TEAR OF RIGHT KNEE, SUBSEQUENT ENCOUNTER: Primary | ICD-10-CM

## 2021-04-12 PROCEDURE — 1125F AMNT PAIN NOTED PAIN PRSNT: CPT | Mod: S$GLB,,, | Performed by: ORTHOPAEDIC SURGERY

## 2021-04-12 PROCEDURE — 99214 OFFICE O/P EST MOD 30 MIN: CPT | Mod: S$GLB,,, | Performed by: ORTHOPAEDIC SURGERY

## 2021-04-12 PROCEDURE — 3008F BODY MASS INDEX DOCD: CPT | Mod: CPTII,S$GLB,, | Performed by: ORTHOPAEDIC SURGERY

## 2021-04-12 PROCEDURE — 99999 PR PBB SHADOW E&M-EST. PATIENT-LVL III: ICD-10-PCS | Mod: PBBFAC,,, | Performed by: ORTHOPAEDIC SURGERY

## 2021-04-12 PROCEDURE — 99499 RISK ADDL DX/OHS AUDIT: ICD-10-PCS | Mod: S$GLB,,, | Performed by: ORTHOPAEDIC SURGERY

## 2021-04-12 PROCEDURE — 99499 UNLISTED E&M SERVICE: CPT | Mod: S$GLB,,, | Performed by: ORTHOPAEDIC SURGERY

## 2021-04-12 PROCEDURE — 99999 PR PBB SHADOW E&M-EST. PATIENT-LVL III: CPT | Mod: PBBFAC,,, | Performed by: ORTHOPAEDIC SURGERY

## 2021-04-12 PROCEDURE — 99214 PR OFFICE/OUTPT VISIT, EST, LEVL IV, 30-39 MIN: ICD-10-PCS | Mod: S$GLB,,, | Performed by: ORTHOPAEDIC SURGERY

## 2021-04-12 PROCEDURE — 1125F PR PAIN SEVERITY QUANTIFIED, PAIN PRESENT: ICD-10-PCS | Mod: S$GLB,,, | Performed by: ORTHOPAEDIC SURGERY

## 2021-04-12 PROCEDURE — 3008F PR BODY MASS INDEX (BMI) DOCUMENTED: ICD-10-PCS | Mod: CPTII,S$GLB,, | Performed by: ORTHOPAEDIC SURGERY

## 2021-04-12 RX ORDER — GABAPENTIN 300 MG/1
300 CAPSULE ORAL NIGHTLY
Qty: 30 CAPSULE | Refills: 11 | Status: SHIPPED | OUTPATIENT
Start: 2021-04-12 | End: 2022-05-10 | Stop reason: SDUPTHER

## 2021-04-14 ENCOUNTER — HOSPITAL ENCOUNTER (OUTPATIENT)
Dept: CARDIOLOGY | Facility: HOSPITAL | Age: 66
Discharge: HOME | End: 2021-04-14
Attending: ORTHOPAEDIC SURGERY
Payer: MEDICARE

## 2021-04-14 ENCOUNTER — HOSPITAL ENCOUNTER (OUTPATIENT)
Dept: RADIOLOGY | Facility: HOSPITAL | Age: 66
Discharge: HOME | End: 2021-04-14
Attending: ORTHOPAEDIC SURGERY
Payer: MEDICARE

## 2021-04-14 DIAGNOSIS — Z01.818 PREOP TESTING: ICD-10-CM

## 2021-04-14 PROCEDURE — 93010 EKG 12-LEAD: ICD-10-PCS | Mod: ,,, | Performed by: INTERNAL MEDICINE

## 2021-04-14 PROCEDURE — 71046 XR CHEST PA AND LATERAL PRE-OP: ICD-10-PCS | Mod: 26,,, | Performed by: RADIOLOGY

## 2021-04-14 PROCEDURE — 71046 X-RAY EXAM CHEST 2 VIEWS: CPT | Mod: TC

## 2021-04-14 PROCEDURE — 93005 ELECTROCARDIOGRAM TRACING: CPT

## 2021-04-14 PROCEDURE — 71046 X-RAY EXAM CHEST 2 VIEWS: CPT | Mod: 26,,, | Performed by: RADIOLOGY

## 2021-04-14 PROCEDURE — 93010 ELECTROCARDIOGRAM REPORT: CPT | Mod: ,,, | Performed by: INTERNAL MEDICINE

## 2021-04-22 ENCOUNTER — OFFICE VISIT (OUTPATIENT)
Dept: PALLIATIVE MEDICINE | Facility: CLINIC | Age: 66
End: 2021-04-22
Payer: MEDICARE

## 2021-04-22 DIAGNOSIS — M17.11 PRIMARY OSTEOARTHRITIS OF RIGHT KNEE: Primary | ICD-10-CM

## 2021-04-22 DIAGNOSIS — T45.1X5A CHEMOTHERAPY-INDUCED PERIPHERAL NEUROPATHY: ICD-10-CM

## 2021-04-22 DIAGNOSIS — G62.0 CHEMOTHERAPY-INDUCED PERIPHERAL NEUROPATHY: ICD-10-CM

## 2021-04-22 PROCEDURE — 99999 PR PBB SHADOW E&M-EST. PATIENT-LVL II: ICD-10-PCS | Mod: PBBFAC,,, | Performed by: FAMILY MEDICINE

## 2021-04-22 PROCEDURE — 99215 OFFICE O/P EST HI 40 MIN: CPT | Mod: S$GLB,,, | Performed by: FAMILY MEDICINE

## 2021-04-22 PROCEDURE — 99215 PR OFFICE/OUTPT VISIT, EST, LEVL V, 40-54 MIN: ICD-10-PCS | Mod: S$GLB,,, | Performed by: FAMILY MEDICINE

## 2021-04-22 PROCEDURE — 99999 PR PBB SHADOW E&M-EST. PATIENT-LVL II: CPT | Mod: PBBFAC,,, | Performed by: FAMILY MEDICINE

## 2021-04-22 RX ORDER — TRAMADOL HYDROCHLORIDE 50 MG/1
50 TABLET ORAL EVERY 6 HOURS
Qty: 30 TABLET | Refills: 0 | Status: ON HOLD | OUTPATIENT
Start: 2021-04-22 | End: 2022-08-16 | Stop reason: HOSPADM

## 2021-04-26 ENCOUNTER — PATIENT OUTREACH (OUTPATIENT)
Dept: ADMINISTRATIVE | Facility: HOSPITAL | Age: 66
End: 2021-04-26

## 2021-04-26 ENCOUNTER — OFFICE VISIT (OUTPATIENT)
Dept: INTERNAL MEDICINE | Facility: CLINIC | Age: 66
End: 2021-04-26
Payer: MEDICARE

## 2021-04-26 VITALS
DIASTOLIC BLOOD PRESSURE: 78 MMHG | BODY MASS INDEX: 22.37 KG/M2 | HEIGHT: 71 IN | TEMPERATURE: 96 F | SYSTOLIC BLOOD PRESSURE: 134 MMHG | HEART RATE: 101 BPM | WEIGHT: 159.81 LBS | OXYGEN SATURATION: 96 %

## 2021-04-26 DIAGNOSIS — D50.9 IRON DEFICIENCY ANEMIA, UNSPECIFIED IRON DEFICIENCY ANEMIA TYPE: ICD-10-CM

## 2021-04-26 DIAGNOSIS — I10 ESSENTIAL HYPERTENSION: Chronic | ICD-10-CM

## 2021-04-26 DIAGNOSIS — G89.29 CHRONIC PAIN OF RIGHT KNEE: ICD-10-CM

## 2021-04-26 DIAGNOSIS — C20 RECTAL ADENOCARCINOMA: ICD-10-CM

## 2021-04-26 DIAGNOSIS — M25.561 CHRONIC PAIN OF RIGHT KNEE: ICD-10-CM

## 2021-04-26 DIAGNOSIS — Z01.818 PREOPERATIVE EXAMINATION: Primary | ICD-10-CM

## 2021-04-26 PROBLEM — Z93.2 ILEOSTOMY IN PLACE: Chronic | Status: RESOLVED | Noted: 2020-08-05 | Resolved: 2021-04-26

## 2021-04-26 PROCEDURE — 3008F BODY MASS INDEX DOCD: CPT | Mod: CPTII,S$GLB,, | Performed by: INTERNAL MEDICINE

## 2021-04-26 PROCEDURE — 1125F AMNT PAIN NOTED PAIN PRSNT: CPT | Mod: S$GLB,,, | Performed by: INTERNAL MEDICINE

## 2021-04-26 PROCEDURE — 99999 PR PBB SHADOW E&M-EST. PATIENT-LVL III: ICD-10-PCS | Mod: PBBFAC,,, | Performed by: INTERNAL MEDICINE

## 2021-04-26 PROCEDURE — 1125F PR PAIN SEVERITY QUANTIFIED, PAIN PRESENT: ICD-10-PCS | Mod: S$GLB,,, | Performed by: INTERNAL MEDICINE

## 2021-04-26 PROCEDURE — 99499 UNLISTED E&M SERVICE: CPT | Mod: S$GLB,,, | Performed by: INTERNAL MEDICINE

## 2021-04-26 PROCEDURE — 99499 RISK ADDL DX/OHS AUDIT: ICD-10-PCS | Mod: S$GLB,,, | Performed by: INTERNAL MEDICINE

## 2021-04-26 PROCEDURE — 99999 PR PBB SHADOW E&M-EST. PATIENT-LVL III: CPT | Mod: PBBFAC,,, | Performed by: INTERNAL MEDICINE

## 2021-04-26 PROCEDURE — 99396 PR PREVENTIVE VISIT,EST,40-64: ICD-10-PCS | Mod: S$GLB,,, | Performed by: INTERNAL MEDICINE

## 2021-04-26 PROCEDURE — 3008F PR BODY MASS INDEX (BMI) DOCUMENTED: ICD-10-PCS | Mod: CPTII,S$GLB,, | Performed by: INTERNAL MEDICINE

## 2021-04-26 PROCEDURE — 99396 PREV VISIT EST AGE 40-64: CPT | Mod: S$GLB,,, | Performed by: INTERNAL MEDICINE

## 2021-04-29 RX ORDER — IBUPROFEN 100 MG/5ML
1000 SUSPENSION, ORAL (FINAL DOSE FORM) ORAL DAILY
COMMUNITY

## 2021-05-02 ENCOUNTER — LAB VISIT (OUTPATIENT)
Dept: OTOLARYNGOLOGY | Facility: CLINIC | Age: 66
End: 2021-05-02
Payer: MEDICARE

## 2021-05-02 DIAGNOSIS — Z01.818 PREOP TESTING: ICD-10-CM

## 2021-05-02 PROCEDURE — U0005 INFEC AGEN DETEC AMPLI PROBE: HCPCS | Performed by: ORTHOPAEDIC SURGERY

## 2021-05-02 PROCEDURE — U0003 INFECTIOUS AGENT DETECTION BY NUCLEIC ACID (DNA OR RNA); SEVERE ACUTE RESPIRATORY SYNDROME CORONAVIRUS 2 (SARS-COV-2) (CORONAVIRUS DISEASE [COVID-19]), AMPLIFIED PROBE TECHNIQUE, MAKING USE OF HIGH THROUGHPUT TECHNOLOGIES AS DESCRIBED BY CMS-2020-01-R: HCPCS | Performed by: ORTHOPAEDIC SURGERY

## 2021-05-03 LAB — SARS-COV-2 RNA RESP QL NAA+PROBE: NOT DETECTED

## 2021-05-05 ENCOUNTER — ANESTHESIA (OUTPATIENT)
Dept: SURGERY | Facility: HOSPITAL | Age: 66
End: 2021-05-05
Payer: MEDICARE

## 2021-05-05 ENCOUNTER — HOSPITAL ENCOUNTER (OUTPATIENT)
Facility: HOSPITAL | Age: 66
Discharge: HOME | End: 2021-05-05
Attending: ORTHOPAEDIC SURGERY | Admitting: ORTHOPAEDIC SURGERY
Payer: MEDICARE

## 2021-05-05 ENCOUNTER — ANESTHESIA EVENT (OUTPATIENT)
Dept: SURGERY | Facility: HOSPITAL | Age: 66
End: 2021-05-05
Payer: MEDICARE

## 2021-05-05 VITALS
RESPIRATION RATE: 17 BRPM | BODY MASS INDEX: 22.29 KG/M2 | HEART RATE: 66 BPM | SYSTOLIC BLOOD PRESSURE: 135 MMHG | OXYGEN SATURATION: 100 % | TEMPERATURE: 98 F | WEIGHT: 159.19 LBS | HEIGHT: 71 IN | DIASTOLIC BLOOD PRESSURE: 82 MMHG

## 2021-05-05 DIAGNOSIS — S83.241D ACUTE MEDIAL MENISCUS TEAR OF RIGHT KNEE, SUBSEQUENT ENCOUNTER: ICD-10-CM

## 2021-05-05 DIAGNOSIS — S83.241D ACUTE MEDIAL MENISCUS TEAR, RIGHT, SUBSEQUENT ENCOUNTER: Primary | ICD-10-CM

## 2021-05-05 PROBLEM — S83.241A ACUTE MEDIAL MENISCUS TEAR OF RIGHT KNEE: Status: ACTIVE | Noted: 2021-05-05

## 2021-05-05 PROCEDURE — 71000015 HC POSTOP RECOV 1ST HR: Performed by: ORTHOPAEDIC SURGERY

## 2021-05-05 PROCEDURE — 99499 NO LOS: ICD-10-PCS | Mod: ,,, | Performed by: PHYSICIAN ASSISTANT

## 2021-05-05 PROCEDURE — 37000009 HC ANESTHESIA EA ADD 15 MINS: Performed by: ORTHOPAEDIC SURGERY

## 2021-05-05 PROCEDURE — 25000003 PHARM REV CODE 250: Performed by: ORTHOPAEDIC SURGERY

## 2021-05-05 PROCEDURE — 29880 ARTHRS KNE SRG MNISECTMY M&L: CPT | Mod: RT,,, | Performed by: ORTHOPAEDIC SURGERY

## 2021-05-05 PROCEDURE — 63600175 PHARM REV CODE 636 W HCPCS: Performed by: ORTHOPAEDIC SURGERY

## 2021-05-05 PROCEDURE — 25000003 PHARM REV CODE 250: Performed by: STUDENT IN AN ORGANIZED HEALTH CARE EDUCATION/TRAINING PROGRAM

## 2021-05-05 PROCEDURE — 71000033 HC RECOVERY, INTIAL HOUR: Performed by: ORTHOPAEDIC SURGERY

## 2021-05-05 PROCEDURE — 63600175 PHARM REV CODE 636 W HCPCS: Performed by: STUDENT IN AN ORGANIZED HEALTH CARE EDUCATION/TRAINING PROGRAM

## 2021-05-05 PROCEDURE — 37000008 HC ANESTHESIA 1ST 15 MINUTES: Performed by: ORTHOPAEDIC SURGERY

## 2021-05-05 PROCEDURE — 29880 PR KNEE SCOPE MED/LAT MENISCECTOMY: ICD-10-PCS | Mod: RT,,, | Performed by: ORTHOPAEDIC SURGERY

## 2021-05-05 PROCEDURE — 63600175 PHARM REV CODE 636 W HCPCS: Performed by: ANESTHESIOLOGY

## 2021-05-05 PROCEDURE — 36000710: Performed by: ORTHOPAEDIC SURGERY

## 2021-05-05 PROCEDURE — 36000711: Performed by: ORTHOPAEDIC SURGERY

## 2021-05-05 PROCEDURE — 99499 UNLISTED E&M SERVICE: CPT | Mod: ,,, | Performed by: PHYSICIAN ASSISTANT

## 2021-05-05 PROCEDURE — 27201423 OPTIME MED/SURG SUP & DEVICES STERILE SUPPLY: Performed by: ORTHOPAEDIC SURGERY

## 2021-05-05 RX ORDER — ONDANSETRON 2 MG/ML
INJECTION INTRAMUSCULAR; INTRAVENOUS
Status: DISCONTINUED | OUTPATIENT
Start: 2021-05-05 | End: 2021-05-05

## 2021-05-05 RX ORDER — HYDROCODONE BITARTRATE AND ACETAMINOPHEN 10; 325 MG/1; MG/1
1 TABLET ORAL EVERY 8 HOURS PRN
Qty: 21 TABLET | Refills: 0 | Status: SHIPPED | OUTPATIENT
Start: 2021-05-05 | End: 2022-01-24

## 2021-05-05 RX ORDER — ONDANSETRON 8 MG/1
8 TABLET, ORALLY DISINTEGRATING ORAL EVERY 8 HOURS PRN
Status: DISCONTINUED | OUTPATIENT
Start: 2021-05-05 | End: 2021-05-05 | Stop reason: HOSPADM

## 2021-05-05 RX ORDER — LIDOCAINE HYDROCHLORIDE 10 MG/ML
INJECTION, SOLUTION EPIDURAL; INFILTRATION; INTRACAUDAL; PERINEURAL
Status: DISCONTINUED | OUTPATIENT
Start: 2021-05-05 | End: 2021-05-05

## 2021-05-05 RX ORDER — MEPERIDINE HYDROCHLORIDE 25 MG/ML
12.5 INJECTION INTRAMUSCULAR; INTRAVENOUS; SUBCUTANEOUS ONCE AS NEEDED
Status: ACTIVE | OUTPATIENT
Start: 2021-05-05 | End: 2021-05-06

## 2021-05-05 RX ORDER — DIPHENHYDRAMINE HYDROCHLORIDE 50 MG/ML
25 INJECTION INTRAMUSCULAR; INTRAVENOUS EVERY 6 HOURS PRN
Status: ACTIVE | OUTPATIENT
Start: 2021-05-05

## 2021-05-05 RX ORDER — SUCCINYLCHOLINE CHLORIDE 20 MG/ML
INJECTION INTRAMUSCULAR; INTRAVENOUS
Status: DISCONTINUED | OUTPATIENT
Start: 2021-05-05 | End: 2021-05-05

## 2021-05-05 RX ORDER — HYDROCODONE BITARTRATE AND ACETAMINOPHEN 5; 325 MG/1; MG/1
1 TABLET ORAL EVERY 4 HOURS PRN
Status: DISCONTINUED | OUTPATIENT
Start: 2021-05-05 | End: 2021-05-05 | Stop reason: HOSPADM

## 2021-05-05 RX ORDER — METOCLOPRAMIDE HYDROCHLORIDE 5 MG/ML
10 INJECTION INTRAMUSCULAR; INTRAVENOUS EVERY 10 MIN PRN
Status: ACTIVE | OUTPATIENT
Start: 2021-05-05

## 2021-05-05 RX ORDER — SODIUM CHLORIDE 0.9 % (FLUSH) 0.9 %
3 SYRINGE (ML) INJECTION EVERY 8 HOURS
Status: ACTIVE | OUTPATIENT
Start: 2021-05-05

## 2021-05-05 RX ORDER — CHLORHEXIDINE GLUCONATE ORAL RINSE 1.2 MG/ML
10 SOLUTION DENTAL 2 TIMES DAILY
Status: DISCONTINUED | OUTPATIENT
Start: 2021-05-05 | End: 2021-05-05 | Stop reason: HOSPADM

## 2021-05-05 RX ORDER — OXYCODONE HYDROCHLORIDE 5 MG/1
10 TABLET ORAL EVERY 4 HOURS PRN
Status: DISCONTINUED | OUTPATIENT
Start: 2021-05-05 | End: 2021-05-05 | Stop reason: HOSPADM

## 2021-05-05 RX ORDER — FENTANYL CITRATE 50 UG/ML
INJECTION, SOLUTION INTRAMUSCULAR; INTRAVENOUS
Status: DISCONTINUED | OUTPATIENT
Start: 2021-05-05 | End: 2021-05-05

## 2021-05-05 RX ORDER — PROPOFOL 10 MG/ML
VIAL (ML) INTRAVENOUS
Status: DISCONTINUED | OUTPATIENT
Start: 2021-05-05 | End: 2021-05-05

## 2021-05-05 RX ORDER — HYDROMORPHONE HYDROCHLORIDE 2 MG/ML
0.2 INJECTION, SOLUTION INTRAMUSCULAR; INTRAVENOUS; SUBCUTANEOUS EVERY 5 MIN PRN
Status: DISCONTINUED | OUTPATIENT
Start: 2021-05-05 | End: 2022-11-09

## 2021-05-05 RX ORDER — DEXAMETHASONE SODIUM PHOSPHATE 4 MG/ML
INJECTION, SOLUTION INTRA-ARTICULAR; INTRALESIONAL; INTRAMUSCULAR; INTRAVENOUS; SOFT TISSUE
Status: DISCONTINUED | OUTPATIENT
Start: 2021-05-05 | End: 2021-05-05

## 2021-05-05 RX ORDER — MIDAZOLAM HYDROCHLORIDE 1 MG/ML
INJECTION, SOLUTION INTRAMUSCULAR; INTRAVENOUS
Status: DISCONTINUED | OUTPATIENT
Start: 2021-05-05 | End: 2021-05-05

## 2021-05-05 RX ORDER — SODIUM CHLORIDE 9 MG/ML
INJECTION, SOLUTION INTRAVENOUS CONTINUOUS
Status: ACTIVE | OUTPATIENT
Start: 2021-05-05

## 2021-05-05 RX ORDER — ROCURONIUM BROMIDE 10 MG/ML
INJECTION, SOLUTION INTRAVENOUS
Status: DISCONTINUED | OUTPATIENT
Start: 2021-05-05 | End: 2021-05-05

## 2021-05-05 RX ORDER — BUPIVACAINE HYDROCHLORIDE 2.5 MG/ML
INJECTION, SOLUTION EPIDURAL; INFILTRATION; INTRACAUDAL
Status: DISCONTINUED | OUTPATIENT
Start: 2021-05-05 | End: 2021-05-05 | Stop reason: HOSPADM

## 2021-05-05 RX ORDER — CEFAZOLIN SODIUM 2 G/50ML
2 SOLUTION INTRAVENOUS
Status: COMPLETED | OUTPATIENT
Start: 2021-05-05 | End: 2021-05-05

## 2021-05-05 RX ORDER — CHLORHEXIDINE GLUCONATE ORAL RINSE 1.2 MG/ML
10 SOLUTION DENTAL
Status: DISPENSED | OUTPATIENT
Start: 2021-05-05

## 2021-05-05 RX ADMIN — CHLORHEXIDINE GLUCONATE 0.12% ORAL RINSE 10 ML: 1.2 LIQUID ORAL at 12:05

## 2021-05-05 RX ADMIN — FENTANYL CITRATE 100 MCG: 50 INJECTION, SOLUTION INTRAMUSCULAR; INTRAVENOUS at 01:05

## 2021-05-05 RX ADMIN — PROPOFOL 150 MG: 10 INJECTION, EMULSION INTRAVENOUS at 01:05

## 2021-05-05 RX ADMIN — LIDOCAINE HYDROCHLORIDE 100 MG: 10 INJECTION, SOLUTION EPIDURAL; INFILTRATION; INTRACAUDAL; PERINEURAL at 01:05

## 2021-05-05 RX ADMIN — ONDANSETRON 4 MG: 2 INJECTION, SOLUTION INTRAMUSCULAR; INTRAVENOUS at 01:05

## 2021-05-05 RX ADMIN — SODIUM CHLORIDE, SODIUM LACTATE, POTASSIUM CHLORIDE, AND CALCIUM CHLORIDE: .6; .31; .03; .02 INJECTION, SOLUTION INTRAVENOUS at 01:05

## 2021-05-05 RX ADMIN — OXYCODONE 10 MG: 5 TABLET ORAL at 02:05

## 2021-05-05 RX ADMIN — CEFAZOLIN SODIUM 2 G: 2 SOLUTION INTRAVENOUS at 01:05

## 2021-05-05 RX ADMIN — HYDROMORPHONE HYDROCHLORIDE 0.2 MG: 2 INJECTION INTRAMUSCULAR; INTRAVENOUS; SUBCUTANEOUS at 02:05

## 2021-05-05 RX ADMIN — MIDAZOLAM HYDROCHLORIDE 2 MG: 1 INJECTION, SOLUTION INTRAMUSCULAR; INTRAVENOUS at 01:05

## 2021-05-05 RX ADMIN — DEXAMETHASONE SODIUM PHOSPHATE 4 MG: 4 INJECTION, SOLUTION INTRAMUSCULAR; INTRAVENOUS at 01:05

## 2021-05-05 RX ADMIN — HYDROMORPHONE HYDROCHLORIDE 0.2 MG: 2 INJECTION INTRAMUSCULAR; INTRAVENOUS; SUBCUTANEOUS at 03:05

## 2021-05-05 RX ADMIN — ROCURONIUM BROMIDE 5 MG: 10 INJECTION, SOLUTION INTRAVENOUS at 01:05

## 2021-05-05 RX ADMIN — SUCCINYLCHOLINE CHLORIDE 180 MG: 20 INJECTION, SOLUTION INTRAMUSCULAR; INTRAVENOUS at 01:05

## 2021-05-11 ENCOUNTER — TELEPHONE (OUTPATIENT)
Dept: INTERNAL MEDICINE | Facility: CLINIC | Age: 66
End: 2021-05-11

## 2021-05-11 DIAGNOSIS — R64 CACHEXIA: ICD-10-CM

## 2021-05-14 RX ORDER — MIRTAZAPINE 15 MG/1
15 TABLET, FILM COATED ORAL NIGHTLY
Qty: 30 TABLET | Refills: 0 | Status: SHIPPED | OUTPATIENT
Start: 2021-05-14 | End: 2021-06-25 | Stop reason: SDUPTHER

## 2021-06-22 ENCOUNTER — OFFICE VISIT (OUTPATIENT)
Dept: DERMATOLOGY | Facility: CLINIC | Age: 66
End: 2021-06-22
Payer: MEDICARE

## 2021-06-22 DIAGNOSIS — D48.5 NEOPLASM OF UNCERTAIN BEHAVIOR OF SKIN: Primary | ICD-10-CM

## 2021-06-22 PROCEDURE — 99999 PR PBB SHADOW E&M-EST. PATIENT-LVL III: CPT | Mod: PBBFAC,,, | Performed by: STUDENT IN AN ORGANIZED HEALTH CARE EDUCATION/TRAINING PROGRAM

## 2021-06-22 PROCEDURE — 88342 IMHCHEM/IMCYTCHM 1ST ANTB: CPT | Performed by: PATHOLOGY

## 2021-06-22 PROCEDURE — 88342 IMHCHEM/IMCYTCHM 1ST ANTB: CPT | Mod: 26,,, | Performed by: PATHOLOGY

## 2021-06-22 PROCEDURE — 11102 TANGNTL BX SKIN SINGLE LES: CPT | Mod: S$GLB,,, | Performed by: STUDENT IN AN ORGANIZED HEALTH CARE EDUCATION/TRAINING PROGRAM

## 2021-06-22 PROCEDURE — 99499 NO LOS: ICD-10-PCS | Mod: S$GLB,,, | Performed by: STUDENT IN AN ORGANIZED HEALTH CARE EDUCATION/TRAINING PROGRAM

## 2021-06-22 PROCEDURE — 88305 TISSUE EXAM BY PATHOLOGIST: CPT | Performed by: PATHOLOGY

## 2021-06-22 PROCEDURE — 88305 TISSUE EXAM BY PATHOLOGIST: ICD-10-PCS | Mod: 26,,, | Performed by: PATHOLOGY

## 2021-06-22 PROCEDURE — 88342 CHG IMMUNOCYTOCHEMISTRY: ICD-10-PCS | Mod: 26,,, | Performed by: PATHOLOGY

## 2021-06-22 PROCEDURE — 11102 PR TANGENTIAL BIOPSY, SKIN, SINGLE LESION: ICD-10-PCS | Mod: S$GLB,,, | Performed by: STUDENT IN AN ORGANIZED HEALTH CARE EDUCATION/TRAINING PROGRAM

## 2021-06-22 PROCEDURE — 99499 UNLISTED E&M SERVICE: CPT | Mod: S$GLB,,, | Performed by: STUDENT IN AN ORGANIZED HEALTH CARE EDUCATION/TRAINING PROGRAM

## 2021-06-22 PROCEDURE — 88305 TISSUE EXAM BY PATHOLOGIST: CPT | Mod: 26,,, | Performed by: PATHOLOGY

## 2021-06-22 PROCEDURE — 99999 PR PBB SHADOW E&M-EST. PATIENT-LVL III: ICD-10-PCS | Mod: PBBFAC,,, | Performed by: STUDENT IN AN ORGANIZED HEALTH CARE EDUCATION/TRAINING PROGRAM

## 2021-06-22 RX ORDER — PROPOFOL 10 MG/ML
INJECTION, EMULSION INTRAVENOUS
Status: ON HOLD | COMMUNITY
Start: 2021-03-24 | End: 2022-01-04 | Stop reason: HOSPADM

## 2021-06-25 DIAGNOSIS — R64 CACHEXIA: ICD-10-CM

## 2021-06-28 RX ORDER — MIRTAZAPINE 15 MG/1
15 TABLET, FILM COATED ORAL NIGHTLY
Qty: 90 TABLET | Refills: 1 | Status: SHIPPED | OUTPATIENT
Start: 2021-06-28 | End: 2021-08-17 | Stop reason: SDUPTHER

## 2021-06-29 LAB
FINAL PATHOLOGIC DIAGNOSIS: NORMAL
GROSS: NORMAL
Lab: NORMAL
MICROSCOPIC EXAM: NORMAL

## 2021-06-30 ENCOUNTER — TELEPHONE (OUTPATIENT)
Dept: INTERNAL MEDICINE | Facility: CLINIC | Age: 66
End: 2021-06-30

## 2021-07-06 ENCOUNTER — PROCEDURE VISIT (OUTPATIENT)
Dept: DERMATOLOGY | Facility: CLINIC | Age: 66
End: 2021-07-06
Payer: MEDICARE

## 2021-07-06 VITALS
HEIGHT: 71 IN | WEIGHT: 159 LBS | DIASTOLIC BLOOD PRESSURE: 92 MMHG | BODY MASS INDEX: 22.26 KG/M2 | SYSTOLIC BLOOD PRESSURE: 151 MMHG | HEART RATE: 86 BPM

## 2021-07-06 DIAGNOSIS — D03.30 MELANOMA IN SITU OF FACE EXCLUDING EYELID, NOSE, LIP, AND EAR: Primary | ICD-10-CM

## 2021-07-06 PROCEDURE — 88305 TISSUE EXAM BY PATHOLOGIST: CPT | Mod: 26,,, | Performed by: PATHOLOGY

## 2021-07-06 PROCEDURE — 88305 TISSUE EXAM BY PATHOLOGIST: CPT | Performed by: PATHOLOGY

## 2021-07-06 PROCEDURE — 99499 NO LOS: ICD-10-PCS | Mod: ,,, | Performed by: DERMATOLOGY

## 2021-07-06 PROCEDURE — 11643 EXC F/E/E/N/L MAL+MRG 2.1-3: CPT | Mod: ,,, | Performed by: DERMATOLOGY

## 2021-07-06 PROCEDURE — 11643 PR EXC SKIN MALIG 2.1-3 CM FACE,FACIAL: ICD-10-PCS | Mod: ,,, | Performed by: DERMATOLOGY

## 2021-07-06 PROCEDURE — 88305 TISSUE EXAM BY PATHOLOGIST: ICD-10-PCS | Mod: 26,,, | Performed by: PATHOLOGY

## 2021-07-06 PROCEDURE — 99499 UNLISTED E&M SERVICE: CPT | Mod: ,,, | Performed by: DERMATOLOGY

## 2021-07-07 LAB
FINAL PATHOLOGIC DIAGNOSIS: NORMAL
GROSS: NORMAL
Lab: NORMAL
MICROSCOPIC EXAM: NORMAL

## 2021-07-08 ENCOUNTER — PROCEDURE VISIT (OUTPATIENT)
Dept: DERMATOLOGY | Facility: CLINIC | Age: 66
End: 2021-07-08
Payer: MEDICARE

## 2021-07-08 DIAGNOSIS — D03.30 MELANOMA IN SITU OF FACE EXCLUDING EYELID, NOSE, LIP, AND EAR: Primary | ICD-10-CM

## 2021-07-08 PROCEDURE — 99499 UNLISTED E&M SERVICE: CPT | Mod: S$GLB,,, | Performed by: DERMATOLOGY

## 2021-07-08 PROCEDURE — 13132 PR RECMPL WND HEAD,FAC,HAND 2.6-7.5 CM: ICD-10-PCS | Mod: 58,S$GLB,, | Performed by: DERMATOLOGY

## 2021-07-08 PROCEDURE — 13132 CMPLX RPR F/C/C/M/N/AX/G/H/F: CPT | Mod: 58,S$GLB,, | Performed by: DERMATOLOGY

## 2021-07-08 PROCEDURE — 99499 NO LOS: ICD-10-PCS | Mod: S$GLB,,, | Performed by: DERMATOLOGY

## 2021-07-15 ENCOUNTER — CLINICAL SUPPORT (OUTPATIENT)
Dept: DERMATOLOGY | Facility: CLINIC | Age: 66
End: 2021-07-15
Payer: MEDICARE

## 2021-07-15 DIAGNOSIS — Z48.02 VISIT FOR SUTURE REMOVAL: Primary | ICD-10-CM

## 2021-07-15 PROCEDURE — 99024 POSTOP FOLLOW-UP VISIT: CPT | Mod: S$GLB,,, | Performed by: DERMATOLOGY

## 2021-07-15 PROCEDURE — 99999 PR PBB SHADOW E&M-EST. PATIENT-LVL III: ICD-10-PCS | Mod: PBBFAC,,,

## 2021-07-15 PROCEDURE — 99024 PR POST-OP FOLLOW-UP VISIT: ICD-10-PCS | Mod: S$GLB,,, | Performed by: DERMATOLOGY

## 2021-07-15 PROCEDURE — 99999 PR PBB SHADOW E&M-EST. PATIENT-LVL III: CPT | Mod: PBBFAC,,,

## 2021-07-16 ENCOUNTER — OFFICE VISIT (OUTPATIENT)
Dept: ORTHOPEDICS | Facility: CLINIC | Age: 66
End: 2021-07-16
Payer: MEDICARE

## 2021-07-16 VITALS
HEIGHT: 71 IN | HEART RATE: 71 BPM | SYSTOLIC BLOOD PRESSURE: 122 MMHG | BODY MASS INDEX: 22.26 KG/M2 | DIASTOLIC BLOOD PRESSURE: 74 MMHG | WEIGHT: 159 LBS

## 2021-07-16 DIAGNOSIS — S83.241D ACUTE MEDIAL MENISCUS TEAR OF RIGHT KNEE, SUBSEQUENT ENCOUNTER: Primary | ICD-10-CM

## 2021-07-16 PROCEDURE — 99999 PR PBB SHADOW E&M-EST. PATIENT-LVL IV: CPT | Mod: PBBFAC,,, | Performed by: PHYSICIAN ASSISTANT

## 2021-07-16 PROCEDURE — 1126F PR PAIN SEVERITY QUANTIFIED, NO PAIN PRESENT: ICD-10-PCS | Mod: S$GLB,,, | Performed by: PHYSICIAN ASSISTANT

## 2021-07-16 PROCEDURE — 99024 POSTOP FOLLOW-UP VISIT: CPT | Mod: S$GLB,,, | Performed by: PHYSICIAN ASSISTANT

## 2021-07-16 PROCEDURE — 3008F PR BODY MASS INDEX (BMI) DOCUMENTED: ICD-10-PCS | Mod: CPTII,S$GLB,, | Performed by: PHYSICIAN ASSISTANT

## 2021-07-16 PROCEDURE — 99999 PR PBB SHADOW E&M-EST. PATIENT-LVL IV: ICD-10-PCS | Mod: PBBFAC,,, | Performed by: PHYSICIAN ASSISTANT

## 2021-07-16 PROCEDURE — 1126F AMNT PAIN NOTED NONE PRSNT: CPT | Mod: S$GLB,,, | Performed by: PHYSICIAN ASSISTANT

## 2021-07-16 PROCEDURE — 3008F BODY MASS INDEX DOCD: CPT | Mod: CPTII,S$GLB,, | Performed by: PHYSICIAN ASSISTANT

## 2021-07-16 PROCEDURE — 99024 PR POST-OP FOLLOW-UP VISIT: ICD-10-PCS | Mod: S$GLB,,, | Performed by: PHYSICIAN ASSISTANT

## 2021-08-16 DIAGNOSIS — M25.552 LEFT HIP PAIN: Primary | ICD-10-CM

## 2021-08-17 DIAGNOSIS — R64 CACHEXIA: ICD-10-CM

## 2021-08-20 RX ORDER — MIRTAZAPINE 15 MG/1
15 TABLET, FILM COATED ORAL NIGHTLY
Qty: 90 TABLET | Refills: 1 | Status: SHIPPED | OUTPATIENT
Start: 2021-08-20 | End: 2022-08-16

## 2021-08-31 ENCOUNTER — TELEPHONE (OUTPATIENT)
Dept: ORTHOPEDICS | Facility: CLINIC | Age: 66
End: 2021-08-31

## 2021-09-02 ENCOUNTER — HOSPITAL ENCOUNTER (OUTPATIENT)
Dept: RADIOLOGY | Facility: HOSPITAL | Age: 66
Discharge: HOME | End: 2021-09-02
Attending: ORTHOPAEDIC SURGERY
Payer: MEDICARE

## 2021-09-02 ENCOUNTER — OFFICE VISIT (OUTPATIENT)
Dept: ORTHOPEDICS | Facility: CLINIC | Age: 66
End: 2021-09-02
Payer: MEDICARE

## 2021-09-02 VITALS
WEIGHT: 158.94 LBS | SYSTOLIC BLOOD PRESSURE: 163 MMHG | BODY MASS INDEX: 22.25 KG/M2 | DIASTOLIC BLOOD PRESSURE: 89 MMHG | HEIGHT: 71 IN | HEART RATE: 75 BPM

## 2021-09-02 DIAGNOSIS — M17.11 ARTHRITIS OF KNEE, RIGHT: ICD-10-CM

## 2021-09-02 DIAGNOSIS — M70.62 GREATER TROCHANTERIC BURSITIS OF LEFT HIP: Primary | ICD-10-CM

## 2021-09-02 DIAGNOSIS — T45.1X5A PERIPHERAL NEUROPATHY DUE TO CHEMOTHERAPY: ICD-10-CM

## 2021-09-02 DIAGNOSIS — Z98.890 S/P RIGHT KNEE ARTHROSCOPY: ICD-10-CM

## 2021-09-02 DIAGNOSIS — M11.261 CHONDROCALCINOSIS OF RIGHT KNEE: ICD-10-CM

## 2021-09-02 DIAGNOSIS — M25.552 LEFT HIP PAIN: ICD-10-CM

## 2021-09-02 DIAGNOSIS — S83.001D: ICD-10-CM

## 2021-09-02 DIAGNOSIS — M21.061 ACQUIRED VALGUS DEFORMITY KNEE, RIGHT: ICD-10-CM

## 2021-09-02 DIAGNOSIS — G62.0 PERIPHERAL NEUROPATHY DUE TO CHEMOTHERAPY: ICD-10-CM

## 2021-09-02 DIAGNOSIS — Z96.9 RETAINED ORTHOPEDIC HARDWARE: ICD-10-CM

## 2021-09-02 PROCEDURE — 1159F PR MEDICATION LIST DOCUMENTED IN MEDICAL RECORD: ICD-10-PCS | Mod: CPTII,S$GLB,, | Performed by: ORTHOPAEDIC SURGERY

## 2021-09-02 PROCEDURE — 1159F MED LIST DOCD IN RCRD: CPT | Mod: CPTII,S$GLB,, | Performed by: ORTHOPAEDIC SURGERY

## 2021-09-02 PROCEDURE — 3008F BODY MASS INDEX DOCD: CPT | Mod: CPTII,S$GLB,, | Performed by: ORTHOPAEDIC SURGERY

## 2021-09-02 PROCEDURE — 99999 PR PBB SHADOW E&M-EST. PATIENT-LVL III: ICD-10-PCS | Mod: PBBFAC,,, | Performed by: ORTHOPAEDIC SURGERY

## 2021-09-02 PROCEDURE — 20610 LARGE JOINT ASPIRATION/INJECTION: L GREATER TROCHANTERIC BURSA: ICD-10-PCS | Mod: LT,S$GLB,, | Performed by: ORTHOPAEDIC SURGERY

## 2021-09-02 PROCEDURE — 73502 X-RAY EXAM HIP UNI 2-3 VIEWS: CPT | Mod: TC,LT

## 2021-09-02 PROCEDURE — 99214 PR OFFICE/OUTPT VISIT, EST, LEVL IV, 30-39 MIN: ICD-10-PCS | Mod: 25,S$GLB,, | Performed by: ORTHOPAEDIC SURGERY

## 2021-09-02 PROCEDURE — 99214 OFFICE O/P EST MOD 30 MIN: CPT | Mod: 25,S$GLB,, | Performed by: ORTHOPAEDIC SURGERY

## 2021-09-02 PROCEDURE — 3008F PR BODY MASS INDEX (BMI) DOCUMENTED: ICD-10-PCS | Mod: CPTII,S$GLB,, | Performed by: ORTHOPAEDIC SURGERY

## 2021-09-02 PROCEDURE — 99999 PR PBB SHADOW E&M-EST. PATIENT-LVL III: CPT | Mod: PBBFAC,,, | Performed by: ORTHOPAEDIC SURGERY

## 2021-09-02 PROCEDURE — 3079F PR MOST RECENT DIASTOLIC BLOOD PRESSURE 80-89 MM HG: ICD-10-PCS | Mod: CPTII,S$GLB,, | Performed by: ORTHOPAEDIC SURGERY

## 2021-09-02 PROCEDURE — 3079F DIAST BP 80-89 MM HG: CPT | Mod: CPTII,S$GLB,, | Performed by: ORTHOPAEDIC SURGERY

## 2021-09-02 PROCEDURE — 3077F PR MOST RECENT SYSTOLIC BLOOD PRESSURE >= 140 MM HG: ICD-10-PCS | Mod: CPTII,S$GLB,, | Performed by: ORTHOPAEDIC SURGERY

## 2021-09-02 PROCEDURE — 20610 DRAIN/INJ JOINT/BURSA W/O US: CPT | Mod: LT,S$GLB,, | Performed by: ORTHOPAEDIC SURGERY

## 2021-09-02 PROCEDURE — 73502 X-RAY EXAM HIP UNI 2-3 VIEWS: CPT | Mod: 26,LT,, | Performed by: RADIOLOGY

## 2021-09-02 PROCEDURE — 73502 XR HIP WITH PELVIS WHEN PERFORMED, 2 OR 3 VIEWS LEFT: ICD-10-PCS | Mod: 26,LT,, | Performed by: RADIOLOGY

## 2021-09-02 PROCEDURE — 3077F SYST BP >= 140 MM HG: CPT | Mod: CPTII,S$GLB,, | Performed by: ORTHOPAEDIC SURGERY

## 2021-09-02 RX ORDER — METHYLPREDNISOLONE ACETATE 80 MG/ML
80 INJECTION, SUSPENSION INTRA-ARTICULAR; INTRALESIONAL; INTRAMUSCULAR; SOFT TISSUE
Status: DISCONTINUED | OUTPATIENT
Start: 2021-09-02 | End: 2021-09-02 | Stop reason: HOSPADM

## 2021-09-02 RX ORDER — MELOXICAM 15 MG/1
15 TABLET ORAL DAILY
Qty: 30 TABLET | Refills: 2 | Status: SHIPPED | OUTPATIENT
Start: 2021-09-02 | End: 2021-12-27 | Stop reason: SDUPTHER

## 2021-09-02 RX ADMIN — METHYLPREDNISOLONE ACETATE 80 MG: 80 INJECTION, SUSPENSION INTRA-ARTICULAR; INTRALESIONAL; INTRAMUSCULAR; SOFT TISSUE at 04:09

## 2021-09-27 ENCOUNTER — IMMUNIZATION (OUTPATIENT)
Dept: PRIMARY CARE CLINIC | Facility: CLINIC | Age: 66
End: 2021-09-27
Payer: MEDICARE

## 2021-09-27 DIAGNOSIS — Z23 NEED FOR VACCINATION: Primary | ICD-10-CM

## 2021-09-27 PROCEDURE — 91300 COVID-19, MRNA, LNP-S, PF, 30 MCG/0.3 ML DOSE VACCINE: CPT | Mod: PBBFAC | Performed by: FAMILY MEDICINE

## 2021-09-27 PROCEDURE — 0003A COVID-19, MRNA, LNP-S, PF, 30 MCG/0.3 ML DOSE VACCINE: CPT | Mod: CV19,PBBFAC | Performed by: FAMILY MEDICINE

## 2021-10-14 ENCOUNTER — PATIENT OUTREACH (OUTPATIENT)
Dept: ADMINISTRATIVE | Facility: OTHER | Age: 66
End: 2021-10-14

## 2021-10-21 ENCOUNTER — OFFICE VISIT (OUTPATIENT)
Dept: ORTHOPEDICS | Facility: CLINIC | Age: 66
End: 2021-10-21
Payer: MEDICARE

## 2021-10-21 VITALS
HEART RATE: 74 BPM | BODY MASS INDEX: 22.12 KG/M2 | HEIGHT: 71 IN | SYSTOLIC BLOOD PRESSURE: 130 MMHG | DIASTOLIC BLOOD PRESSURE: 82 MMHG | WEIGHT: 158 LBS

## 2021-10-21 DIAGNOSIS — M70.62 GREATER TROCHANTERIC BURSITIS OF LEFT HIP: ICD-10-CM

## 2021-10-21 DIAGNOSIS — M11.261 CHONDROCALCINOSIS OF RIGHT KNEE: ICD-10-CM

## 2021-10-21 DIAGNOSIS — M16.12 ARTHRITIS OF LEFT HIP: ICD-10-CM

## 2021-10-21 DIAGNOSIS — T45.1X5A PERIPHERAL NEUROPATHY DUE TO CHEMOTHERAPY: ICD-10-CM

## 2021-10-21 DIAGNOSIS — Z96.9 RETAINED ORTHOPEDIC HARDWARE: Primary | ICD-10-CM

## 2021-10-21 DIAGNOSIS — Z98.890 S/P RIGHT KNEE ARTHROSCOPY: ICD-10-CM

## 2021-10-21 DIAGNOSIS — S83.001D: ICD-10-CM

## 2021-10-21 DIAGNOSIS — M17.11 ARTHRITIS OF KNEE, RIGHT: ICD-10-CM

## 2021-10-21 DIAGNOSIS — M21.061 ACQUIRED VALGUS DEFORMITY KNEE, RIGHT: ICD-10-CM

## 2021-10-21 DIAGNOSIS — G62.0 PERIPHERAL NEUROPATHY DUE TO CHEMOTHERAPY: ICD-10-CM

## 2021-10-21 PROCEDURE — 1160F PR REVIEW ALL MEDS BY PRESCRIBER/CLIN PHARMACIST DOCUMENTED: ICD-10-PCS | Mod: HCNC,CPTII,S$GLB, | Performed by: ORTHOPAEDIC SURGERY

## 2021-10-21 PROCEDURE — 99999 PR PBB SHADOW E&M-EST. PATIENT-LVL III: ICD-10-PCS | Mod: PBBFAC,HCNC,, | Performed by: ORTHOPAEDIC SURGERY

## 2021-10-21 PROCEDURE — 3075F SYST BP GE 130 - 139MM HG: CPT | Mod: HCNC,CPTII,S$GLB, | Performed by: ORTHOPAEDIC SURGERY

## 2021-10-21 PROCEDURE — 3008F PR BODY MASS INDEX (BMI) DOCUMENTED: ICD-10-PCS | Mod: HCNC,CPTII,S$GLB, | Performed by: ORTHOPAEDIC SURGERY

## 2021-10-21 PROCEDURE — 99499 UNLISTED E&M SERVICE: CPT | Mod: HCNC,S$GLB,, | Performed by: ORTHOPAEDIC SURGERY

## 2021-10-21 PROCEDURE — 1159F PR MEDICATION LIST DOCUMENTED IN MEDICAL RECORD: ICD-10-PCS | Mod: HCNC,CPTII,S$GLB, | Performed by: ORTHOPAEDIC SURGERY

## 2021-10-21 PROCEDURE — 99214 OFFICE O/P EST MOD 30 MIN: CPT | Mod: HCNC,S$GLB,, | Performed by: ORTHOPAEDIC SURGERY

## 2021-10-21 PROCEDURE — 1160F RVW MEDS BY RX/DR IN RCRD: CPT | Mod: HCNC,CPTII,S$GLB, | Performed by: ORTHOPAEDIC SURGERY

## 2021-10-21 PROCEDURE — 99499 RISK ADDL DX/OHS AUDIT: ICD-10-PCS | Mod: HCNC,S$GLB,, | Performed by: ORTHOPAEDIC SURGERY

## 2021-10-21 PROCEDURE — 3075F PR MOST RECENT SYSTOLIC BLOOD PRESS GE 130-139MM HG: ICD-10-PCS | Mod: HCNC,CPTII,S$GLB, | Performed by: ORTHOPAEDIC SURGERY

## 2021-10-21 PROCEDURE — 1159F MED LIST DOCD IN RCRD: CPT | Mod: HCNC,CPTII,S$GLB, | Performed by: ORTHOPAEDIC SURGERY

## 2021-10-21 PROCEDURE — 99999 PR PBB SHADOW E&M-EST. PATIENT-LVL III: CPT | Mod: PBBFAC,HCNC,, | Performed by: ORTHOPAEDIC SURGERY

## 2021-10-21 PROCEDURE — 3079F PR MOST RECENT DIASTOLIC BLOOD PRESSURE 80-89 MM HG: ICD-10-PCS | Mod: HCNC,CPTII,S$GLB, | Performed by: ORTHOPAEDIC SURGERY

## 2021-10-21 PROCEDURE — 99214 PR OFFICE/OUTPT VISIT, EST, LEVL IV, 30-39 MIN: ICD-10-PCS | Mod: HCNC,S$GLB,, | Performed by: ORTHOPAEDIC SURGERY

## 2021-10-21 PROCEDURE — 3079F DIAST BP 80-89 MM HG: CPT | Mod: HCNC,CPTII,S$GLB, | Performed by: ORTHOPAEDIC SURGERY

## 2021-10-21 PROCEDURE — 3008F BODY MASS INDEX DOCD: CPT | Mod: HCNC,CPTII,S$GLB, | Performed by: ORTHOPAEDIC SURGERY

## 2021-10-21 RX ORDER — TRAMADOL HYDROCHLORIDE 50 MG/1
50 TABLET ORAL EVERY 6 HOURS PRN
Qty: 28 TABLET | Refills: 0 | Status: ON HOLD | OUTPATIENT
Start: 2021-10-21 | End: 2022-01-04 | Stop reason: HOSPADM

## 2021-10-22 DIAGNOSIS — Z96.9 RETAINED ORTHOPEDIC HARDWARE: Primary | ICD-10-CM

## 2021-10-22 DIAGNOSIS — M25.552 LEFT HIP PAIN: ICD-10-CM

## 2021-10-22 DIAGNOSIS — Z01.818 PREOP TESTING: Primary | ICD-10-CM

## 2021-10-22 DIAGNOSIS — M16.12 ARTHRITIS OF LEFT HIP: ICD-10-CM

## 2021-10-25 ENCOUNTER — HOSPITAL ENCOUNTER (OUTPATIENT)
Dept: RADIOLOGY | Facility: HOSPITAL | Age: 66
Discharge: HOME | End: 2021-10-25
Attending: COLON & RECTAL SURGERY
Payer: MEDICARE

## 2021-10-25 ENCOUNTER — HOSPITAL ENCOUNTER (OUTPATIENT)
Dept: CARDIOLOGY | Facility: HOSPITAL | Age: 66
Discharge: HOME | End: 2021-10-25
Attending: ORTHOPAEDIC SURGERY
Payer: MEDICARE

## 2021-10-25 ENCOUNTER — OFFICE VISIT (OUTPATIENT)
Dept: CARDIOLOGY | Facility: CLINIC | Age: 66
End: 2021-10-25
Payer: MEDICARE

## 2021-10-25 VITALS
RESPIRATION RATE: 16 BRPM | BODY MASS INDEX: 23.18 KG/M2 | OXYGEN SATURATION: 98 % | HEIGHT: 71 IN | DIASTOLIC BLOOD PRESSURE: 80 MMHG | WEIGHT: 165.56 LBS | SYSTOLIC BLOOD PRESSURE: 120 MMHG | HEART RATE: 65 BPM

## 2021-10-25 DIAGNOSIS — C20 RECTAL CANCER: ICD-10-CM

## 2021-10-25 DIAGNOSIS — Z01.818 PREOP TESTING: ICD-10-CM

## 2021-10-25 DIAGNOSIS — D50.0 IRON DEFICIENCY ANEMIA DUE TO CHRONIC BLOOD LOSS: ICD-10-CM

## 2021-10-25 DIAGNOSIS — I10 ESSENTIAL HYPERTENSION: Primary | ICD-10-CM

## 2021-10-25 DIAGNOSIS — Z01.810 PREOP CARDIOVASCULAR EXAM: ICD-10-CM

## 2021-10-25 DIAGNOSIS — Z01.818 PRE-OP TESTING: ICD-10-CM

## 2021-10-25 PROCEDURE — 71046 X-RAY EXAM CHEST 2 VIEWS: CPT | Mod: 26,HCNC,, | Performed by: RADIOLOGY

## 2021-10-25 PROCEDURE — 3008F BODY MASS INDEX DOCD: CPT | Mod: HCNC,CPTII,S$GLB, | Performed by: STUDENT IN AN ORGANIZED HEALTH CARE EDUCATION/TRAINING PROGRAM

## 2021-10-25 PROCEDURE — 3074F SYST BP LT 130 MM HG: CPT | Mod: HCNC,CPTII,S$GLB, | Performed by: STUDENT IN AN ORGANIZED HEALTH CARE EDUCATION/TRAINING PROGRAM

## 2021-10-25 PROCEDURE — 71046 X-RAY EXAM CHEST 2 VIEWS: CPT | Mod: TC,HCNC

## 2021-10-25 PROCEDURE — 1159F PR MEDICATION LIST DOCUMENTED IN MEDICAL RECORD: ICD-10-PCS | Mod: HCNC,CPTII,S$GLB, | Performed by: STUDENT IN AN ORGANIZED HEALTH CARE EDUCATION/TRAINING PROGRAM

## 2021-10-25 PROCEDURE — 93005 ELECTROCARDIOGRAM TRACING: CPT | Mod: HCNC

## 2021-10-25 PROCEDURE — 93010 EKG 12-LEAD: ICD-10-PCS | Mod: HCNC,,, | Performed by: INTERNAL MEDICINE

## 2021-10-25 PROCEDURE — 99204 PR OFFICE/OUTPT VISIT, NEW, LEVL IV, 45-59 MIN: ICD-10-PCS | Mod: HCNC,S$GLB,, | Performed by: STUDENT IN AN ORGANIZED HEALTH CARE EDUCATION/TRAINING PROGRAM

## 2021-10-25 PROCEDURE — 99499 RISK ADDL DX/OHS AUDIT: ICD-10-PCS | Mod: S$GLB,,, | Performed by: STUDENT IN AN ORGANIZED HEALTH CARE EDUCATION/TRAINING PROGRAM

## 2021-10-25 PROCEDURE — 1159F MED LIST DOCD IN RCRD: CPT | Mod: HCNC,CPTII,S$GLB, | Performed by: STUDENT IN AN ORGANIZED HEALTH CARE EDUCATION/TRAINING PROGRAM

## 2021-10-25 PROCEDURE — 3079F PR MOST RECENT DIASTOLIC BLOOD PRESSURE 80-89 MM HG: ICD-10-PCS | Mod: HCNC,CPTII,S$GLB, | Performed by: STUDENT IN AN ORGANIZED HEALTH CARE EDUCATION/TRAINING PROGRAM

## 2021-10-25 PROCEDURE — 99204 OFFICE O/P NEW MOD 45 MIN: CPT | Mod: HCNC,S$GLB,, | Performed by: STUDENT IN AN ORGANIZED HEALTH CARE EDUCATION/TRAINING PROGRAM

## 2021-10-25 PROCEDURE — 99999 PR PBB SHADOW E&M-EST. PATIENT-LVL III: CPT | Mod: PBBFAC,HCNC,, | Performed by: STUDENT IN AN ORGANIZED HEALTH CARE EDUCATION/TRAINING PROGRAM

## 2021-10-25 PROCEDURE — 99999 PR PBB SHADOW E&M-EST. PATIENT-LVL III: ICD-10-PCS | Mod: PBBFAC,HCNC,, | Performed by: STUDENT IN AN ORGANIZED HEALTH CARE EDUCATION/TRAINING PROGRAM

## 2021-10-25 PROCEDURE — 71046 XR CHEST PA AND LATERAL: ICD-10-PCS | Mod: 26,HCNC,, | Performed by: RADIOLOGY

## 2021-10-25 PROCEDURE — 3079F DIAST BP 80-89 MM HG: CPT | Mod: HCNC,CPTII,S$GLB, | Performed by: STUDENT IN AN ORGANIZED HEALTH CARE EDUCATION/TRAINING PROGRAM

## 2021-10-25 PROCEDURE — 3008F PR BODY MASS INDEX (BMI) DOCUMENTED: ICD-10-PCS | Mod: HCNC,CPTII,S$GLB, | Performed by: STUDENT IN AN ORGANIZED HEALTH CARE EDUCATION/TRAINING PROGRAM

## 2021-10-25 PROCEDURE — 3074F PR MOST RECENT SYSTOLIC BLOOD PRESSURE < 130 MM HG: ICD-10-PCS | Mod: HCNC,CPTII,S$GLB, | Performed by: STUDENT IN AN ORGANIZED HEALTH CARE EDUCATION/TRAINING PROGRAM

## 2021-10-25 PROCEDURE — 93010 ELECTROCARDIOGRAM REPORT: CPT | Mod: HCNC,,, | Performed by: INTERNAL MEDICINE

## 2021-10-25 PROCEDURE — 99499 UNLISTED E&M SERVICE: CPT | Mod: S$GLB,,, | Performed by: STUDENT IN AN ORGANIZED HEALTH CARE EDUCATION/TRAINING PROGRAM

## 2021-11-16 DIAGNOSIS — Z01.818 PREOP TESTING: Primary | ICD-10-CM

## 2021-12-27 RX ORDER — MELOXICAM 15 MG/1
15 TABLET ORAL DAILY
Qty: 30 TABLET | Refills: 2 | Status: SHIPPED | OUTPATIENT
Start: 2021-12-27 | End: 2022-05-17

## 2021-12-30 DIAGNOSIS — Z01.818 PRE-OP TESTING: ICD-10-CM

## 2021-12-30 NOTE — PRE ADMISSION SCREENING
Pre op instructions reviewed with pt per phone: Spoke about pre op process and surgery instructions, verbalized understanding.    To confirm, Your surgeon has instructed you:  Surgery is scheduled on 1/4/22      Please report to the main hospital (1st Floor) at [facility] off of [facility] (2nd building on the left, in front of the flag pole).  Please arrive at 0530am. We will call you the afternoon prior to surgery to confirm arrival time, as it is subject to change due to cancellations & emergencies.    Your Covid Test appointment is on 1/1/22 @ 1:45pm      INSTRUCTIONS IMPORTANT!!!  Do Not Eat, Drink, or Smoke after 12 midnight! NO WATER after midnight! OK to brush teeth, no gum, candy or mints!      *Take only these medicines with a small swallow of water-morning of surgery.  none      ____  Do Not wear makeup, mascara or nail polish.   ____  NO Acrylic/fake nails worn day of surgery (hand/arm surgeries)  ____  NO powder, lotions, deodorants or creams to surgical area.  ____  Please remove all jewelry, including piercings and leave at home.  ____  Dentures, Hearing Aids and Contact Lens will need to be removed prior to the start of surgery.  ____  Please bring photo ID and insurance information to hospital (Leave Valuables at Home)  ____  If going home the same day, arrange for a ride home. You will not be able to             drive if Anesthesia was used.   ____  Wear clean, loose fitting clothing. Allow for dressings, bandages.  ____  Stop Aspirin, Ibuprofen, Motrin and Aleve at least 5-7 days before surgery, unless otherwise instructed by your doctor, or the nurse. You MAY use Tylenol/acetaminophen until day of                surgery.  ____  If you take diabetic medication, do NOT take morning of surgery unless instructed by             Doctor. Metformin to be stopped 24 hrs prior to surgery time. DO NOT take long-acting insulin the evening before surgery. Blood sugars will be checked in pre-op morning of.  ____  Stop taking any Fish Oil supplements or Vitamins at least 5 days prior to surgery, unless instructed otherwise by your Doctor.          Bathing Instructions: The night before surgery and the morning prior to coming to the hospital:   -Do not shave your face.  -Do not shave pubic hair 7 days prior to surgery (gyn pt's).  -Do not shave legs/underarms 3 days prior to surgery.   -Shower & Rinse your body as usual with anti-bacterial Soap (Dial, Lever 2000, or Hibiclens)   -Do not use hibiclens on your head, face, or genitals.   -Do not wash with anti-bacterial soap after you use the hibiclens.   -Rinse your body thoroughly.      [facility] Visitor/Ride Policy:  Only 1 adult allowed (over the age of 18) to accompany you into Pre-op/Recovery Surgery Dept. All other visitors will need to wait in waiting room. Must have a ride home from a responsible adult that you know and trust. Medical Transport, Uber or Lyft can only be used if patient has a responsible adult to accompany them during ride home.      Surgical Site Infection:    Prevention of surgical site infections:     -Keep incisions clean and dry.   -Do not soak/submerge incisions in water until completely healed.   -Do not apply lotions, powders, creams, or deodorants to site.   -Always make sure hands are cleaned with antibacterial soap/ alcohol-based   prior to touching the surgical site.      Signs and symptoms:   -Redness and pain around the area where you had surgery   -Drainage of cloudy fluid from your surgical wound   -Fever over 100.4 or chills  >>>Call Surgeon office/on-call Surgeon if you experience any of these signs & symptoms post-surgery.      *Please Call [facility] Pre-Admissions Department with surgery instruction questions at [phone] or [phone].    *Insurance Questions, please call [phone].

## 2022-01-01 ENCOUNTER — LAB VISIT (OUTPATIENT)
Dept: URGENT CARE | Facility: CLINIC | Age: 67
End: 2022-01-01
Payer: MEDICARE

## 2022-01-01 DIAGNOSIS — Z01.818 PRE-OP TESTING: ICD-10-CM

## 2022-01-01 PROCEDURE — U0003 INFECTIOUS AGENT DETECTION BY NUCLEIC ACID (DNA OR RNA); SEVERE ACUTE RESPIRATORY SYNDROME CORONAVIRUS 2 (SARS-COV-2) (CORONAVIRUS DISEASE [COVID-19]), AMPLIFIED PROBE TECHNIQUE, MAKING USE OF HIGH THROUGHPUT TECHNOLOGIES AS DESCRIBED BY CMS-2020-01-R: HCPCS | Performed by: ORTHOPAEDIC SURGERY

## 2022-01-01 PROCEDURE — U0005 INFEC AGEN DETEC AMPLI PROBE: HCPCS | Performed by: ORTHOPAEDIC SURGERY

## 2022-01-03 ENCOUNTER — LAB VISIT (OUTPATIENT)
Dept: LAB | Facility: HOSPITAL | Age: 67
End: 2022-01-03
Attending: ORTHOPAEDIC SURGERY
Payer: MEDICARE

## 2022-01-03 DIAGNOSIS — Z01.818 PREOP TESTING: ICD-10-CM

## 2022-01-03 LAB
ABO + RH BLD: NORMAL
ALBUMIN SERPL BCP-MCNC: 4 G/DL (ref 3.5–5.2)
ALP SERPL-CCNC: 126 U/L (ref 55–135)
ALT SERPL W/O P-5'-P-CCNC: 21 U/L (ref 10–44)
ANION GAP SERPL CALC-SCNC: 15 MMOL/L (ref 8–16)
AST SERPL-CCNC: 21 U/L (ref 10–40)
BASOPHILS # BLD AUTO: 0.06 K/UL (ref 0–0.2)
BASOPHILS NFR BLD: 1 % (ref 0–1.9)
BILIRUB SERPL-MCNC: 0.5 MG/DL (ref 0.1–1)
BLD GP AB SCN CELLS X3 SERPL QL: NORMAL
BUN SERPL-MCNC: 15 MG/DL (ref 8–23)
CALCIUM SERPL-MCNC: 9.5 MG/DL (ref 8.7–10.5)
CHLORIDE SERPL-SCNC: 98 MMOL/L (ref 95–110)
CO2 SERPL-SCNC: 25 MMOL/L (ref 23–29)
CREAT SERPL-MCNC: 0.8 MG/DL (ref 0.5–1.4)
DIFFERENTIAL METHOD: ABNORMAL
EOSINOPHIL # BLD AUTO: 0.1 K/UL (ref 0–0.5)
EOSINOPHIL NFR BLD: 1 % (ref 0–8)
ERYTHROCYTE [DISTWIDTH] IN BLOOD BY AUTOMATED COUNT: 16.5 % (ref 11.5–14.5)
EST. GFR  (AFRICAN AMERICAN): >60 ML/MIN/1.73 M^2
EST. GFR  (NON AFRICAN AMERICAN): >60 ML/MIN/1.73 M^2
GLUCOSE SERPL-MCNC: 102 MG/DL (ref 70–110)
HCT VFR BLD AUTO: 40.3 % (ref 40–54)
HGB BLD-MCNC: 12.5 G/DL (ref 14–18)
IMM GRANULOCYTES # BLD AUTO: 0.02 K/UL (ref 0–0.04)
IMM GRANULOCYTES NFR BLD AUTO: 0.3 % (ref 0–0.5)
LYMPHOCYTES # BLD AUTO: 1.1 K/UL (ref 1–4.8)
LYMPHOCYTES NFR BLD: 18.3 % (ref 18–48)
MCH RBC QN AUTO: 25.7 PG (ref 27–31)
MCHC RBC AUTO-ENTMCNC: 31 G/DL (ref 32–36)
MCV RBC AUTO: 83 FL (ref 82–98)
MONOCYTES # BLD AUTO: 0.7 K/UL (ref 0.3–1)
MONOCYTES NFR BLD: 11.6 % (ref 4–15)
NEUTROPHILS # BLD AUTO: 4.2 K/UL (ref 1.8–7.7)
NEUTROPHILS NFR BLD: 67.8 % (ref 38–73)
NRBC BLD-RTO: 0 /100 WBC
PLATELET # BLD AUTO: 360 K/UL (ref 150–450)
PMV BLD AUTO: 9.8 FL (ref 9.2–12.9)
POTASSIUM SERPL-SCNC: 4.7 MMOL/L (ref 3.5–5.1)
PROT SERPL-MCNC: 8.4 G/DL (ref 6–8.4)
RBC # BLD AUTO: 4.87 M/UL (ref 4.6–6.2)
SARS-COV-2 RNA RESP QL NAA+PROBE: NOT DETECTED
SARS-COV-2- CYCLE NUMBER: NORMAL
SODIUM SERPL-SCNC: 138 MMOL/L (ref 136–145)
WBC # BLD AUTO: 6.19 K/UL (ref 3.9–12.7)

## 2022-01-03 PROCEDURE — 36415 COLL VENOUS BLD VENIPUNCTURE: CPT | Performed by: ORTHOPAEDIC SURGERY

## 2022-01-03 PROCEDURE — 80053 COMPREHEN METABOLIC PANEL: CPT | Performed by: ORTHOPAEDIC SURGERY

## 2022-01-03 PROCEDURE — 86850 RBC ANTIBODY SCREEN: CPT | Performed by: ORTHOPAEDIC SURGERY

## 2022-01-03 PROCEDURE — 86900 BLOOD TYPING SEROLOGIC ABO: CPT | Performed by: ORTHOPAEDIC SURGERY

## 2022-01-03 PROCEDURE — 85025 COMPLETE CBC W/AUTO DIFF WBC: CPT | Performed by: ORTHOPAEDIC SURGERY

## 2022-01-04 ENCOUNTER — ANESTHESIA (OUTPATIENT)
Dept: SURGERY | Facility: HOSPITAL | Age: 67
End: 2022-01-04
Payer: MEDICARE

## 2022-01-04 ENCOUNTER — HOSPITAL ENCOUNTER (OUTPATIENT)
Facility: HOSPITAL | Age: 67
Discharge: HOME | End: 2022-01-04
Attending: ORTHOPAEDIC SURGERY | Admitting: ORTHOPAEDIC SURGERY
Payer: MEDICARE

## 2022-01-04 ENCOUNTER — ANESTHESIA EVENT (OUTPATIENT)
Dept: SURGERY | Facility: HOSPITAL | Age: 67
End: 2022-01-04
Payer: MEDICARE

## 2022-01-04 VITALS
RESPIRATION RATE: 15 BRPM | TEMPERATURE: 98 F | HEART RATE: 61 BPM | SYSTOLIC BLOOD PRESSURE: 138 MMHG | WEIGHT: 160.69 LBS | HEIGHT: 71 IN | DIASTOLIC BLOOD PRESSURE: 78 MMHG | OXYGEN SATURATION: 98 % | BODY MASS INDEX: 22.5 KG/M2

## 2022-01-04 DIAGNOSIS — Z96.9 RETAINED ORTHOPEDIC HARDWARE: Primary | ICD-10-CM

## 2022-01-04 LAB
ABO + RH BLD: NORMAL
BLD GP AB SCN CELLS X3 SERPL QL: NORMAL

## 2022-01-04 PROCEDURE — 88300 PR  SURG PATH,GROSS,LEVEL I: ICD-10-PCS | Mod: 26,,, | Performed by: PATHOLOGY

## 2022-01-04 PROCEDURE — 36000707: Performed by: ORTHOPAEDIC SURGERY

## 2022-01-04 PROCEDURE — 71000039 HC RECOVERY, EACH ADD'L HOUR: Performed by: ORTHOPAEDIC SURGERY

## 2022-01-04 PROCEDURE — 71000033 HC RECOVERY, INTIAL HOUR: Performed by: ORTHOPAEDIC SURGERY

## 2022-01-04 PROCEDURE — 63600175 PHARM REV CODE 636 W HCPCS: Performed by: ORTHOPAEDIC SURGERY

## 2022-01-04 PROCEDURE — 20680 PR REMOVAL DEEP IMPLANT: ICD-10-PCS | Mod: LT,,, | Performed by: ORTHOPAEDIC SURGERY

## 2022-01-04 PROCEDURE — 37000008 HC ANESTHESIA 1ST 15 MINUTES: Performed by: ORTHOPAEDIC SURGERY

## 2022-01-04 PROCEDURE — 86850 RBC ANTIBODY SCREEN: CPT | Performed by: ORTHOPAEDIC SURGERY

## 2022-01-04 PROCEDURE — A4216 STERILE WATER/SALINE, 10 ML: HCPCS | Performed by: ORTHOPAEDIC SURGERY

## 2022-01-04 PROCEDURE — 25000003 PHARM REV CODE 250: Performed by: FAMILY MEDICINE

## 2022-01-04 PROCEDURE — 88300 SURGICAL PATH GROSS: CPT | Performed by: PATHOLOGY

## 2022-01-04 PROCEDURE — 88300 SURGICAL PATH GROSS: CPT | Mod: 26,,, | Performed by: PATHOLOGY

## 2022-01-04 PROCEDURE — 25000003 PHARM REV CODE 250: Performed by: ORTHOPAEDIC SURGERY

## 2022-01-04 PROCEDURE — 25000003 PHARM REV CODE 250: Performed by: ANESTHESIOLOGY

## 2022-01-04 PROCEDURE — 20680 REMOVAL OF IMPLANT DEEP: CPT | Mod: LT,,, | Performed by: ORTHOPAEDIC SURGERY

## 2022-01-04 PROCEDURE — 37000009 HC ANESTHESIA EA ADD 15 MINS: Performed by: ORTHOPAEDIC SURGERY

## 2022-01-04 PROCEDURE — 63600175 PHARM REV CODE 636 W HCPCS: Performed by: FAMILY MEDICINE

## 2022-01-04 PROCEDURE — 71000015 HC POSTOP RECOV 1ST HR: Performed by: ORTHOPAEDIC SURGERY

## 2022-01-04 PROCEDURE — 20680 REMOVAL OF IMPLANT DEEP: CPT | Mod: AS,LT,, | Performed by: PHYSICIAN ASSISTANT

## 2022-01-04 PROCEDURE — 20680 PR REMOVAL DEEP IMPLANT: ICD-10-PCS | Mod: AS,LT,, | Performed by: PHYSICIAN ASSISTANT

## 2022-01-04 PROCEDURE — 36000706: Performed by: ORTHOPAEDIC SURGERY

## 2022-01-04 RX ORDER — ONDANSETRON 4 MG/1
8 TABLET, ORALLY DISINTEGRATING ORAL EVERY 8 HOURS PRN
Qty: 20 TABLET | Refills: 0 | Status: ON HOLD | OUTPATIENT
Start: 2022-01-04 | End: 2022-08-16 | Stop reason: HOSPADM

## 2022-01-04 RX ORDER — HYDROCODONE BITARTRATE AND ACETAMINOPHEN 10; 325 MG/1; MG/1
1 TABLET ORAL EVERY 4 HOURS PRN
Status: DISCONTINUED | OUTPATIENT
Start: 2022-01-04 | End: 2022-01-04 | Stop reason: HOSPADM

## 2022-01-04 RX ORDER — MEPERIDINE HYDROCHLORIDE 25 MG/ML
12.5 INJECTION INTRAMUSCULAR; INTRAVENOUS; SUBCUTANEOUS ONCE AS NEEDED
Status: COMPLETED | OUTPATIENT
Start: 2022-01-04 | End: 2022-01-04

## 2022-01-04 RX ORDER — HYDROMORPHONE HYDROCHLORIDE 2 MG/ML
0.2 INJECTION, SOLUTION INTRAMUSCULAR; INTRAVENOUS; SUBCUTANEOUS EVERY 5 MIN PRN
Status: DISCONTINUED | OUTPATIENT
Start: 2022-01-04 | End: 2022-01-04 | Stop reason: HOSPADM

## 2022-01-04 RX ORDER — HYDROCODONE BITARTRATE AND ACETAMINOPHEN 5; 325 MG/1; MG/1
1 TABLET ORAL EVERY 4 HOURS PRN
Status: DISCONTINUED | OUTPATIENT
Start: 2022-01-04 | End: 2022-01-04 | Stop reason: HOSPADM

## 2022-01-04 RX ORDER — ACETAMINOPHEN 500 MG
1000 TABLET ORAL EVERY 8 HOURS
Status: DISCONTINUED | OUTPATIENT
Start: 2022-01-04 | End: 2022-01-04 | Stop reason: HOSPADM

## 2022-01-04 RX ORDER — SODIUM CHLORIDE 9 MG/ML
INJECTION, SOLUTION INTRAVENOUS CONTINUOUS
Status: ACTIVE | OUTPATIENT
Start: 2022-01-04

## 2022-01-04 RX ORDER — CHLORHEXIDINE GLUCONATE ORAL RINSE 1.2 MG/ML
10 SOLUTION DENTAL 2 TIMES DAILY
Status: DISCONTINUED | OUTPATIENT
Start: 2022-01-04 | End: 2022-01-04 | Stop reason: HOSPADM

## 2022-01-04 RX ORDER — ROPIVACAINE/EPI/CLONIDINE/KET 2.46-0.005
SYRINGE (ML) INJECTION
Status: DISCONTINUED | OUTPATIENT
Start: 2022-01-04 | End: 2022-01-04 | Stop reason: HOSPADM

## 2022-01-04 RX ORDER — OXYCODONE AND ACETAMINOPHEN 5; 325 MG/1; MG/1
1 TABLET ORAL
Status: DISCONTINUED | OUTPATIENT
Start: 2022-01-04 | End: 2022-01-04 | Stop reason: HOSPADM

## 2022-01-04 RX ORDER — KETOROLAC TROMETHAMINE 30 MG/ML
15 INJECTION, SOLUTION INTRAMUSCULAR; INTRAVENOUS EVERY 8 HOURS PRN
Status: DISCONTINUED | OUTPATIENT
Start: 2022-01-04 | End: 2022-01-04 | Stop reason: HOSPADM

## 2022-01-04 RX ORDER — ONDANSETRON 2 MG/ML
4 INJECTION INTRAMUSCULAR; INTRAVENOUS DAILY PRN
Status: DISCONTINUED | OUTPATIENT
Start: 2022-01-04 | End: 2022-01-04 | Stop reason: HOSPADM

## 2022-01-04 RX ORDER — ONDANSETRON 2 MG/ML
INJECTION INTRAMUSCULAR; INTRAVENOUS
Status: DISCONTINUED | OUTPATIENT
Start: 2022-01-04 | End: 2022-01-04

## 2022-01-04 RX ORDER — CEFAZOLIN SODIUM 2 G/50ML
2 SOLUTION INTRAVENOUS
Status: COMPLETED | OUTPATIENT
Start: 2022-01-04 | End: 2022-01-04

## 2022-01-04 RX ORDER — SODIUM CHLORIDE 9 MG/ML
INJECTION, SOLUTION INTRAMUSCULAR; INTRAVENOUS; SUBCUTANEOUS
Status: DISCONTINUED | OUTPATIENT
Start: 2022-01-04 | End: 2022-01-04 | Stop reason: HOSPADM

## 2022-01-04 RX ORDER — ROCURONIUM BROMIDE 10 MG/ML
INJECTION, SOLUTION INTRAVENOUS
Status: DISCONTINUED | OUTPATIENT
Start: 2022-01-04 | End: 2022-01-04

## 2022-01-04 RX ORDER — FENTANYL CITRATE 50 UG/ML
INJECTION, SOLUTION INTRAMUSCULAR; INTRAVENOUS
Status: DISCONTINUED | OUTPATIENT
Start: 2022-01-04 | End: 2022-01-04

## 2022-01-04 RX ORDER — HYDROCODONE BITARTRATE AND ACETAMINOPHEN 10; 325 MG/1; MG/1
1 TABLET ORAL EVERY 8 HOURS PRN
Qty: 21 TABLET | Refills: 0 | Status: SHIPPED | OUTPATIENT
Start: 2022-01-04 | End: 2022-01-12 | Stop reason: SDUPTHER

## 2022-01-04 RX ORDER — PROPOFOL 10 MG/ML
VIAL (ML) INTRAVENOUS
Status: DISCONTINUED | OUTPATIENT
Start: 2022-01-04 | End: 2022-01-04

## 2022-01-04 RX ORDER — PHENYLEPHRINE HYDROCHLORIDE 10 MG/ML
INJECTION INTRAVENOUS
Status: DISCONTINUED | OUTPATIENT
Start: 2022-01-04 | End: 2022-01-04

## 2022-01-04 RX ORDER — NEOSTIGMINE METHYLSULFATE 1 MG/ML
INJECTION, SOLUTION INTRAVENOUS
Status: DISCONTINUED | OUTPATIENT
Start: 2022-01-04 | End: 2022-01-04

## 2022-01-04 RX ORDER — LIDOCAINE HYDROCHLORIDE 20 MG/ML
INJECTION, SOLUTION EPIDURAL; INFILTRATION; INTRACAUDAL; PERINEURAL
Status: DISCONTINUED | OUTPATIENT
Start: 2022-01-04 | End: 2022-01-04

## 2022-01-04 RX ORDER — ONDANSETRON 8 MG/1
8 TABLET, ORALLY DISINTEGRATING ORAL EVERY 8 HOURS PRN
Status: DISCONTINUED | OUTPATIENT
Start: 2022-01-04 | End: 2022-01-04 | Stop reason: HOSPADM

## 2022-01-04 RX ORDER — CHLORHEXIDINE GLUCONATE ORAL RINSE 1.2 MG/ML
10 SOLUTION DENTAL
Status: DISPENSED | OUTPATIENT
Start: 2022-01-04

## 2022-01-04 RX ADMIN — MEPERIDINE HYDROCHLORIDE 12.5 MG: 25 INJECTION INTRAMUSCULAR; INTRAVENOUS; SUBCUTANEOUS at 09:01

## 2022-01-04 RX ADMIN — SODIUM CHLORIDE, SODIUM LACTATE, POTASSIUM CHLORIDE, AND CALCIUM CHLORIDE: .6; .31; .03; .02 INJECTION, SOLUTION INTRAVENOUS at 06:01

## 2022-01-04 RX ADMIN — LIDOCAINE HYDROCHLORIDE 100 MG: 20 INJECTION, SOLUTION EPIDURAL; INFILTRATION; INTRACAUDAL; PERINEURAL at 07:01

## 2022-01-04 RX ADMIN — CEFAZOLIN SODIUM 2 G: 2 SOLUTION INTRAVENOUS at 07:01

## 2022-01-04 RX ADMIN — GLYCOPYRROLATE 0.6 MG: 0.2 INJECTION, SOLUTION INTRAMUSCULAR; INTRAVENOUS at 09:01

## 2022-01-04 RX ADMIN — NEOSTIGMINE METHYLSULFATE 3 MG: 1 INJECTION INTRAVENOUS at 09:01

## 2022-01-04 RX ADMIN — OXYCODONE HYDROCHLORIDE AND ACETAMINOPHEN 1 TABLET: 5; 325 TABLET ORAL at 10:01

## 2022-01-04 RX ADMIN — PROPOFOL 200 MG: 10 INJECTION, EMULSION INTRAVENOUS at 07:01

## 2022-01-04 RX ADMIN — PHENYLEPHRINE HYDROCHLORIDE 100 MCG: 10 INJECTION INTRAVENOUS at 07:01

## 2022-01-04 RX ADMIN — ONDANSETRON 4 MG: 2 INJECTION, SOLUTION INTRAMUSCULAR; INTRAVENOUS at 08:01

## 2022-01-04 RX ADMIN — FENTANYL CITRATE 100 MCG: 50 INJECTION, SOLUTION INTRAMUSCULAR; INTRAVENOUS at 07:01

## 2022-01-04 RX ADMIN — FENTANYL CITRATE 100 MCG: 50 INJECTION, SOLUTION INTRAMUSCULAR; INTRAVENOUS at 06:01

## 2022-01-04 RX ADMIN — ROCURONIUM BROMIDE 50 MG: 10 INJECTION, SOLUTION INTRAVENOUS at 07:01

## 2022-01-04 RX ADMIN — CHLORHEXIDINE GLUCONATE 0.12% ORAL RINSE 10 ML: 1.2 LIQUID ORAL at 06:01

## 2022-01-04 RX ADMIN — SODIUM CHLORIDE, SODIUM LACTATE, POTASSIUM CHLORIDE, AND CALCIUM CHLORIDE: .6; .31; .03; .02 INJECTION, SOLUTION INTRAVENOUS at 07:01

## 2022-01-04 NOTE — TRANSFER OF CARE
"Anesthesia Transfer of Care Note    Patient: Vincent Benton    Procedure(s) Performed: Procedure(s) (LRB):  REMOVAL, HARDWARE, HIP (Left)    Patient location: PACU    Anesthesia Type: general    Transport from OR: Transported from OR on room air with adequate spontaneous ventilation    Post pain: adequate analgesia    Post assessment: no apparent anesthetic complications    Post vital signs: stable    Level of consciousness: awake and responds to stimulation    Nausea/Vomiting: no nausea/vomiting    Complications: none    Transfer of care protocol was followed      Last vitals:   Visit Vitals  BP (!) 166/86   Pulse 99   Temp 36.6 °C (97.9 °F)   Resp 18   Ht 5' 11" (1.803 m)   Wt 72.9 kg (160 lb 11.5 oz)   SpO2 99%   BMI 22.42 kg/m²     "

## 2022-01-04 NOTE — ANESTHESIA PROCEDURE NOTES
Intubation    Date/Time: 1/4/2022 7:04 AM  Performed by: Sree Carvalho CRNA  Authorized by: Catracho Brandon II, MD     Intubation:     Induction:  Intravenous    Intubated:  Postinduction    Mask Ventilation:  Easy mask    Attempts:  1    Attempted By:  Student    Method of Intubation:  Direct    Blade:  East 2    Laryngeal View Grade: Grade I - full view of cords      Difficult Airway Encountered?: No      Complications:  None    Airway Device:  Oral endotracheal tube    Airway Device Size:  7.5    Style/Cuff Inflation:  Cuffed (inflated to minimal occlusive pressure)    Tube secured:  21    Secured at:  The lips    Placement Verified By:  Capnometry    Complicating Factors:  None    Findings Post-Intubation:  BS equal bilateral and atraumatic/condition of teeth unchanged

## 2022-01-04 NOTE — H&P
Subjective:     Patient ID: Vincent Benton is a 66 y.o. male.    Chief Complaint: No chief complaint on file.  Severe numbness both feet  HPI:  Four/12/21  65-year-old who recently had rectal cancer underwent surgery for it and reverse of his colostomy as well as followed by chemotherapy.  This resulted in complete loss of sensation in plantar aspect of his feet and severe numbness or tingling.    In December 2020 starting having severe pain in the right knee and extreme swelling.  It was drained a couple times as well as received steroid on 12/16/2020.  Last aspiration was 75 cc on 03/05/2021.  Patient occasionally takes Advil which did not help.  He also use diclofenac gel.  The draining of the knee helps for a week or so and otherwise it comes back.  He does wear a neoprene sleeve.  His knee grinds locks catches gives out on him.  Cannot take NSAIDs due to rectal cancer and I did tell him should not be taking Advil or Aleve over-the-counter.  Denies any pain in his hips or in the groins or down in the ankles.  The left knee is doing well no problems with it the problem is with the right knee.  Denies low back pain denies any numbness or tingling in the legs or loss of bladder control.  Denies any fever or chills or shortness of breath or chest pain difficulty with chewing or swallowing or loss of sense smell or taste  Pain is 6/10.  Difficulty with ambulation, squatting, climbing stairs.  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  Partial medial lateral meniscectomy with plica resection and as well as finding of tricompartmental chondromalacia.  He is doing well with the knee without any complaints.  New problem  left hip severe pain 10/10.  Previous history of fracture approximately 12 years ago and he underwent application of  short claire.  He said over the last several weeks is been hurting him severely unable to touch unable to walk with severe limp.  He points over the greater trochanter is slightly over the anterior groin.  No history of new trauma.  He is a  he does climb some ladders and kneel on the floor.  Things are getting really with bad that he called for appointment.  He recently a year ago had a colon cancer resection.  He told me that he is raising his granddaughter any needs to be active in wants to avoid any joint replacements at this time.  No fever no chills no shortness of breath or difficulty with chewing or swallowing loss of bowel bladder control    10/21/2021   Right knee doing extremely well with arthroscopy done on 05/05/2021.  Partial medial and lateral meniscectomy and plica resection despite having tricompartmental chondromalacia  His main concern is severe left hip pain.  History of intramedullary short claire placed in 2009. He cannot sleep on that side he having severe pain over the greater trochanter which I injected last visit with steroid which helped for 4 days only.  The meloxicam is not doing anything he stop taking it.  He is really hurting a lot 1 something for pain.  He is willing to entertain other things like surgery to see if it helps.  I did go over the x-rays with him and showed him how the leg screws protruding out with compression and he is hurting over that area on the lateral side and if we take that screw might relieve some of his pains.  He does have evidence some arthritis in the hip joint but he said he is now ready to have hip replacement.  He said he wants something for pain he understands I can not give him narcotics however he said he just had cancer and slowly was tapered off.  He said his hip is preventing him from doing his job as a .  The knee is doing extremely well without any problems.  No fever no chills or shortness of breath or difficulty with chewing swallowing  loss of bowel bladder control  Past Medical History:   Diagnosis Date    Alcoholism     Anemia     Back pain     Encounter for blood transfusion 2018    Essential hypertension 2/4/2016    GERD (gastroesophageal reflux disease)     Hiatal hernia     Hypertension     diet controlled    Melanoma 06/2021    left cheek    Rectal cancer 9/11/2019     Past Surgical History:   Procedure Laterality Date    ARTHROSCOPY OF KNEE Right 5/5/2021    Procedure: ARTHROSCOPY, KNEE;  Surgeon: Delonte Mcintyre MD;  Location: [facility] OR;  Service: Orthopedics;  Laterality: Right;    COLONOSCOPY N/A 9/3/2019    Procedure: COLONOSCOPY;  Surgeon: Isai Centeno MD;  Location: [facility] ENDO;  Service: Endoscopy;  Laterality: N/A;    COLONOSCOPY N/A 1/10/2020    Procedure: COLONOSCOPY;  Surgeon: Familia Gonzalez MD;  Location: [facility] ENDO;  Service: General;  Laterality: N/A;    COLONOSCOPY N/A 3/24/2021    Procedure: COLONOSCOPY;  Surgeon: Familia Gonzalez MD;  Location: [facility] ENDO;  Service: General;  Laterality: N/A;    ESOPHAGOGASTRODUODENOSCOPY N/A 9/3/2019    Procedure: ESOPHAGOGASTRODUODENOSCOPY (EGD);  Surgeon: Isai Centeno MD;  [facility];  Service: Endoscopy;  Laterality: N/A;    EXCISION OF MEDIAL MENISCUS OF KNEE Right 5/5/2021    Procedure: MENISCECTOMY, KNEE, MEDIAL;  Surgeon: Delonte Mcintyre MD;  Location: [facility];  Service: Orthopedics;  Laterality: Right;  partial Lateral    FLEXIBLE SIGMOIDOSCOPY  2/4/2020    Procedure: SIGMOIDOSCOPY, FLEXIBLE;  Surgeon: Familia Gonzalez MD;  Location: [facility] OR;  Service: General;;    ILEOSTOMY Right 2/4/2020    Procedure: CREATION, ILEOSTOMY;  Surgeon: Familia Gonzalez MD;  Location: [facility] OR;  Service: General;  Laterality: Right;    ILEOSTOMY CLOSURE N/A 8/4/2020    Procedure: CLOSURE, ILEOSTOMY;  Surgeon: Familia Gonzalez MD;  Location: [facility] OR;  Service: General;  Laterality: N/A;    INCISION AND DRAINAGE OF BACK Left 8/5/2020    Procedure:  INCISION AND DRAINAGE, BACK;  Surgeon: Familia Gonzalez MD;  Location: [facility] OR;  Service: General;  Laterality: Left;    INJECTION OF ANESTHETIC AGENT INTO TISSUE PLANE DEFINED BY TRANSVERSUS ABDOMINIS MUSCLE N/A 2/4/2020    Procedure: BLOCK, TRANSVERSUS ABDOMINIS PLANE;  Surgeon: Familia Gonzalez MD;  Location: [facility];  Service: General;  Laterality: N/A;    INSERTION OF TUNNELED CENTRAL VENOUS CATHETER (CVC) WITH SUBCUTANEOUS PORT Right 2/4/2020    Procedure: INSERTION, PORT-A-CATH;  Surgeon: Familia Gonzalez MD;  Location: [facility];  Service: General;  Laterality: Right;    JOINT REPLACEMENT Left 2009    Hip    KNEE ARTHROSCOPY W/ PLICA EXCISION Right 5/5/2021    Procedure: EXCISION, PLICA, KNEE, ARTHROSCOPIC;  Surgeon: Delonte Mcintyre MD;  Location: [facility];  Service: Orthopedics;  Laterality: Right;    MOBILIZATION OF SPLENIC FLEXURE  2/4/2020    Procedure: MOBILIZATION, SPLENIC FLEXURE;  Surgeon: Familia Gonzalez MD;  Location: [facility];  Service: General;;     Family History   Problem Relation Age of Onset    Cataracts Mother     Stroke Father     Hypertension Father      Social History     Socioeconomic History    Marital status:    Tobacco Use    Smoking status: Never Smoker    Smokeless tobacco: Never Used   Substance and Sexual Activity    Alcohol use: Yes     Comment: occassionally No alcohol 72h prior to sx    Drug use: No    Sexual activity: Never     Partners: Female     Current Discharge Medication List      CONTINUE these medications which have NOT CHANGED    Details   ascorbic acid, vitamin C, (VITAMIN C) 1000 MG tablet Take 1,000 mg by mouth once daily.      gabapentin (NEURONTIN) 300 MG capsule Take 1 capsule (300 mg total) by mouth every evening.  Qty: 30 capsule, Refills: 11    Associated Diagnoses: Peripheral neuropathy due to chemotherapy      meloxicam (MOBIC) 15 MG tablet Take 1 tablet (15 mg total) by mouth once daily. Take with food  Qty: 30 tablet,  Refills: 2      mirtazapine (REMERON) 15 MG tablet Take 1 tablet (15 mg total) by mouth every evening.  Qty: 90 tablet, Refills: 1    Comments: Please inactivate all prior scripts with same name and strength including on holds.  Associated Diagnoses: Cachexia      cyanocobalamin (VITAMIN B-12) 1000 MCG tablet Take 100 mcg by mouth once daily.      diclofenac sodium (VOLTAREN) 1 % Gel Apply 2 g topically 3 (three) times daily as needed.  Qty: 2 Tube, Refills: 6    Associated Diagnoses: Primary osteoarthritis of right knee; Chondrocalcinosis of right knee; Effusion of right knee      DIPRIVAN 10 mg/mL infusion       HYDROcodone-acetaminophen (NORCO)  mg per tablet Take 1 tablet by mouth every 8 (eight) hours as needed for Pain.  Qty: 21 tablet, Refills: 0    Comments: Quantity prescribed more than 7 day supply? Yes, quantity medically necessary      tamsulosin (FLOMAX) 0.4 mg Cap Take 1 capsule (0.4 mg total) by mouth once daily.  Qty: 30 capsule, Refills: 11    Associated Diagnoses: Rectal cancer; Chemotherapy management, encounter for; Colostomy care      !! traMADoL (ULTRAM) 50 mg tablet Take 1 tablet (50 mg total) by mouth every 6 (six) hours.  Qty: 30 tablet, Refills: 0    Comments: Quantity prescribed more than 7 day supply? Yes, quantity medically necessary  Associated Diagnoses: Primary osteoarthritis of right knee      !! traMADoL (ULTRAM) 50 mg tablet Take 1 tablet (50 mg total) by mouth every 6 (six) hours as needed for Pain.  Qty: 28 tablet, Refills: 0    Comments: Quantity prescribed more than 7 day supply? Yes, quantity medically necessary       !! - Potential duplicate medications found. Please discuss with provider.        Review of patient's allergies indicates:  No Known Allergies  Review of Systems   Constitutional: Negative for decreased appetite.   HENT: Negative for tinnitus.    Eyes: Negative for double vision.   Cardiovascular: Negative for chest pain.   Respiratory: Negative for  wheezing.    Hematologic/Lymphatic: Negative for bleeding problem.   Skin: Negative for dry skin.   Musculoskeletal: Positive for joint pain, joint swelling and stiffness. Negative for arthritis, back pain, gout and neck pain.   Gastrointestinal: Negative for abdominal pain.   Genitourinary: Negative for bladder incontinence.   Neurological: Negative for numbness, paresthesias and sensory change.   Psychiatric/Behavioral: Negative for altered mental status.       Objective:   Body mass index is 22.32 kg/m².  There were no vitals filed for this visit.       General    Constitutional: He is oriented to person, place, and time. He appears well-developed.   HENT:   Head: Atraumatic.   Eyes: EOM are normal.   Cardiovascular: Normal rate.    Pulmonary/Chest: Effort normal.   Neurological: He is alert and oriented to person, place, and time.   Psychiatric: Judgment normal.           Cervical rotation is very functional  Bilateral upper extremity neurovascularly intact.  Radial ulnar pulses 2+.  Sensory intact to touch.  Strength is 5/5.  Lumbar nontender  Straight leg raising negative  Pelvis is level  Left hip with severe pain over the greater trochanter.  Internal rotation is severely limited as well as external rotation.  His right leg clinically slightly shorter in the sitting position.  Very minimal pain in the groin.  Gait is with a severe limp today  Hip flexors, abductors adductors quads and hamstrings are 5/5 ankle extension and flexion 5/5  Right knee arthroscopic scars healed well.  There is no swelling.  He has full motion.  There is definitely some crepitus to compression on the patella and range of motion.  Collaterals and cruciates are stable.  Left knee without swelling.  Without tenderness.  Full range of motion.  Collaterals and cruciates are stable.  Negative Lisa sign  Calves are soft nontender  There is no pitting edema around the ankles  Ankle motion is intact  DP 1+  There is multiple  varicosities in the lower extremity.    There is marked loss of sensation in the plantar aspect of his feet    Relevant imaging results reviewed and interpreted by me, discussed with the patient and / or family today   X-ray 12/16/2020 with mild to moderate lateral joint space narrowing.  Small osteophytic changes.  Calcified menisci consistent with chondrocalcinosis on the right knee.  Left knee with joint space maintained with some calcified menisci no evidence of arthrosis    X-ray of pelvis and left hip 09/02/2021 showing left hip short intramedullary claire without evidence of fracture of the claire or hardware.  There is prominence of the leg screw laterally for approximately 1.2 cm. The hip fracture healed in slight varus and shortening compared to the right side which looks normal.  The left hip joint space seems to be maintained but has some degeneration superior lateral.    X-Ray Chest PA And Lateral  Narrative: EXAMINATION:  XR CHEST PA AND LATERAL    CLINICAL HISTORY:  Encounter for other preprocedural examination    TECHNIQUE:  PA and lateral views of the chest were performed.    COMPARISON:  April 14, 2021    FINDINGS:  Healing left rib fractures again noted.  Bibasilar scarring again noted with slight increased prominence basilar scarring and or subsegmental atelectasis on the left.  No consolidation, effusion or pneumothorax.  Bilateral apical pleural thickening.  Osteopenia and spondylosis.  Degenerative change also noted in the partially visualized cervical spine and bilateral shoulders.  Impression: Slight increased left basilar scarring and or subsegmental atelectasis.    Otherwise, stable exam as detailed above.    Electronically signed by: Adrián Moore MD  Date:    10/25/2021  Time:    19:43      Assessment:     Encounter Diagnoses   Name Primary?    Greater trochanteric bursitis of left hip     Arthritis of knee, right     Acquired valgus deformity knee, right     Chondrocalcinosis of right  knee     Subluxation of patellofemoral joint, right, subsequent encounter     Peripheral neuropathy due to chemotherapy     S/P right knee arthroscopy     Retained orthopedic hardware Yes    Arthritis of left hip         Plan:   Retained orthopedic hardware  -     Case Request Operating Room: REMOVAL, HARDWARE, HIP    Other orders  -     Type & Screen; Standing         There are no outpatient Patient Instructions on file for this admission.  Discussed x-ray findings with the patient of the short claire and the femoral acetabular impingement of the neck on the lateral aspect of the acetabulum which will result in the future with arthritis of the hip joint.  Still have some hip joint space left.  His femoral neck is in varus and healed in varus.  There is  retraction of the lag screw out of the lateral cortex which is causing impingement over the lateral aspect and pushing against the iliotibial band causing severe pain/trochanteric bursitis.  The steroid injection seems to help for for 5 days.  At 1 time he was giving a Medrol pack which helped on while he was taking it but stopped after that.  Now he wants some surgical intervention by he does not want have total hip replacement.  I did tell him we can see if we can take the lag screw out which was protruding laterally and causing severe irritation in the lateral aspect of his hip in maybe that will help.  As far as this might not come out because there is a set screw inside the right that prevented from turning then we have to open up the claire and removed the set screw and pull all the hardware out.  He prefers if the lacks row comes out into minimal surgery and then down the road if things do not work out and he needs total hip replacement and he will discuss it further.  He does not want to be out like from the total hip from work for 3 months.  He is a  he needs to work and he does climb ladders but not too high.  Prefers to remove the lag screw if it  does not come out easy that he needs to have all of the hardware taken out and he was willing to proceed with that.  Pros and cons of surgery discussed.  Risk of nerve damage vascular damage infection blood clot fat clot we fracture from the screw holes for the 1st 6-8 weeks until does fill in is a possibility.  I did tell him either way if removed the whole thing or just a leg screw he will be allowed to weightbear as tolerated with crutches at the beginning until he is better.  Wants something for pain.  He just finished having cancer of the colon in was weaned off his narcotics.  I did tell him tramadol is a narcotic I will accommodate him because of the amount of pain he is having.  I did tell him not to take pill more than 1 a day until his surgery.  He needs to stop taking the meloxicam since he feels it is not helping at all  The pros and cons of injection of the steroid discussed with the patient.  It is going to burn the next several days he needs to ice the hip.  It might increase his blood sugar level and maybe his blood pressure and he should watch what he eats the next several days.    Disclaimer: This note was prepared using a voice recognition system and is likely to have sound alike errors within the text.

## 2022-01-04 NOTE — DISCHARGE SUMMARY
[facility] ([facility]ospital)  Discharge Note  Short Stay    Procedure(s) (LRB):  REMOVAL, HARDWARE, HIP (Left)  (short claire, distal locking screw, set screw, lag screw)  OUTCOME: Patient tolerated treatment/procedure well without complication and is now ready for discharge.    DISPOSITION: Home or Self Care    FINAL DIAGNOSIS:  <principal problem not specified> painful retained short claire left hip    FOLLOWUP: In clinic    DISCHARGE INSTRUCTIONS:  No discharge procedures on file.     TIME SPENT ON DISCHARGE: 20 minutes

## 2022-01-04 NOTE — PLAN OF CARE
[facility] ([facility])  Discharge Final Note    Primary Care Provider: Shakila Moran DO    Expected Discharge Date: 1/4/2022    Final Discharge Note (most recent)     Final Note - 01/04/22 1253        Final Note    Assessment Type Final Discharge Note     Anticipated Discharge Disposition Home or Self Care     Hospital Resources/Appts/Education Provided Appointments scheduled and added to AVS                 Important Message from Medicare             Contact Info     Delonte Mcintyre MD   Specialty: Orthopedic Surgery    [address] [place] [address] [address] [id number]   Phone: [phone]       Next Steps: Follow up in 2 week(s)        Jan20 Post OP with ZITA Estes  Thursday Jan 20, 2022 9:20 AM  Arrive at check-in approximately 15 minutes before your scheduled appointment time. Bring all outside medical records and imaging, along with a list of your current medications and insurance card.  Please take [address] for the Medical Office Building. Check in on the first floor. [facility]  [address] [place] [address]   [address] [id number] [phone]     CM notified by Saida with [facility] that PACU notified her that crutches were not needed.  CM was also updated by PACU that ortho stated that home health was not needed.

## 2022-01-04 NOTE — ANESTHESIA PREPROCEDURE EVALUATION
01/04/2022  Vincent Benton is a 66 y.o., male.  Patient Active Problem List   Diagnosis    Neoplasm related pain    Gastroesophageal reflux disease    Alcohol use    Iron deficiency anemia due to chronic blood loss    Rectal cancer    Rectal pain    Essential hypertension    Chemotherapy management, encounter for    Cachexia    Rectal adenocarcinoma    Back abscess    Encounter for palliative care    Screening for colon cancer    Acute medial meniscus tear of right knee     Past Surgical History:   Procedure Laterality Date    ARTHROSCOPY OF KNEE Right 5/5/2021    Procedure: ARTHROSCOPY, KNEE;  Surgeon: Delonte Mcintyre MD;  Location: [facility] OR;  Service: Orthopedics;  Laterality: Right;    COLONOSCOPY N/A 9/3/2019    Procedure: COLONOSCOPY;  Surgeon: Isai Centeno MD;  Location: [facility] ENDO;  Service: Endoscopy;  Laterality: N/A;    COLONOSCOPY N/A 1/10/2020    Procedure: COLONOSCOPY;  Surgeon: Familia Gonzalez MD;  Location: [facility] ENDO;  Service: General;  Laterality: N/A;    COLONOSCOPY N/A 3/24/2021    Procedure: COLONOSCOPY;  Surgeon: Familia Gonzalez MD;  Location: [facility];  Service: General;  Laterality: N/A;    ESOPHAGOGASTRODUODENOSCOPY N/A 9/3/2019    Procedure: ESOPHAGOGASTRODUODENOSCOPY (EGD);  Surgeon: Isai Centeno MD;  Location: [facility];  Service: Endoscopy;  Laterality: N/A;    EXCISION OF MEDIAL MENISCUS OF KNEE Right 5/5/2021    Procedure: MENISCECTOMY, KNEE, MEDIAL;  Surgeon: Delonte Mcintyre MD;  Location: [facility] OR;  Service: Orthopedics;  Laterality: Right;  partial Lateral    FLEXIBLE SIGMOIDOSCOPY  2/4/2020    Procedure: SIGMOIDOSCOPY, FLEXIBLE;  Surgeon: Familia Gonzalez MD;  Location: [facility] OR;  Service: General;;    ILEOSTOMY Right 2/4/2020    Procedure: CREATION, ILEOSTOMY;  Surgeon: Familia Gonzalez MD;  Location: [facility] OR;  Service:  General;  Laterality: Right;    ILEOSTOMY CLOSURE N/A 8/4/2020    Procedure: CLOSURE, ILEOSTOMY;  Surgeon: Familia Gonzalez MD;  Location: [facility] OR;  Service: General;  Laterality: N/A;    INCISION AND DRAINAGE OF BACK Left 8/5/2020    Procedure: INCISION AND DRAINAGE, BACK;  Surgeon: Familia Gonzalez MD;  Location: [facility];  Service: General;  Laterality: Left;    INJECTION OF ANESTHETIC AGENT INTO TISSUE PLANE DEFINED BY TRANSVERSUS ABDOMINIS MUSCLE N/A 2/4/2020    Procedure: BLOCK, TRANSVERSUS ABDOMINIS PLANE;  Surgeon: Familia Gonzalez MD;  Location: [facility] OR;  Service: General;  Laterality: N/A;    INSERTION OF TUNNELED CENTRAL VENOUS CATHETER (CVC) WITH SUBCUTANEOUS PORT Right 2/4/2020    Procedure: INSERTION, PORT-A-CATH;  Surgeon: Familia Gonzalez MD;  Location: [facility];  Service: General;  Laterality: Right;    JOINT REPLACEMENT Left 2009    Hip    KNEE ARTHROSCOPY W/ PLICA EXCISION Right 5/5/2021    Procedure: EXCISION, PLICA, KNEE, ARTHROSCOPIC;  Surgeon: Delonte Mcintyre MD;  Location: [facility];  Service: Orthopedics;  Laterality: Right;    MOBILIZATION OF SPLENIC FLEXURE  2/4/2020    Procedure: MOBILIZATION, SPLENIC FLEXURE;  Surgeon: Familia Gonzalez MD;  Location: [facility];  Service: General;;       Anesthesia Evaluation    I have reviewed the Patient Summary Reports.    I have reviewed the Nursing Notes. I have reviewed the NPO Status.   I have reviewed the Medications.     Review of Systems  Anesthesia Hx:  No problems with previous Anesthesia    Social:  Non-Smoker    Hematology/Oncology:  Hematology Normal       -- Cancer in past history:    EENT/Dental:   Drooping eye   Cardiovascular:   ECG has been reviewed.    Pulmonary:  Pulmonary Normal    Renal/:  Renal/ Normal     Hepatic/GI:   Hiatal Hernia, GERD    Neurological:  Neurology Normal    Endocrine:  Endocrine Normal        Physical Exam  General:  Well nourished    Airway/Jaw/Neck:  Airway Findings: Mouth Opening: Small,  but > 3cm Tongue: Normal  General Airway Assessment: Adult  Mallampati: III  TM Distance: 4 - 6 cm  Jaw/Neck Findings:  Neck ROM: Normal ROM      Dental:  Dental Findings: In tact, Periodontal disease, Mild   Chest/Lungs:  Chest/Lungs Findings: Clear to auscultation, Normal Respiratory Rate     Heart/Vascular:  Heart Findings: Rate: Normal  Rhythm: Regular Rhythm  Sounds: Normal        Mental Status:  Mental Status Findings:  Cooperative, Alert and Oriented         Anesthesia Plan  Type of Anesthesia, risks & benefits discussed:  Anesthesia Type:  general    Patient's Preference:   Plan Factors:          Intra-op Monitoring Plan: standard ASA monitors  Intra-op Monitoring Plan Comments:   Post Op Pain Control Plan: multimodal analgesia and per primary service following discharge from PACU  Post Op Pain Control Plan Comments:     Induction:   IV  Beta Blocker:  Patient is not currently on a Beta-Blocker (No further documentation required).       Informed Consent: Patient understands risks and agrees with Anesthesia plan.  Questions answered. Anesthesia consent signed with patient.  ASA Score: 2     Day of Surgery Review of History & Physical:  There are no significant changes.          Ready For Surgery From Anesthesia Perspective.         Chemistry        Component Value Date/Time     01/03/2022 1037    K 4.7 01/03/2022 1037    CL 98 01/03/2022 1037    CO2 25 01/03/2022 1037    BUN 15 01/03/2022 1037    CREATININE 0.8 01/03/2022 1037     01/03/2022 1037        Component Value Date/Time    CALCIUM 9.5 01/03/2022 1037    ALKPHOS 126 01/03/2022 1037    AST 21 01/03/2022 1037    ALT 21 01/03/2022 1037    BILITOT 0.5 01/03/2022 1037    ESTGFRAFRICA >60 01/03/2022 1037    EGFRNONAA >60 01/03/2022 1037        Lab Results   Component Value Date    WBC 6.19 01/03/2022    HGB 12.5 (L) 01/03/2022    HCT 40.3 01/03/2022    MCV 83 01/03/2022     01/03/2022

## 2022-01-04 NOTE — OP NOTE
[facility] ([facility])  Orthopedic Surgery  Operative Note    SUMMARY     Date of Procedure: 1/4/2022     Procedure: Procedure(s) (LRB):  REMOVAL, HARDWARE, HIP (Left)     Short left claire with lag screw, distal locking screw and set screw  Surgeon(s) and Role:     * Delonte Mcintyre MD - Primary    Assisting Surgeon: zeke AHUMADA    Pre-Operative Diagnosis: Retained orthopedic hardware [Z96.9]    Post-Operative Diagnosis: Post-Op Diagnosis Codes:     * Retained orthopedic hardware [Z96.9]    Anesthesia: General    Significant Surgical Tasks Conducted by the Assistant(s), if Applicable:  Needed to hold the extremity and maneuver it and hold multiple retractors in order to visualize and perform surgery safely and efficiently    Complications: none     Estimated Blood Loss (EBL):  30 mL           Implants: Removed Nader short claire with distal locking screw, lag screw, set screw and the claire    Specimens: None           Condition: Good    Disposition: PACU - hemodynamically stable.    Attestation: I performed the procedure.  Injection of ALIYAH with 20 cc of injectable saline  Description:  Patient had femoral head/neck fracture years ago and had a short claire placed in.  He has been having severe pain over the leg screw sticking out and cannot sleep on it.  Treated non operatively without much success.  He developed impingement in the superior lateral acetabulum and I did recommend removal of hardware and total hip replacement.  I did tell him I cannot guarantee removal of the hardware will get rid of his pains.  Most of his pain is over the lag screw but there is some pain in the joint.  He wanted at this time just to remove the hardware so he can go back to work.  In the future he will need total hip replacement.  The risks involved discussed.  I did tell him if I could not remove the lag screw alone that I have to take the claire out and go mL top to take the set screw then the whole claire is coming out.  The  risk of neurovascular compromise and fracture and a now 11 year and removing all this lag screw will create a large stress riser should be protected for the next 2-3 weeks at least with crutches and toe-touch weight-bearing  After administering general anesthesia patient placed in the lateral decubitus he received antibiotics.  We extended the incision over the lag screw which was laterally on the left femur made it total 4 cm carried down through subcutaneous tissues and vastus lateralis fascia.  We were able to identify the leg screw.  We did not have the appropriate tool to back it out.  We had to call there is him wrap to bring it in.  At this point we knew that he had a lag screw on the top that we needed to remove because the screw was not backing out despite putting a vice  on it.  We made our incision for the proximal over the greater troch where he had previous incision and extended it up to around 4 cm and went through the subcutaneous tissues an open up the abductors over the greater troch and using an osteotome and rongeur were able to take the bone that over grew the claire from the top off and then identified the leg screw.  We cleaned up with a curette and osteotome around the tip of the claire and we were able to take the lag screw out.  At that time we were committed to remove the whole thing and we went distally over the distal scar that he had and we extended that to 3 cm from 1 and went down through lateral iliotibial band and vastus lateralis we identified the screw distally by palpation and then we able to take the soft tissue off and then direct contact with the screwdriver were able to take the locking screw out.  Then we directed our attention to put the extractor on the proximal aspect of the femoral claire after we tightened that up really well.  We proceeded with taking the lag screw which had bone overgrowing it.  We put the screwdriver appropriate 1 and were able to back up the leg screw out.   We cleaned the area with a rongeur and curette.  Irrigated all the wounds copiously.  Closed the vastus lateralis with 1. Vicryl simple stitch and then the iliotibial band in all of the wounds using 1. Vicryl figure of 8 and then the subcutaneous tissues 1. Vicryl inverted stitch in the skin using staple gun.  We did close the of gluteus fascia over the greater troch also a simple stitches and then subcu simple stitches in the skin using staple gun.  We did inject our R E CK in all the soft tissue to provide postop pain control.  Sterile dressing applied

## 2022-01-04 NOTE — PLAN OF CARE
[facility] (Hospital)  Discharge Assessment    Primary Care Provider: Shakila Moran DO     Discharge Assessment (most recent)     BRIEF DISCHARGE ASSESSMENT - 01/04/22 1013        Discharge Planning    Assessment Type Discharge Planning Brief Assessment     Resource/Environmental Concerns none     Support Systems Spouse/significant other     Equipment Currently Used at Home none     Current Living Arrangements home/apartment/condo     Patient/Family Anticipates Transition to home with family     Patient/Family Anticipated Services at Transition none     DME Needed Upon Discharge  crutches     Discharge Plan A Home with family     Discharge Plan B Home with family                   CM communicated via secure chat and orthopedics will not be ordering home health.  CM noted the order for crutches and [facility] is reviewing at this time.

## 2022-01-04 NOTE — ANESTHESIA POSTPROCEDURE EVALUATION
Anesthesia Post Evaluation    Patient: Vincent Benton    Procedure(s) Performed: Procedure(s) (LRB):  REMOVAL, HARDWARE, HIP (Left)    Final Anesthesia Type: general      Patient location during evaluation: PACU  Patient participation: Yes- Able to Participate  Level of consciousness: awake and alert  Post-procedure vital signs: reviewed and stable  Pain management: adequate  Airway patency: patent  RENEA mitigation strategies: Verification of full reversal of neuromuscular block  PONV status at discharge: No PONV  Anesthetic complications: no      Cardiovascular status: hemodynamically stable  Respiratory status: spontaneous ventilation  Hydration status: euvolemic  Follow-up not needed.          Vitals Value Taken Time   /78 01/04/22 1045   Temp 36.6 °C (97.9 °F) 01/04/22 1045   Pulse 61 01/04/22 1045   Resp 15 01/04/22 1045   SpO2 98 % 01/04/22 1045         Event Time   Out of Recovery 10:35:00         Pain/Ana Score: Pain Rating Prior to Med Admin: 5 (1/4/2022 10:18 AM)  Ana Score: 10 (1/4/2022 10:45 AM)

## 2022-01-05 LAB
FINAL PATHOLOGIC DIAGNOSIS: NORMAL
GROSS: NORMAL
Lab: NORMAL

## 2022-01-12 RX ORDER — HYDROCODONE BITARTRATE AND ACETAMINOPHEN 10; 325 MG/1; MG/1
1 TABLET ORAL EVERY 8 HOURS PRN
Qty: 21 TABLET | Refills: 0 | Status: SHIPPED | OUTPATIENT
Start: 2022-01-12 | End: 2022-01-21 | Stop reason: SDUPTHER

## 2022-01-12 NOTE — TELEPHONE ENCOUNTER
----- Message from Lisset Richardson sent at 1/12/2022 11:22 AM CST -----  Contact: Vincent josé [phone]  Requesting an RX refill or new RX.  Is this a refill or new RX: refill  RX name and strength:  HYDROcodone-acetaminophen (NORCO)  mg per tablet  Is this a 30 day or 90 day RX:   Patient advised that in the future they can use their [facility] account to request a refill?:  Pharmacy name and phone #  [facility]   Phone: [phone]  Comments:

## 2022-01-20 ENCOUNTER — OFFICE VISIT (OUTPATIENT)
Dept: ORTHOPEDICS | Facility: CLINIC | Age: 67
End: 2022-01-20
Payer: MEDICARE

## 2022-01-20 VITALS
BODY MASS INDEX: 22.5 KG/M2 | WEIGHT: 160.69 LBS | DIASTOLIC BLOOD PRESSURE: 84 MMHG | SYSTOLIC BLOOD PRESSURE: 141 MMHG | HEART RATE: 82 BPM | TEMPERATURE: 97 F | HEIGHT: 71 IN

## 2022-01-20 DIAGNOSIS — Z98.890 S/P HARDWARE REMOVAL: ICD-10-CM

## 2022-01-20 DIAGNOSIS — Z96.9 RETAINED ORTHOPEDIC HARDWARE: Primary | ICD-10-CM

## 2022-01-20 PROCEDURE — 1160F RVW MEDS BY RX/DR IN RCRD: CPT | Mod: CPTII,S$GLB,, | Performed by: PHYSICIAN ASSISTANT

## 2022-01-20 PROCEDURE — 99999 PR PBB SHADOW E&M-EST. PATIENT-LVL V: ICD-10-PCS | Mod: PBBFAC,,, | Performed by: PHYSICIAN ASSISTANT

## 2022-01-20 PROCEDURE — 99024 PR POST-OP FOLLOW-UP VISIT: ICD-10-PCS | Mod: S$GLB,,, | Performed by: PHYSICIAN ASSISTANT

## 2022-01-20 PROCEDURE — 99024 POSTOP FOLLOW-UP VISIT: CPT | Mod: S$GLB,,, | Performed by: PHYSICIAN ASSISTANT

## 2022-01-20 PROCEDURE — 1160F PR REVIEW ALL MEDS BY PRESCRIBER/CLIN PHARMACIST DOCUMENTED: ICD-10-PCS | Mod: CPTII,S$GLB,, | Performed by: PHYSICIAN ASSISTANT

## 2022-01-20 PROCEDURE — 3008F PR BODY MASS INDEX (BMI) DOCUMENTED: ICD-10-PCS | Mod: CPTII,S$GLB,, | Performed by: PHYSICIAN ASSISTANT

## 2022-01-20 PROCEDURE — 3008F BODY MASS INDEX DOCD: CPT | Mod: CPTII,S$GLB,, | Performed by: PHYSICIAN ASSISTANT

## 2022-01-20 PROCEDURE — 3079F PR MOST RECENT DIASTOLIC BLOOD PRESSURE 80-89 MM HG: ICD-10-PCS | Mod: CPTII,S$GLB,, | Performed by: PHYSICIAN ASSISTANT

## 2022-01-20 PROCEDURE — 3079F DIAST BP 80-89 MM HG: CPT | Mod: CPTII,S$GLB,, | Performed by: PHYSICIAN ASSISTANT

## 2022-01-20 PROCEDURE — 1159F PR MEDICATION LIST DOCUMENTED IN MEDICAL RECORD: ICD-10-PCS | Mod: CPTII,S$GLB,, | Performed by: PHYSICIAN ASSISTANT

## 2022-01-20 PROCEDURE — 3077F SYST BP >= 140 MM HG: CPT | Mod: CPTII,S$GLB,, | Performed by: PHYSICIAN ASSISTANT

## 2022-01-20 PROCEDURE — 1159F MED LIST DOCD IN RCRD: CPT | Mod: CPTII,S$GLB,, | Performed by: PHYSICIAN ASSISTANT

## 2022-01-20 PROCEDURE — 99999 PR PBB SHADOW E&M-EST. PATIENT-LVL V: CPT | Mod: PBBFAC,,, | Performed by: PHYSICIAN ASSISTANT

## 2022-01-20 PROCEDURE — 3077F PR MOST RECENT SYSTOLIC BLOOD PRESSURE >= 140 MM HG: ICD-10-PCS | Mod: CPTII,S$GLB,, | Performed by: PHYSICIAN ASSISTANT

## 2022-01-20 NOTE — PROGRESS NOTES
Patient ID: Vincent Benton is a 66 y.o. male.    Chief Complaint: Post-op Evaluation and Pain of the Left Hip      HPI: Vincent Benton  is a 66 y.o. male who c/o Post-op Evaluation and Pain of the Left Hip     Post op visit 1   Patient notes pain is 5/10  Patient states for the most part he is doing well since surgery  He is still having to take the Norco as needed to help with the pain  Additionally he is ambulating with the crutches and he does find this helps support him  He states he did not take a pain pill this morning as he needed to drive himself to this appointment  He has increased his use and activity and with this has brought on some discomfort although it is relieved with medication as above as well as rest when able    Patient is presently denying any shortness of breath, chest pain, fever/chills, nausea/vomiting, loss of taste or smell, numbness/tingling or sensation changes, loss of bladder or bowel function, loss of taste/smell.     Surgery: Right hip hardware claire removal    Surgery Date:  01/04/2022    Past Medical History:   Diagnosis Date    Alcoholism     Anemia     Back pain     Encounter for blood transfusion 2018    Essential hypertension 2/4/2016    GERD (gastroesophageal reflux disease)     Hiatal hernia     Hypertension     diet controlled    Melanoma 06/2021    left cheek    Rectal cancer 9/11/2019     Past Surgical History:   Procedure Laterality Date    ARTHROSCOPY OF KNEE Right 5/5/2021    Procedure: ARTHROSCOPY, KNEE;  Surgeon: Delonte Mcintyre MD;  Location: [facility];  Service: Orthopedics;  Laterality: Right;    COLONOSCOPY N/A 9/3/2019    Procedure: COLONOSCOPY;  Surgeon: Isai Centeno MD;  Location: [facility] ENDO;  Service: Endoscopy;  Laterality: N/A;    COLONOSCOPY N/A 1/10/2020    Procedure: COLONOSCOPY;  Surgeon: Familia Gonzalez MD;  Location: [facility] ENDO;  Service: General;  Laterality: N/A;    COLONOSCOPY N/A 3/24/2021    Procedure: COLONOSCOPY;   Surgeon: Familia Gonzalez MD;  Location: [facility] ENDO;  Service: General;  Laterality: N/A;    ESOPHAGOGASTRODUODENOSCOPY N/A 9/3/2019    Procedure: ESOPHAGOGASTRODUODENOSCOPY (EGD);  Surgeon: Isai Centeno MD;  Location: [facility] ENDO;  Service: Endoscopy;  Laterality: N/A;    EXCISION OF MEDIAL MENISCUS OF KNEE Right 5/5/2021    Procedure: MENISCECTOMY, KNEE, MEDIAL;  Surgeon: Delonte Mcintyre MD;  Location: [facility] OR;  Service: Orthopedics;  Laterality: Right;  partial Lateral    FLEXIBLE SIGMOIDOSCOPY  2/4/2020    Procedure: SIGMOIDOSCOPY, FLEXIBLE;  Surgeon: Familia Gonzalez MD;  Location: [facility] OR;  Service: General;;    ILEOSTOMY Right 2/4/2020    Procedure: CREATION, ILEOSTOMY;  Surgeon: Familia Gonzalez MD;  Location: [facility] OR;  Service: General;  Laterality: Right;    ILEOSTOMY CLOSURE N/A 8/4/2020    Procedure: CLOSURE, ILEOSTOMY;  Surgeon: Familia Gonzalez MD;  Location: [facility] OR;  Service: General;  Laterality: N/A;    INCISION AND DRAINAGE OF BACK Left 8/5/2020    Procedure: INCISION AND DRAINAGE, BACK;  Surgeon: Familia Gonzalez MD;  Location: [facility] OR;  Service: General;  Laterality: Left;    INJECTION OF ANESTHETIC AGENT INTO TISSUE PLANE DEFINED BY TRANSVERSUS ABDOMINIS MUSCLE N/A 2/4/2020    Procedure: BLOCK, TRANSVERSUS ABDOMINIS PLANE;  Surgeon: Familia Gonzalez MD;  Location: [facility] OR;  Service: General;  Laterality: N/A;    INSERTION OF TUNNELED CENTRAL VENOUS CATHETER (CVC) WITH SUBCUTANEOUS PORT Right 2/4/2020    Procedure: INSERTION, PORT-A-CATH;  Surgeon: Familia Gonzalez MD;  Location: [facility] OR;  Service: General;  Laterality: Right;    JOINT REPLACEMENT Left 2009    Hip    KNEE ARTHROSCOPY W/ PLICA EXCISION Right 5/5/2021    Procedure: EXCISION, PLICA, KNEE, ARTHROSCOPIC;  Surgeon: Delonte Mcintyre MD;  Location: [facility] OR;  Service: Orthopedics;  Laterality: Right;    MOBILIZATION OF SPLENIC FLEXURE  2/4/2020    Procedure: MOBILIZATION, SPLENIC FLEXURE;  Surgeon:  Familia Gonzalez MD;  Location: [facility] OR;  Service: General;;    REMOVAL OF HARDWARE FROM HIP Left 1/4/2022    Procedure: REMOVAL, HARDWARE, HIP;  Surgeon: Delonte Mcintyre MD;  Location: [facility] OR;  Service: Orthopedics;  Laterality: Left;     Family History   Problem Relation Age of Onset    Cataracts Mother     Stroke Father     Hypertension Father      Social History     Socioeconomic History    Marital status:    Tobacco Use    Smoking status: Never Smoker    Smokeless tobacco: Never Used   Substance and Sexual Activity    Alcohol use: Yes     Comment: occassionally No alcohol 72h prior to sx    Drug use: No    Sexual activity: Never     Partners: Female     Medication List with Changes/Refills   Current Medications    ASCORBIC ACID, VITAMIN C, (VITAMIN C) 1000 MG TABLET    Take 1,000 mg by mouth once daily.    CYANOCOBALAMIN (VITAMIN B-12) 1000 MCG TABLET    Take 100 mcg by mouth once daily.    DICLOFENAC SODIUM (VOLTAREN) 1 % GEL    Apply 2 g topically 3 (three) times daily as needed.    GABAPENTIN (NEURONTIN) 300 MG CAPSULE    Take 1 capsule (300 mg total) by mouth every evening.    HYDROCODONE-ACETAMINOPHEN (NORCO)  MG PER TABLET    Take 1 tablet by mouth every 8 (eight) hours as needed for Pain.    HYDROCODONE-ACETAMINOPHEN (NORCO)  MG PER TABLET    Take 1 tablet by mouth every 8 (eight) hours as needed for Pain.    MELOXICAM (MOBIC) 15 MG TABLET    Take 1 tablet (15 mg total) by mouth once daily. Take with food    MIRTAZAPINE (REMERON) 15 MG TABLET    Take 1 tablet (15 mg total) by mouth every evening.    ONDANSETRON (ZOFRAN-ODT) 4 MG TBDL    Take 2 tablets (8 mg total) by mouth every 8 (eight) hours as needed.    TAMSULOSIN (FLOMAX) 0.4 MG CAP    Take 1 capsule (0.4 mg total) by mouth once daily.    TRAMADOL (ULTRAM) 50 MG TABLET    Take 1 tablet (50 mg total) by mouth every 6 (six) hours.     Review of patient's allergies indicates:  No Known Allergies    Objective:      Right Lower Extremity  NVI  WWP foot  Comp soft  Cap refill < 2 sec  Calf NT  (-) Ranulfo sign  HUNG  ROM : Patient is able to easily exhibit full flexion and extension on passive range of motion.   Wiggles toes  DF/PF intact  Sensation intact  Inc C/D/I  No SOI    Assessment:       Encounter Diagnoses   Name Primary?    Retained orthopedic hardware Yes    S/P hardware removal           Plan:       Vincent was seen today for post-op evaluation and pain.    Diagnoses and all orders for this visit:    Retained orthopedic hardware    S/P hardware removal        Vincent LIND Serenity is an established pt here for postop follow-up.  Pain medication was refilled appropriately. The incision was cleaned with hydrogen peroxide.  All staples were removed, and suture strips were applied across the incision.  They should remain in place until they fall off in approximately 1-2 weeks. The patient was instructed not to soak the incision in standing water but may clean the incision with clean running water and antibacterial soap.  Patient should notify the office of any signs or symptoms of infection including fevers, erythema, purulent drainage, increasing pain.  Patient will continue with DVT prophylaxis. Will follow-up in 2 weeks.  Patient verbalized understanding of all instructions and agreed with the above plan.    No follow-ups on file.    The patient understands, chooses and consents to this plan and accepts all   the risks which include but are not limited to the risks mentioned above.     Disclaimer: This note was prepared using a voice recognition system and is likely to have sound alike errors within the text.

## 2022-01-21 NOTE — TELEPHONE ENCOUNTER
----- Message from Hailey Bain sent at 1/21/2022 11:36 AM CST -----  Type:  RX Refill Request    Who Called: pt  Refill or New: refill  RX Name and Strength:norco  How is the patient currently taking it? (ex. 1XDay):?  Is this a 30 day or 90 day RX:?  Preferred Pharmacy with phone number:.  [facility] [address] [place] [address] [place] [id number]  Phone: [phone] Fax: [phone]  Local or Mail Order:local  Ordering Provider:Dr Mcintyre  Would the patient rather a call back or a response via [facility]? call  Best Call Back Number:[phone]   Additional Information: .    Thank you

## 2022-01-24 RX ORDER — HYDROCODONE BITARTRATE AND ACETAMINOPHEN 10; 325 MG/1; MG/1
1 TABLET ORAL EVERY 8 HOURS PRN
Qty: 21 TABLET | Refills: 0 | Status: SHIPPED | OUTPATIENT
Start: 2022-01-24 | End: 2022-01-31 | Stop reason: SDUPTHER

## 2022-01-31 NOTE — TELEPHONE ENCOUNTER
----- Message from Yuli Amato sent at 1/31/2022  1:32 PM CST -----  Contact: [phone] Patient  Requesting an RX refill or new RX.  Is this a refill or new RX: new  RX name and strength (copy/paste from chart):  HYDROcodone-acetaminophen (NORCO)  mg per tablet  Is this a 30 day or 90 day RX:   Pharmacy name and phone # (copy/paste from chart):    [facility] [address] [place]  [facility] [address]  Phone: [phone] Fax: [phone]   The doctors have asked that we provide their patients with the following 2 reminders -- prescription refills can take up to 72 hours, and a friendly reminder that in the future you can use your [facility] account to request refills: yes

## 2022-02-01 RX ORDER — HYDROCODONE BITARTRATE AND ACETAMINOPHEN 10; 325 MG/1; MG/1
1 TABLET ORAL EVERY 8 HOURS PRN
Qty: 21 TABLET | Refills: 0 | Status: ON HOLD | OUTPATIENT
Start: 2022-02-01 | End: 2022-08-16 | Stop reason: HOSPADM

## 2022-02-17 ENCOUNTER — TELEPHONE (OUTPATIENT)
Dept: PAIN MEDICINE | Facility: CLINIC | Age: 67
End: 2022-02-17
Payer: MEDICARE

## 2022-02-17 ENCOUNTER — HOSPITAL ENCOUNTER (OUTPATIENT)
Dept: RADIOLOGY | Facility: HOSPITAL | Age: 67
Discharge: HOME | End: 2022-02-17
Attending: ORTHOPAEDIC SURGERY
Payer: MEDICARE

## 2022-02-17 ENCOUNTER — OFFICE VISIT (OUTPATIENT)
Dept: ORTHOPEDICS | Facility: CLINIC | Age: 67
End: 2022-02-17
Payer: MEDICARE

## 2022-02-17 VITALS — BODY MASS INDEX: 22.4 KG/M2 | WEIGHT: 160 LBS | HEIGHT: 71 IN

## 2022-02-17 DIAGNOSIS — Z09 POSTOP CHECK: Primary | ICD-10-CM

## 2022-02-17 DIAGNOSIS — M54.9 BACK PAIN, UNSPECIFIED BACK LOCATION, UNSPECIFIED BACK PAIN LATERALITY, UNSPECIFIED CHRONICITY: Primary | ICD-10-CM

## 2022-02-17 DIAGNOSIS — Z98.890 S/P RIGHT KNEE ARTHROSCOPY: ICD-10-CM

## 2022-02-17 DIAGNOSIS — M70.62 GREATER TROCHANTERIC BURSITIS OF LEFT HIP: ICD-10-CM

## 2022-02-17 DIAGNOSIS — M54.9 BACK PAIN, UNSPECIFIED BACK LOCATION, UNSPECIFIED BACK PAIN LATERALITY, UNSPECIFIED CHRONICITY: ICD-10-CM

## 2022-02-17 DIAGNOSIS — M54.50 MIDLINE LOW BACK PAIN, UNSPECIFIED CHRONICITY, UNSPECIFIED WHETHER SCIATICA PRESENT: ICD-10-CM

## 2022-02-17 DIAGNOSIS — G62.0 PERIPHERAL NEUROPATHY DUE TO CHEMOTHERAPY: ICD-10-CM

## 2022-02-17 DIAGNOSIS — Z98.890 S/P HARDWARE REMOVAL: ICD-10-CM

## 2022-02-17 DIAGNOSIS — M17.11 ARTHRITIS OF KNEE, RIGHT: ICD-10-CM

## 2022-02-17 DIAGNOSIS — M25.552 LEFT HIP PAIN: ICD-10-CM

## 2022-02-17 DIAGNOSIS — M16.12 ARTHRITIS OF LEFT HIP: ICD-10-CM

## 2022-02-17 DIAGNOSIS — T45.1X5A PERIPHERAL NEUROPATHY DUE TO CHEMOTHERAPY: ICD-10-CM

## 2022-02-17 DIAGNOSIS — M21.70 ACQUIRED LEG LENGTH DISCREPANCY: ICD-10-CM

## 2022-02-17 DIAGNOSIS — M11.261 CHONDROCALCINOSIS OF RIGHT KNEE: ICD-10-CM

## 2022-02-17 PROCEDURE — 73502 X-RAY EXAM HIP UNI 2-3 VIEWS: CPT | Mod: TC,HCNC,LT

## 2022-02-17 PROCEDURE — 1159F MED LIST DOCD IN RCRD: CPT | Mod: HCNC,CPTII,S$GLB, | Performed by: ORTHOPAEDIC SURGERY

## 2022-02-17 PROCEDURE — 72100 X-RAY EXAM L-S SPINE 2/3 VWS: CPT | Mod: TC,HCNC

## 2022-02-17 PROCEDURE — 1159F PR MEDICATION LIST DOCUMENTED IN MEDICAL RECORD: ICD-10-PCS | Mod: HCNC,CPTII,S$GLB, | Performed by: ORTHOPAEDIC SURGERY

## 2022-02-17 PROCEDURE — 99024 PR POST-OP FOLLOW-UP VISIT: ICD-10-PCS | Mod: HCNC,S$GLB,, | Performed by: ORTHOPAEDIC SURGERY

## 2022-02-17 PROCEDURE — 73502 X-RAY EXAM HIP UNI 2-3 VIEWS: CPT | Mod: 26,HCNC,LT, | Performed by: RADIOLOGY

## 2022-02-17 PROCEDURE — 99999 PR PBB SHADOW E&M-EST. PATIENT-LVL III: ICD-10-PCS | Mod: PBBFAC,HCNC,, | Performed by: ORTHOPAEDIC SURGERY

## 2022-02-17 PROCEDURE — 72100 XR LUMBAR SPINE AP AND LATERAL: ICD-10-PCS | Mod: 26,HCNC,, | Performed by: RADIOLOGY

## 2022-02-17 PROCEDURE — 3008F BODY MASS INDEX DOCD: CPT | Mod: HCNC,CPTII,S$GLB, | Performed by: ORTHOPAEDIC SURGERY

## 2022-02-17 PROCEDURE — 99024 POSTOP FOLLOW-UP VISIT: CPT | Mod: HCNC,S$GLB,, | Performed by: ORTHOPAEDIC SURGERY

## 2022-02-17 PROCEDURE — 99499 UNLISTED E&M SERVICE: CPT | Mod: S$GLB,,, | Performed by: ORTHOPAEDIC SURGERY

## 2022-02-17 PROCEDURE — 99499 RISK ADDL DX/OHS AUDIT: ICD-10-PCS | Mod: S$GLB,,, | Performed by: ORTHOPAEDIC SURGERY

## 2022-02-17 PROCEDURE — 99999 PR PBB SHADOW E&M-EST. PATIENT-LVL III: CPT | Mod: PBBFAC,HCNC,, | Performed by: ORTHOPAEDIC SURGERY

## 2022-02-17 PROCEDURE — 3008F PR BODY MASS INDEX (BMI) DOCUMENTED: ICD-10-PCS | Mod: HCNC,CPTII,S$GLB, | Performed by: ORTHOPAEDIC SURGERY

## 2022-02-17 PROCEDURE — 72100 X-RAY EXAM L-S SPINE 2/3 VWS: CPT | Mod: 26,HCNC,, | Performed by: RADIOLOGY

## 2022-02-17 PROCEDURE — 73502 XR HIP WITH PELVIS WHEN PERFORMED, 2 OR 3 VIEWS LEFT: ICD-10-PCS | Mod: 26,HCNC,LT, | Performed by: RADIOLOGY

## 2022-02-17 NOTE — PATIENT INSTRUCTIONS
X-ray of her pelvis showing at you have severe degenerative disc disease at L4 and L5 with bone spurs.  You also shows that the claire is taken out and you do have arthritis in the left hip with shortening of that leg and you have impingement on the femoral neck  You having a little bit of back pain I will refer you to pain management  We will order x-ray of your lumbar spine on you way out today so when you cm you already have it done  As far as the left hip you probably would need total hip replacement.  Total hip replacement is 90% successful in decreasing pain and increasing function.  Your leg is short at probably be much better length wise after surgery  I will give you a brochure about total hip replacement to read  I will see you back in 6 weeks see how you had been doing    You can apply lotion a  over the hip area

## 2022-02-17 NOTE — TELEPHONE ENCOUNTER
----- Message from Ave Barth sent at 2/17/2022  2:22 PM CST -----  Contact: Vincent Kaur called regarding a missed call, please give him a call back at [phone]      Thanks  Kb

## 2022-02-17 NOTE — PROGRESS NOTES
Subjective:     Patient ID: Vincent Benton is a 66 y.o. male.    Chief Complaint: Post-op Evaluation of the Left Hip  Severe numbness both feet  HPI:  Four/12/21  65-year-old who recently had rectal cancer underwent surgery for it and reverse of his colostomy as well as followed by chemotherapy.  This resulted in complete loss of sensation in plantar aspect of his feet and severe numbness or tingling.    In December 2020 starting having severe pain in the right knee and extreme swelling.  It was drained a couple times as well as received steroid on 12/16/2020.  Last aspiration was 75 cc on 03/05/2021.  Patient occasionally takes Advil which did not help.  He also use diclofenac gel.  The draining of the knee helps for a week or so and otherwise it comes back.  He does wear a neoprene sleeve.  His knee grinds locks catches gives out on him.  Cannot take NSAIDs due to rectal cancer and I did tell him should not be taking Advil or Aleve over-the-counter.  Denies any pain in his hips or in the groins or down in the ankles.  The left knee is doing well no problems with it the problem is with the right knee.  Denies low back pain denies any numbness or tingling in the legs or loss of bladder control.  Denies any fever or chills or shortness of breath or chest pain difficulty with chewing or swallowing or loss of sense smell or taste  Pain is 6/10.  Difficulty with ambulation, squatting, climbing stairs.  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  Partial medial lateral meniscectomy with plica resection and as well as finding of tricompartmental chondromalacia.  He is doing well with the knee without any complaints.  New problem  left hip severe pain 10/10.  Previous history of fracture approximately 12 years ago and he underwent  application of short claire.  He said over the last several weeks is been hurting him severely unable to touch unable to walk with severe limp.  He points over the greater trochanter is slightly over the anterior groin.  No history of new trauma.  He is a  he does climb some ladders and kneel on the floor.  Things are getting really with bad that he called for appointment.  He recently a year ago had a colon cancer resection.  He told me that he is raising his granddaughter any needs to be active in wants to avoid any joint replacements at this time.  No fever no chills no shortness of breath or difficulty with chewing or swallowing loss of bowel bladder control    10/21/2021   Right knee doing extremely well with arthroscopy done on 05/05/2021.  Partial medial and lateral meniscectomy and plica resection despite having tricompartmental chondromalacia  His main concern is severe left hip pain.  History of intramedullary short claire placed in 2009. He cannot sleep on that side he having severe pain over the greater trochanter which I injected last visit with steroid which helped for 4 days only.  The meloxicam is not doing anything he stop taking it.  He is really hurting a lot 1 something for pain.  He is willing to entertain other things like surgery to see if it helps.  I did go over the x-rays with him and showed him how the leg screws protruding out with compression and he is hurting over that area on the lateral side and if we take that screw might relieve some of his pains.  He does have evidence some arthritis in the hip joint but he said he is now ready to have hip replacement.  He said he wants something for pain he understands I can not give him narcotics however he said he just had cancer and slowly was tapered off.  He said his hip is preventing him from doing his job as a .  The knee is doing extremely well without any problems.  No fever no chills or shortness of breath or difficulty with  chewing swallowing loss of bowel bladder control    02/17/2022  Right knee arthroscopic surgery 05/05/2021 doing pretty good.  He does have chondrocalcinosis  Left hip is of surgery 01/04/2022 removal of short claire.  Still having quite a bit of discomfort in that area.  He has pain in the hip as well as severe pain in the lower part of his back.  He try to return to work for a day or 2 could not do it.  He is thinking about now retiring.  He does raise a 16-year-old granddaughter.  His pain level is 8/10 he said he is not walking straight and he has feel the left leg is shorter which has been like that from before surgery.  He does have severe back pain occasional radiation down the legs.  Some pain in the groin.  No loss of bowel bladder control  Past Medical History:   Diagnosis Date    Alcoholism     Anemia     Back pain     Encounter for blood transfusion 2018    Essential hypertension 2/4/2016    GERD (gastroesophageal reflux disease)     Hiatal hernia     Hypertension     diet controlled    Melanoma 06/2021    left cheek    Rectal cancer 9/11/2019     Past Surgical History:   Procedure Laterality Date    ARTHROSCOPY OF KNEE Right 5/5/2021    Procedure: ARTHROSCOPY, KNEE;  Surgeon: Delonte Mcintyre MD;  Location: [facility];  Service: Orthopedics;  Laterality: Right;    COLONOSCOPY N/A 9/3/2019    Procedure: COLONOSCOPY;  Surgeon: Isai Centeno MD;  Location: [facility];  Service: Endoscopy;  Laterality: N/A;    COLONOSCOPY N/A 1/10/2020    Procedure: COLONOSCOPY;  Surgeon: Familia Gonzalez MD;  Location: [facility];  Service: General;  Laterality: N/A;    COLONOSCOPY N/A 3/24/2021    Procedure: COLONOSCOPY;  Surgeon: Familia Gonzalez MD;  Location: [facility] ENDO;  Service: General;  Laterality: N/A;    ESOPHAGOGASTRODUODENOSCOPY N/A 9/3/2019    Procedure: ESOPHAGOGASTRODUODENOSCOPY (EGD);  Surgeon: Isai Centeno MD;  Location: [facility];  Service: Endoscopy;  Laterality: N/A;    EXCISION OF  MEDIAL MENISCUS OF KNEE Right 5/5/2021    Procedure: MENISCECTOMY, KNEE, MEDIAL;  Surgeon: Delonte Mcintyre MD;  Location: [facility] OR;  Service: Orthopedics;  Laterality: Right;  partial Lateral    FLEXIBLE SIGMOIDOSCOPY  2/4/2020    Procedure: SIGMOIDOSCOPY, FLEXIBLE;  Surgeon: Familia Gonzalez MD;  Location: [facility] OR;  Service: General;;    ILEOSTOMY Right 2/4/2020    Procedure: CREATION, ILEOSTOMY;  Surgeon: Familia Gonzlaez MD;  Location: [facility] OR;  Service: General;  Laterality: Right;    ILEOSTOMY CLOSURE N/A 8/4/2020    Procedure: CLOSURE, ILEOSTOMY;  Surgeon: Familia Gonzalez MD;  Location: [facility] OR;  Service: General;  Laterality: N/A;    INCISION AND DRAINAGE OF BACK Left 8/5/2020    Procedure: INCISION AND DRAINAGE, BACK;  Surgeon: Familia Gonzalez MD;  Location: [facility] OR;  Service: General;  Laterality: Left;    INJECTION OF ANESTHETIC AGENT INTO TISSUE PLANE DEFINED BY TRANSVERSUS ABDOMINIS MUSCLE N/A 2/4/2020    Procedure: BLOCK, TRANSVERSUS ABDOMINIS PLANE;  Surgeon: Familia Gonzalez MD;  Location: [facility] OR;  Service: General;  Laterality: N/A;    INSERTION OF TUNNELED CENTRAL VENOUS CATHETER (CVC) WITH SUBCUTANEOUS PORT Right 2/4/2020    Procedure: INSERTION, PORT-A-CATH;  Surgeon: Familia Gonzalez MD;  Location: [facility] OR;  Service: General;  Laterality: Right;    JOINT REPLACEMENT Left 2009    Hip    KNEE ARTHROSCOPY W/ PLICA EXCISION Right 5/5/2021    Procedure: EXCISION, PLICA, KNEE, ARTHROSCOPIC;  Surgeon: Delonte Mcintyre MD;  Location: [facility] OR;  Service: Orthopedics;  Laterality: Right;    MOBILIZATION OF SPLENIC FLEXURE  2/4/2020    Procedure: MOBILIZATION, SPLENIC FLEXURE;  Surgeon: Familia Gonzalez MD;  Location: [facility] OR;  Service: General;;    REMOVAL OF HARDWARE FROM HIP Left 1/4/2022    Procedure: REMOVAL, HARDWARE, HIP;  Surgeon: Delonte Mcintyre MD;  Location: [facility] OR;  Service: Orthopedics;  Laterality: Left;     Family History   Problem Relation Age of Onset     Cataracts Mother     Stroke Father     Hypertension Father      Social History     Socioeconomic History    Marital status:    Tobacco Use    Smoking status: Never Smoker    Smokeless tobacco: Never Used   Substance and Sexual Activity    Alcohol use: Yes     Comment: occassionally No alcohol 72h prior to sx    Drug use: No    Sexual activity: Never     Partners: Female     Medication List with Changes/Refills   Current Medications    ASCORBIC ACID, VITAMIN C, (VITAMIN C) 1000 MG TABLET    Take 1,000 mg by mouth once daily.    CYANOCOBALAMIN (VITAMIN B-12) 1000 MCG TABLET    Take 100 mcg by mouth once daily.    DICLOFENAC SODIUM (VOLTAREN) 1 % GEL    Apply 2 g topically 3 (three) times daily as needed.    GABAPENTIN (NEURONTIN) 300 MG CAPSULE    Take 1 capsule (300 mg total) by mouth every evening.    HYDROCODONE-ACETAMINOPHEN (NORCO)  MG PER TABLET    Take 1 tablet by mouth every 8 (eight) hours as needed for Pain.    MELOXICAM (MOBIC) 15 MG TABLET    Take 1 tablet (15 mg total) by mouth once daily. Take with food    MIRTAZAPINE (REMERON) 15 MG TABLET    Take 1 tablet (15 mg total) by mouth every evening.    ONDANSETRON (ZOFRAN-ODT) 4 MG TBDL    Take 2 tablets (8 mg total) by mouth every 8 (eight) hours as needed.    TAMSULOSIN (FLOMAX) 0.4 MG CAP    Take 1 capsule (0.4 mg total) by mouth once daily.    TRAMADOL (ULTRAM) 50 MG TABLET    Take 1 tablet (50 mg total) by mouth every 6 (six) hours.     Review of patient's allergies indicates:  No Known Allergies  Review of Systems   Constitutional: Negative for decreased appetite.   HENT: Negative for tinnitus.    Eyes: Negative for double vision.   Cardiovascular: Negative for chest pain.   Respiratory: Negative for wheezing.    Hematologic/Lymphatic: Negative for bleeding problem.   Skin: Negative for dry skin.   Musculoskeletal: Positive for arthritis, back pain, joint pain, joint swelling and stiffness. Negative for gout and neck pain.    Gastrointestinal: Negative for abdominal pain.   Genitourinary: Negative for bladder incontinence.   Neurological: Negative for numbness, paresthesias and sensory change.   Psychiatric/Behavioral: Negative for altered mental status.       Objective:   Body mass index is 22.32 kg/m².  There were no vitals filed for this visit.       General    Constitutional: He is oriented to person, place, and time. He appears well-developed.   HENT:   Head: Atraumatic.   Eyes: EOM are normal.   Cardiovascular: Normal rate.    Pulmonary/Chest: Effort normal.   Neurological: He is alert and oriented to person, place, and time.   Psychiatric: Judgment normal.             Pelvis is level  Left hip with severe pain over the greater trochanter.  Internal rotation is severely limited as well as external rotation.  His right leg clinically slightly shorter in the sitting position.  Very minimal pain in the groin.  Gait is with a severe limp today which is similar to preop  The left leg is shorter than the right/secondary to old fracture of the hip and varus deformity of the femoral head  Hip flexors, abductors adductors quads and hamstrings are 5/5 ankle extension and flexion 5/5  Left hip surgical scars healed well.  There is still some tenderness over the lateral surgical incision.  Not so much over the proximal 1.  There is no drainage no fluctuance  Right knee arthroscopic scars healed well.  There is no swelling.  He has full motion.  There is definitely some crepitus to compression on the patella and range of motion.  Collaterals and cruciates are stable.  Left knee without swelling.  Without tenderness.  Full range of motion.  Collaterals and cruciates are stable.  Negative Lisa sign  Calves are soft nontender  There is no pitting edema around the ankles  Ankle motion is intact  DP 1+  There is multiple varicosities in the lower extremity.    There is marked loss of sensation in the plantar aspect of his feet    Relevant  imaging results reviewed and interpreted by me, discussed with the patient and / or family today   X-ray 12/16/2020 with mild to moderate lateral joint space narrowing.  Small osteophytic changes.  Calcified menisci consistent with chondrocalcinosis on the right knee.  Left knee with joint space maintained with some calcified menisci no evidence of arthrosis    X-ray of pelvis and left hip 09/02/2021 showing left hip short intramedullary claire without evidence of fracture of the claire or hardware.  There is prominence of the leg screw laterally for approximately 1.2 cm. The hip fracture healed in slight varus and shortening compared to the right side which looks normal.  The left hip joint space seems to be maintained but has some degeneration superior lateral.    X-ray 02/17/2022 AP pelvis showing severe degenerative disc disease L4-L5 more or so to the right side with sclerosis the lumbar spine.  As far as the left hip is concerned there is varus deformity of the femoral head resulting in impingement and slight deformity and flattening of the superior aspect of the femoral head consistent with arthritis and impingement.  The rest of the joint space is maintained.  Area of the hardware that was removed seems to be healing well.    Assessment:     Encounter Diagnoses   Name Primary?    Postop check Yes    Arthritis of left hip     Greater trochanteric bursitis of left hip     S/P hardware removal     Arthritis of knee, right     Chondrocalcinosis of right knee     Peripheral neuropathy due to chemotherapy     S/P right knee arthroscopy     Midline low back pain, unspecified chronicity, unspecified whether sciatica present     Acquired leg length discrepancy         Plan:   Postop check    Arthritis of left hip    Greater trochanteric bursitis of left hip    S/P hardware removal    Arthritis of knee, right    Chondrocalcinosis of right knee    Peripheral neuropathy due to chemotherapy    S/P right knee  arthroscopy    Midline low back pain, unspecified chronicity, unspecified whether sciatica present    Acquired leg length discrepancy         Patient Instructions   X-ray of her pelvis showing at you have severe degenerative disc disease at L4 and L5 with bone spurs.  You also shows that the claire is taken out and you do have arthritis in the left hip with shortening of that leg and you have impingement on the femoral neck  You having a little bit of back pain I will refer you to pain management  We will order x-ray of your lumbar spine on you way out today so when you cm you already have it done  As far as the left hip you probably would need total hip replacement.  Total hip replacement is 90% successful in decreasing pain and increasing function.  Your leg is short at probably be much better length wise after surgery  I will give you a brochure about total hip replacement to read  I will see you back in 6 weeks see how you had been doing    You can apply lotion a  over the hip area    (Discussed x-ray findings with the patient of the short claire and the femoral acetabular impingement of the neck on the lateral aspect of the acetabulum which will result in the future with arthritis of the hip joint.  Still have some hip joint space left.  His femoral neck is in varus and healed in varus.  There is  retraction of the lag screw out of the lateral cortex which is causing impingement over the lateral aspect and pushing against the iliotibial band causing severe pain/trochanteric bursitis.  The steroid injection seems to help for for 5 days.  At 1 time he was giving a Medrol pack which helped on while he was taking it but stopped after that.  Now he wants some surgical intervention by he does not want have total hip replacement.  I did tell him we can see if we can take the lag screw out which was protruding laterally and causing severe irritation in the lateral aspect of his hip in maybe that will help.  As far as this  might not come out because there is a set screw inside the right that prevented from turning then we have to open up the claire and removed the set screw and pull all the hardware out.  He prefers if the lacks row comes out into minimal surgery and then down the road if things do not work out and he needs total hip replacement and he will discuss it further.  He does not want to be out like from the total hip from work for 3 months.  He is a  he needs to work and he does climb ladders but not too high.  Prefers to remove the lag screw if it does not come out easy that he needs to have all of the hardware taken out and he was willing to proceed with that.  Pros and cons of surgery discussed.  Risk of nerve damage vascular damage infection blood clot fat clot we fracture from the screw holes for the 1st 6-8 weeks until does fill in is a possibility.  I did tell him either way if removed the whole thing or just a leg screw he will be allowed to weightbear as tolerated with crutches at the beginning until he is better.  Wants something for pain.  He just finished having cancer of the colon in was weaned off his narcotics.  I did tell him tramadol is a narcotic I will accommodate him because of the amount of pain he is having.  I did tell him not to take pill more than 1 a day until his surgery.  He needs to stop taking the meloxicam since he feels it is not helping at all  The pros and cons of injection of the steroid discussed with the patient.  It is going to burn the next several days he needs to ice the hip.  It might increase his blood sugar level and maybe his blood pressure and he should watch what he eats the next several days.)    Disclaimer: This note was prepared using a voice recognition system and is likely to have sound alike errors within the text.

## 2022-02-17 NOTE — TELEPHONE ENCOUNTER
Reached out to patient to schedule appointment from the work queue. Apt has been made.   Pt understand. All questions answered.     Nickolas Campbell  Medical Assistant

## 2022-02-17 NOTE — TELEPHONE ENCOUNTER
I spoke with the patient.  He wanted to know if he could get in any earlier than the April 6th.  I advised him that we do not, at this time, have an earlier appointment time.  I have added him to the wait list.  The patient verbalized understanding.  All questions answered.

## 2022-04-08 ENCOUNTER — TELEPHONE (OUTPATIENT)
Dept: ORTHOPEDICS | Facility: CLINIC | Age: 67
End: 2022-04-08
Payer: MEDICARE

## 2022-04-08 DIAGNOSIS — M25.469 EDEMA OF KNEE: Primary | ICD-10-CM

## 2022-04-08 DIAGNOSIS — M25.561 RIGHT KNEE PAIN, UNSPECIFIED CHRONICITY: ICD-10-CM

## 2022-04-08 NOTE — TELEPHONE ENCOUNTER
I was able to schedule w/ pt. I stated to arrive 30 min early for XR. Also verified facility and check-in location w/ pt. Understanding verbalized.     ----- Message from Bernie Vásquez sent at 4/8/2022  1:15 PM CDT -----  Contact: Vincent Kaur is needing a call back to get the fluid drained off his right knee as he can't put any weight on it. Please call back [phone]

## 2022-04-11 ENCOUNTER — HOSPITAL ENCOUNTER (OUTPATIENT)
Dept: RADIOLOGY | Facility: HOSPITAL | Age: 67
Discharge: HOME | End: 2022-04-11
Attending: PHYSICIAN ASSISTANT
Payer: MEDICARE

## 2022-04-11 ENCOUNTER — PROCEDURE VISIT (OUTPATIENT)
Dept: ORTHOPEDICS | Facility: CLINIC | Age: 67
End: 2022-04-11
Payer: MEDICARE

## 2022-04-11 VITALS
BODY MASS INDEX: 22.4 KG/M2 | HEIGHT: 71 IN | SYSTOLIC BLOOD PRESSURE: 126 MMHG | WEIGHT: 160 LBS | DIASTOLIC BLOOD PRESSURE: 86 MMHG

## 2022-04-11 DIAGNOSIS — M25.561 RIGHT KNEE PAIN, UNSPECIFIED CHRONICITY: ICD-10-CM

## 2022-04-11 DIAGNOSIS — M11.261 CHONDROCALCINOSIS OF RIGHT KNEE: ICD-10-CM

## 2022-04-11 DIAGNOSIS — M17.11 PRIMARY OSTEOARTHRITIS OF RIGHT KNEE: Primary | ICD-10-CM

## 2022-04-11 DIAGNOSIS — I10 ESSENTIAL HYPERTENSION: ICD-10-CM

## 2022-04-11 DIAGNOSIS — M25.469 EDEMA OF KNEE: ICD-10-CM

## 2022-04-11 DIAGNOSIS — Z98.890 S/P RIGHT KNEE ARTHROSCOPY: ICD-10-CM

## 2022-04-11 PROCEDURE — 73562 XR KNEE ORTHO RIGHT WITH FLEXION: ICD-10-PCS | Mod: 26,LT,, | Performed by: RADIOLOGY

## 2022-04-11 PROCEDURE — 99213 PR OFFICE/OUTPT VISIT, EST, LEVL III, 20-29 MIN: ICD-10-PCS | Mod: 25,S$GLB,, | Performed by: PHYSICIAN ASSISTANT

## 2022-04-11 PROCEDURE — 73562 X-RAY EXAM OF KNEE 3: CPT | Mod: TC,LT

## 2022-04-11 PROCEDURE — 20610 LARGE JOINT ASPIRATION/INJECTION: R KNEE: ICD-10-PCS | Mod: RT,S$GLB,, | Performed by: PHYSICIAN ASSISTANT

## 2022-04-11 PROCEDURE — 99213 OFFICE O/P EST LOW 20 MIN: CPT | Mod: 25,S$GLB,, | Performed by: PHYSICIAN ASSISTANT

## 2022-04-11 PROCEDURE — 73564 X-RAY EXAM KNEE 4 OR MORE: CPT | Mod: 26,RT,, | Performed by: RADIOLOGY

## 2022-04-11 PROCEDURE — 73564 XR KNEE ORTHO RIGHT WITH FLEXION: ICD-10-PCS | Mod: 26,RT,, | Performed by: RADIOLOGY

## 2022-04-11 PROCEDURE — 73562 X-RAY EXAM OF KNEE 3: CPT | Mod: 26,LT,, | Performed by: RADIOLOGY

## 2022-04-11 PROCEDURE — 20610 DRAIN/INJ JOINT/BURSA W/O US: CPT | Mod: RT,S$GLB,, | Performed by: PHYSICIAN ASSISTANT

## 2022-04-11 RX ORDER — METHYLPREDNISOLONE ACETATE 80 MG/ML
80 INJECTION, SUSPENSION INTRA-ARTICULAR; INTRALESIONAL; INTRAMUSCULAR; SOFT TISSUE
Status: DISCONTINUED | OUTPATIENT
Start: 2022-04-11 | End: 2022-04-11 | Stop reason: HOSPADM

## 2022-04-11 RX ORDER — DICLOFENAC SODIUM 10 MG/G
2 GEL TOPICAL 3 TIMES DAILY
Qty: 1 EACH | Refills: 1 | Status: SHIPPED | OUTPATIENT
Start: 2022-04-11 | End: 2022-08-16

## 2022-04-11 RX ADMIN — METHYLPREDNISOLONE ACETATE 80 MG: 80 INJECTION, SUSPENSION INTRA-ARTICULAR; INTRALESIONAL; INTRAMUSCULAR; SOFT TISSUE at 07:04

## 2022-04-11 NOTE — PROCEDURES
Large Joint Aspiration/Injection: R knee    Date/Time: 4/11/2022 7:30 AM  Performed by: Lashonda Griffiths PA-C  Authorized by: Lashonda Griffiths PA-C     Consent Done?:  Yes (Verbal)  Indications:  Joint swelling and pain  Site marked: the procedure site was marked    Timeout: prior to procedure the correct patient, procedure, and site was verified    Prep: patient was prepped and draped in usual sterile fashion      Local anesthesia used?: Yes    Anesthesia:  Local infiltration  Local anesthetic:  Bupivacaine 0.5% without epinephrine, lidocaine 1% without epinephrine, topical anesthetic and lidocaine spray    Details:  Needle Size:  22 G  Approach:  Superior  Location:  Knee  Site:  R knee  Medications:  80 mg methylPREDNISolone acetate 80 mg/mL  Patient tolerance:  Patient tolerated the procedure well with no immediate complications     2cc 1% lidocaine plain, 2cc 0.5% marcaine plain, 0.5cc 80mg methylprednisolone    Procedure Note:  We discussed the risk and benefits of injections, including pain, infection, bleeding, damage to adjacent structures, risk of reaction to injection. We discussed the steroid/cortisone injections will not heal the problem but mat help decrease inflammation and help with symptoms. We discussed the risk of repeated injections. The patient expressed understanding and wanted to proceed with the injection. We performed a timeout to verify the proper patient, proper procedure, and the proper site. The injection site was prepared in a sterile fashion. The patient tolerated it well and there were no complication. We did discuss with the patient that steroid injections can cause some increase in blood sugar and blood pressure for up to a week after the injection.

## 2022-04-11 NOTE — PATIENT INSTRUCTIONS
Assessment:  Vincent Benton is a  66 y.o. male   [place] with a chief complaint of Pain and Swelling of the Right Knee  Previous patient of Dr. Carmona had surgery on 5/5/21 for knee scope, medial menisectomy,and plica excision. Presents today for pain and swelling.   Right knee osteoarthritis  Right knee pain and swelling  History of right knee surgery with Dr. Carmona 5/5/21    Encounter Diagnoses   Name Primary?    Primary osteoarthritis of right knee Yes    S/P right knee arthroscopy     Chondrocalcinosis of right knee     Essential hypertension       Plan:  CSI 2/2/40 in the right knee  Voltaren   Continue brace  Deferred on PT will do at home exercise program  Follow up with Dr. Carmona    Follow-up: Dr. Carmona or sooner if there are any problems between now and then.    Thank you for choosing [facility] and Dr. Aden Whittaker for your orthopedic & sports medicine care. It is our goal to provide you with exceptional care that will help keep you healthy, active, and get you back in the game.    If you felt that you received exemplary care today, please consider leaving us feedback on [employer] at [email or web address].     Please do not hesitate to reach out to us via email, phone, or MyChart with any questions, concerns, or feedback.    If you are experiencing pain/discomfort ,or have questions after 5pm and would like to be connected to the [facility] on-call team, please call this number and specify which Sports Medicine provider is treating you: [phone]

## 2022-04-11 NOTE — PROGRESS NOTES
Patient ID: Vincent Benton  YOB: 1955  MRN: [id number]    Chief Complaint: Pain and Swelling of the Right Knee    Referred By: [facility] main scheduling line    History of Present Illness: Vincent Benton is a RHD 66 y.o. male  with a chief complaint of Pain and Swelling of the Right Knee    Patient presents to the clinic today c/o 9/10 Right Knee Pain and Swelling. His symptoms began over 1 month ago. He was previously treated by Eri Boyer PA-C, on 12/16/2020, 2/2/2021, and 3/5/2021. Treatment included aspirations. He has also been treated by Dr. Delonte Mcintyre on 5/5/2021. Procedures performed:   ARTHROSCOPY, KNEE (Right)  MENISCECTOMY, KNEE, MEDIAL (Right)  EXCISION, PLICA, KNEE, ARTHROSCOPIC (Right)     Partial medial and lateral meniscectomy, resection/excision of plica superior medial  He currently takes Tylenol PRN without any relief. Aggravating activities include walking on uneven surfaces. He states that his symptoms are worsening with time. Denies any fever, chills, night sweats, numbness and tingling        HPI    Past Medical History:   Past Medical History:   Diagnosis Date    Alcoholism     Anemia     Back pain     Encounter for blood transfusion 2018    Essential hypertension 2/4/2016    GERD (gastroesophageal reflux disease)     Hiatal hernia     Hypertension     diet controlled    Melanoma 06/2021    left cheek    Rectal cancer 9/11/2019     Past Surgical History:   Procedure Laterality Date    ARTHROSCOPY OF KNEE Right 5/5/2021    Procedure: ARTHROSCOPY, KNEE;  Surgeon: Delonte Mcintyre MD;  Location: [facility] OR;  Service: Orthopedics;  Laterality: Right;    COLONOSCOPY N/A 9/3/2019    Procedure: COLONOSCOPY;  Surgeon: Isai Centeno MD;  Location: [facility] ENDO;  Service: Endoscopy;  Laterality: N/A;    COLONOSCOPY N/A 1/10/2020    Procedure: COLONOSCOPY;  Surgeon: Familia Gonzalez MD;  Location: [facility] ENDO;  Service: General;   Laterality: N/A;    COLONOSCOPY N/A 3/24/2021    Procedure: COLONOSCOPY;  Surgeon: Familia Gonzalez MD;  Location: [facility] ENDO;  Service: General;  Laterality: N/A;    ESOPHAGOGASTRODUODENOSCOPY N/A 9/3/2019    Procedure: ESOPHAGOGASTRODUODENOSCOPY (EGD);  Surgeon: Isai Centeno MD;  Location: [facility] ENDO;  Service: Endoscopy;  Laterality: N/A;    EXCISION OF MEDIAL MENISCUS OF KNEE Right 5/5/2021    Procedure: MENISCECTOMY, KNEE, MEDIAL;  Surgeon: Delonte Mcintyre MD;  Location: [facility] OR;  Service: Orthopedics;  Laterality: Right;  partial Lateral    FLEXIBLE SIGMOIDOSCOPY  2/4/2020    Procedure: SIGMOIDOSCOPY, FLEXIBLE;  Surgeon: Familia Gonzalez MD;  Location: [facility];  Service: General;;    ILEOSTOMY Right 2/4/2020    Procedure: CREATION, ILEOSTOMY;  Surgeon: Familia Gonzalez MD;  Location: [facility] OR;  Service: General;  Laterality: Right;    ILEOSTOMY CLOSURE N/A 8/4/2020    Procedure: CLOSURE, ILEOSTOMY;  Surgeon: Familia Gonzalez MD;  Location: [facility] OR;  Service: General;  Laterality: N/A;    INCISION AND DRAINAGE OF BACK Left 8/5/2020    Procedure: INCISION AND DRAINAGE, BACK;  Surgeon: Familia Gonzalez MD;  Location: [facility];  Service: General;  Laterality: Left;    INJECTION OF ANESTHETIC AGENT INTO TISSUE PLANE DEFINED BY TRANSVERSUS ABDOMINIS MUSCLE N/A 2/4/2020    Procedure: BLOCK, TRANSVERSUS ABDOMINIS PLANE;  Surgeon: Familia Gonzalez MD;  Location: [facility] OR;  Service: General;  Laterality: N/A;    INSERTION OF TUNNELED CENTRAL VENOUS CATHETER (CVC) WITH SUBCUTANEOUS PORT Right 2/4/2020    Procedure: INSERTION, PORT-A-CATH;  Surgeon: Familia Gonzalez MD;  Location: [facility] OR;  Service: General;  Laterality: Right;    JOINT REPLACEMENT Left 2009    Hip    KNEE ARTHROSCOPY W/ PLICA EXCISION Right 5/5/2021    Procedure: EXCISION, PLICA, KNEE, ARTHROSCOPIC;  Surgeon: Delonte Mcintyre MD;  Location: [facility] OR;  Service: Orthopedics;  Laterality: Right;    MOBILIZATION OF SPLENIC  FLEXURE  2/4/2020    Procedure: MOBILIZATION, SPLENIC FLEXURE;  Surgeon: Familia Gonzalez MD;  Location: [facility] OR;  Service: General;;    REMOVAL OF HARDWARE FROM HIP Left 1/4/2022    Procedure: REMOVAL, HARDWARE, HIP;  Surgeon: Delonte Mcintyre MD;  Location: [facility] OR;  Service: Orthopedics;  Laterality: Left;     Family History   Problem Relation Age of Onset    Cataracts Mother     Stroke Father     Hypertension Father      Social History     Socioeconomic History    Marital status:    Tobacco Use    Smoking status: Never Smoker    Smokeless tobacco: Never Used   Substance and Sexual Activity    Alcohol use: Yes     Comment: occassionally No alcohol 72h prior to sx    Drug use: No    Sexual activity: Never     Partners: Female     Medication List with Changes/Refills   New Medications    DICLOFENAC SODIUM (VOLTAREN) 1 % GEL    Apply 2 g topically 3 (three) times daily.   Current Medications    ASCORBIC ACID, VITAMIN C, (VITAMIN C) 1000 MG TABLET    Take 1,000 mg by mouth once daily.    CYANOCOBALAMIN (VITAMIN B-12) 1000 MCG TABLET    Take 100 mcg by mouth once daily.    DICLOFENAC SODIUM (VOLTAREN) 1 % GEL    Apply 2 g topically 3 (three) times daily as needed.    GABAPENTIN (NEURONTIN) 300 MG CAPSULE    Take 1 capsule (300 mg total) by mouth every evening.    HYDROCODONE-ACETAMINOPHEN (NORCO)  MG PER TABLET    Take 1 tablet by mouth every 8 (eight) hours as needed for Pain.    MELOXICAM (MOBIC) 15 MG TABLET    Take 1 tablet (15 mg total) by mouth once daily. Take with food    MIRTAZAPINE (REMERON) 15 MG TABLET    Take 1 tablet (15 mg total) by mouth every evening.    ONDANSETRON (ZOFRAN-ODT) 4 MG TBDL    Take 2 tablets (8 mg total) by mouth every 8 (eight) hours as needed.    TAMSULOSIN (FLOMAX) 0.4 MG CAP    Take 1 capsule (0.4 mg total) by mouth once daily.    TRAMADOL (ULTRAM) 50 MG TABLET    Take 1 tablet (50 mg total) by mouth every 6 (six) hours.     Review of patient's allergies  indicates:  No Known Allergies  Review of Systems   Constitutional: Negative for chills and fever.   HENT: Negative for sore throat.    Eyes: Negative for pain.   Cardiovascular: Negative for chest pain and leg swelling.   Respiratory: Negative for cough and shortness of breath.    Skin: Negative for itching and rash.   Musculoskeletal: Positive for joint pain and joint swelling.   Gastrointestinal: Negative for abdominal pain, nausea and vomiting.   Genitourinary: Negative for dysuria.   Neurological: Negative for dizziness, numbness and paresthesias.       Physical Exam:   Body mass index is 22.32 kg/m².  Vitals:    22 0756   BP: 126/86      GENERAL: Well appearing, appropriate for stated age, no acute distress.  CARDIOVASCULAR: Pulses regular by peripheral palpation.  PULMONARY: Respirations are even and non-labored.  NEURO: Awake, alert, and oriented x 3.  PSYCH: Mood & affect are appropriate.  HEENT: Head is normocephalic and atraumatic.  General    Nursing note and vitals reviewed.          Right Knee Exam   Right knee exam is normal.    Inspection   Effusion: present    Tenderness   The patient is tender to palpation of the lateral joint line (LFC).    Crepitus   The patient has crepitus of the patella.    Range of Motion   Extension: 0   Flexion: 130     Tests   Ligament Examination Lachman: normal (-1 to 2mm) PCL-Posterior Drawer: normal (0 to 2mm)     MCL - Valgus: normal (0 to 2mm)  LCL - Varus: normal    Other   Sensation: normal    Left Knee Exam   Left knee exam is normal.    Inspection   Effusion: absent    Tenderness   The patient is experiencing no tenderness.     Range of Motion   Extension: 0   Flexion: 130     Tests   Stability Lachman: normal (-1 to 2mm) PCL-Posterior Drawer: normal (0 to 2mm)  MCL - Valgus: normal (0 to 2mm)  LCL - Varus: normal (0 to 2mm)    Other   Sensation: normal    Muscle Strength   Right Lower Extremity   Hip Abduction: 5/5   Quadriceps:  4/5   Hamstrin/5    Left Lower Extremity   Hip Abduction: 5/5   Quadriceps:  4/5   Hamstrin/5     Vascular Exam     Right Pulses  Dorsalis Pedis:      2+  Posterior Tibial:      2+        Left Pulses  Dorsalis Pedis:      2+  Posterior Tibial:      2+          All compartments are soft and compressible. Calf soft non-tender. Intact EHL, FHL, gastroc soleus, and tibialis anterior. Sensation intact to light touch in superficial peroneal, deep peroneal, tibial, sural, and saphenous nerve distributions. Foot warm and well perfused with capillary refill of less than 2 seconds and palpable pedal pulses.       Imaging:    X-ray Knee Ortho Right with Flexion  Narrative: EXAMINATION:  XR KNEE ORTHO RIGHT WITH FLEXION    CLINICAL HISTORY:  Effusion, unspecified knee    TECHNIQUE:  AP standing as well as PA flexion standing and Merchant views of both knees were performed.  A lateral view of the right knee is also performed.    COMPARISON:  2020    FINDINGS:  Osteopenia.  No acute osseous abnormality.  Similar degenerative findings noted, greater within the right knee and most prevalent within the lateral compartment.  Right knee lateral compartment joint space narrowing again noted.  Right greater than left knee chondrocalcinosis findings present.  Small right knee joint effusion suspected.  Impression: As above    Electronically signed by: Sree Goodwin MD  Date:    2022  Time:    07:52      Relevant imaging results reviewed and interpreted by me, discussed with the patient and / or family today.     Other Tests:       Patient Instructions     Assessment:  Vincent Benton is a  66 y.o. male    with a chief complaint of Pain and Swelling of the Right Knee  Previous patient of Dr. Carmona had surgery on 21 for knee scope, medial menisectomy,and plica excision. Presents today for pain and swelling.    Right knee osteoarthritis   Right knee pain and swelling   History of right knee surgery with Dr. Carmona  5/5/21    Encounter Diagnoses   Name Primary?    Primary osteoarthritis of right knee Yes    S/P right knee arthroscopy     Chondrocalcinosis of right knee     Essential hypertension       Plan:   CSI 2/2/40 in the right knee   Voltaren    Continue brace   Deferred on PT will do at home exercise program   Follow up with Dr. Carmona    Follow-up: Dr. Carmona or sooner if there are any problems between now and then.    Thank you for choosing [facility] and Dr. Aden Whittaker for your orthopedic & sports medicine care. It is our goal to provide you with exceptional care that will help keep you healthy, active, and get you back in the game.    If you felt that you received exemplary care today, please consider leaving us feedback on [employer] at [email or web address].     Please do not hesitate to reach out to us via email, phone, or MyChart with any questions, concerns, or feedback.    If you are experiencing pain/discomfort ,or have questions after 5pm and would like to be connected to the [facility] on-call team, please call this number and specify which Sports Medicine provider is treating you: [phone]        Provider Note/Medical Decision Making:   Discussed with patient her following back up with Dr. Carmona out previously had spoken about a possible knee replacement in the future if had continued pain and swelling.     I discussed worrisome and red flag signs and symptoms with the patient. The patient expressed understanding and agreed to alert me immediately or to go to the emergency room if they experience any of these.    Treatment plan was developed with input from the patient/family, and they expressed understanding and agreement with the plan. All questions were answered today.    Disclaimer: This note was prepared using a voice recognition system and is likely to have sound alike errors within  the text.

## 2022-04-19 ENCOUNTER — TELEPHONE (OUTPATIENT)
Dept: ORTHOPEDICS | Facility: CLINIC | Age: 67
End: 2022-04-19
Payer: MEDICARE

## 2022-04-19 ENCOUNTER — TELEPHONE (OUTPATIENT)
Dept: INTERNAL MEDICINE | Facility: CLINIC | Age: 67
End: 2022-04-19
Payer: MEDICARE

## 2022-04-19 NOTE — TELEPHONE ENCOUNTER
Patient is scheduled 05/19/2022 with Dr. Moran for annual check-up. Patient was advised, states and verbalized understanding

## 2022-04-19 NOTE — TELEPHONE ENCOUNTER
----- Message from Elena Alcaraz sent at 4/19/2022  9:34 AM CDT -----  Contact: pt  The pt request a return call concerning a handicap sticker, no additional info given and can be reached at [phone]///[id number]

## 2022-04-19 NOTE — TELEPHONE ENCOUNTER
----- Message from Sherri Cool MA sent at 4/19/2022  9:50 AM CDT -----  Contact: Vincent  Pt is established w/ Dr. Mcintyre and is not continuing care w/ [facility]. Please advise  ----- Message -----  From: Bernie Vásquez  Sent: 4/18/2022   3:09 PM CDT  To: [facility]leg[facility] Staff    Vincent is needing a call abck regarding him needing to get a disability placard for his vehicle. Please call him back at [phone]

## 2022-04-19 NOTE — TELEPHONE ENCOUNTER
Patient states he need a handicapp sticker. Patient states he been disabled for 10 years. Please advise

## 2022-04-19 NOTE — TELEPHONE ENCOUNTER
I stated that this request is best directed to Dr. Mcintyre's staff since pt is not continuing care w/ [facility] and has surgical hx w/ that provider. I offered to forward his message to their staff. Pt accepted and verbalized understanding.    ----- Message from Bernie Vásquez sent at 4/18/2022  3:08 PM CDT -----  Contact: Vincent Kaur is needing a call abck regarding him needing to get a disability placard for his vehicle. Please call him back at [phone]

## 2022-05-10 DIAGNOSIS — G62.0 PERIPHERAL NEUROPATHY DUE TO CHEMOTHERAPY: ICD-10-CM

## 2022-05-10 DIAGNOSIS — T45.1X5A PERIPHERAL NEUROPATHY DUE TO CHEMOTHERAPY: ICD-10-CM

## 2022-05-11 RX ORDER — GABAPENTIN 300 MG/1
300 CAPSULE ORAL NIGHTLY
Qty: 30 CAPSULE | Refills: 11 | Status: SHIPPED | OUTPATIENT
Start: 2022-05-11 | End: 2022-05-17 | Stop reason: ALTCHOICE

## 2022-05-16 NOTE — PROGRESS NOTES
New Patient Chronic Pain Note (Initial Visit)    Referring Physician: Delonte Mcintyre MD    PCP: Shakila Moran DO    Chief Complaint:   Chief Complaint   Patient presents with    Low-back Pain    Leg Pain     Left         SUBJECTIVE:    Vincent Benton is a 66 y.o. male with past medical history significant for alcoholism, hypertension, rectal adenocarcinoma undergoing chemotherapy, GERD who presents to the clinic for the evaluation of left-sided lower back and leg pain.  Patient reports pain began initially following left-sided hip fracture in 2009 status post left femoral intramedullary claire and dynamic hip screw placement with recent hardware removal 1/4/22 (Dr. Mcintyre).  Of note patient is considering left hip arthroplasty with Dr. Mcintyre.  Patient reports he does have leg length discrepancy, with left lower extremity being shorter which he believes potentiates his pain.      Today patient reports pain which is constant which is rated a 9/10.  Patient reports pain in a bandlike distribution in the lower back which radiates down the lateral aspect of the left lower extremity in L4 distribution to the knee.  Pain is described as aching in nature.  Pain is exacerbated with prolonged standing and with ambulation.  Patient reports he is unable to even to climb stairs.  Patient is able to ambulate less than half a block before requiring rest.  Mobility is improved with the use of a stationary bike at home.  Patient has tried gabapentin and Voltaren gel in the past without significant relief.  Patient does endorse associated weakness in the left lower extremity associated with his pain.    Patient reports significant motor weakness.  Patient denies night fever/night sweats, urinary incontinence, bowel incontinence, significant weight loss and loss of sensations.      Pain Disability Index Review:     Last 3 PDI Scores 5/17/2022   Pain Disability Index (PDI) 54       Non-Pharmacologic  Treatments:  Physical Therapy/Home Exercise: no  Ice/Heat:yes  TENS: no  Acupuncture: no  Massage: no  Chiropractic: no    Other: no      Pain Medications:  - Opioids: Ultram (Tramadol HCL) and Norco  - Adjuvant Medications: Mirtazapine (Remeron), Mobic (Meloxicam) and Topical Ointment (Voltaren Gel, Steroid cream, Anti-Inflammatory Cream, Compound cream)    Pain Procedures:   None    Past Medical History:   Diagnosis Date    Alcoholism     Anemia     Back pain     Encounter for blood transfusion 2018    Essential hypertension 2/4/2016    GERD (gastroesophageal reflux disease)     Hiatal hernia     Hypertension     diet controlled    Melanoma 06/2021    left cheek    Rectal cancer 9/11/2019     Past Surgical History:   Procedure Laterality Date    ARTHROSCOPY OF KNEE Right 5/5/2021    Procedure: ARTHROSCOPY, KNEE;  Surgeon: Delonte Mcintyre MD;  Location: [facility];  Service: Orthopedics;  Laterality: Right;    COLONOSCOPY N/A 9/3/2019    Procedure: COLONOSCOPY;  Surgeon: Isai Centeno MD;  Location: [facility];  Service: Endoscopy;  Laterality: N/A;    COLONOSCOPY N/A 1/10/2020    Procedure: COLONOSCOPY;  Surgeon: Familia Gonzalez MD;  Location: [facility];  Service: General;  Laterality: N/A;    COLONOSCOPY N/A 3/24/2021    Procedure: COLONOSCOPY;  Surgeon: Familia Gonzalez MD;  Location: [facility];  Service: General;  Laterality: N/A;    ESOPHAGOGASTRODUODENOSCOPY N/A 9/3/2019    Procedure: ESOPHAGOGASTRODUODENOSCOPY (EGD);  Surgeon: Isai Centeno MD;  Location: [facility];  Service: Endoscopy;  Laterality: N/A;    EXCISION OF MEDIAL MENISCUS OF KNEE Right 5/5/2021    Procedure: MENISCECTOMY, KNEE, MEDIAL;  Surgeon: Delonte Mcintyre MD;  Location: [facility] OR;  Service: Orthopedics;  Laterality: Right;  partial Lateral    FLEXIBLE SIGMOIDOSCOPY  2/4/2020    Procedure: SIGMOIDOSCOPY, FLEXIBLE;  Surgeon: Familia Gonzalez MD;  Location: [facility];  Service: General;;    ILEOSTOMY Right  2/4/2020    Procedure: CREATION, ILEOSTOMY;  Surgeon: Familia Gonzalez MD;  Location: [facility] OR;  Service: General;  Laterality: Right;    ILEOSTOMY CLOSURE N/A 8/4/2020    Procedure: CLOSURE, ILEOSTOMY;  Surgeon: Familia Gonzalez MD;  Location: [facility] OR;  Service: General;  Laterality: N/A;    INCISION AND DRAINAGE OF BACK Left 8/5/2020    Procedure: INCISION AND DRAINAGE, BACK;  Surgeon: Familia Gonzalez MD;  Location: [facility] OR;  Service: General;  Laterality: Left;    INJECTION OF ANESTHETIC AGENT INTO TISSUE PLANE DEFINED BY TRANSVERSUS ABDOMINIS MUSCLE N/A 2/4/2020    Procedure: BLOCK, TRANSVERSUS ABDOMINIS PLANE;  Surgeon: Familia Gonzalez MD;  Location: [facility] OR;  Service: General;  Laterality: N/A;    INSERTION OF TUNNELED CENTRAL VENOUS CATHETER (CVC) WITH SUBCUTANEOUS PORT Right 2/4/2020    Procedure: INSERTION, PORT-A-CATH;  Surgeon: Familia Gonzalez MD;  Location: [facility] OR;  Service: General;  Laterality: Right;    JOINT REPLACEMENT Left 2009    Hip    KNEE ARTHROSCOPY W/ PLICA EXCISION Right 5/5/2021    Procedure: EXCISION, PLICA, KNEE, ARTHROSCOPIC;  Surgeon: Delonte Mcintyre MD;  Location: [facility] OR;  Service: Orthopedics;  Laterality: Right;    MOBILIZATION OF SPLENIC FLEXURE  2/4/2020    Procedure: MOBILIZATION, SPLENIC FLEXURE;  Surgeon: Familia Gonzalez MD;  Location: [facility] OR;  Service: General;;    REMOVAL OF HARDWARE FROM HIP Left 1/4/2022    Procedure: REMOVAL, HARDWARE, HIP;  Surgeon: Delonte Mcintyre MD;  Location: [facility];  Service: Orthopedics;  Laterality: Left;     Review of patient's allergies indicates:  No Known Allergies    Current Outpatient Medications   Medication Sig    ascorbic acid, vitamin C, (VITAMIN C) 1000 MG tablet Take 1,000 mg by mouth once daily.    cyanocobalamin (VITAMIN B-12) 1000 MCG tablet Take 100 mcg by mouth once daily.    diclofenac sodium (VOLTAREN) 1 % Gel Apply 2 g topically 3 (three) times daily as needed.    diclofenac sodium  (VOLTAREN) 1 % Gel Apply 2 g topically 3 (three) times daily.    HYDROcodone-acetaminophen (NORCO)  mg per tablet Take 1 tablet by mouth every 8 (eight) hours as needed for Pain.    mirtazapine (REMERON) 15 MG tablet Take 1 tablet (15 mg total) by mouth every evening.    ondansetron (ZOFRAN-ODT) 4 MG TbDL Take 2 tablets (8 mg total) by mouth every 8 (eight) hours as needed.    traMADoL (ULTRAM) 50 mg tablet Take 1 tablet (50 mg total) by mouth every 6 (six) hours.    pregabalin (LYRICA) 75 MG capsule Take 1 capsule, 75 mg, once daily for 1 week.  Followed by take 1 capsule 75 mg twice daily for 1 week.  Followed by 2 capsules, 150 mg in the morning and 75 mg at night for 1 week.  Followed by 150 mg twice daily for 1 week.  Followed by 225 mg in the morning and 150 mg in the evening for 1 week.  Followed by 225 mg b.i.d..    tamsulosin (FLOMAX) 0.4 mg Cap Take 1 capsule (0.4 mg total) by mouth once daily.     No current facility-administered medications for this visit.     Facility-Administered Medications Ordered in Other Visits   Medication    0.9%  NaCl infusion    0.9%  NaCl infusion    alteplase injection 2 mg    chlorhexidine 0.12 % solution 10 mL    chlorhexidine 0.12 % solution 10 mL    diphenhydrAMINE injection 25 mg    HYDROmorphone (PF) injection 0.2 mg    HYDROmorphone (PF) injection 0.2 mg    lactated ringers infusion    metoclopramide HCl injection 10 mg    nozaseptin (NOZIN) nasal     sodium chloride 0.9% flush 10 mL    sodium chloride 0.9% flush 3 mL    sodium chloride 0.9% flush 3 mL       Review of Systems     GENERAL:  No weight loss, malaise or fevers.  HEENT:   No recent changes in vision or hearing  NECK:  Negative for lumps, no difficulty with swallowing.  RESPIRATORY:  Negative for cough, wheezing or shortness of breath, patient denies any recent URI.  CARDIOVASCULAR:  Negative for chest pain, leg swelling or palpitations.  GI:  Negative for abdominal  "discomfort, blood in stools or black stools or change in bowel habits.  MUSCULOSKELETAL:  See HPI.  SKIN:  Negative for lesions, rash, and itching.  PSYCH:  No mood disorder or recent psychosocial stressors.   HEMATOLOGY/LYMPHOLOGY:  Negative for prolonged bleeding, bruising easily or swollen nodes.    NEURO:   No history of headaches, syncope, paralysis, seizures or tremors.  All other reviewed and negative other than HPI.    OBJECTIVE:    /85   Pulse 73   Resp 17   Ht 5' 11" (1.803 m)   Wt 67.4 kg (148 lb 7.7 oz)   BMI 20.71 kg/m²       Physical Exam    GENERAL:  Patient appears drowsy on exam  PSYCH:  Mood and affect appropriate.  SKIN: Skin color, texture, turgor normal, no rashes or lesions.  HEAD/FACE:  Normocephalic, atraumatic. Cranial nerves grossly intact.    CV: RRR with palpation of the radial artery.  PULM: No evidence of respiratory difficulty, symmetric chest rise.  GI:  Soft and non-tender.    BACK: Straight leg raising in the sitting and supine positions is negative to radicular pain. No pain to palpation over the facet joints of the lumbar spine or spinous processes. Reduced range of motion with pain reproduction.  EXTREMITIES: Peripheral joint ROM is reduced with pain without obvious instability or laxity in all four extremities.  Leg length discrepancy:  Left leg shorter than the right Good capillary refill.  MUSCULOSKELETAL: Unable to stand on heels & toes.   hip provocative maneuvers are + on L.  left hip surgical scars healed well There is no pain with palpation over the sacroiliac joints bilaterally.  Tenderness to palpation overlying left greater trochanteric bursa.  FABERs test is negative.  Facet loading test is negative bilaterally.   Bilateral upper  extremity strength is normal and symmetric.  No atrophy or tone abnormalities are noted.  RIGHT Lower extremity: Hip flexion 5/5, Hip Abduction 5/5, Hip Adduction 5/5, Knee extension 5/5, Knee flexion 5/5, Ankle dorsiflexion5/5, " Extensor hallucis longus 5/5, Ankle plantarflexion 5/5  LEFT Lower extremity:  Hip flexion 4/5, Hip Abduction 4/5,Hip Adduction 4/5, Knee extension 5/5, Knee flexion 5/5, Ankle dorsiflexion 5/5, Extensor hallucis longus 5/5, Ankle plantarflexion 5/5  -Normal testing knee (patellar) jerk and ankle (achilles) jerk    NEURO: Bilateral upper and lower extremity coordination and muscle stretch reflexes are physiologic and symmetric. No loss of sensation is noted.  GAIT:  Antalgic    Imaging:   X-ray femur 01/04/2022  FINDINGS:  Interval removal of the previously identified proximal left femoral intramedullary claire and dynamic hip screw without acute complication identified.  Stable chronic healed fracture deformity of the left femoral neck with varus angulation.  No new acute fracture identified.  Joint alignment is anatomic.  No unsuspected retained surgical instruments identified.       02/17/22    X-Ray Lumbar Spine AP And Lateral    FINDINGS:  There is mild levoscoliosis of the lumbar spine.  There is prominent disc space narrowing is noted throughout the lumbar spine and most prominent at the L2-3 through the L5-S1 levels with vacuum disc phenomenon seen at L3-4 and L4-5. There is grade 1-2 spondylolisthesis of L5 on S1.  There is exaggeration of the normal lumbar lordosis.  Prominent bilateral facet arthropathy seen at the L3-4 through the L5-S1 levels.        ASSESSMENT: 66 y.o. year old male with lower back and left leg pain, consistent with     1. Lumbar facet arthropathy  Ambulatory referral/consult to Physical/Occupational Therapy   2. Lumbar degenerative disc disease  Ambulatory referral/consult to Pain Clinic    Ambulatory referral/consult to Physical/Occupational Therapy   3. Greater trochanteric bursitis of left hip  Ambulatory referral/consult to Pain Clinic   4. Lumbar radiculopathy  MRI Lumbar Spine Without Contrast    Ambulatory referral/consult to Physical/Occupational Therapy         PLAN:   -  Interventions:  We discussed considering a femoral and obturator nerve block for periarticular joint pain versus lumbar transforaminal injection on the left.  We will 1st review lumbar MRI.  Explained the risks and benefits of the procedure in detail with the patient today in clinic along with alternative treatment options    - Anticoagulation use: no no anticoagulation     report:  Reviewed and consistent with medication use as prescribed.    - Medications:  --I will start the patient on a Lyrica titration to see if this helps with neuropathic pain.  We discussed increasing the dose gradually to reach a therapeutic goal according to the following algorithm.  We discussed potential side effects of this medication which may include drowsiness,dizziness, dry mouth, constipation or peripheral edema.    -Week 1: Take 1 capsule, 75 mg QD  -Week 2: Take 1 capsule 75 mg BID  -Week 3: Take 2 capsules, 150 mg in the morning and 75 mg QHS  -Week 4: Take 2 capsules, 150 mg BID  -Week 5: Take 3 capsules, 225 mg in the morning and 150 mg (2 capsules) QHS  -Week 6: Take 3 capsules, 225 mgBID      - Therapy:  We discussed initiating physical therapy to help manage the patient/s painful condition. The patient was counseled that muscle strengthening will improve the long term prognosis in regards to pain and may also help increase range of motion and mobility. They were told that one of the goals of physical therapy is that they learn how to do the exercises so that they can do them independently at home daily upon completion. The patient's questions were answered and they were agreeable to this course. A referral for physical therapy was provided to the patient.      - Imaging: Reviewed available imaging with patient and answered any questions they had regarding study.Order MRI of the Lumbar Spine to help evaluate possible source of pain and weakness.    - Follow up visit: return to clinic in 4-6 weeks      The above plan and  management options were discussed at length with patient. Patient is in agreement with the above and verbalized understanding.    - I discussed the goals of interventional chronic pain management with the patient on today's visit. We discussed a multimodal and systematic approach to pain.  This includes diagnostic and therapeutic injections, adjuvant pharmacologic treatment, physical therapy, and at times psychiatry.  I emphasized the importance of regular exercise, core strengthening and stretching, diet and weight loss as a cornerstone of long-term pain management.    - This condition does not require this patient to take time off of work, and the primary goal of our Pain Management services is to improve the patient's functional capacity.  - Patient Questions: Answered all of the patient's questions regarding diagnoses, therapy, treatment and next steps        Roxanne Mccarthy MD  Interventional Pain Management  [facility]    Disclaimer:  This note was prepared using voice recognition system and is likely to have sound alike errors that may have been overlooked even after proof reading.  Please call me with any questions

## 2022-05-17 ENCOUNTER — OFFICE VISIT (OUTPATIENT)
Dept: PAIN MEDICINE | Facility: CLINIC | Age: 67
End: 2022-05-17
Payer: MEDICARE

## 2022-05-17 VITALS
DIASTOLIC BLOOD PRESSURE: 85 MMHG | SYSTOLIC BLOOD PRESSURE: 126 MMHG | BODY MASS INDEX: 20.79 KG/M2 | HEIGHT: 71 IN | RESPIRATION RATE: 17 BRPM | WEIGHT: 148.5 LBS | HEART RATE: 73 BPM

## 2022-05-17 DIAGNOSIS — M51.36 LUMBAR DEGENERATIVE DISC DISEASE: ICD-10-CM

## 2022-05-17 DIAGNOSIS — M70.62 GREATER TROCHANTERIC BURSITIS OF LEFT HIP: ICD-10-CM

## 2022-05-17 DIAGNOSIS — M54.16 LUMBAR RADICULOPATHY: ICD-10-CM

## 2022-05-17 DIAGNOSIS — M47.816 LUMBAR FACET ARTHROPATHY: Primary | ICD-10-CM

## 2022-05-17 PROCEDURE — 99999 PR PBB SHADOW E&M-EST. PATIENT-LVL V: CPT | Mod: PBBFAC,,, | Performed by: ANESTHESIOLOGY

## 2022-05-17 PROCEDURE — 3008F BODY MASS INDEX DOCD: CPT | Mod: CPTII,S$GLB,, | Performed by: ANESTHESIOLOGY

## 2022-05-17 PROCEDURE — 3074F PR MOST RECENT SYSTOLIC BLOOD PRESSURE < 130 MM HG: ICD-10-PCS | Mod: CPTII,S$GLB,, | Performed by: ANESTHESIOLOGY

## 2022-05-17 PROCEDURE — 3079F PR MOST RECENT DIASTOLIC BLOOD PRESSURE 80-89 MM HG: ICD-10-PCS | Mod: CPTII,S$GLB,, | Performed by: ANESTHESIOLOGY

## 2022-05-17 PROCEDURE — 99999 PR PBB SHADOW E&M-EST. PATIENT-LVL V: ICD-10-PCS | Mod: PBBFAC,,, | Performed by: ANESTHESIOLOGY

## 2022-05-17 PROCEDURE — 3079F DIAST BP 80-89 MM HG: CPT | Mod: CPTII,S$GLB,, | Performed by: ANESTHESIOLOGY

## 2022-05-17 PROCEDURE — 99204 OFFICE O/P NEW MOD 45 MIN: CPT | Mod: S$GLB,,, | Performed by: ANESTHESIOLOGY

## 2022-05-17 PROCEDURE — 1159F PR MEDICATION LIST DOCUMENTED IN MEDICAL RECORD: ICD-10-PCS | Mod: CPTII,S$GLB,, | Performed by: ANESTHESIOLOGY

## 2022-05-17 PROCEDURE — 3008F PR BODY MASS INDEX (BMI) DOCUMENTED: ICD-10-PCS | Mod: CPTII,S$GLB,, | Performed by: ANESTHESIOLOGY

## 2022-05-17 PROCEDURE — 1160F RVW MEDS BY RX/DR IN RCRD: CPT | Mod: CPTII,S$GLB,, | Performed by: ANESTHESIOLOGY

## 2022-05-17 PROCEDURE — 3074F SYST BP LT 130 MM HG: CPT | Mod: CPTII,S$GLB,, | Performed by: ANESTHESIOLOGY

## 2022-05-17 PROCEDURE — 99204 PR OFFICE/OUTPT VISIT, NEW, LEVL IV, 45-59 MIN: ICD-10-PCS | Mod: S$GLB,,, | Performed by: ANESTHESIOLOGY

## 2022-05-17 PROCEDURE — 1160F PR REVIEW ALL MEDS BY PRESCRIBER/CLIN PHARMACIST DOCUMENTED: ICD-10-PCS | Mod: CPTII,S$GLB,, | Performed by: ANESTHESIOLOGY

## 2022-05-17 PROCEDURE — 1159F MED LIST DOCD IN RCRD: CPT | Mod: CPTII,S$GLB,, | Performed by: ANESTHESIOLOGY

## 2022-05-17 RX ORDER — PREGABALIN 75 MG/1
CAPSULE ORAL
Qty: 147 CAPSULE | Refills: 0 | Status: SHIPPED | OUTPATIENT
Start: 2022-05-17 | End: 2022-05-17 | Stop reason: CLARIF

## 2022-05-17 NOTE — PATIENT INSTRUCTIONS
General Neck and Back Pain    Both neck and back pain are usually caused by injury to the muscles or ligaments of the spine. Sometimes the disks that separate each bone of the spine may cause pain by pressing on a nearby nerve. Back and neck pain may appear after a sudden twisting or bending force (such as in a car accident), or sometimes after a simple awkward movement. In either case, muscle spasm is often present and adds to the pain.  Acute neck and back pain usually gets better in 1 to 2 weeks. Pain related to disk disease, arthritis in the spinal joints or spinal stenosis (narrowing of the spinal canal) can become chronic and last for months or years.  Back and neck pain are common problems. Most people feel better in 1 or 2 weeks, and most of the rest in 1 to 2 months. Most people can remain active.  People experience and describe pain differently.  Pain can be sharp, stabbing, shooting, aching, cramping, or burning  Movement, standing, bending, lifting, sitting, or walking may worsen the pain  Pain can be localized to one spot or area, or it can be more generalized  Pain can spread or radiate upwards, downwards, to the front, or go down your arms  Muscle spasm may occur.  Most of the time mechanical problems with the muscles or spine cause the pain. it is usually caused by an injury, whether known or not, to the muscles or ligaments. While illnesses can cause back pain, it is usually not caused by a serious illness. Pain is usually related to physical activity, whether sports, exercise, work, or normal activity. Sometimes it can occur without an identifiable cause. This can happen simply by stretching or moving wrong, without noting pain at the time. Other causes include:  Overexertion, lifting, pushing, pulling incorrectly or too aggressively.  Sudden twisting, bending or stretching from an accident (car or fall), or accidental movement.  Poor posture  Poor conditioning, lack of regular exercise  Spinal  disc disease or arthritis  Stress  Pregnancy, or illness like appendicitis, bladder or kidney infection, pelvic infections   Home care  For neck pain: Use a comfortable pillow that supports the head and keeps the spine in a neutral position. The position of the head should not be tilted forward or backward.  When in bed, try to find a position of comfort. A firm mattress is best. Try lying flat on your back with pillows under your knees. You can also try lying on your side with your knees bent up towards your chest and a pillow between your knees.  At first, do not try to stretch out the sore spots. If there is a strain, it is not like the good soreness you get after exercising without an injury. In this case, stretching may make it worse.  Avoid prolonged sitting, long car rides or travel. This puts more stress on the lower back than standing or walking.  During the first 24 to 72 hours after an injury, apply an ice pack to the painful area for 20 minutes and then remove it for 20 minutes over a period of 60 to 90 minutes or several times a day.   You can alternate ice and heat therapies. Talk with your healthcare provider about the best treatment for your back or neck pain. As a safety precaution, do not use a heating pad at bedtime. Sleeping with a heating pad can lead to skin burns or tissue damage.  Therapeutic massage can help relax the back and neck muscles without stretching them.  Be aware of safe lifting methods and do not lift anything over 15 pounds until all the pain is gone.  Medications  Talk to your healthcare provider before using medicine, especially if you have other medical problems or are taking other medicines.  You may use over-the-counter medicine to control pain, unless another pain medicine was prescribed. If you have chronic conditions like diabetes, liver or kidney disease, stomach ulcers,  gastrointestinal bleeding, or are taking blood thinner medicines.  Be careful if you are given pain  medicines, narcotics, or medicine for muscle spasm. They can cause drowsiness, and can affect your coordination, reflexes, and judgment. Do not drive or operate heavy machinery.  Follow-up care  Follow up with your healthcare provider, or as advised. Physical therapy or further tests may be needed.  If X-rays were taken, you will be notified of any new findings that may affect your care.  Call 911  Seek emergency medical care if any of the following occur:  Trouble breathing  Confusion  Very drowsy or trouble awakening  Fainting or loss of consciousness  Rapid or very slow heart rate  Loss of bowel or bladder control  When to seek medical advice  Call your healthcare provider right away if any of these occur:  Pain becomes worse or spreads into your arms or legs  Weakness, numbness or pain in one or both arms or legs  Numbness in the groin area  Difficulty walking  Fever of 100.4ºF (38ºC) or higher, or as directed by your healthcare provider  Date Last Reviewed: 7/1/2016  © 3524-5324 [employer]. [address] [place] [address]. All rights reserved. This information is not intended as a substitute for professional medical care. Always follow your healthcare professional's instructions.       Understanding Lumbar Radiculopathy    Lumbar radiculopathy is irritation or inflammation of a nerve root in the low back. It causes symptoms that spread out from the back down one or both legs. To understand this condition, it helps to understand the parts of the spine:  Vertebrae. These are bones that stack to form the spine. The lumbar spine contains the 5 bottom vertebrae.  Disks. These are soft pads of tissue between the vertebrae. They act as shock absorbers for the spine.  Spinal canal. This is a tunnel formed within the stacked vertebrae. In the lumbar spine, nerves run through this canal.  Nerves. These branch off and leave the spinal canal, traveling out to parts of the body. As they leave the  spinal canal, nerves pass through openings between the vertebrae. The nerve root is the part of the nerve that is closest to the spinal canal.  Sciatic nerve. This is a large nerve formed from several nerve roots in the low back. This nerve extends down the back of the leg to the foot.  With lumbar radiculopathy, nerve roots in the low back become irritated. This leads to pain and symptoms. The sciatic nerve is commonly involved, so the condition is often called sciatica.  What causes lumbar radiculopathy?  Aging, injury, poor posture, extra body weight, and other issues can lead to problems in the low back. These problems may then irritate nerve roots. They include:  Damage to a disk in the lumbar spine. The damaged disk may then press on nearby nerve roots.  Degeneration from wear and tear, and aging. This can lead to narrowing (stenosis) of the openings between the vertebrae. The narrowed openings press on nerve roots as they leave the spinal canal.  Unstable spine. This is when a vertebra slips forward. It can then press on a nerve root.  Other, less common things can put pressure on nerves in the low back. These include diabetes, infection, or a tumor.  Symptoms of lumbar radiculopathy  These include:  Pain in the low back  Pain, numbness, tingling, or weakness that travels into the buttocks, hip, groin, or leg  Muscle spasms  Treatment for lumbar radiculopathy  In most cases, your healthcare provider will first try treatments that help relieve symptoms. These may include:  Prescription and over-the-counter pain medicines. These help relieve pain, swelling, and irritation.  Limits on positions and activities that increase pain. But lying in bed or avoiding all movement is only recommended for a short period of time.  Physical therapy, including exercises and stretches. This helps decrease pain and increase movement and function.  Steroid shots into the lower back. This may help relieve symptoms for a  time.  Weight-loss program. If you are overweight, losing extra pounds may help relieve symptoms.  In some cases, you may need surgery to fix the underlying problem. This depends on the cause, the symptoms, and how long the pain has lasted.  Possible complications  Over time, an irritated and inflamed nerve may become damaged. This may lead to long-lasting (permanent) numbness or weakness in your legs and feet. If symptoms change suddenly or get worse, be sure to let your healthcare provider know.  When to call your healthcare provider  Call your healthcare provider right away if you have any of these:  New pain or pain that gets worse  New or increasing weakness, tingling, or numbness in your leg or foot  Problems controlling your bladder or bowel   Date Last Reviewed: 3/10/2016  © 7377-8427 [employer]. [address] [place] [address], [place] [address]. All rights reserved. This information is not intended as a substitute for professional medical care. Always follow your healthcare professional's instructions.       Exercises to Strengthen Your Lower Back  Strong lower back and abdominal muscles work together to support your spine. The exercises below will help strengthen the lower back. It is important that you begin exercising slowly and increase levels gradually.  Always begin any exercise program with stretching. If you feel pain while doing any of these exercises, stop and talk to your doctor about a more specific exercise program that better suits your condition.   Low back stretch  The point of stretching is to make you more flexible and increase your range of motion. Stretch only as much as you are able. Stretch slowly. Do not push your stretch to the limit. If at any point you feel pain while stretching, this is your (temporary) limit.  Lie on your back with your knees bent and both feet on the ground.  Slowly raise your left knee to your chest as you flatten your lower back against the floor. Hold  for 5 seconds.  Relax and repeat the exercise with your right knee.  Do 10 of these exercises for each leg.  Repeat hugging both knees to your chest at the same time.  Building lower back strength  Start your exercise routine with 10 to 30 minutes a day, 1 to 3 times a day.  Initial exercises  Lying on your back:  Ankle pumps: Move your foot up and down, towards your head, and then away. Repeat 10 times with each foot.  Heel slides: Slowly bend your knee, drawing the heel of your foot towards you. Then slide your heel/foot from you, straightening your knee. Do not lift your foot off the floor (this is not a leg lift).  Abdominal contraction: Bend your knees and put your hands on your stomach. Tighten your stomach muscles. Hold for 5 seconds, then relax. Repeat 10 times.  Straight leg raise: Bend one leg at the knee and keep the other leg straight. Tighten your stomach muscles. Slowly lift your straight leg 6 to 12 inches off the floor and hold for up to 5 seconds. Repeat 10 times on each side.  Standing:  Wall squats: Stand with your back against the wall. Move your feet about 12 inches away from the wall. Tighten your stomach muscles, and slowly bend your knees until they are at about a 45 degree angle. Do not go down too far. Hold about 5 seconds. Then slowly return to your starting position. Repeat 10 times.  Heel raises: Stand facing the wall. Slowly raise the heels of your feet up and down, while keeping your toes on the floor. If you have trouble balancing, you can touch the wall with your hands. Repeat 10 times.  More advanced exercises  When you feel comfortable enough, try these exercises.  Kneeling lumbar extension: Begin on your hands and knees. At the same time, raise and straighten your right arm and left leg until they are parallel to the ground. Hold for 2 seconds and come back slowly to a starting position. Repeat with left arm and right leg, alternating 10 times.  Prone lumbar extension: Lie face  down, arms extended overhead, palms on the floor. At the same time, raise your right arm and left leg as high as comfortably possible. Hold for 10 seconds and slowly return to start. Repeat with left arm and right leg, alternating 10 times. Gradually build up to 20 times. (Advanced: Repeat this exercise raising both arms and both legs a few inches off the floor at the same time. Hold for 5 seconds and release.)  Pelvic tilt: Lie on the floor on your back with your knees bent at 90 degrees. Your feet should be flat on the floor. Inhale, exhale, then slowly contract your abdominal muscles bringing your navel toward your spine. Let your pelvis rock back until your lower back is flat on the floor. Hold for 10 seconds while breathing smoothly.  Abdominal crunch: Perform a pelvic tilt (above) flattening your lower back against the floor. Holding the tension in your abdominal muscles, take another breath and raise your shoulder blades off the ground (this is not a full sit-up). Keep your head in line with your body (dont bend your neck forward). Hold for 2 seconds, then slowly lower.  Date Last Reviewed: 6/1/2016  © 5901-5162 [employer]. [address] [place] [address], [place] [address]. All rights reserved. This information is not intended as a substitute for professional medical care. Always follow your healthcare professional's instructions.

## 2022-05-25 ENCOUNTER — TELEPHONE (OUTPATIENT)
Dept: SURGERY | Facility: CLINIC | Age: 67
End: 2022-05-25
Payer: MEDICARE

## 2022-05-25 NOTE — TELEPHONE ENCOUNTER
"States where his ostomy was he has started forming a "lump" - Scheduled patient an appt to come in for eval of the area - Monday 6/13 [facility] @ 0900 - Patient correctly repeated back all appt information      ----- Message from Zara De Leon sent at 5/25/2022  3:11 PM CDT -----  Pt would like to schedule an appt because he is having some problems again he stated.  Call back number is [phone]. Thx. El      "

## 2022-06-13 ENCOUNTER — OFFICE VISIT (OUTPATIENT)
Dept: SURGERY | Facility: CLINIC | Age: 67
End: 2022-06-13
Payer: MEDICARE

## 2022-06-13 ENCOUNTER — LAB VISIT (OUTPATIENT)
Dept: LAB | Facility: HOSPITAL | Age: 67
End: 2022-06-13
Attending: COLON & RECTAL SURGERY
Payer: MEDICARE

## 2022-06-13 VITALS
SYSTOLIC BLOOD PRESSURE: 134 MMHG | WEIGHT: 153.25 LBS | HEART RATE: 74 BPM | TEMPERATURE: 98 F | DIASTOLIC BLOOD PRESSURE: 80 MMHG | BODY MASS INDEX: 21.37 KG/M2

## 2022-06-13 DIAGNOSIS — C20 RECTAL ADENOCARCINOMA: ICD-10-CM

## 2022-06-13 DIAGNOSIS — K43.9 VENTRAL HERNIA WITHOUT OBSTRUCTION OR GANGRENE: ICD-10-CM

## 2022-06-13 DIAGNOSIS — C20 RECTAL ADENOCARCINOMA: Primary | ICD-10-CM

## 2022-06-13 LAB — CEA SERPL-MCNC: 159.9 NG/ML (ref 0–5)

## 2022-06-13 PROCEDURE — 3008F PR BODY MASS INDEX (BMI) DOCUMENTED: ICD-10-PCS | Mod: CPTII,S$GLB,, | Performed by: COLON & RECTAL SURGERY

## 2022-06-13 PROCEDURE — 3008F BODY MASS INDEX DOCD: CPT | Mod: CPTII,S$GLB,, | Performed by: COLON & RECTAL SURGERY

## 2022-06-13 PROCEDURE — 99214 OFFICE O/P EST MOD 30 MIN: CPT | Mod: S$GLB,,, | Performed by: COLON & RECTAL SURGERY

## 2022-06-13 PROCEDURE — 3075F PR MOST RECENT SYSTOLIC BLOOD PRESS GE 130-139MM HG: ICD-10-PCS | Mod: CPTII,S$GLB,, | Performed by: COLON & RECTAL SURGERY

## 2022-06-13 PROCEDURE — 3075F SYST BP GE 130 - 139MM HG: CPT | Mod: CPTII,S$GLB,, | Performed by: COLON & RECTAL SURGERY

## 2022-06-13 PROCEDURE — 36415 COLL VENOUS BLD VENIPUNCTURE: CPT

## 2022-06-13 PROCEDURE — 99999 PR PBB SHADOW E&M-EST. PATIENT-LVL III: ICD-10-PCS | Mod: PBBFAC,,, | Performed by: COLON & RECTAL SURGERY

## 2022-06-13 PROCEDURE — 99999 PR PBB SHADOW E&M-EST. PATIENT-LVL III: CPT | Mod: PBBFAC,,, | Performed by: COLON & RECTAL SURGERY

## 2022-06-13 PROCEDURE — 3079F DIAST BP 80-89 MM HG: CPT | Mod: CPTII,S$GLB,, | Performed by: COLON & RECTAL SURGERY

## 2022-06-13 PROCEDURE — 82378 CARCINOEMBRYONIC ANTIGEN: CPT

## 2022-06-13 PROCEDURE — 1159F MED LIST DOCD IN RCRD: CPT | Mod: CPTII,S$GLB,, | Performed by: COLON & RECTAL SURGERY

## 2022-06-13 PROCEDURE — 1159F PR MEDICATION LIST DOCUMENTED IN MEDICAL RECORD: ICD-10-PCS | Mod: CPTII,S$GLB,, | Performed by: COLON & RECTAL SURGERY

## 2022-06-13 PROCEDURE — 99214 PR OFFICE/OUTPT VISIT, EST, LEVL IV, 30-39 MIN: ICD-10-PCS | Mod: S$GLB,,, | Performed by: COLON & RECTAL SURGERY

## 2022-06-13 PROCEDURE — 3079F PR MOST RECENT DIASTOLIC BLOOD PRESSURE 80-89 MM HG: ICD-10-PCS | Mod: CPTII,S$GLB,, | Performed by: COLON & RECTAL SURGERY

## 2022-06-13 NOTE — PROGRESS NOTES
History & Physical    SUBJECTIVE:     Chief Complaint   Patient presents with    Follow-up     HX Rectal Ca// Last appt 3/2021   Ref MD: Isai Centeno MD    History of Present Illness:  Patient is a 66 y.o. male presents for evaluation of a rectal mass.  Patient has been having iron deficiency anemia that did require transfusion around 1 year ago.  He also has had associated hematochezia for over a year now but did improve after he quit drinking beer at 8 months ago but is still intermittently present. This prompted a colonoscopy which was performed on 09/03/2019 where an obstructing rectal mass was seen that appeared malignant was biopsied and could not be traversed.  Patient reports that he had a colonoscopy around 6-7 years ago were some benign polyps were found but no malignancy.  These records are not available.  He states that the hematochezia has recently improved after he quit drinking beer, but is still intermittently present and worsen with the bowel prep from the colonoscopy recently.  He is not on any chronic anticoagulation.  He states he has had normal caliber bowel movements does not feel constipated or obstructed.  He denies any fever, chills, nausea, vomiting, diarrhea, constipation, weight loss, night sweats or any other signs or symptoms.    12/4/19: Completed Neoadj chemoXRT  2/4/2020 : Robotic ultra-low anterior resection with DLI and Mediport placement  June 2020: Completed adjuvant chemotx  8/4/2020: DLI reversal    Interval history:  Since last clinic visit, the patient has done well. He did notice a new bulge at the site of his previous ileostomy 3-4 months ago while moving tomato plants. He does not have significant pain in the area. He is tolerating a regular diet and having normal bowel movements. He denies abdominal pain, fevers, chills, nausea, vomiting, hematochezia and melena.     Review of patient's allergies indicates:  No Known Allergies    Current Outpatient Medications    Medication Sig Dispense Refill    ascorbic acid, vitamin C, (VITAMIN C) 1000 MG tablet Take 1,000 mg by mouth once daily.      cyanocobalamin (VITAMIN B-12) 1000 MCG tablet Take 100 mcg by mouth once daily.      diclofenac sodium (VOLTAREN) 1 % Gel Apply 2 g topically 3 (three) times daily as needed. 2 Tube 6    diclofenac sodium (VOLTAREN) 1 % Gel Apply 2 g topically 3 (three) times daily. 1 each 1    mirtazapine (REMERON) 15 MG tablet Take 1 tablet (15 mg total) by mouth every evening. 90 tablet 1    HYDROcodone-acetaminophen (NORCO)  mg per tablet Take 1 tablet by mouth every 8 (eight) hours as needed for Pain. (Patient not taking: Reported on 6/13/2022) 21 tablet 0    ondansetron (ZOFRAN-ODT) 4 MG TbDL Take 2 tablets (8 mg total) by mouth every 8 (eight) hours as needed. (Patient not taking: Reported on 6/13/2022) 20 tablet 0    pregabalin (LYRICA) 225 MG Cap Take 1 tablet by mouth twice a day (Patient not taking: Reported on 6/13/2022) 60 capsule 0    pregabalin (LYRICA) 75 MG capsule Take 1 capsule by mouth daily for 7 days, then 1 capsule by mouth twice a day for 7 days then take 2 by mouth in the morning and 1 at night for 7 days (Patient not taking: Reported on 6/13/2022) 42 capsule 0    pregabalin (LYRICA) 75 MG capsule Take 1 tablet by mouth twice a day for 7 days then take 3 tablets in the morning and 2 tablets in the evening for 7 days then fill Lyrica 225mg (Patient not taking: Reported on 6/13/2022) 63 capsule 0    tamsulosin (FLOMAX) 0.4 mg Cap Take 1 capsule (0.4 mg total) by mouth once daily. 30 capsule 11    traMADoL (ULTRAM) 50 mg tablet Take 1 tablet (50 mg total) by mouth every 6 (six) hours. (Patient not taking: Reported on 6/13/2022) 30 tablet 0     No current facility-administered medications for this visit.     Facility-Administered Medications Ordered in Other Visits   Medication Dose Route Frequency Provider Last Rate Last Admin    0.9%  NaCl infusion   Intravenous  Continuous Delonte cMintyre MD        0.9%  NaCl infusion   Intravenous Continuous Delonte Mcintyre MD        alteplase injection 2 mg  2 mg Intra-Catheter PRN Mikel Sams MD        chlorhexidine 0.12 % solution 10 mL  10 mL Mouth/Throat On Call Procedure Delonte Mcintyre MD   10 mL at 05/05/21 1222    chlorhexidine 0.12 % solution 10 mL  10 mL Mouth/Throat On Call Procedure Delonte Mcintyre MD   10 mL at 01/04/22 0628    diphenhydrAMINE injection 25 mg  25 mg Intravenous Q6H PRN Keli Alcaraz MD        HYDROmorphone (PF) injection 0.2 mg  0.2 mg Intravenous Q5 Min PRN Keli Alcaraz MD   0.2 mg at 05/05/21 1505    HYDROmorphone (PF) injection 0.2 mg  0.2 mg Intravenous Q5 Min PRN Keli Alcaraz MD        lactated ringers infusion   Intravenous Continuous Familia Gonzalez MD   Stopped at 03/24/21 0825    metoclopramide HCl injection 10 mg  10 mg Intravenous Q10 Min PRN Keli Alcaraz MD        nozaseptin (NOZIN) nasal    Each Nostril On Call Procedure Delonte Mcintyre MD   Given at 05/05/21 1222    sodium chloride 0.9% flush 10 mL  10 mL Intravenous PRN Mikel Sams MD        sodium chloride 0.9% flush 3 mL  3 mL Intravenous PRN Shilpa Yee MD        sodium chloride 0.9% flush 3 mL  3 mL Intravenous Q8H Keli Alcaraz MD           Past Medical History:   Diagnosis Date    Alcoholism     Anemia     Back pain     Encounter for blood transfusion 2018    Essential hypertension 2/4/2016    GERD (gastroesophageal reflux disease)     Hiatal hernia     Hypertension     diet controlled    Melanoma 06/2021    left cheek    Rectal cancer 9/11/2019     Past Surgical History:   Procedure Laterality Date    ARTHROSCOPY OF KNEE Right 5/5/2021    Procedure: ARTHROSCOPY, KNEE;  Surgeon: Delonte Mcintyre MD;  Location[facility];  Service: Orthopedics;  Laterality: Right;    COLONOSCOPY N/A 9/3/2019    Procedure: COLONOSCOPY;  Surgeon:  Isai Centeno MD;  Location: [facility];  Service: Endoscopy;  Laterality: N/A;    COLONOSCOPY N/A 1/10/2020    Procedure: COLONOSCOPY;  Surgeon: Familia Gonzalez MD;  Location: [facility] END[facility];  Service: General;  Laterality: N/A;    COLONOSCOPY N/A 3/24/2021    Procedure: COLONOSCOPY;  Surgeon: Familia Gonzalez MD;  Location: [facility] END[facility];  Service: General;  Laterality: N/A;    ESOPHAGOGASTRODUODENOSCOPY N/A 9/3/2019    Procedure: ESOPHAGOGASTRODUODENOSCOPY (EGD);  Surgeon: Isai Centeno MD;  Location: [facility];  Service: Endoscopy;  Laterality: N/A;    EXCISION OF MEDIAL MENISCUS OF KNEE Right 5/5/2021    Procedure: MENISCECTOMY, KNEE, MEDIAL;  Surgeon: Delonte Mcintyre MD;  Location: [facility];  Service: Orthopedics;  Laterality: Right;  partial Lateral    FLEXIBLE SIGMOIDOSCOPY  2/4/2020    Procedure: SIGMOIDOSCOPY, FLEXIBLE;  Surgeon: Familia Gonzalez MD;  Location: [facility] OR;  Service: General;;    ILEOSTOMY Right 2/4/2020    Procedure: CREATION, ILEOSTOMY;  Surgeon: Familia Gonzalez MD;  Location: [facility] OR;  Service: General;  Laterality: Right;    ILEOSTOMY CLOSURE N/A 8/4/2020    Procedure: CLOSURE, ILEOSTOMY;  Surgeon: Familia Gonzalez MD;  Location: [facility] OR;  Service: General;  Laterality: N/A;    INCISION AND DRAINAGE OF BACK Left 8/5/2020    Procedure: INCISION AND DRAINAGE, BACK;  Surgeon: Familia Gonzalez MD;  Location: [facility] OR;  Service: General;  Laterality: Left;    INJECTION OF ANESTHETIC AGENT INTO TISSUE PLANE DEFINED BY TRANSVERSUS ABDOMINIS MUSCLE N/A 2/4/2020    Procedure: BLOCK, TRANSVERSUS ABDOMINIS PLANE;  Surgeon: Familia Gonzalez MD;  Location: [facility] OR;  Service: General;  Laterality: N/A;    INSERTION OF TUNNELED CENTRAL VENOUS CATHETER (CVC) WITH SUBCUTANEOUS PORT Right 2/4/2020    Procedure: INSERTION, PORT-A-CATH;  Surgeon: Familia Gonzalez MD;  Location: [facility] OR;  Service: General;  Laterality: Right;    JOINT REPLACEMENT Left 2009    Hip    KNEE  ARTHROSCOPY W/ PLICA EXCISION Right 5/5/2021    Procedure: EXCISION, PLICA, KNEE, ARTHROSCOPIC;  Surgeon: Delonte Mcintyre MD;  Location: [facility] OR;  Service: Orthopedics;  Laterality: Right;    MOBILIZATION OF SPLENIC FLEXURE  2/4/2020    Procedure: MOBILIZATION, SPLENIC FLEXURE;  Surgeon: Familia Gonzalez MD;  Location: [facility] OR;  Service: General;;    REMOVAL OF HARDWARE FROM HIP Left 1/4/2022    Procedure: REMOVAL, HARDWARE, HIP;  Surgeon: Delonte Mcintyre MD;  Location: [facility] OR;  Service: Orthopedics;  Laterality: Left;     Family History   Problem Relation Age of Onset    Cataracts Mother     Stroke Father     Hypertension Father      Social History     Tobacco Use    Smoking status: Never Smoker    Smokeless tobacco: Never Used   Substance Use Topics    Alcohol use: Yes     Comment: occassionally No alcohol 72h prior to sx    Drug use: No        Review of Systems:  Review of Systems   Constitutional: Negative for activity change, appetite change, chills, fatigue, fever and unexpected weight change.   HENT: Negative for congestion, ear pain, sore throat and trouble swallowing.    Eyes: Negative for pain, redness and itching.   Respiratory: Negative for cough, shortness of breath and wheezing.    Cardiovascular: Negative for chest pain, palpitations and leg swelling.   Gastrointestinal: Negative for abdominal distention, abdominal pain, anal bleeding, blood in stool, constipation, diarrhea, nausea, rectal pain and vomiting.   Endocrine: Negative for cold intolerance, heat intolerance and polyuria.   Genitourinary: Negative for dysuria, flank pain, frequency and hematuria.   Musculoskeletal: Negative for gait problem, joint swelling and neck pain.   Skin: Negative for color change, rash and wound.   Allergic/Immunologic: Negative for environmental allergies and immunocompromised state.   Neurological: Negative for dizziness, speech difficulty, weakness and numbness.   Psychiatric/Behavioral:  Negative for agitation, confusion and hallucinations.       OBJECTIVE:     Vital Signs (Most Recent)  Temp: 98.2 °F (36.8 °C) (06/13/22 0909)  Pulse: 74 (06/13/22 0909)  BP: 134/80 (06/13/22 0909)     69.5 kg (153 lb 3.5 oz)     Physical Exam:  Physical Exam  Constitutional:       Appearance: He is well-developed.   HENT:      Head: Normocephalic and atraumatic.   Eyes:      Conjunctiva/sclera: Conjunctivae normal.   Neck:      Thyroid: No thyromegaly.   Cardiovascular:      Rate and Rhythm: Normal rate and regular rhythm.   Pulmonary:      Effort: Pulmonary effort is normal. No respiratory distress.   Abdominal:      Comments: Soft, nondistended, nontender; reducible umiblical and previous RLQ ileostomy site hernias; ileostomy site hernia around 2.5cm defect; well-healed incision   Musculoskeletal:         General: No tenderness. Normal range of motion.      Cervical back: Normal range of motion.   Skin:     General: Skin is warm and dry.      Capillary Refill: Capillary refill takes less than 2 seconds.      Findings: No rash.   Neurological:      Mental Status: He is alert and oriented to person, place, and time.   Psychiatric:         Behavior: Behavior normal.       Laboratory  Lab Results   Component Value Date    WBC 6.19 01/03/2022    HGB 12.5 (L) 01/03/2022    HCT 40.3 01/03/2022     01/03/2022    CHOL 180 03/16/2020    TRIG 43 03/16/2020    HDL 81 (H) 03/16/2020    ALT 21 01/03/2022    AST 21 01/03/2022     01/03/2022    K 4.7 01/03/2022    CL 98 01/03/2022    CREATININE 0.8 01/03/2022    BUN 15 01/03/2022    CO2 25 01/03/2022    TSH 0.601 04/07/2018    PSA 1.4 03/16/2020    INR 0.9 11/06/2018    HGBA1C 5.7 (H) 06/08/2020         Diagnostic Results:  Reviewed colonoscopy report and images with rectal mass appreciated     MRI Pelvis Sept 2019:  FINDINGS:  Approximately 6.5 cm from the anal verge is a T2 hyperintense mass in the mid rectum.  This results in circumferential narrowing of the  rectum.  Tumor extends for approximately 3 cm in oblique craniocaudal dimension.  It measures 3.6 cm and greater such transverse dimension by approximately 2.7 cm AP.  The mass demonstrates a somewhat mushroom appearance along its inferior margin.  There is hyperenhancement and stranding in the adjacent fat with spiculation/nodularity suggestive of extramural spread of disease.  This extends for approximately 5.5 mm minimum.  Distance from the tumor to the mesorectal fascia measures on the order of 2.1 cm    There is a 5.2 mm left perirectal lymph node seen on image 11 of series 8 with heterogeneous appearance.  This is located approximately 1.7 mm from the mesorectal fascia.  Margins appear slightly irregular.    Additional lymph node is seen inferior to the rectum on image 12 of series 8.  This measures 4.7 mm.  Other rounded appearing lymph nodes are seen in the right mesorectum on image 16 of series 8 1 of these measures 5 mm and the other measures 5.1 mm.    The intersphincteric complex is maintained.    Relationship to anterior peritoneal reflection: Appears to partially straddle the APR    Tumor Extent: Appears to extend beyond mucosa.  There is spiculation perirectal fat.  Distance tumor to the mesorectal fascia is 2.1 cm    Lymph Nodes:    There are perirectal lymph nodes greater than 5 mmin the mesorectal fat. The distance of the closest lymph node to the mesorectal fascia is 1.7 mm.  Please see above discussion.    There are no extra-mesorectal nodes lymph nodes in the visualized pelvis.    There is no evident vascular invasion.      Impression       Mid rectal mass with findings suspicious for extension into the mesorectal fat with tumor extending approximately 5.5 mm with associated spiculation of the fat noted.  There are suspicious mesorectal lymph nodes with the largest 1 measuring just over 5 mm and located 1.7 mm from the mesorectal fascia.       CT C/A/P Sept 2019:  FINDINGS:  Chest:    There is  elevation the right hemidiaphragm and bibasilar areas of parenchymal lung scarring or atelectasis.  A more confluent area of irregular masslike opacity and air bronchograms is identified in the posterior left lower lobe which has increased from prior.  There are low-grade ground-glass opacities in the bilateral lower lobes, right middle lobe, and to a lesser degree in the upper lobes.  No pleural effusion.    There is a borderline enlarged 10 mm in short axis precarinal lymph node.  Subcentimeter nodes are present elsewhere in the mediastinum and bilateral axilla.  Aorta, heart, and pericardium are unremarkable.    Abdomen/pelvis:    There is fatty infiltration of the liver.  There is a newly developed ill-defined focal hypodensity in the posteromedial segment of the left hepatic lobe measuring 3.1 x 2.7 x 1.3 cm.  No radiopaque gallstones.  Prominence of the common bile duct measuring up to 12 mm and pancreatic duct measuring up to 6 mm, unchanged from prior.  No visible intraductal stones.  Main portal vein is patent.    The spleen is nonenlarged.  No evidence of pancreatic mass or inflammation.  Kidneys enhance symmetrically.  Adrenals are unremarkable.    There is circumferential masslike thickening of the rectum.  No evidence of bowel obstruction.  Multiple bilateral subcentimeter in short axis mesorectal and internal iliac lymph nodes are identified.  No evidence of pathologic inguinal or external iliac lymphadenopathy.  There is sigmoid diverticulosis.  Stomach and visualized small bowel loops are unremarkable.  Normal appendix.  No intraperitoneal free air or fluid.  Fat containing periumbilical hernia.    There is aortoiliac atherosclerosis.  No evidence of aneurysm.  Shotty subcentimeter lymph nodes in the periaortic retroperitoneum.    The bladder is unremarkable.  Prostate mildly enlarged with calcifications.  Seminal vesicles unremarkable.    Skeletal:    There is degenerative change in the spine and  right hip.  Intramedullary nail present in the left hip.  No suspicious intraosseous lesions.      Impression       1. Rectal mass highly suspicious for malignancy.  2. Multiple bilateral mesorectal and internal iliac lymph nodes concerning for metastasis.  3. Ill-defined hypodensity in the left lobe of the liver, new since 2017 and concerning for metastasis.  4. Indeterminate developing area of masslike opacity in the posterior left lung base.  Possible infection/inflammation versus neoplasm.     MRI Liver:  FINDINGS:  The liver demonstrates a subtle hypointense T2 isointense T1 signal area in the deep aspect of the left hepatic lobe, medial segment.  This area measures approximately 11 x 18 mm axially.    The lesion displays a decrease in signal intensity on the out of phase gradient echo sequence suggesting fat containing focal fatty infiltration.    Following contrast administration there is displays mild relative hypoenhancement with respect to the adjacent parenchyma.  No hyperenhancement is seen.    The adjacent gallbladder appears normal.    Bile ducts are nondilated.      Impression       Probable focal fatty infiltration of the medial segment of the left hepatic lobe.          PET Scan:  FINDINGS:  Head/neck: There is normal physiologic FDG uptake noted within the visualized brain parenchyma. No FDG avid lymphadenopathy within the neck.    Chest: There are ill-defined patchy and nodular parenchymal opacities again noted in the left lower lobe exhibiting low level FDG uptake with an SUV max of 2.5.  A new 13 mm ground-glass opacity is noted in the lateral left upper lobe exhibiting weak FDG avidity with an SUV max of 1.9.  Similar newly developed ground-glass opacity present in the mid right lung adjacent to the major fissure with SUV max of 1.6. No FDG avid mediastinal, hilar or axillary lymph nodes.    Abdomen/Pelvis: Normal physiologic FDG uptake noted within the liver, spleen, urinary tract, and  bowel.Focal hypodensity is again noted in the left hepatic lobe which is non FDG avid and favored to represent focal fatty infiltration.  An intermediate length segment of circumferential mural thickening is again noted at the rectosigmoid junction which exhibits intense hypermetabolism and a SUV max of 11.8.  A hypermetabolic lymph node adjacent to the left pelvic sidewall has a SUV max of 5.3.    Skeletal: No FDG avid osseus lesions identified.      Impression       1. Hypermetabolic rectal mass consistent with known rectal malignancy.  2. Hypermetabolic regional lymph node adjacent to left pelvic sidewall.  3. Non FDG avid focal hypodensity in the liver favored to represent focal fatty infiltration.  4. Bilateral lung opacities most likely infectious or inflammatory etiology.  Follow-up recommended.     MRI Rectal Cancer/Pelvis Jan 2020:  FINDINGS:  Again seen is mid rectal mass.  There is persistent spiculation within the surrounding fat adjacent to the mass.  Spiculation extends to within 7 mm from the mesorectal fascia.  The mass demonstrates persistent enhancement with interval decrease in restricted diffusion suggesting partial treatment response.  No significant loss of normal mass signal intensity or fibrosis.  The mass currently on the order of 3.7 x 3.2 cm.  I remeasure the mass on the previous exam at 4.0 x 3.0 cm in axial dimension.    There is interval decrease in size of a 5.2 mm left perirectal lymph node with the node not clearly visualized on today's exam.  Additional lymph node in the right mesorectum measures 2.4 mm, decreased from 4.7 mm.  A lymph node in the right mesorectum on image 5 of series 12 measures 3.5 mm in short axis, also decreased.  It previously measured 5 mm in short axis.  Other nodes also appear smaller.  Small amount of pelvic fluid is noted.      Impression       Redemonstration of an enhancing rectal mass with persistent spiculation in the mesorectal fat.  The mass measures  slightly smaller. There is interval decrease in restricted diffusion in the mass suggesting at least partial treatment response.  Interval decrease in size of multiple mesorectal lymph nodes as above.     CT Abd/Pelvis (Dec 2020):  FINDINGS:  ABDOMEN:    - Lung bases: Atelectasis at the lung bases.  Airspace disease at the left lung base consistent with a left lower lobe infiltrate/pneumonia.    - Liver: Low-density fatty infiltrated liver.    - Gallbladder: No calcified gallstones.    - Bile Ducts: No evidence of intra or extra hepatic biliary ductal dilation.    - Spleen: Negative.    - Kidneys: No mass or hydronephrosis.    - Adrenals: Unremarkable.    - Pancreas: Fatty infiltrated pancreas.    - Retroperitoneum:  No significant adenopathy.    - Vascular: Scattered atherosclerotic calcifications of the abdominal aorta and proximal iliac vessels.    - Abdominal wall:  4 cm fat containing periumbilical hernia.    PELVIS:    No pelvic mass, adenopathy, or free fluid.    BOWEL/MESENTERY:    Scattered sigmoidal diverticulum.  Diverticulum of the ascending transverse and descending colon.  No radiographic evidence of diverticulitis.  The appendix appears normal.    BONES:  Multilevel degenerative changes of the thoracolumbar spine with disc space narrowing seen most significantly at the L1-2 L4-5 and L5-S1 levels.  Dynamic hip screw placement on the left      Impression       No evidence of a small-bowel obstruction.  Distension of the duodenum to the level of the 2nd stage.  Distal to that point no small bowel obstruction.  Diverticulosis without radiographic evidence of diverticulitis.  Normal appendix.  Normal gallbladder.  Low-density fatty infiltrated liver.  Increased density at the left lung base can not exclude a focal infiltrate/pneumonia versus atelectasis.     PATHOLOGY Feb 2020:  1. Sigmoid colon and rectum, low anterior resection:  Residual moderately differentiated invasive adenocarcinoma, 4 cm greatest  dimension  Tumor is located in the rectum  The distal, radial, and proximal margin are uninvolved by carcinoma  15 lymph nodes are negative for metastatic carcinoma (0/15)  Pathologic staging (pTNM): [id number]  See surgical pathology cancer case summary below  Surgical pathology cancer case summary: Colon and rectum. Protocol posting date: June 2017  Procedure: Low anterior resection  Tumor site: Rectum  Tumor size: 4 x 3 cm  Macroscopic tumor perforation: Not identified  Macroscopic intactness of mesorectum: Incomplete  Histologic type: Adenocarcinoma  Histologic grade: G2: Moderately differentiated  Tumor extension: Tumor invades through the muscularis propria into pericolorectal tissue  Margins: All margins are uninvolved by invasive carcinoma, high-grade dysplasia, intramucosal  adenocarcinoma, and adenoma  Margins examined: Proximal, distal, and radial  Distance of tumor from radial margin: 0.4 cm  Distance of tumor from distal margin: 0.5 cm  Distance of tumor from proximal margin: 17 cm  Treatment effect: Present: Residual cancer with evident tumor regression  Lymphovascular invasion: Present: Large vessel: Intramural  Perineural invasion: Not identified  Tumor deposits: Not identified  Regional lymph nodes: Number of lymph nodes involved: 0. Number of lymph nodes examined: 15  Pathologic stage classification (pTNM): [id number]  Immunohistochemistry testing for mismatch repair (MMR) proteins was performed on previous biopsy of the  rectal mass (surgical pathology case [id number]. Result as follows: No loss of nuclear expression of MMR  proteins: Low probability of microsatellite instability.  2. Proximal margin:  Fragment of colon with no evidence of malignancy  3. Anastomotic ring:  Fragment of colon with no evidence of malignancy  ASSESSMENT/PLAN:     66-year-old male with mid-rectal adenocarcinoma who is now s/p long-course neoadj chemoXRT which completed on 12/4/19 with partial treatment response on repeat MRI  and no evidence of metastatic disease on repeat CT Abd/pelvis who is now s/p robotic ultra-low anterior resection, DLI and mediport placement on 2/4/2020 who has recovered well postop with final path showing T3N0 who has now completed adjuvant chemotx in late June 2020 and is now s/p DLI closure on 8/4/2020    - CEA level today and Q3 months  - Repeat CT C/A/P for surveillance and to assess hernias  - Colonoscopy due in spring 2024  - Discussed possibility of hernia repair by one of our general surgeons. Will await CT results and if clear from a cancer surveillance standpoint, will refer to discuss repair  - Educated on signs and symptoms of hernia strangulation/bowel obstruction and advised to report to the ED should these symptoms occur.  - RTC 3 months for surveillance      Familia Gonzalez MD  Colon and Rectal Surgery  [facility]

## 2022-06-14 ENCOUNTER — TELEPHONE (OUTPATIENT)
Dept: SURGERY | Facility: CLINIC | Age: 67
End: 2022-06-14
Payer: MEDICARE

## 2022-06-14 DIAGNOSIS — C20 RECTAL ADENOCARCINOMA: Primary | ICD-10-CM

## 2022-06-14 NOTE — TELEPHONE ENCOUNTER
Spoke to patient's wife - Advised of patient's scheduled CT and Labs on Tuesday 7/19 [facility] Arrival time 0830 NPO 4 hours prior - Wife correctly repeated back all information and asked for my direct call back number incase patient has any questions - Gave my number [phone] Advised for patient to leave a voicemail message should he receive my voicemail - Advised I will also place an appointment letter in the mail today - Wife verbalized understanding

## 2022-06-15 ENCOUNTER — TELEPHONE (OUTPATIENT)
Dept: PAIN MEDICINE | Facility: CLINIC | Age: 67
End: 2022-06-15
Payer: MEDICARE

## 2022-06-15 ENCOUNTER — TELEPHONE (OUTPATIENT)
Dept: SURGERY | Facility: CLINIC | Age: 67
End: 2022-06-15
Payer: MEDICARE

## 2022-06-15 DIAGNOSIS — C20 RECTAL CANCER: Primary | ICD-10-CM

## 2022-06-15 DIAGNOSIS — G89.3 NEOPLASM RELATED PAIN: ICD-10-CM

## 2022-06-15 NOTE — TELEPHONE ENCOUNTER
Attempt to reach patient at all numbers listed for him and his emergency contacts [phone] - There has been no answer - Unable to leave a voicemail message at any number due to mailbox not being set up, or mailbox is full - Will continue trying to reach him to get his PET scheduled ASAP

## 2022-06-15 NOTE — TELEPHONE ENCOUNTER
Spoke to Fara, patient's sister and emergency contact - Advised I have been trying to reach patient to let him know his CEA levels checked Monday came back very elevated - Advised patient is scheduled for a CT on 7/19 but due to his CEA being elevated Dr Gonzalez needs him to have a PET Scan this Friday 6/17 [facility] @ 1660 - Do not eat or drink anything (except non-flavored  water) 6 hours prior to the appointment. Advised after he receives an intravenous injection of a PET tracer, he will sit in a quiet area for one hour. This gives time for the tracer to circulate. The actual scan take approximately 45 minutes.- Fara correctly repeated back all information given and stated she will make sure her brother receives it - Also gave Fara my name and direct callback number [phone]

## 2022-06-15 NOTE — TELEPHONE ENCOUNTER
Called patient to get MRI scheduled because he has a f/u with Maria Isabel to review MRI.  Patient was given the number to schedule the MRI before visit on Friday.  Patient verbalized understanding and will have it done is possible.

## 2022-06-28 ENCOUNTER — HOSPITAL ENCOUNTER (OUTPATIENT)
Dept: RADIOLOGY | Facility: HOSPITAL | Age: 67
Discharge: HOME | End: 2022-06-28
Payer: MEDICARE

## 2022-06-28 DIAGNOSIS — C20 RECTAL CANCER: ICD-10-CM

## 2022-06-28 PROCEDURE — 78815 PET IMAGE W/CT SKULL-THIGH: CPT | Mod: TC

## 2022-06-28 PROCEDURE — 78815 PET IMAGE W/CT SKULL-THIGH: CPT | Mod: 26,PS,, | Performed by: RADIOLOGY

## 2022-06-28 PROCEDURE — 78815 NM PET CT ROUTINE: ICD-10-PCS | Mod: 26,PS,, | Performed by: RADIOLOGY

## 2022-06-30 ENCOUNTER — TELEPHONE (OUTPATIENT)
Dept: SURGERY | Facility: CLINIC | Age: 67
End: 2022-06-30
Payer: MEDICARE

## 2022-06-30 NOTE — TELEPHONE ENCOUNTER
Spoke to and scheduled patient's Follow Up appointment with Dr Gonzalez for Monday 7/18 [facility] @ 1000 - Patient correctly repeated back all appointment information      ----- Message from Familia Gonzalez MD sent at 6/30/2022  3:31 PM CDT -----  Can we get him back in with me in next 2-3 weeks to discuss?    Thanks,  MDG

## 2022-07-05 DIAGNOSIS — R64 CACHEXIA: ICD-10-CM

## 2022-07-05 NOTE — TELEPHONE ENCOUNTER
Care Due:                  Date            Visit Type   Department     Provider  --------------------------------------------------------------------------------                                EP -                              PRIMARY      [facility] INTERNAL  Last Visit: 04-      CARE ([facility])   MEDICINE       Shakila Moran  Next Visit: None Scheduled  None         None Found                                                            Last  Test          Frequency    Reason                     Performed    Due Date  --------------------------------------------------------------------------------    Office Visit  12 months..  mirtazapine..............  04- 04-    Lipid Panel.  12 months..  mirtazapine..............  Not Found    Overdue    [employer] Embedded Care Gaps. Reference number: [id number]. 7/05/2022   5:27:31 PM CDT

## 2022-07-06 RX ORDER — MIRTAZAPINE 15 MG/1
15 TABLET, FILM COATED ORAL NIGHTLY
Qty: 90 TABLET | Refills: 1 | OUTPATIENT
Start: 2022-07-06

## 2022-07-09 ENCOUNTER — HOSPITAL ENCOUNTER (OUTPATIENT)
Dept: RADIOLOGY | Facility: HOSPITAL | Age: 67
Discharge: HOME | End: 2022-07-09
Attending: ANESTHESIOLOGY
Payer: MEDICARE

## 2022-07-09 DIAGNOSIS — M54.16 LUMBAR RADICULOPATHY: ICD-10-CM

## 2022-07-09 PROCEDURE — 72148 MRI LUMBAR SPINE WITHOUT CONTRAST: ICD-10-PCS | Mod: 26,,, | Performed by: RADIOLOGY

## 2022-07-09 PROCEDURE — 72148 MRI LUMBAR SPINE W/O DYE: CPT | Mod: TC

## 2022-07-09 PROCEDURE — 72148 MRI LUMBAR SPINE W/O DYE: CPT | Mod: 26,,, | Performed by: RADIOLOGY

## 2022-07-12 ENCOUNTER — OFFICE VISIT (OUTPATIENT)
Dept: PAIN MEDICINE | Facility: CLINIC | Age: 67
End: 2022-07-12
Payer: MEDICARE

## 2022-07-12 VITALS
DIASTOLIC BLOOD PRESSURE: 78 MMHG | BODY MASS INDEX: 20.54 KG/M2 | HEART RATE: 62 BPM | WEIGHT: 147.25 LBS | SYSTOLIC BLOOD PRESSURE: 133 MMHG

## 2022-07-12 DIAGNOSIS — M51.36 LUMBAR DEGENERATIVE DISC DISEASE: ICD-10-CM

## 2022-07-12 DIAGNOSIS — M25.552 LEFT HIP PAIN: Primary | ICD-10-CM

## 2022-07-12 DIAGNOSIS — M54.16 LUMBAR RADICULOPATHY: ICD-10-CM

## 2022-07-12 PROCEDURE — 1159F MED LIST DOCD IN RCRD: CPT | Mod: CPTII,S$GLB,, | Performed by: PHYSICIAN ASSISTANT

## 2022-07-12 PROCEDURE — 3008F PR BODY MASS INDEX (BMI) DOCUMENTED: ICD-10-PCS | Mod: CPTII,S$GLB,, | Performed by: PHYSICIAN ASSISTANT

## 2022-07-12 PROCEDURE — 3078F DIAST BP <80 MM HG: CPT | Mod: CPTII,S$GLB,, | Performed by: PHYSICIAN ASSISTANT

## 2022-07-12 PROCEDURE — 3008F BODY MASS INDEX DOCD: CPT | Mod: CPTII,S$GLB,, | Performed by: PHYSICIAN ASSISTANT

## 2022-07-12 PROCEDURE — 1160F PR REVIEW ALL MEDS BY PRESCRIBER/CLIN PHARMACIST DOCUMENTED: ICD-10-PCS | Mod: CPTII,S$GLB,, | Performed by: PHYSICIAN ASSISTANT

## 2022-07-12 PROCEDURE — 1160F RVW MEDS BY RX/DR IN RCRD: CPT | Mod: CPTII,S$GLB,, | Performed by: PHYSICIAN ASSISTANT

## 2022-07-12 PROCEDURE — 99214 PR OFFICE/OUTPT VISIT, EST, LEVL IV, 30-39 MIN: ICD-10-PCS | Mod: S$GLB,,, | Performed by: PHYSICIAN ASSISTANT

## 2022-07-12 PROCEDURE — 1159F PR MEDICATION LIST DOCUMENTED IN MEDICAL RECORD: ICD-10-PCS | Mod: CPTII,S$GLB,, | Performed by: PHYSICIAN ASSISTANT

## 2022-07-12 PROCEDURE — 3075F SYST BP GE 130 - 139MM HG: CPT | Mod: CPTII,S$GLB,, | Performed by: PHYSICIAN ASSISTANT

## 2022-07-12 PROCEDURE — 3078F PR MOST RECENT DIASTOLIC BLOOD PRESSURE < 80 MM HG: ICD-10-PCS | Mod: CPTII,S$GLB,, | Performed by: PHYSICIAN ASSISTANT

## 2022-07-12 PROCEDURE — 3075F PR MOST RECENT SYSTOLIC BLOOD PRESS GE 130-139MM HG: ICD-10-PCS | Mod: CPTII,S$GLB,, | Performed by: PHYSICIAN ASSISTANT

## 2022-07-12 PROCEDURE — 99999 PR PBB SHADOW E&M-EST. PATIENT-LVL IV: CPT | Mod: PBBFAC,,, | Performed by: PHYSICIAN ASSISTANT

## 2022-07-12 PROCEDURE — 99999 PR PBB SHADOW E&M-EST. PATIENT-LVL IV: ICD-10-PCS | Mod: PBBFAC,,, | Performed by: PHYSICIAN ASSISTANT

## 2022-07-12 PROCEDURE — 99214 OFFICE O/P EST MOD 30 MIN: CPT | Mod: S$GLB,,, | Performed by: PHYSICIAN ASSISTANT

## 2022-07-12 RX ORDER — GABAPENTIN 300 MG/1
300 CAPSULE ORAL NIGHTLY
Qty: 30 CAPSULE | Refills: 5 | Status: SHIPPED | OUTPATIENT
Start: 2022-07-12 | End: 2022-11-01

## 2022-07-12 NOTE — PROGRESS NOTES
Est Patient Chronic Pain Note (Follow up Visit)    Referring Physician: No ref. provider found    PCP: Shakila Moran DO    Chief Complaint:   Chief Complaint   Patient presents with    Follow-up     Patient in for follow up and mri results with a complaint of  pain in his left hip and mid to lower back that he rates as an eight.         SUBJECTIVE:  Interval History (7/12/2022):  Vincent Benton presents today for follow-up visit for MRI review.  Patient was last seen on 5/17/2022. Patient was started on Lyrica at last visit, followed titration schedule then discontinued secondary to drowsiness and ineffectiveness. Continues to report low back pain with radiation into left hip secondary to left-sided hip fracture in 2009. Continues to report leg length discrepancy is likely attributing to pain. Considering left hip arthroplasty with Dr. Mcintyre, could also address discrepancy. Patient reports pain as 8/10 today.    MRI lumbar spine 07/09/2022  FINDINGS:  Levoscoliosis of the lumbar spine again noted.  There is also grade 1 anterolisthesis of L5 on S1.  Mild retrolisthesis of T12 on L1. No fracture. Moderate to severe degenerative disc disease involves the T12-L1, L2-L3, L3-L4, and L4-L5 levels.  Mild degenerative disc disease at the L1-L2 and L5-S1 levels.  Conus medullaris terminates at the T12 level. Distal spinal cord intensity is normal.  Presacral changes of prior rectal cancer treatment are again seen, similar to recent PET study from June 2022.     Initial HPI  Vincent Benton is a 66 y.o. male with past medical history significant for alcoholism, hypertension, rectal adenocarcinoma undergoing chemotherapy, GERD who presents to the clinic for the evaluation of left-sided lower back and leg pain.  Patient reports pain began initially following left-sided hip fracture in 2009 status post left femoral intramedullary claire and dynamic hip screw placement with recent hardware removal 1/4/22 (   Miguelina).  Of note patient is considering left hip arthroplasty with Dr. Mcintyre.  Patient reports he does have leg length discrepancy, with left lower extremity being shorter which he believes potentiates his pain.      Today patient reports pain which is constant which is rated a 9/10.  Patient reports pain in a bandlike distribution in the lower back which radiates down the lateral aspect of the left lower extremity in L4 distribution to the knee.  Pain is described as aching in nature.  Pain is exacerbated with prolonged standing and with ambulation.  Patient reports he is unable to even to climb stairs.  Patient is able to ambulate less than half a block before requiring rest.  Mobility is improved with the use of a stationary bike at home.  Patient has tried gabapentin and Voltaren gel in the past without significant relief.  Patient does endorse associated weakness in the left lower extremity associated with his pain.    Patient reports significant motor weakness.  Patient denies night fever/night sweats, urinary incontinence, bowel incontinence, significant weight loss and loss of sensations.      Pain Disability Index Review:     Last 3 PDI Scores 5/17/2022   Pain Disability Index (PDI) 54       Non-Pharmacologic Treatments:  Physical Therapy/Home Exercise: no  Ice/Heat:yes  TENS: no  Acupuncture: no  Massage: no  Chiropractic: no    Other: no      Pain Medications:  - Opioids: Ultram (Tramadol HCL) and Norco  - Adjuvant Medications: Mirtazapine (Remeron), Mobic (Meloxicam) and Topical Ointment (Voltaren Gel, Steroid cream, Anti-Inflammatory Cream, Compound cream)    Pain Procedures:   None    Past Medical History:   Diagnosis Date    Alcoholism     Anemia     Back pain     Encounter for blood transfusion 2018    Essential hypertension 2/4/2016    GERD (gastroesophageal reflux disease)     Hiatal hernia     Hypertension     diet controlled    Melanoma 06/2021    left cheek    Rectal cancer  9/11/2019     Past Surgical History:   Procedure Laterality Date    ARTHROSCOPY OF KNEE Right 5/5/2021    Procedure: ARTHROSCOPY, KNEE;  Surgeon: Delonte Mcintyre MD;  Location: [facility] OR;  Service: Orthopedics;  Laterality: Right;    COLONOSCOPY N/A 9/3/2019    Procedure: COLONOSCOPY;  Surgeon: Isai Centeno MD;  Location: [facility] ENDO;  Service: Endoscopy;  Laterality: N/A;    COLONOSCOPY N/A 1/10/2020    Procedure: COLONOSCOPY;  Surgeon: Familia Gonzalez MD;  Location: [facility] ENDO;  Service: General;  Laterality: N/A;    COLONOSCOPY N/A 3/24/2021    Procedure: COLONOSCOPY;  Surgeon: Familia Gonzalez MD;  Location: [facility];  Service: General;  Laterality: N/A;    ESOPHAGOGASTRODUODENOSCOPY N/A 9/3/2019    Procedure: ESOPHAGOGASTRODUODENOSCOPY (EGD);  Surgeon: Isai Centeno MD;  Location: [facility];  Service: Endoscopy;  Laterality: N/A;    EXCISION OF MEDIAL MENISCUS OF KNEE Right 5/5/2021    Procedure: MENISCECTOMY, KNEE, MEDIAL;  Surgeon: Delonte Mcintyre MD;  Location: [facility];  Service: Orthopedics;  Laterality: Right;  partial Lateral    FLEXIBLE SIGMOIDOSCOPY  2/4/2020    Procedure: SIGMOIDOSCOPY, FLEXIBLE;  Surgeon: Familia Gonzalez MD;  Location: [facility];  Service: General;;    ILEOSTOMY Right 2/4/2020    Procedure: CREATION, ILEOSTOMY;  Surgeon: Familia Gonzalez MD;  Location: [facility] OR;  Service: General;  Laterality: Right;    ILEOSTOMY CLOSURE N/A 8/4/2020    Procedure: CLOSURE, ILEOSTOMY;  Surgeon: Familia Gonzalez MD;  Location: [facility] OR;  Service: General;  Laterality: N/A;    INCISION AND DRAINAGE OF BACK Left 8/5/2020    Procedure: INCISION AND DRAINAGE, BACK;  Surgeon: Familia Gonzalez MD;  Location: [facility] OR;  Service: General;  Laterality: Left;    INJECTION OF ANESTHETIC AGENT INTO TISSUE PLANE DEFINED BY TRANSVERSUS ABDOMINIS MUSCLE N/A 2/4/2020    Procedure: BLOCK, TRANSVERSUS ABDOMINIS PLANE;  Surgeon: Familia Gonzalez MD;  Location: [facility] OR;  Service: General;   Laterality: N/A;    INSERTION OF TUNNELED CENTRAL VENOUS CATHETER (CVC) WITH SUBCUTANEOUS PORT Right 2/4/2020    Procedure: INSERTION, PORT-A-CATH;  Surgeon: Familia Gonzalez MD;  Location: [facility] OR;  Service: General;  Laterality: Right;    JOINT REPLACEMENT Left 2009    Hip    KNEE ARTHROSCOPY W/ PLICA EXCISION Right 5/5/2021    Procedure: EXCISION, PLICA, KNEE, ARTHROSCOPIC;  Surgeon: Delonte Mcintyre MD;  Location: [facility] OR;  Service: Orthopedics;  Laterality: Right;    MOBILIZATION OF SPLENIC FLEXURE  2/4/2020    Procedure: MOBILIZATION, SPLENIC FLEXURE;  Surgeon: Familia Gonzalez MD;  Location: [facility] OR;  Service: General;;    REMOVAL OF HARDWARE FROM HIP Left 1/4/2022    Procedure: REMOVAL, HARDWARE, HIP;  Surgeon: Delonte Mcintyre MD;  Location: [facility] OR;  Service: Orthopedics;  Laterality: Left;     Review of patient's allergies indicates:  No Known Allergies    Current Outpatient Medications   Medication Sig    ascorbic acid, vitamin C, (VITAMIN C) 1000 MG tablet Take 1,000 mg by mouth once daily.    cyanocobalamin (VITAMIN B-12) 1000 MCG tablet Take 100 mcg by mouth once daily.    diclofenac sodium (VOLTAREN) 1 % Gel Apply 2 g topically 3 (three) times daily.    mirtazapine (REMERON) 15 MG tablet Take 1 tablet (15 mg total) by mouth every evening.    diclofenac sodium (VOLTAREN) 1 % Gel Apply 2 g topically 3 (three) times daily as needed.    gabapentin (NEURONTIN) 300 MG capsule Take 1 capsule (300 mg total) by mouth every evening.    HYDROcodone-acetaminophen (NORCO)  mg per tablet Take 1 tablet by mouth every 8 (eight) hours as needed for Pain. (Patient not taking: Reported on 6/13/2022)    ondansetron (ZOFRAN-ODT) 4 MG TbDL Take 2 tablets (8 mg total) by mouth every 8 (eight) hours as needed. (Patient not taking: Reported on 6/13/2022)    tamsulosin (FLOMAX) 0.4 mg Cap Take 1 capsule (0.4 mg total) by mouth once daily.    traMADoL (ULTRAM) 50 mg tablet Take 1 tablet (50 mg  total) by mouth every 6 (six) hours. (Patient not taking: Reported on 6/13/2022)     No current facility-administered medications for this visit.     Facility-Administered Medications Ordered in Other Visits   Medication    0.9%  NaCl infusion    0.9%  NaCl infusion    alteplase injection 2 mg    chlorhexidine 0.12 % solution 10 mL    chlorhexidine 0.12 % solution 10 mL    diphenhydrAMINE injection 25 mg    HYDROmorphone (PF) injection 0.2 mg    HYDROmorphone (PF) injection 0.2 mg    lactated ringers infusion    metoclopramide HCl injection 10 mg    nozaseptin (NOZIN) nasal     sodium chloride 0.9% flush 10 mL    sodium chloride 0.9% flush 3 mL    sodium chloride 0.9% flush 3 mL       Review of Systems     GENERAL:  No weight loss, malaise or fevers.  HEENT:   No recent changes in vision or hearing  NECK:  Negative for lumps, no difficulty with swallowing.  RESPIRATORY:  Negative for cough, wheezing or shortness of breath, patient denies any recent URI.  CARDIOVASCULAR:  Negative for chest pain, leg swelling or palpitations.  GI:  Negative for abdominal discomfort, blood in stools or black stools or change in bowel habits.  MUSCULOSKELETAL:  See HPI.  SKIN:  Negative for lesions, rash, and itching.  PSYCH:  No mood disorder or recent psychosocial stressors.   HEMATOLOGY/LYMPHOLOGY:  Negative for prolonged bleeding, bruising easily or swollen nodes.    NEURO:   No history of headaches, syncope, paralysis, seizures or tremors.  All other reviewed and negative other than HPI.    OBJECTIVE:    /78   Pulse 62   Wt 66.8 kg (147 lb 4.3 oz)   BMI 20.54 kg/m²       Physical Exam    GENERAL:  Well-appearing  PSYCH:  Mood and affect appropriate.  SKIN: Skin color, texture, turgor normal, no rashes or lesions.  HEAD/FACE:  Normocephalic, atraumatic. Cranial nerves grossly intact., lazy eye (right)    CV: RRR with palpation of the radial artery.  PULM: No evidence of respiratory difficulty,  symmetric chest rise.  GI:  Soft and non-tender.    BACK: Straight leg raising in the sitting and supine positions is positive to radicular pain on the left. No pain to palpation over the facet joints of the lumbar spine or spinous processes. Reduced range of motion with pain reproduction.  EXTREMITIES: Peripheral joint ROM is reduced with pain without obvious instability or laxity in all four extremities.  Leg length discrepancy:  Left leg shorter than the right Good capillary refill.  MUSCULOSKELETAL: Unable to stand on heels & toes.   hip provocative maneuvers are + on L.  left hip surgical scars healed well There is no pain with palpation over the sacroiliac joints bilaterally.  Tenderness to palpation overlying left greater trochanteric bursa.  FABERs test is negative.  Facet loading test is negative bilaterally.   Bilateral upper  extremity strength is normal and symmetric.  No atrophy or tone abnormalities are noted.  RIGHT Lower extremity: Hip flexion 5/5, Hip Abduction 5/5, Hip Adduction 5/5, Knee extension 5/5, Knee flexion 5/5, Ankle dorsiflexion5/5, Extensor hallucis longus 5/5, Ankle plantarflexion 5/5  LEFT Lower extremity:  Hip flexion 4/5, Hip Abduction 4/5,Hip Adduction 4/5, Knee extension 5/5, Knee flexion 5/5, Ankle dorsiflexion 5/5, Extensor hallucis longus 5/5, Ankle plantarflexion 5/5  -Normal testing knee (patellar) jerk and ankle (achilles) jerk    NEURO: Bilateral upper and lower extremity coordination and muscle stretch reflexes are physiologic and symmetric. No loss of sensation is noted.  GAIT:  Antalgic    Imaging:   MRI lumbar spine 07/09/2022  FINDINGS:  Levoscoliosis of the lumbar spine again noted.  There is also grade 1 anterolisthesis of L5 on S1.  Mild retrolisthesis of T12 on L1. No fracture. Moderate to severe degenerative disc disease involves the T12-L1, L2-L3, L3-L4, and L4-L5 levels.  Mild degenerative disc disease at the L1-L2 and L5-S1 levels.  Conus medullaris terminates  at the T12 level. Distal spinal cord intensity is normal.  Presacral changes of prior rectal cancer treatment are again seen, similar to recent PET study from June 2022.     T11-T12: This level is not fully evaluated given lack of inclusion on the axial sequences.  However, there is a posterior disc protrusion at this level which does not appear to result in significant spinal canal stenosis (series 6, image 11).     T12-L1: There is a circumferential disc osteophyte complex.  No significant facet arthropathy.  Mild left neural foraminal stenosis secondary to the disc osteophyte complex.  No right-sided neural foraminal stenosis.  No significant spinal canal stenosis.     L1-L2: Minimal diffuse disc bulge.  No significant facet arthropathy.  No neural foraminal stenosis.  No spinal canal stenosis.     L2-L3: There is a diffuse disc bulge.  Mild bilateral facet arthropathy.  No significant neural foraminal stenosis.  Mild spinal canal stenosis secondary to disc bulge, facet arthropathy, and ligamentum flavum hypertrophy.     L3-L4: There is a diffuse disc bulge.  Superimposed endplate osteophytes are asymmetrically prominent towards the right.  Mild bilateral facet arthropathy.  Mild right neural foraminal stenosis secondary to disc bulge and endplate osteophytes.  No left neural foraminal stenosis.  Disc bulge exerts minimal mass effect upon the right anterior aspect of the thecal sac without significant spinal canal stenosis.     L4-L5: There is a circumferential disc osteophyte complex.  There is associated edema within the L4-L5 endplates secondary to degenerative changes.  Mild-to-moderate bilateral facet arthropathy.  Moderate bilateral neural foraminal stenosis, greater on the left than right, secondary to disc osteophyte complex and facet arthropathy.  No significant spinal canal stenosis.     L5-S1: There is partial uncovering of the disc space secondary to listhesis.  There is a diffuse disc bulge with an  annular fissure within the posterior aspect of the disc.  Moderate bilateral facet arthropathy.  No significant neural foraminal stenosis.  Minimal spinal canal stenosis secondary to listhesis, disc bulge, and facet arthropathy.     Impression:     Multilevel lumbar spine degenerative changes resulting in areas of neural foraminal and spinal canal stenosis as described above.      X-ray femur 01/04/2022  FINDINGS:  Interval removal of the previously identified proximal left femoral intramedullary claire and dynamic hip screw without acute complication identified.  Stable chronic healed fracture deformity of the left femoral neck with varus angulation.  No new acute fracture identified.  Joint alignment is anatomic.  No unsuspected retained surgical instruments identified.       02/17/22    X-Ray Lumbar Spine AP And Lateral    FINDINGS:  There is mild levoscoliosis of the lumbar spine.  There is prominent disc space narrowing is noted throughout the lumbar spine and most prominent at the L2-3 through the L5-S1 levels with vacuum disc phenomenon seen at L3-4 and L4-5. There is grade 1-2 spondylolisthesis of L5 on S1.  There is exaggeration of the normal lumbar lordosis.  Prominent bilateral facet arthropathy seen at the L3-4 through the L5-S1 levels.        ASSESSMENT: 66 y.o. year old male with lower back and left leg pain, consistent with     1. Left hip pain  IR Peripheral Nerve Injection    Case Request-RAD/Other Procedure Area: LEFT femoral/obturator nerve block RN IV Sedation   2. Lumbar radiculopathy  IR Epidural Transforaminal Inj 1st Vert Lumbar Uni    IR Epidural Transfor Inj Ea Add Lumb Uni    Case Request-RAD/Other Procedure Area: left L4/5 + L5/S1 TF ALEXIS   3. Lumbar degenerative disc disease  IR Epidural Transforaminal Inj 1st Vert Lumbar Uni    IR Epidural Transfor Inj Ea Add Lumb Uni    Case Request-RAD/Other Procedure Area: left L4/5 + L5/S1 TF ALEXIS         PLAN:   - Interventions: Schedule left femoral  and obturator nerve block to address periarticular joint pain followed 2 weeks later by Left L4/5 +L5/S1 TF ALEXIS to address radicular symtpoms.  Explained the risks and benefits of the procedure in detail with the patient today in clinic along with alternative treatment options     - Anticoagulation use: no no anticoagulation     report:  Reviewed and consistent with medication use as prescribed.      - Medications:  --Patient self-discontinued Lyrica secondary to side effects (drowsiness) and ineffectiveness  -Restart - Will restart gabapentin 300mg QHS    - Therapy:  We discussed continuing physical therapy to help manage the patient/s painful condition.     - Imaging: Reviewed available imaging with patient and answered any questions they had regarding study. MRI of the Lumbar Spine reviewed with patient, answered any questions they had regarding study    - Follow up visit: return to clinic in 4-6 weeks after injections      The above plan and management options were discussed at length with patient. Patient is in agreement with the above and verbalized understanding.    - I discussed the goals of interventional chronic pain management with the patient on today's visit. We discussed a multimodal and systematic approach to pain.  This includes diagnostic and therapeutic injections, adjuvant pharmacologic treatment, physical therapy, and at times psychiatry.  I emphasized the importance of regular exercise, core strengthening and stretching, diet and weight loss as a cornerstone of long-term pain management.    - This condition does not require this patient to take time off of work, and the primary goal of our Pain Management services is to improve the patient's functional capacity.  - Patient Questions: Answered all of the patient's questions regarding diagnoses, therapy, treatment and next steps        Maria Isabel Montiel PA-C  Interventional Pain Management  [facility]    Disclaimer:  This note was  prepared using voice recognition system and is likely to have sound alike errors that may have been overlooked even after proof reading.  Please call me with any questions

## 2022-07-18 ENCOUNTER — OFFICE VISIT (OUTPATIENT)
Dept: SURGERY | Facility: CLINIC | Age: 67
End: 2022-07-18
Payer: MEDICARE

## 2022-07-18 VITALS — TEMPERATURE: 98 F | HEART RATE: 86 BPM | SYSTOLIC BLOOD PRESSURE: 125 MMHG | DIASTOLIC BLOOD PRESSURE: 81 MMHG

## 2022-07-18 DIAGNOSIS — K43.9 VENTRAL HERNIA WITHOUT OBSTRUCTION OR GANGRENE: ICD-10-CM

## 2022-07-18 DIAGNOSIS — C78.7 METASTATIC ADENOCARCINOMA TO LIVER: Primary | ICD-10-CM

## 2022-07-18 DIAGNOSIS — C20 RECTAL ADENOCARCINOMA: ICD-10-CM

## 2022-07-18 PROCEDURE — 1159F MED LIST DOCD IN RCRD: CPT | Mod: CPTII,S$GLB,, | Performed by: COLON & RECTAL SURGERY

## 2022-07-18 PROCEDURE — 99214 OFFICE O/P EST MOD 30 MIN: CPT | Mod: S$GLB,,, | Performed by: COLON & RECTAL SURGERY

## 2022-07-18 PROCEDURE — 3074F PR MOST RECENT SYSTOLIC BLOOD PRESSURE < 130 MM HG: ICD-10-PCS | Mod: CPTII,S$GLB,, | Performed by: COLON & RECTAL SURGERY

## 2022-07-18 PROCEDURE — 99214 PR OFFICE/OUTPT VISIT, EST, LEVL IV, 30-39 MIN: ICD-10-PCS | Mod: S$GLB,,, | Performed by: COLON & RECTAL SURGERY

## 2022-07-18 PROCEDURE — 3079F PR MOST RECENT DIASTOLIC BLOOD PRESSURE 80-89 MM HG: ICD-10-PCS | Mod: CPTII,S$GLB,, | Performed by: COLON & RECTAL SURGERY

## 2022-07-18 PROCEDURE — 1159F PR MEDICATION LIST DOCUMENTED IN MEDICAL RECORD: ICD-10-PCS | Mod: CPTII,S$GLB,, | Performed by: COLON & RECTAL SURGERY

## 2022-07-18 PROCEDURE — 3074F SYST BP LT 130 MM HG: CPT | Mod: CPTII,S$GLB,, | Performed by: COLON & RECTAL SURGERY

## 2022-07-18 PROCEDURE — 99999 PR PBB SHADOW E&M-EST. PATIENT-LVL III: CPT | Mod: PBBFAC,,, | Performed by: COLON & RECTAL SURGERY

## 2022-07-18 PROCEDURE — 3079F DIAST BP 80-89 MM HG: CPT | Mod: CPTII,S$GLB,, | Performed by: COLON & RECTAL SURGERY

## 2022-07-18 PROCEDURE — 99999 PR PBB SHADOW E&M-EST. PATIENT-LVL III: ICD-10-PCS | Mod: PBBFAC,,, | Performed by: COLON & RECTAL SURGERY

## 2022-07-18 NOTE — PROGRESS NOTES
History & Physical    SUBJECTIVE:     Chief Complaint   Patient presents with    Follow-up     Discuss PET results   Ref MD: Isai Centeno MD    History of Present Illness:  Patient is a 66 y.o. male presents for evaluation of a rectal mass.  Patient has been having iron deficiency anemia that did require transfusion around 1 year ago.  He also has had associated hematochezia for over a year now but did improve after he quit drinking beer at 8 months ago but is still intermittently present. This prompted a colonoscopy which was performed on 09/03/2019 where an obstructing rectal mass was seen that appeared malignant was biopsied and could not be traversed.  Patient reports that he had a colonoscopy around 6-7 years ago were some benign polyps were found but no malignancy.  These records are not available.  He states that the hematochezia has recently improved after he quit drinking beer, but is still intermittently present and worsen with the bowel prep from the colonoscopy recently.  He is not on any chronic anticoagulation.  He states he has had normal caliber bowel movements does not feel constipated or obstructed.  He denies any fever, chills, nausea, vomiting, diarrhea, constipation, weight loss, night sweats or any other signs or symptoms.    12/4/19: Completed Neoadj chemoXRT  2/4/2020 : Robotic ultra-low anterior resection with DLI and Mediport placement  June 2020: Completed adjuvant chemotx  8/4/2020: DLI reversal    Interval history:  Since last clinic visit, the patient had a repeat CT scan showing likely metastatic disease in the lung and liver.  Patient also has a dressing with elevated Ca level currently.  Continues to deny fever, chills, nausea, vomiting, hematochezia or melena.    Review of patient's allergies indicates:  No Known Allergies    Current Outpatient Medications   Medication Sig Dispense Refill    ascorbic acid, vitamin C, (VITAMIN C) 1000 MG tablet Take 1,000 mg by mouth once daily.       cyanocobalamin (VITAMIN B-12) 1000 MCG tablet Take 100 mcg by mouth once daily.      diclofenac sodium (VOLTAREN) 1 % Gel Apply 2 g topically 3 (three) times daily as needed. 2 Tube 6    diclofenac sodium (VOLTAREN) 1 % Gel Apply 2 g topically 3 (three) times daily. 1 each 1    gabapentin (NEURONTIN) 300 MG capsule Take 1 capsule (300 mg total) by mouth every evening. 30 capsule 5    HYDROcodone-acetaminophen (NORCO)  mg per tablet Take 1 tablet by mouth every 8 (eight) hours as needed for Pain. 21 tablet 0    mirtazapine (REMERON) 15 MG tablet Take 1 tablet (15 mg total) by mouth every evening. (Patient not taking: Reported on 7/18/2022) 90 tablet 1    ondansetron (ZOFRAN-ODT) 4 MG TbDL Take 2 tablets (8 mg total) by mouth every 8 (eight) hours as needed. (Patient not taking: Reported on 7/18/2022) 20 tablet 0    tamsulosin (FLOMAX) 0.4 mg Cap Take 1 capsule (0.4 mg total) by mouth once daily. 30 capsule 11    traMADoL (ULTRAM) 50 mg tablet Take 1 tablet (50 mg total) by mouth every 6 (six) hours. (Patient not taking: Reported on 7/18/2022) 30 tablet 0     No current facility-administered medications for this visit.     Facility-Administered Medications Ordered in Other Visits   Medication Dose Route Frequency Provider Last Rate Last Admin    0.9%  NaCl infusion   Intravenous Continuous Delonte Mcintyre MD        0.9%  NaCl infusion   Intravenous Continuous Delonte Mcintyre MD        alteplase injection 2 mg  2 mg Intra-Catheter PRN Mikel Sams MD        chlorhexidine 0.12 % solution 10 mL  10 mL Mouth/Throat On Call Procedure Delonte Mcintyre MD   10 mL at 05/05/21 1222    chlorhexidine 0.12 % solution 10 mL  10 mL Mouth/Throat On Call Procedure Delonte Mcintyre MD   10 mL at 01/04/22 0628    diphenhydrAMINE injection 25 mg  25 mg Intravenous Q6H PRN Keli Alcaraz MD        HYDROmorphone (PF) injection 0.2 mg  0.2 mg Intravenous Q5 Min PRN Keli Alcaraz,  MD   0.2 mg at 05/05/21 1505    HYDROmorphone (PF) injection 0.2 mg  0.2 mg Intravenous Q5 Min PRN Keli Alcaraz MD        lactated ringers infusion   Intravenous Continuous Familia Gonzalez MD   Stopped at 03/24/21 0825    metoclopramide HCl injection 10 mg  10 mg Intravenous Q10 Min PRN Keli Alcaraz MD        nozaseptin (NOZIN) nasal    Each Nostril On Call Procedure Delonte Mcintyre MD   Given at 05/05/21 1222    sodium chloride 0.9% flush 10 mL  10 mL Intravenous PRN Mikel Sams MD        sodium chloride 0.9% flush 3 mL  3 mL Intravenous PRN Shilpa Yee MD        sodium chloride 0.9% flush 3 mL  3 mL Intravenous Q8H Keli Alcaraz MD           Past Medical History:   Diagnosis Date    Alcoholism     Anemia     Back pain     Encounter for blood transfusion 2018    Essential hypertension 2/4/2016    GERD (gastroesophageal reflux disease)     Hiatal hernia     Hypertension     diet controlled    Melanoma 06/2021    left cheek    Rectal cancer 9/11/2019     Past Surgical History:   Procedure Laterality Date    ARTHROSCOPY OF KNEE Right 5/5/2021    Procedure: ARTHROSCOPY, KNEE;  Surgeon: Delonte Mcintyre MD;  Location: [facility];  Service: Orthopedics;  Laterality: Right;    COLONOSCOPY N/A 9/3/2019    Procedure: COLONOSCOPY;  Surgeon: Isai Centeno MD;  Location: [facility];  Service: Endoscopy;  Laterality: N/A;    COLONOSCOPY N/A 1/10/2020    Procedure: COLONOSCOPY;  Surgeon: Familia Gonzalez MD;  Location: [facility];  Service: General;  Laterality: N/A;    COLONOSCOPY N/A 3/24/2021    Procedure: COLONOSCOPY;  Surgeon: Familia Gonzalez MD;  Location: [facility] ENDO;  Service: General;  Laterality: N/A;    ESOPHAGOGASTRODUODENOSCOPY N/A 9/3/2019    Procedure: ESOPHAGOGASTRODUODENOSCOPY (EGD);  Surgeon: Isai Centeno MD;  Location: [facility];  Service: Endoscopy;  Laterality: N/A;    EXCISION OF MEDIAL MENISCUS OF KNEE Right 5/5/2021     Procedure: MENISCECTOMY, KNEE, MEDIAL;  Surgeon: Delonte Mcintyre MD;  Location: [facility] OR;  Service: Orthopedics;  Laterality: Right;  partial Lateral    FLEXIBLE SIGMOIDOSCOPY  2/4/2020    Procedure: SIGMOIDOSCOPY, FLEXIBLE;  Surgeon: Familia Gonzalez MD;  Location: [facility] OR;  Service: General;;    ILEOSTOMY Right 2/4/2020    Procedure: CREATION, ILEOSTOMY;  Surgeon: Familia Gonzalez MD;  Location: [facility] OR;  Service: General;  Laterality: Right;    ILEOSTOMY CLOSURE N/A 8/4/2020    Procedure: CLOSURE, ILEOSTOMY;  Surgeon: Familia Gonzalez MD;  Location: [facility] OR;  Service: General;  Laterality: N/A;    INCISION AND DRAINAGE OF BACK Left 8/5/2020    Procedure: INCISION AND DRAINAGE, BACK;  Surgeon: Familia Gonzalez MD;  Location: [facility];  Service: General;  Laterality: Left;    INJECTION OF ANESTHETIC AGENT INTO TISSUE PLANE DEFINED BY TRANSVERSUS ABDOMINIS MUSCLE N/A 2/4/2020    Procedure: BLOCK, TRANSVERSUS ABDOMINIS PLANE;  Surgeon: Familia Gonzalez MD;  Location: [facility];  Service: General;  Laterality: N/A;    INSERTION OF TUNNELED CENTRAL VENOUS CATHETER (CVC) WITH SUBCUTANEOUS PORT Right 2/4/2020    Procedure: INSERTION, PORT-A-CATH;  Surgeon: Familia Gonzalez MD;  Location: [facility];  Service: General;  Laterality: Right;    JOINT REPLACEMENT Left 2009    Hip    KNEE ARTHROSCOPY W/ PLICA EXCISION Right 5/5/2021    Procedure: EXCISION, PLICA, KNEE, ARTHROSCOPIC;  Surgeon: Delonte Mcintyre MD;  Location: [facility] OR;  Service: Orthopedics;  Laterality: Right;    MOBILIZATION OF SPLENIC FLEXURE  2/4/2020    Procedure: MOBILIZATION, SPLENIC FLEXURE;  Surgeon: Familia Gonzalez MD;  Location: [facility] OR;  Service: General;;    REMOVAL OF HARDWARE FROM HIP Left 1/4/2022    Procedure: REMOVAL, HARDWARE, HIP;  Surgeon: Delonte Mcintyre MD;  Location: [facility];  Service: Orthopedics;  Laterality: Left;     Family History   Problem Relation Age of Onset    Cataracts Mother     Stroke Father      Hypertension Father      Social History     Tobacco Use    Smoking status: Never Smoker    Smokeless tobacco: Never Used   Substance Use Topics    Alcohol use: Yes     Comment: occassionally No alcohol 72h prior to sx    Drug use: No        Review of Systems:  Review of Systems   Constitutional: Negative for activity change, appetite change, chills, fatigue, fever and unexpected weight change.   HENT: Negative for congestion, ear pain, sore throat and trouble swallowing.    Eyes: Negative for pain, redness and itching.   Respiratory: Negative for cough, shortness of breath and wheezing.    Cardiovascular: Negative for chest pain, palpitations and leg swelling.   Gastrointestinal: Negative for abdominal distention, abdominal pain, anal bleeding, blood in stool, constipation, diarrhea, nausea, rectal pain and vomiting.   Endocrine: Negative for cold intolerance, heat intolerance and polyuria.   Genitourinary: Negative for dysuria, flank pain, frequency and hematuria.   Musculoskeletal: Negative for gait problem, joint swelling and neck pain.   Skin: Negative for color change, rash and wound.   Allergic/Immunologic: Negative for environmental allergies and immunocompromised state.   Neurological: Negative for dizziness, speech difficulty, weakness and numbness.   Psychiatric/Behavioral: Negative for agitation, confusion and hallucinations.       OBJECTIVE:     Vital Signs (Most Recent)  Temp: 97.6 °F (36.4 °C) (07/18/22 1009)  Pulse: 86 (07/18/22 1009)  BP: 125/81 (07/18/22 1009)           Physical Exam:  Physical Exam  Constitutional:       Appearance: He is well-developed.   HENT:      Head: Normocephalic and atraumatic.   Eyes:      Conjunctiva/sclera: Conjunctivae normal.   Neck:      Thyroid: No thyromegaly.   Cardiovascular:      Rate and Rhythm: Normal rate and regular rhythm.   Pulmonary:      Effort: Pulmonary effort is normal. No respiratory distress.   Abdominal:      Comments: Soft, nondistended,  nontender; reducible umiblical and previous RLQ ileostomy site hernias; ileostomy site hernia around 2.5cm defect; well-healed incision   Musculoskeletal:         General: No tenderness. Normal range of motion.      Cervical back: Normal range of motion.   Skin:     General: Skin is warm and dry.      Capillary Refill: Capillary refill takes less than 2 seconds.      Findings: No rash.   Neurological:      Mental Status: He is alert and oriented to person, place, and time.   Psychiatric:         Behavior: Behavior normal.       Laboratory  Lab Results   Component Value Date    WBC 6.19 01/03/2022    HGB 12.5 (L) 01/03/2022    HCT 40.3 01/03/2022     01/03/2022    CHOL 180 03/16/2020    TRIG 43 03/16/2020    HDL 81 (H) 03/16/2020    ALT 21 01/03/2022    AST 21 01/03/2022     01/03/2022    K 4.7 01/03/2022    CL 98 01/03/2022    CREATININE 0.8 01/03/2022    BUN 15 01/03/2022    CO2 25 01/03/2022    TSH 0.601 04/07/2018    PSA 1.4 03/16/2020    INR 0.9 11/06/2018    HGBA1C 5.7 (H) 06/08/2020     CEA 0.0 - 5.0 ng/mL 159.9 High   3.0 CM  2.7 CM  6.0 High  CM  5.7 High  CM  4.2 CM  4.5 CM          Diagnostic Results:  Reviewed colonoscopy report and images with rectal mass appreciated     PET CT:  FINDINGS:  Head and neck: Physiologic uptake in the visualized brain parenchyma.  There are no FDG avid cervical lymph nodes.     Chest: Interval development of new bilateral pulmonary nodules.  For example in the right lower lobe series 3, image 158 there is a 4 mm nodule more superiorly in the peripheral right lower lobe on image 156 is a 3-4 mm juxtapleural nodule.  These nodules are not sufficiently FDG avid but fall below the resolution for PET-CT.  Largest nodules is in the left upper lobe on image 146 and measures 7 mm.  This demonstrates max SUV of 1.5, above background parenchyma.     There is unchanged scarring or atelectasis at the left lung base.  Previously described 13 mm ground-glass attenuation in  the prior PET-CT in the lateral left upper lobe is no longer well visualized with mild suspected scarring in this region.  No significant uptake in this region.  No detrimental change in ground-glass density in the right midlung adjacent to the major fissure.  Bilateral patchy ground-glass opacity seen on CT from June 2020 have essentially resolved. No FDG avid axillary mediastinal or hilar adenopathy.     Abdomen/pelvis: New multifocal areas of uptake in the liver are seen.  Largest areas in the inferior right hepatic lobe image 240 axial fused sequence with ill-defined hypodense lesion seen in this area measuring up to 8.7 cm.  Max SUV measures 15.0.  Focus of uptake in the lateral hepatic segment image 219 is seen with max SUV of 13.  Additional focus of uptake on image 206 with max SUV of 9.0.  Hepatic dome lesion uptake with max SUV of 7.0.  No abnormal uptake in the spleen or pancreas or the adrenals.  Postoperative changes of the rectum with primary anastomosis.  On axial fused image 346 there is FDG avid focus with SUV max of 3.4 adjacent to a suture and appears to extend off the posterior right aspect of the rectum into the adjacent soft tissues.  Presacral postsurgical collection with adjacent fat stranding/soft tissue thickening is noted, unchanged.  No significant FDG uptake in this area is seen.     No FDG avid lymphadenopathy.     Bones: Degenerative changes of the spine with osteopenia.  Previously seen left hip orthopedic hardware is no longer visualized.  Chronic appearing fracture deformity the proximal left femur is seen.  No FDG avid osseous lesions.     Miscellaneous: Nonspecific focal uptake in the right hemiscrotum with max SUV of 5.4.  Correlation with nonemergent ultrasound could be performed for further evaluation.  Sebaceous cyst in the posterior left upper back.     Impression:     New bilateral pulmonary nodules and FDG avid multifocal hepatic lesions concerning for metastatic disease.   Small focus of uptake along the posterior right aspect of the rectum in the region of a suture line raising concern for local recurrence as well.  Nonspecific FDG uptake in the right hemiscrotum.  Correlation with nonemergent ultrasound could be performed for further evaluation.  Additional findings as above      ASSESSMENT/PLAN:     66-year-old male with mid-rectal adenocarcinoma who is now s/p long-course neoadj chemoXRT which completed on 12/4/19 with partial treatment response on repeat MRI and no evidence of metastatic disease on repeat CT Abd/pelvis who is now s/p robotic ultra-low anterior resection, DLI and mediport placement on 2/4/2020 who has recovered well postop with final path showing T3N0 who has now completed adjuvant chemotx in late June 2020 and is now s/p DLI closure on 8/4/2020  with new concerns of recurrent/metastatic disease in the liver and lung    - CT guided biopsy of liver lesion  - may need additional chemotx based upon path results  - may need port placed again  - RTC 3 weeks for evaluation    Familia Gonzalez MD  Colon and Rectal Surgery  [facility]

## 2022-07-18 NOTE — H&P (VIEW-ONLY)
History & Physical    SUBJECTIVE:     Chief Complaint   Patient presents with    Follow-up     Discuss PET results   Ref MD: Isai Centeno MD    History of Present Illness:  Patient is a 66 y.o. male presents for evaluation of a rectal mass.  Patient has been having iron deficiency anemia that did require transfusion around 1 year ago.  He also has had associated hematochezia for over a year now but did improve after he quit drinking beer at 8 months ago but is still intermittently present. This prompted a colonoscopy which was performed on 09/03/2019 where an obstructing rectal mass was seen that appeared malignant was biopsied and could not be traversed.  Patient reports that he had a colonoscopy around 6-7 years ago were some benign polyps were found but no malignancy.  These records are not available.  He states that the hematochezia has recently improved after he quit drinking beer, but is still intermittently present and worsen with the bowel prep from the colonoscopy recently.  He is not on any chronic anticoagulation.  He states he has had normal caliber bowel movements does not feel constipated or obstructed.  He denies any fever, chills, nausea, vomiting, diarrhea, constipation, weight loss, night sweats or any other signs or symptoms.    12/4/19: Completed Neoadj chemoXRT  2/4/2020 : Robotic ultra-low anterior resection with DLI and Mediport placement  June 2020: Completed adjuvant chemotx  8/4/2020: DLI reversal    Interval history:  Since last clinic visit, the patient had a repeat CT scan showing likely metastatic disease in the lung and liver.  Patient also has a dressing with elevated Ca level currently.  Continues to deny fever, chills, nausea, vomiting, hematochezia or melena.    Review of patient's allergies indicates:  No Known Allergies    Current Outpatient Medications   Medication Sig Dispense Refill    ascorbic acid, vitamin C, (VITAMIN C) 1000 MG tablet Take 1,000 mg by mouth once daily.       cyanocobalamin (VITAMIN B-12) 1000 MCG tablet Take 100 mcg by mouth once daily.      diclofenac sodium (VOLTAREN) 1 % Gel Apply 2 g topically 3 (three) times daily as needed. 2 Tube 6    diclofenac sodium (VOLTAREN) 1 % Gel Apply 2 g topically 3 (three) times daily. 1 each 1    gabapentin (NEURONTIN) 300 MG capsule Take 1 capsule (300 mg total) by mouth every evening. 30 capsule 5    HYDROcodone-acetaminophen (NORCO)  mg per tablet Take 1 tablet by mouth every 8 (eight) hours as needed for Pain. 21 tablet 0    mirtazapine (REMERON) 15 MG tablet Take 1 tablet (15 mg total) by mouth every evening. (Patient not taking: Reported on 7/18/2022) 90 tablet 1    ondansetron (ZOFRAN-ODT) 4 MG TbDL Take 2 tablets (8 mg total) by mouth every 8 (eight) hours as needed. (Patient not taking: Reported on 7/18/2022) 20 tablet 0    tamsulosin (FLOMAX) 0.4 mg Cap Take 1 capsule (0.4 mg total) by mouth once daily. 30 capsule 11    traMADoL (ULTRAM) 50 mg tablet Take 1 tablet (50 mg total) by mouth every 6 (six) hours. (Patient not taking: Reported on 7/18/2022) 30 tablet 0     No current facility-administered medications for this visit.     Facility-Administered Medications Ordered in Other Visits   Medication Dose Route Frequency Provider Last Rate Last Admin    0.9%  NaCl infusion   Intravenous Continuous Delonte Mcintyre MD        0.9%  NaCl infusion   Intravenous Continuous Delonte Mcintyre MD        alteplase injection 2 mg  2 mg Intra-Catheter PRN Mikel Sams MD        chlorhexidine 0.12 % solution 10 mL  10 mL Mouth/Throat On Call Procedure Delonte Mcintyre MD   10 mL at 05/05/21 1222    chlorhexidine 0.12 % solution 10 mL  10 mL Mouth/Throat On Call Procedure Delonte Mcintyre MD   10 mL at 01/04/22 0628    diphenhydrAMINE injection 25 mg  25 mg Intravenous Q6H PRN Keli Alcaraz MD        HYDROmorphone (PF) injection 0.2 mg  0.2 mg Intravenous Q5 Min PRN Keli Alcaraz,  MD   0.2 mg at 05/05/21 1505    HYDROmorphone (PF) injection 0.2 mg  0.2 mg Intravenous Q5 Min PRN Keli Alcaraz MD        lactated ringers infusion   Intravenous Continuous Familia Gonzalez MD   Stopped at 03/24/21 0825    metoclopramide HCl injection 10 mg  10 mg Intravenous Q10 Min PRN Keli Alcaraz MD        nozaseptin (NOZIN) nasal    Each Nostril On Call Procedure Delonte Mcintyre MD   Given at 05/05/21 1222    sodium chloride 0.9% flush 10 mL  10 mL Intravenous PRN Mikel Sams MD        sodium chloride 0.9% flush 3 mL  3 mL Intravenous PRN Shilpa Yee MD        sodium chloride 0.9% flush 3 mL  3 mL Intravenous Q8H Keli Alcaraz MD           Past Medical History:   Diagnosis Date    Alcoholism     Anemia     Back pain     Encounter for blood transfusion 2018    Essential hypertension 2/4/2016    GERD (gastroesophageal reflux disease)     Hiatal hernia     Hypertension     diet controlled    Melanoma 06/2021    left cheek    Rectal cancer 9/11/2019     Past Surgical History:   Procedure Laterality Date    ARTHROSCOPY OF KNEE Right 5/5/2021    Procedure: ARTHROSCOPY, KNEE;  Surgeon: Delonte Mcintyre MD;  Location: [facility];  Service: Orthopedics;  Laterality: Right;    COLONOSCOPY N/A 9/3/2019    Procedure: COLONOSCOPY;  Surgeon: Isai Centeno MD;  Location: [facility];  Service: Endoscopy;  Laterality: N/A;    COLONOSCOPY N/A 1/10/2020    Procedure: COLONOSCOPY;  Surgeon: Familia Gonzalez MD;  Location: [facility];  Service: General;  Laterality: N/A;    COLONOSCOPY N/A 3/24/2021    Procedure: COLONOSCOPY;  Surgeon: Familia Gonzalez MD;  Location: [facility] ENDO;  Service: General;  Laterality: N/A;    ESOPHAGOGASTRODUODENOSCOPY N/A 9/3/2019    Procedure: ESOPHAGOGASTRODUODENOSCOPY (EGD);  Surgeon: Isai Centeno MD;  Location: [facility];  Service: Endoscopy;  Laterality: N/A;    EXCISION OF MEDIAL MENISCUS OF KNEE Right 5/5/2021     Procedure: MENISCECTOMY, KNEE, MEDIAL;  Surgeon: Delonte Mcintyre MD;  Location: [facility] OR;  Service: Orthopedics;  Laterality: Right;  partial Lateral    FLEXIBLE SIGMOIDOSCOPY  2/4/2020    Procedure: SIGMOIDOSCOPY, FLEXIBLE;  Surgeon: Familia Gonzalez MD;  Location: [facility] OR;  Service: General;;    ILEOSTOMY Right 2/4/2020    Procedure: CREATION, ILEOSTOMY;  Surgeon: Familia Gonzalez MD;  Location: [facility] OR;  Service: General;  Laterality: Right;    ILEOSTOMY CLOSURE N/A 8/4/2020    Procedure: CLOSURE, ILEOSTOMY;  Surgeon: Familia Gonzalez MD;  Location: [facility] OR;  Service: General;  Laterality: N/A;    INCISION AND DRAINAGE OF BACK Left 8/5/2020    Procedure: INCISION AND DRAINAGE, BACK;  Surgeon: Familia Gonzalez MD;  Location: [facility];  Service: General;  Laterality: Left;    INJECTION OF ANESTHETIC AGENT INTO TISSUE PLANE DEFINED BY TRANSVERSUS ABDOMINIS MUSCLE N/A 2/4/2020    Procedure: BLOCK, TRANSVERSUS ABDOMINIS PLANE;  Surgeon: Familia Gonzalez MD;  Location: [facility];  Service: General;  Laterality: N/A;    INSERTION OF TUNNELED CENTRAL VENOUS CATHETER (CVC) WITH SUBCUTANEOUS PORT Right 2/4/2020    Procedure: INSERTION, PORT-A-CATH;  Surgeon: Familia Gonzalez MD;  Location: [facility];  Service: General;  Laterality: Right;    JOINT REPLACEMENT Left 2009    Hip    KNEE ARTHROSCOPY W/ PLICA EXCISION Right 5/5/2021    Procedure: EXCISION, PLICA, KNEE, ARTHROSCOPIC;  Surgeon: Delonte Mcintyre MD;  Location: [facility] OR;  Service: Orthopedics;  Laterality: Right;    MOBILIZATION OF SPLENIC FLEXURE  2/4/2020    Procedure: MOBILIZATION, SPLENIC FLEXURE;  Surgeon: Familia Gonzalez MD;  Location: [facility] OR;  Service: General;;    REMOVAL OF HARDWARE FROM HIP Left 1/4/2022    Procedure: REMOVAL, HARDWARE, HIP;  Surgeon: Delonte Mcintyre MD;  Location: [facility];  Service: Orthopedics;  Laterality: Left;     Family History   Problem Relation Age of Onset    Cataracts Mother     Stroke Father      Hypertension Father      Social History     Tobacco Use    Smoking status: Never Smoker    Smokeless tobacco: Never Used   Substance Use Topics    Alcohol use: Yes     Comment: occassionally No alcohol 72h prior to sx    Drug use: No        Review of Systems:  Review of Systems   Constitutional: Negative for activity change, appetite change, chills, fatigue, fever and unexpected weight change.   HENT: Negative for congestion, ear pain, sore throat and trouble swallowing.    Eyes: Negative for pain, redness and itching.   Respiratory: Negative for cough, shortness of breath and wheezing.    Cardiovascular: Negative for chest pain, palpitations and leg swelling.   Gastrointestinal: Negative for abdominal distention, abdominal pain, anal bleeding, blood in stool, constipation, diarrhea, nausea, rectal pain and vomiting.   Endocrine: Negative for cold intolerance, heat intolerance and polyuria.   Genitourinary: Negative for dysuria, flank pain, frequency and hematuria.   Musculoskeletal: Negative for gait problem, joint swelling and neck pain.   Skin: Negative for color change, rash and wound.   Allergic/Immunologic: Negative for environmental allergies and immunocompromised state.   Neurological: Negative for dizziness, speech difficulty, weakness and numbness.   Psychiatric/Behavioral: Negative for agitation, confusion and hallucinations.       OBJECTIVE:     Vital Signs (Most Recent)  Temp: 97.6 °F (36.4 °C) (07/18/22 1009)  Pulse: 86 (07/18/22 1009)  BP: 125/81 (07/18/22 1009)           Physical Exam:  Physical Exam  Constitutional:       Appearance: He is well-developed.   HENT:      Head: Normocephalic and atraumatic.   Eyes:      Conjunctiva/sclera: Conjunctivae normal.   Neck:      Thyroid: No thyromegaly.   Cardiovascular:      Rate and Rhythm: Normal rate and regular rhythm.   Pulmonary:      Effort: Pulmonary effort is normal. No respiratory distress.   Abdominal:      Comments: Soft, nondistended,  nontender; reducible umiblical and previous RLQ ileostomy site hernias; ileostomy site hernia around 2.5cm defect; well-healed incision   Musculoskeletal:         General: No tenderness. Normal range of motion.      Cervical back: Normal range of motion.   Skin:     General: Skin is warm and dry.      Capillary Refill: Capillary refill takes less than 2 seconds.      Findings: No rash.   Neurological:      Mental Status: He is alert and oriented to person, place, and time.   Psychiatric:         Behavior: Behavior normal.       Laboratory  Lab Results   Component Value Date    WBC 6.19 01/03/2022    HGB 12.5 (L) 01/03/2022    HCT 40.3 01/03/2022     01/03/2022    CHOL 180 03/16/2020    TRIG 43 03/16/2020    HDL 81 (H) 03/16/2020    ALT 21 01/03/2022    AST 21 01/03/2022     01/03/2022    K 4.7 01/03/2022    CL 98 01/03/2022    CREATININE 0.8 01/03/2022    BUN 15 01/03/2022    CO2 25 01/03/2022    TSH 0.601 04/07/2018    PSA 1.4 03/16/2020    INR 0.9 11/06/2018    HGBA1C 5.7 (H) 06/08/2020     CEA 0.0 - 5.0 ng/mL 159.9 High   3.0 CM  2.7 CM  6.0 High  CM  5.7 High  CM  4.2 CM  4.5 CM          Diagnostic Results:  Reviewed colonoscopy report and images with rectal mass appreciated     PET CT:  FINDINGS:  Head and neck: Physiologic uptake in the visualized brain parenchyma.  There are no FDG avid cervical lymph nodes.     Chest: Interval development of new bilateral pulmonary nodules.  For example in the right lower lobe series 3, image 158 there is a 4 mm nodule more superiorly in the peripheral right lower lobe on image 156 is a 3-4 mm juxtapleural nodule.  These nodules are not sufficiently FDG avid but fall below the resolution for PET-CT.  Largest nodules is in the left upper lobe on image 146 and measures 7 mm.  This demonstrates max SUV of 1.5, above background parenchyma.     There is unchanged scarring or atelectasis at the left lung base.  Previously described 13 mm ground-glass attenuation in  the prior PET-CT in the lateral left upper lobe is no longer well visualized with mild suspected scarring in this region.  No significant uptake in this region.  No detrimental change in ground-glass density in the right midlung adjacent to the major fissure.  Bilateral patchy ground-glass opacity seen on CT from June 2020 have essentially resolved. No FDG avid axillary mediastinal or hilar adenopathy.     Abdomen/pelvis: New multifocal areas of uptake in the liver are seen.  Largest areas in the inferior right hepatic lobe image 240 axial fused sequence with ill-defined hypodense lesion seen in this area measuring up to 8.7 cm.  Max SUV measures 15.0.  Focus of uptake in the lateral hepatic segment image 219 is seen with max SUV of 13.  Additional focus of uptake on image 206 with max SUV of 9.0.  Hepatic dome lesion uptake with max SUV of 7.0.  No abnormal uptake in the spleen or pancreas or the adrenals.  Postoperative changes of the rectum with primary anastomosis.  On axial fused image 346 there is FDG avid focus with SUV max of 3.4 adjacent to a suture and appears to extend off the posterior right aspect of the rectum into the adjacent soft tissues.  Presacral postsurgical collection with adjacent fat stranding/soft tissue thickening is noted, unchanged.  No significant FDG uptake in this area is seen.     No FDG avid lymphadenopathy.     Bones: Degenerative changes of the spine with osteopenia.  Previously seen left hip orthopedic hardware is no longer visualized.  Chronic appearing fracture deformity the proximal left femur is seen.  No FDG avid osseous lesions.     Miscellaneous: Nonspecific focal uptake in the right hemiscrotum with max SUV of 5.4.  Correlation with nonemergent ultrasound could be performed for further evaluation.  Sebaceous cyst in the posterior left upper back.     Impression:     New bilateral pulmonary nodules and FDG avid multifocal hepatic lesions concerning for metastatic disease.   Small focus of uptake along the posterior right aspect of the rectum in the region of a suture line raising concern for local recurrence as well.  Nonspecific FDG uptake in the right hemiscrotum.  Correlation with nonemergent ultrasound could be performed for further evaluation.  Additional findings as above      ASSESSMENT/PLAN:     66-year-old male with mid-rectal adenocarcinoma who is now s/p long-course neoadj chemoXRT which completed on 12/4/19 with partial treatment response on repeat MRI and no evidence of metastatic disease on repeat CT Abd/pelvis who is now s/p robotic ultra-low anterior resection, DLI and mediport placement on 2/4/2020 who has recovered well postop with final path showing T3N0 who has now completed adjuvant chemotx in late June 2020 and is now s/p DLI closure on 8/4/2020  with new concerns of recurrent/metastatic disease in the liver and lung    - CT guided biopsy of liver lesion  - may need additional chemotx based upon path results  - may need port placed again  - RTC 3 weeks for evaluation    Familia Gonzalez MD  Colon and Rectal Surgery  [facility]

## 2022-07-19 ENCOUNTER — TELEPHONE (OUTPATIENT)
Dept: RADIOLOGY | Facility: HOSPITAL | Age: 67
End: 2022-07-19
Payer: MEDICARE

## 2022-07-19 ENCOUNTER — TELEPHONE (OUTPATIENT)
Dept: SURGERY | Facility: CLINIC | Age: 67
End: 2022-07-19
Payer: MEDICARE

## 2022-07-19 DIAGNOSIS — Z78.9 ALCOHOL USE: Primary | ICD-10-CM

## 2022-07-19 DIAGNOSIS — I10 ESSENTIAL HYPERTENSION: ICD-10-CM

## 2022-07-19 DIAGNOSIS — D68.9 COAGULATION DEFECT: ICD-10-CM

## 2022-07-19 NOTE — TELEPHONE ENCOUNTER
Called patient and confirmed radiology procedure appointment for 7/20/22 at 1130. Instructed pt to be NPO after midnight tonight, take BP meds in morning with a few sips of water only, arrive to [facility] [address] at 1030, and have a ride home post procedure. Pt verbalized understanding and had all questions answered. Pt confirmed no blood thinners are being taken

## 2022-07-19 NOTE — TELEPHONE ENCOUNTER
Spoke to and advised patient that I have sent Dr Gonzalez a message asking if he can send something in for pain - Advised as soon as I hear back from Dr Gonzalez I will let him know - Patient verbalized understanding       ----- Message from Yady Roche sent at 7/19/2022  4:27 PM CDT -----  Contact: Vincent  Patient would like a call back at  [phone] in regard to knowing if he can get some pain medication sent over to the pharmacy.      [facility] [address] [place] [address]  Phone: [phone] Fax: [phone]       Thanks

## 2022-07-20 ENCOUNTER — HOSPITAL ENCOUNTER (OUTPATIENT)
Dept: RADIOLOGY | Facility: HOSPITAL | Age: 67
Discharge: HOME | End: 2022-07-20
Attending: COLON & RECTAL SURGERY
Payer: MEDICARE

## 2022-07-20 VITALS
HEART RATE: 70 BPM | SYSTOLIC BLOOD PRESSURE: 126 MMHG | BODY MASS INDEX: 21.05 KG/M2 | OXYGEN SATURATION: 95 % | DIASTOLIC BLOOD PRESSURE: 74 MMHG | WEIGHT: 147 LBS | RESPIRATION RATE: 16 BRPM | HEIGHT: 70 IN

## 2022-07-20 DIAGNOSIS — C78.7 METASTATIC ADENOCARCINOMA TO LIVER: ICD-10-CM

## 2022-07-20 PROCEDURE — 81210 BRAF GENE: CPT | Performed by: PATHOLOGY

## 2022-07-20 PROCEDURE — 81311 NRAS GENE VARIANTS EXON 2&3: CPT | Performed by: PATHOLOGY

## 2022-07-20 PROCEDURE — 88307 TISSUE EXAM BY PATHOLOGIST: CPT | Mod: 26,,, | Performed by: PATHOLOGY

## 2022-07-20 PROCEDURE — 81275 KRAS GENE VARIANTS EXON 2: CPT | Performed by: PATHOLOGY

## 2022-07-20 PROCEDURE — 27200939 CT GUIDED NEEDLE PLACEMENT

## 2022-07-20 PROCEDURE — 88341 IMHCHEM/IMCYTCHM EA ADD ANTB: CPT | Mod: 59 | Performed by: PATHOLOGY

## 2022-07-20 PROCEDURE — 81403 MOPATH PROCEDURE LEVEL 4: CPT | Performed by: PATHOLOGY

## 2022-07-20 PROCEDURE — 88341 IMHCHEM/IMCYTCHM EA ADD ANTB: CPT | Mod: 26,,, | Performed by: PATHOLOGY

## 2022-07-20 PROCEDURE — 88307 TISSUE EXAM BY PATHOLOGIST: CPT | Performed by: PATHOLOGY

## 2022-07-20 PROCEDURE — 88307 PR  SURG PATH,LEVEL V: ICD-10-PCS | Mod: 26,,, | Performed by: PATHOLOGY

## 2022-07-20 PROCEDURE — 88342 IMHCHEM/IMCYTCHM 1ST ANTB: CPT | Performed by: PATHOLOGY

## 2022-07-20 PROCEDURE — 88342 CHG IMMUNOCYTOCHEMISTRY: ICD-10-PCS | Mod: 26,,, | Performed by: PATHOLOGY

## 2022-07-20 PROCEDURE — 88342 IMHCHEM/IMCYTCHM 1ST ANTB: CPT | Mod: 26,,, | Performed by: PATHOLOGY

## 2022-07-20 PROCEDURE — 88381 MICRODISSECTION MANUAL: CPT | Performed by: PATHOLOGY

## 2022-07-20 PROCEDURE — 63600175 PHARM REV CODE 636 W HCPCS: Performed by: PHYSICIAN ASSISTANT

## 2022-07-20 PROCEDURE — 88341 PR IHC OR ICC EACH ADD'L SINGLE ANTIBODY  STAINPR: ICD-10-PCS | Mod: 26,,, | Performed by: PATHOLOGY

## 2022-07-20 PROCEDURE — 63600175 PHARM REV CODE 636 W HCPCS: Performed by: RADIOLOGY

## 2022-07-20 RX ORDER — FENTANYL CITRATE 50 UG/ML
INJECTION, SOLUTION INTRAMUSCULAR; INTRAVENOUS CODE/TRAUMA/SEDATION MEDICATION
Status: COMPLETED | OUTPATIENT
Start: 2022-07-20 | End: 2022-07-20

## 2022-07-20 RX ORDER — HYDROMORPHONE HYDROCHLORIDE 2 MG/ML
1 INJECTION, SOLUTION INTRAMUSCULAR; INTRAVENOUS; SUBCUTANEOUS ONCE
Status: COMPLETED | OUTPATIENT
Start: 2022-07-20 | End: 2022-07-20

## 2022-07-20 RX ADMIN — HYDROMORPHONE HYDROCHLORIDE 1 MG: 2 INJECTION INTRAMUSCULAR; INTRAVENOUS; SUBCUTANEOUS at 02:07

## 2022-07-20 RX ADMIN — FENTANYL CITRATE 25 MCG: 50 INJECTION, SOLUTION INTRAMUSCULAR; INTRAVENOUS at 01:07

## 2022-07-20 NOTE — PLAN OF CARE
Verbal and written discharge instructions given to patient including when to seek medical attention and site care. Pt verbalized understanding. PIV safely discontinued. Bandaid to procedure site remains CDI. Pt reports 3/10 tolerable pain and NADN. VSS. Pt transported to main entrance via w/c with all belongings. Pt was stable on discharge.

## 2022-07-20 NOTE — DISCHARGE SUMMARY
Pre Op Diagnosis: Liver mass     Post Op Diagnosis: same     Procedure:  biopsy     Procedure performed by: Cheyanne JOSHI, Juventino MCMILLAN     Written Informed Consent Obtained: Yes     Specimen Removed:  yes (type of tissue to be determined by lab analysis)     Estimated Blood Loss:  minimal    Moderate Sedation: yes     The patient tolerated the procedure well and there were no complications.      Sterile technique was performed in the RUQ, lidocaine was used as a local anesthetic.  Multiple samples taken percutaneously from the liver mass.  Pt tolerated the procedure well without immediate complications.  Please see radiologist report for details. F/u with PCP and/or ordering physician.

## 2022-07-21 ENCOUNTER — TELEPHONE (OUTPATIENT)
Dept: SURGERY | Facility: CLINIC | Age: 67
End: 2022-07-21
Payer: MEDICARE

## 2022-07-21 NOTE — TELEPHONE ENCOUNTER
Left voicemail message 7/20 asking patient to call back - Left my name and call back number [phone]     Left voicemail message 7/21 advising patient per Dr Gonzalez he will need to get prescription from PCP or Oncology for pain as pain is not from a surgery - Left my name and call back number [phone]      ----- Message from Familia Gonzalez MD sent at 7/20/2022  2:53 PM CDT -----  Contact: Vincent  Would need to get these from PCP or oncology since this is not surgery related pain    ----- Message -----  From: Alejandra River LPN  Sent: 7/19/2022   4:35 PM CDT  To: Familia Gonzalez MD    Mr Kaur is wanting to know if you can send him in something for pain    ----- Message -----  From: Yady Roche  Sent: 7/19/2022   4:28 PM CDT  To: Carlos Barbosa Staff    Patient would like a call back at  [phone] in regard to knowing if he can get some pain medication sent over to the pharmacy.      [facility] [address] [place] [address]  Phone: [phone] Fax: [phone]       Thanks

## 2022-07-22 NOTE — SEDATION DOCUMENTATION
Second dose of Fentanyl not documented at time given.  25mcg Fentanyl given IV as a second dose during procedure for pain control.  Total of 50mcg given during procedure.  WD

## 2022-07-29 ENCOUNTER — TELEPHONE (OUTPATIENT)
Dept: PAIN MEDICINE | Facility: CLINIC | Age: 67
End: 2022-07-29
Payer: MEDICARE

## 2022-07-29 NOTE — TELEPHONE ENCOUNTER
Attempt to reach patient to give him his arrival time for his procedure on 8/01/22. Patient did not answer. no voice box .     Nickolas Campbell  Medical Assistant

## 2022-08-03 LAB
COMMENT: NORMAL
FINAL PATHOLOGIC DIAGNOSIS: NORMAL
GROSS: NORMAL
Lab: NORMAL
SUPPLEMENTAL DIAGNOSIS: NORMAL

## 2022-08-05 ENCOUNTER — TELEPHONE (OUTPATIENT)
Dept: SURGERY | Facility: CLINIC | Age: 67
End: 2022-08-05
Payer: MEDICARE

## 2022-08-08 ENCOUNTER — OFFICE VISIT (OUTPATIENT)
Dept: SURGERY | Facility: CLINIC | Age: 67
End: 2022-08-08
Payer: MEDICARE

## 2022-08-08 VITALS — DIASTOLIC BLOOD PRESSURE: 89 MMHG | HEART RATE: 75 BPM | SYSTOLIC BLOOD PRESSURE: 136 MMHG

## 2022-08-08 DIAGNOSIS — C78.7 METASTATIC ADENOCARCINOMA TO LIVER: Primary | ICD-10-CM

## 2022-08-08 DIAGNOSIS — C19 RECURRENT COLORECTAL ADENOCARCINOMA: ICD-10-CM

## 2022-08-08 PROCEDURE — 1159F PR MEDICATION LIST DOCUMENTED IN MEDICAL RECORD: ICD-10-PCS | Mod: CPTII,S$GLB,, | Performed by: COLON & RECTAL SURGERY

## 2022-08-08 PROCEDURE — 3075F SYST BP GE 130 - 139MM HG: CPT | Mod: CPTII,S$GLB,, | Performed by: COLON & RECTAL SURGERY

## 2022-08-08 PROCEDURE — 3079F DIAST BP 80-89 MM HG: CPT | Mod: CPTII,S$GLB,, | Performed by: COLON & RECTAL SURGERY

## 2022-08-08 PROCEDURE — 3079F PR MOST RECENT DIASTOLIC BLOOD PRESSURE 80-89 MM HG: ICD-10-PCS | Mod: CPTII,S$GLB,, | Performed by: COLON & RECTAL SURGERY

## 2022-08-08 PROCEDURE — 99214 OFFICE O/P EST MOD 30 MIN: CPT | Mod: S$GLB,,, | Performed by: COLON & RECTAL SURGERY

## 2022-08-08 PROCEDURE — 1159F MED LIST DOCD IN RCRD: CPT | Mod: CPTII,S$GLB,, | Performed by: COLON & RECTAL SURGERY

## 2022-08-08 PROCEDURE — 99999 PR PBB SHADOW E&M-EST. PATIENT-LVL IV: ICD-10-PCS | Mod: PBBFAC,,, | Performed by: COLON & RECTAL SURGERY

## 2022-08-08 PROCEDURE — 99999 PR PBB SHADOW E&M-EST. PATIENT-LVL IV: CPT | Mod: PBBFAC,,, | Performed by: COLON & RECTAL SURGERY

## 2022-08-08 PROCEDURE — 99214 PR OFFICE/OUTPT VISIT, EST, LEVL IV, 30-39 MIN: ICD-10-PCS | Mod: S$GLB,,, | Performed by: COLON & RECTAL SURGERY

## 2022-08-08 PROCEDURE — 3075F PR MOST RECENT SYSTOLIC BLOOD PRESS GE 130-139MM HG: ICD-10-PCS | Mod: CPTII,S$GLB,, | Performed by: COLON & RECTAL SURGERY

## 2022-08-08 RX ORDER — SODIUM CHLORIDE, SODIUM LACTATE, POTASSIUM CHLORIDE, CALCIUM CHLORIDE 600; 310; 30; 20 MG/100ML; MG/100ML; MG/100ML; MG/100ML
INJECTION, SOLUTION INTRAVENOUS CONTINUOUS
Status: CANCELLED | OUTPATIENT
Start: 2022-08-08

## 2022-08-08 RX ORDER — ONDANSETRON 2 MG/ML
4 INJECTION INTRAMUSCULAR; INTRAVENOUS EVERY 12 HOURS PRN
Status: CANCELLED | OUTPATIENT
Start: 2022-08-08

## 2022-08-08 NOTE — PROGRESS NOTES
History & Physical    SUBJECTIVE:     Chief Complaint   Patient presents with    Follow-up     3 week follow up// Rectal Ca IR Bx 7/20   Ref MD: Isai Centeno MD    History of Present Illness:  Patient is a 66 y.o. male presents for evaluation of a rectal mass.  Patient has been having iron deficiency anemia that did require transfusion around 1 year ago.  He also has had associated hematochezia for over a year now but did improve after he quit drinking beer at 8 months ago but is still intermittently present. This prompted a colonoscopy which was performed on 09/03/2019 where an obstructing rectal mass was seen that appeared malignant was biopsied and could not be traversed.  Patient reports that he had a colonoscopy around 6-7 years ago were some benign polyps were found but no malignancy.  These records are not available.  He states that the hematochezia has recently improved after he quit drinking beer, but is still intermittently present and worsen with the bowel prep from the colonoscopy recently.  He is not on any chronic anticoagulation.  He states he has had normal caliber bowel movements does not feel constipated or obstructed.  He denies any fever, chills, nausea, vomiting, diarrhea, constipation, weight loss, night sweats or any other signs or symptoms.    12/4/19: Completed Neoadj chemoXRT  2/4/2020 : Robotic ultra-low anterior resection with DLI and Mediport placement  June 2020: Completed adjuvant chemotx  8/4/2020: DLI reversal    Interval history:  Since last clinic visit, the patient had a CT-guided biopsy of the liver lesion confirming metastatic colorectal adenocarcinoma.  Denies any fever, chills, nausea, vomiting, hematochezia or melena.    Review of patient's allergies indicates:  No Known Allergies    Current Outpatient Medications   Medication Sig Dispense Refill    ascorbic acid, vitamin C, (VITAMIN C) 1000 MG tablet Take 1,000 mg by mouth once daily.      cyanocobalamin (VITAMIN B-12)  1000 MCG tablet Take 100 mcg by mouth once daily.      diclofenac sodium (VOLTAREN) 1 % Gel Apply 2 g topically 3 (three) times daily as needed. 2 Tube 6    diclofenac sodium (VOLTAREN) 1 % Gel Apply 2 g topically 3 (three) times daily. 1 each 1    gabapentin (NEURONTIN) 300 MG capsule Take 1 capsule (300 mg total) by mouth every evening. 30 capsule 5    mirtazapine (REMERON) 15 MG tablet Take 1 tablet (15 mg total) by mouth every evening. 90 tablet 1    traMADoL (ULTRAM) 50 mg tablet Take 1 tablet (50 mg total) by mouth every 6 (six) hours. 30 tablet 0    HYDROcodone-acetaminophen (NORCO)  mg per tablet Take 1 tablet by mouth every 8 (eight) hours as needed for Pain. (Patient not taking: Reported on 8/8/2022) 21 tablet 0    ondansetron (ZOFRAN-ODT) 4 MG TbDL Take 2 tablets (8 mg total) by mouth every 8 (eight) hours as needed. (Patient not taking: Reported on 8/8/2022) 20 tablet 0    tamsulosin (FLOMAX) 0.4 mg Cap Take 1 capsule (0.4 mg total) by mouth once daily. 30 capsule 11     No current facility-administered medications for this visit.     Facility-Administered Medications Ordered in Other Visits   Medication Dose Route Frequency Provider Last Rate Last Admin    0.9%  NaCl infusion   Intravenous Continuous Delonte Mcintyre MD        0.9%  NaCl infusion   Intravenous Continuous Delonte Mcintyre MD        alteplase injection 2 mg  2 mg Intra-Catheter PRN Mikel Sams MD        chlorhexidine 0.12 % solution 10 mL  10 mL Mouth/Throat On Call Procedure Delonte Mcintyre MD   10 mL at 05/05/21 1222    chlorhexidine 0.12 % solution 10 mL  10 mL Mouth/Throat On Call Procedure Delonte Mcintyre MD   10 mL at 01/04/22 0628    diphenhydrAMINE injection 25 mg  25 mg Intravenous Q6H PRN Keli Alcaraz MD        HYDROmorphone (PF) injection 0.2 mg  0.2 mg Intravenous Q5 Min PRN Keli Alcaraz MD   0.2 mg at 05/05/21 1505    HYDROmorphone (PF) injection 0.2 mg  0.2 mg  Intravenous Q5 Min PRN Keli Alcaraz MD        lactated ringers infusion   Intravenous Continuous Familia Gonzalez MD   Stopped at 03/24/21 0825    metoclopramide HCl injection 10 mg  10 mg Intravenous Q10 Min PRN Keli Alcaraz MD        nozaseptin (NOZIN) nasal    Each Nostril On Call Procedure Delonte Mcintyre MD   Given at 05/05/21 1222    sodium chloride 0.9% flush 10 mL  10 mL Intravenous PRN Mikel Sams MD        sodium chloride 0.9% flush 3 mL  3 mL Intravenous PRN Shilpa Yee MD        sodium chloride 0.9% flush 3 mL  3 mL Intravenous Q8H Keli Alcaraz MD           Past Medical History:   Diagnosis Date    Alcoholism     Anemia     Back pain     Encounter for blood transfusion 2018    Essential hypertension 2/4/2016    GERD (gastroesophageal reflux disease)     Hiatal hernia     Hypertension     diet controlled    Melanoma 06/2021    left cheek    Rectal cancer 9/11/2019     Past Surgical History:   Procedure Laterality Date    ARTHROSCOPY OF KNEE Right 5/5/2021    Procedure: ARTHROSCOPY, KNEE;  Surgeon: Delonte Mcintyre MD;  Location: [facility];  Service: Orthopedics;  Laterality: Right;    COLONOSCOPY N/A 9/3/2019    Procedure: COLONOSCOPY;  Surgeon: Isai Centeno MD;  Location: [facility];  Service: Endoscopy;  Laterality: N/A;    COLONOSCOPY N/A 1/10/2020    Procedure: COLONOSCOPY;  Surgeon: Familia Gonzalez MD;  Location: [facility];  Service: General;  Laterality: N/A;    COLONOSCOPY N/A 3/24/2021    Procedure: COLONOSCOPY;  Surgeon: Familia Gonzalez MD;  Location: [facility] END[facility];  Service: General;  Laterality: N/A;    ESOPHAGOGASTRODUODENOSCOPY N/A 9/3/2019    Procedure: ESOPHAGOGASTRODUODENOSCOPY (EGD);  Surgeon: Isai Centeno MD;  Location: [facility];  Service: Endoscopy;  Laterality: N/A;    EXCISION OF MEDIAL MENISCUS OF KNEE Right 5/5/2021    Procedure: MENISCECTOMY, KNEE, MEDIAL;  Surgeon: Delonte Mcintyre MD;  Location:  [facility];  Service: Orthopedics;  Laterality: Right;  partial Lateral    FLEXIBLE SIGMOIDOSCOPY  2/4/2020    Procedure: SIGMOIDOSCOPY, FLEXIBLE;  Surgeon: Familia Gonzalez MD;  Location: [facility] OR;  Service: General;;    ILEOSTOMY Right 2/4/2020    Procedure: CREATION, ILEOSTOMY;  Surgeon: Familia Gonzalez MD;  Location: [facility] OR;  Service: General;  Laterality: Right;    ILEOSTOMY CLOSURE N/A 8/4/2020    Procedure: CLOSURE, ILEOSTOMY;  Surgeon: Familia Gonzalez MD;  Location: [facility] OR;  Service: General;  Laterality: N/A;    INCISION AND DRAINAGE OF BACK Left 8/5/2020    Procedure: INCISION AND DRAINAGE, BACK;  Surgeon: Familia Gonzalez MD;  Location: [facility] OR;  Service: General;  Laterality: Left;    INJECTION OF ANESTHETIC AGENT INTO TISSUE PLANE DEFINED BY TRANSVERSUS ABDOMINIS MUSCLE N/A 2/4/2020    Procedure: BLOCK, TRANSVERSUS ABDOMINIS PLANE;  Surgeon: Familia Gonzalez MD;  Location: [facility] OR;  Service: General;  Laterality: N/A;    INSERTION OF TUNNELED CENTRAL VENOUS CATHETER (CVC) WITH SUBCUTANEOUS PORT Right 2/4/2020    Procedure: INSERTION, PORT-A-CATH;  Surgeon: Familia Gonzalez MD;  Location: [facility];  Service: General;  Laterality: Right;    JOINT REPLACEMENT Left 2009    Hip    KNEE ARTHROSCOPY W/ PLICA EXCISION Right 5/5/2021    Procedure: EXCISION, PLICA, KNEE, ARTHROSCOPIC;  Surgeon: Delonte Mcintyre MD;  Location: [facility];  Service: Orthopedics;  Laterality: Right;    MOBILIZATION OF SPLENIC FLEXURE  2/4/2020    Procedure: MOBILIZATION, SPLENIC FLEXURE;  Surgeon: Familia Gonzalez MD;  Location: [facility] OR;  Service: General;;    REMOVAL OF HARDWARE FROM HIP Left 1/4/2022    Procedure: REMOVAL, HARDWARE, HIP;  Surgeon: Delonte Mcintyre MD;  Location: [facility];  Service: Orthopedics;  Laterality: Left;     Family History   Problem Relation Age of Onset    Cataracts Mother     Stroke Father     Hypertension Father      Social History     Tobacco Use    Smoking status: Never  Smoker    Smokeless tobacco: Never Used   Substance Use Topics    Alcohol use: Yes     Comment: occassionally No alcohol 72h prior to sx    Drug use: No        Review of Systems:  Review of Systems   Constitutional: Negative for activity change, appetite change, chills, fatigue, fever and unexpected weight change.   HENT: Negative for congestion, ear pain, sore throat and trouble swallowing.    Eyes: Negative for pain, redness and itching.   Respiratory: Negative for cough, shortness of breath and wheezing.    Cardiovascular: Negative for chest pain, palpitations and leg swelling.   Gastrointestinal: Negative for abdominal distention, abdominal pain, anal bleeding, blood in stool, constipation, diarrhea, nausea, rectal pain and vomiting.   Endocrine: Negative for cold intolerance, heat intolerance and polyuria.   Genitourinary: Negative for dysuria, flank pain, frequency and hematuria.   Musculoskeletal: Negative for gait problem, joint swelling and neck pain.   Skin: Negative for color change, rash and wound.   Allergic/Immunologic: Negative for environmental allergies and immunocompromised state.   Neurological: Negative for dizziness, speech difficulty, weakness and numbness.   Psychiatric/Behavioral: Negative for agitation, confusion and hallucinations.       OBJECTIVE:     Vital Signs (Most Recent)  Pulse: 75 (08/08/22 1425)  BP: 136/89 (08/08/22 1425)           Physical Exam:  Physical Exam  Constitutional:       Appearance: He is well-developed.   HENT:      Head: Normocephalic and atraumatic.   Eyes:      Conjunctiva/sclera: Conjunctivae normal.   Neck:      Thyroid: No thyromegaly.   Cardiovascular:      Rate and Rhythm: Normal rate and regular rhythm.   Pulmonary:      Effort: Pulmonary effort is normal. No respiratory distress.   Abdominal:      Comments: Soft, nondistended, nontender; reducible umiblical and previous RLQ ileostomy site hernias; ileostomy site hernia around 2.5cm defect; well-healed  incision   Musculoskeletal:         General: No tenderness. Normal range of motion.      Cervical back: Normal range of motion.   Skin:     General: Skin is warm and dry.      Capillary Refill: Capillary refill takes less than 2 seconds.      Findings: No rash.   Neurological:      Mental Status: He is alert and oriented to person, place, and time.   Psychiatric:         Behavior: Behavior normal.       Laboratory  Lab Results   Component Value Date    WBC 8.67 07/20/2022    HGB 11.5 (L) 07/20/2022    HCT 37.1 (L) 07/20/2022     07/20/2022    CHOL 180 03/16/2020    TRIG 43 03/16/2020    HDL 81 (H) 03/16/2020    ALT 11 07/20/2022    AST 19 07/20/2022     07/20/2022    K 5.1 07/20/2022    CL 98 07/20/2022    CREATININE 0.7 07/20/2022    BUN 12 07/20/2022    CO2 26 07/20/2022    TSH 0.601 04/07/2018    PSA 1.4 03/16/2020    INR 1.0 07/20/2022    HGBA1C 5.7 (H) 06/08/2020     Component Ref Range & Units 1 mo ago   (6/13/22) 1 yr ago   (2/22/21) 1 yr ago   (9/1/20) 2 yr ago   (7/13/20) 2 yr ago   (6/22/20) 2 yr ago   (5/4/20) 2 yr ago   (1/27/20)   CEA 0.0 - 5.0 ng/mL 159.9 High   3.0 CM  2.7 CM  6.0 High  CM  5.7 High  CM  4.2 CM  4.5 CM          Diagnostic Results:  Reviewed colonoscopy report and images with rectal mass appreciated     PET CT:  FINDINGS:  Head and neck: Physiologic uptake in the visualized brain parenchyma.  There are no FDG avid cervical lymph nodes.     Chest: Interval development of new bilateral pulmonary nodules.  For example in the right lower lobe series 3, image 158 there is a 4 mm nodule more superiorly in the peripheral right lower lobe on image 156 is a 3-4 mm juxtapleural nodule.  These nodules are not sufficiently FDG avid but fall below the resolution for PET-CT.  Largest nodules is in the left upper lobe on image 146 and measures 7 mm.  This demonstrates max SUV of 1.5, above background parenchyma.     There is unchanged scarring or atelectasis at the left lung base.   Previously described 13 mm ground-glass attenuation in the prior PET-CT in the lateral left upper lobe is no longer well visualized with mild suspected scarring in this region.  No significant uptake in this region.  No detrimental change in ground-glass density in the right midlung adjacent to the major fissure.  Bilateral patchy ground-glass opacity seen on CT from June 2020 have essentially resolved. No FDG avid axillary mediastinal or hilar adenopathy.     Abdomen/pelvis: New multifocal areas of uptake in the liver are seen.  Largest areas in the inferior right hepatic lobe image 240 axial fused sequence with ill-defined hypodense lesion seen in this area measuring up to 8.7 cm.  Max SUV measures 15.0.  Focus of uptake in the lateral hepatic segment image 219 is seen with max SUV of 13.  Additional focus of uptake on image 206 with max SUV of 9.0.  Hepatic dome lesion uptake with max SUV of 7.0.  No abnormal uptake in the spleen or pancreas or the adrenals.  Postoperative changes of the rectum with primary anastomosis.  On axial fused image 346 there is FDG avid focus with SUV max of 3.4 adjacent to a suture and appears to extend off the posterior right aspect of the rectum into the adjacent soft tissues.  Presacral postsurgical collection with adjacent fat stranding/soft tissue thickening is noted, unchanged.  No significant FDG uptake in this area is seen.     No FDG avid lymphadenopathy.     Bones: Degenerative changes of the spine with osteopenia.  Previously seen left hip orthopedic hardware is no longer visualized.  Chronic appearing fracture deformity the proximal left femur is seen.  No FDG avid osseous lesions.     Miscellaneous: Nonspecific focal uptake in the right hemiscrotum with max SUV of 5.4.  Correlation with nonemergent ultrasound could be performed for further evaluation.  Sebaceous cyst in the posterior left upper back.     Impression:     New bilateral pulmonary nodules and FDG avid  multifocal hepatic lesions concerning for metastatic disease.  Small focus of uptake along the posterior right aspect of the rectum in the region of a suture line raising concern for local recurrence as well.  Nonspecific FDG uptake in the right hemiscrotum.  Correlation with nonemergent ultrasound could be performed for further evaluation.  Additional findings as above      PATHOLOGY:  LIVER MASS, BIOPSY:   Adenocarcinoma consistent with colorectal origin.    ASSESSMENT/PLAN:     66-year-old male with mid-rectal adenocarcinoma who is now s/p long-course neoadj chemoXRT which completed on 12/4/19 with partial treatment response on repeat MRI and no evidence of metastatic disease on repeat CT Abd/pelvis who is now s/p robotic ultra-low anterior resection, DLI and mediport placement on 2/4/2020 who has recovered well postop with final path showing T3N0 who has now completed adjuvant chemotx in late June 2020 and is now s/p DLI closure on 8/4/2020  with new concerns of recurrent/metastatic disease in the liver and lung    - will need [facility] treatment. [facility]  - plan for mediport insertion next week  - All risks, benefits and alternatives fully explained to patient.  Risks include, but are not limited to, bleeding, infection, pneumothorax requiring chest tube insertion, damage to great vessels and nerves, port malfunction, port malposition, postoperative abscess, postoperative pain, perioperative MI, CVA and death.  All questions appropriately answered to patient's satisfaction.  Consent signed and placed on chart.  - RTC 3 months    Familia Gonzalez MD  Colon and Rectal Surgery  [facility]

## 2022-08-08 NOTE — H&P (VIEW-ONLY)
History & Physical    SUBJECTIVE:     Chief Complaint   Patient presents with    Follow-up     3 week follow up// Rectal Ca IR Bx 7/20   Ref MD: Isai Centeno MD    History of Present Illness:  Patient is a 66 y.o. male presents for evaluation of a rectal mass.  Patient has been having iron deficiency anemia that did require transfusion around 1 year ago.  He also has had associated hematochezia for over a year now but did improve after he quit drinking beer at 8 months ago but is still intermittently present. This prompted a colonoscopy which was performed on 09/03/2019 where an obstructing rectal mass was seen that appeared malignant was biopsied and could not be traversed.  Patient reports that he had a colonoscopy around 6-7 years ago were some benign polyps were found but no malignancy.  These records are not available.  He states that the hematochezia has recently improved after he quit drinking beer, but is still intermittently present and worsen with the bowel prep from the colonoscopy recently.  He is not on any chronic anticoagulation.  He states he has had normal caliber bowel movements does not feel constipated or obstructed.  He denies any fever, chills, nausea, vomiting, diarrhea, constipation, weight loss, night sweats or any other signs or symptoms.    12/4/19: Completed Neoadj chemoXRT  2/4/2020 : Robotic ultra-low anterior resection with DLI and Mediport placement  June 2020: Completed adjuvant chemotx  8/4/2020: DLI reversal    Interval history:  Since last clinic visit, the patient had a CT-guided biopsy of the liver lesion confirming metastatic colorectal adenocarcinoma.  Denies any fever, chills, nausea, vomiting, hematochezia or melena.    Review of patient's allergies indicates:  No Known Allergies    Current Outpatient Medications   Medication Sig Dispense Refill    ascorbic acid, vitamin C, (VITAMIN C) 1000 MG tablet Take 1,000 mg by mouth once daily.      cyanocobalamin (VITAMIN B-12)  1000 MCG tablet Take 100 mcg by mouth once daily.      diclofenac sodium (VOLTAREN) 1 % Gel Apply 2 g topically 3 (three) times daily as needed. 2 Tube 6    diclofenac sodium (VOLTAREN) 1 % Gel Apply 2 g topically 3 (three) times daily. 1 each 1    gabapentin (NEURONTIN) 300 MG capsule Take 1 capsule (300 mg total) by mouth every evening. 30 capsule 5    mirtazapine (REMERON) 15 MG tablet Take 1 tablet (15 mg total) by mouth every evening. 90 tablet 1    traMADoL (ULTRAM) 50 mg tablet Take 1 tablet (50 mg total) by mouth every 6 (six) hours. 30 tablet 0    HYDROcodone-acetaminophen (NORCO)  mg per tablet Take 1 tablet by mouth every 8 (eight) hours as needed for Pain. (Patient not taking: Reported on 8/8/2022) 21 tablet 0    ondansetron (ZOFRAN-ODT) 4 MG TbDL Take 2 tablets (8 mg total) by mouth every 8 (eight) hours as needed. (Patient not taking: Reported on 8/8/2022) 20 tablet 0    tamsulosin (FLOMAX) 0.4 mg Cap Take 1 capsule (0.4 mg total) by mouth once daily. 30 capsule 11     No current facility-administered medications for this visit.     Facility-Administered Medications Ordered in Other Visits   Medication Dose Route Frequency Provider Last Rate Last Admin    0.9%  NaCl infusion   Intravenous Continuous Delonte Mcintyre MD        0.9%  NaCl infusion   Intravenous Continuous Delonte Mcintyre MD        alteplase injection 2 mg  2 mg Intra-Catheter PRN Mikel Sams MD        chlorhexidine 0.12 % solution 10 mL  10 mL Mouth/Throat On Call Procedure Delonte Mcintyre MD   10 mL at 05/05/21 1222    chlorhexidine 0.12 % solution 10 mL  10 mL Mouth/Throat On Call Procedure Delonte Mcintyre MD   10 mL at 01/04/22 0628    diphenhydrAMINE injection 25 mg  25 mg Intravenous Q6H PRN Keli Alcaraz MD        HYDROmorphone (PF) injection 0.2 mg  0.2 mg Intravenous Q5 Min PRN Keli Alcaraz MD   0.2 mg at 05/05/21 1505    HYDROmorphone (PF) injection 0.2 mg  0.2 mg  Intravenous Q5 Min PRN Keli Alcaraz MD        lactated ringers infusion   Intravenous Continuous Familia Gonzalez MD   Stopped at 03/24/21 0825    metoclopramide HCl injection 10 mg  10 mg Intravenous Q10 Min PRN Keli Alcaraz MD        nozaseptin (NOZIN) nasal    Each Nostril On Call Procedure Delonte Mcintyre MD   Given at 05/05/21 1222    sodium chloride 0.9% flush 10 mL  10 mL Intravenous PRN Mikel Sams MD        sodium chloride 0.9% flush 3 mL  3 mL Intravenous PRN Shilpa Yee MD        sodium chloride 0.9% flush 3 mL  3 mL Intravenous Q8H Keli Alcaraz MD           Past Medical History:   Diagnosis Date    Alcoholism     Anemia     Back pain     Encounter for blood transfusion 2018    Essential hypertension 2/4/2016    GERD (gastroesophageal reflux disease)     Hiatal hernia     Hypertension     diet controlled    Melanoma 06/2021    left cheek    Rectal cancer 9/11/2019     Past Surgical History:   Procedure Laterality Date    ARTHROSCOPY OF KNEE Right 5/5/2021    Procedure: ARTHROSCOPY, KNEE;  Surgeon: Delonte Mcintyre MD;  Location: [facility];  Service: Orthopedics;  Laterality: Right;    COLONOSCOPY N/A 9/3/2019    Procedure: COLONOSCOPY;  Surgeon: Isai Centeno MD;  Location: [facility];  Service: Endoscopy;  Laterality: N/A;    COLONOSCOPY N/A 1/10/2020    Procedure: COLONOSCOPY;  Surgeon: Familia Gonzalez MD;  Location: [facility];  Service: General;  Laterality: N/A;    COLONOSCOPY N/A 3/24/2021    Procedure: COLONOSCOPY;  Surgeon: Familia Gonzalez MD;  Location: [facility] ENDO;  Service: General;  Laterality: N/A;    ESOPHAGOGASTRODUODENOSCOPY N/A 9/3/2019    Procedure: ESOPHAGOGASTRODUODENOSCOPY (EGD);  Surgeon: Isai Centeno MD;  Location: [facility];  Service: Endoscopy;  Laterality: N/A;    EXCISION OF MEDIAL MENISCUS OF KNEE Right 5/5/2021    Procedure: MENISCECTOMY, KNEE, MEDIAL;  Surgeon: Delonte Mcintyre MD;  Location:  [facility] OR;  Service: Orthopedics;  Laterality: Right;  partial Lateral    FLEXIBLE SIGMOIDOSCOPY  2/4/2020    Procedure: SIGMOIDOSCOPY, FLEXIBLE;  Surgeon: Familia Gonzalez MD;  Location: [facility] OR;  Service: General;;    ILEOSTOMY Right 2/4/2020    Procedure: CREATION, ILEOSTOMY;  Surgeon: Familia Gonzalez MD;  Location: [facility] OR;  Service: General;  Laterality: Right;    ILEOSTOMY CLOSURE N/A 8/4/2020    Procedure: CLOSURE, ILEOSTOMY;  Surgeon: Familia Gonzalez MD;  Location: [facility] OR;  Service: General;  Laterality: N/A;    INCISION AND DRAINAGE OF BACK Left 8/5/2020    Procedure: INCISION AND DRAINAGE, BACK;  Surgeon: Familia Gonzalez MD;  Location: [facility] OR;  Service: General;  Laterality: Left;    INJECTION OF ANESTHETIC AGENT INTO TISSUE PLANE DEFINED BY TRANSVERSUS ABDOMINIS MUSCLE N/A 2/4/2020    Procedure: BLOCK, TRANSVERSUS ABDOMINIS PLANE;  Surgeon: Familia Gonzalez MD;  Location: [facility] OR;  Service: General;  Laterality: N/A;    INSERTION OF TUNNELED CENTRAL VENOUS CATHETER (CVC) WITH SUBCUTANEOUS PORT Right 2/4/2020    Procedure: INSERTION, PORT-A-CATH;  Surgeon: Familia Gonzalez MD;  Location: [facility];  Service: General;  Laterality: Right;    JOINT REPLACEMENT Left 2009    Hip    KNEE ARTHROSCOPY W/ PLICA EXCISION Right 5/5/2021    Procedure: EXCISION, PLICA, KNEE, ARTHROSCOPIC;  Surgeon: Delonte Mcintyre MD;  Location: [facility] OR;  Service: Orthopedics;  Laterality: Right;    MOBILIZATION OF SPLENIC FLEXURE  2/4/2020    Procedure: MOBILIZATION, SPLENIC FLEXURE;  Surgeon: Familia Gonzalez MD;  Location: [facility] OR;  Service: General;;    REMOVAL OF HARDWARE FROM HIP Left 1/4/2022    Procedure: REMOVAL, HARDWARE, HIP;  Surgeon: Delonte Mcintyre MD;  Location: [facility];  Service: Orthopedics;  Laterality: Left;     Family History   Problem Relation Age of Onset    Cataracts Mother     Stroke Father     Hypertension Father      Social History     Tobacco Use    Smoking status: Never  Smoker    Smokeless tobacco: Never Used   Substance Use Topics    Alcohol use: Yes     Comment: occassionally No alcohol 72h prior to sx    Drug use: No        Review of Systems:  Review of Systems   Constitutional: Negative for activity change, appetite change, chills, fatigue, fever and unexpected weight change.   HENT: Negative for congestion, ear pain, sore throat and trouble swallowing.    Eyes: Negative for pain, redness and itching.   Respiratory: Negative for cough, shortness of breath and wheezing.    Cardiovascular: Negative for chest pain, palpitations and leg swelling.   Gastrointestinal: Negative for abdominal distention, abdominal pain, anal bleeding, blood in stool, constipation, diarrhea, nausea, rectal pain and vomiting.   Endocrine: Negative for cold intolerance, heat intolerance and polyuria.   Genitourinary: Negative for dysuria, flank pain, frequency and hematuria.   Musculoskeletal: Negative for gait problem, joint swelling and neck pain.   Skin: Negative for color change, rash and wound.   Allergic/Immunologic: Negative for environmental allergies and immunocompromised state.   Neurological: Negative for dizziness, speech difficulty, weakness and numbness.   Psychiatric/Behavioral: Negative for agitation, confusion and hallucinations.       OBJECTIVE:     Vital Signs (Most Recent)  Pulse: 75 (08/08/22 1425)  BP: 136/89 (08/08/22 1425)           Physical Exam:  Physical Exam  Constitutional:       Appearance: He is well-developed.   HENT:      Head: Normocephalic and atraumatic.   Eyes:      Conjunctiva/sclera: Conjunctivae normal.   Neck:      Thyroid: No thyromegaly.   Cardiovascular:      Rate and Rhythm: Normal rate and regular rhythm.   Pulmonary:      Effort: Pulmonary effort is normal. No respiratory distress.   Abdominal:      Comments: Soft, nondistended, nontender; reducible umiblical and previous RLQ ileostomy site hernias; ileostomy site hernia around 2.5cm defect; well-healed  incision   Musculoskeletal:         General: No tenderness. Normal range of motion.      Cervical back: Normal range of motion.   Skin:     General: Skin is warm and dry.      Capillary Refill: Capillary refill takes less than 2 seconds.      Findings: No rash.   Neurological:      Mental Status: He is alert and oriented to person, place, and time.   Psychiatric:         Behavior: Behavior normal.       Laboratory  Lab Results   Component Value Date    WBC 8.67 07/20/2022    HGB 11.5 (L) 07/20/2022    HCT 37.1 (L) 07/20/2022     07/20/2022    CHOL 180 03/16/2020    TRIG 43 03/16/2020    HDL 81 (H) 03/16/2020    ALT 11 07/20/2022    AST 19 07/20/2022     07/20/2022    K 5.1 07/20/2022    CL 98 07/20/2022    CREATININE 0.7 07/20/2022    BUN 12 07/20/2022    CO2 26 07/20/2022    TSH 0.601 04/07/2018    PSA 1.4 03/16/2020    INR 1.0 07/20/2022    HGBA1C 5.7 (H) 06/08/2020     Component Ref Range & Units 1 mo ago   (6/13/22) 1 yr ago   (2/22/21) 1 yr ago   (9/1/20) 2 yr ago   (7/13/20) 2 yr ago   (6/22/20) 2 yr ago   (5/4/20) 2 yr ago   (1/27/20)   CEA 0.0 - 5.0 ng/mL 159.9 High   3.0 CM  2.7 CM  6.0 High  CM  5.7 High  CM  4.2 CM  4.5 CM          Diagnostic Results:  Reviewed colonoscopy report and images with rectal mass appreciated     PET CT:  FINDINGS:  Head and neck: Physiologic uptake in the visualized brain parenchyma.  There are no FDG avid cervical lymph nodes.     Chest: Interval development of new bilateral pulmonary nodules.  For example in the right lower lobe series 3, image 158 there is a 4 mm nodule more superiorly in the peripheral right lower lobe on image 156 is a 3-4 mm juxtapleural nodule.  These nodules are not sufficiently FDG avid but fall below the resolution for PET-CT.  Largest nodules is in the left upper lobe on image 146 and measures 7 mm.  This demonstrates max SUV of 1.5, above background parenchyma.     There is unchanged scarring or atelectasis at the left lung base.   Previously described 13 mm ground-glass attenuation in the prior PET-CT in the lateral left upper lobe is no longer well visualized with mild suspected scarring in this region.  No significant uptake in this region.  No detrimental change in ground-glass density in the right midlung adjacent to the major fissure.  Bilateral patchy ground-glass opacity seen on CT from June 2020 have essentially resolved. No FDG avid axillary mediastinal or hilar adenopathy.     Abdomen/pelvis: New multifocal areas of uptake in the liver are seen.  Largest areas in the inferior right hepatic lobe image 240 axial fused sequence with ill-defined hypodense lesion seen in this area measuring up to 8.7 cm.  Max SUV measures 15.0.  Focus of uptake in the lateral hepatic segment image 219 is seen with max SUV of 13.  Additional focus of uptake on image 206 with max SUV of 9.0.  Hepatic dome lesion uptake with max SUV of 7.0.  No abnormal uptake in the spleen or pancreas or the adrenals.  Postoperative changes of the rectum with primary anastomosis.  On axial fused image 346 there is FDG avid focus with SUV max of 3.4 adjacent to a suture and appears to extend off the posterior right aspect of the rectum into the adjacent soft tissues.  Presacral postsurgical collection with adjacent fat stranding/soft tissue thickening is noted, unchanged.  No significant FDG uptake in this area is seen.     No FDG avid lymphadenopathy.     Bones: Degenerative changes of the spine with osteopenia.  Previously seen left hip orthopedic hardware is no longer visualized.  Chronic appearing fracture deformity the proximal left femur is seen.  No FDG avid osseous lesions.     Miscellaneous: Nonspecific focal uptake in the right hemiscrotum with max SUV of 5.4.  Correlation with nonemergent ultrasound could be performed for further evaluation.  Sebaceous cyst in the posterior left upper back.     Impression:     New bilateral pulmonary nodules and FDG avid  multifocal hepatic lesions concerning for metastatic disease.  Small focus of uptake along the posterior right aspect of the rectum in the region of a suture line raising concern for local recurrence as well.  Nonspecific FDG uptake in the right hemiscrotum.  Correlation with nonemergent ultrasound could be performed for further evaluation.  Additional findings as above      PATHOLOGY:  LIVER MASS, BIOPSY:   Adenocarcinoma consistent with colorectal origin.    ASSESSMENT/PLAN:     66-year-old male with mid-rectal adenocarcinoma who is now s/p long-course neoadj chemoXRT which completed on 12/4/19 with partial treatment response on repeat MRI and no evidence of metastatic disease on repeat CT Abd/pelvis who is now s/p robotic ultra-low anterior resection, DLI and mediport placement on 2/4/2020 who has recovered well postop with final path showing T3N0 who has now completed adjuvant chemotx in late June 2020 and is now s/p DLI closure on 8/4/2020  with new concerns of recurrent/metastatic disease in the liver and lung    - [facility]  - plan for mediport insertion next week  - All risks, benefits and alternatives fully explained to patient.  Risks include, but are not limited to, bleeding, infection, pneumothorax requiring chest tube insertion, damage to great vessels and nerves, port malfunction, port malposition, postoperative abscess, postoperative pain, perioperative MI, CVA and death.  All questions appropriately answered to patient's satisfaction.  Consent signed and placed on chart.  - RTC 3 months    Familia Gonzalez MD  Colon and Rectal Surgery  [facility]

## 2022-08-09 ENCOUNTER — TELEPHONE (OUTPATIENT)
Dept: HEMATOLOGY/ONCOLOGY | Facility: CLINIC | Age: 67
End: 2022-08-09
Payer: MEDICARE

## 2022-08-09 DIAGNOSIS — Z01.818 PRE-OP TESTING: Primary | ICD-10-CM

## 2022-08-09 NOTE — PRE ADMISSION SCREENING
Pre op instructions reviewed with Pt per phone: Spoke about pre op process and surgery instructions, verbalized understanding.    Surgery is scheduled on 8/16/22. Please arrive at 10:30am. We will call you the afternoon prior to surgery to confirm arrival time, as it is subject to change due to cancellations & emergencies.    Please report to the [facility] (1st Floor) at [facility] located off of [facility] (2nd building on the left, in front of the [facility] pole).  Address: [address] [place] [address] [place]. [id number]       INSTRUCTIONS IMPORTANT!!!  Do Not Eat, Drink, or Smoke after 12 midnight! NO WATER after midnight! OK to brush teeth, no gum, candy or mints!      *Take only these medicines with a small swallow of water-morning of surgery.  N/a    ____  NO Acrylic/fake nails or nail polish worn day of surgery (specifically hand/arm & foot surgeries).  ____  NO powder, lotions, deodorants, oils or creams on body.  ____  Please Remove All jewelry & piercings prior to surgery.  ____  Please Remove Dentures, Hearing Aids & Contact Lens prior to the start of surgery.  ____  Please bring photo ID and insurance information to hospital (Leave Valuables at Home).  ____  If going home the same day, arrange for a ride home. You will not be able to drive 24 hrs if Anesthesia was used.   ____  Females (ages 11-60) may need to give a urine sample the morning of surgery; please see Pre op Nurse prior to voiding.  ____  Wear clean, loose fitting clothing. Allow for dressings, bandages.  ____  Stop all Aspirin products, Ibuprofen, Advil, Motrin & Aleve at least 5-7 days before surgery, unless otherwise instructed by your doctor, or the nurse.   ____  Blood Thinners are stopped based on your Provider's recommendation; Call Surgeon's Office to inquire when to stop/hold.  ____  Stop taking any Fish Oil supplements or Vitamins at least 5 days prior to surgery, unless instructed otherwise by your Doctor.            Diabetic  Patients: If you take diabetic medication, do NOT take morning of surgery unless instructed by             Doctor. Metformin to be stopped 24 hrs prior to surgery time. DO NOT take long-acting insulin the evening before surgery. Blood sugars will be checked in pre-op morning of.    Bathing Instructions:    -Do not shave your face or body the day before or the day of surgery.  -Do not shave pubic hair 7 days prior to surgery (gyn pt's).   -Shower & Rinse your body as usual with anti-bacterial Soap (Dial, Lever 2000, or Hibiclens)   -Do not use Hibiclens on your head, face, or genitals.   -Do not wash with anti-bacterial soap after you use the Hibiclens.   -Rinse your body thoroughly.      [facility] Visitor/Ride Policy:  Only 2 adults allowed (over the age of 18) to accompany you to the Hospital. You Must have a ride home from a responsible adult that you know and trust. Medical Transport, Uber or Lyft can only be used if patient has a responsible adult to accompany them during ride home.    Post-Op Instructions: You will receive Post-op/Discharge instructions by your Discharge Nurse prior to going home. Please call your Surgeon's office with any post-surgery questions/concerns.    *Call [facility] Pre-Admissions Department with surgery instruction questions @ [phone] or [phone] (Mon-Fri 8 am to 4 pm)  *If you are running late or have questions the morning of surgery, please call the Surgery Dept @ [phone]  *Insurance/ Financial Questions, please call [phone].

## 2022-08-09 NOTE — TELEPHONE ENCOUNTER
----- Message from Mikel Sams MD sent at 8/9/2022  6:59 AM CDT -----  Recurrent disease. I agree. Need chemo, will discuss with him. Dionne, please schedule follow up with me asap    ----- Message -----  From: Familia Gonzalez MD  Sent: 8/8/2022   3:19 PM CDT  To: Mikel Sams MD

## 2022-08-11 ENCOUNTER — TELEPHONE (OUTPATIENT)
Dept: PAIN MEDICINE | Facility: CLINIC | Age: 67
End: 2022-08-11
Payer: MEDICARE

## 2022-08-11 NOTE — TELEPHONE ENCOUNTER
Contacted Pt r/s procedure per Dr. Mccarthy due to port placement the day before. R/S to 8/31/2022 and r/s the follow up with Maria Isabel Montiel PA-C. All questions answered.

## 2022-08-15 ENCOUNTER — ANESTHESIA EVENT (OUTPATIENT)
Dept: SURGERY | Facility: HOSPITAL | Age: 67
End: 2022-08-15
Payer: MEDICARE

## 2022-08-15 ENCOUNTER — TELEPHONE (OUTPATIENT)
Dept: PREADMISSION TESTING | Facility: HOSPITAL | Age: 67
End: 2022-08-15
Payer: MEDICARE

## 2022-08-15 NOTE — TELEPHONE ENCOUNTER
Called and spoke with Pt about the following; verbalized understanding.    Please arrive to [facility] main lobby at 10:30am on 8/16/22 for your scheduled procedure. NOTHING to eat or drink after midnight, except medications instructed by the Pre-admit Nurse.

## 2022-08-16 ENCOUNTER — ANESTHESIA (OUTPATIENT)
Dept: SURGERY | Facility: HOSPITAL | Age: 67
End: 2022-08-16
Payer: MEDICARE

## 2022-08-16 ENCOUNTER — HOSPITAL ENCOUNTER (OUTPATIENT)
Facility: HOSPITAL | Age: 67
Discharge: HOME | End: 2022-08-16
Attending: COLON & RECTAL SURGERY | Admitting: COLON & RECTAL SURGERY
Payer: MEDICARE

## 2022-08-16 DIAGNOSIS — C78.7 METASTATIC ADENOCARCINOMA TO LIVER: ICD-10-CM

## 2022-08-16 DIAGNOSIS — Z01.818 PRE-OP TESTING: ICD-10-CM

## 2022-08-16 DIAGNOSIS — C19 RECURRENT COLORECTAL ADENOCARCINOMA: ICD-10-CM

## 2022-08-16 PROCEDURE — 36000706: Performed by: COLON & RECTAL SURGERY

## 2022-08-16 PROCEDURE — 36000707: Performed by: COLON & RECTAL SURGERY

## 2022-08-16 PROCEDURE — 36561 INSERT TUNNELED CV CATH: CPT | Mod: RT,,, | Performed by: COLON & RECTAL SURGERY

## 2022-08-16 PROCEDURE — 25000003 PHARM REV CODE 250: Performed by: ANESTHESIOLOGY

## 2022-08-16 PROCEDURE — 71000015 HC POSTOP RECOV 1ST HR: Performed by: COLON & RECTAL SURGERY

## 2022-08-16 PROCEDURE — 37000009 HC ANESTHESIA EA ADD 15 MINS: Performed by: COLON & RECTAL SURGERY

## 2022-08-16 PROCEDURE — 93010 ELECTROCARDIOGRAM REPORT: CPT | Mod: ,,, | Performed by: STUDENT IN AN ORGANIZED HEALTH CARE EDUCATION/TRAINING PROGRAM

## 2022-08-16 PROCEDURE — 77001 CHG FLUOROGUIDE CNTRL VEN ACCESS,PLACE,REPLACE,REMOVE: ICD-10-PCS | Mod: 26,,, | Performed by: COLON & RECTAL SURGERY

## 2022-08-16 PROCEDURE — 36561 PR INSERT TUNNELED CV CATH WITH PORT: ICD-10-PCS | Mod: RT,,, | Performed by: COLON & RECTAL SURGERY

## 2022-08-16 PROCEDURE — 77001 FLUOROGUIDE FOR VEIN DEVICE: CPT | Mod: 26,,, | Performed by: COLON & RECTAL SURGERY

## 2022-08-16 PROCEDURE — 93010 EKG 12-LEAD: ICD-10-PCS | Mod: ,,, | Performed by: STUDENT IN AN ORGANIZED HEALTH CARE EDUCATION/TRAINING PROGRAM

## 2022-08-16 PROCEDURE — 63600175 PHARM REV CODE 636 W HCPCS: Performed by: ANESTHESIOLOGY

## 2022-08-16 PROCEDURE — C1788 PORT, INDWELLING, IMP: HCPCS | Performed by: COLON & RECTAL SURGERY

## 2022-08-16 PROCEDURE — 93005 ELECTROCARDIOGRAM TRACING: CPT | Mod: 59

## 2022-08-16 PROCEDURE — 25000003 PHARM REV CODE 250: Performed by: COLON & RECTAL SURGERY

## 2022-08-16 PROCEDURE — 63600175 PHARM REV CODE 636 W HCPCS: Performed by: NURSE ANESTHETIST, CERTIFIED REGISTERED

## 2022-08-16 PROCEDURE — 25000003 PHARM REV CODE 250: Performed by: NURSE ANESTHETIST, CERTIFIED REGISTERED

## 2022-08-16 PROCEDURE — 37000008 HC ANESTHESIA 1ST 15 MINUTES: Performed by: COLON & RECTAL SURGERY

## 2022-08-16 PROCEDURE — 63600175 PHARM REV CODE 636 W HCPCS: Performed by: COLON & RECTAL SURGERY

## 2022-08-16 PROCEDURE — 71000033 HC RECOVERY, INTIAL HOUR: Performed by: COLON & RECTAL SURGERY

## 2022-08-16 DEVICE — PORT POWER CLEAR VIEW: Type: IMPLANTABLE DEVICE | Site: SUBCLAVIAN | Status: FUNCTIONAL

## 2022-08-16 RX ORDER — PROPOFOL 10 MG/ML
VIAL (ML) INTRAVENOUS
Status: DISCONTINUED | OUTPATIENT
Start: 2022-08-16 | End: 2022-08-16

## 2022-08-16 RX ORDER — MIDAZOLAM HYDROCHLORIDE 1 MG/ML
INJECTION, SOLUTION INTRAMUSCULAR; INTRAVENOUS
Status: DISCONTINUED | OUTPATIENT
Start: 2022-08-16 | End: 2022-08-16

## 2022-08-16 RX ORDER — ONDANSETRON 2 MG/ML
4 INJECTION INTRAMUSCULAR; INTRAVENOUS EVERY 12 HOURS PRN
Status: DISCONTINUED | OUTPATIENT
Start: 2022-08-16 | End: 2022-08-16 | Stop reason: HOSPADM

## 2022-08-16 RX ORDER — SODIUM CHLORIDE, SODIUM LACTATE, POTASSIUM CHLORIDE, CALCIUM CHLORIDE 600; 310; 30; 20 MG/100ML; MG/100ML; MG/100ML; MG/100ML
INJECTION, SOLUTION INTRAVENOUS CONTINUOUS
Status: DISCONTINUED | OUTPATIENT
Start: 2022-08-16 | End: 2022-08-16 | Stop reason: HOSPADM

## 2022-08-16 RX ORDER — OXYCODONE AND ACETAMINOPHEN 5; 325 MG/1; MG/1
1 TABLET ORAL
Status: DISCONTINUED | OUTPATIENT
Start: 2022-08-16 | End: 2022-08-16 | Stop reason: HOSPADM

## 2022-08-16 RX ORDER — BUPIVACAINE HYDROCHLORIDE AND EPINEPHRINE 2.5; 5 MG/ML; UG/ML
INJECTION, SOLUTION EPIDURAL; INFILTRATION; INTRACAUDAL; PERINEURAL
Status: DISCONTINUED | OUTPATIENT
Start: 2022-08-16 | End: 2022-08-16 | Stop reason: HOSPADM

## 2022-08-16 RX ORDER — KETAMINE HYDROCHLORIDE 50 MG/ML
INJECTION, SOLUTION INTRAMUSCULAR; INTRAVENOUS
Status: DISCONTINUED | OUTPATIENT
Start: 2022-08-16 | End: 2022-08-16

## 2022-08-16 RX ORDER — HEPARIN 100 UNIT/ML
SYRINGE INTRAVENOUS
Status: DISCONTINUED | OUTPATIENT
Start: 2022-08-16 | End: 2022-08-16 | Stop reason: HOSPADM

## 2022-08-16 RX ORDER — FENTANYL CITRATE 50 UG/ML
INJECTION, SOLUTION INTRAMUSCULAR; INTRAVENOUS
Status: DISCONTINUED | OUTPATIENT
Start: 2022-08-16 | End: 2022-08-16

## 2022-08-16 RX ORDER — FENTANYL CITRATE 50 UG/ML
25 INJECTION, SOLUTION INTRAMUSCULAR; INTRAVENOUS EVERY 5 MIN PRN
Status: COMPLETED | OUTPATIENT
Start: 2022-08-16 | End: 2022-08-16

## 2022-08-16 RX ORDER — ONDANSETRON 2 MG/ML
4 INJECTION INTRAMUSCULAR; INTRAVENOUS DAILY PRN
Status: DISCONTINUED | OUTPATIENT
Start: 2022-08-16 | End: 2022-08-16 | Stop reason: HOSPADM

## 2022-08-16 RX ORDER — CEFAZOLIN SODIUM 1 G/3ML
INJECTION, POWDER, FOR SOLUTION INTRAMUSCULAR; INTRAVENOUS
Status: DISCONTINUED | OUTPATIENT
Start: 2022-08-16 | End: 2022-08-16

## 2022-08-16 RX ORDER — CEFAZOLIN SODIUM 2 G/50ML
2 SOLUTION INTRAVENOUS
Status: DISCONTINUED | OUTPATIENT
Start: 2022-08-16 | End: 2022-08-16 | Stop reason: HOSPADM

## 2022-08-16 RX ADMIN — KETAMINE HYDROCHLORIDE 10 MG: 50 INJECTION, SOLUTION, CONCENTRATE INTRAMUSCULAR; INTRAVENOUS at 01:08

## 2022-08-16 RX ADMIN — FENTANYL CITRATE 25 MCG: 50 INJECTION INTRAMUSCULAR; INTRAVENOUS at 01:08

## 2022-08-16 RX ADMIN — PROPOFOL 30 MG: 10 INJECTION, EMULSION INTRAVENOUS at 12:08

## 2022-08-16 RX ADMIN — PROPOFOL 30 MG: 10 INJECTION, EMULSION INTRAVENOUS at 01:08

## 2022-08-16 RX ADMIN — FENTANYL CITRATE 50 MCG: 50 INJECTION, SOLUTION INTRAMUSCULAR; INTRAVENOUS at 01:08

## 2022-08-16 RX ADMIN — OXYCODONE HYDROCHLORIDE AND ACETAMINOPHEN 1 TABLET: 5; 325 TABLET ORAL at 01:08

## 2022-08-16 RX ADMIN — CEFAZOLIN 2 G: 1 INJECTION, POWDER, FOR SOLUTION INTRAMUSCULAR; INTRAVENOUS at 01:08

## 2022-08-16 RX ADMIN — PROPOFOL 50 MG: 10 INJECTION, EMULSION INTRAVENOUS at 01:08

## 2022-08-16 RX ADMIN — PROPOFOL 20 MG: 10 INJECTION, EMULSION INTRAVENOUS at 01:08

## 2022-08-16 RX ADMIN — FENTANYL CITRATE 50 MCG: 50 INJECTION, SOLUTION INTRAMUSCULAR; INTRAVENOUS at 12:08

## 2022-08-16 RX ADMIN — MIDAZOLAM 2 MG: 1 INJECTION INTRAMUSCULAR; INTRAVENOUS at 12:08

## 2022-08-16 RX ADMIN — FENTANYL CITRATE 25 MCG: 50 INJECTION INTRAMUSCULAR; INTRAVENOUS at 02:08

## 2022-08-16 NOTE — ANESTHESIA PREPROCEDURE EVALUATION
08/16/2022  Vincent Benton is a 66 y.o., male.    Patient Active Problem List   Diagnosis    Neoplasm related pain    Gastroesophageal reflux disease    Alcohol use    Iron deficiency anemia due to chronic blood loss    Rectal cancer    Rectal pain    Essential hypertension    Chemotherapy management, encounter for    Cachexia    Rectal adenocarcinoma    Back abscess    Encounter for palliative care    Screening for colon cancer    Acute medial meniscus tear of right knee     Pre-op Assessment    I have reviewed the Patient Summary Reports.    I have reviewed the NPO Status.   I have reviewed the Medications.     Review of Systems  Social:  Alcohol Use, Non-Smoker    Hematology/Oncology:  Hematology Normal      Current/Recent Cancer. chemotherapy and surgery Oncology Comments: Needs chemotherapy for rectal cancer with hepatic and probable pulmonary mets     EENT/Dental:EENT/Dental Normal   Cardiovascular:   Hypertension ECG has been reviewed.    Pulmonary:  Pulmonary Normal    Renal/:  Renal/ Normal     Hepatic/GI:   Hiatal Hernia, GERD Low anterior resection for colorectal carcinoma   Musculoskeletal:  Musculoskeletal Normal    Neurological:  Neurology Normal    Endocrine:  Endocrine Normal    Dermatological:  Skin Normal    Psych:  Psychiatric Normal           Physical Exam  General: Well nourished    Airway:  Mallampati: II   Mouth Opening: Normal  TM Distance: Normal  Neck ROM: Normal ROM    Dental:  Intact, Periodontal disease        Anesthesia Plan  Type of Anesthesia, risks & benefits discussed:    Anesthesia Type: MAC  Intra-op Monitoring Plan: Standard ASA Monitors  Post Op Pain Control Plan: multimodal analgesia  Induction:  IV  Airway Plan: , Post-Induction  Informed Consent: Informed consent signed with the Patient and all parties understand the risks and agree with  anesthesia plan.  All questions answered.   ASA Score: 2    Ready For Surgery From Anesthesia Perspective.     .      Chemistry        Component Value Date/Time     07/20/2022 1042    K 5.1 07/20/2022 1042    CL 98 07/20/2022 1042    CO2 26 07/20/2022 1042    BUN 12 07/20/2022 1042    CREATININE 0.7 07/20/2022 1042    GLU 94 07/20/2022 1042        Component Value Date/Time    CALCIUM 10.1 07/20/2022 1042    ALKPHOS 152 (H) 07/20/2022 1042    AST 19 07/20/2022 1042    ALT 11 07/20/2022 1042    BILITOT 0.7 07/20/2022 1042    ESTGFRAFRICA >60 07/20/2022 1042    EGFRNONAA >60 07/20/2022 1042        Lab Results   Component Value Date    WBC 8.67 07/20/2022    HGB 11.5 (L) 07/20/2022    HCT 37.1 (L) 07/20/2022    MCV 82 07/20/2022     07/20/2022

## 2022-08-16 NOTE — ANESTHESIA POSTPROCEDURE EVALUATION
Anesthesia Post Evaluation    Patient: Vincent Benton    Procedure(s) Performed: Procedure(s) (LRB):  [id number] (N/A)    Final Anesthesia Type: MAC      Patient location during evaluation: PACU  Patient participation: Yes- Able to Participate  Level of consciousness: awake and alert  Post-procedure vital signs: reviewed and stable  Airway patency: patent      Anesthetic complications: no      Cardiovascular status: blood pressure returned to baseline  Respiratory status: unassisted and spontaneous ventilation  Hydration status: euvolemic  Follow-up not needed.          Vitals Value Taken Time   /87 08/16/22 1408   Temp 36.4 °C (97.5 °F) 08/16/22 1340   Pulse 69 08/16/22 1411   Resp 15 08/16/22 1411   SpO2 95 % 08/16/22 1411   Vitals shown include unvalidated device data.      No case tracking events are documented in the log.      Pain/Ana Score: Pain Rating Prior to Med Admin: 7 (8/16/2022  1:55 PM)  Ana Score: 9 (8/16/2022  1:45 PM)

## 2022-08-16 NOTE — DISCHARGE SUMMARY
[facility]  Discharge Note  Short Stay    Admit Date: 8/16/2022    Discharge Date and Time: 8/16/2022  2:35 PM     Attending Physician: Familia Gonzalez     Discharge Provider: Familia Gonzalez    Diagnoses:  Metastatic adenocarcinoma to liver    Discharged Condition: good    Hospital Course: Patient was admitted for an outpatient procedure and tolerated the procedure well with no complications.    Final Diagnoses: Same as principal problem.    Disposition: Home or Self Care    Follow up/Patient Instructions:    Medications:  Reconciled Home Medications:      Medication List      CONTINUE taking these medications    ascorbic acid (vitamin C) 1000 MG tablet  Commonly known as: VITAMIN C  Take 1,000 mg by mouth once daily.     gabapentin 300 MG capsule  Commonly known as: NEURONTIN  Take 1 capsule (300 mg total) by mouth every evening.     multivitamin capsule  Take 1 capsule by mouth once daily.        STOP taking these medications    cyanocobalamin 1000 MCG tablet  Commonly known as: VITAMIN B-12     diclofenac sodium 1 % Gel  Commonly known as: VOLTAREN     HYDROcodone-acetaminophen  mg per tablet  Commonly known as: NORCO     mirtazapine 15 MG tablet  Commonly known as: REMERON     ondansetron 4 MG Tbdl  Commonly known as: ZOFRAN-ODT     tamsulosin 0.4 mg Cap  Commonly known as: FLOMAX     traMADoL 50 mg tablet  Commonly known as: ULTRAM          Discharge Procedure Orders   Diet general     Call MD for:  extreme fatigue     Call MD for:  persistent dizziness or light-headedness     Call MD for:  hives     Call MD for:  redness, tenderness, or signs of infection (pain, swelling, redness, odor or green/yellow discharge around incision site)     Call MD for:  difficulty breathing, headache or visual disturbances     Call MD for:  severe uncontrolled pain     Call MD for:  temperature >100.4     Call MD for:  persistent nausea and vomiting     No dressing needed     Activity as tolerated       Follow-up Information     Familia Gonzalez MD Follow up.    Specialties: Colon and Rectal Surgery, General Surgery  Why: As needed  Contact information:  [address] [place] [address] [address] Rolan [place] [id number] [phone]                         Discharge Procedure Orders (must include Diet, Follow-up, Activity):   Discharge Procedure Orders (must include Diet, Follow-up, Activity)   Diet general     Call MD for:  extreme fatigue     Call MD for:  persistent dizziness or light-headedness     Call MD for:  hives     Call MD for:  redness, tenderness, or signs of infection (pain, swelling, redness, odor or green/yellow discharge around incision site)     Call MD for:  difficulty breathing, headache or visual disturbances     Call MD for:  severe uncontrolled pain     Call MD for:  temperature >100.4     Call MD for:  persistent nausea and vomiting     No dressing needed     Activity as tolerated

## 2022-08-16 NOTE — TRANSFER OF CARE
"Anesthesia Transfer of Care Note    Patient: Vincent Benton    Procedure(s) Performed: Procedure(s) (LRB):  [id number] (N/A)    Patient location: PACU    Anesthesia Type: MAC    Transport from OR: Transported from OR on room air with adequate spontaneous ventilation    Post pain: adequate analgesia    Post assessment: no apparent anesthetic complications    Post vital signs: stable    Level of consciousness: awake and alert    Nausea/Vomiting: no nausea/vomiting    Complications: none    Transfer of care protocol was followed      Last vitals:   Visit Vitals  BP (!) 152/81   Pulse 77   Temp 36.5 °C (97.7 °F) (Temporal)   Resp 18   Ht 5' 10" (1.778 m)   Wt 64.8 kg (142 lb 13.7 oz)   SpO2 97%   BMI 20.50 kg/m²     "

## 2022-08-16 NOTE — OP NOTE
[facility] - Surgery ([facility])  Surgery Department  Operative Note    SUMMARY     Date of Procedure: 8/16/2022     Procedure:  1. MediPort insertion  2. Fluoroscopic guidance for MediPort insertion    Surgeon(s) and Role:     * Familia Gonzalez MD - Primary    Assisting Surgeon: None    Pre-Operative Diagnosis: Metastatic adenocarcinoma to liver [C78.7]    Post-Operative Diagnosis: Post-Op Diagnosis Codes:     * Metastatic adenocarcinoma to liver [C78.7]    Anesthesia: Local MAC    Technical Procedures Used:   1. MediPort insertion  2. Fluoroscopic guidance for MediPort insertion    Indications for Procedure:  66-year-old male with metastatic colorectal adenocarcinoma who presents for MediPort insertion for chemotherapy administration    Findings of the Procedure:  Normal anatomy of the right subclavian vein amenable to MediPort insertion with accessed on the 1st stick    Description of the Procedure: Patient was brought to the operating room placed supine on the table. MAC anesthesia was then induced. The chest and neck was then prepped and draped in usual sterile fashion. A preoperative surgical time-out was then performed confirming correct patient, procedure and preoperative medications given.  Local anesthetic was then used to numb up the area the future needle insertion and port site.  Small incision was made just beneath the right clavicle, lateral to the curve of the clavicle.  A needle and syringe was then used to access the right subclavian vein on the 1st stick, where dark red blood and nonpulsatile flow were observed.  A guidewire was inserted through the needle until ectopy was confirmed on cardiac monitoring.  Fluoroscopy was then used to confirm location of the wire into the subclavian vein terminating at the SVC.  Needle was removed and guidewire left in place. The vein dilator and insertion sheath were then guided over the guidewire.  The guidewire and inner portions of the insertion sheath/dilator  were then removed leaving only the insertion sheath present.  A finger was placed over the opening to the insertion sheath to prevent air embolus.  Catheter for the port was then injected with heparinized saline and inserted directly into the insertion sheath.  Fluoroscopy was then used to confirm location of the catheter at the tip of the SVC and right atrium.  The catheter was held in place with forceps while the insertion sheath was peeled away until was fully removed. The distal end of the catheter remained clamped with the hemostats to prevent air embolus.  An incision 2 cm beneath the needlestick site was then fashioned in a horizontal direction that was approximately 4 cm wide.  A subcutaneous pocket was then created with sharp and blunt dissection above the level of the muscle fascia beneath the subcutaneous fat.  This was fashioned wide enough to accommodate the port. The catheter was then attached to a tunneling device was then tunneled from the needle insertion site to the port site. Once there, it was cut to length to allow no kinking after insertion into the port.  Prior to cutting, a hemostat was placed on the proximal portion of the catheter to prevent air embolus.  The remaining catheter was then attached to the port, which was then inserted beneath the skin in the pocket previously created and the hemostat was removed. The Courtney needle was then used to access the port through the skin and blood was easily aspirated and was then flushed with heparinized saline. A final fluoroscopic image was obtained confirming good position of catheter/port and no kinking of the catheter. Hemostasis was ensured in the port pocket.  Deep dermal stitches of 3-0 Vicryl suture then used to close the defect where the port placement was made.  Skin was then run with a running 4-0 Monocryl suture.  Interrupted 4-0 Monocryl suture was then used to close the site at the needle insertion. Skin glue was then applied to both  sites. The patient was then woken from MAC anesthesia and taken to the postanesthesia care unit in stable condition where a chest x-ray will be obtained to confirm position and rule out pneumothorax.  Patient tolerated procedure well.    Significant Surgical Tasks Conducted by the Assistant(s), if Applicable: N/A    Complications: No    Estimated Blood Loss (EBL): 5mL           Implants:   Implant Name Type Inv. Item Serial No.  Lot No. LRB No. Used Action   PORT POWER CLEAR VIEW - [id number]  PORT POWER CLEAR VIEW  [employer] [id number] 1 Implanted       Specimens:   Specimen (24h ago, onward)            None                  Condition: Good    Disposition: PACU - hemodynamically stable.    Attestation: I performed the procedure.

## 2022-08-17 ENCOUNTER — OFFICE VISIT (OUTPATIENT)
Dept: HEMATOLOGY/ONCOLOGY | Facility: CLINIC | Age: 67
End: 2022-08-17
Payer: MEDICARE

## 2022-08-17 VITALS
BODY MASS INDEX: 20.58 KG/M2 | TEMPERATURE: 98 F | OXYGEN SATURATION: 99 % | HEART RATE: 70 BPM | HEIGHT: 70 IN | SYSTOLIC BLOOD PRESSURE: 143 MMHG | WEIGHT: 143.75 LBS | DIASTOLIC BLOOD PRESSURE: 88 MMHG

## 2022-08-17 DIAGNOSIS — C20 RECTAL CANCER: Primary | ICD-10-CM

## 2022-08-17 PROCEDURE — 99215 OFFICE O/P EST HI 40 MIN: CPT | Mod: S$GLB,,, | Performed by: INTERNAL MEDICINE

## 2022-08-17 PROCEDURE — 99499 UNLISTED E&M SERVICE: CPT | Mod: S$GLB,,, | Performed by: INTERNAL MEDICINE

## 2022-08-17 PROCEDURE — 3079F DIAST BP 80-89 MM HG: CPT | Mod: CPTII,S$GLB,, | Performed by: INTERNAL MEDICINE

## 2022-08-17 PROCEDURE — 3079F PR MOST RECENT DIASTOLIC BLOOD PRESSURE 80-89 MM HG: ICD-10-PCS | Mod: CPTII,S$GLB,, | Performed by: INTERNAL MEDICINE

## 2022-08-17 PROCEDURE — 3077F SYST BP >= 140 MM HG: CPT | Mod: CPTII,S$GLB,, | Performed by: INTERNAL MEDICINE

## 2022-08-17 PROCEDURE — 1159F PR MEDICATION LIST DOCUMENTED IN MEDICAL RECORD: ICD-10-PCS | Mod: CPTII,S$GLB,, | Performed by: INTERNAL MEDICINE

## 2022-08-17 PROCEDURE — 99999 PR PBB SHADOW E&M-EST. PATIENT-LVL III: ICD-10-PCS | Mod: PBBFAC,,, | Performed by: INTERNAL MEDICINE

## 2022-08-17 PROCEDURE — 3077F PR MOST RECENT SYSTOLIC BLOOD PRESSURE >= 140 MM HG: ICD-10-PCS | Mod: CPTII,S$GLB,, | Performed by: INTERNAL MEDICINE

## 2022-08-17 PROCEDURE — 3008F BODY MASS INDEX DOCD: CPT | Mod: CPTII,S$GLB,, | Performed by: INTERNAL MEDICINE

## 2022-08-17 PROCEDURE — 1159F MED LIST DOCD IN RCRD: CPT | Mod: CPTII,S$GLB,, | Performed by: INTERNAL MEDICINE

## 2022-08-17 PROCEDURE — 99215 PR OFFICE/OUTPT VISIT, EST, LEVL V, 40-54 MIN: ICD-10-PCS | Mod: S$GLB,,, | Performed by: INTERNAL MEDICINE

## 2022-08-17 PROCEDURE — 3008F PR BODY MASS INDEX (BMI) DOCUMENTED: ICD-10-PCS | Mod: CPTII,S$GLB,, | Performed by: INTERNAL MEDICINE

## 2022-08-17 PROCEDURE — 99999 PR PBB SHADOW E&M-EST. PATIENT-LVL III: CPT | Mod: PBBFAC,,, | Performed by: INTERNAL MEDICINE

## 2022-08-17 PROCEDURE — 99499 RISK ADDL DX/OHS AUDIT: ICD-10-PCS | Mod: S$GLB,,, | Performed by: INTERNAL MEDICINE

## 2022-08-17 RX ORDER — ONDANSETRON 8 MG/1
8 TABLET, ORALLY DISINTEGRATING ORAL EVERY 8 HOURS PRN
Qty: 60 TABLET | Refills: 5 | Status: ON HOLD | OUTPATIENT
Start: 2022-08-17 | End: 2022-11-07 | Stop reason: SDUPTHER

## 2022-08-17 RX ORDER — HYDROCODONE BITARTRATE AND ACETAMINOPHEN 10; 325 MG/1; MG/1
1 TABLET ORAL EVERY 8 HOURS PRN
Qty: 90 TABLET | Refills: 0 | Status: SHIPPED | OUTPATIENT
Start: 2022-08-17 | End: 2022-09-12

## 2022-08-17 RX ORDER — DEXAMETHASONE 4 MG/1
8 TABLET ORAL DAILY
Qty: 24 TABLET | Refills: 5 | Status: SHIPPED | OUTPATIENT
Start: 2022-08-18

## 2022-08-17 NOTE — PLAN OF CARE
DISCONTINUE ON PATHWAY REGIMEN - Colorectal    ROS56        Oxaliplatin (Eloxatin(R))       Leucovorin       5-Fluorouracil       5-Fluorouracil           Additional Orders: **Note: order sheet contains two q14 day cycles**    **Always confirm dose/schedule in your pharmacy ordering system**    REASON: Disease Progression  PRIOR TREATMENT: ROS56  TREATMENT RESPONSE: Progressive Disease (PD)    START ON PATHWAY REGIMEN - Colorectal    [id number]        Bevacizumab-xxxx       Irinotecan (Camptosar)       Leucovorin       Fluorouracil       Fluorouracil           Additional Orders: Do not administer bevacizumab for at least 28 days   prior to elective surgery and for at least 28 days following surgery and until   the wound is fully healed.    **Always confirm dose/schedule in your pharmacy ordering system**    Patient Characteristics:  Distant Metastases, Nonsurgical Candidate, KRAS/NRAS Mutation Positive/Unknown   (BRAF V600 Wild-Type/Unknown), Standard Cytotoxic Therapy, Second Line Standard   Cytotoxic Therapy, Bevacizumab Eligible  Tumor Location: Rectal  Therapeutic Status: Distant Metastases  Microsatellite/Mismatch Repair Status: Unknown  BRAF Mutation Status: Awaiting Test Results  KRAS/NRAS Mutation Status: Awaiting Test Results  Standard Cytotoxic Line of Therapy: Second Line Standard Cytotoxic Therapy  Bevacizumab Eligibility: Eligible  Intent of Therapy:  Non-Curative / Palliative Intent, Discussed with Patient

## 2022-08-17 NOTE — PROGRESS NOTES
Subjective:       Patient ID: Vincent Benton is a 66 y.o. male.    Chief Complaint: Rectal cancer [C20]  HPI: We have an opportunity to see Mr. Vincent Benton in Hematology Oncology clinic at [facility] on 08/17/2022.  Mr. Vincent Benton is a 66 y.o. gentleman with rectal cancer.  Patient has completed concurrent chemoradiation with capecitabine.  Status post low anterior resection.  Final path noted at ypT3N0. Status post 7 cycles of adjuvant therapy with FOLFOX.    Interval history:  66-year-old male with stage III rectal cancer status post pj op chemotherapy with FOLFOX.  Status post loop ileostomy and reversal on 08/04/2020.  Recent surviellance imaging revealed liver mets-biopsy proven. Presents today to discuss systemic therapy.    Oncology History   Rectal cancer   9/11/2019 Initial Diagnosis    Rectal cancer     9/18/2019 Tumor Conference    Multidisciplinary Rectal Cancer Conference - Evaluation and Recommendation Summary    9/17/2019  Vincent Benton  [id number]  63 y.o. male    1. Evaluation    C scope 9/3/19: Obstructing Rectal mass that couldn't be crossed.     Rigid Procto 9/3/19: Left and anterior lesion at 9-10 cm.     Path: Invasive adenocarcinoma, no LVI.     MRI date: 9/9/2019    Tumor Location in Rectum: middle third     Size: 3.6x2.7 cm mass    Nodes: 2 suspected nodes.     Indication of Sphincter Involvement:  Uninvolved    Pretreatment Circumferential Resection Margin (CRM) status:  Not Threatened    Pretreatment (clinical) AJCC Stage:III B vs  IV ?  Stage I  [] I: T1N0M0  [] I: T2N0M0  Stage II  [] IIA: T3N0M0  [] IIB [id number]  [] IIC: F4vL3C0  Stage III  [] IIIA: T1-2N1M0  [] IIIA: X7W1vV0  [] IIIB: T3-Z3cD2Y4  [x] IIIB: T2-3N2aM0  [] IIIB: T1-2N2bM0  [] IIIC: H4lA3rP4  [] IIIC: T3-2cT7iQ1  [] IIIC: R4cH1-7D9   Stage IV  [x] IV: [id number]-b    CEA level:     Lab Results   Component Value Date    CEA 3.5 09/03/2019       2. Treatment  Recommendation    Neoadjuvant Therapy Recommendation:     Short course radiation and Chemotherapy.     PLAN:    MRI and PET if not able to obtain PET will proceed with biopsy of the lung biopsy.     Colonoscopy during Chemotherapy to rule out any other lesions.     Anticipated date and type of surgical procedure:     LAR after    Clinical research study eligibility and/or enrollment: Not eligible     10/8/2019 Cancer Staged    Staging form: Colon and Rectum, AJCC 8th Edition  - Clinical stage from 10/8/2019: Stage IIIB (cT3, cN2a, cM0)     10/15/2019 - 10/15/2019 Chemotherapy    Treatment Summary   Plan Name: OP CAPECITABINE 5 DAYS + RADIOTHERAPY  Treatment Goal: Curative  Status: Inactive  Start Date: [No treatment day found]  End Date: [No treatment day found]  Provider: Mikel Sams MD  Chemotherapy: [No matching medication found in this treatment plan]     10/24/2019 - 12/4/2019 Radiation Therapy    Treatment Site Ref. ID Energy Dose/Fx (Gy) #Fx Dose Correction (Gy) Total Dose (Gy) Start Date End Date Elapsed Days   Pelvis Pelvis 6X 1.8 28 / 28 0 50.4 10/24/2019 12/4/2019 41          3/2/2020 - 7/1/2020 Chemotherapy    Treatment Summary   Plan Name: OP FOLFOX 6 Q2W  Treatment Goal: Curative  Status: Inactive  Start Date: 3/2/2020  End Date: 7/1/2020  Provider: Mikel Sams MD  Chemotherapy: fluorouracil injection 710 mg, 400 mg/m2 = 710 mg, Intravenous, Clinic/HOD 1 time, 8 of 12 cycles  Administration: 710 mg (3/2/2020), 710 mg (3/16/2020), 710 mg (4/6/2020), 710 mg (4/20/2020), 710 mg (5/25/2020), 710 mg (5/12/2020), 710 mg (6/8/2020), 710 mg (6/29/2020)  fluorouracil (ADRUCIL) 2,400 mg/m2 = 4,270 mg in sodium chloride 0.9% 101.2 mL chemo infusion, 2,400 mg/m2 = 4,270 mg, Intravenous, Over 46 hours, 8 of 12 cycles  Administration: 4,270 mg (3/2/2020), 4,270 mg (3/16/2020), 4,270 mg (4/6/2020), 4,270 mg (4/20/2020), 4,270 mg (5/25/2020), 4,270 mg (5/12/2020), 4,270 mg (6/8/2020), 4,270 mg  (6/29/2020)  leucovorin calcium 400 mg/m2 = 710 mg in dextrose 5 % 285.5 mL infusion, 400 mg/m2 = 710 mg, Intravenous, Clinic/HOD 1 time, 8 of 12 cycles  Administration: 710 mg (3/2/2020), 710 mg (3/16/2020), 700 mg (4/6/2020), 710 mg (4/20/2020), 710 mg (5/25/2020), 710 mg (5/12/2020), 710 mg (6/8/2020), 700 mg (6/29/2020)  oxaliplatin (ELOXATIN) 85 mg/m2 = 151 mg in dextrose 5 % 530.2 mL chemo infusion, 85 mg/m2 = 151 mg, Intravenous, Clinic/HOD 1 time, 8 of 12 cycles  Administration: 151 mg (3/2/2020), 151 mg (3/16/2020), 150 mg (4/6/2020), 151 mg (4/20/2020), 151 mg (5/25/2020), 151 mg (5/12/2020), 151 mg (6/8/2020), 151 mg (6/29/2020)     8/18/2022 -  Chemotherapy    Treatment Summary   Plan Name: OP COLORECTAL FOLFIRI + BEVACIZUMAB Q2W  Treatment Goal: Palliative  Status: Active  Start Date: 8/18/2022 (Planned)  End Date: 8/5/2023 (Planned)  Provider: Mikel Sams MD  Chemotherapy: dexAMETHasone (DECADRON) 4 MG Tab, 8 mg, Oral, Daily, 0 of 1 cycle, Start date: 8/18/2022, End date: --  fluorouraciL injection 715 mg, 400 mg/m2, Intravenous, Clinic/HOD 1 time, 0 of 26 cycles  irinotecan (CAMPTOSAR) 180 mg/m2 = 322 mg in sodium chloride 0.9% 500 mL chemo infusion, 180 mg/m2, Intravenous, Clinic/HOD 1 time, 0 of 26 cycles  leucovorin calcium 400 mg/m2 = 715 mg in dextrose 5 % 250 mL infusion, 400 mg/m2, Intravenous, Clinic/HOD 1 time, 0 of 26 cycles  bevacizumab-bvzr 326 mg in sodium chloride 0.9% 113.04 mL infusion, 5 mg/kg, Intravenous, Clinic/HOD 1 time, 0 of 26 cycles       Past Medical History:   Diagnosis Date    Alcoholism     Anemia     Back pain     Encounter for blood transfusion 2018    Essential hypertension 2/4/2016    GERD (gastroesophageal reflux disease)     Hiatal hernia     Hypertension     diet controlled    Melanoma 06/2021    left cheek    Rectal cancer 9/11/2019     Family History   Problem Relation Age of Onset    Cataracts Mother     Stroke Father     Hypertension Father       Social History     Socioeconomic History    Marital status:    Tobacco Use    Smoking status: Never Smoker    Smokeless tobacco: Never Used   Substance and Sexual Activity    Alcohol use: Yes     Comment: occassionally No alcohol 72h prior to sx    Drug use: No    Sexual activity: Never     Partners: Female     Past Surgical History:   Procedure Laterality Date    ARTHROSCOPY OF KNEE Right 5/5/2021    Procedure: ARTHROSCOPY, KNEE;  Surgeon: Delonte Mcintyre MD;  Location: [facility] OR;  Service: Orthopedics;  Laterality: Right;    COLONOSCOPY N/A 9/3/2019    Procedure: COLONOSCOPY;  Surgeon: Isai Centeno MD;  Location: [facility] ENDO;  Service: Endoscopy;  Laterality: N/A;    COLONOSCOPY N/A 1/10/2020    Procedure: COLONOSCOPY;  Surgeon: Familia Gonzalez MD;  Location: [facility] ENDO;  Service: General;  Laterality: N/A;    COLONOSCOPY N/A 3/24/2021    Procedure: COLONOSCOPY;  Surgeon: Familia Gonzalez MD;  Location: [facility] ENDO;  Service: General;  Laterality: N/A;    ESOPHAGOGASTRODUODENOSCOPY N/A 9/3/2019    Procedure: ESOPHAGOGASTRODUODENOSCOPY (EGD);  Surgeon: Isai Centeno MD;  Location: [facility];  Service: Endoscopy;  Laterality: N/A;    EXCISION OF MEDIAL MENISCUS OF KNEE Right 5/5/2021    Procedure: MENISCECTOMY, KNEE, MEDIAL;  Surgeon: Delonte Mcintyre MD;  Location: [facility];  Service: Orthopedics;  Laterality: Right;  partial Lateral    FLEXIBLE SIGMOIDOSCOPY  2/4/2020    Procedure: SIGMOIDOSCOPY, FLEXIBLE;  Surgeon: Familia Gonzalez MD;  Location: [facility] OR;  Service: General;;    ILEOSTOMY Right 2/4/2020    Procedure: CREATION, ILEOSTOMY;  Surgeon: Familia Gonzalez MD;  Location: [facility] OR;  Service: General;  Laterality: Right;    ILEOSTOMY CLOSURE N/A 8/4/2020    Procedure: CLOSURE, ILEOSTOMY;  Surgeon: Familia Gonzalez MD;  Location: [facility] OR;  Service: General;  Laterality: N/A;    INCISION AND DRAINAGE OF BACK Left 8/5/2020    Procedure: INCISION AND DRAINAGE, BACK;   Surgeon: Familia Gonzalez MD;  Location: [facility] OR;  Service: General;  Laterality: Left;    INJECTION OF ANESTHETIC AGENT INTO TISSUE PLANE DEFINED BY TRANSVERSUS ABDOMINIS MUSCLE N/A 2/4/2020    Procedure: BLOCK, TRANSVERSUS ABDOMINIS PLANE;  Surgeon: Familia Gonzalez MD;  Location: [facility] OR;  Service: General;  Laterality: N/A;    INSERTION OF TUNNELED CENTRAL VENOUS CATHETER (CVC) WITH SUBCUTANEOUS PORT Right 2/4/2020    Procedure: INSERTION, PORT-A-CATH;  Surgeon: Familia Gonzalez MD;  Location: [facility] OR;  Service: General;  Laterality: Right;    INSERTION OF TUNNELED CENTRAL VENOUS CATHETER (CVC) WITH SUBCUTANEOUS PORT N/A 8/16/2022    Procedure: [id number];  Surgeon: Familia Gonzalez MD;  Location: [facility];  Service: General;  Laterality: N/A;  right subclavian    JOINT REPLACEMENT Left 2009    Hip    KNEE ARTHROSCOPY W/ PLICA EXCISION Right 5/5/2021    Procedure: EXCISION, PLICA, KNEE, ARTHROSCOPIC;  Surgeon: Delonte Mcintyre MD;  Location: [facility] OR;  Service: Orthopedics;  Laterality: Right;    MOBILIZATION OF SPLENIC FLEXURE  2/4/2020    Procedure: MOBILIZATION, SPLENIC FLEXURE;  Surgeon: Familia Gonzalez MD;  Location: [facility];  Service: General;;    REMOVAL OF HARDWARE FROM HIP Left 1/4/2022    Procedure: REMOVAL, HARDWARE, HIP;  Surgeon: Delonte Mcintyre MD;  Location: [facility];  Service: Orthopedics;  Laterality: Left;     Current Outpatient Medications   Medication Sig Dispense Refill    ascorbic acid, vitamin C, (VITAMIN C) 1000 MG tablet Take 1,000 mg by mouth once daily.      gabapentin (NEURONTIN) 300 MG capsule Take 1 capsule (300 mg total) by mouth every evening. 30 capsule 5    multivitamin capsule Take 1 capsule by mouth once daily.      [START ON 8/18/2022] dexAMETHasone (DECADRON) 4 MG Tab Take 2 tablets (8 mg total) by mouth once daily. Take as directed on days 2 and 3 of your chemotherapy cycle. 24 tablet 5    ondansetron (ZOFRAN-ODT) 8 MG TbDL Dissolve 1  tablet (8 mg total) by mouth every 8 (eight) hours as needed (nausea/vomiting). 60 tablet 5     No current facility-administered medications for this visit.     Facility-Administered Medications Ordered in Other Visits   Medication Dose Route Frequency Provider Last Rate Last Admin    0.9%  NaCl infusion   Intravenous Continuous Delonte Mcintyre MD        0.9%  NaCl infusion   Intravenous Continuous Delonte Mcintyre MD        alteplase injection 2 mg  2 mg Intra-Catheter PRN Mikel Sams MD        chlorhexidine 0.12 % solution 10 mL  10 mL Mouth/Throat On Call Procedure Delonte Mcintyre MD   10 mL at 05/05/21 1222    chlorhexidine 0.12 % solution 10 mL  10 mL Mouth/Throat On Call Procedure Delonte Mcintyre MD   10 mL at 01/04/22 0628    diphenhydrAMINE injection 25 mg  25 mg Intravenous Q6H PRN Keli Alcaraz MD        HYDROmorphone (PF) injection 0.2 mg  0.2 mg Intravenous Q5 Min PRN Keli Alcaraz MD   0.2 mg at 05/05/21 1505    HYDROmorphone (PF) injection 0.2 mg  0.2 mg Intravenous Q5 Min PRN Keli Alcaraz MD        lactated ringers infusion   Intravenous Continuous Familia Gonzalez MD   Stopped at 03/24/21 0825    metoclopramide HCl injection 10 mg  10 mg Intravenous Q10 Min PRN Keli Alcaraz MD        nozaseptin (NOZIN) nasal    Each Nostril On Call Procedure Delonte Mcintyre MD   Given at 05/05/21 1222    sodium chloride 0.9% flush 10 mL  10 mL Intravenous PRN Mikel Sams MD        sodium chloride 0.9% flush 3 mL  3 mL Intravenous PRN Shilap Yee MD        sodium chloride 0.9% flush 3 mL  3 mL Intravenous Q8H Keli Alcaraz MD           Labs:  Lab Results   Component Value Date    WBC 8.67 07/20/2022    HGB 11.5 (L) 07/20/2022    HCT 37.1 (L) 07/20/2022    MCV 82 07/20/2022     07/20/2022     BMP  Lab Results   Component Value Date     07/20/2022    K 5.1 07/20/2022    CL 98 07/20/2022    CO2 26 07/20/2022     BUN 12 07/20/2022    CREATININE 0.7 07/20/2022    CALCIUM 10.1 07/20/2022    ANIONGAP 13 07/20/2022    ESTGFRAFRICA >60 07/20/2022    EGFRNONAA >60 07/20/2022     Lab Results   Component Value Date    ALT 11 07/20/2022    AST 19 07/20/2022    ALKPHOS 152 (H) 07/20/2022    BILITOT 0.7 07/20/2022       Lab Results   Component Value Date    IRON <10 (L) 04/13/2018    TIBC 527 (H) 04/13/2018    FERRITIN 241 03/16/2020     No results found for: [id number]  No results found for: FOLATE  Lab Results   Component Value Date    TSH 0.601 04/07/2018       I have reviewed the radiology reports and examined the scan/xray images.    Review of Systems   Constitutional: Negative for activity change, chills, fatigue, fever and unexpected weight change.   HENT: Negative for hearing loss, mouth sores, nosebleeds, sore throat, tinnitus, trouble swallowing and voice change.    Eyes: Negative for visual disturbance.   Respiratory: Negative for cough, chest tightness, shortness of breath and wheezing.    Cardiovascular: Negative for chest pain, palpitations and leg swelling.   Gastrointestinal: Negative for abdominal pain, anal bleeding, blood in stool, constipation, diarrhea, nausea and vomiting.   Genitourinary: Negative for frequency, hematuria and testicular pain.   Musculoskeletal: Negative for arthralgias, back pain, gait problem and neck pain.   Skin: Negative for color change, pallor and rash.   Allergic/Immunologic: Negative for immunocompromised state.   Neurological: Negative for seizures, syncope, weakness and headaches.   Hematological: Negative for adenopathy. Does not bruise/bleed easily.   Psychiatric/Behavioral: Negative for agitation, confusion, decreased concentration, hallucinations and sleep disturbance. The patient is not nervous/anxious.      ECOG SCORE    1 - Restricted in strenuous activity-ambulatory and able to carry out work of a light nature            Objective:     Vitals:    08/17/22 1302   BP: (!) 143/88    Pulse: 70   Temp: 98.1 °F (36.7 °C)   Body mass index is 20.62 kg/m².  Physical Exam  Constitutional:       Appearance: He is well-developed.   HENT:      Head: Normocephalic and atraumatic.      Right Ear: External ear normal.      Left Ear: External ear normal.      Mouth/Throat:      Pharynx: No oropharyngeal exudate.   Eyes:      General: No scleral icterus.        Right eye: No discharge.         Left eye: No discharge.      Conjunctiva/sclera: Conjunctivae normal.      Pupils: Pupils are equal, round, and reactive to light.   Neck:      Thyroid: No thyromegaly.   Cardiovascular:      Rate and Rhythm: Normal rate and regular rhythm.      Heart sounds: Normal heart sounds. No murmur heard.  Pulmonary:      Effort: Pulmonary effort is normal. No respiratory distress.      Breath sounds: Normal breath sounds. No wheezing.   Chest:      Chest wall: No tenderness.   Breasts:      Right: No supraclavicular adenopathy.      Left: No supraclavicular adenopathy.       Abdominal:      General: Bowel sounds are normal. There is no distension.      Palpations: Abdomen is soft. There is no mass.      Tenderness: There is no abdominal tenderness. There is no rebound.   Musculoskeletal:         General: No tenderness. Normal range of motion.      Cervical back: Normal range of motion and neck supple.   Lymphadenopathy:      Cervical: No cervical adenopathy.      Right cervical: No superficial cervical adenopathy.     Left cervical: No superficial cervical adenopathy.      Upper Body:      Right upper body: No supraclavicular or pectoral adenopathy.      Left upper body: No supraclavicular or pectoral adenopathy.      Lower Body: No right inguinal adenopathy. No left inguinal adenopathy.   Skin:     General: Skin is warm and dry.      Capillary Refill: Capillary refill takes 2 to 3 seconds.      Coloration: Skin is not pale.      Findings: No erythema or rash.   Neurological:      Mental Status: He is alert and oriented to  person, place, and time.      Cranial Nerves: No cranial nerve deficit.      Sensory: No sensory deficit.      Gait: Gait abnormal.   Psychiatric:         Behavior: Behavior normal.         Judgment: Judgment normal.           Assessment:      1. Rectal cancer           Plan:     Rectal cancer  Stage IIIB rectal cancer, status post pj op chemotherapy with FOLFOX.  Status post loop ileostomy and reversal on 08/04/2020.  Has been on surveillance until recently imaging studies from 06/28/2022 showed new bilateral pulmonary nodules and FDG avid multi focal hepatic lesions concerning for metastasis. Liver biopsy confirmed recurrent metastatic adenocarcinoma consistent with colorectal origin.    Discussed implications with patient. He understands that his disease is now incurable but can be treated.  We agreed on palliative systemic therapy with FOLFIRI plus Avastin.  Will also send for molecular studies on the biopsied tissue.    Regimen:  FOLFIRI plus Avastin    Will obtain 2D echocardiogram prior to initiating treatment.  Status post MediPort placement.  Return in 2 weeks with repeat labs or sooner if needed for cycle 1 day 1.     Rectal cancer  -     CBC Auto Differential; Future; Expected date: 08/17/2022  -     Comprehensive Metabolic Panel; Future; Expected date: 08/17/2022  -     CEA; Future; Expected date: 08/17/2022  -     Urinalysis; Future; Expected date: 08/17/2022  -     dexAMETHasone (DECADRON) 4 MG Tab; Take 2 tablets (8 mg total) by mouth once daily. Take as directed on days 2 and 3 of your chemotherapy cycle.  Dispense: 24 tablet; Refill: 5  -     ondansetron (ZOFRAN-ODT) 8 MG TbDL; Dissolve 1 tablet (8 mg total) by mouth every 8 (eight) hours as needed (nausea/vomiting).  Dispense: 60 tablet; Refill: 5  -     Echo; Future      Mikel Sams MD

## 2022-08-17 NOTE — ASSESSMENT & PLAN NOTE
Stage IIIB rectal cancer, status post pj op chemotherapy with FOLFOX.  Status post loop ileostomy and reversal on 08/04/2020.  Has been on surveillance until recently imaging studies from 06/28/2022 showed new bilateral pulmonary nodules and FDG avid multi focal hepatic lesions concerning for metastasis. Liver biopsy confirmed recurrent metastatic adenocarcinoma consistent with colorectal origin.    Discussed implications with patient. He understands that his disease is now incurable but can be treated.  We agreed on palliative systemic therapy with FOLFIRI plus Avastin.  Will also send for molecular studies on the biopsied tissue.    Regimen:  FOLFIRI plus Avastin    Will obtain 2D echocardiogram prior to initiating treatment.  Status post MediPort placement.  Return in 2 weeks with repeat labs or sooner if needed for cycle 1 day 1.

## 2022-08-18 VITALS
WEIGHT: 142.88 LBS | RESPIRATION RATE: 19 BRPM | OXYGEN SATURATION: 94 % | HEART RATE: 68 BPM | HEIGHT: 70 IN | BODY MASS INDEX: 20.46 KG/M2 | SYSTOLIC BLOOD PRESSURE: 141 MMHG | DIASTOLIC BLOOD PRESSURE: 87 MMHG | TEMPERATURE: 98 F

## 2022-08-23 ENCOUNTER — HOSPITAL ENCOUNTER (OUTPATIENT)
Dept: CARDIOLOGY | Facility: HOSPITAL | Age: 67
Discharge: HOME | End: 2022-08-23
Attending: INTERNAL MEDICINE
Payer: MEDICARE

## 2022-08-23 VITALS
DIASTOLIC BLOOD PRESSURE: 88 MMHG | HEIGHT: 70 IN | BODY MASS INDEX: 20.47 KG/M2 | WEIGHT: 143 LBS | SYSTOLIC BLOOD PRESSURE: 143 MMHG

## 2022-08-23 DIAGNOSIS — C20 RECTAL CANCER: ICD-10-CM

## 2022-08-23 LAB
AORTIC ROOT ANNULUS: 3.28 CM
ASCENDING AORTA: 3.67 CM
AV INDEX (PROSTH): 0.87
AV MEAN GRADIENT: 2 MMHG
AV PEAK GRADIENT: 4 MMHG
AV VALVE AREA: 3.69 CM2
AV VELOCITY RATIO: 0.91
BSA FOR ECHO PROCEDURE: 1.79 M2
CV ECHO LV RWT: 0.45 CM
DOP CALC AO PEAK VEL: 0.96 M/S
DOP CALC AO VTI: 15.7 CM
DOP CALC LVOT AREA: 4.2 CM2
DOP CALC LVOT DIAMETER: 2.32 CM
DOP CALC LVOT PEAK VEL: 0.87 M/S
DOP CALC LVOT STROKE VOLUME: 57.89 CM3
DOP CALC RVOT PEAK VEL: 0.71 M/S
DOP CALC RVOT VTI: 12.1 CM
DOP CALCLVOT PEAK VEL VTI: 13.7 CM
E WAVE DECELERATION TIME: 227.11 MSEC
E/A RATIO: 0.61
E/E' RATIO: 6.14 M/S
ECHO LV POSTERIOR WALL: 1.03 CM (ref 0.6–1.1)
EJECTION FRACTION: 55 %
FRACTIONAL SHORTENING: 40 % (ref 28–44)
INTERVENTRICULAR SEPTUM: 1.22 CM (ref 0.6–1.1)
IVC DIAMETER: 1.09 CM
IVRT: 102.76 MSEC
LA MAJOR: 4.77 CM
LA MINOR: 4.6 CM
LEFT ATRIUM SIZE: 2.77 CM
LEFT ATRIUM VOLUME INDEX MOD: 11.7 ML/M2
LEFT ATRIUM VOLUME MOD: 21.18 CM3
LEFT INTERNAL DIMENSION IN SYSTOLE: 2.76 CM (ref 2.1–4)
LEFT VENTRICLE DIASTOLIC VOLUME INDEX: 54.16 ML/M2
LEFT VENTRICLE DIASTOLIC VOLUME: 98.03 ML
LEFT VENTRICLE MASS INDEX: 104 G/M2
LEFT VENTRICLE SYSTOLIC VOLUME INDEX: 15.7 ML/M2
LEFT VENTRICLE SYSTOLIC VOLUME: 28.47 ML
LEFT VENTRICULAR INTERNAL DIMENSION IN DIASTOLE: 4.61 CM (ref 3.5–6)
LEFT VENTRICULAR MASS: 187.68 G
LV LATERAL E/E' RATIO: 5.38 M/S
LV SEPTAL E/E' RATIO: 7.17 M/S
LVOT MG: 1.6 MMHG
LVOT MV: 0.59 CM/S
MV PEAK A VEL: 0.71 M/S
MV PEAK E VEL: 0.43 M/S
MV STENOSIS PRESSURE HALF TIME: 65.86 MS
MV VALVE AREA P 1/2 METHOD: 3.34 CM2
PISA TR MAX VEL: 2.57 M/S
PV MEAN GRADIENT: 1.19 MMHG
PV PEAK VELOCITY: 0.8 CM/S
QEF: 58 %
RA MAJOR: 4.96 CM
RA PRESSURE: 3 MMHG
RA WIDTH: 3.51 CM
RIGHT VENTRICULAR END-DIASTOLIC DIMENSION: 3.79 CM
SINUS: 3.96 CM
STJ: 4.03 CM
TDI LATERAL: 0.08 M/S
TDI SEPTAL: 0.06 M/S
TDI: 0.07 M/S
TR MAX PG: 26 MMHG
TV REST PULMONARY ARTERY PRESSURE: 29 MMHG

## 2022-08-23 PROCEDURE — 93306 ECHO (CUPID ONLY): ICD-10-PCS | Mod: 26,,, | Performed by: STUDENT IN AN ORGANIZED HEALTH CARE EDUCATION/TRAINING PROGRAM

## 2022-08-23 PROCEDURE — 93356 ECHO (CUPID ONLY): ICD-10-PCS | Mod: ,,, | Performed by: STUDENT IN AN ORGANIZED HEALTH CARE EDUCATION/TRAINING PROGRAM

## 2022-08-23 PROCEDURE — 93356 MYOCRD STRAIN IMG SPCKL TRCK: CPT

## 2022-08-23 PROCEDURE — 93306 TTE W/DOPPLER COMPLETE: CPT | Mod: 26,,, | Performed by: STUDENT IN AN ORGANIZED HEALTH CARE EDUCATION/TRAINING PROGRAM

## 2022-08-23 PROCEDURE — 93356 MYOCRD STRAIN IMG SPCKL TRCK: CPT | Mod: ,,, | Performed by: STUDENT IN AN ORGANIZED HEALTH CARE EDUCATION/TRAINING PROGRAM

## 2022-08-24 ENCOUNTER — DOCUMENTATION ONLY (OUTPATIENT)
Dept: HEMATOLOGY/ONCOLOGY | Facility: CLINIC | Age: 67
End: 2022-08-24
Payer: MEDICARE

## 2022-08-24 DIAGNOSIS — C20 RECTAL CANCER: Primary | ICD-10-CM

## 2022-08-24 NOTE — NURSING
Called and spoke with pt over the phone to schedule restarting chemotherapy. Reviewed my role as NN, gave him my contact information. Pt states he lives off [address], only wants to go to the [facility]. Together we scheduled labs and provider , to include pharmacogenomics panel and   infusion start all at the [facility]. Pt has had similar drugs in the past, declines full chemo education, prefers chairside refresher of new/different drugs. Pt states he got his port  and has no c/o at present. All questions answered, he voiced understanding and knows to call with any questions/concerns. Notified SW, FC of pt RESTART & NEW drugs. Infu system paperwork faxed.    Oncology Navigation   Intake  Date Worked: 2022  Start of Treatment: 2022     Treatment  Current Status: Active       Medical Oncologist: Dr. Sams  Chemotherapy: Planned  Chemotherapy Regimen: FOLFIRI + AVASTIN       Procedures: Port / PICC; Echo  Echo Schedule Date: 2022  Port / PICC Schedule Date: 2022             Support Systems: Family members     Acuity  Stage: 2  Systemic Treatment - predicted or initiated: Chemotherapy regimen with multiple drugs (+1)  ECO  Comorbidities in Medical History: 2  Verbalizes Financial Concerns: 0  Transportation: 0  Verbalizes the need for more education: 1  Navigation Acuity: 7     Follow Up  Follow up in 5 days (on 2022) for Kayley w/labs for chemo on .

## 2022-08-29 ENCOUNTER — DOCUMENTATION ONLY (OUTPATIENT)
Dept: PALLIATIVE MEDICINE | Facility: CLINIC | Age: 67
End: 2022-08-29
Payer: MEDICARE

## 2022-08-29 ENCOUNTER — LAB VISIT (OUTPATIENT)
Dept: LAB | Facility: HOSPITAL | Age: 67
End: 2022-08-29
Attending: INTERNAL MEDICINE
Payer: MEDICARE

## 2022-08-29 ENCOUNTER — OFFICE VISIT (OUTPATIENT)
Dept: HEMATOLOGY/ONCOLOGY | Facility: CLINIC | Age: 67
End: 2022-08-29
Payer: MEDICARE

## 2022-08-29 VITALS
HEIGHT: 70 IN | TEMPERATURE: 98 F | BODY MASS INDEX: 20.1 KG/M2 | WEIGHT: 140.44 LBS | DIASTOLIC BLOOD PRESSURE: 95 MMHG | OXYGEN SATURATION: 98 % | HEART RATE: 85 BPM | SYSTOLIC BLOOD PRESSURE: 141 MMHG

## 2022-08-29 DIAGNOSIS — C20 RECTAL CANCER: ICD-10-CM

## 2022-08-29 DIAGNOSIS — C20 RECTAL ADENOCARCINOMA: ICD-10-CM

## 2022-08-29 DIAGNOSIS — D50.9 MICROCYTIC ANEMIA: Primary | ICD-10-CM

## 2022-08-29 LAB
ALBUMIN SERPL BCP-MCNC: 3.4 G/DL (ref 3.5–5.2)
ALP SERPL-CCNC: 189 U/L (ref 55–135)
ALT SERPL W/O P-5'-P-CCNC: 9 U/L (ref 10–44)
ANION GAP SERPL CALC-SCNC: 12 MMOL/L (ref 8–16)
AST SERPL-CCNC: 31 U/L (ref 10–40)
BASOPHILS # BLD AUTO: 0.05 K/UL (ref 0–0.2)
BASOPHILS NFR BLD: 0.5 % (ref 0–1.9)
BILIRUB SERPL-MCNC: 0.5 MG/DL (ref 0.1–1)
BUN SERPL-MCNC: 7 MG/DL (ref 8–23)
CALCIUM SERPL-MCNC: 9.7 MG/DL (ref 8.7–10.5)
CEA SERPL-MCNC: 259.3 NG/ML (ref 0–5)
CHLORIDE SERPL-SCNC: 97 MMOL/L (ref 95–110)
CO2 SERPL-SCNC: 25 MMOL/L (ref 23–29)
CREAT SERPL-MCNC: 0.7 MG/DL (ref 0.5–1.4)
DIFFERENTIAL METHOD: ABNORMAL
EOSINOPHIL # BLD AUTO: 0.1 K/UL (ref 0–0.5)
EOSINOPHIL NFR BLD: 0.7 % (ref 0–8)
ERYTHROCYTE [DISTWIDTH] IN BLOOD BY AUTOMATED COUNT: 16.2 % (ref 11.5–14.5)
EST. GFR  (NO RACE VARIABLE): >60 ML/MIN/1.73 M^2
FERRITIN SERPL-MCNC: 286 NG/ML (ref 20–300)
GLUCOSE SERPL-MCNC: 92 MG/DL (ref 70–110)
HCT VFR BLD AUTO: 34.6 % (ref 40–54)
HGB BLD-MCNC: 10.8 G/DL (ref 14–18)
IMM GRANULOCYTES # BLD AUTO: 0.04 K/UL (ref 0–0.04)
IMM GRANULOCYTES NFR BLD AUTO: 0.4 % (ref 0–0.5)
IRON SERPL-MCNC: 12 UG/DL (ref 45–160)
LYMPHOCYTES # BLD AUTO: 1.2 K/UL (ref 1–4.8)
LYMPHOCYTES NFR BLD: 11.8 % (ref 18–48)
MCH RBC QN AUTO: 24.4 PG (ref 27–31)
MCHC RBC AUTO-ENTMCNC: 31.2 G/DL (ref 32–36)
MCV RBC AUTO: 78 FL (ref 82–98)
MONOCYTES # BLD AUTO: 1.3 K/UL (ref 0.3–1)
MONOCYTES NFR BLD: 13.4 % (ref 4–15)
NEUTROPHILS # BLD AUTO: 7.1 K/UL (ref 1.8–7.7)
NEUTROPHILS NFR BLD: 73.2 % (ref 38–73)
NRBC BLD-RTO: 0 /100 WBC
PLATELET # BLD AUTO: 462 K/UL (ref 150–450)
PMV BLD AUTO: 9.1 FL (ref 9.2–12.9)
POTASSIUM SERPL-SCNC: 4.7 MMOL/L (ref 3.5–5.1)
PROT SERPL-MCNC: 8.3 G/DL (ref 6–8.4)
RBC # BLD AUTO: 4.43 M/UL (ref 4.6–6.2)
SATURATED IRON: 4 % (ref 20–50)
SODIUM SERPL-SCNC: 134 MMOL/L (ref 136–145)
TOTAL IRON BINDING CAPACITY: 272 UG/DL (ref 250–450)
TRANSFERRIN SERPL-MCNC: 184 MG/DL (ref 200–375)
WBC # BLD AUTO: 9.75 K/UL (ref 3.9–12.7)

## 2022-08-29 PROCEDURE — 84466 ASSAY OF TRANSFERRIN: CPT | Performed by: INTERNAL MEDICINE

## 2022-08-29 PROCEDURE — 99999 PR PBB SHADOW E&M-EST. PATIENT-LVL IV: ICD-10-PCS | Mod: PBBFAC,,, | Performed by: INTERNAL MEDICINE

## 2022-08-29 PROCEDURE — 82728 ASSAY OF FERRITIN: CPT | Performed by: INTERNAL MEDICINE

## 2022-08-29 PROCEDURE — 80053 COMPREHEN METABOLIC PANEL: CPT | Performed by: INTERNAL MEDICINE

## 2022-08-29 PROCEDURE — 3080F PR MOST RECENT DIASTOLIC BLOOD PRESSURE >= 90 MM HG: ICD-10-PCS | Mod: CPTII,S$GLB,, | Performed by: INTERNAL MEDICINE

## 2022-08-29 PROCEDURE — 3008F BODY MASS INDEX DOCD: CPT | Mod: CPTII,S$GLB,, | Performed by: INTERNAL MEDICINE

## 2022-08-29 PROCEDURE — 3008F PR BODY MASS INDEX (BMI) DOCUMENTED: ICD-10-PCS | Mod: CPTII,S$GLB,, | Performed by: INTERNAL MEDICINE

## 2022-08-29 PROCEDURE — 3077F SYST BP >= 140 MM HG: CPT | Mod: CPTII,S$GLB,, | Performed by: INTERNAL MEDICINE

## 2022-08-29 PROCEDURE — 3077F PR MOST RECENT SYSTOLIC BLOOD PRESSURE >= 140 MM HG: ICD-10-PCS | Mod: CPTII,S$GLB,, | Performed by: INTERNAL MEDICINE

## 2022-08-29 PROCEDURE — 1159F PR MEDICATION LIST DOCUMENTED IN MEDICAL RECORD: ICD-10-PCS | Mod: CPTII,S$GLB,, | Performed by: INTERNAL MEDICINE

## 2022-08-29 PROCEDURE — 85025 COMPLETE CBC W/AUTO DIFF WBC: CPT | Performed by: INTERNAL MEDICINE

## 2022-08-29 PROCEDURE — 99999 PR PBB SHADOW E&M-EST. PATIENT-LVL IV: CPT | Mod: PBBFAC,,, | Performed by: INTERNAL MEDICINE

## 2022-08-29 PROCEDURE — 3080F DIAST BP >= 90 MM HG: CPT | Mod: CPTII,S$GLB,, | Performed by: INTERNAL MEDICINE

## 2022-08-29 PROCEDURE — 1159F MED LIST DOCD IN RCRD: CPT | Mod: CPTII,S$GLB,, | Performed by: INTERNAL MEDICINE

## 2022-08-29 PROCEDURE — 99215 PR OFFICE/OUTPT VISIT, EST, LEVL V, 40-54 MIN: ICD-10-PCS | Mod: S$GLB,,, | Performed by: INTERNAL MEDICINE

## 2022-08-29 PROCEDURE — 36415 COLL VENOUS BLD VENIPUNCTURE: CPT | Performed by: INTERNAL MEDICINE

## 2022-08-29 PROCEDURE — 99215 OFFICE O/P EST HI 40 MIN: CPT | Mod: S$GLB,,, | Performed by: INTERNAL MEDICINE

## 2022-08-29 PROCEDURE — 82378 CARCINOEMBRYONIC ANTIGEN: CPT | Performed by: INTERNAL MEDICINE

## 2022-08-29 RX ORDER — FLUOROURACIL 50 MG/ML
400 INJECTION, SOLUTION INTRAVENOUS
Status: CANCELLED | OUTPATIENT
Start: 2022-08-29

## 2022-08-29 RX ORDER — HEPARIN 100 UNIT/ML
500 SYRINGE INTRAVENOUS
Status: CANCELLED | OUTPATIENT
Start: 2022-08-29

## 2022-08-29 RX ORDER — SODIUM CHLORIDE 0.9 % (FLUSH) 0.9 %
10 SYRINGE (ML) INJECTION
Status: CANCELLED | OUTPATIENT
Start: 2022-08-31

## 2022-08-29 RX ORDER — HEPARIN 100 UNIT/ML
500 SYRINGE INTRAVENOUS
Status: CANCELLED | OUTPATIENT
Start: 2022-08-31

## 2022-08-29 RX ORDER — EPINEPHRINE 0.3 MG/.3ML
0.3 INJECTION SUBCUTANEOUS ONCE AS NEEDED
Status: CANCELLED | OUTPATIENT
Start: 2022-08-29

## 2022-08-29 RX ORDER — DIPHENHYDRAMINE HYDROCHLORIDE 50 MG/ML
50 INJECTION INTRAMUSCULAR; INTRAVENOUS ONCE AS NEEDED
Status: CANCELLED | OUTPATIENT
Start: 2022-08-29

## 2022-08-29 RX ORDER — SODIUM CHLORIDE 0.9 % (FLUSH) 0.9 %
10 SYRINGE (ML) INJECTION
Status: CANCELLED | OUTPATIENT
Start: 2022-08-29

## 2022-08-29 RX ORDER — FLUOROURACIL 50 MG/ML
2400 INJECTION, SOLUTION INTRAVENOUS
Status: CANCELLED | OUTPATIENT
Start: 2022-08-29

## 2022-08-29 RX ORDER — ATROPINE SULFATE 0.4 MG/ML
0.4 INJECTION, SOLUTION ENDOTRACHEAL; INTRAMEDULLARY; INTRAMUSCULAR; INTRAVENOUS; SUBCUTANEOUS ONCE AS NEEDED
Status: CANCELLED | OUTPATIENT
Start: 2022-08-29

## 2022-08-29 NOTE — PROGRESS NOTES
Palliative Referral Nurse Note    Nurse reached out to pt to set up palliative apt, no answer, unable to leave voice message at this time.

## 2022-08-29 NOTE — PROGRESS NOTES
Subjective:      DATE OF VISIT: 8/29/22     ?  Patient ID:?Vincent Benton is a 66 y.o. male.?? MR#: [id number]   ?   REFERRING PROVIDER: No referring provider defined for this encounter.     ? Primary Care Providers:  Shakila Moran DO, DO (General)     CHIEF COMPLAINT: ?? start palliative chemotherapy for recurrent metastatic rectal adenocarcinoma??   ?   ONCOLOGIC DIAGNOSIS:  rectal adenocarcinoma stage IV; initial stage III 2020  ?   CURRENT TREATMENT:  FOLFIRI plus bevacizumab    PAST TREATMENT:   Neoadjuvant chemoradiation with capecitabine, adjuvant FOLFOX x7, 2020  ?   ONCOLOGIC HISTORY:   ?   Oncology History   Rectal cancer   9/11/2019 Initial Diagnosis    Rectal cancer     9/18/2019 Tumor Conference    Multidisciplinary Rectal Cancer Conference - Evaluation and Recommendation Summary    9/17/2019  Vincent Benton  [id number]  63 y.o. male    1. Evaluation    C scope 9/3/19: Obstructing Rectal mass that couldn't be crossed.     Rigid Procto 9/3/19: Left and anterior lesion at 9-10 cm.     Path: Invasive adenocarcinoma, no LVI.     MRI date: 9/9/2019    Tumor Location in Rectum: middle third     Size: 3.6x2.7 cm mass    Nodes: 2 suspected nodes.     Indication of Sphincter Involvement:  Uninvolved    Pretreatment Circumferential Resection Margin (CRM) status:  Not Threatened    Pretreatment (clinical) AJCC Stage:III B vs  IV ?  Stage I  [] I: T1N0M0  [] I: T2N0M0  Stage II  [] IIA: T3N0M0  [] IIB U8nM3M6  [] IIC: Q4jK2P5  Stage III  [] IIIA: T1-2N1M0  [] IIIA: Y6R6sW0  [] IIIB: T3-D2jX7T0  [x] IIIB: T2-3N2aM0  [] IIIB: T1-2N2bM0  [] IIIC: J6wP0dX0  [] IIIC: T3-0xV1eZ1  [] IIIC: U7cT5-3Y9   Stage IV  [x] IV: F8-6W8-8A7h-b    CEA level:     Lab Results   Component Value Date    CEA 3.5 09/03/2019       2. Treatment Recommendation    Neoadjuvant Therapy Recommendation:     Short course radiation and Chemotherapy.     PLAN:    MRI and PET if not able to obtain PET will proceed with biopsy of the lung  biopsy.     Colonoscopy during Chemotherapy to rule out any other lesions.     Anticipated date and type of surgical procedure:     LAR after    Clinical research study eligibility and/or enrollment: Not eligible     10/8/2019 Cancer Staged    Staging form: Colon and Rectum, AJCC 8th Edition  - Clinical stage from 10/8/2019: Stage IIIB (cT3, cN2a, cM0)       10/15/2019 - 10/15/2019 Chemotherapy    Treatment Summary   Plan Name: OP CAPECITABINE 5 DAYS + RADIOTHERAPY  Treatment Goal: Curative  Status: Inactive  Start Date: [No treatment day found]  End Date: [No treatment day found]  Provider: Mikel Sams MD  Chemotherapy: [No matching medication found in this treatment plan]       10/24/2019 - 12/4/2019 Radiation Therapy    Treatment Site Ref. ID Energy Dose/Fx (Gy) #Fx Dose Correction (Gy) Total Dose (Gy) Start Date End Date Elapsed Days   Pelvis Pelvis 6X 1.8 28 / 28 0 50.4 10/24/2019 12/4/2019 41          3/2/2020 - 7/1/2020 Chemotherapy    Treatment Summary   Plan Name: OP FOLFOX 6 Q2W  Treatment Goal: Curative  Status: Inactive  Start Date: 3/2/2020  End Date: 7/1/2020  Provider: Mikel Sams MD  Chemotherapy: fluorouracil injection 710 mg, 400 mg/m2 = 710 mg, Intravenous, Clinic/H[facility] 1 time, 8 of 12 cycles  Administration: 710 mg (3/2/2020), 710 mg (3/16/2020), 710 mg (4/6/2020), 710 mg (4/20/2020), 710 mg (5/25/2020), 710 mg (5/12/2020), 710 mg (6/8/2020), 710 mg (6/29/2020)  fluorouracil (ADRUCIL) 2,400 mg/m2 = 4,270 mg in sodium chloride 0.9% 101.2 mL chemo infusion, 2,400 mg/m2 = 4,270 mg, Intravenous, Over 46 hours, 8 of 12 cycles  Administration: 4,270 mg (3/2/2020), 4,270 mg (3/16/2020), 4,270 mg (4/6/2020), 4,270 mg (4/20/2020), 4,270 mg (5/25/2020), 4,270 mg (5/12/2020), 4,270 mg (6/8/2020), 4,270 mg (6/29/2020)  leucovorin calcium 400 mg/m2 = 710 mg in dextrose 5 % 285.5 mL infusion, 400 mg/m2 = 710 mg, Intravenous, [facility] 1 time, 8 of 12 cycles  Administration: 710 mg (3/2/2020),  710 mg (3/16/2020), 700 mg (4/6/2020), 710 mg (4/20/2020), 710 mg (5/25/2020), 710 mg (5/12/2020), 710 mg (6/8/2020), 700 mg (6/29/2020)  oxaliplatin (ELOXATIN) 85 mg/m2 = 151 mg in dextrose 5 % 530.2 mL chemo infusion, 85 mg/m2 = 151 mg, Intravenous, Clinic/HOD 1 time, 8 of 12 cycles  Administration: 151 mg (3/2/2020), 151 mg (3/16/2020), 150 mg (4/6/2020), 151 mg (4/20/2020), 151 mg (5/25/2020), 151 mg (5/12/2020), 151 mg (6/8/2020), 151 mg (6/29/2020)       8/29/2022 -  Chemotherapy    Treatment Summary   Plan Name: OP COLORECTAL FOLFIRI + BEVACIZUMAB Q2W  Treatment Goal: Palliative  Status: Active  Start Date: 8/29/2022 (Planned)  End Date: 8/16/2023 (Planned)  Provider: Mikel Sams MD  Chemotherapy: dexAMETHasone (DECADRON) 4 MG Tab, 8 mg, Oral, Daily, 1 of 1 cycle, Start date: 8/18/2022, End date: --  fluorouraciL injection 715 mg, 400 mg/m2 = 715 mg, Intravenous, Clinic/H[facility] 1 time, 0 of 26 cycles  irinotecan (CAMPTOSAR) 180 mg/m2 = 322 mg in sodium chloride 0.9% 500 mL chemo infusion, 180 mg/m2 = 322 mg, Intravenous, Clinic/HOD 1 time, 0 of 26 cycles  leucovorin calcium 400 mg/m2 = 715 mg in dextrose 5 % 250 mL infusion, 400 mg/m2 = 715 mg, Intravenous, Clinic/HOD 1 time, 0 of 26 cycles  bevacizumab-bvzr 326 mg in sodium chloride 0.9% 113.04 mL infusion, 5 mg/kg = 326 mg, Intravenous, Clinic/HOD 1 time, 0 of 26 cycles          Cancer Staging   Rectal cancer  - Clinical stage from 10/8/2019: Stage IIIB (cT3, cN2a, cM0) - Signed by Steven Valles II, MD on 10/8/2019      I had the pleasure meeting for the 1st time Mr. Benton a 66-year-old gentleman under the care primary oncologist Dr. Sams for treatment of rectal cancer status post chemoradiation with capecitabine low anterior resection ypT3 N0 7 cycles adjuvant FOLFOX per prior notes.  He had been in surveillance with restaging imaging June 2022 showing new pulmonary nodules biopsy proven recurrent metastatic disease started on FOLFIRI and  Avastin molecular studies positive for mutation NRAS.    HPI     He presents today planned for  cycle 1 day 1  tomorrow.  Prefers bedside teaching with nursing however we did review all potential toxicities and signed consent together today.  He is feeling well in stable health and denies new concerning symptoms but does have right upper quadrant abdominal pain.    Review of Systems    ?   A comprehensive 14-point review of systems was reviewed with patient and was negative other than as specified above.   ?   PAST MEDICAL HISTORY:   Past Medical History:   Diagnosis Date    Alcoholism     Anemia     Back pain     Encounter for blood transfusion 2018    Essential hypertension 2/4/2016    GERD (gastroesophageal reflux disease)     Hiatal hernia     Hypertension     diet controlled    Melanoma 06/2021    left cheek    Rectal cancer 9/11/2019    ?     PAST SURGICAL HISTORY:   Past Surgical History:   Procedure Laterality Date    ARTHROSCOPY OF KNEE Right 5/5/2021    Procedure: ARTHROSCOPY, KNEE;  Surgeon: Delonte Mcintyre MD;  Location: [facility];  Service: Orthopedics;  Laterality: Right;    COLONOSCOPY N/A 9/3/2019    Procedure: COLONOSCOPY;  Surgeon: Isai Centeno MD;  Location: [facility];  Service: Endoscopy;  Laterality: N/A;    COLONOSCOPY N/A 1/10/2020    Procedure: COLONOSCOPY;  Surgeon: Familia Gonzalez MD;  Location: [facility];  Service: General;  Laterality: N/A;    COLONOSCOPY N/A 3/24/2021    Procedure: COLONOSCOPY;  Surgeon: Familia Gonzalez MD;  Location: [facility];  Service: General;  Laterality: N/A;    ESOPHAGOGASTRODUODENOSCOPY N/A 9/3/2019    Procedure: ESOPHAGOGASTRODUODENOSCOPY (EGD);  Surgeon: Isai Centeno MD;  Location: [facility];  Service: Endoscopy;  Laterality: N/A;    EXCISION OF MEDIAL MENISCUS OF KNEE Right 5/5/2021    Procedure: MENISCECTOMY, KNEE, MEDIAL;  Surgeon: Delonte Mcintyre MD;  Location: [facility];  Service: Orthopedics;  Laterality: Right;  partial Lateral     FLEXIBLE SIGMOIDOSCOPY  2/4/2020    Procedure: SIGMOIDOSCOPY, FLEXIBLE;  Surgeon: Familia Gonzalez MD;  Location: [facility];  Service: General;;    ILEOSTOMY Right 2/4/2020    Procedure: CREATION, ILEOSTOMY;  Surgeon: Familia Gonzalez MD;  Location: [facility];  Service: General;  Laterality: Right;    ILEOSTOMY CLOSURE N/A 8/4/2020    Procedure: CLOSURE, ILEOSTOMY;  Surgeon: Familia Gonzalez MD;  Location: [facility];  Service: General;  Laterality: N/A;    INCISION AND DRAINAGE OF BACK Left 8/5/2020    Procedure: INCISION AND DRAINAGE, BACK;  Surgeon: Familia Gonzalez MD;  Location: [facility];  Service: General;  Laterality: Left;    INJECTION OF ANESTHETIC AGENT INTO TISSUE PLANE DEFINED BY TRANSVERSUS ABDOMINIS MUSCLE N/A 2/4/2020    Procedure: BLOCK, TRANSVERSUS ABDOMINIS PLANE;  Surgeon: Familia Gonzalez MD;  Location: [facility];  Service: General;  Laterality: N/A;    INSERTION OF TUNNELED CENTRAL VENOUS CATHETER (CVC) WITH SUBCUTANEOUS PORT Right 2/4/2020    Procedure: INSERTION, PORT-A-CATH;  Surgeon: Familia Gonzalez MD;  Location: [facility];  Service: General;  Laterality: Right;    INSERTION OF TUNNELED CENTRAL VENOUS CATHETER (CVC) WITH SUBCUTANEOUS PORT N/A 8/16/2022    Procedure: [id number];  Surgeon: Familia Gonzalez MD;  Location: [facility];  Service: General;  Laterality: N/A;  right subclavian    JOINT REPLACEMENT Left 2009    Hip    KNEE ARTHROSCOPY W/ PLICA EXCISION Right 5/5/2021    Procedure: EXCISION, PLICA, KNEE, ARTHROSCOPIC;  Surgeon: Delonte Mcintyre MD;  Location: [facility];  Service: Orthopedics;  Laterality: Right;    MOBILIZATION OF SPLENIC FLEXURE  2/4/2020    Procedure: MOBILIZATION, SPLENIC FLEXURE;  Surgeon: Familia Gonzalez MD;  Location: [facility];  Service: General;;    REMOVAL OF HARDWARE FROM HIP Left 1/4/2022    Procedure: REMOVAL, HARDWARE, HIP;  Surgeon: Delonte Mcintyre MD;  Location: [facility];  Service: Orthopedics;  Laterality: Left;      ?   ALLERGIES:    Allergies as of 08/29/2022    (No Known Allergies)      ?   MEDICATIONS:?   Outpatient Medications Marked as Taking for the 8/29/22 encounter (Office Visit) with Alessia Tuttle MD   Medication Sig Dispense Refill    ascorbic acid, vitamin C, (VITAMIN C) 1000 MG tablet Take 1,000 mg by mouth once daily.      dexAMETHasone (DECADRON) 4 MG Tab Take 2 tablets (8 mg total) by mouth once daily. Take as directed on days 2 and 3 of your chemotherapy cycle. 24 tablet 5    gabapentin (NEURONTIN) 300 MG capsule Take 1 capsule (300 mg total) by mouth every evening. 30 capsule 5    HYDROcodone-acetaminophen (NORCO)  mg per tablet Take 1 tablet by mouth every 8 (eight) hours as needed for Pain. 90 tablet 0    multivitamin capsule Take 1 capsule by mouth once daily.      ondansetron (ZOFRAN-ODT) 8 MG TbDL Dissolve 1 tablet (8 mg total) by mouth every 8 (eight) hours as needed (nausea/vomiting). 60 tablet 5      ?   SOCIAL HISTORY:?   Social History     Tobacco Use    Smoking status: Never    Smokeless tobacco: Never   Substance Use Topics    Alcohol use: Yes     Comment: occassionally No alcohol 72h prior to sx      ?      ?   FAMILY HISTORY:   family history includes Cataracts in his mother; Hypertension in his father; Stroke in his father.   ?        Objective:      Physical Exam      ?   Vitals:    08/29/22 1332   BP: (!) 141/95   Pulse: 85   Temp: 97.9 °F (36.6 °C)      ?   ECOG:?1   General appearance: Generally well appearing, in no acute distress.   Head, eyes, ears, nose, and throat: Pupils round and equally reactive to light. Oropharynx clear with moist mucous membranes.   Cardiovascular: Regular rate and rhythm, S1, S2, no audible murmurs.   Respiratory: Lungs clear to auscultation bilaterally.   Abdomen: Bowel sounds present, soft, nontender, nondistended.   Extremities: Warm, without edema.   Neurologic: Alert and oriented. Grossly normal strength, coordination, and gait.   Skin: No rashes, ecchymoses or  petechial lesion.   ?      ?   Laboratory:  ?   Lab Visit on 08/29/2022   Component Date Value Ref Range Status    Specimen UA 08/29/2022 Urine, Clean Catch   Final    Color, UA 08/29/2022 Yellow  Yellow, Straw, Stefanie Final    Appearance, UA 08/29/2022 Clear  Clear Final    pH, UA 08/29/2022 6.0  5.0 - 8.0 Final    Specific Gravity, UA 08/29/2022 1.020  1.005 - 1.030 Final    Protein, UA 08/29/2022 Trace (A)  Negative Final    Glucose, UA 08/29/2022 Negative  Negative Final    Ketones, UA 08/29/2022 2+ (A)  Negative Final    Bilirubin (UA) 08/29/2022 Negative  Negative Final    Occult Blood UA 08/29/2022 Negative  Negative Final    Nitrite, UA 08/29/2022 Negative  Negative Final    Urobilinogen, UA 08/29/2022 Negative  <2.0 EU/dL Final    Leukocytes, UA 08/29/2022 Negative  Negative Final   Lab Visit on 08/29/2022   Component Date Value Ref Range Status    WBC 08/29/2022 9.75  3.90 - 12.70 K/uL Final    RBC 08/29/2022 4.43 (L)  4.60 - 6.20 M/uL Final    Hemoglobin 08/29/2022 10.8 (L)  14.0 - 18.0 g/dL Final    Hematocrit 08/29/2022 34.6 (L)  40.0 - 54.0 % Final    MCV 08/29/2022 78 (L)  82 - 98 fL Final    MCH 08/29/2022 24.4 (L)  27.0 - 31.0 pg Final    MCHC 08/29/2022 31.2 (L)  32.0 - 36.0 g/dL Final    RDW 08/29/2022 16.2 (H)  11.5 - 14.5 % Final    Platelets 08/29/2022 462 (H)  150 - 450 K/uL Final    MPV 08/29/2022 9.1 (L)  9.2 - 12.9 fL Final    Immature Granulocytes 08/29/2022 0.4  0.0 - 0.5 % Final    Gran # (ANC) 08/29/2022 7.1  1.8 - 7.7 K/uL Final    Immature Grans (Abs) 08/29/2022 0.04  0.00 - 0.04 K/uL Final    Lymph # 08/29/2022 1.2  1.0 - 4.8 K/uL Final    Mono # 08/29/2022 1.3 (H)  0.3 - 1.0 K/uL Final    Eos # 08/29/2022 0.1  0.0 - 0.5 K/uL Final    Baso # 08/29/2022 0.05  0.00 - 0.20 K/uL Final    nRBC 08/29/2022 0  0 /100 WBC Final    Gran % 08/29/2022 73.2 (H)  38.0 - 73.0 % Final    Lymph % 08/29/2022 11.8 (L)  18.0 - 48.0 % Final    Mono % 08/29/2022 13.4  4.0 - 15.0 % Final    Eosinophil %  08/29/2022 0.7  0.0 - 8.0 % Final    Basophil % 08/29/2022 0.5  0.0 - 1.9 % Final    Differential Method 08/29/2022 Automated   Final    Sodium 08/29/2022 134 (L)  136 - 145 mmol/L Final    Potassium 08/29/2022 4.7  3.5 - 5.1 mmol/L Final    Chloride 08/29/2022 97  95 - 110 mmol/L Final    CO2 08/29/2022 25  23 - 29 mmol/L Final    Glucose 08/29/2022 92  70 - 110 mg/dL Final    BUN 08/29/2022 7 (L)  8 - 23 mg/dL Final    Creatinine 08/29/2022 0.7  0.5 - 1.4 mg/dL Final    Calcium 08/29/2022 9.7  8.7 - 10.5 mg/dL Final    Total Protein 08/29/2022 8.3  6.0 - 8.4 g/dL Final    Albumin 08/29/2022 3.4 (L)  3.5 - 5.2 g/dL Final    Total Bilirubin 08/29/2022 0.5  0.1 - 1.0 mg/dL Final    Alkaline Phosphatase 08/29/2022 189 (H)  55 - 135 U/L Final    AST 08/29/2022 31  10 - 40 U/L Final    ALT 08/29/2022 9 (L)  10 - 44 U/L Final    Anion Gap 08/29/2022 12  8 - 16 mmol/L Final    eGFR 08/29/2022 >60  >60 mL/min/1.73 m^2 Final      ?   Lab Results   Component Value Date    .9 (H) 06/13/2022       ?   Imaging:  ?    Results for orders placed or performed during the hospital encounter of 07/20/22 (from the past 2160 hour(s))   CT Guided Needle Placement    Impression    CT guided biopsy of a right hepatic lobe mass which demonstrated increased metabolic activity on PET scan.    All CT scans at this facility use dose modulation, iterative reconstruction, and/or weight based dosing when appropriate to reduce radiation dose to as low as reasonable achievable.      Electronically signed by: Avila Edward MD  Date:    07/20/2022  Time:    14:53     Results for orders placed or performed during the hospital encounter of 07/09/22 (from the past 2160 hour(s))   MRI Lumbar Spine Without Contrast    Impression    Multilevel lumbar spine degenerative changes resulting in areas of neural foraminal and spinal canal stenosis as described above.      Electronically signed by: Laureano Ladd  Date:    07/11/2022  Time:    08:22      Results for orders placed or performed during the hospital encounter of 06/28/22 (from the past 2160 hour(s))   NM PET CT Routine FDG    Impression    New bilateral pulmonary nodules and FDG avid multifocal hepatic lesions concerning for metastatic disease.  Small focus of uptake along the posterior right aspect of the rectum in the region of a suture line raising concern for local recurrence as well.  Nonspecific FDG uptake in the right hemiscrotum.  Correlation with nonemergent ultrasound could be performed for further evaluation.  Additional findings as above      Electronically signed by: Yariel Frank MD  Date:    06/28/2022  Time:    12:29         Pathology:      Reviewed in epic     ?   Assessment/Plan:   Microcytic anemia    Rectal cancer  -     Ambulatory referral/consult to [facility] Palliative Care; Future; Expected date: 09/05/2022    Rectal adenocarcinoma  -     Ambulatory referral/consult to [facility]IN[facility] Palliative Care; Future; Expected date: 09/05/2022  -     CBC Auto Differential; Standing  -     Comprehensive Metabolic Panel; Standing  -     Iron and TIBC; Future; Expected date: 08/29/2022  -     Ferritin; Future; Expected date: 08/29/2022    Other orders  -     bevacizumab-bvzr 326 mg in sodium chloride 0.9% 113.04 mL infusion  -     palonosetron (ALOXI) 0.25 mg with Dexamethasone (DECADRON) 12 mg in NS 50 mL IVPB  -     irinotecan (CAMPTOSAR) 180 mg/m2 = 322 mg in sodium chloride 0.9% 500 mL chemo infusion  -     leucovorin calcium 400 mg/m2 = 715 mg in dextrose 5 % 250 mL infusion  -     fluorouraciL injection 715 mg  -     fluorouracil injection 4,295 mg  -     atropine injection 0.4 mg  -     EPINEPHrine (EPIPEN) 0.3 mg/0.3 mL pen injection 0.3 mg  -     diphenhydrAMINE injection 50 mg  -     hydrocortisone sodium succinate injection 100 mg  -     sodium chloride 0.9% 250 mL flush bag  -     sodium chloride 0.9% flush 10 mL  -     heparin, porcine (PF) 100 unit/mL injection flush 500 Units  -      alteplase injection 2 mg  -     sodium chloride 0.9% flush 10 mL  -     heparin, porcine (PF) 100 unit/mL injection flush 500 Units  -     alteplase injection 2 mg     1. Microcytic anemia    2. Rectal cancer    3. Rectal adenocarcinoma            Plan:     Problem List Items Addressed This Visit          Oncology    Rectal cancer    Rectal adenocarcinoma     Other Visit Diagnoses       Microcytic anemia    -  Primary            Recurrent metastatic stage IV rectal adenocarcinoma, metastatic to lung and liver, biopsy proven  Metastatic disease in liver, NRAS mutation positive colon primary oncologist Dr. Sams plans for FOLFIRI and bevacizumab (received initial diagnosis 2020 stage III rectal adenocarcinoma status post neoadjuvant capecitabine based chemoradiation and adjuvant FOLFOX).  Reviewed potential toxicities of his planned regimen, obtain urinalysis CBC and CMP today will be proceeding with cycle 1 day 1 tomorrow, 08/30/2022.    Microcytic anemia:  May be concerning for iron deficiency will add on labs today and replete as needed.  He denies any clear evidence of rectal or other bleeding.    Advance Care Planning   Discussed role of palliative care in assistance with pain management right upper quadrant pain present as well as advance care planning and goals of care accepting of consultation referral placed.       Follow-Up:   Patient Instructions   UA now  Add on iron to todays labs  Referral to [facility] care, asap  Chemo tomorrow with bedside chemo teaching, consent completed today please file  Rv in 2 wks cbc, cmp and chemo c2 planned

## 2022-08-29 NOTE — PATIENT INSTRUCTIONS
UA now  Add on iron to todays labs  Referral to pall care, asap  Chemo tomorrow with bedside chemo teaching, consent completed today please file  Rv in 2 wks cbc, cmp and chemo c2 planned

## 2022-08-30 ENCOUNTER — INFUSION (OUTPATIENT)
Dept: INFUSION THERAPY | Facility: HOSPITAL | Age: 67
End: 2022-08-30
Attending: INTERNAL MEDICINE
Payer: MEDICARE

## 2022-08-30 ENCOUNTER — DOCUMENTATION ONLY (OUTPATIENT)
Dept: HEMATOLOGY/ONCOLOGY | Facility: CLINIC | Age: 67
End: 2022-08-30
Payer: MEDICARE

## 2022-08-30 VITALS
RESPIRATION RATE: 17 BRPM | TEMPERATURE: 98 F | OXYGEN SATURATION: 100 % | DIASTOLIC BLOOD PRESSURE: 68 MMHG | HEART RATE: 75 BPM | SYSTOLIC BLOOD PRESSURE: 106 MMHG

## 2022-08-30 DIAGNOSIS — C20 RECTAL CANCER: Primary | ICD-10-CM

## 2022-08-30 PROCEDURE — 96415 CHEMO IV INFUSION ADDL HR: CPT

## 2022-08-30 PROCEDURE — 96413 CHEMO IV INFUSION 1 HR: CPT

## 2022-08-30 PROCEDURE — 96367 TX/PROPH/DG ADDL SEQ IV INF: CPT

## 2022-08-30 PROCEDURE — 63600175 PHARM REV CODE 636 W HCPCS: Performed by: INTERNAL MEDICINE

## 2022-08-30 PROCEDURE — 96411 CHEMO IV PUSH ADDL DRUG: CPT

## 2022-08-30 PROCEDURE — 96416 CHEMO PROLONG INFUSE W/PUMP: CPT

## 2022-08-30 PROCEDURE — 96368 THER/DIAG CONCURRENT INF: CPT

## 2022-08-30 PROCEDURE — 25000003 PHARM REV CODE 250: Performed by: INTERNAL MEDICINE

## 2022-08-30 PROCEDURE — 96376 TX/PRO/DX INJ SAME DRUG ADON: CPT

## 2022-08-30 RX ORDER — HEPARIN 100 UNIT/ML
500 SYRINGE INTRAVENOUS
Status: DISCONTINUED | OUTPATIENT
Start: 2022-08-30 | End: 2022-08-30 | Stop reason: HOSPADM

## 2022-08-30 RX ORDER — ATROPINE SULFATE 1 MG/ML
0.4 INJECTION, SOLUTION INTRAMUSCULAR; INTRAVENOUS; SUBCUTANEOUS ONCE AS NEEDED
Status: COMPLETED | OUTPATIENT
Start: 2022-08-30 | End: 2022-08-30

## 2022-08-30 RX ORDER — FLUOROURACIL 50 MG/ML
400 INJECTION, SOLUTION INTRAVENOUS
Status: COMPLETED | OUTPATIENT
Start: 2022-08-30 | End: 2022-08-30

## 2022-08-30 RX ORDER — DIPHENHYDRAMINE HYDROCHLORIDE 50 MG/ML
50 INJECTION INTRAMUSCULAR; INTRAVENOUS ONCE AS NEEDED
Status: DISCONTINUED | OUTPATIENT
Start: 2022-08-30 | End: 2022-08-30 | Stop reason: HOSPADM

## 2022-08-30 RX ORDER — EPINEPHRINE 1 MG/ML
0.3 INJECTION, SOLUTION INTRACARDIAC; INTRAMUSCULAR; INTRAVENOUS; SUBCUTANEOUS ONCE AS NEEDED
Status: DISCONTINUED | OUTPATIENT
Start: 2022-08-30 | End: 2022-08-30 | Stop reason: HOSPADM

## 2022-08-30 RX ADMIN — FLUOROURACIL 715 MG: 50 INJECTION, SOLUTION INTRAVENOUS at 12:08

## 2022-08-30 RX ADMIN — ATROPINE SULFATE 0.4 MG: 1 INJECTION, SOLUTION INTRAMUSCULAR; INTRAVENOUS; SUBCUTANEOUS at 10:08

## 2022-08-30 RX ADMIN — FLUOROURACIL 4295 MG: 50 INJECTION, SOLUTION INTRAVENOUS at 12:08

## 2022-08-30 RX ADMIN — SODIUM CHLORIDE 322 MG: 9 INJECTION, SOLUTION INTRAVENOUS at 10:08

## 2022-08-30 RX ADMIN — LEUCOVORIN CALCIUM 715 MG: 350 INJECTION, POWDER, LYOPHILIZED, FOR SOLUTION INTRAMUSCULAR; INTRAVENOUS at 10:08

## 2022-08-30 RX ADMIN — DEXAMETHASONE SODIUM PHOSPHATE 0.25 MG: 4 INJECTION, SOLUTION INTRA-ARTICULAR; INTRALESIONAL; INTRAMUSCULAR; INTRAVENOUS; SOFT TISSUE at 10:08

## 2022-08-30 RX ADMIN — SODIUM CHLORIDE 300 MG: 9 INJECTION, SOLUTION INTRAVENOUS at 08:08

## 2022-08-30 NOTE — PROGRESS NOTES
Patient requested case of Boost. Nurse provided patient a case of Boost. Swer will remain available.

## 2022-08-30 NOTE — PLAN OF CARE
Problem: Adult Inpatient Plan of Care  Goal: Plan of Care Review  Outcome: Ongoing, Progressing  Flowsheets (Taken 8/30/2022 0834)  Plan of Care Reviewed With: patient  Goal: Patient-Specific Goal (Individualized)  Outcome: Ongoing, Progressing  Flowsheets (Taken 8/30/2022 0834)  Anxieties, Fears or Concerns: none  Individualized Care Needs: feet elevated, blanket provided, OJ and BBQ chips  Patient-Specific Goals (Include Timeframe): tolerate infusion today  Goal: Optimal Comfort and Wellbeing  Outcome: Ongoing, Progressing  Intervention: Provide Person-Centered Care  Flowsheets (Taken 8/30/2022 0834)  Trust Relationship/Rapport:   care explained   reassurance provided   choices provided   thoughts/feelings acknowledged   emotional support provided   empathic listening provided   questions answered   questions encouraged     Problem: Neutropenia (Chemotherapy Effects)  Goal: Absence of Infection  Outcome: Ongoing, Progressing

## 2022-08-31 DIAGNOSIS — K21.9 GASTROESOPHAGEAL REFLUX DISEASE, UNSPECIFIED WHETHER ESOPHAGITIS PRESENT: Primary | ICD-10-CM

## 2022-08-31 RX ORDER — OMEPRAZOLE 20 MG/1
20 CAPSULE, DELAYED RELEASE ORAL DAILY
Qty: 28 CAPSULE | Refills: 1 | Status: SHIPPED | OUTPATIENT
Start: 2022-08-31 | End: 2022-10-28

## 2022-09-01 ENCOUNTER — INFUSION (OUTPATIENT)
Dept: INFUSION THERAPY | Facility: HOSPITAL | Age: 67
End: 2022-09-01
Attending: INTERNAL MEDICINE
Payer: MEDICARE

## 2022-09-01 VITALS
HEART RATE: 62 BPM | OXYGEN SATURATION: 100 % | DIASTOLIC BLOOD PRESSURE: 80 MMHG | RESPIRATION RATE: 18 BRPM | TEMPERATURE: 97 F | SYSTOLIC BLOOD PRESSURE: 137 MMHG

## 2022-09-01 DIAGNOSIS — C20 RECTAL CANCER: Primary | ICD-10-CM

## 2022-09-01 PROCEDURE — 63600175 PHARM REV CODE 636 W HCPCS: Performed by: INTERNAL MEDICINE

## 2022-09-01 PROCEDURE — A4216 STERILE WATER/SALINE, 10 ML: HCPCS | Performed by: INTERNAL MEDICINE

## 2022-09-01 PROCEDURE — 25000003 PHARM REV CODE 250: Performed by: INTERNAL MEDICINE

## 2022-09-01 RX ORDER — SODIUM CHLORIDE 0.9 % (FLUSH) 0.9 %
10 SYRINGE (ML) INJECTION
Status: DISCONTINUED | OUTPATIENT
Start: 2022-09-01 | End: 2022-09-01 | Stop reason: HOSPADM

## 2022-09-01 RX ORDER — HEPARIN 100 UNIT/ML
500 SYRINGE INTRAVENOUS
Status: DISCONTINUED | OUTPATIENT
Start: 2022-09-01 | End: 2022-09-01 | Stop reason: HOSPADM

## 2022-09-01 RX ADMIN — Medication 10 ML: at 10:09

## 2022-09-01 RX ADMIN — HEPARIN 500 UNITS: 100 SYRINGE at 10:09

## 2022-09-01 NOTE — NURSING
Chemotherapy pump completed and discontinued with unit intact. Mediport flushed with saline/heparin; [place] needle removed. Band-aid to site. Tolerated well

## 2022-09-02 DIAGNOSIS — C20 RECTAL ADENOCARCINOMA: Primary | ICD-10-CM

## 2022-09-12 ENCOUNTER — OFFICE VISIT (OUTPATIENT)
Dept: HEMATOLOGY/ONCOLOGY | Facility: CLINIC | Age: 67
End: 2022-09-12
Payer: MEDICARE

## 2022-09-12 ENCOUNTER — LAB VISIT (OUTPATIENT)
Dept: LAB | Facility: HOSPITAL | Age: 67
End: 2022-09-12
Attending: INTERNAL MEDICINE
Payer: MEDICARE

## 2022-09-12 VITALS
TEMPERATURE: 98 F | DIASTOLIC BLOOD PRESSURE: 86 MMHG | WEIGHT: 133.38 LBS | SYSTOLIC BLOOD PRESSURE: 133 MMHG | BODY MASS INDEX: 19.09 KG/M2 | HEART RATE: 86 BPM | HEIGHT: 70 IN | OXYGEN SATURATION: 98 %

## 2022-09-12 DIAGNOSIS — Z79.899 IMMUNODEFICIENCY DUE TO CHEMOTHERAPY: Primary | ICD-10-CM

## 2022-09-12 DIAGNOSIS — T45.1X5A IMMUNODEFICIENCY DUE TO CHEMOTHERAPY: Primary | ICD-10-CM

## 2022-09-12 DIAGNOSIS — Z51.5 ENCOUNTER FOR PALLIATIVE CARE: ICD-10-CM

## 2022-09-12 DIAGNOSIS — D84.821 IMMUNODEFICIENCY DUE TO CHEMOTHERAPY: Primary | ICD-10-CM

## 2022-09-12 DIAGNOSIS — C20 RECTAL CANCER: ICD-10-CM

## 2022-09-12 DIAGNOSIS — R19.7 DIARRHEA, UNSPECIFIED TYPE: ICD-10-CM

## 2022-09-12 DIAGNOSIS — D50.0 IRON DEFICIENCY ANEMIA DUE TO CHRONIC BLOOD LOSS: ICD-10-CM

## 2022-09-12 DIAGNOSIS — C20 RECTAL ADENOCARCINOMA: ICD-10-CM

## 2022-09-12 LAB
ALBUMIN SERPL BCP-MCNC: 3.7 G/DL (ref 3.5–5.2)
ALP SERPL-CCNC: 155 U/L (ref 55–135)
ALT SERPL W/O P-5'-P-CCNC: 16 U/L (ref 10–44)
ANION GAP SERPL CALC-SCNC: 13 MMOL/L (ref 8–16)
AST SERPL-CCNC: 22 U/L (ref 10–40)
BASOPHILS # BLD AUTO: 0.06 K/UL (ref 0–0.2)
BASOPHILS NFR BLD: 1.5 % (ref 0–1.9)
BILIRUB SERPL-MCNC: 0.3 MG/DL (ref 0.1–1)
BUN SERPL-MCNC: 8 MG/DL (ref 8–23)
CALCIUM SERPL-MCNC: 10.2 MG/DL (ref 8.7–10.5)
CHLORIDE SERPL-SCNC: 103 MMOL/L (ref 95–110)
CO2 SERPL-SCNC: 24 MMOL/L (ref 23–29)
CREAT SERPL-MCNC: 0.8 MG/DL (ref 0.5–1.4)
DIFFERENTIAL METHOD: ABNORMAL
EOSINOPHIL # BLD AUTO: 0.3 K/UL (ref 0–0.5)
EOSINOPHIL NFR BLD: 6.8 % (ref 0–8)
ERYTHROCYTE [DISTWIDTH] IN BLOOD BY AUTOMATED COUNT: 17.1 % (ref 11.5–14.5)
EST. GFR  (NO RACE VARIABLE): >60 ML/MIN/1.73 M^2
GLUCOSE SERPL-MCNC: 111 MG/DL (ref 70–110)
HCT VFR BLD AUTO: 36.8 % (ref 40–54)
HGB BLD-MCNC: 11.5 G/DL (ref 14–18)
IMM GRANULOCYTES # BLD AUTO: 0 K/UL (ref 0–0.04)
IMM GRANULOCYTES NFR BLD AUTO: 0 % (ref 0–0.5)
LYMPHOCYTES # BLD AUTO: 1 K/UL (ref 1–4.8)
LYMPHOCYTES NFR BLD: 25.1 % (ref 18–48)
MCH RBC QN AUTO: 24.4 PG (ref 27–31)
MCHC RBC AUTO-ENTMCNC: 31.3 G/DL (ref 32–36)
MCV RBC AUTO: 78 FL (ref 82–98)
MONOCYTES # BLD AUTO: 0.5 K/UL (ref 0.3–1)
MONOCYTES NFR BLD: 11.9 % (ref 4–15)
NEUTROPHILS # BLD AUTO: 2.2 K/UL (ref 1.8–7.7)
NEUTROPHILS NFR BLD: 54.7 % (ref 38–73)
NRBC BLD-RTO: 0 /100 WBC
PLATELET # BLD AUTO: 445 K/UL (ref 150–450)
PMV BLD AUTO: 9.4 FL (ref 9.2–12.9)
POTASSIUM SERPL-SCNC: 5.6 MMOL/L (ref 3.5–5.1)
PROT SERPL-MCNC: 7.9 G/DL (ref 6–8.4)
RBC # BLD AUTO: 4.71 M/UL (ref 4.6–6.2)
SODIUM SERPL-SCNC: 140 MMOL/L (ref 136–145)
WBC # BLD AUTO: 3.95 K/UL (ref 3.9–12.7)

## 2022-09-12 PROCEDURE — 85025 COMPLETE CBC W/AUTO DIFF WBC: CPT | Performed by: INTERNAL MEDICINE

## 2022-09-12 PROCEDURE — 99214 PR OFFICE/OUTPT VISIT, EST, LEVL IV, 30-39 MIN: ICD-10-PCS | Mod: 25,S$GLB,, | Performed by: INTERNAL MEDICINE

## 2022-09-12 PROCEDURE — 3008F BODY MASS INDEX DOCD: CPT | Mod: CPTII,S$GLB,, | Performed by: INTERNAL MEDICINE

## 2022-09-12 PROCEDURE — 3079F PR MOST RECENT DIASTOLIC BLOOD PRESSURE 80-89 MM HG: ICD-10-PCS | Mod: CPTII,S$GLB,, | Performed by: INTERNAL MEDICINE

## 2022-09-12 PROCEDURE — 99499 UNLISTED E&M SERVICE: CPT | Mod: S$GLB,,, | Performed by: INTERNAL MEDICINE

## 2022-09-12 PROCEDURE — 99999 PR PBB SHADOW E&M-EST. PATIENT-LVL III: CPT | Mod: PBBFAC,,, | Performed by: INTERNAL MEDICINE

## 2022-09-12 PROCEDURE — 80053 COMPREHEN METABOLIC PANEL: CPT | Performed by: INTERNAL MEDICINE

## 2022-09-12 PROCEDURE — 1159F PR MEDICATION LIST DOCUMENTED IN MEDICAL RECORD: ICD-10-PCS | Mod: CPTII,S$GLB,, | Performed by: INTERNAL MEDICINE

## 2022-09-12 PROCEDURE — 3008F PR BODY MASS INDEX (BMI) DOCUMENTED: ICD-10-PCS | Mod: CPTII,S$GLB,, | Performed by: INTERNAL MEDICINE

## 2022-09-12 PROCEDURE — 1159F MED LIST DOCD IN RCRD: CPT | Mod: CPTII,S$GLB,, | Performed by: INTERNAL MEDICINE

## 2022-09-12 PROCEDURE — 3075F PR MOST RECENT SYSTOLIC BLOOD PRESS GE 130-139MM HG: ICD-10-PCS | Mod: CPTII,S$GLB,, | Performed by: INTERNAL MEDICINE

## 2022-09-12 PROCEDURE — 99214 OFFICE O/P EST MOD 30 MIN: CPT | Mod: 25,S$GLB,, | Performed by: INTERNAL MEDICINE

## 2022-09-12 PROCEDURE — 99999 PR PBB SHADOW E&M-EST. PATIENT-LVL III: ICD-10-PCS | Mod: PBBFAC,,, | Performed by: INTERNAL MEDICINE

## 2022-09-12 PROCEDURE — 99499 RISK ADDL DX/OHS AUDIT: ICD-10-PCS | Mod: S$GLB,,, | Performed by: INTERNAL MEDICINE

## 2022-09-12 PROCEDURE — 3075F SYST BP GE 130 - 139MM HG: CPT | Mod: CPTII,S$GLB,, | Performed by: INTERNAL MEDICINE

## 2022-09-12 PROCEDURE — 3079F DIAST BP 80-89 MM HG: CPT | Mod: CPTII,S$GLB,, | Performed by: INTERNAL MEDICINE

## 2022-09-12 PROCEDURE — 36415 COLL VENOUS BLD VENIPUNCTURE: CPT | Performed by: INTERNAL MEDICINE

## 2022-09-12 RX ORDER — LOPERAMIDE HYDROCHLORIDE 2 MG/1
2 CAPSULE ORAL 4 TIMES DAILY PRN
Qty: 30 CAPSULE | Refills: 0 | Status: SHIPPED | OUTPATIENT
Start: 2022-09-12 | End: 2022-09-12 | Stop reason: SDUPTHER

## 2022-09-12 RX ORDER — OXYCODONE AND ACETAMINOPHEN 7.5; 325 MG/1; MG/1
1 TABLET ORAL EVERY 4 HOURS PRN
Qty: 90 TABLET | Refills: 0 | Status: SHIPPED | OUTPATIENT
Start: 2022-09-12 | End: 2022-09-20 | Stop reason: ALTCHOICE

## 2022-09-12 RX ORDER — LOPERAMIDE HYDROCHLORIDE 2 MG/1
2 CAPSULE ORAL 4 TIMES DAILY PRN
Qty: 30 CAPSULE | Refills: 0 | Status: SHIPPED | OUTPATIENT
Start: 2022-09-12 | End: 2022-09-22

## 2022-09-12 RX ORDER — OXYCODONE AND ACETAMINOPHEN 7.5; 325 MG/1; MG/1
1 TABLET ORAL EVERY 4 HOURS PRN
Qty: 90 TABLET | Refills: 0 | Status: SHIPPED | OUTPATIENT
Start: 2022-09-12 | End: 2022-09-12 | Stop reason: SDUPTHER

## 2022-09-12 NOTE — ASSESSMENT & PLAN NOTE
Likely related to neoplasm.  Most recent iron studies showed adequate iron storage capacity with ferritin above 200 nanograms/cc.  Noted low iron saturation and serum iron however TIBC is normal.  Hemoglobin noted above 10 grams/deciliters.  Will continue to monitor.

## 2022-09-12 NOTE — PROGRESS NOTES
Subjective:       Patient ID: Vincent Benton is a 66 y.o. male.    Chief Complaint: Immunodeficiency due to chemotherapy [D84.821, T45.1X5A, Z79.899]  HPI: We have an opportunity to see Mr. Vincent Benton in Hematology Oncology clinic at [facility] on 09/12/2022.  Mr. Vincent Benton is a 66 y.o. gentleman with rectal cancer.  Patient has completed concurrent chemoradiation with capecitabine.  Status post low anterior resection.  Final path noted at ypT3N0. Status post 7 cycles of adjuvant therapy with FOLFOX.    Interval history:  66-year-old male with stage III rectal cancer status post pj op chemotherapy with FOLFOX.  Status post loop ileostomy and reversal on 08/04/2020.  Recent surviellance imaging revealed liver mets-biopsy proven. Started on palliative chemotherapy with FOLFIRI plus Avastin. Presents today for C2D1. C/o mild diarrhea.    Oncology History   Rectal cancer   9/11/2019 Initial Diagnosis    Rectal cancer     9/18/2019 Tumor Conference    Multidisciplinary Rectal Cancer Conference - Evaluation and Recommendation Summary    9/17/2019  Vincent Benton  [id number]  63 y.o. male    1. Evaluation    C scope 9/3/19: Obstructing Rectal mass that couldn't be crossed.     Rigid Procto 9/3/19: Left and anterior lesion at 9-10 cm.     Path: Invasive adenocarcinoma, no LVI.     MRI date: 9/9/2019    Tumor Location in Rectum: middle third     Size: 3.6x2.7 cm mass    Nodes: 2 suspected nodes.     Indication of Sphincter Involvement:  Uninvolved    Pretreatment Circumferential Resection Margin (CRM) status:  Not Threatened    Pretreatment (clinical) AJCC Stage:III B vs  IV ?  Stage I  [] I: T1N0M0  [] I: T2N0M0  Stage II  [] IIA: T3N0M0  [] IIB P0wX6G1  [] IIC: Z8hB8A6  Stage III  [] IIIA: T1-2N1M0  [] IIIA: B1O1vU6  [] IIIB: T3-N3kT5A0  [x] IIIB: T2-3N2aM0  [] IIIB: T1-2N2bM0  [] IIIC: Q0qC3cL6  [] IIIC: T3-7pD3iQ7  [] IIIC: W0gL0-0E6   Stage IV  [x] IV: T2-0X0-6H5y-b    CEA  level:     Lab Results   Component Value Date    CEA 3.5 09/03/2019       2. Treatment Recommendation    Neoadjuvant Therapy Recommendation:     Short course radiation and Chemotherapy.     PLAN:    MRI and PET if not able to obtain PET will proceed with biopsy of the lung biopsy.     Colonoscopy during Chemotherapy to rule out any other lesions.     Anticipated date and type of surgical procedure:     LAR after    Clinical research study eligibility and/or enrollment: Not eligible     10/8/2019 Cancer Staged    Staging form: Colon and Rectum, AJCC 8th Edition  - Clinical stage from 10/8/2019: Stage IIIB (cT3, cN2a, cM0)       10/15/2019 - 10/15/2019 Chemotherapy    Treatment Summary   Plan Name: OP CAPECITABINE 5 DAYS + RADIOTHERAPY  Treatment Goal: Curative  Status: Inactive  Start Date: [No treatment day found]  End Date: [No treatment day found]  Provider: Mikel Sams MD  Chemotherapy: [No matching medication found in this treatment plan]       10/24/2019 - 12/4/2019 Radiation Therapy    Treatment Site Ref. ID Energy Dose/Fx (Gy) #Fx Dose Correction (Gy) Total Dose (Gy) Start Date End Date Elapsed Days   Pelvis Pelvis 6X 1.8 28 / 28 0 50.4 10/24/2019 12/4/2019 41          3/2/2020 - 7/1/2020 Chemotherapy    Treatment Summary   Plan Name: OP FOLFOX 6 Q2W  Treatment Goal: Curative  Status: Inactive  Start Date: 3/2/2020  End Date: 7/1/2020  Provider: Mikel Sams MD  Chemotherapy: fluorouracil injection 710 mg, 400 mg/m2 = 710 mg, Intravenous, Clinic/HOD 1 time, 8 of 12 cycles  Administration: 710 mg (3/2/2020), 710 mg (3/16/2020), 710 mg (4/6/2020), 710 mg (4/20/2020), 710 mg (5/25/2020), 710 mg (5/12/2020), 710 mg (6/8/2020), 710 mg (6/29/2020)  fluorouracil (ADRUCIL) 2,400 mg/m2 = 4,270 mg in sodium chloride 0.9% 101.2 mL chemo infusion, 2,400 mg/m2 = 4,270 mg, Intravenous, Over 46 hours, 8 of 12 cycles  Administration: 4,270 mg (3/2/2020), 4,270 mg (3/16/2020), 4,270 mg (4/6/2020), 4,270 mg  (4/20/2020), 4,270 mg (5/25/2020), 4,270 mg (5/12/2020), 4,270 mg (6/8/2020), 4,270 mg (6/29/2020)  leucovorin calcium 400 mg/m2 = 710 mg in dextrose 5 % 285.5 mL infusion, 400 mg/m2 = 710 mg, Intravenous, Clinic/HOD 1 time, 8 of 12 cycles  Administration: 710 mg (3/2/2020), 710 mg (3/16/2020), 700 mg (4/6/2020), 710 mg (4/20/2020), 710 mg (5/25/2020), 710 mg (5/12/2020), 710 mg (6/8/2020), 700 mg (6/29/2020)  oxaliplatin (ELOXATIN) 85 mg/m2 = 151 mg in dextrose 5 % 530.2 mL chemo infusion, 85 mg/m2 = 151 mg, Intravenous, Clinic/HOD 1 time, 8 of 12 cycles  Administration: 151 mg (3/2/2020), 151 mg (3/16/2020), 150 mg (4/6/2020), 151 mg (4/20/2020), 151 mg (5/25/2020), 151 mg (5/12/2020), 151 mg (6/8/2020), 151 mg (6/29/2020)       8/30/2022 -  Chemotherapy    Treatment Summary   Plan Name: OP COLORECTAL FOLFIRI + BEVACIZUMAB Q2W  Treatment Goal: Palliative  Status: Active  Start Date: 8/30/2022  End Date: 8/16/2023 (Planned)  Provider: Mikel Sams MD  Chemotherapy: dexAMETHasone (DECADRON) 4 MG Tab, 8 mg, Oral, Daily, 1 of 1 cycle, Start date: 8/18/2022, End date: --  fluorouraciL injection 715 mg, 400 mg/m2 = 715 mg, Intravenous, Clinic/HOD 1 time, 1 of 26 cycles  Administration: 715 mg (8/30/2022)  irinotecan (CAMPTOSAR) 180 mg/m2 = 322 mg in sodium chloride 0.9% 516.1 mL chemo infusion, 180 mg/m2 = 322 mg, Intravenous, Clinic/HOD 1 time, 1 of 26 cycles  Administration: 322 mg (8/30/2022)  leucovorin calcium 400 mg/m2 = 715 mg in sodium chloride 0.9% 285.8 mL infusion, 400 mg/m2 = 715 mg, Intravenous, Clinic/HOD 1 time, 1 of 26 cycles  Administration: 715 mg (8/30/2022)  bevacizumab-bvzr 300 mg in sodium chloride 0.9% 100 mL infusion, 326 mg, Intravenous, Clinic/HOD 1 time, 1 of 26 cycles  Administration: 300 mg (8/30/2022)         Past Medical History:   Diagnosis Date    Alcoholism     Anemia     Back pain     Encounter for blood transfusion 2018    Essential hypertension 2/4/2016    GERD  (gastroesophageal reflux disease)     Hiatal hernia     Hypertension     diet controlled    Melanoma 06/2021    left cheek    Rectal cancer 9/11/2019     Family History   Problem Relation Age of Onset    Cataracts Mother     Stroke Father     Hypertension Father      Social History     Socioeconomic History    Marital status:    Tobacco Use    Smoking status: Never    Smokeless tobacco: Never   Substance and Sexual Activity    Alcohol use: Yes     Comment: occassionally No alcohol 72h prior to sx    Drug use: No    Sexual activity: Never     Partners: Female     Past Surgical History:   Procedure Laterality Date    ARTHROSCOPY OF KNEE Right 5/5/2021    Procedure: ARTHROSCOPY, KNEE;  Surgeon: Delonte Mcintyre MD;  Location: [facility];  Service: Orthopedics;  Laterality: Right;    COLONOSCOPY N/A 9/3/2019    Procedure: COLONOSCOPY;  Surgeon: Isai Centeno MD;  Location: [facility];  Service: Endoscopy;  Laterality: N/A;    COLONOSCOPY N/A 1/10/2020    Procedure: COLONOSCOPY;  Surgeon: Familia Gonzalez MD;  Location: [facility];  Service: General;  Laterality: N/A;    COLONOSCOPY N/A 3/24/2021    Procedure: COLONOSCOPY;  Surgeon: Familia Gonzalez MD;  Location: [facility];  Service: General;  Laterality: N/A;    ESOPHAGOGASTRODUODENOSCOPY N/A 9/3/2019    Procedure: ESOPHAGOGASTRODUODENOSCOPY (EGD);  Surgeon: Isai Centeno MD;  Location: [facility];  Service: Endoscopy;  Laterality: N/A;    EXCISION OF MEDIAL MENISCUS OF KNEE Right 5/5/2021    Procedure: MENISCECTOMY, KNEE, MEDIAL;  Surgeon: Delonte Mcintyre MD;  Location: [facility];  Service: Orthopedics;  Laterality: Right;  partial Lateral    FLEXIBLE SIGMOIDOSCOPY  2/4/2020    Procedure: SIGMOIDOSCOPY, FLEXIBLE;  Surgeon: Familia Gonzalez MD;  Location: [facility] OR;  Service: General;;    ILEOSTOMY Right 2/4/2020    Procedure: CREATION, ILEOSTOMY;  Surgeon: Familia Gonzalez MD;  Location: [facility];  Service: General;  Laterality: Right;    ILEOSTOMY  CLOSURE N/A 8/4/2020    Procedure: CLOSURE, ILEOSTOMY;  Surgeon: Familia Gonzalez MD;  Location: [facility] OR;  Service: General;  Laterality: N/A;    INCISION AND DRAINAGE OF BACK Left 8/5/2020    Procedure: INCISION AND DRAINAGE, BACK;  Surgeon: Familia Gonzalez MD;  Location: [facility];  Service: General;  Laterality: Left;    INJECTION OF ANESTHETIC AGENT INTO TISSUE PLANE DEFINED BY TRANSVERSUS ABDOMINIS MUSCLE N/A 2/4/2020    Procedure: BLOCK, TRANSVERSUS ABDOMINIS PLANE;  Surgeon: Familia Gonzalez MD;  Location: [facility] OR;  Service: General;  Laterality: N/A;    INSERTION OF TUNNELED CENTRAL VENOUS CATHETER (CVC) WITH SUBCUTANEOUS PORT Right 2/4/2020    Procedure: INSERTION, PORT-A-CATH;  Surgeon: Familia Gonzalez MD;  Location: [facility];  Service: General;  Laterality: Right;    INSERTION OF TUNNELED CENTRAL VENOUS CATHETER (CVC) WITH SUBCUTANEOUS PORT N/A 8/16/2022    Procedure: [id number];  Surgeon: Familia Gonzalez MD;  Location: [facility];  Service: General;  Laterality: N/A;  right subclavian    JOINT REPLACEMENT Left 2009    Hip    KNEE ARTHROSCOPY W/ PLICA EXCISION Right 5/5/2021    Procedure: EXCISION, PLICA, KNEE, ARTHROSCOPIC;  Surgeon: Delonte Mcintyre MD;  Location: [facility];  Service: Orthopedics;  Laterality: Right;    MOBILIZATION OF SPLENIC FLEXURE  2/4/2020    Procedure: MOBILIZATION, SPLENIC FLEXURE;  Surgeon: Familia Gonzalez MD;  Location: [facility] OR;  Service: General;;    REMOVAL OF HARDWARE FROM HIP Left 1/4/2022    Procedure: REMOVAL, HARDWARE, HIP;  Surgeon: Delonte Mcintyre MD;  Location: [facility];  Service: Orthopedics;  Laterality: Left;     Current Outpatient Medications   Medication Sig Dispense Refill    ascorbic acid, vitamin C, (VITAMIN C) 1000 MG tablet Take 1,000 mg by mouth once daily.      dexAMETHasone (DECADRON) 4 MG Tab Take 2 tablets (8 mg total) by mouth once daily. Take as directed on days 2 and 3 of your chemotherapy cycle. 24 tablet 5    gabapentin  (NEURONTIN) 300 MG capsule Take 1 capsule (300 mg total) by mouth every evening. 30 capsule 5    multivitamin capsule Take 1 capsule by mouth once daily.      omeprazole (PRILOSEC) 20 MG capsule Take 1 capsule (20 mg total) by mouth once daily. 28 capsule 1    ondansetron (ZOFRAN-ODT) 8 MG TbDL Dissolve 1 tablet (8 mg total) by mouth every 8 (eight) hours as needed (nausea/vomiting). 60 tablet 5    loperamide (IMODIUM) 2 mg capsule Take 1 capsule (2 mg total) by mouth 4 (four) times daily as needed for Diarrhea. 30 capsule 0    oxyCODONE-acetaminophen (PERCOCET) 7.5-325 mg per tablet Take 1 tablet by mouth every 4 (four) hours as needed for Pain. 90 tablet 0     No current facility-administered medications for this visit.     Facility-Administered Medications Ordered in Other Visits   Medication Dose Route Frequency Provider Last Rate Last Admin    0.9%  NaCl infusion   Intravenous Continuous Delonte Mcintyre MD        0.9%  NaCl infusion   Intravenous Continuous Delonte Mcintyre MD        alteplase injection 2 mg  2 mg Intra-Catheter PRN Mikel Sams MD        chlorhexidine 0.12 % solution 10 mL  10 mL Mouth/Throat On Call Procedure Delonte Mcintyre MD   10 mL at 05/05/21 1222    chlorhexidine 0.12 % solution 10 mL  10 mL Mouth/Throat On Call Procedure Delonte Mcintyre MD   10 mL at 01/04/22 0628    diphenhydrAMINE injection 25 mg  25 mg Intravenous Q6H PRN Keli Alcaraz MD        HYDROmorphone (PF) injection 0.2 mg  0.2 mg Intravenous Q5 Min PRN Keli Alcaraz MD   0.2 mg at 05/05/21 1505    HYDROmorphone (PF) injection 0.2 mg  0.2 mg Intravenous Q5 Min PRN Keli Alcaraz MD        lactated ringers infusion   Intravenous Continuous Familia Gonzalez MD   Stopped at 03/24/21 0825    metoclopramide HCl injection 10 mg  10 mg Intravenous Q10 Min PRN Keli Alcaraz MD        nozaseptin (NOZIN) nasal    Each Nostril On Call Procedure Delonte Mcintyre MD   Given  at 05/05/21 1222    sodium chloride 0.9% flush 10 mL  10 mL Intravenous PRN Mikel Sams MD        sodium chloride 0.9% flush 3 mL  3 mL Intravenous PRN Shilpa Yee MD        sodium chloride 0.9% flush 3 mL  3 mL Intravenous Q8H Keli Alcaraz MD           Labs:  Lab Results   Component Value Date    WBC 9.75 08/29/2022    HGB 10.8 (L) 08/29/2022    HCT 34.6 (L) 08/29/2022    MCV 78 (L) 08/29/2022     (H) 08/29/2022     BMP  Lab Results   Component Value Date     (L) 08/29/2022    K 4.7 08/29/2022    CL 97 08/29/2022    CO2 25 08/29/2022    BUN 7 (L) 08/29/2022    CREATININE 0.7 08/29/2022    CALCIUM 9.7 08/29/2022    ANIONGAP 12 08/29/2022    ESTGFRAFRICA >60 07/20/2022    EGFRNONAA >60 07/20/2022     Lab Results   Component Value Date    ALT 9 (L) 08/29/2022    AST 31 08/29/2022    ALKPHOS 189 (H) 08/29/2022    BILITOT 0.5 08/29/2022       Lab Results   Component Value Date    IRON 12 (L) 08/29/2022    TIBC 272 08/29/2022    FERRITIN 286 08/29/2022     No results found for: [id number]  No results found for: FOLATE  Lab Results   Component Value Date    TSH 0.601 04/07/2018       I have reviewed the radiology reports and examined the scan/xray images.    Review of Systems   Constitutional:  Negative for activity change, chills, fatigue, fever and unexpected weight change.   HENT:  Negative for hearing loss, mouth sores, nosebleeds, sore throat, tinnitus, trouble swallowing and voice change.    Eyes:  Negative for visual disturbance.   Respiratory:  Negative for cough, chest tightness, shortness of breath and wheezing.    Cardiovascular:  Negative for chest pain, palpitations and leg swelling.   Gastrointestinal:  Negative for abdominal pain, anal bleeding, blood in stool, constipation, diarrhea, nausea and vomiting.   Genitourinary:  Negative for frequency, hematuria and testicular pain.   Musculoskeletal:  Negative for arthralgias, back pain, gait problem and neck pain.   Skin:  Negative  for color change, pallor and rash.   Allergic/Immunologic: Negative for immunocompromised state.   Neurological:  Negative for seizures, syncope, weakness and headaches.   Hematological:  Negative for adenopathy. Does not bruise/bleed easily.   Psychiatric/Behavioral:  Negative for agitation, confusion, decreased concentration, hallucinations and sleep disturbance. The patient is not nervous/anxious.    ECOG SCORE    1 - Restricted in strenuous activity-ambulatory and able to carry out work of a light nature            Objective:     Vitals:    09/12/22 0734   BP: 133/86   Pulse: 86   Temp: 97.8 °F (36.6 °C)     Body mass index is 19.14 kg/m².  Physical Exam  Constitutional:       Appearance: He is well-developed.   HENT:      Head: Normocephalic and atraumatic.      Right Ear: External ear normal.      Left Ear: External ear normal.      Mouth/Throat:      Pharynx: No oropharyngeal exudate.   Eyes:      General: No scleral icterus.        Right eye: No discharge.         Left eye: No discharge.      Conjunctiva/sclera: Conjunctivae normal.      Pupils: Pupils are equal, round, and reactive to light.   Neck:      Thyroid: No thyromegaly.   Cardiovascular:      Rate and Rhythm: Normal rate and regular rhythm.      Heart sounds: Normal heart sounds. No murmur heard.  Pulmonary:      Effort: Pulmonary effort is normal. No respiratory distress.      Breath sounds: Normal breath sounds. No wheezing.   Chest:      Chest wall: No tenderness.   Abdominal:      General: Bowel sounds are normal. There is no distension.      Palpations: Abdomen is soft. There is no mass.      Tenderness: There is no abdominal tenderness. There is no rebound.   Musculoskeletal:         General: No tenderness. Normal range of motion.      Cervical back: Normal range of motion and neck supple.   Lymphadenopathy:      Cervical: No cervical adenopathy.      Right cervical: No superficial cervical adenopathy.     Left cervical: No superficial  cervical adenopathy.      Upper Body:      Right upper body: No supraclavicular or pectoral adenopathy.      Left upper body: No supraclavicular or pectoral adenopathy.      Lower Body: No right inguinal adenopathy. No left inguinal adenopathy.   Skin:     General: Skin is warm and dry.      Capillary Refill: Capillary refill takes 2 to 3 seconds.      Coloration: Skin is not pale.      Findings: No erythema or rash.   Neurological:      Mental Status: He is alert and oriented to person, place, and time.      Cranial Nerves: No cranial nerve deficit.      Sensory: No sensory deficit.      Gait: Gait abnormal.   Psychiatric:         Behavior: Behavior normal.         Judgment: Judgment normal.         Assessment:      1. Immunodeficiency due to chemotherapy    2. Rectal cancer    3. Encounter for palliative care    4. Iron deficiency anemia due to chronic blood loss    5. Diarrhea, unspecified type           Plan:     Rectal cancer  Stage IIIB rectal cancer, status post pj op chemotherapy with FOLFOX.  Status post loop ileostomy and reversal on 08/04/2020.  Has been on surveillance until recently imaging studies from 06/28/2022 showed new bilateral pulmonary nodules and FDG avid multi focal hepatic lesions concerning for metastasis. Liver biopsy confirmed recurrent metastatic adenocarcinoma consistent with colorectal origin.     Discussed implications with patient. He understands that his disease is now incurable but can be treated.  We agreed on palliative systemic therapy with FOLFIRI plus Avastin.  Will also send for molecular studies on the biopsied tissue.     Regimen:  FOLFIRI plus Avastin    2D echocardiogram from 08/23/2022 showed ejection fraction of 55%.  Reviewed labs, no concerning cytopenia, will proceed with cycle 2 day 1.  Return in 2 weeks with repeat labs or sooner if needed.    Encounter for palliative care  Appreciate palliative Care recs.    Iron deficiency anemia due to chronic blood  loss  Likely related to neoplasm.  Most recent iron studies showed adequate iron storage capacity with ferritin above 200 nanograms/cc.  Noted low iron saturation and serum iron however TIBC is normal.  Hemoglobin noted above 10 grams/deciliters.  Will continue to monitor.    Diarrhea: likely chemo-induced. Encourage increase oral fluid intake. Will prescribe imodium. Reviewed Pgx study for DPD mutation.     Immunodeficiency due to chemotherapy  -     CBC Auto Differential; Future; Expected date: 09/12/2022  -     Comprehensive Metabolic Panel; Future; Expected date: 09/12/2022  -     Urinalysis; Future; Expected date: 09/12/2022  -     CBC Auto Differential; Future; Expected date: 09/12/2022  -     Comprehensive Metabolic Panel; Future; Expected date: 09/12/2022  -     Urinalysis; Future; Expected date: 09/12/2022    Rectal cancer  -     CBC Auto Differential; Future; Expected date: 09/12/2022  -     Comprehensive Metabolic Panel; Future; Expected date: 09/12/2022  -     Urinalysis; Future; Expected date: 09/12/2022  -     CBC Auto Differential; Future; Expected date: 09/12/2022  -     Comprehensive Metabolic Panel; Future; Expected date: 09/12/2022  -     Urinalysis; Future; Expected date: 09/12/2022  -     Discontinue: oxyCODONE-acetaminophen (PERCOCET) 7.5-325 mg per tablet; Take 1 tablet by mouth every 4 (four) hours as needed for Pain.  Dispense: 90 tablet; Refill: 0  -     oxyCODONE-acetaminophen (PERCOCET) 7.5-325 mg per tablet; Take 1 tablet by mouth every 4 (four) hours as needed for Pain.  Dispense: 90 tablet; Refill: 0    Encounter for palliative care  -     CBC Auto Differential; Future; Expected date: 09/12/2022  -     Comprehensive Metabolic Panel; Future; Expected date: 09/12/2022  -     Urinalysis; Future; Expected date: 09/12/2022    Iron deficiency anemia due to chronic blood loss  -     CBC Auto Differential; Future; Expected date: 09/12/2022  -     Comprehensive Metabolic Panel; Future; Expected  date: 09/12/2022  -     Urinalysis; Future; Expected date: 09/12/2022    Diarrhea, unspecified type  -     Discontinue: loperamide (IMODIUM) 2 mg capsule; Take 1 capsule (2 mg total) by mouth 4 (four) times daily as needed for Diarrhea.  Dispense: 30 capsule; Refill: 0  -     loperamide (IMODIUM) 2 mg capsule; Take 1 capsule (2 mg total) by mouth 4 (four) times daily as needed for Diarrhea.  Dispense: 30 capsule; Refill: 0    Mikel Sams MD

## 2022-09-12 NOTE — ASSESSMENT & PLAN NOTE
Stage IIIB rectal cancer, status post pj op chemotherapy with FOLFOX.  Status post loop ileostomy and reversal on 08/04/2020.  Has been on surveillance until recently imaging studies from 06/28/2022 showed new bilateral pulmonary nodules and FDG avid multi focal hepatic lesions concerning for metastasis. Liver biopsy confirmed recurrent metastatic adenocarcinoma consistent with colorectal origin.     Discussed implications with patient. He understands that his disease is now incurable but can be treated.  We agreed on palliative systemic therapy with FOLFIRI plus Avastin.  Will also send for molecular studies on the biopsied tissue.     Regimen:  FOLFIRI plus Avastin    2D echocardiogram from 08/23/2022 showed ejection fraction of 55%.  Reviewed labs, no concerning cytopenia, will proceed with cycle 2 day 1.  Return in 2 weeks with repeat labs or sooner if needed.

## 2022-09-13 ENCOUNTER — INFUSION (OUTPATIENT)
Dept: INFUSION THERAPY | Facility: HOSPITAL | Age: 67
End: 2022-09-13
Attending: INTERNAL MEDICINE
Payer: MEDICARE

## 2022-09-13 VITALS
HEART RATE: 60 BPM | RESPIRATION RATE: 18 BRPM | SYSTOLIC BLOOD PRESSURE: 143 MMHG | OXYGEN SATURATION: 96 % | TEMPERATURE: 97 F | DIASTOLIC BLOOD PRESSURE: 78 MMHG

## 2022-09-13 DIAGNOSIS — C20 RECTAL CANCER: Primary | ICD-10-CM

## 2022-09-13 PROCEDURE — 96375 TX/PRO/DX INJ NEW DRUG ADDON: CPT

## 2022-09-13 PROCEDURE — 96368 THER/DIAG CONCURRENT INF: CPT

## 2022-09-13 PROCEDURE — 25000003 PHARM REV CODE 250: Performed by: INTERNAL MEDICINE

## 2022-09-13 PROCEDURE — 96367 TX/PROPH/DG ADDL SEQ IV INF: CPT

## 2022-09-13 PROCEDURE — 96417 CHEMO IV INFUS EACH ADDL SEQ: CPT

## 2022-09-13 PROCEDURE — 96415 CHEMO IV INFUSION ADDL HR: CPT

## 2022-09-13 PROCEDURE — 63600175 PHARM REV CODE 636 W HCPCS: Performed by: INTERNAL MEDICINE

## 2022-09-13 PROCEDURE — 96416 CHEMO PROLONG INFUSE W/PUMP: CPT

## 2022-09-13 PROCEDURE — 96413 CHEMO IV INFUSION 1 HR: CPT

## 2022-09-13 PROCEDURE — 96411 CHEMO IV PUSH ADDL DRUG: CPT

## 2022-09-13 RX ORDER — SODIUM CHLORIDE 0.9 % (FLUSH) 0.9 %
10 SYRINGE (ML) INJECTION
Status: CANCELLED | OUTPATIENT
Start: 2022-09-14

## 2022-09-13 RX ORDER — EPINEPHRINE 0.3 MG/.3ML
0.3 INJECTION SUBCUTANEOUS ONCE AS NEEDED
Status: CANCELLED | OUTPATIENT
Start: 2022-09-13

## 2022-09-13 RX ORDER — EPINEPHRINE 1 MG/ML
0.3 INJECTION, SOLUTION INTRACARDIAC; INTRAMUSCULAR; INTRAVENOUS; SUBCUTANEOUS ONCE AS NEEDED
Status: DISCONTINUED | OUTPATIENT
Start: 2022-09-13 | End: 2022-09-13 | Stop reason: HOSPADM

## 2022-09-13 RX ORDER — SODIUM CHLORIDE 0.9 % (FLUSH) 0.9 %
10 SYRINGE (ML) INJECTION
Status: CANCELLED | OUTPATIENT
Start: 2022-09-13

## 2022-09-13 RX ORDER — HEPARIN 100 UNIT/ML
500 SYRINGE INTRAVENOUS
Status: CANCELLED | OUTPATIENT
Start: 2022-09-14

## 2022-09-13 RX ORDER — FLUOROURACIL 50 MG/ML
400 INJECTION, SOLUTION INTRAVENOUS
Status: COMPLETED | OUTPATIENT
Start: 2022-09-13 | End: 2022-09-13

## 2022-09-13 RX ORDER — ATROPINE SULFATE 1 MG/ML
0.4 INJECTION, SOLUTION INTRAMUSCULAR; INTRAVENOUS; SUBCUTANEOUS ONCE AS NEEDED
Status: COMPLETED | OUTPATIENT
Start: 2022-09-13 | End: 2022-09-13

## 2022-09-13 RX ORDER — FLUOROURACIL 50 MG/ML
400 INJECTION, SOLUTION INTRAVENOUS
Status: CANCELLED | OUTPATIENT
Start: 2022-09-13

## 2022-09-13 RX ORDER — DIPHENHYDRAMINE HYDROCHLORIDE 50 MG/ML
50 INJECTION INTRAMUSCULAR; INTRAVENOUS ONCE AS NEEDED
Status: CANCELLED | OUTPATIENT
Start: 2022-09-13

## 2022-09-13 RX ORDER — ATROPINE SULFATE 0.4 MG/ML
0.4 INJECTION, SOLUTION ENDOTRACHEAL; INTRAMEDULLARY; INTRAMUSCULAR; INTRAVENOUS; SUBCUTANEOUS ONCE AS NEEDED
Status: CANCELLED | OUTPATIENT
Start: 2022-09-13

## 2022-09-13 RX ORDER — DIPHENHYDRAMINE HYDROCHLORIDE 50 MG/ML
50 INJECTION INTRAMUSCULAR; INTRAVENOUS ONCE AS NEEDED
Status: DISCONTINUED | OUTPATIENT
Start: 2022-09-13 | End: 2022-09-13 | Stop reason: HOSPADM

## 2022-09-13 RX ORDER — HEPARIN 100 UNIT/ML
500 SYRINGE INTRAVENOUS
Status: CANCELLED | OUTPATIENT
Start: 2022-09-13

## 2022-09-13 RX ADMIN — SODIUM CHLORIDE 322 MG: 9 INJECTION, SOLUTION INTRAVENOUS at 12:09

## 2022-09-13 RX ADMIN — DEXAMETHASONE SODIUM PHOSPHATE 0.25 MG: 4 INJECTION, SOLUTION INTRA-ARTICULAR; INTRALESIONAL; INTRAMUSCULAR; INTRAVENOUS; SOFT TISSUE at 12:09

## 2022-09-13 RX ADMIN — SODIUM CHLORIDE 300 MG: 9 INJECTION, SOLUTION INTRAVENOUS at 11:09

## 2022-09-13 RX ADMIN — FLUOROURACIL 715 MG: 50 INJECTION, SOLUTION INTRAVENOUS at 02:09

## 2022-09-13 RX ADMIN — FLUOROURACIL 4295 MG: 50 INJECTION, SOLUTION INTRAVENOUS at 02:09

## 2022-09-13 RX ADMIN — LEUCOVORIN CALCIUM 715 MG: 350 INJECTION, POWDER, LYOPHILIZED, FOR SOLUTION INTRAMUSCULAR; INTRAVENOUS at 12:09

## 2022-09-13 RX ADMIN — ATROPINE SULFATE 0.4 MG: 1 INJECTION, SOLUTION INTRAMUSCULAR; INTRAVENOUS; SUBCUTANEOUS at 12:09

## 2022-09-13 NOTE — PLAN OF CARE
Problem: Adult Inpatient Plan of Care  Goal: Plan of Care Review  Outcome: Ongoing, Progressing  Flowsheets (Taken 9/13/2022 1127)  Plan of Care Reviewed With: patient  Goal: Patient-Specific Goal (Individualized)  Outcome: Ongoing, Progressing  Flowsheets (Taken 9/13/2022 1127)  Anxieties, Fears or Concerns: None today  Individualized Care Needs: Feet elevated, warm blanket, snack, reclined  Patient-Specific Goals (Include Timeframe): Tolerate infusion today  Goal: Optimal Comfort and Wellbeing  Outcome: Ongoing, Progressing  Intervention: Provide Person-Centered Care  Flowsheets (Taken 9/13/2022 1127)  Trust Relationship/Rapport:   care explained   questions encouraged   choices provided   reassurance provided   emotional support provided   thoughts/feelings acknowledged   empathic listening provided   questions answered     Problem: Neutropenia (Chemotherapy Effects)  Goal: Absence of Infection  Outcome: Ongoing, Progressing  Intervention: Prevent Infection and Maximize Resistance  Flowsheets (Taken 9/13/2022 1127)  Infection Prevention:   hand hygiene promoted   equipment surfaces disinfected   personal protective equipment utilized   rest/sleep promoted

## 2022-09-15 ENCOUNTER — INFUSION (OUTPATIENT)
Dept: INFUSION THERAPY | Facility: HOSPITAL | Age: 67
End: 2022-09-15
Attending: INTERNAL MEDICINE
Payer: MEDICARE

## 2022-09-15 VITALS
TEMPERATURE: 97 F | RESPIRATION RATE: 18 BRPM | OXYGEN SATURATION: 99 % | SYSTOLIC BLOOD PRESSURE: 129 MMHG | HEART RATE: 67 BPM | DIASTOLIC BLOOD PRESSURE: 81 MMHG

## 2022-09-15 DIAGNOSIS — C20 RECTAL CANCER: Primary | ICD-10-CM

## 2022-09-15 PROCEDURE — 63600175 PHARM REV CODE 636 W HCPCS: Performed by: INTERNAL MEDICINE

## 2022-09-15 PROCEDURE — 25000003 PHARM REV CODE 250: Performed by: INTERNAL MEDICINE

## 2022-09-15 PROCEDURE — A4216 STERILE WATER/SALINE, 10 ML: HCPCS | Performed by: INTERNAL MEDICINE

## 2022-09-15 RX ORDER — HEPARIN 100 UNIT/ML
500 SYRINGE INTRAVENOUS
Status: DISCONTINUED | OUTPATIENT
Start: 2022-09-15 | End: 2022-09-15 | Stop reason: HOSPADM

## 2022-09-15 RX ORDER — SODIUM CHLORIDE 0.9 % (FLUSH) 0.9 %
10 SYRINGE (ML) INJECTION
Status: DISCONTINUED | OUTPATIENT
Start: 2022-09-15 | End: 2022-09-15 | Stop reason: HOSPADM

## 2022-09-15 RX ADMIN — HEPARIN 500 UNITS: 100 SYRINGE at 01:09

## 2022-09-15 RX ADMIN — Medication 10 ML: at 01:09

## 2022-09-15 NOTE — DISCHARGE INSTRUCTIONS
[facility]  [address] [address]  [address]  [phone] phone     [phone] fax  Hours of Operation: Monday- Friday 8:00am- 5:00pm  After hours phone  [phone]  Hematology / Oncology Physicians on call      [address]IP[address] Dr., NP Sydney Prescott, PETE Murillo FNP    Please call with any concerns regarding your appointment today.

## 2022-09-15 NOTE — NURSING
Pt came in for pump D/C. Pt tolerated home 5FU well and had no complaints. PAC flushed w/ NS and heparin and deaccessed.

## 2022-09-16 ENCOUNTER — DOCUMENTATION ONLY (OUTPATIENT)
Dept: PALLIATIVE MEDICINE | Facility: CLINIC | Age: 67
End: 2022-09-16
Payer: MEDICARE

## 2022-09-16 ENCOUNTER — TELEPHONE (OUTPATIENT)
Dept: HEMATOLOGY/ONCOLOGY | Facility: CLINIC | Age: 67
End: 2022-09-16
Payer: MEDICARE

## 2022-09-16 NOTE — TELEPHONE ENCOUNTER
----- Message from Ryan Magallon sent at 9/16/2022  8:26 AM CDT -----  Contact: Pt  Pt states he's calling rg having some questions that he has from his previous appt / would like to speak with the Dr or the nurse about it and can be reached at [phone]//[email or web address]

## 2022-09-16 NOTE — TELEPHONE ENCOUNTER
Spoke to pt regarding pain management. Informed him I would have someone from Palliative to contact him for consultation.

## 2022-09-16 NOTE — PROGRESS NOTES
Nurse reached out to pt and offered a sooner apt for 9-27 at 3pm, nurse informed pt he will be in the infusion center that morning for treatment and maybe dawit can see him after his chemo. Pt was pleased and thankful for sooner apt and two visit on one day.

## 2022-09-19 ENCOUNTER — LAB VISIT (OUTPATIENT)
Dept: LAB | Facility: HOSPITAL | Age: 67
End: 2022-09-19
Attending: COLON & RECTAL SURGERY
Payer: MEDICARE

## 2022-09-19 DIAGNOSIS — C20 RECTAL ADENOCARCINOMA: ICD-10-CM

## 2022-09-19 LAB — CEA SERPL-MCNC: 248.3 NG/ML (ref 0–5)

## 2022-09-19 PROCEDURE — 82378 CARCINOEMBRYONIC ANTIGEN: CPT

## 2022-09-19 PROCEDURE — 36415 COLL VENOUS BLD VENIPUNCTURE: CPT

## 2022-09-20 ENCOUNTER — OFFICE VISIT (OUTPATIENT)
Dept: PALLIATIVE MEDICINE | Facility: CLINIC | Age: 67
End: 2022-09-20
Payer: MEDICARE

## 2022-09-20 DIAGNOSIS — G62.9 NEUROPATHY: ICD-10-CM

## 2022-09-20 DIAGNOSIS — C20 RECTAL ADENOCARCINOMA: ICD-10-CM

## 2022-09-20 DIAGNOSIS — C20 RECTAL CANCER: ICD-10-CM

## 2022-09-20 DIAGNOSIS — Z51.5 ENCOUNTER FOR PALLIATIVE CARE: ICD-10-CM

## 2022-09-20 DIAGNOSIS — G89.3 NEOPLASM RELATED PAIN: Primary | ICD-10-CM

## 2022-09-20 PROCEDURE — 99999 PR PBB SHADOW E&M-EST. PATIENT-LVL III: CPT | Mod: PBBFAC,,, | Performed by: PHYSICIAN ASSISTANT

## 2022-09-20 PROCEDURE — 99497 PR ADVNCD CARE PLAN 30 MIN: ICD-10-PCS | Mod: S$GLB,,, | Performed by: PHYSICIAN ASSISTANT

## 2022-09-20 PROCEDURE — 1159F PR MEDICATION LIST DOCUMENTED IN MEDICAL RECORD: ICD-10-PCS | Mod: CPTII,S$GLB,, | Performed by: PHYSICIAN ASSISTANT

## 2022-09-20 PROCEDURE — 99497 ADVNCD CARE PLAN 30 MIN: CPT | Mod: S$GLB,,, | Performed by: PHYSICIAN ASSISTANT

## 2022-09-20 PROCEDURE — 99215 PR OFFICE/OUTPT VISIT, EST, LEVL V, 40-54 MIN: ICD-10-PCS | Mod: S$GLB,,, | Performed by: PHYSICIAN ASSISTANT

## 2022-09-20 PROCEDURE — 1159F MED LIST DOCD IN RCRD: CPT | Mod: CPTII,S$GLB,, | Performed by: PHYSICIAN ASSISTANT

## 2022-09-20 PROCEDURE — 1160F PR REVIEW ALL MEDS BY PRESCRIBER/CLIN PHARMACIST DOCUMENTED: ICD-10-PCS | Mod: CPTII,S$GLB,, | Performed by: PHYSICIAN ASSISTANT

## 2022-09-20 PROCEDURE — 99499 UNLISTED E&M SERVICE: CPT | Mod: S$GLB,,, | Performed by: PHYSICIAN ASSISTANT

## 2022-09-20 PROCEDURE — 99215 OFFICE O/P EST HI 40 MIN: CPT | Mod: S$GLB,,, | Performed by: PHYSICIAN ASSISTANT

## 2022-09-20 PROCEDURE — 99999 PR PBB SHADOW E&M-EST. PATIENT-LVL III: ICD-10-PCS | Mod: PBBFAC,,, | Performed by: PHYSICIAN ASSISTANT

## 2022-09-20 PROCEDURE — 99499 RISK ADDL DX/OHS AUDIT: ICD-10-PCS | Mod: S$GLB,,, | Performed by: PHYSICIAN ASSISTANT

## 2022-09-20 PROCEDURE — 1160F RVW MEDS BY RX/DR IN RCRD: CPT | Mod: CPTII,S$GLB,, | Performed by: PHYSICIAN ASSISTANT

## 2022-09-20 RX ORDER — FENTANYL 12.5 UG/1
1 PATCH TRANSDERMAL
Qty: 5 PATCH | Refills: 0 | Status: SHIPPED | OUTPATIENT
Start: 2022-09-20 | End: 2022-10-04

## 2022-09-20 RX ORDER — NALOXONE HYDROCHLORIDE 4 MG/.1ML
1 SPRAY NASAL ONCE
Qty: 2 EACH | Refills: 0 | Status: SHIPPED | OUTPATIENT
Start: 2022-09-20 | End: 2022-09-21

## 2022-09-20 RX ORDER — HYDROCODONE BITARTRATE AND ACETAMINOPHEN 10; 325 MG/1; MG/1
1 TABLET ORAL EVERY 6 HOURS PRN
Qty: 75 TABLET | Refills: 0 | Status: SHIPPED | OUTPATIENT
Start: 2022-09-20 | End: 2022-10-04 | Stop reason: SDUPTHER

## 2022-09-20 NOTE — ASSESSMENT & PLAN NOTE
Current treatment: FOLFIRI plus Avastin    s/p chemotherapy with FOLFOX then loop ileostomy and reversal in 08/20. Progression noted 6/22 with bilateral pulmonary nodules and multifocal hepatic lesions

## 2022-09-25 PROBLEM — C78.7 SECONDARY LIVER CANCER: Status: ACTIVE | Noted: 2022-09-25

## 2022-09-26 ENCOUNTER — OFFICE VISIT (OUTPATIENT)
Dept: HEMATOLOGY/ONCOLOGY | Facility: CLINIC | Age: 67
End: 2022-09-26
Payer: MEDICARE

## 2022-09-26 ENCOUNTER — LAB VISIT (OUTPATIENT)
Dept: LAB | Facility: HOSPITAL | Age: 67
End: 2022-09-26
Attending: INTERNAL MEDICINE
Payer: MEDICARE

## 2022-09-26 VITALS
HEIGHT: 70 IN | BODY MASS INDEX: 19.22 KG/M2 | WEIGHT: 134.25 LBS | SYSTOLIC BLOOD PRESSURE: 155 MMHG | HEART RATE: 59 BPM | DIASTOLIC BLOOD PRESSURE: 95 MMHG | OXYGEN SATURATION: 97 % | TEMPERATURE: 97 F

## 2022-09-26 DIAGNOSIS — D84.821 IMMUNODEFICIENCY DUE TO CHEMOTHERAPY: ICD-10-CM

## 2022-09-26 DIAGNOSIS — C20 RECTAL CANCER: ICD-10-CM

## 2022-09-26 DIAGNOSIS — D50.0 IRON DEFICIENCY ANEMIA DUE TO CHRONIC BLOOD LOSS: ICD-10-CM

## 2022-09-26 DIAGNOSIS — Z79.899 IMMUNODEFICIENCY DUE TO CHEMOTHERAPY: ICD-10-CM

## 2022-09-26 DIAGNOSIS — C20 RECTAL CANCER: Primary | ICD-10-CM

## 2022-09-26 DIAGNOSIS — T45.1X5A IMMUNODEFICIENCY DUE TO CHEMOTHERAPY: ICD-10-CM

## 2022-09-26 DIAGNOSIS — Z51.5 ENCOUNTER FOR PALLIATIVE CARE: ICD-10-CM

## 2022-09-26 DIAGNOSIS — C78.7 SECONDARY LIVER CANCER: ICD-10-CM

## 2022-09-26 LAB
ALBUMIN SERPL BCP-MCNC: 3.5 G/DL (ref 3.5–5.2)
ALP SERPL-CCNC: 123 U/L (ref 55–135)
ALT SERPL W/O P-5'-P-CCNC: 12 U/L (ref 10–44)
ANION GAP SERPL CALC-SCNC: 8 MMOL/L (ref 8–16)
AST SERPL-CCNC: 16 U/L (ref 10–40)
BASOPHILS # BLD AUTO: 0.02 K/UL (ref 0–0.2)
BASOPHILS NFR BLD: 0.3 % (ref 0–1.9)
BILIRUB SERPL-MCNC: 0.3 MG/DL (ref 0.1–1)
BUN SERPL-MCNC: 13 MG/DL (ref 8–23)
CALCIUM SERPL-MCNC: 9.8 MG/DL (ref 8.7–10.5)
CHLORIDE SERPL-SCNC: 102 MMOL/L (ref 95–110)
CO2 SERPL-SCNC: 27 MMOL/L (ref 23–29)
CREAT SERPL-MCNC: 0.7 MG/DL (ref 0.5–1.4)
DIFFERENTIAL METHOD: ABNORMAL
EOSINOPHIL # BLD AUTO: 0 K/UL (ref 0–0.5)
EOSINOPHIL NFR BLD: 0 % (ref 0–8)
ERYTHROCYTE [DISTWIDTH] IN BLOOD BY AUTOMATED COUNT: 18.9 % (ref 11.5–14.5)
EST. GFR  (NO RACE VARIABLE): >60 ML/MIN/1.73 M^2
GLUCOSE SERPL-MCNC: 104 MG/DL (ref 70–110)
HCT VFR BLD AUTO: 33.5 % (ref 40–54)
HGB BLD-MCNC: 10.3 G/DL (ref 14–18)
IMM GRANULOCYTES # BLD AUTO: 0.03 K/UL (ref 0–0.04)
IMM GRANULOCYTES NFR BLD AUTO: 0.4 % (ref 0–0.5)
LYMPHOCYTES # BLD AUTO: 1 K/UL (ref 1–4.8)
LYMPHOCYTES NFR BLD: 13.6 % (ref 18–48)
MCH RBC QN AUTO: 24.2 PG (ref 27–31)
MCHC RBC AUTO-ENTMCNC: 30.7 G/DL (ref 32–36)
MCV RBC AUTO: 79 FL (ref 82–98)
MONOCYTES # BLD AUTO: 0.7 K/UL (ref 0.3–1)
MONOCYTES NFR BLD: 9.1 % (ref 4–15)
NEUTROPHILS # BLD AUTO: 5.8 K/UL (ref 1.8–7.7)
NEUTROPHILS NFR BLD: 76.6 % (ref 38–73)
NRBC BLD-RTO: 0 /100 WBC
PLATELET # BLD AUTO: 400 K/UL (ref 150–450)
PMV BLD AUTO: 9 FL (ref 9.2–12.9)
POTASSIUM SERPL-SCNC: 4.9 MMOL/L (ref 3.5–5.1)
PROT SERPL-MCNC: 7.5 G/DL (ref 6–8.4)
RBC # BLD AUTO: 4.26 M/UL (ref 4.6–6.2)
SODIUM SERPL-SCNC: 137 MMOL/L (ref 136–145)
WBC # BLD AUTO: 7.5 K/UL (ref 3.9–12.7)

## 2022-09-26 PROCEDURE — 99214 OFFICE O/P EST MOD 30 MIN: CPT | Mod: S$GLB,,, | Performed by: NURSE PRACTITIONER

## 2022-09-26 PROCEDURE — 1160F PR REVIEW ALL MEDS BY PRESCRIBER/CLIN PHARMACIST DOCUMENTED: ICD-10-PCS | Mod: CPTII,S$GLB,, | Performed by: NURSE PRACTITIONER

## 2022-09-26 PROCEDURE — 99999 PR PBB SHADOW E&M-EST. PATIENT-LVL III: CPT | Mod: PBBFAC,,, | Performed by: NURSE PRACTITIONER

## 2022-09-26 PROCEDURE — 3077F SYST BP >= 140 MM HG: CPT | Mod: CPTII,S$GLB,, | Performed by: NURSE PRACTITIONER

## 2022-09-26 PROCEDURE — 80053 COMPREHEN METABOLIC PANEL: CPT | Performed by: INTERNAL MEDICINE

## 2022-09-26 PROCEDURE — 1160F RVW MEDS BY RX/DR IN RCRD: CPT | Mod: CPTII,S$GLB,, | Performed by: NURSE PRACTITIONER

## 2022-09-26 PROCEDURE — 3080F PR MOST RECENT DIASTOLIC BLOOD PRESSURE >= 90 MM HG: ICD-10-PCS | Mod: CPTII,S$GLB,, | Performed by: NURSE PRACTITIONER

## 2022-09-26 PROCEDURE — 99499 RISK ADDL DX/OHS AUDIT: ICD-10-PCS | Mod: S$GLB,,, | Performed by: NURSE PRACTITIONER

## 2022-09-26 PROCEDURE — 3008F PR BODY MASS INDEX (BMI) DOCUMENTED: ICD-10-PCS | Mod: CPTII,S$GLB,, | Performed by: NURSE PRACTITIONER

## 2022-09-26 PROCEDURE — 3080F DIAST BP >= 90 MM HG: CPT | Mod: CPTII,S$GLB,, | Performed by: NURSE PRACTITIONER

## 2022-09-26 PROCEDURE — 36415 COLL VENOUS BLD VENIPUNCTURE: CPT | Performed by: INTERNAL MEDICINE

## 2022-09-26 PROCEDURE — 1159F PR MEDICATION LIST DOCUMENTED IN MEDICAL RECORD: ICD-10-PCS | Mod: CPTII,S$GLB,, | Performed by: NURSE PRACTITIONER

## 2022-09-26 PROCEDURE — 99499 UNLISTED E&M SERVICE: CPT | Mod: S$GLB,,, | Performed by: NURSE PRACTITIONER

## 2022-09-26 PROCEDURE — 99999 PR PBB SHADOW E&M-EST. PATIENT-LVL III: ICD-10-PCS | Mod: PBBFAC,,, | Performed by: NURSE PRACTITIONER

## 2022-09-26 PROCEDURE — 3008F BODY MASS INDEX DOCD: CPT | Mod: CPTII,S$GLB,, | Performed by: NURSE PRACTITIONER

## 2022-09-26 PROCEDURE — 99214 PR OFFICE/OUTPT VISIT, EST, LEVL IV, 30-39 MIN: ICD-10-PCS | Mod: S$GLB,,, | Performed by: NURSE PRACTITIONER

## 2022-09-26 PROCEDURE — 1159F MED LIST DOCD IN RCRD: CPT | Mod: CPTII,S$GLB,, | Performed by: NURSE PRACTITIONER

## 2022-09-26 PROCEDURE — 85025 COMPLETE CBC W/AUTO DIFF WBC: CPT | Performed by: INTERNAL MEDICINE

## 2022-09-26 PROCEDURE — 3077F PR MOST RECENT SYSTOLIC BLOOD PRESSURE >= 140 MM HG: ICD-10-PCS | Mod: CPTII,S$GLB,, | Performed by: NURSE PRACTITIONER

## 2022-09-26 RX ORDER — HEPARIN 100 UNIT/ML
500 SYRINGE INTRAVENOUS
Status: CANCELLED | OUTPATIENT
Start: 2022-09-28

## 2022-09-26 RX ORDER — SODIUM CHLORIDE 0.9 % (FLUSH) 0.9 %
10 SYRINGE (ML) INJECTION
Status: CANCELLED | OUTPATIENT
Start: 2022-10-05

## 2022-09-26 RX ORDER — SODIUM CHLORIDE 0.9 % (FLUSH) 0.9 %
10 SYRINGE (ML) INJECTION
Status: CANCELLED | OUTPATIENT
Start: 2022-09-28

## 2022-09-26 RX ORDER — HEPARIN 100 UNIT/ML
500 SYRINGE INTRAVENOUS
Status: CANCELLED | OUTPATIENT
Start: 2022-09-26

## 2022-09-26 RX ORDER — FLUOROURACIL 50 MG/ML
400 INJECTION, SOLUTION INTRAVENOUS
Status: CANCELLED | OUTPATIENT
Start: 2022-09-26

## 2022-09-26 RX ORDER — DIPHENHYDRAMINE HYDROCHLORIDE 50 MG/ML
50 INJECTION INTRAMUSCULAR; INTRAVENOUS ONCE AS NEEDED
Status: CANCELLED | OUTPATIENT
Start: 2022-09-26

## 2022-09-26 RX ORDER — ATROPINE SULFATE 0.4 MG/ML
0.4 INJECTION, SOLUTION ENDOTRACHEAL; INTRAMEDULLARY; INTRAMUSCULAR; INTRAVENOUS; SUBCUTANEOUS ONCE AS NEEDED
Status: CANCELLED | OUTPATIENT
Start: 2022-09-26

## 2022-09-26 RX ORDER — SODIUM CHLORIDE 9 MG/ML
INJECTION, SOLUTION INTRAVENOUS
Status: CANCELLED
Start: 2022-09-28

## 2022-09-26 RX ORDER — HEPARIN 100 UNIT/ML
500 SYRINGE INTRAVENOUS
Status: CANCELLED | OUTPATIENT
Start: 2022-10-05

## 2022-09-26 RX ORDER — SODIUM CHLORIDE 0.9 % (FLUSH) 0.9 %
10 SYRINGE (ML) INJECTION
Status: CANCELLED | OUTPATIENT
Start: 2022-09-26

## 2022-09-26 RX ORDER — EPINEPHRINE 0.3 MG/.3ML
0.3 INJECTION SUBCUTANEOUS ONCE AS NEEDED
Status: CANCELLED | OUTPATIENT
Start: 2022-09-26

## 2022-09-26 NOTE — ASSESSMENT & PLAN NOTE
Stage IIIB rectal cancer, status post pj op chemotherapy with FOLFOX.  Status post loop ileostomy and reversal on 08/04/2020.  Has been on surveillance until recently imaging studies from 06/28/2022 showed new bilateral pulmonary nodules and FDG avid multi focal hepatic lesions concerning for metastasis. Liver biopsy confirmed recurrent metastatic adenocarcinoma consistent with colorectal origin.     Discussed implications with patient. He understands that his disease is now incurable but can be treated.  We agreed on palliative systemic therapy with FOLFIRI plus Avastin.  Will also send for molecular studies on the biopsied tissue.     Regimen:  FOLFIRI plus Avastin    2D echocardiogram from 08/23/2022 showed ejection fraction of 55%.

## 2022-09-26 NOTE — PROGRESS NOTES
Subjective:       Patient ID: Vincent Benton is a 66 y.o. male.    Chief Complaint:   1. Rectal cancer  CBC Oncology    Comprehensive Metabolic Panel    Magnesium    Urinalysis      2. Iron deficiency anemia due to chronic blood loss  CBC Oncology    Comprehensive Metabolic Panel    Magnesium    Urinalysis      3. Secondary liver cancer  CBC Oncology    Comprehensive Metabolic Panel    Magnesium    Urinalysis        Current Treatment:  OP COLORECTAL FOLFIRI + BEVACIZUMAB Q2W     Treatment History:  Concurrent chemoradiation with capecitabine  S/p LAR  S/p loop ileostomy and reversal on 8/4/2020  Adjuvant FOLFOX x 7 cycles    HPI: This is a 66 year old male with medical history significant for melanoma, hiatal hernia, essential HTN, anemia, back pain, GERD, and alcoholism who was diagnosed locally advanced rectal cancer in 2020.      According to the patient, for over a year he has been having iron deficiency anemia with associated hematochezia. Colonoscopy was performed in September 2019 that was biopsy proven to be rectal adenocarcinoma.  He had normal Bms with no constipation.  He underwent PET scan which showed no evidence of metastatic disease. He also underwent liver MRI that was negative for the liver lesion that was seen on CT scan previously.     He was treated with concurrent chemoXRT, LAR, adjuvant FOLFOX, then loop ileostomy and reversal on 8/4/2020 and went into surveillance. Surveillance imaging revealed new bilateral pulmonary nodules and FDG avid hepatic lesions; biopsy proved liver mets consistent with colorectal origin. He was started on palliative FOLFIRI/Avastin on 8/30/2022.     His primary Hematologist/Oncologist is Dr. Sams.    Interval History: Patient presents for follow up on palliative FOLFIRI/Avastin; He is scheduled to receive C3D1 tomorrow. He presents alone and reports eating twice a day, breakfast and supper at about 4pm. He states when he eats chocolate; it causes diarrhea  for which Imodium is effective. He states he was trying to eat chocolate for weight gain. He now avoids chocolate.     Reviewed labs with patient:   CBC:   Recent Labs   Lab 09/26/22  1250   WBC 7.50   RBC 4.26 L   Hemoglobin 10.3 L   Hematocrit 33.5 L   Platelets 400   MCV 79 L   MCH 24.2 L   MCHC 30.7 L     CMP:  Recent Labs   Lab 09/26/22  1250   Glucose 104   Calcium 9.8   Albumin 3.5   Total Protein 7.5   Sodium 137   Potassium 4.9   CO2 27   Chloride 102   BUN 13   Creatinine 0.7   Alkaline Phosphatase 123   ALT 12   AST 16   Total Bilirubin 0.3     Lab Results   Component Value Date    IRON 12 (L) 08/29/2022    TRANSFERRIN 184 (L) 08/29/2022    TIBC 272 08/29/2022    FESATURATED 4 (L) 08/29/2022      Most recent CEA improved to 248.3. Will continue to monitor. Discussed the need to address his low iron levels; he is amenable to iron infusion x 2 doses 1 week apart. He states he now understands why he feels the way he feels. UA with 1+ protein; ok for Avastin.    Social History     Socioeconomic History    Marital status:    Tobacco Use    Smoking status: Never    Smokeless tobacco: Never   Substance and Sexual Activity    Alcohol use: Yes     Comment: occassionally No alcohol 72h prior to sx    Drug use: No    Sexual activity: Never     Partners: Female     Past Medical History:   Diagnosis Date    Alcoholism     Anemia     Back pain     Encounter for blood transfusion 2018    Essential hypertension 2/4/2016    GERD (gastroesophageal reflux disease)     Hiatal hernia     Hypertension     diet controlled    Melanoma 06/2021    left cheek    Rectal cancer 9/11/2019     Family History   Problem Relation Age of Onset    Cataracts Mother     Stroke Father     Hypertension Father      Past Surgical History:   Procedure Laterality Date    ARTHROSCOPY OF KNEE Right 5/5/2021    Procedure: ARTHROSCOPY, KNEE;  Surgeon: Delonte Mcintyre MD;  Location: [facility] OR;  Service: Orthopedics;  Laterality: Right;     COLONOSCOPY N/A 9/3/2019    Procedure: COLONOSCOPY;  Surgeon: Isai Centeno MD;  Location: [facility] ENDO;  Service: Endoscopy;  Laterality: N/A;    COLONOSCOPY N/A 1/10/2020    Procedure: COLONOSCOPY;  Surgeon: Familia Gonzalez MD;  Location: [facility] ENDO;  Service: General;  Laterality: N/A;    COLONOSCOPY N/A 3/24/2021    Procedure: COLONOSCOPY;  Surgeon: Familia Gonzalez MD;  Location: [facility] ENDO;  Service: General;  Laterality: N/A;    ESOPHAGOGASTRODUODENOSCOPY N/A 9/3/2019    Procedure: ESOPHAGOGASTRODUODENOSCOPY (EGD);  Surgeon: Isai Centeno MD;  Location: [facility] ENDO;  Service: Endoscopy;  Laterality: N/A;    EXCISION OF MEDIAL MENISCUS OF KNEE Right 5/5/2021    Procedure: MENISCECTOMY, KNEE, MEDIAL;  Surgeon: Delonte Mcintyre MD;  Location: [facility];  Service: Orthopedics;  Laterality: Right;  partial Lateral    FLEXIBLE SIGMOIDOSCOPY  2/4/2020    Procedure: SIGMOIDOSCOPY, FLEXIBLE;  Surgeon: Familia Gonzalez MD;  Location: [facility] OR;  Service: General;;    ILEOSTOMY Right 2/4/2020    Procedure: CREATION, ILEOSTOMY;  Surgeon: Familia Gonzalez MD;  Location: [facility] OR;  Service: General;  Laterality: Right;    ILEOSTOMY CLOSURE N/A 8/4/2020    Procedure: CLOSURE, ILEOSTOMY;  Surgeon: Familia Gonzalez MD;  Location: [facility] OR;  Service: General;  Laterality: N/A;    INCISION AND DRAINAGE OF BACK Left 8/5/2020    Procedure: INCISION AND DRAINAGE, BACK;  Surgeon: Familia Gonzalez MD;  Location: [facility] OR;  Service: General;  Laterality: Left;    INJECTION OF ANESTHETIC AGENT INTO TISSUE PLANE DEFINED BY TRANSVERSUS ABDOMINIS MUSCLE N/A 2/4/2020    Procedure: BLOCK, TRANSVERSUS ABDOMINIS PLANE;  Surgeon: Familia Gonzalez MD;  Location: [facility] OR;  Service: General;  Laterality: N/A;    INSERTION OF TUNNELED CENTRAL VENOUS CATHETER (CVC) WITH SUBCUTANEOUS PORT Right 2/4/2020    Procedure: INSERTION, PORT-A-CATH;  Surgeon: Familia Gonzalez MD;  Location: [facility];  Service: General;  Laterality: Right;     INSERTION OF TUNNELED CENTRAL VENOUS CATHETER (CVC) WITH SUBCUTANEOUS PORT N/A 8/16/2022    Procedure: [id number];  Surgeon: Familia Gonzalez MD;  Location: [facility] OR;  Service: General;  Laterality: N/A;  right subclavian    JOINT REPLACEMENT Left 2009    Hip    KNEE ARTHROSCOPY W/ PLICA EXCISION Right 5/5/2021    Procedure: EXCISION, PLICA, KNEE, ARTHROSCOPIC;  Surgeon: Delonte Mcintyre MD;  Location: [facility] OR;  Service: Orthopedics;  Laterality: Right;    MOBILIZATION OF SPLENIC FLEXURE  2/4/2020    Procedure: MOBILIZATION, SPLENIC FLEXURE;  Surgeon: Familia Gonzalez MD;  Location: [facility] OR;  Service: General;;    REMOVAL OF HARDWARE FROM HIP Left 1/4/2022    Procedure: REMOVAL, HARDWARE, HIP;  Surgeon: Delonte Mcintyre MD;  Location: [facility] OR;  Service: Orthopedics;  Laterality: Left;     Review of Systems   Constitutional:  Positive for appetite change (decreased) and fatigue.   HENT:  Negative for mouth sores, rhinorrhea and sore throat.    Eyes: Negative.    Respiratory: Negative.     Cardiovascular: Negative.    Gastrointestinal:  Positive for abdominal pain (right lower quadrant). Negative for constipation, diarrhea, nausea and vomiting.   Endocrine: Negative.    Genitourinary: Negative.    Musculoskeletal: Negative.    Integumentary:  Negative.   Allergic/Immunologic: Negative.    Neurological:  Positive for weakness and numbness. Negative for dizziness and light-headedness.   Hematological: Negative.    Psychiatric/Behavioral: Negative.         Medication List with Changes/Refills   Current Medications    ASCORBIC ACID, VITAMIN C, (VITAMIN C) 1000 MG TABLET    Take 1,000 mg by mouth once daily.    DEXAMETHASONE (DECADRON) 4 MG TAB    Take 2 tablets (8 mg total) by mouth once daily. Take as directed on days 2 and 3 of your chemotherapy cycle.    FENTANYL (DURAGESIC) 12 MCG/HR PT72    Place 1 patch onto the skin every 72 hours.    GABAPENTIN (NEURONTIN) 300 MG CAPSULE    Take 1 capsule  (300 mg total) by mouth every evening.    HYDROCODONE-ACETAMINOPHEN (NORCO)  MG PER TABLET    Take 1 tablet by mouth every 6 (six) hours as needed for Pain.    MULTIVITAMIN CAPSULE    Take 1 capsule by mouth once daily.    OMEPRAZOLE (PRILOSEC) 20 MG CAPSULE    Take 1 capsule (20 mg total) by mouth once daily.    ONDANSETRON (ZOFRAN-ODT) 8 MG TBDL    Dissolve 1 tablet (8 mg total) by mouth every 8 (eight) hours as needed (nausea/vomiting).     Objective:     Vitals:    09/26/22 1309   BP: (!) 155/95   Pulse: (!) 59   Temp: 97.4 °F (36.3 °C)     Physical Exam  Vitals reviewed.   Constitutional:       Appearance: Normal appearance.   HENT:      Head: Normocephalic.      Mouth/Throat:      Comments: Wearing mask    Eyes:      Extraocular Movements: Extraocular movements intact.      Pupils: Pupils are equal, round, and reactive to light.   Cardiovascular:      Rate and Rhythm: Normal rate and regular rhythm.      Heart sounds: Normal heart sounds.   Pulmonary:      Effort: Pulmonary effort is normal.      Breath sounds: Normal breath sounds.   Abdominal:      General: Bowel sounds are normal.      Palpations: Abdomen is soft.      Comments: rounded     Genitourinary:     Comments: deferred    Musculoskeletal:         General: Normal range of motion.      Cervical back: Normal range of motion and neck supple.   Skin:     General: Skin is warm and dry.   Neurological:      Mental Status: He is alert and oriented to person, place, and time.   Psychiatric:         Behavior: Behavior normal.         Thought Content: Thought content normal.        (2) Ambulatory and capable of self care, unable to carry out work activity, up and about > 50% or waking hours  Assessment:     Problem List Items Addressed This Visit          Oncology    Iron deficiency anemia due to chronic blood loss     Likely related to neoplasm.  Most recent iron studies showed adequate iron storage capacity with ferritin above 200 nanograms/cc.  Noted  low iron saturation and serum iron however TIBC is normal.  Hemoglobin noted above 10 grams/deciliters.  Will continue to monitor.         Relevant Orders    [facility] Oncology    Comprehensive Metabolic Panel    Magnesium    Urinalysis    Rectal cancer - Primary     Stage IIIB rectal cancer, status post pj op chemotherapy with FOLFOX.  Status post loop ileostomy and reversal on 08/04/2020.  Has been on surveillance until recently imaging studies from 06/28/2022 showed new bilateral pulmonary nodules and FDG avid multi focal hepatic lesions concerning for metastasis. Liver biopsy confirmed recurrent metastatic adenocarcinoma consistent with colorectal origin.     Discussed implications with patient. He understands that his disease is now incurable but can be treated.  We agreed on palliative systemic therapy with FOLFIRI plus Avastin.  Will also send for molecular studies on the biopsied tissue.     Regimen:  FOLFIRI plus Avastin    2D echocardiogram from 08/23/2022 showed ejection fraction of 55%.           Relevant Orders    [facility] Oncology    Comprehensive Metabolic Panel    Magnesium    Urinalysis    Secondary liver cancer    Relevant Orders    [facility] Oncology    Comprehensive Metabolic Panel    Magnesium    Urinalysis     Plan:     Rectal cancer  -     [facility] Oncology; Future; Expected date: 10/09/2022  -     Comprehensive Metabolic Panel; Future; Expected date: 10/09/2022  -     Magnesium; Future; Expected date: 10/09/2022  -     Urinalysis; Future; Expected date: 10/09/2022    Iron deficiency anemia due to chronic blood loss  -     CBC Oncology; Future; Expected date: 10/09/2022  -     Comprehensive Metabolic Panel; Future; Expected date: 10/09/2022  -     Magnesium; Future; Expected date: 10/09/2022  -     Urinalysis; Future; Expected date: 10/09/2022    Secondary liver cancer  -     [facility] Oncology; Future; Expected date: 10/09/2022  -     Comprehensive Metabolic Panel; Future; Expected date: 10/09/2022  -     Magnesium;  Future; Expected date: 10/09/2022  -     Urinalysis; Future; Expected date: 10/09/2022    Other orders  -     ferumoxytoL (FERAHEME) 510 mg in dextrose 5 % 100 mL IVPB  -     sodium chloride 0.9% 250 mL flush bag  -     sodium chloride 0.9% flush 10 mL  -     heparin, porcine (PF) 100 unit/mL injection flush 500 Units  -     alteplase injection 2 mg  -     ferumoxytoL (FERAHEME) 510 mg in dextrose 5 % 100 mL IVPB  -     sodium chloride 0.9% 250 mL flush bag  -     sodium chloride 0.9% flush 10 mL  -     heparin, porcine (PF) 100 unit/mL injection flush 500 Units  -     alteplase injection 2 mg    Labs reviewed.   Start Feraheme x 2 doses 1 week apart.  Ok to proceed with C3D1 of FOLFIRI/Avastin tomorrow.  Give 1L NS with pump takedown on day 3.  Follow up in 2 weeks with  Mg, UA, CBC, and Comprehensive Metabolic Panel prior to C4D1.    I will review assessment/plan with collaborating physician Dr. Sams.      SKYLAR Chen

## 2022-09-27 ENCOUNTER — INFUSION (OUTPATIENT)
Dept: INFUSION THERAPY | Facility: HOSPITAL | Age: 67
End: 2022-09-27
Attending: INTERNAL MEDICINE
Payer: MEDICARE

## 2022-09-27 ENCOUNTER — TELEPHONE (OUTPATIENT)
Dept: PAIN MEDICINE | Facility: CLINIC | Age: 67
End: 2022-09-27
Payer: MEDICARE

## 2022-09-27 VITALS
RESPIRATION RATE: 18 BRPM | OXYGEN SATURATION: 100 % | TEMPERATURE: 98 F | HEART RATE: 58 BPM | SYSTOLIC BLOOD PRESSURE: 150 MMHG | DIASTOLIC BLOOD PRESSURE: 80 MMHG

## 2022-09-27 DIAGNOSIS — C20 RECTAL CANCER: Primary | ICD-10-CM

## 2022-09-27 DIAGNOSIS — C78.7 SECONDARY LIVER CANCER: ICD-10-CM

## 2022-09-27 PROCEDURE — 25000003 PHARM REV CODE 250: Performed by: NURSE PRACTITIONER

## 2022-09-27 PROCEDURE — 96413 CHEMO IV INFUSION 1 HR: CPT

## 2022-09-27 PROCEDURE — 96368 THER/DIAG CONCURRENT INF: CPT

## 2022-09-27 PROCEDURE — 63600175 PHARM REV CODE 636 W HCPCS: Performed by: NURSE PRACTITIONER

## 2022-09-27 PROCEDURE — 96367 TX/PROPH/DG ADDL SEQ IV INF: CPT

## 2022-09-27 PROCEDURE — 96416 CHEMO PROLONG INFUSE W/PUMP: CPT

## 2022-09-27 PROCEDURE — 96375 TX/PRO/DX INJ NEW DRUG ADDON: CPT

## 2022-09-27 PROCEDURE — 96415 CHEMO IV INFUSION ADDL HR: CPT

## 2022-09-27 PROCEDURE — 96417 CHEMO IV INFUS EACH ADDL SEQ: CPT

## 2022-09-27 PROCEDURE — 96411 CHEMO IV PUSH ADDL DRUG: CPT

## 2022-09-27 RX ORDER — HEPARIN 100 UNIT/ML
500 SYRINGE INTRAVENOUS
Status: DISCONTINUED | OUTPATIENT
Start: 2022-09-27 | End: 2022-09-27 | Stop reason: HOSPADM

## 2022-09-27 RX ORDER — ATROPINE SULFATE 1 MG/ML
0.4 INJECTION, SOLUTION INTRAMUSCULAR; INTRAVENOUS; SUBCUTANEOUS ONCE AS NEEDED
Status: COMPLETED | OUTPATIENT
Start: 2022-09-27 | End: 2022-09-27

## 2022-09-27 RX ORDER — FLUOROURACIL 50 MG/ML
400 INJECTION, SOLUTION INTRAVENOUS
Status: COMPLETED | OUTPATIENT
Start: 2022-09-27 | End: 2022-09-27

## 2022-09-27 RX ADMIN — FLUOROURACIL 715 MG: 50 INJECTION, SOLUTION INTRAVENOUS at 01:09

## 2022-09-27 RX ADMIN — PALONOSETRON HYDROCHLORIDE 0.25 MG: 0.25 INJECTION, SOLUTION INTRAVENOUS at 11:09

## 2022-09-27 RX ADMIN — LEUCOVORIN CALCIUM 715 MG: 350 INJECTION, POWDER, LYOPHILIZED, FOR SOLUTION INTRAMUSCULAR; INTRAVENOUS at 11:09

## 2022-09-27 RX ADMIN — SODIUM CHLORIDE 300 MG: 9 INJECTION, SOLUTION INTRAVENOUS at 10:09

## 2022-09-27 RX ADMIN — SODIUM CHLORIDE: 0.9 INJECTION, SOLUTION INTRAVENOUS at 10:09

## 2022-09-27 RX ADMIN — IRINOTECAN HYDROCHLORIDE 322 MG: 20 INJECTION, SOLUTION INTRAVENOUS at 11:09

## 2022-09-27 RX ADMIN — ATROPINE SULFATE 0.4 MG: 1 INJECTION, SOLUTION INTRAMUSCULAR; INTRAVENOUS; SUBCUTANEOUS at 11:09

## 2022-09-27 RX ADMIN — FLUOROURACIL 4295 MG: 50 INJECTION, SOLUTION INTRAVENOUS at 01:09

## 2022-09-27 NOTE — DISCHARGE INSTRUCTIONS
.[facility]  [address] [address]  [address] [address]  [phone] phone     [phone] fax  Hours of Operation: Monday- Friday 8:00am- 5:00pm  After hours phone  [phone]  Hematology / Oncology Physicians on call    Dr. Thor Bay      Nurse Practitioners:    Marla Flores, PETE Boone, PETE Barroso, PETE Ahuja, PETE Pablo, PETE Pierce, PA      Please don't hesitate to call if you have any concerns.    .FALL PREVENTION   Falls often occur due to slipping, tripping or losing your balance. Here are ways to reduce your risk of falling again.   Was there anything that caused your fall that can be fixed, removed or replaced?   Make your home safe by keeping walkways clear of objects you may trip over.   Use non-slip pads under rugs.   Do not walk in poorly lit areas.   Do not stand on chairs or wobbly ladders.   Use caution when reaching overhead or looking upward. This position can cause a loss of balance.   Be sure your shoes fit properly, have non-slip bottoms and are in good condition.   Be cautious when going up and down stairs, curbs, and when walking on uneven sidewalks.   If your balance is poor, consider using a cane or walker.   If your fall was related to alcohol use, stop or limit alcohol intake.   If your fall was related to use of sleeping medicines, talk to your doctor about this. You may need to reduce your dosage at bedtime if you awaken during the night to go to the bathroom.   To reduce the need for nighttime bathroom trips:   Avoid drinking fluids for several hours before going to bed   Empty your bladder before going to bed   Men can keep a urinal at the bedside   © 2673-8974 [employer], [address] [place] [address], [place] [address]. All rights reserved. This information is not intended as a substitute for professional medical care. Always follow your healthcare  professional's instructions.  .WAYS TO HELP PREVENT INFECTION        WASH YOUR HANDS OFTEN DURING THE DAY, ESPECIALLY BEFORE YOU EAT, AFTER USING THE BATHROOM, AND AFTER TOUCHING ANIMALS    STAY AWAY FROM PEOPLE WHO HAVE ILLNESSES YOU CAN CATCH; SUCH AS COLDS, FLU, CHICKEN POX    TRY TO AVOID CROWDS    STAY AWAY FROM CHILDREN WHO RECENTLY HAVE RECEIVED LIVE VIRUS VACCINES    MAINTAIN GOOD MOUTH CARE    DO NOT SQUEEZE OR SCRATCH PIMPLES    CLEAN CUTS & SCRAPES RIGHT AWAY AND DAILY UNTIL HEALED WITH WARM WATER, SOAP & AN ANTISEPTIC    AVOID CONTACT WITH LITTER BOXES, BIRD CAGES, & FISH TANKS    AVOID STANDING WATER, IE., BIRD BATHS, FLOWER POTS/VASES, OR HUMIDIFIERS    WEAR GLOVES WHEN GARDENING OR CLEANING UP AFTER OTHERS, ESPECIALLY BABIES & SMALL CHILDREN    DO NOT EAT RAW FISH, SEAFOOD, MEAT, OR EGGS

## 2022-09-27 NOTE — TELEPHONE ENCOUNTER
Spoke with the pt and we had to push his procedure back because he is currently undergoing chemo.

## 2022-09-27 NOTE — PLAN OF CARE
Patient denied any complaints today. Stated he has chronic pain but he deals with it daily. Expressed that he does have down days d/t diagnoses but is feeling pretty good today. Discussed POC with patient, answered any questions.     Problem: Adult Inpatient Plan of Care  Goal: Plan of Care Review  Outcome: Ongoing, Progressing  Flowsheets (Taken 9/27/2022 1039)  Plan of Care Reviewed With: patient  Goal: Patient-Specific Goal (Individualized)  Outcome: Ongoing, Progressing  Flowsheets (Taken 9/27/2022 1039)  Anxieties, Fears or Concerns: None expressed today.  Individualized Care Needs: Feet elevated, warm blanket & pillow, and snack provided.  Patient-Specific Goals (Include Timeframe): To tolerate treatment today.     Problem: Anemia  Goal: Anemia Symptom Improvement  Outcome: Ongoing, Progressing  Intervention: Monitor and Manage Anemia  Flowsheets (Taken 9/27/2022 1039)  Safety Promotion/Fall Prevention:   in recliner, wheels locked   high risk medications identified   medications reviewed   lighting adjusted  Fatigue Management: frequent rest breaks encouraged     Problem: Anemia (Chemotherapy Effects)  Goal: Anemia Symptom Improvement  Outcome: Ongoing, Progressing  Intervention: Monitor and Manage Anemia  Flowsheets (Taken 9/27/2022 1039)  Safety Promotion/Fall Prevention:   in recliner, wheels locked   high risk medications identified   medications reviewed   lighting adjusted  Fatigue Management: frequent rest breaks encouraged     Problem: Nausea and Vomiting (Chemotherapy Effects)  Goal: Fluid and Electrolyte Balance  Outcome: Ongoing, Progressing  Intervention: Prevent and Manage Nausea and Vomiting  Flowsheets (Taken 9/27/2022 1039)  Environmental Support:   calm environment promoted   rest periods encouraged   distractions minimized   comfort object encouraged     Problem: Neurotoxicity (Chemotherapy Effects)  Goal: Neurotoxicity Symptom Control  Outcome: Ongoing, Progressing     Problem: Neutropenia  (Chemotherapy Effects)  Goal: Absence of Infection  Outcome: Ongoing, Progressing  Intervention: Prevent Infection and Maximize Resistance  Flowsheets (Taken 9/27/2022 1039)  Infection Prevention:   hand hygiene promoted   personal protective equipment utilized   rest/sleep promoted     Problem: Thrombocytopenia Bleeding Risk (Chemotherapy Effects)  Goal: Absence of Bleeding  Outcome: Ongoing, Progressing  Intervention: Minimize Bleeding Risk  Flowsheets (Taken 9/27/2022 1039)  Bleeding Precautions:   blood pressure closely monitored   monitored for signs of bleeding

## 2022-09-29 ENCOUNTER — INFUSION (OUTPATIENT)
Dept: INFUSION THERAPY | Facility: HOSPITAL | Age: 67
End: 2022-09-29
Attending: NURSE PRACTITIONER
Payer: MEDICARE

## 2022-09-29 ENCOUNTER — IMMUNIZATION (OUTPATIENT)
Dept: PRIMARY CARE CLINIC | Facility: CLINIC | Age: 67
End: 2022-09-29
Payer: MEDICARE

## 2022-09-29 VITALS — RESPIRATION RATE: 16 BRPM | TEMPERATURE: 97 F | OXYGEN SATURATION: 99 % | HEART RATE: 56 BPM

## 2022-09-29 DIAGNOSIS — C20 RECTAL CANCER: Primary | ICD-10-CM

## 2022-09-29 DIAGNOSIS — C78.7 SECONDARY LIVER CANCER: ICD-10-CM

## 2022-09-29 DIAGNOSIS — Z23 NEED FOR VACCINATION: Primary | ICD-10-CM

## 2022-09-29 DIAGNOSIS — D50.0 IRON DEFICIENCY ANEMIA DUE TO CHRONIC BLOOD LOSS: ICD-10-CM

## 2022-09-29 PROCEDURE — 63600175 PHARM REV CODE 636 W HCPCS: Performed by: NURSE PRACTITIONER

## 2022-09-29 PROCEDURE — 96361 HYDRATE IV INFUSION ADD-ON: CPT

## 2022-09-29 PROCEDURE — 91312 COVID-19, MRNA, LNP-S, BIVALENT BOOSTER, PF, 30 MCG/0.3 ML DOSE: CPT | Mod: PBBFAC | Performed by: FAMILY MEDICINE

## 2022-09-29 PROCEDURE — 25000003 PHARM REV CODE 250: Performed by: NURSE PRACTITIONER

## 2022-09-29 PROCEDURE — 96365 THER/PROPH/DIAG IV INF INIT: CPT

## 2022-09-29 PROCEDURE — 0124A COVID-19, MRNA, LNP-S, BIVALENT BOOSTER, PF, 30 MCG/0.3 ML DOSE: CPT | Mod: CV19,PBBFAC | Performed by: FAMILY MEDICINE

## 2022-09-29 RX ORDER — HEPARIN 100 UNIT/ML
500 SYRINGE INTRAVENOUS
Status: CANCELLED | OUTPATIENT
Start: 2022-10-06

## 2022-09-29 RX ORDER — SODIUM CHLORIDE 0.9 % (FLUSH) 0.9 %
10 SYRINGE (ML) INJECTION
Status: CANCELLED | OUTPATIENT
Start: 2022-10-06

## 2022-09-29 RX ORDER — HEPARIN 100 UNIT/ML
500 SYRINGE INTRAVENOUS
Status: DISCONTINUED | OUTPATIENT
Start: 2022-09-29 | End: 2022-09-29 | Stop reason: HOSPADM

## 2022-09-29 RX ORDER — SODIUM CHLORIDE 9 MG/ML
INJECTION, SOLUTION INTRAVENOUS
Status: COMPLETED | OUTPATIENT
Start: 2022-09-29 | End: 2022-09-29

## 2022-09-29 RX ADMIN — HEPARIN 500 UNITS: 100 SYRINGE at 11:09

## 2022-09-29 RX ADMIN — SODIUM CHLORIDE: 0.9 INJECTION, SOLUTION INTRAVENOUS at 08:09

## 2022-09-29 RX ADMIN — IRON SUCROSE 100 MG: 20 INJECTION, SOLUTION INTRAVENOUS at 10:09

## 2022-09-29 NOTE — PLAN OF CARE
Patient said he felt pretty good today. Stated day 2 is the worst day after chemo. Patient tolerated his first dose of venofer and is scheduled to come back in one week for next dose.     Problem: Adult Inpatient Plan of Care  Goal: Plan of Care Review  Outcome: Ongoing, Progressing  Flowsheets (Taken 9/29/2022 1200)  Plan of Care Reviewed With: patient  Goal: Patient-Specific Goal (Individualized)  Outcome: Ongoing, Progressing  Flowsheets (Taken 9/29/2022 1200)  Anxieties, Fears or Concerns: None expressed.  Individualized Care Needs: feet elevated, warm blankets and pillow provided as well as snacks  Patient-Specific Goals (Include Timeframe): Pt tolerated treatment today.     Problem: Anemia  Goal: Anemia Symptom Improvement  Outcome: Ongoing, Progressing  Intervention: Monitor and Manage Anemia  Flowsheets (Taken 9/29/2022 1200)  Safety Promotion/Fall Prevention:   in recliner, wheels locked   lighting adjusted   medications reviewed   high risk medications identified  Fatigue Management:   frequent rest breaks encouraged   paced activity encouraged     Problem: Anemia (Chemotherapy Effects)  Goal: Anemia Symptom Improvement  Outcome: Ongoing, Progressing  Intervention: Monitor and Manage Anemia  Flowsheets (Taken 9/29/2022 1200)  Safety Promotion/Fall Prevention:   in recliner, wheels locked   lighting adjusted   medications reviewed   high risk medications identified  Fatigue Management:   frequent rest breaks encouraged   paced activity encouraged     Problem: Nausea and Vomiting (Chemotherapy Effects)  Goal: Fluid and Electrolyte Balance  Outcome: Ongoing, Progressing  Intervention: Prevent and Manage Nausea and Vomiting  Flowsheets (Taken 9/29/2022 1200)  Environmental Support:   calm environment promoted   rest periods encouraged   comfort object encouraged     Problem: Neurotoxicity (Chemotherapy Effects)  Goal: Neurotoxicity Symptom Control  Outcome: Ongoing, Progressing     Problem: Neutropenia  (Chemotherapy Effects)  Goal: Absence of Infection  Outcome: Ongoing, Progressing  Intervention: Prevent Infection and Maximize Resistance  Flowsheets (Taken 9/29/2022 1200)  Infection Prevention:   equipment surfaces disinfected   hand hygiene promoted   personal protective equipment utilized     Problem: Thrombocytopenia Bleeding Risk (Chemotherapy Effects)  Goal: Absence of Bleeding  Outcome: Ongoing, Progressing  Intervention: Minimize Bleeding Risk  Flowsheets (Taken 9/29/2022 1200)  Bleeding Precautions:   blood pressure closely monitored   monitored for signs of bleeding

## 2022-09-29 NOTE — DISCHARGE INSTRUCTIONS
.[facility]  [address] [facility]  [address] [address]  [phone] phone     [phone] fax  Hours of Operation: Monday- Friday 8:00am- 5:00pm  After hours phone  [phone]  Hematology / Oncology Physicians on call    Dr. Thor Bay      Nurse Practitioners:    Marla Flores, PETE Boone, PETE Barroso, PETE Ahuja, PETE Pablo, PETE Pierce, [place]      Please don't hesitate to call if you have any concerns.    .FALL PREVENTION   Falls often occur due to slipping, tripping or losing your balance. Here are ways to reduce your risk of falling again.   Was there anything that caused your fall that can be fixed, removed or replaced?   Make your home safe by keeping walkways clear of objects you may trip over.   Use non-slip pads under rugs.   Do not walk in poorly lit areas.   Do not stand on chairs or wobbly ladders.   Use caution when reaching overhead or looking upward. This position can cause a loss of balance.   Be sure your shoes fit properly, have non-slip bottoms and are in good condition.   Be cautious when going up and down stairs, curbs, and when walking on uneven sidewalks.   If your balance is poor, consider using a cane or walker.   If your fall was related to alcohol use, stop or limit alcohol intake.   If your fall was related to use of sleeping medicines, talk to your doctor about this. You may need to reduce your dosage at bedtime if you awaken during the night to go to the bathroom.   To reduce the need for nighttime bathroom trips:   Avoid drinking fluids for several hours before going to bed   Empty your bladder before going to bed   Men can keep a urinal at the bedside   © 7743-2938 Alexandru Clark, [address] [place] [address], [place] [address]. All rights reserved. This information is not intended as a substitute for professional medical care. Always follow your healthcare  professional's instructions.  .WAYS TO HELP PREVENT INFECTION        WASH YOUR HANDS OFTEN DURING THE DAY, ESPECIALLY BEFORE YOU EAT, AFTER USING THE BATHROOM, AND AFTER TOUCHING ANIMALS    STAY AWAY FROM PEOPLE WHO HAVE ILLNESSES YOU CAN CATCH; SUCH AS COLDS, FLU, CHICKEN POX    TRY TO AVOID CROWDS    STAY AWAY FROM CHILDREN WHO RECENTLY HAVE RECEIVED LIVE VIRUS VACCINES    MAINTAIN GOOD MOUTH CARE    DO NOT SQUEEZE OR SCRATCH PIMPLES    CLEAN CUTS & SCRAPES RIGHT AWAY AND DAILY UNTIL HEALED WITH WARM WATER, SOAP & AN ANTISEPTIC    AVOID CONTACT WITH LITTER BOXES, BIRD CAGES, & FISH TANKS    AVOID STANDING WATER, IE., BIRD BATHS, FLOWER POTS/VASES, OR HUMIDIFIERS    WEAR GLOVES WHEN GARDENING OR CLEANING UP AFTER OTHERS, ESPECIALLY BABIES & SMALL CHILDREN    DO NOT EAT RAW FISH, SEAFOOD, MEAT, OR EGGS

## 2022-10-04 ENCOUNTER — CLINICAL SUPPORT (OUTPATIENT)
Dept: PALLIATIVE MEDICINE | Facility: CLINIC | Age: 67
End: 2022-10-04
Payer: MEDICARE

## 2022-10-04 DIAGNOSIS — Z51.5 ENCOUNTER FOR PALLIATIVE CARE: Primary | ICD-10-CM

## 2022-10-04 DIAGNOSIS — G89.3 NEOPLASM RELATED PAIN: ICD-10-CM

## 2022-10-04 PROCEDURE — 99999 PR PBB SHADOW E&M-EST. PATIENT-LVL I: ICD-10-PCS | Mod: PBBFAC,,,

## 2022-10-04 PROCEDURE — 99999 PR PBB SHADOW E&M-EST. PATIENT-LVL I: CPT | Mod: PBBFAC,,,

## 2022-10-04 RX ORDER — HYDROCODONE BITARTRATE AND ACETAMINOPHEN 10; 325 MG/1; MG/1
1 TABLET ORAL EVERY 6 HOURS PRN
Qty: 75 TABLET | Refills: 0 | Status: SHIPPED | OUTPATIENT
Start: 2022-10-04 | End: 2022-10-25 | Stop reason: SDUPTHER

## 2022-10-04 RX ORDER — FENTANYL 25 UG/1
1 PATCH TRANSDERMAL
Qty: 5 PATCH | Refills: 0 | Status: SHIPPED | OUTPATIENT
Start: 2022-10-04 | End: 2022-10-25 | Stop reason: SDUPTHER

## 2022-10-04 NOTE — PROGRESS NOTES
Pt is clinic for infusion and 2 weeks fu on cancer related pain. Pt stated he had a 2 week supply of Norco 10-325mg his last dose was yesterday, pt stated he takes up to 5 pills everyday for pain, pt rated his pain at 7/10. His fentanyl 12 mcg patch  patch was removed today, provider made aware of his two week follow up on his new pain regiment. Provider offered Norco  mg 1 tab Q 6hr, hoping to help decreas from use of 5 pills daily his Fentanyl  will be increased from 12 mg to 25mcg Q 72 hr patch. pt agreed to this. Scripts sent to [facility] by ZITA Neil. Urine Drug Monitoring collected today by nurse. Pt will contact our office if he has any additional concerns or questions.

## 2022-10-04 NOTE — PROGRESS NOTES
Clinical Drug Medication Monitoring Nurse Note:      Patient in office today for EP visit today with palliative for cancer related pain management.     Pt signed pain contract on file, understands this urine drug Monitoring screening is part of the palliative medicine protocol.  Pt agrees to the process.           Date: 10-     Urine Drug Test       ICD 10Code Used:  G89.3/ F11.20/ G89.4     Location of this visit : [facility]     Orders received by Provider Named :  ZITA Cruz     Two Patient ID was preformed by Patient-  Yes     Urine  Specimen obtained  by nurse:  MARLON Hay     Patient verified full name and date of birth, Lables placed on specimen- Yes     Urine was successfully collected with in normal ranges. 90 F     Labeled and sealed in presence of pt. - Yes     Nurse called Zomazz , Rep.with [facility] stated specimens will be picked up   Date: 10-     Nurse has specimen secured in [facility] Lock Box hanging on Lab Door.     Confirmation Number Provided:

## 2022-10-07 ENCOUNTER — INFUSION (OUTPATIENT)
Dept: INFUSION THERAPY | Facility: HOSPITAL | Age: 67
End: 2022-10-07
Attending: NURSE PRACTITIONER
Payer: MEDICARE

## 2022-10-07 VITALS
OXYGEN SATURATION: 99 % | SYSTOLIC BLOOD PRESSURE: 147 MMHG | RESPIRATION RATE: 16 BRPM | HEART RATE: 80 BPM | DIASTOLIC BLOOD PRESSURE: 90 MMHG | TEMPERATURE: 98 F

## 2022-10-07 DIAGNOSIS — D50.0 IRON DEFICIENCY ANEMIA DUE TO CHRONIC BLOOD LOSS: Primary | ICD-10-CM

## 2022-10-07 PROCEDURE — 63600175 PHARM REV CODE 636 W HCPCS: Performed by: NURSE PRACTITIONER

## 2022-10-07 PROCEDURE — A4216 STERILE WATER/SALINE, 10 ML: HCPCS | Performed by: NURSE PRACTITIONER

## 2022-10-07 PROCEDURE — 96374 THER/PROPH/DIAG INJ IV PUSH: CPT

## 2022-10-07 PROCEDURE — 25000003 PHARM REV CODE 250: Performed by: NURSE PRACTITIONER

## 2022-10-07 RX ORDER — SODIUM CHLORIDE 0.9 % (FLUSH) 0.9 %
10 SYRINGE (ML) INJECTION
Status: DISCONTINUED | OUTPATIENT
Start: 2022-10-07 | End: 2022-10-07 | Stop reason: HOSPADM

## 2022-10-07 RX ORDER — HEPARIN 100 UNIT/ML
500 SYRINGE INTRAVENOUS
Status: DISCONTINUED | OUTPATIENT
Start: 2022-10-07 | End: 2022-10-07 | Stop reason: HOSPADM

## 2022-10-07 RX ORDER — HEPARIN 100 UNIT/ML
500 SYRINGE INTRAVENOUS
Status: CANCELLED | OUTPATIENT
Start: 2022-10-13

## 2022-10-07 RX ORDER — SODIUM CHLORIDE 0.9 % (FLUSH) 0.9 %
10 SYRINGE (ML) INJECTION
Status: CANCELLED | OUTPATIENT
Start: 2022-10-13

## 2022-10-07 RX ADMIN — IRON SUCROSE 100 MG: 20 INJECTION, SOLUTION INTRAVENOUS at 02:10

## 2022-10-07 RX ADMIN — SODIUM CHLORIDE, PRESERVATIVE FREE 10 ML: 5 INJECTION INTRAVENOUS at 02:10

## 2022-10-07 RX ADMIN — HEPARIN 500 UNITS: 100 SYRINGE at 02:10

## 2022-10-07 NOTE — PLAN OF CARE
Patient reports feeling very tired. Tolerated Venofer IVP well today with no adverse reactions. Patient to return to clinic next Tuesday for chemo treatment.

## 2022-10-07 NOTE — DISCHARGE INSTRUCTIONS
.[facility]  [address] [facility] [address] [facility]  [address]  [phone] phone     [phone] fax  Hours of Operation: Monday- Friday 8:00am- 5:00pm  After hours phone  [phone]  Hematology / Oncology Physicians on call    Dr. Thor Siegel      Nurse Practitioners:    Marla Flores, PETE Boone, PETE Barroso, PETE Ahuja, PETE Pablo, PETE Pierce, PA      Please don't hesitate to call if you have any concerns.      .FALL PREVENTION   Falls often occur due to slipping, tripping or losing your balance. Here are ways to reduce your risk of falling again.   Was there anything that caused your fall that can be fixed, removed or replaced?   Make your home safe by keeping walkways clear of objects you may trip over.   Use non-slip pads under rugs.   Do not walk in poorly lit areas.   Do not stand on chairs or wobbly ladders.   Use caution when reaching overhead or looking upward. This position can cause a loss of balance.   Be sure your shoes fit properly, have non-slip bottoms and are in good condition.   Be cautious when going up and down stairs, curbs, and when walking on uneven sidewalks.   If your balance is poor, consider using a cane or walker.   If your fall was related to alcohol use, stop or limit alcohol intake.   If your fall was related to use of sleeping medicines, talk to your doctor about this. You may need to reduce your dosage at bedtime if you awaken during the night to go to the bathroom.   To reduce the need for nighttime bathroom trips:   Avoid drinking fluids for several hours before going to bed   Empty your bladder before going to bed   Men can keep a urinal at the bedside   © 4464-6093 Alexandru Clark, [address] [place] [address], [place] [address]. All rights reserved. This information is not intended as a substitute for professional medical care. Always follow your healthcare  professional's instructions.     .WAYS TO HELP PREVENT INFECTION        WASH YOUR HANDS OFTEN DURING THE DAY, ESPECIALLY BEFORE YOU EAT, AFTER USING THE BATHROOM, AND AFTER TOUCHING ANIMALS    STAY AWAY FROM PEOPLE WHO HAVE ILLNESSES YOU CAN CATCH; SUCH AS COLDS, FLU, CHICKEN POX    TRY TO AVOID CROWDS    STAY AWAY FROM CHILDREN WHO RECENTLY HAVE RECEIVED LIVE VIRUS VACCINES    MAINTAIN GOOD MOUTH CARE    DO NOT SQUEEZE OR SCRATCH PIMPLES    CLEAN CUTS & SCRAPES RIGHT AWAY AND DAILY UNTIL HEALED WITH WARM WATER, SOAP & AN ANTISEPTIC    AVOID CONTACT WITH LITTER BOXES, BIRD CAGES, & FISH TANKS    AVOID STANDING WATER, IE., BIRD BATHS, FLOWER POTS/VASES, OR HUMIDIFIERS    WEAR GLOVES WHEN GARDENING OR CLEANING UP AFTER OTHERS, ESPECIALLY BABIES & SMALL CHILDREN    DO NOT EAT RAW FISH, SEAFOOD, MEAT, OR EGGS

## 2022-10-11 ENCOUNTER — OFFICE VISIT (OUTPATIENT)
Dept: HEMATOLOGY/ONCOLOGY | Facility: CLINIC | Age: 67
End: 2022-10-11
Payer: MEDICARE

## 2022-10-11 ENCOUNTER — INFUSION (OUTPATIENT)
Dept: INFUSION THERAPY | Facility: HOSPITAL | Age: 67
End: 2022-10-11
Attending: INTERNAL MEDICINE
Payer: MEDICARE

## 2022-10-11 ENCOUNTER — LAB VISIT (OUTPATIENT)
Dept: LAB | Facility: HOSPITAL | Age: 67
End: 2022-10-11
Attending: INTERNAL MEDICINE
Payer: MEDICARE

## 2022-10-11 VITALS
HEART RATE: 56 BPM | HEIGHT: 70 IN | OXYGEN SATURATION: 100 % | SYSTOLIC BLOOD PRESSURE: 156 MMHG | WEIGHT: 132.5 LBS | TEMPERATURE: 98 F | DIASTOLIC BLOOD PRESSURE: 91 MMHG | BODY MASS INDEX: 18.97 KG/M2

## 2022-10-11 VITALS
SYSTOLIC BLOOD PRESSURE: 159 MMHG | HEART RATE: 57 BPM | TEMPERATURE: 98 F | DIASTOLIC BLOOD PRESSURE: 85 MMHG | RESPIRATION RATE: 16 BRPM | OXYGEN SATURATION: 98 %

## 2022-10-11 DIAGNOSIS — Z79.899 IMMUNODEFICIENCY DUE TO CHEMOTHERAPY: ICD-10-CM

## 2022-10-11 DIAGNOSIS — C78.7 SECONDARY LIVER CANCER: ICD-10-CM

## 2022-10-11 DIAGNOSIS — I10 HYPERTENSION, UNSPECIFIED TYPE: ICD-10-CM

## 2022-10-11 DIAGNOSIS — C20 RECTAL CANCER: Primary | ICD-10-CM

## 2022-10-11 DIAGNOSIS — T45.1X5A IMMUNODEFICIENCY DUE TO CHEMOTHERAPY: ICD-10-CM

## 2022-10-11 DIAGNOSIS — D84.821 IMMUNODEFICIENCY DUE TO CHEMOTHERAPY: ICD-10-CM

## 2022-10-11 DIAGNOSIS — R19.7 DIARRHEA, UNSPECIFIED TYPE: ICD-10-CM

## 2022-10-11 DIAGNOSIS — C20 RECTAL CANCER: ICD-10-CM

## 2022-10-11 DIAGNOSIS — R19.7 DIARRHEA, UNSPECIFIED: ICD-10-CM

## 2022-10-11 DIAGNOSIS — D50.0 IRON DEFICIENCY ANEMIA DUE TO CHRONIC BLOOD LOSS: Primary | ICD-10-CM

## 2022-10-11 DIAGNOSIS — D50.0 IRON DEFICIENCY ANEMIA DUE TO CHRONIC BLOOD LOSS: ICD-10-CM

## 2022-10-11 LAB
ALBUMIN SERPL BCP-MCNC: 3.7 G/DL (ref 3.5–5.2)
ALP SERPL-CCNC: 134 U/L (ref 55–135)
ALT SERPL W/O P-5'-P-CCNC: 19 U/L (ref 10–44)
ANION GAP SERPL CALC-SCNC: 9 MMOL/L (ref 8–16)
AST SERPL-CCNC: 22 U/L (ref 10–40)
BILIRUB SERPL-MCNC: 0.4 MG/DL (ref 0.1–1)
BUN SERPL-MCNC: 9 MG/DL (ref 8–23)
CALCIUM SERPL-MCNC: 10.1 MG/DL (ref 8.7–10.5)
CHLORIDE SERPL-SCNC: 100 MMOL/L (ref 95–110)
CO2 SERPL-SCNC: 26 MMOL/L (ref 23–29)
CREAT SERPL-MCNC: 0.7 MG/DL (ref 0.5–1.4)
ERYTHROCYTE [DISTWIDTH] IN BLOOD BY AUTOMATED COUNT: 23.5 % (ref 11.5–14.5)
EST. GFR  (NO RACE VARIABLE): >60 ML/MIN/1.73 M^2
GLUCOSE SERPL-MCNC: 140 MG/DL (ref 70–110)
HCT VFR BLD AUTO: 36.9 % (ref 40–54)
HGB BLD-MCNC: 11.2 G/DL (ref 14–18)
IMM GRANULOCYTES # BLD AUTO: 0.02 K/UL (ref 0–0.04)
MAGNESIUM SERPL-MCNC: 1.9 MG/DL (ref 1.6–2.6)
MCH RBC QN AUTO: 24.6 PG (ref 27–31)
MCHC RBC AUTO-ENTMCNC: 30.4 G/DL (ref 32–36)
MCV RBC AUTO: 81 FL (ref 82–98)
NEUTROPHILS # BLD AUTO: 3 K/UL (ref 1.8–7.7)
PLATELET # BLD AUTO: 294 K/UL (ref 150–450)
PMV BLD AUTO: 9 FL (ref 9.2–12.9)
POTASSIUM SERPL-SCNC: 4.8 MMOL/L (ref 3.5–5.1)
PROT SERPL-MCNC: 7.6 G/DL (ref 6–8.4)
RBC # BLD AUTO: 4.55 M/UL (ref 4.6–6.2)
SODIUM SERPL-SCNC: 135 MMOL/L (ref 136–145)
WBC # BLD AUTO: 4.14 K/UL (ref 3.9–12.7)

## 2022-10-11 PROCEDURE — 85027 COMPLETE CBC AUTOMATED: CPT | Performed by: NURSE PRACTITIONER

## 2022-10-11 PROCEDURE — 3077F PR MOST RECENT SYSTOLIC BLOOD PRESSURE >= 140 MM HG: ICD-10-PCS | Mod: CPTII,S$GLB,, | Performed by: INTERNAL MEDICINE

## 2022-10-11 PROCEDURE — 99215 PR OFFICE/OUTPT VISIT, EST, LEVL V, 40-54 MIN: ICD-10-PCS | Mod: S$GLB,,, | Performed by: INTERNAL MEDICINE

## 2022-10-11 PROCEDURE — 1101F PT FALLS ASSESS-DOCD LE1/YR: CPT | Mod: CPTII,S$GLB,, | Performed by: INTERNAL MEDICINE

## 2022-10-11 PROCEDURE — 3077F SYST BP >= 140 MM HG: CPT | Mod: CPTII,S$GLB,, | Performed by: INTERNAL MEDICINE

## 2022-10-11 PROCEDURE — 96417 CHEMO IV INFUS EACH ADDL SEQ: CPT

## 2022-10-11 PROCEDURE — 1157F ADVNC CARE PLAN IN RCRD: CPT | Mod: CPTII,S$GLB,, | Performed by: INTERNAL MEDICINE

## 2022-10-11 PROCEDURE — 96413 CHEMO IV INFUSION 1 HR: CPT

## 2022-10-11 PROCEDURE — 99999 PR PBB SHADOW E&M-EST. PATIENT-LVL III: CPT | Mod: PBBFAC,,, | Performed by: INTERNAL MEDICINE

## 2022-10-11 PROCEDURE — 3080F DIAST BP >= 90 MM HG: CPT | Mod: CPTII,S$GLB,, | Performed by: INTERNAL MEDICINE

## 2022-10-11 PROCEDURE — 3008F PR BODY MASS INDEX (BMI) DOCUMENTED: ICD-10-PCS | Mod: CPTII,S$GLB,, | Performed by: INTERNAL MEDICINE

## 2022-10-11 PROCEDURE — 1157F PR ADVANCE CARE PLAN OR EQUIV PRESENT IN MEDICAL RECORD: ICD-10-PCS | Mod: CPTII,S$GLB,, | Performed by: INTERNAL MEDICINE

## 2022-10-11 PROCEDURE — 3288F PR FALLS RISK ASSESSMENT DOCUMENTED: ICD-10-PCS | Mod: CPTII,S$GLB,, | Performed by: INTERNAL MEDICINE

## 2022-10-11 PROCEDURE — 99999 PR PBB SHADOW E&M-EST. PATIENT-LVL III: ICD-10-PCS | Mod: PBBFAC,,, | Performed by: INTERNAL MEDICINE

## 2022-10-11 PROCEDURE — 3288F FALL RISK ASSESSMENT DOCD: CPT | Mod: CPTII,S$GLB,, | Performed by: INTERNAL MEDICINE

## 2022-10-11 PROCEDURE — 96367 TX/PROPH/DG ADDL SEQ IV INF: CPT

## 2022-10-11 PROCEDURE — 3008F BODY MASS INDEX DOCD: CPT | Mod: CPTII,S$GLB,, | Performed by: INTERNAL MEDICINE

## 2022-10-11 PROCEDURE — 36415 COLL VENOUS BLD VENIPUNCTURE: CPT | Performed by: NURSE PRACTITIONER

## 2022-10-11 PROCEDURE — 96411 CHEMO IV PUSH ADDL DRUG: CPT

## 2022-10-11 PROCEDURE — 83735 ASSAY OF MAGNESIUM: CPT | Performed by: NURSE PRACTITIONER

## 2022-10-11 PROCEDURE — 1101F PR PT FALLS ASSESS DOC 0-1 FALLS W/OUT INJ PAST YR: ICD-10-PCS | Mod: CPTII,S$GLB,, | Performed by: INTERNAL MEDICINE

## 2022-10-11 PROCEDURE — 25000003 PHARM REV CODE 250: Performed by: INTERNAL MEDICINE

## 2022-10-11 PROCEDURE — 3080F PR MOST RECENT DIASTOLIC BLOOD PRESSURE >= 90 MM HG: ICD-10-PCS | Mod: CPTII,S$GLB,, | Performed by: INTERNAL MEDICINE

## 2022-10-11 PROCEDURE — 96415 CHEMO IV INFUSION ADDL HR: CPT

## 2022-10-11 PROCEDURE — 80053 COMPREHEN METABOLIC PANEL: CPT | Performed by: NURSE PRACTITIONER

## 2022-10-11 PROCEDURE — 96368 THER/DIAG CONCURRENT INF: CPT

## 2022-10-11 PROCEDURE — 96416 CHEMO PROLONG INFUSE W/PUMP: CPT

## 2022-10-11 PROCEDURE — 96375 TX/PRO/DX INJ NEW DRUG ADDON: CPT

## 2022-10-11 PROCEDURE — 99215 OFFICE O/P EST HI 40 MIN: CPT | Mod: S$GLB,,, | Performed by: INTERNAL MEDICINE

## 2022-10-11 PROCEDURE — 63600175 PHARM REV CODE 636 W HCPCS: Performed by: INTERNAL MEDICINE

## 2022-10-11 RX ORDER — FLUOROURACIL 50 MG/ML
400 INJECTION, SOLUTION INTRAVENOUS
Status: CANCELLED | OUTPATIENT
Start: 2022-10-11

## 2022-10-11 RX ORDER — HEPARIN 100 UNIT/ML
500 SYRINGE INTRAVENOUS
Status: CANCELLED | OUTPATIENT
Start: 2022-10-12

## 2022-10-11 RX ORDER — LOPERAMIDE HYDROCHLORIDE 2 MG/1
CAPSULE ORAL
Qty: 30 CAPSULE | Refills: 1 | Status: SHIPPED | OUTPATIENT
Start: 2022-10-11

## 2022-10-11 RX ORDER — ATROPINE SULFATE 1 MG/ML
0.4 INJECTION, SOLUTION INTRAMUSCULAR; INTRAVENOUS; SUBCUTANEOUS ONCE AS NEEDED
Status: COMPLETED | OUTPATIENT
Start: 2022-10-11 | End: 2022-10-11

## 2022-10-11 RX ORDER — DIPHENHYDRAMINE HYDROCHLORIDE 50 MG/ML
50 INJECTION INTRAMUSCULAR; INTRAVENOUS ONCE AS NEEDED
Status: CANCELLED | OUTPATIENT
Start: 2022-10-11

## 2022-10-11 RX ORDER — EPINEPHRINE 1 MG/ML
0.3 INJECTION, SOLUTION INTRACARDIAC; INTRAMUSCULAR; INTRAVENOUS; SUBCUTANEOUS ONCE AS NEEDED
Status: DISCONTINUED | OUTPATIENT
Start: 2022-10-11 | End: 2022-10-12 | Stop reason: HOSPADM

## 2022-10-11 RX ORDER — HEPARIN 100 UNIT/ML
500 SYRINGE INTRAVENOUS
Status: CANCELLED | OUTPATIENT
Start: 2022-10-11

## 2022-10-11 RX ORDER — SODIUM CHLORIDE 0.9 % (FLUSH) 0.9 %
10 SYRINGE (ML) INJECTION
Status: CANCELLED | OUTPATIENT
Start: 2022-10-11

## 2022-10-11 RX ORDER — ATROPINE SULFATE 0.4 MG/ML
0.4 INJECTION, SOLUTION ENDOTRACHEAL; INTRAMEDULLARY; INTRAMUSCULAR; INTRAVENOUS; SUBCUTANEOUS ONCE AS NEEDED
Status: CANCELLED | OUTPATIENT
Start: 2022-10-11

## 2022-10-11 RX ORDER — DIPHENHYDRAMINE HYDROCHLORIDE 50 MG/ML
50 INJECTION INTRAMUSCULAR; INTRAVENOUS ONCE AS NEEDED
Status: DISCONTINUED | OUTPATIENT
Start: 2022-10-11 | End: 2022-10-12 | Stop reason: HOSPADM

## 2022-10-11 RX ORDER — SODIUM CHLORIDE 0.9 % (FLUSH) 0.9 %
10 SYRINGE (ML) INJECTION
Status: CANCELLED | OUTPATIENT
Start: 2022-10-12

## 2022-10-11 RX ORDER — FLUOROURACIL 50 MG/ML
400 INJECTION, SOLUTION INTRAVENOUS
Status: COMPLETED | OUTPATIENT
Start: 2022-10-11 | End: 2022-10-11

## 2022-10-11 RX ORDER — SODIUM CHLORIDE 9 MG/ML
INJECTION, SOLUTION INTRAVENOUS
Status: CANCELLED
Start: 2022-10-12 | End: 2022-10-12

## 2022-10-11 RX ORDER — EPINEPHRINE 0.3 MG/.3ML
0.3 INJECTION SUBCUTANEOUS ONCE AS NEEDED
Status: CANCELLED | OUTPATIENT
Start: 2022-10-11

## 2022-10-11 RX ADMIN — LEUCOVORIN CALCIUM 715 MG: 350 INJECTION, POWDER, LYOPHILIZED, FOR SOLUTION INTRAMUSCULAR; INTRAVENOUS at 10:10

## 2022-10-11 RX ADMIN — DEXAMETHASONE SODIUM PHOSPHATE 0.25 MG: 4 INJECTION, SOLUTION INTRA-ARTICULAR; INTRALESIONAL; INTRAMUSCULAR; INTRAVENOUS; SOFT TISSUE at 10:10

## 2022-10-11 RX ADMIN — FLUOROURACIL 715 MG: 50 INJECTION, SOLUTION INTRAVENOUS at 12:10

## 2022-10-11 RX ADMIN — SODIUM CHLORIDE 322 MG: 9 INJECTION, SOLUTION INTRAVENOUS at 10:10

## 2022-10-11 RX ADMIN — ATROPINE SULFATE 0.4 MG: 1 INJECTION, SOLUTION INTRAMUSCULAR; INTRAVENOUS; SUBCUTANEOUS at 10:10

## 2022-10-11 RX ADMIN — FLUOROURACIL 4295 MG: 50 INJECTION, SOLUTION INTRAVENOUS at 12:10

## 2022-10-11 RX ADMIN — SODIUM CHLORIDE 300 MG: 9 INJECTION, SOLUTION INTRAVENOUS at 09:10

## 2022-10-11 NOTE — PLAN OF CARE
Problem: Adult Inpatient Plan of Care  Goal: Plan of Care Review  Outcome: Ongoing, Progressing  Flowsheets (Taken 10/11/2022 1004)  Plan of Care Reviewed With: patient  Goal: Patient-Specific Goal (Individualized)  Outcome: Ongoing, Progressing  Flowsheets (Taken 10/11/2022 1005)  Anxieties, Fears or Concerns: None today  Individualized Care Needs: Feet elevated, warm blanket, snacks, tv on  Patient-Specific Goals (Include Timeframe): Tolerate infusion today  Goal: Optimal Comfort and Wellbeing  Outcome: Ongoing, Progressing  Intervention: Provide Person-Centered Care  Flowsheets (Taken 10/11/2022 1005)  Trust Relationship/Rapport:   care explained   choices provided   emotional support provided   empathic listening provided   questions answered   questions encouraged   reassurance provided   thoughts/feelings acknowledged     Problem: Neutropenia (Chemotherapy Effects)  Goal: Absence of Infection  Outcome: Ongoing, Progressing  Intervention: Prevent Infection and Maximize Resistance  Flowsheets (Taken 10/11/2022 1005)  Infection Prevention:   rest/sleep promoted   personal protective equipment utilized   hand hygiene promoted   equipment surfaces disinfected

## 2022-10-11 NOTE — PROGRESS NOTES
Subjective:      DATE OF VISIT: 10/11/22     ?  Patient ID:?Vincent Benton is a 67 y.o. male.?? MR#: [id number]   ?   ? Primary Care Providers:  Shakila Moran DO, DO (General)     CHIEF COMPLAINT: ?? start palliative chemotherapy for recurrent metastatic rectal adenocarcinoma??   ?   ONCOLOGIC DIAGNOSIS:  rectal adenocarcinoma stage IV; initial stage III 2020  ?   CURRENT TREATMENT:  FOLFIRI plus bevacizumab    PAST TREATMENT:   Neoadjuvant chemoradiation with capecitabine, adjuvant FOLFOX x7, 2020  ?   ONCOLOGIC HISTORY:   ?   Oncology History   Rectal cancer   9/11/2019 Initial Diagnosis    Rectal cancer     9/18/2019 Tumor Conference    Multidisciplinary Rectal Cancer Conference - Evaluation and Recommendation Summary    9/17/2019  Vincent Benton  [id number]  63 y.o. male    1. Evaluation    C scope 9/3/19: Obstructing Rectal mass that couldn't be crossed.     Rigid Procto 9/3/19: Left and anterior lesion at 9-10 cm.     Path: Invasive adenocarcinoma, no LVI.     MRI date: 9/9/2019    Tumor Location in Rectum: middle third     Size: 3.6x2.7 cm mass    Nodes: 2 suspected nodes.     Indication of Sphincter Involvement:  Uninvolved    Pretreatment Circumferential Resection Margin (CRM) status:  Not Threatened    Pretreatment (clinical) AJCC Stage:III B vs  IV ?  Stage I  [] I: T1N0M0  [] I: T2N0M0  Stage II  [] IIA: T3N0M0  [] IIB Q3wI2F7  [] IIC: W9uH1U7  Stage III  [] IIIA: T1-2N1M0  [] IIIA: M3O2zB0  [] IIIB: T3-J5gI3B2  [x] IIIB: T2-3N2aM0  [] IIIB: T1-2N2bM0  [] IIIC: U8tI9iM2  [] IIIC: T3-0uS2vN7  [] IIIC: Q6rN2-0T2   Stage IV  [x] IV: H9-9U8-7Q1r-b    CEA level:     Lab Results   Component Value Date    CEA 3.5 09/03/2019       2. Treatment Recommendation    Neoadjuvant Therapy Recommendation:     Short course radiation and Chemotherapy.     PLAN:    MRI and PET if not able to obtain PET will proceed with biopsy of the lung biopsy.     Colonoscopy during Chemotherapy to rule out any other lesions.      Anticipated date and type of surgical procedure:     LAR after    Clinical research study eligibility and/or enrollment: Not eligible     10/8/2019 Cancer Staged    Staging form: Colon and Rectum, AJCC 8th Edition  - Clinical stage from 10/8/2019: Stage IIIB (cT3, cN2a, cM0)       10/15/2019 - 10/15/2019 Chemotherapy    Treatment Summary   Plan Name: OP CAPECITABINE 5 DAYS + RADIOTHERAPY  Treatment Goal: Curative  Status: Inactive  Start Date: [No treatment day found]  End Date: [No treatment day found]  Provider: Mikel Sams MD  Chemotherapy: [No matching medication found in this treatment plan]       10/24/2019 - 12/4/2019 Radiation Therapy    Treatment Site Ref. ID Energy Dose/Fx (Gy) #Fx Dose Correction (Gy) Total Dose (Gy) Start Date End Date Elapsed Days   Pelvis Pelvis 6X 1.8 28 / 28 0 50.4 10/24/2019 12/4/2019 41          3/2/2020 - 7/1/2020 Chemotherapy    Treatment Summary   Plan Name: OP FOLFOX 6 Q2W  Treatment Goal: Curative  Status: Inactive  Start Date: 3/2/2020  End Date: 7/1/2020  Provider: Mikel Sams MD  Chemotherapy: fluorouracil injection 710 mg, 400 mg/m2 = 710 mg, Intravenous, Clinic/HOD 1 time, 8 of 12 cycles  Administration: 710 mg (3/2/2020), 710 mg (3/16/2020), 710 mg (4/6/2020), 710 mg (4/20/2020), 710 mg (5/25/2020), 710 mg (5/12/2020), 710 mg (6/8/2020), 710 mg (6/29/2020)  fluorouracil (ADRUCIL) 2,400 mg/m2 = 4,270 mg in sodium chloride 0.9% 101.2 mL chemo infusion, 2,400 mg/m2 = 4,270 mg, Intravenous, Over 46 hours, 8 of 12 cycles  Administration: 4,270 mg (3/2/2020), 4,270 mg (3/16/2020), 4,270 mg (4/6/2020), 4,270 mg (4/20/2020), 4,270 mg (5/25/2020), 4,270 mg (5/12/2020), 4,270 mg (6/8/2020), 4,270 mg (6/29/2020)  leucovorin calcium 400 mg/m2 = 710 mg in dextrose 5 % 285.5 mL infusion, 400 mg/m2 = 710 mg, Intravenous, [facility] 1 time, 8 of 12 cycles  Administration: 710 mg (3/2/2020), 710 mg (3/16/2020), 700 mg (4/6/2020), 710 mg (4/20/2020), 710 mg  (5/25/2020), 710 mg (5/12/2020), 710 mg (6/8/2020), 700 mg (6/29/2020)  oxaliplatin (ELOXATIN) 85 mg/m2 = 151 mg in dextrose 5 % 530.2 mL chemo infusion, 85 mg/m2 = 151 mg, Intravenous, Clinic/HOD 1 time, 8 of 12 cycles  Administration: 151 mg (3/2/2020), 151 mg (3/16/2020), 150 mg (4/6/2020), 151 mg (4/20/2020), 151 mg (5/25/2020), 151 mg (5/12/2020), 151 mg (6/8/2020), 151 mg (6/29/2020)       8/30/2022 -  Chemotherapy    Treatment Summary   Plan Name: OP COLORECTAL FOLFIRI + BEVACIZUMAB Q2W  Treatment Goal: Palliative  Status: Active  Start Date: 8/30/2022  End Date: 8/16/2023 (Planned)  Provider: Mikel Sams MD  Chemotherapy: fluorouraciL injection 715 mg, 400 mg/m2 = 715 mg, Intravenous, Clinic/HOD 1 time, 4 of 26 cycles  Administration: 715 mg (8/30/2022), 715 mg (9/13/2022), 715 mg (9/27/2022)  fluorouracil (ADRUCIL) 2,400 mg/m2 = 4,295 mg in sodium chloride 0.9% 100 mL chemo infusion, 2,400 mg/m2 = 4,295 mg, Intravenous, Over 46 hours, 3 of 25 cycles  Administration: 4,295 mg (9/13/2022), 4,295 mg (9/27/2022)  irinotecan (CAMPTOSAR) 180 mg/m2 = 322 mg in sodium chloride 0.9% 516.1 mL chemo infusion, 180 mg/m2 = 322 mg, Intravenous, Clinic/HOD 1 time, 4 of 26 cycles  Administration: 322 mg (8/30/2022), 322 mg (9/13/2022), 322 mg (9/27/2022)  bevacizumab-bvzr 300 mg in sodium chloride 0.9% 100 mL infusion, 326 mg, Intravenous, Clinic/HOD 1 time, 4 of 26 cycles  Administration: 300 mg (8/30/2022), 300 mg (9/13/2022), 300 mg (9/27/2022)          Cancer Staging   Rectal cancer  - Clinical stage from 10/8/2019: Stage IIIB (cT3, cN2a, cM0) - Signed by Steven Valles II, MD on 10/8/2019      I had the pleasure meeting for the 1st time Mr. Benton a 66-year-old gentleman under the care primary oncologist Dr. Sams for treatment of rectal cancer status post chemoradiation with capecitabine low anterior resection ypT3 N0 7 cycles adjuvant FOLFOX per prior notes.  He had been in surveillance with restaging  imaging 2022 showing new pulmonary nodules biopsy proven recurrent metastatic disease started on FOLFIRI and Avastin molecular studies positive for mutation NRAS.    HPI    He has been doing well on new regimen FOLFIRI and bevacizumab aside from some diarrhea recently.  He does note some reflux medications using over-the-counter medications.  He request refill of Lomotil.  Blood pressure over most recent visits elevated.  Notes distant use of antihypertensive prescribed by primary care but has not followed up in some time.    Review of Systems    ?   A comprehensive 14-point review of systems was reviewed with patient and was negative other than as specified above.   ?   Objective:      Physical Exam      ?   Vitals:    10/11/22 0831   BP: (!) 156/91   Pulse: (!) 56   Temp: 97.8 °F (36.6 °C)      ?   ECO    General appearance: Generally well appearing, in no acute distress.   Head, eyes, ears, nose, and throat: moist mucous membranes.   Respiratory:  Normal work of breathing  Abdomen: nontender, nondistended.   Extremities: Warm, without edema.   Neurologic: Alert and oriented. Grossly normal strength, coordination, and gait.   Skin: No rashes, ecchymoses or petechial lesion.   Psychiatric:  Normal mood and affect.    Laboratory:  ?   Lab Visit on 10/11/2022   Component Date Value Ref Range Status    Specimen UA 10/11/2022 Urine, Clean Catch   Final    Color, UA 10/11/2022 Yellow  Yellow, Straw, Stefanie Final    Appearance, UA 10/11/2022 Clear  Clear Final    pH, UA 10/11/2022 6.0  5.0 - 8.0 Final    Specific [place], UA 10/11/2022 1.015  1.005 - 1.030 Final    Protein, UA 10/11/2022 Trace (A)  Negative Final    Glucose, UA 10/11/2022 1+ (A)  Negative Final    Ketones, UA 10/11/2022 Negative  Negative Final    Bilirubin (UA) 10/11/2022 Negative  Negative Final    Occult Blood UA 10/11/2022 Negative  Negative Final    Nitrite, UA 10/11/2022 Negative  Negative Final    Urobilinogen, UA 10/11/2022  Negative  <2.0 EU/dL Final    Leukocytes, UA 10/11/2022 Negative  Negative Final   Lab Visit on 10/11/2022   Component Date Value Ref Range Status    WBC 10/11/2022 4.14  3.90 - 12.70 K/uL Final    RBC 10/11/2022 4.55 (L)  4.60 - 6.20 M/uL Final    Hemoglobin 10/11/2022 11.2 (L)  14.0 - 18.0 g/dL Final    Hematocrit 10/11/2022 36.9 (L)  40.0 - 54.0 % Final    MCV 10/11/2022 81 (L)  82 - 98 fL Final    MCH 10/11/2022 24.6 (L)  27.0 - 31.0 pg Final    MCHC 10/11/2022 30.4 (L)  32.0 - 36.0 g/dL Final    RDW 10/11/2022 23.5 (H)  11.5 - 14.5 % Final    Platelets 10/11/2022 294  150 - 450 K/uL Final    MPV 10/11/2022 9.0 (L)  9.2 - 12.9 fL Final    Gran # (ANC) 10/11/2022 3.0  1.8 - 7.7 K/uL Final    Immature Grans (Abs) 10/11/2022 0.02  0.00 - 0.04 K/uL Final    Sodium 10/11/2022 135 (L)  136 - 145 mmol/L Final    Potassium 10/11/2022 4.8  3.5 - 5.1 mmol/L Final    Chloride 10/11/2022 100  95 - 110 mmol/L Final    CO2 10/11/2022 26  23 - 29 mmol/L Final    Glucose 10/11/2022 140 (H)  70 - 110 mg/dL Final    BUN 10/11/2022 9  8 - 23 mg/dL Final    Creatinine 10/11/2022 0.7  0.5 - 1.4 mg/dL Final    Calcium 10/11/2022 10.1  8.7 - 10.5 mg/dL Final    Total Protein 10/11/2022 7.6  6.0 - 8.4 g/dL Final    Albumin 10/11/2022 3.7  3.5 - 5.2 g/dL Final    Total Bilirubin 10/11/2022 0.4  0.1 - 1.0 mg/dL Final    Alkaline Phosphatase 10/11/2022 134  55 - 135 U/L Final    AST 10/11/2022 22  10 - 40 U/L Final    ALT 10/11/2022 19  10 - 44 U/L Final    Anion Gap 10/11/2022 9  8 - 16 mmol/L Final    eGFR 10/11/2022 >60  >60 mL/min/1.73 m^2 Final    Magnesium 10/11/2022 1.9  1.6 - 2.6 mg/dL Final      ?   Lab Results   Component Value Date    .3 (H) 09/19/2022       ?   Imaging:  ?    Results for orders placed or performed during the hospital encounter of 07/20/22 (from the past 2160 hour(s))   CT Guided Needle Placement    Impression    CT guided biopsy of a right hepatic lobe mass which  demonstrated increased metabolic activity on PET scan.    All CT scans at this facility use dose modulation, iterative reconstruction, and/or weight based dosing when appropriate to reduce radiation dose to as low as reasonable achievable.      Electronically signed by: Avila Edward MD  Date:    07/20/2022  Time:    14:53     No results found for this or any previous visit (from the past 2160 hour(s)).    No results found for this or any previous visit (from the past 2160 hour(s)).        Pathology:      Reviewed in epic     ?   Assessment/Plan:   Iron deficiency anemia due to chronic blood loss    Diarrhea, unspecified  -     loperamide (IMODIUM) 2 mg capsule; Take 1 capsule (2 mg total) by mouth 4 (four) times daily as needed for Diarrhea.  Dispense: 30 capsule; Refill: 1    Secondary liver cancer    Diarrhea, unspecified type    Rectal cancer  -     Cancel: bevacizumab-bvzr 326 mg in sodium chloride 0.9% 113.04 mL infusion  -     Cancel: palonosetron (ALOXI) 0.25 mg with Dexamethasone (DECADRON) 12 mg in NS 50 mL IVPB  -     Cancel: irinotecan (CAMPTOSAR) 180 mg/m2 = 322 mg in sodium chloride 0.9% 500 mL chemo infusion  -     Cancel: leucovorin calcium 400 mg/m2 = 715 mg in sodium chloride 0.9% 250 mL infusion  -     Cancel: fluorouraciL injection 715 mg  -     Cancel: fluorouracil (ADRUCIL) 2,400 mg/m2 = 4,295 mg in sodium chloride 0.9% 185.9 mL chemo infusion  -     Cancel: atropine injection 0.4 mg  -     Cancel: EPINEPHrine (EPIPEN) 0.3 mg/0.3 mL pen injection 0.3 mg  -     Cancel: diphenhydrAMINE injection 50 mg  -     Cancel: hydrocortisone sodium succinate injection 100 mg    Hypertension, unspecified type    Immunodeficiency due to chemotherapy    Other orders  -     sodium chloride 0.9% 250 mL flush bag  -     sodium chloride 0.9% flush 10 mL  -     heparin, porcine (PF) 100 unit/mL injection flush 500 Units  -     alteplase injection 2 mg  -     0.9%  NaCl infusion  -     sodium chloride 0.9% flush 10  mL  -     heparin, porcine (PF) 100 unit/mL injection flush 500 Units  -     alteplase injection 2 mg     1. Iron deficiency anemia due to chronic blood loss    2. Diarrhea, unspecified    3. Secondary liver cancer    4. Diarrhea, unspecified type    5. Rectal cancer    6. Hypertension, unspecified type    7. Immunodeficiency due to chemotherapy            Plan:     Problem List Items Addressed This Visit          Oncology    Iron deficiency anemia due to chronic blood loss - Primary    Rectal cancer    Secondary liver cancer     Other Visit Diagnoses       Diarrhea, unspecified        Diarrhea, unspecified type        Hypertension, unspecified type        Immunodeficiency due to chemotherapy                Recurrent metastatic stage IV rectal adenocarcinoma, metastatic to lung and liver, biopsy proven  Metastatic disease in liver, NRAS mutation positive colon primary oncologist Dr. Sams plans for FOLFIRI and bevacizumab (received initial diagnosis 2020 stage III rectal adenocarcinoma status post neoadjuvant capecitabine based chemoradiation and adjuvant FOLFOX).  cycle 1 day 1 08/30/2022  Tolerating well aside from some diarrhea and reflux.  Refilled Lomotil.  No signs of dehydration.  Over-the-counter PPI use.  Will plan on restaging scans and December 2022 after about 3 months on therapy.    Of note hypertension on several occasions in discuss with importance of managing follow-up with primary care he expressed understanding and agree with this plan.  Currently asymptomatic for hypertension.    Iron deficiency anemia:  On IV iron therapy planned on day 3.  Continue monitor.  No evidence of bleeding.    Follow-Up:   Chemotherapy cycle 4 today disconnect in 2 days with IV iron therapy planned on day 3   Revisit 2 weeks with nurse practitioner CBC CMP prior and cycle 5 planned  Okay to get urinalysis every 4 weeks  Would plan on restaging imaging in December 2022

## 2022-10-14 ENCOUNTER — INFUSION (OUTPATIENT)
Dept: INFUSION THERAPY | Facility: HOSPITAL | Age: 67
End: 2022-10-14
Attending: NURSE PRACTITIONER
Payer: MEDICARE

## 2022-10-14 VITALS
OXYGEN SATURATION: 98 % | DIASTOLIC BLOOD PRESSURE: 86 MMHG | SYSTOLIC BLOOD PRESSURE: 157 MMHG | RESPIRATION RATE: 16 BRPM | TEMPERATURE: 98 F | HEART RATE: 58 BPM

## 2022-10-14 DIAGNOSIS — C20 RECTAL CANCER: ICD-10-CM

## 2022-10-14 DIAGNOSIS — D50.0 IRON DEFICIENCY ANEMIA DUE TO CHRONIC BLOOD LOSS: Primary | ICD-10-CM

## 2022-10-14 PROCEDURE — 96361 HYDRATE IV INFUSION ADD-ON: CPT

## 2022-10-14 PROCEDURE — 25000003 PHARM REV CODE 250: Performed by: INTERNAL MEDICINE

## 2022-10-14 PROCEDURE — 63600175 PHARM REV CODE 636 W HCPCS: Performed by: NURSE PRACTITIONER

## 2022-10-14 PROCEDURE — 96374 THER/PROPH/DIAG INJ IV PUSH: CPT

## 2022-10-14 RX ORDER — HEPARIN 100 UNIT/ML
500 SYRINGE INTRAVENOUS
Status: DISCONTINUED | OUTPATIENT
Start: 2022-10-14 | End: 2022-10-14 | Stop reason: HOSPADM

## 2022-10-14 RX ORDER — HEPARIN 100 UNIT/ML
500 SYRINGE INTRAVENOUS
Status: CANCELLED | OUTPATIENT
Start: 2022-10-21

## 2022-10-14 RX ORDER — SODIUM CHLORIDE 9 MG/ML
INJECTION, SOLUTION INTRAVENOUS
Status: COMPLETED | OUTPATIENT
Start: 2022-10-14 | End: 2022-10-14

## 2022-10-14 RX ORDER — SODIUM CHLORIDE 0.9 % (FLUSH) 0.9 %
10 SYRINGE (ML) INJECTION
Status: CANCELLED | OUTPATIENT
Start: 2022-10-21

## 2022-10-14 RX ADMIN — HEPARIN 500 UNITS: 100 SYRINGE at 12:10

## 2022-10-14 RX ADMIN — IRON SUCROSE 100 MG: 20 INJECTION, SOLUTION INTRAVENOUS at 10:10

## 2022-10-14 RX ADMIN — SODIUM CHLORIDE 1000 ML: 9 INJECTION, SOLUTION INTRAVENOUS at 10:10

## 2022-10-14 NOTE — PLAN OF CARE
Problem: Adult Inpatient Plan of Care  Goal: Plan of Care Review  Outcome: Ongoing, Progressing  Flowsheets (Taken 10/14/2022 1038)  Plan of Care Reviewed With: patient  Goal: Patient-Specific Goal (Individualized)  Outcome: Ongoing, Progressing  Flowsheets (Taken 10/14/2022 1038)  Anxieties, Fears or Concerns: No concerns at this time  Individualized Care Needs: Legs elevated, warm blanket/pillow and snacks provided. TV on  Patient-Specific Goals (Include Timeframe): Tolerate Venofer and IVF with no adverse effects  Goal: Optimal Comfort and Wellbeing  Outcome: Ongoing, Progressing     Problem: Anemia  Goal: Anemia Symptom Improvement  Outcome: Ongoing, Progressing     Problem: Fall Injury Risk  Goal: Absence of Fall and Fall-Related Injury  Outcome: Ongoing, Progressing     Problem: Fluid Volume Deficit  Goal: Fluid Balance  Outcome: Ongoing, Progressing

## 2022-10-19 LAB
ONEOME COMMENT: NORMAL
ONEOME METHOD: NORMAL

## 2022-10-20 ENCOUNTER — TELEPHONE (OUTPATIENT)
Dept: PAIN MEDICINE | Facility: CLINIC | Age: 67
End: 2022-10-20
Payer: MEDICARE

## 2022-10-22 NOTE — PROGRESS NOTES
Subjective:       Patient ID: Vincent Benton is a 67 y.o. male.    Chief Complaint: Rectal Cancer and Chemotherapy    Primary Oncologist/Hematologist: Dr. Tuttle    HPI: Mr. Benton is a 67 year old male who is following up for his rectal adenocarcinoma stage IV; initial stage III 2020. He is here for cycle 5 day 1 of FOLFIRI + bevacizumab q 2 weeks.  Seeing PCP for HTN management.  Cancer HX: Neoadjuvant chemoradiation with capecitabine, adjuvant FOLFOX x7, 2020    Today: Uses lomotil for diarrhea, no longer needed.  Patient states he has been drinking boost and Pedialyte.  He states that his appetite has been horrible.  He tries to stay hydrated.  He does have pain in his back and his hip.  He states that his fentanyl patch and Norco do not help.  He denies any nausea, vomiting, fevers, constipation, fatigue.  He states he does have a chronic cough it is not like a cold feeling.  He denies any sore throat or other illnesses.  He states he called his PCP for an appointment for hypertension and they would call him back but they have not done so yet.  Patient's white blood cell count is a little increased, denies any steroid injections or infections. He was supposed to have injection for his back but they didn't want to while he was on active treatment with pump. Patient is sad about current diagnosis.     Social History     Socioeconomic History    Marital status:    Tobacco Use    Smoking status: Never    Smokeless tobacco: Never   Substance and Sexual Activity    Alcohol use: Yes     Comment: occassionally No alcohol 72h prior to sx    Drug use: No    Sexual activity: Never     Partners: Female       Past Medical History:   Diagnosis Date    Alcoholism     Anemia     Back pain     Encounter for blood transfusion 2018    Essential hypertension 2/4/2016    GERD (gastroesophageal reflux disease)     Hiatal hernia     Hypertension     diet controlled    Melanoma 06/2021    left cheek    Rectal cancer  9/11/2019       Family History   Problem Relation Age of Onset    Cataracts Mother     Stroke Father     Hypertension Father        Past Surgical History:   Procedure Laterality Date    ARTHROSCOPY OF KNEE Right 5/5/2021    Procedure: ARTHROSCOPY, KNEE;  Surgeon: Delonte Mcintyre MD;  Location: [facility] OR;  Service: Orthopedics;  Laterality: Right;    COLONOSCOPY N/A 9/3/2019    Procedure: COLONOSCOPY;  Surgeon: Isai Centeno MD;  Location: [facility] ENDO;  Service: Endoscopy;  Laterality: N/A;    COLONOSCOPY N/A 1/10/2020    Procedure: COLONOSCOPY;  Surgeon: Familia Gonzalez MD;  Location: [facility] ENDO;  Service: General;  Laterality: N/A;    COLONOSCOPY N/A 3/24/2021    Procedure: COLONOSCOPY;  Surgeon: Familia Gonzalez MD;  Location: [facility] ENDO;  Service: General;  Laterality: N/A;    ESOPHAGOGASTRODUODENOSCOPY N/A 9/3/2019    Procedure: ESOPHAGOGASTRODUODENOSCOPY (EGD);  Surgeon: Isai Centeno MD;  Location: [facility] ENDO;  Service: Endoscopy;  Laterality: N/A;    EXCISION OF MEDIAL MENISCUS OF KNEE Right 5/5/2021    Procedure: MENISCECTOMY, KNEE, MEDIAL;  Surgeon: Delonte Mcintyre MD;  Location: [facility] OR;  Service: Orthopedics;  Laterality: Right;  partial Lateral    FLEXIBLE SIGMOIDOSCOPY  2/4/2020    Procedure: SIGMOIDOSCOPY, FLEXIBLE;  Surgeon: Familia Gonzalez MD;  Location: [facility];  Service: General;;    ILEOSTOMY Right 2/4/2020    Procedure: CREATION, ILEOSTOMY;  Surgeon: Familia Gonzalez MD;  Location: [facility] OR;  Service: General;  Laterality: Right;    ILEOSTOMY CLOSURE N/A 8/4/2020    Procedure: CLOSURE, ILEOSTOMY;  Surgeon: Familia Gonzalez MD;  Location: [facility] OR;  Service: General;  Laterality: N/A;    INCISION AND DRAINAGE OF BACK Left 8/5/2020    Procedure: INCISION AND DRAINAGE, BACK;  Surgeon: Familia Gonzalez MD;  Location: [facility] OR;  Service: General;  Laterality: Left;    INJECTION OF ANESTHETIC AGENT INTO TISSUE PLANE DEFINED BY TRANSVERSUS ABDOMINIS MUSCLE N/A 2/4/2020    Procedure:  BLOCK, TRANSVERSUS ABDOMINIS PLANE;  Surgeon: Familia Gonzalez MD;  Location: [facility] OR;  Service: General;  Laterality: N/A;    INSERTION OF TUNNELED CENTRAL VENOUS CATHETER (CVC) WITH SUBCUTANEOUS PORT Right 2/4/2020    Procedure: INSERTION, PORT-A-CATH;  Surgeon: Familia Gonzalez MD;  Location: [facility] OR;  Service: General;  Laterality: Right;    INSERTION OF TUNNELED CENTRAL VENOUS CATHETER (CVC) WITH SUBCUTANEOUS PORT N/A 8/16/2022    Procedure: [id number];  Surgeon: Familia Gonzalez MD;  Location: [facility];  Service: General;  Laterality: N/A;  right subclavian    JOINT REPLACEMENT Left 2009    Hip    KNEE ARTHROSCOPY W/ PLICA EXCISION Right 5/5/2021    Procedure: EXCISION, PLICA, KNEE, ARTHROSCOPIC;  Surgeon: Delonte Mcintyre MD;  Location: [facility];  Service: Orthopedics;  Laterality: Right;    MOBILIZATION OF SPLENIC FLEXURE  2/4/2020    Procedure: MOBILIZATION, SPLENIC FLEXURE;  Surgeon: Familia Gonzalez MD;  Location: [facility];  Service: General;;    REMOVAL OF HARDWARE FROM HIP Left 1/4/2022    Procedure: REMOVAL, HARDWARE, HIP;  Surgeon: Delonte Mcintyre MD;  Location: [facility];  Service: Orthopedics;  Laterality: Left;       Review of Systems   Constitutional:  Positive for appetite change. Negative for activity change, chills, diaphoresis, fatigue, fever and unexpected weight change.   HENT:  Negative for congestion, nosebleeds, postnasal drip and rhinorrhea.    Respiratory:  Positive for cough. Negative for shortness of breath.    Cardiovascular:  Negative for chest pain and leg swelling.   Gastrointestinal:  Negative for abdominal pain, constipation, diarrhea, nausea and vomiting.   Genitourinary:  Negative for difficulty urinating and hematuria.   Musculoskeletal:  Positive for arthralgias, back pain and gait problem. Negative for myalgias.   Skin:  Negative for color change and rash.   Allergic/Immunologic: Positive for immunocompromised state.   Neurological:  Negative for  dizziness, light-headedness and headaches.       Medication List with Changes/Refills   Current Medications    ASCORBIC ACID, VITAMIN C, (VITAMIN C) 1000 MG TABLET    Take 1,000 mg by mouth once daily.    DEXAMETHASONE (DECADRON) 4 MG TAB    Take 2 tablets (8 mg total) by mouth once daily. Take as directed on days 2 and 3 of your chemotherapy cycle.    FENTANYL (DURAGESIC) 25 MCG/HR    Place 1 patch onto the skin every 72 hours.    GABAPENTIN (NEURONTIN) 300 MG CAPSULE    Take 1 capsule (300 mg total) by mouth every evening.    HYDROCODONE-ACETAMINOPHEN (NORCO)  MG PER TABLET    Take 1 tablet by mouth every 6 (six) hours as needed for Pain.    LOPERAMIDE (IMODIUM) 2 MG CAPSULE    Take 1 capsule (2 mg total) by mouth 4 (four) times daily as needed for Diarrhea.    MULTIVITAMIN CAPSULE    Take 1 capsule by mouth once daily.    OMEPRAZOLE (PRILOSEC) 20 MG CAPSULE    Take 1 capsule (20 mg total) by mouth once daily.    ONDANSETRON (ZOFRAN-ODT) 8 MG TBDL    Dissolve 1 tablet (8 mg total) by mouth every 8 (eight) hours as needed (nausea/vomiting).     Objective:     Vitals:    10/24/22 1417   BP: (!) 153/95   Pulse: 84   Temp: 97.7 °F (36.5 °C)       Physical Exam  Constitutional:       General: He is not in acute distress.     Appearance: He is not ill-appearing, toxic-appearing or diaphoretic.   HENT:      Head: Normocephalic and atraumatic.   Cardiovascular:      Rate and Rhythm: Normal rate.   Pulmonary:      Effort: Pulmonary effort is normal.   Abdominal:      General: Bowel sounds are normal. There is no distension.      Tenderness: There is no abdominal tenderness.      Comments: Hernia present   Musculoskeletal:      Right lower leg: No edema.      Left lower leg: No edema.   Skin:     General: Skin is warm and dry.      Coloration: Skin is not jaundiced or pale.      Findings: No bruising or erythema.   Neurological:      Mental Status: He is alert.      Gait: Gait abnormal.   Psychiatric:         Mood and  Affect: Mood normal.         Behavior: Behavior normal.          Labs/Results:    Lab Results   Component Value Date    WBC 14.07 (H) 10/24/2022    RBC 4.34 (L) 10/24/2022    HGB 11.3 (L) 10/24/2022    HCT 35.1 (L) 10/24/2022    MCV 81 (L) 10/24/2022    MCH 26.0 (L) 10/24/2022    MCHC 32.2 10/24/2022    RDW 26.4 (H) 10/24/2022     10/24/2022    MPV 9.1 (L) 10/24/2022    GRAN 12.9 (H) 10/24/2022    GRAN 91.5 (H) 10/24/2022    LYMPH 0.4 (L) 10/24/2022    LYMPH 2.8 (L) 10/24/2022    MONO 0.7 10/24/2022    MONO 5.2 10/24/2022    EOS 0.0 10/24/2022    BASO 0.01 10/24/2022    EOSINOPHIL 0.0 10/24/2022    BASOPHIL 0.1 10/24/2022     CMP  Sodium   Date Value Ref Range Status   10/24/2022 136 136 - 145 mmol/L Final     Potassium   Date Value Ref Range Status   10/24/2022 4.1 3.5 - 5.1 mmol/L Final     Chloride   Date Value Ref Range Status   10/24/2022 98 95 - 110 mmol/L Final     CO2   Date Value Ref Range Status   10/24/2022 28 23 - 29 mmol/L Final     Glucose   Date Value Ref Range Status   10/24/2022 124 (H) 70 - 110 mg/dL Final     BUN   Date Value Ref Range Status   10/24/2022 15 8 - 23 mg/dL Final     Creatinine   Date Value Ref Range Status   10/24/2022 0.7 0.5 - 1.4 mg/dL Final     Calcium   Date Value Ref Range Status   10/24/2022 9.4 8.7 - 10.5 mg/dL Final     Total Protein   Date Value Ref Range Status   10/24/2022 7.3 6.0 - 8.4 g/dL Final     Albumin   Date Value Ref Range Status   10/24/2022 3.8 3.5 - 5.2 g/dL Final     Total Bilirubin   Date Value Ref Range Status   10/24/2022 0.9 0.1 - 1.0 mg/dL Final     Comment:     For infants and newborns, interpretation of results should be based  on gestational age, weight and in agreement with clinical  observations.    Premature Infant recommended reference ranges:  Up to 24 hours.............<8.0 mg/dL  Up to 48 hours............<12.0 mg/dL  3-5 days..................<15.0 mg/dL  6-29 days.................<15.0 mg/dL       Alkaline Phosphatase   Date Value Ref  Range Status   10/24/2022 109 55 - 135 U/L Final     AST   Date Value Ref Range Status   10/24/2022 26 10 - 40 U/L Final     ALT   Date Value Ref Range Status   10/24/2022 28 10 - 44 U/L Final     Anion Gap   Date Value Ref Range Status   10/24/2022 10 8 - 16 mmol/L Final     eGFR if    Date Value Ref Range Status   07/20/2022 >60 >60 mL/min/1.73 m^2 Final     eGFR if non    Date Value Ref Range Status   07/20/2022 >60 >60 mL/min/1.73 m^2 Final     Comment:     Calculation used to obtain the estimated glomerular filtration  rate (eGFR) is the CKD-EPI equation.              Latest Reference Range & Units 10/24/22 13:55   Specimen UA  Urine, Clean Catch   Color, UA Yellow, Straw, Stefanie  Yellow   Appearance, UA Clear  Clear   Specific Gravity, UA 1.005 - 1.030  1.020   pH, UA 5.0 - 8.0  6.0   Protein, UA Negative  1+ !   Glucose, UA Negative  2+ !   Ketones, UA Negative  Negative   Occult Blood UA Negative  Trace !   NITRITE UA Negative  Negative   UROBILINOGEN UA <2.0 EU/dL Negative   Bilirubin (UA) Negative  Negative   Leukocytes, UA Negative  Negative     Pet scan 6/28/22  Impression:  New bilateral pulmonary nodules and FDG avid multifocal hepatic lesions concerning for metastatic disease.  Small focus of uptake along the posterior right aspect of the rectum in the region of a suture line raising concern for local recurrence as well.  Nonspecific FDG uptake in the right hemiscrotum.  Correlation with nonemergent ultrasound could be performed for further evaluation.  Additional findings as above    Assessment:     Problem List Items Addressed This Visit          Neuro    Neuropathy       Oncology    Neoplasm related pain    Iron deficiency anemia due to chronic blood loss    Relevant Orders    Comprehensive Metabolic Panel    CBC Auto Differential    Rectal cancer - Primary    Relevant Orders    Comprehensive Metabolic Panel    CBC Auto Differential    Rectal adenocarcinoma     Secondary liver cancer    Relevant Orders    Comprehensive Metabolic Panel    CBC Auto Differential     Plan:     Rectal cancer  --Recurrent metastatic stage IV rectal adenocarcinoma, metastatic to lung and liver, biopsy proven  Metastatic disease in liver, NRAS mutation positive colon cx  --received initial diagnosis 2020 stage III rectal adenocarcinoma status post neoadjuvant capecitabine based chemoradiation and adjuvant FOLFOX  --lomotil for diarrhea  --restaging scan in December 2022 after 3 months of therapy  --continue with cycle 5 day 1 of FOLFIRI + bevacizumab  --UA q 4 weeks-1+ protein  --ANC:12.9, plts:335, bili:0.9, BP: 153/95  --follow with PCP for HTN management    Iron deficiency anemia due to chronic blood loss  --needs 3 more doses of IV iron on days of disconnect.     Follow-Up: 2 weeks with cbc cmp prior for next cycle    Sumaya Pierce PA-C  Hematology Oncology

## 2022-10-24 ENCOUNTER — LAB VISIT (OUTPATIENT)
Dept: LAB | Facility: HOSPITAL | Age: 67
End: 2022-10-24
Attending: INTERNAL MEDICINE
Payer: MEDICARE

## 2022-10-24 ENCOUNTER — OFFICE VISIT (OUTPATIENT)
Dept: HEMATOLOGY/ONCOLOGY | Facility: CLINIC | Age: 67
End: 2022-10-24
Payer: MEDICARE

## 2022-10-24 VITALS
SYSTOLIC BLOOD PRESSURE: 153 MMHG | TEMPERATURE: 98 F | OXYGEN SATURATION: 99 % | BODY MASS INDEX: 18.12 KG/M2 | HEIGHT: 70 IN | WEIGHT: 126.56 LBS | DIASTOLIC BLOOD PRESSURE: 95 MMHG | HEART RATE: 84 BPM

## 2022-10-24 DIAGNOSIS — D50.0 IRON DEFICIENCY ANEMIA DUE TO CHRONIC BLOOD LOSS: ICD-10-CM

## 2022-10-24 DIAGNOSIS — C20 RECTAL ADENOCARCINOMA: ICD-10-CM

## 2022-10-24 DIAGNOSIS — G89.3 NEOPLASM RELATED PAIN: ICD-10-CM

## 2022-10-24 DIAGNOSIS — G62.9 NEUROPATHY: ICD-10-CM

## 2022-10-24 DIAGNOSIS — C20 RECTAL CANCER: Primary | ICD-10-CM

## 2022-10-24 DIAGNOSIS — C78.7 SECONDARY LIVER CANCER: ICD-10-CM

## 2022-10-24 LAB
ALBUMIN SERPL BCP-MCNC: 3.8 G/DL (ref 3.5–5.2)
ALP SERPL-CCNC: 109 U/L (ref 55–135)
ALT SERPL W/O P-5'-P-CCNC: 28 U/L (ref 10–44)
ANION GAP SERPL CALC-SCNC: 10 MMOL/L (ref 8–16)
AST SERPL-CCNC: 26 U/L (ref 10–40)
BASOPHILS # BLD AUTO: 0.01 K/UL (ref 0–0.2)
BASOPHILS NFR BLD: 0.1 % (ref 0–1.9)
BILIRUB SERPL-MCNC: 0.9 MG/DL (ref 0.1–1)
BUN SERPL-MCNC: 15 MG/DL (ref 8–23)
CALCIUM SERPL-MCNC: 9.4 MG/DL (ref 8.7–10.5)
CHLORIDE SERPL-SCNC: 98 MMOL/L (ref 95–110)
CO2 SERPL-SCNC: 28 MMOL/L (ref 23–29)
CREAT SERPL-MCNC: 0.7 MG/DL (ref 0.5–1.4)
DIFFERENTIAL METHOD: ABNORMAL
EOSINOPHIL # BLD AUTO: 0 K/UL (ref 0–0.5)
EOSINOPHIL NFR BLD: 0 % (ref 0–8)
ERYTHROCYTE [DISTWIDTH] IN BLOOD BY AUTOMATED COUNT: 26.4 % (ref 11.5–14.5)
EST. GFR  (NO RACE VARIABLE): >60 ML/MIN/1.73 M^2
GLUCOSE SERPL-MCNC: 124 MG/DL (ref 70–110)
HCT VFR BLD AUTO: 35.1 % (ref 40–54)
HGB BLD-MCNC: 11.3 G/DL (ref 14–18)
IMM GRANULOCYTES # BLD AUTO: 0.05 K/UL (ref 0–0.04)
IMM GRANULOCYTES NFR BLD AUTO: 0.4 % (ref 0–0.5)
LYMPHOCYTES # BLD AUTO: 0.4 K/UL (ref 1–4.8)
LYMPHOCYTES NFR BLD: 2.8 % (ref 18–48)
MCH RBC QN AUTO: 26 PG (ref 27–31)
MCHC RBC AUTO-ENTMCNC: 32.2 G/DL (ref 32–36)
MCV RBC AUTO: 81 FL (ref 82–98)
MONOCYTES # BLD AUTO: 0.7 K/UL (ref 0.3–1)
MONOCYTES NFR BLD: 5.2 % (ref 4–15)
NEUTROPHILS # BLD AUTO: 12.9 K/UL (ref 1.8–7.7)
NEUTROPHILS NFR BLD: 91.5 % (ref 38–73)
NRBC BLD-RTO: 0 /100 WBC
PLATELET # BLD AUTO: 335 K/UL (ref 150–450)
PMV BLD AUTO: 9.1 FL (ref 9.2–12.9)
POTASSIUM SERPL-SCNC: 4.1 MMOL/L (ref 3.5–5.1)
PROT SERPL-MCNC: 7.3 G/DL (ref 6–8.4)
RBC # BLD AUTO: 4.34 M/UL (ref 4.6–6.2)
SODIUM SERPL-SCNC: 136 MMOL/L (ref 136–145)
WBC # BLD AUTO: 14.07 K/UL (ref 3.9–12.7)

## 2022-10-24 PROCEDURE — 3288F FALL RISK ASSESSMENT DOCD: CPT | Mod: CPTII,S$GLB,,

## 2022-10-24 PROCEDURE — 3080F DIAST BP >= 90 MM HG: CPT | Mod: CPTII,S$GLB,,

## 2022-10-24 PROCEDURE — 99999 PR PBB SHADOW E&M-EST. PATIENT-LVL III: ICD-10-PCS | Mod: PBBFAC,,,

## 2022-10-24 PROCEDURE — 1101F PR PT FALLS ASSESS DOC 0-1 FALLS W/OUT INJ PAST YR: ICD-10-PCS | Mod: CPTII,S$GLB,,

## 2022-10-24 PROCEDURE — 1159F MED LIST DOCD IN RCRD: CPT | Mod: CPTII,S$GLB,,

## 2022-10-24 PROCEDURE — 1157F ADVNC CARE PLAN IN RCRD: CPT | Mod: CPTII,S$GLB,,

## 2022-10-24 PROCEDURE — 1157F PR ADVANCE CARE PLAN OR EQUIV PRESENT IN MEDICAL RECORD: ICD-10-PCS | Mod: CPTII,S$GLB,,

## 2022-10-24 PROCEDURE — 3288F PR FALLS RISK ASSESSMENT DOCUMENTED: ICD-10-PCS | Mod: CPTII,S$GLB,,

## 2022-10-24 PROCEDURE — 3080F PR MOST RECENT DIASTOLIC BLOOD PRESSURE >= 90 MM HG: ICD-10-PCS | Mod: CPTII,S$GLB,,

## 2022-10-24 PROCEDURE — 1101F PT FALLS ASSESS-DOCD LE1/YR: CPT | Mod: CPTII,S$GLB,,

## 2022-10-24 PROCEDURE — 1159F PR MEDICATION LIST DOCUMENTED IN MEDICAL RECORD: ICD-10-PCS | Mod: CPTII,S$GLB,,

## 2022-10-24 PROCEDURE — 3077F PR MOST RECENT SYSTOLIC BLOOD PRESSURE >= 140 MM HG: ICD-10-PCS | Mod: CPTII,S$GLB,,

## 2022-10-24 PROCEDURE — 3077F SYST BP >= 140 MM HG: CPT | Mod: CPTII,S$GLB,,

## 2022-10-24 PROCEDURE — 99215 OFFICE O/P EST HI 40 MIN: CPT | Mod: S$GLB,,,

## 2022-10-24 PROCEDURE — 99999 PR PBB SHADOW E&M-EST. PATIENT-LVL III: CPT | Mod: PBBFAC,,,

## 2022-10-24 PROCEDURE — 80053 COMPREHEN METABOLIC PANEL: CPT | Performed by: INTERNAL MEDICINE

## 2022-10-24 PROCEDURE — 85025 COMPLETE CBC W/AUTO DIFF WBC: CPT | Performed by: INTERNAL MEDICINE

## 2022-10-24 PROCEDURE — 36415 COLL VENOUS BLD VENIPUNCTURE: CPT | Performed by: INTERNAL MEDICINE

## 2022-10-24 PROCEDURE — 99215 PR OFFICE/OUTPT VISIT, EST, LEVL V, 40-54 MIN: ICD-10-PCS | Mod: S$GLB,,,

## 2022-10-25 ENCOUNTER — INFUSION (OUTPATIENT)
Dept: INFUSION THERAPY | Facility: HOSPITAL | Age: 67
End: 2022-10-25
Attending: INTERNAL MEDICINE
Payer: MEDICARE

## 2022-10-25 VITALS
OXYGEN SATURATION: 98 % | TEMPERATURE: 97 F | RESPIRATION RATE: 16 BRPM | SYSTOLIC BLOOD PRESSURE: 160 MMHG | DIASTOLIC BLOOD PRESSURE: 75 MMHG | HEART RATE: 55 BPM

## 2022-10-25 DIAGNOSIS — C20 RECTAL CANCER: Primary | ICD-10-CM

## 2022-10-25 DIAGNOSIS — G89.3 NEOPLASM RELATED PAIN: ICD-10-CM

## 2022-10-25 PROCEDURE — 96368 THER/DIAG CONCURRENT INF: CPT

## 2022-10-25 PROCEDURE — 96411 CHEMO IV PUSH ADDL DRUG: CPT

## 2022-10-25 PROCEDURE — 96416 CHEMO PROLONG INFUSE W/PUMP: CPT

## 2022-10-25 PROCEDURE — 63600175 PHARM REV CODE 636 W HCPCS: Performed by: INTERNAL MEDICINE

## 2022-10-25 PROCEDURE — 96417 CHEMO IV INFUS EACH ADDL SEQ: CPT

## 2022-10-25 PROCEDURE — 96415 CHEMO IV INFUSION ADDL HR: CPT

## 2022-10-25 PROCEDURE — 96375 TX/PRO/DX INJ NEW DRUG ADDON: CPT

## 2022-10-25 PROCEDURE — 96413 CHEMO IV INFUSION 1 HR: CPT

## 2022-10-25 PROCEDURE — 96367 TX/PROPH/DG ADDL SEQ IV INF: CPT

## 2022-10-25 PROCEDURE — 25000003 PHARM REV CODE 250: Performed by: INTERNAL MEDICINE

## 2022-10-25 RX ORDER — FENTANYL 25 UG/1
1 PATCH TRANSDERMAL
Qty: 5 PATCH | Refills: 0 | Status: ON HOLD | OUTPATIENT
Start: 2022-10-25 | End: 2022-11-07 | Stop reason: HOSPADM

## 2022-10-25 RX ORDER — SODIUM CHLORIDE 0.9 % (FLUSH) 0.9 %
10 SYRINGE (ML) INJECTION
Status: CANCELLED | OUTPATIENT
Start: 2022-10-25

## 2022-10-25 RX ORDER — ATROPINE SULFATE 1 MG/ML
0.4 INJECTION, SOLUTION INTRAMUSCULAR; INTRAVENOUS; SUBCUTANEOUS ONCE AS NEEDED
Status: COMPLETED | OUTPATIENT
Start: 2022-10-25 | End: 2022-10-25

## 2022-10-25 RX ORDER — DIPHENHYDRAMINE HYDROCHLORIDE 50 MG/ML
50 INJECTION INTRAMUSCULAR; INTRAVENOUS ONCE AS NEEDED
Status: CANCELLED | OUTPATIENT
Start: 2022-10-25

## 2022-10-25 RX ORDER — EPINEPHRINE 0.3 MG/.3ML
0.3 INJECTION SUBCUTANEOUS ONCE AS NEEDED
Status: CANCELLED | OUTPATIENT
Start: 2022-10-25

## 2022-10-25 RX ORDER — HEPARIN 100 UNIT/ML
500 SYRINGE INTRAVENOUS
Status: DISCONTINUED | OUTPATIENT
Start: 2022-10-25 | End: 2022-10-25 | Stop reason: HOSPADM

## 2022-10-25 RX ORDER — HYDROCODONE BITARTRATE AND ACETAMINOPHEN 10; 325 MG/1; MG/1
1 TABLET ORAL EVERY 6 HOURS PRN
Qty: 75 TABLET | Refills: 0 | Status: ON HOLD | OUTPATIENT
Start: 2022-10-25 | End: 2022-11-07 | Stop reason: HOSPADM

## 2022-10-25 RX ORDER — SODIUM CHLORIDE 9 MG/ML
INJECTION, SOLUTION INTRAVENOUS
Status: CANCELLED
Start: 2022-10-27 | End: 2022-10-27

## 2022-10-25 RX ORDER — SODIUM CHLORIDE 0.9 % (FLUSH) 0.9 %
10 SYRINGE (ML) INJECTION
Status: DISCONTINUED | OUTPATIENT
Start: 2022-10-25 | End: 2022-10-25 | Stop reason: HOSPADM

## 2022-10-25 RX ORDER — SODIUM CHLORIDE 0.9 % (FLUSH) 0.9 %
10 SYRINGE (ML) INJECTION
Status: CANCELLED | OUTPATIENT
Start: 2022-10-27

## 2022-10-25 RX ORDER — FLUOROURACIL 50 MG/ML
400 INJECTION, SOLUTION INTRAVENOUS
Status: COMPLETED | OUTPATIENT
Start: 2022-10-25 | End: 2022-10-25

## 2022-10-25 RX ORDER — HEPARIN 100 UNIT/ML
500 SYRINGE INTRAVENOUS
Status: CANCELLED | OUTPATIENT
Start: 2022-10-27

## 2022-10-25 RX ORDER — ATROPINE SULFATE 0.4 MG/ML
0.4 INJECTION, SOLUTION ENDOTRACHEAL; INTRAMEDULLARY; INTRAMUSCULAR; INTRAVENOUS; SUBCUTANEOUS ONCE AS NEEDED
Status: CANCELLED | OUTPATIENT
Start: 2022-10-25

## 2022-10-25 RX ORDER — HEPARIN 100 UNIT/ML
500 SYRINGE INTRAVENOUS
Status: CANCELLED | OUTPATIENT
Start: 2022-10-25

## 2022-10-25 RX ORDER — FLUOROURACIL 50 MG/ML
400 INJECTION, SOLUTION INTRAVENOUS
Status: CANCELLED | OUTPATIENT
Start: 2022-10-25

## 2022-10-25 RX ADMIN — FLUOROURACIL 670 MG: 50 INJECTION, SOLUTION INTRAVENOUS at 12:10

## 2022-10-25 RX ADMIN — SODIUM CHLORIDE: 0.9 INJECTION, SOLUTION INTRAVENOUS at 09:10

## 2022-10-25 RX ADMIN — DEXAMETHASONE SODIUM PHOSPHATE 0.25 MG: 4 INJECTION, SOLUTION INTRA-ARTICULAR; INTRALESIONAL; INTRAMUSCULAR; INTRAVENOUS; SOFT TISSUE at 10:10

## 2022-10-25 RX ADMIN — LEUCOVORIN CALCIUM 670 MG: 350 INJECTION, POWDER, LYOPHILIZED, FOR SOLUTION INTRAMUSCULAR; INTRAVENOUS at 10:10

## 2022-10-25 RX ADMIN — ATROPINE SULFATE 0.4 MG: 1 INJECTION, SOLUTION INTRAMUSCULAR; INTRAVENOUS; SUBCUTANEOUS at 10:10

## 2022-10-25 RX ADMIN — SODIUM CHLORIDE 300 MG: 0.9 INJECTION, SOLUTION INTRAVENOUS at 10:10

## 2022-10-25 RX ADMIN — FLUOROURACIL 4000 MG: 50 INJECTION, SOLUTION INTRAVENOUS at 12:10

## 2022-10-25 RX ADMIN — SODIUM CHLORIDE 300 MG: 9 INJECTION, SOLUTION INTRAVENOUS at 09:10

## 2022-10-25 NOTE — PLAN OF CARE
Problem: Fall Injury Risk  Goal: Absence of Fall and Fall-Related Injury  Outcome: Ongoing, Progressing  Intervention: Identify and Manage Contributors  Flowsheets (Taken 10/25/2022 0941)  Self-Care Promotion:   independence encouraged   BADL personal objects within reach   BADL personal routines maintained   safe use of adaptive equipment encouraged  Medication Review/Management:   medications reviewed   high-risk medications identified  Intervention: Promote Injury-Free Environment  Flowsheets (Taken 10/25/2022 0941)  Safety Promotion/Fall Prevention:   assistive device/personal item within reach   Fall Risk reviewed with patient/family   in recliner, wheels locked   high risk medications identified   medications reviewed   instructed to call staff for mobility     Problem: Adult Inpatient Plan of Care  Goal: Plan of Care Review  Outcome: Ongoing, Progressing  Flowsheets (Taken 10/25/2022 0941)  Plan of Care Reviewed With: patient  Goal: Patient-Specific Goal (Individualized)  Outcome: Ongoing, Progressing  Flowsheets (Taken 10/25/2022 0941)  Anxieties, Fears or Concerns: out of pain medicine- pharmacy contacted for refills  Individualized Care Needs: feet elevated, warm blanket pillow and snack provided  Patient-Specific Goals (Include Timeframe): tolerate chemo infusion  Goal: Optimal Comfort and Wellbeing  Outcome: Ongoing, Progressing  Intervention: Provide Person-Centered Care  Flowsheets (Taken 10/25/2022 0941)  Trust Relationship/Rapport:   care explained   choices provided   emotional support provided   empathic listening provided   questions answered   questions encouraged   reassurance provided   thoughts/feelings acknowledged     Problem: Neutropenia (Chemotherapy Effects)  Goal: Absence of Infection  Outcome: Ongoing, Progressing  Intervention: Prevent Infection and Maximize Resistance  Flowsheets (Taken 10/25/2022 0941)  Infection Prevention:   environmental surveillance performed   equipment  surfaces disinfected   personal protective equipment utilized   hand hygiene promoted

## 2022-10-26 DIAGNOSIS — Z78.9 ALCOHOL USE: ICD-10-CM

## 2022-10-26 DIAGNOSIS — R10.13 EPIGASTRIC ABDOMINAL PAIN: ICD-10-CM

## 2022-10-26 DIAGNOSIS — R63.4 ABNORMAL WEIGHT LOSS: ICD-10-CM

## 2022-10-27 ENCOUNTER — INFUSION (OUTPATIENT)
Dept: INFUSION THERAPY | Facility: HOSPITAL | Age: 67
End: 2022-10-27
Attending: NURSE PRACTITIONER
Payer: MEDICARE

## 2022-10-27 ENCOUNTER — DOCUMENTATION ONLY (OUTPATIENT)
Dept: HEMATOLOGY/ONCOLOGY | Facility: CLINIC | Age: 67
End: 2022-10-27
Payer: MEDICARE

## 2022-10-27 VITALS
RESPIRATION RATE: 18 BRPM | SYSTOLIC BLOOD PRESSURE: 143 MMHG | DIASTOLIC BLOOD PRESSURE: 77 MMHG | HEART RATE: 61 BPM | OXYGEN SATURATION: 96 % | TEMPERATURE: 97 F

## 2022-10-27 DIAGNOSIS — D50.0 IRON DEFICIENCY ANEMIA DUE TO CHRONIC BLOOD LOSS: ICD-10-CM

## 2022-10-27 DIAGNOSIS — C20 RECTAL CANCER: Primary | ICD-10-CM

## 2022-10-27 PROCEDURE — 63600175 PHARM REV CODE 636 W HCPCS: Performed by: NURSE PRACTITIONER

## 2022-10-27 PROCEDURE — 96374 THER/PROPH/DIAG INJ IV PUSH: CPT

## 2022-10-27 PROCEDURE — 25000003 PHARM REV CODE 250: Performed by: NURSE PRACTITIONER

## 2022-10-27 PROCEDURE — 63600175 PHARM REV CODE 636 W HCPCS: Performed by: INTERNAL MEDICINE

## 2022-10-27 PROCEDURE — 96361 HYDRATE IV INFUSION ADD-ON: CPT

## 2022-10-27 RX ORDER — HEPARIN 100 UNIT/ML
500 SYRINGE INTRAVENOUS
Status: DISCONTINUED | OUTPATIENT
Start: 2022-10-27 | End: 2022-10-27 | Stop reason: HOSPADM

## 2022-10-27 RX ORDER — SODIUM CHLORIDE 0.9 % (FLUSH) 0.9 %
10 SYRINGE (ML) INJECTION
Status: CANCELLED | OUTPATIENT
Start: 2022-11-03

## 2022-10-27 RX ORDER — SODIUM CHLORIDE 9 MG/ML
INJECTION, SOLUTION INTRAVENOUS
Status: DISCONTINUED | OUTPATIENT
Start: 2022-10-27 | End: 2022-10-27 | Stop reason: HOSPADM

## 2022-10-27 RX ORDER — SODIUM CHLORIDE 9 MG/ML
INJECTION, SOLUTION INTRAVENOUS
Status: COMPLETED | OUTPATIENT
Start: 2022-10-27 | End: 2022-10-27

## 2022-10-27 RX ORDER — HEPARIN 100 UNIT/ML
500 SYRINGE INTRAVENOUS
Status: CANCELLED | OUTPATIENT
Start: 2022-11-03

## 2022-10-27 RX ORDER — PANTOPRAZOLE SODIUM 40 MG/1
40 TABLET, DELAYED RELEASE ORAL DAILY
Qty: 30 TABLET | Refills: 11 | OUTPATIENT
Start: 2022-10-27 | End: 2023-10-27

## 2022-10-27 RX ADMIN — IRON SUCROSE 100 MG: 20 INJECTION, SOLUTION INTRAVENOUS at 12:10

## 2022-10-27 RX ADMIN — HEPARIN 500 UNITS: 100 SYRINGE at 01:10

## 2022-10-27 RX ADMIN — SODIUM CHLORIDE: 9 INJECTION, SOLUTION INTRAVENOUS at 11:10

## 2022-10-27 NOTE — PLAN OF CARE
Problem: Adult Inpatient Plan of Care  Goal: Plan of Care Review  Outcome: Ongoing, Progressing  Flowsheets (Taken 10/27/2022 1311)  Plan of Care Reviewed With: patient  Goal: Patient-Specific Goal (Individualized)  Outcome: Ongoing, Progressing  Flowsheets (Taken 10/27/2022 1311)  Anxieties, Fears or Concerns: none today  Individualized Care Needs: feet elevated, warm blanket, tv on  Patient-Specific Goals (Include Timeframe): tolerate infusion today  Goal: Optimal Comfort and Wellbeing  Outcome: Ongoing, Progressing  Intervention: Provide Person-Centered Care  Flowsheets (Taken 10/27/2022 1311)  Trust Relationship/Rapport:   care explained   choices provided   emotional support provided   empathic listening provided   questions answered   questions encouraged   reassurance provided   thoughts/feelings acknowledged

## 2022-10-27 NOTE — PROGRESS NOTES
Pt. requested assistance with handicap tag for vehicle on today's date. Swer obtained provider review and signature. Swer returned pt. document on today's date. Pt. verbalized understanding for turning in paperwork. There were no other needs. Swer will remain available.

## 2022-10-28 ENCOUNTER — OFFICE VISIT (OUTPATIENT)
Dept: INTERNAL MEDICINE | Facility: CLINIC | Age: 67
End: 2022-10-28
Payer: MEDICARE

## 2022-10-28 VITALS
HEIGHT: 70 IN | WEIGHT: 126.31 LBS | BODY MASS INDEX: 18.08 KG/M2 | OXYGEN SATURATION: 98 % | HEART RATE: 59 BPM | SYSTOLIC BLOOD PRESSURE: 150 MMHG | RESPIRATION RATE: 17 BRPM | DIASTOLIC BLOOD PRESSURE: 84 MMHG | TEMPERATURE: 97 F

## 2022-10-28 DIAGNOSIS — I10 ESSENTIAL HYPERTENSION: Chronic | ICD-10-CM

## 2022-10-28 DIAGNOSIS — K21.9 GASTROESOPHAGEAL REFLUX DISEASE, UNSPECIFIED WHETHER ESOPHAGITIS PRESENT: Primary | ICD-10-CM

## 2022-10-28 PROCEDURE — 99999 PR PBB SHADOW E&M-EST. PATIENT-LVL V: CPT | Mod: PBBFAC,,, | Performed by: PHYSICIAN ASSISTANT

## 2022-10-28 PROCEDURE — 1101F PR PT FALLS ASSESS DOC 0-1 FALLS W/OUT INJ PAST YR: ICD-10-PCS | Mod: CPTII,S$GLB,, | Performed by: PHYSICIAN ASSISTANT

## 2022-10-28 PROCEDURE — 99999 PR PBB SHADOW E&M-EST. PATIENT-LVL V: ICD-10-PCS | Mod: PBBFAC,,, | Performed by: PHYSICIAN ASSISTANT

## 2022-10-28 PROCEDURE — 3079F DIAST BP 80-89 MM HG: CPT | Mod: CPTII,S$GLB,, | Performed by: PHYSICIAN ASSISTANT

## 2022-10-28 PROCEDURE — 1159F MED LIST DOCD IN RCRD: CPT | Mod: CPTII,S$GLB,, | Performed by: PHYSICIAN ASSISTANT

## 2022-10-28 PROCEDURE — 3079F PR MOST RECENT DIASTOLIC BLOOD PRESSURE 80-89 MM HG: ICD-10-PCS | Mod: CPTII,S$GLB,, | Performed by: PHYSICIAN ASSISTANT

## 2022-10-28 PROCEDURE — 3077F PR MOST RECENT SYSTOLIC BLOOD PRESSURE >= 140 MM HG: ICD-10-PCS | Mod: CPTII,S$GLB,, | Performed by: PHYSICIAN ASSISTANT

## 2022-10-28 PROCEDURE — 1101F PT FALLS ASSESS-DOCD LE1/YR: CPT | Mod: CPTII,S$GLB,, | Performed by: PHYSICIAN ASSISTANT

## 2022-10-28 PROCEDURE — 3288F PR FALLS RISK ASSESSMENT DOCUMENTED: ICD-10-PCS | Mod: CPTII,S$GLB,, | Performed by: PHYSICIAN ASSISTANT

## 2022-10-28 PROCEDURE — 1159F PR MEDICATION LIST DOCUMENTED IN MEDICAL RECORD: ICD-10-PCS | Mod: CPTII,S$GLB,, | Performed by: PHYSICIAN ASSISTANT

## 2022-10-28 PROCEDURE — 3288F FALL RISK ASSESSMENT DOCD: CPT | Mod: CPTII,S$GLB,, | Performed by: PHYSICIAN ASSISTANT

## 2022-10-28 PROCEDURE — 1160F RVW MEDS BY RX/DR IN RCRD: CPT | Mod: CPTII,S$GLB,, | Performed by: PHYSICIAN ASSISTANT

## 2022-10-28 PROCEDURE — 1160F PR REVIEW ALL MEDS BY PRESCRIBER/CLIN PHARMACIST DOCUMENTED: ICD-10-PCS | Mod: CPTII,S$GLB,, | Performed by: PHYSICIAN ASSISTANT

## 2022-10-28 PROCEDURE — 1157F ADVNC CARE PLAN IN RCRD: CPT | Mod: CPTII,S$GLB,, | Performed by: PHYSICIAN ASSISTANT

## 2022-10-28 PROCEDURE — 99214 PR OFFICE/OUTPT VISIT, EST, LEVL IV, 30-39 MIN: ICD-10-PCS | Mod: S$GLB,,, | Performed by: PHYSICIAN ASSISTANT

## 2022-10-28 PROCEDURE — 3077F SYST BP >= 140 MM HG: CPT | Mod: CPTII,S$GLB,, | Performed by: PHYSICIAN ASSISTANT

## 2022-10-28 PROCEDURE — 99214 OFFICE O/P EST MOD 30 MIN: CPT | Mod: S$GLB,,, | Performed by: PHYSICIAN ASSISTANT

## 2022-10-28 PROCEDURE — 1157F PR ADVANCE CARE PLAN OR EQUIV PRESENT IN MEDICAL RECORD: ICD-10-PCS | Mod: CPTII,S$GLB,, | Performed by: PHYSICIAN ASSISTANT

## 2022-10-28 RX ORDER — PANTOPRAZOLE SODIUM 40 MG/1
40 TABLET, DELAYED RELEASE ORAL DAILY
Qty: 30 TABLET | Refills: 1 | Status: SHIPPED | OUTPATIENT
Start: 2022-10-28 | End: 2023-10-28

## 2022-10-28 RX ORDER — LOSARTAN POTASSIUM 25 MG/1
25 TABLET ORAL DAILY
Qty: 30 TABLET | Refills: 1 | Status: SHIPPED | OUTPATIENT
Start: 2022-10-28 | End: 2023-10-28

## 2022-10-28 NOTE — PROGRESS NOTES
"Subjective:      Patient ID: Vincent Benton is a 67 y.o. male.    Chief Complaint: Follow-up    Patient is new to me, being seen today for indigestion/acid reflux.    Restarted chemo and since that time has had terrible acid reflux, painful to even drink a glass of water   Previously prescribe omeprazole by Dr. Tuttle, he was not aware and is not currently taking   Took pantoprazole (3 tablets today of old Rx)    Patient followed by Hem/Onc and Palliative Care for rectal adenocarcinoma, secondary liver cancer, neuropathy, neoplasm related pain.     Last visit April 2021 with PCP.  Due for annual.  Previously taking losartan 50mg   Bp elevated at recent visits    Diarrhea x1wk without relief   Labs earlier in the week, CMP stable, no signs of dehydration    Review of Systems   Constitutional:  Positive for appetite change. Negative for chills, diaphoresis and fever.   Respiratory:  Positive for shortness of breath (chronic per patient).    Cardiovascular:  Negative for chest pain and palpitations.   Gastrointestinal:  Positive for abdominal pain (chronic), diarrhea (x1wk) and nausea. Negative for blood in stool and vomiting.   Neurological:  Negative for dizziness (only if getting up quickly) and light-headedness.     Objective:   BP (!) 150/84   Pulse (!) 59   Temp 97.1 °F (36.2 °C)   Resp 17   Ht 5' 10" (1.778 m)   Wt 57.3 kg (126 lb 5.2 oz)   SpO2 98%   BMI 18.13 kg/m²   Physical Exam  Constitutional:       General: He is not in acute distress.     Appearance: Normal appearance. He is well-developed. He is not ill-appearing.   HENT:      Head: Normocephalic and atraumatic.   Cardiovascular:      Rate and Rhythm: Normal rate and regular rhythm.      Heart sounds: Normal heart sounds. No murmur heard.  Pulmonary:      Effort: Pulmonary effort is normal. No respiratory distress.      Breath sounds: Normal breath sounds. No decreased breath sounds.   Abdominal:      General: Bowel sounds are increased.    "   Palpations: Abdomen is soft.      Tenderness: There is no abdominal tenderness.   Musculoskeletal:      Right lower leg: No edema.      Left lower leg: No edema.   Skin:     General: Skin is warm and dry.      Findings: No rash.   Psychiatric:         Speech: Speech normal.         Behavior: Behavior normal.         Thought Content: Thought content normal.     Assessment:      1. Gastroesophageal reflux disease, unspecified whether esophagitis present    2. Essential hypertension       Plan:   Gastroesophageal reflux disease, unspecified whether esophagitis present  -     pantoprazole (PROTONIX) 40 MG tablet; Take 1 tablet (40 mg total) by mouth once daily.  Dispense: 30 tablet; Refill: 1    Essential hypertension  -     losartan (COZAAR) 25 MG tablet; Take 1 tablet (25 mg total) by mouth once daily.  Dispense: 30 tablet; Refill: 1    Begin losartan 25mg   Obtain BP cuff and monitor   F/u PCP for annual     Recommend bland diet and hydration, follow up to discuss with Hem/Onc as likely is adverse effect of chemo     Discussed GERD diet, tums prn, start protonix    Discussed worsening signs/symptoms and when to return to clinic or go to ED.   Patient expresses understanding and agrees with treatment plan.

## 2022-10-31 ENCOUNTER — TELEPHONE (OUTPATIENT)
Dept: SURGERY | Facility: CLINIC | Age: 67
End: 2022-10-31
Payer: MEDICARE

## 2022-10-31 ENCOUNTER — DOCUMENTATION ONLY (OUTPATIENT)
Dept: HEMATOLOGY/ONCOLOGY | Facility: CLINIC | Age: 67
End: 2022-10-31
Payer: MEDICARE

## 2022-10-31 NOTE — TELEPHONE ENCOUNTER
Spoke with patient to move appointment to tomorrow. Also sent a message to Eleanor Rowland RN to notify her of the situation and request that the patient also be seen by an oncology provider this week.     ----- Message from Humberto Owen sent at 10/31/2022 12:39 PM CDT -----  Contact: Patient  Vincent Benton would like a call back at [phone]. Pt states he has sores all over his body from chemo.

## 2022-11-01 ENCOUNTER — OFFICE VISIT (OUTPATIENT)
Dept: SURGERY | Facility: CLINIC | Age: 67
End: 2022-11-01
Payer: MEDICARE

## 2022-11-01 ENCOUNTER — HOSPITAL ENCOUNTER (INPATIENT)
Facility: HOSPITAL | Age: 67
LOS: 4 days | Discharge: HOME HEALTH | DRG: 157 | End: 2022-11-07
Attending: EMERGENCY MEDICINE | Admitting: INTERNAL MEDICINE
Payer: MEDICARE

## 2022-11-01 VITALS
SYSTOLIC BLOOD PRESSURE: 152 MMHG | TEMPERATURE: 98 F | DIASTOLIC BLOOD PRESSURE: 97 MMHG | WEIGHT: 121.69 LBS | BODY MASS INDEX: 17.46 KG/M2 | HEART RATE: 78 BPM

## 2022-11-01 DIAGNOSIS — K12.30 MUCOSITIS: Primary | ICD-10-CM

## 2022-11-01 DIAGNOSIS — R07.9 CHEST PAIN: ICD-10-CM

## 2022-11-01 DIAGNOSIS — C78.7 METASTATIC ADENOCARCINOMA TO LIVER: ICD-10-CM

## 2022-11-01 DIAGNOSIS — C20 RECTAL CANCER: ICD-10-CM

## 2022-11-01 DIAGNOSIS — R64 CACHEXIA: ICD-10-CM

## 2022-11-01 DIAGNOSIS — K62.89 ANAL PAIN: ICD-10-CM

## 2022-11-01 DIAGNOSIS — Z51.5 ENCOUNTER FOR PALLIATIVE CARE: ICD-10-CM

## 2022-11-01 DIAGNOSIS — C20 RECTAL ADENOCARCINOMA: ICD-10-CM

## 2022-11-01 DIAGNOSIS — C20 RECTAL ADENOCARCINOMA: Primary | ICD-10-CM

## 2022-11-01 DIAGNOSIS — K13.79 MOUTH PAIN: ICD-10-CM

## 2022-11-01 DIAGNOSIS — R19.7 DIARRHEA, UNSPECIFIED TYPE: ICD-10-CM

## 2022-11-01 DIAGNOSIS — R53.81 DEBILITY: ICD-10-CM

## 2022-11-01 DIAGNOSIS — Z51.11 CHEMOTHERAPY MANAGEMENT, ENCOUNTER FOR: ICD-10-CM

## 2022-11-01 DIAGNOSIS — R00.1 BRADYCARDIA: ICD-10-CM

## 2022-11-01 DIAGNOSIS — R62.7 FAILURE TO THRIVE IN ADULT: ICD-10-CM

## 2022-11-01 PROBLEM — K12.31 MUCOSITIS DUE TO ANTINEOPLASTIC THERAPY: Status: ACTIVE | Noted: 2022-11-01

## 2022-11-01 LAB
ALBUMIN SERPL BCP-MCNC: 3.3 G/DL (ref 3.5–5.2)
ALP SERPL-CCNC: 81 U/L (ref 55–135)
ALT SERPL W/O P-5'-P-CCNC: 21 U/L (ref 10–44)
ANION GAP SERPL CALC-SCNC: 11 MMOL/L (ref 8–16)
AST SERPL-CCNC: 15 U/L (ref 10–40)
BASOPHILS NFR BLD: 0 % (ref 0–1.9)
BILIRUB SERPL-MCNC: 0.8 MG/DL (ref 0.1–1)
BUN SERPL-MCNC: 17 MG/DL (ref 8–23)
CALCIUM SERPL-MCNC: 9.8 MG/DL (ref 8.7–10.5)
CHLORIDE SERPL-SCNC: 97 MMOL/L (ref 95–110)
CO2 SERPL-SCNC: 28 MMOL/L (ref 23–29)
CREAT SERPL-MCNC: 0.7 MG/DL (ref 0.5–1.4)
DIFFERENTIAL METHOD: ABNORMAL
EOSINOPHIL NFR BLD: 0 % (ref 0–8)
ERYTHROCYTE [DISTWIDTH] IN BLOOD BY AUTOMATED COUNT: 27.5 % (ref 11.5–14.5)
EST. GFR  (NO RACE VARIABLE): >60 ML/MIN/1.73 M^2
GLUCOSE SERPL-MCNC: 74 MG/DL (ref 70–110)
HCT VFR BLD AUTO: 36.9 % (ref 40–54)
HCV AB SERPL QL IA: NEGATIVE
HEP C VIRUS HOLD SPECIMEN: NORMAL
HGB BLD-MCNC: 11.6 G/DL (ref 14–18)
HIV 1+2 AB+HIV1 P24 AG SERPL QL IA: NEGATIVE
HYPOCHROMIA BLD QL SMEAR: ABNORMAL
IMM GRANULOCYTES # BLD AUTO: ABNORMAL K/UL (ref 0–0.04)
IMM GRANULOCYTES NFR BLD AUTO: ABNORMAL % (ref 0–0.5)
LACTATE SERPL-SCNC: 1.4 MMOL/L (ref 0.5–2.2)
LYMPHOCYTES NFR BLD: 32 % (ref 18–48)
MAGNESIUM SERPL-MCNC: 1.9 MG/DL (ref 1.6–2.6)
MCH RBC QN AUTO: 26.3 PG (ref 27–31)
MCHC RBC AUTO-ENTMCNC: 31.4 G/DL (ref 32–36)
MCV RBC AUTO: 84 FL (ref 82–98)
MONOCYTES NFR BLD: 5 % (ref 4–15)
NEUTROPHILS NFR BLD: 57 % (ref 38–73)
NEUTS BAND NFR BLD MANUAL: 6 %
NRBC BLD-RTO: 0 /100 WBC
PLATELET # BLD AUTO: 230 K/UL (ref 150–450)
PLATELET BLD QL SMEAR: ABNORMAL
PMV BLD AUTO: 9.2 FL (ref 9.2–12.9)
POTASSIUM SERPL-SCNC: 4.5 MMOL/L (ref 3.5–5.1)
PROT SERPL-MCNC: 6.9 G/DL (ref 6–8.4)
RBC # BLD AUTO: 4.41 M/UL (ref 4.6–6.2)
SODIUM SERPL-SCNC: 136 MMOL/L (ref 136–145)
TARGETS BLD QL SMEAR: ABNORMAL
WBC # BLD AUTO: 8.57 K/UL (ref 3.9–12.7)

## 2022-11-01 PROCEDURE — 25000003 PHARM REV CODE 250: Performed by: NURSE PRACTITIONER

## 2022-11-01 PROCEDURE — 85027 COMPLETE CBC AUTOMATED: CPT | Performed by: NURSE PRACTITIONER

## 2022-11-01 PROCEDURE — 86803 HEPATITIS C AB TEST: CPT | Performed by: EMERGENCY MEDICINE

## 2022-11-01 PROCEDURE — 87389 HIV-1 AG W/HIV-1&-2 AB AG IA: CPT | Performed by: EMERGENCY MEDICINE

## 2022-11-01 PROCEDURE — 1157F PR ADVANCE CARE PLAN OR EQUIV PRESENT IN MEDICAL RECORD: ICD-10-PCS | Mod: CPTII,S$GLB,, | Performed by: COLON & RECTAL SURGERY

## 2022-11-01 PROCEDURE — G0378 HOSPITAL OBSERVATION PER HR: HCPCS

## 2022-11-01 PROCEDURE — 96374 THER/PROPH/DIAG INJ IV PUSH: CPT

## 2022-11-01 PROCEDURE — 94761 N-INVAS EAR/PLS OXIMETRY MLT: CPT

## 2022-11-01 PROCEDURE — 63600175 PHARM REV CODE 636 W HCPCS: Performed by: EMERGENCY MEDICINE

## 2022-11-01 PROCEDURE — 87209 SMEAR COMPLEX STAIN: CPT | Performed by: NURSE PRACTITIONER

## 2022-11-01 PROCEDURE — 99499 RISK ADDL DX/OHS AUDIT: ICD-10-PCS | Mod: S$GLB,,, | Performed by: COLON & RECTAL SURGERY

## 2022-11-01 PROCEDURE — 99214 OFFICE O/P EST MOD 30 MIN: CPT | Mod: S$GLB,,, | Performed by: COLON & RECTAL SURGERY

## 2022-11-01 PROCEDURE — C9113 INJ PANTOPRAZOLE SODIUM, VIA: HCPCS | Performed by: NURSE PRACTITIONER

## 2022-11-01 PROCEDURE — 3077F SYST BP >= 140 MM HG: CPT | Mod: CPTII,S$GLB,, | Performed by: COLON & RECTAL SURGERY

## 2022-11-01 PROCEDURE — 87177 OVA AND PARASITES SMEARS: CPT | Performed by: NURSE PRACTITIONER

## 2022-11-01 PROCEDURE — 80053 COMPREHEN METABOLIC PANEL: CPT | Performed by: NURSE PRACTITIONER

## 2022-11-01 PROCEDURE — 3008F PR BODY MASS INDEX (BMI) DOCUMENTED: ICD-10-PCS | Mod: CPTII,S$GLB,, | Performed by: COLON & RECTAL SURGERY

## 2022-11-01 PROCEDURE — 99499 UNLISTED E&M SERVICE: CPT | Mod: S$GLB,,, | Performed by: COLON & RECTAL SURGERY

## 2022-11-01 PROCEDURE — 99999 PR PBB SHADOW E&M-EST. PATIENT-LVL III: ICD-10-PCS | Mod: PBBFAC,,, | Performed by: COLON & RECTAL SURGERY

## 2022-11-01 PROCEDURE — 99285 EMERGENCY DEPT VISIT HI MDM: CPT | Mod: 25

## 2022-11-01 PROCEDURE — 3077F PR MOST RECENT SYSTOLIC BLOOD PRESSURE >= 140 MM HG: ICD-10-PCS | Mod: CPTII,S$GLB,, | Performed by: COLON & RECTAL SURGERY

## 2022-11-01 PROCEDURE — 87045 FECES CULTURE AEROBIC BACT: CPT | Performed by: NURSE PRACTITIONER

## 2022-11-01 PROCEDURE — 25000003 PHARM REV CODE 250: Performed by: EMERGENCY MEDICINE

## 2022-11-01 PROCEDURE — 87046 STOOL CULTR AEROBIC BACT EA: CPT | Mod: 59 | Performed by: NURSE PRACTITIONER

## 2022-11-01 PROCEDURE — 3080F DIAST BP >= 90 MM HG: CPT | Mod: CPTII,S$GLB,, | Performed by: COLON & RECTAL SURGERY

## 2022-11-01 PROCEDURE — 4010F ACE/ARB THERAPY RXD/TAKEN: CPT | Mod: CPTII,S$GLB,, | Performed by: COLON & RECTAL SURGERY

## 2022-11-01 PROCEDURE — 99214 PR OFFICE/OUTPT VISIT, EST, LEVL IV, 30-39 MIN: ICD-10-PCS | Mod: S$GLB,,, | Performed by: COLON & RECTAL SURGERY

## 2022-11-01 PROCEDURE — 83605 ASSAY OF LACTIC ACID: CPT | Performed by: EMERGENCY MEDICINE

## 2022-11-01 PROCEDURE — 1157F ADVNC CARE PLAN IN RCRD: CPT | Mod: CPTII,S$GLB,, | Performed by: COLON & RECTAL SURGERY

## 2022-11-01 PROCEDURE — 85007 BL SMEAR W/DIFF WBC COUNT: CPT | Performed by: NURSE PRACTITIONER

## 2022-11-01 PROCEDURE — 3008F BODY MASS INDEX DOCD: CPT | Mod: CPTII,S$GLB,, | Performed by: COLON & RECTAL SURGERY

## 2022-11-01 PROCEDURE — 83735 ASSAY OF MAGNESIUM: CPT | Performed by: NURSE PRACTITIONER

## 2022-11-01 PROCEDURE — 87427 SHIGA-LIKE TOXIN AG IA: CPT | Mod: 59 | Performed by: NURSE PRACTITIONER

## 2022-11-01 PROCEDURE — 3080F PR MOST RECENT DIASTOLIC BLOOD PRESSURE >= 90 MM HG: ICD-10-PCS | Mod: CPTII,S$GLB,, | Performed by: COLON & RECTAL SURGERY

## 2022-11-01 PROCEDURE — 89055 LEUKOCYTE ASSESSMENT FECAL: CPT | Performed by: NURSE PRACTITIONER

## 2022-11-01 PROCEDURE — 63600175 PHARM REV CODE 636 W HCPCS: Performed by: NURSE PRACTITIONER

## 2022-11-01 PROCEDURE — 96375 TX/PRO/DX INJ NEW DRUG ADDON: CPT

## 2022-11-01 PROCEDURE — 99999 PR PBB SHADOW E&M-EST. PATIENT-LVL III: CPT | Mod: PBBFAC,,, | Performed by: COLON & RECTAL SURGERY

## 2022-11-01 PROCEDURE — 4010F PR ACE/ARB THEARPY RXD/TAKEN: ICD-10-PCS | Mod: CPTII,S$GLB,, | Performed by: COLON & RECTAL SURGERY

## 2022-11-01 RX ORDER — SODIUM CHLORIDE 9 MG/ML
INJECTION, SOLUTION INTRAVENOUS CONTINUOUS
Status: DISCONTINUED | OUTPATIENT
Start: 2022-11-01 | End: 2022-11-02

## 2022-11-01 RX ORDER — PANTOPRAZOLE SODIUM 40 MG/10ML
40 INJECTION, POWDER, LYOPHILIZED, FOR SOLUTION INTRAVENOUS DAILY
Status: DISCONTINUED | OUTPATIENT
Start: 2022-11-01 | End: 2022-11-07 | Stop reason: HOSPADM

## 2022-11-01 RX ORDER — ONDANSETRON 2 MG/ML
4 INJECTION INTRAMUSCULAR; INTRAVENOUS
Status: COMPLETED | OUTPATIENT
Start: 2022-11-01 | End: 2022-11-01

## 2022-11-01 RX ORDER — MORPHINE SULFATE 4 MG/ML
4 INJECTION, SOLUTION INTRAMUSCULAR; INTRAVENOUS EVERY 4 HOURS PRN
Status: DISCONTINUED | OUTPATIENT
Start: 2022-11-01 | End: 2022-11-02

## 2022-11-01 RX ORDER — SODIUM CHLORIDE 9 MG/ML
1000 INJECTION, SOLUTION INTRAVENOUS
Status: COMPLETED | OUTPATIENT
Start: 2022-11-01 | End: 2022-11-01

## 2022-11-01 RX ORDER — ACETAMINOPHEN 325 MG/1
650 TABLET ORAL EVERY 4 HOURS PRN
Status: DISCONTINUED | OUTPATIENT
Start: 2022-11-01 | End: 2022-11-07 | Stop reason: HOSPADM

## 2022-11-01 RX ORDER — ONDANSETRON 2 MG/ML
4 INJECTION INTRAMUSCULAR; INTRAVENOUS EVERY 8 HOURS PRN
Status: DISCONTINUED | OUTPATIENT
Start: 2022-11-01 | End: 2022-11-07 | Stop reason: HOSPADM

## 2022-11-01 RX ORDER — GABAPENTIN 300 MG/1
300 CAPSULE ORAL NIGHTLY
Status: DISCONTINUED | OUTPATIENT
Start: 2022-11-01 | End: 2022-11-07 | Stop reason: HOSPADM

## 2022-11-01 RX ORDER — IPRATROPIUM BROMIDE AND ALBUTEROL SULFATE 2.5; .5 MG/3ML; MG/3ML
3 SOLUTION RESPIRATORY (INHALATION) EVERY 6 HOURS PRN
Status: DISCONTINUED | OUTPATIENT
Start: 2022-11-01 | End: 2022-11-07 | Stop reason: HOSPADM

## 2022-11-01 RX ORDER — PROCHLORPERAZINE EDISYLATE 5 MG/ML
5 INJECTION INTRAMUSCULAR; INTRAVENOUS EVERY 6 HOURS PRN
Status: DISCONTINUED | OUTPATIENT
Start: 2022-11-01 | End: 2022-11-07 | Stop reason: HOSPADM

## 2022-11-01 RX ORDER — SODIUM CHLORIDE 0.9 % (FLUSH) 0.9 %
10 SYRINGE (ML) INJECTION EVERY 8 HOURS PRN
Status: DISCONTINUED | OUTPATIENT
Start: 2022-11-01 | End: 2022-11-07 | Stop reason: HOSPADM

## 2022-11-01 RX ORDER — SIMETHICONE 80 MG
1 TABLET,CHEWABLE ORAL 4 TIMES DAILY PRN
Status: DISCONTINUED | OUTPATIENT
Start: 2022-11-01 | End: 2022-11-07 | Stop reason: HOSPADM

## 2022-11-01 RX ORDER — MORPHINE SULFATE 4 MG/ML
4 INJECTION, SOLUTION INTRAMUSCULAR; INTRAVENOUS
Status: COMPLETED | OUTPATIENT
Start: 2022-11-01 | End: 2022-11-01

## 2022-11-01 RX ORDER — NALOXONE HCL 0.4 MG/ML
0.02 VIAL (ML) INJECTION
Status: DISCONTINUED | OUTPATIENT
Start: 2022-11-01 | End: 2022-11-07 | Stop reason: HOSPADM

## 2022-11-01 RX ORDER — MAG HYDROX/ALUMINUM HYD/SIMETH 200-200-20
30 SUSPENSION, ORAL (FINAL DOSE FORM) ORAL 4 TIMES DAILY PRN
Status: DISCONTINUED | OUTPATIENT
Start: 2022-11-01 | End: 2022-11-07 | Stop reason: HOSPADM

## 2022-11-01 RX ORDER — LANOLIN ALCOHOL/MO/W.PET/CERES
800 CREAM (GRAM) TOPICAL
Status: DISCONTINUED | OUTPATIENT
Start: 2022-11-01 | End: 2022-11-05

## 2022-11-01 RX ORDER — FENTANYL 25 UG/1
1 PATCH TRANSDERMAL
Status: DISCONTINUED | OUTPATIENT
Start: 2022-11-01 | End: 2022-11-07 | Stop reason: HOSPADM

## 2022-11-01 RX ORDER — MORPHINE SULFATE 4 MG/ML
2 INJECTION, SOLUTION INTRAMUSCULAR; INTRAVENOUS EVERY 4 HOURS PRN
Status: DISCONTINUED | OUTPATIENT
Start: 2022-11-01 | End: 2022-11-02

## 2022-11-01 RX ORDER — TALC
6 POWDER (GRAM) TOPICAL NIGHTLY PRN
Status: DISCONTINUED | OUTPATIENT
Start: 2022-11-01 | End: 2022-11-07 | Stop reason: HOSPADM

## 2022-11-01 RX ORDER — HYDRALAZINE HYDROCHLORIDE 20 MG/ML
10 INJECTION INTRAMUSCULAR; INTRAVENOUS EVERY 6 HOURS PRN
Status: DISCONTINUED | OUTPATIENT
Start: 2022-11-01 | End: 2022-11-07 | Stop reason: HOSPADM

## 2022-11-01 RX ORDER — LOSARTAN POTASSIUM 25 MG/1
25 TABLET ORAL DAILY
Status: DISCONTINUED | OUTPATIENT
Start: 2022-11-01 | End: 2022-11-02

## 2022-11-01 RX ADMIN — MORPHINE SULFATE 4 MG: 4 INJECTION INTRAVENOUS at 12:11

## 2022-11-01 RX ADMIN — FENTANYL 1 PATCH: 25 PATCH TRANSDERMAL at 04:11

## 2022-11-01 RX ADMIN — ONDANSETRON 4 MG: 2 INJECTION INTRAMUSCULAR; INTRAVENOUS at 12:11

## 2022-11-01 RX ADMIN — GABAPENTIN 300 MG: 300 CAPSULE ORAL at 09:11

## 2022-11-01 RX ADMIN — PANTOPRAZOLE SODIUM 40 MG: 40 INJECTION, POWDER, FOR SOLUTION INTRAVENOUS at 03:11

## 2022-11-01 RX ADMIN — SODIUM CHLORIDE 1000 ML: 0.9 INJECTION, SOLUTION INTRAVENOUS at 11:11

## 2022-11-01 RX ADMIN — DIPHENHYDRAMINE HYDROCHLORIDE 15 ML: 12.5 LIQUID ORAL at 02:11

## 2022-11-01 RX ADMIN — SODIUM CHLORIDE: 9 INJECTION, SOLUTION INTRAVENOUS at 03:11

## 2022-11-01 RX ADMIN — MORPHINE SULFATE 4 MG: 4 INJECTION INTRAVENOUS at 04:11

## 2022-11-01 RX ADMIN — LOSARTAN POTASSIUM 25 MG: 25 TABLET, FILM COATED ORAL at 03:11

## 2022-11-01 NOTE — H&P
[facility]  History & Physical    Patient Name: Vincent Benton  MRN: [id number]  Patient Class: OP- Observation  Admission Date: 11/1/2022  Attending Physician: Steven Doherty MD   Primary Care Provider: Shakila Moran DO         Patient information was obtained from patient, past medical records and ER records.     Subjective:     Principal Problem:Mucositis due to antineoplastic therapy    Chief Complaint:   Chief Complaint   Patient presents with    Other     Pt was sent from [facility] with c/o sores to mouth and perirectal area.  Pt is currently undergoing chemotherapy.        HPI: Vincent Benton is a 67 y.o. male patient with a PMHx of GERD, HTN, rectal cancer, alcoholism who presents to the Emergency Department for evaluation of mouth and perirectal sores which onset several days PTA. Pt was sent from the [facility] and is currently undergoing chemotherapy. His last chemo was on 10/25/22. Associated sxs include decreased appetite, diarrhea, generalized abdominal pain, generalized weakness and decreased urination. Patient denies any fever, chills, CP, SOB, HA, n/v, and all other sxs at this time. No prior Tx reported. No further complaints or concerns at this time.   On arrival: Temp 97.4, pulse 69, resp 20 and B/P 145/73  Labs are essentially baseline with mild anemia  Pt was placed in Observation for supportive care as a result of chemotherapy.   As clarification, on 11/1/2022 patient should be admitted for hospital observation services under my care in collaboration with Steven Doherty MD            Past Medical History:   Diagnosis Date    Alcoholism     Anemia     Back pain     Encounter for blood transfusion 2018    Essential hypertension 2/4/2016    GERD (gastroesophageal reflux disease)     Hiatal hernia     Hypertension     diet controlled    Melanoma 06/2021    left cheek    Rectal cancer 9/11/2019       Past Surgical History:    Procedure Laterality Date    ARTHROSCOPY OF KNEE Right 5/5/2021    Procedure: ARTHROSCOPY, KNEE;  Surgeon: Delonte Mcintyre MD;  Location: [facility] OR;  Service: Orthopedics;  Laterality: Right;    COLONOSCOPY N/A 9/3/2019    Procedure: COLONOSCOPY;  Surgeon: Isai Centeno MD;  Location: [facility] ENDO;  Service: Endoscopy;  Laterality: N/A;    COLONOSCOPY N/A 1/10/2020    Procedure: COLONOSCOPY;  Surgeon: Familia Gonzalez MD;  Location: [facility] ENDO;  Service: General;  Laterality: N/A;    COLONOSCOPY N/A 3/24/2021    Procedure: COLONOSCOPY;  Surgeon: Familia Gonzalez MD;  Location: [facility] ENDO;  Service: General;  Laterality: N/A;    ESOPHAGOGASTRODUODENOSCOPY N/A 9/3/2019    Procedure: ESOPHAGOGASTRODUODENOSCOPY (EGD);  Surgeon: Isai Centeno MD;  Location: [facility] ENDO;  Service: Endoscopy;  Laterality: N/A;    EXCISION OF MEDIAL MENISCUS OF KNEE Right 5/5/2021    Procedure: MENISCECTOMY, KNEE, MEDIAL;  Surgeon: Delonte Mcintyre MD;  Location: [facility];  Service: Orthopedics;  Laterality: Right;  partial Lateral    FLEXIBLE SIGMOIDOSCOPY  2/4/2020    Procedure: SIGMOIDOSCOPY, FLEXIBLE;  Surgeon: Familia Gonzalez MD;  Location: [facility];  Service: General;;    ILEOSTOMY Right 2/4/2020    Procedure: CREATION, ILEOSTOMY;  Surgeon: Familia Gonzalez MD;  Location: [facility] OR;  Service: General;  Laterality: Right;    ILEOSTOMY CLOSURE N/A 8/4/2020    Procedure: CLOSURE, ILEOSTOMY;  Surgeon: Familia Gonzalez MD;  Location: [facility] OR;  Service: General;  Laterality: N/A;    INCISION AND DRAINAGE OF BACK Left 8/5/2020    Procedure: INCISION AND DRAINAGE, BACK;  Surgeon: Familia Gonzalez MD;  Location: [facility] OR;  Service: General;  Laterality: Left;    INJECTION OF ANESTHETIC AGENT INTO TISSUE PLANE DEFINED BY TRANSVERSUS ABDOMINIS MUSCLE N/A 2/4/2020    Procedure: BLOCK, TRANSVERSUS ABDOMINIS PLANE;  Surgeon: Familia Gonzalez MD;  Location: [facility] OR;  Service: General;  Laterality: N/A;    INSERTION OF  TUNNELED CENTRAL VENOUS CATHETER (CVC) WITH SUBCUTANEOUS PORT Right 2/4/2020    Procedure: INSERTION, PORT-A-CATH;  Surgeon: Familia Gonzalez MD;  Location: [facility] OR;  Service: General;  Laterality: Right;    INSERTION OF TUNNELED CENTRAL VENOUS CATHETER (CVC) WITH SUBCUTANEOUS PORT N/A 8/16/2022    Procedure: [id number];  Surgeon: Familia Gonzalez MD;  Location: [facility] OR;  Service: General;  Laterality: N/A;  right subclavian    JOINT REPLACEMENT Left 2009    Hip    KNEE ARTHROSCOPY W/ PLICA EXCISION Right 5/5/2021    Procedure: EXCISION, PLICA, KNEE, ARTHROSCOPIC;  Surgeon: Delonte Mcintyre MD;  Location: [facility] OR;  Service: Orthopedics;  Laterality: Right;    MOBILIZATION OF SPLENIC FLEXURE  2/4/2020    Procedure: MOBILIZATION, SPLENIC FLEXURE;  Surgeon: Familia Gonzalez MD;  Location: [facility];  Service: General;;    REMOVAL OF HARDWARE FROM HIP Left 1/4/2022    Procedure: REMOVAL, HARDWARE, HIP;  Surgeon: Delonte Mcintyre MD;  Location: [facility] OR;  Service: Orthopedics;  Laterality: Left;       Review of patient's allergies indicates:  No Known Allergies    Current Facility-Administered Medications on File Prior to Encounter   Medication    0.9%  NaCl infusion    0.9%  NaCl infusion    alteplase injection 2 mg    chlorhexidine 0.12 % solution 10 mL    chlorhexidine 0.12 % solution 10 mL    diphenhydrAMINE injection 25 mg    HYDROmorphone (PF) injection 0.2 mg    HYDROmorphone (PF) injection 0.2 mg    lactated ringers infusion    metoclopramide HCl injection 10 mg    nozaseptin (NOZIN) nasal     sodium chloride 0.9% flush 10 mL    sodium chloride 0.9% flush 3 mL    sodium chloride 0.9% flush 3 mL     Current Outpatient Medications on File Prior to Encounter   Medication Sig    ascorbic acid, vitamin C, (VITAMIN C) 1000 MG tablet Take 1,000 mg by mouth once daily.    gabapentin (NEURONTIN) 300 MG capsule Take 1 capsule (300 mg total) by mouth every evening.     HYDROcodone-acetaminophen (NORCO)  mg per tablet Take 1 tablet by mouth every 6 (six) hours as needed for Pain.    losartan (COZAAR) 25 MG tablet Take 1 tablet (25 mg total) by mouth once daily.    multivitamin capsule Take 1 capsule by mouth once daily.    ondansetron (ZOFRAN-ODT) 8 MG TbDL Dissolve 1 tablet (8 mg total) by mouth every 8 (eight) hours as needed (nausea/vomiting).    pantoprazole (PROTONIX) 40 MG tablet Take 1 tablet (40 mg total) by mouth once daily.    dexAMETHasone (DECADRON) 4 MG Tab Take 2 tablets (8 mg total) by mouth once daily. Take as directed on days 2 and 3 of your chemotherapy cycle.    fentaNYL (DURAGESIC) 25 mcg/hr Place 1 patch onto the skin every 72 hours.    loperamide (IMODIUM) 2 mg capsule Take 1 capsule (2 mg total) by mouth 4 (four) times daily as needed for Diarrhea.    [DISCONTINUED] diclofenac sodium (VOLTAREN) 1 % Gel Apply 2 g topically 3 (three) times daily.    [DISCONTINUED] mirtazapine (REMERON) 15 MG tablet Take 1 tablet (15 mg total) by mouth every evening.    [DISCONTINUED] tamsulosin (FLOMAX) 0.4 mg Cap Take 1 capsule (0.4 mg total) by mouth once daily.     Family History       Problem Relation (Age of Onset)    Cataracts Mother    Hypertension Father    Stroke Father          Tobacco Use    Smoking status: Never    Smokeless tobacco: Never   Substance and Sexual Activity    Alcohol use: Yes     Comment: occassionally No alcohol 72h prior to sx    Drug use: No    Sexual activity: Never     Partners: Female     Review of Systems   Constitutional:  Positive for activity change, appetite change and fatigue.   HENT:  Positive for mouth sores and trouble swallowing.    Eyes: Negative.    Respiratory:  Negative for cough and shortness of breath.    Cardiovascular:  Negative for chest pain.   Gastrointestinal:  Positive for abdominal distention and diarrhea. Negative for constipation, nausea and vomiting.   Genitourinary:  Positive for decreased urine  volume.   Skin:  Positive for wound.   Neurological:  Positive for weakness and light-headedness.   Psychiatric/Behavioral:  The patient is nervous/anxious.    Objective:     Vital Signs (Most Recent):  Temp: 98.6 °F (37 °C) (11/01/22 1521)  Pulse: 61 (11/01/22 1620)  Resp: 18 (11/01/22 1621)  BP: 137/67 (11/01/22 1620)  SpO2: 99 % (11/01/22 1620) Vital Signs (24h Range):  Temp:  [97.4 °F (36.3 °C)-98.6 °F (37 °C)] 98.6 °F (37 °C)  Pulse:  [47-78] 61  Resp:  [18-26] 18  SpO2:  [91 %-100 %] 99 %  BP: (137-180)/(67-97) 137/67     Weight: 58 kg (127 lb 13.9 oz)  Body mass index is 18.35 kg/m².    Physical Exam  Vitals reviewed.   Constitutional:       Appearance: He is well-developed. He is ill-appearing.      Comments: Thin in appearance   HENT:      Head: Normocephalic and atraumatic.      Nose: Nose normal.      Mouth/Throat:      Mouth: Oral lesions present.   Eyes:      General: No scleral icterus.     Conjunctiva/sclera: Conjunctivae normal.      Pupils: Pupils are equal, round, and reactive to light.   Cardiovascular:      Rate and Rhythm: Normal rate and regular rhythm.      Heart sounds: Normal heart sounds. No murmur heard.    No friction rub. No gallop.   Pulmonary:      Effort: Pulmonary effort is normal.      Breath sounds: Normal breath sounds.   Abdominal:      General: Bowel sounds are normal.      Palpations: Abdomen is soft.      Tenderness: There is generalized abdominal tenderness.   Genitourinary:     Comments: Scattered ulcerative lesions to the intergluteal cleft  Musculoskeletal:         General: No tenderness. Normal range of motion.      Cervical back: Normal range of motion and neck supple.   Skin:     General: Skin is warm and dry.   Neurological:      Mental Status: He is alert and oriented to person, place, and time.   Psychiatric:         Behavior: Behavior normal.         CRANIAL NERVES     CN III, IV, VI   Pupils are equal, round, and reactive to light.     Significant Labs: All  pertinent labs within the past 24 hours have been reviewed.  CBC:   Recent Labs   Lab 11/01/22  1141   WBC 8.57   HGB 11.6*   HCT 36.9*        CMP:   Recent Labs   Lab 11/01/22  1141      K 4.5   CL 97   CO2 28   GLU 74   BUN 17   CREATININE 0.7   CALCIUM 9.8   PROT 6.9   ALBUMIN 3.3*   BILITOT 0.8   ALKPHOS 81   AST 15   ALT 21   ANIONGAP 11       Significant Imaging: I have reviewed all pertinent imaging results/findings within the past 24 hours.    Assessment/Plan:     * Mucositis due to antineoplastic therapy  Clear liquid diet if tolerated  IV fluids  Miracle mouth wash  Prn analgesia      Essential hypertension  Hydralazine 10 mg IV every 6 hours is unable to tolerate oral meds  Continue Losartan      Rectal cancer  Is followed by Dr. Tuttle in clinic  Last chemo 10/25/22    Neoplasm related pain  Continue fentanyl patch  Prn morphine for pain associated with mucositis  Consult Palliative Care to assist with pain medications        VTE Risk Mitigation (From admission, onward)         Ordered     Reason for No Pharmacological VTE Prophylaxis  Once        Question:  Reasons:  Answer:  Risk of Bleeding    11/01/22 1359     IP VTE HIGH RISK PATIENT  Once         11/01/22 1359     Place sequential compression device  Until discontinued         11/01/22 1359                   Emily Sanches NP  Department of Hospital Medicine   [facility] - Telemetry ([facility])

## 2022-11-01 NOTE — PHARMACY MED REC
"Admission Medication History     The home medication history was taken by Bravo Alcaraz.    You may go to "Admission" then "Reconcile Home Medications" tabs to review and/or act upon these items.     The home medication list has been updated by the Pharmacy department.   Please read ALL comments highlighted in yellow.   Please address this information as you see fit.    Feel free to contact us if you have any questions or require assistance.      Medications listed below were obtained from: Patient/family and Analytic software- [employer]  (Not in a hospital admission)      Bravo Alcaraz  [id number]    Current Outpatient Medications on File Prior to Encounter   Medication Sig Dispense Refill Last Dose    ascorbic acid, vitamin C, (VITAMIN C) 1000 MG tablet Take 1,000 mg by mouth once daily.   10/31/2022    gabapentin (NEURONTIN) 300 MG capsule Take 1 capsule (300 mg total) by mouth every evening. 30 capsule 5 10/31/2022    HYDROcodone-acetaminophen (NORCO)  mg per tablet Take 1 tablet by mouth every 6 (six) hours as needed for Pain. 75 tablet 0 10/31/2022    losartan (COZAAR) 25 MG tablet Take 1 tablet (25 mg total) by mouth once daily. 30 tablet 1 10/31/2022    multivitamin capsule Take 1 capsule by mouth once daily.   10/31/2022    ondansetron (ZOFRAN-ODT) 8 MG TbDL Dissolve 1 tablet (8 mg total) by mouth every 8 (eight) hours as needed (nausea/vomiting). 60 tablet 5 Past Month    pantoprazole (PROTONIX) 40 MG tablet Take 1 tablet (40 mg total) by mouth once daily. 30 tablet 1 10/31/2022    dexAMETHasone (DECADRON) 4 MG Tab Take 2 tablets (8 mg total) by mouth once daily. Take as directed on days 2 and 3 of your chemotherapy cycle. 24 tablet 5     fentaNYL (DURAGESIC) 25 mcg/hr Place 1 patch onto the skin every 72 hours. 5 patch 0 10/29/2022    loperamide (IMODIUM) 2 mg capsule Take 1 capsule (2 mg total) by mouth 4 (four) times daily as needed for Diarrhea. 30 capsule 1  "                           .

## 2022-11-01 NOTE — PROGRESS NOTES
Received secure chat from colorectal nurse that pt with mucositis after chemo. States pt coming vor leona . Told her I would call pt. Called and spoke with pt over the phone to see how he was doing after chemo. Told him Dr. Gonzalez's nurse suggested he come in. Asked him if he'd like to see onc provider and get fluids today, pt states he'd rather wait until tomorrow to do all visits at one time. Told him will have magic mouthwash script sent to [facility], states wife can . Encouraged him to use either vaseline or desitin to help with the pain until he can see provider. Also told him to do cool sitz baths and or use pj bottle with cool water to cleanse perineum after using bathroom, especially if having diarrhea. He voiced understanding, no further questions at this time and has my call back number should he need to reach me.   Oncology Navigation   Intake  Date Worked: 10/31/22  Start of Treatment: 22     Treatment  Current Status: Active       Medical Oncologist: Dr. Sams  Chemotherapy: Planned  Chemotherapy Regimen: FOLFIRI + AVASTIN       Procedures: Port / PICC; Echo  Echo Schedule Date: 22  Port / PICC Schedule Date: 22             Support Systems: Family members     Acuity  Stage: 2  Systemic Treatment - predicted or initiated: Chemotherapy regimen with multiple drugs (+1)  ECO  Comorbidities in Medical History: 2  Verbalizes Financial Concerns: 0  Transportation: 0  Verbalizes the need for more education: 1  Navigation Acuity: 7     Follow Up  Follow up in 1 day (on 2022) for leona w/labs for poss fluids.

## 2022-11-01 NOTE — FIRST PROVIDER EVALUATION
Medical screening examination initiated.  I have conducted a focused provider triage encounter, findings are as follows:    Brief history of present illness: 67 year old male sent to ER from cancer center for decreased food and fluid intake secondary to mouth ulcers. Also reports sores to rectal region.     Vitals:    11/01/22 1033   BP: (!) 145/73   BP Location: Right arm   Patient Position: Sitting   Pulse: 69   Resp: 20   Temp: 97.4 °F (36.3 °C)   TempSrc: Oral   SpO2: 99%       Pertinent physical exam:  AAOX3

## 2022-11-01 NOTE — ASSESSMENT & PLAN NOTE
Continue fentanyl patch  Prn morphine for pain associated with mucositis  Consult Palliative Care to assist with pain medications

## 2022-11-01 NOTE — HPI
Vincent Benton is a 67 y.o. male patient with a PMHx of GERD, HTN, rectal cancer, alcoholism who presents to the Emergency Department for evaluation of mouth and perirectal sores which onset several days PTA. Pt was sent from the cancer center and is currently undergoing chemotherapy. His last chemo was on 10/25/22. Associated sxs include decreased appetite, diarrhea, generalized abdominal pain, generalized weakness and decreased urination. Patient denies any fever, chills, CP, SOB, HA, n/v, and all other sxs at this time. No prior Tx reported. No further complaints or concerns at this time.   On arrival: Temp 97.4, pulse 69, resp 20 and B/P 145/73  Labs are essentially baseline with mild anemia  Pt was placed in Observation for supportive care as a result of chemotherapy.   As clarification, on 11/1/2022 patient should be admitted for hospital observation services under my care in collaboration with Steven Doherty MD

## 2022-11-01 NOTE — Clinical Note
Diagnosis: Failure to thrive in adult [310907]   Future Attending Provider: VINCENT MILLER [[id number]]   Admitting Provider:: VINCENT MILLER [[id number]]

## 2022-11-01 NOTE — ED PROVIDER NOTES
SCRIBE #1 NOTE: I, Delilah Greer, am scribing for, and in the presence of, Lyndsay Medeiros DO. I have scribed the entire note.       History     Chief Complaint   Patient presents with    Other     Pt was sent from [facility] with c/o sores to mouth and perirectal area.  Pt is currently undergoing chemotherapy.     Review of patient's allergies indicates:  No Known Allergies      History of Present Illness     HPI    11/1/2022, 11:48 AM  History obtained from the patient      History of Present Illness: Vincent Benton is a 67 y.o. male patient with a PMHx of GERD, HTN, rectal cancer, and L cheek melanoma who presents to the Emergency Department for evaluation of mouth and perirectal sores which onset several days PTA. Pt was sent from the cancer center and is currently undergoing chemotherapy. Symptoms are constant and moderate in severity. No mitigating or exacerbating factors reported. Associated sxs include decreased appetite, diarrhea, generalized abdominal pain, generalized weakness. Patient denies any fever, chills, CP, SOB, lightheadedness, HA, n/v, and all other sxs at this time. No prior Tx reported. No further complaints or concerns at this time.       Arrival mode: Personal vehicle     PCP: Shakila Moran DO        Past Medical History:  Past Medical History:   Diagnosis Date    Alcoholism     Anemia     Back pain     Encounter for blood transfusion 2018    Essential hypertension 2/4/2016    GERD (gastroesophageal reflux disease)     Hiatal hernia     Hypertension     diet controlled    Melanoma 06/2021    left cheek    Rectal cancer 9/11/2019       Past Surgical History:  Past Surgical History:   Procedure Laterality Date    ARTHROSCOPY OF KNEE Right 5/5/2021    Procedure: ARTHROSCOPY, KNEE;  Surgeon: Delonte Mcintyre MD;  Location: [facility] OR;  Service: Orthopedics;  Laterality: Right;    COLONOSCOPY N/A 9/3/2019    Procedure: COLONOSCOPY;  Surgeon: Isai Centeno MD;  Location: [facility] ENDO;   Service: Endoscopy;  Laterality: N/A;    COLONOSCOPY N/A 1/10/2020    Procedure: COLONOSCOPY;  Surgeon: Familia Gonzalez MD;  Location: [facility] ENDO;  Service: General;  Laterality: N/A;    COLONOSCOPY N/A 3/24/2021    Procedure: COLONOSCOPY;  Surgeon: Familia Gonzalez MD;  Location: [facility] ENDO;  Service: General;  Laterality: N/A;    ESOPHAGOGASTRODUODENOSCOPY N/A 9/3/2019    Procedure: ESOPHAGOGASTRODUODENOSCOPY (EGD);  Surgeon: Isai Centeno MD;  Location: [facility] ENDO;  Service: Endoscopy;  Laterality: N/A;    EXCISION OF MEDIAL MENISCUS OF KNEE Right 5/5/2021    Procedure: MENISCECTOMY, KNEE, MEDIAL;  Surgeon: Delonte Mcintyre MD;  Location: [facility] OR;  Service: Orthopedics;  Laterality: Right;  partial Lateral    FLEXIBLE SIGMOIDOSCOPY  2/4/2020    Procedure: SIGMOIDOSCOPY, FLEXIBLE;  Surgeon: Familia Gonzalez MD;  Location: [facility] OR;  Service: General;;    ILEOSTOMY Right 2/4/2020    Procedure: CREATION, ILEOSTOMY;  Surgeon: Familia Gonzalez MD;  Location: [facility] OR;  Service: General;  Laterality: Right;    ILEOSTOMY CLOSURE N/A 8/4/2020    Procedure: CLOSURE, ILEOSTOMY;  Surgeon: Familia Gonzalez MD;  Location: [facility] OR;  Service: General;  Laterality: N/A;    INCISION AND DRAINAGE OF BACK Left 8/5/2020    Procedure: INCISION AND DRAINAGE, BACK;  Surgeon: Familia Gonzalez MD;  Location: [facility] OR;  Service: General;  Laterality: Left;    INJECTION OF ANESTHETIC AGENT INTO TISSUE PLANE DEFINED BY TRANSVERSUS ABDOMINIS MUSCLE N/A 2/4/2020    Procedure: BLOCK, TRANSVERSUS ABDOMINIS PLANE;  Surgeon: Familia Gonzalez MD;  Location: [facility] OR;  Service: General;  Laterality: N/A;    INSERTION OF TUNNELED CENTRAL VENOUS CATHETER (CVC) WITH SUBCUTANEOUS PORT Right 2/4/2020    Procedure: INSERTION, PORT-A-CATH;  Surgeon: Familia Gonzalez MD;  Location: [facility] OR;  Service: General;  Laterality: Right;    INSERTION OF TUNNELED CENTRAL VENOUS CATHETER (CVC) WITH SUBCUTANEOUS PORT N/A 8/16/2022    Procedure:  [id number];  Surgeon: Familia Gonzalez MD;  Location: [facility] OR;  Service: General;  Laterality: N/A;  right subclavian    JOINT REPLACEMENT Left 2009    Hip    KNEE ARTHROSCOPY W/ PLICA EXCISION Right 5/5/2021    Procedure: EXCISION, PLICA, KNEE, ARTHROSCOPIC;  Surgeon: Delonte Mcintyre MD;  Location: [facility] OR;  Service: Orthopedics;  Laterality: Right;    MOBILIZATION OF SPLENIC FLEXURE  2/4/2020    Procedure: MOBILIZATION, SPLENIC FLEXURE;  Surgeon: Familia Gonzalez MD;  Location: [facility] OR;  Service: General;;    REMOVAL OF HARDWARE FROM HIP Left 1/4/2022    Procedure: REMOVAL, HARDWARE, HIP;  Surgeon: Delonte Mcintyre MD;  Location: [facility] OR;  Service: Orthopedics;  Laterality: Left;         Family History:  Family History   Problem Relation Age of Onset    Cataracts Mother     Stroke Father     Hypertension Father        Social History:  Social History     Tobacco Use    Smoking status: Never    Smokeless tobacco: Never   Substance and Sexual Activity    Alcohol use: Yes     Comment: occassionally No alcohol 72h prior to sx    Drug use: No    Sexual activity: Never     Partners: Female        Review of Systems     Review of Systems   Constitutional:  Positive for appetite change (decreased). Negative for fever.   HENT:  Positive for mouth sores. Negative for sore throat.    Respiratory:  Negative for shortness of breath.    Cardiovascular:  Negative for chest pain.   Gastrointestinal:  Positive for abdominal pain (generalized) and diarrhea. Negative for nausea and vomiting.   Genitourinary:  Negative for dysuria.        (+) Perirectal sores   Musculoskeletal:  Negative for back pain.   Skin:  Negative for rash.   Neurological:  Positive for weakness (generalized). Negative for light-headedness and headaches.   Hematological:  Does not bruise/bleed easily.   All other systems reviewed and are negative.     Physical Exam     Initial Vitals [11/01/22 1033]   BP Pulse Resp Temp SpO2   (!) 145/73  "69 20 97.4 °F (36.3 °C) 99 %      MAP       --          Physical Exam  Nursing Notes and Vital Signs Reviewed.  Constitutional: Patient is ill-appearing. No acute distress. Thin appearing.  Head: Atraumatic. Normocephalic.  Eyes: PERRL. EOM intact. Conjunctivae are not pale. No scleral icterus.  ENT: Mucous membranes are dry.  Oropharynx is clear and symmetric. Yeast in mouth.  Cardiovascular: Regular rate. Regular rhythm. No murmurs, rubs, or gallops. Distal pulses are 2+ and symmetric.  Pulmonary/Chest: No respiratory distress. Clear to auscultation bilaterally. No wheezing or rales.  Abdominal: Soft and non-distended.  There is no tenderness.  No rebound, guarding, or rigidity.   Musculoskeletal: Moves all extremities. No obvious deformities. No edema. No calf tenderness.  Skin: Warm and dry.  Neurological:  Alert, awake, and appropriate.  Normal speech.  No acute focal neurological deficits are appreciated.  Psychiatric: Normal affect. Good eye contact. Appropriate in content.    Rectal:  There are scattered pustules in ulcers noted to the rectal region.         ED Course   Procedures  ED Vital Signs:  Vitals:    11/01/22 1033 11/01/22 1204 11/01/22 1218 11/01/22 1416   BP: (!) 145/73 (!) 180/81 (!) 159/87    Pulse: 69 (!) 47 60    Resp: 20 (!) 22 20    Temp: 97.4 °F (36.3 °C)      TempSrc: Oral      SpO2: 99% (!) 94% (!) 91% 98%   Weight:       Height:        11/01/22 1418 11/01/22 1521 11/01/22 1620 11/01/22 1621   BP: 137/85 (!) 155/87 137/67    Pulse: (!) 59 60 61    Resp: (!) 26 18 18 18   Temp:  98.6 °F (37 °C)     TempSrc:  Oral     SpO2: 99% 100% 99%    Weight:  58 kg (127 lb 13.9 oz)     Height:  5' 10" (1.778 m)      11/01/22 1622   BP:    Pulse:    Resp: 18   Temp:    TempSrc:    SpO2:    Weight:    Height:        Abnormal Lab Results:  Labs Reviewed   CBC W/ AUTO DIFFERENTIAL - Abnormal; Notable for the following components:       Result Value    RBC 4.41 (*)     Hemoglobin 11.6 (*)     Hematocrit " 36.9 (*)     MCH 26.3 (*)     MCHC 31.4 (*)     RDW 27.5 (*)     All other components within normal limits   COMPREHENSIVE METABOLIC PANEL - Abnormal; Notable for the following components:    Albumin 3.3 (*)     All other components within normal limits   HIV 1 / 2 ANTIBODY    Narrative:     Release to patient->Immediate   HEPATITIS C ANTIBODY    Narrative:     Release to patient->Immediate   HEP C VIRUS HOLD SPECIMEN    Narrative:     Release to patient->Immediate   LACTIC ACID, PLASMA   MAGNESIUM   MAGNESIUM   URINALYSIS, REFLEX TO URINE CULTURE        All Lab Results:  Results for orders placed or performed during the hospital encounter of 11/01/22   HIV 1/2 Ag/Ab (4th Gen)   Result Value Ref Range    HIV 1/2 Ag/Ab Negative Negative   Hepatitis C Antibody   Result Value Ref Range    Hepatitis C Ab Negative Negative   HCV Virus Hold Specimen   Result Value Ref Range    HEP C Virus Hold Specimen Hold for HCV sendout    CBC auto differential   Result Value Ref Range    WBC 8.57 3.90 - 12.70 K/uL    RBC 4.41 (L) 4.60 - 6.20 M/uL    Hemoglobin 11.6 (L) 14.0 - 18.0 g/dL    Hematocrit 36.9 (L) 40.0 - 54.0 %    MCV 84 82 - 98 fL    MCH 26.3 (L) 27.0 - 31.0 pg    MCHC 31.4 (L) 32.0 - 36.0 g/dL    RDW 27.5 (H) 11.5 - 14.5 %    Platelets 230 150 - 450 K/uL    MPV 9.2 9.2 - 12.9 fL    Immature Granulocytes CANCELED 0.0 - 0.5 %    Immature Grans (Abs) CANCELED 0.00 - 0.04 K/uL    nRBC 0 0 /100 WBC    Gran % 57.0 38.0 - 73.0 %    Lymph % 32.0 18.0 - 48.0 %    Mono % 5.0 4.0 - 15.0 %    Eosinophil % 0.0 0.0 - 8.0 %    Basophil % 0.0 0.0 - 1.9 %    Bands 6.0 %    Platelet Estimate Appears normal     Hypo Occasional     Target Cells Occasional     Differential Method Manual    Comprehensive metabolic panel   Result Value Ref Range    Sodium 136 136 - 145 mmol/L    Potassium 4.5 3.5 - 5.1 mmol/L    Chloride 97 95 - 110 mmol/L    CO2 28 23 - 29 mmol/L    Glucose 74 70 - 110 mg/dL    BUN 17 8 - 23 mg/dL    Creatinine 0.7 0.5 - 1.4  mg/dL    Calcium 9.8 8.7 - 10.5 mg/dL    Total Protein 6.9 6.0 - 8.4 g/dL    Albumin 3.3 (L) 3.5 - 5.2 g/dL    Total Bilirubin 0.8 0.1 - 1.0 mg/dL    Alkaline Phosphatase 81 55 - 135 U/L    AST 15 10 - 40 U/L    ALT 21 10 - 44 U/L    Anion Gap 11 8 - 16 mmol/L    eGFR >60 >60 mL/min/1.73 m^2   Lactic acid, plasma   Result Value Ref Range    Lactate (Lactic Acid) 1.4 0.5 - 2.2 mmol/L   Magnesium   Result Value Ref Range    Magnesium 1.9 1.6 - 2.6 mg/dL              The Emergency Provider reviewed the vital signs and test results, which are outlined above.     ED Discussion       1:43 PM: Discussed case with Diana Woodard NP ([facility] Medicine). Dr. Doherty agrees with current care and management of pt and accepts admission.   Admitting Service: Hospital Medicine  Admitting Physician: Dr. Doherty  Admit to: Obs [facility]    1:46 PM: Re-evaluated pt. I have discussed test results, shared treatment plan, and the need for admission with patient and family at bedside. Pt and family express understanding at this time and agree with all information. All questions answered. Pt and family have no further questions or concerns at this time. Pt is ready for admit.         Medical Decision Making:   Clinical Tests:   Lab Tests: Ordered and Reviewed         ED Medication(s):  Medications   magic mouthwash (diphenhydrAMINE 12.5 mg/5 mL 20 mL, aluminum & magnesium hydroxide-simethicone (MYLANTA) 20 mL, LIDOcaine HCl 2% (XYLOCAINE) 20 mL) solution (15 mLs Swish & Spit Given 11/1/22 1414)   fentaNYL 25 mcg/hr 1 patch (1 patch Transdermal Patch Applied 11/1/22 1622)   gabapentin capsule 300 mg (has no administration in time range)   losartan tablet 25 mg (25 mg Oral Given 11/1/22 1520)   sodium chloride 0.9% flush 10 mL (has no administration in time range)   albuterol-ipratropium 2.5 mg-0.5 mg/3 mL nebulizer solution 3 mL (has no administration in time range)   melatonin tablet 6 mg (has no administration in time range)    ondansetron injection 4 mg (has no administration in time range)   prochlorperazine injection Soln 5 mg (has no administration in time range)   simethicone chewable tablet 80 mg (has no administration in time range)   aluminum-magnesium hydroxide-simethicone 200-200-20 mg/5 mL suspension 30 mL (has no administration in time range)   acetaminophen tablet 650 mg (has no administration in time range)   naloxone 0.4 mg/mL injection 0.02 mg (has no administration in time range)   magnesium oxide tablet 800 mg (has no administration in time range)   magnesium oxide tablet 800 mg (has no administration in time range)   0.9%  NaCl infusion ( Intravenous New Bag 11/1/22 1545)   morphine injection 4 mg (4 mg Intravenous Given 11/1/22 1621)   morphine injection 2 mg (has no administration in time range)   hydrALAZINE injection 10 mg (has no administration in time range)   pantoprazole injection 40 mg (40 mg Intravenous Given 11/1/22 1520)   0.9%  NaCl infusion (1,000 mLs Intravenous New Bag 11/1/22 1141)   morphine injection 4 mg (4 mg Intravenous Given 11/1/22 1218)   ondansetron injection 4 mg (4 mg Intravenous Given 11/1/22 1218)       Current Discharge Medication List                  Scribe Attestation:   Scribe #1: I performed the above scribed service and the documentation accurately describes the services I performed. I attest to the accuracy of the note.     Attending:   Physician Attestation Statement for Scribe #1: I, Lyndsay Medeiros DO, personally performed the services described in this documentation, as scribed by Delilah Greer, in my presence, and it is both accurate and complete.           Clinical Impression       ICD-10-CM ICD-9-CM   1. Mucositis  K12.30 528.00   2. Failure to thrive in adult  R62.7 783.7   3. Weakness  R07.9 786.50   4. Diarrhea, unspecified type  R19.7 787.91       Disposition:   Disposition: Placed in Observation  Condition: Fair       Lyndsay Medeiros DO  11/01/22 8264

## 2022-11-01 NOTE — SUBJECTIVE & OBJECTIVE
Past Medical History:   Diagnosis Date    Alcoholism     Anemia     Back pain     Encounter for blood transfusion 2018    Essential hypertension 2/4/2016    GERD (gastroesophageal reflux disease)     Hiatal hernia     Hypertension     diet controlled    Melanoma 06/2021    left cheek    Rectal cancer 9/11/2019       Past Surgical History:   Procedure Laterality Date    ARTHROSCOPY OF KNEE Right 5/5/2021    Procedure: ARTHROSCOPY, KNEE;  Surgeon: Delonte Mcintyre MD;  Location: [facility] OR;  Service: Orthopedics;  Laterality: Right;    COLONOSCOPY N/A 9/3/2019    Procedure: COLONOSCOPY;  Surgeon: Isai Centeno MD;  Location: [facility] ENDO;  Service: Endoscopy;  Laterality: N/A;    COLONOSCOPY N/A 1/10/2020    Procedure: COLONOSCOPY;  Surgeon: Familia Gonzalez MD;  Location: [facility];  Service: General;  Laterality: N/A;    COLONOSCOPY N/A 3/24/2021    Procedure: COLONOSCOPY;  Surgeon: Familia Gonzalez MD;  Location: [facility];  Service: General;  Laterality: N/A;    ESOPHAGOGASTRODUODENOSCOPY N/A 9/3/2019    Procedure: ESOPHAGOGASTRODUODENOSCOPY (EGD);  Surgeon: Isai Centeno MD;  Location: [facility];  Service: Endoscopy;  Laterality: N/A;    EXCISION OF MEDIAL MENISCUS OF KNEE Right 5/5/2021    Procedure: MENISCECTOMY, KNEE, MEDIAL;  Surgeon: Delonte Mcintyre MD;  Location: [facility];  Service: Orthopedics;  Laterality: Right;  partial Lateral    FLEXIBLE SIGMOIDOSCOPY  2/4/2020    Procedure: SIGMOIDOSCOPY, FLEXIBLE;  Surgeon: Familia Gonzalez MD;  Location: [facility] OR;  Service: General;;    ILEOSTOMY Right 2/4/2020    Procedure: CREATION, ILEOSTOMY;  Surgeon: Familia Gonzalez MD;  Location: [facility] OR;  Service: General;  Laterality: Right;    ILEOSTOMY CLOSURE N/A 8/4/2020    Procedure: CLOSURE, ILEOSTOMY;  Surgeon: Familia Gonzalez MD;  Location: [facility];  Service: General;  Laterality: N/A;    INCISION AND DRAINAGE OF BACK Left 8/5/2020    Procedure: INCISION AND DRAINAGE, BACK;  Surgeon: Familia RIVAS  MD Carlos;  Location: [facility] OR;  Service: General;  Laterality: Left;    INJECTION OF ANESTHETIC AGENT INTO TISSUE PLANE DEFINED BY TRANSVERSUS ABDOMINIS MUSCLE N/A 2/4/2020    Procedure: BLOCK, TRANSVERSUS ABDOMINIS PLANE;  Surgeon: Familia Gonzalez MD;  Location: [facility] OR;  Service: General;  Laterality: N/A;    INSERTION OF TUNNELED CENTRAL VENOUS CATHETER (CVC) WITH SUBCUTANEOUS PORT Right 2/4/2020    Procedure: INSERTION, PORT-A-CATH;  Surgeon: Familia Gonzalez MD;  Location: [facility] OR;  Service: General;  Laterality: Right;    INSERTION OF TUNNELED CENTRAL VENOUS CATHETER (CVC) WITH SUBCUTANEOUS PORT N/A 8/16/2022    Procedure: [id number];  Surgeon: Familia Gonzalez MD;  Location: [facility];  Service: General;  Laterality: N/A;  right subclavian    JOINT REPLACEMENT Left 2009    Hip    KNEE ARTHROSCOPY W/ PLICA EXCISION Right 5/5/2021    Procedure: EXCISION, PLICA, KNEE, ARTHROSCOPIC;  Surgeon: Delonte Mcintyre MD;  Location: [facility] OR;  Service: Orthopedics;  Laterality: Right;    MOBILIZATION OF SPLENIC FLEXURE  2/4/2020    Procedure: MOBILIZATION, SPLENIC FLEXURE;  Surgeon: Familia Gonzalez MD;  Location: [facility];  Service: General;;    REMOVAL OF HARDWARE FROM HIP Left 1/4/2022    Procedure: REMOVAL, HARDWARE, HIP;  Surgeon: Delonte Mcintyre MD;  Location: [facility];  Service: Orthopedics;  Laterality: Left;       Review of patient's allergies indicates:  No Known Allergies    Current Facility-Administered Medications on File Prior to Encounter   Medication    0.9%  NaCl infusion    0.9%  NaCl infusion    alteplase injection 2 mg    chlorhexidine 0.12 % solution 10 mL    chlorhexidine 0.12 % solution 10 mL    diphenhydrAMINE injection 25 mg    HYDROmorphone (PF) injection 0.2 mg    HYDROmorphone (PF) injection 0.2 mg    lactated ringers infusion    metoclopramide HCl injection 10 mg    nozaseptin (NOZIN) nasal     sodium chloride 0.9% flush 10 mL    sodium chloride 0.9% flush 3  mL    sodium chloride 0.9% flush 3 mL     Current Outpatient Medications on File Prior to Encounter   Medication Sig    ascorbic acid, vitamin C, (VITAMIN C) 1000 MG tablet Take 1,000 mg by mouth once daily.    gabapentin (NEURONTIN) 300 MG capsule Take 1 capsule (300 mg total) by mouth every evening.    HYDROcodone-acetaminophen (NORCO)  mg per tablet Take 1 tablet by mouth every 6 (six) hours as needed for Pain.    losartan (COZAAR) 25 MG tablet Take 1 tablet (25 mg total) by mouth once daily.    multivitamin capsule Take 1 capsule by mouth once daily.    ondansetron (ZOFRAN-ODT) 8 MG TbDL Dissolve 1 tablet (8 mg total) by mouth every 8 (eight) hours as needed (nausea/vomiting).    pantoprazole (PROTONIX) 40 MG tablet Take 1 tablet (40 mg total) by mouth once daily.    dexAMETHasone (DECADRON) 4 MG Tab Take 2 tablets (8 mg total) by mouth once daily. Take as directed on days 2 and 3 of your chemotherapy cycle.    fentaNYL (DURAGESIC) 25 mcg/hr Place 1 patch onto the skin every 72 hours.    loperamide (IMODIUM) 2 mg capsule Take 1 capsule (2 mg total) by mouth 4 (four) times daily as needed for Diarrhea.    [DISCONTINUED] diclofenac sodium (VOLTAREN) 1 % Gel Apply 2 g topically 3 (three) times daily.    [DISCONTINUED] mirtazapine (REMERON) 15 MG tablet Take 1 tablet (15 mg total) by mouth every evening.    [DISCONTINUED] tamsulosin (FLOMAX) 0.4 mg Cap Take 1 capsule (0.4 mg total) by mouth once daily.     Family History       Problem Relation (Age of Onset)    Cataracts Mother    Hypertension Father    Stroke Father          Tobacco Use    Smoking status: Never    Smokeless tobacco: Never   Substance and Sexual Activity    Alcohol use: Yes     Comment: occassionally No alcohol 72h prior to sx    Drug use: No    Sexual activity: Never     Partners: Female     Review of Systems   Constitutional:  Positive for activity change, appetite change and fatigue.   HENT:  Positive for mouth sores and trouble swallowing.     Eyes: Negative.    Respiratory:  Negative for cough and shortness of breath.    Cardiovascular:  Negative for chest pain.   Gastrointestinal:  Positive for abdominal distention and diarrhea. Negative for constipation, nausea and vomiting.   Genitourinary:  Positive for decreased urine volume.   Skin:  Positive for wound.   Neurological:  Positive for weakness and light-headedness.   Psychiatric/Behavioral:  The patient is nervous/anxious.    Objective:     Vital Signs (Most Recent):  Temp: 98.6 °F (37 °C) (11/01/22 1521)  Pulse: 61 (11/01/22 1620)  Resp: 18 (11/01/22 1621)  BP: 137/67 (11/01/22 1620)  SpO2: 99 % (11/01/22 1620) Vital Signs (24h Range):  Temp:  [97.4 °F (36.3 °C)-98.6 °F (37 °C)] 98.6 °F (37 °C)  Pulse:  [47-78] 61  Resp:  [18-26] 18  SpO2:  [91 %-100 %] 99 %  BP: (137-180)/(67-97) 137/67     Weight: 58 kg (127 lb 13.9 oz)  Body mass index is 18.35 kg/m².    Physical Exam  Vitals reviewed.   Constitutional:       Appearance: He is well-developed. He is ill-appearing.      Comments: Thin in appearance   HENT:      Head: Normocephalic and atraumatic.      Nose: Nose normal.      Mouth/Throat:      Mouth: Oral lesions present.   Eyes:      General: No scleral icterus.     Conjunctiva/sclera: Conjunctivae normal.      Pupils: Pupils are equal, round, and reactive to light.   Cardiovascular:      Rate and Rhythm: Normal rate and regular rhythm.      Heart sounds: Normal heart sounds. No murmur heard.    No friction rub. No gallop.   Pulmonary:      Effort: Pulmonary effort is normal.      Breath sounds: Normal breath sounds.   Abdominal:      General: Bowel sounds are normal.      Palpations: Abdomen is soft.      Tenderness: There is generalized abdominal tenderness.   Genitourinary:     Comments: Scattered ulcerative lesions to the intergluteal cleft  Musculoskeletal:         General: No tenderness. Normal range of motion.      Cervical back: Normal range of motion and neck supple.   Skin:      General: Skin is warm and dry.   Neurological:      Mental Status: He is alert and oriented to person, place, and time.   Psychiatric:         Behavior: Behavior normal.         CRANIAL NERVES     CN III, IV, VI   Pupils are equal, round, and reactive to light.     Significant Labs: All pertinent labs within the past 24 hours have been reviewed.  CBC:   Recent Labs   Lab 11/01/22  1141   WBC 8.57   HGB 11.6*   HCT 36.9*        CMP:   Recent Labs   Lab 11/01/22  1141      K 4.5   CL 97   CO2 28   GLU 74   BUN 17   CREATININE 0.7   CALCIUM 9.8   PROT 6.9   ALBUMIN 3.3*   BILITOT 0.8   ALKPHOS 81   AST 15   ALT 21   ANIONGAP 11       Significant Imaging: I have reviewed all pertinent imaging results/findings within the past 24 hours.

## 2022-11-02 LAB
ALBUMIN SERPL BCP-MCNC: 2.4 G/DL (ref 3.5–5.2)
ALP SERPL-CCNC: 73 U/L (ref 55–135)
ALT SERPL W/O P-5'-P-CCNC: 9 U/L (ref 10–44)
ANION GAP SERPL CALC-SCNC: 11 MMOL/L (ref 8–16)
ANISOCYTOSIS BLD QL SMEAR: SLIGHT
AST SERPL-CCNC: 11 U/L (ref 10–40)
BASOPHILS # BLD AUTO: 0.02 K/UL (ref 0–0.2)
BASOPHILS NFR BLD: 0.3 % (ref 0–1.9)
BILIRUB SERPL-MCNC: 0.6 MG/DL (ref 0.1–1)
BUN SERPL-MCNC: 11 MG/DL (ref 8–23)
C DIFF GDH STL QL: NEGATIVE
C DIFF TOX A+B STL QL IA: NEGATIVE
CALCIUM SERPL-MCNC: 8.3 MG/DL (ref 8.7–10.5)
CHLORIDE SERPL-SCNC: 101 MMOL/L (ref 95–110)
CO2 SERPL-SCNC: 24 MMOL/L (ref 23–29)
CREAT SERPL-MCNC: 0.6 MG/DL (ref 0.5–1.4)
DIFFERENTIAL METHOD: ABNORMAL
EOSINOPHIL # BLD AUTO: 0 K/UL (ref 0–0.5)
EOSINOPHIL NFR BLD: 0.3 % (ref 0–8)
ERYTHROCYTE [DISTWIDTH] IN BLOOD BY AUTOMATED COUNT: 27.9 % (ref 11.5–14.5)
EST. GFR  (NO RACE VARIABLE): >60 ML/MIN/1.73 M^2
GLUCOSE SERPL-MCNC: 49 MG/DL (ref 70–110)
HCT VFR BLD AUTO: 32.1 % (ref 40–54)
HGB BLD-MCNC: 9.9 G/DL (ref 14–18)
IMM GRANULOCYTES # BLD AUTO: 0.04 K/UL (ref 0–0.04)
IMM GRANULOCYTES NFR BLD AUTO: 0.6 % (ref 0–0.5)
LYMPHOCYTES # BLD AUTO: 1 K/UL (ref 1–4.8)
LYMPHOCYTES NFR BLD: 15 % (ref 18–48)
MAGNESIUM SERPL-MCNC: 1.8 MG/DL (ref 1.6–2.6)
MCH RBC QN AUTO: 26.5 PG (ref 27–31)
MCHC RBC AUTO-ENTMCNC: 30.8 G/DL (ref 32–36)
MCV RBC AUTO: 86 FL (ref 82–98)
MONOCYTES # BLD AUTO: 0.4 K/UL (ref 0.3–1)
MONOCYTES NFR BLD: 5.6 % (ref 4–15)
NEUTROPHILS # BLD AUTO: 5.4 K/UL (ref 1.8–7.7)
NEUTROPHILS NFR BLD: 78.2 % (ref 38–73)
NRBC BLD-RTO: 0 /100 WBC
PLATELET # BLD AUTO: 207 K/UL (ref 150–450)
PLATELET BLD QL SMEAR: ABNORMAL
PMV BLD AUTO: 9.8 FL (ref 9.2–12.9)
POTASSIUM SERPL-SCNC: 3.3 MMOL/L (ref 3.5–5.1)
PROT SERPL-MCNC: 5.3 G/DL (ref 6–8.4)
RBC # BLD AUTO: 3.74 M/UL (ref 4.6–6.2)
SMUDGE CELLS BLD QL SMEAR: PRESENT
SODIUM SERPL-SCNC: 136 MMOL/L (ref 136–145)
TARGETS BLD QL SMEAR: ABNORMAL
WBC # BLD AUTO: 6.94 K/UL (ref 3.9–12.7)
WBC #/AREA STL HPF: NORMAL /[HPF]

## 2022-11-02 PROCEDURE — 63600175 PHARM REV CODE 636 W HCPCS: Performed by: NURSE PRACTITIONER

## 2022-11-02 PROCEDURE — 25000003 PHARM REV CODE 250: Performed by: NURSE PRACTITIONER

## 2022-11-02 PROCEDURE — 36415 COLL VENOUS BLD VENIPUNCTURE: CPT | Performed by: NURSE PRACTITIONER

## 2022-11-02 PROCEDURE — 87449 NOS EACH ORGANISM AG IA: CPT | Performed by: INTERNAL MEDICINE

## 2022-11-02 PROCEDURE — 99215 OFFICE O/P EST HI 40 MIN: CPT | Mod: ,,, | Performed by: INTERNAL MEDICINE

## 2022-11-02 PROCEDURE — 63600175 PHARM REV CODE 636 W HCPCS: Performed by: INTERNAL MEDICINE

## 2022-11-02 PROCEDURE — 99215 PR OFFICE/OUTPT VISIT, EST, LEVL V, 40-54 MIN: ICD-10-PCS | Mod: ,,, | Performed by: INTERNAL MEDICINE

## 2022-11-02 PROCEDURE — 25000003 PHARM REV CODE 250: Performed by: INTERNAL MEDICINE

## 2022-11-02 PROCEDURE — 25000003 PHARM REV CODE 250: Performed by: EMERGENCY MEDICINE

## 2022-11-02 PROCEDURE — 99205 OFFICE O/P NEW HI 60 MIN: CPT | Mod: ,,, | Performed by: PHYSICIAN ASSISTANT

## 2022-11-02 PROCEDURE — 83735 ASSAY OF MAGNESIUM: CPT | Performed by: NURSE PRACTITIONER

## 2022-11-02 PROCEDURE — 80053 COMPREHEN METABOLIC PANEL: CPT | Performed by: NURSE PRACTITIONER

## 2022-11-02 PROCEDURE — G0378 HOSPITAL OBSERVATION PER HR: HCPCS

## 2022-11-02 PROCEDURE — 85025 COMPLETE CBC W/AUTO DIFF WBC: CPT | Performed by: NURSE PRACTITIONER

## 2022-11-02 PROCEDURE — C9113 INJ PANTOPRAZOLE SODIUM, VIA: HCPCS | Performed by: NURSE PRACTITIONER

## 2022-11-02 PROCEDURE — 94761 N-INVAS EAR/PLS OXIMETRY MLT: CPT

## 2022-11-02 PROCEDURE — 99205 PR OFFICE/OUTPT VISIT, NEW, LEVL V, 60-74 MIN: ICD-10-PCS | Mod: ,,, | Performed by: PHYSICIAN ASSISTANT

## 2022-11-02 RX ORDER — DEXTROSE MONOHYDRATE AND SODIUM CHLORIDE 5; .9 G/100ML; G/100ML
INJECTION, SOLUTION INTRAVENOUS CONTINUOUS
Status: DISCONTINUED | OUTPATIENT
Start: 2022-11-02 | End: 2022-11-04

## 2022-11-02 RX ORDER — METHYLPREDNISOLONE SOD SUCC 125 MG
80 VIAL (EA) INJECTION DAILY
Status: DISCONTINUED | OUTPATIENT
Start: 2022-11-02 | End: 2022-11-04

## 2022-11-02 RX ORDER — LOSARTAN POTASSIUM 25 MG/1
25 TABLET ORAL DAILY
Status: DISCONTINUED | OUTPATIENT
Start: 2022-11-03 | End: 2022-11-07 | Stop reason: HOSPADM

## 2022-11-02 RX ORDER — HYDROMORPHONE HYDROCHLORIDE 1 MG/ML
0.5 INJECTION, SOLUTION INTRAMUSCULAR; INTRAVENOUS; SUBCUTANEOUS
Status: DISCONTINUED | OUTPATIENT
Start: 2022-11-02 | End: 2022-11-04

## 2022-11-02 RX ORDER — HYDROMORPHONE HYDROCHLORIDE 1 MG/ML
1 INJECTION, SOLUTION INTRAMUSCULAR; INTRAVENOUS; SUBCUTANEOUS EVERY 4 HOURS PRN
Status: DISCONTINUED | OUTPATIENT
Start: 2022-11-02 | End: 2022-11-04

## 2022-11-02 RX ADMIN — DIPHENHYDRAMINE HYDROCHLORIDE 15 ML: 2.5 LIQUID ORAL at 09:11

## 2022-11-02 RX ADMIN — PANTOPRAZOLE SODIUM 40 MG: 40 INJECTION, POWDER, FOR SOLUTION INTRAVENOUS at 08:11

## 2022-11-02 RX ADMIN — HYDROMORPHONE HYDROCHLORIDE 1 MG: 1 INJECTION, SOLUTION INTRAMUSCULAR; INTRAVENOUS; SUBCUTANEOUS at 01:11

## 2022-11-02 RX ADMIN — Medication: at 09:11

## 2022-11-02 RX ADMIN — METHYLPREDNISOLONE SODIUM SUCCINATE 80 MG: 125 INJECTION, POWDER, FOR SOLUTION INTRAMUSCULAR; INTRAVENOUS at 03:11

## 2022-11-02 RX ADMIN — HYDROMORPHONE HYDROCHLORIDE 1 MG: 1 INJECTION, SOLUTION INTRAMUSCULAR; INTRAVENOUS; SUBCUTANEOUS at 09:11

## 2022-11-02 RX ADMIN — LOSARTAN POTASSIUM 25 MG: 25 TABLET, FILM COATED ORAL at 08:11

## 2022-11-02 RX ADMIN — DIPHENHYDRAMINE HYDROCHLORIDE 15 ML: 12.5 LIQUID ORAL at 01:11

## 2022-11-02 RX ADMIN — SODIUM CHLORIDE: 9 INJECTION, SOLUTION INTRAVENOUS at 01:11

## 2022-11-02 RX ADMIN — DIPHENHYDRAMINE HYDROCHLORIDE 15 ML: 2.5 LIQUID ORAL at 03:11

## 2022-11-02 RX ADMIN — DEXTROSE AND SODIUM CHLORIDE: 5; .9 INJECTION, SOLUTION INTRAVENOUS at 08:11

## 2022-11-02 RX ADMIN — MORPHINE SULFATE 4 MG: 4 INJECTION INTRAVENOUS at 09:11

## 2022-11-02 RX ADMIN — MORPHINE SULFATE 4 MG: 4 INJECTION INTRAVENOUS at 02:11

## 2022-11-02 NOTE — HOSPITAL COURSE
Pt with severe mucositis due to chemotherapy. Ulcerations present in mouth and in the rectal area. Receiving IV fluids, miracle mouth wash and prn analgesia. Morphine changed to Dilaudid. Per Up to Date - systemic corticosteroids are warranted. Solumedrol added. Encouraged family member to assist with salt water gargles every 4 hours. Pt hypoglycemic this AM and IV fluids with dextrose added. Potassium replaced. Pt denies further diarrhea.     11/3/22 - slight improvement in condition today. Pt reports he is feeling better. Has whitish coating to tongue, Diflucan added to cover for oral candidasis. Nystatin for thrush. Pt has not tried to take any oral medications. IV fluids continue and potassium replaced. Encouraged warm salt water gargles along with miracle mouth wash. PT/OT ordered due to weakness.   11/4/22 No acute events overnight. The patient reports some improvement in mucositis. He is able to tolerate some PO intake. Will add boost with each meal to improve nutrition status. Will start lomotil to improve diarrhea. Palliative care is following and will adjust PRN analgesia. Continue current management.    11/05 Still poor po intake. Continue IVF. Monitor and supplement electrolytes as needed. Continue current treatment.   11/06 Po intake improving slowly. Patient with bradycardia with heart rate down to 30s this am. Blood pressure stable. Cardiology consulted.   11/7/22 No acute events overnight. The patient reports his mucositis is improved. He is tolerating a PO intake. Palliative care evaluated the patient and sent in some pain meds. Cardiology evaluated the patient and recommend outpatient follow up. The patient was seen and examined today and deemed stable for discharge. The patient is discharging home with home health and will follow up with his PCP, Oncology, Palliative care and Cardiology.

## 2022-11-02 NOTE — ASSESSMENT & PLAN NOTE
Continue fentanyl patch  Prn Dilaudid for pain associated with mucositis  Consult Palliative Care to assist with pain medications

## 2022-11-02 NOTE — CONSULTS
[facility] - Telemetry ([facility])  Hematology/Oncology  Consult Note    Patient Name: Vincent Benton  MRN: [id number]  Admission Date: 11/1/2022  Hospital Length of Stay: 0 days  Code Status: Full Code   Attending Provider: Steven Doherty MD  Consulting Provider: Tamara Barroso NP  Primary Care Physician: Shakila Moran DO  Principal Problem:Mucositis due to antineoplastic therapy    Inpatient consult to Hematology/Oncology  Consult performed by: Tamara Barroso NP  Consult ordered by: Emily Sanches NP        Subjective:     HPI:  67 y.o male with recurrent metastatic rectal cancer presented to [facility] ER following visit with Dr. Gonzalez for mucositis and rectal sores. Associated symptoms include mouth and rectal pain, trouble swallowing, fatigue, weakness, diarrhea. He denies fever, syncope, urinary complaints, CP, SOB.     In regards to his recurrent metastatic rectal cancer patient currently on palliative chemotherapy. S/p 5 cycles FOLFIRI + Avastin (last tx 10/25/22).       Oncology Treatment Plan:   OP COLORECTAL FOLFIRI + BEVACIZUMAB Q2W    Medications:  Continuous Infusions:   dextrose 5 % and 0.9 % NaCl 125 mL/hr at 11/02/22 0839     Scheduled Meds:   fentaNYL  1 patch Transdermal Q72H    gabapentin  300 mg Oral QHS    [START ON 11/3/2022] losartan  25 mg Oral Daily    magic mouthwash (diphenhydrAMINE 12.5 mg/5 mL 20 mL, aluminum & magnesium hydroxide-simethicone (MYLANTA) 20 mL, LIDOcaine HCl 2% (XYLOCAINE) 20 mL) solution  15 mL Swish & Spit Q4H    pantoprazole  40 mg Intravenous Daily    potassium bicarbonate  25 mEq Oral Daily     PRN Meds:acetaminophen, albuterol-ipratropium, aluminum-magnesium hydroxide-simethicone, hydrALAZINE, HYDROmorphone, HYDROmorphone, magnesium oxide, magnesium oxide, melatonin, naloxone, ondansetron, prochlorperazine, simethicone, sodium chloride 0.9%     Review of patient's allergies indicates:  No Known Allergies     Past Medical History:   Diagnosis Date     Alcoholism     Anemia     Back pain     Encounter for blood transfusion 2018    Essential hypertension 2/4/2016    GERD (gastroesophageal reflux disease)     Hiatal hernia     Hypertension     diet controlled    Melanoma 06/2021    left cheek    Rectal cancer 9/11/2019     Past Surgical History:   Procedure Laterality Date    ARTHROSCOPY OF KNEE Right 5/5/2021    Procedure: ARTHROSCOPY, KNEE;  Surgeon: Delonte Mcintyre MD;  Location: [facility];  Service: Orthopedics;  Laterality: Right;    COLONOSCOPY N/A 9/3/2019    Procedure: COLONOSCOPY;  Surgeon: Isai Centeno MD;  Location: [facility] ENDO;  Service: Endoscopy;  Laterality: N/A;    COLONOSCOPY N/A 1/10/2020    Procedure: COLONOSCOPY;  Surgeon: Familia Gonzalez MD;  Location: [facility];  Service: General;  Laterality: N/A;    COLONOSCOPY N/A 3/24/2021    Procedure: COLONOSCOPY;  Surgeon: Familia Gonzalez MD;  Location: [facility];  Service: General;  Laterality: N/A;    ESOPHAGOGASTRODUODENOSCOPY N/A 9/3/2019    Procedure: ESOPHAGOGASTRODUODENOSCOPY (EGD);  Surgeon: Isai Centeno MD;  Location: [facility];  Service: Endoscopy;  Laterality: N/A;    EXCISION OF MEDIAL MENISCUS OF KNEE Right 5/5/2021    Procedure: MENISCECTOMY, KNEE, MEDIAL;  Surgeon: Delonte Mcintyre MD;  Location: [facility];  Service: Orthopedics;  Laterality: Right;  partial Lateral    FLEXIBLE SIGMOIDOSCOPY  2/4/2020    Procedure: SIGMOIDOSCOPY, FLEXIBLE;  Surgeon: Familia Gonzalez MD;  Location: [facility];  Service: General;;    ILEOSTOMY Right 2/4/2020    Procedure: CREATION, ILEOSTOMY;  Surgeon: Familia Gonzalez MD;  Location: [facility];  Service: General;  Laterality: Right;    ILEOSTOMY CLOSURE N/A 8/4/2020    Procedure: CLOSURE, ILEOSTOMY;  Surgeon: Familia Gonzalez MD;  Location: [facility];  Service: General;  Laterality: N/A;    INCISION AND DRAINAGE OF BACK Left 8/5/2020    Procedure: INCISION AND DRAINAGE, BACK;  Surgeon: Familia Gonzalez MD;  Location: [facility]  OR;  Service: General;  Laterality: Left;    INJECTION OF ANESTHETIC AGENT INTO TISSUE PLANE DEFINED BY TRANSVERSUS ABDOMINIS MUSCLE N/A 2/4/2020    Procedure: BLOCK, TRANSVERSUS ABDOMINIS PLANE;  Surgeon: Familia Gonzalez MD;  Location: [facility] OR;  Service: General;  Laterality: N/A;    INSERTION OF TUNNELED CENTRAL VENOUS CATHETER (CVC) WITH SUBCUTANEOUS PORT Right 2/4/2020    Procedure: INSERTION, PORT-A-CATH;  Surgeon: Familia Gonzalez MD;  Location: [facility] OR;  Service: General;  Laterality: Right;    INSERTION OF TUNNELED CENTRAL VENOUS CATHETER (CVC) WITH SUBCUTANEOUS PORT N/A 8/16/2022    Procedure: [id number]-H-CATH;  Surgeon: Familia Gonzalez MD;  Location: [facility] OR;  Service: General;  Laterality: N/A;  right subclavian    JOINT REPLACEMENT Left 2009    Hip    KNEE ARTHROSCOPY W/ PLICA EXCISION Right 5/5/2021    Procedure: EXCISION, PLICA, KNEE, ARTHROSCOPIC;  Surgeon: Delonte Mcintyre MD;  Location: [facility] OR;  Service: Orthopedics;  Laterality: Right;    MOBILIZATION OF SPLENIC FLEXURE  2/4/2020    Procedure: MOBILIZATION, SPLENIC FLEXURE;  Surgeon: Familia Gonzalez MD;  Location: [facility] OR;  Service: General;;    REMOVAL OF HARDWARE FROM HIP Left 1/4/2022    Procedure: REMOVAL, HARDWARE, HIP;  Surgeon: Delonte Mcintyre MD;  Location: [facility] OR;  Service: Orthopedics;  Laterality: Left;     Family History       Problem Relation (Age of Onset)    Cataracts Mother    Hypertension Father    Stroke Father          Tobacco Use    Smoking status: Never    Smokeless tobacco: Never   Substance and Sexual Activity    Alcohol use: Yes     Comment: occassionally No alcohol 72h prior to sx    Drug use: No    Sexual activity: Never     Partners: Female       Review of Systems   Constitutional:  Positive for activity change and fatigue. Negative for appetite change, chills, fever and unexpected weight change.   HENT:  Positive for mouth sores and trouble swallowing. Negative for congestion,  nosebleeds, sore throat and voice change.    Eyes:  Negative for visual disturbance.   Respiratory:  Negative for cough, chest tightness, shortness of breath and wheezing.    Cardiovascular:  Negative for chest pain, palpitations and leg swelling.   Gastrointestinal:  Positive for diarrhea and rectal pain. Negative for abdominal distention, abdominal pain, anal bleeding, blood in stool, constipation, nausea and vomiting.   Genitourinary:  Negative for difficulty urinating, dysuria and hematuria.   Musculoskeletal:  Negative for arthralgias, back pain and myalgias.   Skin:  Negative for pallor, rash and wound.   Neurological:  Positive for weakness. Negative for dizziness, syncope and headaches.   Hematological:  Negative for adenopathy. Does not bruise/bleed easily.   Psychiatric/Behavioral:  The patient is nervous/anxious.    Objective:     Vital Signs (Most Recent):  Temp: 98 °F (36.7 °C) (11/02/22 0924)  Pulse: 79 (11/02/22 0924)  Resp: 18 (11/02/22 0924)  BP: 113/71 (11/02/22 0924)  SpO2: 97 % (11/02/22 0924) Vital Signs (24h Range):  Temp:  [96.9 °F (36.1 °C)-98.6 °F (37 °C)] 98 °F (36.7 °C)  Pulse:  [47-84] 79  Resp:  [17-26] 18  SpO2:  [91 %-100 %] 97 %  BP: (106-180)/(56-87) 113/71     Weight: 58.7 kg (129 lb 6.6 oz)  Body mass index is 18.57 kg/m².  Body surface area is 1.7 meters squared.    No intake or output data in the 24 hours ending 11/02/22 1054    Physical Exam  Vitals reviewed.   Constitutional:       Appearance: He is well-developed. He is ill-appearing.   HENT:      Head: Normocephalic.      Right Ear: External ear normal.      Left Ear: External ear normal.      Nose: Nose normal.   Eyes:      General: Lids are normal. No scleral icterus.        Right eye: No discharge.         Left eye: No discharge.      Conjunctiva/sclera: Conjunctivae normal.   Neck:      Thyroid: No thyroid mass.   Cardiovascular:      Rate and Rhythm: Normal rate and regular rhythm.      Heart sounds: Normal heart sounds.    Pulmonary:      Effort: Pulmonary effort is normal. No respiratory distress.      Breath sounds: Normal breath sounds. No wheezing or rales.   Abdominal:      General: Bowel sounds are normal. There is no distension.      Palpations: Abdomen is soft.      Tenderness: There is no abdominal tenderness.   Genitourinary:     Comments: deferred  Musculoskeletal:         General: Normal range of motion.      Cervical back: Normal range of motion.   Skin:     General: Skin is warm and dry.   Neurological:      Mental Status: He is alert and oriented to person, place, and time.   Psychiatric:         Speech: Speech normal.         Behavior: Behavior normal. Behavior is cooperative.         Thought Content: Thought content normal.       Significant Labs:   BMP:   Recent Labs   Lab 11/01/22  1141 11/02/22  0452   GLU 74 49*    136   K 4.5 3.3*   CL 97 101   CO2 28 24   BUN 17 11   CREATININE 0.7 0.6   CALCIUM 9.8 8.3*   MG 1.9 1.8   , CBC:   Recent Labs   Lab 11/01/22  1141 11/02/22  0452   WBC 8.57 6.94   HGB 11.6* 9.9*   HCT 36.9* 32.1*    207   , LDH: No results for input(s): LDHCSF, BFSOURCE in the last 48 hours., LFTs:   Recent Labs   Lab 11/01/22  1141 11/02/22  0452   ALT 21 9*   AST 15 11   ALKPHOS 81 73   BILITOT 0.8 0.6   PROT 6.9 5.3*   ALBUMIN 3.3* 2.4*   , Urine Studies: No results for input(s): COLORU, APPEARANCEUA, PHUR, SPECGRAV, PROTEINUA, GLUCUA, KETONESU, BILIRUBINUA, OCCULTUA, NITRITE, UROBILINOGEN, LEUKOCYTESUR, RBCUA, WBCUA, BACTERIA, SQUAMEPITHEL, HYALINECASTS in the last 48 hours.    Invalid input(s): WRIGHTSUR, and All pertinent labs from the last 24 hours have been reviewed.    Diagnostic Results:  None    Assessment/Plan:     * Mucositis due to antineoplastic therapy  -magic mouthwash Q4H  -IVFs  -avoid food irritants  -pain management per PM  -note low albumin. Encourage boost/ensure when tolerable   -electrolyte repletion PRN per primary team  -supportive care    Rectal  cancer  -Hold chemo inpatient  -Will plan for f/u with Primary Oncologist for further recommendations in regards to malignancy management      Neoplasm related pain  -PM consult for pain management       Thank you for your consult. I will follow-up with patient. Please contact us if you have any additional questions.    Tamara Barroso NP  Hematology/Oncology  [facility] - Telemetry ([facility])  25 minutes of total time spent on the encounter, which includes face to face time and non-face to face time preparing to see the patient (eg, review of tests), Obtaining and/or reviewing separately obtained history, Documenting clinical information in the electronic or other health record, Independently interpreting results (not separately reported) and communicating results to the patient/family/caregiver, or Care coordination (not separately reported).

## 2022-11-02 NOTE — SUBJECTIVE & OBJECTIVE
Oncology Treatment Plan:   OP COLORECTAL FOLFIRI + BEVACIZUMAB Q2W    Medications:  Continuous Infusions:   dextrose 5 % and 0.9 % NaCl 125 mL/hr at 11/02/22 0839     Scheduled Meds:   fentaNYL  1 patch Transdermal Q72H    gabapentin  300 mg Oral QHS    [START ON 11/3/2022] losartan  25 mg Oral Daily    magic mouthwash (diphenhydrAMINE 12.5 mg/5 mL 20 mL, aluminum & magnesium hydroxide-simethicone (MYLANTA) 20 mL, LIDOcaine HCl 2% (XYLOCAINE) 20 mL) solution  15 mL Swish & Spit Q4H    pantoprazole  40 mg Intravenous Daily    potassium bicarbonate  25 mEq Oral Daily     PRN Meds:acetaminophen, albuterol-ipratropium, aluminum-magnesium hydroxide-simethicone, hydrALAZINE, HYDROmorphone, HYDROmorphone, magnesium oxide, magnesium oxide, melatonin, naloxone, ondansetron, prochlorperazine, simethicone, sodium chloride 0.9%     Review of patient's allergies indicates:  No Known Allergies     Past Medical History:   Diagnosis Date    Alcoholism     Anemia     Back pain     Encounter for blood transfusion 2018    Essential hypertension 2/4/2016    GERD (gastroesophageal reflux disease)     Hiatal hernia     Hypertension     diet controlled    Melanoma 06/2021    left cheek    Rectal cancer 9/11/2019     Past Surgical History:   Procedure Laterality Date    ARTHROSCOPY OF KNEE Right 5/5/2021    Procedure: ARTHROSCOPY, KNEE;  Surgeon: Delonte Mcintyre MD;  Location: [facility];  Service: Orthopedics;  Laterality: Right;    COLONOSCOPY N/A 9/3/2019    Procedure: COLONOSCOPY;  Surgeon: Isai Centeno MD;  Location: [facility];  Service: Endoscopy;  Laterality: N/A;    COLONOSCOPY N/A 1/10/2020    Procedure: COLONOSCOPY;  Surgeon: Familia Gonzalez MD;  Location: [facility];  Service: General;  Laterality: N/A;    COLONOSCOPY N/A 3/24/2021    Procedure: COLONOSCOPY;  Surgeon: Familia Gonzalez MD;  Location: [facility];  Service: General;  Laterality: N/A;    ESOPHAGOGASTRODUODENOSCOPY N/A 9/3/2019    Procedure:  ESOPHAGOGASTRODUODENOSCOPY (EGD);  Surgeon: Isai Centeno MD;  Location: [facility] ENDO;  Service: Endoscopy;  Laterality: N/A;    EXCISION OF MEDIAL MENISCUS OF KNEE Right 5/5/2021    Procedure: MENISCECTOMY, KNEE, MEDIAL;  Surgeon: Delonte Mcintyre MD;  Location: [facility] OR;  Service: Orthopedics;  Laterality: Right;  partial Lateral    FLEXIBLE SIGMOIDOSCOPY  2/4/2020    Procedure: SIGMOIDOSCOPY, FLEXIBLE;  Surgeon: Familia Gonzalez MD;  Location: [facility] OR;  Service: General;;    ILEOSTOMY Right 2/4/2020    Procedure: CREATION, ILEOSTOMY;  Surgeon: Familia Gonzalez MD;  Location: [facility] OR;  Service: General;  Laterality: Right;    ILEOSTOMY CLOSURE N/A 8/4/2020    Procedure: CLOSURE, ILEOSTOMY;  Surgeon: Familia Gonzalez MD;  Location: [facility] OR;  Service: General;  Laterality: N/A;    INCISION AND DRAINAGE OF BACK Left 8/5/2020    Procedure: INCISION AND DRAINAGE, BACK;  Surgeon: Familia Gonzalez MD;  Location: [facility] OR;  Service: General;  Laterality: Left;    INJECTION OF ANESTHETIC AGENT INTO TISSUE PLANE DEFINED BY TRANSVERSUS ABDOMINIS MUSCLE N/A 2/4/2020    Procedure: BLOCK, TRANSVERSUS ABDOMINIS PLANE;  Surgeon: Familia Gonzalez MD;  Location: [facility] OR;  Service: General;  Laterality: N/A;    INSERTION OF TUNNELED CENTRAL VENOUS CATHETER (CVC) WITH SUBCUTANEOUS PORT Right 2/4/2020    Procedure: INSERTION, PORT-A-CATH;  Surgeon: Familia Gonzalez MD;  Location: [facility] OR;  Service: General;  Laterality: Right;    INSERTION OF TUNNELED CENTRAL VENOUS CATHETER (CVC) WITH SUBCUTANEOUS PORT N/A 8/16/2022    Procedure: HYOEUTFEB-LYON-M-CATH;  Surgeon: Familia Gonzalez MD;  Location: [facility] OR;  Service: General;  Laterality: N/A;  right subclavian    JOINT REPLACEMENT Left 2009    Hip    KNEE ARTHROSCOPY W/ PLICA EXCISION Right 5/5/2021    Procedure: EXCISION, PLICA, KNEE, ARTHROSCOPIC;  Surgeon: Delonte Mcintyre MD;  Location: [facility] OR;  Service: Orthopedics;  Laterality: Right;    MOBILIZATION OF SPLENIC  FLEXURE  2/4/2020    Procedure: MOBILIZATION, SPLENIC FLEXURE;  Surgeon: Familia Gonzalez MD;  Location: [facility] OR;  Service: General;;    REMOVAL OF HARDWARE FROM HIP Left 1/4/2022    Procedure: REMOVAL, HARDWARE, HIP;  Surgeon: Delonte Mcintyre MD;  Location: [facility] OR;  Service: Orthopedics;  Laterality: Left;     Family History       Problem Relation (Age of Onset)    Cataracts Mother    Hypertension Father    Stroke Father          Tobacco Use    Smoking status: Never    Smokeless tobacco: Never   Substance and Sexual Activity    Alcohol use: Yes     Comment: occassionally No alcohol 72h prior to sx    Drug use: No    Sexual activity: Never     Partners: Female       Review of Systems   Constitutional:  Positive for activity change and fatigue. Negative for appetite change, chills, fever and unexpected weight change.   HENT:  Positive for mouth sores and trouble swallowing. Negative for congestion, nosebleeds, sore throat and voice change.    Eyes:  Negative for visual disturbance.   Respiratory:  Negative for cough, chest tightness, shortness of breath and wheezing.    Cardiovascular:  Negative for chest pain, palpitations and leg swelling.   Gastrointestinal:  Positive for diarrhea and rectal pain. Negative for abdominal distention, abdominal pain, anal bleeding, blood in stool, constipation, nausea and vomiting.   Genitourinary:  Negative for difficulty urinating, dysuria and hematuria.   Musculoskeletal:  Negative for arthralgias, back pain and myalgias.   Skin:  Negative for pallor, rash and wound.   Neurological:  Positive for weakness. Negative for dizziness, syncope and headaches.   Hematological:  Negative for adenopathy. Does not bruise/bleed easily.   Psychiatric/Behavioral:  The patient is nervous/anxious.    Objective:     Vital Signs (Most Recent):  Temp: 98 °F (36.7 °C) (11/02/22 0924)  Pulse: 79 (11/02/22 0924)  Resp: 18 (11/02/22 0924)  BP: 113/71 (11/02/22 0924)  SpO2: 97 % (11/02/22 0924)  Vital Signs (24h Range):  Temp:  [96.9 °F (36.1 °C)-98.6 °F (37 °C)] 98 °F (36.7 °C)  Pulse:  [47-84] 79  Resp:  [17-26] 18  SpO2:  [91 %-100 %] 97 %  BP: (106-180)/(56-87) 113/71     Weight: 58.7 kg (129 lb 6.6 oz)  Body mass index is 18.57 kg/m².  Body surface area is 1.7 meters squared.    No intake or output data in the 24 hours ending 11/02/22 1054    Physical Exam  Vitals reviewed.   Constitutional:       Appearance: He is well-developed. He is ill-appearing.   HENT:      Head: Normocephalic.      Right Ear: External ear normal.      Left Ear: External ear normal.      Nose: Nose normal.   Eyes:      General: Lids are normal. No scleral icterus.        Right eye: No discharge.         Left eye: No discharge.      Conjunctiva/sclera: Conjunctivae normal.   Neck:      Thyroid: No thyroid mass.   Cardiovascular:      Rate and Rhythm: Normal rate and regular rhythm.      Heart sounds: Normal heart sounds.   Pulmonary:      Effort: Pulmonary effort is normal. No respiratory distress.      Breath sounds: Normal breath sounds. No wheezing or rales.   Abdominal:      General: Bowel sounds are normal. There is no distension.      Palpations: Abdomen is soft.      Tenderness: There is no abdominal tenderness.   Genitourinary:     Comments: deferred  Musculoskeletal:         General: Normal range of motion.      Cervical back: Normal range of motion.   Skin:     General: Skin is warm and dry.   Neurological:      Mental Status: He is alert and oriented to person, place, and time.   Psychiatric:         Speech: Speech normal.         Behavior: Behavior normal. Behavior is cooperative.         Thought Content: Thought content normal.       Significant Labs:   BMP:   Recent Labs   Lab 11/01/22  1141 11/02/22  0452   GLU 74 49*    136   K 4.5 3.3*   CL 97 101   CO2 28 24   BUN 17 11   CREATININE 0.7 0.6   CALCIUM 9.8 8.3*   MG 1.9 1.8   , CBC:   Recent Labs   Lab 11/01/22  1141 11/02/22  0452   WBC 8.57 6.94   HGB  11.6* 9.9*   HCT 36.9* 32.1*    207   , LDH: No results for input(s): LDHCSF, BFSOURCE in the last 48 hours., LFTs:   Recent Labs   Lab 11/01/22  1141 11/02/22  0452   ALT 21 9*   AST 15 11   ALKPHOS 81 73   BILITOT 0.8 0.6   PROT 6.9 5.3*   ALBUMIN 3.3* 2.4*   , Urine Studies: No results for input(s): COLORU, APPEARANCEUA, PHUR, SPECGRAV, PROTEINUA, GLUCUA, KETONESU, BILIRUBINUA, OCCULTUA, NITRITE, UROBILINOGEN, LEUKOCYTESUR, RBCUA, WBCUA, BACTERIA, SQUAMEPITHEL, HYALINECASTS in the last 48 hours.    Invalid input(s): WRIGHTSUR, and All pertinent labs from the last 24 hours have been reviewed.    Diagnostic Results:  None

## 2022-11-02 NOTE — PROGRESS NOTES
[facility] Medicine  Progress Note    Patient Name: Vincent Benton  MRN: [id number]  Patient Class: OP- Observation   Admission Date: 11/1/2022  Length of Stay: 0 days  Attending Physician: Steven Doherty MD  Primary Care Provider: Shakila Moran DO        Subjective:     Principal Problem:Mucositis due to antineoplastic therapy        HPI:  Vincent Benton is a 67 y.o. male patient with a PMHx of GERD, HTN, rectal cancer, alcoholism who presents to the Emergency Department for evaluation of mouth and perirectal sores which onset several days PTA. Pt was sent from the cancer center and is currently undergoing chemotherapy. His last chemo was on 10/25/22. Associated sxs include decreased appetite, diarrhea, generalized abdominal pain, generalized weakness and decreased urination. Patient denies any fever, chills, CP, SOB, HA, n/v, and all other sxs at this time. No prior Tx reported. No further complaints or concerns at this time.   On arrival: Temp 97.4, pulse 69, resp 20 and B/P 145/73  Labs are essentially baseline with mild anemia  Pt was placed in Observation for supportive care as a result of chemotherapy.   As clarification, on 11/1/2022 patient should be admitted for hospital observation services under my care in collaboration with Steven Doherty MD            Overview/Hospital Course:  Pt with severe mucositis due to chemotherapy. Ulcerations present in mouth and in the rectal area. Receiving IV fluids, miracle mouth wash and prn analgesia. Morphine changed to Dilaudid. Per Up to Date - systemic corticosteroids are warranted. Solumedrol added. Encouraged family member to assist with salt water gargles every 4 hours. Pt hypoglycemic this AM and IV fluids with dextrose added. Potassium replaced. Pt denies further diarrhea.       Interval History: See Hospital Course    Review of Systems   Constitutional:  Positive for activity change, appetite change and fatigue.    HENT:  Positive for mouth sores and trouble swallowing.    Eyes: Negative.    Respiratory:  Negative for cough and shortness of breath.    Cardiovascular:  Negative for chest pain.   Gastrointestinal:  Positive for abdominal pain and diarrhea. Negative for abdominal distention, constipation, nausea and vomiting.   Genitourinary:  Positive for decreased urine volume.   Skin:  Positive for wound.   Neurological:  Positive for weakness and light-headedness.   Psychiatric/Behavioral:  The patient is nervous/anxious.    Objective:     Vital Signs (Most Recent):  Temp: 97.3 °F (36.3 °C) (11/02/22 1242)  Pulse: 87 (11/02/22 1242)  Resp: 20 (11/02/22 1358)  BP: 125/76 (11/02/22 1242)  SpO2: 97 % (11/02/22 1242) Vital Signs (24h Range):  Temp:  [96.9 °F (36.1 °C)-98.6 °F (37 °C)] 97.3 °F (36.3 °C)  Pulse:  [59-87] 87  Resp:  [17-26] 20  SpO2:  [95 %-100 %] 97 %  BP: (106-155)/(56-87) 125/76     Weight: 58.7 kg (129 lb 6.6 oz)  Body mass index is 18.57 kg/m².  No intake or output data in the 24 hours ending 11/02/22 1408   Physical Exam  Vitals reviewed.   Constitutional:       Appearance: He is well-developed. He is ill-appearing.      Comments: Thin in appearance   HENT:      Head: Normocephalic and atraumatic.      Nose: Nose normal.      Mouth/Throat:      Mouth: Oral lesions present.   Eyes:      General: No scleral icterus.     Conjunctiva/sclera: Conjunctivae normal.      Pupils: Pupils are equal, round, and reactive to light.   Cardiovascular:      Rate and Rhythm: Normal rate and regular rhythm.      Heart sounds: Normal heart sounds. No murmur heard.    No friction rub. No gallop.   Pulmonary:      Effort: Pulmonary effort is normal.      Breath sounds: Normal breath sounds.   Abdominal:      General: Bowel sounds are normal.      Palpations: Abdomen is soft.      Tenderness: There is generalized abdominal tenderness.   Genitourinary:     Comments: Scattered ulcerative lesions to the intergluteal  cleft  Musculoskeletal:         General: No tenderness. Normal range of motion.      Cervical back: Normal range of motion and neck supple.   Skin:     General: Skin is warm and dry.   Neurological:      Mental Status: He is alert and oriented to person, place, and time.   Psychiatric:         Behavior: Behavior normal.       Significant Labs: All pertinent labs within the past 24 hours have been reviewed.  CBC:   Recent Labs   Lab 11/01/22  1141 11/02/22  0452   WBC 8.57 6.94   HGB 11.6* 9.9*   HCT 36.9* 32.1*    207     CMP:   Recent Labs   Lab 11/01/22  1141 11/02/22  0452    136   K 4.5 3.3*   CL 97 101   CO2 28 24   GLU 74 49*   BUN 17 11   CREATININE 0.7 0.6   CALCIUM 9.8 8.3*   PROT 6.9 5.3*   ALBUMIN 3.3* 2.4*   BILITOT 0.8 0.6   ALKPHOS 81 73   AST 15 11   ALT 21 9*   ANIONGAP 11 11       Significant Imaging: I have reviewed all pertinent imaging results/findings within the past 24 hours.      Assessment/Plan:      * Mucositis due to antineoplastic therapy  Clear liquid diet if tolerated  IV fluids  Miracle mouth wash  Prn analgesia  Solumedrol      Essential hypertension  Hydralazine 10 mg IV every 6 hours is unable to tolerate oral meds  Continue Losartan      Rectal cancer  Is followed by Dr. Tuttle in clinic  Last chemo 10/25/22    Neoplasm related pain  Continue fentanyl patch  Prn Dilaudid for pain associated with mucositis  Consult Palliative Care to assist with pain medications        VTE Risk Mitigation (From admission, onward)         Ordered     Reason for No Pharmacological VTE Prophylaxis  Once        Question:  Reasons:  Answer:  Risk of Bleeding    11/01/22 1359     IP VTE HIGH RISK PATIENT  Once         11/01/22 1359     Place sequential compression device  Until discontinued         11/01/22 1359                Discharge Planning   KERRIE:      Code Status: Full Code   Is the patient medically ready for discharge?:     Reason for patient still in hospital (select all that apply):  Patient trending condition and Treatment                     Emily Sanches NP  Department of Hospital Medicine   [facility] - Telemetry ([facility])

## 2022-11-02 NOTE — CONSULTS
Consult Note  Palliative Medicine      Consult Requested By: Emily Sanches NP  Reason for Consult: Pain and symptom management    SUBJECTIVE:     History of Present Illness:  Vincent Benton is a 67 y.o. year old with a history of recurrent metastatic rectal cancer who presented to the ED from colorectal surgeon's office due to mucositis of mouth and rectum. He was admitted for treatment and Palliative Medicine was consulted to assist with pain management. I met Mr. Benton with his sister Fara at bedside. He reports his pain is severe but is trying to drink liquids and eat jello. He received one dose of Dilaudid prior to my evaluation and was about to take a nap when I came in. He reports prior to the mucositis his cancer/ chronic pain was not controlled on the regimen and planned to discuss at our visit today. I agree with the current pain regimen and would discuss his home regimen prior to discharge once his acute pain has improved.     In the last 24hrs the patient has used the following pain medications (7a-7a):  - Dilaudid 0.5mg IV x 0  - Dilaudid 0.5mg IV x 0  - Fentanyl 25mcg/ hr patch  - MSIR 4mg IV x 1 (now d/c)      Past Medical History:   Diagnosis Date    Alcoholism     Anemia     Back pain     Encounter for blood transfusion 2018    Essential hypertension 2/4/2016    GERD (gastroesophageal reflux disease)     Hiatal hernia     Hypertension     diet controlled    Melanoma 06/2021    left cheek    Rectal cancer 9/11/2019     Past Surgical History:   Procedure Laterality Date    ARTHROSCOPY OF KNEE Right 5/5/2021    Procedure: ARTHROSCOPY, KNEE;  Surgeon: Delonte Mcintyre MD;  Location: [facility] OR;  Service: Orthopedics;  Laterality: Right;    COLONOSCOPY N/A 9/3/2019    Procedure: COLONOSCOPY;  Surgeon: Isai Centeno MD;  Location: [facility] ENDO;  Service: Endoscopy;  Laterality: N/A;    COLONOSCOPY N/A 1/10/2020    Procedure: COLONOSCOPY;  Surgeon: Familia Gonzalez MD;  Location: [facility]  ENDO;  Service: General;  Laterality: N/A;    COLONOSCOPY N/A 3/24/2021    Procedure: COLONOSCOPY;  Surgeon: Familia Gonzalez MD;  Location: [facility] ENDO;  Service: General;  Laterality: N/A;    ESOPHAGOGASTRODUODENOSCOPY N/A 9/3/2019    Procedure: ESOPHAGOGASTRODUODENOSCOPY (EGD);  Surgeon: Isai Centeno MD;  Location: [facility] ENDO;  Service: Endoscopy;  Laterality: N/A;    EXCISION OF MEDIAL MENISCUS OF KNEE Right 5/5/2021    Procedure: MENISCECTOMY, KNEE, MEDIAL;  Surgeon: Delonte Mcintyre MD;  Location: [facility] OR;  Service: Orthopedics;  Laterality: Right;  partial Lateral    FLEXIBLE SIGMOIDOSCOPY  2/4/2020    Procedure: SIGMOIDOSCOPY, FLEXIBLE;  Surgeon: Familia Gonzalez MD;  Location: [facility];  Service: General;;    ILEOSTOMY Right 2/4/2020    Procedure: CREATION, ILEOSTOMY;  Surgeon: Familia Gonzalez MD;  Location: [facility] OR;  Service: General;  Laterality: Right;    ILEOSTOMY CLOSURE N/A 8/4/2020    Procedure: CLOSURE, ILEOSTOMY;  Surgeon: Familia Gonzalez MD;  Location: [facility] OR;  Service: General;  Laterality: N/A;    INCISION AND DRAINAGE OF BACK Left 8/5/2020    Procedure: INCISION AND DRAINAGE, BACK;  Surgeon: Familia Gonzalez MD;  Location: [facility] OR;  Service: General;  Laterality: Left;    INJECTION OF ANESTHETIC AGENT INTO TISSUE PLANE DEFINED BY TRANSVERSUS ABDOMINIS MUSCLE N/A 2/4/2020    Procedure: BLOCK, TRANSVERSUS ABDOMINIS PLANE;  Surgeon: Familia Gonzalez MD;  Location: [facility] OR;  Service: General;  Laterality: N/A;    INSERTION OF TUNNELED CENTRAL VENOUS CATHETER (CVC) WITH SUBCUTANEOUS PORT Right 2/4/2020    Procedure: INSERTION, PORT-A-CATH;  Surgeon: Familia Gonzalez MD;  Location: [facility] OR;  Service: General;  Laterality: Right;    INSERTION OF TUNNELED CENTRAL VENOUS CATHETER (CVC) WITH SUBCUTANEOUS PORT N/A 8/16/2022    Procedure: [id number]-CAKM-P-CATH;  Surgeon: Familia Gonzalez MD;  Location: [facility] OR;  Service: General;  Laterality: N/A;  right subclavian    JOINT  REPLACEMENT Left 2009    Hip    KNEE ARTHROSCOPY W/ PLICA EXCISION Right 5/5/2021    Procedure: EXCISION, PLICA, KNEE, ARTHROSCOPIC;  Surgeon: Delonte Mcintyre MD;  Location: [facility] OR;  Service: Orthopedics;  Laterality: Right;    MOBILIZATION OF SPLENIC FLEXURE  2/4/2020    Procedure: MOBILIZATION, SPLENIC FLEXURE;  Surgeon: Familia Gonzalez MD;  Location: [facility] OR;  Service: General;;    REMOVAL OF HARDWARE FROM HIP Left 1/4/2022    Procedure: REMOVAL, HARDWARE, HIP;  Surgeon: Delonte Mcintyre MD;  Location: [facility] OR;  Service: Orthopedics;  Laterality: Left;     Family History   Problem Relation Age of Onset    Cataracts Mother     Stroke Father     Hypertension Father        Social History     Socioeconomic History    Marital status:    Tobacco Use    Smoking status: Never    Smokeless tobacco: Never   Substance and Sexual Activity    Alcohol use: Yes     Comment: occassionally No alcohol 72h prior to sx    Drug use: No    Sexual activity: Never     Partners: Female      Review of patient's allergies indicates:  No Known Allergies    Medications:    Current Facility-Administered Medications:     acetaminophen tablet 650 mg, 650 mg, Oral, Q4H PRN, Emily Sanches NP    albuterol-ipratropium 2.5 mg-0.5 mg/3 mL nebulizer solution 3 mL, 3 mL, Nebulization, Q6H PRN, Emily Sanches NP    aluminum-magnesium hydroxide-simethicone 200-200-20 mg/5 mL suspension 30 mL, 30 mL, Oral, QID PRN, Emily Sanches NP    dextrose 5 % and 0.9 % NaCl infusion, , Intravenous, Continuous, Steven Doherty MD, Last Rate: 125 mL/hr at 11/02/22 0839, New Bag at 11/02/22 0839    fentaNYL 25 mcg/hr 1 patch, 1 patch, Transdermal, Q72H, Emily Sanches NP, 1 patch at 11/01/22 1622    gabapentin capsule 300 mg, 300 mg, Oral, QHS, Emily Sanches NP, 300 mg at 11/01/22 2101    hydrALAZINE injection 10 mg, 10 mg, Intravenous, Q6H PRN, Emily Sanches NP    HYDROmorphone injection 0.5 mg, 0.5 mg, Intravenous, Q3H  PRN, Emily Sanches NP    HYDROmorphone injection 1 mg, 1 mg, Intravenous, Q4H PRN, Emily Sanches NP, 1 mg at 11/02/22 1358    [START ON 11/3/2022] losartan tablet 25 mg, 25 mg, Oral, Daily, Emily Sanches NP    magic mouthwash (diphenhydrAMINE 12.5 mg/5 mL 20 mL, aluminum & magnesium hydroxide-simethicone (MYLANTA) 20 mL, LIDOcaine HCl 2% (XYLOCAINE) 20 mL) solution, 15 mL, Swish & Spit, Q4H, Tamara Barroso NP, 15 mL at 11/02/22 1555    magnesium oxide tablet 800 mg, 800 mg, Oral, PRN, Emily Sanches NP    magnesium oxide tablet 800 mg, 800 mg, Oral, PRN, Emily Sanches NP    melatonin tablet 6 mg, 6 mg, Oral, Nightly PRN, Emily Sanches NP    methylPREDNISolone sodium succinate injection 80 mg, 80 mg, Intravenous, Daily, Emily Sanches NP, 80 mg at 11/02/22 1555    naloxone 0.4 mg/mL injection 0.02 mg, 0.02 mg, Intravenous, PRN, Emily Sanches NP    ondansetron injection 4 mg, 4 mg, Intravenous, Q8H PRN, Emily Sacnhes NP    pantoprazole injection 40 mg, 40 mg, Intravenous, Daily, Emily Sanches NP, 40 mg at 11/02/22 0837    potassium bicarbonate disintegrating tablet 25 mEq, 25 mEq, Oral, Daily, Emily Sanches NP    prochlorperazine injection Soln 5 mg, 5 mg, Intravenous, Q6H PRN, Emily Sanches NP    simethicone chewable tablet 80 mg, 1 tablet, Oral, QID PRN, Emily Sanches NP    sodium chloride 0.9% flush 10 mL, 10 mL, Intravenous, Q8H PRN, Emily Sanches NP    Facility-Administered Medications Ordered in Other Encounters:     0.9%  NaCl infusion, , Intravenous, Continuous, Delonte Mcintyre MD    0.9%  NaCl infusion, , Intravenous, Continuous, Delonte Mcintyre MD    alteplase injection 2 mg, 2 mg, Intra-Catheter, PRN, Mikel Sams MD    chlorhexidine 0.12 % solution 10 mL, 10 mL, Mouth/Throat, On Call Procedure, Delonte Mcintyre MD, 10 mL at 05/05/21 1222    chlorhexidine 0.12 % solution 10 mL, 10 mL, Mouth/Throat, On Call Procedure,  Delonte Mcintyre MD, 10 mL at 01/04/22 0628    diphenhydrAMINE injection 25 mg, 25 mg, Intravenous, Q6H PRN, Keli Alcaraz MD    HYDROmorphone (PF) injection 0.2 mg, 0.2 mg, Intravenous, Q5 Min PRN, Keli Alcaraz MD, 0.2 mg at 05/05/21 1505    HYDROmorphone (PF) injection 0.2 mg, 0.2 mg, Intravenous, Q5 Min PRN, Keli Alcaraz MD    lactated ringers infusion, , Intravenous, Continuous, Familia Gonzalez MD, Stopped at 03/24/21 0825    metoclopramide HCl injection 10 mg, 10 mg, Intravenous, Q10 Min PRN, Keli Alcaraz MD    nozaseptin (NOZIN) nasal , , Each Nostril, On Call Procedure, Delonte Mcintyre MD, Given at 05/05/21 1222    sodium chloride 0.9% flush 10 mL, 10 mL, Intravenous, PRN, Mikel Sams MD    sodium chloride 0.9% flush 3 mL, 3 mL, Intravenous, PRN, Shilpa Yee MD    sodium chloride 0.9% flush 3 mL, 3 mL, Intravenous, Q8H, Keli Alcaraz MD    ROS:  Review of Systems   Constitutional:  Positive for activity change and appetite change. Negative for chills and fever.   HENT:  Positive for mouth sores and trouble swallowing. Negative for sore throat.    Respiratory:  Negative for cough and shortness of breath.    Gastrointestinal:  Negative for abdominal pain, constipation, nausea and vomiting.   Genitourinary:  Negative for difficulty urinating and dysuria.   Musculoskeletal:  Positive for back pain. Negative for myalgias.   Skin:  Negative for rash and wound.   Allergic/Immunologic: Negative for immunocompromised state.   Neurological:  Negative for weakness and headaches.   Psychiatric/Behavioral:  Negative for confusion and sleep disturbance. The patient is not nervous/anxious.      OBJECTIVE:     Physical Exam:  Vitals: Temp: 97.3 °F (36.3 °C) (11/02/22 1242)  Pulse: 87 (11/02/22 1242)  Resp: 20 (11/02/22 1358)  BP: 125/76 (11/02/22 1242)  SpO2: 97 % (11/02/22 1242)    Physical Exam  Vitals reviewed.   Constitutional:       General: He is not in  acute distress.     Appearance: Normal appearance. He is well-developed.   HENT:      Head: Normocephalic and atraumatic.      Mouth/Throat:      Lips: Lesions present.      Mouth: Oral lesions present.   Eyes:      Conjunctiva/sclera: Conjunctivae normal.   Cardiovascular:      Rate and Rhythm: Normal rate and regular rhythm.      Heart sounds: Normal heart sounds. No murmur heard.  Pulmonary:      Effort: Pulmonary effort is normal. No respiratory distress.      Breath sounds: Normal breath sounds.   Abdominal:      General: Bowel sounds are normal. There is no distension.      Palpations: Abdomen is soft.      Tenderness: There is no abdominal tenderness.   Musculoskeletal:         General: Normal range of motion.      Cervical back: Normal range of motion.      Right lower leg: No edema.      Left lower leg: No edema.   Skin:     General: Skin is warm and dry.      Findings: No rash.   Neurological:      Mental Status: He is alert and oriented to person, place, and time.      Cranial Nerves: No cranial nerve deficit.   Psychiatric:         Mood and Affect: Mood normal.         Behavior: Behavior normal.         Thought Content: Thought content normal.         Judgment: Judgment normal.         Review of Symptoms      Symptom Assessment (ESAS 0-10 Scale)  Pain:  10  Dyspnea:  0  Anxiety:  0  Nausea:  0  Depression:  0  Anorexia:  0  Fatigue:  0  Insomnia:  0  Restlessness:  0  Agitation:  0     CAM / Delirium:  Negative  Constipation:  Negative    Anxiety:  Is not nervous/anxious  Constipation:  No constipation    Pain Assessment:    Location(s): mouth  Mouth       Location: generalized       Quality: none        Quantity: 10/10 in intensity        Chronicity: Onset 5 day(s) ago, unchanged        Aggravating Factors: eating        Alleviating Factors: opiates        Associated Symptoms: none    ECOG Performance Status rdGrdrrdarddrderd:rd rd3rd Living Arrangements:  Lives with spouse    Psychosocial/Cultural: , one  daughter from previous relationship    Advance Directives:   Living Will: Yes        Copy on chart: Yes    [place]POST: Yes    Do Not Resuscitate Status: Yes    Medical Power of : Yes    Agent's Name:  1: wife Akilah Medrano, 2: sister Fara Benton    Decision Making:  Patient answered questions  Goals of Care: The patient endorses that what is most important right now is to focus on improvement in condition but with limits to invasive therapies    Accordingly, we have decided that the best plan to meet the patient's goals includes continuing with treatment    Labs:  WBC   Date Value Ref Range Status   11/02/2022 6.94 3.90 - 12.70 K/uL Final       Hemoglobin   Date Value Ref Range Status   11/02/2022 9.9 (L) 14.0 - 18.0 g/dL Final       Hematocrit   Date Value Ref Range Status   11/02/2022 32.1 (L) 40.0 - 54.0 % Final       MCV   Date Value Ref Range Status   11/02/2022 86 82 - 98 fL Final       Platelets   Date Value Ref Range Status   11/02/2022 207 150 - 450 K/uL Final       BMP  Lab Results   Component Value Date     11/02/2022    K 3.3 (L) 11/02/2022     11/02/2022    CO2 24 11/02/2022    BUN 11 11/02/2022    CREATININE 0.6 11/02/2022    CALCIUM 8.3 (L) 11/02/2022    ANIONGAP 11 11/02/2022    ESTGFRAFRICA >60 07/20/2022    EGFRNONAA >60 07/20/2022       Lab Results   Component Value Date    AST 11 11/02/2022    ALKPHOS 73 11/02/2022    BILITOT 0.6 11/02/2022       Albumin   Date Value Ref Range Status   11/02/2022 2.4 (L) 3.5 - 5.2 g/dL Final       Radiology:I have reviewed all pertinent imaging results/findings within the past 24 hours.  No recent imaging to review    ASSESSMENT   Vincent Benton is a 67 y.o. year old with a history of recurrent metastatic rectal cancer who presented to the ED from colorectal surgeon's office due to mucositis of mouth and rectum. He was admitted for treatment and Palliative Medicine was consulted to assist with pain management.     PLAN   Mucositis  -  Agree with current regimen of IV Dilaudid  - Plan to transition to PO once lesions improve    2. Neoplasm related pain superimposed on chronic back pain  - Continue fentanyl 25mcg/ hr patch q 72 hrs  - Home med- Norco 10mg q4hr PRN  - Will assess pain at discharge and adjust as needed    3. Recurrent metastatic rectal cancer  - Under the care of Dr. Tuttle  - Recently completed FOLFIRI plus Avastin with plans to restage in a month    4. Encounter for Palliative Care  - Code status: DNR/I- [place]POST on file  - HCPOA on file, 1: wife Akilah, 2: sister Fara      Discussed case and visit details with Dr. Doherty     Thank you for allowing Palliative Medicine to be involved in the care of Vincent Benton.        Medical decision making: HIGH based on high risk of death untreated symptoms management of more than one chronic illness in exacerbation or progression of disease managing side effects of medications or polypharmacy parenteral opioids    Plan required increased review of medication choice, interaction, dosing, frequency, and route due to patient complexity. Patient complexity increased by: high risk medication use, advanced age, at risk for delirium, continuous use of opioids, and NPO      Kristi Wing PA-C  Palliative Medicine

## 2022-11-02 NOTE — PROGRESS NOTES
History & Physical    SUBJECTIVE:     No chief complaint on file.  Ref MD: Isai Centeno MD    History of Present Illness:  Patient is a 67 y.o. male presents for evaluation of a rectal mass.  Patient has been having iron deficiency anemia that did require transfusion around 1 year ago.  He also has had associated hematochezia for over a year now but did improve after he quit drinking beer at 8 months ago but is still intermittently present. This prompted a colonoscopy which was performed on 09/03/2019 where an obstructing rectal mass was seen that appeared malignant was biopsied and could not be traversed.  Patient reports that he had a colonoscopy around 6-7 years ago were some benign polyps were found but no malignancy.  These records are not available.  He states that the hematochezia has recently improved after he quit drinking beer, but is still intermittently present and worsen with the bowel prep from the colonoscopy recently.  He is not on any chronic anticoagulation.  He states he has had normal caliber bowel movements does not feel constipated or obstructed.  He denies any fever, chills, nausea, vomiting, diarrhea, constipation, weight loss, night sweats or any other signs or symptoms.    12/4/19: Completed Neoadj chemoXRT  2/4/2020 : Robotic ultra-low anterior resection with DLI and Mediport placement  June 2020: Completed adjuvant chemotx  8/4/2020: DLI reversal    Interval history:  Since last clinic visit, the patient has started systemic chemotherapy for his metastatic colorectal adenocarcinoma.  Over the past week he has developed severe mouth sores and ulcers as well as some perianal/perirectal pain.  Denies any drainage from the area.    Review of patient's allergies indicates:  No Known Allergies    No current facility-administered medications for this visit.     No current outpatient medications on file.     Facility-Administered Medications Ordered in Other Visits   Medication Dose Route  Frequency Provider Last Rate Last Admin    0.9%  NaCl infusion   Intravenous Continuous Delonte Mcintyre MD        0.9%  NaCl infusion   Intravenous Continuous Delonte Mcintyre MD        acetaminophen tablet 650 mg  650 mg Oral Q4H PRN Emily Sanches NP        albuterol-ipratropium 2.5 mg-0.5 mg/3 mL nebulizer solution 3 mL  3 mL Nebulization Q6H PRN Emily Sanches NP        alteplase injection 2 mg  2 mg Intra-Catheter PRN Mikel Sams MD        aluminum-magnesium hydroxide-simethicone 200-200-20 mg/5 mL suspension 30 mL  30 mL Oral QID PRN Emily Sanches NP        chlorhexidine 0.12 % solution 10 mL  10 mL Mouth/Throat On Call Procedure Delonte Mcintyre MD   10 mL at 05/05/21 1222    chlorhexidine 0.12 % solution 10 mL  10 mL Mouth/Throat On Call Procedure Delonte Mcintyre MD   10 mL at 01/04/22 0628    dextrose 5 % and 0.9 % NaCl infusion   Intravenous Continuous Steven Doherty  mL/hr at 11/02/22 0839 New Bag at 11/02/22 0839    diphenhydrAMINE injection 25 mg  25 mg Intravenous Q6H PRN Keli Alcaraz MD        fentaNYL 25 mcg/hr 1 patch  1 patch Transdermal Q72H Emily Sanches NP   1 patch at 11/01/22 1622    gabapentin capsule 300 mg  300 mg Oral QHS Emily Sanches NP   300 mg at 11/01/22 2101    hydrALAZINE injection 10 mg  10 mg Intravenous Q6H PRN Emily Sanches NP        HYDROmorphone (PF) injection 0.2 mg  0.2 mg Intravenous Q5 Min PRN Keli Alcaraz MD   0.2 mg at 05/05/21 1505    HYDROmorphone (PF) injection 0.2 mg  0.2 mg Intravenous Q5 Min PRN Keli Alcaraz MD        HYDROmorphone injection 0.5 mg  0.5 mg Intravenous Q3H PRN Emily Sanches NP        HYDROmorphone injection 1 mg  1 mg Intravenous Q4H PRN Emily Sanches NP   1 mg at 11/02/22 1358    lactated ringers infusion   Intravenous Continuous Familia Gonzalez MD   Stopped at 03/24/21 0825    [START ON 11/3/2022] losartan tablet 25 mg  25 mg Oral Daily Emily FRAGA  Myron, PETE        magic mouthwash (diphenhydrAMINE 12.5 mg/5 mL 20 mL, aluminum & magnesium hydroxide-simethicone (MYLANTA) 20 mL, LIDOcaine HCl 2% (XYLOCAINE) 20 mL) solution  15 mL Swish & Spit Q4H Tamara Barroso NP        magnesium oxide tablet 800 mg  800 mg Oral PRN Emily Sanches, PETE        magnesium oxide tablet 800 mg  800 mg Oral PRN Emily Sanches, PETE        melatonin tablet 6 mg  6 mg Oral Nightly PRN Emily Sanches, PETE        methylPREDNISolone sodium succinate injection 80 mg  80 mg Intravenous Daily Emily Sanches, PETE        metoclopramide HCl injection 10 mg  10 mg Intravenous Q10 Min PRN Keli Alcaraz MD        naloxone 0.4 mg/mL injection 0.02 mg  0.02 mg Intravenous PRN Emily Sanches NP        nozaseptin (NOZIN) nasal    Each Nostril On Call Procedure Delonte Mcintyre MD   Given at 05/05/21 1222    ondansetron injection 4 mg  4 mg Intravenous Q8H PRN Emily Sanches NP        pantoprazole injection 40 mg  40 mg Intravenous Daily Emily Sanches, PETE   40 mg at 11/02/22 0837    potassium bicarbonate disintegrating tablet 25 mEq  25 mEq Oral Daily Emily Sanches NP        prochlorperazine injection Soln 5 mg  5 mg Intravenous Q6H PRN Emily Sanches, PETE        simethicone chewable tablet 80 mg  1 tablet Oral QID PRN Emily Sanches NP        sodium chloride 0.9% flush 10 mL  10 mL Intravenous PRN Mikel Sams MD        sodium chloride 0.9% flush 10 mL  10 mL Intravenous Q8H PRN Emily Sanches NP        sodium chloride 0.9% flush 3 mL  3 mL Intravenous PRN Shilpa Yee MD        sodium chloride 0.9% flush 3 mL  3 mL Intravenous Q8H Keli Alcaraz MD           Past Medical History:   Diagnosis Date    Alcoholism     Anemia     Back pain     Encounter for blood transfusion 2018    Essential hypertension 2/4/2016    GERD (gastroesophageal reflux disease)     Hiatal hernia     Hypertension     diet controlled    Melanoma 06/2021    left  cheek    Rectal cancer 9/11/2019     Past Surgical History:   Procedure Laterality Date    ARTHROSCOPY OF KNEE Right 5/5/2021    Procedure: ARTHROSCOPY, KNEE;  Surgeon: Delonte Mcintyre MD;  Location: [facility] OR;  Service: Orthopedics;  Laterality: Right;    COLONOSCOPY N/A 9/3/2019    Procedure: COLONOSCOPY;  Surgeon: Isai Centeno MD;  Location: [facility] ENDO;  Service: Endoscopy;  Laterality: N/A;    COLONOSCOPY N/A 1/10/2020    Procedure: COLONOSCOPY;  Surgeon: Familia Gonzalez MD;  Location: [facility] ENDO;  Service: General;  Laterality: N/A;    COLONOSCOPY N/A 3/24/2021    Procedure: COLONOSCOPY;  Surgeon: Familia Gonzalez MD;  Location: [facility];  Service: General;  Laterality: N/A;    ESOPHAGOGASTRODUODENOSCOPY N/A 9/3/2019    Procedure: ESOPHAGOGASTRODUODENOSCOPY (EGD);  Surgeon: Isai Centeno MD;  Location: [facility];  Service: Endoscopy;  Laterality: N/A;    EXCISION OF MEDIAL MENISCUS OF KNEE Right 5/5/2021    Procedure: MENISCECTOMY, KNEE, MEDIAL;  Surgeon: Delonte Mcintyre MD;  Location: [facility] OR;  Service: Orthopedics;  Laterality: Right;  partial Lateral    FLEXIBLE SIGMOIDOSCOPY  2/4/2020    Procedure: SIGMOIDOSCOPY, FLEXIBLE;  Surgeon: Familia Gonzalez MD;  Location: [facility];  Service: General;;    ILEOSTOMY Right 2/4/2020    Procedure: CREATION, ILEOSTOMY;  Surgeon: Familia Gonzalez MD;  Location: [facility] OR;  Service: General;  Laterality: Right;    ILEOSTOMY CLOSURE N/A 8/4/2020    Procedure: CLOSURE, ILEOSTOMY;  Surgeon: Familia Gonzalez MD;  Location: [facility] OR;  Service: General;  Laterality: N/A;    INCISION AND DRAINAGE OF BACK Left 8/5/2020    Procedure: INCISION AND DRAINAGE, BACK;  Surgeon: Familia Gonzalez MD;  Location: [facility] OR;  Service: General;  Laterality: Left;    INJECTION OF ANESTHETIC AGENT INTO TISSUE PLANE DEFINED BY TRANSVERSUS ABDOMINIS MUSCLE N/A 2/4/2020    Procedure: BLOCK, TRANSVERSUS ABDOMINIS PLANE;  Surgeon: Familia Gonzalez MD;  Location: [facility] OR;   Service: General;  Laterality: N/A;    INSERTION OF TUNNELED CENTRAL VENOUS CATHETER (CVC) WITH SUBCUTANEOUS PORT Right 2/4/2020    Procedure: INSERTION, PORT-A-CATH;  Surgeon: Familia Gonzalez MD;  Location: [facility] OR;  Service: General;  Laterality: Right;    INSERTION OF TUNNELED CENTRAL VENOUS CATHETER (CVC) WITH SUBCUTANEOUS PORT N/A 8/16/2022    Procedure: OYTTSCIOR-ICNT-P-CATH;  Surgeon: Familia Gonzalez MD;  Location: [facility] OR;  Service: General;  Laterality: N/A;  right subclavian    JOINT REPLACEMENT Left 2009    Hip    KNEE ARTHROSCOPY W/ PLICA EXCISION Right 5/5/2021    Procedure: EXCISION, PLICA, KNEE, ARTHROSCOPIC;  Surgeon: Delonte Mcintyre MD;  Location: [facility] OR;  Service: Orthopedics;  Laterality: Right;    MOBILIZATION OF SPLENIC FLEXURE  2/4/2020    Procedure: MOBILIZATION, SPLENIC FLEXURE;  Surgeon: Familia Gonzalez MD;  Location: [facility] OR;  Service: General;;    REMOVAL OF HARDWARE FROM HIP Left 1/4/2022    Procedure: REMOVAL, HARDWARE, HIP;  Surgeon: Delonte Mcintyre MD;  Location: [facility] OR;  Service: Orthopedics;  Laterality: Left;     Family History   Problem Relation Age of Onset    Cataracts Mother     Stroke Father     Hypertension Father      Social History     Tobacco Use    Smoking status: Never    Smokeless tobacco: Never   Substance Use Topics    Alcohol use: Yes     Comment: occassionally No alcohol 72h prior to sx    Drug use: No      Review of Systems:  Review of Systems   Constitutional:  Negative for activity change, appetite change, chills, fatigue, fever and unexpected weight change.   HENT:  Negative for congestion, ear pain, sore throat and trouble swallowing.    Eyes:  Negative for pain, redness and itching.   Respiratory:  Negative for cough, shortness of breath and wheezing.    Cardiovascular:  Negative for chest pain, palpitations and leg swelling.   Gastrointestinal:  Negative for abdominal distention, abdominal pain, anal bleeding, blood in stool, constipation,  diarrhea, nausea, rectal pain and vomiting.   Endocrine: Negative for cold intolerance, heat intolerance and polyuria.   Genitourinary:  Negative for dysuria, flank pain, frequency and hematuria.   Musculoskeletal:  Negative for gait problem, joint swelling and neck pain.   Skin:  Negative for color change, rash and wound.   Allergic/Immunologic: Negative for environmental allergies and immunocompromised state.   Neurological:  Negative for dizziness, speech difficulty, weakness and numbness.   Psychiatric/Behavioral:  Negative for agitation, confusion and hallucinations.      OBJECTIVE:     Vital Signs (Most Recent)  Temp: 97.9 °F (36.6 °C) (11/01/22 0905)  Pulse: 78 (11/01/22 0905)  BP: (!) 152/97 (11/01/22 0905)     55.2 kg (121 lb 11.1 oz)     Physical Exam:  Physical Exam  Exam conducted with a chaperone present.   Constitutional:       Appearance: He is well-developed.   HENT:      Head: Normocephalic and atraumatic.   Eyes:      Conjunctiva/sclera: Conjunctivae normal.   Neck:      Thyroid: No thyromegaly.   Cardiovascular:      Rate and Rhythm: Normal rate and regular rhythm.   Pulmonary:      Effort: Pulmonary effort is normal. No respiratory distress.   Abdominal:      Comments: Soft, nondistended, nontender; reducible umiblical and previous RLQ ileostomy site hernias; ileostomy site hernia around 2.5cm defect; well-healed incision   Genitourinary:     Comments: Anorectal: ulcerated lesions in the perianal area as shown in pictures below with three on the left side, one on the right side and one larger one in the left perineum  Musculoskeletal:         General: No tenderness. Normal range of motion.      Cervical back: Normal range of motion.   Skin:     General: Skin is warm and dry.      Capillary Refill: Capillary refill takes less than 2 seconds.      Findings: No rash.   Neurological:      Mental Status: He is alert and oriented to person, place, and time.   Psychiatric:         Behavior: Behavior  normal.               Laboratory  Lab Results   Component Value Date    WBC 6.94 11/02/2022    HGB 9.9 (L) 11/02/2022    HCT 32.1 (L) 11/02/2022     11/02/2022    CHOL 180 03/16/2020    TRIG 43 03/16/2020    HDL 81 (H) 03/16/2020    ALT 9 (L) 11/02/2022    AST 11 11/02/2022     11/02/2022    K 3.3 (L) 11/02/2022     11/02/2022    CREATININE 0.6 11/02/2022    BUN 11 11/02/2022    CO2 24 11/02/2022    TSH 0.601 04/07/2018    PSA 1.4 03/16/2020    INR 1.0 07/20/2022    HGBA1C 5.7 (H) 06/08/2020     Component Ref Range & Units 1 mo ago   (6/13/22) 1 yr ago   (2/22/21) 1 yr ago   (9/1/20) 2 yr ago   (7/13/20) 2 yr ago   (6/22/20) 2 yr ago   (5/4/20) 2 yr ago   (1/27/20)   CEA 0.0 - 5.0 ng/mL 159.9 High   3.0 CM  2.7 CM  6.0 High  CM  5.7 High  CM  4.2 CM  4.5 CM          Diagnostic Results:  Reviewed colonoscopy report and images with rectal mass appreciated      PET CT:  FINDINGS:  Head and neck: Physiologic uptake in the visualized brain parenchyma.  There are no FDG avid cervical lymph nodes.     Chest: Interval development of new bilateral pulmonary nodules.  For example in the right lower lobe series 3, image 158 there is a 4 mm nodule more superiorly in the peripheral right lower lobe on image 156 is a 3-4 mm juxtapleural nodule.  These nodules are not sufficiently FDG avid but fall below the resolution for PET-CT.  Largest nodules is in the left upper lobe on image 146 and measures 7 mm.  This demonstrates max SUV of 1.5, above background parenchyma.     There is unchanged scarring or atelectasis at the left lung base.  Previously described 13 mm ground-glass attenuation in the prior PET-CT in the lateral left upper lobe is no longer well visualized with mild suspected scarring in this region.  No significant uptake in this region.  No detrimental change in ground-glass density in the right midlung adjacent to the major fissure.  Bilateral patchy ground-glass opacity seen on CT from June 2020  have essentially resolved. No FDG avid axillary mediastinal or hilar adenopathy.     Abdomen/pelvis: New multifocal areas of uptake in the liver are seen.  Largest areas in the inferior right hepatic lobe image 240 axial fused sequence with ill-defined hypodense lesion seen in this area measuring up to 8.7 cm.  Max SUV measures 15.0.  Focus of uptake in the lateral hepatic segment image 219 is seen with max SUV of 13.  Additional focus of uptake on image 206 with max SUV of 9.0.  Hepatic dome lesion uptake with max SUV of 7.0.  No abnormal uptake in the spleen or pancreas or the adrenals.  Postoperative changes of the rectum with primary anastomosis.  On axial fused image 346 there is FDG avid focus with SUV max of 3.4 adjacent to a suture and appears to extend off the posterior right aspect of the rectum into the adjacent soft tissues.  Presacral postsurgical collection with adjacent fat stranding/soft tissue thickening is noted, unchanged.  No significant FDG uptake in this area is seen.     No FDG avid lymphadenopathy.     Bones: Degenerative changes of the spine with osteopenia.  Previously seen left hip orthopedic hardware is no longer visualized.  Chronic appearing fracture deformity the proximal left femur is seen.  No FDG avid osseous lesions.     Miscellaneous: Nonspecific focal uptake in the right hemiscrotum with max SUV of 5.4.  Correlation with nonemergent ultrasound could be performed for further evaluation.  Sebaceous cyst in the posterior left upper back.     Impression:     New bilateral pulmonary nodules and FDG avid multifocal hepatic lesions concerning for metastatic disease.  Small focus of uptake along the posterior right aspect of the rectum in the region of a suture line raising concern for local recurrence as well.  Nonspecific FDG uptake in the right hemiscrotum.  Correlation with nonemergent ultrasound could be performed for further evaluation.  Additional findings as  above      PATHOLOGY:  LIVER MASS, BIOPSY:   Adenocarcinoma consistent with colorectal origin.    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 who is now s/p long-course neoadj chemoXRT which completed on 12/4/19 with partial treatment response on repeat MRI and no evidence of metastatic disease on repeat CT Abd/pelvis who is now s/p robotic ultra-low anterior resection, DLI and mediport placement on 2/4/2020 who has recovered well postop with final path showing T3N0 who has now completed adjuvant chemotx in late June 2020 and is now s/p DLI closure on 8/4/2020 with unfortunate metastatic disease in the liver and lung    - discussed with the patient that with his ongoing pain in his mouth as well as in his perianal area, I would recommend evaluation in the emergency room by the emergency room physicians as well as likely admission to the hospital for management with the medical services.  Discussed that I would recommend evaluation in the ER to include a CT scan of the abdomen and pelvis.  These recommendations were communicated directly via phone to the ER physician/Dr. Koo  - if any abscess is found, will be happy to assist with any management necessary    Familia Gonzalez MD  Colon and Rectal Surgery  [facility]

## 2022-11-02 NOTE — ASSESSMENT & PLAN NOTE
-Hold chemo inpatient  -Will plan for f/u with Primary Oncologist for further recommendations in regards to malignancy management

## 2022-11-02 NOTE — HPI
67 y.o male with recurrent metastatic rectal cancer presented to [facility] ER following visit with Dr. Gonzalez for mucositis and rectal sores. Associated symptoms include mouth and rectal pain, trouble swallowing, fatigue, weakness, diarrhea. He denies fever, syncope, urinary complaints, CP, SOB.     In regards to his recurrent metastatic rectal cancer patient currently on palliative chemotherapy. S/p 5 cycles FOLFIRI + Avastin (last tx 10/25/22).

## 2022-11-02 NOTE — SUBJECTIVE & OBJECTIVE
Interval History: See Hospital Course    Review of Systems   Constitutional:  Positive for activity change, appetite change and fatigue.   HENT:  Positive for mouth sores and trouble swallowing.    Eyes: Negative.    Respiratory:  Negative for cough and shortness of breath.    Cardiovascular:  Negative for chest pain.   Gastrointestinal:  Positive for abdominal pain and diarrhea. Negative for abdominal distention, constipation, nausea and vomiting.   Genitourinary:  Positive for decreased urine volume.   Skin:  Positive for wound.   Neurological:  Positive for weakness and light-headedness.   Psychiatric/Behavioral:  The patient is nervous/anxious.    Objective:     Vital Signs (Most Recent):  Temp: 97.3 °F (36.3 °C) (11/02/22 1242)  Pulse: 87 (11/02/22 1242)  Resp: 20 (11/02/22 1358)  BP: 125/76 (11/02/22 1242)  SpO2: 97 % (11/02/22 1242) Vital Signs (24h Range):  Temp:  [96.9 °F (36.1 °C)-98.6 °F (37 °C)] 97.3 °F (36.3 °C)  Pulse:  [59-87] 87  Resp:  [17-26] 20  SpO2:  [95 %-100 %] 97 %  BP: (106-155)/(56-87) 125/76     Weight: 58.7 kg (129 lb 6.6 oz)  Body mass index is 18.57 kg/m².  No intake or output data in the 24 hours ending 11/02/22 1408   Physical Exam  Vitals reviewed.   Constitutional:       Appearance: He is well-developed. He is ill-appearing.      Comments: Thin in appearance   HENT:      Head: Normocephalic and atraumatic.      Nose: Nose normal.      Mouth/Throat:      Mouth: Oral lesions present.   Eyes:      General: No scleral icterus.     Conjunctiva/sclera: Conjunctivae normal.      Pupils: Pupils are equal, round, and reactive to light.   Cardiovascular:      Rate and Rhythm: Normal rate and regular rhythm.      Heart sounds: Normal heart sounds. No murmur heard.    No friction rub. No gallop.   Pulmonary:      Effort: Pulmonary effort is normal.      Breath sounds: Normal breath sounds.   Abdominal:      General: Bowel sounds are normal.      Palpations: Abdomen is soft.      Tenderness:  There is generalized abdominal tenderness.   Genitourinary:     Comments: Scattered ulcerative lesions to the intergluteal cleft  Musculoskeletal:         General: No tenderness. Normal range of motion.      Cervical back: Normal range of motion and neck supple.   Skin:     General: Skin is warm and dry.   Neurological:      Mental Status: He is alert and oriented to person, place, and time.   Psychiatric:         Behavior: Behavior normal.       Significant Labs: All pertinent labs within the past 24 hours have been reviewed.  CBC:   Recent Labs   Lab 11/01/22  1141 11/02/22  0452   WBC 8.57 6.94   HGB 11.6* 9.9*   HCT 36.9* 32.1*    207     CMP:   Recent Labs   Lab 11/01/22  1141 11/02/22  0452    136   K 4.5 3.3*   CL 97 101   CO2 28 24   GLU 74 49*   BUN 17 11   CREATININE 0.7 0.6   CALCIUM 9.8 8.3*   PROT 6.9 5.3*   ALBUMIN 3.3* 2.4*   BILITOT 0.8 0.6   ALKPHOS 81 73   AST 15 11   ALT 21 9*   ANIONGAP 11 11       Significant Imaging: I have reviewed all pertinent imaging results/findings within the past 24 hours.

## 2022-11-02 NOTE — ASSESSMENT & PLAN NOTE
-magic mouthwash Q4H  -IVFs  -avoid food irritants  -pain management per PM  -note low albumin. Encourage boost/ensure when tolerable   -electrolyte repletion PRN per primary team  -supportive care

## 2022-11-03 PROBLEM — B37.0 ORAL CANDIDIASIS: Status: ACTIVE | Noted: 2022-11-03

## 2022-11-03 LAB
ALBUMIN SERPL BCP-MCNC: 2 G/DL (ref 3.5–5.2)
ALP SERPL-CCNC: 86 U/L (ref 55–135)
ALT SERPL W/O P-5'-P-CCNC: 9 U/L (ref 10–44)
ANION GAP SERPL CALC-SCNC: 8 MMOL/L (ref 8–16)
AST SERPL-CCNC: 10 U/L (ref 10–40)
BASOPHILS # BLD AUTO: 0.02 K/UL (ref 0–0.2)
BASOPHILS NFR BLD: 0.3 % (ref 0–1.9)
BILIRUB SERPL-MCNC: 0.6 MG/DL (ref 0.1–1)
BUN SERPL-MCNC: 3 MG/DL (ref 8–23)
CALCIUM SERPL-MCNC: 8 MG/DL (ref 8.7–10.5)
CHLORIDE SERPL-SCNC: 100 MMOL/L (ref 95–110)
CO2 SERPL-SCNC: 27 MMOL/L (ref 23–29)
CREAT SERPL-MCNC: 0.6 MG/DL (ref 0.5–1.4)
DIFFERENTIAL METHOD: ABNORMAL
E COLI SXT1 STL QL IA: NEGATIVE
E COLI SXT2 STL QL IA: NEGATIVE
EOSINOPHIL # BLD AUTO: 0 K/UL (ref 0–0.5)
EOSINOPHIL NFR BLD: 0.4 % (ref 0–8)
ERYTHROCYTE [DISTWIDTH] IN BLOOD BY AUTOMATED COUNT: 27 % (ref 11.5–14.5)
EST. GFR  (NO RACE VARIABLE): >60 ML/MIN/1.73 M^2
GLUCOSE SERPL-MCNC: 136 MG/DL (ref 70–110)
HCT VFR BLD AUTO: 30.8 % (ref 40–54)
HGB BLD-MCNC: 9.8 G/DL (ref 14–18)
IMM GRANULOCYTES # BLD AUTO: 0.04 K/UL (ref 0–0.04)
IMM GRANULOCYTES NFR BLD AUTO: 0.6 % (ref 0–0.5)
LYMPHOCYTES # BLD AUTO: 0.6 K/UL (ref 1–4.8)
LYMPHOCYTES NFR BLD: 9.2 % (ref 18–48)
MCH RBC QN AUTO: 26.6 PG (ref 27–31)
MCHC RBC AUTO-ENTMCNC: 31.8 G/DL (ref 32–36)
MCV RBC AUTO: 84 FL (ref 82–98)
MONOCYTES # BLD AUTO: 0.3 K/UL (ref 0.3–1)
MONOCYTES NFR BLD: 4.6 % (ref 4–15)
NEUTROPHILS # BLD AUTO: 5.9 K/UL (ref 1.8–7.7)
NEUTROPHILS NFR BLD: 84.9 % (ref 38–73)
NRBC BLD-RTO: 0 /100 WBC
PLATELET # BLD AUTO: 193 K/UL (ref 150–450)
PMV BLD AUTO: 9.6 FL (ref 9.2–12.9)
POTASSIUM SERPL-SCNC: 3.1 MMOL/L (ref 3.5–5.1)
PROT SERPL-MCNC: 5 G/DL (ref 6–8.4)
RBC # BLD AUTO: 3.69 M/UL (ref 4.6–6.2)
SODIUM SERPL-SCNC: 135 MMOL/L (ref 136–145)
WBC # BLD AUTO: 6.92 K/UL (ref 3.9–12.7)

## 2022-11-03 PROCEDURE — 25000003 PHARM REV CODE 250: Performed by: NURSE PRACTITIONER

## 2022-11-03 PROCEDURE — C9113 INJ PANTOPRAZOLE SODIUM, VIA: HCPCS | Performed by: NURSE PRACTITIONER

## 2022-11-03 PROCEDURE — 97530 THERAPEUTIC ACTIVITIES: CPT

## 2022-11-03 PROCEDURE — 27000207 HC ISOLATION

## 2022-11-03 PROCEDURE — 36415 COLL VENOUS BLD VENIPUNCTURE: CPT | Performed by: NURSE PRACTITIONER

## 2022-11-03 PROCEDURE — 97162 PT EVAL MOD COMPLEX 30 MIN: CPT

## 2022-11-03 PROCEDURE — 99233 PR SUBSEQUENT HOSPITAL CARE,LEVL III: ICD-10-PCS | Mod: ,,, | Performed by: INTERNAL MEDICINE

## 2022-11-03 PROCEDURE — 99233 SBSQ HOSP IP/OBS HIGH 50: CPT | Mod: ,,, | Performed by: INTERNAL MEDICINE

## 2022-11-03 PROCEDURE — 85025 COMPLETE CBC W/AUTO DIFF WBC: CPT | Performed by: NURSE PRACTITIONER

## 2022-11-03 PROCEDURE — 63600175 PHARM REV CODE 636 W HCPCS: Performed by: INTERNAL MEDICINE

## 2022-11-03 PROCEDURE — 80053 COMPREHEN METABOLIC PANEL: CPT | Performed by: NURSE PRACTITIONER

## 2022-11-03 PROCEDURE — 94761 N-INVAS EAR/PLS OXIMETRY MLT: CPT

## 2022-11-03 PROCEDURE — 99233 SBSQ HOSP IP/OBS HIGH 50: CPT | Mod: ,,, | Performed by: PHYSICIAN ASSISTANT

## 2022-11-03 PROCEDURE — 63600175 PHARM REV CODE 636 W HCPCS: Performed by: NURSE PRACTITIONER

## 2022-11-03 PROCEDURE — 97166 OT EVAL MOD COMPLEX 45 MIN: CPT

## 2022-11-03 PROCEDURE — 21400001 HC TELEMETRY ROOM

## 2022-11-03 PROCEDURE — 99233 PR SUBSEQUENT HOSPITAL CARE,LEVL III: ICD-10-PCS | Mod: ,,, | Performed by: PHYSICIAN ASSISTANT

## 2022-11-03 RX ORDER — POTASSIUM CHLORIDE 7.45 MG/ML
10 INJECTION INTRAVENOUS
Status: COMPLETED | OUTPATIENT
Start: 2022-11-03 | End: 2022-11-03

## 2022-11-03 RX ORDER — NYSTATIN 100000 [USP'U]/ML
500000 SUSPENSION ORAL 3 TIMES DAILY
Status: DISCONTINUED | OUTPATIENT
Start: 2022-11-03 | End: 2022-11-07 | Stop reason: HOSPADM

## 2022-11-03 RX ORDER — FLUCONAZOLE 2 MG/ML
200 INJECTION, SOLUTION INTRAVENOUS
Status: DISCONTINUED | OUTPATIENT
Start: 2022-11-03 | End: 2022-11-07 | Stop reason: HOSPADM

## 2022-11-03 RX ADMIN — DIPHENHYDRAMINE HYDROCHLORIDE 15 ML: 2.5 LIQUID ORAL at 01:11

## 2022-11-03 RX ADMIN — HYDROMORPHONE HYDROCHLORIDE 1 MG: 1 INJECTION, SOLUTION INTRAMUSCULAR; INTRAVENOUS; SUBCUTANEOUS at 03:11

## 2022-11-03 RX ADMIN — POTASSIUM CHLORIDE 10 MEQ: 7.46 INJECTION, SOLUTION INTRAVENOUS at 01:11

## 2022-11-03 RX ADMIN — PANTOPRAZOLE SODIUM 40 MG: 40 INJECTION, POWDER, FOR SOLUTION INTRAVENOUS at 08:11

## 2022-11-03 RX ADMIN — DIPHENHYDRAMINE HYDROCHLORIDE 15 ML: 2.5 LIQUID ORAL at 04:11

## 2022-11-03 RX ADMIN — POTASSIUM BICARBONATE 25 MEQ: 978 TABLET, EFFERVESCENT ORAL at 08:11

## 2022-11-03 RX ADMIN — HYDROMORPHONE HYDROCHLORIDE 1 MG: 1 INJECTION, SOLUTION INTRAMUSCULAR; INTRAVENOUS; SUBCUTANEOUS at 01:11

## 2022-11-03 RX ADMIN — NYSTATIN 500000 UNITS: 500000 SUSPENSION ORAL at 04:11

## 2022-11-03 RX ADMIN — LOSARTAN POTASSIUM 25 MG: 25 TABLET, FILM COATED ORAL at 08:11

## 2022-11-03 RX ADMIN — METHYLPREDNISOLONE SODIUM SUCCINATE 80 MG: 125 INJECTION, POWDER, FOR SOLUTION INTRAMUSCULAR; INTRAVENOUS at 08:11

## 2022-11-03 RX ADMIN — DIPHENHYDRAMINE HYDROCHLORIDE 15 ML: 2.5 LIQUID ORAL at 11:11

## 2022-11-03 RX ADMIN — GABAPENTIN 300 MG: 300 CAPSULE ORAL at 08:11

## 2022-11-03 RX ADMIN — Medication: at 08:11

## 2022-11-03 RX ADMIN — DEXTROSE AND SODIUM CHLORIDE: 5; .9 INJECTION, SOLUTION INTRAVENOUS at 01:11

## 2022-11-03 RX ADMIN — NYSTATIN 500000 UNITS: 500000 SUSPENSION ORAL at 08:11

## 2022-11-03 RX ADMIN — POTASSIUM CHLORIDE 10 MEQ: 7.46 INJECTION, SOLUTION INTRAVENOUS at 11:11

## 2022-11-03 RX ADMIN — DIPHENHYDRAMINE HYDROCHLORIDE 15 ML: 2.5 LIQUID ORAL at 09:11

## 2022-11-03 RX ADMIN — POTASSIUM CHLORIDE 10 MEQ: 7.46 INJECTION, SOLUTION INTRAVENOUS at 12:11

## 2022-11-03 RX ADMIN — FLUCONAZOLE 200 MG: 2 INJECTION, SOLUTION INTRAVENOUS at 04:11

## 2022-11-03 RX ADMIN — DIPHENHYDRAMINE HYDROCHLORIDE 15 ML: 2.5 LIQUID ORAL at 05:11

## 2022-11-03 RX ADMIN — HYDROMORPHONE HYDROCHLORIDE 1 MG: 1 INJECTION, SOLUTION INTRAMUSCULAR; INTRAVENOUS; SUBCUTANEOUS at 08:11

## 2022-11-03 RX ADMIN — Medication: at 09:11

## 2022-11-03 NOTE — SUBJECTIVE & OBJECTIVE
Interval History: Patient is resting in bed comfortably. Patients wife and sister were just leaving but said they would be back later. He states his pain is much better than yesterday, he is feeling better on this pain regimen. Palliative care was at bedside as well. Will continue to monitor pain and advance to oral pain medication as tolerated, likely tomorrow. He states his appetite is better and he is feeling incline to eat solid foods. Encourage to eat small, soft, solid foods at tolerated. He is still using magic mouthwash multiple times a day. Patient remains weak. He denies any bleeding. No acute events overnight. CBC remains stable.    Oncology Treatment Plan:   OP COLORECTAL FOLFIRI + BEVACIZUMAB Q2W    Medications:  Continuous Infusions:   dextrose 5 % and 0.9 % NaCl 125 mL/hr at 11/03/22 0143     Scheduled Meds:   fentaNYL  1 patch Transdermal Q72H    gabapentin  300 mg Oral QHS    losartan  25 mg Oral Daily    magic mouthwash (diphenhydrAMINE 12.5 mg/5 mL 20 mL, aluminum & magnesium hydroxide-simethicone (MYLANTA) 20 mL, LIDOcaine HCl 2% (XYLOCAINE) 20 mL) solution  15 mL Swish & Spit Q4H    methylPREDNISolone sodium succinate  80 mg Intravenous Daily    pantoprazole  40 mg Intravenous Daily    potassium bicarbonate  25 mEq Oral Daily    potassium chloride  10 mEq Intravenous Q1H    Questran and Aquaphor Topical Compound   Topical (Top) BID     PRN Meds:acetaminophen, albuterol-ipratropium, aluminum-magnesium hydroxide-simethicone, hydrALAZINE, HYDROmorphone, HYDROmorphone, magnesium oxide, magnesium oxide, melatonin, naloxone, ondansetron, prochlorperazine, simethicone, sodium chloride 0.9%     Review of Systems   Constitutional:  Positive for activity change and fatigue. Negative for appetite change, chills, fever and unexpected weight change.   HENT:  Positive for mouth sores and trouble swallowing. Negative for congestion, nosebleeds, sore throat and voice change.    Eyes:  Negative for visual  disturbance.   Respiratory:  Negative for cough and shortness of breath.    Cardiovascular:  Negative for chest pain, palpitations and leg swelling.   Gastrointestinal:  Positive for rectal pain. Negative for abdominal pain, blood in stool, constipation, diarrhea, nausea and vomiting.   Genitourinary:  Negative for difficulty urinating, dysuria and hematuria.   Musculoskeletal:  Negative for arthralgias, back pain and myalgias.   Skin:  Negative for pallor, rash and wound.   Neurological:  Positive for weakness. Negative for dizziness, syncope and headaches.   Hematological:  Negative for adenopathy. Does not bruise/bleed easily.   Objective:     Vital Signs (Most Recent):  Temp: 98.7 °F (37.1 °C) (11/03/22 0707)  Pulse: 63 (11/03/22 0707)  Resp: 20 (11/03/22 0848)  BP: 121/65 (11/03/22 0707)  SpO2: 97 % (11/03/22 0707) Vital Signs (24h Range):  Temp:  [97.3 °F (36.3 °C)-99.8 °F (37.7 °C)] 98.7 °F (37.1 °C)  Pulse:  [63-87] 63  Resp:  [18-20] 20  SpO2:  [94 %-97 %] 97 %  BP: (115-132)/(65-83) 121/65     Weight: 58.7 kg (129 lb 6.6 oz)  Body mass index is 18.57 kg/m².  Body surface area is 1.7 meters squared.      Intake/Output Summary (Last 24 hours) at 11/3/2022 0944  Last data filed at 11/3/2022 0317  Gross per 24 hour   Intake --   Output 100 ml   Net -100 ml       Physical Exam  Vitals reviewed.   Constitutional:       Appearance: He is well-developed. He is ill-appearing.   HENT:      Head: Normocephalic.   Eyes:      General: Lids are normal.      Conjunctiva/sclera: Conjunctivae normal.   Neck:      Thyroid: No thyroid mass.   Cardiovascular:      Rate and Rhythm: Normal rate.      Heart sounds: Normal heart sounds.   Pulmonary:      Effort: Pulmonary effort is normal.   Abdominal:      General: There is no distension.      Tenderness: There is no abdominal tenderness.   Musculoskeletal:      Cervical back: Normal range of motion.   Skin:     General: Skin is warm and dry.   Neurological:      Mental Status:  He is alert and easily aroused.   Psychiatric:         Mood and Affect: Mood normal.         Speech: Speech normal.         Behavior: Behavior normal. Behavior is cooperative.         Thought Content: Thought content normal.       Significant Labs:   BMP:   Recent Labs   Lab 11/01/22  1141 11/02/22  0452 11/03/22  0518   GLU 74 49* 136*    136 135*   K 4.5 3.3* 3.1*   CL 97 101 100   CO2 28 24 27   BUN 17 11 3*   CREATININE 0.7 0.6 0.6   CALCIUM 9.8 8.3* 8.0*   MG 1.9 1.8  --     and CBC:   Recent Labs   Lab 11/01/22  1141 11/02/22 0452 11/03/22  0518   WBC 8.57 6.94 6.92   HGB 11.6* 9.9* 9.8*   HCT 36.9* 32.1* 30.8*    207 193       Diagnostic Results:  I have reviewed all pertinent imaging results/findings within the past 24 hours.

## 2022-11-03 NOTE — PLAN OF CARE
OT vilma completed. Sup>sit min A, sit>stand min A, functional mobility x20ft with RW and min A, sit>sup min A. Recommending HHOT and RW at d/c.

## 2022-11-03 NOTE — PROGRESS NOTES
[facility] ([facility]  Progress Note    Patient Name: Vincent Benton  MRN: [id number]  Patient Class: IP- Inpatient   Admission Date: 11/1/2022  Length of Stay: 0 days  Attending Physician: Steven Doherty MD  Primary Care Provider: Shakila Moran DO        Subjective:     Principal Problem:Mucositis due to antineoplastic therapy        HPI:  Vincent Benton is a 67 y.o. male patient with a PMHx of GERD, HTN, rectal cancer, alcoholism who presents to the Emergency Department for evaluation of mouth and perirectal sores which onset several days PTA. Pt was sent from the cancer center and is currently undergoing chemotherapy. His last chemo was on 10/25/22. Associated sxs include decreased appetite, diarrhea, generalized abdominal pain, generalized weakness and decreased urination. Patient denies any fever, chills, CP, SOB, HA, n/v, and all other sxs at this time. No prior Tx reported. No further complaints or concerns at this time.   On arrival: Temp 97.4, pulse 69, resp 20 and B/P 145/73  Labs are essentially baseline with mild anemia  Pt was placed in Observation for supportive care as a result of chemotherapy.   As clarification, on 11/1/2022 patient should be admitted for hospital observation services under my care in collaboration with Steven Doherty MD            Overview/Hospital Course:  Pt with severe mucositis due to chemotherapy. Ulcerations present in mouth and in the rectal area. Receiving IV fluids, miracle mouth wash and prn analgesia. Morphine changed to Dilaudid. Per Up to Date - systemic corticosteroids are warranted. Solumedrol added. Encouraged family member to assist with salt water gargles every 4 hours. Pt hypoglycemic this AM and IV fluids with dextrose added. Potassium replaced. Pt denies further diarrhea.     11/3/22 - slight improvement in condition today. Pt reports he is feeling better. Has whitish coating to tongue, Diflucan added to cover for oral  candidasis. Nystatin for thrush. Pt has not tried to take any oral medications. IV fluids continue and potassium replaced. Encouraged warm salt water gargles along with miracle mouth wash. PT/OT ordered due to weakness.       Interval History:  See Hospital course    Review of Systems   Constitutional:  Positive for activity change, appetite change and fatigue.   HENT:  Positive for mouth sores and trouble swallowing.    Eyes: Negative.    Respiratory:  Negative for cough and shortness of breath.    Cardiovascular:  Negative for chest pain.   Gastrointestinal:  Positive for diarrhea. Negative for abdominal distention, abdominal pain, constipation, nausea and vomiting.   Genitourinary:  Positive for decreased urine volume.   Skin:  Positive for wound.   Neurological:  Positive for weakness and light-headedness.   Psychiatric/Behavioral:  The patient is nervous/anxious.    Objective:     Vital Signs (Most Recent):  Temp: 99.8 °F (37.7 °C) (11/03/22 1208)  Pulse: (!) 58 (11/03/22 1208)  Resp: 20 (11/03/22 1300)  BP: 128/84 (11/03/22 1208)  SpO2: 95 % (11/03/22 1208)   Vital Signs (24h Range):  Temp:  [98.4 °F (36.9 °C)-99.8 °F (37.7 °C)] 99.8 °F (37.7 °C)  Pulse:  [58-72] 58  Resp:  [18-20] 20  SpO2:  [94 %-97 %] 95 %  BP: (115-132)/(65-84) 128/84     Weight: 58.7 kg (129 lb 6.6 oz)  Body mass index is 18.57 kg/m².    Intake/Output Summary (Last 24 hours) at 11/3/2022 1449  Last data filed at 11/3/2022 0317  Gross per 24 hour   Intake --   Output 100 ml   Net -100 ml      Physical Exam  Vitals reviewed.   Constitutional:       Appearance: He is well-developed. He is ill-appearing.      Comments: Thin in appearance   HENT:      Head: Normocephalic and atraumatic.      Nose: Nose normal.      Mouth/Throat:      Mouth: Oral lesions present.   Eyes:      General: No scleral icterus.     Conjunctiva/sclera: Conjunctivae normal.      Pupils: Pupils are equal, round, and reactive to light.   Cardiovascular:      Rate and  Rhythm: Normal rate and regular rhythm.      Heart sounds: Normal heart sounds. No murmur heard.    No friction rub. No gallop.   Pulmonary:      Effort: Pulmonary effort is normal.      Breath sounds: Normal breath sounds.   Abdominal:      General: Bowel sounds are normal.      Palpations: Abdomen is soft.   Genitourinary:     Comments: Scattered ulcerative lesions to the intergluteal cleft  Musculoskeletal:         General: No tenderness. Normal range of motion.      Cervical back: Normal range of motion and neck supple.   Skin:     General: Skin is warm and dry.   Neurological:      Mental Status: He is alert and oriented to person, place, and time.   Psychiatric:         Behavior: Behavior normal.       Significant Labs: All pertinent labs within the past 24 hours have been reviewed.  CBC:   Recent Labs   Lab 11/02/22 0452 11/03/22 0518   WBC 6.94 6.92   HGB 9.9* 9.8*   HCT 32.1* 30.8*    193     CMP:   Recent Labs   Lab 11/02/22 0452 11/03/22  0518    135*   K 3.3* 3.1*    100   CO2 24 27   GLU 49* 136*   BUN 11 3*   CREATININE 0.6 0.6   CALCIUM 8.3* 8.0*   PROT 5.3* 5.0*   ALBUMIN 2.4* 2.0*   BILITOT 0.6 0.6   ALKPHOS 73 86   AST 11 10   ALT 9* 9*   ANIONGAP 11 8       Significant Imaging: I have reviewed all pertinent imaging results/findings within the past 24 hours.      Assessment/Plan:      * Mucositis due to antineoplastic therapy  Clear liquid diet if tolerated  IV fluids  Miracle mouth wash  Prn analgesia  Solumedrol      Oral candidiasis  Diflucan IV x 7 days  Nystatin oral swish and swallow      Encounter for palliative care        Essential hypertension  Hydralazine 10 mg IV every 6 hours is unable to tolerate oral meds  Continue Losartan      Rectal cancer  Is followed by Dr. Tuttle in clinic  Last chemo 10/25/22    Neoplasm related pain  Continue fentanyl patch  Prn Dilaudid for pain associated with mucositis  Consult Palliative Care to assist with pain  medications        VTE Risk Mitigation (From admission, onward)         Ordered     Reason for No Pharmacological VTE Prophylaxis  Once        Question:  Reasons:  Answer:  Risk of Bleeding    11/01/22 1359     IP VTE HIGH RISK PATIENT  Once         11/01/22 1359     Place sequential compression device  Until discontinued         11/01/22 1359                Discharge Planning   KERRIE:      Code Status: DNR   Is the patient medically ready for discharge?:     Reason for patient still in hospital (select all that apply): Patient trending condition and Treatment  Discharge Plan A: Home with family                  Emily Sanches NP  Department of Hospital Medicine   [facility])

## 2022-11-03 NOTE — PROGRESS NOTES
Pharmacist Renal Dose Adjustment Note    Vincent Benton is a 67 y.o. male being treated with the medication fluconazole.     Patient Data:    Vital Signs (Most Recent):  Temp: 98.7 °F (37.1 °C) (11/03/22 0707)  Pulse: 63 (11/03/22 0707)  Resp: 20 (11/03/22 0848)  BP: 121/65 (11/03/22 0707)  SpO2: 97 % (11/03/22 0707) Vital Signs (72h Range):  Temp:  [96.9 °F (36.1 °C)-99.8 °F (37.7 °C)]   Pulse:  [47-87]   Resp:  [17-26]   BP: (106-180)/(56-97)   SpO2:  [91 %-100 %]      Recent Labs   Lab 11/01/22  1141 11/02/22  0452 11/03/22 0518   CREATININE 0.7 0.6 0.6     Serum creatinine: 0.6 mg/dL 11/03/22 0518  Estimated creatinine clearance: 101.9 mL/min    Fluconazole 100 mg IV every 24 hours will be changed to fluconazole 200 mg IV every 24 hours per renal dose protocol for CrCl > 50 ml/min.    Thank you,  Pharmacist's Name: James Munoz

## 2022-11-03 NOTE — PLAN OF CARE
[facility] - Tele[facility] (Hospital)  Initial Discharge Assessment       Primary Care Provider: Shakila Moran DO    Admission Diagnosis: Failure to thrive in adult [R62.7]  Chest pain [R07.9]  Mucositis [K12.30]    Admission Date: 11/1/2022  Expected Discharge Date:     Discharge Barriers Identified: None    Payor: HU[employer] MANAGED MEDICARE / Plan: HU[employer]NA TOTAL CARE ADVANTAGE / Product Type: Medicare Advantage /     Extended Emergency Contact Information  Primary Emergency Contact: Fara Benton  Mobile Phone: [phone]  Relation: Sister  Secondary Emergency Contact: Akilah Hernandez   [place]  Mobile Phone: [phone]  Relation: Healthcare Power of     Discharge Plan A: Home with family         [address] [address]  Phone: [phone] Fax: [phone]    [address] [address]  Phone: [phone] Fax: [phone]    [facility] [place] [id number]  Phone: [phone] Fax: [phone]      Initial Assessment (most recent)       Adult Discharge Assessment - 11/03/22 1237          Discharge Assessment    Assessment Type Discharge Planning Assessment     Confirmed/corrected address, phone number and insurance Yes     Confirmed Demographics Correct on Facesheet     Source of Information patient     Communicated KERRIE with patient/caregiver Date not available/Unable to determine     Reason For Admission Mucositis due to antineoplastic therapy     Lives With spouse     Facility Arrived From: home     Do you expect to return to your current living situation? Yes     Do you have help at home or someone to help you manage your care at home? Yes     Who are your caregiver(s) and their phone number(s)? Spouse, Akilah     Prior to hospitilization  cognitive status: Alert/Oriented     Current cognitive status: Alert/Oriented     Walking or Climbing Stairs Difficulty ambulation difficulty, requires equipment     Mobility Management cane, walker     Dressing/Bathing Difficulty none     Home Layout Able to live on 1st floor     Equipment Currently Used at Home cane, quad;walker, standard     Readmission within 30 days? No     Patient currently being followed by outpatient case management? No     Do you currently have service(s) that help you manage your care at home? No     Do you take prescription medications? Yes     Do you have prescription coverage? Yes     Do you have any problems affording any of your prescribed medications? No     Is the patient taking medications as prescribed? yes     Who is going to help you get home at discharge? Spouse, Akilah     How do you get to doctors appointments? car, drives self;family or friend will provide     Are you on dialysis? No     Do you take coumadin? No     Discharge Plan A Home with family     DME Needed Upon Discharge  suction machine     Discharge Plan discussed with: Patient     Discharge Barriers Identified None        Physical Activity    On average, how many days per week do you engage in moderate to strenuous exercise (like a brisk walk)? 0 days     On average, how many minutes do you engage in exercise at this level? 0 min        Financial Resource Strain    How hard is it for you to pay for the very basics like food, housing, medical care, and heating? Not very hard        Housing Stability    In the last 12 months, was there a time when you were not able to pay the mortgage or rent on time? No     In the last 12 months, how many places have you lived? 1     In the last 12 months, was there a time when you did not have a steady place to sleep or slept in a shelter (including now)? No        Transportation Needs    In the past 12 months, has lack of transportation kept you from medical appointments or from  getting medications? No     In the past 12 months, has lack of transportation kept you from meetings, work, or from getting things needed for daily living? No        Food Insecurity    Within the past 12 months, you worried that your food would run out before you got the money to buy more. Never true     Within the past 12 months, the food you bought just didn't last and you didn't have money to get more. Never true        Stress    Do you feel stress - tense, restless, nervous, or anxious, or unable to sleep at night because your mind is troubled all the time - these days? To some extent        Social Connections    In a typical week, how many times do you talk on the phone with family, friends, or neighbors? Three times a week     How often do you get together with friends or relatives? Once a week     How often do you attend Scientology or Gnosticist services? Never     Do you belong to any clubs or organizations such as Scientology groups, unions, fraternal or athletic groups, or school groups? No     How often do you attend meetings of the clubs or organizations you belong to? 1 to 4 times per year     Are you , , , , never , or living with a partner?         Alcohol Use    Q1: How often do you have a drink containing alcohol? Never     Q2: How many drinks containing alcohol do you have on a typical day when you are drinking? Patient does not drink     Q3: How often do you have six or more drinks on one occasion? Patient refused                   Anticipated DC dispo: home with family   Prior Level of Function: independent with ADLs, lives with spouse   PCP: Shakila Moran DO     Comments:  CM met with patient at bedside to introduce role and discuss discharge planning. Patient lives with his spouse, Akilah, who will also be help at home and can provide transport at time of discharge. Patient may need home suction machine at DC. CM discharge needs depends on hospital progress. CM  will continue following to assist with other needs.

## 2022-11-03 NOTE — CONSULTS
Discussed home health with patient at bedside. CM provided in network home health agencies. Referral sent to [facility]. Patient would like home suction. Secure chat sent to provider to order. [facility] contacted with estimated DC date and advised of DME ordered. [facility] accepted patient.

## 2022-11-03 NOTE — ASSESSMENT & PLAN NOTE
--Hold chemo inpatient  --Will plan for f/u with Primary Oncologist for further recommendations in regards to malignancy management

## 2022-11-03 NOTE — PT/OT/SLP EVAL
"Occupational Therapy   Evaluation    Name: Vincent Benton  MRN: [id number]  Admitting Diagnosis:  Mucositis due to antineoplastic therapy  Recent Surgery: * No surgery found *      Recommendations:     Discharge Recommendations: home health OT (24/7 SPV and A)  Discharge Equipment Recommendations:  walker, rolling, bedside commode, bath bench  Barriers to discharge:  None    Assessment:     Vincent Benton is a 67 y.o. male with a medical diagnosis of Mucositis due to antineoplastic therapy.  He presents with the following performance deficits affecting function: weakness, impaired endurance, impaired self care skills, impaired functional mobility, impaired balance, impaired cardiopulmonary response to activity, pain.      Rehab Prognosis: Good; patient would benefit from acute skilled OT services to address these deficits and reach maximum level of function.       Plan:     Patient to be seen 2 x/week to address the above listed problems via self-care/home management, therapeutic activities, therapeutic exercises  Plan of Care Expires: 11/17/22  Plan of Care Reviewed with: patient, spouse    Subjective     Chief Complaint: Reported "I am doing ok."  Patient/Family Comments/goals: return to PLOF     Occupational Profile:  Living Environment: Patient resides in a 1 story home with a threshold to enter, with his wife.  Previous level of function: Patient was mod I with functional mobility via QC and ADLs (PRN A of wife).  Roles and Routines: Patient was an active  and working PRN.  Equipment Used at Home:  cane, quad, shower chair, grab bar  Assistance upon Discharge: wife    Pain/Comfort:  Pain Rating 1: 7/10  Location 1: mouth  Pain Addressed 1:  (activity pacing)  Pain Rating Post-Intervention 1: 7/10  Location 2:  (rectum)  Pain Addressed 2:  (offloading)    Objective:     Communicated with: NurseDelonte, prior to session.  Patient found supine with peripheral IV, telemetry upon OT entry to " room.    General Precautions: Standard, fall   Orthopedic Precautions:N/A   Braces: N/A  Respiratory Status: Room air    Bed Mobility:    Patient completed Supine to Sit with minimum assistance  Patient completed Sit to Supine with minimum assistance    Functional Mobility/Transfers:  Patient completed Sit <> Stand Transfer with minimum assistance  with  rolling walker   Patient completed Bed <> Chair Transfer using Step Transfer technique with minimum assistance with rolling walker  Functional Mobility: Patient completed x20ft functional mobility with RW and min A to increase dynamic standing balance and activity tolerance needed for ADL completion.  Patient unable to tolerate OOB sitting due to severe rectum pain.    Activities of Daily Living:  Grooming: setup .  Upper Body Dressing: minimum assistance .    Cognitive/Visual Perceptual:  Cognitive/Psychosocial Skills:     -       Oriented to: Person, Place, Time, and Situation   -       Follows Commands/attention:Follows two-step commands    Physical Exam:  Balance:    -       sitting: fair, static standing: fair, dynamic standing: poor +  Upper Extremity Range of Motion:     -       Right Upper Extremity: WFL  -       Left Upper Extremity: WFL  Upper Extremity Strength:    -       Right Upper Extremity: Deficits: grossly 4-/5  -       Left Upper Extremity: Deficits: grossly 4-/5   Strength:    -       Right Upper Extremity: Deficits: fair  -       Left Upper Extremity: Deficits: fair    AMPAC 6 Click ADL:  AMPAC Total Score: 17    Treatment & Education:  Patient educated on role of OT in acute setting and benefits of participation. Educated on techniques to use to increase independence and decrease fall risk with functional transfers. Educated on importance of OOB activity and plan to progress towards completion. Encouraged completion of B UE AROM therex throughout the day to tolerance to increase functional strength and activity tolerance. Patient stated  understanding and in agreement with POC    Patient left with bed in chair position with all lines intact, call button in reach, and wife present    GOALS:   Multidisciplinary Problems       Occupational Therapy Goals          Problem: Occupational Therapy    Goal Priority Disciplines Outcome Interventions   Occupational Therapy Goal     OT, PT/OT     Description: Goals to be met by: 11/17/22    Patient will increase functional independence with ADLs by performing:    Toileting from bedside commode with Supervision for hygiene and clothing management.   Toilet transfer to bedside commode with Supervision.  Increased functional strength in B UE grossly by 1/2 MM grade.                         History:     Past Medical History:   Diagnosis Date    Alcoholism     Anemia     Back pain     Encounter for blood transfusion 2018    Essential hypertension 2/4/2016    GERD (gastroesophageal reflux disease)     Hiatal hernia     Hypertension     diet controlled    Melanoma 06/2021    left cheek    Rectal cancer 9/11/2019         Past Surgical History:   Procedure Laterality Date    ARTHROSCOPY OF KNEE Right 5/5/2021    Procedure: ARTHROSCOPY, KNEE;  Surgeon: Delonte Mcintyre MD;  Location: [facility];  Service: Orthopedics;  Laterality: Right;    COLONOSCOPY N/A 9/3/2019    Procedure: COLONOSCOPY;  Surgeon: Isai Centeno MD;  Location: [facility];  Service: Endoscopy;  Laterality: N/A;    COLONOSCOPY N/A 1/10/2020    Procedure: COLONOSCOPY;  Surgeon: Familia Gonzalez MD;  Location: [facility];  Service: General;  Laterality: N/A;    COLONOSCOPY N/A 3/24/2021    Procedure: COLONOSCOPY;  Surgeon: Familia Gonzalez MD;  Location: [facility];  Service: General;  Laterality: N/A;    ESOPHAGOGASTRODUODENOSCOPY N/A 9/3/2019    Procedure: ESOPHAGOGASTRODUODENOSCOPY (EGD);  Surgeon: Isai Centeno MD;  Location: [facility];  Service: Endoscopy;  Laterality: N/A;    EXCISION OF MEDIAL MENISCUS OF KNEE Right 5/5/2021    Procedure:  MENISCECTOMY, KNEE, MEDIAL;  Surgeon: Delonte Mcintyre MD;  Location: [facility];  Service: Orthopedics;  Laterality: Right;  partial Lateral    FLEXIBLE SIGMOIDOSCOPY  2/4/2020    Procedure: SIGMOIDOSCOPY, FLEXIBLE;  Surgeon: Familia Gonzalez MD;  Location: [facility];  Service: General;;    ILEOSTOMY Right 2/4/2020    Procedure: CREATION, ILEOSTOMY;  Surgeon: Familia Gonzalez MD;  Location: [facility];  Service: General;  Laterality: Right;    ILEOSTOMY CLOSURE N/A 8/4/2020    Procedure: CLOSURE, ILEOSTOMY;  Surgeon: Familia Gonzalez MD;  Location: [facility];  Service: General;  Laterality: N/A;    INCISION AND DRAINAGE OF BACK Left 8/5/2020    Procedure: INCISION AND DRAINAGE, BACK;  Surgeon: Familia Gonzalez MD;  Location: [facility];  Service: General;  Laterality: Left;    INJECTION OF ANESTHETIC AGENT INTO TISSUE PLANE DEFINED BY TRANSVERSUS ABDOMINIS MUSCLE N/A 2/4/2020    Procedure: BLOCK, TRANSVERSUS ABDOMINIS PLANE;  Surgeon: Familia Gonzalez MD;  Location: [facility];  Service: General;  Laterality: N/A;    INSERTION OF TUNNELED CENTRAL VENOUS CATHETER (CVC) WITH SUBCUTANEOUS PORT Right 2/4/2020    Procedure: INSERTION, PORT-A-CATH;  Surgeon: Familia Gonzalez MD;  Location: [facility];  Service: General;  Laterality: Right;    INSERTION OF TUNNELED CENTRAL VENOUS CATHETER (CVC) WITH SUBCUTANEOUS PORT N/A 8/16/2022    Procedure: [id number];  Surgeon: Familia Gonzalez MD;  Location: [facility];  Service: General;  Laterality: N/A;  right subclavian    JOINT REPLACEMENT Left 2009    Hip    KNEE ARTHROSCOPY W/ PLICA EXCISION Right 5/5/2021    Procedure: EXCISION, PLICA, KNEE, ARTHROSCOPIC;  Surgeon: Delonte Mcintyre MD;  Location: [facility];  Service: Orthopedics;  Laterality: Right;    MOBILIZATION OF SPLENIC FLEXURE  2/4/2020    Procedure: MOBILIZATION, SPLENIC FLEXURE;  Surgeon: Familia Gonzalez MD;  Location: [facility];  Service: General;;    REMOVAL OF HARDWARE FROM HIP Left 1/4/2022     Procedure: REMOVAL, HARDWARE, HIP;  Surgeon: Delonte Mcintyre MD;  Location: [facility];  Service: Orthopedics;  Laterality: Left;       Time Tracking:     OT Date of Treatment: 11/03/22  OT Start Time: 1340  OT Stop Time: 1405  OT Total Time (min): 25 min    Billable Minutes:Evaluation 15  Therapeutic Activity 10    11/3/2022

## 2022-11-03 NOTE — PROGRESS NOTES
Progress Note   Palliative Medicine      SUBJECTIVE:     History of Present Illness:  Patient seen and examined without family present as sister and wife had just left. He reports his pain is improved with the current regimen and was able to sleep last night. He thinks the lesions are improving and would like a more substantial diet. I plan to transition to PO in anticipation of discharge but he requests to wait until tomorrow. I suspect we will increase PRN dose for now then can decrease back to previous dose once lesions heal.      In the last 24hrs the patient has used the following pain medications (7a-7a):  - Dilaudid 0.5mg IV x 0  - Dilaudid 1mg IV x 3      Past Medical History:   Diagnosis Date    Alcoholism     Anemia     Back pain     Encounter for blood transfusion 2018    Essential hypertension 2/4/2016    GERD (gastroesophageal reflux disease)     Hiatal hernia     Hypertension     diet controlled    Melanoma 06/2021    left cheek    Rectal cancer 9/11/2019       Review of patient's allergies indicates:  No Known Allergies    Review of Symptoms      Symptom Assessment (ESAS 0-10 Scale)  Pain:  5  Dyspnea:  0  Anxiety:  0  Nausea:  0  Depression:  0  Anorexia:  0  Fatigue:  0  Insomnia:  0  Restlessness:  0  Agitation:  0     CAM / Delirium:  Negative  Constipation:  Negative    Anxiety:  Is not nervous/anxious  Constipation:  No constipation    Pain Assessment:    Location(s): mouth  Mouth       Location: generalized       Quality: none        Quantity: 5/10 in intensity day(s) ago, unchanged        Aggravating Factors: eating        Alleviating Factors: opiates        Associated Symptoms: none    Living Arrangements:  Lives with spouse    Psychosocial/Cultural: , one daughter from previous relationship    Advance Care Planning   Advance Directives:   Living Will: Yes        Copy on chart: Yes    [place]POST: Yes    Do Not Resuscitate Status: Yes    Medical Power of : Yes    Agent's Name:  1:  wife Akilah Medrano, 2: sister Fara Benton    Decision Making:  Patient answered questions  Goals of Care: What is most important right now is to focus on improvement in condition but with limits to invasive therapies. Accordingly, we have decided that the best plan to meet the patient's goals includes continuing with treatment.       ROS:  Review of Systems   Constitutional:  Positive for activity change. Negative for chills and fever.   HENT:  Positive for mouth sores and trouble swallowing. Negative for sore throat.    Respiratory:  Negative for cough and shortness of breath.    Gastrointestinal:  Negative for abdominal pain, constipation, nausea and vomiting.   Genitourinary:  Negative for difficulty urinating and dysuria.   Musculoskeletal:  Positive for back pain. Negative for myalgias.   Skin:  Negative for rash and wound.   Allergic/Immunologic: Negative for immunocompromised state.   Neurological:  Negative for weakness and headaches.   Psychiatric/Behavioral:  Negative for confusion and sleep disturbance. The patient is not nervous/anxious.      OBJECTIVE:     Physical Exam:  Vitals: Temp: 99.8 °F (37.7 °C) (11/03/22 1208)  Pulse: (!) 58 (11/03/22 1208)  Resp: 20 (11/03/22 1300)  BP: 128/84 (11/03/22 1208)  SpO2: 95 % (11/03/22 1208)    Physical Exam  Vitals reviewed.   Constitutional:       General: He is not in acute distress.     Appearance: Normal appearance. He is well-developed.   HENT:      Head: Normocephalic and atraumatic.      Mouth/Throat:      Lips: Lesions present.      Mouth: Oral lesions present.   Eyes:      Conjunctiva/sclera: Conjunctivae normal.   Cardiovascular:      Rate and Rhythm: Normal rate and regular rhythm.      Heart sounds: Normal heart sounds. No murmur heard.  Pulmonary:      Effort: Pulmonary effort is normal. No respiratory distress.      Breath sounds: Normal breath sounds.   Abdominal:      General: Bowel sounds are normal. There is no distension.      Palpations:  Abdomen is soft.      Tenderness: There is no abdominal tenderness.   Musculoskeletal:         General: Normal range of motion.      Cervical back: Normal range of motion.      Right lower leg: No edema.      Left lower leg: No edema.   Skin:     General: Skin is warm and dry.      Findings: No rash.   Neurological:      Mental Status: He is alert and oriented to person, place, and time.      Cranial Nerves: No cranial nerve deficit.   Psychiatric:         Mood and Affect: Mood normal.         Behavior: Behavior normal.         Thought Content: Thought content normal.         Judgment: Judgment normal.         ASSESSMENT   Vincent Benton is a 67 y.o. year old with a history of recurrent metastatic rectal cancer who presented to the ED from colorectal surgeon's office due to mucositis of mouth and rectum. He was admitted for treatment and Palliative Medicine was consulted to assist with pain management.      PLAN   Mucositis  - Agree with current regimen of IV Dilaudid  - Plan to transition to PO once lesions improve (tentative plan to transition to PO tomorrow)     2. Neoplasm related pain superimposed on chronic back pain  - Continue fentanyl 25mcg/ hr patch q 72 hrs  - Home med- Norco 10mg q4hr PRN  - Will assess pain at discharge and adjust as needed     3. Recurrent metastatic rectal cancer  - Under the care of Dr. Tuttle  - Recently completed FOLFIRI plus Avastin with plans to restage in a month     4. Encounter for Palliative Care  - Code status: DNR/I- LaPOST on file  - HCPOA on file, 1: wife Akilah, 2: sister Fara       Thank you for allowing Palliative Medicine to be involved in the care of Vincent Benton.    Medical decision making: HIGH based on high risk of death untreated symptoms management of more than one chronic illness in exacerbation or progression of disease managing side effects of medications or polypharmacy parenteral opioids    Plan required increased review of medication choice,  interaction, dosing, frequency, and route due to patient complexity. Patient complexity increased by: high risk medication use, advanced age, at risk for delirium, continuous use of opioids, and NPO    > 50% of 36 min visit spent in chart review, face to face discussion of goals of care, symptom assessment, coordination of care and emotional support, formulating and communicating prognosis and goals of care, exploring burden/ benefit of various approaches of treatment.     Kristi Wing PA-C  Palliative Medicine

## 2022-11-03 NOTE — ASSESSMENT & PLAN NOTE
--magic mouthwash Q4H  --IVFs  --avoid food irritants  --note low albumin. Encourage boost/ensure when tolerable   --electrolyte repletion PRN per primary team  --supportive care  --appetite has improved-encouraged introducing solid, soft foods as tolerated

## 2022-11-03 NOTE — PROGRESS NOTES
[facility] - Telemetry ([facility])  Hematology/Oncology  Progress Note    Patient Name: Vincent Benton  Admission Date: 11/1/2022  Hospital Length of Stay: 0 days  Code Status: DNR     Subjective:     HPI:  67 y.o male with recurrent metastatic rectal cancer presented to [facility] ER following visit with Dr. Gonzalez for mucositis and rectal sores. Associated symptoms include mouth and rectal pain, trouble swallowing, fatigue, weakness, diarrhea. He denies fever, syncope, urinary complaints, CP, SOB.     In regards to his recurrent metastatic rectal cancer patient currently on palliative chemotherapy. S/p 5 cycles FOLFIRI + Avastin (last tx 10/25/22).       Interval History: Patient is resting in bed comfortably. Patients wife and sister were just leaving but said they would be back later. He states his pain is much better than yesterday, he is feeling better on this pain regimen. Palliative care was at bedside as well. Will continue to monitor pain and advance to oral pain medication as tolerated, likely tomorrow. He states his appetite is better and he is feeling incline to eat solid foods. Encourage to eat small, soft, solid foods at tolerated. He is still using magic mouthwash multiple times a day. Patient remains weak. He denies any bleeding. No acute events overnight. CBC remains stable.    Oncology Treatment Plan:   OP COLORECTAL FOLFIRI + BEVACIZUMAB Q2W    Medications:  Continuous Infusions:   dextrose 5 % and 0.9 % NaCl 125 mL/hr at 11/03/22 0143     Scheduled Meds:   fentaNYL  1 patch Transdermal Q72H    gabapentin  300 mg Oral QHS    losartan  25 mg Oral Daily    magic mouthwash (diphenhydrAMINE 12.5 mg/5 mL 20 mL, aluminum & magnesium hydroxide-simethicone (MYLANTA) 20 mL, LIDOcaine HCl 2% (XYLOCAINE) 20 mL) solution  15 mL Swish & Spit Q4H    methylPREDNISolone sodium succinate  80 mg Intravenous Daily    pantoprazole  40 mg Intravenous Daily    potassium bicarbonate  25 mEq Oral Daily     potassium chloride  10 mEq Intravenous Q1H    Questran and Aquaphor Topical Compound   Topical (Top) BID     PRN Meds:acetaminophen, albuterol-ipratropium, aluminum-magnesium hydroxide-simethicone, hydrALAZINE, HYDROmorphone, HYDROmorphone, magnesium oxide, magnesium oxide, melatonin, naloxone, ondansetron, prochlorperazine, simethicone, sodium chloride 0.9%     Review of Systems   Constitutional:  Positive for activity change and fatigue. Negative for appetite change, chills, fever and unexpected weight change.   HENT:  Positive for mouth sores and trouble swallowing. Negative for congestion, nosebleeds, sore throat and voice change.    Eyes:  Negative for visual disturbance.   Respiratory:  Negative for cough and shortness of breath.    Cardiovascular:  Negative for chest pain, palpitations and leg swelling.   Gastrointestinal:  Positive for rectal pain. Negative for abdominal pain, blood in stool, constipation, diarrhea, nausea and vomiting.   Genitourinary:  Negative for difficulty urinating, dysuria and hematuria.   Musculoskeletal:  Negative for arthralgias, back pain and myalgias.   Skin:  Negative for pallor, rash and wound.   Neurological:  Positive for weakness. Negative for dizziness, syncope and headaches.   Hematological:  Negative for adenopathy. Does not bruise/bleed easily.   Objective:     Vital Signs (Most Recent):  Temp: 98.7 °F (37.1 °C) (11/03/22 0707)  Pulse: 63 (11/03/22 0707)  Resp: 20 (11/03/22 0848)  BP: 121/65 (11/03/22 0707)  SpO2: 97 % (11/03/22 0707) Vital Signs (24h Range):  Temp:  [97.3 °F (36.3 °C)-99.8 °F (37.7 °C)] 98.7 °F (37.1 °C)  Pulse:  [63-87] 63  Resp:  [18-20] 20  SpO2:  [94 %-97 %] 97 %  BP: (115-132)/(65-83) 121/65     Weight: 58.7 kg (129 lb 6.6 oz)  Body mass index is 18.57 kg/m².  Body surface area is 1.7 meters squared.      Intake/Output Summary (Last 24 hours) at 11/3/2022 0995  Last data filed at 11/3/2022 0317  Gross per 24 hour   Intake --   Output 100 ml   Net  -100 ml       Physical Exam  Vitals reviewed.   Constitutional:       Appearance: He is well-developed. He is ill-appearing.   HENT:      Head: Normocephalic.   Eyes:      General: Lids are normal.      Conjunctiva/sclera: Conjunctivae normal.   Neck:      Thyroid: No thyroid mass.   Cardiovascular:      Rate and Rhythm: Normal rate.      Heart sounds: Normal heart sounds.   Pulmonary:      Effort: Pulmonary effort is normal.   Abdominal:      General: There is no distension.      Tenderness: There is no abdominal tenderness.   Musculoskeletal:      Cervical back: Normal range of motion.   Skin:     General: Skin is warm and dry.   Neurological:      Mental Status: He is alert and easily aroused.   Psychiatric:         Mood and Affect: Mood normal.         Speech: Speech normal.         Behavior: Behavior normal. Behavior is cooperative.         Thought Content: Thought content normal.       Significant Labs:   BMP:   Recent Labs   Lab 11/01/22  1141 11/02/22  0452 11/03/22  0518   GLU 74 49* 136*    136 135*   K 4.5 3.3* 3.1*   CL 97 101 100   CO2 28 24 27   BUN 17 11 3*   CREATININE 0.7 0.6 0.6   CALCIUM 9.8 8.3* 8.0*   MG 1.9 1.8  --     and CBC:   Recent Labs   Lab 11/01/22  1141 11/02/22  0452 11/03/22  0518   WBC 8.57 6.94 6.92   HGB 11.6* 9.9* 9.8*   HCT 36.9* 32.1* 30.8*    207 193       Diagnostic Results:  I have reviewed all pertinent imaging results/findings within the past 24 hours.    Assessment/Plan:     * Mucositis due to antineoplastic therapy  --magic mouthwash Q4H  --IVFs  --avoid food irritants  --note low albumin. Encourage boost/ensure when tolerable   --electrolyte repletion PRN per primary team  --supportive care  --appetite has improved-encouraged introducing solid, soft foods as tolerated    Rectal cancer  --Hold chemo inpatient  --Will plan for f/u with Primary Oncologist for further recommendations in regards to malignancy management      Neoplasm related pain  --palliative  medicine for pain management   --pain has improved       Thank you for your consult. I will follow-up with patient. Please contact us if you have any additional questions.     Sumaya Pierce PA-C  Hematology/Oncology  [facility] - Telemetry ([facility])

## 2022-11-03 NOTE — CONSULTS
[facility] - Telemetry ([facility])  Wound Care    Patient Name:  Vincent Benton   MRN:  [id number]  Date: 11/3/2022  Diagnosis: Mucositis due to antineoplastic therapy    History:     Past Medical History:   Diagnosis Date    Alcoholism     Anemia     Back pain     Encounter for blood transfusion 2018    Essential hypertension 2/4/2016    GERD (gastroesophageal reflux disease)     Hiatal hernia     Hypertension     diet controlled    Melanoma 06/2021    left cheek    Rectal cancer 9/11/2019       Social History     Socioeconomic History    Marital status:    Tobacco Use    Smoking status: Never    Smokeless tobacco: Never   Substance and Sexual Activity    Alcohol use: Yes     Comment: occassionally No alcohol 72h prior to sx    Drug use: No    Sexual activity: Never     Partners: Female     Social Determinants of Health     Financial Resource Strain: Low Risk     Difficulty of Paying Living Expenses: Not very hard   Food Insecurity: No Food Insecurity    Worried About Running Out of Food in the Last Year: Never true    Ran Out of Food in the Last Year: Never true   Transportation Needs: No Transportation Needs    Lack of Transportation (Medical): No    Lack of Transportation (Non-Medical): No   Physical Activity: Inactive    Days of Exercise per Week: 0 days    Minutes of Exercise per Session: 0 min   Stress: Stress Concern Present    Feeling of Stress : To some extent   Social Connections: Moderately Integrated    Frequency of Communication with Friends and Family: Three times a week    Frequency of Social Gatherings with Friends and Family: Once a week    Attends [employer] Services: Never    Active Member of Clubs or Organizations: No    Attends Club or Organization Meetings: 1 to 4 times per year    Marital Status:    Housing Stability: Low Risk     Unable to Pay for Housing in the Last Year: No    Number of Places Lived in the Last Year: 1    Unstable Housing in the Last Year: No       Precautions:      Allergies as of 11/01/2022    (No Known Allergies)       [facility] Assessment Details/Treatment     67 y.o. male patient with a PMHx of GERD, HTN, rectal cancer, alcoholism who presents to the Emergency Department for evaluation of mouth and perirectal sores which onset several days PTA. Pt was sent from the cancer center and is currently undergoing chemotherapy. His last chemo was on 10/25/22. Turned to right side. Scattered ulcerations and fluid filled pustules to buttock and periarea. Topical treatment ordered from pharmacy, apply per MAR. Will follow.     11/03/2022

## 2022-11-03 NOTE — SUBJECTIVE & OBJECTIVE
Interval History:  See Hospital course    Review of Systems   Constitutional:  Positive for activity change, appetite change and fatigue.   HENT:  Positive for mouth sores and trouble swallowing.    Eyes: Negative.    Respiratory:  Negative for cough and shortness of breath.    Cardiovascular:  Negative for chest pain.   Gastrointestinal:  Positive for diarrhea. Negative for abdominal distention, abdominal pain, constipation, nausea and vomiting.   Genitourinary:  Positive for decreased urine volume.   Skin:  Positive for wound.   Neurological:  Positive for weakness and light-headedness.   Psychiatric/Behavioral:  The patient is nervous/anxious.    Objective:     Vital Signs (Most Recent):  Temp: 99.8 °F (37.7 °C) (11/03/22 1208)  Pulse: (!) 58 (11/03/22 1208)  Resp: 20 (11/03/22 1300)  BP: 128/84 (11/03/22 1208)  SpO2: 95 % (11/03/22 1208)   Vital Signs (24h Range):  Temp:  [98.4 °F (36.9 °C)-99.8 °F (37.7 °C)] 99.8 °F (37.7 °C)  Pulse:  [58-72] 58  Resp:  [18-20] 20  SpO2:  [94 %-97 %] 95 %  BP: (115-132)/(65-84) 128/84     Weight: 58.7 kg (129 lb 6.6 oz)  Body mass index is 18.57 kg/m².    Intake/Output Summary (Last 24 hours) at 11/3/2022 1449  Last data filed at 11/3/2022 0317  Gross per 24 hour   Intake --   Output 100 ml   Net -100 ml      Physical Exam  Vitals reviewed.   Constitutional:       Appearance: He is well-developed. He is ill-appearing.      Comments: Thin in appearance   HENT:      Head: Normocephalic and atraumatic.      Nose: Nose normal.      Mouth/Throat:      Mouth: Oral lesions present.   Eyes:      General: No scleral icterus.     Conjunctiva/sclera: Conjunctivae normal.      Pupils: Pupils are equal, round, and reactive to light.   Cardiovascular:      Rate and Rhythm: Normal rate and regular rhythm.      Heart sounds: Normal heart sounds. No murmur heard.    No friction rub. No gallop.   Pulmonary:      Effort: Pulmonary effort is normal.      Breath sounds: Normal breath sounds.    Abdominal:      General: Bowel sounds are normal.      Palpations: Abdomen is soft.   Genitourinary:     Comments: Scattered ulcerative lesions to the intergluteal cleft  Musculoskeletal:         General: No tenderness. Normal range of motion.      Cervical back: Normal range of motion and neck supple.   Skin:     General: Skin is warm and dry.   Neurological:      Mental Status: He is alert and oriented to person, place, and time.   Psychiatric:         Behavior: Behavior normal.       Significant Labs: All pertinent labs within the past 24 hours have been reviewed.  CBC:   Recent Labs   Lab 11/02/22 0452 11/03/22  0518   WBC 6.94 6.92   HGB 9.9* 9.8*   HCT 32.1* 30.8*    193     CMP:   Recent Labs   Lab 11/02/22 0452 11/03/22  0518    135*   K 3.3* 3.1*    100   CO2 24 27   GLU 49* 136*   BUN 11 3*   CREATININE 0.6 0.6   CALCIUM 8.3* 8.0*   PROT 5.3* 5.0*   ALBUMIN 2.4* 2.0*   BILITOT 0.6 0.6   ALKPHOS 73 86   AST 11 10   ALT 9* 9*   ANIONGAP 11 8       Significant Imaging: I have reviewed all pertinent imaging results/findings within the past 24 hours.

## 2022-11-03 NOTE — PLAN OF CARE
P.T. EVAL COMPLETE.  PT CURRENTLY REQUIRES JIMI FOR BED MOBILITY, TF'S, AND GAIT WITH RW.  P.T. RECOMMENDS HHPT AT D/C WITH 24 HOUR CARE FOR SAFETY

## 2022-11-04 LAB
ALBUMIN SERPL BCP-MCNC: 2 G/DL (ref 3.5–5.2)
ALP SERPL-CCNC: 94 U/L (ref 55–135)
ALT SERPL W/O P-5'-P-CCNC: 11 U/L (ref 10–44)
ANION GAP SERPL CALC-SCNC: 7 MMOL/L (ref 8–16)
AST SERPL-CCNC: 9 U/L (ref 10–40)
BASOPHILS # BLD AUTO: 0.01 K/UL (ref 0–0.2)
BASOPHILS NFR BLD: 0.1 % (ref 0–1.9)
BILIRUB SERPL-MCNC: 0.3 MG/DL (ref 0.1–1)
BUN SERPL-MCNC: 4 MG/DL (ref 8–23)
CALCIUM SERPL-MCNC: 8.3 MG/DL (ref 8.7–10.5)
CHLORIDE SERPL-SCNC: 105 MMOL/L (ref 95–110)
CO2 SERPL-SCNC: 25 MMOL/L (ref 23–29)
CREAT SERPL-MCNC: 0.6 MG/DL (ref 0.5–1.4)
DIFFERENTIAL METHOD: ABNORMAL
EOSINOPHIL # BLD AUTO: 0 K/UL (ref 0–0.5)
EOSINOPHIL NFR BLD: 0 % (ref 0–8)
ERYTHROCYTE [DISTWIDTH] IN BLOOD BY AUTOMATED COUNT: 26.9 % (ref 11.5–14.5)
EST. GFR  (NO RACE VARIABLE): >60 ML/MIN/1.73 M^2
GLUCOSE SERPL-MCNC: 170 MG/DL (ref 70–110)
HCT VFR BLD AUTO: 30 % (ref 40–54)
HGB BLD-MCNC: 9.4 G/DL (ref 14–18)
IMM GRANULOCYTES # BLD AUTO: 0.05 K/UL (ref 0–0.04)
IMM GRANULOCYTES NFR BLD AUTO: 0.6 % (ref 0–0.5)
LYMPHOCYTES # BLD AUTO: 0.4 K/UL (ref 1–4.8)
LYMPHOCYTES NFR BLD: 4.7 % (ref 18–48)
MCH RBC QN AUTO: 26.6 PG (ref 27–31)
MCHC RBC AUTO-ENTMCNC: 31.3 G/DL (ref 32–36)
MCV RBC AUTO: 85 FL (ref 82–98)
MONOCYTES # BLD AUTO: 0.4 K/UL (ref 0.3–1)
MONOCYTES NFR BLD: 4.3 % (ref 4–15)
NEUTROPHILS # BLD AUTO: 7.4 K/UL (ref 1.8–7.7)
NEUTROPHILS NFR BLD: 90.3 % (ref 38–73)
NRBC BLD-RTO: 0 /100 WBC
O+P STL MICRO: NORMAL
PLATELET # BLD AUTO: 233 K/UL (ref 150–450)
PMV BLD AUTO: 9.7 FL (ref 9.2–12.9)
POTASSIUM SERPL-SCNC: 3.6 MMOL/L (ref 3.5–5.1)
PROT SERPL-MCNC: 5.2 G/DL (ref 6–8.4)
RBC # BLD AUTO: 3.54 M/UL (ref 4.6–6.2)
SODIUM SERPL-SCNC: 137 MMOL/L (ref 136–145)
WBC # BLD AUTO: 8.22 K/UL (ref 3.9–12.7)

## 2022-11-04 PROCEDURE — 27000207 HC ISOLATION

## 2022-11-04 PROCEDURE — 25000003 PHARM REV CODE 250: Performed by: NURSE PRACTITIONER

## 2022-11-04 PROCEDURE — 99233 PR SUBSEQUENT HOSPITAL CARE,LEVL III: ICD-10-PCS | Mod: ,,, | Performed by: INTERNAL MEDICINE

## 2022-11-04 PROCEDURE — 80053 COMPREHEN METABOLIC PANEL: CPT | Performed by: NURSE PRACTITIONER

## 2022-11-04 PROCEDURE — 97530 THERAPEUTIC ACTIVITIES: CPT

## 2022-11-04 PROCEDURE — 99233 SBSQ HOSP IP/OBS HIGH 50: CPT | Mod: ,,, | Performed by: INTERNAL MEDICINE

## 2022-11-04 PROCEDURE — 36415 COLL VENOUS BLD VENIPUNCTURE: CPT | Performed by: NURSE PRACTITIONER

## 2022-11-04 PROCEDURE — 63600175 PHARM REV CODE 636 W HCPCS: Performed by: NURSE PRACTITIONER

## 2022-11-04 PROCEDURE — 99233 PR SUBSEQUENT HOSPITAL CARE,LEVL III: ICD-10-PCS | Mod: ,,, | Performed by: PHYSICIAN ASSISTANT

## 2022-11-04 PROCEDURE — 97116 GAIT TRAINING THERAPY: CPT

## 2022-11-04 PROCEDURE — 63600175 PHARM REV CODE 636 W HCPCS: Performed by: PHYSICIAN ASSISTANT

## 2022-11-04 PROCEDURE — 85025 COMPLETE CBC W/AUTO DIFF WBC: CPT | Performed by: NURSE PRACTITIONER

## 2022-11-04 PROCEDURE — 63600175 PHARM REV CODE 636 W HCPCS: Performed by: INTERNAL MEDICINE

## 2022-11-04 PROCEDURE — 21400001 HC TELEMETRY ROOM

## 2022-11-04 PROCEDURE — C9113 INJ PANTOPRAZOLE SODIUM, VIA: HCPCS | Performed by: NURSE PRACTITIONER

## 2022-11-04 PROCEDURE — 99233 SBSQ HOSP IP/OBS HIGH 50: CPT | Mod: ,,, | Performed by: PHYSICIAN ASSISTANT

## 2022-11-04 RX ORDER — DEXTROSE MONOHYDRATE AND SODIUM CHLORIDE 5; .9 G/100ML; G/100ML
INJECTION, SOLUTION INTRAVENOUS CONTINUOUS
Status: ACTIVE | OUTPATIENT
Start: 2022-11-04 | End: 2022-11-05

## 2022-11-04 RX ORDER — PREDNISONE 20 MG/1
40 TABLET ORAL DAILY
Status: COMPLETED | OUTPATIENT
Start: 2022-11-05 | End: 2022-11-06

## 2022-11-04 RX ORDER — DIPHENOXYLATE HYDROCHLORIDE AND ATROPINE SULFATE 2.5; .025 MG/1; MG/1
1 TABLET ORAL 4 TIMES DAILY
Status: DISPENSED | OUTPATIENT
Start: 2022-11-04 | End: 2022-11-05

## 2022-11-04 RX ORDER — HYDROMORPHONE HYDROCHLORIDE 1 MG/ML
1 INJECTION, SOLUTION INTRAMUSCULAR; INTRAVENOUS; SUBCUTANEOUS EVERY 4 HOURS PRN
Status: DISPENSED | OUTPATIENT
Start: 2022-11-04 | End: 2022-11-05

## 2022-11-04 RX ORDER — OXYCODONE HYDROCHLORIDE 5 MG/1
20 TABLET ORAL EVERY 4 HOURS PRN
Status: DISCONTINUED | OUTPATIENT
Start: 2022-11-05 | End: 2022-11-07 | Stop reason: HOSPADM

## 2022-11-04 RX ORDER — DIPHENOXYLATE HYDROCHLORIDE AND ATROPINE SULFATE 2.5; .025 MG/1; MG/1
1 TABLET ORAL 4 TIMES DAILY PRN
Status: DISCONTINUED | OUTPATIENT
Start: 2022-11-05 | End: 2022-11-07 | Stop reason: HOSPADM

## 2022-11-04 RX ADMIN — LOSARTAN POTASSIUM 25 MG: 25 TABLET, FILM COATED ORAL at 10:11

## 2022-11-04 RX ADMIN — HYDROMORPHONE HYDROCHLORIDE 1 MG: 1 INJECTION, SOLUTION INTRAMUSCULAR; INTRAVENOUS; SUBCUTANEOUS at 08:11

## 2022-11-04 RX ADMIN — FLUCONAZOLE 200 MG: 2 INJECTION, SOLUTION INTRAVENOUS at 12:11

## 2022-11-04 RX ADMIN — DEXTROSE AND SODIUM CHLORIDE: 5; .9 INJECTION, SOLUTION INTRAVENOUS at 11:11

## 2022-11-04 RX ADMIN — DIPHENHYDRAMINE HYDROCHLORIDE 15 ML: 2.5 LIQUID ORAL at 05:11

## 2022-11-04 RX ADMIN — DIPHENHYDRAMINE HYDROCHLORIDE 15 ML: 2.5 LIQUID ORAL at 10:11

## 2022-11-04 RX ADMIN — HYDROMORPHONE HYDROCHLORIDE 1 MG: 1 INJECTION, SOLUTION INTRAMUSCULAR; INTRAVENOUS; SUBCUTANEOUS at 12:11

## 2022-11-04 RX ADMIN — HYDROMORPHONE HYDROCHLORIDE 1 MG: 1 INJECTION, SOLUTION INTRAMUSCULAR; INTRAVENOUS; SUBCUTANEOUS at 06:11

## 2022-11-04 RX ADMIN — DIPHENOXYLATE HYDROCHLORIDE AND ATROPINE SULFATE 1 TABLET: 2.5; .025 TABLET ORAL at 05:11

## 2022-11-04 RX ADMIN — DIPHENHYDRAMINE HYDROCHLORIDE 15 ML: 2.5 LIQUID ORAL at 03:11

## 2022-11-04 RX ADMIN — NYSTATIN 500000 UNITS: 500000 SUSPENSION ORAL at 03:11

## 2022-11-04 RX ADMIN — GABAPENTIN 300 MG: 300 CAPSULE ORAL at 08:11

## 2022-11-04 RX ADMIN — PANTOPRAZOLE SODIUM 40 MG: 40 INJECTION, POWDER, FOR SOLUTION INTRAVENOUS at 10:11

## 2022-11-04 RX ADMIN — DIPHENOXYLATE HYDROCHLORIDE AND ATROPINE SULFATE 1 TABLET: 2.5; .025 TABLET ORAL at 12:11

## 2022-11-04 RX ADMIN — DIPHENOXYLATE HYDROCHLORIDE AND ATROPINE SULFATE 1 TABLET: 2.5; .025 TABLET ORAL at 08:11

## 2022-11-04 RX ADMIN — METHYLPREDNISOLONE SODIUM SUCCINATE 80 MG: 125 INJECTION, POWDER, FOR SOLUTION INTRAMUSCULAR; INTRAVENOUS at 10:11

## 2022-11-04 RX ADMIN — DEXTROSE AND SODIUM CHLORIDE: 5; .9 INJECTION, SOLUTION INTRAVENOUS at 03:11

## 2022-11-04 RX ADMIN — FENTANYL 1 PATCH: 25 PATCH TRANSDERMAL at 03:11

## 2022-11-04 RX ADMIN — Medication: at 10:11

## 2022-11-04 RX ADMIN — HYDROMORPHONE HYDROCHLORIDE 1 MG: 1 INJECTION, SOLUTION INTRAMUSCULAR; INTRAVENOUS; SUBCUTANEOUS at 10:11

## 2022-11-04 RX ADMIN — NYSTATIN 500000 UNITS: 500000 SUSPENSION ORAL at 10:11

## 2022-11-04 RX ADMIN — HYDROMORPHONE HYDROCHLORIDE 1 MG: 1 INJECTION, SOLUTION INTRAMUSCULAR; INTRAVENOUS; SUBCUTANEOUS at 04:11

## 2022-11-04 RX ADMIN — Medication: at 09:11

## 2022-11-04 RX ADMIN — NYSTATIN 500000 UNITS: 500000 SUSPENSION ORAL at 08:11

## 2022-11-04 RX ADMIN — DIPHENHYDRAMINE HYDROCHLORIDE 15 ML: 2.5 LIQUID ORAL at 06:11

## 2022-11-04 RX ADMIN — DIPHENHYDRAMINE HYDROCHLORIDE 15 ML: 2.5 LIQUID ORAL at 09:11

## 2022-11-04 RX ADMIN — DIPHENHYDRAMINE HYDROCHLORIDE 15 ML: 2.5 LIQUID ORAL at 02:11

## 2022-11-04 NOTE — PT/OT/SLP PROGRESS
Occupational Therapy   Treatment    Name: Vincent Benton  MRN: [id number]  Admitting Diagnosis:  Mucositis due to antineoplastic therapy       Recommendations:     Discharge Recommendations: home health OT (24/7 SPV and A)  Discharge Equipment Recommendations:  bedside commode, bath bench, walker, rolling  Barriers to discharge:  None    Assessment:     Vincent Benton is a 67 y.o. male with a medical diagnosis of Mucositis due to antineoplastic therapy.  He presents with the following performance deficits affecting function are weakness, impaired endurance, impaired self care skills, impaired functional mobility, impaired balance, pain, impaired cardiopulmonary response to activity.     Rehab Prognosis:  Fair; patient would benefit from acute skilled OT services to address these deficits and reach maximum level of function.       Plan:     Patient to be seen 2 x/week to address the above listed problems via self-care/home management, therapeutic activities, therapeutic exercises  Plan of Care Expires: 11/17/22  Plan of Care Reviewed with: patient    Subjective     Pain/Comfort:  Pain Rating 1: 10/10  Location 1:  (mouth and rectum)  Pain Addressed 1: Nurse notified (activity pacing)    Objective:     Communicated with: NurseMadhu, prior to session.  Patient found supine with peripheral IV, telemetry, oxygen upon OT entry to room.    General Precautions: Standard, fall   Orthopedic Precautions:N/A   Braces: N/A  Respiratory Status: Nasal cannula, flow 2 L/min    Bed Mobility:    Patient completed Supine to Sit with minimum assistance  Patient completed Sit to Supine with minimum assistance     Functional Mobility/Transfers:  Patient completed Sit <> Stand Transfer with contact guard assistance  with  rolling walker   Functional Mobility: Patient completed x20ft functional mobility with RW and min A to increase dynamic standing balance and activity tolerance needed for ADL completion.  Patient remains  unable to tolerate prolonged sitting due to rectal pain. Noted to have improved midline sitting while EOB this date prior to sit>stand transfer  Noted to have R lateral lean when in bed with HOB elevated. Subjectively voicing completion to offload L side (due to increased pain). Provided with additional pillow support in attempts to improve.    [facility] 6 Click ADL: 16    Treatment & Education:  Patient with decreased tolerance for intervention this date due to increase in pain. Cooperative and motivated despite. Patient provided with v/c for improved technique with transfers to increase independence and decrease fall risk, with focus on improved RW management. Patient provided with education re: importance of increased repositioning in bed due to high skin break down risk. Encouraged completion of B UE AROM therex throughout the day to increase functional strength and activity tolerance. Stated understanding, in agreement with POC, and in agreement to call for A with all OOB transfers.    Patient left up in chair with all lines intact, call button in reach, bed alarm on, and nurse notified    Wife entering room at therapist exit. Educated on course of events.    GOALS:   Multidisciplinary Problems       Occupational Therapy Goals          Problem: Occupational Therapy    Goal Priority Disciplines Outcome Interventions   Occupational Therapy Goal     OT, PT/OT Ongoing, Progressing    Description: Goals to be met by: 11/17/22    Patient will increase functional independence with ADLs by performing:    Toileting from bedside commode with Supervision for hygiene and clothing management.   Toilet transfer to bedside commode with Supervision.  Increased functional strength in B UE grossly by 1/2 MM grade.                         Time Tracking:     OT Date of Treatment: 11/04/22  OT Start Time: 0915  OT Stop Time: 0940  OT Total Time (min): 25 min    Billable Minutes:Therapeutic Activity 25    11/4/2022

## 2022-11-04 NOTE — SUBJECTIVE & OBJECTIVE
Interval History: No acute events overnight. The patient reports some improvement in mucositis. He is able to tolerate some PO intake. Will add boost with each meal to improve nutrition status. Will start lomotil to improve diarrhea. Palliative care is following and will adjust PRN analgesia. Continue current management.      Review of Systems   Constitutional:  Positive for activity change, appetite change and fatigue. Negative for chills, fever and unexpected weight change.   HENT:  Positive for mouth sores and trouble swallowing. Negative for congestion, facial swelling, rhinorrhea, sinus pressure, sneezing and sore throat.    Eyes: Negative.  Negative for discharge, redness and visual disturbance.   Respiratory:  Negative for apnea, cough, chest tightness, shortness of breath, wheezing and stridor.    Cardiovascular:  Negative for chest pain, palpitations and leg swelling.   Gastrointestinal:  Positive for diarrhea. Negative for abdominal distention, abdominal pain, anal bleeding, blood in stool, constipation, nausea and vomiting.   Genitourinary:  Positive for decreased urine volume. Negative for difficulty urinating, dysuria, frequency, hematuria and penile discharge.   Musculoskeletal:  Negative for arthralgias, back pain, gait problem, joint swelling, myalgias, neck pain and neck stiffness.   Skin:  Positive for wound. Negative for color change and pallor.   Neurological:  Positive for weakness and light-headedness. Negative for dizziness, tremors, seizures, syncope, facial asymmetry, speech difficulty, numbness and headaches.   Psychiatric/Behavioral:  Negative for behavioral problems, confusion, hallucinations and suicidal ideas. The patient is nervous/anxious.    All other systems reviewed and are negative.  Objective:     Vital Signs (Most Recent):  Temp: 99.1 °F (37.3 °C) (11/04/22 1132)  Pulse: 68 (11/04/22 1132)  Resp: 18 (11/04/22 1132)  BP: 136/80 (11/04/22 1132)  SpO2: 99 % (11/04/22 1132) Vital  Signs (24h Range):  Temp:  [97.8 °F (36.6 °C)-99.5 °F (37.5 °C)] 99.1 °F (37.3 °C)  Pulse:  [60-76] 68  Resp:  [16-18] 18  SpO2:  [91 %-99 %] 99 %  BP: (101-147)/(67-84) 136/80     Weight: 57.6 kg (126 lb 15.8 oz)  Body mass index is 18.22 kg/m².    Intake/Output Summary (Last 24 hours) at 11/4/2022 1441  Last data filed at 11/4/2022 0933  Gross per 24 hour   Intake --   Output 875 ml   Net -875 ml      Physical Exam  Vitals and nursing note reviewed.   Constitutional:       Appearance: He is well-developed. He is ill-appearing.      Comments: Thin in appearance   HENT:      Head: Normocephalic and atraumatic.      Nose: Nose normal.      Mouth/Throat:      Mouth: Oral lesions present.   Eyes:      General: No scleral icterus.     Conjunctiva/sclera: Conjunctivae normal.      Pupils: Pupils are equal, round, and reactive to light.   Cardiovascular:      Rate and Rhythm: Normal rate and regular rhythm.      Heart sounds: Normal heart sounds. No murmur heard.    No friction rub. No gallop.   Pulmonary:      Effort: Pulmonary effort is normal. No respiratory distress.      Breath sounds: Normal breath sounds.   Abdominal:      General: Bowel sounds are normal. There is no distension.      Palpations: Abdomen is soft.      Tenderness: There is no abdominal tenderness.   Genitourinary:     Comments: Scattered ulcerative lesions to the intergluteal cleft  Musculoskeletal:         General: No tenderness or deformity. Normal range of motion.      Cervical back: Normal range of motion and neck supple.   Skin:     General: Skin is warm and dry.      Findings: No erythema or rash.   Neurological:      Mental Status: He is alert and oriented to person, place, and time.   Psychiatric:         Behavior: Behavior normal.       Significant Labs: All pertinent labs within the past 24 hours have been reviewed.    Significant Imaging:   Imaging Results    None

## 2022-11-04 NOTE — PT/OT/SLP PROGRESS
Physical Therapy Treatment    Patient Name:  Vincent Benton   MRN:  [id number]    Recommendations:     Discharge Recommendations:  home health PT (24 HOUR CARE)   Discharge Equipment Recommendations: bedside commode, bath bench, walker, rolling   Barriers to discharge: None    Assessment:     Vincent Benton is a 67 y.o. male admitted with a medical diagnosis of Mucositis due to antineoplastic therapy.  He presents with the following impairments/functional limitations:  weakness, impaired endurance, impaired functional mobility, gait instability, impaired balance, pain, decreased safety awareness, decreased lower extremity function, decreased coordination.    Rehab Prognosis: Fair; patient would benefit from acute skilled PT services to address these deficits and reach maximum level of function.    Recent Surgery: * No surgery found *     Plan:     During this hospitalization, patient to be seen 3 x/week to address the identified rehab impairments via gait training, therapeutic activities, therapeutic exercises and progress toward the following goals:    Plan of Care Expires:  11/17/22    Subjective     Chief Complaint: PAIN-REQUESTING PAIN MED-NOTIFIED NURSE ANN  Patient/Family Comments/goals:   Pain/Comfort:  Pain Rating 1: 10/10  Location 1:  (RECTUM)  Pain Addressed 1: Reposition, Distraction, Cessation of Activity (UNABLE TO TOLERATE SITTING)  Pain Rating Post-Intervention 1: 10/10  Pain Rating 2: 8/10  Location 2:  (MOUTH AND LOW BACK)  Pain Addressed 2: Reposition, Distraction  Pain Rating Post-Intervention 2: 8/10      Objective:     Communicated with NURSE ANN prior to session.  Patient found supine with telemetry, bed alarm, oxygen, peripheral IV upon PT entry to room.     General Precautions: Standard, fall, respiratory   Orthopedic Precautions:N/A   Braces: N/A  Respiratory Status: Nasal cannula, flow 2 L/min     Functional Mobility:  Bed Mobility:     Rolling Left:  minimum  "assistance  Scooting: minimum assistance  Supine to Sit: minimum assistance  Transfers:     Sit to Stand:  minimum assistance with rolling walker  Bed to Chair: minimum assistance with  rolling walker  using  Step Transfer  Gait: PT AMB 25' WITH RW AND JIMI, SLOW UNSTEADY GAIT, RLE DRAG, DROPPING AT R HIP WITH BUCKLING AT R KNEE SO QUICK TO FATIGUE DURING GAIT TRIAL, C/O INCREASE PAIN TODAY, CUES FOR UPRIGHT POSTURE AND RW SAFETY  Balance: POOR    AM-PAC 6 CLICK MOBILITY  Turning over in bed (including adjusting bedclothes, sheets and blankets)?: 3  Sitting down on and standing up from a chair with arms (e.g., wheelchair, bedside commode, etc.): 3  Moving from lying on back to sitting on the side of the bed?: 3  Moving to and from a bed to a chair (including a wheelchair)?: 3  Need to walk in hospital room?: 3  Climbing 3-5 steps with a railing?: 1  Basic Mobility Total Score: 16     Treatment & Education:  REVIEW ROLE OF P.T. AND POC IN ACUTE CARE HOSPITAL SETTING, REVIEW RW USE AND SAFETY DURING TF'S AND GAIT, ENCOURAGED TO INCREASE TIME OOB IN CHAIR TO TOLERANCE, EDUCATED IN AND ENCOURAGED TO PERFORM BLE THEREX WHILE SEATED OR SUPINE THROUGHOUT THE DAY TO TOLERANCE: HIP FLEX/EXT, QUAD SET, LAQ, HEEL SLIDES, AP'S.  PT AGREEABLE TO REQUESTS  PT EDUCATED ON RISK FOR FALLS DUE TO GENERALIZED WEAKNESS, EDUCATED ON "CALL DON'T FALL", ENCOURAGED TO CALL FOR ASSISTANCE WITH ALL NEEDS SUCH AS BED<>CHAIR TRANSFERS OR TRIPS TO BATHROOM, PT AGREEABLE TO SAFETY PRECAUTIONS    Patient left HOB elevated with all lines intact, call button in reach, and NURSE notified..    GOALS:   Multidisciplinary Problems       Physical Therapy Goals          Problem: Physical Therapy    Goal Priority Disciplines Outcome Goal Variances Interventions   Physical Therapy Goal     PT, PT/OT Ongoing, Progressing     Description: LTG'S TO BE MET IN 14 DAYS (11-17-22)  PT WILL REQUIRE SBA FOR BED MOBILITY  PT WILL REQUIRE SBA FOR BED<>CHAIR TF'S  PT " WILL  FEET WITH RW AND SBA                         Time Tracking:     PT Received On: 11/04/22  PT Start Time: 0910     PT Stop Time: 0935  PT Total Time (min): 25 min     Billable Minutes: Gait Training 10 and Therapeutic Activity 15    Treatment Type: Treatment  PT/PTA: PT     PTA Visit Number: 0     11/04/2022

## 2022-11-04 NOTE — PROGRESS NOTES
Progress Note   Palliative Medicine      SUBJECTIVE:     History of Present Illness:  Patient seen and examined with sister Fara at bedside. He reports the lesions in the back of his mouth are better and enabled him to eat some eggs. He reports some worsening of the lesions on his lips. The IV Dilaudid has been very effective and would like to continue for one more day. We discussed transitioning to PO in anticipation of d/c and is okay with me adjusting the orders to start tomorrow. He can continue the increased PRN dose at home (previously on 10mg) while the lesions heal then hopefully we can reduce back to lower dose.     In the last 24hrs the patient has used the following pain medications (7a-7a):  - Dilaudid 0.5mg IV x 0  - Dilaudid 1mg IV x 5  - Fentanyl 25mcg/ hr patch      Past Medical History:   Diagnosis Date    Alcoholism     Anemia     Back pain     Encounter for blood transfusion 2018    Essential hypertension 2/4/2016    GERD (gastroesophageal reflux disease)     Hiatal hernia     Hypertension     diet controlled    Melanoma 06/2021    left cheek    Rectal cancer 9/11/2019       Review of patient's allergies indicates:  No Known Allergies    Review of Symptoms      Symptom Assessment (ESAS 0-10 Scale)  Pain:  0  Dyspnea:  0  Anxiety:  0  Nausea:  0  Depression:  0  Anorexia:  0  Fatigue:  0  Insomnia:  0  Restlessness:  0  Agitation:  0     CAM / Delirium:  Negative  Constipation:  Negative    Anxiety:  Is not nervous/anxious  Constipation:  No constipation    Pain Assessment:    Location(s): mouth  Mouth       Location: generalized       Quality: none        Quantity: 5/10 in intensity day(s) ago, unchanged        Aggravating Factors: eating        Alleviating Factors: opiates        Associated Symptoms: none    Living Arrangements:  Lives with spouse    Psychosocial/Cultural: , one daughter from previous relationship    Advance Care Planning   Advance Directives:   Living Will: Yes         Copy on chart: Yes    LaPOST: Yes    Do Not Resuscitate Status: Yes    Medical Power of : Yes    Agent's Name:  1: wife Akilah Medrano, 2: sister Fara Benton    Decision Making:  Patient answered questions  Goals of Care: What is most important right now is to focus on improvement in condition but with limits to invasive therapies. Accordingly, we have decided that the best plan to meet the patient's goals includes continuing with treatment.       ROS:  Review of Systems   Constitutional:  Positive for activity change. Negative for chills and fever.   HENT:  Positive for mouth sores and trouble swallowing. Negative for sore throat.    Respiratory:  Negative for cough and shortness of breath.    Gastrointestinal:  Negative for abdominal pain, constipation, nausea and vomiting.   Genitourinary:  Negative for difficulty urinating and dysuria.   Musculoskeletal:  Positive for back pain. Negative for myalgias.   Skin:  Negative for rash and wound.   Allergic/Immunologic: Negative for immunocompromised state.   Neurological:  Negative for weakness and headaches.   Psychiatric/Behavioral:  Negative for confusion and sleep disturbance. The patient is not nervous/anxious.      OBJECTIVE:     Physical Exam:  Vitals: Temp: 99.1 °F (37.3 °C) (11/04/22 1132)  Pulse: 68 (11/04/22 1132)  Resp: 18 (11/04/22 1132)  BP: 136/80 (11/04/22 1132)  SpO2: 99 % (11/04/22 1132)    Physical Exam  Vitals reviewed.   Constitutional:       General: He is not in acute distress.     Appearance: Normal appearance. He is well-developed.   HENT:      Head: Normocephalic and atraumatic.      Mouth/Throat:      Lips: Lesions present.      Mouth: Oral lesions present.   Eyes:      Conjunctiva/sclera: Conjunctivae normal.   Cardiovascular:      Rate and Rhythm: Normal rate and regular rhythm.      Heart sounds: Normal heart sounds. No murmur heard.  Pulmonary:      Effort: Pulmonary effort is normal. No respiratory distress.      Breath  sounds: Normal breath sounds.   Abdominal:      General: Bowel sounds are normal. There is no distension.      Palpations: Abdomen is soft.      Tenderness: There is no abdominal tenderness.   Musculoskeletal:         General: Normal range of motion.      Cervical back: Normal range of motion.      Right lower leg: No edema.      Left lower leg: No edema.   Skin:     General: Skin is warm and dry.      Findings: No rash.   Neurological:      Mental Status: He is alert and oriented to person, place, and time.      Cranial Nerves: No cranial nerve deficit.   Psychiatric:         Mood and Affect: Mood normal.         Behavior: Behavior normal.         Thought Content: Thought content normal.         Judgment: Judgment normal.         ASSESSMENT   Vincent Benton is a 67 y.o. year old with a history of recurrent metastatic rectal cancer who presented to the ED from colorectal surgeon's office due to mucositis of mouth and rectum. He was admitted for treatment and Palliative Medicine was consulted to assist with pain management.      PLAN   Mucositis  - Agree with current regimen of IV Dilaudid  - Plan to transition to PO once lesions improve (transition to PO tomorrow, orders placed)     2. Neoplasm related pain superimposed on chronic back pain  - Continue fentanyl 25mcg/ hr patch q 72 hrs  - Home med- Norco 10mg q4hr PRN  - Okay with continuing oxycodone 20mg q4hr PRN pain while lesions heal     3. Recurrent metastatic rectal cancer  - Under the care of Dr. Tuttle  - Recently completed FOLFIRI plus Avastin with plans to restage in a month     4. Encounter for Palliative Care  - Code status: DNR/I- LaPOST on file  - HCPOA on file, 1: wife Akilah, 2: sister Fara  - Toro r/s his visit from this week       Thank you for allowing Palliative Medicine to be involved in the care of Vincent Benton.    Medical decision making: HIGH based on high risk of death untreated symptoms management of more than one chronic  illness in exacerbation or progression of disease managing side effects of medications or polypharmacy parenteral opioids    Plan required increased review of medication choice, interaction, dosing, frequency, and route due to patient complexity. Patient complexity increased by: high risk medication use, advanced age, at risk for delirium, continuous use of opioids, and NPO  35 min visit spent in chart review, face to face discussion of goals of care, symptom assessment, coordination of care and emotional support, formulating and communicating prognosis and goals of care, exploring burden/ benefit of various approaches of treatment.     Kristi Wing PA-C  Palliative Medicine

## 2022-11-04 NOTE — ASSESSMENT & PLAN NOTE
Clear liquid diet if tolerated  IV fluids  Miracle mouth wash  Prn analgesia  Solumedrol  11/4/22  The patient reports some improvement in mucositis. He is able to tolerate some PO intake. Palliative care is following and will adjust PRN analgesia. Continue current management.  Hem/onc is following

## 2022-11-04 NOTE — PROGRESS NOTES
[facility] ([facility] Medicine  Progress Note    Patient Name: Vincent Benton  MRN: [id number]  Patient Class: IP- Inpatient   Admission Date: 11/1/2022  Length of Stay: 1 days  Attending Physician: Raquel Arroyo MD  Primary Care Provider: Shakila Moran DO        Subjective:     Principal Problem:Mucositis due to antineoplastic therapy        HPI:  Vincent Benton is a 67 y.o. male patient with a PMHx of GERD, HTN, rectal cancer, alcoholism who presents to the Emergency Department for evaluation of mouth and perirectal sores which onset several days PTA. Pt was sent from the cancer center and is currently undergoing chemotherapy. His last chemo was on 10/25/22. Associated sxs include decreased appetite, diarrhea, generalized abdominal pain, generalized weakness and decreased urination. Patient denies any fever, chills, CP, SOB, HA, n/v, and all other sxs at this time. No prior Tx reported. No further complaints or concerns at this time.   On arrival: Temp 97.4, pulse 69, resp 20 and B/P 145/73  Labs are essentially baseline with mild anemia  Pt was placed in Observation for supportive care as a result of chemotherapy.   As clarification, on 11/1/2022 patient should be admitted for hospital observation services under my care in collaboration with Steven Doherty MD            Overview/Hospital Course:  Pt with severe mucositis due to chemotherapy. Ulcerations present in mouth and in the rectal area. Receiving IV fluids, miracle mouth wash and prn analgesia. Morphine changed to Dilaudid. Per Up to Date - systemic corticosteroids are warranted. Solumedrol added. Encouraged family member to assist with salt water gargles every 4 hours. Pt hypoglycemic this AM and IV fluids with dextrose added. Potassium replaced. Pt denies further diarrhea.     11/3/22 - slight improvement in condition today. Pt reports he is feeling better. Has whitish coating to tongue, Diflucan added to cover for oral  candidasis. Nystatin for thrush. Pt has not tried to take any oral medications. IV fluids continue and potassium replaced. Encouraged warm salt water gargles along with miracle mouth wash. PT/OT ordered due to weakness.   11/4/22 No acute events overnight. The patient reports some improvement in mucositis. He is able to tolerate some PO intake. Will add boost with each meal to improve nutrition status. Will start lomotil to improve diarrhea. Palliative care is following and will adjust PRN analgesia. Continue current management.        Interval History: No acute events overnight. The patient reports some improvement in mucositis. He is able to tolerate some PO intake. Will add boost with each meal to improve nutrition status. Will start lomotil to improve diarrhea. Palliative care is following and will adjust PRN analgesia. Continue current management.      Review of Systems   Constitutional:  Positive for activity change, appetite change and fatigue. Negative for chills, fever and unexpected weight change.   HENT:  Positive for mouth sores and trouble swallowing. Negative for congestion, facial swelling, rhinorrhea, sinus pressure, sneezing and sore throat.    Eyes: Negative.  Negative for discharge, redness and visual disturbance.   Respiratory:  Negative for apnea, cough, chest tightness, shortness of breath, wheezing and stridor.    Cardiovascular:  Negative for chest pain, palpitations and leg swelling.   Gastrointestinal:  Positive for diarrhea. Negative for abdominal distention, abdominal pain, anal bleeding, blood in stool, constipation, nausea and vomiting.   Genitourinary:  Positive for decreased urine volume. Negative for difficulty urinating, dysuria, frequency, hematuria and penile discharge.   Musculoskeletal:  Negative for arthralgias, back pain, gait problem, joint swelling, myalgias, neck pain and neck stiffness.   Skin:  Positive for wound. Negative for color change and pallor.   Neurological:   Positive for weakness and light-headedness. Negative for dizziness, tremors, seizures, syncope, facial asymmetry, speech difficulty, numbness and headaches.   Psychiatric/Behavioral:  Negative for behavioral problems, confusion, hallucinations and suicidal ideas. The patient is nervous/anxious.    All other systems reviewed and are negative.  Objective:     Vital Signs (Most Recent):  Temp: 99.1 °F (37.3 °C) (11/04/22 1132)  Pulse: 68 (11/04/22 1132)  Resp: 18 (11/04/22 1132)  BP: 136/80 (11/04/22 1132)  SpO2: 99 % (11/04/22 1132) Vital Signs (24h Range):  Temp:  [97.8 °F (36.6 °C)-99.5 °F (37.5 °C)] 99.1 °F (37.3 °C)  Pulse:  [60-76] 68  Resp:  [16-18] 18  SpO2:  [91 %-99 %] 99 %  BP: (101-147)/(67-84) 136/80     Weight: 57.6 kg (126 lb 15.8 oz)  Body mass index is 18.22 kg/m².    Intake/Output Summary (Last 24 hours) at 11/4/2022 1441  Last data filed at 11/4/2022 0933  Gross per 24 hour   Intake --   Output 875 ml   Net -875 ml      Physical Exam  Vitals and nursing note reviewed.   Constitutional:       Appearance: He is well-developed. He is ill-appearing.      Comments: Thin in appearance   HENT:      Head: Normocephalic and atraumatic.      Nose: Nose normal.      Mouth/Throat:      Mouth: Oral lesions present.   Eyes:      General: No scleral icterus.     Conjunctiva/sclera: Conjunctivae normal.      Pupils: Pupils are equal, round, and reactive to light.   Cardiovascular:      Rate and Rhythm: Normal rate and regular rhythm.      Heart sounds: Normal heart sounds. No murmur heard.    No friction rub. No gallop.   Pulmonary:      Effort: Pulmonary effort is normal. No respiratory distress.      Breath sounds: Normal breath sounds.   Abdominal:      General: Bowel sounds are normal. There is no distension.      Palpations: Abdomen is soft.      Tenderness: There is no abdominal tenderness.   Genitourinary:     Comments: Scattered ulcerative lesions to the intergluteal cleft  Musculoskeletal:         General:  No tenderness or deformity. Normal range of motion.      Cervical back: Normal range of motion and neck supple.   Skin:     General: Skin is warm and dry.      Findings: No erythema or rash.   Neurological:      Mental Status: He is alert and oriented to person, place, and time.   Psychiatric:         Behavior: Behavior normal.       Significant Labs: All pertinent labs within the past 24 hours have been reviewed.    Significant Imaging:   Imaging Results    None             Assessment/Plan:      * Mucositis due to antineoplastic therapy  Clear liquid diet if tolerated  IV fluids  Miracle mouth wash  Prn analgesia  Solumedrol  11/4/22  The patient reports some improvement in mucositis. He is able to tolerate some PO intake. Palliative care is following and will adjust PRN analgesia. Continue current management.  Hem/onc is following     Oral candidiasis  Diflucan IV x 7 days  Nystatin oral swish and swallow      Encounter for palliative care  - Palliative care is following       Essential hypertension  Hydralazine 10 mg IV every 6 hours is unable to tolerate oral meds  Continue Losartan      Rectal cancer  Is followed by Dr. Tuttle in clinic  Last chemo 10/25/22    Neoplasm related pain  Continue fentanyl patch  Prn Dilaudid for pain associated with mucositis  Consult Palliative Care to assist with pain medications        VTE Risk Mitigation (From admission, onward)         Ordered     Reason for No Pharmacological VTE Prophylaxis  Once        Question:  Reasons:  Answer:  Risk of Bleeding    11/01/22 1359     IP VTE HIGH RISK PATIENT  Once         11/01/22 1359     Place sequential compression device  Until discontinued         11/01/22 1359                Discharge Planning   KERRIE:      Code Status: DNR   Is the patient medically ready for discharge?:     Reason for patient still in hospital (select all that apply): Patient trending condition, Laboratory test, Treatment, Consult recommendations and Pending  disposition  Discharge Plan A: Home with family                  Catracho Carter NP  Department of Hospital Medicine   [facility] - Telemetry ([facility])

## 2022-11-04 NOTE — PLAN OF CARE
11/04/22 1455   Post-Acute Status   Post-Acute Authorization HME   HME Status Set-up Complete/Auth obtained   Discharge Plan   Discharge Plan A Home Health     RW and home suction approved by [facility]. CM will deliver day of discharge. Consult placed for weekend staff.

## 2022-11-04 NOTE — PLAN OF CARE
Poor tolerance for intervention this date due to pain. Completed x20ft functional mobility with RW and min A. Recommending HHOT at d/c.

## 2022-11-05 PROBLEM — D64.9 NORMOCYTIC ANEMIA: Status: ACTIVE | Noted: 2022-11-05

## 2022-11-05 PROBLEM — D84.9 IMMUNOCOMPROMISED PATIENT: Status: ACTIVE | Noted: 2022-11-05

## 2022-11-05 PROBLEM — E43 SEVERE MALNUTRITION: Status: ACTIVE | Noted: 2022-11-05

## 2022-11-05 PROBLEM — R53.81 DEBILITY: Status: ACTIVE | Noted: 2022-11-05

## 2022-11-05 LAB
ALBUMIN SERPL BCP-MCNC: 2 G/DL (ref 3.5–5.2)
ALP SERPL-CCNC: 86 U/L (ref 55–135)
ALT SERPL W/O P-5'-P-CCNC: 11 U/L (ref 10–44)
ANION GAP SERPL CALC-SCNC: 8 MMOL/L (ref 8–16)
ANISOCYTOSIS BLD QL SMEAR: SLIGHT
AST SERPL-CCNC: 6 U/L (ref 10–40)
BACTERIA STL CULT: NORMAL
BASOPHILS # BLD AUTO: 0 K/UL (ref 0–0.2)
BASOPHILS NFR BLD: 0 % (ref 0–1.9)
BILIRUB SERPL-MCNC: 0.2 MG/DL (ref 0.1–1)
BUN SERPL-MCNC: 4 MG/DL (ref 8–23)
CALCIUM SERPL-MCNC: 7.9 MG/DL (ref 8.7–10.5)
CHLORIDE SERPL-SCNC: 107 MMOL/L (ref 95–110)
CO2 SERPL-SCNC: 23 MMOL/L (ref 23–29)
CREAT SERPL-MCNC: 0.5 MG/DL (ref 0.5–1.4)
DIFFERENTIAL METHOD: ABNORMAL
EOSINOPHIL # BLD AUTO: 0 K/UL (ref 0–0.5)
EOSINOPHIL NFR BLD: 0 % (ref 0–8)
ERYTHROCYTE [DISTWIDTH] IN BLOOD BY AUTOMATED COUNT: 27.5 % (ref 11.5–14.5)
EST. GFR  (NO RACE VARIABLE): >60 ML/MIN/1.73 M^2
GIANT PLATELETS BLD QL SMEAR: PRESENT
GLUCOSE SERPL-MCNC: 181 MG/DL (ref 70–110)
HCT VFR BLD AUTO: 28.6 % (ref 40–54)
HGB BLD-MCNC: 9 G/DL (ref 14–18)
IMM GRANULOCYTES # BLD AUTO: 0.02 K/UL (ref 0–0.04)
IMM GRANULOCYTES NFR BLD AUTO: 0.4 % (ref 0–0.5)
LYMPHOCYTES # BLD AUTO: 0.5 K/UL (ref 1–4.8)
LYMPHOCYTES NFR BLD: 9.1 % (ref 18–48)
MAGNESIUM SERPL-MCNC: 1.8 MG/DL (ref 1.6–2.6)
MCH RBC QN AUTO: 26.9 PG (ref 27–31)
MCHC RBC AUTO-ENTMCNC: 31.5 G/DL (ref 32–36)
MCV RBC AUTO: 86 FL (ref 82–98)
MONOCYTES # BLD AUTO: 0.4 K/UL (ref 0.3–1)
MONOCYTES NFR BLD: 6.6 % (ref 4–15)
NEUTROPHILS # BLD AUTO: 4.5 K/UL (ref 1.8–7.7)
NEUTROPHILS NFR BLD: 83.9 % (ref 38–73)
NRBC BLD-RTO: 0 /100 WBC
OVALOCYTES BLD QL SMEAR: ABNORMAL
PLATELET # BLD AUTO: 197 K/UL (ref 150–450)
PLATELET BLD QL SMEAR: ABNORMAL
PMV BLD AUTO: 9.4 FL (ref 9.2–12.9)
POIKILOCYTOSIS BLD QL SMEAR: SLIGHT
POTASSIUM SERPL-SCNC: 3.5 MMOL/L (ref 3.5–5.1)
PROT SERPL-MCNC: 4.6 G/DL (ref 6–8.4)
RBC # BLD AUTO: 3.34 M/UL (ref 4.6–6.2)
SODIUM SERPL-SCNC: 138 MMOL/L (ref 136–145)
TARGETS BLD QL SMEAR: ABNORMAL
WBC # BLD AUTO: 5.3 K/UL (ref 3.9–12.7)

## 2022-11-05 PROCEDURE — 21400001 HC TELEMETRY ROOM

## 2022-11-05 PROCEDURE — 83735 ASSAY OF MAGNESIUM: CPT | Performed by: NURSE PRACTITIONER

## 2022-11-05 PROCEDURE — C9113 INJ PANTOPRAZOLE SODIUM, VIA: HCPCS | Performed by: NURSE PRACTITIONER

## 2022-11-05 PROCEDURE — 27000221 HC OXYGEN, UP TO 24 HOURS

## 2022-11-05 PROCEDURE — 27000207 HC ISOLATION

## 2022-11-05 PROCEDURE — 99900035 HC TECH TIME PER 15 MIN (STAT)

## 2022-11-05 PROCEDURE — 93010 EKG 12-LEAD: ICD-10-PCS | Mod: ,,, | Performed by: INTERNAL MEDICINE

## 2022-11-05 PROCEDURE — 63600175 PHARM REV CODE 636 W HCPCS: Performed by: NURSE PRACTITIONER

## 2022-11-05 PROCEDURE — 25000003 PHARM REV CODE 250: Performed by: NURSE PRACTITIONER

## 2022-11-05 PROCEDURE — 25000003 PHARM REV CODE 250: Performed by: PHYSICIAN ASSISTANT

## 2022-11-05 PROCEDURE — 63600175 PHARM REV CODE 636 W HCPCS: Performed by: PHYSICIAN ASSISTANT

## 2022-11-05 PROCEDURE — 85025 COMPLETE CBC W/AUTO DIFF WBC: CPT | Performed by: NURSE PRACTITIONER

## 2022-11-05 PROCEDURE — 80053 COMPREHEN METABOLIC PANEL: CPT | Performed by: NURSE PRACTITIONER

## 2022-11-05 PROCEDURE — 93010 ELECTROCARDIOGRAM REPORT: CPT | Mod: ,,, | Performed by: INTERNAL MEDICINE

## 2022-11-05 PROCEDURE — 93005 ELECTROCARDIOGRAM TRACING: CPT

## 2022-11-05 PROCEDURE — 36415 COLL VENOUS BLD VENIPUNCTURE: CPT | Performed by: NURSE PRACTITIONER

## 2022-11-05 PROCEDURE — 63600175 PHARM REV CODE 636 W HCPCS: Performed by: INTERNAL MEDICINE

## 2022-11-05 RX ORDER — ENOXAPARIN SODIUM 100 MG/ML
40 INJECTION SUBCUTANEOUS EVERY 24 HOURS
Status: DISCONTINUED | OUTPATIENT
Start: 2022-11-05 | End: 2022-11-07 | Stop reason: HOSPADM

## 2022-11-05 RX ORDER — POTASSIUM CHLORIDE 7.45 MG/ML
10 INJECTION INTRAVENOUS
Status: COMPLETED | OUTPATIENT
Start: 2022-11-05 | End: 2022-11-05

## 2022-11-05 RX ORDER — MAGNESIUM SULFATE HEPTAHYDRATE 40 MG/ML
2 INJECTION, SOLUTION INTRAVENOUS ONCE
Status: COMPLETED | OUTPATIENT
Start: 2022-11-05 | End: 2022-11-05

## 2022-11-05 RX ADMIN — DIPHENHYDRAMINE HYDROCHLORIDE 15 ML: 2.5 LIQUID ORAL at 01:11

## 2022-11-05 RX ADMIN — OXYCODONE HYDROCHLORIDE 20 MG: 5 TABLET ORAL at 09:11

## 2022-11-05 RX ADMIN — HYDROMORPHONE HYDROCHLORIDE 1 MG: 1 INJECTION, SOLUTION INTRAMUSCULAR; INTRAVENOUS; SUBCUTANEOUS at 01:11

## 2022-11-05 RX ADMIN — NYSTATIN 500000 UNITS: 500000 SUSPENSION ORAL at 02:11

## 2022-11-05 RX ADMIN — PANTOPRAZOLE SODIUM 40 MG: 40 INJECTION, POWDER, FOR SOLUTION INTRAVENOUS at 09:11

## 2022-11-05 RX ADMIN — OXYCODONE HYDROCHLORIDE 20 MG: 5 TABLET ORAL at 01:11

## 2022-11-05 RX ADMIN — POTASSIUM CHLORIDE 10 MEQ: 7.46 INJECTION, SOLUTION INTRAVENOUS at 01:11

## 2022-11-05 RX ADMIN — Medication 6 MG: at 09:11

## 2022-11-05 RX ADMIN — DIPHENHYDRAMINE HYDROCHLORIDE 15 ML: 2.5 LIQUID ORAL at 09:11

## 2022-11-05 RX ADMIN — GABAPENTIN 300 MG: 300 CAPSULE ORAL at 09:11

## 2022-11-05 RX ADMIN — Medication: at 09:11

## 2022-11-05 RX ADMIN — PREDNISONE 40 MG: 20 TABLET ORAL at 09:11

## 2022-11-05 RX ADMIN — DEXTROSE AND SODIUM CHLORIDE: 5; .9 INJECTION, SOLUTION INTRAVENOUS at 09:11

## 2022-11-05 RX ADMIN — OXYCODONE HYDROCHLORIDE 20 MG: 5 TABLET ORAL at 05:11

## 2022-11-05 RX ADMIN — POTASSIUM CHLORIDE 10 MEQ: 7.46 INJECTION, SOLUTION INTRAVENOUS at 12:11

## 2022-11-05 RX ADMIN — MAGNESIUM SULFATE HEPTAHYDRATE 2 G: 40 INJECTION, SOLUTION INTRAVENOUS at 12:11

## 2022-11-05 RX ADMIN — NYSTATIN 500000 UNITS: 500000 SUSPENSION ORAL at 09:11

## 2022-11-05 RX ADMIN — FLUCONAZOLE 200 MG: 2 INJECTION, SOLUTION INTRAVENOUS at 11:11

## 2022-11-05 RX ADMIN — DIPHENHYDRAMINE HYDROCHLORIDE 15 ML: 2.5 LIQUID ORAL at 05:11

## 2022-11-05 RX ADMIN — ENOXAPARIN SODIUM 40 MG: 40 INJECTION SUBCUTANEOUS at 05:11

## 2022-11-05 RX ADMIN — LOSARTAN POTASSIUM 25 MG: 25 TABLET, FILM COATED ORAL at 09:11

## 2022-11-05 NOTE — ASSESSMENT & PLAN NOTE
11/05 Nutrition consulted. Most recent weight and BMI monitored     Measurements:  Wt Readings from Last 1 Encounters:   11/05/22 60 kg (132 lb 4.4 oz)   Body mass index is 18.98 kg/m².    Recommendations:      Patient has been screened and assessed by RD. RD will follow patient.    Add po supplement

## 2022-11-05 NOTE — ASSESSMENT & PLAN NOTE
11/06 Fall and skin precautions  Turn q 2 hours  Continue Physical therapy and Occupational therapy

## 2022-11-05 NOTE — SUBJECTIVE & OBJECTIVE
Interval History:  Still poor po intake. Continue IVF. Monitor and supplement electrolytes as needed. Continue current treatment.     Review of Systems   Constitutional:  Positive for activity change, appetite change and fatigue. Negative for chills, fever and unexpected weight change.   HENT:  Positive for mouth sores and trouble swallowing. Negative for congestion, ear discharge, ear pain, facial swelling, rhinorrhea, sinus pressure, sneezing and sore throat.    Eyes:  Negative for pain and redness.   Respiratory:  Positive for shortness of breath. Negative for apnea, cough, chest tightness, wheezing and stridor.    Cardiovascular:  Negative for chest pain, palpitations and leg swelling.   Gastrointestinal:  Positive for diarrhea. Negative for abdominal distention, abdominal pain, anal bleeding, blood in stool, constipation, nausea and vomiting.   Endocrine: Negative for polydipsia and polyphagia.   Genitourinary:  Negative for difficulty urinating, dysuria, frequency, hematuria and penile discharge.   Musculoskeletal:  Positive for gait problem. Negative for neck pain and neck stiffness.   Skin:  Positive for pallor and wound.   Allergic/Immunologic: Negative for food allergies.   Neurological:  Positive for weakness. Negative for tremors, seizures, syncope, facial asymmetry and speech difficulty.   Hematological:  Does not bruise/bleed easily.   Psychiatric/Behavioral:  Negative for agitation, behavioral problems, confusion, hallucinations and suicidal ideas. The patient is nervous/anxious.    Objective:     Vital Signs (Most Recent):  Temp: 98.2 °F (36.8 °C) (11/05/22 1537)  Pulse: 61 (11/05/22 1537)  Resp: 18 (11/05/22 1748)  BP: (!) 148/89 (11/05/22 1537)  SpO2: 98 % (11/05/22 1537)   Vital Signs (24h Range):  Temp:  [97.6 °F (36.4 °C)-98.3 °F (36.8 °C)] 98.2 °F (36.8 °C)  Pulse:  [52-63] 61  Resp:  [16-20] 18  SpO2:  [93 %-99 %] 98 %  BP: (130-157)/(80-91) 148/89     Weight: 60 kg (132 lb 4.4 oz)  Body mass  index is 18.45 kg/m².    Intake/Output Summary (Last 24 hours) at 11/5/2022 1751  Last data filed at 11/5/2022 1605  Gross per 24 hour   Intake 1778.93 ml   Output 750 ml   Net 1028.93 ml      Physical Exam  Vitals and nursing note reviewed.   Constitutional:       Appearance: He is well-developed. He is ill-appearing. He is not diaphoretic.      Comments: Thin in appearance   HENT:      Head: Normocephalic and atraumatic.      Mouth/Throat:      Mouth: Mucous membranes are dry. Oral lesions present.   Eyes:      General: No scleral icterus.        Right eye: No discharge.         Left eye: No discharge.      Extraocular Movements: Extraocular movements intact.      Conjunctiva/sclera: Conjunctivae normal.      Pupils: Pupils are equal, round, and reactive to light.   Cardiovascular:      Rate and Rhythm: Normal rate and regular rhythm.      Pulses: Normal pulses.      Heart sounds: Normal heart sounds. No murmur heard.    No friction rub. No gallop.   Pulmonary:      Effort: Pulmonary effort is normal. No respiratory distress.      Breath sounds: Normal breath sounds. No wheezing or rales.   Abdominal:      General: Bowel sounds are normal. There is no distension.      Palpations: Abdomen is soft.      Tenderness: There is no abdominal tenderness. There is no guarding.   Genitourinary:     Comments: Not examined  Musculoskeletal:         General: Normal range of motion.      Cervical back: Normal range of motion and neck supple. No rigidity or tenderness.      Right lower leg: No edema.      Left lower leg: No edema.   Skin:     General: Skin is warm and dry.      Coloration: Skin is pale.   Neurological:      General: No focal deficit present.      Mental Status: He is alert and oriented to person, place, and time. Mental status is at baseline.      Cranial Nerves: No cranial nerve deficit.      Motor: Weakness present.   Psychiatric:         Mood and Affect: Mood normal.         Behavior: Behavior normal.          Thought Content: Thought content normal.         Judgment: Judgment normal.          Lab Results   Component Value Date    WBC 5.30 11/05/2022    HGB 9.0 (L) 11/05/2022    HCT 28.6 (L) 11/05/2022    MCV 86 11/05/2022     11/05/2022     BMP  Lab Results   Component Value Date     11/05/2022    K 3.5 11/05/2022     11/05/2022    CO2 23 11/05/2022    BUN 4 (L) 11/05/2022    CREATININE 0.5 11/05/2022    CALCIUM 7.9 (L) 11/05/2022    ANIONGAP 8 11/05/2022    ESTGFRAFRICA >60 07/20/2022    EGFRNONAA >60 07/20/2022

## 2022-11-05 NOTE — PLAN OF CARE
Problem: Adult Inpatient Plan of Care  Goal: Plan of Care Review  Outcome: Ongoing, Progressing  Goal: Patient-Specific Goal (Individualized)  Outcome: Ongoing, Progressing  Goal: Absence of Hospital-Acquired Illness or Injury  Outcome: Ongoing, Progressing  Goal: Optimal Comfort and Wellbeing  Outcome: Ongoing, Progressing  Goal: Readiness for Transition of Care  Outcome: Ongoing, Progressing     Patient resting in bed. Vital signs WDL. Patient complains of lip and rectal pain. Tele monitor on. Patient identifiers checked. Patient is awake alert and oriented. All needs met, safety measures in place, will continue to monitor.

## 2022-11-05 NOTE — PROGRESS NOTES
[facility] ([facility] Medicine  Progress Note    Patient Name: Vincent Benton  MRN: [id number]  Patient Class: IP- Inpatient   Admission Date: 11/1/2022  Length of Stay: 2 days  Attending Physician: Raquel Arroyo MD  Primary Care Provider: Shakila Mroan DO      Subjective:     Principal Problem:Mucositis due to antineoplastic therapy      HPI:  Vincent Benton is a 67 y.o. male patient with a PMHx of GERD, HTN, rectal cancer, alcoholism who presents to the Emergency Department for evaluation of mouth and perirectal sores which onset several days PTA. Pt was sent from the cancer center and is currently undergoing chemotherapy. His last chemo was on 10/25/22. Associated sxs include decreased appetite, diarrhea, generalized abdominal pain, generalized weakness and decreased urination. Patient denies any fever, chills, CP, SOB, HA, n/v, and all other sxs at this time. No prior Tx reported. No further complaints or concerns at this time.   On arrival: Temp 97.4, pulse 69, resp 20 and B/P 145/73  Labs are essentially baseline with mild anemia  Pt was placed in Observation for supportive care as a result of chemotherapy.   As clarification, on 11/1/2022 patient should be admitted for hospital observation services under my care in collaboration with Steven Doherty MD          Overview/Hospital Course:  Pt with severe mucositis due to chemotherapy. Ulcerations present in mouth and in the rectal area. Receiving IV fluids, miracle mouth wash and prn analgesia. Morphine changed to Dilaudid. Per Up to Date - systemic corticosteroids are warranted. Solumedrol added. Encouraged family member to assist with salt water gargles every 4 hours. Pt hypoglycemic this AM and IV fluids with dextrose added. Potassium replaced. Pt denies further diarrhea.     11/3/22 - slight improvement in condition today. Pt reports he is feeling better. Has whitish coating to tongue, Diflucan added to cover for oral  candidasis. Nystatin for thrush. Pt has not tried to take any oral medications. IV fluids continue and potassium replaced. Encouraged warm salt water gargles along with miracle mouth wash. PT/OT ordered due to weakness.   11/4/22 No acute events overnight. The patient reports some improvement in mucositis. He is able to tolerate some PO intake. Will add boost with each meal to improve nutrition status. Will start lomotil to improve diarrhea. Palliative care is following and will adjust PRN analgesia. Continue current management.    11/05 Still poor po intake. Continue IVF. Monitor and supplement electrolytes as needed. Continue current treatment.       Interval History:  Still poor po intake. Continue IVF. Monitor and supplement electrolytes as needed. Continue current treatment.     Review of Systems   Constitutional:  Positive for activity change, appetite change and fatigue. Negative for chills, fever and unexpected weight change.   HENT:  Positive for mouth sores and trouble swallowing. Negative for congestion, ear discharge, ear pain, facial swelling, rhinorrhea, sinus pressure, sneezing and sore throat.    Eyes:  Negative for pain and redness.   Respiratory:  Positive for shortness of breath. Negative for apnea, cough, chest tightness, wheezing and stridor.    Cardiovascular:  Negative for chest pain, palpitations and leg swelling.   Gastrointestinal:  Positive for diarrhea. Negative for abdominal distention, abdominal pain, anal bleeding, blood in stool, constipation, nausea and vomiting.   Endocrine: Negative for polydipsia and polyphagia.   Genitourinary:  Negative for difficulty urinating, dysuria, frequency, hematuria and penile discharge.   Musculoskeletal:  Positive for gait problem. Negative for neck pain and neck stiffness.   Skin:  Positive for pallor and wound.   Allergic/Immunologic: Negative for food allergies.   Neurological:  Positive for weakness. Negative for tremors, seizures, syncope, facial  asymmetry and speech difficulty.   Hematological:  Does not bruise/bleed easily.   Psychiatric/Behavioral:  Negative for agitation, behavioral problems, confusion, hallucinations and suicidal ideas. The patient is nervous/anxious.    Objective:     Vital Signs (Most Recent):  Temp: 98.2 °F (36.8 °C) (11/05/22 1537)  Pulse: 61 (11/05/22 1537)  Resp: 18 (11/05/22 1748)  BP: (!) 148/89 (11/05/22 1537)  SpO2: 98 % (11/05/22 1537)   Vital Signs (24h Range):  Temp:  [97.6 °F (36.4 °C)-98.3 °F (36.8 °C)] 98.2 °F (36.8 °C)  Pulse:  [52-63] 61  Resp:  [16-20] 18  SpO2:  [93 %-99 %] 98 %  BP: (130-157)/(80-91) 148/89     Weight: 60 kg (132 lb 4.4 oz)  Body mass index is 18.45 kg/m².    Intake/Output Summary (Last 24 hours) at 11/5/2022 1751  Last data filed at 11/5/2022 1605  Gross per 24 hour   Intake 1778.93 ml   Output 750 ml   Net 1028.93 ml      Physical Exam  Vitals and nursing note reviewed.   Constitutional:       Appearance: He is well-developed. He is ill-appearing. He is not diaphoretic.      Comments: Thin in appearance   HENT:      Head: Normocephalic and atraumatic.      Mouth/Throat:      Mouth: Mucous membranes are dry. Oral lesions present.   Eyes:      General: No scleral icterus.        Right eye: No discharge.         Left eye: No discharge.      Extraocular Movements: Extraocular movements intact.      Conjunctiva/sclera: Conjunctivae normal.      Pupils: Pupils are equal, round, and reactive to light.   Cardiovascular:      Rate and Rhythm: Normal rate and regular rhythm.      Pulses: Normal pulses.      Heart sounds: Normal heart sounds. No murmur heard.    No friction rub. No gallop.   Pulmonary:      Effort: Pulmonary effort is normal. No respiratory distress.      Breath sounds: Normal breath sounds. No wheezing or rales.   Abdominal:      General: Bowel sounds are normal. There is no distension.      Palpations: Abdomen is soft.      Tenderness: There is no abdominal tenderness. There is no  guarding.   Genitourinary:     Comments: Not examined  Musculoskeletal:         General: Normal range of motion.      Cervical back: Normal range of motion and neck supple. No rigidity or tenderness.      Right lower leg: No edema.      Left lower leg: No edema.   Skin:     General: Skin is warm and dry.      Coloration: Skin is pale.   Neurological:      General: No focal deficit present.      Mental Status: He is alert and oriented to person, place, and time. Mental status is at baseline.      Cranial Nerves: No cranial nerve deficit.      Motor: Weakness present.   Psychiatric:         Mood and Affect: Mood normal.         Behavior: Behavior normal.         Thought Content: Thought content normal.         Judgment: Judgment normal.          Lab Results   Component Value Date    WBC 5.30 11/05/2022    HGB 9.0 (L) 11/05/2022    HCT 28.6 (L) 11/05/2022    MCV 86 11/05/2022     11/05/2022     BMP  Lab Results   Component Value Date     11/05/2022    K 3.5 11/05/2022     11/05/2022    CO2 23 11/05/2022    BUN 4 (L) 11/05/2022    CREATININE 0.5 11/05/2022    CALCIUM 7.9 (L) 11/05/2022    ANIONGAP 8 11/05/2022    ESTGFRAFRICA >60 07/20/2022    EGFRNONAA >60 07/20/2022                Assessment/Plan:      * Mucositis due to antineoplastic therapy  11/01 Clear liquid diet if tolerated  IV fluids  Miracle mouth wash  Prn analgesia  Solumedrol  11/4/22  The patient reports some improvement in mucositis. He is able to tolerate some PO intake. Palliative care is following and will adjust PRN analgesia. Continue current management.  Hem/onc is following   11/05 Attempt soft diet    Immunocompromised patient  11/05 Monitor  Recent chemotherapy 10/20/2022  Oncology following      Severe malnutrition  11/05 Nutrition consulted. Most recent weight and BMI monitored     Measurements:  Wt Readings from Last 1 Encounters:   11/05/22 60 kg (132 lb 4.4 oz)   Body mass index is 18.98 kg/m².    Recommendations:       Patient has been screened and assessed by RD. RD will follow patient.    Add po supplement    Normocytic anemia  11/05 Add MVI once tolerating po intake      Oral candidiasis  11/01 Diflucan IV x 7 days  Nystatin oral swish and swallow      Encounter for palliative care  11/02 - Palliative care is following       Essential hypertension  11/01 Hydralazine 10 mg IV every 6 hours is unable to tolerate oral meds  Continue Losartan      Rectal cancer  11/01 Is followed by Dr. Tuttle in clinic  Last chemo 10/25/22    Neoplasm related pain  11/01 Continue fentanyl patch  Prn Dilaudid for pain associated with mucositis  Consult Palliative Care to assist with pain medications        VTE Risk Mitigation (From admission, onward)         Ordered     enoxaparin injection 40 mg  Daily         11/05/22 1106     Place JUAN hose  Until discontinued         11/05/22 1106     Reason for No Pharmacological VTE Prophylaxis  Once        Question:  Reasons:  Answer:  Risk of Bleeding    11/01/22 1359     IP VTE HIGH RISK PATIENT  Once         11/01/22 1359     Place sequential compression device  Until discontinued         11/01/22 1359                Discharge Planning   KERRIE:      Code Status: DNR   Is the patient medically ready for discharge?:     Reason for patient still in hospital (select all that apply): Treatment  Discharge Plan A: Home Health        Time spent seeing patient( greater than 1/2 spent in direct contact) :  38 minutes    SKYLAR Lin  Department of Hospital Medicine   [facility] - Telemetry ([facility])

## 2022-11-05 NOTE — PLAN OF CARE
Pt AAOx4. No tele monitor orders. On 2L NC; tolerating well. Pt voids spontaneously without difficulty. Pt turned and repositioned with use of pillows. 20 G PIV in left AC CDI with no redness, swelling, warmth, or drainage with D5NS infusing at 125 ml/hr. Bed low, wheels locked, alarms audible. Plan of care reviewed. Pt verbalizes understanding. Vital signs monitored. Fall precautions in place. Pain assessed. Safety promoted. Infection risks reduced.

## 2022-11-05 NOTE — PLAN OF CARE
Pt AAOx4. NSR on heart monitor. On 2L NC; tolerating well. Pt voids spontaneously without difficulty. Pt turned and repositioned with use of pillows. New 22 G PIV in right hand CDI with no redness, swelling, warmth, or drainage with D5NS infusing at 75 ml/hr. Bed low, wheels locked, alarms audible. Plan of care reviewed. Pt verbalizes understanding. Vital signs monitored. Fall precautions in place. Pain assessed. Safety promoted. Infection risks reduced.

## 2022-11-05 NOTE — SUBJECTIVE & OBJECTIVE
Interval History:  Oral and rectal pain under better control using Magic mouthwash on Diflucan and nystatin.  Continues to have poor p.o. intake limited due to pain.  Palliative Care involved in adjusting analgesic regimen.  He is very concerned about further chemotherapy given his current toxicities.    Oncology Treatment Plan:   OP COLORECTAL FOLFIRI + BEVACIZUMAB Q2W    Medications:  Continuous Infusions:   dextrose 5 % and 0.9 % NaCl 75 mL/hr at 11/05/22 0949     Scheduled Meds:   fentaNYL  1 patch Transdermal Q72H    fluconazole (DIFLUCAN) IV (PEDS and ADULTS)  200 mg Intravenous Q24H    gabapentin  300 mg Oral QHS    losartan  25 mg Oral Daily    magic mouthwash (diphenhydrAMINE 12.5 mg/5 mL 20 mL, aluminum & magnesium hydroxide-simethicone (MYLANTA) 20 mL, LIDOcaine HCl 2% (XYLOCAINE) 20 mL) solution  15 mL Swish & Spit Q4H    nystatin  500,000 Units Oral TID    pantoprazole  40 mg Intravenous Daily    predniSONE  40 mg Oral Daily    Questran and Aquaphor Topical Compound   Topical (Top) BID     PRN Meds:acetaminophen, albuterol-ipratropium, aluminum-magnesium hydroxide-simethicone, diphenoxylate-atropine 2.5-0.025 mg, hydrALAZINE, magnesium oxide, magnesium oxide, melatonin, naloxone, ondansetron, oxyCODONE, prochlorperazine, simethicone, sodium chloride 0.9%     Review of Systems   Constitutional:  Positive for appetite change. Negative for activity change, chills, diaphoresis, fatigue, fever and unexpected weight change.   HENT:  Positive for mouth sores. Negative for congestion, rhinorrhea and sinus pain.    Respiratory:  Negative for cough, choking, chest tightness, shortness of breath, wheezing and stridor.    Cardiovascular:  Negative for chest pain, palpitations and leg swelling.   Gastrointestinal:  Positive for rectal pain. Negative for abdominal distention, abdominal pain, anal bleeding, blood in stool, constipation, diarrhea, nausea and vomiting.   Skin:  Negative for rash.   Hematological:  Does  not bruise/bleed easily.   Psychiatric/Behavioral: Negative.     Objective:     Vital Signs (Most Recent):  Temp: 97.9 °F (36.6 °C) (11/05/22 0708)  Pulse: (!) 56 (11/05/22 0708)  Resp: 18 (11/05/22 0929)  BP: (!) 157/90 (11/05/22 0708)  SpO2: 98 % (11/05/22 0708)   Vital Signs (24h Range):  Temp:  [97.6 °F (36.4 °C)-99.1 °F (37.3 °C)] 97.9 °F (36.6 °C)  Pulse:  [52-68] 56  Resp:  [16-20] 18  SpO2:  [93 %-99 %] 98 %  BP: (130-157)/(80-91) 157/90     Weight: 60 kg (132 lb 4.4 oz)  Body mass index is 18.98 kg/m².  Body surface area is 1.72 meters squared.      Intake/Output Summary (Last 24 hours) at 11/5/2022 1006  Last data filed at 11/5/2022 0000  Gross per 24 hour   Intake 1748.93 ml   Output 450 ml   Net 1298.93 ml        Physical Exam  Vitals reviewed.   Constitutional:       General: He is not in acute distress.     Appearance: Normal appearance. He is ill-appearing.   HENT:      Head: Normocephalic and atraumatic.      Nose: No congestion.      Mouth/Throat:      Mouth: Mucous membranes are moist.      Comments: mucositis  Eyes:      Extraocular Movements: Extraocular movements intact.      Conjunctiva/sclera: Conjunctivae normal.   Cardiovascular:      Rate and Rhythm: Normal rate.   Pulmonary:      Effort: Pulmonary effort is normal. No respiratory distress.   Abdominal:      General: There is no distension.      Palpations: Abdomen is soft.   Musculoskeletal:         General: No swelling.      Cervical back: Neck supple.   Skin:     General: Skin is warm.      Coloration: Skin is not jaundiced.   Neurological:      General: No focal deficit present.      Mental Status: He is alert and oriented to person, place, and time.      Motor: Weakness present.   Psychiatric:         Mood and Affect: Mood normal.         Behavior: Behavior normal.         Thought Content: Thought content normal.         Judgment: Judgment normal.       Significant Labs:   All pertinent labs from the last 24 hours have been  reviewed.    Diagnostic Results:  I have reviewed and interpreted all pertinent imaging results/findings within the past 24 hours.

## 2022-11-05 NOTE — PROGRESS NOTES
[facility] Telemetry ([facility])  Hematology/Oncology  Progress Note    Patient Name: Vincent Benton  Admission Date: 11/1/2022  Hospital Length of Stay: 2 days  Code Status: DNR     Subjective:     HPI:  67 y.o male with recurrent metastatic rectal cancer presented to [facility] ER following visit with Dr. Gonzalez for mucositis and rectal sores. Associated symptoms include mouth and rectal pain, trouble swallowing, fatigue, weakness, diarrhea. He denies fever, syncope, urinary complaints, CP, SOB.     In regards to his recurrent metastatic rectal cancer patient currently on palliative chemotherapy. S/p 5 cycles FOLFIRI + Avastin (last tx 10/25/22).       Interval History:  Oral and rectal pain under better control using Magic mouthwash on Diflucan and nystatin.  Continues to have poor p.o. intake limited due to pain.  Palliative Care involved in adjusting analgesic regimen.  He is very concerned about further chemotherapy given his current toxicities.    Oncology Treatment Plan:   OP COLORECTAL FOLFIRI + BEVACIZUMAB Q2W    Medications:  Continuous Infusions:   dextrose 5 % and 0.9 % NaCl 75 mL/hr at 11/05/22 0949     Scheduled Meds:   fentaNYL  1 patch Transdermal Q72H    fluconazole (DIFLUCAN) IV (PEDS and ADULTS)  200 mg Intravenous Q24H    gabapentin  300 mg Oral QHS    losartan  25 mg Oral Daily    magic mouthwash (diphenhydrAMINE 12.5 mg/5 mL 20 mL, aluminum & magnesium hydroxide-simethicone (MYLANTA) 20 mL, LIDOcaine HCl 2% (XYLOCAINE) 20 mL) solution  15 mL Swish & Spit Q4H    nystatin  500,000 Units Oral TID    pantoprazole  40 mg Intravenous Daily    predniSONE  40 mg Oral Daily    Questran and Aquaphor Topical Compound   Topical (Top) BID     PRN Meds:acetaminophen, albuterol-ipratropium, aluminum-magnesium hydroxide-simethicone, diphenoxylate-atropine 2.5-0.025 mg, hydrALAZINE, magnesium oxide, magnesium oxide, melatonin, naloxone, ondansetron, oxyCODONE, prochlorperazine, simethicone, sodium  chloride 0.9%     Review of Systems   Constitutional:  Positive for appetite change. Negative for activity change, chills, diaphoresis, fatigue, fever and unexpected weight change.   HENT:  Positive for mouth sores. Negative for congestion, rhinorrhea and sinus pain.    Respiratory:  Negative for cough, choking, chest tightness, shortness of breath, wheezing and stridor.    Cardiovascular:  Negative for chest pain, palpitations and leg swelling.   Gastrointestinal:  Positive for rectal pain. Negative for abdominal distention, abdominal pain, anal bleeding, blood in stool, constipation, diarrhea, nausea and vomiting.   Skin:  Negative for rash.   Hematological:  Does not bruise/bleed easily.   Psychiatric/Behavioral: Negative.     Objective:     Vital Signs (Most Recent):  Temp: 97.9 °F (36.6 °C) (11/05/22 0708)  Pulse: (!) 56 (11/05/22 0708)  Resp: 18 (11/05/22 0929)  BP: (!) 157/90 (11/05/22 0708)  SpO2: 98 % (11/05/22 0708)   Vital Signs (24h Range):  Temp:  [97.6 °F (36.4 °C)-99.1 °F (37.3 °C)] 97.9 °F (36.6 °C)  Pulse:  [52-68] 56  Resp:  [16-20] 18  SpO2:  [93 %-99 %] 98 %  BP: (130-157)/(80-91) 157/90     Weight: 60 kg (132 lb 4.4 oz)  Body mass index is 18.98 kg/m².  Body surface area is 1.72 meters squared.      Intake/Output Summary (Last 24 hours) at 11/5/2022 1006  Last data filed at 11/5/2022 0000  Gross per 24 hour   Intake 1748.93 ml   Output 450 ml   Net 1298.93 ml        Physical Exam  Vitals reviewed.   Constitutional:       General: He is not in acute distress.     Appearance: Normal appearance. He is ill-appearing.   HENT:      Head: Normocephalic and atraumatic.      Nose: No congestion.      Mouth/Throat:      Mouth: Mucous membranes are moist.      Comments: mucositis  Eyes:      Extraocular Movements: Extraocular movements intact.      Conjunctiva/sclera: Conjunctivae normal.   Cardiovascular:      Rate and Rhythm: Normal rate.   Pulmonary:      Effort: Pulmonary effort is normal. No  respiratory distress.   Abdominal:      General: There is no distension.      Palpations: Abdomen is soft.   Musculoskeletal:         General: No swelling.      Cervical back: Neck supple.   Skin:     General: Skin is warm.      Coloration: Skin is not jaundiced.   Neurological:      General: No focal deficit present.      Mental Status: He is alert and oriented to person, place, and time.      Motor: Weakness present.   Psychiatric:         Mood and Affect: Mood normal.         Behavior: Behavior normal.         Thought Content: Thought content normal.         Judgment: Judgment normal.       Significant Labs:   All pertinent labs from the last 24 hours have been reviewed.    Diagnostic Results:  I have reviewed and interpreted all pertinent imaging results/findings within the past 24 hours.    Assessment/Plan:     * Mucositis due to antineoplastic therapy  --magic mouthwash Q4H, salt and soda rinses, complete course Diflucan, nystatin  --IVFs  --avoid food irritants  --note low albumin. Encourage boost/ensure when tolerable   --electrolyte repletion PRN per primary team  --supportive care  --recommend nutrition consultation and supplement  --appetite has improved-encouraged introducing solid, soft foods as tolerated with improved analgesia    Oral candidiasis  On statin and Diflucan, complete course    Rectal cancer  --Hold chemo inpatient  --Will plan for f/u with Primary Oncologist for further recommendations in regards to malignancy management; will recommend dose modification given toxicity mucositis 5 fluorouracil induced      Neoplasm related pain  --palliative medicine for pain management, appreciate ongoing regimen adjustment p.r.n.  --pain has improved currently on fentanyl patch and Norco        Thank you for your consult. I will sign off. Please contact us if you have any additional questions.  Will arrange for outpatient follow-up following discharge.      Alessia Tuttle,  MD  Hematology/Oncology  [facility] - Telemetry ([facility])

## 2022-11-05 NOTE — CONSULTS
[facility] - Telemetry ([facility])  Adult Nutrition  Consult Note    SUMMARY     Recommendations   1) Continue soft diet as pt tolerates and boost plus TID   2) Advance to regular if pt desires   3) weigh weekly  4) magic mouthwash if needed    Goals: 1) PO intakes > 75% of meals and supplements at f/u  Nutrition Goal Status: new  Communication of RD Recs:  (POC, sticky note)    Assessment and Plan    Suspect severe chronic illness related malnutrition  R/t poor appetite, mouth pain  As evidenced by   1) PO intakes < 75% of needs x > 1 month  2) 17% wt loss x < 10 months  3) wounds  Intervention: texture modified diet and nutrition supplement therapy     Malnutrition Assessment     Skin (Micronutrient):  (Jacob = 17, foot and buttocks ulcers- diarrhea noted)  Teeth (Micronutrient):  (missing some)   Micronutrient Evaluation: suspected deficiency  Micronutrient Evaluation Comments: check iron, would benefit from MVI if he does not drink all his Boost   Weight Loss (Malnutrition): other (see comments) (10 months 17%)  Energy Intake (Malnutrition): less than 75% for greater than or equal to 1 month           Edema (Fluid Accumulation): 0-->no edema present             Reason for Assessment    Reason For Assessment: consult  Diagnosis:  (Mucositis due to antineoplastic therapy)  Relevant Medical History: GERD, HTN, rectal cancer, alcoholism  Interdisciplinary Rounds: did not attend (remote)    General Information Comments: 66 y/o male admitted with mouth and perirectal sores s/p chemo for rectal CA. symptoms since 10/25, poor appetite. Soft diet + boost with meals ordered. Poor PO intakes this admission except for the last meal today. NFPE to be done by on-site RD at f/u/. Significant wt loss per chart review.    Nutrition Discharge Planning: To be determined- regular, texture per SLP + Boost plus TID    Nutrition Risk Screen    Nutrition Risk Screen: no indicators present    Nutrition/Diet History    Patient Reported  "Diet/Restrictions/Preferences: soft  Spiritual, Cultural Beliefs, Lutheran Practices, Values that Affect Care: no  Food Allergies: NKFA  Factors Affecting Nutritional Intake: painful swallowing, decreased appetite, diarrhea    Anthropometrics    Temp: 97.8 °F (36.6 °C)  Height Method: Measured  Height: 5' 11"  Height (inches): 71 in  Weight Method: Bed Scale  Weight: 60 kg (132 lb 4.4 oz)  Weight (lb): 132.28 lb  Ideal Body Weight (IBW), Male: 172 lb  % Ideal Body Weight, Male (lb): 76.91 %  BMI (Calculated): 18.5  BMI Grade: 18.5-24.9 - normal  Weight Loss: unintentional  Usual Body Weight (UBW), k.9 kg (22)  % Usual Body Weight: 82.48  % Weight Change From Usual Weight: -17.7 %       Lab/Procedures/Meds    Pertinent Labs Reviewed: reviewed  BMP  Lab Results   Component Value Date     2022    K 3.5 2022     2022    CO2 23 2022    BUN 4 (L) 2022    CREATININE 0.5 2022    CALCIUM 7.9 (L) 2022    ANIONGAP 8 2022    ESTGFRAFRICA >60 2022    EGFRNONAA >60 2022     Lab Results   Component Value Date    ALBUMIN 2.0 (L) 2022       Pertinent Medications Reviewed: reviewed  Scheduled Meds:   enoxaparin  40 mg Subcutaneous Daily    fentaNYL  1 patch Transdermal Q72H    fluconazole (DIFLUCAN) IV (PEDS and ADULTS)  200 mg Intravenous Q24H    gabapentin  300 mg Oral QHS    losartan  25 mg Oral Daily    magic mouthwash (diphenhydrAMINE 12.5 mg/5 mL 20 mL, aluminum & magnesium hydroxide-simethicone (MYLANTA) 20 mL, LIDOcaine HCl 2% (XYLOCAINE) 20 mL) solution  15 mL Swish & Spit Q4H    magnesium sulfate IVPB  2 g Intravenous Once    nystatin  500,000 Units Oral TID    pantoprazole  40 mg Intravenous Daily    potassium chloride  10 mEq Intravenous Q1H    predniSONE  40 mg Oral Daily    Questran and Aquaphor Topical Compound   Topical (Top) BID     Continuous Infusions:   dextrose 5 % and 0.9 % NaCl 75 mL/hr at 22 0949     PRN " Meds:.acetaminophen, albuterol-ipratropium, aluminum-magnesium hydroxide-simethicone, diphenoxylate-atropine 2.5-0.025 mg, hydrALAZINE, melatonin, naloxone, ondansetron, oxyCODONE, prochlorperazine, simethicone, sodium chloride 0.9%    Estimated/Assessed Needs    Weight Used For Calorie Calculations: 60 kg (132 lb 4.4 oz)  Energy Calorie Requirements (kcal): MSJ ( 1.5 wt gain) = 2110 kcal  Energy Need Method: [place]-St Jeor  Protein Requirements: 1.2 -1.5 g protein/kg ( wounds/chemo) = 72-90 g  Weight Used For Protein Calculations: 60 kg (132 lb 4.4 oz)  Fluid Requirements (mL): 2100 ml or per MD  Estimated Fluid Requirement Method: RDA Method  CHO Requirement: N/A      Nutrition Prescription Ordered    Current Diet Order: IDDSI 6, boost plus TID    Evaluation of Received Nutrient/Fluid Intake    Energy Calories Required: not meeting needs  Protein Required: not meeting needs  Fluid Required: not meeting needs  Tolerance: tolerating     Intake/Output Summary (Last 24 hours) at 11/5/2022 1353  Last data filed at 11/5/2022 0000  Gross per 24 hour   Intake 1628.93 ml   Output 450 ml   Net 1178.93 ml       % Intake of Estimated Energy Needs: most 25%  % Meal Intake: %    Nutrition Risk    Level of Risk/Frequency of Follow-up: moderate 2 x weekly      Monitor and Evaluation    Food and Nutrient Intake: energy intake, food and beverage intake  Food and Nutrient Adminstration: diet order  Anthropometric Measurements: weight  Biochemical Data, Medical Tests and Procedures: electrolyte and renal panel, gastrointestinal profile  Nutrition-Focused Physical Findings: overall appearance, skin       Nutrition Follow-Up    RD Follow-up?: Yes

## 2022-11-05 NOTE — ASSESSMENT & PLAN NOTE
--Hold chemo inpatient  --Will plan for f/u with Primary Oncologist for further recommendations in regards to malignancy management; will recommend dose modification given toxicity mucositis 5 fluorouracil induced

## 2022-11-05 NOTE — ASSESSMENT & PLAN NOTE
11/01 Clear liquid diet if tolerated  IV fluids  Miracle mouth wash  Prn analgesia  Solumedrol  11/4/22  The patient reports some improvement in mucositis. He is able to tolerate some PO intake. Palliative care is following and will adjust PRN analgesia. Continue current management.  Hem/onc is following   11/05 Attempt soft diet

## 2022-11-05 NOTE — ASSESSMENT & PLAN NOTE
--palliative medicine for pain management, appreciate ongoing regimen adjustment p.r.n.  --pain has improved currently on fentanyl patch and Norco

## 2022-11-05 NOTE — ASSESSMENT & PLAN NOTE
--magic mouthwash Q4H, salt and soda rinses, complete course Diflucan, nystatin  --IVFs  --avoid food irritants  --note low albumin. Encourage boost/ensure when tolerable   --electrolyte repletion PRN per primary team  --supportive care  --recommend nutrition consultation and supplement  --appetite has improved-encouraged introducing solid, soft foods as tolerated with improved analgesia

## 2022-11-05 NOTE — PLAN OF CARE
Recommendations   1) Continue soft diet as pt tolerates and boost plus TID   2) Advance to regular if pt desires   3) weigh weekly  4) magic mouthwash if needed    Goals: 1) PO intakes > 75% of meals and supplements at f/u  Nutrition Goal Status: new  Communication of RD Recs:  (POC, sticky note)

## 2022-11-05 NOTE — ASSESSMENT & PLAN NOTE
11/01 Continue fentanyl patch  Prn Dilaudid for pain associated with mucositis  Consult Palliative Care to assist with pain medications

## 2022-11-06 PROBLEM — E83.39 HYPOPHOSPHATEMIA: Status: ACTIVE | Noted: 2022-11-06

## 2022-11-06 PROBLEM — R00.1 BRADYCARDIA: Status: ACTIVE | Noted: 2022-11-06

## 2022-11-06 LAB
ANION GAP SERPL CALC-SCNC: 9 MMOL/L (ref 8–16)
BASOPHILS # BLD AUTO: 0.01 K/UL (ref 0–0.2)
BASOPHILS NFR BLD: 0.2 % (ref 0–1.9)
BUN SERPL-MCNC: 4 MG/DL (ref 8–23)
CALCIUM SERPL-MCNC: 8.5 MG/DL (ref 8.7–10.5)
CHLORIDE SERPL-SCNC: 107 MMOL/L (ref 95–110)
CO2 SERPL-SCNC: 27 MMOL/L (ref 23–29)
CREAT SERPL-MCNC: 0.6 MG/DL (ref 0.5–1.4)
DIFFERENTIAL METHOD: ABNORMAL
EOSINOPHIL # BLD AUTO: 0 K/UL (ref 0–0.5)
EOSINOPHIL NFR BLD: 0.2 % (ref 0–8)
ERYTHROCYTE [DISTWIDTH] IN BLOOD BY AUTOMATED COUNT: 27.5 % (ref 11.5–14.5)
EST. GFR  (NO RACE VARIABLE): >60 ML/MIN/1.73 M^2
GLUCOSE SERPL-MCNC: 91 MG/DL (ref 70–110)
HCT VFR BLD AUTO: 30.3 % (ref 40–54)
HGB BLD-MCNC: 9.3 G/DL (ref 14–18)
IMM GRANULOCYTES # BLD AUTO: 0.04 K/UL (ref 0–0.04)
IMM GRANULOCYTES NFR BLD AUTO: 0.9 % (ref 0–0.5)
LYMPHOCYTES # BLD AUTO: 0.7 K/UL (ref 1–4.8)
LYMPHOCYTES NFR BLD: 16.9 % (ref 18–48)
MAGNESIUM SERPL-MCNC: 2.3 MG/DL (ref 1.6–2.6)
MCH RBC QN AUTO: 26.7 PG (ref 27–31)
MCHC RBC AUTO-ENTMCNC: 30.7 G/DL (ref 32–36)
MCV RBC AUTO: 87 FL (ref 82–98)
MONOCYTES # BLD AUTO: 0.5 K/UL (ref 0.3–1)
MONOCYTES NFR BLD: 10.9 % (ref 4–15)
NEUTROPHILS # BLD AUTO: 3.1 K/UL (ref 1.8–7.7)
NEUTROPHILS NFR BLD: 70.9 % (ref 38–73)
NRBC BLD-RTO: 1 /100 WBC
PHOSPHATE SERPL-MCNC: 1.6 MG/DL (ref 2.7–4.5)
PLATELET # BLD AUTO: 185 K/UL (ref 150–450)
PMV BLD AUTO: 9.2 FL (ref 9.2–12.9)
POTASSIUM SERPL-SCNC: 4.8 MMOL/L (ref 3.5–5.1)
RBC # BLD AUTO: 3.48 M/UL (ref 4.6–6.2)
SODIUM SERPL-SCNC: 143 MMOL/L (ref 136–145)
TSH SERPL DL<=0.005 MIU/L-ACNC: 0.92 UIU/ML (ref 0.4–4)
WBC # BLD AUTO: 4.32 K/UL (ref 3.9–12.7)

## 2022-11-06 PROCEDURE — 36415 COLL VENOUS BLD VENIPUNCTURE: CPT | Performed by: NURSE PRACTITIONER

## 2022-11-06 PROCEDURE — 83735 ASSAY OF MAGNESIUM: CPT | Performed by: NURSE PRACTITIONER

## 2022-11-06 PROCEDURE — 25000003 PHARM REV CODE 250: Performed by: NURSE PRACTITIONER

## 2022-11-06 PROCEDURE — 80048 BASIC METABOLIC PNL TOTAL CA: CPT | Performed by: NURSE PRACTITIONER

## 2022-11-06 PROCEDURE — 63600175 PHARM REV CODE 636 W HCPCS: Performed by: NURSE PRACTITIONER

## 2022-11-06 PROCEDURE — 85025 COMPLETE CBC W/AUTO DIFF WBC: CPT | Performed by: NURSE PRACTITIONER

## 2022-11-06 PROCEDURE — 93005 ELECTROCARDIOGRAM TRACING: CPT

## 2022-11-06 PROCEDURE — 99223 PR INITIAL HOSPITAL CARE,LEVL III: ICD-10-PCS | Mod: ,,, | Performed by: INTERNAL MEDICINE

## 2022-11-06 PROCEDURE — 25000003 PHARM REV CODE 250: Performed by: INTERNAL MEDICINE

## 2022-11-06 PROCEDURE — 93010 EKG 12-LEAD: ICD-10-PCS | Mod: ,,, | Performed by: INTERNAL MEDICINE

## 2022-11-06 PROCEDURE — 99223 1ST HOSP IP/OBS HIGH 75: CPT | Mod: ,,, | Performed by: INTERNAL MEDICINE

## 2022-11-06 PROCEDURE — 27000207 HC ISOLATION

## 2022-11-06 PROCEDURE — 94761 N-INVAS EAR/PLS OXIMETRY MLT: CPT

## 2022-11-06 PROCEDURE — 63600175 PHARM REV CODE 636 W HCPCS: Performed by: INTERNAL MEDICINE

## 2022-11-06 PROCEDURE — 97530 THERAPEUTIC ACTIVITIES: CPT

## 2022-11-06 PROCEDURE — 84480 ASSAY TRIIODOTHYRONINE (T3): CPT | Performed by: INTERNAL MEDICINE

## 2022-11-06 PROCEDURE — 21400001 HC TELEMETRY ROOM

## 2022-11-06 PROCEDURE — 84436 ASSAY OF TOTAL THYROXINE: CPT | Performed by: INTERNAL MEDICINE

## 2022-11-06 PROCEDURE — 93010 ELECTROCARDIOGRAM REPORT: CPT | Mod: ,,, | Performed by: INTERNAL MEDICINE

## 2022-11-06 PROCEDURE — 84100 ASSAY OF PHOSPHORUS: CPT | Performed by: NURSE PRACTITIONER

## 2022-11-06 PROCEDURE — 84443 ASSAY THYROID STIM HORMONE: CPT | Performed by: INTERNAL MEDICINE

## 2022-11-06 PROCEDURE — 36415 COLL VENOUS BLD VENIPUNCTURE: CPT | Performed by: INTERNAL MEDICINE

## 2022-11-06 PROCEDURE — 25000003 PHARM REV CODE 250: Performed by: PHYSICIAN ASSISTANT

## 2022-11-06 PROCEDURE — 97116 GAIT TRAINING THERAPY: CPT | Mod: CQ

## 2022-11-06 PROCEDURE — C9113 INJ PANTOPRAZOLE SODIUM, VIA: HCPCS | Performed by: NURSE PRACTITIONER

## 2022-11-06 PROCEDURE — 97530 THERAPEUTIC ACTIVITIES: CPT | Mod: CQ

## 2022-11-06 RX ORDER — HYDRALAZINE HYDROCHLORIDE 25 MG/1
25 TABLET, FILM COATED ORAL 2 TIMES DAILY
Status: DISCONTINUED | OUTPATIENT
Start: 2022-11-06 | End: 2022-11-07 | Stop reason: HOSPADM

## 2022-11-06 RX ADMIN — PANTOPRAZOLE SODIUM 40 MG: 40 INJECTION, POWDER, FOR SOLUTION INTRAVENOUS at 09:11

## 2022-11-06 RX ADMIN — HYDRALAZINE HYDROCHLORIDE 25 MG: 25 TABLET, FILM COATED ORAL at 10:11

## 2022-11-06 RX ADMIN — OXYCODONE HYDROCHLORIDE 20 MG: 5 TABLET ORAL at 09:11

## 2022-11-06 RX ADMIN — Medication: at 09:11

## 2022-11-06 RX ADMIN — DIPHENHYDRAMINE HYDROCHLORIDE 15 ML: 2.5 LIQUID ORAL at 10:11

## 2022-11-06 RX ADMIN — PREDNISONE 40 MG: 20 TABLET ORAL at 09:11

## 2022-11-06 RX ADMIN — ENOXAPARIN SODIUM 40 MG: 40 INJECTION SUBCUTANEOUS at 04:11

## 2022-11-06 RX ADMIN — HYDRALAZINE HYDROCHLORIDE 25 MG: 25 TABLET, FILM COATED ORAL at 11:11

## 2022-11-06 RX ADMIN — NYSTATIN 500000 UNITS: 500000 SUSPENSION ORAL at 04:11

## 2022-11-06 RX ADMIN — NYSTATIN 500000 UNITS: 500000 SUSPENSION ORAL at 09:11

## 2022-11-06 RX ADMIN — DIPHENHYDRAMINE HYDROCHLORIDE 15 ML: 2.5 LIQUID ORAL at 09:11

## 2022-11-06 RX ADMIN — OXYCODONE HYDROCHLORIDE 20 MG: 5 TABLET ORAL at 05:11

## 2022-11-06 RX ADMIN — GABAPENTIN 300 MG: 300 CAPSULE ORAL at 10:11

## 2022-11-06 RX ADMIN — FLUCONAZOLE 200 MG: 2 INJECTION, SOLUTION INTRAVENOUS at 11:11

## 2022-11-06 RX ADMIN — LOSARTAN POTASSIUM 25 MG: 25 TABLET, FILM COATED ORAL at 09:11

## 2022-11-06 RX ADMIN — SODIUM PHOSPHATE, MONOBASIC, MONOHYDRATE 30 MMOL: 276; 142 INJECTION, SOLUTION INTRAVENOUS at 01:11

## 2022-11-06 RX ADMIN — HYDRALAZINE HYDROCHLORIDE 10 MG: 20 INJECTION, SOLUTION INTRAMUSCULAR; INTRAVENOUS at 04:11

## 2022-11-06 RX ADMIN — NYSTATIN 500000 UNITS: 500000 SUSPENSION ORAL at 10:11

## 2022-11-06 RX ADMIN — OXYCODONE HYDROCHLORIDE 20 MG: 5 TABLET ORAL at 03:11

## 2022-11-06 RX ADMIN — DIPHENHYDRAMINE HYDROCHLORIDE 15 ML: 2.5 LIQUID ORAL at 01:11

## 2022-11-06 RX ADMIN — OXYCODONE HYDROCHLORIDE 20 MG: 5 TABLET ORAL at 01:11

## 2022-11-06 RX ADMIN — OXYCODONE HYDROCHLORIDE 20 MG: 5 TABLET ORAL at 10:11

## 2022-11-06 RX ADMIN — DIPHENHYDRAMINE HYDROCHLORIDE 15 ML: 2.5 LIQUID ORAL at 05:11

## 2022-11-06 NOTE — NURSING
Patient heart rate was around 39-41 at this time, patient asymptomatic, NP Myriam notified, EKG ordered, given to NP, no new orders at this time.

## 2022-11-06 NOTE — PLAN OF CARE
Patient tolerated intervention well. Step>pivot to bedside chair with RW and min A. Recommending HHOT and RW at d/c.

## 2022-11-06 NOTE — PROGRESS NOTES
[facility] - [facility] ([facility] Medicine  Progress Note    Patient Name: Vincent Benton  MRN: [id number]  Patient Class: IP- Inpatient   Admission Date: 11/1/2022  Length of Stay: 3 days  Attending Physician: Raquel Arroyo MD  Primary Care Provider: Shakila Moran DO      Subjective:     Principal Problem:Mucositis due to antineoplastic therapy      HPI:  Vincent Benton is a 67 y.o. male patient with a PMHx of GERD, HTN, rectal cancer, alcoholism who presents to the Emergency Department for evaluation of mouth and perirectal sores which onset several days PTA. Pt was sent from the cancer center and is currently undergoing chemotherapy. His last chemo was on 10/25/22. Associated sxs include decreased appetite, diarrhea, generalized abdominal pain, generalized weakness and decreased urination. Patient denies any fever, chills, CP, SOB, HA, n/v, and all other sxs at this time. No prior Tx reported. No further complaints or concerns at this time.   On arrival: Temp 97.4, pulse 69, resp 20 and B/P 145/73  Labs are essentially baseline with mild anemia  Pt was placed in Observation for supportive care as a result of chemotherapy.   As clarification, on 11/1/2022 patient should be admitted for hospital observation services under my care in collaboration with Steven Doherty MD        Overview/Hospital Course:  Pt with severe mucositis due to chemotherapy. Ulcerations present in mouth and in the rectal area. Receiving IV fluids, miracle mouth wash and prn analgesia. Morphine changed to Dilaudid. Per Up to Date - systemic corticosteroids are warranted. Solumedrol added. Encouraged family member to assist with salt water gargles every 4 hours. Pt hypoglycemic this AM and IV fluids with dextrose added. Potassium replaced. Pt denies further diarrhea.     11/3/22 - slight improvement in condition today. Pt reports he is feeling better. Has whitish coating to tongue, Diflucan added to cover for oral  candidasis. Nystatin for thrush. Pt has not tried to take any oral medications. IV fluids continue and potassium replaced. Encouraged warm salt water gargles along with miracle mouth wash. PT/OT ordered due to weakness.   11/4/22 No acute events overnight. The patient reports some improvement in mucositis. He is able to tolerate some PO intake. Will add boost with each meal to improve nutrition status. Will start lomotil to improve diarrhea. Palliative care is following and will adjust PRN analgesia. Continue current management.    11/05 Still poor po intake. Continue IVF. Monitor and supplement electrolytes as needed. Continue current treatment.   11/06 Po intake improving slowly. Patient with bradycardia with heart rate down to 30s this am. Blood pressure stable. Cardiology consulted.       Interval History:  Po intake improving slowly. Patient with bradycardia with heart rate down to 30s this am. Blood pressure stable. Cardiology consulted.     Review of Systems   Constitutional:  Positive for activity change, appetite change and fatigue. Negative for chills, fever and unexpected weight change.   HENT:  Positive for mouth sores and trouble swallowing. Negative for congestion, ear discharge, ear pain, facial swelling, rhinorrhea, sinus pressure, sneezing and sore throat.    Eyes:  Negative for pain and redness.   Respiratory:  Positive for shortness of breath. Negative for apnea, cough, chest tightness, wheezing and stridor.    Cardiovascular:  Negative for chest pain, palpitations and leg swelling.   Gastrointestinal:  Positive for diarrhea. Negative for abdominal distention, abdominal pain, anal bleeding, blood in stool, constipation, nausea and vomiting.   Endocrine: Negative for polydipsia and polyphagia.   Genitourinary:  Negative for difficulty urinating, dysuria, frequency, hematuria and penile discharge.   Musculoskeletal:  Positive for gait problem. Negative for neck pain and neck stiffness.   Skin:   Positive for pallor and wound.   Allergic/Immunologic: Negative for food allergies.   Neurological:  Positive for weakness. Negative for tremors, seizures, syncope, facial asymmetry and speech difficulty.   Hematological:  Does not bruise/bleed easily.   Psychiatric/Behavioral:  Negative for agitation, behavioral problems, confusion, hallucinations and suicidal ideas. The patient is nervous/anxious.    Objective:     Vital Signs (Most Recent):  Temp: 98 °F (36.7 °C) (11/06/22 1123)  Pulse: 63 (11/06/22 1123)  Resp: 18 (11/06/22 1123)  BP: 132/85 (11/06/22 1123)  SpO2: 97 % (11/06/22 1123) Vital Signs (24h Range):  Temp:  [97.5 °F (36.4 °C)-98.5 °F (36.9 °C)] 98 °F (36.7 °C)  Pulse:  [36-70] 63  Resp:  [15-20] 18  SpO2:  [94 %-98 %] 97 %  BP: (125-156)/(74-89) 132/85     Weight: 63.5 kg (139 lb 15.9 oz)  Body mass index is 19.52 kg/m².    Intake/Output Summary (Last 24 hours) at 11/6/2022 1230  Last data filed at 11/6/2022 0000  Gross per 24 hour   Intake 976.96 ml   Output 900 ml   Net 76.96 ml      Physical Exam  Vitals and nursing note reviewed.   Constitutional:       Appearance: He is well-developed. He is ill-appearing. He is not diaphoretic.      Comments: Thin in appearance   HENT:      Head: Normocephalic and atraumatic.      Mouth/Throat:      Mouth: Mucous membranes are dry. Oral lesions present.   Eyes:      General: No scleral icterus.        Right eye: No discharge.         Left eye: No discharge.      Extraocular Movements: Extraocular movements intact.      Conjunctiva/sclera: Conjunctivae normal.      Pupils: Pupils are equal, round, and reactive to light.   Cardiovascular:      Rate and Rhythm: Regular rhythm. Bradycardia present.      Pulses: Normal pulses.      Heart sounds: Normal heart sounds. No murmur heard.    No friction rub. No gallop.   Pulmonary:      Effort: Pulmonary effort is normal. No respiratory distress.      Breath sounds: Normal breath sounds. No wheezing or rales.   Abdominal:       General: Bowel sounds are normal. There is no distension.      Palpations: Abdomen is soft.      Tenderness: There is no abdominal tenderness. There is no guarding.   Genitourinary:     Comments: Not examined  Musculoskeletal:         General: Normal range of motion.      Cervical back: Normal range of motion and neck supple. No rigidity or tenderness.      Right lower leg: No edema.      Left lower leg: No edema.   Skin:     General: Skin is warm and dry.      Coloration: Skin is pale.   Neurological:      General: No focal deficit present.      Mental Status: He is alert and oriented to person, place, and time. Mental status is at baseline.      Cranial Nerves: No cranial nerve deficit.      Motor: Weakness present.   Psychiatric:         Mood and Affect: Mood normal.         Behavior: Behavior normal.         Thought Content: Thought content normal.         Judgment: Judgment normal.        Lab Results   Component Value Date    WBC 4.32 11/06/2022    HGB 9.3 (L) 11/06/2022    HCT 30.3 (L) 11/06/2022    MCV 87 11/06/2022     11/06/2022         BMP  Lab Results   Component Value Date     11/06/2022    K 4.8 11/06/2022     11/06/2022    CO2 27 11/06/2022    BUN 4 (L) 11/06/2022    CREATININE 0.6 11/06/2022    CALCIUM 8.5 (L) 11/06/2022    ANIONGAP 9 11/06/2022    ESTGFRAFRICA >60 07/20/2022    EGFRNONAA >60 07/20/2022         Assessment/Plan:      * Mucositis due to antineoplastic therapy  11/01 Clear liquid diet if tolerated  IV fluids  Miracle mouth wash  Prn analgesia  Solumedrol  11/4/22  The patient reports some improvement in mucositis. He is able to tolerate some PO intake. Palliative care is following and will adjust PRN analgesia. Continue current management.  Hem/onc is following   11/05 Attempt soft diet    Hypophosphatemia  11/06 Add Iv Na phos  Repeat level in am      Bradycardia  11/06 New onset  Check EKG  Telemetry  Cardiology consulted        Debility  11/06 Fall and skin  precautions  Turn q 2 hours  Continue Physical therapy and Occupational therapy        Immunocompromised patient  11/05 Monitor  Recent chemotherapy 10/20/2022  Oncology following      Severe malnutrition  11/05 Nutrition consulted. Most recent weight and BMI monitored     Measurements:  Wt Readings from Last 1 Encounters:   11/05/22 60 kg (132 lb 4.4 oz)   Body mass index is 18.98 kg/m².    Recommendations:      Patient has been screened and assessed by RD. RD will follow patient.    Add po supplement    Normocytic anemia  11/05 Add MVI once tolerating po intake      Oral candidiasis  11/01 Diflucan IV x 7 days  Nystatin oral swish and swallow      Encounter for palliative care  11/02 - Palliative care is following       Essential hypertension  11/01 Hydralazine 10 mg IV every 6 hours is unable to tolerate oral meds  Continue Losartan      Rectal cancer  11/01 Is followed by Dr. Tuttle in clinic  Last chemo 10/25/22    Neoplasm related pain  11/01 Continue fentanyl patch  Prn Dilaudid for pain associated with mucositis  Consult Palliative Care to assist with pain medications        VTE Risk Mitigation (From admission, onward)         Ordered     enoxaparin injection 40 mg  Daily         11/05/22 1106     Place JUAN hose  Until discontinued         11/05/22 1106     Reason for No Pharmacological VTE Prophylaxis  Once        Question:  Reasons:  Answer:  Risk of Bleeding    11/01/22 1359     IP VTE HIGH RISK PATIENT  Once         11/01/22 1359     Place sequential compression device  Until discontinued         11/01/22 1359                Discharge Planning   KERRIE:      Code Status: DNR   Is the patient medically ready for discharge?:     Reason for patient still in hospital (select all that apply): Treatment  Discharge Plan A: Home Health        Time spent seeing patient( greater than 1/2 spent in direct contact) : 36 minutes    SKYLAR Lin  Department of Hospital Medicine   [facility] - Telemetry ([facility])

## 2022-11-06 NOTE — CARE UPDATE
Notified by nurse that pt's HR drops to 30s when he is sleeping. Snoring noted. As soon as pt is woken from sleep, his HR comes up to 50s. EKG showed Sinus bradycardia, IRBBB, no heart block/pauses. Pt likely has RENEA. Consider OP sleep study and/or cardiology consult.

## 2022-11-06 NOTE — PLAN OF CARE
Patient resting in bed. Vital signs WDL. No complaints of pain. Tele monitor on. Patient identifiers checked. Patient is awake alert and oriented. All needs met, safety measures in place, will continue to monitor.  Patient's heart rate would fall as low as 33-35 bpm, patient asymptomatic and NP Myriam was aware.

## 2022-11-06 NOTE — SUBJECTIVE & OBJECTIVE
Interval History:  Po intake improving slowly. Patient with bradycardia with heart rate down to 30s this am. Blood pressure stable. Cardiology consulted.     Review of Systems   Constitutional:  Positive for activity change, appetite change and fatigue. Negative for chills, fever and unexpected weight change.   HENT:  Positive for mouth sores and trouble swallowing. Negative for congestion, ear discharge, ear pain, facial swelling, rhinorrhea, sinus pressure, sneezing and sore throat.    Eyes:  Negative for pain and redness.   Respiratory:  Positive for shortness of breath. Negative for apnea, cough, chest tightness, wheezing and stridor.    Cardiovascular:  Negative for chest pain, palpitations and leg swelling.   Gastrointestinal:  Positive for diarrhea. Negative for abdominal distention, abdominal pain, anal bleeding, blood in stool, constipation, nausea and vomiting.   Endocrine: Negative for polydipsia and polyphagia.   Genitourinary:  Negative for difficulty urinating, dysuria, frequency, hematuria and penile discharge.   Musculoskeletal:  Positive for gait problem. Negative for neck pain and neck stiffness.   Skin:  Positive for pallor and wound.   Allergic/Immunologic: Negative for food allergies.   Neurological:  Positive for weakness. Negative for tremors, seizures, syncope, facial asymmetry and speech difficulty.   Hematological:  Does not bruise/bleed easily.   Psychiatric/Behavioral:  Negative for agitation, behavioral problems, confusion, hallucinations and suicidal ideas. The patient is nervous/anxious.    Objective:     Vital Signs (Most Recent):  Temp: 98 °F (36.7 °C) (11/06/22 1123)  Pulse: 63 (11/06/22 1123)  Resp: 18 (11/06/22 1123)  BP: 132/85 (11/06/22 1123)  SpO2: 97 % (11/06/22 1123) Vital Signs (24h Range):  Temp:  [97.5 °F (36.4 °C)-98.5 °F (36.9 °C)] 98 °F (36.7 °C)  Pulse:  [36-70] 63  Resp:  [15-20] 18  SpO2:  [94 %-98 %] 97 %  BP: (125-156)/(74-89) 132/85     Weight: 63.5 kg (139 lb  15.9 oz)  Body mass index is 19.52 kg/m².    Intake/Output Summary (Last 24 hours) at 11/6/2022 1230  Last data filed at 11/6/2022 0000  Gross per 24 hour   Intake 976.96 ml   Output 900 ml   Net 76.96 ml      Physical Exam  Vitals and nursing note reviewed.   Constitutional:       Appearance: He is well-developed. He is ill-appearing. He is not diaphoretic.      Comments: Thin in appearance   HENT:      Head: Normocephalic and atraumatic.      Mouth/Throat:      Mouth: Mucous membranes are dry. Oral lesions present.   Eyes:      General: No scleral icterus.        Right eye: No discharge.         Left eye: No discharge.      Extraocular Movements: Extraocular movements intact.      Conjunctiva/sclera: Conjunctivae normal.      Pupils: Pupils are equal, round, and reactive to light.   Cardiovascular:      Rate and Rhythm: Regular rhythm. Bradycardia present.      Pulses: Normal pulses.      Heart sounds: Normal heart sounds. No murmur heard.    No friction rub. No gallop.   Pulmonary:      Effort: Pulmonary effort is normal. No respiratory distress.      Breath sounds: Normal breath sounds. No wheezing or rales.   Abdominal:      General: Bowel sounds are normal. There is no distension.      Palpations: Abdomen is soft.      Tenderness: There is no abdominal tenderness. There is no guarding.   Genitourinary:     Comments: Not examined  Musculoskeletal:         General: Normal range of motion.      Cervical back: Normal range of motion and neck supple. No rigidity or tenderness.      Right lower leg: No edema.      Left lower leg: No edema.   Skin:     General: Skin is warm and dry.      Coloration: Skin is pale.   Neurological:      General: No focal deficit present.      Mental Status: He is alert and oriented to person, place, and time. Mental status is at baseline.      Cranial Nerves: No cranial nerve deficit.      Motor: Weakness present.   Psychiatric:         Mood and Affect: Mood normal.         Behavior:  Behavior normal.         Thought Content: Thought content normal.         Judgment: Judgment normal.        Lab Results   Component Value Date    WBC 4.32 11/06/2022    HGB 9.3 (L) 11/06/2022    HCT 30.3 (L) 11/06/2022    MCV 87 11/06/2022     11/06/2022         BMP  Lab Results   Component Value Date     11/06/2022    K 4.8 11/06/2022     11/06/2022    CO2 27 11/06/2022    BUN 4 (L) 11/06/2022    CREATININE 0.6 11/06/2022    CALCIUM 8.5 (L) 11/06/2022    ANIONGAP 9 11/06/2022    ESTGFRAFRICA >60 07/20/2022    EGFRNONAA >60 07/20/2022

## 2022-11-06 NOTE — ASSESSMENT & PLAN NOTE
Currently not symptomatic  Cont to monitor on Tele   Maybe due to vagal response   Discussed will need outpt Holter/Event monitor   R/o RENEA; ordered thyroid studies   Avoid any AVN blocking agents

## 2022-11-06 NOTE — HPI
HPI:  Vincent Benton is a 67 y.o. male patient with a PMHx of GERD, HTN, rectal cancer, alcoholism who presents to the Emergency Department for evaluation of mouth and perirectal sores which onset several days PTA. Pt was sent from the cancer center and is currently undergoing chemotherapy. His last chemo was on 10/25/22. Associated sxs include decreased appetite, diarrhea, generalized abdominal pain, generalized weakness and decreased urination. Patient denies any fever, chills, CP, SOB, HA, n/v, and all other sxs at this time. No prior Tx reported. No further complaints or concerns at this time.   On arrival: Temp 97.4, pulse 69, resp 20 and B/P 145/73  Labs are essentially baseline with mild anemia  Pt was placed in Observation for supportive care as a result of chemotherapy.     Cardiology consulted for bradycardia, rates mid 30s-40s; pt overall asymptomatic regards to HR; is doing PT/OT and able to walk around well without dizziness/falls. No indication for PPM unless heart block noted on tele, will continue to monitor here on tele and f/u in CV clinic and EP if needed. Started on Hydralazine 25 BID.

## 2022-11-06 NOTE — CONSULTS
[facility] - Telemetry ([facility])  Cardiology  Consult Note    Patient Name: Vincent Benton  MRN: [id number]  Admission Date: 11/1/2022  Hospital Length of Stay: 3 days  Code Status: DNR   Attending Provider: Raquel Arroyo MD   Consulting Provider: Alberto Trujillo Md, MD  Primary Care Physician: Shakila Moran DO  Principal Problem:Mucositis due to antineoplastic therapy    Patient information was obtained from patient, past medical records, ER records and primary team.     Inpatient consult to Cardiology  Consult performed by: Alberto Trujillo MD  Consult ordered by: Raquel Arroyo MD  Reason for consult: bradycardia         Subjective:     Chief Complaint:  Mouth and perirectal sores     HPI:   HPI:  Vincent Benton is a 67 y.o. male patient with a PMHx of GERD, HTN, rectal cancer, alcoholism who presents to the Emergency Department for evaluation of mouth and perirectal sores which onset several days PTA. Pt was sent from the cancer center and is currently undergoing chemotherapy. His last chemo was on 10/25/22. Associated sxs include decreased appetite, diarrhea, generalized abdominal pain, generalized weakness and decreased urination. Patient denies any fever, chills, CP, SOB, HA, n/v, and all other sxs at this time. No prior Tx reported. No further complaints or concerns at this time.   On arrival: Temp 97.4, pulse 69, resp 20 and B/P 145/73  Labs are essentially baseline with mild anemia  Pt was placed in Observation for supportive care as a result of chemotherapy.     Cardiology consulted for bradycardia, rates mid 30s-40s; pt overall asymptomatic regards to HR; is doing PT/OT and able to walk around well without dizziness/falls. No indication for PPM unless heart block noted on tele, will continue to monitor here on tele and f/u in CV clinic and EP if needed. Started on Hydralazine 25 BID.           Review of Systems   Constitutional:  Positive for activity change, appetite change and fatigue. Negative for  chills, fever and unexpected weight change.   HENT:  Positive for mouth sores and trouble swallowing. Negative for congestion, ear discharge, ear pain, facial swelling, rhinorrhea, sinus pressure, sneezing and sore throat.    Eyes:  Negative for pain and redness.   Respiratory:  Positive for shortness of breath. Negative for apnea, cough, chest tightness, wheezing and stridor.    Cardiovascular:  Negative for chest pain, palpitations and leg swelling.   Gastrointestinal:  Positive for diarrhea. Negative for abdominal distention, abdominal pain, anal bleeding, blood in stool, constipation, nausea and vomiting.   Endocrine: Negative for polydipsia and polyphagia.   Genitourinary:  Negative for difficulty urinating, dysuria, frequency, hematuria and penile discharge.   Musculoskeletal:  Positive for gait problem. Negative for neck pain and neck stiffness.   Skin:  Positive for pallor and wound.   Allergic/Immunologic: Negative for food allergies.   Neurological:  Positive for weakness. Negative for tremors, seizures, syncope, facial asymmetry and speech difficulty.   Hematological:  Does not bruise/bleed easily.   Psychiatric/Behavioral:  Negative for agitation, behavioral problems, confusion, hallucinations and suicidal ideas. The patient is nervous/anxious.    Objective:     Vital Signs (Most Recent):  Temp: 98 °F (36.7 °C) (11/06/22 1123)  Pulse: 63 (11/06/22 1123)  Resp: 18 (11/06/22 1324)  BP: 132/85 (11/06/22 1123)  SpO2: 97 % (11/06/22 1123) Vital Signs (24h Range):  Temp:  [97.5 °F (36.4 °C)-98.5 °F (36.9 °C)] 98 °F (36.7 °C)  Pulse:  [36-70] 63  Resp:  [15-20] 18  SpO2:  [94 %-98 %] 97 %  BP: (125-156)/(74-89) 132/85     Weight: 63.5 kg (139 lb 15.9 oz)  Body mass index is 19.52 kg/m².    Intake/Output Summary (Last 24 hours) at 11/6/2022 1424  Last data filed at 11/6/2022 0000  Gross per 24 hour   Intake 976.96 ml   Output 900 ml   Net 76.96 ml        Physical Exam  Vitals and nursing note reviewed.    Constitutional:       Appearance: He is well-developed. He is ill-appearing. He is not diaphoretic.      Comments: Thin in appearance   HENT:      Head: Normocephalic and atraumatic.      Mouth/Throat:      Mouth: Mucous membranes are dry. Oral lesions present.   Eyes:      General: No scleral icterus.        Right eye: No discharge.         Left eye: No discharge.      Extraocular Movements: Extraocular movements intact.      Conjunctiva/sclera: Conjunctivae normal.      Pupils: Pupils are equal, round, and reactive to light.   Cardiovascular:      Rate and Rhythm: Regular rhythm. Bradycardia present.      Pulses: Normal pulses.      Heart sounds: Normal heart sounds. No murmur heard.    No friction rub. No gallop.   Pulmonary:      Effort: Pulmonary effort is normal. No respiratory distress.      Breath sounds: Normal breath sounds. No wheezing or rales.   Abdominal:      General: Bowel sounds are normal. There is no distension.      Palpations: Abdomen is soft.      Tenderness: There is no abdominal tenderness. There is no guarding.   Genitourinary:     Comments: Not examined  Musculoskeletal:         General: Normal range of motion.      Cervical back: Normal range of motion and neck supple. No rigidity or tenderness.      Right lower leg: No edema.      Left lower leg: No edema.   Skin:     General: Skin is warm and dry.      Coloration: Skin is pale.   Neurological:      General: No focal deficit present.      Mental Status: He is alert and oriented to person, place, and time. Mental status is at baseline.      Cranial Nerves: No cranial nerve deficit.      Motor: Weakness present.   Psychiatric:         Mood and Affect: Mood normal.         Behavior: Behavior normal.         Thought Content: Thought content normal.         Judgment: Judgment normal.        Lab Results   Component Value Date    WBC 4.32 11/06/2022    HGB 9.3 (L) 11/06/2022    HCT 30.3 (L) 11/06/2022    MCV 87 11/06/2022     11/06/2022          BMP  Lab Results   Component Value Date     11/06/2022    K 4.8 11/06/2022     11/06/2022    CO2 27 11/06/2022    BUN 4 (L) 11/06/2022    CREATININE 0.6 11/06/2022    CALCIUM 8.5 (L) 11/06/2022    ANIONGAP 9 11/06/2022    ESTGFRAFRICA >60 07/20/2022    EGFRNONAA >60 07/20/2022       Review of Systems   Constitutional: Positive for activity change, appetite change and fatigue. Negative for chills, fever and unexpected weight change.   HENT:  Positive for mouth sores and trouble swallowing. Negative for congestion, ear discharge, ear pain, facial swelling, neck stiffness, rhinorrhea, sinus pressure, sneezing, sore throat and stridor.    Eyes:  Negative for pain and redness.   Cardiovascular:  Negative for chest pain, leg swelling and palpitations.   Respiratory:  Positive for shortness of breath. Negative for apnea, chest tightness, cough and wheezing.    Endocrine: Negative for polydipsia and polyphagia.   Hematologic/Lymphatic: Does not bruise/bleed easily.   Skin:  Positive for pallor and wound.   Musculoskeletal:  Positive for gait problem. Negative for neck pain.   Gastrointestinal:  Positive for diarrhea. Negative for abdominal distention, abdominal pain, anal bleeding, blood in stool, constipation, nausea and vomiting.   Genitourinary:  Negative for difficulty urinating, dysuria, frequency, hematuria and penile discharge.   Neurological:  Positive for weakness. Negative for facial asymmetry, seizures, speech difficulty, syncope and tremors.   Psychiatric/Behavioral:  Negative for agitation, behavioral problems, confusion, hallucinations and suicidal ideas. The patient is nervous/anxious.    Allergic/Immunologic: Negative for food allergies.   Physical Exam  Vitals and nursing note reviewed.   Constitutional:       Appearance: He is well-developed. He is ill-appearing. He is not diaphoretic.      Comments: Thin in appearance   HENT:      Head: Normocephalic and atraumatic.      Mouth/Throat:       Mouth: Mucous membranes are dry. Oral lesions present.   Eyes:      General: No scleral icterus.        Right eye: No discharge.         Left eye: No discharge.      Extraocular Movements: Extraocular movements intact.      Conjunctiva/sclera: Conjunctivae normal.      Pupils: Pupils are equal, round, and reactive to light.   Cardiovascular:      Rate and Rhythm: Regular rhythm. Bradycardia present.      Pulses: Normal pulses.      Heart sounds: Normal heart sounds. No murmur heard.    No friction rub. No gallop.   Pulmonary:      Effort: Pulmonary effort is normal. No respiratory distress.      Breath sounds: Normal breath sounds. No wheezing or rales.   Abdominal:      General: Bowel sounds are normal. There is no distension.      Palpations: Abdomen is soft.      Tenderness: There is no abdominal tenderness. There is no guarding.   Genitourinary:     Comments: Not examined  Musculoskeletal:         General: Normal range of motion.      Cervical back: Normal range of motion and neck supple. No rigidity or tenderness.      Right lower leg: No edema.      Left lower leg: No edema.   Skin:     General: Skin is warm and dry.      Coloration: Skin is pale.   Neurological:      General: No focal deficit present.      Mental Status: He is alert and oriented to person, place, and time. Mental status is at baseline.      Cranial Nerves: No cranial nerve deficit.      Motor: Weakness present.   Psychiatric:         Mood and Affect: Mood normal.         Behavior: Behavior normal.         Thought Content: Thought content normal.         Judgment: Judgment normal.       Assessment and Plan:     Bradycardia  Currently not symptomatic  Cont to monitor on Tele   Maybe due to vagal response   Discussed will need outpt Holter/Event monitor   R/o RENEA; ordered thyroid studies   Avoid any AVN blocking agents    Debility  Cont PT/OT    Essential hypertension  Titrate meds  Started Hydralazine to improve rates    Rectal  cancer  Cont tx and f/u per heme/onc        VTE Risk Mitigation (From admission, onward)         Ordered     enoxaparin injection 40 mg  Daily         11/05/22 1106     Place JUAN hose  Until discontinued         11/05/22 1106     Reason for No Pharmacological VTE Prophylaxis  Once        Question:  Reasons:  Answer:  Risk of Bleeding    11/01/22 1359     IP VTE HIGH RISK PATIENT  Once         11/01/22 1359     Place sequential compression device  Until discontinued         11/01/22 1359                Thank you for your consult. I will follow-up with patient. Please contact us if you have any additional questions.    Alberto Trujillo Md, MD  Cardiology   [facility] - Telemetry ([facility])

## 2022-11-06 NOTE — PT/OT/SLP PROGRESS
Physical Therapy Treatment    Patient Name:  Vincent Benton   MRN:  [id number]    Recommendations:     Discharge Recommendations:  home health PT (24 hour care)   Discharge Equipment Recommendations: bedside commode, bath bench, walker, rolling   Barriers to discharge: None    Assessment:     Vincent Benton is a 67 y.o. male admitted with a medical diagnosis of Mucositis due to antineoplastic therapy.  He presents with the following impairments/functional limitations:  weakness, impaired endurance, impaired self care skills, impaired functional mobility, gait instability, impaired balance, pain, decreased coordination.    Pt was able to ambulate a further distance on today with improved stability and safety awareness. Pt will benefit from continued PT services to improve overall strength and endurance to perform safe transfers and ambulation.    Rehab Prognosis: Fair; patient would benefit from acute skilled PT services to address these deficits and reach maximum level of function.    Recent Surgery: * No surgery found *      Plan:     During this hospitalization, patient to be seen 3 x/week to address the identified rehab impairments via gait training, therapeutic activities, therapeutic exercises and progress toward the following goals:    Plan of Care Expires:  11/17/22    Subjective     Chief Complaint: L hip pain  Patient/Family Comments/goals: to d/c home  Pain/Comfort:  Pain Rating 1:  (pt did not rate)  Location - Side 1: Left  Location - Orientation 1: posterior  Location 1: hip  Pain Addressed 1: Distraction, Pre-medicate for activity  Pain Rating Post-Intervention 1:  (unrated)      Objective:     Communicated with pt's nurseMadhu prior to session.  Patient found HOB elevated with telemetry, peripheral IV upon PT entry to room.     General Precautions: Standard, fall, respiratory   Orthopedic Precautions:N/A   Braces: N/A  Respiratory Status: Room air     Functional Mobility:  Bed Mobility:      Supine to Sit: stand by assistance  Sit to supine: contact guard assistance  Transfers:     Sit to Stand: minimum assistance with rolling walker  Cues for hand placement  Gait: Pt ambulates ~100 ft throughout hallway with CGA-Min (A) using the RW. Pt demos antalgic gait patterning with toe-out stance (compensation for muscular weakness/ atrophy and pain). Pt with decreased gait speed, step length, and base of support. Pt cued for activity pacing and safety.  Balance: CGA      AM-PAC 6 CLICK MOBILITY  Turning over in bed (including adjusting bedclothes, sheets and blankets)?: 3  Sitting down on and standing up from a chair with arms (e.g., wheelchair, bedside commode, etc.): 3  Moving from lying on back to sitting on the side of the bed?: 3  Moving to and from a bed to a chair (including a wheelchair)?: 3  Need to walk in hospital room?: 3  Climbing 3-5 steps with a railing?: 1  Basic Mobility Total Score: 16       Treatment & Education:  Pt educated on the goals of the session, proper transfer technique, gait mechanics, and safety.    Patient left up in chair with all lines intact, call button in reach, chair alarm on, and OT and PCT/nurse present.    GOALS:   Multidisciplinary Problems       Physical Therapy Goals          Problem: Physical Therapy    Goal Priority Disciplines Outcome Goal Variances Interventions   Physical Therapy Goal     PT, PT/OT Ongoing, Progressing     Description: LTG'S TO BE MET IN 14 DAYS (11-17-22)  PT WILL REQUIRE SBA FOR BED MOBILITY  PT WILL REQUIRE SBA FOR BED<>CHAIR TF'S  PT WILL  FEET WITH RW AND SBA                         Time Tracking:     PT Received On: 11/06/22  PT Start Time: 1040     PT Stop Time: 1103  PT Total Time (min): 23 min     Billable Minutes: Gait Training 13 and Therapeutic Activity 10    Treatment Type: Treatment  PT/PTA: PTA     PTA Visit Number: 1     11/06/2022

## 2022-11-06 NOTE — SUBJECTIVE & OBJECTIVE
Review of Systems   Constitutional:  Positive for activity change, appetite change and fatigue. Negative for chills, fever and unexpected weight change.   HENT:  Positive for mouth sores and trouble swallowing. Negative for congestion, ear discharge, ear pain, facial swelling, rhinorrhea, sinus pressure, sneezing and sore throat.    Eyes:  Negative for pain and redness.   Respiratory:  Positive for shortness of breath. Negative for apnea, cough, chest tightness, wheezing and stridor.    Cardiovascular:  Negative for chest pain, palpitations and leg swelling.   Gastrointestinal:  Positive for diarrhea. Negative for abdominal distention, abdominal pain, anal bleeding, blood in stool, constipation, nausea and vomiting.   Endocrine: Negative for polydipsia and polyphagia.   Genitourinary:  Negative for difficulty urinating, dysuria, frequency, hematuria and penile discharge.   Musculoskeletal:  Positive for gait problem. Negative for neck pain and neck stiffness.   Skin:  Positive for pallor and wound.   Allergic/Immunologic: Negative for food allergies.   Neurological:  Positive for weakness. Negative for tremors, seizures, syncope, facial asymmetry and speech difficulty.   Hematological:  Does not bruise/bleed easily.   Psychiatric/Behavioral:  Negative for agitation, behavioral problems, confusion, hallucinations and suicidal ideas. The patient is nervous/anxious.    Objective:     Vital Signs (Most Recent):  Temp: 98 °F (36.7 °C) (11/06/22 1123)  Pulse: 63 (11/06/22 1123)  Resp: 18 (11/06/22 1324)  BP: 132/85 (11/06/22 1123)  SpO2: 97 % (11/06/22 1123) Vital Signs (24h Range):  Temp:  [97.5 °F (36.4 °C)-98.5 °F (36.9 °C)] 98 °F (36.7 °C)  Pulse:  [36-70] 63  Resp:  [15-20] 18  SpO2:  [94 %-98 %] 97 %  BP: (125-156)/(74-89) 132/85     Weight: 63.5 kg (139 lb 15.9 oz)  Body mass index is 19.52 kg/m².    Intake/Output Summary (Last 24 hours) at 11/6/2022 1424  Last data filed at 11/6/2022 0000  Gross per 24 hour    Intake 976.96 ml   Output 900 ml   Net 76.96 ml        Physical Exam  Vitals and nursing note reviewed.   Constitutional:       Appearance: He is well-developed. He is ill-appearing. He is not diaphoretic.      Comments: Thin in appearance   HENT:      Head: Normocephalic and atraumatic.      Mouth/Throat:      Mouth: Mucous membranes are dry. Oral lesions present.   Eyes:      General: No scleral icterus.        Right eye: No discharge.         Left eye: No discharge.      Extraocular Movements: Extraocular movements intact.      Conjunctiva/sclera: Conjunctivae normal.      Pupils: Pupils are equal, round, and reactive to light.   Cardiovascular:      Rate and Rhythm: Regular rhythm. Bradycardia present.      Pulses: Normal pulses.      Heart sounds: Normal heart sounds. No murmur heard.    No friction rub. No gallop.   Pulmonary:      Effort: Pulmonary effort is normal. No respiratory distress.      Breath sounds: Normal breath sounds. No wheezing or rales.   Abdominal:      General: Bowel sounds are normal. There is no distension.      Palpations: Abdomen is soft.      Tenderness: There is no abdominal tenderness. There is no guarding.   Genitourinary:     Comments: Not examined  Musculoskeletal:         General: Normal range of motion.      Cervical back: Normal range of motion and neck supple. No rigidity or tenderness.      Right lower leg: No edema.      Left lower leg: No edema.   Skin:     General: Skin is warm and dry.      Coloration: Skin is pale.   Neurological:      General: No focal deficit present.      Mental Status: He is alert and oriented to person, place, and time. Mental status is at baseline.      Cranial Nerves: No cranial nerve deficit.      Motor: Weakness present.   Psychiatric:         Mood and Affect: Mood normal.         Behavior: Behavior normal.         Thought Content: Thought content normal.         Judgment: Judgment normal.        Lab Results   Component Value Date    WBC 4.32  11/06/2022    HGB 9.3 (L) 11/06/2022    HCT 30.3 (L) 11/06/2022    MCV 87 11/06/2022     11/06/2022         BMP  Lab Results   Component Value Date     11/06/2022    K 4.8 11/06/2022     11/06/2022    CO2 27 11/06/2022    BUN 4 (L) 11/06/2022    CREATININE 0.6 11/06/2022    CALCIUM 8.5 (L) 11/06/2022    ANIONGAP 9 11/06/2022    ESTGFRAFRICA >60 07/20/2022    EGFRNONAA >60 07/20/2022       Review of Systems   Constitutional: Positive for activity change, appetite change and fatigue. Negative for chills, fever and unexpected weight change.   HENT:  Positive for mouth sores and trouble swallowing. Negative for congestion, ear discharge, ear pain, facial swelling, neck stiffness, rhinorrhea, sinus pressure, sneezing, sore throat and stridor.    Eyes:  Negative for pain and redness.   Cardiovascular:  Negative for chest pain, leg swelling and palpitations.   Respiratory:  Positive for shortness of breath. Negative for apnea, chest tightness, cough and wheezing.    Endocrine: Negative for polydipsia and polyphagia.   Hematologic/Lymphatic: Does not bruise/bleed easily.   Skin:  Positive for pallor and wound.   Musculoskeletal:  Positive for gait problem. Negative for neck pain.   Gastrointestinal:  Positive for diarrhea. Negative for abdominal distention, abdominal pain, anal bleeding, blood in stool, constipation, nausea and vomiting.   Genitourinary:  Negative for difficulty urinating, dysuria, frequency, hematuria and penile discharge.   Neurological:  Positive for weakness. Negative for facial asymmetry, seizures, speech difficulty, syncope and tremors.   Psychiatric/Behavioral:  Negative for agitation, behavioral problems, confusion, hallucinations and suicidal ideas. The patient is nervous/anxious.    Allergic/Immunologic: Negative for food allergies.   Physical Exam  Vitals and nursing note reviewed.   Constitutional:       Appearance: He is well-developed. He is ill-appearing. He is not  diaphoretic.      Comments: Thin in appearance   HENT:      Head: Normocephalic and atraumatic.      Mouth/Throat:      Mouth: Mucous membranes are dry. Oral lesions present.   Eyes:      General: No scleral icterus.        Right eye: No discharge.         Left eye: No discharge.      Extraocular Movements: Extraocular movements intact.      Conjunctiva/sclera: Conjunctivae normal.      Pupils: Pupils are equal, round, and reactive to light.   Cardiovascular:      Rate and Rhythm: Regular rhythm. Bradycardia present.      Pulses: Normal pulses.      Heart sounds: Normal heart sounds. No murmur heard.    No friction rub. No gallop.   Pulmonary:      Effort: Pulmonary effort is normal. No respiratory distress.      Breath sounds: Normal breath sounds. No wheezing or rales.   Abdominal:      General: Bowel sounds are normal. There is no distension.      Palpations: Abdomen is soft.      Tenderness: There is no abdominal tenderness. There is no guarding.   Genitourinary:     Comments: Not examined  Musculoskeletal:         General: Normal range of motion.      Cervical back: Normal range of motion and neck supple. No rigidity or tenderness.      Right lower leg: No edema.      Left lower leg: No edema.   Skin:     General: Skin is warm and dry.      Coloration: Skin is pale.   Neurological:      General: No focal deficit present.      Mental Status: He is alert and oriented to person, place, and time. Mental status is at baseline.      Cranial Nerves: No cranial nerve deficit.      Motor: Weakness present.   Psychiatric:         Mood and Affect: Mood normal.         Behavior: Behavior normal.         Thought Content: Thought content normal.         Judgment: Judgment normal.

## 2022-11-06 NOTE — PT/OT/SLP PROGRESS
Occupational Therapy   Treatment    Name: Vincent Benton  MRN: [id number]  Admitting Diagnosis:  Mucositis due to antineoplastic therapy       Recommendations:     Discharge Recommendations: home health OT (24/7 SPMARLON and A)  Discharge Equipment Recommendations:  bedside commode, bath bench, walker, rolling  Barriers to discharge:  None    Assessment:     Vincent Benton is a 67 y.o. male with a medical diagnosis of Mucositis due to antineoplastic therapy.  He presents with the following performance deficits affecting function are weakness, impaired endurance, impaired self care skills, impaired functional mobility, impaired balance, pain, impaired cardiopulmonary response to activity.     Rehab Prognosis:  Fair; patient would benefit from acute skilled OT services to address these deficits and reach maximum level of function.       Plan:     Patient to be seen 2 x/week to address the above listed problems via self-care/home management, therapeutic activities, therapeutic exercises  Plan of Care Expires: 11/17/22  Plan of Care Reviewed with: patient    Subjective     Pain/Comfort:  Pain Rating 1: 7/10  Location 1:  (rectum)  Pain Addressed 1:  (activity pacing)    Objective:     Communicated with: NurseEmily, prior to session.  Patient found supine with telemetry, peripheral IV, bed alarm, oxygen upon OT entry to room.    General Precautions: Standard, fall, respiratory   Orthopedic Precautions:N/A   Braces: N/A  Respiratory Status: Nasal cannula, flow 2 L/min    Bed Mobility:    Patient completed Supine to Sit with contact guard assistance and with side rail     Functional Mobility/Transfers:  Patient completed Sit <> Stand Transfer with minimum assistance  with  rolling walker   Patient completed Bed <> Chair Transfer using Step Transfer technique with minimum assistance with rolling walker    [facility] 6 Click ADL: 16    Treatment & Education:  Therapist located recliner and brought to patient room.  Patient just completed ambulation via PT. In agreement to transfer to bedside chair despite returning to supine. Completed with v/c for technique to increase safety and increase independence. Patient provided with education on repositioning techniques to use in chair to improve tolerance (including chair push up and B lateral leaning for pressure relief). Patient stated understanding and in agreement to call for A to transfer back to bed. Encouraged OOB for meals TID.    Patient left up in chair with all lines intact, call button in reach, chair alarm on, and nurse present    GOALS:   Multidisciplinary Problems       Occupational Therapy Goals          Problem: Occupational Therapy    Goal Priority Disciplines Outcome Interventions   Occupational Therapy Goal     OT, PT/OT Ongoing, Progressing    Description: Goals to be met by: 11/17/22    Patient will increase functional independence with ADLs by performing:    Toileting from bedside commode with Supervision for hygiene and clothing management.   Toilet transfer to bedside commode with Supervision.  Increased functional strength in B UE grossly by 1/2 MM grade.                         Time Tracking:     OT Date of Treatment: 11/06/22  OT Start Time: 1045  OT Stop Time: 1100  OT Total Time (min): 15 min    Billable Minutes:Therapeutic Activity 15    11/6/2022

## 2022-11-07 VITALS
RESPIRATION RATE: 20 BRPM | TEMPERATURE: 99 F | WEIGHT: 134.25 LBS | BODY MASS INDEX: 18.79 KG/M2 | HEIGHT: 71 IN | OXYGEN SATURATION: 95 % | DIASTOLIC BLOOD PRESSURE: 80 MMHG | HEART RATE: 125 BPM | SYSTOLIC BLOOD PRESSURE: 111 MMHG

## 2022-11-07 LAB
PHOSPHATE SERPL-MCNC: 1.9 MG/DL (ref 2.7–4.5)
T3 SERPL-MCNC: 64 NG/DL (ref 60–180)
T4 SERPL-MCNC: 5.7 UG/DL (ref 4.5–11.5)

## 2022-11-07 PROCEDURE — 36415 COLL VENOUS BLD VENIPUNCTURE: CPT | Performed by: INTERNAL MEDICINE

## 2022-11-07 PROCEDURE — 99232 PR SUBSEQUENT HOSPITAL CARE,LEVL II: ICD-10-PCS | Mod: ,,, | Performed by: PHYSICIAN ASSISTANT

## 2022-11-07 PROCEDURE — 25000003 PHARM REV CODE 250: Performed by: PHYSICIAN ASSISTANT

## 2022-11-07 PROCEDURE — 84100 ASSAY OF PHOSPHORUS: CPT | Performed by: INTERNAL MEDICINE

## 2022-11-07 PROCEDURE — 94761 N-INVAS EAR/PLS OXIMETRY MLT: CPT

## 2022-11-07 PROCEDURE — 99233 SBSQ HOSP IP/OBS HIGH 50: CPT | Mod: ,,, | Performed by: PHYSICIAN ASSISTANT

## 2022-11-07 PROCEDURE — 25000003 PHARM REV CODE 250: Performed by: NURSE PRACTITIONER

## 2022-11-07 PROCEDURE — 99233 PR SUBSEQUENT HOSPITAL CARE,LEVL III: ICD-10-PCS | Mod: ,,, | Performed by: PHYSICIAN ASSISTANT

## 2022-11-07 PROCEDURE — 25000003 PHARM REV CODE 250: Performed by: INTERNAL MEDICINE

## 2022-11-07 PROCEDURE — 99232 SBSQ HOSP IP/OBS MODERATE 35: CPT | Mod: ,,, | Performed by: PHYSICIAN ASSISTANT

## 2022-11-07 PROCEDURE — C9113 INJ PANTOPRAZOLE SODIUM, VIA: HCPCS | Performed by: NURSE PRACTITIONER

## 2022-11-07 PROCEDURE — 63600175 PHARM REV CODE 636 W HCPCS: Performed by: NURSE PRACTITIONER

## 2022-11-07 RX ORDER — DIPHENOXYLATE HYDROCHLORIDE AND ATROPINE SULFATE 2.5; .025 MG/1; MG/1
1 TABLET ORAL 4 TIMES DAILY PRN
Qty: 30 TABLET | Refills: 1 | Status: ON HOLD | OUTPATIENT
Start: 2022-11-07 | End: 2022-11-12 | Stop reason: HOSPADM

## 2022-11-07 RX ORDER — OXYCODONE HYDROCHLORIDE 20 MG/1
20 TABLET ORAL EVERY 4 HOURS PRN
Qty: 60 TABLET | Refills: 0 | Status: ON HOLD | OUTPATIENT
Start: 2022-11-07 | End: 2022-11-12 | Stop reason: HOSPADM

## 2022-11-07 RX ORDER — FLUCONAZOLE 2 MG/ML
200 INJECTION, SOLUTION INTRAVENOUS DAILY
Qty: 300 ML | Refills: 0 | Status: SHIPPED | OUTPATIENT
Start: 2022-11-07 | End: 2022-11-07 | Stop reason: HOSPADM

## 2022-11-07 RX ORDER — ONDANSETRON 8 MG/1
8 TABLET, ORALLY DISINTEGRATING ORAL EVERY 8 HOURS PRN
Qty: 60 TABLET | Refills: 5 | Status: SHIPPED | OUTPATIENT
Start: 2022-11-07

## 2022-11-07 RX ORDER — FLUCONAZOLE 200 MG/1
200 TABLET ORAL DAILY
Qty: 3 TABLET | Refills: 0 | Status: ON HOLD | OUTPATIENT
Start: 2022-11-07 | End: 2022-11-12 | Stop reason: HOSPADM

## 2022-11-07 RX ORDER — HYDRALAZINE HYDROCHLORIDE 25 MG/1
25 TABLET, FILM COATED ORAL 2 TIMES DAILY
Qty: 60 TABLET | Refills: 11 | Status: SHIPPED | OUTPATIENT
Start: 2022-11-07 | End: 2023-11-07

## 2022-11-07 RX ORDER — FENTANYL 25 UG/1
1 PATCH TRANSDERMAL
Qty: 5 PATCH | Refills: 0 | Status: SHIPPED | OUTPATIENT
Start: 2022-11-07 | End: 2022-12-08 | Stop reason: SDUPTHER

## 2022-11-07 RX ADMIN — OXYCODONE HYDROCHLORIDE 20 MG: 5 TABLET ORAL at 08:11

## 2022-11-07 RX ADMIN — HYDRALAZINE HYDROCHLORIDE 25 MG: 25 TABLET, FILM COATED ORAL at 08:11

## 2022-11-07 RX ADMIN — NYSTATIN 500000 UNITS: 500000 SUSPENSION ORAL at 08:11

## 2022-11-07 RX ADMIN — OXYCODONE HYDROCHLORIDE 20 MG: 5 TABLET ORAL at 01:11

## 2022-11-07 RX ADMIN — LOSARTAN POTASSIUM 25 MG: 25 TABLET, FILM COATED ORAL at 08:11

## 2022-11-07 RX ADMIN — PANTOPRAZOLE SODIUM 40 MG: 40 INJECTION, POWDER, FOR SOLUTION INTRAVENOUS at 08:11

## 2022-11-07 RX ADMIN — Medication: at 08:11

## 2022-11-07 NOTE — CONSULTS
Rolling walker and suction machine being delivered.    NP ordered wheelchair.  Patient decline currently.  Feels he does not need it now. Advised will have to wait one month before it can be ordered again with changes in physical status.  Patient verbalized understanding.    3:20pm  Patient turned down suction machine when [facility] tried to deliver.

## 2022-11-07 NOTE — PLAN OF CARE
Pt AAOx4. Sinus bradycardia on heart monitor. Remains on 2L NC; tolerating well. Pt voids spontaneously without difficulty. Pt turned and repositioned with use of pillows. 22 G PIV in right hand CDI with no redness, swelling, warmth, or drainage saline locked. Bed low, wheels locked, alarms audible. Plan of care reviewed. Pt verbalizes understanding. Vital signs monitored. Fall precautions in place. Pain assessed. Safety promoted. Infection risks reduced.

## 2022-11-07 NOTE — PLAN OF CARE
Patient set up with [facility]. Home Health orders in chart.  Notified [facility] of patient's discharge via careport.       11/07/22 1524   Post-Acute Status   Post-Acute Authorization Home [facility] Status Set-up Complete/Auth obtained   Discharge Plan   Discharge Plan A [facility]

## 2022-11-07 NOTE — ASSESSMENT & PLAN NOTE
Currently not symptomatic  Cont to monitor on Tele   Maybe due to vagal response   Discussed will need outpt Holter/Event monitor   R/o RENEA; ordered thyroid studies   Avoid any AVN blocking agents    11/7/22  -HR stable this AM, 70's during exam  -No CV complaints  -TSH WNL  -OP extended Holter monitor

## 2022-11-07 NOTE — DISCHARGE SUMMARY
[facility] - Telemetry ([facility] Medicine  Discharge Summary      Patient Name: Vincent Benton  MRN: [id number]  MARÍA: [id number]  Patient Class: IP- Inpatient  Admission Date: 11/1/2022  Hospital Length of Stay: 4 days  Discharge Date and Time:  11/07/2022 3:29 PM  Attending Physician: Raquel Arroyo MD   Discharging Provider: Catracho Carter NP  Primary Care Provider: Shakila Moran DO    Primary Care Team: [facility] MEDICINE D    HPI:   Vincent Benton is a 67 y.o. male patient with a PMHx of GERD, HTN, rectal cancer, alcoholism who presents to the Emergency Department for evaluation of mouth and perirectal sores which onset several days PTA. Pt was sent from the cancer center and is currently undergoing chemotherapy. His last chemo was on 10/25/22. Associated sxs include decreased appetite, diarrhea, generalized abdominal pain, generalized weakness and decreased urination. Patient denies any fever, chills, CP, SOB, HA, n/v, and all other sxs at this time. No prior Tx reported. No further complaints or concerns at this time.   On arrival: Temp 97.4, pulse 69, resp 20 and B/P 145/73  Labs are essentially baseline with mild anemia  Pt was placed in Observation for supportive care as a result of chemotherapy.   As clarification, on 11/1/2022 patient should be admitted for hospital observation services under my care in collaboration with Steven Doherty MD            * No surgery found *      Hospital Course:   Pt with severe mucositis due to chemotherapy. Ulcerations present in mouth and in the rectal area. Receiving IV fluids, miracle mouth wash and prn analgesia. Morphine changed to Dilaudid. Per Up to Date - systemic corticosteroids are warranted. Solumedrol added. Encouraged family member to assist with salt water gargles every 4 hours. Pt hypoglycemic this AM and IV fluids with dextrose added. Potassium replaced. Pt denies further diarrhea.     11/3/22 - slight improvement in condition  today. Pt reports he is feeling better. Has whitish coating to tongue, Diflucan added to cover for oral candidasis. Nystatin for thrush. Pt has not tried to take any oral medications. IV fluids continue and potassium replaced. Encouraged warm salt water gargles along with miracle mouth wash. PT/OT ordered due to weakness.   11/4/22 No acute events overnight. The patient reports some improvement in mucositis. He is able to tolerate some PO intake. Will add boost with each meal to improve nutrition status. Will start lomotil to improve diarrhea. Palliative care is following and will adjust PRN analgesia. Continue current management.    11/05 Still poor po intake. Continue IVF. Monitor and supplement electrolytes as needed. Continue current treatment.   11/06 Po intake improving slowly. Patient with bradycardia with heart rate down to 30s this am. Blood pressure stable. Cardiology consulted.   11/7/22 No acute events overnight. The patient reports his mucositis is improved. He is tolerating a PO intake. Palliative care evaluated the patient and sent in some pain meds. Cardiology evaluated the patient and recommend outpatient follow up. The patient was seen and examined today and deemed stable for discharge. The patient is discharging home with home health and will follow up with his PCP, Oncology, Palliative care and Cardiology.        Goals of Care Treatment Preferences:  Code Status: DNR    Health care agent: 1: wife Akilah Medrano, 2: sister Fara Benton  Health care agent number: [phone]    Living Will: Yes  [place]: Yes  What is most important right now is to focus on improvement in condition but with limits to invasive therapies.  Accordingly, we have decided that the best plan to meet the patient's goals includes continuing with treatment.      Consults:   Consults (From admission, onward)        Status Ordering Provider     Inpatient consult to Cardiology  Once        Provider:  Alberto Trujillo MD     Completed BETSEY TAN     Inpatient consult to Registered Dietitian/Nutritionist  Once        Provider:  (Not yet assigned)    Completed MAGNUS BERRY     Inpatient consult to Registered Dietitian/Nutritionist  Once        Provider:  (Not yet assigned)    Completed MAGNUS BERRY     Inpatient consult to Social Work  Once        Provider:  (Not yet assigned)    Completed BETSEY TAN     Inpatient consult to Social Work  Once        Provider:  (Not yet assigned)    Completed GURJIT GARCES     Inpatient consult to Palliative Care  Once        Provider:  Kristi Wing PA-C    Completed GURJIT GARCES     Inpatient consult to Hematology/Oncology  Once        Provider:  Alessia Tuttle MD    Completed GURJIT GARCES     Inpatient consult to Hospitalist  Once        Provider:  (Not yet assigned)    SYL Fernandes          No new Assessment & Plan notes have been filed under this hospital service since the last note was generated.  Service: Hospital Medicine    Final Active Diagnoses:    Diagnosis Date Noted POA    PRINCIPAL PROBLEM:  Mucositis due to antineoplastic therapy [K12.31] 11/01/2022 Yes    Bradycardia [R00.1] 11/06/2022 No    Hypophosphatemia [E83.39] 11/06/2022 Yes    Normocytic anemia [D64.9] 11/05/2022 Yes    Severe malnutrition [E43] 11/05/2022 Yes    Immunocompromised patient [D84.9] 11/05/2022 Yes    Debility [R53.81] 11/05/2022 Yes    Oral candidiasis [B37.0] 11/03/2022 Yes    Encounter for palliative care [Z51.5] 08/05/2020 Not Applicable    Essential hypertension [I10] 02/04/2020 Yes     Chronic    Rectal cancer [C20] 09/11/2019 Yes    Neoplasm related pain [G89.3] 02/04/2016 Yes      Problems Resolved During this Admission:       Discharged Condition: stable    Disposition: Home-Health Care [facility]    Follow Up:   Follow-up Information     Shakila Moran DO. Schedule an appointment as soon as possible for a visit in 1 week(s).   "  Specialty: Internal Medicine  Why: Hospital follow up  Contact information:  [address] [place] [address]  [phone]             Alberto Trujillo Md, MD. Schedule an appointment as soon as possible for a visit in 2 week(s).    Specialties: Cardiology, Internal Medicine  Why: Hospital follow up  Contact information:  [address]  [place] [address]  [phone]             Kristi Wing PA-C. Schedule an appointment as soon as possible for a visit in 1 week(s).    Specialty: Palliative Medicine  Why: Hospital follow up  Contact information:  [address] [place] [address]  [phone]             Alessia Tuttle MD. Schedule an appointment as soon as possible for a visit in 1 week(s).    Specialty: Hematology and Oncology  Why: Hospital follow up  Contact information:  [address]  [place] [address]  [phone]                       Patient Instructions:      SUCTION MACHINE FOR HOME USE     Order Specific Question Answer Comments   Height: 5' 10" (1.778 m)    Weight: 58.7 kg (129 lb 6.6 oz)    Type of suction: Intermittent    Frequency: Continuous    Disposable cannisters? Yes    Connective tubing? Yes    Yankauer? Yes    Length of need (1-99 months): 12    Does patient have medical equipment at home? cane, quad    Does patient have medical equipment at home? shower chair    Does patient have medical equipment at home? grab bar      WALKER FOR HOME USE     Order Specific Question Answer Comments   Type of Walker: Adult (5'4"-6'6")    With wheels? Yes    Height: 5' 10" (1.778 m)    Weight: 57.6 kg (126 lb 15.8 oz)    Length of need (1-99 months): 99    Does patient have medical equipment at home? cane, quad    Does patient have medical equipment at home? shower chair    Does patient have medical equipment at home? grab bar    Please check all that apply: Patient's condition impairs ambulation.    Please check all that apply: Patient is unable to " "safely ambulate without equipment.      WHEELCHAIR FOR HOME USE     Order Specific Question Answer Comments   Hours in W/C per day: 8    Type of Wheelchair: Standard    Size(Width): 18"(STD adult)    Leg Support: STD footrests    Lap Belt: Velcro    Accessories: Front brakes    Cushion: Basic    Height: 5' 11" (1.803 m)    Weight: 60.9 kg (134 lb 4.2 oz)    Does patient have medical equipment at home? cane, quad    Does patient have medical equipment at home? shower chair    Does patient have medical equipment at home? grab bar    Length of need (1-99 months): 99    Please check all that apply: Patient's upper body strength is sufficient for propulsion.    Please check all that apply: The patient has significant edema of the lower extremities that requires an elevating leg rest.    Please check all that apply: A reclining back is ordered.      Ambulatory referral/consult to [facility]   Standing Status: Future   Referral Priority: Routine Referral Type: Consultation   Referral Reason: Specialty Services Required   Number of Visits Requested: 1       Significant Diagnostic Studies:   Imaging Results    None           Pending Diagnostic Studies:     None         Medications:  Reconciled Home Medications:      Medication List      START taking these medications    diphenoxylate-atropine 2.5-0.025 mg 2.5-0.025 mg per tablet  Commonly known as: LOMOTIL  Take 1 tablet by mouth 4 (four) times daily as needed for Diarrhea.     fluconazole 200 MG Tab  Commonly known as: DIFLUCAN  Take 1 tablet (200 mg total) by mouth once daily. for 3 days     hydrALAZINE 25 MG tablet  Commonly known as: APRESOLINE  Take 1 tablet (25 mg total) by mouth 2 (two) times a day.     oxyCODONE 20 mg Tab immediate release tablet  Commonly known as: ROXICODONE  Take 1 tablet (20 mg total) by mouth every 4 (four) hours as needed (pain). Max 4 doses/ day        CONTINUE taking these medications    ascorbic acid (vitamin C) 1000 MG " tablet  Commonly known as: VITAMIN C  Take 1,000 mg by mouth once daily.     dexAMETHasone 4 MG Tab  Commonly known as: DECADRON  Take 2 tablets (8 mg total) by mouth once daily. Take as directed on days 2 and 3 of your chemotherapy cycle.     fentaNYL 25 mcg/hr  Commonly known as: DURAGESIC  Place 1 patch onto the skin every 72 hours. for 15 days     gabapentin 300 MG capsule  Commonly known as: NEURONTIN  Take 1 capsule (300 mg total) by mouth every evening.     loperamide 2 mg capsule  Commonly known as: IMODIUM  Take 1 capsule (2 mg total) by mouth 4 (four) times daily as needed for Diarrhea.     losartan 25 MG tablet  Commonly known as: COZAAR  Take 1 tablet (25 mg total) by mouth once daily.     magic mouthwash diphen/antac/lidoc  Swish and spit 15 mLs every 4 (four) hours as needed (mouth ulcers).     multivitamin capsule  Take 1 capsule by mouth once daily.     ondansetron 8 MG Tbdl  Commonly known as: ZOFRAN-ODT  Dissolve 1 tablet (8 mg total) by mouth every 8 (eight) hours as needed (nausea/vomiting).     pantoprazole 40 MG tablet  Commonly known as: PROTONIX  Take 1 tablet (40 mg total) by mouth once daily.        STOP taking these medications    HYDROcodone-acetaminophen  mg per tablet  Commonly known as: NORCO            Indwelling Lines/Drains at time of discharge:   Lines/Drains/Airways     Central Venous Catheter Line  Duration           Port A Cath Single Lumen 08/16/22 1323 right subclavian 83 days                Time spent on the discharge of patient: >35 minutes         Catracho Carter NP  Department of Hospital Medicine  [facility] [facility] ([facility])

## 2022-11-07 NOTE — HOSPITAL COURSE
11/7/22-Patient seen and examined today, resting in bed. Feels ok, pain improved since admission. No CV complaints. HR in 70's during exam. Discussed OP monitor.

## 2022-11-07 NOTE — PLAN OF CARE
[facility] - Telemetry (Hospital)  Discharge Final Note    Primary Care Provider: Shakila Moran DO    Expected Discharge Date: 11/7/2022    Final Discharge Note (most recent)       Final Note - 11/07/22 1528          Final Note    Assessment Type Final Discharge Note     Anticipated Discharge Disposition Home-Health Care [facility]     Hospital Resources/Appts/Education Provided Appointments scheduled and added to AVS        Post-Acute Status    Discharge Delays None known at this time                     Important Message from Medicare  Important Message from Medicare regarding Discharge Appeal Rights: Given to patient/caregiver, Explained to patient/caregiver, Signed/date by patient/caregiver     Date IMM was signed: 11/07/22  Time IMM was signed: 2147    Contact Info       Shakila Moran DO   Specialty: Internal Medicine   Relationship: PCP - General    [address] [place] [address] [address] [place] [address]   Phone: [phone]       Next Steps: Schedule an appointment as soon as possible for a visit in 1 week(s)    Instructions: Hospital follow up    Alberto Trujillo Md, MD   Specialty: Cardiology, Internal Medicine    [address]  [place] [address]   Phone: [phone]       Next Steps: Schedule an appointment as soon as possible for a visit in 2 week(s)    Instructions: Hospital follow up    Kristi Wing PA-C   Specialty: Palliative Medicine    [address] [place] [address] [address] [place] [address]   Phone: [phone]       Next Steps: Schedule an appointment as soon as possible for a visit in 1 week(s)    Instructions: Hospital follow up    Alessia Tuttle MD   Specialty: Hematology and Oncology    [address] [facility]  [place] [address]   Phone: [phone]       Next Steps: Schedule an appointment as soon as possible for a visit in 1 week(s)    Instructions: Hospital follow up

## 2022-11-07 NOTE — PROGRESS NOTES
[facility] - Telemetry ([facility])  Cardiology  Progress Note    Patient Name: Vincent Benton  MRN: [id number]  Admission Date: 11/1/2022  Hospital Length of Stay: 4 days  Code Status: DNR   Attending Physician: Raquel Arroyo MD   Primary Care Physician: Shakila Moran DO  Expected Discharge Date:   Principal Problem:Mucositis due to antineoplastic therapy    Subjective:   HPI:  Vincent Benton is a 67 y.o. male patient with a PMHx of GERD, HTN, rectal cancer, alcoholism who presents to the Emergency Department for evaluation of mouth and perirectal sores which onset several days PTA. Pt was sent from the cancer center and is currently undergoing chemotherapy. His last chemo was on 10/25/22. Associated sxs include decreased appetite, diarrhea, generalized abdominal pain, generalized weakness and decreased urination. Patient denies any fever, chills, CP, SOB, HA, n/v, and all other sxs at this time. No prior Tx reported. No further complaints or concerns at this time.   On arrival: Temp 97.4, pulse 69, resp 20 and B/P 145/73  Labs are essentially baseline with mild anemia  Pt was placed in Observation for supportive care as a result of chemotherapy.      Cardiology consulted for bradycardia, rates mid 30s-40s; pt overall asymptomatic regards to HR; is doing PT/OT and able to walk around well without dizziness/falls. No indication for PPM unless heart block noted on tele, will continue to monitor here on tele and f/u in CV clinic and EP if needed. Started on Hydralazine 25 BID.                 Hospital Course:   11/7/22-Patient seen and examined today, resting in bed. Feels ok, pain improved since admission. No CV complaints. HR in 70's during exam. Discussed OP monitor.           Review of Systems   Constitutional: Positive for malaise/fatigue.   HENT:          Oral lesions/ulcerations    Eyes: Negative.    Cardiovascular: Negative.    Respiratory: Negative.     Endocrine: Negative.    Hematologic/Lymphatic:  Negative.    Skin: Negative.    Musculoskeletal: Negative.    Gastrointestinal: Negative.    Genitourinary: Negative.    Neurological: Negative.    Psychiatric/Behavioral: Negative.     Allergic/Immunologic: Negative.    Objective:     Vital Signs (Most Recent):  Temp: 98.6 °F (37 °C) (11/07/22 1252)  Pulse: (!) 125 (11/07/22 1252)  Resp: 18 (11/07/22 1252)  BP: 111/80 (11/07/22 1252)  SpO2: 95 % (11/07/22 1252)   Vital Signs (24h Range):  Temp:  [97.9 °F (36.6 °C)-98.6 °F (37 °C)] 98.6 °F (37 °C)  Pulse:  [] 125  Resp:  [16-20] 18  SpO2:  [95 %-97 %] 95 %  BP: (111-187)/() 111/80     Weight: 60.9 kg (134 lb 4.2 oz)  Body mass index is 18.73 kg/m².     SpO2: 95 %  O2 Device (Oxygen Therapy): room air      Intake/Output Summary (Last 24 hours) at 11/7/2022 1317  Last data filed at 11/7/2022 0542  Gross per 24 hour   Intake 586.83 ml   Output 1500 ml   Net -913.17 ml       Lines/Drains/Airways       Central Venous Catheter Line  Duration             Port A Cath Single Lumen 08/16/22 1323 right subclavian 83 days              Peripheral Intravenous Line  Duration                  Peripheral IV - Single Lumen 11/05/22 1207 22 G Posterior;Right Hand 2 days                    Physical Exam  Vitals and nursing note reviewed.   Constitutional:       General: He is not in acute distress.     Appearance: Normal appearance. He is well-developed. He is not diaphoretic.   HENT:      Head: Normocephalic and atraumatic.   Eyes:      General:         Right eye: No discharge.         Left eye: No discharge.      Pupils: Pupils are equal, round, and reactive to light.   Neck:      Thyroid: No thyromegaly.      Vascular: No JVD.      Trachea: No tracheal deviation.   Cardiovascular:      Rate and Rhythm: Normal rate and regular rhythm.      Heart sounds: Normal heart sounds, S1 normal and S2 normal. No murmur heard.  Pulmonary:      Effort: Pulmonary effort is normal. No respiratory distress.      Breath sounds: Normal  breath sounds. No wheezing or rales.   Abdominal:      General: There is no distension.      Palpations: Abdomen is soft.      Tenderness: There is no rebound.   Musculoskeletal:      Cervical back: Neck supple.      Right lower leg: No edema.      Left lower leg: No edema.   Skin:     General: Skin is warm and dry.      Findings: No erythema.   Neurological:      General: No focal deficit present.      Mental Status: He is alert and oriented to person, place, and time.   Psychiatric:         Mood and Affect: Mood normal.         Behavior: Behavior normal.         Thought Content: Thought content normal.       Significant Labs: CMP   Recent Labs   Lab 11/06/22  0500      K 4.8      CO2 27   GLU 91   BUN 4*   CREATININE 0.6   CALCIUM 8.5*   ANIONGAP 9   , CBC   Recent Labs   Lab 11/06/22  0500   WBC 4.32   HGB 9.3*   HCT 30.3*      , Troponin No results for input(s): TROPONINI in the last 48 hours., and All pertinent lab results from the last 24 hours have been reviewed.    Significant Imaging: Echocardiogram: Transthoracic echo (TTE) complete (Cupid Only):   Results for orders placed or performed during the hospital encounter of 08/23/22   Echo   Result Value Ref Range    BSA 1.79 m2    TDI SEPTAL 0.06 m/s    LV LATERAL E/E' RATIO 5.38 m/s    LV SEPTAL E/E' RATIO 7.17 m/s    IVC diameter 1.09 cm    QEF 58 %    Left Ventricular Outflow Tract Mean Velocity 0.59 cm/s    Left Ventricular Outflow Tract Mean Gradient 1.60 mmHg    TDI LATERAL 0.08 m/s    PV PEAK VELOCITY 0.80 cm/s    LVIDd 4.61 3.5 - 6.0 cm    IVS 1.22 (A) 0.6 - 1.1 cm    Posterior Wall 1.03 0.6 - 1.1 cm    Ao root annulus 3.28 cm    LVIDs 2.76 2.1 - 4.0 cm    FS 40 28 - 44 %    Sinus 3.96 cm    STJ 4.03 cm    Ascending aorta 3.67 cm    LV mass 187.68 g    LA size 2.77 cm    RVDD 3.79 cm    Left Ventricle Relative Wall Thickness 0.45 cm    AV mean gradient 2 mmHg    AV valve area 3.69 cm2    AV Velocity Ratio 0.91     AV index  (prosthetic) 0.87     MV valve area p 1/2 method 3.34 cm2    E/A ratio 0.61     Mean e' 0.07 m/s    E wave deceleration time 227.11 msec    IVRT 102.76 msec    LVOT diameter 2.32 cm    LVOT area 4.2 cm2    LVOT peak naman 0.87 m/s    LVOT peak VTI 13.70 cm    Ao peak naman 0.96 m/s    Ao VTI 15.7 cm    RVOT peak naman 0.71 m/s    RVOT peak VTI 12.1 cm    LVOT stroke volume 57.89 cm3    AV peak gradient 4 mmHg    PV mean gradient 1.19 mmHg    E/E' ratio 6.14 m/s    MV Peak E Naman 0.43 m/s    TR Max Naman 2.57 m/s    MV stenosis pressure 1/2 time 65.86 ms    MV Peak A Naman 0.71 m/s    LV Systolic Volume 28.47 mL    LV Systolic Volume Index 15.7 mL/m2    LV Diastolic Volume 98.03 mL    LV Diastolic Volume Index 54.16 mL/m2    LV Mass Index 104 g/m2    RA Major Axis 4.96 cm    Left Atrium Minor Axis 4.60 cm    Left Atrium Major Axis 4.77 cm    Triscuspid Valve Regurgitation Peak Gradient 26 mmHg    LA Volume Index (Mod) 11.7 mL/m2    LA volume (mod) 21.18 cm3    RA Width 3.51 cm    Right Atrial Pressure (from IVC) 3 mmHg    EF 55 %    TV rest pulmonary artery pressure 29 mmHg    Narrative    · The left ventricle is normal in size with concentric remodeling and   normal systolic function.  · The quantitatively derived ejection fraction is 58%.  · The estimated ejection fraction is 55%.  · The left ventricular global longitudinal strain is -17.23%.  · Normal left ventricular diastolic function.  · The estimated PA systolic pressure is 29 mmHg.  · Normal right ventricular size with normal right ventricular systolic   function.  · Normal central venous pressure (3 mmHg).  · The sinuses of Valsalva is mildly dilated.  · Mild to moderate tricuspid regurgitation.     Baseline ECHO for Chemo TX 8/23/2022  4D LVQ= 58%  AVG GPLS= -17.23%  Biplane= 50%     , EKG: Reviewed, and X-Ray: CXR: X-Ray Chest 1 View (CXR): No results found for this visit on 11/01/22. and X-Ray Chest PA and Lateral (CXR): No results found for this visit on  11/01/22.    Assessment and Plan:   Patient who presents with mucositis. Noted to be bradycardic during admission, stable this AM. OP monitor.     Bradycardia  Currently not symptomatic  Cont to monitor on Tele   Maybe due to vagal response   Discussed will need outpt Holter/Event monitor   R/o RENEA; ordered thyroid studies   Avoid any AVN blocking agents    11/7/22  -HR stable this AM, 70's during exam  -No CV complaints  -TSH WNL  -OP extended Holter monitor    Debility  -Cont PT/OT    Essential hypertension  Titrate meds  Started Hydralazine to improve rates    Rectal cancer  Cont tx and f/u per heme/onc        VTE Risk Mitigation (From admission, onward)         Ordered     enoxaparin injection 40 mg  Daily         11/05/22 1106     Place JUAN hose  Until discontinued         11/05/22 1106     Reason for No Pharmacological VTE Prophylaxis  Once        Question:  Reasons:  Answer:  Risk of Bleeding    11/01/22 1359     IP VTE HIGH RISK PATIENT  Once         11/01/22 1359     Place sequential compression device  Until discontinued         11/01/22 1359                Kelly Kaur PA-C  Cardiology  [facility] - Telemetry ([facility])

## 2022-11-07 NOTE — PROGRESS NOTES
Progress Note   Palliative Medicine      SUBJECTIVE:     History of Present Illness:  Patient seen and examined with sister Fara at bedside. He reports the oral lesions are improved but still forcing himself to eat. He feels the oxycodone is superior to the home hydrocodone and would like to continue this at home. He is out of PRN medications and at most has 2 fentanyl patches left. He will see me in a week. He is due to see oncology tomorrow with chemotherapy but requesting to be rescheduled since is not fully recovered. I have messaged the oncology department to reach out and discuss his upcoming visits.       In the last 24hrs the patient has used the following pain medications (7a-7a):  - oxycodone 20mg x 4   - Fentanyl 25mcg/ hr patch      Past Medical History:   Diagnosis Date    Alcoholism     Anemia     Back pain     Encounter for blood transfusion 2018    Essential hypertension 2/4/2016    GERD (gastroesophageal reflux disease)     Hiatal hernia     Hypertension     diet controlled    Melanoma 06/2021    left cheek    Rectal cancer 9/11/2019       Review of patient's allergies indicates:  No Known Allergies    Review of Symptoms      Symptom Assessment (ESAS 0-10 Scale)  Pain:  0  Dyspnea:  0  Anxiety:  0  Nausea:  0  Depression:  0  Anorexia:  0  Fatigue:  0  Insomnia:  0  Restlessness:  0  Agitation:  0     CAM / Delirium:  Negative  Constipation:  Negative    Anxiety:  Is not nervous/anxious  Constipation:  No constipation    Pain Assessment:    Location(s): mouth  Mouth       Location: generalized       Quality: none        Quantity: 5/10 in intensity day(s) ago, unchanged        Aggravating Factors: eating        Alleviating Factors: opiates        Associated Symptoms: none    Living Arrangements:  Lives with spouse    Psychosocial/Cultural: , one daughter from previous relationship    Advance Care Planning   Advance Directives:   Living Will: Yes        Copy on chart: Yes    LaPOST: Yes    Do  Not Resuscitate Status: Yes    Medical Power of : Yes    Agent's Name:  1: wife Akilah Medrano, 2: sister Fara Benton    Decision Making:  Patient answered questions  Goals of Care: What is most important right now is to focus on improvement in condition but with limits to invasive therapies. Accordingly, we have decided that the best plan to meet the patient's goals includes continuing with treatment.       ROS:  Review of Systems   Constitutional:  Positive for activity change. Negative for chills and fever.   HENT:  Positive for mouth sores and trouble swallowing. Negative for sore throat.    Respiratory:  Negative for cough and shortness of breath.    Gastrointestinal:  Negative for abdominal pain, constipation, nausea and vomiting.   Genitourinary:  Negative for difficulty urinating and dysuria.   Musculoskeletal:  Positive for back pain. Negative for myalgias.   Skin:  Negative for rash and wound.   Allergic/Immunologic: Negative for immunocompromised state.   Neurological:  Negative for weakness and headaches.   Psychiatric/Behavioral:  Negative for confusion and sleep disturbance. The patient is not nervous/anxious.      OBJECTIVE:     Physical Exam:  Vitals: Temp: 98.2 °F (36.8 °C) (11/07/22 0716)  Pulse: 66 (11/07/22 0827)  Resp: 16 (11/07/22 0827)  BP: (!) 151/90 (11/07/22 0716)  SpO2: 97 % (11/07/22 0827)    Physical Exam  Vitals reviewed.   Constitutional:       General: He is not in acute distress.     Appearance: Normal appearance. He is well-developed.   HENT:      Head: Normocephalic and atraumatic.      Mouth/Throat:      Lips: Lesions present.      Mouth: Oral lesions present.   Eyes:      Conjunctiva/sclera: Conjunctivae normal.   Cardiovascular:      Rate and Rhythm: Normal rate and regular rhythm.      Heart sounds: Normal heart sounds. No murmur heard.  Pulmonary:      Effort: Pulmonary effort is normal. No respiratory distress.      Breath sounds: Normal breath sounds.   Abdominal:       General: Bowel sounds are normal. There is no distension.      Palpations: Abdomen is soft.      Tenderness: There is no abdominal tenderness.   Musculoskeletal:         General: Normal range of motion.      Cervical back: Normal range of motion.      Right lower leg: No edema.      Left lower leg: No edema.   Skin:     General: Skin is warm and dry.      Findings: No rash.   Neurological:      Mental Status: He is alert and oriented to person, place, and time.      Cranial Nerves: No cranial nerve deficit.   Psychiatric:         Mood and Affect: Mood normal.         Behavior: Behavior normal.         Thought Content: Thought content normal.         Judgment: Judgment normal.         ASSESSMENT   Vincent Benton is a 67 y.o. year old with a history of recurrent metastatic rectal cancer who presented to the ED from colorectal surgeon's office due to mucositis of mouth and rectum. He was admitted for treatment and Palliative Medicine was consulted to assist with pain management.      PLAN   Mucositis  - Improved  - Continue oxycodone 20mg q4hr PRN pain     2. Neoplasm related pain superimposed on chronic back pain  - Continue fentanyl 25mcg/ hr patch q 72 hrs  - Rotate to oxycodone from Norco 10mg q4hr PRN     3. Recurrent metastatic rectal cancer  - Under the care of Dr. Tuttle  - Due for another cycle of FOLFIRI plus Avastin tomorrow but will ask to r/s. Plans to restage in a month     4. Encounter for Palliative Care  - Code status: DNR/I- LaPOST on file  - HCPOA on file, 1: wife Akilah, 2: sister Fara  - Scheduled to see me 11/15 in clinic       Thank you for allowing Palliative Medicine to be involved in the care of Vincent Benton.    Medical decision making: HIGH based on high risk of death untreated symptoms management of more than one chronic illness in exacerbation or progression of disease managing side effects of medications or polypharmacy parenteral opioids    Plan required increased review  of medication choice, interaction, dosing, frequency, and route due to patient complexity. Patient complexity increased by: high risk medication use, advanced age, at risk for delirium, continuous use of opioids, and NPO    35 min visit spent in chart review, face to face discussion of goals of care, symptom assessment, coordination of care and emotional support, formulating and communicating prognosis and goals of care, exploring burden/ benefit of various approaches of treatment.     Kristi Wing PA-C  Palliative Medicine

## 2022-11-07 NOTE — SUBJECTIVE & OBJECTIVE
Review of Systems   Constitutional: Positive for malaise/fatigue.   HENT:          Oral lesions/ulcerations    Eyes: Negative.    Cardiovascular: Negative.    Respiratory: Negative.     Endocrine: Negative.    Hematologic/Lymphatic: Negative.    Skin: Negative.    Musculoskeletal: Negative.    Gastrointestinal: Negative.    Genitourinary: Negative.    Neurological: Negative.    Psychiatric/Behavioral: Negative.     Allergic/Immunologic: Negative.    Objective:     Vital Signs (Most Recent):  Temp: 98.6 °F (37 °C) (11/07/22 1252)  Pulse: (!) 125 (11/07/22 1252)  Resp: 18 (11/07/22 1252)  BP: 111/80 (11/07/22 1252)  SpO2: 95 % (11/07/22 1252)   Vital Signs (24h Range):  Temp:  [97.9 °F (36.6 °C)-98.6 °F (37 °C)] 98.6 °F (37 °C)  Pulse:  [] 125  Resp:  [16-20] 18  SpO2:  [95 %-97 %] 95 %  BP: (111-187)/() 111/80     Weight: 60.9 kg (134 lb 4.2 oz)  Body mass index is 18.73 kg/m².     SpO2: 95 %  O2 Device (Oxygen Therapy): room air      Intake/Output Summary (Last 24 hours) at 11/7/2022 1317  Last data filed at 11/7/2022 0542  Gross per 24 hour   Intake 586.83 ml   Output 1500 ml   Net -913.17 ml       Lines/Drains/Airways       Central Venous Catheter Line  Duration             Port A Cath Single Lumen 08/16/22 1323 right subclavian 83 days              Peripheral Intravenous Line  Duration                  Peripheral IV - Single Lumen 11/05/22 1207 22 G Posterior;Right Hand 2 days                    Physical Exam  Vitals and nursing note reviewed.   Constitutional:       General: He is not in acute distress.     Appearance: Normal appearance. He is well-developed. He is not diaphoretic.   HENT:      Head: Normocephalic and atraumatic.   Eyes:      General:         Right eye: No discharge.         Left eye: No discharge.      Pupils: Pupils are equal, round, and reactive to light.   Neck:      Thyroid: No thyromegaly.      Vascular: No JVD.      Trachea: No tracheal deviation.   Cardiovascular:       Rate and Rhythm: Normal rate and regular rhythm.      Heart sounds: Normal heart sounds, S1 normal and S2 normal. No murmur heard.  Pulmonary:      Effort: Pulmonary effort is normal. No respiratory distress.      Breath sounds: Normal breath sounds. No wheezing or rales.   Abdominal:      General: There is no distension.      Palpations: Abdomen is soft.      Tenderness: There is no rebound.   Musculoskeletal:      Cervical back: Neck supple.      Right lower leg: No edema.      Left lower leg: No edema.   Skin:     General: Skin is warm and dry.      Findings: No erythema.   Neurological:      General: No focal deficit present.      Mental Status: He is alert and oriented to person, place, and time.   Psychiatric:         Mood and Affect: Mood normal.         Behavior: Behavior normal.         Thought Content: Thought content normal.       Significant Labs: CMP   Recent Labs   Lab 11/06/22  0500      K 4.8      CO2 27   GLU 91   BUN 4*   CREATININE 0.6   CALCIUM 8.5*   ANIONGAP 9   , CBC   Recent Labs   Lab 11/06/22  0500   WBC 4.32   HGB 9.3*   HCT 30.3*      , Troponin No results for input(s): TROPONINI in the last 48 hours., and All pertinent lab results from the last 24 hours have been reviewed.    Significant Imaging: Echocardiogram: Transthoracic echo (TTE) complete (Cupid Only):   Results for orders placed or performed during the hospital encounter of 08/23/22   Echo   Result Value Ref Range    BSA 1.79 m2    TDI SEPTAL 0.06 m/s    LV LATERAL E/E' RATIO 5.38 m/s    LV SEPTAL E/E' RATIO 7.17 m/s    IVC diameter 1.09 cm    QEF 58 %    Left Ventricular Outflow Tract Mean Velocity 0.59 cm/s    Left Ventricular Outflow Tract Mean Gradient 1.60 mmHg    TDI LATERAL 0.08 m/s    PV PEAK VELOCITY 0.80 cm/s    LVIDd 4.61 3.5 - 6.0 cm    IVS 1.22 (A) 0.6 - 1.1 cm    Posterior Wall 1.03 0.6 - 1.1 cm    Ao root annulus 3.28 cm    LVIDs 2.76 2.1 - 4.0 cm    FS 40 28 - 44 %    Sinus 3.96 cm    STJ 4.03  cm    Ascending aorta 3.67 cm    LV mass 187.68 g    LA size 2.77 cm    RVDD 3.79 cm    Left Ventricle Relative Wall Thickness 0.45 cm    AV mean gradient 2 mmHg    AV valve area 3.69 cm2    AV Velocity Ratio 0.91     AV index (prosthetic) 0.87     MV valve area p 1/2 method 3.34 cm2    E/A ratio 0.61     Mean e' 0.07 m/s    E wave deceleration time 227.11 msec    IVRT 102.76 msec    LVOT diameter 2.32 cm    LVOT area 4.2 cm2    LVOT peak naman 0.87 m/s    LVOT peak VTI 13.70 cm    Ao peak naman 0.96 m/s    Ao VTI 15.7 cm    RVOT peak naman 0.71 m/s    RVOT peak VTI 12.1 cm    LVOT stroke volume 57.89 cm3    AV peak gradient 4 mmHg    PV mean gradient 1.19 mmHg    E/E' ratio 6.14 m/s    MV Peak E Naman 0.43 m/s    TR Max Naman 2.57 m/s    MV stenosis pressure 1/2 time 65.86 ms    MV Peak A Naman 0.71 m/s    LV Systolic Volume 28.47 mL    LV Systolic Volume Index 15.7 mL/m2    LV Diastolic Volume 98.03 mL    LV Diastolic Volume Index 54.16 mL/m2    LV Mass Index 104 g/m2    RA Major Axis 4.96 cm    Left Atrium Minor Axis 4.60 cm    Left Atrium Major Axis 4.77 cm    Triscuspid Valve Regurgitation Peak Gradient 26 mmHg    LA Volume Index (Mod) 11.7 mL/m2    LA volume (mod) 21.18 cm3    RA Width 3.51 cm    Right Atrial Pressure (from IVC) 3 mmHg    EF 55 %    TV rest pulmonary artery pressure 29 mmHg    Narrative    · The left ventricle is normal in size with concentric remodeling and   normal systolic function.  · The quantitatively derived ejection fraction is 58%.  · The estimated ejection fraction is 55%.  · The left ventricular global longitudinal strain is -17.23%.  · Normal left ventricular diastolic function.  · The estimated PA systolic pressure is 29 mmHg.  · Normal right ventricular size with normal right ventricular systolic   function.  · Normal central venous pressure (3 mmHg).  · The sinuses of Valsalva is mildly dilated.  · Mild to moderate tricuspid regurgitation.     Baseline ECHO for Chemo TX 8/23/2022  4D LVQ=  58%  AVG GPLS= -17.23%  Biplane= 50%     , EKG: Reviewed, and X-Ray: CXR: X-Ray Chest 1 View (CXR): No results found for this visit on 11/01/22. and X-Ray Chest PA and Lateral (CXR): No results found for this visit on 11/01/22.

## 2022-11-08 ENCOUNTER — TELEPHONE (OUTPATIENT)
Dept: INTERNAL MEDICINE | Facility: CLINIC | Age: 67
End: 2022-11-08
Payer: MEDICARE

## 2022-11-08 ENCOUNTER — HOSPITAL ENCOUNTER (INPATIENT)
Facility: HOSPITAL | Age: 67
LOS: 4 days | Discharge: HOME | DRG: 853 | End: 2022-11-12
Attending: EMERGENCY MEDICINE | Admitting: STUDENT IN AN ORGANIZED HEALTH CARE EDUCATION/TRAINING PROGRAM
Payer: MEDICARE

## 2022-11-08 ENCOUNTER — PATIENT OUTREACH (OUTPATIENT)
Dept: ADMINISTRATIVE | Facility: CLINIC | Age: 67
End: 2022-11-08
Payer: MEDICARE

## 2022-11-08 DIAGNOSIS — R65.10 SIRS (SYSTEMIC INFLAMMATORY RESPONSE SYNDROME): ICD-10-CM

## 2022-11-08 DIAGNOSIS — K65.1 PELVIC ABSCESS IN MALE: ICD-10-CM

## 2022-11-08 DIAGNOSIS — R19.7 DIARRHEA, UNSPECIFIED TYPE: ICD-10-CM

## 2022-11-08 DIAGNOSIS — J18.9 MULTIFOCAL PNEUMONIA: ICD-10-CM

## 2022-11-08 DIAGNOSIS — R00.0 TACHYCARDIA: ICD-10-CM

## 2022-11-08 DIAGNOSIS — K61.1 PERIRECTAL ABSCESS: ICD-10-CM

## 2022-11-08 DIAGNOSIS — R07.9 CHEST PAIN: ICD-10-CM

## 2022-11-08 DIAGNOSIS — R00.0 INCREASED HEART RATE: ICD-10-CM

## 2022-11-08 DIAGNOSIS — A41.9 SEPTIC SHOCK: Primary | ICD-10-CM

## 2022-11-08 DIAGNOSIS — R79.89 ELEVATED TROPONIN: ICD-10-CM

## 2022-11-08 DIAGNOSIS — R65.21 SEPTIC SHOCK: Primary | ICD-10-CM

## 2022-11-08 PROBLEM — R65.20 SEVERE SEPSIS: Status: ACTIVE | Noted: 2022-11-08

## 2022-11-08 LAB
ALBUMIN SERPL BCP-MCNC: 2.3 G/DL (ref 3.5–5.2)
ALP SERPL-CCNC: 110 U/L (ref 55–135)
ALT SERPL W/O P-5'-P-CCNC: 10 U/L (ref 10–44)
ANION GAP SERPL CALC-SCNC: 18 MMOL/L (ref 8–16)
ANISOCYTOSIS BLD QL SMEAR: SLIGHT
AST SERPL-CCNC: 13 U/L (ref 10–40)
BASOPHILS # BLD AUTO: 0.03 K/UL (ref 0–0.2)
BASOPHILS NFR BLD: 0.5 % (ref 0–1.9)
BILIRUB SERPL-MCNC: 0.6 MG/DL (ref 0.1–1)
BILIRUB UR QL STRIP: NEGATIVE
BNP SERPL-MCNC: 218 PG/ML (ref 0–99)
BUN SERPL-MCNC: 6 MG/DL (ref 8–23)
CALCIUM SERPL-MCNC: 8.7 MG/DL (ref 8.7–10.5)
CHLORIDE SERPL-SCNC: 100 MMOL/L (ref 95–110)
CLARITY UR: CLEAR
CO2 SERPL-SCNC: 21 MMOL/L (ref 23–29)
COLOR UR: YELLOW
CREAT SERPL-MCNC: 0.6 MG/DL (ref 0.5–1.4)
DIFFERENTIAL METHOD: ABNORMAL
EOSINOPHIL # BLD AUTO: 0 K/UL (ref 0–0.5)
EOSINOPHIL NFR BLD: 0.2 % (ref 0–8)
ERYTHROCYTE [DISTWIDTH] IN BLOOD BY AUTOMATED COUNT: 27.7 % (ref 11.5–14.5)
EST. GFR  (NO RACE VARIABLE): >60 ML/MIN/1.73 M^2
GLUCOSE SERPL-MCNC: 71 MG/DL (ref 70–110)
GLUCOSE UR QL STRIP: NEGATIVE
HCT VFR BLD AUTO: 41.9 % (ref 40–54)
HGB BLD-MCNC: 13.1 G/DL (ref 14–18)
HGB UR QL STRIP: NEGATIVE
IMM GRANULOCYTES # BLD AUTO: 0.15 K/UL (ref 0–0.04)
IMM GRANULOCYTES NFR BLD AUTO: 2.3 % (ref 0–0.5)
KETONES UR QL STRIP: ABNORMAL
LACTATE SERPL-SCNC: 1.3 MMOL/L (ref 0.5–2.2)
LACTATE SERPL-SCNC: 3 MMOL/L (ref 0.5–2.2)
LEUKOCYTE ESTERASE UR QL STRIP: NEGATIVE
LYMPHOCYTES # BLD AUTO: 1 K/UL (ref 1–4.8)
LYMPHOCYTES NFR BLD: 14.3 % (ref 18–48)
MCH RBC QN AUTO: 27.1 PG (ref 27–31)
MCHC RBC AUTO-ENTMCNC: 31.3 G/DL (ref 32–36)
MCV RBC AUTO: 87 FL (ref 82–98)
MONOCYTES # BLD AUTO: 0.5 K/UL (ref 0.3–1)
MONOCYTES NFR BLD: 7.8 % (ref 4–15)
NEUTROPHILS # BLD AUTO: 5 K/UL (ref 1.8–7.7)
NEUTROPHILS NFR BLD: 74.9 % (ref 38–73)
NITRITE UR QL STRIP: NEGATIVE
NRBC BLD-RTO: 0 /100 WBC
OVALOCYTES BLD QL SMEAR: ABNORMAL
PH UR STRIP: 7 [PH] (ref 5–8)
PLATELET # BLD AUTO: 244 K/UL (ref 150–450)
PLATELET BLD QL SMEAR: ABNORMAL
PMV BLD AUTO: 9.6 FL (ref 9.2–12.9)
POLYCHROMASIA BLD QL SMEAR: ABNORMAL
POTASSIUM SERPL-SCNC: 3.5 MMOL/L (ref 3.5–5.1)
PROT SERPL-MCNC: 6.1 G/DL (ref 6–8.4)
PROT UR QL STRIP: ABNORMAL
RBC # BLD AUTO: 4.84 M/UL (ref 4.6–6.2)
SODIUM SERPL-SCNC: 139 MMOL/L (ref 136–145)
SP GR UR STRIP: 1.01 (ref 1–1.03)
TROPONIN I SERPL DL<=0.01 NG/ML-MCNC: 0.07 NG/ML (ref 0–0.03)
TROPONIN I SERPL DL<=0.01 NG/ML-MCNC: 0.1 NG/ML (ref 0–0.03)
URN SPEC COLLECT METH UR: ABNORMAL
UROBILINOGEN UR STRIP-ACNC: NEGATIVE EU/DL
WBC # BLD AUTO: 6.63 K/UL (ref 3.9–12.7)

## 2022-11-08 PROCEDURE — 93010 ELECTROCARDIOGRAM REPORT: CPT | Mod: ,,, | Performed by: INTERNAL MEDICINE

## 2022-11-08 PROCEDURE — 87077 CULTURE AEROBIC IDENTIFY: CPT | Performed by: EMERGENCY MEDICINE

## 2022-11-08 PROCEDURE — 83880 ASSAY OF NATRIURETIC PEPTIDE: CPT | Performed by: EMERGENCY MEDICINE

## 2022-11-08 PROCEDURE — 83605 ASSAY OF LACTIC ACID: CPT | Performed by: EMERGENCY MEDICINE

## 2022-11-08 PROCEDURE — 93005 ELECTROCARDIOGRAM TRACING: CPT

## 2022-11-08 PROCEDURE — 81003 URINALYSIS AUTO W/O SCOPE: CPT | Performed by: NURSE PRACTITIONER

## 2022-11-08 PROCEDURE — 93010 EKG 12-LEAD: ICD-10-PCS | Mod: 76,,, | Performed by: INTERNAL MEDICINE

## 2022-11-08 PROCEDURE — 87070 CULTURE OTHR SPECIMN AEROBIC: CPT | Performed by: EMERGENCY MEDICINE

## 2022-11-08 PROCEDURE — G0378 HOSPITAL OBSERVATION PER HR: HCPCS

## 2022-11-08 PROCEDURE — 96375 TX/PRO/DX INJ NEW DRUG ADDON: CPT | Mod: 59

## 2022-11-08 PROCEDURE — 25000003 PHARM REV CODE 250

## 2022-11-08 PROCEDURE — 84484 ASSAY OF TROPONIN QUANT: CPT | Mod: 91 | Performed by: EMERGENCY MEDICINE

## 2022-11-08 PROCEDURE — 85025 COMPLETE CBC W/AUTO DIFF WBC: CPT | Performed by: EMERGENCY MEDICINE

## 2022-11-08 PROCEDURE — 99291 CRITICAL CARE FIRST HOUR: CPT | Mod: 25

## 2022-11-08 PROCEDURE — 11000001 HC ACUTE MED/SURG PRIVATE ROOM

## 2022-11-08 PROCEDURE — 63600175 PHARM REV CODE 636 W HCPCS: Performed by: EMERGENCY MEDICINE

## 2022-11-08 PROCEDURE — 87186 SC STD MICRODIL/AGAR DIL: CPT | Performed by: EMERGENCY MEDICINE

## 2022-11-08 PROCEDURE — 96374 THER/PROPH/DIAG INJ IV PUSH: CPT | Mod: 59

## 2022-11-08 PROCEDURE — 27000207 HC ISOLATION

## 2022-11-08 PROCEDURE — 25000003 PHARM REV CODE 250: Performed by: EMERGENCY MEDICINE

## 2022-11-08 PROCEDURE — 25500020 PHARM REV CODE 255: Performed by: STUDENT IN AN ORGANIZED HEALTH CARE EDUCATION/TRAINING PROGRAM

## 2022-11-08 PROCEDURE — 87040 BLOOD CULTURE FOR BACTERIA: CPT | Performed by: EMERGENCY MEDICINE

## 2022-11-08 PROCEDURE — 96365 THER/PROPH/DIAG IV INF INIT: CPT

## 2022-11-08 PROCEDURE — 87449 NOS EACH ORGANISM AG IA: CPT | Performed by: EMERGENCY MEDICINE

## 2022-11-08 PROCEDURE — 25000003 PHARM REV CODE 250: Performed by: NURSE PRACTITIONER

## 2022-11-08 PROCEDURE — 96361 HYDRATE IV INFUSION ADD-ON: CPT | Mod: 59

## 2022-11-08 PROCEDURE — 80053 COMPREHEN METABOLIC PANEL: CPT | Performed by: EMERGENCY MEDICINE

## 2022-11-08 RX ORDER — GLUCAGON 1 MG
1 KIT INJECTION
Status: DISCONTINUED | OUTPATIENT
Start: 2022-11-08 | End: 2022-11-12 | Stop reason: HOSPADM

## 2022-11-08 RX ORDER — MORPHINE SULFATE 4 MG/ML
4 INJECTION, SOLUTION INTRAMUSCULAR; INTRAVENOUS
Status: COMPLETED | OUTPATIENT
Start: 2022-11-08 | End: 2022-11-08

## 2022-11-08 RX ORDER — SODIUM CHLORIDE 9 MG/ML
1000 INJECTION, SOLUTION INTRAVENOUS
Status: DISCONTINUED | OUTPATIENT
Start: 2022-11-08 | End: 2022-11-08

## 2022-11-08 RX ORDER — IPRATROPIUM BROMIDE AND ALBUTEROL SULFATE 2.5; .5 MG/3ML; MG/3ML
3 SOLUTION RESPIRATORY (INHALATION) EVERY 6 HOURS PRN
Status: DISCONTINUED | OUTPATIENT
Start: 2022-11-08 | End: 2022-11-12 | Stop reason: HOSPADM

## 2022-11-08 RX ORDER — OXYCODONE HYDROCHLORIDE 5 MG/1
20 TABLET ORAL EVERY 4 HOURS PRN
Status: DISCONTINUED | OUTPATIENT
Start: 2022-11-08 | End: 2022-11-09

## 2022-11-08 RX ORDER — LANOLIN ALCOHOL/MO/W.PET/CERES
800 CREAM (GRAM) TOPICAL
Status: DISCONTINUED | OUTPATIENT
Start: 2022-11-08 | End: 2022-11-12 | Stop reason: HOSPADM

## 2022-11-08 RX ORDER — NALOXONE HCL 0.4 MG/ML
0.02 VIAL (ML) INJECTION
Status: DISCONTINUED | OUTPATIENT
Start: 2022-11-08 | End: 2022-11-12 | Stop reason: HOSPADM

## 2022-11-08 RX ORDER — TALC
6 POWDER (GRAM) TOPICAL NIGHTLY PRN
Status: DISCONTINUED | OUTPATIENT
Start: 2022-11-08 | End: 2022-11-12 | Stop reason: HOSPADM

## 2022-11-08 RX ORDER — ACETAMINOPHEN 325 MG/1
650 TABLET ORAL EVERY 4 HOURS PRN
Status: DISCONTINUED | OUTPATIENT
Start: 2022-11-08 | End: 2022-11-12 | Stop reason: HOSPADM

## 2022-11-08 RX ORDER — DILTIAZEM HYDROCHLORIDE 5 MG/ML
INJECTION INTRAVENOUS
Status: COMPLETED
Start: 2022-11-08 | End: 2022-11-08

## 2022-11-08 RX ORDER — ZINC SULFATE 50(220)MG
220 CAPSULE ORAL DAILY
Status: DISCONTINUED | OUTPATIENT
Start: 2022-11-09 | End: 2022-11-12 | Stop reason: HOSPADM

## 2022-11-08 RX ORDER — SODIUM CHLORIDE 9 MG/ML
1000 INJECTION, SOLUTION INTRAVENOUS CONTINUOUS
Status: ACTIVE | OUTPATIENT
Start: 2022-11-08 | End: 2022-11-09

## 2022-11-08 RX ORDER — IBUPROFEN 200 MG
24 TABLET ORAL
Status: DISCONTINUED | OUTPATIENT
Start: 2022-11-08 | End: 2022-11-12 | Stop reason: HOSPADM

## 2022-11-08 RX ORDER — DILTIAZEM HYDROCHLORIDE 5 MG/ML
20 INJECTION INTRAVENOUS
Status: COMPLETED | OUTPATIENT
Start: 2022-11-08 | End: 2022-11-08

## 2022-11-08 RX ORDER — IBUPROFEN 200 MG
16 TABLET ORAL
Status: DISCONTINUED | OUTPATIENT
Start: 2022-11-08 | End: 2022-11-12 | Stop reason: HOSPADM

## 2022-11-08 RX ORDER — ASCORBIC ACID 500 MG
1000 TABLET ORAL DAILY
Status: DISCONTINUED | OUTPATIENT
Start: 2022-11-09 | End: 2022-11-12 | Stop reason: HOSPADM

## 2022-11-08 RX ORDER — SODIUM,POTASSIUM PHOSPHATES 280-250MG
2 POWDER IN PACKET (EA) ORAL
Status: DISCONTINUED | OUTPATIENT
Start: 2022-11-08 | End: 2022-11-12 | Stop reason: HOSPADM

## 2022-11-08 RX ORDER — ONDANSETRON 2 MG/ML
4 INJECTION INTRAMUSCULAR; INTRAVENOUS EVERY 6 HOURS PRN
Status: DISCONTINUED | OUTPATIENT
Start: 2022-11-08 | End: 2022-11-12 | Stop reason: HOSPADM

## 2022-11-08 RX ORDER — METOPROLOL TARTRATE 1 MG/ML
5 INJECTION, SOLUTION INTRAVENOUS EVERY 4 HOURS PRN
Status: DISCONTINUED | OUTPATIENT
Start: 2022-11-09 | End: 2022-11-12 | Stop reason: HOSPADM

## 2022-11-08 RX ORDER — ENOXAPARIN SODIUM 100 MG/ML
40 INJECTION SUBCUTANEOUS EVERY 24 HOURS
Status: DISCONTINUED | OUTPATIENT
Start: 2022-11-08 | End: 2022-11-12 | Stop reason: HOSPADM

## 2022-11-08 RX ORDER — SODIUM CHLORIDE 0.9 % (FLUSH) 0.9 %
10 SYRINGE (ML) INJECTION EVERY 8 HOURS PRN
Status: DISCONTINUED | OUTPATIENT
Start: 2022-11-08 | End: 2022-11-12 | Stop reason: HOSPADM

## 2022-11-08 RX ORDER — SIMETHICONE 80 MG
1 TABLET,CHEWABLE ORAL 4 TIMES DAILY PRN
Status: DISCONTINUED | OUTPATIENT
Start: 2022-11-08 | End: 2022-11-12 | Stop reason: HOSPADM

## 2022-11-08 RX ORDER — PROCHLORPERAZINE EDISYLATE 5 MG/ML
5 INJECTION INTRAMUSCULAR; INTRAVENOUS EVERY 6 HOURS PRN
Status: DISCONTINUED | OUTPATIENT
Start: 2022-11-08 | End: 2022-11-12 | Stop reason: HOSPADM

## 2022-11-08 RX ORDER — INSULIN ASPART 100 [IU]/ML
0-5 INJECTION, SOLUTION INTRAVENOUS; SUBCUTANEOUS
Status: DISCONTINUED | OUTPATIENT
Start: 2022-11-08 | End: 2022-11-12 | Stop reason: HOSPADM

## 2022-11-08 RX ORDER — ONDANSETRON 2 MG/ML
4 INJECTION INTRAMUSCULAR; INTRAVENOUS
Status: COMPLETED | OUTPATIENT
Start: 2022-11-08 | End: 2022-11-08

## 2022-11-08 RX ORDER — DILTIAZEM HYDROCHLORIDE 5 MG/ML
20 INJECTION INTRAVENOUS
Status: DISPENSED | OUTPATIENT
Start: 2022-11-08 | End: 2022-11-09

## 2022-11-08 RX ORDER — CEFEPIME HYDROCHLORIDE 1 G/50ML
1 INJECTION, SOLUTION INTRAVENOUS
Status: DISCONTINUED | OUTPATIENT
Start: 2022-11-08 | End: 2022-11-09

## 2022-11-08 RX ORDER — PANTOPRAZOLE SODIUM 40 MG/1
40 TABLET, DELAYED RELEASE ORAL DAILY
Status: DISCONTINUED | OUTPATIENT
Start: 2022-11-09 | End: 2022-11-09

## 2022-11-08 RX ADMIN — MORPHINE SULFATE 4 MG: 4 INJECTION INTRAVENOUS at 05:11

## 2022-11-08 RX ADMIN — DILTIAZEM HYDROCHLORIDE 20 MG: 5 INJECTION INTRAVENOUS at 05:11

## 2022-11-08 RX ADMIN — ONDANSETRON 4 MG: 2 INJECTION INTRAMUSCULAR; INTRAVENOUS at 05:11

## 2022-11-08 RX ADMIN — IOHEXOL 100 ML: 350 INJECTION, SOLUTION INTRAVENOUS at 10:11

## 2022-11-08 RX ADMIN — SODIUM CHLORIDE 1000 ML: 0.9 INJECTION, SOLUTION INTRAVENOUS at 08:11

## 2022-11-08 RX ADMIN — PIPERACILLIN SODIUM AND TAZOBACTAM SODIUM 4.5 G: 4; .5 INJECTION, POWDER, LYOPHILIZED, FOR SOLUTION INTRAVENOUS at 06:11

## 2022-11-08 RX ADMIN — SODIUM CHLORIDE 1000 ML: 0.9 INJECTION, SOLUTION INTRAVENOUS at 09:11

## 2022-11-08 RX ADMIN — SODIUM CHLORIDE 1000 ML: 0.9 INJECTION, SOLUTION INTRAVENOUS at 05:11

## 2022-11-08 NOTE — Clinical Note
Diagnosis: SIRS (systemic inflammatory response syndrome) [175370]   Future Attending Provider: MAYA CAIN [[id number]]   Admitting Provider:: MAYA CAIN [[id number]]

## 2022-11-08 NOTE — TELEPHONE ENCOUNTER
----- Message from Michael Adhikari sent at 11/8/2022  9:50 AM CST -----  Contact: Sumaya with [facility] nurse   Sumaya called in regards to speaking with the nurse about pain medication that the pt was sent home with from the hospital please advise

## 2022-11-09 ENCOUNTER — PATIENT OUTREACH (OUTPATIENT)
Dept: ADMINISTRATIVE | Facility: CLINIC | Age: 67
End: 2022-11-09
Payer: MEDICARE

## 2022-11-09 PROBLEM — R65.21 SEPTIC SHOCK: Status: ACTIVE | Noted: 2022-11-08

## 2022-11-09 PROBLEM — R79.89 ELEVATED TROPONIN: Status: ACTIVE | Noted: 2022-11-09

## 2022-11-09 LAB
ALBUMIN SERPL BCP-MCNC: 2 G/DL (ref 3.5–5.2)
ALP SERPL-CCNC: 97 U/L (ref 55–135)
ALT SERPL W/O P-5'-P-CCNC: 12 U/L (ref 10–44)
ANION GAP SERPL CALC-SCNC: 18 MMOL/L (ref 8–16)
AORTIC ROOT ANNULUS: 3.9 CM
ASCENDING AORTA: 3.86 CM
AST SERPL-CCNC: 13 U/L (ref 10–40)
AV INDEX (PROSTH): 0.88
AV MEAN GRADIENT: 3 MMHG
AV PEAK GRADIENT: 5 MMHG
AV VALVE AREA: 3.62 CM2
AV VELOCITY RATIO: 1.05
BILIRUB SERPL-MCNC: 0.4 MG/DL (ref 0.1–1)
BSA FOR ECHO PROCEDURE: 1.74 M2
BUN SERPL-MCNC: 5 MG/DL (ref 8–23)
C DIFF GDH STL QL: NEGATIVE
C DIFF TOX A+B STL QL IA: NEGATIVE
CALCIUM SERPL-MCNC: 7.6 MG/DL (ref 8.7–10.5)
CHLORIDE SERPL-SCNC: 104 MMOL/L (ref 95–110)
CO2 SERPL-SCNC: 19 MMOL/L (ref 23–29)
CREAT SERPL-MCNC: 0.5 MG/DL (ref 0.5–1.4)
CV ECHO LV RWT: 0.51 CM
DOP CALC AO PEAK VEL: 1.11 M/S
DOP CALC AO VTI: 22.3 CM
DOP CALC LVOT AREA: 4.1 CM2
DOP CALC LVOT DIAMETER: 2.29 CM
DOP CALC LVOT PEAK VEL: 1.16 M/S
DOP CALC LVOT STROKE VOLUME: 80.69 CM3
DOP CALC RVOT PEAK VEL: 0.79 M/S
DOP CALC RVOT VTI: 15.2 CM
DOP CALCLVOT PEAK VEL VTI: 19.6 CM
E WAVE DECELERATION TIME: 187.33 MSEC
E/A RATIO: 1.07
E/E' RATIO: 6.53 M/S
ECHO LV POSTERIOR WALL: 1.07 CM (ref 0.6–1.1)
EJECTION FRACTION: 65 %
ERYTHROCYTE [DISTWIDTH] IN BLOOD BY AUTOMATED COUNT: 28.3 % (ref 11.5–14.5)
EST. GFR  (NO RACE VARIABLE): >60 ML/MIN/1.73 M^2
FRACTIONAL SHORTENING: 37 % (ref 28–44)
GLUCOSE SERPL-MCNC: 58 MG/DL (ref 70–110)
HCT VFR BLD AUTO: 37.7 % (ref 40–54)
HGB BLD-MCNC: 11.3 G/DL (ref 14–18)
INTERVENTRICULAR SEPTUM: 1.21 CM (ref 0.6–1.1)
IVC DIAMETER: 1.22 CM
IVRT: 114.18 MSEC
LACTATE SERPL-SCNC: 1.4 MMOL/L (ref 0.5–2.2)
LACTATE SERPL-SCNC: 3.1 MMOL/L (ref 0.5–2.2)
LEFT ATRIUM SIZE: 2.97 CM
LEFT INTERNAL DIMENSION IN SYSTOLE: 2.65 CM (ref 2.1–4)
LEFT VENTRICLE DIASTOLIC VOLUME INDEX: 43.67 ML/M2
LEFT VENTRICLE DIASTOLIC VOLUME: 77.73 ML
LEFT VENTRICLE MASS INDEX: 92 G/M2
LEFT VENTRICLE SYSTOLIC VOLUME INDEX: 14.4 ML/M2
LEFT VENTRICLE SYSTOLIC VOLUME: 25.72 ML
LEFT VENTRICULAR INTERNAL DIMENSION IN DIASTOLE: 4.18 CM (ref 3.5–6)
LEFT VENTRICULAR MASS: 164.13 G
LV LATERAL E/E' RATIO: 6.2 M/S
LV SEPTAL E/E' RATIO: 6.89 M/S
LVOT MG: 2.34 MMHG
LVOT MV: 0.7 CM/S
MAGNESIUM SERPL-MCNC: 1.8 MG/DL (ref 1.6–2.6)
MCH RBC QN AUTO: 27.2 PG (ref 27–31)
MCHC RBC AUTO-ENTMCNC: 30 G/DL (ref 32–36)
MCV RBC AUTO: 91 FL (ref 82–98)
MV PEAK A VEL: 0.58 M/S
MV PEAK E VEL: 0.62 M/S
PHOSPHATE SERPL-MCNC: 3 MG/DL (ref 2.7–4.5)
PISA TR MAX VEL: 2.65 M/S
PLATELET # BLD AUTO: 248 K/UL (ref 150–450)
PMV BLD AUTO: 10.5 FL (ref 9.2–12.9)
POCT GLUCOSE: 140 MG/DL (ref 70–110)
POCT GLUCOSE: 65 MG/DL (ref 70–110)
POCT GLUCOSE: 84 MG/DL (ref 70–110)
POCT GLUCOSE: 94 MG/DL (ref 70–110)
POTASSIUM SERPL-SCNC: 3.3 MMOL/L (ref 3.5–5.1)
POTASSIUM SERPL-SCNC: 3.4 MMOL/L (ref 3.5–5.1)
PROT SERPL-MCNC: 4.8 G/DL (ref 6–8.4)
PV MEAN GRADIENT: 1.23 MMHG
RA PRESSURE: 3 MMHG
RBC # BLD AUTO: 4.16 M/UL (ref 4.6–6.2)
RV TISSUE DOPPLER FREE WALL SYSTOLIC VELOCITY 1 (APICAL 4 CHAMBER VIEW): 0.02 CM/S
SODIUM SERPL-SCNC: 141 MMOL/L (ref 136–145)
STJ: 3.55 CM
TDI LATERAL: 0.1 M/S
TDI SEPTAL: 0.09 M/S
TDI: 0.1 M/S
TR MAX PG: 28 MMHG
TRICUSPID ANNULAR PLANE SYSTOLIC EXCURSION: 1.6 CM
TROPONIN I SERPL DL<=0.01 NG/ML-MCNC: 0.04 NG/ML (ref 0–0.03)
TROPONIN I SERPL DL<=0.01 NG/ML-MCNC: 0.06 NG/ML (ref 0–0.03)
TROPONIN I SERPL DL<=0.01 NG/ML-MCNC: 0.08 NG/ML (ref 0–0.03)
TV REST PULMONARY ARTERY PRESSURE: 31 MMHG
WBC # BLD AUTO: 5.28 K/UL (ref 3.9–12.7)

## 2022-11-09 PROCEDURE — 25000003 PHARM REV CODE 250: Performed by: EMERGENCY MEDICINE

## 2022-11-09 PROCEDURE — 83735 ASSAY OF MAGNESIUM: CPT | Performed by: NURSE PRACTITIONER

## 2022-11-09 PROCEDURE — 97166 OT EVAL MOD COMPLEX 45 MIN: CPT

## 2022-11-09 PROCEDURE — S0030 INJECTION, METRONIDAZOLE: HCPCS | Performed by: FAMILY MEDICINE

## 2022-11-09 PROCEDURE — 99497 ADVNCD CARE PLAN 30 MIN: CPT | Mod: 25,,, | Performed by: PHYSICIAN ASSISTANT

## 2022-11-09 PROCEDURE — 36415 COLL VENOUS BLD VENIPUNCTURE: CPT | Performed by: STUDENT IN AN ORGANIZED HEALTH CARE EDUCATION/TRAINING PROGRAM

## 2022-11-09 PROCEDURE — 25000003 PHARM REV CODE 250: Performed by: NURSE PRACTITIONER

## 2022-11-09 PROCEDURE — 99291 CRITICAL CARE FIRST HOUR: CPT | Mod: ,,, | Performed by: INTERNAL MEDICINE

## 2022-11-09 PROCEDURE — 99497 PR ADVNCD CARE PLAN 30 MIN: ICD-10-PCS | Mod: 25,,, | Performed by: PHYSICIAN ASSISTANT

## 2022-11-09 PROCEDURE — 83605 ASSAY OF LACTIC ACID: CPT | Mod: 91 | Performed by: NURSE PRACTITIONER

## 2022-11-09 PROCEDURE — 99223 1ST HOSP IP/OBS HIGH 75: CPT | Mod: ,,, | Performed by: PHYSICIAN ASSISTANT

## 2022-11-09 PROCEDURE — 63600175 PHARM REV CODE 636 W HCPCS: Performed by: FAMILY MEDICINE

## 2022-11-09 PROCEDURE — 87081 CULTURE SCREEN ONLY: CPT | Performed by: STUDENT IN AN ORGANIZED HEALTH CARE EDUCATION/TRAINING PROGRAM

## 2022-11-09 PROCEDURE — 84132 ASSAY OF SERUM POTASSIUM: CPT | Performed by: STUDENT IN AN ORGANIZED HEALTH CARE EDUCATION/TRAINING PROGRAM

## 2022-11-09 PROCEDURE — 84484 ASSAY OF TROPONIN QUANT: CPT | Performed by: NURSE PRACTITIONER

## 2022-11-09 PROCEDURE — 25000003 PHARM REV CODE 250: Performed by: FAMILY MEDICINE

## 2022-11-09 PROCEDURE — 63600175 PHARM REV CODE 636 W HCPCS: Performed by: NURSE PRACTITIONER

## 2022-11-09 PROCEDURE — 21400001 HC TELEMETRY ROOM

## 2022-11-09 PROCEDURE — 85027 COMPLETE CBC AUTOMATED: CPT | Performed by: NURSE PRACTITIONER

## 2022-11-09 PROCEDURE — 84100 ASSAY OF PHOSPHORUS: CPT | Performed by: NURSE PRACTITIONER

## 2022-11-09 PROCEDURE — 25000003 PHARM REV CODE 250: Performed by: STUDENT IN AN ORGANIZED HEALTH CARE EDUCATION/TRAINING PROGRAM

## 2022-11-09 PROCEDURE — 25000003 PHARM REV CODE 250: Performed by: INTERNAL MEDICINE

## 2022-11-09 PROCEDURE — 99223 PR INITIAL HOSPITAL CARE,LEVL III: ICD-10-PCS | Mod: ,,, | Performed by: PHYSICIAN ASSISTANT

## 2022-11-09 PROCEDURE — 99223 1ST HOSP IP/OBS HIGH 75: CPT | Mod: ,,, | Performed by: INTERNAL MEDICINE

## 2022-11-09 PROCEDURE — 97163 PT EVAL HIGH COMPLEX 45 MIN: CPT

## 2022-11-09 PROCEDURE — 80053 COMPREHEN METABOLIC PANEL: CPT | Performed by: NURSE PRACTITIONER

## 2022-11-09 PROCEDURE — 99223 1ST HOSP IP/OBS HIGH 75: CPT | Mod: 57,,, | Performed by: COLON & RECTAL SURGERY

## 2022-11-09 PROCEDURE — 99223 PR INITIAL HOSPITAL CARE,LEVL III: ICD-10-PCS | Mod: 57,,, | Performed by: COLON & RECTAL SURGERY

## 2022-11-09 PROCEDURE — 94761 N-INVAS EAR/PLS OXIMETRY MLT: CPT

## 2022-11-09 PROCEDURE — 99291 PR CRITICAL CARE, E/M 30-74 MINUTES: ICD-10-PCS | Mod: ,,, | Performed by: INTERNAL MEDICINE

## 2022-11-09 PROCEDURE — 63600175 PHARM REV CODE 636 W HCPCS: Performed by: STUDENT IN AN ORGANIZED HEALTH CARE EDUCATION/TRAINING PROGRAM

## 2022-11-09 PROCEDURE — 27000221 HC OXYGEN, UP TO 24 HOURS

## 2022-11-09 PROCEDURE — C9113 INJ PANTOPRAZOLE SODIUM, VIA: HCPCS | Performed by: NURSE PRACTITIONER

## 2022-11-09 PROCEDURE — 99223 PR INITIAL HOSPITAL CARE,LEVL III: ICD-10-PCS | Mod: ,,, | Performed by: INTERNAL MEDICINE

## 2022-11-09 RX ORDER — CEFEPIME HYDROCHLORIDE 1 G/50ML
2 INJECTION, SOLUTION INTRAVENOUS
Status: DISCONTINUED | OUTPATIENT
Start: 2022-11-09 | End: 2022-11-12

## 2022-11-09 RX ORDER — SODIUM CHLORIDE 9 MG/ML
1000 INJECTION, SOLUTION INTRAVENOUS CONTINUOUS
Status: ACTIVE | OUTPATIENT
Start: 2022-11-09 | End: 2022-11-09

## 2022-11-09 RX ORDER — MUPIROCIN 20 MG/G
OINTMENT TOPICAL 2 TIMES DAILY
Status: DISCONTINUED | OUTPATIENT
Start: 2022-11-09 | End: 2022-11-12

## 2022-11-09 RX ORDER — PANTOPRAZOLE SODIUM 40 MG/10ML
40 INJECTION, POWDER, LYOPHILIZED, FOR SOLUTION INTRAVENOUS DAILY
Status: DISCONTINUED | OUTPATIENT
Start: 2022-11-09 | End: 2022-11-12 | Stop reason: HOSPADM

## 2022-11-09 RX ORDER — POTASSIUM CHLORIDE 7.45 MG/ML
10 INJECTION INTRAVENOUS
Status: COMPLETED | OUTPATIENT
Start: 2022-11-09 | End: 2022-11-09

## 2022-11-09 RX ORDER — OXYCODONE HYDROCHLORIDE 5 MG/1
20 TABLET ORAL EVERY 4 HOURS PRN
Status: DISCONTINUED | OUTPATIENT
Start: 2022-11-09 | End: 2022-11-12 | Stop reason: HOSPADM

## 2022-11-09 RX ORDER — HYDROMORPHONE HYDROCHLORIDE 1 MG/ML
0.5 INJECTION, SOLUTION INTRAMUSCULAR; INTRAVENOUS; SUBCUTANEOUS ONCE
Status: COMPLETED | OUTPATIENT
Start: 2022-11-09 | End: 2022-11-09

## 2022-11-09 RX ORDER — NAPROXEN SODIUM 220 MG/1
81 TABLET, FILM COATED ORAL DAILY
Status: DISCONTINUED | OUTPATIENT
Start: 2022-11-09 | End: 2022-11-12 | Stop reason: HOSPADM

## 2022-11-09 RX ORDER — MAGNESIUM SULFATE HEPTAHYDRATE 40 MG/ML
2 INJECTION, SOLUTION INTRAVENOUS ONCE
Status: COMPLETED | OUTPATIENT
Start: 2022-11-09 | End: 2022-11-09

## 2022-11-09 RX ORDER — FENTANYL 25 UG/1
1 PATCH TRANSDERMAL
Status: DISCONTINUED | OUTPATIENT
Start: 2022-11-09 | End: 2022-11-12 | Stop reason: HOSPADM

## 2022-11-09 RX ORDER — HYDROMORPHONE HYDROCHLORIDE 1 MG/ML
1 INJECTION, SOLUTION INTRAMUSCULAR; INTRAVENOUS; SUBCUTANEOUS EVERY 6 HOURS PRN
Status: DISCONTINUED | OUTPATIENT
Start: 2022-11-09 | End: 2022-11-10

## 2022-11-09 RX ORDER — NYSTATIN 100000 [USP'U]/ML
500000 SUSPENSION ORAL 4 TIMES DAILY
Status: DISCONTINUED | OUTPATIENT
Start: 2022-11-09 | End: 2022-11-12 | Stop reason: HOSPADM

## 2022-11-09 RX ORDER — HYDROMORPHONE HYDROCHLORIDE 1 MG/ML
1 INJECTION, SOLUTION INTRAMUSCULAR; INTRAVENOUS; SUBCUTANEOUS EVERY 6 HOURS PRN
Status: DISCONTINUED | OUTPATIENT
Start: 2022-11-09 | End: 2022-11-09

## 2022-11-09 RX ORDER — VANCOMYCIN HCL IN 5 % DEXTROSE 1G/250ML
1000 PLASTIC BAG, INJECTION (ML) INTRAVENOUS
Status: DISCONTINUED | OUTPATIENT
Start: 2022-11-09 | End: 2022-11-12

## 2022-11-09 RX ORDER — METRONIDAZOLE 500 MG/100ML
500 INJECTION, SOLUTION INTRAVENOUS
Status: DISCONTINUED | OUTPATIENT
Start: 2022-11-09 | End: 2022-11-11

## 2022-11-09 RX ADMIN — POTASSIUM CHLORIDE 10 MEQ: 7.46 INJECTION, SOLUTION INTRAVENOUS at 11:11

## 2022-11-09 RX ADMIN — HYDROMORPHONE HYDROCHLORIDE 1 MG: 1 INJECTION, SOLUTION INTRAMUSCULAR; INTRAVENOUS; SUBCUTANEOUS at 12:11

## 2022-11-09 RX ADMIN — MAGNESIUM SULFATE HEPTAHYDRATE 2 G: 40 INJECTION, SOLUTION INTRAVENOUS at 10:11

## 2022-11-09 RX ADMIN — SODIUM CHLORIDE 1000 ML: 0.9 INJECTION, SOLUTION INTRAVENOUS at 06:11

## 2022-11-09 RX ADMIN — METRONIDAZOLE 500 MG: 500 INJECTION, SOLUTION INTRAVENOUS at 06:11

## 2022-11-09 RX ADMIN — HYDROMORPHONE HYDROCHLORIDE 0.5 MG: 1 INJECTION, SOLUTION INTRAMUSCULAR; INTRAVENOUS; SUBCUTANEOUS at 12:11

## 2022-11-09 RX ADMIN — SODIUM CHLORIDE 1000 ML: 0.9 INJECTION, SOLUTION INTRAVENOUS at 04:11

## 2022-11-09 RX ADMIN — DIPHENHYDRAMINE HYDROCHLORIDE 15 ML: 12.5 LIQUID ORAL at 04:11

## 2022-11-09 RX ADMIN — POTASSIUM CHLORIDE 10 MEQ: 7.46 INJECTION, SOLUTION INTRAVENOUS at 03:11

## 2022-11-09 RX ADMIN — OXYCODONE HYDROCHLORIDE 20 MG: 5 TABLET ORAL at 08:11

## 2022-11-09 RX ADMIN — METRONIDAZOLE 500 MG: 500 INJECTION, SOLUTION INTRAVENOUS at 10:11

## 2022-11-09 RX ADMIN — CEFEPIME HYDROCHLORIDE 1 G: 1 INJECTION, SOLUTION INTRAVENOUS at 01:11

## 2022-11-09 RX ADMIN — NYSTATIN 500000 UNITS: 500000 SUSPENSION ORAL at 10:11

## 2022-11-09 RX ADMIN — SODIUM CHLORIDE 1000 ML: 0.9 INJECTION, SOLUTION INTRAVENOUS at 12:11

## 2022-11-09 RX ADMIN — CEFEPIME HYDROCHLORIDE 1 G: 1 INJECTION, SOLUTION INTRAVENOUS at 06:11

## 2022-11-09 RX ADMIN — METOROPROLOL TARTRATE 5 MG: 5 INJECTION, SOLUTION INTRAVENOUS at 10:11

## 2022-11-09 RX ADMIN — METRONIDAZOLE 500 MG: 500 INJECTION, SOLUTION INTRAVENOUS at 01:11

## 2022-11-09 RX ADMIN — ENOXAPARIN SODIUM 40 MG: 40 INJECTION SUBCUTANEOUS at 01:11

## 2022-11-09 RX ADMIN — ENOXAPARIN SODIUM 40 MG: 40 INJECTION SUBCUTANEOUS at 04:11

## 2022-11-09 RX ADMIN — VANCOMYCIN HYDROCHLORIDE 1500 MG: 1.5 INJECTION, POWDER, LYOPHILIZED, FOR SOLUTION INTRAVENOUS at 01:11

## 2022-11-09 RX ADMIN — DEXTROSE 125 ML: 10 SOLUTION INTRAVENOUS at 06:11

## 2022-11-09 RX ADMIN — NYSTATIN 500000 UNITS: 500000 SUSPENSION ORAL at 12:11

## 2022-11-09 RX ADMIN — OXYCODONE HYDROCHLORIDE 20 MG: 5 TABLET ORAL at 12:11

## 2022-11-09 RX ADMIN — MUPIROCIN: 20 OINTMENT TOPICAL at 10:11

## 2022-11-09 RX ADMIN — HYDROMORPHONE HYDROCHLORIDE 1 MG: 1 INJECTION, SOLUTION INTRAMUSCULAR; INTRAVENOUS; SUBCUTANEOUS at 06:11

## 2022-11-09 RX ADMIN — POTASSIUM CHLORIDE 10 MEQ: 7.46 INJECTION, SOLUTION INTRAVENOUS at 04:11

## 2022-11-09 RX ADMIN — PANTOPRAZOLE SODIUM 40 MG: 40 INJECTION, POWDER, FOR SOLUTION INTRAVENOUS at 10:11

## 2022-11-09 RX ADMIN — DIPHENHYDRAMINE HYDROCHLORIDE 15 ML: 12.5 LIQUID ORAL at 11:11

## 2022-11-09 RX ADMIN — POTASSIUM CHLORIDE 10 MEQ: 7.46 INJECTION, SOLUTION INTRAVENOUS at 12:11

## 2022-11-09 RX ADMIN — OXYCODONE HYDROCHLORIDE 20 MG: 5 TABLET ORAL at 09:11

## 2022-11-09 RX ADMIN — NYSTATIN 500000 UNITS: 500000 SUSPENSION ORAL at 04:11

## 2022-11-09 RX ADMIN — VANCOMYCIN HYDROCHLORIDE 1000 MG: 1 INJECTION, POWDER, LYOPHILIZED, FOR SOLUTION INTRAVENOUS at 03:11

## 2022-11-09 RX ADMIN — NYSTATIN 500000 UNITS: 500000 SUSPENSION ORAL at 09:11

## 2022-11-09 RX ADMIN — OXYCODONE HYDROCHLORIDE 20 MG: 5 TABLET ORAL at 03:11

## 2022-11-09 RX ADMIN — CEFEPIME HYDROCHLORIDE 2 G: 2 INJECTION, SOLUTION INTRAVENOUS at 10:11

## 2022-11-09 RX ADMIN — POTASSIUM CHLORIDE 10 MEQ: 7.46 INJECTION, SOLUTION INTRAVENOUS at 01:11

## 2022-11-09 RX ADMIN — MUPIROCIN: 20 OINTMENT TOPICAL at 11:11

## 2022-11-09 RX ADMIN — CEFEPIME HYDROCHLORIDE 2 G: 2 INJECTION, SOLUTION INTRAVENOUS at 02:11

## 2022-11-09 RX ADMIN — FENTANYL 1 PATCH: 25 PATCH TRANSDERMAL at 09:11

## 2022-11-09 RX ADMIN — DIPHENHYDRAMINE HYDROCHLORIDE 15 ML: 12.5 LIQUID ORAL at 06:11

## 2022-11-09 NOTE — H&P
[facility] - Intensive Care ([facility] Medicine  History & Physical    Patient Name: Vincent Benton  MRN: [id number]  Patient Class: IP- Inpatient  Admission Date: 11/8/2022  Attending Physician: Dr. Hand       Patient information was obtained from patient and ER records.     Subjective:     Principal Problem:Septic shock    Chief Complaint:   Chief Complaint   Patient presents with    Diarrhea     Cancer pt. Found with AMS and BP of 60/42.  Told to touch and not moving but covered in diarrhea.  Given fluids with BP improvement.         HPI: The patient is a 68 yo male with rectal adenocarcinoma stage IV on palliative chemotherapy-last tx 10/25/22, GERD, HTN, Alcoholism, Diarrhea since staring Chemo, and recent hospitalization for severe mucositis and wounds to buttocks who presented to ED after being found nearly unresponsive covered in diarrhea with BP 60/42. Per ED records, the spouse also reported severe generalized weakness, worsening diarrhea, worsening perirectal wounds with pain. BP improved with IVFs    In the ED, BP 90/60, O2sat 93%, WBC britni- mild left shift, LA 3.0. Troponin 0.07>0.101. UA +2 ketones.   While in the ED, HR elevated to 150s. IV Cardizem 20mg x 2 given. EKG showed Sinus tachycardia-rate 159, IRBBB, ST wave abnormalities   CT abd/pelvis showed LLL Pneumonia, possible small perirectal abscess.   3 liters IVF bolus given. SBP still dropping to 80s, -140s at times     ED provider discussed pt status and findings with Cardiology and General surgery who will see pt in consult.   Discussed ICU care and possible CVC/pressor support with pt- he stated that he is ready to die. Attempted to call spouse but no answer. Discussed CVC/IV Pressors with ED provider. Dr. Hawthorne discussed this with the pt's spouse, Akilah, before she left the ED. She wants these procedures done if needed     DNR/I- LaPOST on file, HCPOA on file, 1: wife Akilah, 2: sister Fara      Past Medical History:   Diagnosis Date     Alcoholism     Anemia     Back pain     Encounter for blood transfusion 2018    Essential hypertension 2/4/2016    GERD (gastroesophageal reflux disease)     Hiatal hernia     Hypertension     diet controlled    Melanoma 06/2021    left cheek    Rectal cancer 9/11/2019       Past Surgical History:   Procedure Laterality Date    ARTHROSCOPY OF KNEE Right 5/5/2021    Procedure: ARTHROSCOPY, KNEE;  Surgeon: Delonte Mcintyre MD;  Location: [facility];  Service: Orthopedics;  Laterality: Right;    COLONOSCOPY N/A 9/3/2019    Procedure: COLONOSCOPY;  Surgeon: Isai Centeno MD;  Location: [facility];  Service: Endoscopy;  Laterality: N/A;    COLONOSCOPY N/A 1/10/2020    Procedure: COLONOSCOPY;  Surgeon: Familia Gonzalez MD;  Location: [facility];  Service: General;  Laterality: N/A;    COLONOSCOPY N/A 3/24/2021    Procedure: COLONOSCOPY;  Surgeon: Familia Gonzalez MD;  Location: [facility];  Service: General;  Laterality: N/A;    ESOPHAGOGASTRODUODENOSCOPY N/A 9/3/2019    Procedure: ESOPHAGOGASTRODUODENOSCOPY (EGD);  Surgeon: Isai Centeno MD;  Location: [facility];  Service: Endoscopy;  Laterality: N/A;    EXCISION OF MEDIAL MENISCUS OF KNEE Right 5/5/2021    Procedure: MENISCECTOMY, KNEE, MEDIAL;  Surgeon: Delonte Mcintyre MD;  Location: [facility];  Service: Orthopedics;  Laterality: Right;  partial Lateral    FLEXIBLE SIGMOIDOSCOPY  2/4/2020    Procedure: SIGMOIDOSCOPY, FLEXIBLE;  Surgeon: Familia Gonzalez MD;  Location: [facility];  Service: General;;    ILEOSTOMY Right 2/4/2020    Procedure: CREATION, ILEOSTOMY;  Surgeon: Familia Gonzalez MD;  Location: [facility];  Service: General;  Laterality: Right;    ILEOSTOMY CLOSURE N/A 8/4/2020    Procedure: CLOSURE, ILEOSTOMY;  Surgeon: Familia Gonzalez MD;  Location: [facility];  Service: General;  Laterality: N/A;    INCISION AND DRAINAGE OF BACK Left 8/5/2020    Procedure: INCISION AND DRAINAGE, BACK;  Surgeon: Familia Gonzalez MD;  Location: [facility];  Service:  General;  Laterality: Left;    INJECTION OF ANESTHETIC AGENT INTO TISSUE PLANE DEFINED BY TRANSVERSUS ABDOMINIS MUSCLE N/A 2/4/2020    Procedure: BLOCK, TRANSVERSUS ABDOMINIS PLANE;  Surgeon: Familia Gonzalez MD;  Location: [facility] OR;  Service: General;  Laterality: N/A;    INSERTION OF TUNNELED CENTRAL VENOUS CATHETER (CVC) WITH SUBCUTANEOUS PORT Right 2/4/2020    Procedure: INSERTION, PORT-A-CATH;  Surgeon: Familia Gonzalez MD;  Location: [facility] OR;  Service: General;  Laterality: Right;    INSERTION OF TUNNELED CENTRAL VENOUS CATHETER (CVC) WITH SUBCUTANEOUS PORT N/A 8/16/2022    Procedure: [id number];  Surgeon: Familia Gonzalez MD;  Location: [facility] OR;  Service: General;  Laterality: N/A;  right subclavian    JOINT REPLACEMENT Left 2009    Hip    KNEE ARTHROSCOPY W/ PLICA EXCISION Right 5/5/2021    Procedure: EXCISION, PLICA, KNEE, ARTHROSCOPIC;  Surgeon: Delonte Mcintyre MD;  Location: [facility] OR;  Service: Orthopedics;  Laterality: Right;    MOBILIZATION OF SPLENIC FLEXURE  2/4/2020    Procedure: MOBILIZATION, SPLENIC FLEXURE;  Surgeon: Familia Gonzalez MD;  Location: [facility] OR;  Service: General;;    REMOVAL OF HARDWARE FROM HIP Left 1/4/2022    Procedure: REMOVAL, HARDWARE, HIP;  Surgeon: Delonte Mcintyre MD;  Location: [facility] OR;  Service: Orthopedics;  Laterality: Left;       Review of patient's allergies indicates:  No Known Allergies    Current Facility-Administered Medications on File Prior to Encounter   Medication    0.9%  NaCl infusion    0.9%  NaCl infusion    alteplase injection 2 mg    chlorhexidine 0.12 % solution 10 mL    chlorhexidine 0.12 % solution 10 mL    diphenhydrAMINE injection 25 mg    HYDROmorphone (PF) injection 0.2 mg    HYDROmorphone (PF) injection 0.2 mg    lactated ringers infusion    metoclopramide HCl injection 10 mg    nozaseptin (NOZIN) nasal     sodium chloride 0.9% flush 10 mL    sodium chloride 0.9% flush 3 mL    sodium chloride 0.9% flush 3 mL      Current Outpatient Medications on File Prior to Encounter   Medication Sig    magic mouthwash diphen/antac/lidoc Swish and spit 15 mLs every 4 (four) hours as needed (mouth ulcers).    ascorbic acid, vitamin C, (VITAMIN C) 1000 MG tablet Take 1,000 mg by mouth once daily.    dexAMETHasone (DECADRON) 4 MG Tab Take 2 tablets (8 mg total) by mouth once daily. Take as directed on days 2 and 3 of your chemotherapy cycle.    diphenoxylate-atropine 2.5-0.025 mg (LOMOTIL) 2.5-0.025 mg per tablet Take 1 tablet by mouth 4 (four) times daily as needed for Diarrhea.    fentaNYL (DURAGESIC) 25 mcg/hr Place 1 patch onto the skin every 72 hours. for 15 days    fluconazole (DIFLUCAN) 200 MG Tab Take 1 tablet (200 mg total) by mouth once daily. for 3 days    gabapentin (NEURONTIN) 300 MG capsule Take 1 capsule (300 mg total) by mouth every evening.    hydrALAZINE (APRESOLINE) 25 MG tablet Take 1 tablet (25 mg total) by mouth 2 (two) times a day.    loperamide (IMODIUM) 2 mg capsule Take 1 capsule (2 mg total) by mouth 4 (four) times daily as needed for Diarrhea.    losartan (COZAAR) 25 MG tablet Take 1 tablet (25 mg total) by mouth once daily.    multivitamin capsule Take 1 capsule by mouth once daily.    ondansetron (ZOFRAN-ODT) 8 MG TbDL Dissolve 1 tablet (8 mg total) by mouth every 8 (eight) hours as needed (nausea/vomiting).    oxyCODONE (ROXICODONE) 20 mg Tab immediate release tablet Take 1 tablet (20 mg total) by mouth every 4 (four) hours as needed (pain). Max 4 doses/ day    pantoprazole (PROTONIX) 40 MG tablet Take 1 tablet (40 mg total) by mouth once daily.    [DISCONTINUED] diclofenac sodium (VOLTAREN) 1 % Gel Apply 2 g topically 3 (three) times daily.    [DISCONTINUED] mirtazapine (REMERON) 15 MG tablet Take 1 tablet (15 mg total) by mouth every evening.    [DISCONTINUED] tamsulosin (FLOMAX) 0.4 mg Cap Take 1 capsule (0.4 mg total) by mouth once daily.     Family History       Problem Relation (Age of Onset)     Cataracts Mother    Hypertension Father    Stroke Father          Tobacco Use    Smoking status: Never    Smokeless tobacco: Never   Substance and Sexual Activity    Alcohol use: Yes    Drug use: No    Sexual activity: Never     Partners: Female     Review of Systems   Unable to perform ROS: Acuity of condition   Constitutional:  Positive for activity change.   Gastrointestinal:  Positive for diarrhea.   Musculoskeletal:  Positive for arthralgias.   Skin:  Positive for wound (buttock).   Neurological:  Positive for dizziness, syncope (near syncope) and weakness.   Objective:     Vital Signs (Most Recent):  Temp: 98.6 °F (37 °C) (11/08/22 2341)  Pulse: 65 (11/08/22 2341)  Resp: 17 (11/08/22 2341)  BP: (!) 99/57 (11/08/22 2341)  SpO2: (!) 92 % (11/08/22 2341)   Vital Signs (24h Range):  Temp:  [97.6 °F (36.4 °C)-98.6 °F (37 °C)] 98.6 °F (37 °C)  Pulse:  [] 65  Resp:  [15-27] 17  SpO2:  [92 %-100 %] 92 %  BP: ()/(51-71) 99/57     Weight: 58.4 kg (128 lb 12.8 oz)  Body mass index is 17.96 kg/m².    Physical Exam  Vitals and nursing note reviewed.   Constitutional:       Appearance: He is cachectic. He is ill-appearing and toxic-appearing.      Comments: Appears Moribund   HENT:      Head: Normocephalic and atraumatic.      Nose: Nose normal.   Eyes:      General: No scleral icterus.  Cardiovascular:      Rate and Rhythm: Regular rhythm. Tachycardia present.      Heart sounds: Normal heart sounds. No murmur heard.    No friction rub. No gallop.   Pulmonary:      Effort: Pulmonary effort is normal. No respiratory distress.      Breath sounds: Rhonchi present. No wheezing.   Abdominal:      General: Bowel sounds are normal. There is no distension.      Palpations: Abdomen is soft.      Tenderness: There is no abdominal tenderness.   Musculoskeletal:         General: Normal range of motion.      Cervical back: Normal range of motion and neck supple.   Skin:     General: Skin is warm and dry.      Coloration:  Skin is pale.      Findings: No rash.   Neurological:      Mental Status: He is lethargic.      Cranial Nerves: No cranial nerve deficit.   Psychiatric:         Behavior: Behavior normal.                 Significant Labs: All pertinent labs within the past 24 hours have been reviewed.    Significant Imaging: I have reviewed all pertinent imaging results/findings within the past 24 hours.    Assessment/Plan:     * Septic shock  This patient does have evidence of infective focus  My overall impression is septic shock.  Source: IV Cefepime and Vanco   Antibiotics given-   Antibiotics (From admission, onward)      Start     Stop Route Frequency Ordered    11/09/22 0015  vancomycin 1.5 g in dextrose 5 % 250 mL IVPB (ready to mix)         -- IV Once 11/08/22 2302 11/08/22 2315  cefepime in dextrose 5 % 1 gram/50 mL IVPB 1 g         -- IV Every 8 hours (non-standard times) 11/08/22 2201 11/08/22 2301  vancomycin - pharmacy to dose  (vancomycin IVPB)        See Hyperspace for full Linked Orders Report.    -- IV pharmacy to manage frequency 11/08/22 2201          Latest lactate reviewed-  Recent Labs   Lab 11/08/22 1818 11/08/22 2129   LACTATE 3.0* 1.3     Organ dysfunction indicated by encephalopathy    Shock with decreased perfusion noted, Fluid challenge  was given at 30cc/kg    Post- resuscitation assessment- (Done after fluids given for shock)  Vital signs post fluid administrations were-  Temp Readings from Last 1 Encounters:   11/08/22 98.6 °F (37 °C) (Oral)     BP Readings from Last 1 Encounters:   11/09/22 (!) 87/51     Pulse Readings from Last 1 Encounters:   11/09/22 (!) 140       Perfusion exam was performed within 6 hours of septic shock presentation after bolus shows Adequate tissue perfusion assessed by non-invasive monitoring  Will Start Pressors- Levophed for MAP of 65  Source control achieved by: IV Vanco and Cefepime         Pneumonia  IV Cefepime/vanco  Blood cultures       Perirectal wounds with  abscess  Very small abscesses   Consult general surgery and wound care  IV Vanco and Cefepime  Wound cultures       Diarrhea  Check stools for Cdiff  Stool cultureshowed No growth and Stoool for Cdiff was negative on 11/1/22      Tachycardia  Likely 2/2 Sepsis and severe dehydration   Check CTA chest to r/o PE  IV Cardizem x 2 doses given in ED.   Cardiology recommended IV Cardizem drip but BP too low   IV Lopressor prn HR>120      Elevated troponin         Troponin 0.070>0.101          EKG showed ST depression - ED provider discussed with Dr Montana who felt it was rate related         trend troponin            Mucositis due to antineoplastic therapy  Cont Magic mouth wash       Rectal cancer  palliative care followed pt for pain control   Consider hospice       Immunocompromised patient  2/2 chemo      Severe malnutrition  Nutrition consulted. Most recent weight and BMI monitored-   Measurements:  Wt Readings from Last 1 Encounters:   11/08/22 58.4 kg (128 lb 12.8 oz)   Body mass index is 17.96 kg/m².    Recommendations:    Patient has been screened and assessed by RD. RD will follow patient.      Neoplasm related pain  Cont po meds if BP tolerates         VTE Risk Mitigation (From admission, onward)           Ordered     enoxaparin injection 40 mg  Daily         11/08/22 2159     IP VTE HIGH RISK PATIENT  Once         11/08/22 2159     Place sequential compression device  Until discontinued         11/08/22 2159                  Critical care time spent on the evaluation and treatment of severe organ dysfunction, review of pertinent labs and imaging studies, discussions with consulting providers and discussions with patient/family: 70 minutes.     Myriam Hidalgo NP  Department of Hospital Medicine   [facility] - Intensive Care ([facility])

## 2022-11-09 NOTE — PT/OT/SLP EVAL
Physical Therapy Evaluation and Discharge Note    Patient Name:  Vincent Benton   MRN:  [id number]    Recommendations:     Discharge Recommendations:  home, nursing facility, basic   Discharge Equipment Recommendations: hospital bed   Barriers to discharge: None    Assessment:     Vincent Benton is a 67 y.o. male admitted with a medical diagnosis of Septic shock. .  At this time, patient is functioning at their prior level of function and does not require further acute PT services.     Recent Surgery: * No surgery found *     Plan:     During this hospitalization, patient does not require further acute PT services.  Pt is currently unable to tolerate skilled P.T. intervention due to uncontrolled pain Please re-consult if situation changes.      Subjective     Chief Complaint: PAIN  Patient/Family Comments/goals:   Pain/Comfort:  Pain Rating 1: 10/10  Location 1:  (RECTUM)  Pain Addressed 1: Reposition, Distraction, Cessation of Activity  Pain Rating Post-Intervention 1: 10/10    Patients cultural, spiritual, Methodist conflicts given the current situation:      Living Environment:  PT LIVES WITH WIFE WHO WORKS DURING THE DAY, 1 STORY HOUSE NO STEPS TO ENTER, PT WAS AMB WITH QUAD CANE HOUSEHOLD DISTANCE APPROX. 1 WEEK AGO BUT LIMITED BY INCREASED PAIN WITH MULTIPLE HOSPITAL STAYS.  WIFE ASSISTS WITH ADL'S  Prior to admission, patients level of function was.  Equipment used at home: cane, quad, shower chair, grab bar.  DME owned (not currently used): none.  Upon discharge, patient will have assistance from ?.    Objective:     Communicated with NURSE VALDES  prior to session.  Patient found left sidelying with telemetry, oxygen, peripheral IV, bed alarm, blood pressure cuff, pulse ox (continuous) upon PT entry to room.    General Precautions: Standard, special contact, fall, respiratory (LOW BP)   Orthopedic Precautions:N/A   Braces: N/A [email or web address]  Respiratory Status: Nasal  "cannula    Exams:  Cognitive Exam:  Patient is oriented to Person, Place, Time, Situation, and PT CRYING OUT THROUGHOUT EVAL WITH SLIGHT MOVEMENT AND AT REST-NOTIFIED NURSE  RLE ROM: LIMITED ACTIVE MOVEMENT DUE TO PAIN  LLE ROM: LIMITED ACTIVE MOVEMENT DUE TO PAIN    Functional Mobility:  Bed Mobility:     Rolling Left:  total assistance  Rolling Right: total assistance  Scooting: total assistance    AM-PAC 6 CLICK MOBILITY  Total Score:6     Treatment and Education:  PT EDUCATED IN ROLE OF P.T. IN ACUTE CARE HOSPITAL SETTING, PT EDUCATED IN AND ENCOURAGED TO PERFORM GENTLE AROM WHILE SUPINE THROUGHOUT THE DAY TO TOLERANCE: HIP FLEX/EXT, QUAD SET, AP'S.    PT REQUIRED TOTAL ASSIST FOR USE OF URINAL WHILE SUPINE IN BED  PT EDUCATED ON RISK FOR FALLS DUE TO GENERALIZED WEAKNESS, EDUCATED ON "CALL DON'T FALL", ENCOURAGED TO CALL FOR ASSISTANCE WITH ALL NEEDS     AM-PAC 6 CLICK MOBILITY  Total Score:6     Patient left right sidelying with all lines intact, call button in reach, bed alarm on, and NURSE notified.    GOALS:   Multidisciplinary Problems       Physical Therapy Goals       Not on file                    History:     Past Medical History:   Diagnosis Date    Alcoholism     Anemia     Back pain     Encounter for blood transfusion 2018    Essential hypertension 2/4/2016    GERD (gastroesophageal reflux disease)     Hiatal hernia     Hypertension     diet controlled    Melanoma 06/2021    left cheek    Rectal cancer 9/11/2019       Past Surgical History:   Procedure Laterality Date    ARTHROSCOPY OF KNEE Right 5/5/2021    Procedure: ARTHROSCOPY, KNEE;  Surgeon: Delonte Mcintyre MD;  Location: [facility] OR;  Service: Orthopedics;  Laterality: Right;    COLONOSCOPY N/A 9/3/2019    Procedure: COLONOSCOPY;  Surgeon: Isai Centeno MD;  Location: [facility] ENDO;  Service: Endoscopy;  Laterality: N/A;    COLONOSCOPY N/A 1/10/2020    Procedure: COLONOSCOPY;  Surgeon: Familia Gonzalez MD;  Location: [facility] ENDO;  Service: " General;  Laterality: N/A;    COLONOSCOPY N/A 3/24/2021    Procedure: COLONOSCOPY;  Surgeon: Familia Gonzalez MD;  Location: [facility];  Service: General;  Laterality: N/A;    ESOPHAGOGASTRODUODENOSCOPY N/A 9/3/2019    Procedure: ESOPHAGOGASTRODUODENOSCOPY (EGD);  Surgeon: Isai Centeno MD;  Location: [facility] ENDO;  Service: Endoscopy;  Laterality: N/A;    EXCISION OF MEDIAL MENISCUS OF KNEE Right 5/5/2021    Procedure: MENISCECTOMY, KNEE, MEDIAL;  Surgeon: Delonte Mcintyre MD;  Location: [facility] OR;  Service: Orthopedics;  Laterality: Right;  partial Lateral    FLEXIBLE SIGMOIDOSCOPY  2/4/2020    Procedure: SIGMOIDOSCOPY, FLEXIBLE;  Surgeon: Familia Gonzalez MD;  Location: [facility];  Service: General;;    ILEOSTOMY Right 2/4/2020    Procedure: CREATION, ILEOSTOMY;  Surgeon: Familia Gonzalez MD;  Location: [facility] OR;  Service: General;  Laterality: Right;    ILEOSTOMY CLOSURE N/A 8/4/2020    Procedure: CLOSURE, ILEOSTOMY;  Surgeon: Familia Gonzalez MD;  Location: [facility] OR;  Service: General;  Laterality: N/A;    INCISION AND DRAINAGE OF BACK Left 8/5/2020    Procedure: INCISION AND DRAINAGE, BACK;  Surgeon: Familia Gonzalez MD;  Location: [facility];  Service: General;  Laterality: Left;    INJECTION OF ANESTHETIC AGENT INTO TISSUE PLANE DEFINED BY TRANSVERSUS ABDOMINIS MUSCLE N/A 2/4/2020    Procedure: BLOCK, TRANSVERSUS ABDOMINIS PLANE;  Surgeon: Familia Gonzalez MD;  Location: [facility];  Service: General;  Laterality: N/A;    INSERTION OF TUNNELED CENTRAL VENOUS CATHETER (CVC) WITH SUBCUTANEOUS PORT Right 2/4/2020    Procedure: INSERTION, PORT-A-CATH;  Surgeon: Familia Gonzalez MD;  Location: [facility] OR;  Service: General;  Laterality: Right;    INSERTION OF TUNNELED CENTRAL VENOUS CATHETER (CVC) WITH SUBCUTANEOUS PORT N/A 8/16/2022    Procedure: [id number];  Surgeon: Familia Gonzalez MD;  Location: [facility] OR;  Service: General;  Laterality: N/A;  right subclavian    JOINT REPLACEMENT Left 2009     Hip    KNEE ARTHROSCOPY W/ PLICA EXCISION Right 5/5/2021    Procedure: EXCISION, PLICA, KNEE, ARTHROSCOPIC;  Surgeon: Delonte Mcintyre MD;  Location: [facility] OR;  Service: Orthopedics;  Laterality: Right;    MOBILIZATION OF SPLENIC FLEXURE  2/4/2020    Procedure: MOBILIZATION, SPLENIC FLEXURE;  Surgeon: Familia Gonzalez MD;  Location: [facility];  Service: General;;    REMOVAL OF HARDWARE FROM HIP Left 1/4/2022    Procedure: REMOVAL, HARDWARE, HIP;  Surgeon: Delonte Mcintyre MD;  Location: [facility];  Service: Orthopedics;  Laterality: Left;       Time Tracking:     PT Received On: 11/09/22  PT Start Time: 0800     PT Stop Time: 0823  PT Total Time (min): 23 min     Billable Minutes: Evaluation 23 11/09/2022

## 2022-11-09 NOTE — ED PROVIDER NOTES
SCRIBE #1 NOTE: I, Berto Landaverde, am scribing for, and in the presence of, Talisha Benson Do, MD. I have scribed the entire note.       History     Chief Complaint   Patient presents with    Diarrhea     Cancer pt. Found with AMS and BP of 60/42.  Told to touch and not moving but covered in diarrhea.  Given fluids with BP improvement.      Review of patient's allergies indicates:  No Known Allergies      History of Present Illness     HPI    11/8/2022, 6:16 PM  History obtained from the wife and pt      History of Present Illness: Vincent Benton is a 67 y.o. male patient with a PMHx of liver cancer, rectum cancer, colon caner, lung cancer, GERD, and HTN who presents to the Emergency Department for evaluation of diarrhea which onset today. Per pt's wife, she left pt in his bath for 2 hours and placed pt on bed after; pt's wife says pt began to exhibit extreme generalized weakness and fatigue at this point. When EMS arrived, pt was weak and his BP was 60/42; pt's BP jumped to the 150s per EMS. EMS administered fluids to pt which improved BP.  Symptoms are constant and severe in severity. No mitigating or exacerbating factors reported. Associated sxs include generalized myalgias, rectal pain, nausea, buttock pain, mouth sores, and perirectal sores. Patient denies any fever, chills, CP, SOB, lightheadedness, HA, vomiting, and all other sxs at this time. Pt started chemo Tx this year and last had Tx on 11/1/2022. No further complaints or concerns at this time.       Arrival mode: Ambulance Service    PCP: Shakila Moran DO        Past Medical History:  Past Medical History:   Diagnosis Date    Alcoholism     Anemia     Back pain     Encounter for blood transfusion 2018    Essential hypertension 2/4/2016    GERD (gastroesophageal reflux disease)     Hiatal hernia     Hypertension     diet controlled    Melanoma 06/2021    left cheek    Rectal cancer 9/11/2019       Past Surgical History:  Past Surgical History:    Procedure Laterality Date    ARTHROSCOPY OF KNEE Right 5/5/2021    Procedure: ARTHROSCOPY, KNEE;  Surgeon: Delonte Mcintyre MD;  Location: [facility] OR;  Service: Orthopedics;  Laterality: Right;    COLONOSCOPY N/A 9/3/2019    Procedure: COLONOSCOPY;  Surgeon: Isai Centeno MD;  Location: [facility] ENDO;  Service: Endoscopy;  Laterality: N/A;    COLONOSCOPY N/A 1/10/2020    Procedure: COLONOSCOPY;  Surgeon: Familia Gonzalez MD;  Location: [facility] ENDO;  Service: General;  Laterality: N/A;    COLONOSCOPY N/A 3/24/2021    Procedure: COLONOSCOPY;  Surgeon: Familia Gonzalez MD;  Location: [facility] ENDO;  Service: General;  Laterality: N/A;    ESOPHAGOGASTRODUODENOSCOPY N/A 9/3/2019    Procedure: ESOPHAGOGASTRODUODENOSCOPY (EGD);  Surgeon: Isai Centeno MD;  Location: [facility] ENDO;  Service: Endoscopy;  Laterality: N/A;    EXCISION OF MEDIAL MENISCUS OF KNEE Right 5/5/2021    Procedure: MENISCECTOMY, KNEE, MEDIAL;  Surgeon: Delonte Mcintyre MD;  Location: [facility];  Service: Orthopedics;  Laterality: Right;  partial Lateral    FLEXIBLE SIGMOIDOSCOPY  2/4/2020    Procedure: SIGMOIDOSCOPY, FLEXIBLE;  Surgeon: Familia Gonzalez MD;  Location: [facility];  Service: General;;    ILEOSTOMY Right 2/4/2020    Procedure: CREATION, ILEOSTOMY;  Surgeon: Familia Gonzalez MD;  Location: [facility] OR;  Service: General;  Laterality: Right;    ILEOSTOMY CLOSURE N/A 8/4/2020    Procedure: CLOSURE, ILEOSTOMY;  Surgeon: Familia Gonzalez MD;  Location: [facility] OR;  Service: General;  Laterality: N/A;    INCISION AND DRAINAGE OF BACK Left 8/5/2020    Procedure: INCISION AND DRAINAGE, BACK;  Surgeon: Familia Gonzalez MD;  Location: [facility] OR;  Service: General;  Laterality: Left;    INJECTION OF ANESTHETIC AGENT INTO TISSUE PLANE DEFINED BY TRANSVERSUS ABDOMINIS MUSCLE N/A 2/4/2020    Procedure: BLOCK, TRANSVERSUS ABDOMINIS PLANE;  Surgeon: Familia Gonzalez MD;  Location: [facility] OR;  Service: General;  Laterality: N/A;    INSERTION OF TUNNELED  CENTRAL VENOUS CATHETER (CVC) WITH SUBCUTANEOUS PORT Right 2/4/2020    Procedure: INSERTION, PORT-A-CATH;  Surgeon: Familia Gonzalez MD;  Location: [facility] OR;  Service: General;  Laterality: Right;    INSERTION OF TUNNELED CENTRAL VENOUS CATHETER (CVC) WITH SUBCUTANEOUS PORT N/A 8/16/2022    Procedure: [id number];  Surgeon: Familia Gonzalez MD;  Location: [facility] OR;  Service: General;  Laterality: N/A;  right subclavian    JOINT REPLACEMENT Left 2009    Hip    KNEE ARTHROSCOPY W/ PLICA EXCISION Right 5/5/2021    Procedure: EXCISION, PLICA, KNEE, ARTHROSCOPIC;  Surgeon: Delonte Mcintyre MD;  Location: [facility] OR;  Service: Orthopedics;  Laterality: Right;    MOBILIZATION OF SPLENIC FLEXURE  2/4/2020    Procedure: MOBILIZATION, SPLENIC FLEXURE;  Surgeon: Familia Gonzalez MD;  Location: [facility] OR;  Service: General;;    REMOVAL OF HARDWARE FROM HIP Left 1/4/2022    Procedure: REMOVAL, HARDWARE, HIP;  Surgeon: Delonte Mcintyre MD;  Location: [facility] OR;  Service: Orthopedics;  Laterality: Left;         Family History:  Family History   Problem Relation Age of Onset    Cataracts Mother     Stroke Father     Hypertension Father        Social History:  Social History     Tobacco Use    Smoking status: Never    Smokeless tobacco: Never   Substance and Sexual Activity    Alcohol use: Yes    Drug use: No    Sexual activity: Never     Partners: Female        Review of Systems     Review of Systems   Constitutional:  Positive for fatigue. Negative for chills and fever.   HENT:  Positive for mouth sores. Negative for sore throat.    Respiratory:  Negative for shortness of breath.    Cardiovascular:  Negative for chest pain.   Gastrointestinal:  Positive for diarrhea, nausea and rectal pain. Negative for vomiting.   Genitourinary:  Negative for dysuria.        (+) buttock pain  (+) perirectal sores   Musculoskeletal:  Positive for myalgias (generalized). Negative for back pain.   Skin:  Negative for rash.    Neurological:  Positive for weakness (generalized, extreme). Negative for light-headedness and headaches.   Hematological:  Does not bruise/bleed easily.   All other systems reviewed and are negative.     Physical Exam     Initial Vitals [22 1706]   BP Pulse Resp Temp SpO2   90/60 63 18 97.6 °F (36.4 °C) (!) 93 %      MAP       --          Physical Exam  Nursing Notes and Vital Signs Reviewed.  Constitutional: Patient is in moderate distress. Extremely weak. Pt is crying at times. Moaning in pain. Thin  Head: Atraumatic. Normocephalic.  Eyes: PERRL. EOM intact. Conjunctivae are not pale. No scleral icterus.  ENT: Mucous membranes are moist. Oropharynx is clear and symmetric.    Neck: Supple. Full ROM. No lymphadenopathy.  Cardiovascular: Tachycardic. Regular rhythm. No murmurs, rubs, or gallops. Distal pulses are 2+ and symmetric.  Pulmonary/Chest: No respiratory distress. Clear to auscultation bilaterally. No wheezing or rales. No retractions  Abdominal: Soft and non-distended.  There is no tenderness.  No rebound, guarding, or rigidity. Diarrhea noted in pt's diaper, brown and mucus-like.  Genitourinary: No CVA tenderness. Near rectal area, ulcerated area. Abscess noted, draining pus near the rectum.          Musculoskeletal: Moves all extremities but overall very weak. No obvious deformities. No edema. No calf tenderness.  Skin: Mediport to upper R chest wall.  Neurological:  Slightly lethargic, pt able to say his name, , and address. Pt does not seem to be confused or delirious. Pt is able to move all of his extremities but overall very weak.  Psychiatric: Normal affect. Good eye contact. Appropriate in content.     ED Course   Critical Care    Date/Time: 2022 5:39 PM  Performed by: Talisha Benson Do, MD  Authorized by: Talisha Benson Do, MD   Direct patient critical care time: 25 minutes  Additional history critical care time: 5 minutes  Ordering / reviewing critical care time: 5  minutes  Documentation critical care time: 5 minutes  Consulting other physicians critical care time: 5 minutes  Total critical care time (exclusive of procedural time) : 45 minutes  Critical care time was exclusive of separately billable procedures and treating other patients and teaching time.  Critical care was necessary to treat or prevent imminent or life-threatening deterioration of the following conditions: severe sepsis, elevated troponin, atrial flutter.  Critical care was time spent personally by me on the following activities: blood draw for specimens, development of treatment plan with patient or surrogate, discussions with consultants, interpretation of cardiac output measurements, evaluation of patient's response to treatment, examination of patient, obtaining history from patient or surrogate, ordering and performing treatments and interventions, ordering and review of laboratory studies, ordering and review of radiographic studies, pulse oximetry, re-evaluation of patient's condition and review of old charts.      ED Vital Signs:  Vitals:    11/08/22 2047 11/08/22 2117 11/08/22 2131 11/08/22 2206   BP: 101/71 107/60 (!) 91/54 (!) 85/59   Pulse: (!) 123 (!) 143 (!) 142 (!) 141   Resp: 16 17 19 (!) 25   Temp:       TempSrc:       SpO2: 100% 100% (!) 93% 99%   Weight:       Height:        11/08/22 2221 11/08/22 2231 11/08/22 2310 11/08/22 2329   BP:  (!) 97/57 (!) 123/59 (!) 85/58   Pulse: 62 64 (!) 59 (!) 138   Resp: (!) 27 (!) 27 18    Temp:   98.6 °F (37 °C)    TempSrc:   Oral    SpO2: 100% 100% (!) 94%    Weight:       Height:        11/08/22 2333 11/08/22 2341 11/09/22 0000 11/09/22 0023   BP:  (!) 99/57 (!) 87/51    Pulse: (!) 139 65 (!) 140    Resp:  17  (!) 24   Temp:  98.6 °F (37 °C)     TempSrc:  Oral     SpO2:  (!) 92%     Weight:       Height:        11/09/22 0030 11/09/22 0057 11/09/22 0100   BP: (!) 97/55  (!) 76/50   Pulse: 62  (!) 137   Resp: (!) 30 (!) 22 18   Temp: 98.8 °F (37.1 °C)    "  TempSrc: Oral     SpO2:   100%   Weight: 59.4 kg (130 lb 15.3 oz)     Height: 5' 11" (1.803 m)         Abnormal Lab Results:  Labs Reviewed   CBC W/ AUTO DIFFERENTIAL - Abnormal; Notable for the following components:       Result Value    Hemoglobin 13.1 (*)     MCHC 31.3 (*)     RDW 27.7 (*)     Immature Granulocytes 2.3 (*)     Immature Grans (Abs) 0.15 (*)     Gran % 74.9 (*)     Lymph % 14.3 (*)     All other components within normal limits   COMPREHENSIVE METABOLIC PANEL - Abnormal; Notable for the following components:    CO2 21 (*)     BUN 6 (*)     Albumin 2.3 (*)     Anion Gap 18 (*)     All other components within normal limits   TROPONIN I - Abnormal; Notable for the following components:    Troponin I 0.070 (*)     All other components within normal limits   TROPONIN I - Abnormal; Notable for the following components:    Troponin I 0.101 (*)     All other components within normal limits   B-TYPE NATRIURETIC PEPTIDE - Abnormal; Notable for the following components:     (*)     All other components within normal limits   LACTIC ACID, PLASMA - Abnormal; Notable for the following components:    Lactate (Lactic Acid) 3.0 (*)     All other components within normal limits   URINALYSIS, REFLEX TO URINE CULTURE - Abnormal; Notable for the following components:    Protein, UA Trace (*)     Ketones, UA 2+ (*)     All other components within normal limits    Narrative:     Specimen Source->Urine   CLOSTRIDIUM DIFFICILE   CULTURE, BLOOD   CULTURE, BLOOD   CULTURE, AEROBIC  (SPECIFY SOURCE)   LACTIC ACID, PLASMA        All Lab Results:  Results for orders placed or performed during the hospital encounter of 11/08/22   CBC auto differential   Result Value Ref Range    WBC 6.63 3.90 - 12.70 K/uL    RBC 4.84 4.60 - 6.20 M/uL    Hemoglobin 13.1 (L) 14.0 - 18.0 g/dL    Hematocrit 41.9 40.0 - 54.0 %    MCV 87 82 - 98 fL    MCH 27.1 27.0 - 31.0 pg    MCHC 31.3 (L) 32.0 - 36.0 g/dL    RDW 27.7 (H) 11.5 - 14.5 %    " Platelets 244 150 - 450 K/uL    MPV 9.6 9.2 - 12.9 fL    Immature Granulocytes 2.3 (H) 0.0 - 0.5 %    Gran # (ANC) 5.0 1.8 - 7.7 K/uL    Immature Grans (Abs) 0.15 (H) 0.00 - 0.04 K/uL    Lymph # 1.0 1.0 - 4.8 K/uL    Mono # 0.5 0.3 - 1.0 K/uL    Eos # 0.0 0.0 - 0.5 K/uL    Baso # 0.03 0.00 - 0.20 K/uL    nRBC 0 0 /100 WBC    Gran % 74.9 (H) 38.0 - 73.0 %    Lymph % 14.3 (L) 18.0 - 48.0 %    Mono % 7.8 4.0 - 15.0 %    Eosinophil % 0.2 0.0 - 8.0 %    Basophil % 0.5 0.0 - 1.9 %    Platelet Estimate Appears normal     Aniso Slight     Poly Occasional     Ovalocytes Occasional     Differential Method Automated    Comprehensive metabolic panel   Result Value Ref Range    Sodium 139 136 - 145 mmol/L    Potassium 3.5 3.5 - 5.1 mmol/L    Chloride 100 95 - 110 mmol/L    CO2 21 (L) 23 - 29 mmol/L    Glucose 71 70 - 110 mg/dL    BUN 6 (L) 8 - 23 mg/dL    Creatinine 0.6 0.5 - 1.4 mg/dL    Calcium 8.7 8.7 - 10.5 mg/dL    Total Protein 6.1 6.0 - 8.4 g/dL    Albumin 2.3 (L) 3.5 - 5.2 g/dL    Total Bilirubin 0.6 0.1 - 1.0 mg/dL    Alkaline Phosphatase 110 55 - 135 U/L    AST 13 10 - 40 U/L    ALT 10 10 - 44 U/L    Anion Gap 18 (H) 8 - 16 mmol/L    eGFR >60 >60 mL/min/1.73 m^2   Troponin I #1   Result Value Ref Range    Troponin I 0.070 (H) 0.000 - 0.026 ng/mL   Troponin I #2   Result Value Ref Range    Troponin I 0.101 (H) 0.000 - 0.026 ng/mL   BNP   Result Value Ref Range     (H) 0 - 99 pg/mL   Lactic acid, plasma   Result Value Ref Range    Lactate (Lactic Acid) 3.0 (H) 0.5 - 2.2 mmol/L   Lactic acid, plasma   Result Value Ref Range    Lactate (Lactic Acid) 1.3 0.5 - 2.2 mmol/L   Urinalysis, Reflex to Urine Culture Urine, Clean Catch    Specimen: Urine   Result Value Ref Range    Specimen UA Urine, Clean Catch     Color, UA Yellow Yellow, Straw, Stefanie    Appearance, UA Clear Clear    pH, UA 7.0 5.0 - 8.0    Specific Gravity, UA 1.010 1.005 - 1.030    Protein, UA Trace (A) Negative    Glucose, UA Negative Negative     Ketones, UA 2+ (A) Negative    Bilirubin (UA) Negative Negative    Occult Blood UA Negative Negative    Nitrite, UA Negative Negative    Urobilinogen, UA Negative <2.0 EU/dL    Leukocytes, UA Negative Negative         Imaging Results:  Imaging Results              CTA Chest Non-Coronary (PE Studies) (Final result)  Result time 11/08/22 23:05:39      Final result by Chato Osei MD (11/08/22 23:05:39)                   Impression:      No pulmonary embolism.  No dissection.    Patchy areas of nodular opacities throughout the lung upper lung zone with more reticular subpleural consolidation in the bilateral lower lobes left greater than right consistent with multifocal pneumonia.    Remaining findings as above    All CT scans   are performed using dose optimization techniques including the following: automated exposure control; adjustment of the mA and/or kV; use of iterative reconstruction technique.  Dose modulation was employed for ALARA by means of: Automated exposure control; adjustment of the mA and/or kV according to patient size (this includes techniques or standardized protocols for targeted exams where dose is matched to indication/reason for exam; i.e. extremities or head); and/or use of iterative reconstructive technique.      Electronically signed by: Sea Reis  Date:    11/08/2022  Time:    23:05               Narrative:    EXAMINATION:  CTA CHEST NON CORONARY (PE STUDIES)    CLINICAL HISTORY:  Pulmonary embolism (PE) suspected, unknown D-dimer;    TECHNIQUE:  Low dose axial images, sagittal and coronal reformations were obtained from the thoracic inlet to the lung bases following the IV administration of 100 mL of Omnipaque 350.  Contrast timing was optimized to evaluate the pulmonary arteries.  MIP images were performed.    COMPARISON:  None    FINDINGS:  No evidence for pulmonary embolism.  No evidence for aortic dissection or aneurysm.  Patchy areas of nodular opacities throughout the lung  upper lung zone with more reticular subpleural consolidation in the bilateral lower lobes left greater than right consistent with multifocal pneumonia.  Ectatic aorta measuring up to 3.8 cm.  Mild thickening of the gastric mucosa.  Right-sided port.  Question subcutaneous sebaceous cyst in the left posterior subcutaneous fat.  Correlate to physical exam.  Slight ascites adjacent to the spleen.  Small pericardial effusion.                                       CT Abdomen Pelvis  Without Contrast (Final result)  Result time 11/08/22 21:02:23      Final result by Chato Osei MD (11/08/22 21:02:23)                   Impression:      Relatively thick walled fluid collection in the pre sacral space situated between the rectosigmoid colon and the distal sacrum measuring 18 x 40 x 45 mm suggestive of abscess.    In the Annalisa - buttock region adjacent to the left gluteal cleft there is a slight nodular appearance in the subcutaneous region measuring up to 12-14 mm may relate to a boil or sebaceous cyst.  Attention on follow-up and correlate to physical exam    Heterogeneous reticular alveolar opacity in the left posterior lung base consistent with pneumonia.    Postsurgical changes of the bowel    Senescent changes not otherwise specified    Chronic deformity of the left hip status post prosthesis removal    Atherosclerotic disease    All CT scans   are performed using dose optimization techniques including the following: automated exposure control; adjustment of the mA and/or kV; use of iterative reconstruction technique.  Dose modulation was employed for ALARA by means of: Automated exposure control; adjustment of the mA and/or kV according to patient size (this includes techniques or standardized protocols for targeted exams where dose is matched to indication/reason for exam; i.e. extremities or head); and/or use of iterative reconstructive technique.      Electronically signed by: Sea  Chato  Date:    11/08/2022  Time:    21:02               Narrative:    EXAMINATION:  CT ABDOMEN PELVIS WITHOUT CONTRAST    CLINICAL HISTORY:  Abdominal abscess/infection suspected;    TECHNIQUE:  Low dose axial images, sagittal and coronal reformations were obtained from the lung bases to the pubic symphysis, 30 mL of oral Omnipaque 350 was administered..    COMPARISON:  None    FINDINGS:  Heart: Normal in size as far as seen.  No pericardial effusion as far seen.    Lung Bases: Left basilar reticular alveolar opacities consistent with left basilar pneumonia.    Liver: Normal in size and attenuation, with no focal hepatic lesions.    Gallbladder: No calcified gallstones.    Bile Ducts: No evidence of dilated ducts.    Pancreas: No mass or peripancreatic fat stranding.    Spleen: Unremarkable.    Adrenals: Unremarkable.    Kidneys/ Ureters: Unremarkable.    Bladder: No evidence of wall thickening.    Reproductive organs: Unremarkable.    GI Tract/Mesentery: No evidence of bowel obstruction or inflammation. No secondary signs of appendicitis.  Relatively thick walled fluid collection in the pre sacral space situated between the rectosigmoid colon and the distal sacrum measuring 18 x 40 x 45 mm suggestive of abscess.  There isn't adjacent hyperdensity suggestive of possible postsurgical changes.  No other sizable abnormal fluid collection adjacent to the perirectal region.  Nodular appearance in the left Annalisa- buttock adjacent to the cleft measures up to 14 mm may relate to a sebaceous cyst or a boil.  Attention on follow-up.  Surgical sutures in the right lower quadrant bowel loop suspected.    Peritoneal Space: No ascites. No free air.    Retroperitoneum: No significant adenopathy.    Abdominal wall: Unremarkable.    Vasculature: No aneurysm.    Bones: Deformity of the left hip with tracks from prior surgical fixation noted.                                       X-Ray Chest AP Portable (Final result)  Result time  11/08/22 18:49:35      Final result by Chato Osei MD (11/08/22 18:49:35)                   Impression:      As above      Electronically signed by: Sea Reis  Date:    11/08/2022  Time:    18:49               Narrative:    EXAMINATION:  XR CHEST AP PORTABLE    CLINICAL HISTORY:  Chest Pain;    TECHNIQUE:  Single frontal view of the chest was performed.    COMPARISON:  Prior    FINDINGS:  Right-sided chest port.Prominent cardiac silhouette.  Mild perihilar interstitial opacities may relate to trace pulmonary edema.  No segmental consolidation pleural effusion pneumothorax.  Old rib deformities left posterolateral ribs.  Similar findings to prior exam    Bones are intact.                                       The EKG was ordered, reviewed, and independently interpreted by the ED provider.  Interpretation time: 17:12  Rate: 159 BPM  Rhythm: sinus tachycardia  Interpretation: Incomplete right bundle branch block  Marked ST abnormality, possible inferior subendocardial injury  Marked ST abnormality, possible anterolateral subendocardial injury. No STEMI.    The EKG was ordered, reviewed, and independently interpreted by the ED provider.  Interpretation time: 21:18  Rate: 148 BPM  Rhythm: sinus tachycardia  Interpretation: Incomplete right bundle branch block.  Marked ST abnormality, possible inferior subendocardial injury. No STEMI.             The Emergency Provider reviewed the vital signs and test results, which are outlined above.     ED Discussion       5:38 PM: In the presence of pt's wife, pt says DNR.     5:42 PM: After further discussion with Dr. Hawthorne, pt's wife agrees with pt's decision for DNR.     9:34 PM: Discussed pt's case with Myriam Hidalgo NP (Hospital Medicine) who says from EKG, it is likely rate related ST depression. Rocky says rate control prior to disposition to the floor is needed.    9:37 PM: Discussed pt's case with Dr. Montana (interventional cardiology) who agrees that it is rate related  ST changes, likely to be atrial tachycardia. Dr. Montana also agrees with Dr. Hawthorne on trying Cardizem drip.     9:40 PM: Dr. Rizo (general surgery) says that it appears the ulcerations have been present probably not much to do for fluid collection right now. Dr. Rizo will review with IR tomorrow and says can put pt on empiric Abx in the meantime. It looks like pt is followed by oncology and palliative care and not a great prognosis.    10:30 PM: Discussed case with Myriam Hidalgo NP ([facility]). Dr. Shah agrees with current care and management of pt and accepts admission.   Admitting Service: [facility]  Admitting Physician: Dr. Shah  Admit to: ICU    10:31 PM: Re-evaluated pt. I have discussed test results, shared treatment plan, and the need for admission with patient and family at bedside. Pt and family express understanding at this time and agree with all information. All questions answered. Pt and family have no further questions or concerns at this time. Pt is ready for admit.    10:35 PM: Myriam Hidalgo NP ([facility]) says bc pt is DNR, she needs to clarify about pressors. Rocky says she will come and talk to pt and family about this.    11:37 PM: Discussed case with Gigi Keys RN ([facility] medicine) who says Dr. Rodriguez ([facility] medicine) can perform central line procedure.       Medical Decision Making:   Clinical Tests:   Lab Tests: Ordered and Reviewed  Radiological Study: Ordered and Reviewed  Medical Tests: Ordered and Reviewed         ED Medication(s):  Medications   0.9%  NaCl infusion (1,000 mLs Intravenous New Bag 11/9/22 0022)   diltiaZEM injection 20 mg (has no administration in time range)   ascorbic acid (vitamin C) tablet 1,000 mg (has no administration in time range)   pantoprazole EC tablet 40 mg (has no administration in time range)   oxyCODONE immediate release tablet 20 mg (20 mg Oral Given 11/9/22 0023)   sodium chloride 0.9% flush 10 mL (has no administration in  time range)   albuterol-ipratropium 2.5 mg-0.5 mg/3 mL nebulizer solution 3 mL (has no administration in time range)   melatonin tablet 6 mg (has no administration in time range)   ondansetron injection 4 mg (has no administration in time range)   prochlorperazine injection Soln 5 mg (has no administration in time range)   simethicone chewable tablet 80 mg (has no administration in time range)   acetaminophen tablet 650 mg (has no administration in time range)   naloxone 0.4 mg/mL injection 0.02 mg (has no administration in time range)   potassium bicarbonate disintegrating tablet 50 mEq (has no administration in time range)   potassium bicarbonate disintegrating tablet 35 mEq (has no administration in time range)   potassium bicarbonate disintegrating tablet 60 mEq (has no administration in time range)   magnesium oxide tablet 800 mg (has no administration in time range)   magnesium oxide tablet 800 mg (has no administration in time range)   potassium, sodium phosphates 280-160-250 mg packet 2 packet (has no administration in time range)   potassium, sodium phosphates 280-160-250 mg packet 2 packet (has no administration in time range)   potassium, sodium phosphates 280-160-250 mg packet 2 packet (has no administration in time range)   insulin aspart U-100 pen 0-5 Units (has no administration in time range)   glucose chewable tablet 16 g (has no administration in time range)   glucose chewable tablet 24 g (has no administration in time range)   glucagon (human recombinant) injection 1 mg (has no administration in time range)   enoxaparin injection 40 mg (40 mg Subcutaneous Given 11/9/22 0104)   cefepime in dextrose 5 % 1 gram/50 mL IVPB 1 g (1 g Intravenous New Bag 11/9/22 0107)   vancomycin - pharmacy to dose (has no administration in time range)   folic acid-vit B6-vit B12 2.5-25-2 mg tablet 1 tablet (has no administration in time range)   multivitamin tablet (has no administration in time range)   zinc sulfate  capsule 220 mg (has no administration in time range)   sodium chloride 0.9% bolus 1,000 mL (1,000 mLs Intravenous New Bag 11/9/22 0029)   dextrose 10% bolus 125 mL (has no administration in time range)   dextrose 10% bolus 250 mL (has no administration in time range)   diphenhydrAMINE (BENYLIN) 12.5 mg/5 mL 5 mL, aluminum-magnesium hydroxide-simethicone (MAALOX) 200-200-20 mg/5 mL 5 mL, LIDOcaine HCl 2% (XYLOCAINE) 5 mL (has no administration in time range)   vancomycin 1.5 g in dextrose 5 % 250 mL IVPB (ready to mix) (has no administration in time range)   metoprolol injection 5 mg (has no administration in time range)   sodium chloride 0.9% bolus 1,000 mL (has no administration in time range)   metronidazole IVPB 500 mg (has no administration in time range)   sodium chloride 0.9% bolus 1,000 mL (0 mLs Intravenous Stopped 11/8/22 1836)   diltiaZEM injection 20 mg (20 mg Intravenous Given 11/8/22 1737)   ondansetron injection 4 mg (4 mg Intravenous Given 11/8/22 1755)   morphine injection 4 mg (4 mg Intravenous Given 11/8/22 1755)   sodium chloride 0.9% bolus 1,000 mL (0 mLs Intravenous Stopped 11/8/22 2255)   iohexoL (OMNIPAQUE 350) injection 100 mL (100 mLs Intravenous Given 11/8/22 2258)   HYDROmorphone injection 0.5 mg (0.5 mg Intravenous Given 11/9/22 0057)       Current Discharge Medication List                  Scribe Attestation:   Scribe #1: I performed the above scribed service and the documentation accurately describes the services I performed. I attest to the accuracy of the note.     Attending:   Physician Attestation Statement for Scribe #1: I, Talisha Benson Do, MD, personally performed the services described in this documentation, as scribed by Berto Landaverde, in my presence, and it is both accurate and complete.           Clinical Impression       ICD-10-CM ICD-9-CM   1. Septic shock  A41.9 038.9    R65.21 785.52     995.92   2. Chest pain  R07.9 786.50   3. Increased heart rate  R00.0 785.0   4.  Tachycardia  R00.0 785.0   5. SIRS (systemic inflammatory response syndrome)  R65.10 995.90   6. Pelvic abscess in male  K65.1 567.22   7. Multifocal pneumonia  J18.9 486   8. Diarrhea, unspecified type  R19.7 787.91       Disposition:   Disposition: Admitted  Condition: Serious       Talisha Benson Do, MD  11/09/22 0112

## 2022-11-09 NOTE — SUBJECTIVE & OBJECTIVE
Current Facility-Administered Medications on File Prior to Encounter   Medication    0.9%  NaCl infusion    0.9%  NaCl infusion    alteplase injection 2 mg    chlorhexidine 0.12 % solution 10 mL    chlorhexidine 0.12 % solution 10 mL    diphenhydrAMINE injection 25 mg    lactated ringers infusion    metoclopramide HCl injection 10 mg    nozaseptin (NOZIN) nasal     sodium chloride 0.9% flush 10 mL    sodium chloride 0.9% flush 3 mL    sodium chloride 0.9% flush 3 mL    [DISCONTINUED] HYDROmorphone (PF) injection 0.2 mg    [DISCONTINUED] HYDROmorphone (PF) injection 0.2 mg     Current Outpatient Medications on File Prior to Encounter   Medication Sig    magic mouthwash diphen/antac/lidoc Swish and spit 15 mLs every 4 (four) hours as needed (mouth ulcers).    ascorbic acid, vitamin C, (VITAMIN C) 1000 MG tablet Take 1,000 mg by mouth once daily.    dexAMETHasone (DECADRON) 4 MG Tab Take 2 tablets (8 mg total) by mouth once daily. Take as directed on days 2 and 3 of your chemotherapy cycle.    diphenoxylate-atropine 2.5-0.025 mg (LOMOTIL) 2.5-0.025 mg per tablet Take 1 tablet by mouth 4 (four) times daily as needed for Diarrhea.    fentaNYL (DURAGESIC) 25 mcg/hr Place 1 patch onto the skin every 72 hours. for 15 days    fluconazole (DIFLUCAN) 200 MG Tab Take 1 tablet (200 mg total) by mouth once daily. for 3 days    gabapentin (NEURONTIN) 300 MG capsule Take 1 capsule (300 mg total) by mouth every evening.    hydrALAZINE (APRESOLINE) 25 MG tablet Take 1 tablet (25 mg total) by mouth 2 (two) times a day.    loperamide (IMODIUM) 2 mg capsule Take 1 capsule (2 mg total) by mouth 4 (four) times daily as needed for Diarrhea.    losartan (COZAAR) 25 MG tablet Take 1 tablet (25 mg total) by mouth once daily.    multivitamin capsule Take 1 capsule by mouth once daily.    ondansetron (ZOFRAN-ODT) 8 MG TbDL Dissolve 1 tablet (8 mg total) by mouth every 8 (eight) hours as needed (nausea/vomiting).    oxyCODONE  (ROXICODONE) 20 mg Tab immediate release tablet Take 1 tablet (20 mg total) by mouth every 4 (four) hours as needed (pain). Max 4 doses/ day    pantoprazole (PROTONIX) 40 MG tablet Take 1 tablet (40 mg total) by mouth once daily.    [DISCONTINUED] diclofenac sodium (VOLTAREN) 1 % Gel Apply 2 g topically 3 (three) times daily.    [DISCONTINUED] mirtazapine (REMERON) 15 MG tablet Take 1 tablet (15 mg total) by mouth every evening.    [DISCONTINUED] tamsulosin (FLOMAX) 0.4 mg Cap Take 1 capsule (0.4 mg total) by mouth once daily.       Review of patient's allergies indicates:  No Known Allergies    Past Medical History:   Diagnosis Date    Alcoholism     Anemia     Back pain     Encounter for blood transfusion 2018    Essential hypertension 2/4/2016    GERD (gastroesophageal reflux disease)     Hiatal hernia     Hypertension     diet controlled    Melanoma 06/2021    left cheek    Rectal cancer 9/11/2019     Past Surgical History:   Procedure Laterality Date    ARTHROSCOPY OF KNEE Right 5/5/2021    Procedure: ARTHROSCOPY, KNEE;  Surgeon: Delonte Mcintyre MD;  Location: [facility];  Service: Orthopedics;  Laterality: Right;    COLONOSCOPY N/A 9/3/2019    Procedure: COLONOSCOPY;  Surgeon: Isai Centeno MD;  Location: [facility];  Service: Endoscopy;  Laterality: N/A;    COLONOSCOPY N/A 1/10/2020    Procedure: COLONOSCOPY;  Surgeon: Familia Gonzalez MD;  Location: [facility];  Service: General;  Laterality: N/A;    COLONOSCOPY N/A 3/24/2021    Procedure: COLONOSCOPY;  Surgeon: Familia Gonzalez MD;  Location: [facility];  Service: General;  Laterality: N/A;    ESOPHAGOGASTRODUODENOSCOPY N/A 9/3/2019    Procedure: ESOPHAGOGASTRODUODENOSCOPY (EGD);  Surgeon: Isai Centeno MD;  Location: [facility];  Service: Endoscopy;  Laterality: N/A;    EXCISION OF MEDIAL MENISCUS OF KNEE Right 5/5/2021    Procedure: MENISCECTOMY, KNEE, MEDIAL;  Surgeon: Delonte Mcintyre MD;  Location: [facility];  Service: Orthopedics;   Laterality: Right;  partial Lateral    FLEXIBLE SIGMOIDOSCOPY  2/4/2020    Procedure: SIGMOIDOSCOPY, FLEXIBLE;  Surgeon: Familia Gonzalez MD;  Location: [facility];  Service: General;;    ILEOSTOMY Right 2/4/2020    Procedure: CREATION, ILEOSTOMY;  Surgeon: Familia Gonzalez MD;  Location: [facility] OR;  Service: General;  Laterality: Right;    ILEOSTOMY CLOSURE N/A 8/4/2020    Procedure: CLOSURE, ILEOSTOMY;  Surgeon: Familia Gonzalez MD;  Location: [facility] OR;  Service: General;  Laterality: N/A;    INCISION AND DRAINAGE OF BACK Left 8/5/2020    Procedure: INCISION AND DRAINAGE, BACK;  Surgeon: Familia Gonzalez MD;  Location: [facility] OR;  Service: General;  Laterality: Left;    INJECTION OF ANESTHETIC AGENT INTO TISSUE PLANE DEFINED BY TRANSVERSUS ABDOMINIS MUSCLE N/A 2/4/2020    Procedure: BLOCK, TRANSVERSUS ABDOMINIS PLANE;  Surgeon: Familia Gonzalez MD;  Location: [facility] OR;  Service: General;  Laterality: N/A;    INSERTION OF TUNNELED CENTRAL VENOUS CATHETER (CVC) WITH SUBCUTANEOUS PORT Right 2/4/2020    Procedure: INSERTION, PORT-A-CATH;  Surgeon: Familia Gonzalez MD;  Location: [facility] OR;  Service: General;  Laterality: Right;    INSERTION OF TUNNELED CENTRAL VENOUS CATHETER (CVC) WITH SUBCUTANEOUS PORT N/A 8/16/2022    Procedure: [id number];  Surgeon: Familia Gonzalez MD;  Location: [facility] OR;  Service: General;  Laterality: N/A;  right subclavian    JOINT REPLACEMENT Left 2009    Hip    KNEE ARTHROSCOPY W/ PLICA EXCISION Right 5/5/2021    Procedure: EXCISION, PLICA, KNEE, ARTHROSCOPIC;  Surgeon: Delonte Mcintyre MD;  Location: [facility] OR;  Service: Orthopedics;  Laterality: Right;    MOBILIZATION OF SPLENIC FLEXURE  2/4/2020    Procedure: MOBILIZATION, SPLENIC FLEXURE;  Surgeon: Familia Gonzalez MD;  Location: [facility] OR;  Service: General;;    REMOVAL OF HARDWARE FROM HIP Left 1/4/2022    Procedure: REMOVAL, HARDWARE, HIP;  Surgeon: Delonte Mcintyre MD;  Location: [facility] OR;  Service: Orthopedics;   Laterality: Left;     Family History       Problem Relation (Age of Onset)    Cataracts Mother    Hypertension Father    Stroke Father          Tobacco Use    Smoking status: Never    Smokeless tobacco: Never   Substance and Sexual Activity    Alcohol use: Yes    Drug use: No    Sexual activity: Never     Partners: Female     Review of Systems   Constitutional:  Positive for activity change, appetite change, chills, diaphoresis and fatigue. Negative for fever.   HENT:  Negative for drooling, ear discharge and nosebleeds.    Eyes:  Negative for pain, discharge and itching.   Respiratory:  Positive for shortness of breath. Negative for choking.    Cardiovascular:  Positive for palpitations. Negative for chest pain.   Gastrointestinal:  Positive for abdominal pain and diarrhea. Negative for anal bleeding.   Endocrine: Negative for cold intolerance.   Genitourinary:  Negative for hematuria.   Musculoskeletal:  Positive for arthralgias. Negative for neck stiffness.   Skin:  Negative for rash.   Allergic/Immunologic: Negative for immunocompromised state.   Neurological:  Positive for weakness. Negative for seizures and facial asymmetry.   Hematological:  Negative for adenopathy.   Psychiatric/Behavioral:  Negative for behavioral problems, self-injury and suicidal ideas. The patient is nervous/anxious.    Objective:     Vital Signs (Most Recent):  Temp: 97.9 °F (36.6 °C) (11/09/22 0715)  Pulse: 64 (11/09/22 1145)  Resp: (!) 25 (11/09/22 1237)  BP: (!) 105/57 (11/09/22 1145)  SpO2: 100 % (11/09/22 1145)   Vital Signs (24h Range):  Temp:  [97 °F (36.1 °C)-98.8 °F (37.1 °C)] 97.9 °F (36.6 °C)  Pulse:  [] 64  Resp:  [11-33] 25  SpO2:  [77 %-100 %] 100 %  BP: ()/(50-75) 105/57     Weight: 60.8 kg (134 lb)  Body mass index is 18.69 kg/m².    Physical Exam  Exam conducted with a chaperone present.   Constitutional:       Appearance: He is well-developed. He is ill-appearing.      Comments: cachectic   HENT:      Head:  Normocephalic and atraumatic.   Eyes:      Conjunctiva/sclera: Conjunctivae normal.   Neck:      Thyroid: No thyromegaly.   Cardiovascular:      Rate and Rhythm: Normal rate and regular rhythm.   Pulmonary:      Effort: Pulmonary effort is normal. No respiratory distress.   Abdominal:      General: There is no distension.      Palpations: Abdomen is soft. There is no mass.      Tenderness: There is no abdominal tenderness.   Genitourinary:     Comments: Bilateral perianal/perirectal ulcerations with small fluctuance in the left anterior perineum/perianal area concerning for abscess  Musculoskeletal:         General: No tenderness. Normal range of motion.      Cervical back: Normal range of motion.   Skin:     General: Skin is warm and dry.      Capillary Refill: Capillary refill takes less than 2 seconds.      Findings: No rash.   Neurological:      Mental Status: He is alert and oriented to person, place, and time.     Significant Labs:  I have reviewed all pertinent lab results within the past 24 hours.  CBC:   Recent Labs   Lab 11/09/22 0451   WBC 5.28   RBC 4.16*   HGB 11.3*   HCT 37.7*      MCV 91   MCH 27.2   MCHC 30.0*     BMP:   Recent Labs   Lab 11/09/22 0451   GLU 58*      K 3.3*      CO2 19*   BUN 5*   CREATININE 0.5   CALCIUM 7.6*   MG 1.8     CMP:   Recent Labs   Lab 11/09/22 0451   GLU 58*   CALCIUM 7.6*   ALBUMIN 2.0*   PROT 4.8*      K 3.3*   CO2 19*      BUN 5*   CREATININE 0.5   ALKPHOS 97   ALT 12   AST 13   BILITOT 0.4     LFTs:   Recent Labs   Lab 11/09/22 0451   ALT 12   AST 13   ALKPHOS 97   BILITOT 0.4   PROT 4.8*   ALBUMIN 2.0*       Significant Diagnostics:  I have reviewed all pertinent imaging results/findings within the past 24 hours.    CT:  FINDINGS:  Heart: Normal in size as far as seen.  No pericardial effusion as far seen.     Lung Bases: Left basilar reticular alveolar opacities consistent with left basilar pneumonia.     Liver: Normal in size and  attenuation, with no focal hepatic lesions.     Gallbladder: No calcified gallstones.     Bile Ducts: No evidence of dilated ducts.     Pancreas: No mass or peripancreatic fat stranding.     Spleen: Unremarkable.     Adrenals: Unremarkable.     Kidneys/ Ureters: Unremarkable.     Bladder: No evidence of wall thickening.     Reproductive organs: Unremarkable.     GI Tract/Mesentery: No evidence of bowel obstruction or inflammation. No secondary signs of appendicitis.  Relatively thick walled fluid collection in the pre sacral space situated between the rectosigmoid colon and the distal sacrum measuring 18 x 40 x 45 mm suggestive of abscess.  There isn't adjacent hyperdensity suggestive of possible postsurgical changes.  No other sizable abnormal fluid collection adjacent to the perirectal region.  Nodular appearance in the left Annalisa- buttock adjacent to the cleft measures up to 14 mm may relate to a sebaceous cyst or a boil.  Attention on follow-up.  Surgical sutures in the right lower quadrant bowel loop suspected.     Peritoneal Space: No ascites. No free air.     Retroperitoneum: No significant adenopathy.     Abdominal wall: Unremarkable.     Vasculature: No aneurysm.     Bones: Deformity of the left hip with tracks from prior surgical fixation noted.     Impression:     Relatively thick walled fluid collection in the pre sacral space situated between the rectosigmoid colon and the distal sacrum measuring 18 x 40 x 45 mm suggestive of abscess.     In the Annalisa - buttock region adjacent to the left gluteal cleft there is a slight nodular appearance in the subcutaneous region measuring up to 12-14 mm may relate to a boil or sebaceous cyst.  Attention on follow-up and correlate to physical exam     Heterogeneous reticular alveolar opacity in the left posterior lung base consistent with pneumonia.     Postsurgical changes of the bowel     Senescent changes not otherwise specified     Chronic deformity of the left hip  status post prosthesis removal     Atherosclerotic disease

## 2022-11-09 NOTE — PLAN OF CARE
Pt admitted from [facility] for hypotension and possible levophed gtt, did not require levophed. Pt in extreme pain d/t perirectal abscess, turned q 2 hrs for skin protection. Tachy/Eric on monitor, MD aware and EKG reviewed. POC reviewed w/pt all questions answered.

## 2022-11-09 NOTE — ASSESSMENT & PLAN NOTE
Check stools for Cdiff  Stool cultureshowed No growth  And Stoool for Cdiff was negative on 11/1/22 11/9:    C diff -ve;

## 2022-11-09 NOTE — PROGRESS NOTES
Pharmacist Renal Dose Adjustment Note    Vincent Benton is a 67 y.o. male being treated with the medication cefepime for SSTI but also pneumonia.     Patient Data:    Vital Signs (Most Recent):  Temp: 97.9 °F (36.6 °C) (11/09/22 0715)  Pulse: 64 (11/09/22 1145)  Resp: 18 (11/09/22 1145)  BP: (!) 105/57 (11/09/22 1145)  SpO2: 100 % (11/09/22 1145)   Vital Signs (72h Range):  Temp:  [97 °F (36.1 °C)-98.8 °F (37.1 °C)]   Pulse:  []   Resp:  [11-33]   BP: ()/()   SpO2:  [77 %-100 %]      Recent Labs   Lab 11/06/22  0500 11/08/22  1818 11/09/22  0451   CREATININE 0.6 0.6 0.5     Serum creatinine: 0.5 mg/dL 11/09/22 0451  Estimated creatinine clearance: 126.7 mL/min    Per protocol for a severe infection (pneumonia) & for CrCl > 60 ml/min, dose will be increased from 1 gm IV every 8 hours to 2 gm IV every 8 hours.     Pharmacist's Name: Katherine E Mcardle  Pharmacist's Extension: [phone]

## 2022-11-09 NOTE — HPI
The patient is a 68 yo male with rectal adenocarcinoma stage IV on palliative chemotherapy-last tx 10/25/22, GERD, HTN, Alcoholism, Diarrhea since staring Chemo, and recent hospitalization for severe mucositis and wounds to buttocks who presented to ED after being found nearly unresponsive covered in diarrhea with BP 60/42. Per ED records, the spouse also reported severe generalized weakness, worsening diarrhea, worsening perirectal wounds with pain. BP improved with IVFs    In the ED, BP 90/60, O2sat 93%, WBC britni- mild left shift, LA 3.0. Troponin 0.07>0.101. UA +2 ketones.   While in the ED, HR elevated to 150s. IV Cardizem 20mg x 2 given. EKG showed Sinus tachycardia-rate 159, IRBBB, ST wave abnormalities   CT abd/pelvis showed LLL Pneumonia, possible small perirectal abscess.   3 liters IVF bolus given. SBP still dropping to 80s, -140s at times     ED provider discussed pt status and findings with Cardiology and General surgery who will see pt in consult.   Discussed ICU care and possible CVC/pressor support with pt- he stated that he is ready to die. Attempted to call spouse but no answer. Discussed CVC/IV Pressors with ED provider. Dr. Hawthorne discussed this with the pt's spouse, Akilah, before she left the ED. She wants these procedures done if needed     DNR/I- LaPOST on file, HCPOA on file, 1: wife Akilah, 2: sister Fara

## 2022-11-09 NOTE — ASSESSMENT & PLAN NOTE
-Troponin 0.070>0.101>0.084>0.059  -No chest pain/anginal symptoms  -Suspect related to demand ischemia from PSVT/septic shock  -EKG with diffuse ST depression  -Repeat echo pending  -Continue ASA as tolerated  -Will consider ischemic workup pending stability, patient meeting with palliative care today

## 2022-11-09 NOTE — CONSULTS
Consulted on Mr. Benton due to present on admission ulcerations to pj-rectal region, coccyx, bilateral buttock. Patient was recently seen with mucocitis due to antineoplastic therapy, now with concerns of abscess formation.   Attempted to see patient initially this am, but discussed with primary nurse, and patient having issues with pain control at that time, so agreed to return after lunch.  Returned for 2nd attempt of consult, patient sleeping in room. Discussed with primary nurse LESLEE Suárez, patient did receive pain medication and is finally resting, palliative medicine consult pending. Chart reviewed and Dr. Gonzalez did see patient and is planning exam under anesthesia tomorrow with potential I&D. Will defer physical assessment of patient at this time due to these factors.   Recommendations discussed with primary nurse LESLEE Suárez: cleanse after BM with pj bottle filled with warm water and no-rinse foaming cleanser. May allow to air dry or pat dry gently with soft washcloth. May apply TRIAD paste to open pj-rectal ulcerations for comfort. Will follow up after surgery as needed.

## 2022-11-09 NOTE — ASSESSMENT & PLAN NOTE
Likely 2/2 Sepsis and severe dehydration   Check CTA chest to r/o PE  IV Cardizem x 2 doses given in ED.   Cardiology recommended IV Cardizem drip but BP too low   IV Lopressor prn HR>120

## 2022-11-09 NOTE — ASSESSMENT & PLAN NOTE
c diff negative 11/2 dc isolation , continue IVF monitor lytes , likely related to enterotoxic chemoRx

## 2022-11-09 NOTE — ASSESSMENT & PLAN NOTE
Very small abscesses   Consult general surgery and wound care  IV Vanco and Cefepime  Wound cultures

## 2022-11-09 NOTE — HOSPITAL COURSE
11/9:  Examination done at bedside, patient alert, complaining of persistent pain.  Expressing concerns lorene he might die soon, however still wants things to be done, sister at bedside expressing concerns of stage IV cancer, recurrent hospitalizations- will follow-up with palliative team.   Follow-up with general surgery for perirectal abscess-will continue broad-spectrum antibiotics, monitored blood pressure and consider pressors as needed to maintain map above 65;  Follow-up with wound care   Cardiology evaluated and recommended possible ischemic workup once patient says is more stable.       11/10:    Examination done at bedside, patient alert, complaining of persistent pain --> on pain regimen as per palliative;  Scheduled for I&D by General surgery for perirectal abscess, on broad-spectrum antibiotics, cultures from wound showing Gram-negative rods, Staph aureus, pending susceptibility, ID follow-up.    Afebrile, hemodynamically stable.    Cardio on board, considering ischemic workup once stable;   11/11   Examination done at bedside, denied acute issues.    Status post I&D day 1, general surgeon on board.  Aerobic cultures resulted that showed MRSA, Klebsiella, E coli--> will follow up with ID for final recommendations.    Cardiology on board, recommended outpatient ischemic workup, outpatient Holter monitoring, ordered low-dose Lopressor.    Palliative on board, due to poor social support, recommended skilled nursing facility upon discharge, will follow up with .    Heme-Onc on board, recommended outpatient follow-up with patient's oncologist for ongoing management.  11/12   Examination done at bedside, patient denied any acute issues.    Hemodynamically stable, afebrile, no leukocytosis.    ID on board, recommended Augmentin, Bactrim p.o. for 12 days.    Heme-Onc evaluated and recommended to consider hospice, however patient at this point would like to continue with DNR DNI, continue outpatient  palliative follow-up.    Cardiology follow-up outpatient.    General surgery stated no further surgical interventions, okay to discharge, follow-up with wound care.    Discussed with  regarding possible transfer to skilled nursing facility, based on PT OT evaluation  stated that patient will qualify for home PT OT, based on insurance.     arranging for home PT, OT, speech therapy, home nurse visit.  Patient will be discharged to sister's home who agrees to take care of the patient for couple of days until patient's gets more stabilized.

## 2022-11-09 NOTE — HPI
66yo M well-known to me with previous mid-rectal adenocarcinoma who previously completed long-course neoadj chemoXRT which completed on 12/4/19 with partial treatment response on repeat MRI and no evidence of metastatic disease on repeat CT Abd/pelvis who is s/p robotic ultra-low anterior resection, DLI and mediport placement on 2/4/2020 who recovered well postop with final path showing T3N0 who completed adjuvant chemotx in late June 2020 and is now s/p DLI closure on 8/4/2020 who subsequently developed metastatic disease in liver and lung who is now admitted to the hospital medicine service. Recently found to have bilateral perianal and buttock wounds and diarrhea. CT scan concerning for possible perirectal abscess. CRS consulted for evaluation. In ICU for sepsis and hypotension.

## 2022-11-09 NOTE — PROGRESS NOTES
[facility] - Intensive Care ([facility])  [facility] Medicine  Progress Note    Patient Name: Vincent Benton  MRN: [id number]  Patient Class: IP- Inpatient   Admission Date: 11/8/2022  Length of Stay: 1 days  Attending Physician: Lauren Shah, *  Primary Care Provider: Shakila Moran DO        Subjective:     Principal Problem:Septic shock        HPI:  The patient is a 68 yo male with rectal adenocarcinoma stage IV on palliative chemotherapy-last tx 10/25/22, GERD, HTN, Alcoholism, Diarrhea since staring Chemo, and recent hospitalization for severe mucositis and wounds to buttocks who presented to ED after being found nearly unresponsive covered in diarrhea with BP 60/42. Per ED records, the spouse also reported severe generalized weakness, worsening diarrhea, worsening perirectal wounds with pain. BP improved with IVFs    In the ED, BP 90/60, O2sat 93%, WBC britni- mild left shift, LA 3.0. Troponin 0.07>0.101. UA +2 ketones.   While in the ED, HR elevated to 150s. IV Cardizem 20mg x 2 given. EKG showed Sinus tachycardia-rate 159, IRBBB, ST wave abnormalities   CT abd/pelvis showed LLL Pneumonia, possible small perirectal abscess.   3 liters IVF bolus given. SBP still dropping to 80s, -140s at times     ED provider discussed pt status and findings with Cardiology and General surgery who will see pt in consult.   Discussed ICU care and possible CVC/pressor support with pt- he stated that he is ready to die. Attempted to call spouse but no answer. Discussed CVC/IV Pressors with ED provider. Dr. Hawthorne discussed this with the pt's spouse, Akilah, before she left the ED. She wants these procedures done if needed     DNR/I- LaPOST on file, HCPOA on file, 1: wife Akilah, 2: sister Fara      Overview/Hospital Course:  11/9:  Examination done at bedside, patient alert, complaining of persistent pain.  Expressing concerns lorene he might die soon, however still wants things to be done, sister at bedside expressing concerns of  stage IV cancer, recurrent hospitalizations- will follow-up with palliative team.   Follow-up with general surgery for perirectal abscess-will continue broad-spectrum antibiotics, monitored blood pressure and consider pressors as needed to maintain map above 65;  Follow-up with wound care   Cardiology evaluated and recommended possible ischemic workup once patient says is more stable.                 Review of Systems    Constitutional: Positive for malaise/fatigue.   HENT: Negative.     Eyes: Negative.    Cardiovascular: Negative.    Respiratory: Negative.     Endocrine: Negative.    Hematologic/Lymphatic: Negative.    Skin: Negative.    Musculoskeletal:  Positive for joint pain.   Gastrointestinal:  Positive for diarrhea.   Genitourinary: Negative.         Rectal pain   Neurological: Negative.    Psychiatric/Behavioral: Negative.     Allergic/Immunologic: Negative.    Objective:     Vital Signs (Most Recent):  Temp: 97.9 °F (36.6 °C) (11/09/22 0715)  Pulse: 64 (11/09/22 1145)  Resp: 18 (11/09/22 1145)  BP: (!) 105/57 (11/09/22 1145)  SpO2: 100 % (11/09/22 1145)   Vital Signs (24h Range):  Temp:  [97 °F (36.1 °C)-98.8 °F (37.1 °C)] 97.9 °F (36.6 °C)  Pulse:  [] 64  Resp:  [11-33] 18  SpO2:  [77 %-100 %] 100 %  BP: ()/(50-75) 105/57     Weight: 60.8 kg (134 lb)  Body mass index is 18.69 kg/m².    Intake/Output Summary (Last 24 hours) at 11/9/2022 1218  Last data filed at 11/9/2022 1100  Gross per 24 hour   Intake 4492.44 ml   Output 950 ml   Net 3542.44 ml      Physical Exam      Constitutional:       General: He is not in acute distress.     Appearance: He is well-developed. He is ill-appearing. He is not diaphoretic.      Comments: On supplemental O2   HENT:      Head: Normocephalic and atraumatic.   Eyes:      General:         Right eye: No discharge.         Left eye: No discharge.      Pupils: Pupils are equal, round, and reactive to light.   Neck:      Thyroid: No thyromegaly.      Vascular: No  JVD.      Trachea: No tracheal deviation.   Cardiovascular:      Rate and Rhythm: Normal rate and regular rhythm.      Heart sounds: Normal heart sounds, S1 normal and S2 normal. No murmur heard.  Pulmonary:      Effort: Pulmonary effort is normal. No respiratory distress.      Breath sounds: Normal breath sounds. No wheezing or rales.   Abdominal:      General: There is no distension.      Tenderness: There is no rebound.   Musculoskeletal:      Cervical back: Neck supple.      Right lower leg: No edema.      Left lower leg: No edema.   Skin:     General: Skin is warm and dry.      Findings: No erythema.   Neurological:      Mental Status: He is alert and oriented to person, place, and time.   Psychiatric:         Mood and Affect: Mood normal.         Behavior: Behavior normal.      Comments: Anxious/crying in pain    Significant Labs: All pertinent labs within the past 24 hours have been reviewed.  CBC:   Recent Labs   Lab 11/08/22 1818 11/09/22  0451   WBC 6.63 5.28   HGB 13.1* 11.3*   HCT 41.9 37.7*    248     CMP:   Recent Labs   Lab 11/08/22 1818 11/09/22  0451    141   K 3.5 3.3*    104   CO2 21* 19*   GLU 71 58*   BUN 6* 5*   CREATININE 0.6 0.5   CALCIUM 8.7 7.6*   PROT 6.1 4.8*   ALBUMIN 2.3* 2.0*   BILITOT 0.6 0.4   ALKPHOS 110 97   AST 13 13   ALT 10 12   ANIONGAP 18* 18*       Significant Imaging:   Imaging Results              CTA Chest Non-Coronary (PE Studies) (Final result)  Result time 11/08/22 23:05:39      Final result by Chato Osei MD (11/08/22 23:05:39)                   Impression:      No pulmonary embolism.  No dissection.    Patchy areas of nodular opacities throughout the lung upper lung zone with more reticular subpleural consolidation in the bilateral lower lobes left greater than right consistent with multifocal pneumonia.    Remaining findings as above    All CT scans   are performed using dose optimization techniques including the following: automated exposure  control; adjustment of the mA and/or kV; use of iterative reconstruction technique.  Dose modulation was employed for ALARA by means of: Automated exposure control; adjustment of the mA and/or kV according to patient size (this includes techniques or standardized protocols for targeted exams where dose is matched to indication/reason for exam; i.e. extremities or head); and/or use of iterative reconstructive technique.      Electronically signed by: Sea Reis  Date:    11/08/2022  Time:    23:05               Narrative:    EXAMINATION:  CTA CHEST NON CORONARY (PE STUDIES)    CLINICAL HISTORY:  Pulmonary embolism (PE) suspected, unknown D-dimer;    TECHNIQUE:  Low dose axial images, sagittal and coronal reformations were obtained from the thoracic inlet to the lung bases following the IV administration of 100 mL of Omnipaque 350.  Contrast timing was optimized to evaluate the pulmonary arteries.  MIP images were performed.    COMPARISON:  None    FINDINGS:  No evidence for pulmonary embolism.  No evidence for aortic dissection or aneurysm.  Patchy areas of nodular opacities throughout the lung upper lung zone with more reticular subpleural consolidation in the bilateral lower lobes left greater than right consistent with multifocal pneumonia.  Ectatic aorta measuring up to 3.8 cm.  Mild thickening of the gastric mucosa.  Right-sided port.  Question subcutaneous sebaceous cyst in the left posterior subcutaneous fat.  Correlate to physical exam.  Slight ascites adjacent to the spleen.  Small pericardial effusion.                                       CT Abdomen Pelvis  Without Contrast (Final result)  Result time 11/08/22 21:02:23      Final result by Chato Osei MD (11/08/22 21:02:23)                   Impression:      Relatively thick walled fluid collection in the pre sacral space situated between the rectosigmoid colon and the distal sacrum measuring 18 x 40 x 45 mm suggestive of abscess.    In the Annalisa -  buttock region adjacent to the left gluteal cleft there is a slight nodular appearance in the subcutaneous region measuring up to 12-14 mm may relate to a boil or sebaceous cyst.  Attention on follow-up and correlate to physical exam    Heterogeneous reticular alveolar opacity in the left posterior lung base consistent with pneumonia.    Postsurgical changes of the bowel    Senescent changes not otherwise specified    Chronic deformity of the left hip status post prosthesis removal    Atherosclerotic disease    All CT scans   are performed using dose optimization techniques including the following: automated exposure control; adjustment of the mA and/or kV; use of iterative reconstruction technique.  Dose modulation was employed for ALARA by means of: Automated exposure control; adjustment of the mA and/or kV according to patient size (this includes techniques or standardized protocols for targeted exams where dose is matched to indication/reason for exam; i.e. extremities or head); and/or use of iterative reconstructive technique.      Electronically signed by: Sea Reis  Date:    11/08/2022  Time:    21:02               Narrative:    EXAMINATION:  CT ABDOMEN PELVIS WITHOUT CONTRAST    CLINICAL HISTORY:  Abdominal abscess/infection suspected;    TECHNIQUE:  Low dose axial images, sagittal and coronal reformations were obtained from the lung bases to the pubic symphysis, 30 mL of oral Omnipaque 350 was administered..    COMPARISON:  None    FINDINGS:  Heart: Normal in size as far as seen.  No pericardial effusion as far seen.    Lung Bases: Left basilar reticular alveolar opacities consistent with left basilar pneumonia.    Liver: Normal in size and attenuation, with no focal hepatic lesions.    Gallbladder: No calcified gallstones.    Bile Ducts: No evidence of dilated ducts.    Pancreas: No mass or peripancreatic fat stranding.    Spleen: Unremarkable.    Adrenals: Unremarkable.    Kidneys/ Ureters:  Unremarkable.    Bladder: No evidence of wall thickening.    Reproductive organs: Unremarkable.    GI Tract/Mesentery: No evidence of bowel obstruction or inflammation. No secondary signs of appendicitis.  Relatively thick walled fluid collection in the pre sacral space situated between the rectosigmoid colon and the distal sacrum measuring 18 x 40 x 45 mm suggestive of abscess.  There isn't adjacent hyperdensity suggestive of possible postsurgical changes.  No other sizable abnormal fluid collection adjacent to the perirectal region.  Nodular appearance in the left Annalisa- buttock adjacent to the cleft measures up to 14 mm may relate to a sebaceous cyst or a boil.  Attention on follow-up.  Surgical sutures in the right lower quadrant bowel loop suspected.    Peritoneal Space: No ascites. No free air.    Retroperitoneum: No significant adenopathy.    Abdominal wall: Unremarkable.    Vasculature: No aneurysm.    Bones: Deformity of the left hip with tracks from prior surgical fixation noted.                                       X-Ray Chest AP Portable (Final result)  Result time 11/08/22 18:49:35      Final result by Chato Osei MD (11/08/22 18:49:35)                   Impression:      As above      Electronically signed by: Sea Reis  Date:    11/08/2022  Time:    18:49               Narrative:    EXAMINATION:  XR CHEST AP PORTABLE    CLINICAL HISTORY:  Chest Pain;    TECHNIQUE:  Single frontal view of the chest was performed.    COMPARISON:  Prior    FINDINGS:  Right-sided chest port.Prominent cardiac silhouette.  Mild perihilar interstitial opacities may relate to trace pulmonary edema.  No segmental consolidation pleural effusion pneumothorax.  Old rib deformities left posterolateral ribs.  Similar findings to prior exam    Bones are intact.                                         Assessment/Plan:      * Septic shock  This patient does have evidence of infective focus  My overall impression is septic  shock.  Source: IV Cefepime and Vanco   Antibiotics given-   Antibiotics (From admission, onward)    Start     Stop Route Frequency Ordered    11/09/22 0015  vancomycin 1.5 g in dextrose 5 % 250 mL IVPB (ready to mix)         -- IV Once 11/08/22 2302 11/08/22 2315  cefepime in dextrose 5 % 1 gram/50 mL IVPB 1 g         -- IV Every 8 hours (non-standard times) 11/08/22 2201 11/08/22 2301  vancomycin - pharmacy to dose  (vancomycin IVPB)        See Hyperspace for full Linked Orders Report.    -- IV pharmacy to manage frequency 11/08/22 2201        Latest lactate reviewed-  Recent Labs   Lab 11/08/22 1818 11/08/22 2129   LACTATE 3.0* 1.3     Organ dysfunction indicated by encephalopathy    Shock with decreased perfusion noted, Fluid challenge  was given at 30cc/kg    Post- resuscitation assessment- (Done after fluids given for shock)  Vital signs post fluid administrations were-  Temp Readings from Last 1 Encounters:   11/08/22 98.6 °F (37 °C) (Oral)     BP Readings from Last 1 Encounters:   11/09/22 (!) 87/51     Pulse Readings from Last 1 Encounters:   11/09/22 (!) 140       Perfusion exam was performed within 6 hours of septic shock presentation after bolus shows Adequate tissue perfusion assessed by non-invasive monitoring  Will Start Pressors- Levophed for MAP of 65  Source control achieved by: IV Vanco and Cefepime         Elevated troponin  EKG did not show any findings of ST elevation  Cardiology evaluated and recommended possible ischemic workup once patient says is more stable.       Diarrhea  Check stools for Cdiff  Stool cultureshowed No growth  And Stoool for Cdiff was negative on 11/1/22 11/9:    C diff -ve;       Perirectal wounds with abscess  Very small abscesses   Consult general surgery and wound care  IV Vanco and Cefepime  Wound cultures       Pneumonia  IV Cefepime/vanco  Blood cultures       Tachycardia  Likely 2/2 Sepsis and severe dehydration   Check CTA chest to r/o PE  IV Cardizem  x 2 doses given in ED.   Cardiology recommended IV Cardizem drip but BP too low   IV Lopressor prn HR>120      Immunocompromised patient  2/2 chemo      Severe malnutrition  Nutrition consulted. Most recent weight and BMI monitored-   Measurements:  Wt Readings from Last 1 Encounters:   11/08/22 58.4 kg (128 lb 12.8 oz)   Body mass index is 17.96 kg/m².    Recommendations:    Patient has been screened and assessed by RD. RD will follow patient.      Oral candidiasis        Mucositis due to antineoplastic therapy  Cont Magic mouth wash       Rectal cancer  palliative care followed pt for pain control   Consider hospice       Neoplasm related pain  Cont po meds if BP tolerates         VTE Risk Mitigation (From admission, onward)         Ordered     enoxaparin injection 40 mg  Daily         11/08/22 2159     IP VTE HIGH RISK PATIENT  Once         11/08/22 2159     Place sequential compression device  Until discontinued         11/08/22 2159                Discharge Planning   KERRIE:      Code Status: DNR   Is the patient medically ready for discharge?:     Reason for patient still in hospital (select all that apply): icu care;   Discharge Plan A: Home Health            Critical care time spent on the evaluation and treatment of severe organ dysfunction, review of pertinent labs and imaging studies, discussions with consulting providers and discussions with patient/family: 61 minutes.      Lauern Shah MD  Department of Hospital Medicine   [facility] - Intensive Care ([facility])

## 2022-11-09 NOTE — PROGRESS NOTES
Pharmacist Renal Dose Adjustment Note    Vincent Benton is a 67 y.o. male being treated with the medication piperacillin-tazobactam.    Patient Data:    Vital Signs (Most Recent):  Temp: 97.6 °F (36.4 °C) (11/08/22 1706)  Pulse: 62 (11/08/22 1943)  Resp: 20 (11/08/22 1943)  BP: (!) 105/57 (11/08/22 1943)  SpO2: 100 % (11/08/22 1943)   Vital Signs (72h Range):  Temp:  [97.5 °F (36.4 °C)-98.6 °F (37 °C)]   Pulse:  []   Resp:  [15-23]   BP: ()/()   SpO2:  [93 %-100 %]      Recent Labs   Lab 11/05/22  0517 11/06/22  0500 11/08/22  1818   CREATININE 0.5 0.6 0.6     Serum creatinine: 0.6 mg/dL 11/08/22 1818  Estimated creatinine clearance: 101.4 mL/min    Medication: piperacillin-tazobactam 4.5 g IV every 12 hours will be changed to piperacillin-tazobactam 4.5 g IV every 8 hours per pharmacy renal dose adjustment protocol for patients with CrCl greater than 20 mL/min.    Pharmacist's Name: Dalia Bryant PharmD  Pharmacist's Extension: [phone]    Thank you for allowing us to participate in this patient's care.     Dalia Bryant PharmD 11/08/2022 7:52 PM

## 2022-11-09 NOTE — ASSESSMENT & PLAN NOTE
-Mgmt as per primary team  -On abx  -BC pending   Provider Procedure Text (C): After obtaining clear surgical margins the defect was repaired by another provider.

## 2022-11-09 NOTE — ASSESSMENT & PLAN NOTE
This patient does have evidence of infective focus  My overall impression is septic shock.  Source: IV Cefepime and Vanco   Antibiotics given-   Antibiotics (From admission, onward)    Start     Stop Route Frequency Ordered    11/09/22 0015  vancomycin 1.5 g in dextrose 5 % 250 mL IVPB (ready to mix)         -- IV Once 11/08/22 2302 11/08/22 2315  cefepime in dextrose 5 % 1 gram/50 mL IVPB 1 g         -- IV Every 8 hours (non-standard times) 11/08/22 2201 11/08/22 2301  vancomycin - pharmacy to dose  (vancomycin IVPB)        See Hyperspace for full Linked Orders Report.    -- IV pharmacy to manage frequency 11/08/22 2201        Latest lactate reviewed-  Recent Labs   Lab 11/08/22 1818 11/08/22 2129   LACTATE 3.0* 1.3     Organ dysfunction indicated by encephalopathy    Shock with decreased perfusion noted, Fluid challenge  was given at 30cc/kg    Post- resuscitation assessment- (Done after fluids given for shock)  Vital signs post fluid administrations were-  Temp Readings from Last 1 Encounters:   11/08/22 98.6 °F (37 °C) (Oral)     BP Readings from Last 1 Encounters:   11/09/22 (!) 87/51     Pulse Readings from Last 1 Encounters:   11/09/22 (!) 140       Perfusion exam was performed within 6 hours of septic shock presentation after bolus shows Adequate tissue perfusion assessed by non-invasive monitoring  Will Start Pressors- Levophed for MAP of 65  Source control achieved by: IV Vanco and Cefepime

## 2022-11-09 NOTE — HPI
Mr. Benton is a 67 year old male patient whose current medical conditions include rectal adenocarcinoma stage IV on palliative chemotherapy, GERD, HTN, alcoholism, and recent hospitalization due to severe mucositis and wounds to buttocks who presented to [facility] ED yesterday evening after being found unresponsive by his wife at home. Patient was reportedly covered in diarrhea and complained of perirectal pain with an initial BP of 60/42. Initial workup in ED revealed lactic acidosis (3.0), hypotension, and mild left shift. CT of abdomen/pelvis showed LLL pneumonia as well as small perirectal abscess. Initial EKG showed ST vs PSVT with HR in 140-150 range with diffuse ST depression and patient was subsequently admitted for to ICU for further evaluation and treatment. Cardiology consulted to assist with management. Patient seen and examined today, resting in bed. Moaning/crying/writhing in bed due to pain. No CV complaints. Denies chest pain or SOB. He was previously seen by our service last admission due to asymptomatic bradycardia and OP Holter monitor was planned. Troponin 0.070>0.101>0.084>0.059. Repeat echo pending, prior echo in 8/22 showed normal EF.

## 2022-11-09 NOTE — PROGRESS NOTES
Pharmacokinetic Initial Assessment: IV Vancomycin    Assessment/Plan:    Initiate intravenous vancomycin with loading dose of 1500 mg once followed by a maintenance dose of vancomycin 1000 mg IV every 12 hours  Desired empiric serum trough concentration is 10 to 15 mcg/mL  Draw vancomycin trough level 60 min prior to fourth dose on 11/10 at approximately 13:00  Pharmacy will continue to follow and monitor vancomycin.      Please contact pharmacy at extension [phone] with any questions regarding this assessment.     Thank you for the consult,   Myke Bustillos       Patient brief summary:  Vincent Benton is a 67 y.o. male initiated on antimicrobial therapy with IV Vancomycin for treatment of suspected skin & soft tissue infection    Drug Allergies:   Review of patient's allergies indicates:  No Known Allergies    Actual Body Weight:   59.4kg    Renal Function:   Estimated Creatinine Clearance: 103.1 mL/min (based on SCr of 0.6 mg/dL).,     Dialysis Method (if applicable):  N/A    CBC (last 72 hours):  Recent Labs   Lab Result Units 11/06/22  0500 11/08/22  1818   WBC K/uL 4.32 6.63   Hemoglobin g/dL 9.3* 13.1*   Hematocrit % 30.3* 41.9   Platelets K/uL 185 244   Gran % % 70.9 74.9*   Lymph % % 16.9* 14.3*   Mono % % 10.9 7.8   Eosinophil % % 0.2 0.2   Basophil % % 0.2 0.5   Differential Method  Automated Automated       Metabolic Panel (last 72 hours):  Recent Labs   Lab Result Units 11/06/22  0500 11/07/22  1119 11/08/22  1818 11/08/22  2151   Sodium mmol/L 143  --  139  --    Potassium mmol/L 4.8  --  3.5  --    Chloride mmol/L 107  --  100  --    CO2 mmol/L 27  --  21*  --    Glucose mg/dL 91  --  71  --    Glucose, UA   --   --   --  Negative   BUN mg/dL 4*  --  6*  --    Creatinine mg/dL 0.6  --  0.6  --    Albumin g/dL  --   --  2.3*  --    Total Bilirubin mg/dL  --   --  0.6  --    Alkaline Phosphatase U/L  --   --  110  --    AST U/L  --   --  13  --    ALT U/L  --   --  10  --    Magnesium mg/dL 2.3  --    --   --    Phosphorus mg/dL 1.6* 1.9*  --   --        Drug levels (last 3 results):  No results for input(s): VANCOMYCINRA, VANCORANDOM, VANCOMYCINPE, VANCOPEAK, VANCOMYCINTR, VANCOTROUGH in the last 72 hours.    Microbiologic Results:  Microbiology Results (last 7 days)       Procedure Component Value Units Date/Time    Blood culture #1 **CANNOT BE ORDERED STAT** [[id number]] Collected: 11/08/22 1819    Order Status: Sent Specimen: Blood from Peripheral, Hand, Right Updated: 11/09/22 0209    Blood culture #2 **CANNOT BE ORDERED STAT** [[id number]] Collected: 11/08/22 1835    Order Status: Sent Specimen: Blood from Peripheral, Hand, Left Updated: 11/09/22 0209    Aerobic culture (Specify Source) **CANNOT BE ORDERED AS STAT** [[id number]] Collected: 11/08/22 1858    Order Status: Sent Specimen: Wound from Buttocks, Right Updated: 11/09/22 0209    Clostridium difficile EIA [[id number]] Collected: 11/08/22 1800    Order Status: Sent Specimen: Stool Updated: 11/08/22 1835

## 2022-11-09 NOTE — SUBJECTIVE & OBJECTIVE
Past Medical History:   Diagnosis Date    Alcoholism     Anemia     Back pain     Encounter for blood transfusion 2018    Essential hypertension 2/4/2016    GERD (gastroesophageal reflux disease)     Hiatal hernia     Hypertension     diet controlled    Melanoma 06/2021    left cheek    Rectal cancer 9/11/2019       Past Surgical History:   Procedure Laterality Date    ARTHROSCOPY OF KNEE Right 5/5/2021    Procedure: ARTHROSCOPY, KNEE;  Surgeon: Delonte Mcintyre MD;  Location: [facility] OR;  Service: Orthopedics;  Laterality: Right;    COLONOSCOPY N/A 9/3/2019    Procedure: COLONOSCOPY;  Surgeon: Isai Centeno MD;  Location: [facility] ENDO;  Service: Endoscopy;  Laterality: N/A;    COLONOSCOPY N/A 1/10/2020    Procedure: COLONOSCOPY;  Surgeon: Familia Gonzalez MD;  Location: [facility];  Service: General;  Laterality: N/A;    COLONOSCOPY N/A 3/24/2021    Procedure: COLONOSCOPY;  Surgeon: Familia Gonzalez MD;  Location: [facility];  Service: General;  Laterality: N/A;    ESOPHAGOGASTRODUODENOSCOPY N/A 9/3/2019    Procedure: ESOPHAGOGASTRODUODENOSCOPY (EGD);  Surgeon: Isai Centeno MD;  Location: [facility];  Service: Endoscopy;  Laterality: N/A;    EXCISION OF MEDIAL MENISCUS OF KNEE Right 5/5/2021    Procedure: MENISCECTOMY, KNEE, MEDIAL;  Surgeon: Delonte Mcintyre MD;  Location: [facility];  Service: Orthopedics;  Laterality: Right;  partial Lateral    FLEXIBLE SIGMOIDOSCOPY  2/4/2020    Procedure: SIGMOIDOSCOPY, FLEXIBLE;  Surgeon: Familia Gonzalez MD;  Location: [facility] OR;  Service: General;;    ILEOSTOMY Right 2/4/2020    Procedure: CREATION, ILEOSTOMY;  Surgeon: Familia Gonzalez MD;  Location: [facility] OR;  Service: General;  Laterality: Right;    ILEOSTOMY CLOSURE N/A 8/4/2020    Procedure: CLOSURE, ILEOSTOMY;  Surgeon: Familia Gonzalez MD;  Location: [facility];  Service: General;  Laterality: N/A;    INCISION AND DRAINAGE OF BACK Left 8/5/2020    Procedure: INCISION AND DRAINAGE, BACK;  Surgeon: Familia RIVAS  MD Carlos;  Location: [facility];  Service: General;  Laterality: Left;    INJECTION OF ANESTHETIC AGENT INTO TISSUE PLANE DEFINED BY TRANSVERSUS ABDOMINIS MUSCLE N/A 2/4/2020    Procedure: BLOCK, TRANSVERSUS ABDOMINIS PLANE;  Surgeon: Familia Gonzalez MD;  Location: [facility] OR;  Service: General;  Laterality: N/A;    INSERTION OF TUNNELED CENTRAL VENOUS CATHETER (CVC) WITH SUBCUTANEOUS PORT Right 2/4/2020    Procedure: INSERTION, PORT-A-CATH;  Surgeon: Familia Gonzalez MD;  Location: [facility] OR;  Service: General;  Laterality: Right;    INSERTION OF TUNNELED CENTRAL VENOUS CATHETER (CVC) WITH SUBCUTANEOUS PORT N/A 8/16/2022    Procedure: [id number];  Surgeon: Familia Gonzalez MD;  Location: [facility];  Service: General;  Laterality: N/A;  right subclavian    JOINT REPLACEMENT Left 2009    Hip    KNEE ARTHROSCOPY W/ PLICA EXCISION Right 5/5/2021    Procedure: EXCISION, PLICA, KNEE, ARTHROSCOPIC;  Surgeon: Delonte Mcintyre MD;  Location: [facility];  Service: Orthopedics;  Laterality: Right;    MOBILIZATION OF SPLENIC FLEXURE  2/4/2020    Procedure: MOBILIZATION, SPLENIC FLEXURE;  Surgeon: Familia Gonzalez MD;  Location: [facility];  Service: General;;    REMOVAL OF HARDWARE FROM HIP Left 1/4/2022    Procedure: REMOVAL, HARDWARE, HIP;  Surgeon: Delonte Mcintyre MD;  Location: [facility];  Service: Orthopedics;  Laterality: Left;       Review of patient's allergies indicates:  No Known Allergies    Family History       Problem Relation (Age of Onset)    Cataracts Mother    Hypertension Father    Stroke Father          Tobacco Use    Smoking status: Never    Smokeless tobacco: Never   Substance and Sexual Activity    Alcohol use: Yes    Drug use: No    Sexual activity: Never     Partners: Female         Review of Systems   Constitutional:  Positive for activity change, appetite change, chills, diaphoresis and fatigue. Negative for fever.   HENT:  Negative for drooling, ear discharge and nosebleeds.    Eyes:   Negative for pain, discharge and itching.   Respiratory:  Positive for shortness of breath. Negative for choking.    Cardiovascular:  Positive for palpitations. Negative for chest pain.   Gastrointestinal:  Positive for abdominal pain and diarrhea. Negative for anal bleeding.   Endocrine: Negative for cold intolerance.   Genitourinary:  Negative for hematuria.   Musculoskeletal:  Positive for arthralgias. Negative for neck stiffness.   Skin:  Negative for rash.   Allergic/Immunologic: Negative for immunocompromised state.   Neurological:  Positive for weakness. Negative for seizures and facial asymmetry.   Hematological:  Negative for adenopathy.   Psychiatric/Behavioral:  Negative for behavioral problems, self-injury and suicidal ideas. The patient is nervous/anxious.    Objective:     Vital Signs (Most Recent):  Temp: 97.9 °F (36.6 °C) (11/09/22 0715)  Pulse: (!) 121 (11/09/22 0800)  Resp: 19 (11/09/22 0800)  BP: (!) 85/65 (11/09/22 0800)  SpO2: 100 % (11/09/22 0800)   Vital Signs (24h Range):  Temp:  [97 °F (36.1 °C)-98.8 °F (37.1 °C)] 97.9 °F (36.6 °C)  Pulse:  [] 121  Resp:  [13-33] 19  SpO2:  [77 %-100 %] 100 %  BP: ()/(50-75) 85/65     Weight: 61 kg (134 lb 7.7 oz)  Body mass index is 18.76 kg/m².      Intake/Output Summary (Last 24 hours) at 11/9/2022 0916  Last data filed at 11/9/2022 0700  Gross per 24 hour   Intake 3989.21 ml   Output 400 ml   Net 3589.21 ml       Physical Exam  Vitals and nursing note reviewed.   Constitutional:       General: He is not in acute distress.     Appearance: He is well-developed. He is ill-appearing and toxic-appearing.   HENT:      Head: Normocephalic and atraumatic.      Nose: Nose normal.   Eyes:      Extraocular Movements: Extraocular movements intact.   Cardiovascular:      Rate and Rhythm: Regular rhythm. Tachycardia present.   Pulmonary:      Effort: Pulmonary effort is normal.      Breath sounds: No stridor.      Comments: Right subclavian port a cath    Abdominal:      General: There is no distension.   Musculoskeletal:         General: No signs of injury.      Cervical back: Normal range of motion and neck supple.   Skin:     General: Skin is dry.   Neurological:      General: No focal deficit present.      Mental Status: He is alert and oriented to person, place, and time. Mental status is at baseline.       Vents:  Oxygen Concentration (%): 32 (11/09/22 0800)    Lines/Drains/Airways       Central Venous Catheter Line  Duration             Port A Cath Single Lumen 08/16/22 1323 right subclavian 84 days              Peripheral Intravenous Line  Duration                  Peripheral IV - Single Lumen 11/08/22 1708 20 G Right Antecubital <1 day         Peripheral IV - Single Lumen 11/08/22 1840 20 G Posterior;Right Hand <1 day                    Significant Labs:    CBC/Anemia Profile:  Recent Labs   Lab 11/08/22 1818 11/09/22  0451   WBC 6.63 5.28   HGB 13.1* 11.3*   HCT 41.9 37.7*    248   MCV 87 91   RDW 27.7* 28.3*        Chemistries:  Recent Labs   Lab 11/07/22  1119 11/08/22 1818 11/09/22  0451   NA  --  139 141   K  --  3.5 3.3*   CL  --  100 104   CO2  --  21* 19*   BUN  --  6* 5*   CREATININE  --  0.6 0.5   CALCIUM  --  8.7 7.6*   ALBUMIN  --  2.3* 2.0*   PROT  --  6.1 4.8*   BILITOT  --  0.6 0.4   ALKPHOS  --  110 97   ALT  --  10 12   AST  --  13 13   MG  --   --  1.8   PHOS 1.9*  --  3.0      Latest Reference Range & Units Most Recent   BNP 0 - 99 pg/mL 218 (H)  11/8/22 18:18   CPK 20 - 200 U/L  20 - 200 U/L 105  11/3/13 10:55  105  11/3/13 10:55   CPK MB 0.1 - 6.5 ng/mL 2.6  11/3/13 10:55   MB % 0.0 - 5.0 % 2.5  11/3/13 10:55       EKG 11/8     (H): Data is abnormally high  Vent. Rate : 148 BPM     Atrial Rate : 148 BPM      P-R Int : 176 ms          QRS Dur : 092 ms       QT Int : 322 ms       P-R-T Axes : 064 041 252 degrees      QTc Int : 505 ms     Sinus tachycardia   Incomplete right bundle branch block   Marked ST abnormality, possible  inferior subendocardial injury   Abnormal ECG   When compared with ECG of 08-NOV-2022 17:12,   ST less depressed in Inferior leads     Significant Imaging:     CTA chest 11/8/2022       FINDINGS:  No evidence for pulmonary embolism.  No evidence for aortic dissection or aneurysm.  Patchy areas of nodular opacities throughout the lung upper lung zone with more reticular subpleural consolidation in the bilateral lower lobes left greater than right consistent with multifocal pneumonia.  Ectatic aorta measuring up to 3.8 cm.  Mild thickening of the gastric mucosa.  Right-sided port.  Question subcutaneous sebaceous cyst in the left posterior subcutaneous fat.  Correlate to physical exam.  Slight ascites adjacent to the spleen.  Small pericardial effusion.     Impression:     No pulmonary embolism.  No dissection.     Patchy areas of nodular opacities throughout the lung upper lung zone with more reticular subpleural consolidation in the bilateral lower lobes left greater than right consistent with multifocal pneumonia.        CT abd pelvis wo contrast 11/8/2022    FINDINGS:  Heart: Normal in size as far as seen.  No pericardial effusion as far seen.     Lung Bases: Left basilar reticular alveolar opacities consistent with left basilar pneumonia.     Liver: Normal in size and attenuation, with no focal hepatic lesions.     Gallbladder: No calcified gallstones.     Bile Ducts: No evidence of dilated ducts.     Pancreas: No mass or peripancreatic fat stranding.     Spleen: Unremarkable.     Adrenals: Unremarkable.     Kidneys/ Ureters: Unremarkable.     Bladder: No evidence of wall thickening.     Reproductive organs: Unremarkable.     GI Tract/Mesentery: No evidence of bowel obstruction or inflammation. No secondary signs of appendicitis.  Relatively thick walled fluid collection in the pre sacral space situated between the rectosigmoid colon and the distal sacrum measuring 18 x 40 x 45 mm suggestive of abscess.  There  isn't adjacent hyperdensity suggestive of possible postsurgical changes.  No other sizable abnormal fluid collection adjacent to the perirectal region.  Nodular appearance in the left Annalisa- buttock adjacent to the cleft measures up to 14 mm may relate to a sebaceous cyst or a boil.  Attention on follow-up.  Surgical sutures in the right lower quadrant bowel loop suspected.     Peritoneal Space: No ascites. No free air.     Retroperitoneum: No significant adenopathy.     Abdominal wall: Unremarkable.     Vasculature: No aneurysm.     Bones: Deformity of the left hip with tracks from prior surgical fixation noted.     Impression:     Relatively thick walled fluid collection in the pre sacral space situated between the rectosigmoid colon and the distal sacrum measuring 18 x 40 x 45 mm suggestive of abscess.     In the Annalisa - buttock region adjacent to the left gluteal cleft there is a slight nodular appearance in the subcutaneous region measuring up to 12-14 mm may relate to a boil or sebaceous cyst.  Attention on follow-up and correlate to physical exam     Heterogeneous reticular alveolar opacity in the left posterior lung base consistent with pneumonia.     Postsurgical changes of the bowel

## 2022-11-09 NOTE — CONSULTS
[facility] - Intensive Care ([facility])  Critical Care Medicine  Consult Note    Patient Name: Vicnent Benton  MRN: [id number]  Admission Date: 11/8/2022  Hospital Length of Stay: 1 days  Code Status: DNR  Attending Physician: Lauren Shah, *   Primary Care Provider: Shakila Moran DO   Principal Problem: Septic shock    [unfilled]  Subjective:     HPI:  67 y o m pt for unresponsiveness and diarrhea at home , he has metastatic colorectal CA on chemo Rx  as OP + diarrhea negative Cdiff 11/2  on palliative chemotherapy-last tx 10/25/22, GERD, HTN, Alcoholism, Diarrhea recent hospitalization for severe mucositis and wounds to buttocks       Hospital/ICU Course:  O2 sat 100% on 3 lpm T max 98  ml last 24 hrs + 4 liters since admit . 3 L NS on admission BP 90/60 mm Hg . CTA chest , CT abd pelvis reviewed       Past Medical History:   Diagnosis Date    Alcoholism     Anemia     Back pain     Encounter for blood transfusion 2018    Essential hypertension 2/4/2016    GERD (gastroesophageal reflux disease)     Hiatal hernia     Hypertension     diet controlled    Melanoma 06/2021    left cheek    Rectal cancer 9/11/2019       Past Surgical History:   Procedure Laterality Date    ARTHROSCOPY OF KNEE Right 5/5/2021    Procedure: ARTHROSCOPY, KNEE;  Surgeon: Delonte Mcintyre MD;  Location: [facility];  Service: Orthopedics;  Laterality: Right;    COLONOSCOPY N/A 9/3/2019    Procedure: COLONOSCOPY;  Surgeon: Isai Centeno MD;  Location: [facility];  Service: Endoscopy;  Laterality: N/A;    COLONOSCOPY N/A 1/10/2020    Procedure: COLONOSCOPY;  Surgeon: Familia Gonzalez MD;  Location: [facility];  Service: General;  Laterality: N/A;    COLONOSCOPY N/A 3/24/2021    Procedure: COLONOSCOPY;  Surgeon: Familia Gonzalez MD;  Location: [facility];  Service: General;  Laterality: N/A;    ESOPHAGOGASTRODUODENOSCOPY N/A 9/3/2019    Procedure: ESOPHAGOGASTRODUODENOSCOPY (EGD);  Surgeon: Isai Centeno MD;  Location:  [facility] ENDO;  Service: Endoscopy;  Laterality: N/A;    EXCISION OF MEDIAL MENISCUS OF KNEE Right 5/5/2021    Procedure: MENISCECTOMY, KNEE, MEDIAL;  Surgeon: Delonte Mcintyre MD;  Location: [facility] OR;  Service: Orthopedics;  Laterality: Right;  partial Lateral    FLEXIBLE SIGMOIDOSCOPY  2/4/2020    Procedure: SIGMOIDOSCOPY, FLEXIBLE;  Surgeon: Familia Gonzalez MD;  Location: [facility] OR;  Service: General;;    ILEOSTOMY Right 2/4/2020    Procedure: CREATION, ILEOSTOMY;  Surgeon: Familia Gonzalez MD;  Location: [facility] OR;  Service: General;  Laterality: Right;    ILEOSTOMY CLOSURE N/A 8/4/2020    Procedure: CLOSURE, ILEOSTOMY;  Surgeon: Familia Gonzalez MD;  Location: [facility] OR;  Service: General;  Laterality: N/A;    INCISION AND DRAINAGE OF BACK Left 8/5/2020    Procedure: INCISION AND DRAINAGE, BACK;  Surgeon: Familia Gonzalez MD;  Location: [facility] OR;  Service: General;  Laterality: Left;    INJECTION OF ANESTHETIC AGENT INTO TISSUE PLANE DEFINED BY TRANSVERSUS ABDOMINIS MUSCLE N/A 2/4/2020    Procedure: BLOCK, TRANSVERSUS ABDOMINIS PLANE;  Surgeon: Familia Gonzalez MD;  Location: [facility] OR;  Service: General;  Laterality: N/A;    INSERTION OF TUNNELED CENTRAL VENOUS CATHETER (CVC) WITH SUBCUTANEOUS PORT Right 2/4/2020    Procedure: INSERTION, PORT-A-CATH;  Surgeon: Familia Gonzalez MD;  Location: [facility] OR;  Service: General;  Laterality: Right;    INSERTION OF TUNNELED CENTRAL VENOUS CATHETER (CVC) WITH SUBCUTANEOUS PORT N/A 8/16/2022    Procedure: [id number];  Surgeon: Familia Gonzalez MD;  Location: [facility] OR;  Service: General;  Laterality: N/A;  right subclavian    JOINT REPLACEMENT Left 2009    Hip    KNEE ARTHROSCOPY W/ PLICA EXCISION Right 5/5/2021    Procedure: EXCISION, PLICA, KNEE, ARTHROSCOPIC;  Surgeon: Delonte Mcintyre MD;  Location: [facility] OR;  Service: Orthopedics;  Laterality: Right;    MOBILIZATION OF SPLENIC FLEXURE  2/4/2020    Procedure: MOBILIZATION, SPLENIC FLEXURE;  Surgeon:  Familia Gonzalez MD;  Location: [facility] OR;  Service: General;;    REMOVAL OF HARDWARE FROM HIP Left 1/4/2022    Procedure: REMOVAL, HARDWARE, HIP;  Surgeon: Delonte Mcintyre MD;  Location: [facility] OR;  Service: Orthopedics;  Laterality: Left;       Review of patient's allergies indicates:  No Known Allergies    Family History       Problem Relation (Age of Onset)    Cataracts Mother    Hypertension Father    Stroke Father          Tobacco Use    Smoking status: Never    Smokeless tobacco: Never   Substance and Sexual Activity    Alcohol use: Yes    Drug use: No    Sexual activity: Never     Partners: Female         Review of Systems   Constitutional:  Positive for activity change, appetite change, chills, diaphoresis and fatigue. Negative for fever.   HENT:  Negative for drooling, ear discharge and nosebleeds.    Eyes:  Negative for pain, discharge and itching.   Respiratory:  Positive for shortness of breath. Negative for choking.    Cardiovascular:  Positive for palpitations. Negative for chest pain.   Gastrointestinal:  Positive for abdominal pain and diarrhea. Negative for anal bleeding.   Endocrine: Negative for cold intolerance.   Genitourinary:  Negative for hematuria.   Musculoskeletal:  Positive for arthralgias. Negative for neck stiffness.   Skin:  Negative for rash.   Allergic/Immunologic: Negative for immunocompromised state.   Neurological:  Positive for weakness. Negative for seizures and facial asymmetry.   Hematological:  Negative for adenopathy.   Psychiatric/Behavioral:  Negative for behavioral problems, self-injury and suicidal ideas. The patient is nervous/anxious.    Objective:     Vital Signs (Most Recent):  Temp: 97.9 °F (36.6 °C) (11/09/22 0715)  Pulse: (!) 121 (11/09/22 0800)  Resp: 19 (11/09/22 0800)  BP: (!) 85/65 (11/09/22 0800)  SpO2: 100 % (11/09/22 0800)   Vital Signs (24h Range):  Temp:  [97 °F (36.1 °C)-98.8 °F (37.1 °C)] 97.9 °F (36.6 °C)  Pulse:  [] 121  Resp:  [13-33]  19  SpO2:  [77 %-100 %] 100 %  BP: ()/(50-75) 85/65     Weight: 61 kg (134 lb 7.7 oz)  Body mass index is 18.76 kg/m².      Intake/Output Summary (Last 24 hours) at 11/9/2022 0916  Last data filed at 11/9/2022 0700  Gross per 24 hour   Intake 3989.21 ml   Output 400 ml   Net 3589.21 ml       Physical Exam  Vitals and nursing note reviewed.   Constitutional:       General: He is not in acute distress.     Appearance: He is well-developed. He is ill-appearing and toxic-appearing.   HENT:      Head: Normocephalic and atraumatic.      Nose: Nose normal.   Eyes:      Extraocular Movements: Extraocular movements intact.   Cardiovascular:      Rate and Rhythm: Regular rhythm. Tachycardia present.   Pulmonary:      Effort: Pulmonary effort is normal.      Breath sounds: No stridor.      Comments: Right subclavian port a cath   Abdominal:      General: There is no distension.   Musculoskeletal:         General: No signs of injury.      Cervical back: Normal range of motion and neck supple.   Skin:     General: Skin is dry.   Neurological:      General: No focal deficit present.      Mental Status: He is alert and oriented to person, place, and time. Mental status is at baseline.       Vents:  Oxygen Concentration (%): 32 (11/09/22 0800)    Lines/Drains/Airways       Central Venous Catheter Line  Duration             Port A Cath Single Lumen 08/16/22 1323 right subclavian 84 days              Peripheral Intravenous Line  Duration                  Peripheral IV - Single Lumen 11/08/22 1708 20 G Right Antecubital <1 day         Peripheral IV - Single Lumen 11/08/22 1840 20 G Posterior;Right Hand <1 day                    Significant Labs:    CBC/Anemia Profile:  Recent Labs   Lab 11/08/22 1818 11/09/22  0451   WBC 6.63 5.28   HGB 13.1* 11.3*   HCT 41.9 37.7*    248   MCV 87 91   RDW 27.7* 28.3*        Chemistries:  Recent Labs   Lab 11/07/22  1119 11/08/22  1818 11/09/22  0451   NA  --  139 141   K  --  3.5 3.3*    CL  --  100 104   CO2  --  21* 19*   BUN  --  6* 5*   CREATININE  --  0.6 0.5   CALCIUM  --  8.7 7.6*   ALBUMIN  --  2.3* 2.0*   PROT  --  6.1 4.8*   BILITOT  --  0.6 0.4   ALKPHOS  --  110 97   ALT  --  10 12   AST  --  13 13   MG  --   --  1.8   PHOS 1.9*  --  3.0      Latest Reference Range & Units Most Recent   BNP 0 - 99 pg/mL 218 (H)  11/8/22 18:18   CPK 20 - 200 U/L  20 - 200 U/L 105  11/3/13 10:55  105  11/3/13 10:55   CPK MB 0.1 - 6.5 ng/mL 2.6  11/3/13 10:55   MB % 0.0 - 5.0 % 2.5  11/3/13 10:55       EKG 11/8     (H): Data is abnormally high  Vent. Rate : 148 BPM     Atrial Rate : 148 BPM      P-R Int : 176 ms          QRS Dur : 092 ms       QT Int : 322 ms       P-R-T Axes : 064 041 252 degrees      QTc Int : 505 ms     Sinus tachycardia   Incomplete right bundle branch block   Marked ST abnormality, possible inferior subendocardial injury   Abnormal ECG   When compared with ECG of 08-NOV-2022 17:12,   ST less depressed in Inferior leads     Significant Imaging:     CTA chest 11/8/2022       FINDINGS:  No evidence for pulmonary embolism.  No evidence for aortic dissection or aneurysm.  Patchy areas of nodular opacities throughout the lung upper lung zone with more reticular subpleural consolidation in the bilateral lower lobes left greater than right consistent with multifocal pneumonia.  Ectatic aorta measuring up to 3.8 cm.  Mild thickening of the gastric mucosa.  Right-sided port.  Question subcutaneous sebaceous cyst in the left posterior subcutaneous fat.  Correlate to physical exam.  Slight ascites adjacent to the spleen.  Small pericardial effusion.     Impression:     No pulmonary embolism.  No dissection.     Patchy areas of nodular opacities throughout the lung upper lung zone with more reticular subpleural consolidation in the bilateral lower lobes left greater than right consistent with multifocal pneumonia.        CT abd pelvis wo contrast 11/8/2022    FINDINGS:  Heart: Normal in size as  far as seen.  No pericardial effusion as far seen.     Lung Bases: Left basilar reticular alveolar opacities consistent with left basilar pneumonia.     Liver: Normal in size and attenuation, with no focal hepatic lesions.     Gallbladder: No calcified gallstones.     Bile Ducts: No evidence of dilated ducts.     Pancreas: No mass or peripancreatic fat stranding.     Spleen: Unremarkable.     Adrenals: Unremarkable.     Kidneys/ Ureters: Unremarkable.     Bladder: No evidence of wall thickening.     Reproductive organs: Unremarkable.     GI Tract/Mesentery: No evidence of bowel obstruction or inflammation. No secondary signs of appendicitis.  Relatively thick walled fluid collection in the pre sacral space situated between the rectosigmoid colon and the distal sacrum measuring 18 x 40 x 45 mm suggestive of abscess.  There isn't adjacent hyperdensity suggestive of possible postsurgical changes.  No other sizable abnormal fluid collection adjacent to the perirectal region.  Nodular appearance in the left Annalisa- buttock adjacent to the cleft measures up to 14 mm may relate to a sebaceous cyst or a boil.  Attention on follow-up.  Surgical sutures in the right lower quadrant bowel loop suspected.     Peritoneal Space: No ascites. No free air.     Retroperitoneum: No significant adenopathy.     Abdominal wall: Unremarkable.     Vasculature: No aneurysm.     Bones: Deformity of the left hip with tracks from prior surgical fixation noted.     Impression:     Relatively thick walled fluid collection in the pre sacral space situated between the rectosigmoid colon and the distal sacrum measuring 18 x 40 x 45 mm suggestive of abscess.     In the Annalisa - buttock region adjacent to the left gluteal cleft there is a slight nodular appearance in the subcutaneous region measuring up to 12-14 mm may relate to a boil or sebaceous cyst.  Attention on follow-up and correlate to physical exam     Heterogeneous reticular alveolar opacity in  the left posterior lung base consistent with pneumonia.     Postsurgical changes of the bowel         ABG  No results for input(s): PH, PO2, PCO2, HCO3, BE in the last 168 hours.  Assessment/Plan:     Pulmonary  Pneumonia  IV cefepime , flagyl , Vancomycin, nasal MRSA     ID  * Septic shock  Pan cx IV cefepime , flagyl , Vancomycin, check cx ,Surgery eval for rectal abcess     Oncology  Rectal cancer  fentanyl patch , palliative care     GI  Diarrhea  c diff negative 11/2 dc isolation , continue IVF monitor lytes , likely related to enterotoxic chemoRx    Perirectal wounds with abscess  IV cefepime , flagyl , Vancomycin, check cx ,Surgery eval       Critical Care Daily Checklist:    A: Awake: RASS Goal/Actual Goal:    Actual:     B: Spontaneous Breathing Trial Performed?     C: SAT & SBT Coordinated?  Not intubated                       D: Delirium: CAM-ICU     E: Early Mobility Performed? Yes   F: Feeding Goal:    Status:     Current Diet Order   Procedures    Diet NPO Except for: Sips with Medication     Order Specific Question:   Except for     Answer:   Sips with Medication      AS: Analgesia/Sedation Not sedated    T: Thromboembolic Prophylaxis lovenox sc    H: HOB > 300 Yes   U: Stress Ulcer Prophylaxis (if needed) PPI    G: Glucose Control FS prn    B: Bowel Function Stool Occurrence: 1   I: Indwelling Catheter (Lines & Bradley) Necessity No bradley    D: De-escalation of Antimicrobials/Pharmacotherapies Broad spectrum , immunocompromised     Plan for the day/ETD Y    Code Status:  Family/Goals of Care: DNR  Spoke to patient's sister at bedside discussed grave prognosis and hospice on DC palliative care consult     Critical Care Time: 35 minutes  Critical secondary to Patient has a condition that poses threat to life and bodily function: septic shock      Critical care was time spent personally by me on the following activities: development of treatment plan with patient or surrogate and bedside caregivers,  discussions with consultants, evaluation of patient's response to treatment, examination of patient, ordering and performing treatments and interventions, ordering and review of laboratory studies, ordering and review of radiographic studies, pulse oximetry, re-evaluation of patient's condition. This critical care time did not overlap with that of any other provider or involve time for any procedures.    Thank you for your consult. I will follow-up with patient. Please contact us if you have any additional questions.     Bernard Arriaza MD  Critical Care Medicine  [facility] - Intensive Care [facility])

## 2022-11-09 NOTE — PT/OT/SLP EVAL
"Occupational Therapy   Evaluation and Discharge Note    Name: Vincent Benton  MRN: [id number]  Admitting Diagnosis:  Septic shock   Recent Surgery: Procedure(s) (LRB):  Exam under anesthesia, possible perirectal abscess I&D (lithotomy) (N/A)      Recommendations:     Discharge Recommendations: home, nursing facility, basic  Discharge Equipment Recommendations:  hospital bed  Barriers to discharge:  None    Assessment:     Vincent Benton is a 67 y.o. male with a medical diagnosis of Septic shock. At this time, patient with poor tolerance for skilled intervention. Currently with poor potential to make practical and functional gains.    Plan:     During this hospitalization, patient does not require further acute OT services.  Please re-consult if situation changes.    Plan of Care Reviewed with: patient    Subjective     Chief Complaint: Reported "I just want it to all end."  Patient/Family Comments/goals: none reported    Occupational Profile:  Living Environment: Patient resides in a 1 story home with a threshold to enter, with his wife.  Previous level of function: Patient was mod I with ambulation via QC at baseline. He required PRN A of wife for ADL completion.  Roles and Routines: n/a  Equipment Used at home:  cane, quad, shower chair, grab bar  Assistance upon Discharge: wife    Pain/Comfort:  Pain Rating 1: 10/10  Location 1:  (rectum)  Pain Addressed 1: Reposition, Cessation of Activity, Nurse notified    Objective:     Communicated with: NurseKamini, prior to session.  Patient found left sidelying with pulse ox (continuous), telemetry, blood pressure cuff, bed alarm, oxygen, peripheral IV upon OT entry to room.    General Precautions: Standard, special contact, fall, respiratory   Orthopedic Precautions:N/A   Braces: N/A  Respiratory Status: Nasal cannula, flow 3 L/min     Bed Mobility:    Patient completed Rolling/Turning to Left with  total assistance and 2 persons  Patient completed " Rolling/Turning to Right with total assistance and 2 persons    Functional Mobility/Transfers:  Not appropriate for functional transfer at this time.    Activities of Daily Living:  Upper Body Dressing: total assistance .  Lower Body Dressing: total assistance .  Dependently used urinal in supine.    Cognitive/Visual Perceptual:  Cognitive/Psychosocial Skills:     -       Oriented to: Person and Place   -       Follows Commands/attention:Follows one-step commands    Physical Exam:  Unable to tolerate assessment of sitting balance and/or B UE ROM/strength at this time.    AMPAC 6 Click ADL:  AMPAC Total Score: 7    Treatment & Education:  Patient received in fetal position rolled to L whimpering in pain. Therapist offered repositioning in attempts to improve pain, and patient in agreement. Voicing pain with all movement and unable to actively engage in any mobility. Switched wedge from R to L to allow for R side-lying. Added pillow between legs and behind patients head. Remained in supine. Nurse informed of severe pain. Patient is currently unable to tolerate any intervention. D/C OT.    Patient left right sidelying with all lines intact, call button in reach, bed alarm on, and nurse notified    History:     Past Medical History:   Diagnosis Date    Alcoholism     Anemia     Back pain     Encounter for blood transfusion 2018    Essential hypertension 2/4/2016    GERD (gastroesophageal reflux disease)     Hiatal hernia     Hypertension     diet controlled    Melanoma 06/2021    left cheek    Rectal cancer 9/11/2019         Past Surgical History:   Procedure Laterality Date    ARTHROSCOPY OF KNEE Right 5/5/2021    Procedure: ARTHROSCOPY, KNEE;  Surgeon: Delonte Mcintyre MD;  Location: [facility] OR;  Service: Orthopedics;  Laterality: Right;    COLONOSCOPY N/A 9/3/2019    Procedure: COLONOSCOPY;  Surgeon: Isai Centeno MD;  Location: [facility] ENDO;  Service: Endoscopy;  Laterality: N/A;    COLONOSCOPY N/A 1/10/2020     Procedure: COLONOSCOPY;  Surgeon: Familia Gonzalez MD;  Location: [facility] ENDO;  Service: General;  Laterality: N/A;    COLONOSCOPY N/A 3/24/2021    Procedure: COLONOSCOPY;  Surgeon: Familia Gonzalez MD;  Location: [facility] ENDO;  Service: General;  Laterality: N/A;    ESOPHAGOGASTRODUODENOSCOPY N/A 9/3/2019    Procedure: ESOPHAGOGASTRODUODENOSCOPY (EGD);  Surgeon: Isai Centeno MD;  Location: [facility] ENDO;  Service: Endoscopy;  Laterality: N/A;    EXCISION OF MEDIAL MENISCUS OF KNEE Right 5/5/2021    Procedure: MENISCECTOMY, KNEE, MEDIAL;  Surgeon: Delonte Mcintyre MD;  Location: [facility] OR;  Service: Orthopedics;  Laterality: Right;  partial Lateral    FLEXIBLE SIGMOIDOSCOPY  2/4/2020    Procedure: SIGMOIDOSCOPY, FLEXIBLE;  Surgeon: Familia Gonzalez MD;  Location: [facility] OR;  Service: General;;    ILEOSTOMY Right 2/4/2020    Procedure: CREATION, ILEOSTOMY;  Surgeon: Familia Gonzalez MD;  Location: [facility] OR;  Service: General;  Laterality: Right;    ILEOSTOMY CLOSURE N/A 8/4/2020    Procedure: CLOSURE, ILEOSTOMY;  Surgeon: Familia Gonzalez MD;  Location: [facility] OR;  Service: General;  Laterality: N/A;    INCISION AND DRAINAGE OF BACK Left 8/5/2020    Procedure: INCISION AND DRAINAGE, BACK;  Surgeon: Familia Gonzalez MD;  Location: [facility] OR;  Service: General;  Laterality: Left;    INJECTION OF ANESTHETIC AGENT INTO TISSUE PLANE DEFINED BY TRANSVERSUS ABDOMINIS MUSCLE N/A 2/4/2020    Procedure: BLOCK, TRANSVERSUS ABDOMINIS PLANE;  Surgeon: Familia Gonzalez MD;  Location: [facility] OR;  Service: General;  Laterality: N/A;    INSERTION OF TUNNELED CENTRAL VENOUS CATHETER (CVC) WITH SUBCUTANEOUS PORT Right 2/4/2020    Procedure: INSERTION, PORT-A-CATH;  Surgeon: Familia Gonzalez MD;  Location: [facility] OR;  Service: General;  Laterality: Right;    INSERTION OF TUNNELED CENTRAL VENOUS CATHETER (CVC) WITH SUBCUTANEOUS PORT N/A 8/16/2022    Procedure: [id number];  Surgeon: Familia Gonzalez MD;  Location: [facility] OR;   Service: General;  Laterality: N/A;  right subclavian    JOINT REPLACEMENT Left 2009    Hip    KNEE ARTHROSCOPY W/ PLICA EXCISION Right 5/5/2021    Procedure: EXCISION, PLICA, KNEE, ARTHROSCOPIC;  Surgeon: Delonte Mcintyre MD;  Location: [facility] OR;  Service: Orthopedics;  Laterality: Right;    MOBILIZATION OF SPLENIC FLEXURE  2/4/2020    Procedure: MOBILIZATION, SPLENIC FLEXURE;  Surgeon: Familia Gonzalez MD;  Location: [facility] OR;  Service: General;;    REMOVAL OF HARDWARE FROM HIP Left 1/4/2022    Procedure: REMOVAL, HARDWARE, HIP;  Surgeon: Delonte Mcintyre MD;  Location: [facility];  Service: Orthopedics;  Laterality: Left;       Time Tracking:     OT Date of Treatment: 11/09/22  OT Start Time: 0835  OT Stop Time: 0900  OT Total Time (min): 25 min    Billable Minutes:Evaluation 25 11/9/2022

## 2022-11-09 NOTE — ASSESSMENT & PLAN NOTE
Check stools for Cdiff  Stool cultureshowed No growth  And Stoool for Cdiff was negative on 11/1/22

## 2022-11-09 NOTE — PLAN OF CARE
OT eval completed. Patient dependent with all mobility with extremely poor tolerance for intervention. Currently not appropriate for skilled intervention. D/C OT.

## 2022-11-09 NOTE — ASSESSMENT & PLAN NOTE
EKG did not show any findings of ST elevation  Cardiology evaluated and recommended possible ischemic workup once patient says is more stable.

## 2022-11-09 NOTE — CONSULTS
[facility] ([facility])  Colorectal Surgery  Consult Note    Patient Name: Vincent Benton  MRN: [id number]  Code Status: DNR  Admission Date: 11/8/2022  Hospital Length of Stay: 1 days  Attending Physician: Lauren Shah, *  Primary Care Provider: Shakila Moran DO    Patient information was obtained from patient, past medical records and ER records.     Inpatient consult to General Surgery  Consult performed by: Familia Gonzalez MD  Consult ordered by: Myriam Hidalgo NP        Subjective:     Principal Problem: Septic shock    History of Present Illness: 68yo M well-known to me with previous mid-rectal adenocarcinoma who previously completed long-course neoadj chemoXRT which completed on 12/4/19 with partial treatment response on repeat MRI and no evidence of metastatic disease on repeat CT Abd/pelvis who is s/p robotic ultra-low anterior resection, DLI and mediport placement on 2/4/2020 who recovered well postop with final path showing T3N0 who completed adjuvant chemotx in late June 2020 and is now s/p DLI closure on 8/4/2020 who subsequently developed metastatic disease in liver and lung who is now admitted to the hospital medicine service. Recently found to have bilateral perianal and buttock wounds and diarrhea. CT scan concerning for possible perirectal abscess. CRS consulted for evaluation. In ICU for sepsis and hypotension.      Current Facility-Administered Medications on File Prior to Encounter   Medication    0.9%  NaCl infusion    0.9%  NaCl infusion    alteplase injection 2 mg    chlorhexidine 0.12 % solution 10 mL    chlorhexidine 0.12 % solution 10 mL    diphenhydrAMINE injection 25 mg    lactated ringers infusion    metoclopramide HCl injection 10 mg    nozaseptin (NOZIN) nasal     sodium chloride 0.9% flush 10 mL    sodium chloride 0.9% flush 3 mL    sodium chloride 0.9% flush 3 mL    [DISCONTINUED] HYDROmorphone (PF) injection 0.2 mg     [DISCONTINUED] HYDROmorphone (PF) injection 0.2 mg     Current Outpatient Medications on File Prior to Encounter   Medication Sig    magic mouthwash diphen/antac/lidoc Swish and spit 15 mLs every 4 (four) hours as needed (mouth ulcers).    ascorbic acid, vitamin C, (VITAMIN C) 1000 MG tablet Take 1,000 mg by mouth once daily.    dexAMETHasone (DECADRON) 4 MG Tab Take 2 tablets (8 mg total) by mouth once daily. Take as directed on days 2 and 3 of your chemotherapy cycle.    diphenoxylate-atropine 2.5-0.025 mg (LOMOTIL) 2.5-0.025 mg per tablet Take 1 tablet by mouth 4 (four) times daily as needed for Diarrhea.    fentaNYL (DURAGESIC) 25 mcg/hr Place 1 patch onto the skin every 72 hours. for 15 days    fluconazole (DIFLUCAN) 200 MG Tab Take 1 tablet (200 mg total) by mouth once daily. for 3 days    gabapentin (NEURONTIN) 300 MG capsule Take 1 capsule (300 mg total) by mouth every evening.    hydrALAZINE (APRESOLINE) 25 MG tablet Take 1 tablet (25 mg total) by mouth 2 (two) times a day.    loperamide (IMODIUM) 2 mg capsule Take 1 capsule (2 mg total) by mouth 4 (four) times daily as needed for Diarrhea.    losartan (COZAAR) 25 MG tablet Take 1 tablet (25 mg total) by mouth once daily.    multivitamin capsule Take 1 capsule by mouth once daily.    ondansetron (ZOFRAN-ODT) 8 MG TbDL Dissolve 1 tablet (8 mg total) by mouth every 8 (eight) hours as needed (nausea/vomiting).    oxyCODONE (ROXICODONE) 20 mg Tab immediate release tablet Take 1 tablet (20 mg total) by mouth every 4 (four) hours as needed (pain). Max 4 doses/ day    pantoprazole (PROTONIX) 40 MG tablet Take 1 tablet (40 mg total) by mouth once daily.    [DISCONTINUED] diclofenac sodium (VOLTAREN) 1 % Gel Apply 2 g topically 3 (three) times daily.    [DISCONTINUED] mirtazapine (REMERON) 15 MG tablet Take 1 tablet (15 mg total) by mouth every evening.    [DISCONTINUED] tamsulosin (FLOMAX) 0.4 mg Cap Take 1 capsule (0.4 mg total) by mouth once  daily.       Review of patient's allergies indicates:  No Known Allergies    Past Medical History:   Diagnosis Date    Alcoholism     Anemia     Back pain     Encounter for blood transfusion 2018    Essential hypertension 2/4/2016    GERD (gastroesophageal reflux disease)     Hiatal hernia     Hypertension     diet controlled    Melanoma 06/2021    left cheek    Rectal cancer 9/11/2019     Past Surgical History:   Procedure Laterality Date    ARTHROSCOPY OF KNEE Right 5/5/2021    Procedure: ARTHROSCOPY, KNEE;  Surgeon: Delonte Mcintyre MD;  Location: [facility] OR;  Service: Orthopedics;  Laterality: Right;    COLONOSCOPY N/A 9/3/2019    Procedure: COLONOSCOPY;  Surgeon: Isai Centeno MD;  Location: [facility] ENDO;  Service: Endoscopy;  Laterality: N/A;    COLONOSCOPY N/A 1/10/2020    Procedure: COLONOSCOPY;  Surgeon: Familia Gonzalez MD;  Location: [facility];  Service: General;  Laterality: N/A;    COLONOSCOPY N/A 3/24/2021    Procedure: COLONOSCOPY;  Surgeon: Familia Gonzalez MD;  Location: [facility];  Service: General;  Laterality: N/A;    ESOPHAGOGASTRODUODENOSCOPY N/A 9/3/2019    Procedure: ESOPHAGOGASTRODUODENOSCOPY (EGD);  Surgeon: Isai Centeno MD;  Location: [facility];  Service: Endoscopy;  Laterality: N/A;    EXCISION OF MEDIAL MENISCUS OF KNEE Right 5/5/2021    Procedure: MENISCECTOMY, KNEE, MEDIAL;  Surgeon: Delonte Mcintyre MD;  Location: [facility];  Service: Orthopedics;  Laterality: Right;  partial Lateral    FLEXIBLE SIGMOIDOSCOPY  2/4/2020    Procedure: SIGMOIDOSCOPY, FLEXIBLE;  Surgeon: Familia Gonzalez MD;  Location: [facility] OR;  Service: General;;    ILEOSTOMY Right 2/4/2020    Procedure: CREATION, ILEOSTOMY;  Surgeon: Familia Gonzalez MD;  Location: [facility] OR;  Service: General;  Laterality: Right;    ILEOSTOMY CLOSURE N/A 8/4/2020    Procedure: CLOSURE, ILEOSTOMY;  Surgeon: Familia Gonzalez MD;  Location: [facility] OR;  Service: General;  Laterality: N/A;    INCISION AND  DRAINAGE OF BACK Left 8/5/2020    Procedure: INCISION AND DRAINAGE, BACK;  Surgeon: Familia Gonzalez MD;  Location: [facility] OR;  Service: General;  Laterality: Left;    INJECTION OF ANESTHETIC AGENT INTO TISSUE PLANE DEFINED BY TRANSVERSUS ABDOMINIS MUSCLE N/A 2/4/2020    Procedure: BLOCK, TRANSVERSUS ABDOMINIS PLANE;  Surgeon: Familia Gonzalez MD;  Location: [facility] OR;  Service: General;  Laterality: N/A;    INSERTION OF TUNNELED CENTRAL VENOUS CATHETER (CVC) WITH SUBCUTANEOUS PORT Right 2/4/2020    Procedure: INSERTION, PORT-A-CATH;  Surgeon: Familia Gonzalez MD;  Location: [facility] OR;  Service: General;  Laterality: Right;    INSERTION OF TUNNELED CENTRAL VENOUS CATHETER (CVC) WITH SUBCUTANEOUS PORT N/A 8/16/2022    Procedure: [id number];  Surgeon: Familia Gonzalez MD;  Location: [facility];  Service: General;  Laterality: N/A;  right subclavian    JOINT REPLACEMENT Left 2009    Hip    KNEE ARTHROSCOPY W/ PLICA EXCISION Right 5/5/2021    Procedure: EXCISION, PLICA, KNEE, ARTHROSCOPIC;  Surgeon: Delonte Mcintyre MD;  Location: [facility] OR;  Service: Orthopedics;  Laterality: Right;    MOBILIZATION OF SPLENIC FLEXURE  2/4/2020    Procedure: MOBILIZATION, SPLENIC FLEXURE;  Surgeon: Familia Gonzalez MD;  Location: [facility] OR;  Service: General;;    REMOVAL OF HARDWARE FROM HIP Left 1/4/2022    Procedure: REMOVAL, HARDWARE, HIP;  Surgeon: Delonte Mcintyre MD;  Location: [facility];  Service: Orthopedics;  Laterality: Left;     Family History       Problem Relation (Age of Onset)    Cataracts Mother    Hypertension Father    Stroke Father          Tobacco Use    Smoking status: Never    Smokeless tobacco: Never   Substance and Sexual Activity    Alcohol use: Yes    Drug use: No    Sexual activity: Never     Partners: Female     Review of Systems   Constitutional:  Positive for activity change, appetite change, chills, diaphoresis and fatigue. Negative for fever.   HENT:  Negative for drooling, ear  discharge and nosebleeds.    Eyes:  Negative for pain, discharge and itching.   Respiratory:  Positive for shortness of breath. Negative for choking.    Cardiovascular:  Positive for palpitations. Negative for chest pain.   Gastrointestinal:  Positive for abdominal pain and diarrhea. Negative for anal bleeding.   Endocrine: Negative for cold intolerance.   Genitourinary:  Negative for hematuria.   Musculoskeletal:  Positive for arthralgias. Negative for neck stiffness.   Skin:  Negative for rash.   Allergic/Immunologic: Negative for immunocompromised state.   Neurological:  Positive for weakness. Negative for seizures and facial asymmetry.   Hematological:  Negative for adenopathy.   Psychiatric/Behavioral:  Negative for behavioral problems, self-injury and suicidal ideas. The patient is nervous/anxious.    Objective:     Vital Signs (Most Recent):  Temp: 97.9 °F (36.6 °C) (11/09/22 0715)  Pulse: 64 (11/09/22 1145)  Resp: (!) 25 (11/09/22 1237)  BP: (!) 105/57 (11/09/22 1145)  SpO2: 100 % (11/09/22 1145)   Vital Signs (24h Range):  Temp:  [97 °F (36.1 °C)-98.8 °F (37.1 °C)] 97.9 °F (36.6 °C)  Pulse:  [] 64  Resp:  [11-33] 25  SpO2:  [77 %-100 %] 100 %  BP: ()/(50-75) 105/57     Weight: 60.8 kg (134 lb)  Body mass index is 18.69 kg/m².    Physical Exam  Exam conducted with a chaperone present.   Constitutional:       Appearance: He is well-developed. He is ill-appearing.      Comments: cachectic   HENT:      Head: Normocephalic and atraumatic.   Eyes:      Conjunctiva/sclera: Conjunctivae normal.   Neck:      Thyroid: No thyromegaly.   Cardiovascular:      Rate and Rhythm: Normal rate and regular rhythm.   Pulmonary:      Effort: Pulmonary effort is normal. No respiratory distress.   Abdominal:      General: There is no distension.      Palpations: Abdomen is soft. There is no mass.      Tenderness: There is no abdominal tenderness.   Genitourinary:     Comments: Bilateral perianal/perirectal ulcerations  with small fluctuance in the left anterior perineum/perianal area concerning for abscess  Musculoskeletal:         General: No tenderness. Normal range of motion.      Cervical back: Normal range of motion.   Skin:     General: Skin is warm and dry.      Capillary Refill: Capillary refill takes less than 2 seconds.      Findings: No rash.   Neurological:      Mental Status: He is alert and oriented to person, place, and time.     Significant Labs:  I have reviewed all pertinent lab results within the past 24 hours.  CBC:   Recent Labs   Lab 11/09/22 0451   WBC 5.28   RBC 4.16*   HGB 11.3*   HCT 37.7*      MCV 91   MCH 27.2   MCHC 30.0*     BMP:   Recent Labs   Lab 11/09/22 0451   GLU 58*      K 3.3*      CO2 19*   BUN 5*   CREATININE 0.5   CALCIUM 7.6*   MG 1.8     CMP:   Recent Labs   Lab 11/09/22 0451   GLU 58*   CALCIUM 7.6*   ALBUMIN 2.0*   PROT 4.8*      K 3.3*   CO2 19*      BUN 5*   CREATININE 0.5   ALKPHOS 97   ALT 12   AST 13   BILITOT 0.4     LFTs:   Recent Labs   Lab 11/09/22 0451   ALT 12   AST 13   ALKPHOS 97   BILITOT 0.4   PROT 4.8*   ALBUMIN 2.0*       Significant Diagnostics:  I have reviewed all pertinent imaging results/findings within the past 24 hours.    CT:  FINDINGS:  Heart: Normal in size as far as seen.  No pericardial effusion as far seen.     Lung Bases: Left basilar reticular alveolar opacities consistent with left basilar pneumonia.     Liver: Normal in size and attenuation, with no focal hepatic lesions.     Gallbladder: No calcified gallstones.     Bile Ducts: No evidence of dilated ducts.     Pancreas: No mass or peripancreatic fat stranding.     Spleen: Unremarkable.     Adrenals: Unremarkable.     Kidneys/ Ureters: Unremarkable.     Bladder: No evidence of wall thickening.     Reproductive organs: Unremarkable.     GI Tract/Mesentery: No evidence of bowel obstruction or inflammation. No secondary signs of appendicitis.  Relatively thick walled  fluid collection in the pre sacral space situated between the rectosigmoid colon and the distal sacrum measuring 18 x 40 x 45 mm suggestive of abscess.  There isn't adjacent hyperdensity suggestive of possible postsurgical changes.  No other sizable abnormal fluid collection adjacent to the perirectal region.  Nodular appearance in the left Annalisa- buttock adjacent to the cleft measures up to 14 mm may relate to a sebaceous cyst or a boil.  Attention on follow-up.  Surgical sutures in the right lower quadrant bowel loop suspected.     Peritoneal Space: No ascites. No free air.     Retroperitoneum: No significant adenopathy.     Abdominal wall: Unremarkable.     Vasculature: No aneurysm.     Bones: Deformity of the left hip with tracks from prior surgical fixation noted.     Impression:     Relatively thick walled fluid collection in the pre sacral space situated between the rectosigmoid colon and the distal sacrum measuring 18 x 40 x 45 mm suggestive of abscess.     In the Annalisa - buttock region adjacent to the left gluteal cleft there is a slight nodular appearance in the subcutaneous region measuring up to 12-14 mm may relate to a boil or sebaceous cyst.  Attention on follow-up and correlate to physical exam     Heterogeneous reticular alveolar opacity in the left posterior lung base consistent with pneumonia.     Postsurgical changes of the bowel     Senescent changes not otherwise specified     Chronic deformity of the left hip status post prosthesis removal     Atherosclerotic disease      Assessment/Plan:     * Septic shock  Care per primary team    Perirectal wounds with abscess  66yo M with perirectal ulcerations and sores with fluctuance on exam    - will take to OR tomorrow for EUA and possible I&D  - NPO at [facility] tonight  - cont abx per primary team  - rest of care per primary team    Elevated troponin  Care per primary team    Diarrhea  Care per primary team    Pneumonia  Care per primary  team    Tachycardia  Care per primary team    Immunocompromised patient  Care per primary team    Severe malnutrition  Care per primary team    Oral candidiasis  Care per primary team    Mucositis due to antineoplastic therapy  Care per primary team    Neoplasm related pain  Care per primary team      VTE Risk Mitigation (From admission, onward)         Ordered     enoxaparin injection 40 mg  Daily         11/08/22 2159     IP VTE HIGH RISK PATIENT  Once         11/08/22 2159     Place sequential compression device  Until discontinued         11/08/22 2159                Thank you for your consult. I will follow-up with patient. Please contact us if you have any additional questions.    Familia Gonzalez MD  Colorectal Surgery  [facility] ([facility])

## 2022-11-09 NOTE — ASSESSMENT & PLAN NOTE
68yo M with perirectal ulcerations and sores with fluctuance on exam    - will take to OR tomorrow for EUA and possible I&D  - NPO at [facility] tonight  - cont abx per primary team  - rest of care per primary team

## 2022-11-09 NOTE — SUBJECTIVE & OBJECTIVE
Past Medical History:   Diagnosis Date    Alcoholism     Anemia     Back pain     Encounter for blood transfusion 2018    Essential hypertension 2/4/2016    GERD (gastroesophageal reflux disease)     Hiatal hernia     Hypertension     diet controlled    Melanoma 06/2021    left cheek    Rectal cancer 9/11/2019       Past Surgical History:   Procedure Laterality Date    ARTHROSCOPY OF KNEE Right 5/5/2021    Procedure: ARTHROSCOPY, KNEE;  Surgeon: Delonte Mcintyre MD;  Location: [facility] OR;  Service: Orthopedics;  Laterality: Right;    COLONOSCOPY N/A 9/3/2019    Procedure: COLONOSCOPY;  Surgeon: Isai Centeno MD;  Location: [facility] ENDO;  Service: Endoscopy;  Laterality: N/A;    COLONOSCOPY N/A 1/10/2020    Procedure: COLONOSCOPY;  Surgeon: Familia Gonzalez MD;  Location: [facility];  Service: General;  Laterality: N/A;    COLONOSCOPY N/A 3/24/2021    Procedure: COLONOSCOPY;  Surgeon: Familia Gonzalez MD;  Location: [facility];  Service: General;  Laterality: N/A;    ESOPHAGOGASTRODUODENOSCOPY N/A 9/3/2019    Procedure: ESOPHAGOGASTRODUODENOSCOPY (EGD);  Surgeon: Isai Centeno MD;  Location: [facility];  Service: Endoscopy;  Laterality: N/A;    EXCISION OF MEDIAL MENISCUS OF KNEE Right 5/5/2021    Procedure: MENISCECTOMY, KNEE, MEDIAL;  Surgeon: Delonte Mcintyre MD;  Location: [facility];  Service: Orthopedics;  Laterality: Right;  partial Lateral    FLEXIBLE SIGMOIDOSCOPY  2/4/2020    Procedure: SIGMOIDOSCOPY, FLEXIBLE;  Surgeon: Familia Gonzalez MD;  Location: [facility] OR;  Service: General;;    ILEOSTOMY Right 2/4/2020    Procedure: CREATION, ILEOSTOMY;  Surgeon: Familia Gonzalez MD;  Location: [facility] OR;  Service: General;  Laterality: Right;    ILEOSTOMY CLOSURE N/A 8/4/2020    Procedure: CLOSURE, ILEOSTOMY;  Surgeon: Familia Gonzalez MD;  Location: [facility];  Service: General;  Laterality: N/A;    INCISION AND DRAINAGE OF BACK Left 8/5/2020    Procedure: INCISION AND DRAINAGE, BACK;  Surgeon: Familia RIVAS  MD Carlos;  Location: [facility] OR;  Service: General;  Laterality: Left;    INJECTION OF ANESTHETIC AGENT INTO TISSUE PLANE DEFINED BY TRANSVERSUS ABDOMINIS MUSCLE N/A 2/4/2020    Procedure: BLOCK, TRANSVERSUS ABDOMINIS PLANE;  Surgeon: Familia Gonzalez MD;  Location: [facility] OR;  Service: General;  Laterality: N/A;    INSERTION OF TUNNELED CENTRAL VENOUS CATHETER (CVC) WITH SUBCUTANEOUS PORT Right 2/4/2020    Procedure: INSERTION, PORT-A-CATH;  Surgeon: Familia Gonzalez MD;  Location: [facility] OR;  Service: General;  Laterality: Right;    INSERTION OF TUNNELED CENTRAL VENOUS CATHETER (CVC) WITH SUBCUTANEOUS PORT N/A 8/16/2022    Procedure: SLUCTACEY-EJXX-B-CATH;  Surgeon: Familia Gonzalez MD;  Location: [facility];  Service: General;  Laterality: N/A;  right subclavian    JOINT REPLACEMENT Left 2009    Hip    KNEE ARTHROSCOPY W/ PLICA EXCISION Right 5/5/2021    Procedure: EXCISION, PLICA, KNEE, ARTHROSCOPIC;  Surgeon: Delonte Mcintyre MD;  Location: [facility] OR;  Service: Orthopedics;  Laterality: Right;    MOBILIZATION OF SPLENIC FLEXURE  2/4/2020    Procedure: MOBILIZATION, SPLENIC FLEXURE;  Surgeon: Familia Gonzalez MD;  Location: [facility];  Service: General;;    REMOVAL OF HARDWARE FROM HIP Left 1/4/2022    Procedure: REMOVAL, HARDWARE, HIP;  Surgeon: Delonte Mcintyre MD;  Location: [facility];  Service: Orthopedics;  Laterality: Left;       Review of patient's allergies indicates:  No Known Allergies    Current Facility-Administered Medications on File Prior to Encounter   Medication    0.9%  NaCl infusion    0.9%  NaCl infusion    alteplase injection 2 mg    chlorhexidine 0.12 % solution 10 mL    chlorhexidine 0.12 % solution 10 mL    diphenhydrAMINE injection 25 mg    lactated ringers infusion    metoclopramide HCl injection 10 mg    nozaseptin (NOZIN) nasal     sodium chloride 0.9% flush 10 mL    sodium chloride 0.9% flush 3 mL    sodium chloride 0.9% flush 3 mL    [DISCONTINUED] HYDROmorphone (PF)  injection 0.2 mg    [DISCONTINUED] HYDROmorphone (PF) injection 0.2 mg     Current Outpatient Medications on File Prior to Encounter   Medication Sig    magic mouthwash diphen/antac/lidoc Swish and spit 15 mLs every 4 (four) hours as needed (mouth ulcers).    ascorbic acid, vitamin C, (VITAMIN C) 1000 MG tablet Take 1,000 mg by mouth once daily.    dexAMETHasone (DECADRON) 4 MG Tab Take 2 tablets (8 mg total) by mouth once daily. Take as directed on days 2 and 3 of your chemotherapy cycle.    diphenoxylate-atropine 2.5-0.025 mg (LOMOTIL) 2.5-0.025 mg per tablet Take 1 tablet by mouth 4 (four) times daily as needed for Diarrhea.    fentaNYL (DURAGESIC) 25 mcg/hr Place 1 patch onto the skin every 72 hours. for 15 days    fluconazole (DIFLUCAN) 200 MG Tab Take 1 tablet (200 mg total) by mouth once daily. for 3 days    gabapentin (NEURONTIN) 300 MG capsule Take 1 capsule (300 mg total) by mouth every evening.    hydrALAZINE (APRESOLINE) 25 MG tablet Take 1 tablet (25 mg total) by mouth 2 (two) times a day.    loperamide (IMODIUM) 2 mg capsule Take 1 capsule (2 mg total) by mouth 4 (four) times daily as needed for Diarrhea.    losartan (COZAAR) 25 MG tablet Take 1 tablet (25 mg total) by mouth once daily.    multivitamin capsule Take 1 capsule by mouth once daily.    ondansetron (ZOFRAN-ODT) 8 MG TbDL Dissolve 1 tablet (8 mg total) by mouth every 8 (eight) hours as needed (nausea/vomiting).    oxyCODONE (ROXICODONE) 20 mg Tab immediate release tablet Take 1 tablet (20 mg total) by mouth every 4 (four) hours as needed (pain). Max 4 doses/ day    pantoprazole (PROTONIX) 40 MG tablet Take 1 tablet (40 mg total) by mouth once daily.    [DISCONTINUED] diclofenac sodium (VOLTAREN) 1 % Gel Apply 2 g topically 3 (three) times daily.    [DISCONTINUED] mirtazapine (REMERON) 15 MG tablet Take 1 tablet (15 mg total) by mouth every evening.    [DISCONTINUED] tamsulosin (FLOMAX) 0.4 mg Cap Take 1 capsule (0.4 mg total) by mouth once  daily.     Family History       Problem Relation (Age of Onset)    Cataracts Mother    Hypertension Father    Stroke Father          Tobacco Use    Smoking status: Never    Smokeless tobacco: Never   Substance and Sexual Activity    Alcohol use: Yes    Drug use: No    Sexual activity: Never     Partners: Female     Review of Systems   Constitutional: Positive for malaise/fatigue.   HENT: Negative.     Eyes: Negative.    Cardiovascular: Negative.    Respiratory: Negative.     Endocrine: Negative.    Hematologic/Lymphatic: Negative.    Skin: Negative.    Musculoskeletal:  Positive for joint pain.   Gastrointestinal:  Positive for diarrhea.   Genitourinary: Negative.         Rectal pain   Neurological: Negative.    Psychiatric/Behavioral: Negative.     Allergic/Immunologic: Negative.    Objective:     Vital Signs (Most Recent):  Temp: 97.9 °F (36.6 °C) (11/09/22 0715)  Pulse: (!) 121 (11/09/22 0800)  Resp: 20 (11/09/22 0955)  BP: (!) 85/65 (11/09/22 0800)  SpO2: 100 % (11/09/22 0800)   Vital Signs (24h Range):  Temp:  [97 °F (36.1 °C)-98.8 °F (37.1 °C)] 97.9 °F (36.6 °C)  Pulse:  [] 121  Resp:  [13-33] 20  SpO2:  [77 %-100 %] 100 %  BP: ()/(50-75) 85/65     Weight: 60.8 kg (134 lb)  Body mass index is 18.69 kg/m².    SpO2: 100 %  O2 Device (Oxygen Therapy): nasal cannula      Intake/Output Summary (Last 24 hours) at 11/9/2022 1024  Last data filed at 11/9/2022 0700  Gross per 24 hour   Intake 3989.21 ml   Output 400 ml   Net 3589.21 ml       Lines/Drains/Airways       Central Venous Catheter Line  Duration             Port A Cath Single Lumen 08/16/22 1323 right subclavian 84 days              Peripheral Intravenous Line  Duration                  Peripheral IV - Single Lumen 11/08/22 1708 20 G Right Antecubital <1 day         Peripheral IV - Single Lumen 11/08/22 1840 20 G Posterior;Right Hand <1 day                    Physical Exam  Vitals and nursing note reviewed.   Constitutional:       General: He is  not in acute distress.     Appearance: He is well-developed. He is ill-appearing. He is not diaphoretic.      Comments: On supplemental O2   HENT:      Head: Normocephalic and atraumatic.   Eyes:      General:         Right eye: No discharge.         Left eye: No discharge.      Pupils: Pupils are equal, round, and reactive to light.   Neck:      Thyroid: No thyromegaly.      Vascular: No JVD.      Trachea: No tracheal deviation.   Cardiovascular:      Rate and Rhythm: Normal rate and regular rhythm.      Heart sounds: Normal heart sounds, S1 normal and S2 normal. No murmur heard.  Pulmonary:      Effort: Pulmonary effort is normal. No respiratory distress.      Breath sounds: Normal breath sounds. No wheezing or rales.   Abdominal:      General: There is no distension.      Tenderness: There is no rebound.   Musculoskeletal:      Cervical back: Neck supple.      Right lower leg: No edema.      Left lower leg: No edema.   Skin:     General: Skin is warm and dry.      Findings: No erythema.   Neurological:      Mental Status: He is alert and oriented to person, place, and time.   Psychiatric:         Mood and Affect: Mood normal.         Behavior: Behavior normal.      Comments: Anxious/crying in pain         Significant Labs: CMP   Recent Labs   Lab 11/08/22 1818 11/09/22  0451    141   K 3.5 3.3*    104   CO2 21* 19*   GLU 71 58*   BUN 6* 5*   CREATININE 0.6 0.5   CALCIUM 8.7 7.6*   PROT 6.1 4.8*   ALBUMIN 2.3* 2.0*   BILITOT 0.6 0.4   ALKPHOS 110 97   AST 13 13   ALT 10 12   ANIONGAP 18* 18*   , CBC   Recent Labs   Lab 11/08/22 1818 11/09/22  0451   WBC 6.63 5.28   HGB 13.1* 11.3*   HCT 41.9 37.7*    248   , INR No results for input(s): INR, PROTIME in the last 48 hours., Troponin   Recent Labs   Lab 11/08/22 2048 11/09/22 0451 11/09/22  0821   TROPONINI 0.101* 0.084* 0.059*   , and All pertinent lab results from the last 24 hours have been reviewed.    Significant Imaging: Echocardiogram:  Transthoracic echo (TTE) complete (Cupid Only):   Results for orders placed or performed during the hospital encounter of 11/08/22   Echo   Result Value Ref Range    BSA 1.74 m2    TDI SEPTAL 0.09 m/s    LV LATERAL E/E' RATIO 6.20 m/s    LV SEPTAL E/E' RATIO 6.89 m/s    IVC diameter 1.22 cm    Left Ventricular Outflow Tract Mean Velocity 0.70 cm/s    Left Ventricular Outflow Tract Mean Gradient 2.34 mmHg    TDI LATERAL 0.10 m/s    LVIDd 4.18 3.5 - 6.0 cm    IVS 1.21 (A) 0.6 - 1.1 cm    Posterior Wall 1.07 0.6 - 1.1 cm    Ao root annulus 3.90 cm    LVIDs 2.65 2.1 - 4.0 cm    FS 37 28 - 44 %    STJ 3.55 cm    Ascending aorta 3.86 cm    LV mass 164.13 g    LA size 2.97 cm    TAPSE 1.60 cm    RV S' 0.02 cm/s    Left Ventricle Relative Wall Thickness 0.51 cm    AV mean gradient 3 mmHg    AV valve area 3.62 cm2    AV Velocity Ratio 1.05     AV index (prosthetic) 0.88     PV peak gradient 2.49 mmHg    E/A ratio 1.07     Mean e' 0.10 m/s    E wave deceleration time 187.33 msec    IVRT 114.18 msec    LVOT diameter 2.29 cm    LVOT area 4.1 cm2    LVOT peak naman 1.16 m/s    LVOT peak VTI 19.60 cm    Ao peak naman 1.11 m/s    Ao VTI 22.3 cm    RVOT peak naman 0.79 m/s    RVOT peak VTI 15.2 cm    LVOT stroke volume 80.69 cm3    AV peak gradient 5 mmHg    PV mean gradient 1.23 mmHg    E/E' ratio 6.53 m/s    MV Peak E Naman 0.62 m/s    TR Max Naman 2.65 m/s    MV Peak A Naman 0.58 m/s    LV Systolic Volume 25.72 mL    LV Systolic Volume Index 14.4 mL/m2    LV Diastolic Volume 77.73 mL    LV Diastolic Volume Index 43.67 mL/m2    LV Mass Index 92 g/m2    Triscuspid Valve Regurgitation Peak Gradient 28 mmHg   , EKG: reviewed, and X-Ray: CXR: X-Ray Chest 1 View (CXR): No results found for this visit on 11/08/22. and X-Ray Chest PA and Lateral (CXR): No results found for this visit on 11/08/22.

## 2022-11-09 NOTE — HPI
67 y o m pt for unresponsiveness and diarrhea at home , he has metastatic colorectal CA on chemo Rx  as OP + diarrhea negative Cdiff 11/2  on palliative chemotherapy-last tx 10/25/22, GERD, HTN, Alcoholism, Diarrhea recent hospitalization for severe mucositis and wounds to buttocks

## 2022-11-09 NOTE — ASSESSMENT & PLAN NOTE
Nutrition consulted. Most recent weight and BMI monitored-   Measurements:  Wt Readings from Last 1 Encounters:   11/08/22 58.4 kg (128 lb 12.8 oz)   Body mass index is 17.96 kg/m².    Recommendations:    Patient has been screened and assessed by RD. RD will follow patient.

## 2022-11-09 NOTE — SUBJECTIVE & OBJECTIVE
Review of Systems    Constitutional: Positive for malaise/fatigue.   HENT: Negative.     Eyes: Negative.    Cardiovascular: Negative.    Respiratory: Negative.     Endocrine: Negative.    Hematologic/Lymphatic: Negative.    Skin: Negative.    Musculoskeletal:  Positive for joint pain.   Gastrointestinal:  Positive for diarrhea.   Genitourinary: Negative.         Rectal pain   Neurological: Negative.    Psychiatric/Behavioral: Negative.     Allergic/Immunologic: Negative.    Objective:     Vital Signs (Most Recent):  Temp: 97.9 °F (36.6 °C) (11/09/22 0715)  Pulse: 64 (11/09/22 1145)  Resp: 18 (11/09/22 1145)  BP: (!) 105/57 (11/09/22 1145)  SpO2: 100 % (11/09/22 1145)   Vital Signs (24h Range):  Temp:  [97 °F (36.1 °C)-98.8 °F (37.1 °C)] 97.9 °F (36.6 °C)  Pulse:  [] 64  Resp:  [11-33] 18  SpO2:  [77 %-100 %] 100 %  BP: ()/(50-75) 105/57     Weight: 60.8 kg (134 lb)  Body mass index is 18.69 kg/m².    Intake/Output Summary (Last 24 hours) at 11/9/2022 1218  Last data filed at 11/9/2022 1100  Gross per 24 hour   Intake 4492.44 ml   Output 950 ml   Net 3542.44 ml      Physical Exam      Constitutional:       General: He is not in acute distress.     Appearance: He is well-developed. He is ill-appearing. He is not diaphoretic.      Comments: On supplemental O2   HENT:      Head: Normocephalic and atraumatic.   Eyes:      General:         Right eye: No discharge.         Left eye: No discharge.      Pupils: Pupils are equal, round, and reactive to light.   Neck:      Thyroid: No thyromegaly.      Vascular: No JVD.      Trachea: No tracheal deviation.   Cardiovascular:      Rate and Rhythm: Normal rate and regular rhythm.      Heart sounds: Normal heart sounds, S1 normal and S2 normal. No murmur heard.  Pulmonary:      Effort: Pulmonary effort is normal. No respiratory distress.      Breath sounds: Normal breath sounds. No wheezing or rales.   Abdominal:      General: There is no distension.       Tenderness: There is no rebound.   Musculoskeletal:      Cervical back: Neck supple.      Right lower leg: No edema.      Left lower leg: No edema.   Skin:     General: Skin is warm and dry.      Findings: No erythema.   Neurological:      Mental Status: He is alert and oriented to person, place, and time.   Psychiatric:         Mood and Affect: Mood normal.         Behavior: Behavior normal.      Comments: Anxious/crying in pain    Significant Labs: All pertinent labs within the past 24 hours have been reviewed.  CBC:   Recent Labs   Lab 11/08/22 1818 11/09/22  0451   WBC 6.63 5.28   HGB 13.1* 11.3*   HCT 41.9 37.7*    248     CMP:   Recent Labs   Lab 11/08/22 1818 11/09/22  0451    141   K 3.5 3.3*    104   CO2 21* 19*   GLU 71 58*   BUN 6* 5*   CREATININE 0.6 0.5   CALCIUM 8.7 7.6*   PROT 6.1 4.8*   ALBUMIN 2.3* 2.0*   BILITOT 0.6 0.4   ALKPHOS 110 97   AST 13 13   ALT 10 12   ANIONGAP 18* 18*       Significant Imaging:   Imaging Results              CTA Chest Non-Coronary (PE Studies) (Final result)  Result time 11/08/22 23:05:39      Final result by Chato Osei MD (11/08/22 23:05:39)                   Impression:      No pulmonary embolism.  No dissection.    Patchy areas of nodular opacities throughout the lung upper lung zone with more reticular subpleural consolidation in the bilateral lower lobes left greater than right consistent with multifocal pneumonia.    Remaining findings as above    All CT scans   are performed using dose optimization techniques including the following: automated exposure control; adjustment of the mA and/or kV; use of iterative reconstruction technique.  Dose modulation was employed for ALARA by means of: Automated exposure control; adjustment of the mA and/or kV according to patient size (this includes techniques or standardized protocols for targeted exams where dose is matched to indication/reason for exam; i.e. extremities or head); and/or use of  iterative reconstructive technique.      Electronically signed by: Sea Reis  Date:    11/08/2022  Time:    23:05               Narrative:    EXAMINATION:  CTA CHEST NON CORONARY (PE STUDIES)    CLINICAL HISTORY:  Pulmonary embolism (PE) suspected, unknown D-dimer;    TECHNIQUE:  Low dose axial images, sagittal and coronal reformations were obtained from the thoracic inlet to the lung bases following the IV administration of 100 mL of Omnipaque 350.  Contrast timing was optimized to evaluate the pulmonary arteries.  MIP images were performed.    COMPARISON:  None    FINDINGS:  No evidence for pulmonary embolism.  No evidence for aortic dissection or aneurysm.  Patchy areas of nodular opacities throughout the lung upper lung zone with more reticular subpleural consolidation in the bilateral lower lobes left greater than right consistent with multifocal pneumonia.  Ectatic aorta measuring up to 3.8 cm.  Mild thickening of the gastric mucosa.  Right-sided port.  Question subcutaneous sebaceous cyst in the left posterior subcutaneous fat.  Correlate to physical exam.  Slight ascites adjacent to the spleen.  Small pericardial effusion.                                       CT Abdomen Pelvis  Without Contrast (Final result)  Result time 11/08/22 21:02:23      Final result by Chato Osei MD (11/08/22 21:02:23)                   Impression:      Relatively thick walled fluid collection in the pre sacral space situated between the rectosigmoid colon and the distal sacrum measuring 18 x 40 x 45 mm suggestive of abscess.    In the Annalisa - buttock region adjacent to the left gluteal cleft there is a slight nodular appearance in the subcutaneous region measuring up to 12-14 mm may relate to a boil or sebaceous cyst.  Attention on follow-up and correlate to physical exam    Heterogeneous reticular alveolar opacity in the left posterior lung base consistent with pneumonia.    Postsurgical changes of the bowel    Senescent  changes not otherwise specified    Chronic deformity of the left hip status post prosthesis removal    Atherosclerotic disease    All CT scans   are performed using dose optimization techniques including the following: automated exposure control; adjustment of the mA and/or kV; use of iterative reconstruction technique.  Dose modulation was employed for ALARA by means of: Automated exposure control; adjustment of the mA and/or kV according to patient size (this includes techniques or standardized protocols for targeted exams where dose is matched to indication/reason for exam; i.e. extremities or head); and/or use of iterative reconstructive technique.      Electronically signed by: Sea Reis  Date:    11/08/2022  Time:    21:02               Narrative:    EXAMINATION:  CT ABDOMEN PELVIS WITHOUT CONTRAST    CLINICAL HISTORY:  Abdominal abscess/infection suspected;    TECHNIQUE:  Low dose axial images, sagittal and coronal reformations were obtained from the lung bases to the pubic symphysis, 30 mL of oral Omnipaque 350 was administered..    COMPARISON:  None    FINDINGS:  Heart: Normal in size as far as seen.  No pericardial effusion as far seen.    Lung Bases: Left basilar reticular alveolar opacities consistent with left basilar pneumonia.    Liver: Normal in size and attenuation, with no focal hepatic lesions.    Gallbladder: No calcified gallstones.    Bile Ducts: No evidence of dilated ducts.    Pancreas: No mass or peripancreatic fat stranding.    Spleen: Unremarkable.    Adrenals: Unremarkable.    Kidneys/ Ureters: Unremarkable.    Bladder: No evidence of wall thickening.    Reproductive organs: Unremarkable.    GI Tract/Mesentery: No evidence of bowel obstruction or inflammation. No secondary signs of appendicitis.  Relatively thick walled fluid collection in the pre sacral space situated between the rectosigmoid colon and the distal sacrum measuring 18 x 40 x 45 mm suggestive of abscess.  There isn't  adjacent hyperdensity suggestive of possible postsurgical changes.  No other sizable abnormal fluid collection adjacent to the perirectal region.  Nodular appearance in the left Annalisa- buttock adjacent to the cleft measures up to 14 mm may relate to a sebaceous cyst or a boil.  Attention on follow-up.  Surgical sutures in the right lower quadrant bowel loop suspected.    Peritoneal Space: No ascites. No free air.    Retroperitoneum: No significant adenopathy.    Abdominal wall: Unremarkable.    Vasculature: No aneurysm.    Bones: Deformity of the left hip with tracks from prior surgical fixation noted.                                       X-Ray Chest AP Portable (Final result)  Result time 11/08/22 18:49:35      Final result by Chato Osei MD (11/08/22 18:49:35)                   Impression:      As above      Electronically signed by: Sea Reis  Date:    11/08/2022  Time:    18:49               Narrative:    EXAMINATION:  XR CHEST AP PORTABLE    CLINICAL HISTORY:  Chest Pain;    TECHNIQUE:  Single frontal view of the chest was performed.    COMPARISON:  Prior    FINDINGS:  Right-sided chest port.Prominent cardiac silhouette.  Mild perihilar interstitial opacities may relate to trace pulmonary edema.  No segmental consolidation pleural effusion pneumothorax.  Old rib deformities left posterolateral ribs.  Similar findings to prior exam    Bones are intact.

## 2022-11-09 NOTE — CONSULTS
[facility] - Intensive Care (Hospital)  Cardiology  Consult Note    Patient Name: Vincent Benton  MRN: [id number]  Admission Date: 11/8/2022  Hospital Length of Stay: 1 days  Code Status: DNR   Attending Provider: Lauren Shah, *   Consulting Provider: Kelly Kaur PA-C  Primary Care Physician: Shakila Moran DO  Principal Problem:Septic shock    Patient information was obtained from patient, past medical records and ER records.     Inpatient consult to Cardiology  Consult performed by: Kelly Kaur PA-C  Consult ordered by: Myriam Hidalgo NP        Subjective:     Chief Complaint:  Diarrhea/sepsis    HPI:   Mr. Benton is a 67 year old male patient whose current medical conditions include rectal adenocarcinoma stage IV on palliative chemotherapy, GERD, HTN, alcoholism, and recent hospitalization due to severe mucositis and wounds to buttocks who presented to [facility] ED yesterday evening after being found unresponsive by his wife at home. Patient was reportedly covered in diarrhea and complained of perirectal pain with an initial BP of 60/42. Initial workup in ED revealed lactic acidosis (3.0), hypotension, and mild left shift. CT of abdomen/pelvis showed LLL pneumonia as well as small perirectal abscess. Initial EKG showed ST vs PSVT with HR in 140-150 range with diffuse ST depression and patient was subsequently admitted for to ICU for further evaluation and treatment. Cardiology consulted to assist with management. Patient seen and examined today, resting in bed. Moaning/crying/writhing in bed due to pain. No CV complaints. Denies chest pain or SOB. He was previously seen by our service last admission due to asymptomatic bradycardia and OP Holter monitor was planned. Troponin 0.070>0.101>0.084>0.059. Repeat echo pending, prior echo in 8/22 showed normal EF.         Past Medical History:   Diagnosis Date    Alcoholism     Anemia     Back pain     Encounter for blood transfusion 2018     Essential hypertension 2/4/2016    GERD (gastroesophageal reflux disease)     Hiatal hernia     Hypertension     diet controlled    Melanoma 06/2021    left cheek    Rectal cancer 9/11/2019       Past Surgical History:   Procedure Laterality Date    ARTHROSCOPY OF KNEE Right 5/5/2021    Procedure: ARTHROSCOPY, KNEE;  Surgeon: Delonte Mcintyre MD;  Location: [facility] OR;  Service: Orthopedics;  Laterality: Right;    COLONOSCOPY N/A 9/3/2019    Procedure: COLONOSCOPY;  Surgeon: Isai Centeno MD;  Location: [facility] ENDO;  Service: Endoscopy;  Laterality: N/A;    COLONOSCOPY N/A 1/10/2020    Procedure: COLONOSCOPY;  Surgeon: Familia Gonzalez MD;  Location: [facility] ENDO;  Service: General;  Laterality: N/A;    COLONOSCOPY N/A 3/24/2021    Procedure: COLONOSCOPY;  Surgeon: Familia Gonzalez MD;  Location: [facility] ENDO;  Service: General;  Laterality: N/A;    ESOPHAGOGASTRODUODENOSCOPY N/A 9/3/2019    Procedure: ESOPHAGOGASTRODUODENOSCOPY (EGD);  Surgeon: Isai Centeno MD;  Location: [facility];  Service: Endoscopy;  Laterality: N/A;    EXCISION OF MEDIAL MENISCUS OF KNEE Right 5/5/2021    Procedure: MENISCECTOMY, KNEE, MEDIAL;  Surgeon: Delonte Mcintyre MD;  Location: [facility] ER;  Service: Orthopedics;  Laterality: Right;  partial Lateral    FLEXIBLE SIGMOIDOSCOPY  2/4/2020    Procedure: SIGMOIDOSCOPY, FLEXIBLE;  Surgeon: Familia Gonzalez MD;  Location: [facility] OR;  Service: General;;    ILEOSTOMY Right 2/4/2020    Procedure: CREATION, ILEOSTOMY;  Surgeon: Familia Gonzalez MD;  Location: [facility] OR;  Service: General;  Laterality: Right;    ILEOSTOMY CLOSURE N/A 8/4/2020    Procedure: CLOSURE, ILEOSTOMY;  Surgeon: Familia Gonzalez MD;  Location: [facility] OR;  Service: General;  Laterality: N/A;    INCISION AND DRAINAGE OF BACK Left 8/5/2020    Procedure: INCISION AND DRAINAGE, BACK;  Surgeon: Familia Gonzalez MD;  Location: [facility] OR;  Service: General;  Laterality: Left;    INJECTION OF ANESTHETIC AGENT INTO  TISSUE PLANE DEFINED BY TRANSVERSUS ABDOMINIS MUSCLE N/A 2/4/2020    Procedure: BLOCK, TRANSVERSUS ABDOMINIS PLANE;  Surgeon: Familia Gonzalez MD;  Location: [facility] OR;  Service: General;  Laterality: N/A;    INSERTION OF TUNNELED CENTRAL VENOUS CATHETER (CVC) WITH SUBCUTANEOUS PORT Right 2/4/2020    Procedure: INSERTION, PORT-A-CATH;  Surgeon: Familia Gonzalez MD;  Location: [facility] OR;  Service: General;  Laterality: Right;    INSERTION OF TUNNELED CENTRAL VENOUS CATHETER (CVC) WITH SUBCUTANEOUS PORT N/A 8/16/2022    Procedure: [id number];  Surgeon: Familia Gonzalez MD;  Location: [facility] OR;  Service: General;  Laterality: N/A;  right subclavian    JOINT REPLACEMENT Left 2009    Hip    KNEE ARTHROSCOPY W/ PLICA EXCISION Right 5/5/2021    Procedure: EXCISION, PLICA, KNEE, ARTHROSCOPIC;  Surgeon: Delonte Mcintyre MD;  Location: [facility] OR;  Service: Orthopedics;  Laterality: Right;    MOBILIZATION OF SPLENIC FLEXURE  2/4/2020    Procedure: MOBILIZATION, SPLENIC FLEXURE;  Surgeon: Familia Gonzalez MD;  Location: [facility] OR;  Service: General;;    REMOVAL OF HARDWARE FROM HIP Left 1/4/2022    Procedure: REMOVAL, HARDWARE, HIP;  Surgeon: Delonte Mcintyre MD;  Location: [facility];  Service: Orthopedics;  Laterality: Left;       Review of patient's allergies indicates:  No Known Allergies    Current Facility-Administered Medications on File Prior to Encounter   Medication    0.9%  NaCl infusion    0.9%  NaCl infusion    alteplase injection 2 mg    chlorhexidine 0.12 % solution 10 mL    chlorhexidine 0.12 % solution 10 mL    diphenhydrAMINE injection 25 mg    lactated ringers infusion    metoclopramide HCl injection 10 mg    nozaseptin (NOZIN) nasal     sodium chloride 0.9% flush 10 mL    sodium chloride 0.9% flush 3 mL    sodium chloride 0.9% flush 3 mL    [DISCONTINUED] HYDROmorphone (PF) injection 0.2 mg    [DISCONTINUED] HYDROmorphone (PF) injection 0.2 mg     Current  Outpatient Medications on File Prior to Encounter   Medication Sig    magic mouthwash diphen/antac/lidoc Swish and spit 15 mLs every 4 (four) hours as needed (mouth ulcers).    ascorbic acid, vitamin C, (VITAMIN C) 1000 MG tablet Take 1,000 mg by mouth once daily.    dexAMETHasone (DECADRON) 4 MG Tab Take 2 tablets (8 mg total) by mouth once daily. Take as directed on days 2 and 3 of your chemotherapy cycle.    diphenoxylate-atropine 2.5-0.025 mg (LOMOTIL) 2.5-0.025 mg per tablet Take 1 tablet by mouth 4 (four) times daily as needed for Diarrhea.    fentaNYL (DURAGESIC) 25 mcg/hr Place 1 patch onto the skin every 72 hours. for 15 days    fluconazole (DIFLUCAN) 200 MG Tab Take 1 tablet (200 mg total) by mouth once daily. for 3 days    gabapentin (NEURONTIN) 300 MG capsule Take 1 capsule (300 mg total) by mouth every evening.    hydrALAZINE (APRESOLINE) 25 MG tablet Take 1 tablet (25 mg total) by mouth 2 (two) times a day.    loperamide (IMODIUM) 2 mg capsule Take 1 capsule (2 mg total) by mouth 4 (four) times daily as needed for Diarrhea.    losartan (COZAAR) 25 MG tablet Take 1 tablet (25 mg total) by mouth once daily.    multivitamin capsule Take 1 capsule by mouth once daily.    ondansetron (ZOFRAN-ODT) 8 MG TbDL Dissolve 1 tablet (8 mg total) by mouth every 8 (eight) hours as needed (nausea/vomiting).    oxyCODONE (ROXICODONE) 20 mg Tab immediate release tablet Take 1 tablet (20 mg total) by mouth every 4 (four) hours as needed (pain). Max 4 doses/ day    pantoprazole (PROTONIX) 40 MG tablet Take 1 tablet (40 mg total) by mouth once daily.    [DISCONTINUED] diclofenac sodium (VOLTAREN) 1 % Gel Apply 2 g topically 3 (three) times daily.    [DISCONTINUED] mirtazapine (REMERON) 15 MG tablet Take 1 tablet (15 mg total) by mouth every evening.    [DISCONTINUED] tamsulosin (FLOMAX) 0.4 mg Cap Take 1 capsule (0.4 mg total) by mouth once daily.     Family History       Problem Relation (Age of Onset)     Cataracts Mother    Hypertension Father    Stroke Father          Tobacco Use    Smoking status: Never    Smokeless tobacco: Never   Substance and Sexual Activity    Alcohol use: Yes    Drug use: No    Sexual activity: Never     Partners: Female     Review of Systems   Constitutional: Positive for malaise/fatigue.   HENT: Negative.     Eyes: Negative.    Cardiovascular: Negative.    Respiratory: Negative.     Endocrine: Negative.    Hematologic/Lymphatic: Negative.    Skin: Negative.    Musculoskeletal:  Positive for joint pain.   Gastrointestinal:  Positive for diarrhea.   Genitourinary: Negative.         Rectal pain   Neurological: Negative.    Psychiatric/Behavioral: Negative.     Allergic/Immunologic: Negative.    Objective:     Vital Signs (Most Recent):  Temp: 97.9 °F (36.6 °C) (11/09/22 0715)  Pulse: (!) 121 (11/09/22 0800)  Resp: 20 (11/09/22 0955)  BP: (!) 85/65 (11/09/22 0800)  SpO2: 100 % (11/09/22 0800)   Vital Signs (24h Range):  Temp:  [97 °F (36.1 °C)-98.8 °F (37.1 °C)] 97.9 °F (36.6 °C)  Pulse:  [] 121  Resp:  [13-33] 20  SpO2:  [77 %-100 %] 100 %  BP: ()/(50-75) 85/65     Weight: 60.8 kg (134 lb)  Body mass index is 18.69 kg/m².    SpO2: 100 %  O2 Device (Oxygen Therapy): nasal cannula      Intake/Output Summary (Last 24 hours) at 11/9/2022 1024  Last data filed at 11/9/2022 0700  Gross per 24 hour   Intake 3989.21 ml   Output 400 ml   Net 3589.21 ml       Lines/Drains/Airways       Central Venous Catheter Line  Duration             Port A Cath Single Lumen 08/16/22 1323 right subclavian 84 days              Peripheral Intravenous Line  Duration                  Peripheral IV - Single Lumen 11/08/22 1708 20 G Right Antecubital <1 day         Peripheral IV - Single Lumen 11/08/22 1840 20 G Posterior;Right Hand <1 day                    Physical Exam  Vitals and nursing note reviewed.   Constitutional:       General: He is not in acute distress.     Appearance: He is well-developed.  He is ill-appearing. He is not diaphoretic.      Comments: On supplemental O2   HENT:      Head: Normocephalic and atraumatic.   Eyes:      General:         Right eye: No discharge.         Left eye: No discharge.      Pupils: Pupils are equal, round, and reactive to light.   Neck:      Thyroid: No thyromegaly.      Vascular: No JVD.      Trachea: No tracheal deviation.   Cardiovascular:      Rate and Rhythm: Normal rate and regular rhythm.      Heart sounds: Normal heart sounds, S1 normal and S2 normal. No murmur heard.  Pulmonary:      Effort: Pulmonary effort is normal. No respiratory distress.      Breath sounds: Normal breath sounds. No wheezing or rales.   Abdominal:      General: There is no distension.      Tenderness: There is no rebound.   Musculoskeletal:      Cervical back: Neck supple.      Right lower leg: No edema.      Left lower leg: No edema.   Skin:     General: Skin is warm and dry.      Findings: No erythema.   Neurological:      Mental Status: He is alert and oriented to person, place, and time.   Psychiatric:         Mood and Affect: Mood normal.         Behavior: Behavior normal.      Comments: Anxious/crying in pain         Significant Labs: CMP   Recent Labs   Lab 11/08/22 1818 11/09/22 0451    141   K 3.5 3.3*    104   CO2 21* 19*   GLU 71 58*   BUN 6* 5*   CREATININE 0.6 0.5   CALCIUM 8.7 7.6*   PROT 6.1 4.8*   ALBUMIN 2.3* 2.0*   BILITOT 0.6 0.4   ALKPHOS 110 97   AST 13 13   ALT 10 12   ANIONGAP 18* 18*   , CBC   Recent Labs   Lab 11/08/22 1818 11/09/22  0451   WBC 6.63 5.28   HGB 13.1* 11.3*   HCT 41.9 37.7*    248   , INR No results for input(s): INR, PROTIME in the last 48 hours., Troponin   Recent Labs   Lab 11/08/22 2048 11/09/22 0451 11/09/22 0821   TROPONINI 0.101* 0.084* 0.059*   , and All pertinent lab results from the last 24 hours have been reviewed.    Significant Imaging: Echocardiogram: Transthoracic echo (TTE) complete (Cupid Only):   Results for  orders placed or performed during the hospital encounter of 11/08/22   Echo   Result Value Ref Range    BSA 1.74 m2    TDI SEPTAL 0.09 m/s    LV LATERAL E/E' RATIO 6.20 m/s    LV SEPTAL E/E' RATIO 6.89 m/s    IVC diameter 1.22 cm    Left Ventricular Outflow Tract Mean Velocity 0.70 cm/s    Left Ventricular Outflow Tract Mean Gradient 2.34 mmHg    TDI LATERAL 0.10 m/s    LVIDd 4.18 3.5 - 6.0 cm    IVS 1.21 (A) 0.6 - 1.1 cm    Posterior Wall 1.07 0.6 - 1.1 cm    Ao root annulus 3.90 cm    LVIDs 2.65 2.1 - 4.0 cm    FS 37 28 - 44 %    STJ 3.55 cm    Ascending aorta 3.86 cm    LV mass 164.13 g    LA size 2.97 cm    TAPSE 1.60 cm    RV S' 0.02 cm/s    Left Ventricle Relative Wall Thickness 0.51 cm    AV mean gradient 3 mmHg    AV valve area 3.62 cm2    AV Velocity Ratio 1.05     AV index (prosthetic) 0.88     PV peak gradient 2.49 mmHg    E/A ratio 1.07     Mean e' 0.10 m/s    E wave deceleration time 187.33 msec    IVRT 114.18 msec    LVOT diameter 2.29 cm    LVOT area 4.1 cm2    LVOT peak naman 1.16 m/s    LVOT peak VTI 19.60 cm    Ao peak naman 1.11 m/s    Ao VTI 22.3 cm    RVOT peak naman 0.79 m/s    RVOT peak VTI 15.2 cm    LVOT stroke volume 80.69 cm3    AV peak gradient 5 mmHg    PV mean gradient 1.23 mmHg    E/E' ratio 6.53 m/s    MV Peak E Naman 0.62 m/s    TR Max Naman 2.65 m/s    MV Peak A Naman 0.58 m/s    LV Systolic Volume 25.72 mL    LV Systolic Volume Index 14.4 mL/m2    LV Diastolic Volume 77.73 mL    LV Diastolic Volume Index 43.67 mL/m2    LV Mass Index 92 g/m2    Triscuspid Valve Regurgitation Peak Gradient 28 mmHg   , EKG: reviewed, and X-Ray: CXR: X-Ray Chest 1 View (CXR): No results found for this visit on 11/08/22. and X-Ray Chest PA and Lateral (CXR): No results found for this visit on 11/08/22.    Assessment and Plan:   Patient who presents with septic shock/possible PSVT/elevated troponin. Stable CV wise during exam. Repeat echo pending. Can consider ischemic workup pending clinical course (patient  writhing and crying in pain this AM, palliative consulted).    * Septic shock  -Mgmt as per primary team  -On abx  -BC pending    Elevated troponin  -Troponin 0.070>0.101>0.084>0.059  -No chest pain/anginal symptoms  -Suspect related to demand ischemia from PSVT/septic shock  -EKG with diffuse ST depression  -Repeat echo pending  -Continue ASA as tolerated  -Will consider ischemic workup pending stability, patient meeting with palliative care today    Diarrhea  -Mgmt as per primary team    Pneumonia  -Mgmt as per primary team  -On abx    Tachycardia  -Reactive in setting of sepsis  -SR this AM        VTE Risk Mitigation (From admission, onward)         Ordered     enoxaparin injection 40 mg  Daily         11/08/22 2159     IP VTE HIGH RISK PATIENT  Once         11/08/22 2159     Place sequential compression device  Until discontinued         11/08/22 2159                Thank you for your consult. I will follow-up with patient. Please contact us if you have any additional questions.    Kelly Kaur PA-C  Cardiology   [facility] - Intensive Care ([facility])

## 2022-11-09 NOTE — HOSPITAL COURSE
O2 sat 100% on 3 lpm T max 98  ml last 24 hrs + 4 liters since admit . 3 L NS on admission BP 90/60 mm Hg . CTA chest , CT abd pelvis reviewed

## 2022-11-09 NOTE — PLAN OF CARE
[facility] - Intensive Care (Hospital)  Initial Discharge Assessment       Primary Care Provider: Shakila Moran DO    Admission Diagnosis: Tachycardia [R00.0]  Increased heart rate [R00.0]  SIRS (systemic inflammatory response syndrome) [R65.10]  Chest pain [R07.9]  Septic shock [A41.9, R65.21]    Admission Date: 11/8/2022  Expected Discharge Date:     Discharge Barriers Identified: None    Payor: HU[employer] MANAGED MEDICARE / Plan: HUMANA TOTAL CARE ADVANTAGE / Product Type: Medicare Advantage /     Extended Emergency Contact Information  Primary Emergency Contact: Fara Benton  Mobile Phone: [phone]  Relation: Sister  Secondary Emergency Contact: Akilah Hernandez   [place]  Mobile Phone: [phone]  Relation: Healthcare Power of     Discharge Plan A: Home Health         [address] [address]  Phone: [phone] Fax: [phone]    [address] [address]  Phone: [phone] Fax: [phone]    [facility] [address] [place] [address] [place] [address]  Phone: [phone] Fax: [phone]      Initial Assessment (most recent)       Adult Discharge Assessment - 11/09/22 1047          Discharge Assessment    Assessment Type Discharge Planning Assessment     Confirmed/corrected address, phone number and insurance Yes     Confirmed Demographics Correct on Facesheet     Source of Information family     Communicated KERRIE with patient/caregiver Date not available/Unable to determine     Reason For Admission Septic shock     Lives With spouse     Facility Arrived From: home     Do you expect to return to your current living situation? Yes     Do you have help at home or someone to help you manage your care at home? Yes     Who are your caregiver(s) and their phone  number(s)? Spouse, Akilah     Prior to hospitilization cognitive status: Alert/Oriented     Current cognitive status: Alert/Oriented     Walking or Climbing Stairs Difficulty ambulation difficulty, requires equipment     Mobility Management Cane, Walker     Dressing/Bathing Difficulty none     Home Accessibility wheelchair accessible     Home Layout Able to live on 1st floor     Equipment Currently Used at Home cane, quad;grab bar;shower chair;walker, rolling;suction machine     Readmission within 30 days? Yes     Patient currently being followed by outpatient case management? No     Do you currently have service(s) that help you manage your care at home? Yes     Name and Contact number of agency [facility]     Is the pt/caregiver preference to resume services with current agency Yes     Do you take prescription medications? Yes     Do you have prescription coverage? Yes     Do you have any problems affording any of your prescribed medications? No     Is the patient taking medications as prescribed? yes     Who is going to help you get home at discharge? Spouse     How do you get to doctors appointments? family or friend will provide     Are you on dialysis? No     Do you take coumadin? No     Discharge Plan A Home Health     DME Needed Upon Discharge  none     Discharge Plan discussed with: Patient     Discharge Barriers Identified None                     Readmission Assessment (most recent)       Readmission Assessment - 11/09/22 1055          Readmission    Why were you hospitalized in the last 30 days? mucositis     Why were you readmitted? New medical problem     When you left the hospital how did you feel? ok     When you left the hospital where did you go? Home with Home Health     Did patient/caregiver refused recommended DC plan? No     Tell me about what happened between when you left the hospital and the day you returned. found non responsive at home     Did you try to manage your symptoms your  self? No     Did you call anyone? No     Did you try to see or did see a doctor or nurse before you came? No     Why? emergent situation, came straight to the hospital     Did you have  a follow-up appointment on discharge? Yes     Did you go? No     Why? --   admitted before appt date    Was this a planned readmission? No                    Anticipated DC dispo: home health (current with [facility] )    Prior Level of Function: dependent with ADLs, ambulates using a walker, lives with spouse   PCP: Shakila Moran DO    Comments:  CM met with patient at bedside to introduce role and discuss discharge planning. Patient lives with his spouse who will also be help at home (along with other family) and can provide transport at time of discharge. CM discharge needs depends on hospital progress. CM will continue following to assist with other needs.

## 2022-11-09 NOTE — SUBJECTIVE & OBJECTIVE
Past Medical History:   Diagnosis Date    Alcoholism     Anemia     Back pain     Encounter for blood transfusion 2018    Essential hypertension 2/4/2016    GERD (gastroesophageal reflux disease)     Hiatal hernia     Hypertension     diet controlled    Melanoma 06/2021    left cheek    Rectal cancer 9/11/2019       Past Surgical History:   Procedure Laterality Date    ARTHROSCOPY OF KNEE Right 5/5/2021    Procedure: ARTHROSCOPY, KNEE;  Surgeon: Delonte Mcintyre MD;  Location: [facility] OR;  Service: Orthopedics;  Laterality: Right;    COLONOSCOPY N/A 9/3/2019    Procedure: COLONOSCOPY;  Surgeon: Isai Centeno MD;  Location: [facility] ENDO;  Service: Endoscopy;  Laterality: N/A;    COLONOSCOPY N/A 1/10/2020    Procedure: COLONOSCOPY;  Surgeon: Familia Gonzalez MD;  Location: [facility];  Service: General;  Laterality: N/A;    COLONOSCOPY N/A 3/24/2021    Procedure: COLONOSCOPY;  Surgeon: Familia Gonzalez MD;  Location: [facility];  Service: General;  Laterality: N/A;    ESOPHAGOGASTRODUODENOSCOPY N/A 9/3/2019    Procedure: ESOPHAGOGASTRODUODENOSCOPY (EGD);  Surgeon: Isai Centeno MD;  Location: [facility];  Service: Endoscopy;  Laterality: N/A;    EXCISION OF MEDIAL MENISCUS OF KNEE Right 5/5/2021    Procedure: MENISCECTOMY, KNEE, MEDIAL;  Surgeon: Delonte Mcintyre MD;  Location: [facility];  Service: Orthopedics;  Laterality: Right;  partial Lateral    FLEXIBLE SIGMOIDOSCOPY  2/4/2020    Procedure: SIGMOIDOSCOPY, FLEXIBLE;  Surgeon: Familia Gonzalez MD;  Location: [facility] OR;  Service: General;;    ILEOSTOMY Right 2/4/2020    Procedure: CREATION, ILEOSTOMY;  Surgeon: Familia Gonzalez MD;  Location: [facility] OR;  Service: General;  Laterality: Right;    ILEOSTOMY CLOSURE N/A 8/4/2020    Procedure: CLOSURE, ILEOSTOMY;  Surgeon: Familia Gonzalez MD;  Location: [facility];  Service: General;  Laterality: N/A;    INCISION AND DRAINAGE OF BACK Left 8/5/2020    Procedure: INCISION AND DRAINAGE, BACK;  Surgeon: Familia RIVAS  MD Carlos;  Location: [facility] OR;  Service: General;  Laterality: Left;    INJECTION OF ANESTHETIC AGENT INTO TISSUE PLANE DEFINED BY TRANSVERSUS ABDOMINIS MUSCLE N/A 2/4/2020    Procedure: BLOCK, TRANSVERSUS ABDOMINIS PLANE;  Surgeon: Familia Gonzalez MD;  Location: [facility] OR;  Service: General;  Laterality: N/A;    INSERTION OF TUNNELED CENTRAL VENOUS CATHETER (CVC) WITH SUBCUTANEOUS PORT Right 2/4/2020    Procedure: INSERTION, PORT-A-CATH;  Surgeon: Familia Gonzalez MD;  Location: [facility] OR;  Service: General;  Laterality: Right;    INSERTION OF TUNNELED CENTRAL VENOUS CATHETER (CVC) WITH SUBCUTANEOUS PORT N/A 8/16/2022    Procedure: [id number]-S-[id number];  Surgeon: Familia Gonzalez MD;  Location: [facility];  Service: General;  Laterality: N/A;  right subclavian    JOINT REPLACEMENT Left 2009    Hip    KNEE ARTHROSCOPY W/ PLICA EXCISION Right 5/5/2021    Procedure: EXCISION, PLICA, KNEE, ARTHROSCOPIC;  Surgeon: Delonte Mcintyre MD;  Location: [facility] OR;  Service: Orthopedics;  Laterality: Right;    MOBILIZATION OF SPLENIC FLEXURE  2/4/2020    Procedure: MOBILIZATION, SPLENIC FLEXURE;  Surgeon: Familia Gonzalez MD;  Location: [facility];  Service: General;;    REMOVAL OF HARDWARE FROM HIP Left 1/4/2022    Procedure: REMOVAL, HARDWARE, HIP;  Surgeon: Delonte Mcintyre MD;  Location: [facility];  Service: Orthopedics;  Laterality: Left;       Review of patient's allergies indicates:  No Known Allergies    Current Facility-Administered Medications on File Prior to Encounter   Medication    0.9%  NaCl infusion    0.9%  NaCl infusion    alteplase injection 2 mg    chlorhexidine 0.12 % solution 10 mL    chlorhexidine 0.12 % solution 10 mL    diphenhydrAMINE injection 25 mg    HYDROmorphone (PF) injection 0.2 mg    HYDROmorphone (PF) injection 0.2 mg    lactated ringers infusion    metoclopramide HCl injection 10 mg    nozaseptin (NOZIN) nasal     sodium chloride 0.9% flush 10 mL    sodium chloride 0.9% flush 3  mL    sodium chloride 0.9% flush 3 mL     Current Outpatient Medications on File Prior to Encounter   Medication Sig    magic mouthwash diphen/antac/lidoc Swish and spit 15 mLs every 4 (four) hours as needed (mouth ulcers).    ascorbic acid, vitamin C, (VITAMIN C) 1000 MG tablet Take 1,000 mg by mouth once daily.    dexAMETHasone (DECADRON) 4 MG Tab Take 2 tablets (8 mg total) by mouth once daily. Take as directed on days 2 and 3 of your chemotherapy cycle.    diphenoxylate-atropine 2.5-0.025 mg (LOMOTIL) 2.5-0.025 mg per tablet Take 1 tablet by mouth 4 (four) times daily as needed for Diarrhea.    fentaNYL (DURAGESIC) 25 mcg/hr Place 1 patch onto the skin every 72 hours. for 15 days    fluconazole (DIFLUCAN) 200 MG Tab Take 1 tablet (200 mg total) by mouth once daily. for 3 days    gabapentin (NEURONTIN) 300 MG capsule Take 1 capsule (300 mg total) by mouth every evening.    hydrALAZINE (APRESOLINE) 25 MG tablet Take 1 tablet (25 mg total) by mouth 2 (two) times a day.    loperamide (IMODIUM) 2 mg capsule Take 1 capsule (2 mg total) by mouth 4 (four) times daily as needed for Diarrhea.    losartan (COZAAR) 25 MG tablet Take 1 tablet (25 mg total) by mouth once daily.    multivitamin capsule Take 1 capsule by mouth once daily.    ondansetron (ZOFRAN-ODT) 8 MG TbDL Dissolve 1 tablet (8 mg total) by mouth every 8 (eight) hours as needed (nausea/vomiting).    oxyCODONE (ROXICODONE) 20 mg Tab immediate release tablet Take 1 tablet (20 mg total) by mouth every 4 (four) hours as needed (pain). Max 4 doses/ day    pantoprazole (PROTONIX) 40 MG tablet Take 1 tablet (40 mg total) by mouth once daily.    [DISCONTINUED] diclofenac sodium (VOLTAREN) 1 % Gel Apply 2 g topically 3 (three) times daily.    [DISCONTINUED] mirtazapine (REMERON) 15 MG tablet Take 1 tablet (15 mg total) by mouth every evening.    [DISCONTINUED] tamsulosin (FLOMAX) 0.4 mg Cap Take 1 capsule (0.4 mg total) by mouth once daily.     Family History        Problem Relation (Age of Onset)    Cataracts Mother    Hypertension Father    Stroke Father          Tobacco Use    Smoking status: Never    Smokeless tobacco: Never   Substance and Sexual Activity    Alcohol use: Yes    Drug use: No    Sexual activity: Never     Partners: Female     Review of Systems   Unable to perform ROS: Acuity of condition   Constitutional:  Positive for activity change.   Gastrointestinal:  Positive for diarrhea.   Musculoskeletal:  Positive for arthralgias.   Skin:  Positive for wound (buttock).   Neurological:  Positive for dizziness, syncope (near syncope) and weakness.   Objective:     Vital Signs (Most Recent):  Temp: 98.6 °F (37 °C) (11/08/22 2341)  Pulse: 65 (11/08/22 2341)  Resp: 17 (11/08/22 2341)  BP: (!) 99/57 (11/08/22 2341)  SpO2: (!) 92 % (11/08/22 2341)   Vital Signs (24h Range):  Temp:  [97.6 °F (36.4 °C)-98.6 °F (37 °C)] 98.6 °F (37 °C)  Pulse:  [] 65  Resp:  [15-27] 17  SpO2:  [92 %-100 %] 92 %  BP: ()/(51-71) 99/57     Weight: 58.4 kg (128 lb 12.8 oz)  Body mass index is 17.96 kg/m².    Physical Exam  Vitals and nursing note reviewed.   Constitutional:       Appearance: He is cachectic. He is ill-appearing and toxic-appearing.      Comments: Appears Moribund   HENT:      Head: Normocephalic and atraumatic.      Nose: Nose normal.   Eyes:      General: No scleral icterus.  Cardiovascular:      Rate and Rhythm: Regular rhythm. Tachycardia present.      Heart sounds: Normal heart sounds. No murmur heard.    No friction rub. No gallop.   Pulmonary:      Effort: Pulmonary effort is normal. No respiratory distress.      Breath sounds: Rhonchi present. No wheezing.   Abdominal:      General: Bowel sounds are normal. There is no distension.      Palpations: Abdomen is soft.      Tenderness: There is no abdominal tenderness.   Musculoskeletal:         General: Normal range of motion.      Cervical back: Normal range of motion and neck supple.   Skin:     General: Skin is  warm and dry.      Coloration: Skin is pale.      Findings: No rash.   Neurological:      Mental Status: He is lethargic.      Cranial Nerves: No cranial nerve deficit.   Psychiatric:         Behavior: Behavior normal.                 Significant Labs: All pertinent labs within the past 24 hours have been reviewed.    Significant Imaging: I have reviewed all pertinent imaging results/findings within the past 24 hours.

## 2022-11-10 ENCOUNTER — ANESTHESIA EVENT (OUTPATIENT)
Dept: SURGERY | Facility: HOSPITAL | Age: 67
DRG: 853 | End: 2022-11-10
Payer: MEDICARE

## 2022-11-10 ENCOUNTER — ANESTHESIA (OUTPATIENT)
Dept: SURGERY | Facility: HOSPITAL | Age: 67
DRG: 853 | End: 2022-11-10
Payer: MEDICARE

## 2022-11-10 LAB
ALBUMIN SERPL BCP-MCNC: 1.9 G/DL (ref 3.5–5.2)
ALP SERPL-CCNC: 83 U/L (ref 55–135)
ALT SERPL W/O P-5'-P-CCNC: 11 U/L (ref 10–44)
ANION GAP SERPL CALC-SCNC: 8 MMOL/L (ref 8–16)
AST SERPL-CCNC: 11 U/L (ref 10–40)
BILIRUB SERPL-MCNC: 0.4 MG/DL (ref 0.1–1)
BUN SERPL-MCNC: 3 MG/DL (ref 8–23)
CALCIUM SERPL-MCNC: 7.8 MG/DL (ref 8.7–10.5)
CHLORIDE SERPL-SCNC: 102 MMOL/L (ref 95–110)
CO2 SERPL-SCNC: 26 MMOL/L (ref 23–29)
CREAT SERPL-MCNC: 0.5 MG/DL (ref 0.5–1.4)
ERYTHROCYTE [DISTWIDTH] IN BLOOD BY AUTOMATED COUNT: 26.7 % (ref 11.5–14.5)
EST. GFR  (NO RACE VARIABLE): >60 ML/MIN/1.73 M^2
GLUCOSE SERPL-MCNC: 72 MG/DL (ref 70–110)
HCT VFR BLD AUTO: 31.3 % (ref 40–54)
HGB BLD-MCNC: 10.1 G/DL (ref 14–18)
MCH RBC QN AUTO: 27.5 PG (ref 27–31)
MCHC RBC AUTO-ENTMCNC: 32.3 G/DL (ref 32–36)
MCV RBC AUTO: 85 FL (ref 82–98)
PLATELET # BLD AUTO: 276 K/UL (ref 150–450)
PMV BLD AUTO: 9.1 FL (ref 9.2–12.9)
POCT GLUCOSE: 102 MG/DL (ref 70–110)
POCT GLUCOSE: 110 MG/DL (ref 70–110)
POCT GLUCOSE: 56 MG/DL (ref 70–110)
POCT GLUCOSE: 79 MG/DL (ref 70–110)
POTASSIUM SERPL-SCNC: 3.9 MMOL/L (ref 3.5–5.1)
PROT SERPL-MCNC: 4.5 G/DL (ref 6–8.4)
RBC # BLD AUTO: 3.67 M/UL (ref 4.6–6.2)
SODIUM SERPL-SCNC: 136 MMOL/L (ref 136–145)
WBC # BLD AUTO: 4.89 K/UL (ref 3.9–12.7)

## 2022-11-10 PROCEDURE — 63600175 PHARM REV CODE 636 W HCPCS: Performed by: PHYSICIAN ASSISTANT

## 2022-11-10 PROCEDURE — C9113 INJ PANTOPRAZOLE SODIUM, VIA: HCPCS | Performed by: NURSE PRACTITIONER

## 2022-11-10 PROCEDURE — 71000033 HC RECOVERY, INTIAL HOUR: Performed by: COLON & RECTAL SURGERY

## 2022-11-10 PROCEDURE — 99233 SBSQ HOSP IP/OBS HIGH 50: CPT | Mod: ,,, | Performed by: PHYSICIAN ASSISTANT

## 2022-11-10 PROCEDURE — 99024 PR POST-OP FOLLOW-UP VISIT: ICD-10-PCS | Mod: ,,, | Performed by: COLON & RECTAL SURGERY

## 2022-11-10 PROCEDURE — S0030 INJECTION, METRONIDAZOLE: HCPCS | Performed by: FAMILY MEDICINE

## 2022-11-10 PROCEDURE — 85027 COMPLETE CBC AUTOMATED: CPT | Performed by: NURSE PRACTITIONER

## 2022-11-10 PROCEDURE — 87075 CULTR BACTERIA EXCEPT BLOOD: CPT | Performed by: COLON & RECTAL SURGERY

## 2022-11-10 PROCEDURE — 25000003 PHARM REV CODE 250: Performed by: FAMILY MEDICINE

## 2022-11-10 PROCEDURE — 36000705 HC OR TIME LEV I EA ADD 15 MIN: Performed by: COLON & RECTAL SURGERY

## 2022-11-10 PROCEDURE — 63600175 PHARM REV CODE 636 W HCPCS: Performed by: COLON & RECTAL SURGERY

## 2022-11-10 PROCEDURE — C1729 CATH, DRAINAGE: HCPCS | Performed by: COLON & RECTAL SURGERY

## 2022-11-10 PROCEDURE — 80053 COMPREHEN METABOLIC PANEL: CPT | Performed by: NURSE PRACTITIONER

## 2022-11-10 PROCEDURE — 63600175 PHARM REV CODE 636 W HCPCS: Performed by: NURSE PRACTITIONER

## 2022-11-10 PROCEDURE — 63600175 PHARM REV CODE 636 W HCPCS: Performed by: STUDENT IN AN ORGANIZED HEALTH CARE EDUCATION/TRAINING PROGRAM

## 2022-11-10 PROCEDURE — 36415 COLL VENOUS BLD VENIPUNCTURE: CPT | Performed by: NURSE PRACTITIONER

## 2022-11-10 PROCEDURE — 25000003 PHARM REV CODE 250: Performed by: COLON & RECTAL SURGERY

## 2022-11-10 PROCEDURE — 63600175 PHARM REV CODE 636 W HCPCS: Performed by: FAMILY MEDICINE

## 2022-11-10 PROCEDURE — 37000009 HC ANESTHESIA EA ADD 15 MINS: Performed by: COLON & RECTAL SURGERY

## 2022-11-10 PROCEDURE — 87077 CULTURE AEROBIC IDENTIFY: CPT | Mod: 59 | Performed by: COLON & RECTAL SURGERY

## 2022-11-10 PROCEDURE — 25000003 PHARM REV CODE 250: Performed by: NURSE PRACTITIONER

## 2022-11-10 PROCEDURE — 87186 SC STD MICRODIL/AGAR DIL: CPT | Mod: 59 | Performed by: COLON & RECTAL SURGERY

## 2022-11-10 PROCEDURE — 37000008 HC ANESTHESIA 1ST 15 MINUTES: Performed by: COLON & RECTAL SURGERY

## 2022-11-10 PROCEDURE — 46040 PR I&D PERIRECTAL ABSCESS: ICD-10-PCS | Mod: ,,, | Performed by: COLON & RECTAL SURGERY

## 2022-11-10 PROCEDURE — 36000704 HC OR TIME LEV I 1ST 15 MIN: Performed by: COLON & RECTAL SURGERY

## 2022-11-10 PROCEDURE — 46040 I&D ISCHIORCT&/PERIRCT ABSC: CPT | Mod: ,,, | Performed by: COLON & RECTAL SURGERY

## 2022-11-10 PROCEDURE — 10061 PR DRAIN SKIN ABSCESS COMPLIC: ICD-10-PCS | Mod: 51,,, | Performed by: COLON & RECTAL SURGERY

## 2022-11-10 PROCEDURE — 87070 CULTURE OTHR SPECIMN AEROBIC: CPT | Performed by: COLON & RECTAL SURGERY

## 2022-11-10 PROCEDURE — 21400001 HC TELEMETRY ROOM

## 2022-11-10 PROCEDURE — 63600175 PHARM REV CODE 636 W HCPCS: Performed by: ANESTHESIOLOGY

## 2022-11-10 PROCEDURE — 10061 I&D ABSCESS COMP/MULTIPLE: CPT | Mod: 51,,, | Performed by: COLON & RECTAL SURGERY

## 2022-11-10 PROCEDURE — 25000003 PHARM REV CODE 250: Performed by: STUDENT IN AN ORGANIZED HEALTH CARE EDUCATION/TRAINING PROGRAM

## 2022-11-10 PROCEDURE — 99024 POSTOP FOLLOW-UP VISIT: CPT | Mod: ,,, | Performed by: COLON & RECTAL SURGERY

## 2022-11-10 PROCEDURE — C9290 INJ, BUPIVACAINE LIPOSOME: HCPCS | Performed by: COLON & RECTAL SURGERY

## 2022-11-10 PROCEDURE — 99233 PR SUBSEQUENT HOSPITAL CARE,LEVL III: ICD-10-PCS | Mod: ,,, | Performed by: PHYSICIAN ASSISTANT

## 2022-11-10 RX ORDER — PROPOFOL 10 MG/ML
VIAL (ML) INTRAVENOUS CONTINUOUS PRN
Status: DISCONTINUED | OUTPATIENT
Start: 2022-11-10 | End: 2022-11-10

## 2022-11-10 RX ORDER — SODIUM CHLORIDE 9 MG/ML
INJECTION, SOLUTION INTRAVENOUS
Status: DISCONTINUED | OUTPATIENT
Start: 2022-11-10 | End: 2022-11-12 | Stop reason: HOSPADM

## 2022-11-10 RX ORDER — KETOROLAC TROMETHAMINE 30 MG/ML
15 INJECTION, SOLUTION INTRAMUSCULAR; INTRAVENOUS EVERY 8 HOURS PRN
Status: DISCONTINUED | OUTPATIENT
Start: 2022-11-10 | End: 2022-11-10

## 2022-11-10 RX ORDER — BUPIVACAINE HYDROCHLORIDE 2.5 MG/ML
INJECTION, SOLUTION EPIDURAL; INFILTRATION; INTRACAUDAL
Status: DISCONTINUED | OUTPATIENT
Start: 2022-11-10 | End: 2022-11-10 | Stop reason: HOSPADM

## 2022-11-10 RX ORDER — HYDROMORPHONE HYDROCHLORIDE 2 MG/ML
0.2 INJECTION, SOLUTION INTRAMUSCULAR; INTRAVENOUS; SUBCUTANEOUS EVERY 5 MIN PRN
Status: DISCONTINUED | OUTPATIENT
Start: 2022-11-10 | End: 2022-11-10

## 2022-11-10 RX ORDER — PROPOFOL 10 MG/ML
VIAL (ML) INTRAVENOUS
Status: DISCONTINUED | OUTPATIENT
Start: 2022-11-10 | End: 2022-11-10

## 2022-11-10 RX ORDER — MIDAZOLAM HYDROCHLORIDE 1 MG/ML
INJECTION, SOLUTION INTRAMUSCULAR; INTRAVENOUS
Status: DISCONTINUED | OUTPATIENT
Start: 2022-11-10 | End: 2022-11-10

## 2022-11-10 RX ORDER — ONDANSETRON 2 MG/ML
4 INJECTION INTRAMUSCULAR; INTRAVENOUS DAILY PRN
Status: DISCONTINUED | OUTPATIENT
Start: 2022-11-10 | End: 2022-11-10

## 2022-11-10 RX ORDER — KETAMINE HYDROCHLORIDE 50 MG/ML
INJECTION, SOLUTION INTRAMUSCULAR; INTRAVENOUS
Status: DISCONTINUED | OUTPATIENT
Start: 2022-11-10 | End: 2022-11-10

## 2022-11-10 RX ORDER — OXYCODONE AND ACETAMINOPHEN 5; 325 MG/1; MG/1
1 TABLET ORAL
Status: DISCONTINUED | OUTPATIENT
Start: 2022-11-10 | End: 2022-11-10

## 2022-11-10 RX ORDER — HYDROMORPHONE HYDROCHLORIDE 2 MG/ML
1 INJECTION, SOLUTION INTRAMUSCULAR; INTRAVENOUS; SUBCUTANEOUS EVERY 6 HOURS PRN
Status: DISCONTINUED | OUTPATIENT
Start: 2022-11-10 | End: 2022-11-10

## 2022-11-10 RX ORDER — FENTANYL CITRATE 50 UG/ML
INJECTION, SOLUTION INTRAMUSCULAR; INTRAVENOUS
Status: DISCONTINUED | OUTPATIENT
Start: 2022-11-10 | End: 2022-11-10

## 2022-11-10 RX ORDER — HYDROMORPHONE HYDROCHLORIDE 1 MG/ML
1 INJECTION, SOLUTION INTRAMUSCULAR; INTRAVENOUS; SUBCUTANEOUS EVERY 4 HOURS PRN
Status: DISCONTINUED | OUTPATIENT
Start: 2022-11-10 | End: 2022-11-12 | Stop reason: HOSPADM

## 2022-11-10 RX ADMIN — SODIUM CHLORIDE: 0.9 INJECTION, SOLUTION INTRAVENOUS at 05:11

## 2022-11-10 RX ADMIN — OXYCODONE HYDROCHLORIDE AND ACETAMINOPHEN 1000 MG: 500 TABLET ORAL at 08:11

## 2022-11-10 RX ADMIN — METRONIDAZOLE 500 MG: 500 INJECTION, SOLUTION INTRAVENOUS at 12:11

## 2022-11-10 RX ADMIN — OXYCODONE HYDROCHLORIDE 20 MG: 5 TABLET ORAL at 05:11

## 2022-11-10 RX ADMIN — HYDROMORPHONE HYDROCHLORIDE 1 MG: 1 INJECTION, SOLUTION INTRAMUSCULAR; INTRAVENOUS; SUBCUTANEOUS at 09:11

## 2022-11-10 RX ADMIN — CEFEPIME HYDROCHLORIDE 2 G: 2 INJECTION, SOLUTION INTRAVENOUS at 05:11

## 2022-11-10 RX ADMIN — HYDROMORPHONE HYDROCHLORIDE 1 MG: 1 INJECTION, SOLUTION INTRAMUSCULAR; INTRAVENOUS; SUBCUTANEOUS at 05:11

## 2022-11-10 RX ADMIN — ZINC SULFATE 220 MG (50 MG) CAPSULE 220 MG: CAPSULE at 08:11

## 2022-11-10 RX ADMIN — HYDROMORPHONE HYDROCHLORIDE 0.2 MG: 2 INJECTION INTRAMUSCULAR; INTRAVENOUS; SUBCUTANEOUS at 01:11

## 2022-11-10 RX ADMIN — PROPOFOL 50 MCG/KG/MIN: 10 INJECTION, EMULSION INTRAVENOUS at 12:11

## 2022-11-10 RX ADMIN — PANTOPRAZOLE SODIUM 40 MG: 40 INJECTION, POWDER, FOR SOLUTION INTRAVENOUS at 08:11

## 2022-11-10 RX ADMIN — THERA TABS 1 TABLET: TAB at 08:11

## 2022-11-10 RX ADMIN — HYDROMORPHONE HYDROCHLORIDE 1 MG: 1 INJECTION, SOLUTION INTRAMUSCULAR; INTRAVENOUS; SUBCUTANEOUS at 12:11

## 2022-11-10 RX ADMIN — PROPOFOL 50 MG: 10 INJECTION, EMULSION INTRAVENOUS at 12:11

## 2022-11-10 RX ADMIN — ASPIRIN 81 MG CHEWABLE TABLET 81 MG: 81 TABLET CHEWABLE at 08:11

## 2022-11-10 RX ADMIN — METOROPROLOL TARTRATE 5 MG: 5 INJECTION, SOLUTION INTRAVENOUS at 11:11

## 2022-11-10 RX ADMIN — CEFEPIME HYDROCHLORIDE 2 G: 2 INJECTION, SOLUTION INTRAVENOUS at 10:11

## 2022-11-10 RX ADMIN — FENTANYL CITRATE 100 MCG: 50 INJECTION, SOLUTION INTRAMUSCULAR; INTRAVENOUS at 12:11

## 2022-11-10 RX ADMIN — VANCOMYCIN HYDROCHLORIDE 1000 MG: 1 INJECTION, POWDER, LYOPHILIZED, FOR SOLUTION INTRAVENOUS at 04:11

## 2022-11-10 RX ADMIN — NYSTATIN 500000 UNITS: 500000 SUSPENSION ORAL at 04:11

## 2022-11-10 RX ADMIN — Medication 1 TABLET: at 08:11

## 2022-11-10 RX ADMIN — MIDAZOLAM 2 MG: 1 INJECTION INTRAMUSCULAR; INTRAVENOUS at 12:11

## 2022-11-10 RX ADMIN — VANCOMYCIN HYDROCHLORIDE 1000 MG: 1 INJECTION, POWDER, LYOPHILIZED, FOR SOLUTION INTRAVENOUS at 05:11

## 2022-11-10 RX ADMIN — KETAMINE HYDROCHLORIDE 30 MG: 50 INJECTION, SOLUTION, CONCENTRATE INTRAMUSCULAR; INTRAVENOUS at 12:11

## 2022-11-10 RX ADMIN — DIPHENHYDRAMINE HYDROCHLORIDE 15 ML: 12.5 LIQUID ORAL at 05:11

## 2022-11-10 RX ADMIN — HYDROMORPHONE HYDROCHLORIDE 1 MG: 1 INJECTION, SOLUTION INTRAMUSCULAR; INTRAVENOUS; SUBCUTANEOUS at 08:11

## 2022-11-10 RX ADMIN — MUPIROCIN: 20 OINTMENT TOPICAL at 09:11

## 2022-11-10 RX ADMIN — CEFEPIME HYDROCHLORIDE 2 G: 2 INJECTION, SOLUTION INTRAVENOUS at 02:11

## 2022-11-10 RX ADMIN — DEXTROSE 125 ML: 10 SOLUTION INTRAVENOUS at 04:11

## 2022-11-10 RX ADMIN — KETAMINE HYDROCHLORIDE 20 MG: 50 INJECTION, SOLUTION, CONCENTRATE INTRAMUSCULAR; INTRAVENOUS at 12:11

## 2022-11-10 RX ADMIN — NYSTATIN 500000 UNITS: 500000 SUSPENSION ORAL at 08:11

## 2022-11-10 RX ADMIN — MUPIROCIN: 20 OINTMENT TOPICAL at 08:11

## 2022-11-10 RX ADMIN — SODIUM CHLORIDE, SODIUM LACTATE, POTASSIUM CHLORIDE, AND CALCIUM CHLORIDE: .6; .31; .03; .02 INJECTION, SOLUTION INTRAVENOUS at 12:11

## 2022-11-10 NOTE — ANESTHESIA PREPROCEDURE EVALUATION
11/10/2022  Vincent Benton is a 67 y.o., male.    Patient Active Problem List   Diagnosis    Neoplasm related pain    Gastroesophageal reflux disease    Alcohol use    Iron deficiency anemia due to chronic blood loss    Rectal cancer    Rectal pain    Essential hypertension    Chemotherapy management, encounter for    Cachexia    Rectal adenocarcinoma    Back abscess    Encounter for palliative care    Screening for colon cancer    Acute medial meniscus tear of right knee    Neuropathy    Secondary liver cancer    Mucositis due to antineoplastic therapy    Oral candidiasis    Normocytic anemia    Severe malnutrition    Immunocompromised patient    Debility    Bradycardia    Hypophosphatemia    Septic shock    Tachycardia    Pneumonia    Perirectal wounds with abscess    Diarrhea    Elevated troponin       Pre-op Assessment    I have reviewed the Patient Summary Reports.    I have reviewed the NPO Status.   I have reviewed the Medications.     Review of Systems  Anesthesia Hx:  No problems with previous Anesthesia    Social:  Non-Smoker    Hematology/Oncology:         -- Anemia:   Cardiovascular:   Hypertension ECG has been reviewed.    Pulmonary:  Pulmonary Normal    Renal/:  Renal/ Normal     Hepatic/GI:   GERD Liver Disease, Annalisa rectal abscess   Neurological:  Neurology Normal    Endocrine:  Endocrine Normal        Physical Exam  General: Well nourished    Airway:  Mallampati: II   Mouth Opening: Normal  TM Distance: Normal  Neck ROM: Normal ROM    Dental:  Intact        Anesthesia Plan  Type of Anesthesia, risks & benefits discussed:    Anesthesia Type: MAC  Intra-op Monitoring Plan: Standard ASA Monitors  Post Op Pain Control Plan: multimodal analgesia  Induction:  IV  Airway Plan: , Post-Induction  Informed Consent: Informed consent signed with the Patient and all  parties understand the risks and agree with anesthesia plan.  All questions answered.   ASA Score: 2    Ready For Surgery From Anesthesia Perspective.     .      Chemistry        Component Value Date/Time     11/10/2022 0536    K 3.9 11/10/2022 0536     11/10/2022 0536    CO2 26 11/10/2022 0536    BUN 3 (L) 11/10/2022 0536    CREATININE 0.5 11/10/2022 0536    GLU 72 11/10/2022 0536        Component Value Date/Time    CALCIUM 7.8 (L) 11/10/2022 0536    ALKPHOS 83 11/10/2022 0536    AST 11 11/10/2022 0536    ALT 11 11/10/2022 0536    BILITOT 0.4 11/10/2022 0536    ESTGFRAFRICA >60 07/20/2022 1042    EGFRNONAA >60 07/20/2022 1042        Lab Results   Component Value Date    WBC 4.89 11/10/2022    HGB 10.1 (L) 11/10/2022    HCT 31.3 (L) 11/10/2022    MCV 85 11/10/2022     11/10/2022       Sinus bradycardia with occasional Premature ventricular complexes   Nonspecific ST and T wave abnormality   Abnormal ECG   When compared with ECG of 08-NOV-2022 21:18,   Premature ventricular complexes are now Present   Vent. rate has decreased BY  91 BPM   ST no longer depressed in Inferior leads   ST no longer depressed in Anterior-lateral leads   T wave inversion no longer evident in Inferior leads   T wave inversion no longer evident in Anterior-lateral leads     Echo 11/9/22:   Concentric remodeling and normal systolic function.   The estimated ejection fraction is 65%.   Normal left ventricular diastolic function.   Normal right ventricular size with normal right ventricular systolic function.   Normal central venous pressure (3 mmHg).   The estimated PA systolic pressure is 31 mmHg.   Paroxysmal tachycardia (HR at 130 bpm and lasted for 3 to 5 minutes) noted during the study

## 2022-11-10 NOTE — ASSESSMENT & PLAN NOTE
68yo M with perirectal ulcerations and sores with fluctuance on exam    - to OR today for EUA and possible I&D  - All risks, benefits and alternatives fully explained to patient.  Risks include, but are not limited to, bleeding, infection, fecal incontinence, damage to the sphincter muscles, postoperative abscess, postoperative pain, urinary incontinence, urinary retention, perioperative MI, CVA and death.  All questions appropriately answered to patient's satisfaction.  Consent signed and placed on chart.  - NPO for now, okay for diet postop  - cont abx per primary team  - rest of care per primary team

## 2022-11-10 NOTE — CONSULTS
Advance Care Planning    Consult Note  Palliative Medicine      Consult Requested By: Dr. Hand  Reason for Consult: Pain and symptom management    SUBJECTIVE:     History of Present Illness:  Vincent Benton is a 67 y.o. year old with a history of recurrent metastatic rectal cancer who presented to the ED shortly after being discharged from the hospital due to profound weakness. He was discharged the day prior after being admitted for mucositis. His last chemotherapy was 10/25 with plans to restage in a month. Initial workup indicated septic shock and perirectal abscesses. He was admitted to the hospital for further evaluation and treatment. He is well known to our service and we were consulted to assist with pain management. He also made comments about wanting to die and we were asked to explore this statement. Mr. Benton was alone when I went by today. As soon as I walked into the room he began to cry saying he wished he could have stayed at home. He says he is tired of feeling so poorly and just wants to be in his own bed and stop coming to the hospital. He feels like his readmission is his fault because he felt like he was pressuring the  team to discharge. He did not remember the specifics of talking to Dr. Gonzalez and we went over the plan for tomorrow as well as the CT findings. I suspect his pain will improve after the abscesses are drained. He admits that he was not having rectal pain like this while he was admitted previously and the oral lesions continue to improve. He asked me what hospice would be able to do for him so we discussed this at length. While I do feel they provide excellent home based care I want to know why he is requesting this service now. He says he thought the cancer was causing all of his current issues and if he is to expect more of the same then he would rather focus on comfort and stay at home. From my review of the CT I do not see any mention in the chest or abdomen of  progressive disease (or residual disease for that matter). I certainly understand feeling like this is a setback and can understand that the current predicament is quite painful however it seems we are dealing with reversible issues. I would support continuing with the procedure tomorrow then antibiotic course in hopes this enables him to start regaining strength. If he continues to have setbacks and complications then I would be open to re-examining our options. He felt better hearing the CT results and would like to regain strength. He is hoping for more time and would like to see [place] at the very least. He feels like hospice is his inevitable future and asked a lot of questions about their service which I answered. I will continue to follow along and support him through this process. He elected DNR on admit which is consistent with the LaPOST completed in September.    In regards to pain, I would continue the current regimen of Fentanyl 25mcg/ hr patch and oxycodone 20mg or Dilaudid 1mg IV for breakthrough pain.    Past Medical History:   Diagnosis Date    Alcoholism     Anemia     Back pain     Encounter for blood transfusion 2018    Essential hypertension 2/4/2016    GERD (gastroesophageal reflux disease)     Hiatal hernia     Hypertension     diet controlled    Melanoma 06/2021    left cheek    Rectal cancer 9/11/2019     Past Surgical History:   Procedure Laterality Date    ARTHROSCOPY OF KNEE Right 5/5/2021    Procedure: ARTHROSCOPY, KNEE;  Surgeon: Delonte Mcintyre MD;  Location: [facility] OR;  Service: Orthopedics;  Laterality: Right;    COLONOSCOPY N/A 9/3/2019    Procedure: COLONOSCOPY;  Surgeon: Isai Centeno MD;  Location: [facility] ENDO;  Service: Endoscopy;  Laterality: N/A;    COLONOSCOPY N/A 1/10/2020    Procedure: COLONOSCOPY;  Surgeon: Familia Gonzalez MD;  Location: [facility] ENDO;  Service: General;  Laterality: N/A;    COLONOSCOPY N/A 3/24/2021    Procedure: COLONOSCOPY;  Surgeon: Familia RIVAS  MD Carlos;  Location: [facility];  Service: General;  Laterality: N/A;    ESOPHAGOGASTRODUODENOSCOPY N/A 9/3/2019    Procedure: ESOPHAGOGASTRODUODENOSCOPY (EGD);  Surgeon: Isai Centeno MD;  Location: [facility] ENDO;  Service: Endoscopy;  Laterality: N/A;    EXCISION OF MEDIAL MENISCUS OF KNEE Right 5/5/2021    Procedure: MENISCECTOMY, KNEE, MEDIAL;  Surgeon: Delonte Mcintyre MD;  Location: [facility] OR;  Service: Orthopedics;  Laterality: Right;  partial Lateral    FLEXIBLE SIGMOIDOSCOPY  2/4/2020    Procedure: SIGMOIDOSCOPY, FLEXIBLE;  Surgeon: Familia Gonzalez MD;  Location: [facility] OR;  Service: General;;    ILEOSTOMY Right 2/4/2020    Procedure: CREATION, ILEOSTOMY;  Surgeon: Familia Gonzalez MD;  Location: [facility] OR;  Service: General;  Laterality: Right;    ILEOSTOMY CLOSURE N/A 8/4/2020    Procedure: CLOSURE, ILEOSTOMY;  Surgeon: Familia Gonzalez MD;  Location: [facility] OR;  Service: General;  Laterality: N/A;    INCISION AND DRAINAGE OF BACK Left 8/5/2020    Procedure: INCISION AND DRAINAGE, BACK;  Surgeon: Familia Gonzalez MD;  Location: [facility] OR;  Service: General;  Laterality: Left;    INJECTION OF ANESTHETIC AGENT INTO TISSUE PLANE DEFINED BY TRANSVERSUS ABDOMINIS MUSCLE N/A 2/4/2020    Procedure: BLOCK, TRANSVERSUS ABDOMINIS PLANE;  Surgeon: Familia Gonzalez MD;  Location: [facility] OR;  Service: General;  Laterality: N/A;    INSERTION OF TUNNELED CENTRAL VENOUS CATHETER (CVC) WITH SUBCUTANEOUS PORT Right 2/4/2020    Procedure: INSERTION, PORT-A-CATH;  Surgeon: Familia Gonzalez MD;  Location: [facility] OR;  Service: General;  Laterality: Right;    INSERTION OF TUNNELED CENTRAL VENOUS CATHETER (CVC) WITH SUBCUTANEOUS PORT N/A 8/16/2022    Procedure: ZYRHCTUCS-TEKX-I-CATH;  Surgeon: Familia Gonzalez MD;  Location: [facility] OR;  Service: General;  Laterality: N/A;  right subclavian    JOINT REPLACEMENT Left 2009    Hip    KNEE ARTHROSCOPY W/ PLICA EXCISION Right 5/5/2021    Procedure: EXCISION, PLICA, KNEE,  ARTHROSCOPIC;  Surgeon: Delonte Mcintyre MD;  Location: [facility];  Service: Orthopedics;  Laterality: Right;    MOBILIZATION OF SPLENIC FLEXURE  2/4/2020    Procedure: MOBILIZATION, SPLENIC FLEXURE;  Surgeon: Familia Gonzalez MD;  Location: [facility];  Service: General;;    REMOVAL OF HARDWARE FROM HIP Left 1/4/2022    Procedure: REMOVAL, HARDWARE, HIP;  Surgeon: Delonte Mcintyre MD;  Location: [facility];  Service: Orthopedics;  Laterality: Left;     Family History   Problem Relation Age of Onset    Cataracts Mother     Stroke Father     Hypertension Father        Social History     Socioeconomic History    Marital status:    Tobacco Use    Smoking status: Never    Smokeless tobacco: Never   Substance and Sexual Activity    Alcohol use: Yes    Drug use: No    Sexual activity: Never     Partners: Female     Social Determinants of Health     Financial Resource Strain: Low Risk     Difficulty of Paying Living Expenses: Not very hard   Food Insecurity: No Food Insecurity    Worried About Running Out of Food in the Last Year: Never true    Ran Out of Food in the Last Year: Never true   Transportation Needs: No Transportation Needs    Lack of Transportation (Medical): No    Lack of Transportation (Non-Medical): No   Physical Activity: Inactive    Days of Exercise per Week: 0 days    Minutes of Exercise per Session: 0 min   Stress: Stress Concern Present    Feeling of Stress : To some extent   Social Connections: Moderately Integrated    Frequency of Communication with Friends and Family: Three times a week    Frequency of Social Gatherings with Friends and Family: Once a week    Attends Buddhism Services: Never    Active Member of Clubs or Organizations: No    Attends Club or Organization Meetings: 1 to 4 times per year    Marital Status:    Housing Stability: Low Risk     Unable to Pay for Housing in the Last Year: No    Number of Places Lived in the Last Year: 1    Unstable Housing in the Last Year:  No      Review of patient's allergies indicates:  No Known Allergies    Medications:    Current Facility-Administered Medications:     acetaminophen tablet 650 mg, 650 mg, Oral, Q4H PRN, Myriam Hidalgo NP    albuterol-ipratropium 2.5 mg-0.5 mg/3 mL nebulizer solution 3 mL, 3 mL, Nebulization, Q6H PRN, Myriam Hidalgo NP    ascorbic acid (vitamin C) tablet 1,000 mg, 1,000 mg, Oral, Daily, Myriam Hidalgo NP    aspirin chewable tablet 81 mg, 81 mg, Oral, Daily, Myriam Hidalgo NP    cefepime in dextrose 5 % IVPB 2 g, 2 g, Intravenous, Q8H, Lauren Shah MD, Stopped at 11/09/22 1504    dextrose 10% bolus 125 mL, 12.5 g, Intravenous, PRN, Myriam Hidalgo NP, Stopped at 11/09/22 0701    dextrose 10% bolus 250 mL, 25 g, Intravenous, PRN, Myriam Hidalgo NP, Stopped at 11/09/22 0701    diphenhydrAMINE (BENYLIN) 12.5 mg/5 mL 5 mL, aluminum-magnesium hydroxide-simethicone (MAALOX) 200-200-20 mg/5 mL 5 mL, LIDOcaine HCl 2% (XYLOCAINE) 5 mL, 15 mL, Swish & Spit, QID (AC & HS), Myriam Hidalgo NP, 15 mL at 11/09/22 1611    enoxaparin injection 40 mg, 40 mg, Subcutaneous, Daily, Myriam Hidalgo NP, 40 mg at 11/09/22 1623    fentaNYL 25 mcg/hr 1 patch, 1 patch, Transdermal, Q72H, Bernard Arriaza MD, 1 patch at 11/09/22 0955    folic acid-vit B6-vit B12 2.5-25-2 mg tablet 1 tablet, 1 tablet, Oral, Daily, Myriam Hidalgo NP    glucagon (human recombinant) injection 1 mg, 1 mg, Intramuscular, PRN, Myriam Hidalgo NP    glucose chewable tablet 16 g, 16 g, Oral, PRN, Myriam Hidalgo NP    glucose chewable tablet 24 g, 24 g, Oral, PRN, Myriam Hidalgo NP    HYDROmorphone injection 1 mg, 1 mg, Intravenous, Q6H PRN, Lauren Shah MD, 1 mg at 11/09/22 1840    influenza 65up-adj (QUADRIVALENT ADJUVANTED PF) vaccine 0.5 mL, 0.5 mL, Intramuscular, vaccine x 1 dose, Lauren Shah MD    insulin aspart U-100 pen 0-5 Units, 0-5 Units, Subcutaneous, QID (AC + HS) PRN, Myriam Hidalgo NP    magnesium  oxide tablet 800 mg, 800 mg, Oral, PRN, Myriam Hidalgo NP    magnesium oxide tablet 800 mg, 800 mg, Oral, PRN, Myriam Hidalgo NP    melatonin tablet 6 mg, 6 mg, Oral, Nightly PRN, Myriam Hidalgo NP    metoprolol injection 5 mg, 5 mg, Intravenous, Q4H PRN, Myriam Hidalgo NP    metronidazole IVPB 500 mg, 500 mg, Intravenous, Q8H, Ede Hand MD, Last Rate: 100 mL/hr at 11/09/22 1840, 500 mg at 11/09/22 1840    multivitamin tablet, 1 tablet, Oral, Daily, Myriam Hidalgo NP    mupirocin 2 % ointment, , Nasal, BID, Luaren Shah MD, Given at 11/09/22 1121    naloxone 0.4 mg/mL injection 0.02 mg, 0.02 mg, Intravenous, PRN, Myriam Hidalgo NP    nystatin 100,000 unit/mL suspension 500,000 Units, 500,000 Units, Oral, QID, ORESTES Nevarez, 500,000 Units at 11/09/22 1623    ondansetron injection 4 mg, 4 mg, Intravenous, Q6H PRN, Myriam Hidalgo NP    oxyCODONE immediate release tablet 20 mg, 20 mg, Oral, Q4H PRN, Lauren Shah MD, 20 mg at 11/09/22 2017    pantoprazole injection 40 mg, 40 mg, Intravenous, Daily, ORESTES Nevarez, 40 mg at 11/09/22 1027    pneumococcal vaccine (PNU-IMMUNE 23) injection 0.5 mL, 0.5 mL, Intramuscular, vaccine x 1 dose, Lauren Shah MD    potassium bicarbonate disintegrating tablet 35 mEq, 35 mEq, Oral, PRN, Myrima iHdalgo NP    potassium bicarbonate disintegrating tablet 50 mEq, 50 mEq, Oral, PRN, Myriam Hidalgo NP    potassium bicarbonate disintegrating tablet 60 mEq, 60 mEq, Oral, PRN, Myriam Hidalgo NP    potassium, sodium phosphates 280-160-250 mg packet 2 packet, 2 packet, Oral, PRN, Myriam Hidalgo, NP    potassium, sodium phosphates 280-160-250 mg packet 2 packet, 2 packet, Oral, PRN, Myriam Hidalgo, NP    potassium, sodium phosphates 280-160-250 mg packet 2 packet, 2 packet, Oral, PRN, Myriam Hidalgo, PETE    prochlorperazine injection Soln 5 mg, 5 mg, Intravenous, Q6H PRN, Myriam Hidalgo NP    simethicone chewable tablet 80 mg, 1  tablet, Oral, QID PRN, Myriam Hidalgo NP    sodium chloride 0.9% flush 10 mL, 10 mL, Intravenous, Q8H PRN, Myriam Hidalgo NP    Pharmacy to dose Vancomycin consult, , , Once **AND** vancomycin - pharmacy to dose, , Intravenous, pharmacy to manage frequency, Myriam Hidalgo NP    vancomycin in dextrose 5 % 1 gram/250 mL IVPB 1,000 mg, 1,000 mg, Intravenous, Q12H, Lauren Shah MD, Stopped at 11/09/22 1719    zinc sulfate capsule 220 mg, 220 mg, Oral, Daily, Myriam Hidalgo NP    Facility-Administered Medications Ordered in Other Encounters:     0.9%  NaCl infusion, , Intravenous, Continuous, Delonte Mcintyre MD    0.9%  NaCl infusion, , Intravenous, Continuous, Delonte Mcintyre MD    alteplase injection 2 mg, 2 mg, Intra-Catheter, PRN, Mikel Sams MD    chlorhexidine 0.12 % solution 10 mL, 10 mL, Mouth/Throat, On Call Procedure, Delonte Mcintyre MD, 10 mL at 05/05/21 1222    chlorhexidine 0.12 % solution 10 mL, 10 mL, Mouth/Throat, On Call Procedure, Delonte Mcintyre MD, 10 mL at 01/04/22 0628    diphenhydrAMINE injection 25 mg, 25 mg, Intravenous, Q6H PRN, Keli Alcaraz MD    lactated ringers infusion, , Intravenous, Continuous, Familia Gonzalez MD, Stopped at 03/24/21 0825    metoclopramide HCl injection 10 mg, 10 mg, Intravenous, Q10 Min PRN, Keli Alcaraz MD    nozaseptin (NOZIN) nasal , , Each Nostril, On Call Procedure, Delonte Mcintyre MD, Given at 05/05/21 1222    sodium chloride 0.9% flush 10 mL, 10 mL, Intravenous, PRN, Mikel Sams MD    sodium chloride 0.9% flush 3 mL, 3 mL, Intravenous, PRN, Shilpa Yee MD    sodium chloride 0.9% flush 3 mL, 3 mL, Intravenous, Q8H, Keli Alcaraz MD    ROS:  Review of Systems   Constitutional:  Positive for activity change and fatigue. Negative for chills and fever.   HENT:  Positive for mouth sores and trouble swallowing. Negative for sore throat.    Respiratory:  Negative for cough and  shortness of breath.    Gastrointestinal:  Positive for diarrhea and rectal pain. Negative for abdominal pain, constipation, nausea and vomiting.   Genitourinary:  Negative for difficulty urinating and dysuria.   Musculoskeletal:  Positive for back pain. Negative for myalgias.   Skin:  Negative for rash and wound.   Allergic/Immunologic: Negative for immunocompromised state.   Neurological:  Positive for weakness. Negative for headaches.   Psychiatric/Behavioral:  Negative for confusion and sleep disturbance. The patient is not nervous/anxious.      OBJECTIVE:     Physical Exam:  Vitals: Temp: 97.6 °F (36.4 °C) (11/09/22 1530)  Pulse: 60 (11/09/22 1800)  Resp: (!) 23 (11/09/22 2017)  BP: 111/66 (11/09/22 1800)  SpO2: 100 % (11/09/22 1800)    Physical Exam  Vitals reviewed.   Constitutional:       General: He is not in acute distress.     Appearance: Normal appearance. He is well-developed. He is ill-appearing. He is not toxic-appearing.   HENT:      Head: Normocephalic and atraumatic.      Mouth/Throat:      Lips: Lesions present.      Mouth: Oral lesions present.   Eyes:      Conjunctiva/sclera: Conjunctivae normal.      Comments: R ptosis   Cardiovascular:      Rate and Rhythm: Normal rate and regular rhythm.      Heart sounds: Normal heart sounds. No murmur heard.  Pulmonary:      Effort: Pulmonary effort is normal. No respiratory distress.      Breath sounds: Normal breath sounds.   Abdominal:      General: Bowel sounds are normal. There is no distension.      Palpations: Abdomen is soft.      Tenderness: There is no abdominal tenderness.   Musculoskeletal:         General: Normal range of motion.      Cervical back: Normal range of motion.      Right lower leg: No edema.      Left lower leg: No edema.   Skin:     General: Skin is warm and dry.      Findings: No rash.   Neurological:      Mental Status: He is alert and oriented to person, place, and time.      Cranial Nerves: No cranial nerve deficit.    Psychiatric:         Mood and Affect: Affect is tearful.         Behavior: Behavior normal.         Thought Content: Thought content normal.         Judgment: Judgment normal.         Review of Symptoms      Symptom Assessment (ESAS 0-10 Scale)  Pain:  5  Dyspnea:  0  Anxiety:  0  Nausea:  0  Depression:  0  Anorexia:  0  Fatigue:  0  Insomnia:  0  Restlessness:  0  Agitation:  0     CAM / Delirium:  Negative  Diarrhea:  Positive    Anxiety:  Is not nervous/anxious  Constipation:  No constipation    Pain Assessment:    Location(s): groin    Groin       Location: generalized        Quality: None        Quantity: 5/10 in intensity        Chronicity: Onset 1 day(s) ago, unchanged        Aggravating Factors: Bowel movement        Alleviating Factors: Opiates        Associated Symptoms: None    ECOG Performance Status ndGndrndanddndend:nd nd2nd Living Arrangements:  Lives with spouse    Psychosocial/Cultural: , one daughter from previous relationship    Advance Directives:   Living Will: Yes        Copy on chart: Yes    LaPOST: Yes    Do Not Resuscitate Status: Yes    Medical Power of : Yes    Agent's Name:  1: wife Akilah Medrano, 2: sister Fara Benton    Decision Making:  Patient answered questions  Goals of Care: What is most important right now is to focus on improvement in condition but with limits to invasive therapies. Accordingly, we have decided that the best plan to meet the patient's goals includes continuing with treatment.    Labs:  WBC   Date Value Ref Range Status   11/09/2022 5.28 3.90 - 12.70 K/uL Final       Hemoglobin   Date Value Ref Range Status   11/09/2022 11.3 (L) 14.0 - 18.0 g/dL Final       Hematocrit   Date Value Ref Range Status   11/09/2022 37.7 (L) 40.0 - 54.0 % Final       MCV   Date Value Ref Range Status   11/09/2022 91 82 - 98 fL Final       Platelets   Date Value Ref Range Status   11/09/2022 248 150 - 450 K/uL Final       BMP  Lab Results   Component Value Date      11/09/2022    K 3.4 (L) 11/09/2022     11/09/2022    CO2 19 (L) 11/09/2022    BUN 5 (L) 11/09/2022    CREATININE 0.5 11/09/2022    CALCIUM 7.6 (L) 11/09/2022    ANIONGAP 18 (H) 11/09/2022    ESTGFRAFRICA >60 07/20/2022    EGFRNONAA >60 07/20/2022       Lab Results   Component Value Date    AST 13 11/09/2022    ALKPHOS 97 11/09/2022    BILITOT 0.4 11/09/2022       Albumin   Date Value Ref Range Status   11/09/2022 2.0 (L) 3.5 - 5.2 g/dL Final       Radiology:I have reviewed all pertinent imaging results/findings within the past 24 hours.  CT abd/ pelvis and CTA chest reviewed 11/8/22    ASSESSMENT   Vincent Benton is a 67 y.o. year old with a history of recurrent metastatic rectal cancer who presented to the ED shortly after being discharged from the hospital due to profound weakness. He was discharged the day prior after being admitted for mucositis. His last chemotherapy was 10/25 with plans to restage in a month. Initial workup indicated septic shock and perirectal abscesses. He was admitted to the hospital for further evaluation and treatment. He is well known to our service and we were consulted to assist with pain management. He also made comments about wanting to die and we were asked to explore this statement.     PLAN   Perirectal abscesses  - CRS to take to operating suite tomorrow    Mucositis  - Improved  - Continue oxycodone 20mg q4hr PRN pain     3. Neoplasm related pain superimposed on chronic back pain  - Continue fentanyl 25mcg/ hr patch q 72 hrs  - Continue oxycodone 20mg q4hr PRN pain     4. Recurrent metastatic rectal cancer  - Under the care of Dr. Tuttle  - Was due for another cycle of FOLFIRI plus Avastin this week. Plans to restage in a month  - No hepatic or pulmonary lesions noted on recent imaging. + response to treatment? Limited view in abdomen d/t lack of contrast?     5. Encounter for Palliative Care  - Code status: DNR/I- LaPOST on file  - HCPOA on file, 1: wife Akilah, 2:  sister Fara  - Scheduled to see me 11/15 in clinic      Discussed case and visit details with Dr. Shah     Thank you for allowing Palliative Medicine to be involved in the care of Vincent Benton.         Medical decision making: HIGH based on high risk of death untreated symptoms high risk medications management of more than one chronic illness in exacerbation or progression of disease managing side effects of medications or polypharmacy parenteral opioids    Plan required increased review of medication choice, interaction, dosing, frequency, and route due to patient complexity. Patient complexity increased by: high risk medication use, being on more than 8 medications, advanced age, at risk for delirium, continuous use of opioids, and malnutrition    16 min ACP time spent discussing: assessed patient specific goals and addressed the best way to achieve them, coordination of care and emotional support, formulating and communicating prognosis, exploring burden/ benefit of various approaches of treatment    ENRIKE GaleasC  Palliative Medicine

## 2022-11-10 NOTE — ASSESSMENT & PLAN NOTE
This patient does have evidence of infective focus  My overall impression is septic shock.  Source: IV Cefepime and Vanco   Antibiotics given-   Antibiotics (From admission, onward)    Start     Stop Route Frequency Ordered    11/09/22 1430  cefepime in dextrose 5 % IVPB 2 g         -- IV Every 8 hours (non-standard times) 11/09/22 1224    11/09/22 1400  vancomycin in dextrose 5 % 1 gram/250 mL IVPB 1,000 mg         -- IV Every 12 hours (non-standard times) 11/09/22 0238    11/09/22 1100  mupirocin 2 % ointment  (DECOLONIZATION PROTOCOL ORDERS)         11/14 0859 Nasl 2 times daily 11/09/22 0903    11/09/22 0145  metronidazole IVPB 500 mg         -- IV Every 8 hours (non-standard times) 11/09/22 0037    11/08/22 2301  vancomycin - pharmacy to dose  (vancomycin IVPB)        See Hyperspace for full Linked Orders Report.    -- IV pharmacy to manage frequency 11/08/22 2201        Latest lactate reviewed-  Recent Labs   Lab 11/08/22  2129 11/09/22  0451 11/09/22  0820   LACTATE 1.3 3.1* 1.4     Organ dysfunction indicated by encephalopathy    Shock with decreased perfusion noted, Fluid challenge  was given at 30cc/kg    Post- resuscitation assessment- (Done after fluids given for shock)  Vital signs post fluid administrations were-  Temp Readings from Last 1 Encounters:   11/10/22 98.3 °F (36.8 °C) (Oral)     BP Readings from Last 1 Encounters:   11/10/22 (!) 149/85     Pulse Readings from Last 1 Encounters:   11/10/22 66       Perfusion exam was performed within 6 hours of septic shock presentation after bolus shows Adequate tissue perfusion assessed by non-invasive monitoring    Source control achieved by: IV Vanco and Cefepime

## 2022-11-10 NOTE — ASSESSMENT & PLAN NOTE
Very small abscesses   Consult general surgery and wound care  IV Vanco and Cefepime  Wound cultures     11/10:  For I and d today; gen surg on board   Antibiotics  ID consult

## 2022-11-10 NOTE — PROGRESS NOTES
[facility] - Surgery ([facility])  Colorectal Surgery  Progress Note    Subjective:     Interval History: No acute events overnight. Out of ICU and ready for surgery today.    Medications:  Continuous Infusions:  Scheduled Meds:   ascorbic acid (vitamin C)  1,000 mg Oral Daily    aspirin  81 mg Oral Daily    ceFEPime (MAXIPIME) IVPB  2 g Intravenous Q8H    magic mouthwash diphen/antac/lidoc  15 mL Swish & Spit QID (AC & HS)    enoxaparin  40 mg Subcutaneous Daily    fentaNYL  1 patch Transdermal Q72H    folic acid-vit B6-vit B12 2.5-25-2 mg  1 tablet Oral Daily    metronidazole  500 mg Intravenous Q8H    multivitamin  1 tablet Oral Daily    mupirocin   Nasal BID    nystatin  500,000 Units Oral QID    pantoprazole  40 mg Intravenous Daily    vancomycin (VANCOCIN) IVPB  1,000 mg Intravenous Q12H    zinc sulfate  220 mg Oral Daily     PRN Meds:acetaminophen, albuterol-ipratropium, dextrose 10%, dextrose 10%, glucagon (human recombinant), glucose, glucose, HYDROmorphone, influenza 65up-adj, insulin aspart U-100, magnesium oxide, magnesium oxide, melatonin, metoprolol, naloxone, ondansetron, oxyCODONE, pneumococcal vaccine, potassium bicarbonate, potassium bicarbonate, potassium bicarbonate, potassium, sodium phosphates, potassium, sodium phosphates, potassium, sodium phosphates, prochlorperazine, simethicone, sodium chloride 0.9%, Pharmacy to dose Vancomycin consult **AND** vancomycin - pharmacy to dose     Review of patient's allergies indicates:  No Known Allergies  Objective:     Vital Signs (Most Recent):  Temp: 98.3 °F (36.8 °C) (11/10/22 0703)  Pulse: 66 (11/10/22 0900)  Resp: 16 (11/10/22 0823)  BP: (!) 149/85 (11/10/22 0703)  SpO2: 97 % (11/10/22 0703)   Vital Signs (24h Range):  Temp:  [97.6 °F (36.4 °C)-98.6 °F (37 °C)] 98.3 °F (36.8 °C)  Pulse:  [] 66  Resp:  [13-28] 16  SpO2:  [84 %-100 %] 97 %  BP: ()/(51-86) 149/85     Weight: 60.9 kg (134 lb 4.2 oz)  Body mass index is 18.73  kg/m².    Intake/Output - Last 3 Shifts         11/08 0700  11/09 0659 11/09 0700  11/10 0659 11/10 0700  11/11 0659    P.O.  120     I.V. (mL/kg) 377.7 (6.2) 1072 (17.6)     IV Piggyback 3491.8 1065.5     Total Intake(mL/kg) 3869.5 (63.4) 2257.5 (37.1)     Urine (mL/kg/hr) 400 1150 (0.8) 100 (0.3)    Stool 0 0 0    Total Output 400 1150 100    Net +3469.5 +1107.5 -100           Urine Occurrence 1 x 1 x     Stool Occurrence 3 x 1 x 2 x            Physical Exam  Exam conducted with a chaperone present.   Constitutional:       Appearance: He is well-developed. He is ill-appearing.      Comments: cachectic   HENT:      Head: Normocephalic and atraumatic.   Eyes:      Conjunctiva/sclera: Conjunctivae normal.   Neck:      Thyroid: No thyromegaly.   Cardiovascular:      Rate and Rhythm: Normal rate and regular rhythm.   Pulmonary:      Effort: Pulmonary effort is normal. No respiratory distress.   Abdominal:      General: There is no distension.      Palpations: Abdomen is soft. There is no mass.      Tenderness: There is no abdominal tenderness.   Genitourinary:     Comments: Bilateral perianal/perirectal ulcerations with small fluctuance in the left anterior perineum/perianal area concerning for abscess  Musculoskeletal:         General: No tenderness. Normal range of motion.      Cervical back: Normal range of motion.   Skin:     General: Skin is warm and dry.      Capillary Refill: Capillary refill takes less than 2 seconds.      Findings: No rash.   Neurological:      Mental Status: He is alert and oriented to person, place, and time.       Significant Labs:  I have reviewed all pertinent lab results within the past 24 hours.  CBC:   Recent Labs   Lab 11/10/22  0536   WBC 4.89   RBC 3.67*   HGB 10.1*   HCT 31.3*      MCV 85   MCH 27.5   MCHC 32.3     BMP:   Recent Labs   Lab 11/09/22  0451 11/09/22  1444 11/10/22  0536   GLU 58*  --  72     --  136   K 3.3*   < > 3.9     --  102   CO2 19*  --  26    BUN 5*  --  3*   CREATININE 0.5  --  0.5   CALCIUM 7.6*  --  7.8*   MG 1.8  --   --     < > = values in this interval not displayed.       Significant Diagnostics:  I have reviewed all pertinent imaging results/findings within the past 24 hours.    Assessment/Plan:     * Septic shock  Care per primary team    Perirectal wounds with abscess  66yo M with perirectal ulcerations and sores with fluctuance on exam    - to OR today for EUA and possible I&D  - All risks, benefits and alternatives fully explained to patient.  Risks include, but are not limited to, bleeding, infection, fecal incontinence, damage to the sphincter muscles, postoperative abscess, postoperative pain, urinary incontinence, urinary retention, perioperative MI, CVA and death.  All questions appropriately answered to patient's satisfaction.  Consent signed and placed on chart.  - NPO for now, okay for diet postop  - cont abx per primary team  - rest of care per primary team    Elevated troponin  Care per primary team    Diarrhea  Care per primary team    Pneumonia  Care per primary team    Tachycardia  Care per primary team    Immunocompromised patient  Care per primary team    Severe malnutrition  Care per primary team    Oral candidiasis  Care per primary team    Mucositis due to antineoplastic therapy  Care per primary team    Neoplasm related pain  Care per primary team        Familia Gonzalez MD  Colorectal Surgery  [facility] ([facility])

## 2022-11-10 NOTE — TRANSFER OF CARE
"Anesthesia Transfer of Care Note    Patient: Vincent Benton    Procedure(s) Performed: Procedure(s) (LRB):  EXAM UNDER ANESTHESIA (N/A)  INCISION AND DRAINAGE, PERIRECTAL REGION (N/A)  BLOCK, NERVE, PUDENDAL (N/A)  INCISION, ABSCESS, PERIANAL (N/A)  INCISION AND DRAINAGE, ABSCESS (Bilateral)    Patient location: PACU    Anesthesia Type: MAC    Transport from OR: Transported from OR on room air with adequate spontaneous ventilation    Post pain: adequate analgesia    Post assessment: no apparent anesthetic complications    Post vital signs: stable    Level of consciousness: awake and responds to stimulation    Nausea/Vomiting: no nausea/vomiting    Complications: none    Transfer of care protocol was followed      Last vitals:   Visit Vitals  BP (!) 149/85 (BP Location: Left arm, Patient Position: Lying)   Pulse 66   Temp 36.8 °C (98.3 °F) (Oral)   Resp 16   Ht 5' 11" (1.803 m)   Wt 60.9 kg (134 lb 4.2 oz)   SpO2 97%   BMI 18.73 kg/m²     "

## 2022-11-10 NOTE — PROGRESS NOTES
Progress Note   Palliative Medicine      SUBJECTIVE:     History of Present Illness:  Patient seen and examined without family present. Once he saw me walking in he began to cry. He continued to tell me that Alexx has not told him anything and wants to know how much time he has left. He continues begging to know God's plan. We talked about his alex and his concerns as well as sometimes a blessing that we do not know when we will take our last breath. He admits that he would not want to know when he will die but rather just be assured that he has more time. He tells me again that he wants to see [facility] and that he has a new grand baby and would like for the baby to know him. He is hopeful that the surgery will give him relief but then denies pain currently. He is more emotional this admit than any other time I have seen him. While I do detect some existential and alex crisis I also sense a bit of delirium with his poor attention, perseveration on one phrase, and some limited memory. This is mild and he overall is oriented to person, place, and purpose however I might question whether he can make complex decisions (consents, etc). I spoke to his sister Fara who was at work and trying very hard to wrap up to be here when he gets out of surgery. She gave me some information about his social situation. Mr. Benton is the primary caregiver to his wife Akilah who seems to have a memory deficit. He was upset after returning home as he realized she would no longer be able to care for him. He told her that he would have to go somewhere to get stronger before coming home but then worries about his wife's health when he is not around. This is why Fara is at the hospital so often and has been the one to relay and remind his wife of the current situations. When Fara visited last night he was not as emotional as her morning visit but was not surprised to hear my last two visits. She agrees that he is more emotional lately  and suspects he is not at his baseline mental status. She will continue to support him and is aware that he has been talking about dying and hospice more lately. She agrees this might be the next step if he continues to suffer complications or setbacks but hopes that this is the final bump in the road before he can begin rehabilitating. Fara also asks that we ensure the current pain regimen continues postoperatively since it has worked so well for him.      In the last 24hrs the patient has used the following pain medications (7a-7a):  - oxycodone 20mg x 4  - Dilaudid 1mg IV x 3   - fentanyl 25mcg/ hr patch      Past Medical History:   Diagnosis Date    Alcoholism     Anemia     Back pain     Encounter for blood transfusion 2018    Essential hypertension 2/4/2016    GERD (gastroesophageal reflux disease)     Hiatal hernia     Hypertension     diet controlled    Melanoma 06/2021    left cheek    Rectal cancer 9/11/2019       Review of patient's allergies indicates:  No Known Allergies    Review of Symptoms      Symptom Assessment (ESAS 0-10 Scale)  Pain:  0  Dyspnea:  0  Anxiety:  0  Nausea:  0  Depression:  0  Anorexia:  0  Fatigue:  0  Insomnia:  0  Restlessness:  0  Agitation:  0     CAM / Delirium:  Negative  Diarrhea:  Positive    Anxiety:  Is not nervous/anxious  Constipation:  No constipation    Pain Assessment:    Location(s): groin    Groin       Location: generalized        Quality: None        Quantity: 5/10 in intensity        Chronicity: Onset 1 day(s) ago, unchanged        Aggravating Factors: Bowel movement        Alleviating Factors: Opiates        Associated Symptoms: None    Living Arrangements:  Lives with spouse    Psychosocial/Cultural: , one daughter from previous relationship    Advance Care Planning   Advance Directives:   Living Will: Yes        Copy on chart: Yes    [place]POST: Yes    Medical Power of : Yes    Goals of Care: What is most important right now is to focus on  improvement in condition but with limits to invasive therapies. Accordingly, we have decided that the best plan to meet the patient's goals includes continuing with treatment.       ROS:  Review of Systems   Constitutional:  Positive for activity change and fatigue. Negative for chills and fever.   HENT:  Positive for mouth sores and trouble swallowing. Negative for sore throat.    Respiratory:  Negative for cough and shortness of breath.    Gastrointestinal:  Positive for diarrhea and rectal pain. Negative for abdominal pain, constipation, nausea and vomiting.   Genitourinary:  Negative for difficulty urinating and dysuria.   Musculoskeletal:  Positive for back pain. Negative for myalgias.   Skin:  Negative for rash and wound.   Allergic/Immunologic: Negative for immunocompromised state.   Neurological:  Positive for weakness. Negative for headaches.   Psychiatric/Behavioral:  Positive for dysphoric mood. Negative for confusion and sleep disturbance. The patient is not nervous/anxious.      OBJECTIVE:     Physical Exam:  Vitals: Temp: 98.3 °F (36.8 °C) (11/10/22 0703)  Pulse: 65 (11/10/22 0703)  Resp: 16 (11/10/22 0823)  BP: (!) 149/85 (11/10/22 0703)  SpO2: 97 % (11/10/22 0703)    Physical Exam  Vitals reviewed.   Constitutional:       General: He is not in acute distress.     Appearance: Normal appearance. He is well-developed. He is ill-appearing. He is not toxic-appearing.   HENT:      Head: Normocephalic and atraumatic.      Mouth/Throat:      Lips: Lesions present.      Mouth: Oral lesions present.   Eyes:      Conjunctiva/sclera: Conjunctivae normal.      Comments: R ptosis   Cardiovascular:      Rate and Rhythm: Normal rate and regular rhythm.      Heart sounds: Normal heart sounds. No murmur heard.  Pulmonary:      Effort: Pulmonary effort is normal. No respiratory distress.      Breath sounds: Normal breath sounds.   Abdominal:      General: Bowel sounds are normal. There is no distension.       Palpations: Abdomen is soft.      Tenderness: There is no abdominal tenderness.   Musculoskeletal:         General: Normal range of motion.      Cervical back: Normal range of motion.      Right lower leg: No edema.      Left lower leg: No edema.   Skin:     General: Skin is warm and dry.      Findings: No rash.   Neurological:      Mental Status: He is alert and oriented to person, place, and time.      Cranial Nerves: No cranial nerve deficit.   Psychiatric:         Mood and Affect: Affect is tearful.         Speech: Speech normal.         Behavior: Behavior normal.         Judgment: Judgment normal.         ASSESSMENT   Vincent Benton is a 67 y.o. year old with a history of recurrent metastatic rectal cancer who presented to the ED shortly after being discharged from the hospital due to profound weakness. He was discharged the day prior after being admitted for mucositis. His last chemotherapy was 10/25 with plans to restage in a month. Initial workup indicated septic shock and perirectal abscesses. He was admitted to the hospital for further evaluation and treatment. He is well known to our service and we were consulted to assist with pain management. He also made comments about wanting to die and we were asked to explore this statement.      PLAN   Perirectal abscesses  - CRS to take to operating suite today     Mucositis  - Improved  - Continue oxycodone 20mg q4hr PRN pain     3. Neoplasm related pain superimposed on chronic back pain  - Continue fentanyl 25mcg/ hr patch q 72 hrs  - Continue oxycodone 20mg q4hr PRN pain     4. Recurrent metastatic rectal cancer  - Under the care of Dr. Tuttle  - Was due for another cycle of FOLFIRI plus Avastin this week. Plans to restage in a month  - No hepatic or pulmonary lesions noted on recent imaging. + response to treatment? Limited view in abdomen d/t lack of contrast?     5. Encounter for Palliative Care  - Code status: DNR/I- LaPOST on file  - HCPOA on file,  1: wife Akilah, 2: sister Fara  - Scheduled to see me 11/15 in clinic  - Poor social support so will likely need skilled nursing at discharge        Thank you for allowing Palliative Medicine to be involved in the care of Vincent Benton.    Medical decision making: HIGH based on high risk of death untreated symptoms high risk medications management of more than one chronic illness in exacerbation or progression of disease managing side effects of medications or polypharmacy parenteral opioids    Plan required increased review of medication choice, interaction, dosing, frequency, and route due to patient complexity. Patient complexity increased by: high risk medication use, being on more than 8 medications, advanced age, altered mentation, at risk for delirium, continuous use of opioids, NPO, and malnutrition    > 50% of 45 min visit spent in chart review, face to face discussion of goals of care, symptom assessment, coordination of care and emotional support, formulating and communicating prognosis and goals of care, exploring burden/ benefit of various approaches of treatment.       Kristi Wing PA-C  Palliative Medicine

## 2022-11-10 NOTE — SUBJECTIVE & OBJECTIVE
Review of Systems    Constitutional: Positive for malaise/fatigue.   HENT: Negative.     Eyes: Negative.    Cardiovascular: Negative.    Respiratory: Negative.     Endocrine: Negative.    Hematologic/Lymphatic: Negative.    Skin: Negative.    Musculoskeletal:  Positive for joint pain.   Gastrointestinal:   Genitourinary: Negative.         Rectal pain   Neurological: Negative.    Psychiatric/Behavioral: Negative.     Allergic/Immunologic: Negative.    Objective:     Vital Signs (Most Recent):  Temp: 98.3 °F (36.8 °C) (11/10/22 0703)  Pulse: 66 (11/10/22 0900)  Resp: 16 (11/10/22 0823)  BP: (!) 149/85 (11/10/22 0703)  SpO2: 97 % (11/10/22 0703)   Vital Signs (24h Range):  Temp:  [97.6 °F (36.4 °C)-98.6 °F (37 °C)] 98.3 °F (36.8 °C)  Pulse:  [] 66  Resp:  [13-28] 16  SpO2:  [84 %-100 %] 97 %  BP: ()/(51-86) 149/85     Weight: 60.9 kg (134 lb 4.2 oz)  Body mass index is 18.73 kg/m².    Intake/Output Summary (Last 24 hours) at 11/10/2022 1219  Last data filed at 11/10/2022 0800  Gross per 24 hour   Intake 1634.47 ml   Output 700 ml   Net 934.47 ml      Physical Exam        Constitutional:       General: He is not in acute distress.     Appearance: He is well-developed. He is not diaphoretic.      Comments: On supplemental O2   HENT:      Head: Normocephalic and atraumatic.   Eyes:      General:         Right eye: No discharge.         Left eye: No discharge.      Pupils: Pupils are equal, round, and reactive to light.   Neck:      Thyroid: No thyromegaly.      Vascular: No JVD.      Trachea: No tracheal deviation.   Cardiovascular:      Rate and Rhythm: Normal rate and regular rhythm.      Heart sounds: Normal heart sounds, S1 normal and S2 normal. No murmur heard.  Pulmonary:      Effort: Pulmonary effort is normal. No respiratory distress.      Breath sounds: Normal breath sounds. No wheezing or rales.   Abdominal:      General: There is no distension.      Tenderness: There is no rebound.    Musculoskeletal:      Cervical back: Neck supple.      Right lower leg: No edema.      Left lower leg: No edema.   Skin:     General: Skin is warm and dry.      Findings: No erythema.   Neurological:      Mental Status: He is alert and oriented to person, place, and time.   Psychiatric:         Mood and Affect: Mood normal.         Behavior: Behavior normal.      Comments: Anxious/crying in pain        Significant Labs: All pertinent labs within the past 24 hours have been reviewed.  CBC:   Recent Labs   Lab 11/08/22  1818 11/09/22  0451 11/10/22  0536   WBC 6.63 5.28 4.89   HGB 13.1* 11.3* 10.1*   HCT 41.9 37.7* 31.3*    248 276     CMP:   Recent Labs   Lab 11/08/22 1818 11/09/22 0451 11/09/22  1444 11/10/22  0536    141  --  136   K 3.5 3.3* 3.4* 3.9    104  --  102   CO2 21* 19*  --  26   GLU 71 58*  --  72   BUN 6* 5*  --  3*   CREATININE 0.6 0.5  --  0.5   CALCIUM 8.7 7.6*  --  7.8*   PROT 6.1 4.8*  --  4.5*   ALBUMIN 2.3* 2.0*  --  1.9*   BILITOT 0.6 0.4  --  0.4   ALKPHOS 110 97  --  83   AST 13 13  --  11   ALT 10 12  --  11   ANIONGAP 18* 18*  --  8       Significant Imaging:     Imaging Results              CTA Chest Non-Coronary (PE Studies) (Final result)  Result time 11/08/22 23:05:39      Final result by Chato Osei MD (11/08/22 23:05:39)                   Impression:      No pulmonary embolism.  No dissection.    Patchy areas of nodular opacities throughout the lung upper lung zone with more reticular subpleural consolidation in the bilateral lower lobes left greater than right consistent with multifocal pneumonia.    Remaining findings as above    All CT scans   are performed using dose optimization techniques including the following: automated exposure control; adjustment of the mA and/or kV; use of iterative reconstruction technique.  Dose modulation was employed for ALARA by means of: Automated exposure control; adjustment of the mA and/or kV according to patient size  (this includes techniques or standardized protocols for targeted exams where dose is matched to indication/reason for exam; i.e. extremities or head); and/or use of iterative reconstructive technique.      Electronically signed by: Sea Reis  Date:    11/08/2022  Time:    23:05               Narrative:    EXAMINATION:  CTA CHEST NON CORONARY (PE STUDIES)    CLINICAL HISTORY:  Pulmonary embolism (PE) suspected, unknown D-dimer;    TECHNIQUE:  Low dose axial images, sagittal and coronal reformations were obtained from the thoracic inlet to the lung bases following the IV administration of 100 mL of Omnipaque 350.  Contrast timing was optimized to evaluate the pulmonary arteries.  MIP images were performed.    COMPARISON:  None    FINDINGS:  No evidence for pulmonary embolism.  No evidence for aortic dissection or aneurysm.  Patchy areas of nodular opacities throughout the lung upper lung zone with more reticular subpleural consolidation in the bilateral lower lobes left greater than right consistent with multifocal pneumonia.  Ectatic aorta measuring up to 3.8 cm.  Mild thickening of the gastric mucosa.  Right-sided port.  Question subcutaneous sebaceous cyst in the left posterior subcutaneous fat.  Correlate to physical exam.  Slight ascites adjacent to the spleen.  Small pericardial effusion.                                       CT Abdomen Pelvis  Without Contrast (Final result)  Result time 11/08/22 21:02:23      Final result by Chato Osei MD (11/08/22 21:02:23)                   Impression:      Relatively thick walled fluid collection in the pre sacral space situated between the rectosigmoid colon and the distal sacrum measuring 18 x 40 x 45 mm suggestive of abscess.    In the Annalisa - buttock region adjacent to the left gluteal cleft there is a slight nodular appearance in the subcutaneous region measuring up to 12-14 mm may relate to a boil or sebaceous cyst.  Attention on follow-up and correlate to  physical exam    Heterogeneous reticular alveolar opacity in the left posterior lung base consistent with pneumonia.    Postsurgical changes of the bowel    Senescent changes not otherwise specified    Chronic deformity of the left hip status post prosthesis removal    Atherosclerotic disease    All CT scans   are performed using dose optimization techniques including the following: automated exposure control; adjustment of the mA and/or kV; use of iterative reconstruction technique.  Dose modulation was employed for ALARA by means of: Automated exposure control; adjustment of the mA and/or kV according to patient size (this includes techniques or standardized protocols for targeted exams where dose is matched to indication/reason for exam; i.e. extremities or head); and/or use of iterative reconstructive technique.      Electronically signed by: Sea Reis  Date:    11/08/2022  Time:    21:02               Narrative:    EXAMINATION:  CT ABDOMEN PELVIS WITHOUT CONTRAST    CLINICAL HISTORY:  Abdominal abscess/infection suspected;    TECHNIQUE:  Low dose axial images, sagittal and coronal reformations were obtained from the lung bases to the pubic symphysis, 30 mL of oral Omnipaque 350 was administered..    COMPARISON:  None    FINDINGS:  Heart: Normal in size as far as seen.  No pericardial effusion as far seen.    Lung Bases: Left basilar reticular alveolar opacities consistent with left basilar pneumonia.    Liver: Normal in size and attenuation, with no focal hepatic lesions.    Gallbladder: No calcified gallstones.    Bile Ducts: No evidence of dilated ducts.    Pancreas: No mass or peripancreatic fat stranding.    Spleen: Unremarkable.    Adrenals: Unremarkable.    Kidneys/ Ureters: Unremarkable.    Bladder: No evidence of wall thickening.    Reproductive organs: Unremarkable.    GI Tract/Mesentery: No evidence of bowel obstruction or inflammation. No secondary signs of appendicitis.  Relatively thick walled  fluid collection in the pre sacral space situated between the rectosigmoid colon and the distal sacrum measuring 18 x 40 x 45 mm suggestive of abscess.  There isn't adjacent hyperdensity suggestive of possible postsurgical changes.  No other sizable abnormal fluid collection adjacent to the perirectal region.  Nodular appearance in the left Annalisa- buttock adjacent to the cleft measures up to 14 mm may relate to a sebaceous cyst or a boil.  Attention on follow-up.  Surgical sutures in the right lower quadrant bowel loop suspected.    Peritoneal Space: No ascites. No free air.    Retroperitoneum: No significant adenopathy.    Abdominal wall: Unremarkable.    Vasculature: No aneurysm.    Bones: Deformity of the left hip with tracks from prior surgical fixation noted.                                       X-Ray Chest AP Portable (Final result)  Result time 11/08/22 18:49:35      Final result by Chato Osei MD (11/08/22 18:49:35)                   Impression:      As above      Electronically signed by: Sea Reis  Date:    11/08/2022  Time:    18:49               Narrative:    EXAMINATION:  XR CHEST AP PORTABLE    CLINICAL HISTORY:  Chest Pain;    TECHNIQUE:  Single frontal view of the chest was performed.    COMPARISON:  Prior    FINDINGS:  Right-sided chest port.Prominent cardiac silhouette.  Mild perihilar interstitial opacities may relate to trace pulmonary edema.  No segmental consolidation pleural effusion pneumothorax.  Old rib deformities left posterolateral ribs.  Similar findings to prior exam    Bones are intact.

## 2022-11-10 NOTE — PLAN OF CARE
Pt AAO x4.  Pt remains free of falls throughout shift.  C/O continuous rectal pain. PRN medication given.  Plan of care reviewed. Pt verbalizes understanding.  Pt moving and turning independently. Frequent weight shifting encouraged.  Normal sinus rhythm to sinus valdez on monitor.  I&D today. Pt came back HR 50-60, still c/o rectal pain. Will give PRN pain medication when HR increases WNL.  Hourly rounding completed.  Will continue to monitor.

## 2022-11-10 NOTE — ASSESSMENT & PLAN NOTE
Likely 2/2 Sepsis and severe dehydration   Check CTA chest to r/o PE  IV Cardizem x 2 doses given in ED.   Cardiology recommended IV Cardizem drip but BP too low   IV Lopressor prn HR>120    11/10:  HR wnl

## 2022-11-10 NOTE — SUBJECTIVE & OBJECTIVE
Interval History: No acute events overnight. Out of ICU and ready for surgery today.    Medications:  Continuous Infusions:  Scheduled Meds:   ascorbic acid (vitamin C)  1,000 mg Oral Daily    aspirin  81 mg Oral Daily    ceFEPime (MAXIPIME) IVPB  2 g Intravenous Q8H    magic mouthwash diphen/antac/lidoc  15 mL Swish & Spit QID (AC & HS)    enoxaparin  40 mg Subcutaneous Daily    fentaNYL  1 patch Transdermal Q72H    folic acid-vit B6-vit B12 2.5-25-2 mg  1 tablet Oral Daily    metronidazole  500 mg Intravenous Q8H    multivitamin  1 tablet Oral Daily    mupirocin   Nasal BID    nystatin  500,000 Units Oral QID    pantoprazole  40 mg Intravenous Daily    vancomycin (VANCOCIN) IVPB  1,000 mg Intravenous Q12H    zinc sulfate  220 mg Oral Daily     PRN Meds:acetaminophen, albuterol-ipratropium, dextrose 10%, dextrose 10%, glucagon (human recombinant), glucose, glucose, HYDROmorphone, influenza 65up-adj, insulin aspart U-100, magnesium oxide, magnesium oxide, melatonin, metoprolol, naloxone, ondansetron, oxyCODONE, pneumococcal vaccine, potassium bicarbonate, potassium bicarbonate, potassium bicarbonate, potassium, sodium phosphates, potassium, sodium phosphates, potassium, sodium phosphates, prochlorperazine, simethicone, sodium chloride 0.9%, Pharmacy to dose Vancomycin consult **AND** vancomycin - pharmacy to dose     Review of patient's allergies indicates:  No Known Allergies  Objective:     Vital Signs (Most Recent):  Temp: 98.3 °F (36.8 °C) (11/10/22 0703)  Pulse: 66 (11/10/22 0900)  Resp: 16 (11/10/22 0823)  BP: (!) 149/85 (11/10/22 0703)  SpO2: 97 % (11/10/22 0703)   Vital Signs (24h Range):  Temp:  [97.6 °F (36.4 °C)-98.6 °F (37 °C)] 98.3 °F (36.8 °C)  Pulse:  [] 66  Resp:  [13-28] 16  SpO2:  [84 %-100 %] 97 %  BP: ()/(51-86) 149/85     Weight: 60.9 kg (134 lb 4.2 oz)  Body mass index is 18.73 kg/m².    Intake/Output - Last 3 Shifts         11/08 0700  11/09 0659 11/09 0700  11/10 0659 11/10  0700  11/11 0659    P.O.  120     I.V. (mL/kg) 377.7 (6.2) 1072 (17.6)     IV Piggyback 3491.8 1065.5     Total Intake(mL/kg) 3869.5 (63.4) 2257.5 (37.1)     Urine (mL/kg/hr) 400 1150 (0.8) 100 (0.3)    Stool 0 0 0    Total Output 400 1150 100    Net +3469.5 +1107.5 -100           Urine Occurrence 1 x 1 x     Stool Occurrence 3 x 1 x 2 x            Physical Exam  Exam conducted with a chaperone present.   Constitutional:       Appearance: He is well-developed. He is ill-appearing.      Comments: cachectic   HENT:      Head: Normocephalic and atraumatic.   Eyes:      Conjunctiva/sclera: Conjunctivae normal.   Neck:      Thyroid: No thyromegaly.   Cardiovascular:      Rate and Rhythm: Normal rate and regular rhythm.   Pulmonary:      Effort: Pulmonary effort is normal. No respiratory distress.   Abdominal:      General: There is no distension.      Palpations: Abdomen is soft. There is no mass.      Tenderness: There is no abdominal tenderness.   Genitourinary:     Comments: Bilateral perianal/perirectal ulcerations with small fluctuance in the left anterior perineum/perianal area concerning for abscess  Musculoskeletal:         General: No tenderness. Normal range of motion.      Cervical back: Normal range of motion.   Skin:     General: Skin is warm and dry.      Capillary Refill: Capillary refill takes less than 2 seconds.      Findings: No rash.   Neurological:      Mental Status: He is alert and oriented to person, place, and time.       Significant Labs:  I have reviewed all pertinent lab results within the past 24 hours.  CBC:   Recent Labs   Lab 11/10/22  0536   WBC 4.89   RBC 3.67*   HGB 10.1*   HCT 31.3*      MCV 85   MCH 27.5   MCHC 32.3     BMP:   Recent Labs   Lab 11/09/22  0451 11/09/22  1444 11/10/22  0536   GLU 58*  --  72     --  136   K 3.3*   < > 3.9     --  102   CO2 19*  --  26   BUN 5*  --  3*   CREATININE 0.5  --  0.5   CALCIUM 7.6*  --  7.8*   MG 1.8  --   --     < > =  values in this interval not displayed.       Significant Diagnostics:  I have reviewed all pertinent imaging results/findings within the past 24 hours.

## 2022-11-10 NOTE — PLAN OF CARE
Pt AAOx4. Tachycardia today, up to 155bpm. Now NSR. Normotensive. RR even and unlabored, oxygenating well on on 3L NC. Pain with swallowing. NPO. Voids spontaneously to urinal. Perianal abscess, cleaned with soap and water. Plan for I&D tomorrow in OR with Dr. Gonzalez at 1030. K and Mg replacement today. Pt turned with wedge support. Heel boots in place. Bed low, wheels locked, alarms audible. POC reviewed with pt and wife today.

## 2022-11-10 NOTE — OP NOTE
[facility] ([facility])  Surgery Department  Operative Note    SUMMARY     Date of Procedure: 11/10/2022     Procedure:  Exam under anesthesia  Incision and drainage of multiple perirectal abscesses  Incision and drainage of perineal abscess  Incision and drainage of left inguinal abscess    Surgeon(s) and Role:     * Familia Gonzalez MD - Primary    Assisting Surgeon/Assistant: ZITA Dhillon    Pre-Operative Diagnosis: Perirectal abscess [K61.1]    Post-Operative Diagnosis: Post-Op Diagnosis Codes:     * Perirectal abscess [K61.1]    Anesthesia: Local MAC    Technical Procedures Used:   Exam under anesthesia  Incision and drainage of multiple perirectal abscesses  Incision and drainage of perineal abscess  Incision and drainage of left inguinal abscess    Indications for Procedure:  67-year-old male with metastatic rectal adenocarcinoma who is admitted to the hospital with sepsis with evidence of perirectal abscess who presents for definitive surgical management    Findings of the Procedure:  Multiple abscess cavities surrounding the anus, rectum, perineum and left inguinal region requiring incision and drainage and Penrose drain placement    Description of the Procedure:  Patient was brought to the operating room placed supine on table.  Mac anesthesia was then induced.  The patient was then moved to lithotomy position.  The anus perineum scrotum and inguinal regions were then prepped and draped in the usual sterile fashion.  A preoperative surgical time-out was performed confirming the correct patient procedure and preop medications given.  The patient had multiple open sores that were now actively draining purulence after the prep.  He had areas of fluctuance as well along the left perirectal region and the left inguinal region indicating likely abscess cavities.  Upon pressing the fluctuant areas, there was purulence expressed from the open sores.  The open sores were located in the bilateral posterior  perianal area, the bilateral buttock area, the perineum both in the midline as well as on either side going up near the base of the scrotum, and in the left inguinal region where the maximal area of fluctuance was.  All of these openings were then probed and found to express purulence and all locations indicating wide pelvic sepsis.  Multiple of these areas did connect to 1 another and all loculations were broken up in between them and were irrigated using saline.  Given the metastatic cancer as well as his sepsis, the decision was made to place Penrose drains between multiple sores to allow for adequate drainage to try to prevent this from developing further or even possibly going into Avis's gangrene.  Quarter-inch Penrose drains were then brought onto the field.  They were placed between multiple sores as pictured below.  The areas connected where the right posterior perianal area to the right buttock, the left posterior perianal area to the left buttock, the right perineal area to the midline perineal area to the base of the scrotum, the left perineal area/base of the scrotum to the left inguinal abscess, and the left inguinal abscess to an additional opening of the inguinal abscess.  All sites were then copiously irrigated made hemostatic.  Local infiltration was completed.  Kerlix was then packed in multiple wounds to assist with hemostasis.  Surgical dressings were then applied.  The patient was moved back in supine position.  He was woken from mac anesthesia and taken to the postanesthesia care in stable condition.  He tolerated procedure well.  All sponge, needle and instrument counts were correct at the end of the case.              Significant Surgical Tasks Conducted by the Assistant(s), if Applicable: Assisted with all portions of the operation    Complications: No    Estimated Blood Loss (EBL): 50mL           Implants: * No implants in log *    Specimens:   Specimen (24h ago, onward)      None                     Condition: Good    Disposition: PACU - hemodynamically stable.    Attestation: I performed the procedure.

## 2022-11-10 NOTE — CHAPLAIN
Initial visit with patient.  Was reached out to by Kristi Wing from Palliative asking if I could visit with pt.  I responded to his bed side and we talked for an extended amount of time.  Pt was struggling emotionally and spiritually trying to come to peace with possible future events within his life.  Pt seemed to be feeling better when I left.  I prayed for him and will follow up while pt is here in the hospital.    Chaplain Onofre Scruggs M.Div., [facility]

## 2022-11-10 NOTE — PROGRESS NOTES
[facility] ([facility]  [facility] Medicine  Progress Note    Patient Name: Vincent Benton  MRN: [id number]  Patient Class: IP- Inpatient   Admission Date: 11/8/2022  Length of Stay: 2 days  Attending Physician: Lauren Shah, *  Primary Care Provider: Shakila Moran DO        Subjective:     Principal Problem:Septic shock        HPI:  The patient is a 66 yo male with rectal adenocarcinoma stage IV on palliative chemotherapy-last tx 10/25/22, GERD, HTN, Alcoholism, Diarrhea since staring Chemo, and recent hospitalization for severe mucositis and wounds to buttocks who presented to ED after being found nearly unresponsive covered in diarrhea with BP 60/42. Per ED records, the spouse also reported severe generalized weakness, worsening diarrhea, worsening perirectal wounds with pain. BP improved with IVFs    In the ED, BP 90/60, O2sat 93%, WBC britni- mild left shift, LA 3.0. Troponin 0.07>0.101. UA +2 ketones.   While in the ED, HR elevated to 150s. IV Cardizem 20mg x 2 given. EKG showed Sinus tachycardia-rate 159, IRBBB, ST wave abnormalities   CT abd/pelvis showed LLL Pneumonia, possible small perirectal abscess.   3 liters IVF bolus given. SBP still dropping to 80s, -140s at times     ED provider discussed pt status and findings with Cardiology and General surgery who will see pt in consult.   Discussed ICU care and possible CVC/pressor support with pt- he stated that he is ready to die. Attempted to call spouse but no answer. Discussed CVC/IV Pressors with ED provider. Dr. Hawthorne discussed this with the pt's spouse, Akilah, before she left the ED. She wants these procedures done if needed     DNR/I- LaPOST on file, HCPOA on file, 1: wife Akilah, 2: sister Fara      Overview/Hospital Course:  11/9:  Examination done at bedside, patient alert, complaining of persistent pain.  Expressing concerns lorene he might die soon, however still wants things to be done, sister at bedside expressing concerns of stage  IV cancer, recurrent hospitalizations- will follow-up with palliative team.   Follow-up with general surgery for perirectal abscess-will continue broad-spectrum antibiotics, monitored blood pressure and consider pressors as needed to maintain map above 65;  Follow-up with wound care   Cardiology evaluated and recommended possible ischemic workup once patient says is more stable.       11/10:    Examination done at bedside, patient alert, complaining of persistent pain --> on pain regimen as per palliative;  Scheduled for I&D by General surgery for perirectal abscess, on broad-spectrum antibiotics, cultures from wound showing Gram-negative rods, Staph aureus, pending susceptibility, ID follow-up.    Afebrile, hemodynamically stable.    Cardio on board, considering ischemic workup once stable;               Review of Systems    Constitutional: Positive for malaise/fatigue.   HENT: Negative.     Eyes: Negative.    Cardiovascular: Negative.    Respiratory: Negative.     Endocrine: Negative.    Hematologic/Lymphatic: Negative.    Skin: Negative.    Musculoskeletal:  Positive for joint pain.   Gastrointestinal:   Genitourinary: Negative.         Rectal pain   Neurological: Negative.    Psychiatric/Behavioral: Negative.     Allergic/Immunologic: Negative.    Objective:     Vital Signs (Most Recent):  Temp: 98.3 °F (36.8 °C) (11/10/22 0703)  Pulse: 66 (11/10/22 0900)  Resp: 16 (11/10/22 0823)  BP: (!) 149/85 (11/10/22 0703)  SpO2: 97 % (11/10/22 0703)   Vital Signs (24h Range):  Temp:  [97.6 °F (36.4 °C)-98.6 °F (37 °C)] 98.3 °F (36.8 °C)  Pulse:  [] 66  Resp:  [13-28] 16  SpO2:  [84 %-100 %] 97 %  BP: ()/(51-86) 149/85     Weight: 60.9 kg (134 lb 4.2 oz)  Body mass index is 18.73 kg/m².    Intake/Output Summary (Last 24 hours) at 11/10/2022 1219  Last data filed at 11/10/2022 0800  Gross per 24 hour   Intake 1634.47 ml   Output 700 ml   Net 934.47 ml      Physical Exam        Constitutional:       General: He  is not in acute distress.     Appearance: He is well-developed. He is not diaphoretic.      Comments: On supplemental O2   HENT:      Head: Normocephalic and atraumatic.   Eyes:      General:         Right eye: No discharge.         Left eye: No discharge.      Pupils: Pupils are equal, round, and reactive to light.   Neck:      Thyroid: No thyromegaly.      Vascular: No JVD.      Trachea: No tracheal deviation.   Cardiovascular:      Rate and Rhythm: Normal rate and regular rhythm.      Heart sounds: Normal heart sounds, S1 normal and S2 normal. No murmur heard.  Pulmonary:      Effort: Pulmonary effort is normal. No respiratory distress.      Breath sounds: Normal breath sounds. No wheezing or rales.   Abdominal:      General: There is no distension.      Tenderness: There is no rebound.   Musculoskeletal:      Cervical back: Neck supple.      Right lower leg: No edema.      Left lower leg: No edema.   Skin:     General: Skin is warm and dry.      Findings: No erythema.   Neurological:      Mental Status: He is alert and oriented to person, place, and time.   Psychiatric:         Mood and Affect: Mood normal.         Behavior: Behavior normal.      Comments: Anxious/crying in pain        Significant Labs: All pertinent labs within the past 24 hours have been reviewed.  CBC:   Recent Labs   Lab 11/08/22  1818 11/09/22  0451 11/10/22  0536   WBC 6.63 5.28 4.89   HGB 13.1* 11.3* 10.1*   HCT 41.9 37.7* 31.3*    248 276     CMP:   Recent Labs   Lab 11/08/22  1818 11/09/22  0451 11/09/22  1444 11/10/22  0536    141  --  136   K 3.5 3.3* 3.4* 3.9    104  --  102   CO2 21* 19*  --  26   GLU 71 58*  --  72   BUN 6* 5*  --  3*   CREATININE 0.6 0.5  --  0.5   CALCIUM 8.7 7.6*  --  7.8*   PROT 6.1 4.8*  --  4.5*   ALBUMIN 2.3* 2.0*  --  1.9*   BILITOT 0.6 0.4  --  0.4   ALKPHOS 110 97  --  83   AST 13 13  --  11   ALT 10 12  --  11   ANIONGAP 18* 18*  --  8       Significant Imaging:     Imaging Results               CTA Chest Non-Coronary (PE Studies) (Final result)  Result time 11/08/22 23:05:39      Final result by Chato Osei MD (11/08/22 23:05:39)                   Impression:      No pulmonary embolism.  No dissection.    Patchy areas of nodular opacities throughout the lung upper lung zone with more reticular subpleural consolidation in the bilateral lower lobes left greater than right consistent with multifocal pneumonia.    Remaining findings as above    All CT scans   are performed using dose optimization techniques including the following: automated exposure control; adjustment of the mA and/or kV; use of iterative reconstruction technique.  Dose modulation was employed for ALARA by means of: Automated exposure control; adjustment of the mA and/or kV according to patient size (this includes techniques or standardized protocols for targeted exams where dose is matched to indication/reason for exam; i.e. extremities or head); and/or use of iterative reconstructive technique.      Electronically signed by: Sea Reis  Date:    11/08/2022  Time:    23:05               Narrative:    EXAMINATION:  CTA CHEST NON CORONARY (PE STUDIES)    CLINICAL HISTORY:  Pulmonary embolism (PE) suspected, unknown D-dimer;    TECHNIQUE:  Low dose axial images, sagittal and coronal reformations were obtained from the thoracic inlet to the lung bases following the IV administration of 100 mL of Omnipaque 350.  Contrast timing was optimized to evaluate the pulmonary arteries.  MIP images were performed.    COMPARISON:  None    FINDINGS:  No evidence for pulmonary embolism.  No evidence for aortic dissection or aneurysm.  Patchy areas of nodular opacities throughout the lung upper lung zone with more reticular subpleural consolidation in the bilateral lower lobes left greater than right consistent with multifocal pneumonia.  Ectatic aorta measuring up to 3.8 cm.  Mild thickening of the gastric mucosa.  Right-sided port.   Question subcutaneous sebaceous cyst in the left posterior subcutaneous fat.  Correlate to physical exam.  Slight ascites adjacent to the spleen.  Small pericardial effusion.                                       CT Abdomen Pelvis  Without Contrast (Final result)  Result time 11/08/22 21:02:23      Final result by Chato Osei MD (11/08/22 21:02:23)                   Impression:      Relatively thick walled fluid collection in the pre sacral space situated between the rectosigmoid colon and the distal sacrum measuring 18 x 40 x 45 mm suggestive of abscess.    In the Annalisa - buttock region adjacent to the left gluteal cleft there is a slight nodular appearance in the subcutaneous region measuring up to 12-14 mm may relate to a boil or sebaceous cyst.  Attention on follow-up and correlate to physical exam    Heterogeneous reticular alveolar opacity in the left posterior lung base consistent with pneumonia.    Postsurgical changes of the bowel    Senescent changes not otherwise specified    Chronic deformity of the left hip status post prosthesis removal    Atherosclerotic disease    All CT scans   are performed using dose optimization techniques including the following: automated exposure control; adjustment of the mA and/or kV; use of iterative reconstruction technique.  Dose modulation was employed for ALARA by means of: Automated exposure control; adjustment of the mA and/or kV according to patient size (this includes techniques or standardized protocols for targeted exams where dose is matched to indication/reason for exam; i.e. extremities or head); and/or use of iterative reconstructive technique.      Electronically signed by: Sea Reis  Date:    11/08/2022  Time:    21:02               Narrative:    EXAMINATION:  CT ABDOMEN PELVIS WITHOUT CONTRAST    CLINICAL HISTORY:  Abdominal abscess/infection suspected;    TECHNIQUE:  Low dose axial images, sagittal and coronal reformations were obtained from the lung  bases to the pubic symphysis, 30 mL of oral Omnipaque 350 was administered..    COMPARISON:  None    FINDINGS:  Heart: Normal in size as far as seen.  No pericardial effusion as far seen.    Lung Bases: Left basilar reticular alveolar opacities consistent with left basilar pneumonia.    Liver: Normal in size and attenuation, with no focal hepatic lesions.    Gallbladder: No calcified gallstones.    Bile Ducts: No evidence of dilated ducts.    Pancreas: No mass or peripancreatic fat stranding.    Spleen: Unremarkable.    Adrenals: Unremarkable.    Kidneys/ Ureters: Unremarkable.    Bladder: No evidence of wall thickening.    Reproductive organs: Unremarkable.    GI Tract/Mesentery: No evidence of bowel obstruction or inflammation. No secondary signs of appendicitis.  Relatively thick walled fluid collection in the pre sacral space situated between the rectosigmoid colon and the distal sacrum measuring 18 x 40 x 45 mm suggestive of abscess.  There isn't adjacent hyperdensity suggestive of possible postsurgical changes.  No other sizable abnormal fluid collection adjacent to the perirectal region.  Nodular appearance in the left Annalisa- buttock adjacent to the cleft measures up to 14 mm may relate to a sebaceous cyst or a boil.  Attention on follow-up.  Surgical sutures in the right lower quadrant bowel loop suspected.    Peritoneal Space: No ascites. No free air.    Retroperitoneum: No significant adenopathy.    Abdominal wall: Unremarkable.    Vasculature: No aneurysm.    Bones: Deformity of the left hip with tracks from prior surgical fixation noted.                                       X-Ray Chest AP Portable (Final result)  Result time 11/08/22 18:49:35      Final result by Chato Osei MD (11/08/22 18:49:35)                   Impression:      As above      Electronically signed by: Sea Reis  Date:    11/08/2022  Time:    18:49               Narrative:    EXAMINATION:  XR CHEST AP PORTABLE    CLINICAL  HISTORY:  Chest Pain;    TECHNIQUE:  Single frontal view of the chest was performed.    COMPARISON:  Prior    FINDINGS:  Right-sided chest port.Prominent cardiac silhouette.  Mild perihilar interstitial opacities may relate to trace pulmonary edema.  No segmental consolidation pleural effusion pneumothorax.  Old rib deformities left posterolateral ribs.  Similar findings to prior exam    Bones are intact.                                         Assessment/Plan:      * Septic shock  This patient does have evidence of infective focus  My overall impression is septic shock.  Source: IV Cefepime and Vanco   Antibiotics given-   Antibiotics (From admission, onward)    Start     Stop Route Frequency Ordered    11/09/22 1430  cefepime in dextrose 5 % IVPB 2 g         -- IV Every 8 hours (non-standard times) 11/09/22 1224    11/09/22 1400  vancomycin in dextrose 5 % 1 gram/250 mL IVPB 1,000 mg         -- IV Every 12 hours (non-standard times) 11/09/22 0238    11/09/22 1100  mupirocin 2 % ointment  (DECOLONIZATION PROTOCOL ORDERS)         11/14 0859 Nasl 2 times daily 11/09/22 0903    11/09/22 0145  metronidazole IVPB 500 mg         -- IV Every 8 hours (non-standard times) 11/09/22 0037    11/08/22 2301  vancomycin - pharmacy to dose  (vancomycin IVPB)        See Hyperspace for full Linked Orders Report.    -- IV pharmacy to manage frequency 11/08/22 2201        Latest lactate reviewed-  Recent Labs   Lab 11/08/22  2129 11/09/22  0451 11/09/22  0820   LACTATE 1.3 3.1* 1.4     Organ dysfunction indicated by encephalopathy    Shock with decreased perfusion noted, Fluid challenge  was given at 30cc/kg    Post- resuscitation assessment- (Done after fluids given for shock)  Vital signs post fluid administrations were-  Temp Readings from Last 1 Encounters:   11/10/22 98.3 °F (36.8 °C) (Oral)     BP Readings from Last 1 Encounters:   11/10/22 (!) 149/85     Pulse Readings from Last 1 Encounters:   11/10/22 66       Perfusion exam  was performed within 6 hours of septic shock presentation after bolus shows Adequate tissue perfusion assessed by non-invasive monitoring    Source control achieved by: IV Vanco and Cefepime         Elevated troponin  EKG did not show any findings of ST elevation  Cardiology evaluated and recommended possible ischemic workup once patient says is more stable.       Diarrhea  Check stools for Cdiff  Stool cultureshowed No growth  And Stoool for Cdiff was negative on 11/1/22    11/9:    C diff -ve;       Perirectal wounds with abscess  Very small abscesses   Consult general surgery and wound care  IV Vanco and Cefepime  Wound cultures     11/10:  For I and d today; gen surg on board   Antibiotics  ID consult      Pneumonia  IV Cefepime/vanco  Blood cultures       Tachycardia  Likely 2/2 Sepsis and severe dehydration   Check CTA chest to r/o PE  IV Cardizem x 2 doses given in ED.   Cardiology recommended IV Cardizem drip but BP too low   IV Lopressor prn HR>120    11/10:  HR wnl      Immunocompromised patient  2/2 chemo      Severe malnutrition  Nutrition consulted. Most recent weight and BMI monitored-   Measurements:  Wt Readings from Last 1 Encounters:   11/08/22 58.4 kg (128 lb 12.8 oz)   Body mass index is 17.96 kg/m².    Recommendations:    Patient has been screened and assessed by RD. RD will follow patient.      Oral candidiasis        Mucositis due to antineoplastic therapy  Cont Magic mouth wash       Encounter for palliative care  DNR      Rectal cancer  palliative care followed pt for pain control   Consider hospice       Neoplasm related pain  Cont po meds if BP tolerates     11/10:  Pain regimen as per palliative        VTE Risk Mitigation (From admission, onward)         Ordered     enoxaparin injection 40 mg  Daily         11/08/22 2159     IP VTE HIGH RISK PATIENT  Once         11/08/22 2159     Place sequential compression device  Until discontinued         11/08/22 2159                Discharge  Planning   KERRIE:      Code Status: DNR   Is the patient medically ready for discharge?:     Reason for patient still in hospital (select all that apply): I and d today;   Discharge Plan A: Home Health                  Lauren Shah MD  Department of Hospital Medicine   [facility] ([facility])

## 2022-11-11 LAB
ALBUMIN SERPL BCP-MCNC: 1.8 G/DL (ref 3.5–5.2)
ALP SERPL-CCNC: 81 U/L (ref 55–135)
ALT SERPL W/O P-5'-P-CCNC: 7 U/L (ref 10–44)
ANION GAP SERPL CALC-SCNC: 11 MMOL/L (ref 8–16)
AST SERPL-CCNC: 10 U/L (ref 10–40)
BACTERIA SPEC AEROBE CULT: ABNORMAL
BILIRUB SERPL-MCNC: 0.4 MG/DL (ref 0.1–1)
BUN SERPL-MCNC: 2 MG/DL (ref 8–23)
CALCIUM SERPL-MCNC: 7.8 MG/DL (ref 8.7–10.5)
CHLORIDE SERPL-SCNC: 97 MMOL/L (ref 95–110)
CO2 SERPL-SCNC: 28 MMOL/L (ref 23–29)
CREAT SERPL-MCNC: 0.5 MG/DL (ref 0.5–1.4)
ERYTHROCYTE [DISTWIDTH] IN BLOOD BY AUTOMATED COUNT: 26.1 % (ref 11.5–14.5)
EST. GFR  (NO RACE VARIABLE): >60 ML/MIN/1.73 M^2
GLUCOSE SERPL-MCNC: 81 MG/DL (ref 70–110)
HCT VFR BLD AUTO: 32 % (ref 40–54)
HGB BLD-MCNC: 10.3 G/DL (ref 14–18)
MAGNESIUM SERPL-MCNC: 1.8 MG/DL (ref 1.6–2.6)
MCH RBC QN AUTO: 27 PG (ref 27–31)
MCHC RBC AUTO-ENTMCNC: 32.2 G/DL (ref 32–36)
MCV RBC AUTO: 84 FL (ref 82–98)
MRSA SPEC QL CULT: NORMAL
PLATELET # BLD AUTO: 306 K/UL (ref 150–450)
PMV BLD AUTO: 9.3 FL (ref 9.2–12.9)
POCT GLUCOSE: 117 MG/DL (ref 70–110)
POCT GLUCOSE: 84 MG/DL (ref 70–110)
POCT GLUCOSE: 87 MG/DL (ref 70–110)
POCT GLUCOSE: 97 MG/DL (ref 70–110)
POTASSIUM SERPL-SCNC: 3.2 MMOL/L (ref 3.5–5.1)
PROT SERPL-MCNC: 4.2 G/DL (ref 6–8.4)
RBC # BLD AUTO: 3.81 M/UL (ref 4.6–6.2)
SODIUM SERPL-SCNC: 136 MMOL/L (ref 136–145)
VANCOMYCIN TROUGH SERPL-MCNC: 11.4 UG/ML (ref 10–22)
WBC # BLD AUTO: 6.45 K/UL (ref 3.9–12.7)

## 2022-11-11 PROCEDURE — 25000003 PHARM REV CODE 250: Performed by: COLON & RECTAL SURGERY

## 2022-11-11 PROCEDURE — 25000003 PHARM REV CODE 250: Performed by: STUDENT IN AN ORGANIZED HEALTH CARE EDUCATION/TRAINING PROGRAM

## 2022-11-11 PROCEDURE — S0030 INJECTION, METRONIDAZOLE: HCPCS | Performed by: COLON & RECTAL SURGERY

## 2022-11-11 PROCEDURE — 99223 1ST HOSP IP/OBS HIGH 75: CPT | Mod: ,,, | Performed by: INTERNAL MEDICINE

## 2022-11-11 PROCEDURE — 80202 ASSAY OF VANCOMYCIN: CPT | Performed by: STUDENT IN AN ORGANIZED HEALTH CARE EDUCATION/TRAINING PROGRAM

## 2022-11-11 PROCEDURE — 80053 COMPREHEN METABOLIC PANEL: CPT | Performed by: COLON & RECTAL SURGERY

## 2022-11-11 PROCEDURE — C9113 INJ PANTOPRAZOLE SODIUM, VIA: HCPCS | Performed by: COLON & RECTAL SURGERY

## 2022-11-11 PROCEDURE — 63600175 PHARM REV CODE 636 W HCPCS: Performed by: COLON & RECTAL SURGERY

## 2022-11-11 PROCEDURE — 99024 PR POST-OP FOLLOW-UP VISIT: ICD-10-PCS | Mod: ,,, | Performed by: COLON & RECTAL SURGERY

## 2022-11-11 PROCEDURE — 21400001 HC TELEMETRY ROOM

## 2022-11-11 PROCEDURE — 99232 SBSQ HOSP IP/OBS MODERATE 35: CPT | Mod: ,,, | Performed by: INTERNAL MEDICINE

## 2022-11-11 PROCEDURE — 83735 ASSAY OF MAGNESIUM: CPT | Performed by: STUDENT IN AN ORGANIZED HEALTH CARE EDUCATION/TRAINING PROGRAM

## 2022-11-11 PROCEDURE — 99232 PR SUBSEQUENT HOSPITAL CARE,LEVL II: ICD-10-PCS | Mod: ,,, | Performed by: INTERNAL MEDICINE

## 2022-11-11 PROCEDURE — 63600175 PHARM REV CODE 636 W HCPCS: Performed by: STUDENT IN AN ORGANIZED HEALTH CARE EDUCATION/TRAINING PROGRAM

## 2022-11-11 PROCEDURE — 25000003 PHARM REV CODE 250: Performed by: PHYSICIAN ASSISTANT

## 2022-11-11 PROCEDURE — 85027 COMPLETE CBC AUTOMATED: CPT | Performed by: COLON & RECTAL SURGERY

## 2022-11-11 PROCEDURE — 36415 COLL VENOUS BLD VENIPUNCTURE: CPT | Performed by: COLON & RECTAL SURGERY

## 2022-11-11 PROCEDURE — 99024 POSTOP FOLLOW-UP VISIT: CPT | Mod: ,,, | Performed by: COLON & RECTAL SURGERY

## 2022-11-11 PROCEDURE — 99223 PR INITIAL HOSPITAL CARE,LEVL III: ICD-10-PCS | Mod: ,,, | Performed by: INTERNAL MEDICINE

## 2022-11-11 PROCEDURE — 99223 PR INITIAL HOSPITAL CARE,LEVL III: ICD-10-PCS | Mod: NSCH,,, | Performed by: INTERNAL MEDICINE

## 2022-11-11 PROCEDURE — 27000207 HC ISOLATION

## 2022-11-11 PROCEDURE — 63600175 PHARM REV CODE 636 W HCPCS: Performed by: PHYSICIAN ASSISTANT

## 2022-11-11 PROCEDURE — 99223 1ST HOSP IP/OBS HIGH 75: CPT | Mod: NSCH,,, | Performed by: INTERNAL MEDICINE

## 2022-11-11 PROCEDURE — 94761 N-INVAS EAR/PLS OXIMETRY MLT: CPT

## 2022-11-11 PROCEDURE — 25000003 PHARM REV CODE 250: Performed by: NURSE PRACTITIONER

## 2022-11-11 RX ORDER — METOPROLOL TARTRATE 25 MG/1
12.5 TABLET ORAL DAILY
Status: DISCONTINUED | OUTPATIENT
Start: 2022-11-11 | End: 2022-11-12 | Stop reason: HOSPADM

## 2022-11-11 RX ORDER — POTASSIUM CHLORIDE 20 MEQ/1
40 TABLET, EXTENDED RELEASE ORAL ONCE
Status: COMPLETED | OUTPATIENT
Start: 2022-11-11 | End: 2022-11-11

## 2022-11-11 RX ORDER — SODIUM CHLORIDE, SODIUM LACTATE, POTASSIUM CHLORIDE, CALCIUM CHLORIDE 600; 310; 30; 20 MG/100ML; MG/100ML; MG/100ML; MG/100ML
INJECTION, SOLUTION INTRAVENOUS CONTINUOUS
Status: DISCONTINUED | OUTPATIENT
Start: 2022-11-11 | End: 2022-11-12 | Stop reason: HOSPADM

## 2022-11-11 RX ADMIN — POTASSIUM CHLORIDE 40 MEQ: 1500 TABLET, EXTENDED RELEASE ORAL at 09:11

## 2022-11-11 RX ADMIN — Medication 1 TABLET: at 09:11

## 2022-11-11 RX ADMIN — METOPROLOL TARTRATE 12.5 MG: 25 TABLET, FILM COATED ORAL at 02:11

## 2022-11-11 RX ADMIN — PANTOPRAZOLE SODIUM 40 MG: 40 INJECTION, POWDER, FOR SOLUTION INTRAVENOUS at 09:11

## 2022-11-11 RX ADMIN — MUPIROCIN: 20 OINTMENT TOPICAL at 08:11

## 2022-11-11 RX ADMIN — METRONIDAZOLE 500 MG: 500 INJECTION, SOLUTION INTRAVENOUS at 10:11

## 2022-11-11 RX ADMIN — HYDROMORPHONE HYDROCHLORIDE 1 MG: 1 INJECTION, SOLUTION INTRAMUSCULAR; INTRAVENOUS; SUBCUTANEOUS at 02:11

## 2022-11-11 RX ADMIN — DIPHENHYDRAMINE HYDROCHLORIDE 15 ML: 12.5 LIQUID ORAL at 12:11

## 2022-11-11 RX ADMIN — NYSTATIN 500000 UNITS: 500000 SUSPENSION ORAL at 02:11

## 2022-11-11 RX ADMIN — VANCOMYCIN HYDROCHLORIDE 1000 MG: 1 INJECTION, POWDER, LYOPHILIZED, FOR SOLUTION INTRAVENOUS at 06:11

## 2022-11-11 RX ADMIN — CEFEPIME HYDROCHLORIDE 2 G: 2 INJECTION, SOLUTION INTRAVENOUS at 10:11

## 2022-11-11 RX ADMIN — OXYCODONE HYDROCHLORIDE AND ACETAMINOPHEN 1000 MG: 500 TABLET ORAL at 09:11

## 2022-11-11 RX ADMIN — THERA TABS 1 TABLET: TAB at 09:11

## 2022-11-11 RX ADMIN — CEFEPIME HYDROCHLORIDE 2 G: 2 INJECTION, SOLUTION INTRAVENOUS at 02:11

## 2022-11-11 RX ADMIN — ASPIRIN 81 MG CHEWABLE TABLET 81 MG: 81 TABLET CHEWABLE at 09:11

## 2022-11-11 RX ADMIN — SODIUM CHLORIDE 1000 ML: 0.9 INJECTION, SOLUTION INTRAVENOUS at 04:11

## 2022-11-11 RX ADMIN — ZINC SULFATE 220 MG (50 MG) CAPSULE 220 MG: CAPSULE at 09:11

## 2022-11-11 RX ADMIN — HYDROMORPHONE HYDROCHLORIDE 1 MG: 1 INJECTION, SOLUTION INTRAMUSCULAR; INTRAVENOUS; SUBCUTANEOUS at 06:11

## 2022-11-11 RX ADMIN — HYDROMORPHONE HYDROCHLORIDE 1 MG: 1 INJECTION, SOLUTION INTRAMUSCULAR; INTRAVENOUS; SUBCUTANEOUS at 10:11

## 2022-11-11 RX ADMIN — ENOXAPARIN SODIUM 40 MG: 40 INJECTION SUBCUTANEOUS at 04:11

## 2022-11-11 RX ADMIN — DIPHENHYDRAMINE HYDROCHLORIDE 15 ML: 12.5 LIQUID ORAL at 06:11

## 2022-11-11 RX ADMIN — OXYCODONE HYDROCHLORIDE 20 MG: 5 TABLET ORAL at 04:11

## 2022-11-11 RX ADMIN — SODIUM CHLORIDE, SODIUM LACTATE, POTASSIUM CHLORIDE, AND CALCIUM CHLORIDE: .6; .31; .03; .02 INJECTION, SOLUTION INTRAVENOUS at 09:11

## 2022-11-11 RX ADMIN — DIPHENHYDRAMINE HYDROCHLORIDE 15 ML: 12.5 LIQUID ORAL at 04:11

## 2022-11-11 RX ADMIN — NYSTATIN 500000 UNITS: 500000 SUSPENSION ORAL at 06:11

## 2022-11-11 RX ADMIN — METRONIDAZOLE 500 MG: 500 INJECTION, SOLUTION INTRAVENOUS at 02:11

## 2022-11-11 RX ADMIN — NYSTATIN 500000 UNITS: 500000 SUSPENSION ORAL at 10:11

## 2022-11-11 RX ADMIN — VANCOMYCIN HYDROCHLORIDE 1000 MG: 1 INJECTION, POWDER, LYOPHILIZED, FOR SOLUTION INTRAVENOUS at 05:11

## 2022-11-11 RX ADMIN — MUPIROCIN: 20 OINTMENT TOPICAL at 09:11

## 2022-11-11 RX ADMIN — OXYCODONE HYDROCHLORIDE 20 MG: 5 TABLET ORAL at 08:11

## 2022-11-11 RX ADMIN — OXYCODONE HYDROCHLORIDE 20 MG: 5 TABLET ORAL at 11:11

## 2022-11-11 NOTE — HOSPITAL COURSE
11/11/2022 POD 1 s/p I&D of perirectal ulcerations. Complaining of continued buttocks pain but improved since surgery. Having bowel movements, denies nausea, vomiting. Tolerating diet.

## 2022-11-11 NOTE — PLAN OF CARE
Interventions(treatment strategy):  1. Recommend pt PO diet texture be modified to mechanical soft IDDSI 5.   2. Recommend pt receives boost plus TID on tray with meals.   3. Recommend SLP be consulted to evaluate pt swallow difficulties.  4. Collaboration of care with medical providers.    Jose Ramon Bose Dietitian

## 2022-11-11 NOTE — HOSPITAL COURSE
11/11/22-Patient seen and examined today, resting in bed, s/p I & D of pj-rectal abscesses yesterday. Feels ok, pain improved. NO chest pain or SOB. Brief run of PSVT noted overnight, resolved s/p IV Lopressor. Discussed OP ischemic workup. Labs reviewed, K 3.2, being repleted.      11/12/2022 Lab reviewed stable. VSS. The tel showed PSVT episodes for o/n. The oncology rec. Hospice consult,

## 2022-11-11 NOTE — CONSULTS
[facility] - Telemetry ([facility])  Adult Nutrition  Consult Note    SUMMARY     Recommendations  1. Recommend pt PO diet texture be moified to mechanical soft IDDSI 5.   2. Recommend pt receives boost plus TID on tray with meals.   3. Recommend SLP be consulted to evaluate pt swallow difficulties.    Goals:   1. Pt PO diet texture will be modified within 24-48hrs   2. Pt will consume >80% of energy needs by RD follow up.  Nutrition Goal Status: new  Communication of RD Recs: reviewed with RN (POC: Sticky note)    Assessment and Plan  Nutrition Problem  Inadequate energy intake    Related to (etiology):   Increased energy needs    Signs and Symptoms (as evidenced by):   Cancer    Interventions(treatment strategy):  1. Recommend pt PO diet texture be moified to mechanical soft IDDSI 5.   2. Recommend pt receives boost plus TID on tray with meals.   3. Recommend SLP be consulted to evaluate pt swallow difficulties.  4. Collaboration of care with medical providers.    Nutrition Diagnosis Status:   New       Malnutrition Assessment  Weight Loss (Malnutrition): greater than 10% in 6 months   Severe Weight Loss (Malnutrition): greater than 10% in 6 months    Reason for Assessment    Reason For Assessment: consult, identified at risk by screening criteria  Diagnosis: infection/sepsis  Relevant Medical History: Alcoholism, GERD, Cancer, HTN, diarrhea  General Information Comments:   11/11: 67 y.o. male admitted for septic shock. NFPE not preformed, pt on contact insolation. Per chart review the pt has loss 26lbs in 7 months. >16% in 7months. Pt says that his appetite comes and goes, today he consumed 50% of his breakfast and 100% of his lunch. Pt states that he is having a hard time swallowing medication and foods. Pt states that his diarrhea is improving. Pt denies v/n, abdominal distention, and chewing difficulties. Will continue to monitor.  Nutrition Discharge Planning: General healthy diet    Nutrition Risk  "Screen    Nutrition Risk Screen: dysphagia or difficulty swallowing    Nutrition/Diet History    Food Preferences: Vanilla boost  Spiritual, Cultural Beliefs, Hoahaoism Practices, Values that Affect Care: no  Food Allergies: NKFA  Factors Affecting Nutritional Intake: altered gastrointestinal function, decreased appetite, diarrhea, difficulty/impaired swallowing, sore mouth    Anthropometrics    Temp: 97.4 °F (36.3 °C)  Height Method: Stated  Height: 5' 11" (180.3 cm)  Height (inches): 71 in  Weight Method: Bed Scale  Weight: 60.9 kg (134 lb 4.2 oz)  Weight (lb): 134.26 lb  Ideal Body Weight (IBW), Male: 172 lb  % Ideal Body Weight, Male (lb): 78.06 %  BMI (Calculated): 18.7  BMI Grade: 18.5-24.9 - normal       Lab/Procedures/Meds  CMP  Sodium   Date Value Ref Range Status   11/11/2022 136 136 - 145 mmol/L Final     Potassium   Date Value Ref Range Status   11/11/2022 3.2 (L) 3.5 - 5.1 mmol/L Final     Chloride   Date Value Ref Range Status   11/11/2022 97 95 - 110 mmol/L Final     CO2   Date Value Ref Range Status   11/11/2022 28 23 - 29 mmol/L Final     Glucose   Date Value Ref Range Status   11/11/2022 81 70 - 110 mg/dL Final     BUN   Date Value Ref Range Status   11/11/2022 2 (L) 8 - 23 mg/dL Final     Creatinine   Date Value Ref Range Status   11/11/2022 0.5 0.5 - 1.4 mg/dL Final     Calcium   Date Value Ref Range Status   11/11/2022 7.8 (L) 8.7 - 10.5 mg/dL Final     Total Protein   Date Value Ref Range Status   11/11/2022 4.2 (L) 6.0 - 8.4 g/dL Final     Albumin   Date Value Ref Range Status   11/11/2022 1.8 (L) 3.5 - 5.2 g/dL Final     Total Bilirubin   Date Value Ref Range Status   11/11/2022 0.4 0.1 - 1.0 mg/dL Final     Comment:     For infants and newborns, interpretation of results should be based  on gestational age, weight and in agreement with clinical  observations.    Premature Infant recommended reference ranges:  Up to 24 hours.............<8.0 mg/dL  Up to 48 hours............<12.0 mg/dL  3-5 " days..................<15.0 mg/dL  6-29 days.................<15.0 mg/dL       Alkaline Phosphatase   Date Value Ref Range Status   11/11/2022 81 55 - 135 U/L Final     AST   Date Value Ref Range Status   11/11/2022 10 10 - 40 U/L Final     ALT   Date Value Ref Range Status   11/11/2022 7 (L) 10 - 44 U/L Final     Anion Gap   Date Value Ref Range Status   11/11/2022 11 8 - 16 mmol/L Final     eGFR   Date Value Ref Range Status   11/11/2022 >60 >60 mL/min/1.73 m^2 Final       Pertinent Labs Reviewed: reviewed  Pertinent Medications Reviewed: reviewed  Scheduled Meds:   ascorbic acid (vitamin C)  1,000 mg Oral Daily    aspirin  81 mg Oral Daily    ceFEPime (MAXIPIME) IVPB  2 g Intravenous Q8H    magic mouthwash diphen/antac/lidoc  15 mL Swish & Spit QID (AC & HS)    enoxaparin  40 mg Subcutaneous Daily    fentaNYL  1 patch Transdermal Q72H    folic acid-vit B6-vit B12 2.5-25-2 mg  1 tablet Oral Daily    metoprolol tartrate  12.5 mg Oral Daily    multivitamin  1 tablet Oral Daily    mupirocin   Nasal BID    nystatin  500,000 Units Oral QID    pantoprazole  40 mg Intravenous Daily    vancomycin (VANCOCIN) IVPB  1,000 mg Intravenous Q12H    zinc sulfate  220 mg Oral Daily     Continuous Infusions:   lactated ringers 75 mL/hr at 11/11/22 0942     PRN Meds:.sodium chloride 0.9%, acetaminophen, albuterol-ipratropium, dextrose 10%, dextrose 10%, glucagon (human recombinant), glucose, glucose, HYDROmorphone, influenza 65up-adj, insulin aspart U-100, magnesium oxide, magnesium oxide, melatonin, metoprolol, naloxone, ondansetron, oxyCODONE, pneumococcal vaccine, potassium bicarbonate, potassium bicarbonate, potassium bicarbonate, potassium, sodium phosphates, potassium, sodium phosphates, potassium, sodium phosphates, prochlorperazine, simethicone, sodium chloride 0.9%, Pharmacy to dose Vancomycin consult **AND** vancomycin - pharmacy to dose      Estimated/Assessed Needs    Weight Used For Calorie Calculations: 60.9 kg (134 lb  4.2 oz)  Energy Calorie Requirements (kcal): 1522.5-1827 (25-30kcal/kg d/t Cancer)  Energy Need Method: Kcal/kg  Protein Requirements: 61-73 (1.0-1.2g/kg d/t cancer)  Weight Used For Protein Calculations: 60.9 kg (134 lb 4.2 oz)  Fluid Requirements (mL): 1522.5  Estimated Fluid Requirement Method: RDA Method  RDA Method (mL): 1522.5  CHO Requirement: 190      Nutrition Prescription Ordered    Current Diet Order: Regular    Evaluation of Received Nutrient/Fluid Intake    % Kcal Needs: 75  % Protein Needs: 75  I/O: +5576.9  Energy Calories Required: not meeting needs  Protein Required: not meeting needs  Fluid Required: meeting needs  Tolerance: not tolerating  % Intake of Estimated Energy Needs: 50 - 75 %  % Meal Intake: 75 - 100 %    Nutrition Risk    Level of Risk/Frequency of Follow-up: high       Monitor and Evaluation    Food and Nutrient Intake: energy intake, food and beverage intake  Food and Nutrient Adminstration: diet order  Knowledge/Beliefs/Attitudes: food and nutrition knowledge/skill, beliefs and attitudes  Anthropometric Measurements: weight, weight change, body mass index  Biochemical Data, Medical Tests and Procedures: electrolyte and renal panel, gastrointestinal profile, glucose/endocrine profile, inflammatory profile, lipid profile  Nutrition-Focused Physical Findings: overall appearance       Nutrition Follow-Up    RD Follow-up?: Yes  Jose Ramon Bose Dietitian

## 2022-11-11 NOTE — PLAN OF CARE
POC reviewed with pt. Pt verbalizes understanding of POC. No questions at this time.  AAOx4.   NSR with some intermittent tachycardia on cardiac monitor.  PRN Metoprolol 5mg IV given X 1.  Bolus also given for continued tachycardia.  Pt remains free of falls. Assistance needed with ADLs.  Family @ bedside  Medications given as ordered.  POCT glucose monitored.   PRN pain medication given per request.  No complaints or distress noted @ this time.  Safety measures in place. Will continue to monitor.  Informed pt to call for assistance before getting up. Pt verbalizes understanding.   Hourly rounding complete.

## 2022-11-11 NOTE — ASSESSMENT & PLAN NOTE
-Hold chemotherapy pending clinical course  -Will plan for outpatient f/u with Primary Oncologist to discuss ongoing management

## 2022-11-11 NOTE — PROGRESS NOTES
Progress Note   Palliative Medicine      SUBJECTIVE:     History of Present Illness:  Patient seen and examined and lying flat in bed in no acute distress. Patient denies shortness of breath but does report pain 7/10 to perirectal area now s/p perirectal,perineal and inguinal I&D on 11/10/22. He last received a dose of IV dilaudid at around 1000. During exam we spoke about his alex and concerns again and he noted that he is not ready to die yet. He stated that he wants to live until [place] and wants his new grand baby to know him. He is emotional during exam but noted with improvement in mood in comparison to exam on yesterday. He turned himself on his side during exam and noted that the surgeon said to try to stay off of his butt. He noted that he wants to remain compliant with surgeon's instructions in hopes of healing and recovering post surgery. He hopes to begin rehabilitating as soon as he is medically cleared with hopes of returning to his base line in function prior to his previous hospital stay.     In the last 24hrs the patient has used the following pain medications (7a-7a):  - oxycodone 20mg x 4  - Dilaudid 1mg IV x 4   - fentanyl 25mcg/ hr patch      Past Medical History:   Diagnosis Date    Alcoholism     Anemia     Back pain     Encounter for blood transfusion 2018    Essential hypertension 2/4/2016    GERD (gastroesophageal reflux disease)     Hiatal hernia     Hypertension     diet controlled    Melanoma 06/2021    left cheek    Rectal cancer 9/11/2019       Review of patient's allergies indicates:  No Known Allergies    Review of Symptoms      Symptom Assessment (ESAS 0-10 Scale)  Pain:  7  Dyspnea:  0  Anxiety:  0  Nausea:  0  Depression:  0  Anorexia:  0  Fatigue:  0  Insomnia:  0  Restlessness:  0  Agitation:  0     CAM / Delirium:  Negative  Diarrhea:  Positive    Anxiety:  Is not nervous/anxious  Constipation:  No constipation    Pain Assessment:    Location(s): buttock    Buttock        Location: generalized        Quality: None        Quantity: 7/10 in intensity        Chronicity: Onset 0 second(s) ago, stable        Aggravating Factors: None        Alleviating Factors: None        Associated Symptoms: None    Living Arrangements:  Lives with spouse    Psychosocial/Cultural: , one daughter from previous relationship    Advance Care Planning   Advance Directives:   Living Will: Yes        Copy on chart: Yes    [place]: Yes    Medical Power of : Yes    Goals of Care: What is most important right now is to focus on improvement in condition but with limits to invasive therapies. Accordingly, we have decided that the best plan to meet the patient's goals includes continuing with treatment.       ROS:  Review of Systems   Constitutional:  Positive for activity change and fatigue. Negative for chills and fever.   HENT:  Positive for mouth sores and trouble swallowing. Negative for sore throat.    Respiratory:  Negative for cough and shortness of breath.    Gastrointestinal:  Positive for diarrhea and rectal pain. Negative for abdominal pain, constipation, nausea and vomiting.   Genitourinary:  Negative for difficulty urinating and dysuria.   Musculoskeletal:  Positive for back pain. Negative for myalgias.   Skin:  Negative for rash and wound.   Allergic/Immunologic: Negative for immunocompromised state.   Neurological:  Positive for weakness. Negative for headaches.   Psychiatric/Behavioral:  Positive for dysphoric mood. Negative for confusion and sleep disturbance. The patient is not nervous/anxious.      OBJECTIVE:     Physical Exam:  Vitals: Temp: 98.2 °F (36.8 °C) (11/11/22 0722)  Pulse: 62 (11/11/22 0906)  Resp: 20 (11/11/22 1004)  BP: 124/62 (11/11/22 0722)  SpO2: 96 % (11/11/22 0906)    Physical Exam  Vitals reviewed.   Constitutional:       General: He is not in acute distress.     Appearance: Normal appearance. He is well-developed. He is ill-appearing. He is not toxic-appearing.    HENT:      Head: Normocephalic and atraumatic.      Mouth/Throat:      Lips: Lesions present.      Mouth: Oral lesions present.   Eyes:      Conjunctiva/sclera: Conjunctivae normal.      Comments: R ptosis   Cardiovascular:      Rate and Rhythm: Normal rate and regular rhythm.      Heart sounds: Normal heart sounds. No murmur heard.  Pulmonary:      Effort: Pulmonary effort is normal. No respiratory distress.      Breath sounds: Normal breath sounds.   Abdominal:      General: Bowel sounds are normal. There is no distension.      Palpations: Abdomen is soft.      Tenderness: There is no abdominal tenderness.   Musculoskeletal:         General: Normal range of motion.      Cervical back: Normal range of motion.      Right lower leg: No edema.      Left lower leg: No edema.   Skin:     General: Skin is warm and dry.      Findings: No rash.      Comments: Surgical sites covered w/ dresseings   Neurological:      Mental Status: He is alert and oriented to person, place, and time.      Cranial Nerves: No cranial nerve deficit.   Psychiatric:         Mood and Affect: Affect is tearful.         Speech: Speech normal.         Behavior: Behavior normal.         Judgment: Judgment normal.         ASSESSMENT   Vincent Benton is a 67 y.o. year old with a history of recurrent metastatic rectal cancer who presented to the ED shortly after being discharged from the hospital due to profound weakness. He was discharged the day prior after being admitted for mucositis. His last chemotherapy was 10/25 with plans to restage in a month. Initial workup indicated septic shock and perirectal abscesses. He was admitted to the hospital for further evaluation and treatment. He is well known to our service and we were consulted to assist with pain management. He also made comments about wanting to die and we were asked to explore this statement.      PLAN   Perirectal abscesses  - s/p EUA, perirectal,perineal and inguinal I&D  11/10/2022  - Continue current pain regimen for pain control     Mucositis  - Improved  - Continue oxycodone 20mg q4hr PRN pain     3. Neoplasm related pain superimposed on chronic back pain  - Continue fentanyl 25mcg/ hr patch q 72 hrs  - Continue oxycodone 20mg q4hr PRN pain     4. Recurrent metastatic rectal cancer  - Under the care of Dr. Tuttle  - Was due for another cycle of FOLFIRI plus Avastin this week. Plans to restage in a month  - No hepatic or pulmonary lesions noted on recent imaging. + response to treatment? Limited view in abdomen d/t lack of contrast?     5. Encounter for Palliative Care  - Code status: DNR/I- [place]POST on file  - HCPOA on file, 1: wife Akilah, 2: sister Fara  - Scheduled to see me 11/15 in clinic  - Poor social support so will likely need skilled nursing at discharge        Thank you for allowing Palliative Medicine to be involved in the care of Vincent Benton.    Medical decision making: HIGH based on high risk of death untreated symptoms high risk medications management of more than one chronic illness in exacerbation or progression of disease managing side effects of medications or polypharmacy parenteral opioids    Plan required increased review of medication choice, interaction, dosing, frequency, and route due to patient complexity. Patient complexity increased by: high risk medication use, being on more than 8 medications, advanced age, altered mentation, at risk for delirium, continuous use of opioids, NPO, and malnutrition    > 50% of 45 min visit spent in chart review, face to face discussion of goals of care, symptom assessment, coordination of care and emotional support, formulating and communicating prognosis and goals of care, exploring burden/ benefit of various approaches of treatment.       Ave Arechiga RN  Palliative Medicine

## 2022-11-11 NOTE — ASSESSMENT & PLAN NOTE
68yo M with perirectal ulcerations and sores with fluctuance on exam now s/p EUA, perirectal,perineal and inguinal I&D     - cont abx  - cont penrose drains  - cont local wound care, keep area as clean as possible  - rest of care per primary team

## 2022-11-11 NOTE — SUBJECTIVE & OBJECTIVE
Interval History: no acute events overnight. Improved pain.     Medications:  Continuous Infusions:   lactated ringers 75 mL/hr at 11/11/22 0942     Scheduled Meds:   ascorbic acid (vitamin C)  1,000 mg Oral Daily    aspirin  81 mg Oral Daily    ceFEPime (MAXIPIME) IVPB  2 g Intravenous Q8H    magic mouthwash diphen/antac/lidoc  15 mL Swish & Spit QID (AC & HS)    enoxaparin  40 mg Subcutaneous Daily    fentaNYL  1 patch Transdermal Q72H    folic acid-vit B6-vit B12 2.5-25-2 mg  1 tablet Oral Daily    metronidazole  500 mg Intravenous Q8H    multivitamin  1 tablet Oral Daily    mupirocin   Nasal BID    nystatin  500,000 Units Oral QID    pantoprazole  40 mg Intravenous Daily    vancomycin (VANCOCIN) IVPB  1,000 mg Intravenous Q12H    zinc sulfate  220 mg Oral Daily     PRN Meds:sodium chloride 0.9%, acetaminophen, albuterol-ipratropium, dextrose 10%, dextrose 10%, glucagon (human recombinant), glucose, glucose, HYDROmorphone, influenza 65up-adj, insulin aspart U-100, magnesium oxide, magnesium oxide, melatonin, metoprolol, naloxone, ondansetron, oxyCODONE, pneumococcal vaccine, potassium bicarbonate, potassium bicarbonate, potassium bicarbonate, potassium, sodium phosphates, potassium, sodium phosphates, potassium, sodium phosphates, prochlorperazine, simethicone, sodium chloride 0.9%, Pharmacy to dose Vancomycin consult **AND** vancomycin - pharmacy to dose     Review of patient's allergies indicates:  No Known Allergies  Objective:     Vital Signs (Most Recent):  Temp: 98.2 °F (36.8 °C) (11/11/22 0722)  Pulse: 62 (11/11/22 0906)  Resp: 20 (11/11/22 1004)  BP: 124/62 (11/11/22 0722)  SpO2: 96 % (11/11/22 0906) Vital Signs (24h Range):  Temp:  [97.5 °F (36.4 °C)-99.2 °F (37.3 °C)] 98.2 °F (36.8 °C)  Pulse:  [] 62  Resp:  [12-23] 20  SpO2:  [90 %-99 %] 96 %  BP: ()/(62-90) 124/62     Weight: 60.9 kg (134 lb 4.2 oz)  Body mass index is 18.73 kg/m².    Intake/Output - Last 3 Shifts         11/09 0700  11/10  0659 11/10 0700  11/11 0659 11/11 0700  11/12 0659    P.O. 120 300     I.V. (mL/kg) 1072 (17.6)      IV Piggyback 1065.5 800     Total Intake(mL/kg) 2257.5 (37.1) 1100 (18.1)     Urine (mL/kg/hr) 1150 (0.8) 100 (0.1)     Stool 0 0     Total Output 1150 100     Net +1107.5 +1000            Urine Occurrence 1 x      Stool Occurrence 1 x 2 x             Physical Exam  Exam conducted with a chaperone present.   Constitutional:       Appearance: He is well-developed. He is ill-appearing.      Comments: cachectic   HENT:      Head: Normocephalic and atraumatic.   Eyes:      Conjunctiva/sclera: Conjunctivae normal.   Neck:      Thyroid: No thyromegaly.   Cardiovascular:      Rate and Rhythm: Normal rate and regular rhythm.   Pulmonary:      Effort: Pulmonary effort is normal. No respiratory distress.   Abdominal:      General: There is no distension.      Palpations: Abdomen is soft. There is no mass.      Tenderness: There is no abdominal tenderness.   Genitourinary:     Comments: Perianal/perineal and inguinal I&D sites without ongoing purulence, packing removed; penrose drains remain in place  Musculoskeletal:         General: No tenderness. Normal range of motion.      Cervical back: Normal range of motion.   Skin:     General: Skin is warm and dry.      Capillary Refill: Capillary refill takes less than 2 seconds.      Findings: No rash.   Neurological:      Mental Status: He is alert and oriented to person, place, and time.       Significant Labs:  I have reviewed all pertinent lab results within the past 24 hours.  CBC:   Recent Labs   Lab 11/11/22 0337   WBC 6.45   RBC 3.81*   HGB 10.3*   HCT 32.0*      MCV 84   MCH 27.0   MCHC 32.2     BMP:   Recent Labs   Lab 11/11/22 0337   GLU 81      K 3.2*   CL 97   CO2 28   BUN 2*   CREATININE 0.5   CALCIUM 7.8*   MG 1.8       Significant Diagnostics:  I have reviewed all pertinent imaging results/findings within the past 24 hours.

## 2022-11-11 NOTE — PROGRESS NOTES
[facility] ([facility]  Progress Note    Patient Name: Vincent Benton  MRN: [id number]  Patient Class: IP- Inpatient   Admission Date: 11/8/2022  Length of Stay: 3 days  Attending Physician: Lauren Shah, *  Primary Care Provider: Shakila Moran DO        Subjective:     Principal Problem:Septic shock        HPI:  The patient is a 66 yo male with rectal adenocarcinoma stage IV on palliative chemotherapy-last tx 10/25/22, GERD, HTN, Alcoholism, Diarrhea since staring Chemo, and recent hospitalization for severe mucositis and wounds to buttocks who presented to ED after being found nearly unresponsive covered in diarrhea with BP 60/42. Per ED records, the spouse also reported severe generalized weakness, worsening diarrhea, worsening perirectal wounds with pain. BP improved with IVFs    In the ED, BP 90/60, O2sat 93%, WBC britni- mild left shift, LA 3.0. Troponin 0.07>0.101. UA +2 ketones.   While in the ED, HR elevated to 150s. IV Cardizem 20mg x 2 given. EKG showed Sinus tachycardia-rate 159, IRBBB, ST wave abnormalities   CT abd/pelvis showed LLL Pneumonia, possible small perirectal abscess.   3 liters IVF bolus given. SBP still dropping to 80s, -140s at times     ED provider discussed pt status and findings with Cardiology and General surgery who will see pt in consult.   Discussed ICU care and possible CVC/pressor support with pt- he stated that he is ready to die. Attempted to call spouse but no answer. Discussed CVC/IV Pressors with ED provider. Dr. Hawthorne discussed this with the pt's spouse, Akilah, before she left the ED. She wants these procedures done if needed     DNR/I- LaPOST on file, HCPOA on file, 1: wife Akilah, 2: sister Fara      Overview/Hospital Course:  11/9:  Examination done at bedside, patient alert, complaining of persistent pain.  Expressing concerns lorene he might die soon, however still wants things to be done, sister at bedside expressing concerns of stage  IV cancer, recurrent hospitalizations- will follow-up with palliative team.   Follow-up with general surgery for perirectal abscess-will continue broad-spectrum antibiotics, monitored blood pressure and consider pressors as needed to maintain map above 65;  Follow-up with wound care   Cardiology evaluated and recommended possible ischemic workup once patient says is more stable.       11/10:    Examination done at bedside, patient alert, complaining of persistent pain --> on pain regimen as per palliative;  Scheduled for I&D by General surgery for perirectal abscess, on broad-spectrum antibiotics, cultures from wound showing Gram-negative rods, Staph aureus, pending susceptibility, ID follow-up.    Afebrile, hemodynamically stable.    Cardio on board, considering ischemic workup once stable;   11/11   Examination done at bedside, denied acute issues.    Status post I&D day 1, general surgeon on board.  Aerobic cultures resulted that showed MRSA, Klebsiella, E coli--> will follow up with ID for final recommendations.    Cardiology on board, recommended outpatient ischemic workup, outpatient Holter monitoring, ordered low-dose Lopressor.    Palliative on board, appreciate recommendations;     Heme-Onc on board, recommended outpatient follow-up with patient's oncologist for ongoing management.            Review of Systems    Constitutional: Positive for malaise/fatigue.   HENT: Negative.     Eyes: Negative.    Cardiovascular: Negative.    Respiratory: Negative.     Endocrine: Negative.    Hematologic/Lymphatic: Negative.    Skin: Negative.    Musculoskeletal:  Positive for joint pain.   Gastrointestinal:   Genitourinary: Negative.         Rectal pain   Neurological: Negative.    Psychiatric/Behavioral: Negative.     Allergic/Immunologic: Negative.   Objective:     Vital Signs (Most Recent):  Temp: 97.4 °F (36.3 °C) (11/11/22 1426)  Pulse: 68 (11/11/22 1426)  Resp: 20 (11/11/22 1429)  BP: (!) 147/77 (11/11/22  1426)  SpO2: 96 % (11/11/22 1426)   Vital Signs (24h Range):  Temp:  [97.4 °F (36.3 °C)-99.2 °F (37.3 °C)] 97.4 °F (36.3 °C)  Pulse:  [] 68  Resp:  [16-20] 20  SpO2:  [95 %-97 %] 96 %  BP: ()/(62-82) 147/77     Weight: 60.9 kg (134 lb 4.2 oz)  Body mass index is 18.73 kg/m².    Intake/Output Summary (Last 24 hours) at 11/11/2022 1627  Last data filed at 11/10/2022 1700  Gross per 24 hour   Intake 120 ml   Output --   Net 120 ml      Physical Exam    Constitutional:       General: He is not in acute distress.     Appearance: He is well-developed. He is not diaphoretic.      Comments: On supplemental O2   HENT:      Head: Normocephalic and atraumatic.   Eyes:      General:         Right eye: No discharge.         Left eye: No discharge.      Pupils: Pupils are equal, round, and reactive to light.   Neck:      Thyroid: No thyromegaly.      Vascular: No JVD.      Trachea: No tracheal deviation.   Cardiovascular:      Rate and Rhythm: Normal rate and regular rhythm.      Heart sounds: Normal heart sounds, S1 normal and S2 normal. No murmur heard.  Pulmonary:      Effort: Pulmonary effort is normal. No respiratory distress.      Breath sounds: Normal breath sounds. No wheezing or rales.   Abdominal:      General: There is no distension.      Tenderness: There is no rebound.   Musculoskeletal:      Cervical back: Neck supple.      Right lower leg: No edema.      Left lower leg: No edema.   Skin:     General: Skin is warm and dry.      Findings: No erythema.   Neurological:      Mental Status: He is alert and oriented to person, place, and time.   Psychiatric:         Mood and Affect: Mood normal.         Behavior: Behavior normal.      Comments: Anxious/crying in pain    Significant Labs: All pertinent labs within the past 24 hours have been reviewed.  CBC:   Recent Labs   Lab 11/10/22  0536 11/11/22  0337   WBC 4.89 6.45   HGB 10.1* 10.3*   HCT 31.3* 32.0*    306     CMP:   Recent Labs   Lab  11/10/22  0536 11/11/22  0337    136   K 3.9 3.2*    97   CO2 26 28   GLU 72 81   BUN 3* 2*   CREATININE 0.5 0.5   CALCIUM 7.8* 7.8*   PROT 4.5* 4.2*   ALBUMIN 1.9* 1.8*   BILITOT 0.4 0.4   ALKPHOS 83 81   AST 11 10   ALT 11 7*   ANIONGAP 8 11       Significant Imaging:     Imaging Results              CTA Chest Non-Coronary (PE Studies) (Final result)  Result time 11/08/22 23:05:39      Final result by Chato Osei MD (11/08/22 23:05:39)                   Impression:      No pulmonary embolism.  No dissection.    Patchy areas of nodular opacities throughout the lung upper lung zone with more reticular subpleural consolidation in the bilateral lower lobes left greater than right consistent with multifocal pneumonia.    Remaining findings as above    All CT scans   are performed using dose optimization techniques including the following: automated exposure control; adjustment of the mA and/or kV; use of iterative reconstruction technique.  Dose modulation was employed for ALARA by means of: Automated exposure control; adjustment of the mA and/or kV according to patient size (this includes techniques or standardized protocols for targeted exams where dose is matched to indication/reason for exam; i.e. extremities or head); and/or use of iterative reconstructive technique.      Electronically signed by: Sea Reis  Date:    11/08/2022  Time:    23:05               Narrative:    EXAMINATION:  CTA CHEST NON CORONARY (PE STUDIES)    CLINICAL HISTORY:  Pulmonary embolism (PE) suspected, unknown D-dimer;    TECHNIQUE:  Low dose axial images, sagittal and coronal reformations were obtained from the thoracic inlet to the lung bases following the IV administration of 100 mL of Omnipaque 350.  Contrast timing was optimized to evaluate the pulmonary arteries.  MIP images were performed.    COMPARISON:  None    FINDINGS:  No evidence for pulmonary embolism.  No evidence for aortic dissection or aneurysm.  Patchy  areas of nodular opacities throughout the lung upper lung zone with more reticular subpleural consolidation in the bilateral lower lobes left greater than right consistent with multifocal pneumonia.  Ectatic aorta measuring up to 3.8 cm.  Mild thickening of the gastric mucosa.  Right-sided port.  Question subcutaneous sebaceous cyst in the left posterior subcutaneous fat.  Correlate to physical exam.  Slight ascites adjacent to the spleen.  Small pericardial effusion.                                       CT Abdomen Pelvis  Without Contrast (Final result)  Result time 11/08/22 21:02:23      Final result by Chato Osei MD (11/08/22 21:02:23)                   Impression:      Relatively thick walled fluid collection in the pre sacral space situated between the rectosigmoid colon and the distal sacrum measuring 18 x 40 x 45 mm suggestive of abscess.    In the Annalisa - buttock region adjacent to the left gluteal cleft there is a slight nodular appearance in the subcutaneous region measuring up to 12-14 mm may relate to a boil or sebaceous cyst.  Attention on follow-up and correlate to physical exam    Heterogeneous reticular alveolar opacity in the left posterior lung base consistent with pneumonia.    Postsurgical changes of the bowel    Senescent changes not otherwise specified    Chronic deformity of the left hip status post prosthesis removal    Atherosclerotic disease    All CT scans   are performed using dose optimization techniques including the following: automated exposure control; adjustment of the mA and/or kV; use of iterative reconstruction technique.  Dose modulation was employed for ALARA by means of: Automated exposure control; adjustment of the mA and/or kV according to patient size (this includes techniques or standardized protocols for targeted exams where dose is matched to indication/reason for exam; i.e. extremities or head); and/or use of iterative reconstructive technique.      Electronically  signed by: Sea Bhati  Date:    11/08/2022  Time:    21:02               Narrative:    EXAMINATION:  CT ABDOMEN PELVIS WITHOUT CONTRAST    CLINICAL HISTORY:  Abdominal abscess/infection suspected;    TECHNIQUE:  Low dose axial images, sagittal and coronal reformations were obtained from the lung bases to the pubic symphysis, 30 mL of oral Omnipaque 350 was administered..    COMPARISON:  None    FINDINGS:  Heart: Normal in size as far as seen.  No pericardial effusion as far seen.    Lung Bases: Left basilar reticular alveolar opacities consistent with left basilar pneumonia.    Liver: Normal in size and attenuation, with no focal hepatic lesions.    Gallbladder: No calcified gallstones.    Bile Ducts: No evidence of dilated ducts.    Pancreas: No mass or peripancreatic fat stranding.    Spleen: Unremarkable.    Adrenals: Unremarkable.    Kidneys/ Ureters: Unremarkable.    Bladder: No evidence of wall thickening.    Reproductive organs: Unremarkable.    GI Tract/Mesentery: No evidence of bowel obstruction or inflammation. No secondary signs of appendicitis.  Relatively thick walled fluid collection in the pre sacral space situated between the rectosigmoid colon and the distal sacrum measuring 18 x 40 x 45 mm suggestive of abscess.  There isn't adjacent hyperdensity suggestive of possible postsurgical changes.  No other sizable abnormal fluid collection adjacent to the perirectal region.  Nodular appearance in the left Annalisa- buttock adjacent to the cleft measures up to 14 mm may relate to a sebaceous cyst or a boil.  Attention on follow-up.  Surgical sutures in the right lower quadrant bowel loop suspected.    Peritoneal Space: No ascites. No free air.    Retroperitoneum: No significant adenopathy.    Abdominal wall: Unremarkable.    Vasculature: No aneurysm.    Bones: Deformity of the left hip with tracks from prior surgical fixation noted.                                       X-Ray Chest AP Portable (Final  result)  Result time 11/08/22 18:49:35      Final result by Chato Osei MD (11/08/22 18:49:35)                   Impression:      As above      Electronically signed by: Sea Reis  Date:    11/08/2022  Time:    18:49               Narrative:    EXAMINATION:  XR CHEST AP PORTABLE    CLINICAL HISTORY:  Chest Pain;    TECHNIQUE:  Single frontal view of the chest was performed.    COMPARISON:  Prior    FINDINGS:  Right-sided chest port.Prominent cardiac silhouette.  Mild perihilar interstitial opacities may relate to trace pulmonary edema.  No segmental consolidation pleural effusion pneumothorax.  Old rib deformities left posterolateral ribs.  Similar findings to prior exam    Bones are intact.                                         Assessment/Plan:      * Septic shock  This patient does have evidence of infective focus  My overall impression is septic shock.  Source: IV Cefepime and Vanco   Antibiotics given-   Antibiotics (From admission, onward)      Start     Stop Route Frequency Ordered    11/09/22 1430  cefepime in dextrose 5 % IVPB 2 g         -- IV Every 8 hours (non-standard times) 11/09/22 1224    11/09/22 1400  vancomycin in dextrose 5 % 1 gram/250 mL IVPB 1,000 mg         -- IV Every 12 hours (non-standard times) 11/09/22 0238    11/09/22 1100  mupirocin 2 % ointment  (DECOLONIZATION PROTOCOL ORDERS)         11/14 0859 Nasl 2 times daily 11/09/22 0903    11/09/22 0145  metronidazole IVPB 500 mg         -- IV Every 8 hours (non-standard times) 11/09/22 0037    11/08/22 2301  vancomycin - pharmacy to dose  (vancomycin IVPB)        See Hyperspace for full Linked Orders Report.    -- IV pharmacy to manage frequency 11/08/22 2201          Latest lactate reviewed-  Recent Labs   Lab 11/08/22  2129 11/09/22  0451 11/09/22  0820   LACTATE 1.3 3.1* 1.4     Organ dysfunction indicated by encephalopathy    Shock with decreased perfusion noted, Fluid challenge  was given at 30cc/kg    Post- resuscitation  assessment- (Done after fluids given for shock)  Vital signs post fluid administrations were-  Temp Readings from Last 1 Encounters:   11/10/22 98.3 °F (36.8 °C) (Oral)     BP Readings from Last 1 Encounters:   11/10/22 (!) 149/85     Pulse Readings from Last 1 Encounters:   11/10/22 66       Perfusion exam was performed within 6 hours of septic shock presentation after bolus shows Adequate tissue perfusion assessed by non-invasive monitoring    Source control achieved by: IV Vanco and Cefepime         Elevated troponin  EKG did not show any findings of ST elevation  Cardiology evaluated and recommended possible ischemic workup once patient says is more stable.       Diarrhea  Check stools for Cdiff  Stool cultureshowed No growth  And Stoool for Cdiff was negative on 11/1/22 11/9:    C diff -ve;       Perirectal wounds with abscess  Very small abscesses   Consult general surgery and wound care  IV Vanco and Cefepime  Wound cultures     11/10:  For I and d today; gen surg on board   Antibiotics  ID consult      Pneumonia  IV Cefepime/vanco  Blood cultures       Tachycardia  Likely 2/2 Sepsis and severe dehydration   Check CTA chest to r/o PE  IV Cardizem x 2 doses given in ED.   Cardiology recommended IV Cardizem drip but BP too low   IV Lopressor prn HR>120    11/10:  HR wnl      Immunocompromised patient  2/2 chemo      Severe malnutrition  Nutrition consulted. Most recent weight and BMI monitored-   Measurements:  Wt Readings from Last 1 Encounters:   11/08/22 58.4 kg (128 lb 12.8 oz)   Body mass index is 17.96 kg/m².    Recommendations:    Patient has been screened and assessed by RD. RD will follow patient.      Oral candidiasis        Mucositis due to antineoplastic therapy  Cont Magic mouth wash       Encounter for palliative care  DNR      Rectal cancer  palliative care followed pt for pain control   Consider hospice       Neoplasm related pain  Cont po meds if BP tolerates     11/10:  Pain regimen as per  palliative        VTE Risk Mitigation (From admission, onward)           Ordered     enoxaparin injection 40 mg  Daily         11/08/22 2159     IP VTE HIGH RISK PATIENT  Once         11/08/22 2159     Place sequential compression device  Until discontinued         11/08/22 2159                    Discharge Planning   KERRIE:      Code Status: DNR   Is the patient medically ready for discharge?:     Reason for patient still in hospital (select all that apply): ID f/u; CM for SNF placement;   Discharge Plan A: Home Health                  [facility] Luma Shah MD  Department of Hospital Medicine   '[facility] - Telemetry ([facility])

## 2022-11-11 NOTE — SUBJECTIVE & OBJECTIVE
Review of Systems    Constitutional: Positive for malaise/fatigue.   HENT: Negative.     Eyes: Negative.    Cardiovascular: Negative.    Respiratory: Negative.     Endocrine: Negative.    Hematologic/Lymphatic: Negative.    Skin: Negative.    Musculoskeletal:  Positive for joint pain.   Gastrointestinal:   Genitourinary: Negative.         Rectal pain   Neurological: Negative.    Psychiatric/Behavioral: Negative.     Allergic/Immunologic: Negative.   Objective:     Vital Signs (Most Recent):  Temp: 97.4 °F (36.3 °C) (11/11/22 1426)  Pulse: 68 (11/11/22 1426)  Resp: 20 (11/11/22 1429)  BP: (!) 147/77 (11/11/22 1426)  SpO2: 96 % (11/11/22 1426)   Vital Signs (24h Range):  Temp:  [97.4 °F (36.3 °C)-99.2 °F (37.3 °C)] 97.4 °F (36.3 °C)  Pulse:  [] 68  Resp:  [16-20] 20  SpO2:  [95 %-97 %] 96 %  BP: ()/(62-82) 147/77     Weight: 60.9 kg (134 lb 4.2 oz)  Body mass index is 18.73 kg/m².    Intake/Output Summary (Last 24 hours) at 11/11/2022 1627  Last data filed at 11/10/2022 1700  Gross per 24 hour   Intake 120 ml   Output --   Net 120 ml      Physical Exam    Constitutional:       General: He is not in acute distress.     Appearance: He is well-developed. He is not diaphoretic.      Comments: On supplemental O2   HENT:      Head: Normocephalic and atraumatic.   Eyes:      General:         Right eye: No discharge.         Left eye: No discharge.      Pupils: Pupils are equal, round, and reactive to light.   Neck:      Thyroid: No thyromegaly.      Vascular: No JVD.      Trachea: No tracheal deviation.   Cardiovascular:      Rate and Rhythm: Normal rate and regular rhythm.      Heart sounds: Normal heart sounds, S1 normal and S2 normal. No murmur heard.  Pulmonary:      Effort: Pulmonary effort is normal. No respiratory distress.      Breath sounds: Normal breath sounds. No wheezing or rales.   Abdominal:      General: There is no distension.      Tenderness: There is no rebound.   Musculoskeletal:       Cervical back: Neck supple.      Right lower leg: No edema.      Left lower leg: No edema.   Skin:     General: Skin is warm and dry.      Findings: No erythema.   Neurological:      Mental Status: He is alert and oriented to person, place, and time.   Psychiatric:         Mood and Affect: Mood normal.         Behavior: Behavior normal.      Comments: Anxious/crying in pain    Significant Labs: All pertinent labs within the past 24 hours have been reviewed.  CBC:   Recent Labs   Lab 11/10/22  0536 11/11/22  0337   WBC 4.89 6.45   HGB 10.1* 10.3*   HCT 31.3* 32.0*    306     CMP:   Recent Labs   Lab 11/10/22  0536 11/11/22  0337    136   K 3.9 3.2*    97   CO2 26 28   GLU 72 81   BUN 3* 2*   CREATININE 0.5 0.5   CALCIUM 7.8* 7.8*   PROT 4.5* 4.2*   ALBUMIN 1.9* 1.8*   BILITOT 0.4 0.4   ALKPHOS 83 81   AST 11 10   ALT 11 7*   ANIONGAP 8 11       Significant Imaging:     Imaging Results              CTA Chest Non-Coronary (PE Studies) (Final result)  Result time 11/08/22 23:05:39      Final result by Chato Osei MD (11/08/22 23:05:39)                   Impression:      No pulmonary embolism.  No dissection.    Patchy areas of nodular opacities throughout the lung upper lung zone with more reticular subpleural consolidation in the bilateral lower lobes left greater than right consistent with multifocal pneumonia.    Remaining findings as above    All CT scans   are performed using dose optimization techniques including the following: automated exposure control; adjustment of the mA and/or kV; use of iterative reconstruction technique.  Dose modulation was employed for ALARA by means of: Automated exposure control; adjustment of the mA and/or kV according to patient size (this includes techniques or standardized protocols for targeted exams where dose is matched to indication/reason for exam; i.e. extremities or head); and/or use of iterative reconstructive technique.      Electronically signed  by: Sea Reis  Date:    11/08/2022  Time:    23:05               Narrative:    EXAMINATION:  CTA CHEST NON CORONARY (PE STUDIES)    CLINICAL HISTORY:  Pulmonary embolism (PE) suspected, unknown D-dimer;    TECHNIQUE:  Low dose axial images, sagittal and coronal reformations were obtained from the thoracic inlet to the lung bases following the IV administration of 100 mL of Omnipaque 350.  Contrast timing was optimized to evaluate the pulmonary arteries.  MIP images were performed.    COMPARISON:  None    FINDINGS:  No evidence for pulmonary embolism.  No evidence for aortic dissection or aneurysm.  Patchy areas of nodular opacities throughout the lung upper lung zone with more reticular subpleural consolidation in the bilateral lower lobes left greater than right consistent with multifocal pneumonia.  Ectatic aorta measuring up to 3.8 cm.  Mild thickening of the gastric mucosa.  Right-sided port.  Question subcutaneous sebaceous cyst in the left posterior subcutaneous fat.  Correlate to physical exam.  Slight ascites adjacent to the spleen.  Small pericardial effusion.                                       CT Abdomen Pelvis  Without Contrast (Final result)  Result time 11/08/22 21:02:23      Final result by Chato Osei MD (11/08/22 21:02:23)                   Impression:      Relatively thick walled fluid collection in the pre sacral space situated between the rectosigmoid colon and the distal sacrum measuring 18 x 40 x 45 mm suggestive of abscess.    In the Annalisa - buttock region adjacent to the left gluteal cleft there is a slight nodular appearance in the subcutaneous region measuring up to 12-14 mm may relate to a boil or sebaceous cyst.  Attention on follow-up and correlate to physical exam    Heterogeneous reticular alveolar opacity in the left posterior lung base consistent with pneumonia.    Postsurgical changes of the bowel    Senescent changes not otherwise specified    Chronic deformity of the left  hip status post prosthesis removal    Atherosclerotic disease    All CT scans   are performed using dose optimization techniques including the following: automated exposure control; adjustment of the mA and/or kV; use of iterative reconstruction technique.  Dose modulation was employed for ALARA by means of: Automated exposure control; adjustment of the mA and/or kV according to patient size (this includes techniques or standardized protocols for targeted exams where dose is matched to indication/reason for exam; i.e. extremities or head); and/or use of iterative reconstructive technique.      Electronically signed by: Sea Reis  Date:    11/08/2022  Time:    21:02               Narrative:    EXAMINATION:  CT ABDOMEN PELVIS WITHOUT CONTRAST    CLINICAL HISTORY:  Abdominal abscess/infection suspected;    TECHNIQUE:  Low dose axial images, sagittal and coronal reformations were obtained from the lung bases to the pubic symphysis, 30 mL of oral Omnipaque 350 was administered..    COMPARISON:  None    FINDINGS:  Heart: Normal in size as far as seen.  No pericardial effusion as far seen.    Lung Bases: Left basilar reticular alveolar opacities consistent with left basilar pneumonia.    Liver: Normal in size and attenuation, with no focal hepatic lesions.    Gallbladder: No calcified gallstones.    Bile Ducts: No evidence of dilated ducts.    Pancreas: No mass or peripancreatic fat stranding.    Spleen: Unremarkable.    Adrenals: Unremarkable.    Kidneys/ Ureters: Unremarkable.    Bladder: No evidence of wall thickening.    Reproductive organs: Unremarkable.    GI Tract/Mesentery: No evidence of bowel obstruction or inflammation. No secondary signs of appendicitis.  Relatively thick walled fluid collection in the pre sacral space situated between the rectosigmoid colon and the distal sacrum measuring 18 x 40 x 45 mm suggestive of abscess.  There isn't adjacent hyperdensity suggestive of possible postsurgical changes.   No other sizable abnormal fluid collection adjacent to the perirectal region.  Nodular appearance in the left Annalisa- buttock adjacent to the cleft measures up to 14 mm may relate to a sebaceous cyst or a boil.  Attention on follow-up.  Surgical sutures in the right lower quadrant bowel loop suspected.    Peritoneal Space: No ascites. No free air.    Retroperitoneum: No significant adenopathy.    Abdominal wall: Unremarkable.    Vasculature: No aneurysm.    Bones: Deformity of the left hip with tracks from prior surgical fixation noted.                                       X-Ray Chest AP Portable (Final result)  Result time 11/08/22 18:49:35      Final result by Chato Osei MD (11/08/22 18:49:35)                   Impression:      As above      Electronically signed by: Sea Reis  Date:    11/08/2022  Time:    18:49               Narrative:    EXAMINATION:  XR CHEST AP PORTABLE    CLINICAL HISTORY:  Chest Pain;    TECHNIQUE:  Single frontal view of the chest was performed.    COMPARISON:  Prior    FINDINGS:  Right-sided chest port.Prominent cardiac silhouette.  Mild perihilar interstitial opacities may relate to trace pulmonary edema.  No segmental consolidation pleural effusion pneumothorax.  Old rib deformities left posterolateral ribs.  Similar findings to prior exam    Bones are intact.

## 2022-11-11 NOTE — PROGRESS NOTES
Pharmacokinetic Assessment Follow Up: IV Vancomycin    Vancomycin serum concentration assessment(s):    The trough level was drawn correctly and can be used to guide therapy at this time. The measurement is within the desired definitive target range of 10 to 15 mcg/mL.    Vancomycin Regimen Plan:    Continue regimen to Vancomycin 1000 mg IV every 12 hours with next serum trough concentration measured at 0400 prior to 5th dose on 11/13    Drug levels (last 3 results):  Recent Labs   Lab Result Units 11/11/22  0359   Vancomycin-Trough ug/mL 11.4       Pharmacy will continue to follow and monitor vancomycin.    Please contact pharmacy at extension [phone] for questions regarding this assessment.    Thank you for the consult,   Belgica Odell       Patient brief summary:  Vincent Benton is a 67 y.o. male initiated on antimicrobial therapy with IV Vancomycin for treatment of skin & soft tissue infection    The patient's current regimen is vancomycin 1000 mg IV every 12 hours.     Drug Allergies:   Review of patient's allergies indicates:  No Known Allergies    Actual Body Weight:   60.9 kg    Renal Function:   Estimated Creatinine Clearance: 126.9 mL/min (based on SCr of 0.5 mg/dL).,     Dialysis Method (if applicable):  N/A    CBC (last 72 hours):  Recent Labs   Lab Result Units 11/08/22  1818 11/09/22  0451 11/10/22  0536 11/11/22  0337   WBC K/uL 6.63 5.28 4.89 6.45   Hemoglobin g/dL 13.1* 11.3* 10.1* 10.3*   Hematocrit % 41.9 37.7* 31.3* 32.0*   Platelets K/uL 244 248 276 306   Gran % % 74.9*  --   --   --    Lymph % % 14.3*  --   --   --    Mono % % 7.8  --   --   --    Eosinophil % % 0.2  --   --   --    Basophil % % 0.5  --   --   --    Differential Method  Automated  --   --   --        Metabolic Panel (last 72 hours):  Recent Labs   Lab Result Units 11/08/22 1818 11/08/22  2151 11/09/22 0451 11/09/22  1444 11/10/22  0536 11/11/22  0337   Sodium mmol/L 139  --  141  --  136 136   Potassium mmol/L 3.5  --   3.3* 3.4* 3.9 3.2*   Chloride mmol/L 100  --  104  --  102 97   CO2 mmol/L 21*  --  19*  --  26 28   Glucose mg/dL 71  --  58*  --  72 81   Glucose, UA   --  Negative  --   --   --   --    BUN mg/dL 6*  --  5*  --  3* 2*   Creatinine mg/dL 0.6  --  0.5  --  0.5 0.5   Albumin g/dL 2.3*  --  2.0*  --  1.9* 1.8*   Total Bilirubin mg/dL 0.6  --  0.4  --  0.4 0.4   Alkaline Phosphatase U/L 110  --  97  --  83 81   AST U/L 13  --  13  --  11 10   ALT U/L 10  --  12  --  11 7*   Magnesium mg/dL  --   --  1.8  --   --  1.8   Phosphorus mg/dL  --   --  3.0  --   --   --        Vancomycin Administrations:  vancomycin given in the last 96 hours                     vancomycin in dextrose 5 % 1 gram/250 mL IVPB 1,000 mg (mg) 1,000 mg New Bag 11/10/22 1709     1,000 mg New Bag  0400     1,000 mg New Bag 11/09/22 1549    vancomycin 1.5 g in dextrose 5 % 250 mL IVPB (ready to mix) (mg) 1,500 mg New Bag 11/09/22 0156                    Microbiologic Results:  Microbiology Results (last 7 days)       Procedure Component Value Units Date/Time    Aerobic culture [[id number]] Collected: 11/10/22 1313    Order Status: Sent Specimen: Abscess from Perineum Updated: 11/10/22 2118    Culture, Anaerobe [[id number]] Collected: 11/10/22 1313    Order Status: Sent Specimen: Abscess from Perineum Updated: 11/10/22 2118    Aerobic culture (Specify Source) **CANNOT BE ORDERED AS STAT** [[id number]]  (Abnormal) Collected: 11/08/22 1858    Order Status: Completed Specimen: Wound from Buttocks, Right Updated: 11/10/22 0928     Aerobic Bacterial Culture STAPHYLOCOCCUS AUREUS  Many  Susceptibility pending        GRAM NEGATIVE LALIT  Many  Identification and susceptibility pending      Blood culture #2 **CANNOT BE ORDERED STAT** [[id number]] Collected: 11/08/22 1835    Order Status: Completed Specimen: Blood from Peripheral, Hand, Left Updated: 11/10/22 0613     Blood Culture, Routine No Growth to date      No Growth to date    Blood culture #1 **CANNOT BE  ORDERED STAT** [[id number]] Collected: 11/08/22 1819    Order Status: Completed Specimen: Blood from Peripheral, Hand, Right Updated: 11/10/22 0613     Blood Culture, Routine No Growth to date      No Growth to date    Culture, MRSA [[id number]] Collected: 11/09/22 1008    Order Status: Sent Specimen: MRSA source from Nares, Right Updated: 11/09/22 1928    Clostridium difficile EIA [[id number]] Collected: 11/08/22 1800    Order Status: Completed Specimen: Stool Updated: 11/09/22 0939     C. diff Antigen Negative     C difficile Toxins A+B, EIA Negative     Comment: Testing not recommended for children <24 months old.

## 2022-11-11 NOTE — SUBJECTIVE & OBJECTIVE
Review of Systems   Constitutional: Positive for malaise/fatigue.   HENT: Negative.     Eyes: Negative.    Cardiovascular: Negative.    Respiratory: Negative.     Endocrine: Negative.    Hematologic/Lymphatic: Negative.    Skin: Negative.    Musculoskeletal:  Positive for joint pain.   Gastrointestinal: Negative.    Genitourinary: Negative.         Rectal pain (improved)     Neurological:  Positive for weakness.   Psychiatric/Behavioral: Negative.     Allergic/Immunologic: Negative.    Objective:     Vital Signs (Most Recent):  Temp: 98.2 °F (36.8 °C) (11/11/22 0722)  Pulse: 62 (11/11/22 0906)  Resp: 20 (11/11/22 1158)  BP: 124/62 (11/11/22 0722)  SpO2: 96 % (11/11/22 0906) Vital Signs (24h Range):  Temp:  [97.5 °F (36.4 °C)-99.2 °F (37.3 °C)] 98.2 °F (36.8 °C)  Pulse:  [] 62  Resp:  [12-23] 20  SpO2:  [90 %-99 %] 96 %  BP: ()/(62-90) 124/62     Weight: 60.9 kg (134 lb 4.2 oz)  Body mass index is 18.73 kg/m².     SpO2: 96 %  O2 Device (Oxygen Therapy): room air      Intake/Output Summary (Last 24 hours) at 11/11/2022 1311  Last data filed at 11/10/2022 1700  Gross per 24 hour   Intake 300 ml   Output --   Net 300 ml       Lines/Drains/Airways       Central Venous Catheter Line  Duration             Port A Cath Single Lumen 08/16/22 1323 right subclavian 87 days              Drain  Duration                  Open Drain 11/10/22 1300 Buttock 1 day         Open Drain 11/10/22 1300 Right Buttock 1 day              Peripheral Intravenous Line  Duration                  Peripheral IV - Single Lumen 11/08/22 1840 22 G Posterior;Right Hand 2 days                    Physical Exam  Vitals and nursing note reviewed.   Constitutional:       General: He is not in acute distress.     Appearance: He is well-developed. He is ill-appearing. He is not diaphoretic.   HENT:      Head: Normocephalic and atraumatic.   Eyes:      General:         Right eye: No discharge.         Left eye: No discharge.      Pupils: Pupils are  equal, round, and reactive to light.   Neck:      Thyroid: No thyromegaly.      Vascular: No JVD.      Trachea: No tracheal deviation.   Cardiovascular:      Rate and Rhythm: Normal rate and regular rhythm.      Heart sounds: Normal heart sounds, S1 normal and S2 normal. No murmur heard.  Pulmonary:      Effort: Pulmonary effort is normal. No respiratory distress.      Breath sounds: Normal breath sounds. No wheezing or rales.   Abdominal:      General: There is no distension.      Palpations: Abdomen is soft.      Tenderness: There is no rebound.   Musculoskeletal:      Cervical back: Neck supple.      Right lower leg: No edema.      Left lower leg: No edema.   Skin:     General: Skin is warm and dry.      Findings: No erythema.   Neurological:      General: No focal deficit present.      Mental Status: He is alert and oriented to person, place, and time.   Psychiatric:         Mood and Affect: Mood normal.         Behavior: Behavior normal.         Thought Content: Thought content normal.     Significant Labs: CMP   Recent Labs   Lab 11/09/22  1444 11/10/22  0536 11/11/22  0337   NA  --  136 136   K 3.4* 3.9 3.2*   CL  --  102 97   CO2  --  26 28   GLU  --  72 81   BUN  --  3* 2*   CREATININE  --  0.5 0.5   CALCIUM  --  7.8* 7.8*   PROT  --  4.5* 4.2*   ALBUMIN  --  1.9* 1.8*   BILITOT  --  0.4 0.4   ALKPHOS  --  83 81   AST  --  11 10   ALT  --  11 7*   ANIONGAP  --  8 11   , CBC   Recent Labs   Lab 11/10/22  0536 11/11/22  0337   WBC 4.89 6.45   HGB 10.1* 10.3*   HCT 31.3* 32.0*    306   , INR No results for input(s): INR, PROTIME in the last 48 hours., Troponin   Recent Labs   Lab 11/09/22  1444   TROPONINI 0.042*   , and All pertinent lab results from the last 24 hours have been reviewed.    Significant Imaging: Echocardiogram: Transthoracic echo (TTE) complete (Cupid Only):   Results for orders placed or performed during the hospital encounter of 11/08/22   Echo   Result Value Ref Range    BSA 1.74 m2     TDI SEPTAL 0.09 m/s    LV LATERAL E/E' RATIO 6.20 m/s    LV SEPTAL E/E' RATIO 6.89 m/s    IVC diameter 1.22 cm    Left Ventricular Outflow Tract Mean Velocity 0.70 cm/s    Left Ventricular Outflow Tract Mean Gradient 2.34 mmHg    TDI LATERAL 0.10 m/s    LVIDd 4.18 3.5 - 6.0 cm    IVS 1.21 (A) 0.6 - 1.1 cm    Posterior Wall 1.07 0.6 - 1.1 cm    Ao root annulus 3.90 cm    LVIDs 2.65 2.1 - 4.0 cm    FS 37 28 - 44 %    STJ 3.55 cm    Ascending aorta 3.86 cm    LV mass 164.13 g    LA size 2.97 cm    TAPSE 1.60 cm    RV S' 0.02 cm/s    Left Ventricle Relative Wall Thickness 0.51 cm    AV mean gradient 3 mmHg    AV valve area 3.62 cm2    AV Velocity Ratio 1.05     AV index (prosthetic) 0.88     E/A ratio 1.07     Mean e' 0.10 m/s    E wave deceleration time 187.33 msec    IVRT 114.18 msec    LVOT diameter 2.29 cm    LVOT area 4.1 cm2    LVOT peak naman 1.16 m/s    LVOT peak VTI 19.60 cm    Ao peak naman 1.11 m/s    Ao VTI 22.3 cm    RVOT peak naman 0.79 m/s    RVOT peak VTI 15.2 cm    LVOT stroke volume 80.69 cm3    AV peak gradient 5 mmHg    PV mean gradient 1.23 mmHg    E/E' ratio 6.53 m/s    MV Peak E Naman 0.62 m/s    TR Max Naman 2.65 m/s    MV Peak A Naman 0.58 m/s    LV Systolic Volume 25.72 mL    LV Systolic Volume Index 14.4 mL/m2    LV Diastolic Volume 77.73 mL    LV Diastolic Volume Index 43.67 mL/m2    LV Mass Index 92 g/m2    Triscuspid Valve Regurgitation Peak Gradient 28 mmHg    Right Atrial Pressure (from IVC) 3 mmHg    EF 65 %    TV rest pulmonary artery pressure 31 mmHg    Narrative    · Concentric remodeling and normal systolic function.  · The estimated ejection fraction is 65%.  · Normal left ventricular diastolic function.  · Normal right ventricular size with normal right ventricular systolic   function.  · Normal central venous pressure (3 mmHg).  · The estimated PA systolic pressure is 31 mmHg.  · Paroxysmal tachycardia (HR at 130 bpm and lasted for 3 to 5 minutes)   noted during the study     Limited study  - previous 8/23/22      and EKG:Reviewed

## 2022-11-11 NOTE — HPI
67 y.o male with recurrent metastatic rectal cancer on palliative chemotherapy presented to [facility] ER after being found nearly unresponsive covered in diarrhea with BP 60/42. Per chart review, the spouse also reported severe generalized weakness, worsening diarrhea, worsening perirectal wounds with pain. Hypotension improved with BP.     ER workup revealed: BP 90/60, O2sat 93%, CBC unremarkable. Lactic 3.0. Troponin 0.07>0.101. UA +2 ketones. While in the ED, HR elevated to 150s. EKG showed Sinus tachycardia-rate 159, IRBBB, ST wave abnormalities   CT abd/pelvis showed LLL Pneumonia, possible small perirectal abscess.     Cardiology consulted for cardiac abnormalities. Planning outpatient workup pending clinical course  General surgery consulted for perirectal abscesses. Patient s/p I&D. Continued on abx with penrose drains in place    Hematology/Oncology consulted given metastatic rectal cancer

## 2022-11-11 NOTE — ASSESSMENT & PLAN NOTE
-Reactive in setting of sepsis  -SR this AM    11/11/22  -PSVT noted overnight, patient asymptomatic  -Replete K  -Discussed with Dr. Stallworth, patient with bradycardia during prior admission, will add Lopressor 12.5 mg daily only for now and check Holter as OP

## 2022-11-11 NOTE — ASSESSMENT & PLAN NOTE
-Troponin 0.070>0.101>0.084>0.059  -No chest pain/anginal symptoms  -Suspect related to demand ischemia from PSVT/septic shock  -EKG with diffuse ST depression  -Repeat echo pending  -Continue ASA as tolerated  -Will consider ischemic workup pending stability, patient meeting with palliative care today    11/11/22  -No chest pain symptoms/angina  -Echo showed normal EF  -Continue ASA as tolerated  -Low dose BB as BP/HR permits  -Will consider OP ischemic workup

## 2022-11-11 NOTE — NURSING
At approximately 2300, patient became tachy and sustained in the 140s.  PRN Metoprolol 5mg IV given.  Patient reverted back to NSR in the 60s.  @ 0130, the patient again sustained in the 140s.  Hospital medicine contacted via secure chat.  Cardiology on call ASAD Kaur PA-C also contacted via secure chat.  Patient now back in NSR as of 0205.  RN instructed to monitor for now.

## 2022-11-11 NOTE — PROGRESS NOTES
[facility] ([facility])  Colorectal Surgery  Progress Note    Subjective:     History of Present Illness:  68yo M well-known to me with previous mid-rectal adenocarcinoma who previously completed long-course neoadj chemoXRT which completed on 12/4/19 with partial treatment response on repeat MRI and no evidence of metastatic disease on repeat CT Abd/pelvis who is s/p robotic ultra-low anterior resection, DLI and mediport placement on 2/4/2020 who recovered well postop with final path showing T3N0 who completed adjuvant chemotx in late June 2020 and is now s/p DLI closure on 8/4/2020 who subsequently developed metastatic disease in liver and lung who is now admitted to the [facility] medicine service. Recently found to have bilateral perianal and buttock wounds and diarrhea. CT scan concerning for possible perirectal abscess. CRS consulted for evaluation. In ICU for sepsis and hypotension.      Post-Op Info:  Procedure(s) (LRB):  EXAM UNDER ANESTHESIA (N/A)  INCISION AND DRAINAGE, PERIRECTAL REGION (N/A)  BLOCK, NERVE, PUDENDAL (N/A)  INCISION, ABSCESS, PERIANAL (N/A)  INCISION AND DRAINAGE, ABSCESS (Bilateral)   1 Day Post-Op     Interval History: no acute events overnight. Improved pain.     Medications:  Continuous Infusions:   lactated ringers 75 mL/hr at 11/11/22 0942     Scheduled Meds:   ascorbic acid (vitamin C)  1,000 mg Oral Daily    aspirin  81 mg Oral Daily    ceFEPime (MAXIPIME) IVPB  2 g Intravenous Q8H    magic mouthwash diphen/antac/lidoc  15 mL Swish & Spit QID (AC & HS)    enoxaparin  40 mg Subcutaneous Daily    fentaNYL  1 patch Transdermal Q72H    folic acid-vit B6-vit B12 2.5-25-2 mg  1 tablet Oral Daily    metronidazole  500 mg Intravenous Q8H    multivitamin  1 tablet Oral Daily    mupirocin   Nasal BID    nystatin  500,000 Units Oral QID    pantoprazole  40 mg Intravenous Daily    vancomycin (VANCOCIN) IVPB  1,000 mg Intravenous Q12H    zinc sulfate  220 mg Oral Daily      PRN Meds:sodium chloride 0.9%, acetaminophen, albuterol-ipratropium, dextrose 10%, dextrose 10%, glucagon (human recombinant), glucose, glucose, HYDROmorphone, influenza 65up-adj, insulin aspart U-100, magnesium oxide, magnesium oxide, melatonin, metoprolol, naloxone, ondansetron, oxyCODONE, pneumococcal vaccine, potassium bicarbonate, potassium bicarbonate, potassium bicarbonate, potassium, sodium phosphates, potassium, sodium phosphates, potassium, sodium phosphates, prochlorperazine, simethicone, sodium chloride 0.9%, Pharmacy to dose Vancomycin consult **AND** vancomycin - pharmacy to dose     Review of patient's allergies indicates:  No Known Allergies  Objective:     Vital Signs (Most Recent):  Temp: 98.2 °F (36.8 °C) (11/11/22 0722)  Pulse: 62 (11/11/22 0906)  Resp: 20 (11/11/22 1004)  BP: 124/62 (11/11/22 0722)  SpO2: 96 % (11/11/22 0906) Vital Signs (24h Range):  Temp:  [97.5 °F (36.4 °C)-99.2 °F (37.3 °C)] 98.2 °F (36.8 °C)  Pulse:  [] 62  Resp:  [12-23] 20  SpO2:  [90 %-99 %] 96 %  BP: ()/(62-90) 124/62     Weight: 60.9 kg (134 lb 4.2 oz)  Body mass index is 18.73 kg/m².    Intake/Output - Last 3 Shifts         11/09 0700  11/10 0659 11/10 0700  11/11 0659 11/11 0700  11/12 0659    P.O. 120 300     I.V. (mL/kg) 1072 (17.6)      IV Piggyback 1065.5 800     Total Intake(mL/kg) 2257.5 (37.1) 1100 (18.1)     Urine (mL/kg/hr) 1150 (0.8) 100 (0.1)     Stool 0 0     Total Output 1150 100     Net +1107.5 +1000            Urine Occurrence 1 x      Stool Occurrence 1 x 2 x             Physical Exam  Exam conducted with a chaperone present.   Constitutional:       Appearance: He is well-developed. He is ill-appearing.      Comments: cachectic   HENT:      Head: Normocephalic and atraumatic.   Eyes:      Conjunctiva/sclera: Conjunctivae normal.   Neck:      Thyroid: No thyromegaly.   Cardiovascular:      Rate and Rhythm: Normal rate and regular rhythm.   Pulmonary:      Effort: Pulmonary effort is  normal. No respiratory distress.   Abdominal:      General: There is no distension.      Palpations: Abdomen is soft. There is no mass.      Tenderness: There is no abdominal tenderness.   Genitourinary:     Comments: Perianal/perineal and inguinal I&D sites without ongoing purulence, packing removed; penrose drains remain in place  Musculoskeletal:         General: No tenderness. Normal range of motion.      Cervical back: Normal range of motion.   Skin:     General: Skin is warm and dry.      Capillary Refill: Capillary refill takes less than 2 seconds.      Findings: No rash.   Neurological:      Mental Status: He is alert and oriented to person, place, and time.       Significant Labs:  I have reviewed all pertinent lab results within the past 24 hours.  CBC:   Recent Labs   Lab 11/11/22 0337   WBC 6.45   RBC 3.81*   HGB 10.3*   HCT 32.0*      MCV 84   MCH 27.0   MCHC 32.2     BMP:   Recent Labs   Lab 11/11/22 0337   GLU 81      K 3.2*   CL 97   CO2 28   BUN 2*   CREATININE 0.5   CALCIUM 7.8*   MG 1.8       Significant Diagnostics:  I have reviewed all pertinent imaging results/findings within the past 24 hours.    Assessment/Plan:     * Septic shock  Care per primary team    Perirectal wounds with abscess  66yo M with perirectal ulcerations and sores with fluctuance on exam now s/p EUA, perirectal,perineal and inguinal I&D     - cont abx  - cont penrose drains  - cont local wound care, keep area as clean as possible  - rest of care per primary team    Elevated troponin  Care per primary team    Diarrhea  Care per primary team    Pneumonia  Care per primary team    Tachycardia  Care per primary team    Immunocompromised patient  Care per primary team    Severe malnutrition  Care per primary team    Oral candidiasis  Care per primary team    Mucositis due to antineoplastic therapy  Care per primary team    Neoplasm related pain  Care per primary team        Familia Gonzalez MD  Colorectal  Surgery  [facility] - Telemetry ([facility])

## 2022-11-11 NOTE — CONSULTS
[facility] - Telemetry ([facility])  Hematology/Oncology  Consult Note    Patient Name: Vincent Benton  MRN: [id number]  Admission Date: 11/8/2022  Hospital Length of Stay: 3 days  Code Status: DNR   Attending Provider: Lauren Shah, *  Consulting Provider: Tamara Barroso NP  Primary Care Physician: Shakila Moran DO  Principal Problem:Septic shock    Inpatient consult to Hematology/Oncology  Consult performed by: Tamara Barroso NP  Consult ordered by: Familia Gonzalez MD        Subjective:     HPI:  67 y.o male with recurrent metastatic rectal cancer on palliative chemotherapy presented to [facility] ER after being found nearly unresponsive covered in diarrhea with BP 60/42. Per chart review, the spouse also reported severe generalized weakness, worsening diarrhea, worsening perirectal wounds with pain. Hypotension improved with BP.     ER workup revealed: BP 90/60, O2sat 93%, CBC unremarkable. Lactic 3.0. Troponin 0.07>0.101. UA +2 ketones. While in the ED, HR elevated to 150s. EKG showed Sinus tachycardia-rate 159, IRBBB, ST wave abnormalities   CT abd/pelvis showed LLL Pneumonia, possible small perirectal abscess.     Cardiology consulted for cardiac abnormalities. Planning outpatient workup pending clinical course  General surgery consulted for perirectal abscesses. Patient s/p I&D. Continued on abx with penrose drains in place    Hematology/Oncology consulted given metastatic rectal cancer      Oncology Treatment Plan:   OP COLORECTAL FOLFIRI + BEVACIZUMAB Q2W    Medications:  Continuous Infusions:   lactated ringers 75 mL/hr at 11/11/22 0942     Scheduled Meds:   ascorbic acid (vitamin C)  1,000 mg Oral Daily    aspirin  81 mg Oral Daily    ceFEPime (MAXIPIME) IVPB  2 g Intravenous Q8H    magic mouthwash diphen/antac/lidoc  15 mL Swish & Spit QID (AC & HS)    enoxaparin  40 mg Subcutaneous Daily    fentaNYL  1 patch Transdermal Q72H    folic acid-vit B6-vit B12 2.5-25-2 mg  1 tablet Oral  Daily    metronidazole  500 mg Intravenous Q8H    multivitamin  1 tablet Oral Daily    mupirocin   Nasal BID    nystatin  500,000 Units Oral QID    pantoprazole  40 mg Intravenous Daily    vancomycin (VANCOCIN) IVPB  1,000 mg Intravenous Q12H    zinc sulfate  220 mg Oral Daily     PRN Meds:sodium chloride 0.9%, acetaminophen, albuterol-ipratropium, dextrose 10%, dextrose 10%, glucagon (human recombinant), glucose, glucose, HYDROmorphone, influenza 65up-adj, insulin aspart U-100, magnesium oxide, magnesium oxide, melatonin, metoprolol, naloxone, ondansetron, oxyCODONE, pneumococcal vaccine, potassium bicarbonate, potassium bicarbonate, potassium bicarbonate, potassium, sodium phosphates, potassium, sodium phosphates, potassium, sodium phosphates, prochlorperazine, simethicone, sodium chloride 0.9%, Pharmacy to dose Vancomycin consult **AND** vancomycin - pharmacy to dose     Review of patient's allergies indicates:  No Known Allergies     Past Medical History:   Diagnosis Date    Alcoholism     Anemia     Back pain     Encounter for blood transfusion 2018    Essential hypertension 2/4/2016    GERD (gastroesophageal reflux disease)     Hiatal hernia     Hypertension     diet controlled    Melanoma 06/2021    left cheek    Rectal cancer 9/11/2019     Past Surgical History:   Procedure Laterality Date    ARTHROSCOPY OF KNEE Right 5/5/2021    Procedure: ARTHROSCOPY, KNEE;  Surgeon: Delonte Mcintyre MD;  Location: [facility];  Service: Orthopedics;  Laterality: Right;    COLONOSCOPY N/A 9/3/2019    Procedure: COLONOSCOPY;  Surgeon: Isai Centeno MD;  Location: [facility];  Service: Endoscopy;  Laterality: N/A;    COLONOSCOPY N/A 1/10/2020    Procedure: COLONOSCOPY;  Surgeon: Familia Gonzalez MD;  Location: [facility] ENDO;  Service: General;  Laterality: N/A;    COLONOSCOPY N/A 3/24/2021    Procedure: COLONOSCOPY;  Surgeon: Familia Gonzalez MD;  Location: [facility];  Service: General;  Laterality:  N/A;    ESOPHAGOGASTRODUODENOSCOPY N/A 9/3/2019    Procedure: ESOPHAGOGASTRODUODENOSCOPY (EGD);  Surgeon: Isai Centeno MD;  Location: [facility] ENDO;  Service: Endoscopy;  Laterality: N/A;    EXAMINATION UNDER ANESTHESIA N/A 11/10/2022    Procedure: EXAM UNDER ANESTHESIA;  Surgeon: Familia Gonzalez MD;  Location: [facility] OR;  Service: General;  Laterality: N/A;    EXCISION OF MEDIAL MENISCUS OF KNEE Right 5/5/2021    Procedure: MENISCECTOMY, KNEE, MEDIAL;  Surgeon: Delonte Mcintyre MD;  Location: [facility] OR;  Service: Orthopedics;  Laterality: Right;  partial Lateral    FLEXIBLE SIGMOIDOSCOPY  2/4/2020    Procedure: SIGMOIDOSCOPY, FLEXIBLE;  Surgeon: Familia Gonzalez MD;  Location: [facility] OR;  Service: General;;    ILEOSTOMY Right 2/4/2020    Procedure: CREATION, ILEOSTOMY;  Surgeon: Familia Gonzalez MD;  Location: [facility] OR;  Service: General;  Laterality: Right;    ILEOSTOMY CLOSURE N/A 8/4/2020    Procedure: CLOSURE, ILEOSTOMY;  Surgeon: Familia Gonzalez MD;  Location: [facility] OR;  Service: General;  Laterality: N/A;    INCISION AND DRAINAGE OF ABSCESS Bilateral 11/10/2022    Procedure: INCISION AND DRAINAGE, ABSCESS;  Surgeon: Familia Gonzalez MD;  Location: [facility] OR;  Service: General;  Laterality: Bilateral;  groin abscess    INCISION AND DRAINAGE OF BACK Left 8/5/2020    Procedure: INCISION AND DRAINAGE, BACK;  Surgeon: Familia Gonzalez MD;  Location: [facility] OR;  Service: General;  Laterality: Left;    INCISION AND DRAINAGE OF PERIRECTAL REGION N/A 11/10/2022    Procedure: INCISION AND DRAINAGE, PERIRECTAL REGION;  Surgeon: Familia Gonzalez MD;  Location: [facility] OR;  Service: General;  Laterality: N/A;    INCISION OF PERIANAL ABSCESS N/A 11/10/2022    Procedure: INCISION, ABSCESS, PERIANAL;  Surgeon: Familia Gonzalez MD;  Location: [facility] OR;  Service: General;  Laterality: N/A;    INJECTION OF ANESTHETIC AGENT AROUND PUDENDAL NERVE N/A 11/10/2022    Procedure: BLOCK, NERVE, PUDENDAL;  Surgeon:  Familia Gonzalez MD;  Location: [facility] OR;  Service: General;  Laterality: N/A;    INJECTION OF ANESTHETIC AGENT INTO TISSUE PLANE DEFINED BY TRANSVERSUS ABDOMINIS MUSCLE N/A 2/4/2020    Procedure: BLOCK, TRANSVERSUS ABDOMINIS PLANE;  Surgeon: Familia Gonzalez MD;  Location: [facility] OR;  Service: General;  Laterality: N/A;    INSERTION OF TUNNELED CENTRAL VENOUS CATHETER (CVC) WITH SUBCUTANEOUS PORT Right 2/4/2020    Procedure: INSERTION, PORT-A-CATH;  Surgeon: Familia Gonzalez MD;  Location: [facility] OR;  Service: General;  Laterality: Right;    INSERTION OF TUNNELED CENTRAL VENOUS CATHETER (CVC) WITH SUBCUTANEOUS PORT N/A 8/16/2022    Procedure: [id number];  Surgeon: Familia Gonzalez MD;  Location: [facility];  Service: General;  Laterality: N/A;  right subclavian    JOINT REPLACEMENT Left 2009    Hip    KNEE ARTHROSCOPY W/ PLICA EXCISION Right 5/5/2021    Procedure: EXCISION, PLICA, KNEE, ARTHROSCOPIC;  Surgeon: Delonte Mcintyre MD;  Location: [facility] OR;  Service: Orthopedics;  Laterality: Right;    MOBILIZATION OF SPLENIC FLEXURE  2/4/2020    Procedure: MOBILIZATION, SPLENIC FLEXURE;  Surgeon: Familia Gonzalez MD;  Location: [facility];  Service: General;;    REMOVAL OF HARDWARE FROM HIP Left 1/4/2022    Procedure: REMOVAL, HARDWARE, HIP;  Surgeon: Delonte Mcintyre MD;  Location: [facility];  Service: Orthopedics;  Laterality: Left;     Family History       Problem Relation (Age of Onset)    Cataracts Mother    Hypertension Father    Stroke Father          Tobacco Use    Smoking status: Never    Smokeless tobacco: Never   Substance and Sexual Activity    Alcohol use: Yes    Drug use: No    Sexual activity: Never     Partners: Female       Review of Systems   Constitutional:  Positive for activity change, appetite change and fatigue. Negative for chills, fever and unexpected weight change.   HENT:  Negative for congestion, mouth sores, nosebleeds, sore throat, trouble swallowing and voice  change.    Eyes:  Negative for visual disturbance.   Respiratory:  Negative for cough, chest tightness, shortness of breath and wheezing.    Cardiovascular:  Negative for chest pain and leg swelling.   Gastrointestinal:  Negative for abdominal distention, abdominal pain, blood in stool, constipation, diarrhea, nausea and vomiting.   Genitourinary:  Negative for difficulty urinating, dysuria and hematuria.   Musculoskeletal:  Negative for arthralgias, back pain and myalgias.   Skin:  Positive for wound. Negative for pallor and rash.   Neurological:  Positive for weakness. Negative for dizziness, syncope and headaches.   Hematological:  Negative for adenopathy. Does not bruise/bleed easily.   Psychiatric/Behavioral:  The patient is nervous/anxious.    Objective:     Vital Signs (Most Recent):  Temp: 98.2 °F (36.8 °C) (11/11/22 0722)  Pulse: 62 (11/11/22 0906)  Resp: 20 (11/11/22 1004)  BP: 124/62 (11/11/22 0722)  SpO2: 96 % (11/11/22 0906) Vital Signs (24h Range):  Temp:  [97.5 °F (36.4 °C)-99.2 °F (37.3 °C)] 98.2 °F (36.8 °C)  Pulse:  [] 62  Resp:  [12-23] 20  SpO2:  [90 %-99 %] 96 %  BP: ()/(62-90) 124/62     Weight: 60.9 kg (134 lb 4.2 oz)  Body mass index is 18.73 kg/m².  Body surface area is 1.75 meters squared.      Intake/Output Summary (Last 24 hours) at 11/11/2022 1151  Last data filed at 11/10/2022 1700  Gross per 24 hour   Intake 1100 ml   Output --   Net 1100 ml       Physical Exam  Vitals reviewed.   Constitutional:       Appearance: He is well-developed.   HENT:      Head: Normocephalic.      Right Ear: External ear normal.      Left Ear: External ear normal.      Nose: Nose normal.   Eyes:      General: Lids are normal. No scleral icterus.        Right eye: No discharge.         Left eye: No discharge.      Conjunctiva/sclera: Conjunctivae normal.   Neck:      Thyroid: No thyroid mass.   Cardiovascular:      Rate and Rhythm: Normal rate and regular rhythm.      Heart sounds: Normal heart  sounds.   Pulmonary:      Effort: Pulmonary effort is normal. No respiratory distress.      Breath sounds: Normal breath sounds. No wheezing or rales.   Abdominal:      General: There is no distension.   Genitourinary:     Comments: deferred  Musculoskeletal:         General: Normal range of motion.      Cervical back: Normal range of motion.   Skin:     General: Skin is warm and dry.   Neurological:      Mental Status: He is alert and oriented to person, place, and time.   Psychiatric:         Speech: Speech normal.         Behavior: Behavior normal. Behavior is cooperative.         Thought Content: Thought content normal.       Significant Labs:   BMP:   Recent Labs   Lab 11/09/22  1444 11/10/22  0536 11/11/22  0337   GLU  --  72 81   NA  --  136 136   K 3.4* 3.9 3.2*   CL  --  102 97   CO2  --  26 28   BUN  --  3* 2*   CREATININE  --  0.5 0.5   CALCIUM  --  7.8* 7.8*   MG  --   --  1.8   , CBC:   Recent Labs   Lab 11/10/22  0536 11/11/22  0337   WBC 4.89 6.45   HGB 10.1* 10.3*   HCT 31.3* 32.0*    306   , LDH: No results for input(s): LDHCSF, BFSOURCE in the last 48 hours., LFTs:   Recent Labs   Lab 11/10/22  0536 11/11/22  0337   ALT 11 7*   AST 11 10   ALKPHOS 83 81   BILITOT 0.4 0.4   PROT 4.5* 4.2*   ALBUMIN 1.9* 1.8*   , and All pertinent labs from the last 24 hours have been reviewed.    Diagnostic Results:  I have reviewed all pertinent imaging results/findings within the past 24 hours.    Assessment/Plan:     Diarrhea  -Management per Primary team    Perirectal wounds with abscess  -s/p I&D  -management per General surgery     Mucositis due to antineoplastic therapy  -magic mouthwash PRN    Rectal cancer  -Hold chemotherapy pending clinical course  -Will plan for outpatient f/u with Primary Oncologist to discuss ongoing management     Neoplasm related pain  -management per PM        Thank you for your consult. I will follow-up with patient. Please contact us if you have any additional  questions.    Tamara Barroso NP  Hematology/Oncology  [facility] ([facility])    30 minutes of total time spent on the encounter, which includes face to face time and non-face to face time preparing to see the patient (eg, review of tests), Obtaining and/or reviewing separately obtained history, Documenting clinical information in the electronic or other health record, Independently interpreting results (not separately reported) and communicating results to the patient/family/caregiver, or Care coordination (not separately reported).

## 2022-11-11 NOTE — SUBJECTIVE & OBJECTIVE
Oncology Treatment Plan:   OP COLORECTAL FOLFIRI + BEVACIZUMAB Q2W    Medications:  Continuous Infusions:   lactated ringers 75 mL/hr at 11/11/22 0942     Scheduled Meds:   ascorbic acid (vitamin C)  1,000 mg Oral Daily    aspirin  81 mg Oral Daily    ceFEPime (MAXIPIME) IVPB  2 g Intravenous Q8H    magic mouthwash diphen/antac/lidoc  15 mL Swish & Spit QID (AC & HS)    enoxaparin  40 mg Subcutaneous Daily    fentaNYL  1 patch Transdermal Q72H    folic acid-vit B6-vit B12 2.5-25-2 mg  1 tablet Oral Daily    metronidazole  500 mg Intravenous Q8H    multivitamin  1 tablet Oral Daily    mupirocin   Nasal BID    nystatin  500,000 Units Oral QID    pantoprazole  40 mg Intravenous Daily    vancomycin (VANCOCIN) IVPB  1,000 mg Intravenous Q12H    zinc sulfate  220 mg Oral Daily     PRN Meds:sodium chloride 0.9%, acetaminophen, albuterol-ipratropium, dextrose 10%, dextrose 10%, glucagon (human recombinant), glucose, glucose, HYDROmorphone, influenza 65up-adj, insulin aspart U-100, magnesium oxide, magnesium oxide, melatonin, metoprolol, naloxone, ondansetron, oxyCODONE, pneumococcal vaccine, potassium bicarbonate, potassium bicarbonate, potassium bicarbonate, potassium, sodium phosphates, potassium, sodium phosphates, potassium, sodium phosphates, prochlorperazine, simethicone, sodium chloride 0.9%, Pharmacy to dose Vancomycin consult **AND** vancomycin - pharmacy to dose     Review of patient's allergies indicates:  No Known Allergies     Past Medical History:   Diagnosis Date    Alcoholism     Anemia     Back pain     Encounter for blood transfusion 2018    Essential hypertension 2/4/2016    GERD (gastroesophageal reflux disease)     Hiatal hernia     Hypertension     diet controlled    Melanoma 06/2021    left cheek    Rectal cancer 9/11/2019     Past Surgical History:   Procedure Laterality Date    ARTHROSCOPY OF KNEE Right 5/5/2021    Procedure: ARTHROSCOPY, KNEE;  Surgeon: Delonte Mcintyre MD;  Location: [facility] OR;   Service: Orthopedics;  Laterality: Right;    COLONOSCOPY N/A 9/3/2019    Procedure: COLONOSCOPY;  Surgeon: Isai Centeno MD;  Location: [facility] ENDO;  Service: Endoscopy;  Laterality: N/A;    COLONOSCOPY N/A 1/10/2020    Procedure: COLONOSCOPY;  Surgeon: Familia Gonzalez MD;  Location: [facility] ENDO;  Service: General;  Laterality: N/A;    COLONOSCOPY N/A 3/24/2021    Procedure: COLONOSCOPY;  Surgeon: Familia Gonzalez MD;  Location: [facility] ENDO;  Service: General;  Laterality: N/A;    ESOPHAGOGASTRODUODENOSCOPY N/A 9/3/2019    Procedure: ESOPHAGOGASTRODUODENOSCOPY (EGD);  Surgeon: Isai Centeno MD;  Location: [facility] ENDO;  Service: Endoscopy;  Laterality: N/A;    EXAMINATION UNDER ANESTHESIA N/A 11/10/2022    Procedure: EXAM UNDER ANESTHESIA;  Surgeon: Familia Gonzalez MD;  Location: [facility] OR;  Service: General;  Laterality: N/A;    EXCISION OF MEDIAL MENISCUS OF KNEE Right 5/5/2021    Procedure: MENISCECTOMY, KNEE, MEDIAL;  Surgeon: Delonte Mcintyre MD;  Location: [facility] OR;  Service: Orthopedics;  Laterality: Right;  partial Lateral    FLEXIBLE SIGMOIDOSCOPY  2/4/2020    Procedure: SIGMOIDOSCOPY, FLEXIBLE;  Surgeon: Familia Gonzalez MD;  Location: [facility];  Service: General;;    ILEOSTOMY Right 2/4/2020    Procedure: CREATION, ILEOSTOMY;  Surgeon: Familia Gonzalez MD;  Location: [facility] OR;  Service: General;  Laterality: Right;    ILEOSTOMY CLOSURE N/A 8/4/2020    Procedure: CLOSURE, ILEOSTOMY;  Surgeon: Familia Gonzalez MD;  Location: [facility] OR;  Service: General;  Laterality: N/A;    INCISION AND DRAINAGE OF ABSCESS Bilateral 11/10/2022    Procedure: INCISION AND DRAINAGE, ABSCESS;  Surgeon: Familia Gonzalez MD;  Location: [facility] OR;  Service: General;  Laterality: Bilateral;  groin abscess    INCISION AND DRAINAGE OF BACK Left 8/5/2020    Procedure: INCISION AND DRAINAGE, BACK;  Surgeon: Familia Gonzalez MD;  Location: [facility] OR;  Service: General;  Laterality: Left;    INCISION AND DRAINAGE OF PERIRECTAL  REGION N/A 11/10/2022    Procedure: INCISION AND DRAINAGE, PERIRECTAL REGION;  Surgeon: Familia Gonzalez MD;  Location: [facility] OR;  Service: General;  Laterality: N/A;    INCISION OF PERIANAL ABSCESS N/A 11/10/2022    Procedure: INCISION, ABSCESS, PERIANAL;  Surgeon: Familia Gonzalez MD;  Location: [facility] OR;  Service: General;  Laterality: N/A;    INJECTION OF ANESTHETIC AGENT AROUND PUDENDAL NERVE N/A 11/10/2022    Procedure: BLOCK, NERVE, PUDENDAL;  Surgeon: Familia Gonzalez MD;  Location: [facility] OR;  Service: General;  Laterality: N/A;    INJECTION OF ANESTHETIC AGENT INTO TISSUE PLANE DEFINED BY TRANSVERSUS ABDOMINIS MUSCLE N/A 2/4/2020    Procedure: BLOCK, TRANSVERSUS ABDOMINIS PLANE;  Surgeon: Familia Gonzalez MD;  Location: [facility] OR;  Service: General;  Laterality: N/A;    INSERTION OF TUNNELED CENTRAL VENOUS CATHETER (CVC) WITH SUBCUTANEOUS PORT Right 2/4/2020    Procedure: INSERTION, PORT-A-CATH;  Surgeon: Familia Gonzalez MD;  Location: [facility] OR;  Service: General;  Laterality: Right;    INSERTION OF TUNNELED CENTRAL VENOUS CATHETER (CVC) WITH SUBCUTANEOUS PORT N/A 8/16/2022    Procedure: [id number];  Surgeon: Familia Gonzalez MD;  Location: [facility] OR;  Service: General;  Laterality: N/A;  right subclavian    JOINT REPLACEMENT Left 2009    Hip    KNEE ARTHROSCOPY W/ PLICA EXCISION Right 5/5/2021    Procedure: EXCISION, PLICA, KNEE, ARTHROSCOPIC;  Surgeon: Delonte Mcintyre MD;  Location: [facility] OR;  Service: Orthopedics;  Laterality: Right;    MOBILIZATION OF SPLENIC FLEXURE  2/4/2020    Procedure: MOBILIZATION, SPLENIC FLEXURE;  Surgeon: Familia Gonzalez MD;  Location: [facility] OR;  Service: General;;    REMOVAL OF HARDWARE FROM HIP Left 1/4/2022    Procedure: REMOVAL, HARDWARE, HIP;  Surgeon: Delonte Mcintyre MD;  Location: [facility] OR;  Service: Orthopedics;  Laterality: Left;     Family History       Problem Relation (Age of Onset)    Cataracts Mother    Hypertension Father    Stroke  Father          Tobacco Use    Smoking status: Never    Smokeless tobacco: Never   Substance and Sexual Activity    Alcohol use: Yes    Drug use: No    Sexual activity: Never     Partners: Female       Review of Systems   Constitutional:  Positive for activity change, appetite change and fatigue. Negative for chills, fever and unexpected weight change.   HENT:  Negative for congestion, mouth sores, nosebleeds, sore throat, trouble swallowing and voice change.    Eyes:  Negative for visual disturbance.   Respiratory:  Negative for cough, chest tightness, shortness of breath and wheezing.    Cardiovascular:  Negative for chest pain and leg swelling.   Gastrointestinal:  Negative for abdominal distention, abdominal pain, blood in stool, constipation, diarrhea, nausea and vomiting.   Genitourinary:  Negative for difficulty urinating, dysuria and hematuria.   Musculoskeletal:  Negative for arthralgias, back pain and myalgias.   Skin:  Positive for wound. Negative for pallor and rash.   Neurological:  Positive for weakness. Negative for dizziness, syncope and headaches.   Hematological:  Negative for adenopathy. Does not bruise/bleed easily.   Psychiatric/Behavioral:  The patient is nervous/anxious.    Objective:     Vital Signs (Most Recent):  Temp: 98.2 °F (36.8 °C) (11/11/22 0722)  Pulse: 62 (11/11/22 0906)  Resp: 20 (11/11/22 1004)  BP: 124/62 (11/11/22 0722)  SpO2: 96 % (11/11/22 0906) Vital Signs (24h Range):  Temp:  [97.5 °F (36.4 °C)-99.2 °F (37.3 °C)] 98.2 °F (36.8 °C)  Pulse:  [] 62  Resp:  [12-23] 20  SpO2:  [90 %-99 %] 96 %  BP: ()/(62-90) 124/62     Weight: 60.9 kg (134 lb 4.2 oz)  Body mass index is 18.73 kg/m².  Body surface area is 1.75 meters squared.      Intake/Output Summary (Last 24 hours) at 11/11/2022 1151  Last data filed at 11/10/2022 1700  Gross per 24 hour   Intake 1100 ml   Output --   Net 1100 ml       Physical Exam  Vitals reviewed.   Constitutional:       Appearance: He is  well-developed.   HENT:      Head: Normocephalic.      Right Ear: External ear normal.      Left Ear: External ear normal.      Nose: Nose normal.   Eyes:      General: Lids are normal. No scleral icterus.        Right eye: No discharge.         Left eye: No discharge.      Conjunctiva/sclera: Conjunctivae normal.   Neck:      Thyroid: No thyroid mass.   Cardiovascular:      Rate and Rhythm: Normal rate and regular rhythm.      Heart sounds: Normal heart sounds.   Pulmonary:      Effort: Pulmonary effort is normal. No respiratory distress.      Breath sounds: Normal breath sounds. No wheezing or rales.   Abdominal:      General: There is no distension.   Genitourinary:     Comments: deferred  Musculoskeletal:         General: Normal range of motion.      Cervical back: Normal range of motion.   Skin:     General: Skin is warm and dry.   Neurological:      Mental Status: He is alert and oriented to person, place, and time.   Psychiatric:         Speech: Speech normal.         Behavior: Behavior normal. Behavior is cooperative.         Thought Content: Thought content normal.       Significant Labs:   BMP:   Recent Labs   Lab 11/09/22  1444 11/10/22  0536 11/11/22  0337   GLU  --  72 81   NA  --  136 136   K 3.4* 3.9 3.2*   CL  --  102 97   CO2  --  26 28   BUN  --  3* 2*   CREATININE  --  0.5 0.5   CALCIUM  --  7.8* 7.8*   MG  --   --  1.8   , CBC:   Recent Labs   Lab 11/10/22  0536 11/11/22  0337   WBC 4.89 6.45   HGB 10.1* 10.3*   HCT 31.3* 32.0*    306   , LDH: No results for input(s): LDHCSF, BFSOURCE in the last 48 hours., LFTs:   Recent Labs   Lab 11/10/22  0536 11/11/22  0337   ALT 11 7*   AST 11 10   ALKPHOS 83 81   BILITOT 0.4 0.4   PROT 4.5* 4.2*   ALBUMIN 1.9* 1.8*   , and All pertinent labs from the last 24 hours have been reviewed.    Diagnostic Results:  I have reviewed all pertinent imaging results/findings within the past 24 hours.

## 2022-11-12 VITALS
WEIGHT: 219.56 LBS | SYSTOLIC BLOOD PRESSURE: 142 MMHG | RESPIRATION RATE: 20 BRPM | BODY MASS INDEX: 30.74 KG/M2 | DIASTOLIC BLOOD PRESSURE: 84 MMHG | OXYGEN SATURATION: 93 % | TEMPERATURE: 98 F | HEIGHT: 71 IN | HEART RATE: 68 BPM

## 2022-11-12 LAB
ANION GAP SERPL CALC-SCNC: 10 MMOL/L (ref 8–16)
BASOPHILS # BLD AUTO: 0.06 K/UL (ref 0–0.2)
BASOPHILS NFR BLD: 1 % (ref 0–1.9)
BUN SERPL-MCNC: 2 MG/DL (ref 8–23)
CALCIUM SERPL-MCNC: 7.7 MG/DL (ref 8.7–10.5)
CHLORIDE SERPL-SCNC: 98 MMOL/L (ref 95–110)
CO2 SERPL-SCNC: 28 MMOL/L (ref 23–29)
CREAT SERPL-MCNC: 0.4 MG/DL (ref 0.5–1.4)
DIFFERENTIAL METHOD: ABNORMAL
EOSINOPHIL # BLD AUTO: 0.1 K/UL (ref 0–0.5)
EOSINOPHIL NFR BLD: 1.1 % (ref 0–8)
ERYTHROCYTE [DISTWIDTH] IN BLOOD BY AUTOMATED COUNT: 26 % (ref 11.5–14.5)
EST. GFR  (NO RACE VARIABLE): >60 ML/MIN/1.73 M^2
GLUCOSE SERPL-MCNC: 75 MG/DL (ref 70–110)
HCT VFR BLD AUTO: 28.8 % (ref 40–54)
HGB BLD-MCNC: 9.1 G/DL (ref 14–18)
IMM GRANULOCYTES # BLD AUTO: 0.24 K/UL (ref 0–0.04)
IMM GRANULOCYTES NFR BLD AUTO: 3.9 % (ref 0–0.5)
LYMPHOCYTES # BLD AUTO: 0.7 K/UL (ref 1–4.8)
LYMPHOCYTES NFR BLD: 11.2 % (ref 18–48)
MAGNESIUM SERPL-MCNC: 1.6 MG/DL (ref 1.6–2.6)
MCH RBC QN AUTO: 26.6 PG (ref 27–31)
MCHC RBC AUTO-ENTMCNC: 31.6 G/DL (ref 32–36)
MCV RBC AUTO: 84 FL (ref 82–98)
MONOCYTES # BLD AUTO: 0.6 K/UL (ref 0.3–1)
MONOCYTES NFR BLD: 9.9 % (ref 4–15)
NEUTROPHILS # BLD AUTO: 4.5 K/UL (ref 1.8–7.7)
NEUTROPHILS NFR BLD: 72.9 % (ref 38–73)
NRBC BLD-RTO: 0 /100 WBC
PLATELET # BLD AUTO: 298 K/UL (ref 150–450)
PMV BLD AUTO: 9.4 FL (ref 9.2–12.9)
POCT GLUCOSE: 85 MG/DL (ref 70–110)
POCT GLUCOSE: 88 MG/DL (ref 70–110)
POCT GLUCOSE: 95 MG/DL (ref 70–110)
POTASSIUM SERPL-SCNC: 3.4 MMOL/L (ref 3.5–5.1)
RBC # BLD AUTO: 3.42 M/UL (ref 4.6–6.2)
SODIUM SERPL-SCNC: 136 MMOL/L (ref 136–145)
WBC # BLD AUTO: 6.16 K/UL (ref 3.9–12.7)

## 2022-11-12 PROCEDURE — 25000003 PHARM REV CODE 250: Performed by: PHYSICIAN ASSISTANT

## 2022-11-12 PROCEDURE — 94761 N-INVAS EAR/PLS OXIMETRY MLT: CPT

## 2022-11-12 PROCEDURE — C9113 INJ PANTOPRAZOLE SODIUM, VIA: HCPCS | Performed by: COLON & RECTAL SURGERY

## 2022-11-12 PROCEDURE — 25000003 PHARM REV CODE 250: Performed by: INTERNAL MEDICINE

## 2022-11-12 PROCEDURE — 25000003 PHARM REV CODE 250: Performed by: COLON & RECTAL SURGERY

## 2022-11-12 PROCEDURE — 99232 SBSQ HOSP IP/OBS MODERATE 35: CPT | Mod: ,,, | Performed by: INTERNAL MEDICINE

## 2022-11-12 PROCEDURE — 36415 COLL VENOUS BLD VENIPUNCTURE: CPT | Performed by: STUDENT IN AN ORGANIZED HEALTH CARE EDUCATION/TRAINING PROGRAM

## 2022-11-12 PROCEDURE — 92610 EVALUATE SWALLOWING FUNCTION: CPT

## 2022-11-12 PROCEDURE — 25000003 PHARM REV CODE 250: Performed by: STUDENT IN AN ORGANIZED HEALTH CARE EDUCATION/TRAINING PROGRAM

## 2022-11-12 PROCEDURE — 63600175 PHARM REV CODE 636 W HCPCS: Performed by: PHYSICIAN ASSISTANT

## 2022-11-12 PROCEDURE — 63600175 PHARM REV CODE 636 W HCPCS: Performed by: COLON & RECTAL SURGERY

## 2022-11-12 PROCEDURE — 83735 ASSAY OF MAGNESIUM: CPT | Performed by: STUDENT IN AN ORGANIZED HEALTH CARE EDUCATION/TRAINING PROGRAM

## 2022-11-12 PROCEDURE — 99232 PR SUBSEQUENT HOSPITAL CARE,LEVL II: ICD-10-PCS | Mod: ,,, | Performed by: INTERNAL MEDICINE

## 2022-11-12 PROCEDURE — 80048 BASIC METABOLIC PNL TOTAL CA: CPT | Performed by: STUDENT IN AN ORGANIZED HEALTH CARE EDUCATION/TRAINING PROGRAM

## 2022-11-12 PROCEDURE — 85025 COMPLETE CBC W/AUTO DIFF WBC: CPT | Performed by: STUDENT IN AN ORGANIZED HEALTH CARE EDUCATION/TRAINING PROGRAM

## 2022-11-12 RX ORDER — OXYCODONE HYDROCHLORIDE 20 MG/1
20 TABLET ORAL EVERY 4 HOURS PRN
Qty: 15 TABLET | Refills: 0 | Status: SHIPPED | OUTPATIENT
Start: 2022-11-12 | End: 2022-11-21 | Stop reason: SDUPTHER

## 2022-11-12 RX ORDER — SULFAMETHOXAZOLE AND TRIMETHOPRIM 800; 160 MG/1; MG/1
1 TABLET ORAL 2 TIMES DAILY
Qty: 24 TABLET | Refills: 0 | Status: SHIPPED | OUTPATIENT
Start: 2022-11-12 | End: 2022-11-24

## 2022-11-12 RX ORDER — METRONIDAZOLE 500 MG/1
500 TABLET ORAL EVERY 8 HOURS
Status: DISCONTINUED | OUTPATIENT
Start: 2022-11-12 | End: 2022-11-12

## 2022-11-12 RX ORDER — AMOXICILLIN AND CLAVULANATE POTASSIUM 875; 125 MG/1; MG/1
1 TABLET, FILM COATED ORAL EVERY 12 HOURS
Qty: 24 TABLET | Refills: 0 | Status: SHIPPED | OUTPATIENT
Start: 2022-11-12 | End: 2022-11-24

## 2022-11-12 RX ORDER — HYDROMORPHONE HYDROCHLORIDE 2 MG/1
2 TABLET ORAL EVERY 4 HOURS PRN
Qty: 10 TABLET | Refills: 0 | Status: SHIPPED | OUTPATIENT
Start: 2022-11-12 | End: 2022-11-12 | Stop reason: SDUPTHER

## 2022-11-12 RX ORDER — OXYCODONE HYDROCHLORIDE 20 MG/1
20 TABLET ORAL EVERY 4 HOURS PRN
Qty: 15 TABLET | Refills: 0 | Status: SHIPPED | OUTPATIENT
Start: 2022-11-12 | End: 2022-11-12 | Stop reason: SDUPTHER

## 2022-11-12 RX ORDER — AMOXICILLIN AND CLAVULANATE POTASSIUM 875; 125 MG/1; MG/1
1 TABLET, FILM COATED ORAL EVERY 12 HOURS
Qty: 24 TABLET | Refills: 0 | Status: SHIPPED | OUTPATIENT
Start: 2022-11-12 | End: 2022-11-12 | Stop reason: SDUPTHER

## 2022-11-12 RX ORDER — HYDROMORPHONE HYDROCHLORIDE 2 MG/1
2 TABLET ORAL EVERY 4 HOURS PRN
Qty: 10 TABLET | Refills: 0 | Status: SHIPPED | OUTPATIENT
Start: 2022-11-12 | End: 2022-11-22 | Stop reason: ALTCHOICE

## 2022-11-12 RX ORDER — NAPROXEN SODIUM 220 MG/1
81 TABLET, FILM COATED ORAL DAILY
Qty: 30 TABLET | Refills: 0 | Status: SHIPPED | OUTPATIENT
Start: 2022-11-13 | End: 2022-11-12 | Stop reason: SDUPTHER

## 2022-11-12 RX ORDER — NAPROXEN SODIUM 220 MG/1
81 TABLET, FILM COATED ORAL DAILY
Qty: 30 TABLET | Refills: 0 | Status: SHIPPED | OUTPATIENT
Start: 2022-11-13 | End: 2022-12-13

## 2022-11-12 RX ORDER — ZINC SULFATE 50(220)MG
220 CAPSULE ORAL DAILY
Qty: 15 CAPSULE | Refills: 0 | Status: SHIPPED | OUTPATIENT
Start: 2022-11-13

## 2022-11-12 RX ORDER — SULFAMETHOXAZOLE AND TRIMETHOPRIM 800; 160 MG/1; MG/1
1 TABLET ORAL 2 TIMES DAILY
Qty: 24 TABLET | Refills: 0 | Status: SHIPPED | OUTPATIENT
Start: 2022-11-12 | End: 2022-11-12 | Stop reason: SDUPTHER

## 2022-11-12 RX ORDER — AMOXICILLIN AND CLAVULANATE POTASSIUM 875; 125 MG/1; MG/1
1 TABLET, FILM COATED ORAL EVERY 12 HOURS
Status: DISCONTINUED | OUTPATIENT
Start: 2022-11-12 | End: 2022-11-12 | Stop reason: HOSPADM

## 2022-11-12 RX ORDER — ZINC SULFATE 50(220)MG
220 CAPSULE ORAL DAILY
Qty: 15 CAPSULE | Refills: 0 | Status: SHIPPED | OUTPATIENT
Start: 2022-11-13 | End: 2022-11-12 | Stop reason: SDUPTHER

## 2022-11-12 RX ORDER — POTASSIUM CHLORIDE 20 MEQ/1
40 TABLET, EXTENDED RELEASE ORAL ONCE
Status: COMPLETED | OUTPATIENT
Start: 2022-11-12 | End: 2022-11-12

## 2022-11-12 RX ORDER — SULFAMETHOXAZOLE AND TRIMETHOPRIM 800; 160 MG/1; MG/1
1 TABLET ORAL 2 TIMES DAILY
Status: DISCONTINUED | OUTPATIENT
Start: 2022-11-12 | End: 2022-11-12 | Stop reason: HOSPADM

## 2022-11-12 RX ADMIN — VANCOMYCIN HYDROCHLORIDE 1000 MG: 1 INJECTION, POWDER, LYOPHILIZED, FOR SOLUTION INTRAVENOUS at 05:11

## 2022-11-12 RX ADMIN — ASPIRIN 81 MG CHEWABLE TABLET 81 MG: 81 TABLET CHEWABLE at 09:11

## 2022-11-12 RX ADMIN — SULFAMETHOXAZOLE AND TRIMETHOPRIM 1 TABLET: 800; 160 TABLET ORAL at 09:11

## 2022-11-12 RX ADMIN — PANTOPRAZOLE SODIUM 40 MG: 40 INJECTION, POWDER, FOR SOLUTION INTRAVENOUS at 09:11

## 2022-11-12 RX ADMIN — NYSTATIN 500000 UNITS: 500000 SUSPENSION ORAL at 02:11

## 2022-11-12 RX ADMIN — POTASSIUM CHLORIDE 40 MEQ: 1500 TABLET, EXTENDED RELEASE ORAL at 09:11

## 2022-11-12 RX ADMIN — OXYCODONE HYDROCHLORIDE 20 MG: 5 TABLET ORAL at 12:11

## 2022-11-12 RX ADMIN — OXYCODONE HYDROCHLORIDE 20 MG: 5 TABLET ORAL at 08:11

## 2022-11-12 RX ADMIN — FENTANYL 1 PATCH: 25 PATCH TRANSDERMAL at 12:11

## 2022-11-12 RX ADMIN — THERA TABS 1 TABLET: TAB at 09:11

## 2022-11-12 RX ADMIN — METOPROLOL TARTRATE 12.5 MG: 25 TABLET, FILM COATED ORAL at 09:11

## 2022-11-12 RX ADMIN — ZINC SULFATE 220 MG (50 MG) CAPSULE 220 MG: CAPSULE at 09:11

## 2022-11-12 RX ADMIN — NYSTATIN 500000 UNITS: 500000 SUSPENSION ORAL at 09:11

## 2022-11-12 RX ADMIN — OXYCODONE HYDROCHLORIDE AND ACETAMINOPHEN 1000 MG: 500 TABLET ORAL at 09:11

## 2022-11-12 RX ADMIN — OXYCODONE HYDROCHLORIDE 20 MG: 5 TABLET ORAL at 03:11

## 2022-11-12 RX ADMIN — Medication 1 TABLET: at 09:11

## 2022-11-12 RX ADMIN — DIPHENHYDRAMINE HYDROCHLORIDE 15 ML: 12.5 LIQUID ORAL at 11:11

## 2022-11-12 RX ADMIN — AMOXICILLIN AND CLAVULANATE POTASSIUM 1 TABLET: 875; 125 TABLET, FILM COATED ORAL at 09:11

## 2022-11-12 RX ADMIN — HYDROMORPHONE HYDROCHLORIDE 1 MG: 1 INJECTION, SOLUTION INTRAMUSCULAR; INTRAVENOUS; SUBCUTANEOUS at 02:11

## 2022-11-12 NOTE — PLAN OF CARE
Pt is known to [facility]. [facility] provided patient a case of Vanilla Boost and Ensure Samples from [facility]. [facility] also provided contact information on [place] on aging for meals on wheels. [facility] will follow up with patient in clinic and remain available.

## 2022-11-12 NOTE — ASSESSMENT & PLAN NOTE
68yo M with perirectal ulcerations and sores with fluctuance on exam now s/p EUA, perirectal,perineal and inguinal I&D     - wounds healthy appearing with no further purulence or necrosis  - cont abx  - cont penrose drains  - cont local wound care, keep area as clean as possible  - okay to discharge once medically appropriate  - can follow-up with Dr. Gonzalez in clinic in 1-2 weeks for wound check  - rest of care per primary team

## 2022-11-12 NOTE — ASSESSMENT & PLAN NOTE
-Reactive in setting of sepsis  -SR this AM    11/11/22  -PSVT noted overnight, patient asymptomatic  -Replete K  -Discussed with Dr. Stallworth, patient with bradycardia during prior admission, will add Lopressor 12.5 mg daily only for now and check Holter as OP    11/12  Continue Lopressor for PSVT. Baseline rhythm is valdez SR  Hospice consult per hem onc service

## 2022-11-12 NOTE — PLAN OF CARE
[facility] sent home health referral to [facility] via [employer]. [facility] called [facility] and spoke with nurse to see can patient be seen on tomorrow. Nurse states she will be able to see patient on tomorrow if discharged today. [facility] will update provider.

## 2022-11-12 NOTE — PROGRESS NOTES
[facility] - Telemetry ([facility])  Hematology/Oncology  Progress Note    Patient Name: Vincent Benton  Admission Date: 11/8/2022  Hospital Length of Stay: 4 days  Code Status: DNR     Subjective:     HPI:  67 y.o male with recurrent metastatic rectal cancer on palliative chemotherapy presented to [facility] after being found nearly unresponsive covered in diarrhea with BP 60/42. Per chart review, the spouse also reported severe generalized weakness, worsening diarrhea, worsening perirectal wounds with pain. Hypotension improved with BP.     ER workup revealed: BP 90/60, O2sat 93%, CBC unremarkable. Lactic 3.0. Troponin 0.07>0.101. UA +2 ketones. While in the ED, HR elevated to 150s. EKG showed Sinus tachycardia-rate 159, IRBBB, ST wave abnormalities   CT abd/pelvis showed LLL Pneumonia, possible small perirectal abscess.     Cardiology consulted for cardiac abnormalities. Planning outpatient workup pending clinical course  General surgery consulted for perirectal abscesses. Patient s/p I&D. Continued on abx with penrose drains in place    Hematology/Oncology consulted given metastatic rectal cancer      Interval History:  Patient extremely weak spoke with him in presence of nursing staff about long-term prognosis    Oncology Treatment Plan:   OP COLORECTAL FOLFIRI + BEVACIZUMAB Q2W    Medications:  Continuous Infusions:   lactated ringers 75 mL/hr at 11/11/22 0942     Scheduled Meds:   amoxicillin-clavulanate 875-125mg  1 tablet Oral Q12H    ascorbic acid (vitamin C)  1,000 mg Oral Daily    aspirin  81 mg Oral Daily    magic mouthwash diphen/antac/lidoc  15 mL Swish & Spit QID (AC & HS)    enoxaparin  40 mg Subcutaneous Daily    fentaNYL  1 patch Transdermal Q72H    folic acid-vit B6-vit B12 2.5-25-2 mg  1 tablet Oral Daily    metoprolol tartrate  12.5 mg Oral Daily    multivitamin  1 tablet Oral Daily    nystatin  500,000 Units Oral QID    pantoprazole  40 mg Intravenous Daily    potassium chloride   40 mEq Oral Once    sulfamethoxazole-trimethoprim 800-160mg  1 tablet Oral BID    zinc sulfate  220 mg Oral Daily     PRN Meds:sodium chloride 0.9%, acetaminophen, albuterol-ipratropium, dextrose 10%, dextrose 10%, glucagon (human recombinant), glucose, glucose, HYDROmorphone, influenza 65up-adj, insulin aspart U-100, magnesium oxide, magnesium oxide, melatonin, metoprolol, naloxone, ondansetron, oxyCODONE, pneumococcal vaccine, potassium bicarbonate, potassium bicarbonate, potassium bicarbonate, potassium, sodium phosphates, potassium, sodium phosphates, potassium, sodium phosphates, prochlorperazine, simethicone, sodium chloride 0.9%     Review of Systems   Constitutional:  Positive for activity change, fatigue and unexpected weight change.   Neurological:  Positive for weakness.   Psychiatric/Behavioral:  Positive for dysphoric mood. The patient is nervous/anxious.    Objective:     Vital Signs (Most Recent):  Temp: 98.1 °F (36.7 °C) (11/12/22 0706)  Pulse: (!) 59 (11/12/22 0724)  Resp: 20 (11/12/22 0816)  BP: (!) 172/82 (11/12/22 0706)  SpO2: 97 % (11/12/22 0706)   Vital Signs (24h Range):  Temp:  [97.4 °F (36.3 °C)-98.7 °F (37.1 °C)] 98.1 °F (36.7 °C)  Pulse:  [] 59  Resp:  [16-20] 20  SpO2:  [96 %-97 %] 97 %  BP: (134-172)/(77-93) 172/82     Weight: 99.6 kg (219 lb 9.3 oz)  Body mass index is 30.62 kg/m².  Body surface area is 2.23 meters squared.      Intake/Output Summary (Last 24 hours) at 11/12/2022 0901  Last data filed at 11/11/2022 2231  Gross per 24 hour   Intake 120 ml   Output 1050 ml   Net -930 ml       Physical Exam  Constitutional:       General: He is in acute distress.      Appearance: He is cachectic. He is ill-appearing.   Neurological:      Motor: Weakness present.      Coordination: Coordination abnormal.      Gait: Gait abnormal.       Significant Labs:   BMP:   Recent Labs   Lab 11/11/22  6705 11/12/22  0504   GLU 81 75    136   K 3.2* 3.4*   CL 97 98   CO2 28 28   BUN 2* 2*    CREATININE 0.5 0.4*   CALCIUM 7.8* 7.7*   MG 1.8 1.6   , CBC:   Recent Labs   Lab 11/11/22 0337 11/12/22  0504   WBC 6.45 6.16   HGB 10.3* 9.1*   HCT 32.0* 28.8*    298   , CMP:   Recent Labs   Lab 11/11/22  0337 11/12/22  0504    136   K 3.2* 3.4*   CL 97 98   CO2 28 28   GLU 81 75   BUN 2* 2*   CREATININE 0.5 0.4*   CALCIUM 7.8* 7.7*   PROT 4.2*  --    ALBUMIN 1.8*  --    BILITOT 0.4  --    ALKPHOS 81  --    AST 10  --    ALT 7*  --    ANIONGAP 11 10   , Coagulation: No results for input(s): PT, INR, APTT in the last 48 hours., Haptoglobin: No results for input(s): HAPTOGLOBIN in the last 48 hours., Immunology: No results for input(s): SPEP, TRACY, SUBHASH, FREELAMBDALI in the last 48 hours., LDH: No results for input(s): LDHCSF, BFSOURCE in the last 48 hours., LFTs:   Recent Labs   Lab 11/11/22  0337   ALT 7*   AST 10   ALKPHOS 81   BILITOT 0.4   PROT 4.2*   ALBUMIN 1.8*   , Reticulocytes: No results for input(s): RETIC in the last 48 hours., Tumor Markers: No results for input(s): PSA, CEA, , AFPTM, DO3430,  in the last 48 hours.    Invalid input(s): ALGTM, and Uric Acid No results for input(s): URICACID in the last 48 hours.    Diagnostic Results:  I have reviewed all pertinent imaging results/findings within the past 24 hours.    Assessment/Plan:     Diarrhea  -Management per Primary team    Perirectal wounds with abscess  -s/p I&D  -management per General surgery     Mucositis due to antineoplastic therapy  -magic mouthwash PRN    Rectal cancer  -Hold chemotherapy pending clinical course  -Will plan for outpatient f/u with Primary Oncologist to discuss ongoing management   10/12/2022.  Extensive conversation with patient at bedside.  Nursing staff present at this time patient has ECOG status 3 do not feel any further systemic chemotherapy.  I have talked to the patient I strongly recommend hospice consultation. .  I would agree with a do not resuscitate order that has been placed but I  think chances of us being able to deliver further systemic therapy to this patient in this very weak and condition is very low.  Discussed implications with him will be happy to discuss with family.  But long-term prognosis quite poor discussed with Hospital Medicine as well    Neoplasm related pain  -management per PM        Thank you for your consult. I will follow-up with patient. Please contact us if you have any additional questions.     Timbo Mcrae MD  Hematology/Oncology  [facility] - Telemetry ([facility])

## 2022-11-12 NOTE — PLAN OF CARE
[facility] - Telemetry (Hospital)  Discharge Final Note    Primary Care Provider: Shakila Moran DO    Expected Discharge Date: 11/12/2022    Final Discharge Note (most recent)       Final Note - 11/12/22 1503          Final Note    Assessment Type Final Discharge Note     Anticipated Discharge Disposition Home-Health Care [facility]   [place]        Post-Acute Status    Post-Acute Authorization Home Health     HME Status Set-up Complete/Auth obtained     Discharge Delays None known at this time                     Important Message from Medicare             Contact Info       Shakila Moran DO   Specialty: Internal Medicine   Relationship: PCP - General    [address] [place] [address] [address] [address]   Phone: [phone]       Next Steps: Follow up in 1 week(s)    Kristi Wing PA-C   Specialty: Palliative Medicine    [id number] [place] [address] [address] [address]   Phone: [phone]       Next Steps: Follow up in 1 week(s)    Instructions: Follow-up with palliative within 1 week upon discharge.    Yonatan Stallworth MD   Specialty: Cardiology, Cardiovascular Disease    [address] [facility] [place] [address]   Phone: [phone]       Next Steps: Follow up in 1 month(s)    Instructions: Follow-up with cardiology within 2-4 weeks upon discharge    Yonatan Stallworth MD   Specialty: Cardiology, Cardiovascular Disease    [address] [facility] [place] [address]   Phone: [phone]       Next Steps: Follow up

## 2022-11-12 NOTE — SUBJECTIVE & OBJECTIVE
ROS  Objective:     Vital Signs (Most Recent):  Temp: 98.4 °F (36.9 °C) (11/12/22 1112)  Pulse: 68 (11/12/22 1112)  Resp: 20 (11/12/22 1229)  BP: (!) 162/90 (11/12/22 1112)  SpO2: 97 % (11/12/22 1112)   Vital Signs (24h Range):  Temp:  [97.4 °F (36.3 °C)-98.7 °F (37.1 °C)] 98.4 °F (36.9 °C)  Pulse:  [] 68  Resp:  [16-20] 20  SpO2:  [96 %-97 %] 97 %  BP: (134-172)/(77-93) 162/90     Weight: 99.6 kg (219 lb 9.3 oz)  Body mass index is 30.62 kg/m².     SpO2: 97 %  O2 Device (Oxygen Therapy): room air      Intake/Output Summary (Last 24 hours) at 11/12/2022 1306  Last data filed at 11/12/2022 1029  Gross per 24 hour   Intake 600 ml   Output 1050 ml   Net -450 ml       Lines/Drains/Airways       Central Venous Catheter Line  Duration             Port A Cath Single Lumen 08/16/22 1323 right subclavian 88 days              Drain  Duration                  Open Drain 11/10/22 1300 Buttock 2 days         Open Drain 11/10/22 1300 Right Buttock 2 days              Peripheral Intravenous Line  Duration                  Peripheral IV - Single Lumen 11/08/22 1840 22 G Posterior;Right Hand 3 days                    Physical Exam  Vitals and nursing note reviewed.   Constitutional:       General: He is not in acute distress.     Appearance: He is well-developed. He is ill-appearing. He is not diaphoretic.   HENT:      Head: Normocephalic and atraumatic.   Eyes:      General:         Right eye: No discharge.         Left eye: No discharge.      Pupils: Pupils are equal, round, and reactive to light.   Neck:      Thyroid: No thyromegaly.      Vascular: No JVD.      Trachea: No tracheal deviation.   Cardiovascular:      Rate and Rhythm: Normal rate and regular rhythm.      Heart sounds: Normal heart sounds, S1 normal and S2 normal. No murmur heard.  Pulmonary:      Effort: Pulmonary effort is normal. No respiratory distress.      Breath sounds: Normal breath sounds. No wheezing or rales.   Abdominal:      General: There is no  distension.      Palpations: Abdomen is soft.      Tenderness: There is no rebound.   Musculoskeletal:      Cervical back: Neck supple.      Right lower leg: No edema.      Left lower leg: No edema.   Skin:     General: Skin is warm and dry.      Findings: No erythema.   Neurological:      General: No focal deficit present.      Mental Status: He is alert and oriented to person, place, and time.   Psychiatric:         Mood and Affect: Mood normal.         Behavior: Behavior normal.         Thought Content: Thought content normal.       Significant Labs: ABG: No results for input(s): PH, PCO2, HCO3, POCSATURATED, BE in the last 48 hours., Blood Culture: No results for input(s): LABBLOO in the last 48 hours., BMP:   Recent Labs   Lab 11/11/22  0337 11/12/22  0504   GLU 81 75    136   K 3.2* 3.4*   CL 97 98   CO2 28 28   BUN 2* 2*   CREATININE 0.5 0.4*   CALCIUM 7.8* 7.7*   MG 1.8 1.6   , CMP   Recent Labs   Lab 11/11/22 0337 11/12/22  0504    136   K 3.2* 3.4*   CL 97 98   CO2 28 28   GLU 81 75   BUN 2* 2*   CREATININE 0.5 0.4*   CALCIUM 7.8* 7.7*   PROT 4.2*  --    ALBUMIN 1.8*  --    BILITOT 0.4  --    ALKPHOS 81  --    AST 10  --    ALT 7*  --    ANIONGAP 11 10   , CBC   Recent Labs   Lab 11/11/22  0337 11/12/22  0504   WBC 6.45 6.16   HGB 10.3* 9.1*   HCT 32.0* 28.8*    298   , INR No results for input(s): INR, PROTIME in the last 48 hours., Lipid Panel No results for input(s): CHOL, HDL, LDLCALC, TRIG, CHOLHDL in the last 48 hours., and Troponin No results for input(s): TROPONINI in the last 48 hours.    Significant Imaging: EKG:

## 2022-11-12 NOTE — CONSULTS
[facility])  Infectious Disease  Consult Note    Patient Name: Vincent Benton  MRN: [id number]  Admission Date: 11/8/2022  Hospital Length of Stay: 4 days  Attending Physician: Lauren Shah, *  Primary Care Provider: Shakila Moran DO     Isolation Status: Contact    Patient information was obtained from patient, past medical records and ER records.      Consults  Assessment/Plan:     Diarrhea  C difficile assay is negative , will add  Loperamide PRN     Perirectal wounds with abscess  Will use 12 days of Bactrim and Augmentin   Will need close monitoring ,  Will stop parenteral therapy in AM    Pneumonia  Was on cefepime/Vanco.  Will need to rule out aspiration .  Will switch to PO regime in AM, will use Bactrim and Augmentin for 12 days     Rectal cancer  Oncology follow up    Neoplasm related pain  analgesics as needed        Thank you for your consult. I will follow-up with patient. Please contact us if you have any additional questions.    Jose Antonio Davila MD, [facility]  Infectious Disease  [facility])    Subjective:     Principal Problem: Septic shock    HPI: 8 yo male with rectal adenocarcinoma stage IV on palliative chemotherapy-last tx 10/25/22, GERD, HTN, Alcoholism, Diarrhea who presented to ED after being found nearly unresponsive covered in diarrhea with BP 60/42. .  Labs and imaging test reviewed -  CT abd/pelvis showed LLL Pneumonia, possible small perirectal abscess.          Past Medical History:   Diagnosis Date    Alcoholism     Anemia     Back pain     Encounter for blood transfusion 2018    Essential hypertension 2/4/2016    GERD (gastroesophageal reflux disease)     Hiatal hernia     Hypertension     diet controlled    Melanoma 06/2021    left cheek    Rectal cancer 9/11/2019       Past Surgical History:   Procedure Laterality Date    ARTHROSCOPY OF KNEE Right 5/5/2021    Procedure: ARTHROSCOPY, KNEE;  Surgeon: Delonte Mcintyre MD;   Location: [facility] OR;  Service: Orthopedics;  Laterality: Right;    COLONOSCOPY N/A 9/3/2019    Procedure: COLONOSCOPY;  Surgeon: Isai Centeno MD;  Location: [facility] ENDO;  Service: Endoscopy;  Laterality: N/A;    COLONOSCOPY N/A 1/10/2020    Procedure: COLONOSCOPY;  Surgeon: Familia Gonzalez MD;  Location: [facility] ENDO;  Service: General;  Laterality: N/A;    COLONOSCOPY N/A 3/24/2021    Procedure: COLONOSCOPY;  Surgeon: Familia Gonzalez MD;  Location: [facility] ENDO;  Service: General;  Laterality: N/A;    ESOPHAGOGASTRODUODENOSCOPY N/A 9/3/2019    Procedure: ESOPHAGOGASTRODUODENOSCOPY (EGD);  Surgeon: Isai Centeno MD;  Location: [facility];  Service: Endoscopy;  Laterality: N/A;    EXAMINATION UNDER ANESTHESIA N/A 11/10/2022    Procedure: EXAM UNDER ANESTHESIA;  Surgeon: Familia Gonzalez MD;  Location: [facility] OR;  Service: General;  Laterality: N/A;    EXCISION OF MEDIAL MENISCUS OF KNEE Right 5/5/2021    Procedure: MENISCECTOMY, KNEE, MEDIAL;  Surgeon: Delonte Mcintyre MD;  Location: [facility] OR;  Service: Orthopedics;  Laterality: Right;  partial Lateral    FLEXIBLE SIGMOIDOSCOPY  2/4/2020    Procedure: SIGMOIDOSCOPY, FLEXIBLE;  Surgeon: Familia Gonzalez MD;  Location: [facility] OR;  Service: General;;    ILEOSTOMY Right 2/4/2020    Procedure: CREATION, ILEOSTOMY;  Surgeon: Familia Gonzalez MD;  Location: [facility] OR;  Service: General;  Laterality: Right;    ILEOSTOMY CLOSURE N/A 8/4/2020    Procedure: CLOSURE, ILEOSTOMY;  Surgeon: Familia Gonzalez MD;  Location: [facility] OR;  Service: General;  Laterality: N/A;    INCISION AND DRAINAGE OF ABSCESS Bilateral 11/10/2022    Procedure: INCISION AND DRAINAGE, ABSCESS;  Surgeon: Familia Gonzalez MD;  Location: [facility] OR;  Service: General;  Laterality: Bilateral;  groin abscess    INCISION AND DRAINAGE OF BACK Left 8/5/2020    Procedure: INCISION AND DRAINAGE, BACK;  Surgeon: Familia Gonzalez MD;  Location: [facility] OR;  Service: General;  Laterality: Left;     INCISION AND DRAINAGE OF PERIRECTAL REGION N/A 11/10/2022    Procedure: INCISION AND DRAINAGE, PERIRECTAL REGION;  Surgeon: Familia Gonzalez MD;  Location: [facility] OR;  Service: General;  Laterality: N/A;    INCISION OF PERIANAL ABSCESS N/A 11/10/2022    Procedure: INCISION, ABSCESS, PERIANAL;  Surgeon: Familia Gonzalez MD;  Location: [facility] OR;  Service: General;  Laterality: N/A;    INJECTION OF ANESTHETIC AGENT AROUND PUDENDAL NERVE N/A 11/10/2022    Procedure: BLOCK, NERVE, PUDENDAL;  Surgeon: Familia Gonzalez MD;  Location: [facility] OR;  Service: General;  Laterality: N/A;    INJECTION OF ANESTHETIC AGENT INTO TISSUE PLANE DEFINED BY TRANSVERSUS ABDOMINIS MUSCLE N/A 2/4/2020    Procedure: BLOCK, TRANSVERSUS ABDOMINIS PLANE;  Surgeon: Familia Gonzalez MD;  Location: [facility] OR;  Service: General;  Laterality: N/A;    INSERTION OF TUNNELED CENTRAL VENOUS CATHETER (CVC) WITH SUBCUTANEOUS PORT Right 2/4/2020    Procedure: INSERTION, PORT-A-CATH;  Surgeon: Familia Gonzalez MD;  Location: [facility] OR;  Service: General;  Laterality: Right;    INSERTION OF TUNNELED CENTRAL VENOUS CATHETER (CVC) WITH SUBCUTANEOUS PORT N/A 8/16/2022    Procedure: [id number]-H-CATH;  Surgeon: Familia Gonzalez MD;  Location: [facility] OR;  Service: General;  Laterality: N/A;  right subclavian    JOINT REPLACEMENT Left 2009    Hip    KNEE ARTHROSCOPY W/ PLICA EXCISION Right 5/5/2021    Procedure: EXCISION, PLICA, KNEE, ARTHROSCOPIC;  Surgeon: Delonte Mcintyre MD;  Location: [facility] OR;  Service: Orthopedics;  Laterality: Right;    MOBILIZATION OF SPLENIC FLEXURE  2/4/2020    Procedure: MOBILIZATION, SPLENIC FLEXURE;  Surgeon: Familia Gonzalez MD;  Location: [facility] OR;  Service: General;;    REMOVAL OF HARDWARE FROM HIP Left 1/4/2022    Procedure: REMOVAL, HARDWARE, HIP;  Surgeon: Delonte Mcintyre MD;  Location: [facility] OR;  Service: Orthopedics;  Laterality: Left;       Review of patient's allergies indicates:  No Known  Allergies    Medications:  Medications Prior to Admission   Medication Sig    magic mouthwash diphen/antac/lidoc Swish and spit 15 mLs every 4 (four) hours as needed (mouth ulcers).    ascorbic acid, vitamin C, (VITAMIN C) 1000 MG tablet Take 1,000 mg by mouth once daily.    dexAMETHasone (DECADRON) 4 MG Tab Take 2 tablets (8 mg total) by mouth once daily. Take as directed on days 2 and 3 of your chemotherapy cycle.    diphenoxylate-atropine 2.5-0.025 mg (LOMOTIL) 2.5-0.025 mg per tablet Take 1 tablet by mouth 4 (four) times daily as needed for Diarrhea.    fentaNYL (DURAGESIC) 25 mcg/hr Place 1 patch onto the skin every 72 hours. for 15 days    [] fluconazole (DIFLUCAN) 200 MG Tab Take 1 tablet (200 mg total) by mouth once daily. for 3 days    gabapentin (NEURONTIN) 300 MG capsule Take 1 capsule (300 mg total) by mouth every evening.    hydrALAZINE (APRESOLINE) 25 MG tablet Take 1 tablet (25 mg total) by mouth 2 (two) times a day.    loperamide (IMODIUM) 2 mg capsule Take 1 capsule (2 mg total) by mouth 4 (four) times daily as needed for Diarrhea.    losartan (COZAAR) 25 MG tablet Take 1 tablet (25 mg total) by mouth once daily.    multivitamin capsule Take 1 capsule by mouth once daily.    ondansetron (ZOFRAN-ODT) 8 MG TbDL Dissolve 1 tablet (8 mg total) by mouth every 8 (eight) hours as needed (nausea/vomiting).    oxyCODONE (ROXICODONE) 20 mg Tab immediate release tablet Take 1 tablet (20 mg total) by mouth every 4 (four) hours as needed (pain). Max 4 doses/ day    pantoprazole (PROTONIX) 40 MG tablet Take 1 tablet (40 mg total) by mouth once daily.     Antibiotics (From admission, onward)      Start     Stop Route Frequency Ordered    22 1430  cefepime in dextrose 5 % IVPB 2 g         -- IV Every 8 hours (non-standard times) 22 1224    22 1400  vancomycin in dextrose 5 % 1 gram/250 mL IVPB 1,000 mg         -- IV Every 12 hours (non-standard times) 22 0238    22  1100  mupirocin 2 % ointment  (DECOLONIZATION PROTOCOL ORDERS)         11/14 0859 Nasl 2 times daily 11/09/22 0903    11/08/22 2301  vancomycin - pharmacy to dose  (vancomycin IVPB)        See Hyperspace for full Linked Orders Report.    -- IV pharmacy to manage frequency 11/08/22 2201          Antifungals (From admission, onward)      Start     Stop Route Frequency Ordered    11/09/22 0915  nystatin 100,000 unit/mL suspension 500,000 Units         -- Oral 4 times daily 11/09/22 0908          Antivirals (From admission, onward)      None             Immunization History   Administered Date(s) Administered    COVID-19, MRNA, LN-S, PF (Pfizer) (Gray Cap) 03/04/2022    COVID-19, MRNA, LN-S, PF (Pfizer) (Purple Cap) 02/24/2021, 03/17/2021, 09/27/2021    COVID-19, mRNA, LNP-S, bivalent booster, PF (PFIZER OMICRON) 09/29/2022    Influenza - Quadrivalent - PF *Preferred* (6 months and older) 08/07/2015, 10/10/2017, 11/26/2018, 10/18/2019, 10/03/2020    Influenza - Trivalent - PF (ADULT) 08/07/2015, 11/01/2015    Pneumococcal Conjugate - 13 Valent 12/17/2020       Family History       Problem Relation (Age of Onset)    Cataracts Mother    Hypertension Father    Stroke Father          Social History     Socioeconomic History    Marital status:    Tobacco Use    Smoking status: Never    Smokeless tobacco: Never   Substance and Sexual Activity    Alcohol use: Yes    Drug use: No    Sexual activity: Never     Partners: Female     Social Determinants of Health     Financial Resource Strain: Low Risk     Difficulty of Paying Living Expenses: Not very hard   Food Insecurity: No Food Insecurity    Worried About Running Out of Food in the Last Year: Never true    Ran Out of Food in the Last Year: Never true   Transportation Needs: No Transportation Needs    Lack of Transportation (Medical): No    Lack of Transportation (Non-Medical): No   Physical Activity: Inactive    Days of Exercise per Week: 0 days     Minutes of Exercise per Session: 0 min   Stress: Stress Concern Present    Feeling of Stress : To some extent   Social Connections: Moderately Integrated    Frequency of Communication with Friends and Family: Three times a week    Frequency of Social Gatherings with Friends and Family: Once a week    Attends Oriental orthodox Services: Never    Active Member of Clubs or Organizations: No    Attends Club or Organization Meetings: 1 to 4 times per year    Marital Status:    Housing Stability: Low Risk     Unable to Pay for Housing in the Last Year: No    Number of Places Lived in the Last Year: 1    Unstable Housing in the Last Year: No     Review of Systems   Constitutional:  Positive for activity change, appetite change and fatigue. Negative for chills, fever and unexpected weight change.   HENT:  Negative for congestion, mouth sores, nosebleeds, sore throat, trouble swallowing and voice change.    Eyes:  Negative for visual disturbance.   Respiratory:  Negative for cough, chest tightness, shortness of breath and wheezing.    Cardiovascular:  Negative for chest pain and leg swelling.   Gastrointestinal:  Negative for abdominal distention, abdominal pain, blood in stool, constipation, diarrhea, nausea and vomiting.   Genitourinary:  Negative for difficulty urinating, dysuria and hematuria.   Musculoskeletal:  Negative for arthralgias, back pain and myalgias.   Skin:  Positive for wound. Negative for pallor and rash.   Neurological:  Positive for weakness. Negative for dizziness, syncope and headaches.   Hematological:  Negative for adenopathy. Does not bruise/bleed easily.   Psychiatric/Behavioral:  The patient is nervous/anxious.    Objective:     Vital Signs (Most Recent):  Temp: 98.1 °F (36.7 °C) (11/12/22 0424)  Pulse: (!) 57 (11/12/22 0500)  Resp: 18 (11/12/22 0424)  BP: (!) 149/80 (11/12/22 0424)  SpO2: 96 % (11/12/22 0424)   Vital Signs (24h Range):  Temp:  [97.4 °F (36.3 °C)-98.7 °F (37.1 °C)] 98.1  °F (36.7 °C)  Pulse:  [] 57  Resp:  [16-20] 18  SpO2:  [96 %-97 %] 96 %  BP: (124-149)/(62-93) 149/80     Weight: 99.6 kg (219 lb 9.3 oz)  Body mass index is 30.62 kg/m².    Estimated Creatinine Clearance: 221.4 mL/min (A) (based on SCr of 0.4 mg/dL (L)).    Physical Exam  Vitals and nursing note reviewed.   Constitutional:       Appearance: He is well-developed.   HENT:      Head: Normocephalic.      Right Ear: External ear normal.      Left Ear: External ear normal.      Nose: Nose normal.   Eyes:      General: Lids are normal. No scleral icterus.        Right eye: No discharge.         Left eye: No discharge.      Conjunctiva/sclera: Conjunctivae normal.   Neck:      Thyroid: No thyroid mass.   Cardiovascular:      Rate and Rhythm: Normal rate and regular rhythm.      Heart sounds: Normal heart sounds.   Pulmonary:      Effort: Pulmonary effort is normal. No respiratory distress.      Breath sounds: Normal breath sounds. No wheezing or rales.   Abdominal:      General: There is no distension.   Genitourinary:     Comments: deferred  Musculoskeletal:         General: Normal range of motion.      Cervical back: Normal range of motion.   Skin:     General: Skin is warm and dry.   Neurological:      Mental Status: He is alert and oriented to person, place, and time.   Psychiatric:         Speech: Speech normal.         Behavior: Behavior normal. Behavior is cooperative.         Thought Content: Thought content normal.       Significant Labs: Blood Culture:   Recent Labs   Lab 11/08/22  1819 11/08/22  1835   LABBLOO No Growth to date  No Growth to date  No Growth to date  No Growth to date No Growth to date  No Growth to date  No Growth to date  No Growth to date     BMP:   Recent Labs   Lab 11/12/22  0504   GLU 75      K 3.4*   CL 98   CO2 28   BUN 2*   CREATININE 0.4*   CALCIUM 7.7*   MG 1.6     CBC:   Recent Labs   Lab 11/11/22  0337 11/12/22  0504   WBC 6.45 6.16   HGB 10.3* 9.1*   HCT 32.0*  28.8*    298     CMP:   Recent Labs   Lab 11/11/22  0337 11/12/22  0504    136   K 3.2* 3.4*   CL 97 98   CO2 28 28   GLU 81 75   BUN 2* 2*   CREATININE 0.5 0.4*   CALCIUM 7.8* 7.7*   PROT 4.2*  --    ALBUMIN 1.8*  --    BILITOT 0.4  --    ALKPHOS 81  --    AST 10  --    ALT 7*  --    ANIONGAP 11 10     All pertinent labs within the past 24 hours have been reviewed.  Microbiology Results (last 7 days)       Procedure Component Value Units Date/Time    Blood culture #2 **CANNOT BE ORDERED STAT** [[id number]] Collected: 11/08/22 1835    Order Status: Completed Specimen: Blood from Peripheral, Hand, Left Updated: 11/12/22 0612     Blood Culture, Routine No Growth to date      No Growth to date      No Growth to date      No Growth to date    Blood culture #1 **CANNOT BE ORDERED STAT** [[id number]] Collected: 11/08/22 1819    Order Status: Completed Specimen: Blood from Peripheral, Hand, Right Updated: 11/12/22 0612     Blood Culture, Routine No Growth to date      No Growth to date      No Growth to date      No Growth to date    Aerobic culture (Specify Source) **CANNOT BE ORDERED AS STAT** [[id number]]  (Abnormal)  (Susceptibility) Collected: 11/08/22 1858    Order Status: Completed Specimen: Wound from Buttocks, Right Updated: 11/11/22 1106     Aerobic Bacterial Culture METHICILLIN RESISTANT STAPHYLOCOCCUS AUREUS  Many        ESCHERICHIA COLI  Many        KLEBSIELLA PNEUMONIAE  Rare      Culture, MRSA [[id number]] Collected: 11/09/22 1008    Order Status: Completed Specimen: MRSA source from Nares, Right Updated: 11/11/22 0748     MRSA Surveillance Screen MRSA isolated    Aerobic culture [[id number]] Collected: 11/10/22 1313    Order Status: Sent Specimen: Abscess from Perineum Updated: 11/10/22 2118    Culture, Anaerobe [[id number]] Collected: 11/10/22 1313    Order Status: Sent Specimen: Abscess from Perineum Updated: 11/10/22 2118    Clostridium difficile EIA [[id number]] Collected: 11/08/22 1800    Order  Status: Completed Specimen: Stool Updated: 11/09/22 0939     C. diff Antigen Negative     C difficile Toxins A+B, EIA Negative     Comment: Testing not recommended for children <24 months old.               Significant Imaging: I have reviewed all pertinent imaging results/findings within the past 24 hours.

## 2022-11-12 NOTE — SUBJECTIVE & OBJECTIVE
Past Medical History:   Diagnosis Date    Alcoholism     Anemia     Back pain     Encounter for blood transfusion 2018    Essential hypertension 2/4/2016    GERD (gastroesophageal reflux disease)     Hiatal hernia     Hypertension     diet controlled    Melanoma 06/2021    left cheek    Rectal cancer 9/11/2019       Past Surgical History:   Procedure Laterality Date    ARTHROSCOPY OF KNEE Right 5/5/2021    Procedure: ARTHROSCOPY, KNEE;  Surgeon: Delonte Mcintyre MD;  Location: [facility] OR;  Service: Orthopedics;  Laterality: Right;    COLONOSCOPY N/A 9/3/2019    Procedure: COLONOSCOPY;  Surgeon: Isai Centeno MD;  Location: [facility] ENDO;  Service: Endoscopy;  Laterality: N/A;    COLONOSCOPY N/A 1/10/2020    Procedure: COLONOSCOPY;  Surgeon: Familia Gonzalez MD;  Location: [facility];  Service: General;  Laterality: N/A;    COLONOSCOPY N/A 3/24/2021    Procedure: COLONOSCOPY;  Surgeon: Familia Gonzalez MD;  Location: [facility];  Service: General;  Laterality: N/A;    ESOPHAGOGASTRODUODENOSCOPY N/A 9/3/2019    Procedure: ESOPHAGOGASTRODUODENOSCOPY (EGD);  Surgeon: Isai Centeno MD;  Location: [facility];  Service: Endoscopy;  Laterality: N/A;    EXAMINATION UNDER ANESTHESIA N/A 11/10/2022    Procedure: EXAM UNDER ANESTHESIA;  Surgeon: Familia Gonzalez MD;  Location: [facility];  Service: General;  Laterality: N/A;    EXCISION OF MEDIAL MENISCUS OF KNEE Right 5/5/2021    Procedure: MENISCECTOMY, KNEE, MEDIAL;  Surgeon: Delonte Mcintyre MD;  Location: [facility];  Service: Orthopedics;  Laterality: Right;  partial Lateral    FLEXIBLE SIGMOIDOSCOPY  2/4/2020    Procedure: SIGMOIDOSCOPY, FLEXIBLE;  Surgeon: Familia Gonzalez MD;  Location: [facility] OR;  Service: General;;    ILEOSTOMY Right 2/4/2020    Procedure: CREATION, ILEOSTOMY;  Surgeon: Familia Gonzalez MD;  Location: [facility];  Service: General;  Laterality: Right;    ILEOSTOMY CLOSURE N/A 8/4/2020    Procedure: CLOSURE, ILEOSTOMY;  Surgeon: Familia Gonzalez  MD;  Location: [facility] OR;  Service: General;  Laterality: N/A;    INCISION AND DRAINAGE OF ABSCESS Bilateral 11/10/2022    Procedure: INCISION AND DRAINAGE, ABSCESS;  Surgeon: Familia Gonzalez MD;  Location: [facility] OR;  Service: General;  Laterality: Bilateral;  groin abscess    INCISION AND DRAINAGE OF BACK Left 8/5/2020    Procedure: INCISION AND DRAINAGE, BACK;  Surgeon: Familia Gonzalez MD;  Location: [facility] OR;  Service: General;  Laterality: Left;    INCISION AND DRAINAGE OF PERIRECTAL REGION N/A 11/10/2022    Procedure: INCISION AND DRAINAGE, PERIRECTAL REGION;  Surgeon: Familia Gonzalez MD;  Location: [facility] OR;  Service: General;  Laterality: N/A;    INCISION OF PERIANAL ABSCESS N/A 11/10/2022    Procedure: INCISION, ABSCESS, PERIANAL;  Surgeon: Familia Gonzalez MD;  Location: [facility];  Service: General;  Laterality: N/A;    INJECTION OF ANESTHETIC AGENT AROUND PUDENDAL NERVE N/A 11/10/2022    Procedure: BLOCK, NERVE, PUDENDAL;  Surgeon: Familia Gonzalez MD;  Location: [facility];  Service: General;  Laterality: N/A;    INJECTION OF ANESTHETIC AGENT INTO TISSUE PLANE DEFINED BY TRANSVERSUS ABDOMINIS MUSCLE N/A 2/4/2020    Procedure: BLOCK, TRANSVERSUS ABDOMINIS PLANE;  Surgeon: Familia Gonzalez MD;  Location: [facility];  Service: General;  Laterality: N/A;    INSERTION OF TUNNELED CENTRAL VENOUS CATHETER (CVC) WITH SUBCUTANEOUS PORT Right 2/4/2020    Procedure: INSERTION, PORT-A-CATH;  Surgeon: Familia Gonzalez MD;  Location: [facility] OR;  Service: General;  Laterality: Right;    INSERTION OF TUNNELED CENTRAL VENOUS CATHETER (CVC) WITH SUBCUTANEOUS PORT N/A 8/16/2022    Procedure: [id number];  Surgeon: Familia Gonzalez MD;  Location: [facility];  Service: General;  Laterality: N/A;  right subclavian    JOINT REPLACEMENT Left 2009    Hip    KNEE ARTHROSCOPY W/ PLICA EXCISION Right 5/5/2021    Procedure: EXCISION, PLICA, KNEE, ARTHROSCOPIC;  Surgeon: Delonte Mcintyre MD;  Location: [facility];   Service: Orthopedics;  Laterality: Right;    MOBILIZATION OF SPLENIC FLEXURE  2020    Procedure: MOBILIZATION, SPLENIC FLEXURE;  Surgeon: Familia Gonzalez MD;  Location: [facility] OR;  Service: General;;    REMOVAL OF HARDWARE FROM HIP Left 2022    Procedure: REMOVAL, HARDWARE, HIP;  Surgeon: Delonte Mcintyre MD;  Location: [facility] OR;  Service: Orthopedics;  Laterality: Left;       Review of patient's allergies indicates:  No Known Allergies    Medications:  Medications Prior to Admission   Medication Sig    magic mouthwash diphen/antac/lidoc Swish and spit 15 mLs every 4 (four) hours as needed (mouth ulcers).    ascorbic acid, vitamin C, (VITAMIN C) 1000 MG tablet Take 1,000 mg by mouth once daily.    dexAMETHasone (DECADRON) 4 MG Tab Take 2 tablets (8 mg total) by mouth once daily. Take as directed on days 2 and 3 of your chemotherapy cycle.    diphenoxylate-atropine 2.5-0.025 mg (LOMOTIL) 2.5-0.025 mg per tablet Take 1 tablet by mouth 4 (four) times daily as needed for Diarrhea.    fentaNYL (DURAGESIC) 25 mcg/hr Place 1 patch onto the skin every 72 hours. for 15 days    [] fluconazole (DIFLUCAN) 200 MG Tab Take 1 tablet (200 mg total) by mouth once daily. for 3 days    gabapentin (NEURONTIN) 300 MG capsule Take 1 capsule (300 mg total) by mouth every evening.    hydrALAZINE (APRESOLINE) 25 MG tablet Take 1 tablet (25 mg total) by mouth 2 (two) times a day.    loperamide (IMODIUM) 2 mg capsule Take 1 capsule (2 mg total) by mouth 4 (four) times daily as needed for Diarrhea.    losartan (COZAAR) 25 MG tablet Take 1 tablet (25 mg total) by mouth once daily.    multivitamin capsule Take 1 capsule by mouth once daily.    ondansetron (ZOFRAN-ODT) 8 MG TbDL Dissolve 1 tablet (8 mg total) by mouth every 8 (eight) hours as needed (nausea/vomiting).    oxyCODONE (ROXICODONE) 20 mg Tab immediate release tablet Take 1 tablet (20 mg total) by mouth every 4 (four) hours as needed (pain). Max 4 doses/ day     pantoprazole (PROTONIX) 40 MG tablet Take 1 tablet (40 mg total) by mouth once daily.     Antibiotics (From admission, onward)      Start     Stop Route Frequency Ordered    11/09/22 1430  cefepime in dextrose 5 % IVPB 2 g         -- IV Every 8 hours (non-standard times) 11/09/22 1224    11/09/22 1400  vancomycin in dextrose 5 % 1 gram/250 mL IVPB 1,000 mg         -- IV Every 12 hours (non-standard times) 11/09/22 0238    11/09/22 1100  mupirocin 2 % ointment  (DECOLONIZATION PROTOCOL ORDERS)         11/14 0859 Nasl 2 times daily 11/09/22 0903    11/08/22 2301  vancomycin - pharmacy to dose  (vancomycin IVPB)        See Hyperspace for full Linked Orders Report.    -- IV pharmacy to manage frequency 11/08/22 2201          Antifungals (From admission, onward)      Start     Stop Route Frequency Ordered    11/09/22 0915  nystatin 100,000 unit/mL suspension 500,000 Units         -- Oral 4 times daily 11/09/22 0908          Antivirals (From admission, onward)      None             Immunization History   Administered Date(s) Administered    COVID-19, MRNA, LN-S, PF (Pfizer) (Gray Cap) 03/04/2022    COVID-19, MRNA, LN-S, PF (Pfizer) (Purple Cap) 02/24/2021, 03/17/2021, 09/27/2021    COVID-19, mRNA, LNP-S, bivalent booster, PF (PFIZER OMICRON) 09/29/2022    Influenza - Quadrivalent - PF *Preferred* (6 months and older) 08/07/2015, 10/10/2017, 11/26/2018, 10/18/2019, 10/03/2020    Influenza - Trivalent - PF (ADULT) 08/07/2015, 11/01/2015    Pneumococcal Conjugate - 13 Valent 12/17/2020       Family History       Problem Relation (Age of Onset)    Cataracts Mother    Hypertension Father    Stroke Father          Social History     Socioeconomic History    Marital status:    Tobacco Use    Smoking status: Never    Smokeless tobacco: Never   Substance and Sexual Activity    Alcohol use: Yes    Drug use: No    Sexual activity: Never     Partners: Female     Social Determinants of Health     Financial Resource Strain: Low  Risk     Difficulty of Paying Living Expenses: Not very hard   Food Insecurity: No Food Insecurity    Worried About Running Out of Food in the Last Year: Never true    Ran Out of Food in the Last Year: Never true   Transportation Needs: No Transportation Needs    Lack of Transportation (Medical): No    Lack of Transportation (Non-Medical): No   Physical Activity: Inactive    Days of Exercise per Week: 0 days    Minutes of Exercise per Session: 0 min   Stress: Stress Concern Present    Feeling of Stress : To some extent   Social Connections: Moderately Integrated    Frequency of Communication with Friends and Family: Three times a week    Frequency of Social Gatherings with Friends and Family: Once a week    Attends Worship Services: Never    Active Member of Clubs or Organizations: No    Attends Club or Organization Meetings: 1 to 4 times per year    Marital Status:    Housing Stability: Low Risk     Unable to Pay for Housing in the Last Year: No    Number of Places Lived in the Last Year: 1    Unstable Housing in the Last Year: No     Review of Systems   Constitutional:  Positive for activity change, appetite change and fatigue. Negative for chills, fever and unexpected weight change.   HENT:  Negative for congestion, mouth sores, nosebleeds, sore throat, trouble swallowing and voice change.    Eyes:  Negative for visual disturbance.   Respiratory:  Negative for cough, chest tightness, shortness of breath and wheezing.    Cardiovascular:  Negative for chest pain and leg swelling.   Gastrointestinal:  Negative for abdominal distention, abdominal pain, blood in stool, constipation, diarrhea, nausea and vomiting.   Genitourinary:  Negative for difficulty urinating, dysuria and hematuria.   Musculoskeletal:  Negative for arthralgias, back pain and myalgias.   Skin:  Positive for wound. Negative for pallor and rash.   Neurological:  Positive for weakness. Negative for dizziness, syncope and headaches.    Hematological:  Negative for adenopathy. Does not bruise/bleed easily.   Psychiatric/Behavioral:  The patient is nervous/anxious.    Objective:     Vital Signs (Most Recent):  Temp: 98.1 °F (36.7 °C) (11/12/22 0424)  Pulse: (!) 57 (11/12/22 0500)  Resp: 18 (11/12/22 0424)  BP: (!) 149/80 (11/12/22 0424)  SpO2: 96 % (11/12/22 0424)   Vital Signs (24h Range):  Temp:  [97.4 °F (36.3 °C)-98.7 °F (37.1 °C)] 98.1 °F (36.7 °C)  Pulse:  [] 57  Resp:  [16-20] 18  SpO2:  [96 %-97 %] 96 %  BP: (124-149)/(62-93) 149/80     Weight: 99.6 kg (219 lb 9.3 oz)  Body mass index is 30.62 kg/m².    Estimated Creatinine Clearance: 221.4 mL/min (A) (based on SCr of 0.4 mg/dL (L)).    Physical Exam  Vitals and nursing note reviewed.   Constitutional:       Appearance: He is well-developed.   HENT:      Head: Normocephalic.      Right Ear: External ear normal.      Left Ear: External ear normal.      Nose: Nose normal.   Eyes:      General: Lids are normal. No scleral icterus.        Right eye: No discharge.         Left eye: No discharge.      Conjunctiva/sclera: Conjunctivae normal.   Neck:      Thyroid: No thyroid mass.   Cardiovascular:      Rate and Rhythm: Normal rate and regular rhythm.      Heart sounds: Normal heart sounds.   Pulmonary:      Effort: Pulmonary effort is normal. No respiratory distress.      Breath sounds: Normal breath sounds. No wheezing or rales.   Abdominal:      General: There is no distension.   Genitourinary:     Comments: deferred  Musculoskeletal:         General: Normal range of motion.      Cervical back: Normal range of motion.   Skin:     General: Skin is warm and dry.   Neurological:      Mental Status: He is alert and oriented to person, place, and time.   Psychiatric:         Speech: Speech normal.         Behavior: Behavior normal. Behavior is cooperative.         Thought Content: Thought content normal.       Significant Labs: Blood Culture:   Recent Labs   Lab 11/08/22 1819 11/08/22 1835    LABBLOO No Growth to date  No Growth to date  No Growth to date  No Growth to date No Growth to date  No Growth to date  No Growth to date  No Growth to date     BMP:   Recent Labs   Lab 11/12/22  0504   GLU 75      K 3.4*   CL 98   CO2 28   BUN 2*   CREATININE 0.4*   CALCIUM 7.7*   MG 1.6     CBC:   Recent Labs   Lab 11/11/22  0337 11/12/22  0504   WBC 6.45 6.16   HGB 10.3* 9.1*   HCT 32.0* 28.8*    298     CMP:   Recent Labs   Lab 11/11/22  0337 11/12/22  0504    136   K 3.2* 3.4*   CL 97 98   CO2 28 28   GLU 81 75   BUN 2* 2*   CREATININE 0.5 0.4*   CALCIUM 7.8* 7.7*   PROT 4.2*  --    ALBUMIN 1.8*  --    BILITOT 0.4  --    ALKPHOS 81  --    AST 10  --    ALT 7*  --    ANIONGAP 11 10     All pertinent labs within the past 24 hours have been reviewed.  Microbiology Results (last 7 days)       Procedure Component Value Units Date/Time    Blood culture #2 **CANNOT BE ORDERED STAT** [[id number]] Collected: 11/08/22 1835    Order Status: Completed Specimen: Blood from Peripheral, Hand, Left Updated: 11/12/22 0612     Blood Culture, Routine No Growth to date      No Growth to date      No Growth to date      No Growth to date    Blood culture #1 **CANNOT BE ORDERED STAT** [[id number]] Collected: 11/08/22 1819    Order Status: Completed Specimen: Blood from Peripheral, Hand, Right Updated: 11/12/22 0612     Blood Culture, Routine No Growth to date      No Growth to date      No Growth to date      No Growth to date    Aerobic culture (Specify Source) **CANNOT BE ORDERED AS STAT** [[id number]]  (Abnormal)  (Susceptibility) Collected: 11/08/22 1858    Order Status: Completed Specimen: Wound from Buttocks, Right Updated: 11/11/22 1106     Aerobic Bacterial Culture METHICILLIN RESISTANT STAPHYLOCOCCUS AUREUS  Many        ESCHERICHIA COLI  Many        KLEBSIELLA PNEUMONIAE  Rare      Culture, MRSA [[id number]] Collected: 11/09/22 1008    Order Status: Completed Specimen: MRSA source from Nares, Right  Updated: 11/11/22 0748     MRSA Surveillance Screen MRSA isolated    Aerobic culture [[id number]] Collected: 11/10/22 1313    Order Status: Sent Specimen: Abscess from Perineum Updated: 11/10/22 2118    Culture, Anaerobe [[id number]] Collected: 11/10/22 1313    Order Status: Sent Specimen: Abscess from Perineum Updated: 11/10/22 2118    Clostridium difficile EIA [[id number]] Collected: 11/08/22 1800    Order Status: Completed Specimen: Stool Updated: 11/09/22 0939     C. diff Antigen Negative     C difficile Toxins A+B, EIA Negative     Comment: Testing not recommended for children <24 months old.               Significant Imaging: I have reviewed all pertinent imaging results/findings within the past 24 hours.

## 2022-11-12 NOTE — PT/OT/SLP EVAL
Speech Language Pathology Evaluation  Cognitive/Bedside Swallow    Patient Name:  Vincent Benton   MRN:  [id number]  Admitting Diagnosis: Septic shock    Recommendations:                  General Recommendations:  Modified barium swallow study  Diet recommendations:  Minced & Moist Diet - IDDSI Level 5, Thin liquids - IDDSI Level 0   Aspiration Precautions: Standard aspiration precautions   General Precautions: Standard, aspiration  Communication strategies:  none    History:     Past Medical History:   Diagnosis Date    Alcoholism     Anemia     Back pain     Encounter for blood transfusion 2018    Essential hypertension 2/4/2016    GERD (gastroesophageal reflux disease)     Hiatal hernia     Hypertension     diet controlled    Melanoma 06/2021    left cheek    Rectal cancer 9/11/2019       Past Surgical History:   Procedure Laterality Date    ARTHROSCOPY OF KNEE Right 5/5/2021    Procedure: ARTHROSCOPY, KNEE;  Surgeon: Delonte Mcintyre MD;  Location: [facility];  Service: Orthopedics;  Laterality: Right;    COLONOSCOPY N/A 9/3/2019    Procedure: COLONOSCOPY;  Surgeon: Isai Centeno MD;  Location: [facility];  Service: Endoscopy;  Laterality: N/A;    COLONOSCOPY N/A 1/10/2020    Procedure: COLONOSCOPY;  Surgeon: Familia Gonzalez MD;  Location: [facility];  Service: General;  Laterality: N/A;    COLONOSCOPY N/A 3/24/2021    Procedure: COLONOSCOPY;  Surgeon: Familia Gonzalez MD;  Location: [facility];  Service: General;  Laterality: N/A;    ESOPHAGOGASTRODUODENOSCOPY N/A 9/3/2019    Procedure: ESOPHAGOGASTRODUODENOSCOPY (EGD);  Surgeon: Isai Centeno MD;  Location: [facility];  Service: Endoscopy;  Laterality: N/A;    EXAMINATION UNDER ANESTHESIA N/A 11/10/2022    Procedure: EXAM UNDER ANESTHESIA;  Surgeon: Familia Gonzlaez MD;  Location: [facility];  Service: General;  Laterality: N/A;    EXCISION OF MEDIAL MENISCUS OF KNEE Right 5/5/2021    Procedure: MENISCECTOMY, KNEE, MEDIAL;  Surgeon: Delonte BECKHAM  MD Miguelina;  Location: [facility] OR;  Service: Orthopedics;  Laterality: Right;  partial Lateral    FLEXIBLE SIGMOIDOSCOPY  2/4/2020    Procedure: SIGMOIDOSCOPY, FLEXIBLE;  Surgeon: Familia Gonzalez MD;  Location: [facility] OR;  Service: General;;    ILEOSTOMY Right 2/4/2020    Procedure: CREATION, ILEOSTOMY;  Surgeon: Familia Gonzalez MD;  Location: [facility] OR;  Service: General;  Laterality: Right;    ILEOSTOMY CLOSURE N/A 8/4/2020    Procedure: CLOSURE, ILEOSTOMY;  Surgeon: Familia Gonzalez MD;  Location: [facility] OR;  Service: General;  Laterality: N/A;    INCISION AND DRAINAGE OF ABSCESS Bilateral 11/10/2022    Procedure: INCISION AND DRAINAGE, ABSCESS;  Surgeon: Familia Gonzalez MD;  Location: [facility] OR;  Service: General;  Laterality: Bilateral;  groin abscess    INCISION AND DRAINAGE OF BACK Left 8/5/2020    Procedure: INCISION AND DRAINAGE, BACK;  Surgeon: Familia Gonzalez MD;  Location: [facility] OR;  Service: General;  Laterality: Left;    INCISION AND DRAINAGE OF PERIRECTAL REGION N/A 11/10/2022    Procedure: INCISION AND DRAINAGE, PERIRECTAL REGION;  Surgeon: Familia Gonzalez MD;  Location: [facility] OR;  Service: General;  Laterality: N/A;    INCISION OF PERIANAL ABSCESS N/A 11/10/2022    Procedure: INCISION, ABSCESS, PERIANAL;  Surgeon: Familia Gonzalez MD;  Location: [facility] OR;  Service: General;  Laterality: N/A;    INJECTION OF ANESTHETIC AGENT AROUND PUDENDAL NERVE N/A 11/10/2022    Procedure: BLOCK, NERVE, PUDENDAL;  Surgeon: Familia Gonzalez MD;  Location: [facility] OR;  Service: General;  Laterality: N/A;    INJECTION OF ANESTHETIC AGENT INTO TISSUE PLANE DEFINED BY TRANSVERSUS ABDOMINIS MUSCLE N/A 2/4/2020    Procedure: BLOCK, TRANSVERSUS ABDOMINIS PLANE;  Surgeon: Familia Gonzalez MD;  Location: [facility] OR;  Service: General;  Laterality: N/A;    INSERTION OF TUNNELED CENTRAL VENOUS CATHETER (CVC) WITH SUBCUTANEOUS PORT Right 2/4/2020    Procedure: INSERTION, PORT-A-CATH;  Surgeon: Familia Gonzalez MD;   Location: [facility];  Service: General;  Laterality: Right;    INSERTION OF TUNNELED CENTRAL VENOUS CATHETER (CVC) WITH SUBCUTANEOUS PORT N/A 8/16/2022    Procedure: [id number];  Surgeon: Familia Gonzalez MD;  Location: [facility];  Service: General;  Laterality: N/A;  right subclavian    JOINT REPLACEMENT Left 2009    Hip    KNEE ARTHROSCOPY W/ PLICA EXCISION Right 5/5/2021    Procedure: EXCISION, PLICA, KNEE, ARTHROSCOPIC;  Surgeon: Delonte Mcintyre MD;  Location: [facility];  Service: Orthopedics;  Laterality: Right;    MOBILIZATION OF SPLENIC FLEXURE  2/4/2020    Procedure: MOBILIZATION, SPLENIC FLEXURE;  Surgeon: Familia Gonzalez MD;  Location: [facility];  Service: General;;    REMOVAL OF HARDWARE FROM HIP Left 1/4/2022    Procedure: REMOVAL, HARDWARE, HIP;  Surgeon: Delonte Mcintyre MD;  Location: [facility];  Service: Orthopedics;  Laterality: Left;       Social History: Patient lives at home.     Prior Intubation HX:  See medical chart    Modified Barium Swallow: NA    Chest X-Rays: See medial chart    Prior diet: Thin liquids/Regular consistencies.  Subjective     Patient seen at bedside with family present. Patient reported some difficulty swallowing, prolonged mastication, and some coughing with liquids.     Patient goals: To improve strength and discharge to home.      Pain/Comfort:  Pain Rating 1: 9/10  Pain Rating Post-Intervention 1: 9/10  Pain Rating 2: 9/10  Pain Rating Post-Intervention 2: 9/10    Respiratory Status:  nasal cannula    Objective:       Oral Musculature Evaluation  Oral Musculature: general weakness  Dentition: scattered dentition  Secretion Management: other (see comments) (consistent wet vocal quality)  Mucosal Quality: good  Mandibular Strength and Mobility: impaired  Oral Labial Strength and Mobility: impaired retraction, impaired pursing  Lingual Strength and Mobility: impaired strength  Velar Elevation: impaired  Buccal Strength and Mobility: impaired  Volitional  Cough: Present/Productive  Volitional Swallow: Presetn  Voice Prior to PO Intake: Wet vocal quality  Oral Musculature Comments: Generalized weakness    Bedside Swallow Eval:   Consistencies Assessed:  Thin liquids via self regulated cup sip  Soft solids: scrambled eggs      Oral Phase:   Prolonged mastication  Slow oral transit time    Pharyngeal Phase:   coughing/choking  delayed swallow initation  throat clearing  wet vocal quality after swallow    Compensatory Strategies  None    Impression:   Patient presents with oral phase of the swallow characterized by significantly prolonged mastication due to generalized weakness. Delayed AP transit time and delayed swallow initiation. Overt s/s of pharyngeal dysphagia included a wet vocal quality, coughing and regurgitation of food. A modified barium swallow study is recommended in order to assess swallow safety and efficiency and to guide the treatment plan.     Assessment:     Vincent Benton is a 67 y.o. male with an SLP diagnosis of Dysphagia.  He presents with oral phase dysphagia characterized by prolonged mastication and generalized weakness or oral musculature. Suspected pharyngeal dysphagia due to wet vocal quality, coughing and regurgitation of food. Patient will benefit from a modified barium swallow study in order to assess swallow safety and efficiency and to guide the treatment plan.     Goals:   Multidisciplinary Problems       SLP Goals          Problem: SLP    Goal Priority Disciplines Outcome   SLP Goal     SLP    Description: 1. Patient will complete MBSS to determine safety/efficiency of the swallow and to guide treatment plan.                          Plan:     Patient to be seen:  2 x/week, 3 x/week   Plan of Care expires:  11/19/22  Plan of Care reviewed with:  patient, family   SLP Follow-Up:  Yes       Discharge recommendations:  Discharge Facility/Level of Care Needs: home with home health   Barriers to Discharge:  Level of Skilled  Assistance Needed      Time Tracking:     SLP Treatment Date:   11/12/22  Speech Start Time:  1055  Speech Stop Time:  1110     Speech Total Time (min):  15 min    Billable Minutes: Eval Swallow and Oral Function 15    11/12/2022

## 2022-11-12 NOTE — ASSESSMENT & PLAN NOTE
Was on cefepime/Vanco.  Will need to rule out aspiration .  Will switch to PO regime in AM, will use Bactrim and Augmentin for 12 days

## 2022-11-12 NOTE — DISCHARGE INSTRUCTIONS
Recommended patient to be compliant with medications  Recommended patient to be compliant with follow-up visits recommended patient be compliant with home PT OT, swallow evaluation, wound care.

## 2022-11-12 NOTE — HPI
6 yo male with rectal adenocarcinoma stage IV on palliative chemotherapy-last tx 10/25/22, GERD, HTN, Alcoholism, Diarrhea who presented to ED after being found nearly unresponsive covered in diarrhea with BP 60/42. .  Labs and imaging test reviewed -  CT abd/pelvis showed LLL Pneumonia, possible small perirectal abscess.

## 2022-11-12 NOTE — PROGRESS NOTES
[facility] - Telemetry ([facility])  Cardiology  Progress Note    Patient Name: Vincent Benton  MRN: [id number]  Admission Date: 11/8/2022  Hospital Length of Stay: 4 days  Code Status: DNR   Attending Physician: Lauren Shah, *   Primary Care Physician: Shakila Moran DO  Expected Discharge Date:   Principal Problem:Septic shock    Subjective:     Hospital Course:   11/11/22-Patient seen and examined today, resting in bed, s/p I & D of pj-rectal abscesses yesterday. Feels ok, pain improved. NO chest pain or SOB. Brief run of PSVT noted overnight, resolved s/p IV Lopressor. Discussed OP ischemic workup. Labs reviewed, K 3.2, being repleted.      11/12/2022 Lab reviewed stable. VSS. The tel showed PSVT episodes for o/n. The oncology rec. Hospice consult,         ROS  Objective:     Vital Signs (Most Recent):  Temp: 98.4 °F (36.9 °C) (11/12/22 1112)  Pulse: 68 (11/12/22 1112)  Resp: 20 (11/12/22 1229)  BP: (!) 162/90 (11/12/22 1112)  SpO2: 97 % (11/12/22 1112)   Vital Signs (24h Range):  Temp:  [97.4 °F (36.3 °C)-98.7 °F (37.1 °C)] 98.4 °F (36.9 °C)  Pulse:  [] 68  Resp:  [16-20] 20  SpO2:  [96 %-97 %] 97 %  BP: (134-172)/(77-93) 162/90     Weight: 99.6 kg (219 lb 9.3 oz)  Body mass index is 30.62 kg/m².     SpO2: 97 %  O2 Device (Oxygen Therapy): room air      Intake/Output Summary (Last 24 hours) at 11/12/2022 1306  Last data filed at 11/12/2022 1029  Gross per 24 hour   Intake 600 ml   Output 1050 ml   Net -450 ml       Lines/Drains/Airways       Central Venous Catheter Line  Duration             Port A Cath Single Lumen 08/16/22 1323 right subclavian 88 days              Drain  Duration                  Open Drain 11/10/22 1300 Buttock 2 days         Open Drain 11/10/22 1300 Right Buttock 2 days              Peripheral Intravenous Line  Duration                  Peripheral IV - Single Lumen 11/08/22 1840 22 G Posterior;Right Hand 3 days                    Physical Exam  Vitals and nursing note  reviewed.   Constitutional:       General: He is not in acute distress.     Appearance: He is well-developed. He is ill-appearing. He is not diaphoretic.   HENT:      Head: Normocephalic and atraumatic.   Eyes:      General:         Right eye: No discharge.         Left eye: No discharge.      Pupils: Pupils are equal, round, and reactive to light.   Neck:      Thyroid: No thyromegaly.      Vascular: No JVD.      Trachea: No tracheal deviation.   Cardiovascular:      Rate and Rhythm: Normal rate and regular rhythm.      Heart sounds: Normal heart sounds, S1 normal and S2 normal. No murmur heard.  Pulmonary:      Effort: Pulmonary effort is normal. No respiratory distress.      Breath sounds: Normal breath sounds. No wheezing or rales.   Abdominal:      General: There is no distension.      Palpations: Abdomen is soft.      Tenderness: There is no rebound.   Musculoskeletal:      Cervical back: Neck supple.      Right lower leg: No edema.      Left lower leg: No edema.   Skin:     General: Skin is warm and dry.      Findings: No erythema.   Neurological:      General: No focal deficit present.      Mental Status: He is alert and oriented to person, place, and time.   Psychiatric:         Mood and Affect: Mood normal.         Behavior: Behavior normal.         Thought Content: Thought content normal.       Significant Labs: ABG: No results for input(s): PH, PCO2, HCO3, POCSATURATED, BE in the last 48 hours., Blood Culture: No results for input(s): LABBLOO in the last 48 hours., BMP:   Recent Labs   Lab 11/11/22  0337 11/12/22  0504   GLU 81 75    136   K 3.2* 3.4*   CL 97 98   CO2 28 28   BUN 2* 2*   CREATININE 0.5 0.4*   CALCIUM 7.8* 7.7*   MG 1.8 1.6   , CMP   Recent Labs   Lab 11/11/22  0337 11/12/22  0504    136   K 3.2* 3.4*   CL 97 98   CO2 28 28   GLU 81 75   BUN 2* 2*   CREATININE 0.5 0.4*   CALCIUM 7.8* 7.7*   PROT 4.2*  --    ALBUMIN 1.8*  --    BILITOT 0.4  --    ALKPHOS 81  --    AST 10  --     ALT 7*  --    ANIONGAP 11 10   , CBC   Recent Labs   Lab 11/11/22  0337 11/12/22  0504   WBC 6.45 6.16   HGB 10.3* 9.1*   HCT 32.0* 28.8*    298   , INR No results for input(s): INR, PROTIME in the last 48 hours., Lipid Panel No results for input(s): CHOL, HDL, LDLCALC, TRIG, CHOLHDL in the last 48 hours., and Troponin No results for input(s): TROPONINI in the last 48 hours.    Significant Imaging: EKG:      Assessment and Plan:       * Septic shock  -Mgmt as per primary team  -On abx  -BC pending    Elevated troponin  -Troponin 0.070>0.101>0.084>0.059  -No chest pain/anginal symptoms  -Suspect related to demand ischemia from PSVT/septic shock  -EKG with diffuse ST depression  -Repeat echo pending  -Continue ASA as tolerated  -Will consider ischemic workup pending stability, patient meeting with palliative care today    11/11/22  -No chest pain symptoms/angina  -Echo showed normal EF  -Continue ASA as tolerated  -Low dose BB as BP/HR permits  -Will consider OP ischemic workup     Diarrhea  -Mgmt as per primary team    Pneumonia  -Mgmt as per primary team  -On abx    Tachycardia  -Reactive in setting of sepsis  -SR this AM    11/11/22  -PSVT noted overnight, patient asymptomatic  -Replete K  -Discussed with Dr. Stallworth, patient with bradycardia during prior admission, will add Lopressor 12.5 mg daily only for now and check Holter as OP    11/12  Continue Lopressor for PSVT. Baseline rhythm is valdez SR  Hospice consult per hem onc service        VTE Risk Mitigation (From admission, onward)         Ordered     enoxaparin injection 40 mg  Daily         11/08/22 2159     IP VTE HIGH RISK PATIENT  Once         11/08/22 2159     Place sequential compression device  Until discontinued         11/08/22 2159                Yonatan Stallworth MD  Cardiology  [facility] - Telemetry ([facility])

## 2022-11-12 NOTE — ASSESSMENT & PLAN NOTE
-Hold chemotherapy pending clinical course  -Will plan for outpatient f/u with Primary Oncologist to discuss ongoing management   10/12/2022.  Extensive conversation with patient at bedside.  Nursing staff present at this time patient has ECOG status 3 do not feel any further systemic chemotherapy.  I have talked to the patient I strongly recommend hospice consultation. .  I would agree with a do not resuscitate order that has been placed but I think chances of us being able to deliver further systemic therapy to this patient in this very weak and condition is very low.  Discussed implications with him will be happy to discuss with family.  But long-term prognosis quite poor discussed with Hospital Medicine as well

## 2022-11-12 NOTE — DISCHARGE SUMMARY
[facility] - Telemetry ([facility] Medicine  Discharge Summary      Patient Name: Vincent Benton  MRN: [id number]  MARÍA: [id number]  Patient Class: IP- Inpatient  Admission Date: 11/8/2022  Hospital Length of Stay: 4 days  Discharge Date and Time: No discharge date for patient encounter.  Attending Physician: Lauren Shah, *   Discharging Provider: Lauren Shah MD  Primary Care Provider: Shakila Moran DO    Primary Care Team: [facility] MEDICINE B    HPI:   The patient is a 66 yo male with rectal adenocarcinoma stage IV on palliative chemotherapy-last tx 10/25/22, GERD, HTN, Alcoholism, Diarrhea since staring Chemo, and recent hospitalization for severe mucositis and wounds to buttocks who presented to ED after being found nearly unresponsive covered in diarrhea with BP 60/42. Per ED records, the spouse also reported severe generalized weakness, worsening diarrhea, worsening perirectal wounds with pain. BP improved with IVFs    In the ED, BP 90/60, O2sat 93%, WBC britni- mild left shift, LA 3.0. Troponin 0.07>0.101. UA +2 ketones.   While in the ED, HR elevated to 150s. IV Cardizem 20mg x 2 given. EKG showed Sinus tachycardia-rate 159, IRBBB, ST wave abnormalities   CT abd/pelvis showed LLL Pneumonia, possible small perirectal abscess.   3 liters IVF bolus given. SBP still dropping to 80s, -140s at times     ED provider discussed pt status and findings with Cardiology and General surgery who will see pt in consult.   Discussed ICU care and possible CVC/pressor support with pt- he stated that he is ready to die. Attempted to call spouse but no answer. Discussed CVC/IV Pressors with ED provider. Dr. Hawthorne discussed this with the pt's spouse, Akilah, before she left the ED. She wants these procedures done if needed     DNR/I- LaPOST on file, HCPOA on file, 1: wife Akilah, 2: sister Fara      Procedure(s) (LRB):  EXAM UNDER ANESTHESIA (N/A)  INCISION AND DRAINAGE, PERIRECTAL REGION  (N/A)  BLOCK, NERVE, PUDENDAL (N/A)  INCISION, ABSCESS, PERIANAL (N/A)  INCISION AND DRAINAGE, ABSCESS (Bilateral)      Hospital Course:   11/9:  Examination done at bedside, patient alert, complaining of persistent pain.  Expressing concerns lorene he might die soon, however still wants things to be done, sister at bedside expressing concerns of stage IV cancer, recurrent hospitalizations- will follow-up with palliative team.   Follow-up with general surgery for perirectal abscess-will continue broad-spectrum antibiotics, monitored blood pressure and consider pressors as needed to maintain map above 65;  Follow-up with wound care   Cardiology evaluated and recommended possible ischemic workup once patient says is more stable.       11/10:    Examination done at bedside, patient alert, complaining of persistent pain --> on pain regimen as per palliative;  Scheduled for I&D by General surgery for perirectal abscess, on broad-spectrum antibiotics, cultures from wound showing Gram-negative rods, Staph aureus, pending susceptibility, ID follow-up.    Afebrile, hemodynamically stable.    Cardio on board, considering ischemic workup once stable;   11/11   Examination done at bedside, denied acute issues.    Status post I&D day 1, general surgeon on board.  Aerobic cultures resulted that showed MRSA, Klebsiella, E coli--> will follow up with ID for final recommendations.    Cardiology on board, recommended outpatient ischemic workup, outpatient Holter monitoring, ordered low-dose Lopressor.    Palliative on board, due to poor social support, recommended skilled nursing facility upon discharge, will follow up with .    Heme-Onc on board, recommended outpatient follow-up with patient's oncologist for ongoing management.  11/12   Examination done at bedside, patient denied any acute issues.    Hemodynamically stable, afebrile, no leukocytosis.    ID on board, recommended Augmentin, Bactrim p.o. for 12 days.     Heme-Onc evaluated and recommended to consider hospice, however patient at this point would like to continue with DNR DNI, continue outpatient palliative follow-up.    Cardiology follow-up outpatient.    General surgery stated no further surgical interventions, okay to discharge, follow-up with wound care.    Discussed with  regarding possible transfer to skilled nursing facility, based on PT OT evaluation  stated that patient will qualify for home PT OT, based on insurance.     arranging for home PT, OT, speech therapy, home nurse visit.  Patient will be discharged to sister's home who agrees to take care of the patient for couple of days until patient's gets more stabilized.     Review of Systems     Constitutional:  Denied acute issues.    HENT: Negative.     Eyes: Negative.    Cardiovascular: Negative.    Respiratory: Negative.     Endocrine: Negative.    Hematologic/Lymphatic: Negative.    Skin: Negative.    Musculoskeletal:  Denied muscular pain.  Gastrointestinal:   Genitourinary: Negative.            Neurological: Negative.    Psychiatric/Behavioral: Negative.     Allergic/Immunologic: Negative.    Physical Exam     Constitutional:       General: He is not in acute distress.     Appearance: He is well-developed. He is not diaphoretic.        HENT:      Head: Normocephalic and atraumatic.   Eyes:      General:         Right eye: No discharge.         Left eye: No discharge.      Pupils: Pupils are equal, round, and reactive to light.   Neck:      Thyroid: No thyromegaly.      Vascular: No JVD.      Trachea: No tracheal deviation.   Cardiovascular:      Rate and Rhythm: Normal rate and regular rhythm.      Heart sounds: Normal heart sounds, S1 normal and S2 normal. No murmur heard.  Pulmonary:      Effort: Pulmonary effort is normal. No respiratory distress.      Breath sounds: Normal breath sounds. No wheezing or rales.   Abdominal:      General: There is no distension.       Tenderness: There is no rebound.   Musculoskeletal:      Cervical back: Neck supple.      Right lower leg: No edema.      Left lower leg: No edema.   Skin:     General: Skin is warm and dry.      Findings: No erythema.   Neurological:      Mental Status: He is alert and oriented to person, place, and time.   Psychiatric:         Mood and Affect: Mood normal.         Behavior: Behavior normal.           Goals of Care Treatment Preferences:  Code Status: DNR    Health care agent: 1: wife Akilah Medrano, 2: sister Fara Benton  Health care agent number: [phone]    Living Will: Yes  LaPOST: Yes  What is most important right now is to focus on improvement in condition but with limits to invasive therapies.  Accordingly, we have decided that the best plan to meet the patient's goals includes continuing with treatment.      Consults:   Consults (From admission, onward)        Status Ordering Provider     Inpatient consult to Social Work  Once        Provider:  (Not yet assigned)    Acknowledged MAYA CAIN     Inpatient consult to Registered Dietitian/Nutritionist  Once        Provider:  (Not yet assigned)    Completed MAYA CAIN     Inpatient consult to Infectious Diseases  Once        Provider:  Jose Antonio Davila MD, DANIELA    Acknowledged ESAU MENDOZA     Inpatient consult to Critical Care Medicine  Once        Provider:  Bernard Arriaza MD    Completed MAYA CAIN     Inpatient consult to Palliative Care  Once        Provider:  Kristi Wing PA-C    Completed JEET HERNANDEZ     IP consult to case management  Once        Provider:  (Not yet assigned)    Completed MAYA CAIN     Inpatient consult to Hematology/Oncology  Once        Provider:  Timbo Mcrae MD    Completed ESAU MENDOZA     Inpatient consult to General Surgery  Once        Provider:  Berto Rizo MD    Completed AMOL HAYS     Inpatient consult to Cardiology  Once         Provider:  Endy Montana MD    Completed AMOL HAYS          No new Assessment & Plan notes have been filed under this hospital service since the last note was generated.  Service: Hospital Medicine    Final Active Diagnoses:    Diagnosis Date Noted POA    PRINCIPAL PROBLEM:  Septic shock [A41.9, R65.21] 11/08/2022 Yes    Elevated troponin [R77.8] 11/09/2022 Yes    Tachycardia [R00.0] 11/08/2022 Yes    Pneumonia [J18.9] 11/08/2022 Yes    Perirectal wounds with abscess [K61.1] 11/08/2022 Yes    Diarrhea [R19.7] 11/08/2022 Yes    Immunocompromised patient [D84.9] 11/05/2022 Yes    Severe malnutrition [E43] 11/05/2022 Yes    Oral candidiasis [B37.0] 11/03/2022 Yes    Mucositis due to antineoplastic therapy [K12.31] 11/01/2022 Yes    Encounter for palliative care [Z51.5] 08/05/2020 Not Applicable    Rectal cancer [C20] 09/11/2019 Yes    Neoplasm related pain [G89.3] 02/04/2016 Yes      Problems Resolved During this Admission:       Discharged Condition: stable    Disposition: Home or Self Care    Follow Up:   Follow-up Information     Shakila Moran DO Follow up in 1 week(s).    Specialty: Internal Medicine  Contact information:  [address] [place] [address]  [phone]             Kristi Wing PA-C Follow up in 1 week(s).    Specialty: Palliative Medicine  Why: Follow-up with palliative within 1 week upon discharge.  Contact information:  [address] [place] [address]  [phone]             Yonatan Stallworth MD Follow up in 1 month(s).    Specialties: Cardiology, Cardiovascular Disease  Why: Follow-up with cardiology within 2-4 weeks upon discharge  Contact information:  [address]  [place] [address]  [phone]             Yonatan Stallworth MD .    Specialties: Cardiology, Cardiovascular Disease  Contact information:  [address] [facility] [address] [facility] [address] [place] [address]  [phone]                       Patient Instructions:   No discharge  procedures on file.    Significant Diagnostic Studies:     Results for orders placed or performed during the hospital encounter of 11/08/22   Clostridium difficile EIA    Specimen: Stool   Result Value Ref Range    C. diff Antigen Negative Negative    C difficile Toxins A+B, EIA Negative Negative   Blood culture #1 **CANNOT BE ORDERED STAT**    Specimen: Peripheral, Hand, Right; Blood   Result Value Ref Range    Blood Culture, Routine No Growth to date     Blood Culture, Routine No Growth to date     Blood Culture, Routine No Growth to date     Blood Culture, Routine No Growth to date    Blood culture #2 **CANNOT BE ORDERED STAT**    Specimen: Peripheral, Hand, Left; Blood   Result Value Ref Range    Blood Culture, Routine No Growth to date     Blood Culture, Routine No Growth to date     Blood Culture, Routine No Growth to date     Blood Culture, Routine No Growth to date    Aerobic culture (Specify Source) **CANNOT BE ORDERED AS STAT**    Specimen: Buttocks, Right; Wound   Result Value Ref Range    Aerobic Bacterial Culture (A)      METHICILLIN RESISTANT STAPHYLOCOCCUS AUREUS  Many      Aerobic Bacterial Culture ESCHERICHIA COLI  Many   (A)     Aerobic Bacterial Culture KLEBSIELLA PNEUMONIAE  Rare   (A)        Susceptibility    Escherichia coli - CULTURE, AEROBIC  (SPECIFY SOURCE)     Amp/Sulbactam 16/8 Intermediate mcg/mL     Ampicillin >16 Resistant mcg/mL     Amox/K Clav'ate <=8/4 Sensitive mcg/mL     Ceftriaxone <=1 Sensitive mcg/mL     Cefazolin <=2 Sensitive mcg/mL     Ciprofloxacin <=1 Sensitive mcg/mL     Cefepime <=2 Sensitive mcg/mL     Ertapenem <=0.5 Sensitive mcg/mL     Gentamicin <=4 Sensitive mcg/mL     Levofloxacin <=2 Sensitive mcg/mL     Meropenem <=1 Sensitive mcg/mL     Piperacillin/Tazo <=16 Sensitive mcg/mL     Trimeth/Sulfa <=2/38 Sensitive mcg/mL     Tetracycline <=4 Sensitive mcg/mL     Tobramycin <=4 Sensitive mcg/mL    Klebsiella pneumoniae - CULTURE, AEROBIC  (SPECIFY SOURCE)      Amp/Sulbactam <=8/4 Sensitive mcg/mL     Amox/K Clav'ate <=8/4 Sensitive mcg/mL     Ceftriaxone <=1 Sensitive mcg/mL     Cefazolin <=2 Sensitive mcg/mL     Ciprofloxacin <=1 Sensitive mcg/mL     Cefepime <=2 Sensitive mcg/mL     Ertapenem <=0.5 Sensitive mcg/mL     Gentamicin <=4 Sensitive mcg/mL     Levofloxacin <=2 Sensitive mcg/mL     Meropenem <=1 Sensitive mcg/mL     Piperacillin/Tazo <=16 Sensitive mcg/mL     Trimeth/Sulfa <=2/38 Sensitive mcg/mL     Tetracycline <=4 Sensitive mcg/mL     Tobramycin <=4 Sensitive mcg/mL    Methicillin resistant Staphylococcus aureus - CULTURE, AEROBIC  (SPECIFY SOURCE)     Clindamycin <=0.5 Sensitive mcg/mL     Erythromycin >4 Resistant mcg/mL     Oxacillin >2 Resistant mcg/mL     Penicillin >8 Resistant mcg/mL     Trimeth/Sulfa <=0.5/9.5 Sensitive mcg/mL     Tetracycline <=4 Sensitive mcg/mL     Vancomycin 1 Sensitive mcg/mL   Culture, MRSA    Specimen: Nares, Right; MRSA source   Result Value Ref Range    MRSA Surveillance Screen MRSA isolated    Aerobic culture    Specimen: Perineum; Abscess   Result Value Ref Range    Aerobic Bacterial Culture (A)      STAPHYLOCOCCUS AUREUS  Many  Susceptibility pending      Aerobic Bacterial Culture (A)      GRAM NEGATIVE LALIT  Moderate  Identification and susceptibility pending     Culture, Anaerobe    Specimen: Perineum; Abscess   Result Value Ref Range    Anaerobic Culture Culture in progress    CBC auto differential   Result Value Ref Range    WBC 6.63 3.90 - 12.70 K/uL    RBC 4.84 4.60 - 6.20 M/uL    Hemoglobin 13.1 (L) 14.0 - 18.0 g/dL    Hematocrit 41.9 40.0 - 54.0 %    MCV 87 82 - 98 fL    MCH 27.1 27.0 - 31.0 pg    MCHC 31.3 (L) 32.0 - 36.0 g/dL    RDW 27.7 (H) 11.5 - 14.5 %    Platelets 244 150 - 450 K/uL    MPV 9.6 9.2 - 12.9 fL    Immature Granulocytes 2.3 (H) 0.0 - 0.5 %    Gran # (ANC) 5.0 1.8 - 7.7 K/uL    Immature Grans (Abs) 0.15 (H) 0.00 - 0.04 K/uL    Lymph # 1.0 1.0 - 4.8 K/uL    Mono # 0.5 0.3 - 1.0 K/uL    Eos # 0.0  0.0 - 0.5 K/uL    Baso # 0.03 0.00 - 0.20 K/uL    nRBC 0 0 /100 WBC    Gran % 74.9 (H) 38.0 - 73.0 %    Lymph % 14.3 (L) 18.0 - 48.0 %    Mono % 7.8 4.0 - 15.0 %    Eosinophil % 0.2 0.0 - 8.0 %    Basophil % 0.5 0.0 - 1.9 %    Platelet Estimate Appears normal     Aniso Slight     Poly Occasional     Ovalocytes Occasional     Differential Method Automated    Comprehensive metabolic panel   Result Value Ref Range    Sodium 139 136 - 145 mmol/L    Potassium 3.5 3.5 - 5.1 mmol/L    Chloride 100 95 - 110 mmol/L    CO2 21 (L) 23 - 29 mmol/L    Glucose 71 70 - 110 mg/dL    BUN 6 (L) 8 - 23 mg/dL    Creatinine 0.6 0.5 - 1.4 mg/dL    Calcium 8.7 8.7 - 10.5 mg/dL    Total Protein 6.1 6.0 - 8.4 g/dL    Albumin 2.3 (L) 3.5 - 5.2 g/dL    Total Bilirubin 0.6 0.1 - 1.0 mg/dL    Alkaline Phosphatase 110 55 - 135 U/L    AST 13 10 - 40 U/L    ALT 10 10 - 44 U/L    Anion Gap 18 (H) 8 - 16 mmol/L    eGFR >60 >60 mL/min/1.73 m^2   Troponin I #1   Result Value Ref Range    Troponin I 0.070 (H) 0.000 - 0.026 ng/mL   Troponin I #2   Result Value Ref Range    Troponin I 0.101 (H) 0.000 - 0.026 ng/mL   BNP   Result Value Ref Range     (H) 0 - 99 pg/mL   Lactic acid, plasma   Result Value Ref Range    Lactate (Lactic Acid) 3.0 (H) 0.5 - 2.2 mmol/L   Lactic acid, plasma   Result Value Ref Range    Lactate (Lactic Acid) 1.3 0.5 - 2.2 mmol/L   Urinalysis, Reflex to Urine Culture Urine, Clean Catch    Specimen: Urine   Result Value Ref Range    Specimen UA Urine, Clean Catch     Color, UA Yellow Yellow, Straw, Stefanie    Appearance, UA Clear Clear    pH, UA 7.0 5.0 - 8.0    Specific Gravity, UA 1.010 1.005 - 1.030    Protein, UA Trace (A) Negative    Glucose, UA Negative Negative    Ketones, UA 2+ (A) Negative    Bilirubin (UA) Negative Negative    Occult Blood UA Negative Negative    Nitrite, UA Negative Negative    Urobilinogen, UA Negative <2.0 EU/dL    Leukocytes, UA Negative Negative   Magnesium   Result Value Ref Range    Magnesium 1.8  1.6 - 2.6 mg/dL   Phosphorus   Result Value Ref Range    Phosphorus 3.0 2.7 - 4.5 mg/dL   Comprehensive Metabolic Panel (CMP)   Result Value Ref Range    Sodium 141 136 - 145 mmol/L    Potassium 3.3 (L) 3.5 - 5.1 mmol/L    Chloride 104 95 - 110 mmol/L    CO2 19 (L) 23 - 29 mmol/L    Glucose 58 (L) 70 - 110 mg/dL    BUN 5 (L) 8 - 23 mg/dL    Creatinine 0.5 0.5 - 1.4 mg/dL    Calcium 7.6 (L) 8.7 - 10.5 mg/dL    Total Protein 4.8 (L) 6.0 - 8.4 g/dL    Albumin 2.0 (L) 3.5 - 5.2 g/dL    Total Bilirubin 0.4 0.1 - 1.0 mg/dL    Alkaline Phosphatase 97 55 - 135 U/L    AST 13 10 - 40 U/L    ALT 12 10 - 44 U/L    Anion Gap 18 (H) 8 - 16 mmol/L    eGFR >60 >60 mL/min/1.73 m^2   Lactic acid, plasma   Result Value Ref Range    Lactate (Lactic Acid) 3.1 (H) 0.5 - 2.2 mmol/L   CBC Without Differential   Result Value Ref Range    WBC 5.28 3.90 - 12.70 K/uL    RBC 4.16 (L) 4.60 - 6.20 M/uL    Hemoglobin 11.3 (L) 14.0 - 18.0 g/dL    Hematocrit 37.7 (L) 40.0 - 54.0 %    MCV 91 82 - 98 fL    MCH 27.2 27.0 - 31.0 pg    MCHC 30.0 (L) 32.0 - 36.0 g/dL    RDW 28.3 (H) 11.5 - 14.5 %    Platelets 248 150 - 450 K/uL    MPV 10.5 9.2 - 12.9 fL   Troponin I   Result Value Ref Range    Troponin I 0.084 (H) 0.000 - 0.026 ng/mL   Lactic acid, plasma   Result Value Ref Range    Lactate (Lactic Acid) 1.4 0.5 - 2.2 mmol/L   Troponin I   Result Value Ref Range    Troponin I 0.059 (H) 0.000 - 0.026 ng/mL   Troponin I   Result Value Ref Range    Troponin I 0.042 (H) 0.000 - 0.026 ng/mL   Potassium   Result Value Ref Range    Potassium 3.4 (L) 3.5 - 5.1 mmol/L   Comprehensive Metabolic Panel (CMP)   Result Value Ref Range    Sodium 136 136 - 145 mmol/L    Potassium 3.9 3.5 - 5.1 mmol/L    Chloride 102 95 - 110 mmol/L    CO2 26 23 - 29 mmol/L    Glucose 72 70 - 110 mg/dL    BUN 3 (L) 8 - 23 mg/dL    Creatinine 0.5 0.5 - 1.4 mg/dL    Calcium 7.8 (L) 8.7 - 10.5 mg/dL    Total Protein 4.5 (L) 6.0 - 8.4 g/dL    Albumin 1.9 (L) 3.5 - 5.2 g/dL    Total Bilirubin  0.4 0.1 - 1.0 mg/dL    Alkaline Phosphatase 83 55 - 135 U/L    AST 11 10 - 40 U/L    ALT 11 10 - 44 U/L    Anion Gap 8 8 - 16 mmol/L    eGFR >60 >60 mL/min/1.73 m^2   CBC Without Differential   Result Value Ref Range    WBC 4.89 3.90 - 12.70 K/uL    RBC 3.67 (L) 4.60 - 6.20 M/uL    Hemoglobin 10.1 (L) 14.0 - 18.0 g/dL    Hematocrit 31.3 (L) 40.0 - 54.0 %    MCV 85 82 - 98 fL    MCH 27.5 27.0 - 31.0 pg    MCHC 32.3 32.0 - 36.0 g/dL    RDW 26.7 (H) 11.5 - 14.5 %    Platelets 276 150 - 450 K/uL    MPV 9.1 (L) 9.2 - 12.9 fL   Comprehensive Metabolic Panel (CMP)   Result Value Ref Range    Sodium 136 136 - 145 mmol/L    Potassium 3.2 (L) 3.5 - 5.1 mmol/L    Chloride 97 95 - 110 mmol/L    CO2 28 23 - 29 mmol/L    Glucose 81 70 - 110 mg/dL    BUN 2 (L) 8 - 23 mg/dL    Creatinine 0.5 0.5 - 1.4 mg/dL    Calcium 7.8 (L) 8.7 - 10.5 mg/dL    Total Protein 4.2 (L) 6.0 - 8.4 g/dL    Albumin 1.8 (L) 3.5 - 5.2 g/dL    Total Bilirubin 0.4 0.1 - 1.0 mg/dL    Alkaline Phosphatase 81 55 - 135 U/L    AST 10 10 - 40 U/L    ALT 7 (L) 10 - 44 U/L    Anion Gap 11 8 - 16 mmol/L    eGFR >60 >60 mL/min/1.73 m^2   CBC Without Differential   Result Value Ref Range    WBC 6.45 3.90 - 12.70 K/uL    RBC 3.81 (L) 4.60 - 6.20 M/uL    Hemoglobin 10.3 (L) 14.0 - 18.0 g/dL    Hematocrit 32.0 (L) 40.0 - 54.0 %    MCV 84 82 - 98 fL    MCH 27.0 27.0 - 31.0 pg    MCHC 32.2 32.0 - 36.0 g/dL    RDW 26.1 (H) 11.5 - 14.5 %    Platelets 306 150 - 450 K/uL    MPV 9.3 9.2 - 12.9 fL   Magnesium   Result Value Ref Range    Magnesium 1.8 1.6 - 2.6 mg/dL   VANCOMYCIN, TROUGH   Result Value Ref Range    Vancomycin-Trough 11.4 10.0 - 22.0 ug/mL   CBC auto differential   Result Value Ref Range    WBC 6.16 3.90 - 12.70 K/uL    RBC 3.42 (L) 4.60 - 6.20 M/uL    Hemoglobin 9.1 (L) 14.0 - 18.0 g/dL    Hematocrit 28.8 (L) 40.0 - 54.0 %    MCV 84 82 - 98 fL    MCH 26.6 (L) 27.0 - 31.0 pg    MCHC 31.6 (L) 32.0 - 36.0 g/dL    RDW 26.0 (H) 11.5 - 14.5 %    Platelets 298 150 - 450  K/uL    MPV 9.4 9.2 - 12.9 fL    Immature Granulocytes 3.9 (H) 0.0 - 0.5 %    Gran # (ANC) 4.5 1.8 - 7.7 K/uL    Immature Grans (Abs) 0.24 (H) 0.00 - 0.04 K/uL    Lymph # 0.7 (L) 1.0 - 4.8 K/uL    Mono # 0.6 0.3 - 1.0 K/uL    Eos # 0.1 0.0 - 0.5 K/uL    Baso # 0.06 0.00 - 0.20 K/uL    nRBC 0 0 /100 WBC    Gran % 72.9 38.0 - 73.0 %    Lymph % 11.2 (L) 18.0 - 48.0 %    Mono % 9.9 4.0 - 15.0 %    Eosinophil % 1.1 0.0 - 8.0 %    Basophil % 1.0 0.0 - 1.9 %    Differential Method Automated    Basic metabolic panel   Result Value Ref Range    Sodium 136 136 - 145 mmol/L    Potassium 3.4 (L) 3.5 - 5.1 mmol/L    Chloride 98 95 - 110 mmol/L    CO2 28 23 - 29 mmol/L    Glucose 75 70 - 110 mg/dL    BUN 2 (L) 8 - 23 mg/dL    Creatinine 0.4 (L) 0.5 - 1.4 mg/dL    Calcium 7.7 (L) 8.7 - 10.5 mg/dL    Anion Gap 10 8 - 16 mmol/L    eGFR >60 >60 mL/min/1.73 m^2   Magnesium   Result Value Ref Range    Magnesium 1.6 1.6 - 2.6 mg/dL   Echo   Result Value Ref Range    BSA 1.74 m2    TDI SEPTAL 0.09 m/s    LV LATERAL E/E' RATIO 6.20 m/s    LV SEPTAL E/E' RATIO 6.89 m/s    IVC diameter 1.22 cm    Left Ventricular Outflow Tract Mean Velocity 0.70 cm/s    Left Ventricular Outflow Tract Mean Gradient 2.34 mmHg    TDI LATERAL 0.10 m/s    LVIDd 4.18 3.5 - 6.0 cm    IVS 1.21 (A) 0.6 - 1.1 cm    Posterior Wall 1.07 0.6 - 1.1 cm    Ao root annulus 3.90 cm    LVIDs 2.65 2.1 - 4.0 cm    FS 37 28 - 44 %    STJ 3.55 cm    Ascending aorta 3.86 cm    LV mass 164.13 g    LA size 2.97 cm    TAPSE 1.60 cm    RV S' 0.02 cm/s    Left Ventricle Relative Wall Thickness 0.51 cm    AV mean gradient 3 mmHg    AV valve area 3.62 cm2    AV Velocity Ratio 1.05     AV index (prosthetic) 0.88     E/A ratio 1.07     Mean e' 0.10 m/s    E wave deceleration time 187.33 msec    IVRT 114.18 msec    LVOT diameter 2.29 cm    LVOT area 4.1 cm2    LVOT peak keke 1.16 m/s    LVOT peak VTI 19.60 cm    Ao peak keke 1.11 m/s    Ao VTI 22.3 cm    RVOT peak keke 0.79 m/s    RVOT peak  VTI 15.2 cm    LVOT stroke volume 80.69 cm3    AV peak gradient 5 mmHg    PV mean gradient 1.23 mmHg    E/E' ratio 6.53 m/s    MV Peak E Naman 0.62 m/s    TR Max Naman 2.65 m/s    MV Peak A Naman 0.58 m/s    LV Systolic Volume 25.72 mL    LV Systolic Volume Index 14.4 mL/m2    LV Diastolic Volume 77.73 mL    LV Diastolic Volume Index 43.67 mL/m2    LV Mass Index 92 g/m2    Triscuspid Valve Regurgitation Peak Gradient 28 mmHg    Right Atrial Pressure (from IVC) 3 mmHg    EF 65 %    TV rest pulmonary artery pressure 31 mmHg   POCT glucose   Result Value Ref Range    POCT Glucose 65 (L) 70 - 110 mg/dL   POCT glucose   Result Value Ref Range    POCT Glucose 94 70 - 110 mg/dL   POCT glucose   Result Value Ref Range    POCT Glucose 140 (H) 70 - 110 mg/dL   POCT glucose   Result Value Ref Range    POCT Glucose 84 70 - 110 mg/dL   POCT glucose   Result Value Ref Range    POCT Glucose 56 (L) 70 - 110 mg/dL   POCT glucose   Result Value Ref Range    POCT Glucose 79 70 - 110 mg/dL   POCT glucose   Result Value Ref Range    POCT Glucose 102 70 - 110 mg/dL   POCT glucose   Result Value Ref Range    POCT Glucose 110 70 - 110 mg/dL   POCT glucose   Result Value Ref Range    POCT Glucose 84 70 - 110 mg/dL   POCT glucose   Result Value Ref Range    POCT Glucose 97 70 - 110 mg/dL   POCT glucose   Result Value Ref Range    POCT Glucose 87 70 - 110 mg/dL   POCT glucose   Result Value Ref Range    POCT Glucose 117 (H) 70 - 110 mg/dL   POCT glucose   Result Value Ref Range    POCT Glucose 95 70 - 110 mg/dL   POCT glucose   Result Value Ref Range    POCT Glucose 88 70 - 110 mg/dL       Imaging Results          CTA Chest Non-Coronary (PE Studies) (Final result)  Result time 11/08/22 23:05:39    Final result by Chato Osei MD (11/08/22 23:05:39)                 Impression:      No pulmonary embolism.  No dissection.    Patchy areas of nodular opacities throughout the lung upper lung zone with more reticular subpleural consolidation in the  bilateral lower lobes left greater than right consistent with multifocal pneumonia.    Remaining findings as above    All CT scans   are performed using dose optimization techniques including the following: automated exposure control; adjustment of the mA and/or kV; use of iterative reconstruction technique.  Dose modulation was employed for ALARA by means of: Automated exposure control; adjustment of the mA and/or kV according to patient size (this includes techniques or standardized protocols for targeted exams where dose is matched to indication/reason for exam; i.e. extremities or head); and/or use of iterative reconstructive technique.      Electronically signed by: Sea Reis  Date:    11/08/2022  Time:    23:05             Narrative:    EXAMINATION:  CTA CHEST NON CORONARY (PE STUDIES)    CLINICAL HISTORY:  Pulmonary embolism (PE) suspected, unknown D-dimer;    TECHNIQUE:  Low dose axial images, sagittal and coronal reformations were obtained from the thoracic inlet to the lung bases following the IV administration of 100 mL of Omnipaque 350.  Contrast timing was optimized to evaluate the pulmonary arteries.  MIP images were performed.    COMPARISON:  None    FINDINGS:  No evidence for pulmonary embolism.  No evidence for aortic dissection or aneurysm.  Patchy areas of nodular opacities throughout the lung upper lung zone with more reticular subpleural consolidation in the bilateral lower lobes left greater than right consistent with multifocal pneumonia.  Ectatic aorta measuring up to 3.8 cm.  Mild thickening of the gastric mucosa.  Right-sided port.  Question subcutaneous sebaceous cyst in the left posterior subcutaneous fat.  Correlate to physical exam.  Slight ascites adjacent to the spleen.  Small pericardial effusion.                               CT Abdomen Pelvis  Without Contrast (Final result)  Result time 11/08/22 21:02:23    Final result by Chato Osei MD (11/08/22 21:02:23)                  Impression:      Relatively thick walled fluid collection in the pre sacral space situated between the rectosigmoid colon and the distal sacrum measuring 18 x 40 x 45 mm suggestive of abscess.    In the Annalisa - buttock region adjacent to the left gluteal cleft there is a slight nodular appearance in the subcutaneous region measuring up to 12-14 mm may relate to a boil or sebaceous cyst.  Attention on follow-up and correlate to physical exam    Heterogeneous reticular alveolar opacity in the left posterior lung base consistent with pneumonia.    Postsurgical changes of the bowel    Senescent changes not otherwise specified    Chronic deformity of the left hip status post prosthesis removal    Atherosclerotic disease    All CT scans   are performed using dose optimization techniques including the following: automated exposure control; adjustment of the mA and/or kV; use of iterative reconstruction technique.  Dose modulation was employed for ALARA by means of: Automated exposure control; adjustment of the mA and/or kV according to patient size (this includes techniques or standardized protocols for targeted exams where dose is matched to indication/reason for exam; i.e. extremities or head); and/or use of iterative reconstructive technique.      Electronically signed by: Sea Reis  Date:    11/08/2022  Time:    21:02             Narrative:    EXAMINATION:  CT ABDOMEN PELVIS WITHOUT CONTRAST    CLINICAL HISTORY:  Abdominal abscess/infection suspected;    TECHNIQUE:  Low dose axial images, sagittal and coronal reformations were obtained from the lung bases to the pubic symphysis, 30 mL of oral Omnipaque 350 was administered..    COMPARISON:  None    FINDINGS:  Heart: Normal in size as far as seen.  No pericardial effusion as far seen.    Lung Bases: Left basilar reticular alveolar opacities consistent with left basilar pneumonia.    Liver: Normal in size and attenuation, with no focal hepatic lesions.    Gallbladder:  No calcified gallstones.    Bile Ducts: No evidence of dilated ducts.    Pancreas: No mass or peripancreatic fat stranding.    Spleen: Unremarkable.    Adrenals: Unremarkable.    Kidneys/ Ureters: Unremarkable.    Bladder: No evidence of wall thickening.    Reproductive organs: Unremarkable.    GI Tract/Mesentery: No evidence of bowel obstruction or inflammation. No secondary signs of appendicitis.  Relatively thick walled fluid collection in the pre sacral space situated between the rectosigmoid colon and the distal sacrum measuring 18 x 40 x 45 mm suggestive of abscess.  There isn't adjacent hyperdensity suggestive of possible postsurgical changes.  No other sizable abnormal fluid collection adjacent to the perirectal region.  Nodular appearance in the left Annalisa- buttock adjacent to the cleft measures up to 14 mm may relate to a sebaceous cyst or a boil.  Attention on follow-up.  Surgical sutures in the right lower quadrant bowel loop suspected.    Peritoneal Space: No ascites. No free air.    Retroperitoneum: No significant adenopathy.    Abdominal wall: Unremarkable.    Vasculature: No aneurysm.    Bones: Deformity of the left hip with tracks from prior surgical fixation noted.                               X-Ray Chest AP Portable (Final result)  Result time 11/08/22 18:49:35    Final result by Chato Osei MD (11/08/22 18:49:35)                 Impression:      As above      Electronically signed by: Sea Reis  Date:    11/08/2022  Time:    18:49             Narrative:    EXAMINATION:  XR CHEST AP PORTABLE    CLINICAL HISTORY:  Chest Pain;    TECHNIQUE:  Single frontal view of the chest was performed.    COMPARISON:  Prior    FINDINGS:  Right-sided chest port.Prominent cardiac silhouette.  Mild perihilar interstitial opacities may relate to trace pulmonary edema.  No segmental consolidation pleural effusion pneumothorax.  Old rib deformities left posterolateral ribs.  Similar findings to prior  exam    Bones are intact.                                Pending Diagnostic Studies:     None         Medications:     Medication List      START taking these medications    amoxicillin-clavulanate 875-125mg 875-125 mg per tablet  Commonly known as: AUGMENTIN  Take 1 tablet by mouth every 12 (twelve) hours. for 12 days     aspirin 81 MG Chew  Take 1 tablet (81 mg total) by mouth once daily.  Start taking on: November 13, 2022     folic acid-vit B6-vit B12 2.5-25-2 mg 2.5-25-2 mg Tab  Commonly known as: FOLBIC or Equiv  Take 1 tablet by mouth once daily.  Start taking on: November 13, 2022     HYDROmorphone 2 MG tablet  Commonly known as: DILAUDID  Take 1 tablet (2 mg total) by mouth every 4 (four) hours as needed for Pain.     sulfamethoxazole-trimethoprim 800-160mg 800-160 mg Tab  Commonly known as: BACTRIM DS  Take 1 tablet by mouth 2 (two) times daily. for 12 days     zinc sulfate 50 mg zinc (220 mg) capsule  Commonly known as: ZINCATE  Take 1 capsule (220 mg total) by mouth once daily.  Start taking on: November 13, 2022        CHANGE how you take these medications    oxyCODONE 20 mg Tab immediate release tablet  Commonly known as: ROXICODONE  Take 1 tablet (20 mg total) by mouth every 4 (four) hours as needed.  What changed:   · reasons to take this  · additional instructions        CONTINUE taking these medications    ascorbic acid (vitamin C) 1000 MG tablet  Commonly known as: VITAMIN C     dexAMETHasone 4 MG Tab  Commonly known as: DECADRON  Take 2 tablets (8 mg total) by mouth once daily. Take as directed on days 2 and 3 of your chemotherapy cycle.     fentaNYL 25 mcg/hr  Commonly known as: DURAGESIC  Place 1 patch onto the skin every 72 hours. for 15 days     gabapentin 300 MG capsule  Commonly known as: NEURONTIN  Take 1 capsule (300 mg total) by mouth every evening.     hydrALAZINE 25 MG tablet  Commonly known as: APRESOLINE  Take 1 tablet (25 mg total) by mouth 2 (two) times a day.     loperamide 2 mg  capsule  Commonly known as: IMODIUM  Take 1 capsule (2 mg total) by mouth 4 (four) times daily as needed for Diarrhea.     losartan 25 MG tablet  Commonly known as: COZAAR  Take 1 tablet (25 mg total) by mouth once daily.     magic mouthwash diphen/antac/lidoc  Swish and spit 15 mLs every 4 (four) hours as needed (mouth ulcers).     multivitamin capsule     ondansetron 8 MG Tbdl  Commonly known as: ZOFRAN-ODT  Dissolve 1 tablet (8 mg total) by mouth every 8 (eight) hours as needed (nausea/vomiting).     pantoprazole 40 MG tablet  Commonly known as: PROTONIX  Take 1 tablet (40 mg total) by mouth once daily.        STOP taking these medications    diphenoxylate-atropine 2.5-0.025 mg 2.5-0.025 mg per tablet  Commonly known as: LOMOTIL     fluconazole 200 MG Tab  Commonly known as: DIFLUCAN           Where to Get Your Medications      These medications were sent to [facility]/pharmacy #[id number] - [place], [place] - [address] [facility]  [id number] [place] [address]    Phone: [phone]   · amoxicillin-clavulanate 875-125mg 875-125 mg per tablet     These medications were sent to [facility] [address] [place] [address]    Hours: Mon-Fri, 8a-5:30p Phone: [phone]   · aspirin 81 MG Chew  · folic acid-vit B6-vit B12 2.5-25-2 mg 2.5-25-2 mg Tab  · sulfamethoxazole-trimethoprim 800-160mg 800-160 mg Tab  · zinc sulfate 50 mg zinc (220 mg) capsule     You can get these medications from any pharmacy    Bring a paper prescription for each of these medications  · HYDROmorphone 2 MG tablet  · oxyCODONE 20 mg Tab immediate release tablet         Indwelling Lines/Drains at time of discharge:   Lines/Drains/Airways     Central Venous Catheter Line  Duration           Port A Cath Single Lumen 08/16/22 1323 right subclavian 88 days          Drain  Duration                Open Drain 11/10/22 1300 Buttock 2 days         Open Drain 11/10/22 1300 Right Buttock 2 days                Time spent on  the discharge of patient: 59 minutes         Lauren Shah MD  Department of Hospital Medicine  [facility] - Telemetry ([facility])

## 2022-11-12 NOTE — SUBJECTIVE & OBJECTIVE
Interval History:  Patient reports pain improved greatly compared to before drainage    Medications:  Continuous Infusions:   lactated ringers 75 mL/hr at 11/11/22 0942     Scheduled Meds:   amoxicillin-clavulanate 875-125mg  1 tablet Oral Q12H    ascorbic acid (vitamin C)  1,000 mg Oral Daily    aspirin  81 mg Oral Daily    magic mouthwash diphen/antac/lidoc  15 mL Swish & Spit QID (AC & HS)    enoxaparin  40 mg Subcutaneous Daily    fentaNYL  1 patch Transdermal Q72H    folic acid-vit B6-vit B12 2.5-25-2 mg  1 tablet Oral Daily    metoprolol tartrate  12.5 mg Oral Daily    multivitamin  1 tablet Oral Daily    nystatin  500,000 Units Oral QID    pantoprazole  40 mg Intravenous Daily    sulfamethoxazole-trimethoprim 800-160mg  1 tablet Oral BID    zinc sulfate  220 mg Oral Daily     PRN Meds:sodium chloride 0.9%, acetaminophen, albuterol-ipratropium, dextrose 10%, dextrose 10%, glucagon (human recombinant), glucose, glucose, HYDROmorphone, influenza 65up-adj, insulin aspart U-100, magnesium oxide, magnesium oxide, melatonin, metoprolol, naloxone, ondansetron, oxyCODONE, pneumococcal vaccine, potassium bicarbonate, potassium bicarbonate, potassium bicarbonate, potassium, sodium phosphates, potassium, sodium phosphates, potassium, sodium phosphates, prochlorperazine, simethicone, sodium chloride 0.9%     Review of patient's allergies indicates:  No Known Allergies  Objective:     Vital Signs (Most Recent):  Temp: 98.1 °F (36.7 °C) (11/12/22 0706)  Pulse: (!) 59 (11/12/22 0724)  Resp: 20 (11/12/22 0816)  BP: (!) 172/82 (11/12/22 0706)  SpO2: 97 % (11/12/22 0706) Vital Signs (24h Range):  Temp:  [97.4 °F (36.3 °C)-98.7 °F (37.1 °C)] 98.1 °F (36.7 °C)  Pulse:  [] 59  Resp:  [16-20] 20  SpO2:  [96 %-97 %] 97 %  BP: (134-172)/(77-93) 172/82     Weight: 99.6 kg (219 lb 9.3 oz)  Body mass index is 30.62 kg/m².    Intake/Output - Last 3 Shifts         11/10 0700 11/11 0659 11/11 0700 11/12 0659 11/12 0700 11/13 0659     P.O. 300 120 480    I.V. (mL/kg)       IV Piggyback 800      Total Intake(mL/kg) 1100 (18.1) 120 (1.2) 480 (4.8)    Urine (mL/kg/hr) 100 (0.1) 1050 (0.4)     Stool 0 0     Total Output 100 1050     Net +1000 -930 +480           Stool Occurrence 2 x 1 x 1 x            Physical Exam  Exam conducted with a chaperone present.   Constitutional:       Appearance: He is well-developed. He is ill-appearing.      Comments: cachectic   HENT:      Head: Normocephalic and atraumatic.   Eyes:      Conjunctiva/sclera: Conjunctivae normal.   Neck:      Thyroid: No thyromegaly.   Cardiovascular:      Rate and Rhythm: Normal rate and regular rhythm.   Pulmonary:      Effort: Pulmonary effort is normal. No respiratory distress.   Abdominal:      General: There is no distension.      Palpations: Abdomen is soft. There is no mass.      Tenderness: There is no abdominal tenderness.   Genitourinary:     Comments: Perianal/perineal and inguinal I&D sites without ongoing purulence, packing removed; penrose drains remain in place  Musculoskeletal:         General: No tenderness. Normal range of motion.      Cervical back: Normal range of motion.   Skin:     General: Skin is warm and dry.      Capillary Refill: Capillary refill takes less than 2 seconds.      Findings: No rash.   Neurological:      Mental Status: He is alert and oriented to person, place, and time.       Significant Labs:  I have reviewed all pertinent lab results within the past 24 hours.  CBC:   Recent Labs   Lab 11/12/22  0504   WBC 6.16   RBC 3.42*   HGB 9.1*   HCT 28.8*      MCV 84   MCH 26.6*   MCHC 31.6*       BMP:   Recent Labs   Lab 11/12/22  0504   GLU 75      K 3.4*   CL 98   CO2 28   BUN 2*   CREATININE 0.4*   CALCIUM 7.7*   MG 1.6         Significant Diagnostics:  I have reviewed all pertinent imaging results/findings within the past 24 hours.

## 2022-11-12 NOTE — PLAN OF CARE
Discussed Plan of Care with patient and verbalized understanding - Patient remains AAOx4 - remains free of falls, accidents and trauma during the day shift. Bed is in the low position and the call light is within reach. IV fluids started on patient.Placed on Contact Isolation D/T MRSA. Diet changed to Mechanical Soft with Boost. Will continue to monitor

## 2022-11-12 NOTE — CONSULTS
Pharmacy consult sign off    Therapy with vancomycin is complete and/or consult has been discontinued by the provider.     Pharmacy will sign off. Please re-consult as needed.    Thank you for allowing us to participate in this patient's care.   Ricki Sarmiento, Sari 11/12/2022 9:18 AM

## 2022-11-12 NOTE — PROGRESS NOTES
[facility] - Telemetry ([facility])  General Surgery  Progress Note    Subjective:     History of Present Illness:  68yo M well-known to me with previous mid-rectal adenocarcinoma who previously completed long-course neoadj chemoXRT which completed on 12/4/19 with partial treatment response on repeat MRI and no evidence of metastatic disease on repeat CT Abd/pelvis who is s/p robotic ultra-low anterior resection, DLI and mediport placement on 2/4/2020 who recovered well postop with final path showing T3N0 who completed adjuvant chemotx in late June 2020 and is now s/p DLI closure on 8/4/2020 who subsequently developed metastatic disease in liver and lung who is now admitted to the [facility] medicine service. Recently found to have bilateral perianal and buttock wounds and diarrhea. CT scan concerning for possible perirectal abscess. CRS consulted for evaluation. In ICU for sepsis and hypotension.      Post-Op Info:  Procedure(s) (LRB):  EXAM UNDER ANESTHESIA (N/A)  INCISION AND DRAINAGE, PERIRECTAL REGION (N/A)  BLOCK, NERVE, PUDENDAL (N/A)  INCISION, ABSCESS, PERIANAL (N/A)  INCISION AND DRAINAGE, ABSCESS (Bilateral)   2 Days Post-Op     Interval History:  Patient reports pain improved greatly compared to before drainage    Medications:  Continuous Infusions:   lactated ringers 75 mL/hr at 11/11/22 0942     Scheduled Meds:   amoxicillin-clavulanate 875-125mg  1 tablet Oral Q12H    ascorbic acid (vitamin C)  1,000 mg Oral Daily    aspirin  81 mg Oral Daily    magic mouthwash diphen/antac/lidoc  15 mL Swish & Spit QID (AC & HS)    enoxaparin  40 mg Subcutaneous Daily    fentaNYL  1 patch Transdermal Q72H    folic acid-vit B6-vit B12 2.5-25-2 mg  1 tablet Oral Daily    metoprolol tartrate  12.5 mg Oral Daily    multivitamin  1 tablet Oral Daily    nystatin  500,000 Units Oral QID    pantoprazole  40 mg Intravenous Daily    sulfamethoxazole-trimethoprim 800-160mg  1 tablet Oral BID    zinc sulfate  220 mg Oral  Daily     PRN Meds:sodium chloride 0.9%, acetaminophen, albuterol-ipratropium, dextrose 10%, dextrose 10%, glucagon (human recombinant), glucose, glucose, HYDROmorphone, influenza 65up-adj, insulin aspart U-100, magnesium oxide, magnesium oxide, melatonin, metoprolol, naloxone, ondansetron, oxyCODONE, pneumococcal vaccine, potassium bicarbonate, potassium bicarbonate, potassium bicarbonate, potassium, sodium phosphates, potassium, sodium phosphates, potassium, sodium phosphates, prochlorperazine, simethicone, sodium chloride 0.9%     Review of patient's allergies indicates:  No Known Allergies  Objective:     Vital Signs (Most Recent):  Temp: 98.1 °F (36.7 °C) (11/12/22 0706)  Pulse: (!) 59 (11/12/22 0724)  Resp: 20 (11/12/22 0816)  BP: (!) 172/82 (11/12/22 0706)  SpO2: 97 % (11/12/22 0706) Vital Signs (24h Range):  Temp:  [97.4 °F (36.3 °C)-98.7 °F (37.1 °C)] 98.1 °F (36.7 °C)  Pulse:  [] 59  Resp:  [16-20] 20  SpO2:  [96 %-97 %] 97 %  BP: (134-172)/(77-93) 172/82     Weight: 99.6 kg (219 lb 9.3 oz)  Body mass index is 30.62 kg/m².    Intake/Output - Last 3 Shifts         11/10 0700 11/11 0659 11/11 0700 11/12 0659 11/12 0700 11/13 0659    P.O. 300 120 480    I.V. (mL/kg)       IV Piggyback 800      Total Intake(mL/kg) 1100 (18.1) 120 (1.2) 480 (4.8)    Urine (mL/kg/hr) 100 (0.1) 1050 (0.4)     Stool 0 0     Total Output 100 1050     Net +1000 -930 +480           Stool Occurrence 2 x 1 x 1 x            Physical Exam  Exam conducted with a chaperone present.   Constitutional:       Appearance: He is well-developed. He is ill-appearing.      Comments: cachectic   HENT:      Head: Normocephalic and atraumatic.   Eyes:      Conjunctiva/sclera: Conjunctivae normal.   Neck:      Thyroid: No thyromegaly.   Cardiovascular:      Rate and Rhythm: Normal rate and regular rhythm.   Pulmonary:      Effort: Pulmonary effort is normal. No respiratory distress.   Abdominal:      General: There is no distension.       Palpations: Abdomen is soft. There is no mass.      Tenderness: There is no abdominal tenderness.   Genitourinary:     Comments: Perianal/perineal and inguinal I&D sites without ongoing purulence, packing removed; penrose drains remain in place  Musculoskeletal:         General: No tenderness. Normal range of motion.      Cervical back: Normal range of motion.   Skin:     General: Skin is warm and dry.      Capillary Refill: Capillary refill takes less than 2 seconds.      Findings: No rash.   Neurological:      Mental Status: He is alert and oriented to person, place, and time.       Significant Labs:  I have reviewed all pertinent lab results within the past 24 hours.  CBC:   Recent Labs   Lab 11/12/22  0504   WBC 6.16   RBC 3.42*   HGB 9.1*   HCT 28.8*      MCV 84   MCH 26.6*   MCHC 31.6*       BMP:   Recent Labs   Lab 11/12/22  0504   GLU 75      K 3.4*   CL 98   CO2 28   BUN 2*   CREATININE 0.4*   CALCIUM 7.7*   MG 1.6         Significant Diagnostics:  I have reviewed all pertinent imaging results/findings within the past 24 hours.    Assessment/Plan:     * Septic shock  Care per primary team    Elevated troponin  Care per primary team    Diarrhea  Care per primary team    Perirectal wounds with abscess  68yo M with perirectal ulcerations and sores with fluctuance on exam now s/p EUA, perirectal,perineal and inguinal I&D     - wounds healthy appearing with no further purulence or necrosis  - cont abx  - cont penrose drains  - cont local wound care, keep area as clean as possible  - okay to discharge once medically appropriate  - can follow-up with Dr. Gonzalez in clinic in 1-2 weeks for wound check  - rest of care per primary team    Pneumonia  Care per primary team    Tachycardia  Care per primary team    Immunocompromised patient  Care per primary team    Severe malnutrition  Care per primary team    Oral candidiasis  Care per primary team    Mucositis due to antineoplastic therapy  Care per  primary team    Neoplasm related pain  Care per primary team        Berto Rizo MD  General Surgery  [facility] - Telemetry ([facility]

## 2022-11-12 NOTE — ASSESSMENT & PLAN NOTE
Will use 12 days of Bactrim and Augmentin   Will need close monitoring ,  Will stop parenteral therapy in AM

## 2022-11-12 NOTE — SUBJECTIVE & OBJECTIVE
Interval History:  Patient extremely weak spoke with him in presence of nursing staff about long-term prognosis    Oncology Treatment Plan:   OP COLORECTAL FOLFIRI + BEVACIZUMAB Q2W    Medications:  Continuous Infusions:   lactated ringers 75 mL/hr at 11/11/22 0942     Scheduled Meds:   amoxicillin-clavulanate 875-125mg  1 tablet Oral Q12H    ascorbic acid (vitamin C)  1,000 mg Oral Daily    aspirin  81 mg Oral Daily    magic mouthwash diphen/antac/lidoc  15 mL Swish & Spit QID (AC & HS)    enoxaparin  40 mg Subcutaneous Daily    fentaNYL  1 patch Transdermal Q72H    folic acid-vit B6-vit B12 2.5-25-2 mg  1 tablet Oral Daily    metoprolol tartrate  12.5 mg Oral Daily    multivitamin  1 tablet Oral Daily    nystatin  500,000 Units Oral QID    pantoprazole  40 mg Intravenous Daily    potassium chloride  40 mEq Oral Once    sulfamethoxazole-trimethoprim 800-160mg  1 tablet Oral BID    zinc sulfate  220 mg Oral Daily     PRN Meds:sodium chloride 0.9%, acetaminophen, albuterol-ipratropium, dextrose 10%, dextrose 10%, glucagon (human recombinant), glucose, glucose, HYDROmorphone, influenza 65up-adj, insulin aspart U-100, magnesium oxide, magnesium oxide, melatonin, metoprolol, naloxone, ondansetron, oxyCODONE, pneumococcal vaccine, potassium bicarbonate, potassium bicarbonate, potassium bicarbonate, potassium, sodium phosphates, potassium, sodium phosphates, potassium, sodium phosphates, prochlorperazine, simethicone, sodium chloride 0.9%     Review of Systems   Constitutional:  Positive for activity change, fatigue and unexpected weight change.   Neurological:  Positive for weakness.   Psychiatric/Behavioral:  Positive for dysphoric mood. The patient is nervous/anxious.    Objective:     Vital Signs (Most Recent):  Temp: 98.1 °F (36.7 °C) (11/12/22 0706)  Pulse: (!) 59 (11/12/22 0724)  Resp: 20 (11/12/22 0816)  BP: (!) 172/82 (11/12/22 0706)  SpO2: 97 % (11/12/22 0706)   Vital Signs (24h Range):  Temp:  [97.4 °F (36.3  °C)-98.7 °F (37.1 °C)] 98.1 °F (36.7 °C)  Pulse:  [] 59  Resp:  [16-20] 20  SpO2:  [96 %-97 %] 97 %  BP: (134-172)/(77-93) 172/82     Weight: 99.6 kg (219 lb 9.3 oz)  Body mass index is 30.62 kg/m².  Body surface area is 2.23 meters squared.      Intake/Output Summary (Last 24 hours) at 11/12/2022 0901  Last data filed at 11/11/2022 2231  Gross per 24 hour   Intake 120 ml   Output 1050 ml   Net -930 ml       Physical Exam  Constitutional:       General: He is in acute distress.      Appearance: He is cachectic. He is ill-appearing.   Neurological:      Motor: Weakness present.      Coordination: Coordination abnormal.      Gait: Gait abnormal.       Significant Labs:   BMP:   Recent Labs   Lab 11/11/22  0337 11/12/22  0504   GLU 81 75    136   K 3.2* 3.4*   CL 97 98   CO2 28 28   BUN 2* 2*   CREATININE 0.5 0.4*   CALCIUM 7.8* 7.7*   MG 1.8 1.6   , CBC:   Recent Labs   Lab 11/11/22 0337 11/12/22  0504   WBC 6.45 6.16   HGB 10.3* 9.1*   HCT 32.0* 28.8*    298   , CMP:   Recent Labs   Lab 11/11/22  0337 11/12/22  0504    136   K 3.2* 3.4*   CL 97 98   CO2 28 28   GLU 81 75   BUN 2* 2*   CREATININE 0.5 0.4*   CALCIUM 7.8* 7.7*   PROT 4.2*  --    ALBUMIN 1.8*  --    BILITOT 0.4  --    ALKPHOS 81  --    AST 10  --    ALT 7*  --    ANIONGAP 11 10   , Coagulation: No results for input(s): PT, INR, APTT in the last 48 hours., Haptoglobin: No results for input(s): HAPTOGLOBIN in the last 48 hours., Immunology: No results for input(s): SPEP, TRACY, SUBHASH, FREELAMBDALI in the last 48 hours., LDH: No results for input(s): LDHCSF, BFSOURCE in the last 48 hours., LFTs:   Recent Labs   Lab 11/11/22  0337   ALT 7*   AST 10   ALKPHOS 81   BILITOT 0.4   PROT 4.2*   ALBUMIN 1.8*   , Reticulocytes: No results for input(s): RETIC in the last 48 hours., Tumor Markers: No results for input(s): PSA, CEA, , AFPTM, [id number],  in the last 48 hours.    Invalid input(s): ALGTM, and Uric Acid No results for input(s):  URICACID in the last 48 hours.    Diagnostic Results:  I have reviewed all pertinent imaging results/findings within the past 24 hours.

## 2022-11-13 NOTE — PROGRESS NOTES
Discharge instructions given to patient and sister, verbalized understanding - IV removed without difficulty, pressure dressing applied to site - cardiac monitor removed and returned to monitor tech - patient to car via wheelchair without distress noted.

## 2022-11-14 ENCOUNTER — PES CALL (OUTPATIENT)
Dept: ADMINISTRATIVE | Facility: CLINIC | Age: 67
End: 2022-11-14
Payer: MEDICARE

## 2022-11-14 ENCOUNTER — TELEPHONE (OUTPATIENT)
Dept: SURGERY | Facility: CLINIC | Age: 67
End: 2022-11-14
Payer: MEDICARE

## 2022-11-14 ENCOUNTER — PATIENT MESSAGE (OUTPATIENT)
Dept: HEMATOLOGY/ONCOLOGY | Facility: CLINIC | Age: 67
End: 2022-11-14
Payer: MEDICARE

## 2022-11-14 ENCOUNTER — TELEPHONE (OUTPATIENT)
Dept: PALLIATIVE MEDICINE | Facility: HOSPITAL | Age: 67
End: 2022-11-14
Payer: MEDICARE

## 2022-11-14 LAB
BACTERIA BLD CULT: NORMAL
BACTERIA BLD CULT: NORMAL
BACTERIA SPEC AEROBE CULT: ABNORMAL
BACTERIA SPEC AEROBE CULT: ABNORMAL

## 2022-11-14 NOTE — TELEPHONE ENCOUNTER
[facility]IC[facility]  Medical Specialty       Patient Name: Vincent Benton  MRN: [id number]  Primary Care Physician: Shakila Moran,     Attempted to reach sister, Fara, per message regarding pt being too weak to make it into clinic tomorrow. Appt changed to virtual and VM left for Fara letting her know to call us with any other needs.       Keerthi Bowen, MSW, [facility]  [phone]

## 2022-11-14 NOTE — TELEPHONE ENCOUNTER
Spoke with patient sister about wound care, post operative appointment, and setting up a patient portal to have better access with providers. She verbalized understanding.     ----- Message from Charlotte Franklin sent at 11/14/2022  8:50 AM CST -----  Contact: PT sister      Who Called: Fara     Does the patient know what this is regarding?: surgical drains/ needs to removal instructions/ how and when will drains be remove  Would the patient rather a call back or a response via [facility]:: callback     Best Call Back Number: [phone]   Additional Information:

## 2022-11-15 ENCOUNTER — DOCUMENTATION ONLY (OUTPATIENT)
Dept: HEMATOLOGY/ONCOLOGY | Facility: CLINIC | Age: 67
End: 2022-11-15
Payer: MEDICARE

## 2022-11-15 ENCOUNTER — TELEPHONE (OUTPATIENT)
Dept: HEMATOLOGY/ONCOLOGY | Facility: CLINIC | Age: 67
End: 2022-11-15
Payer: MEDICARE

## 2022-11-15 LAB — BACTERIA SPEC ANAEROBE CULT: NORMAL

## 2022-11-15 NOTE — TELEPHONE ENCOUNTER
Spoke with pts sister and she states he is bed bound and will not be able to come to his appt on 11/17. Apt changed to a virtual.

## 2022-11-15 NOTE — PROGRESS NOTES
SWER was consulted to assist with cancer services referral. SWER faxed referral on today to [phone]. SWER will remain available.

## 2022-11-15 NOTE — TELEPHONE ENCOUNTER
----- Message from Cindi Alcaraz MA sent at 11/14/2022  4:01 PM CST -----  Contact: Fara(Sister)-[phone]    ----- Message -----  From: Leia Adler  Sent: 11/14/2022  12:53 PM CST  To: Samy Garcia Staff    Patients sister called in to consult with nurse regarding when his next appointment should be. Please call her back at [phone]. Juan/LAQUITA

## 2022-11-17 ENCOUNTER — TELEPHONE (OUTPATIENT)
Dept: HEMATOLOGY/ONCOLOGY | Facility: CLINIC | Age: 67
End: 2022-11-17
Payer: MEDICARE

## 2022-11-17 NOTE — TELEPHONE ENCOUNTER
Attempted to contact pt regarding his missed virtual appt with Dr. Tuttle. No answer and pt VM not set up.

## 2022-11-21 ENCOUNTER — OFFICE VISIT (OUTPATIENT)
Dept: SURGERY | Facility: CLINIC | Age: 67
End: 2022-11-21
Payer: MEDICARE

## 2022-11-21 VITALS — SYSTOLIC BLOOD PRESSURE: 128 MMHG | DIASTOLIC BLOOD PRESSURE: 89 MMHG | HEART RATE: 80 BPM | TEMPERATURE: 96 F

## 2022-11-21 DIAGNOSIS — C19 RECURRENT COLORECTAL ADENOCARCINOMA: Primary | ICD-10-CM

## 2022-11-21 DIAGNOSIS — K61.1 PERIRECTAL ABSCESS: ICD-10-CM

## 2022-11-21 PROCEDURE — 3074F PR MOST RECENT SYSTOLIC BLOOD PRESSURE < 130 MM HG: ICD-10-PCS | Mod: CPTII,S$GLB,, | Performed by: COLON & RECTAL SURGERY

## 2022-11-21 PROCEDURE — 3079F PR MOST RECENT DIASTOLIC BLOOD PRESSURE 80-89 MM HG: ICD-10-PCS | Mod: CPTII,S$GLB,, | Performed by: COLON & RECTAL SURGERY

## 2022-11-21 PROCEDURE — 99024 PR POST-OP FOLLOW-UP VISIT: ICD-10-PCS | Mod: S$GLB,,, | Performed by: COLON & RECTAL SURGERY

## 2022-11-21 PROCEDURE — 4010F ACE/ARB THERAPY RXD/TAKEN: CPT | Mod: CPTII,S$GLB,, | Performed by: COLON & RECTAL SURGERY

## 2022-11-21 PROCEDURE — 99024 POSTOP FOLLOW-UP VISIT: CPT | Mod: S$GLB,,, | Performed by: COLON & RECTAL SURGERY

## 2022-11-21 PROCEDURE — 4010F PR ACE/ARB THEARPY RXD/TAKEN: ICD-10-PCS | Mod: CPTII,S$GLB,, | Performed by: COLON & RECTAL SURGERY

## 2022-11-21 PROCEDURE — 1159F PR MEDICATION LIST DOCUMENTED IN MEDICAL RECORD: ICD-10-PCS | Mod: CPTII,S$GLB,, | Performed by: COLON & RECTAL SURGERY

## 2022-11-21 PROCEDURE — 3079F DIAST BP 80-89 MM HG: CPT | Mod: CPTII,S$GLB,, | Performed by: COLON & RECTAL SURGERY

## 2022-11-21 PROCEDURE — 3074F SYST BP LT 130 MM HG: CPT | Mod: CPTII,S$GLB,, | Performed by: COLON & RECTAL SURGERY

## 2022-11-21 PROCEDURE — 99999 PR PBB SHADOW E&M-EST. PATIENT-LVL III: CPT | Mod: PBBFAC,,, | Performed by: COLON & RECTAL SURGERY

## 2022-11-21 PROCEDURE — 99999 PR PBB SHADOW E&M-EST. PATIENT-LVL III: ICD-10-PCS | Mod: PBBFAC,,, | Performed by: COLON & RECTAL SURGERY

## 2022-11-21 PROCEDURE — 1159F MED LIST DOCD IN RCRD: CPT | Mod: CPTII,S$GLB,, | Performed by: COLON & RECTAL SURGERY

## 2022-11-21 PROCEDURE — 1157F PR ADVANCE CARE PLAN OR EQUIV PRESENT IN MEDICAL RECORD: ICD-10-PCS | Mod: CPTII,S$GLB,, | Performed by: COLON & RECTAL SURGERY

## 2022-11-21 PROCEDURE — 1157F ADVNC CARE PLAN IN RCRD: CPT | Mod: CPTII,S$GLB,, | Performed by: COLON & RECTAL SURGERY

## 2022-11-21 NOTE — TELEPHONE ENCOUNTER
Nurse returned call to pt, he request a refill on his oxy due to holiday weekend, and didn't want to run short, pt was informed the clinic hours and office being open all day Friday after thanksgiving. Pt stated he has some oxy left, not sure because he was just waking up and will count later and let us know by tomorrow, he knows I will reach out to him about his refill request.

## 2022-11-21 NOTE — TELEPHONE ENCOUNTER
----- Message from Sydnee Demarco sent at 11/21/2022  4:37 PM CST -----  Contact: Self/[phone]  Pt is calling in regards to pain medicine, he states he will be out by the holiday and would like a refill sent to     [facility] [address] [place] [id number]  Phone: [phone] Fax: [phone]    Please give him a call back at [phone]. Thank you s/g

## 2022-11-22 ENCOUNTER — DOCUMENTATION ONLY (OUTPATIENT)
Dept: PALLIATIVE MEDICINE | Facility: CLINIC | Age: 67
End: 2022-11-22
Payer: MEDICARE

## 2022-11-22 DIAGNOSIS — G89.3 NEOPLASM RELATED PAIN: Primary | ICD-10-CM

## 2022-11-22 RX ORDER — OXYCODONE HYDROCHLORIDE 20 MG/1
20 TABLET ORAL EVERY 4 HOURS PRN
Qty: 105 TABLET | Refills: 0 | Status: SHIPPED | OUTPATIENT
Start: 2022-11-22 | End: 2022-12-07 | Stop reason: SDUPTHER

## 2022-11-22 RX ORDER — OXYCODONE HYDROCHLORIDE 20 MG/1
20 TABLET ORAL EVERY 4 HOURS PRN
Qty: 105 TABLET | Refills: 0 | Status: SHIPPED | OUTPATIENT
Start: 2022-11-22 | End: 2022-11-22 | Stop reason: SDUPTHER

## 2022-11-22 NOTE — PROGRESS NOTES
Nurse received a refill request for oxycodone 20 mg to be filled at [facility], pt stated he had to take 4-5 pills daily after his surgery, see by dr anne on yesterday, he is currently decreased pain pill intake by 4 pills daily, and the oxycodone works better than the dilaudid. This information has been sent to armaan nevarez for review, pt will be notified once script has been sent.

## 2022-11-23 NOTE — ANESTHESIA POSTPROCEDURE EVALUATION
Anesthesia Post Evaluation    Patient: Vincent Benton    Procedure(s) Performed: Procedure(s) (LRB):  EXAM UNDER ANESTHESIA (N/A)  INCISION AND DRAINAGE, PERIRECTAL REGION (N/A)  BLOCK, NERVE, PUDENDAL (N/A)  INCISION, ABSCESS, PERIANAL (N/A)  INCISION AND DRAINAGE, ABSCESS (Bilateral)    Final Anesthesia Type: MAC      Patient location during evaluation: PACU  Patient participation: Yes- Able to Participate  Level of consciousness: awake and alert and oriented  Post-procedure vital signs: reviewed and stable  Pain management: adequate  Airway patency: patent  RENEA mitigation strategies: Multimodal analgesia  PONV status at discharge: No PONV  Anesthetic complications: no      Cardiovascular status: blood pressure returned to baseline and hemodynamically stable  Respiratory status: unassisted  Hydration status: euvolemic  Follow-up not needed.          Vitals Value Taken Time   /89 11/21/22 1259   Temp 35.8 °C (96.4 °F) 11/21/22 1259   Pulse 80 11/21/22 1259   Resp 20 11/12/22 1534   SpO2 95 % 11/12/22 1534         Event Time   Out of Recovery 11/10/2022 14:00:00         Pain/Ana Score: No data recorded

## 2022-11-25 NOTE — PROGRESS NOTES
History & Physical    SUBJECTIVE:     Chief Complaint   Patient presents with    Post-op Evaluation     2 week post op// sx 11/10 multiple I&Ds     Ref MD: Isai Centeno MD    History of Present Illness:  Patient is a 67 y.o. male presents for evaluation of a rectal mass.  Patient has been having iron deficiency anemia that did require transfusion around 1 year ago.  He also has had associated hematochezia for over a year now but did improve after he quit drinking beer at 8 months ago but is still intermittently present. This prompted a colonoscopy which was performed on 09/03/2019 where an obstructing rectal mass was seen that appeared malignant was biopsied and could not be traversed.  Patient reports that he had a colonoscopy around 6-7 years ago were some benign polyps were found but no malignancy.  These records are not available.  He states that the hematochezia has recently improved after he quit drinking beer, but is still intermittently present and worsen with the bowel prep from the colonoscopy recently.  He is not on any chronic anticoagulation.  He states he has had normal caliber bowel movements does not feel constipated or obstructed.  He denies any fever, chills, nausea, vomiting, diarrhea, constipation, weight loss, night sweats or any other signs or symptoms.    12/4/19: Completed Neoadj chemoXRT  2/4/2020 : Robotic ultra-low anterior resection with DLI and Mediport placement  June 2020: Completed adjuvant chemotx  8/4/2020: DLI reversal  11/10/22: Perirectal/perineal/inguinal I&D    Interval history:  Since last clinic visit, the patient underwent perirectal, perineal and inguinal I&D with multiple Penrose drains left in place after developing severe perianal and pelvic abscesses.  He has done well since discharge.  His pain has mostly resolved and he is no longer having any purulent drainage.  He denies any fever, chills, nausea, vomiting.    Review of patient's allergies indicates:  No Known  Allergies    Current Outpatient Medications   Medication Sig Dispense Refill    ascorbic acid, vitamin C, (VITAMIN C) 1000 MG tablet Take 1,000 mg by mouth once daily.      aspirin 81 MG Chew Take 1 tablet (81 mg total) by mouth once daily. 30 tablet 0    dexAMETHasone (DECADRON) 4 MG Tab Take 2 tablets (8 mg total) by mouth once daily. Take as directed on days 2 and 3 of your chemotherapy cycle. 24 tablet 5    folic acid-vit B6-vit B12 2.5-25-2 mg (FOLBIC OR EQUIV) 2.5-25-2 mg Tab Take 1 tablet by mouth once daily. 30 tablet 0    hydrALAZINE (APRESOLINE) 25 MG tablet Take 1 tablet (25 mg total) by mouth 2 (two) times a day. 60 tablet 11    loperamide (IMODIUM) 2 mg capsule Take 1 capsule (2 mg total) by mouth 4 (four) times daily as needed for Diarrhea. 30 capsule 1    losartan (COZAAR) 25 MG tablet Take 1 tablet (25 mg total) by mouth once daily. 30 tablet 1    magic mouthwash diphen/antac/lidoc Swish and spit 15mls every 4 hours as needed for mouth ulcers 450 mL 1    multivitamin capsule Take 1 capsule by mouth once daily.      ondansetron (ZOFRAN-ODT) 8 MG TbDL Dissolve 1 tablet (8 mg total) by mouth every 8 (eight) hours as needed (nausea/vomiting). 60 tablet 5    pantoprazole (PROTONIX) 40 MG tablet Take 1 tablet (40 mg total) by mouth once daily. 30 tablet 1    zinc sulfate (ZINCATE) 50 mg zinc (220 mg) capsule Take 1 capsule (220 mg total) by mouth once daily. 15 capsule 0    gabapentin (NEURONTIN) 300 MG capsule Take 1 capsule (300 mg total) by mouth every evening. 30 capsule 5    oxyCODONE (ROXICODONE) 20 mg Tab immediate release tablet Take 1 tablet (20 mg total) by mouth every 4 (four) hours as needed for Pain. 105 tablet 0     No current facility-administered medications for this visit.     Facility-Administered Medications Ordered in Other Visits   Medication Dose Route Frequency Provider Last Rate Last Admin    0.9%  NaCl infusion   Intravenous Continuous Delonte Mcintyre MD        0.9%  NaCl  infusion   Intravenous Continuous Delonte Mcintyre MD        alteplase injection 2 mg  2 mg Intra-Catheter PRN Mikel Sams MD        chlorhexidine 0.12 % solution 10 mL  10 mL Mouth/Throat On Call Procedure Delonte Mcintyre MD   10 mL at 05/05/21 1222    chlorhexidine 0.12 % solution 10 mL  10 mL Mouth/Throat On Call Procedure Delonte Mcintyre MD   10 mL at 01/04/22 0628    diphenhydrAMINE injection 25 mg  25 mg Intravenous Q6H PRN Keli Alcaraz MD        lactated ringers infusion   Intravenous Continuous Familia Gonzalez MD   Stopped at 03/24/21 0825    metoclopramide HCl injection 10 mg  10 mg Intravenous Q10 Min PRN Keli Alcaraz MD        nozaseptin (NOZIN) nasal    Each Nostril On Call Procedure Delonte Mcintyre MD   Given at 05/05/21 1222    sodium chloride 0.9% flush 10 mL  10 mL Intravenous PRN Mikel Sams MD        sodium chloride 0.9% flush 3 mL  3 mL Intravenous PRN Shilpa Yee MD        sodium chloride 0.9% flush 3 mL  3 mL Intravenous Q8H Keli Alcaraz MD           Past Medical History:   Diagnosis Date    Alcoholism     Anemia     Back pain     Encounter for blood transfusion 2018    Essential hypertension 2/4/2016    GERD (gastroesophageal reflux disease)     Hiatal hernia     Hypertension     diet controlled    Melanoma 06/2021    left cheek    Rectal cancer 9/11/2019     Past Surgical History:   Procedure Laterality Date    ARTHROSCOPY OF KNEE Right 5/5/2021    Procedure: ARTHROSCOPY, KNEE;  Surgeon: Delonte Mcintyre MD;  Location: [facility] OR;  Service: Orthopedics;  Laterality: Right;    COLONOSCOPY N/A 9/3/2019    Procedure: COLONOSCOPY;  Surgeon: Isai Centeno MD;  Location: [facility] ENDO;  Service: Endoscopy;  Laterality: N/A;    COLONOSCOPY N/A 1/10/2020    Procedure: COLONOSCOPY;  Surgeon: Familia Gonzalez MD;  Location: [facility] ENDO;  Service: General;  Laterality: N/A;    COLONOSCOPY N/A 3/24/2021    Procedure: COLONOSCOPY;   Surgeon: Familia Gonzalez MD;  Location: [facility];  Service: General;  Laterality: N/A;    ESOPHAGOGASTRODUODENOSCOPY N/A 9/3/2019    Procedure: ESOPHAGOGASTRODUODENOSCOPY (EGD);  Surgeon: Isai Centeno MD;  Location: [facility] ENDO;  Service: Endoscopy;  Laterality: N/A;    EXAMINATION UNDER ANESTHESIA N/A 11/10/2022    Procedure: EXAM UNDER ANESTHESIA;  Surgeon: Familia Gonzalez MD;  Location: [facility] OR;  Service: General;  Laterality: N/A;    EXCISION OF MEDIAL MENISCUS OF KNEE Right 5/5/2021    Procedure: MENISCECTOMY, KNEE, MEDIAL;  Surgeon: Delonte Mcintyre MD;  Location: [facility];  Service: Orthopedics;  Laterality: Right;  partial Lateral    FLEXIBLE SIGMOIDOSCOPY  2/4/2020    Procedure: SIGMOIDOSCOPY, FLEXIBLE;  Surgeon: Familia Gonzalez MD;  Location: [facility];  Service: General;;    ILEOSTOMY Right 2/4/2020    Procedure: CREATION, ILEOSTOMY;  Surgeon: Familia Gonzalez MD;  Location: [facility] OR;  Service: General;  Laterality: Right;    ILEOSTOMY CLOSURE N/A 8/4/2020    Procedure: CLOSURE, ILEOSTOMY;  Surgeon: Familia Gonzalez MD;  Location: [facility] OR;  Service: General;  Laterality: N/A;    INCISION AND DRAINAGE OF ABSCESS Bilateral 11/10/2022    Procedure: INCISION AND DRAINAGE, ABSCESS;  Surgeon: Familia Gonzalez MD;  Location: [facility] OR;  Service: General;  Laterality: Bilateral;  groin abscess    INCISION AND DRAINAGE OF BACK Left 8/5/2020    Procedure: INCISION AND DRAINAGE, BACK;  Surgeon: Familia Gonzalez MD;  Location: [facility] OR;  Service: General;  Laterality: Left;    INCISION AND DRAINAGE OF PERIRECTAL REGION N/A 11/10/2022    Procedure: INCISION AND DRAINAGE, PERIRECTAL REGION;  Surgeon: Familia Gonzalez MD;  Location: [facility] OR;  Service: General;  Laterality: N/A;    INCISION OF PERIANAL ABSCESS N/A 11/10/2022    Procedure: INCISION, ABSCESS, PERIANAL;  Surgeon: Familia Gonzalez MD;  Location: [facility] OR;  Service: General;  Laterality: N/A;    INJECTION OF ANESTHETIC AGENT AROUND  PUDENDAL NERVE N/A 11/10/2022    Procedure: BLOCK, NERVE, PUDENDAL;  Surgeon: Familia Gonzalez MD;  Location: [facility] OR;  Service: General;  Laterality: N/A;    INJECTION OF ANESTHETIC AGENT INTO TISSUE PLANE DEFINED BY TRANSVERSUS ABDOMINIS MUSCLE N/A 2/4/2020    Procedure: BLOCK, TRANSVERSUS ABDOMINIS PLANE;  Surgeon: Familia Gonzalez MD;  Location: [facility] OR;  Service: General;  Laterality: N/A;    INSERTION OF TUNNELED CENTRAL VENOUS CATHETER (CVC) WITH SUBCUTANEOUS PORT Right 2/4/2020    Procedure: INSERTION, PORT-A-CATH;  Surgeon: Familia Gonzalez MD;  Location: [facility] OR;  Service: General;  Laterality: Right;    INSERTION OF TUNNELED CENTRAL VENOUS CATHETER (CVC) WITH SUBCUTANEOUS PORT N/A 8/16/2022    Procedure: [id number]-G-CATH;  Surgeon: Familia Gonzalez MD;  Location: [facility];  Service: General;  Laterality: N/A;  right subclavian    JOINT REPLACEMENT Left 2009    Hip    KNEE ARTHROSCOPY W/ PLICA EXCISION Right 5/5/2021    Procedure: EXCISION, PLICA, KNEE, ARTHROSCOPIC;  Surgeon: Delonte Mcintyre MD;  Location: [facility] OR;  Service: Orthopedics;  Laterality: Right;    MOBILIZATION OF SPLENIC FLEXURE  2/4/2020    Procedure: MOBILIZATION, SPLENIC FLEXURE;  Surgeon: Familia Gonzalez MD;  Location: [facility];  Service: General;;    REMOVAL OF HARDWARE FROM HIP Left 1/4/2022    Procedure: REMOVAL, HARDWARE, HIP;  Surgeon: Delonte Mcintyre MD;  Location: [facility];  Service: Orthopedics;  Laterality: Left;     Family History   Problem Relation Age of Onset    Cataracts Mother     Stroke Father     Hypertension Father      Social History     Tobacco Use    Smoking status: Never    Smokeless tobacco: Never   Substance Use Topics    Alcohol use: Yes    Drug use: No      Review of Systems:  Review of Systems   Constitutional:  Negative for activity change, appetite change, chills, fatigue, fever and unexpected weight change.   HENT:  Negative for congestion, ear pain, sore throat and trouble swallowing.     Eyes:  Negative for pain, redness and itching.   Respiratory:  Negative for cough, shortness of breath and wheezing.    Cardiovascular:  Negative for chest pain, palpitations and leg swelling.   Gastrointestinal:  Negative for abdominal distention, abdominal pain, anal bleeding, blood in stool, constipation, diarrhea, nausea, rectal pain and vomiting.   Endocrine: Negative for cold intolerance, heat intolerance and polyuria.   Genitourinary:  Negative for dysuria, flank pain, frequency and hematuria.   Musculoskeletal:  Negative for gait problem, joint swelling and neck pain.   Skin:  Negative for color change, rash and wound.   Allergic/Immunologic: Negative for environmental allergies and immunocompromised state.   Neurological:  Negative for dizziness, speech difficulty, weakness and numbness.   Psychiatric/Behavioral:  Negative for agitation, confusion and hallucinations.      OBJECTIVE:     Vital Signs (Most Recent)  Temp: 96.4 °F (35.8 °C) (11/21/22 1259)  Pulse: 80 (11/21/22 1259)  BP: 128/89 (11/21/22 1259)           Physical Exam:  Physical Exam  Exam conducted with a chaperone present.   Constitutional:       Appearance: He is well-developed.   HENT:      Head: Normocephalic and atraumatic.   Eyes:      Conjunctiva/sclera: Conjunctivae normal.   Neck:      Thyroid: No thyromegaly.   Cardiovascular:      Rate and Rhythm: Normal rate and regular rhythm.   Pulmonary:      Effort: Pulmonary effort is normal. No respiratory distress.   Abdominal:      Comments: Soft, nondistended, nontender; reducible umiblical and previous RLQ ileostomy site hernias; ileostomy site hernia; well-healed incision   Genitourinary:     Comments: Anorectal:  Multiple Penrose drains in place without active purulence, no further induration or fluctuance, tender at the insertion sites  Musculoskeletal:         General: No tenderness. Normal range of motion.      Cervical back: Normal range of motion.   Skin:     General: Skin is warm  and dry.      Capillary Refill: Capillary refill takes less than 2 seconds.      Findings: No rash.   Neurological:      Mental Status: He is alert and oriented to person, place, and time.   Psychiatric:         Behavior: Behavior normal.     Laboratory  Lab Results   Component Value Date    WBC 6.16 11/12/2022    HGB 9.1 (L) 11/12/2022    HCT 28.8 (L) 11/12/2022     11/12/2022    CHOL 180 03/16/2020    TRIG 43 03/16/2020    HDL 81 (H) 03/16/2020    ALT 7 (L) 11/11/2022    AST 10 11/11/2022     11/12/2022    K 3.4 (L) 11/12/2022    CL 98 11/12/2022    CREATININE 0.4 (L) 11/12/2022    BUN 2 (L) 11/12/2022    CO2 28 11/12/2022    TSH 0.917 11/06/2022    PSA 1.4 03/16/2020    INR 1.0 07/20/2022    HGBA1C 5.7 (H) 06/08/2020     Component Ref Range & Units 1 mo ago   (6/13/22) 1 yr ago   (2/22/21) 1 yr ago   (9/1/20) 2 yr ago   (7/13/20) 2 yr ago   (6/22/20) 2 yr ago   (5/4/20) 2 yr ago   (1/27/20)   CEA 0.0 - 5.0 ng/mL 159.9 High   3.0 CM  2.7 CM  6.0 High  CM  5.7 High  CM  4.2 CM  4.5 CM          Diagnostic Results:  Reviewed colonoscopy report and images with rectal mass appreciated      PET CT:  FINDINGS:  Head and neck: Physiologic uptake in the visualized brain parenchyma.  There are no FDG avid cervical lymph nodes.     Chest: Interval development of new bilateral pulmonary nodules.  For example in the right lower lobe series 3, image 158 there is a 4 mm nodule more superiorly in the peripheral right lower lobe on image 156 is a 3-4 mm juxtapleural nodule.  These nodules are not sufficiently FDG avid but fall below the resolution for PET-CT.  Largest nodules is in the left upper lobe on image 146 and measures 7 mm.  This demonstrates max SUV of 1.5, above background parenchyma.     There is unchanged scarring or atelectasis at the left lung base.  Previously described 13 mm ground-glass attenuation in the prior PET-CT in the lateral left upper lobe is no longer well visualized with mild suspected  scarring in this region.  No significant uptake in this region.  No detrimental change in ground-glass density in the right midlung adjacent to the major fissure.  Bilateral patchy ground-glass opacity seen on CT from June 2020 have essentially resolved. No FDG avid axillary mediastinal or hilar adenopathy.     Abdomen/pelvis: New multifocal areas of uptake in the liver are seen.  Largest areas in the inferior right hepatic lobe image 240 axial fused sequence with ill-defined hypodense lesion seen in this area measuring up to 8.7 cm.  Max SUV measures 15.0.  Focus of uptake in the lateral hepatic segment image 219 is seen with max SUV of 13.  Additional focus of uptake on image 206 with max SUV of 9.0.  Hepatic dome lesion uptake with max SUV of 7.0.  No abnormal uptake in the spleen or pancreas or the adrenals.  Postoperative changes of the rectum with primary anastomosis.  On axial fused image 346 there is FDG avid focus with SUV max of 3.4 adjacent to a suture and appears to extend off the posterior right aspect of the rectum into the adjacent soft tissues.  Presacral postsurgical collection with adjacent fat stranding/soft tissue thickening is noted, unchanged.  No significant FDG uptake in this area is seen.     No FDG avid lymphadenopathy.     Bones: Degenerative changes of the spine with osteopenia.  Previously seen left hip orthopedic hardware is no longer visualized.  Chronic appearing fracture deformity the proximal left femur is seen.  No FDG avid osseous lesions.     Miscellaneous: Nonspecific focal uptake in the right hemiscrotum with max SUV of 5.4.  Correlation with nonemergent ultrasound could be performed for further evaluation.  Sebaceous cyst in the posterior left upper back.     Impression:     New bilateral pulmonary nodules and FDG avid multifocal hepatic lesions concerning for metastatic disease.  Small focus of uptake along the posterior right aspect of the rectum in the region of a suture  line raising concern for local recurrence as well.  Nonspecific FDG uptake in the right hemiscrotum.  Correlation with nonemergent ultrasound could be performed for further evaluation.  Additional findings as above      PATHOLOGY:  LIVER MASS, BIOPSY:   Adenocarcinoma consistent with colorectal origin.    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 who is now s/p long-course neoadj chemoXRT which completed on 12/4/19 with partial treatment response on repeat MRI and no evidence of metastatic disease on repeat CT Abd/pelvis who is now s/p robotic ultra-low anterior resection, DLI and mediport placement on 2/4/2020 who has recovered well postop with final path showing T3N0 who has now completed adjuvant chemotx in late June 2020 and is now s/p DLI closure on 8/4/2020 with unfortunate metastatic disease in the liver and lung now s/p perirectal/perineal/inguinal I&D on 11/10/22    - no further surgical intervention required at this time  - will keep penrose drains in place at least a few more weeks to completely allow for full drainage and to ensure no further abscesses develop  - will defer further chemotx to [facility]  - RTC 4 weeks or sooner if necessary    Familia Gonzalez MD  Colon and Rectal Surgery  [facility]

## 2022-11-30 ENCOUNTER — TELEPHONE (OUTPATIENT)
Dept: HEMATOLOGY/ONCOLOGY | Facility: CLINIC | Age: 67
End: 2022-11-30
Payer: MEDICARE

## 2022-11-30 NOTE — TELEPHONE ENCOUNTER
Returned call to patient regarding scheduling an appointment. Appointment scheduled for 12/19. Patient added to wait list per request. All understanding verbalized.

## 2022-11-30 NOTE — TELEPHONE ENCOUNTER
----- Message from Padmini Guardado sent at 11/30/2022  3:48 PM CST -----  Contact: [phone]  Pt is calling in regards to seeing if he needs to schedule an follow up appt. Please call pt back at [phone]. Thanks KB

## 2022-12-01 ENCOUNTER — TELEPHONE (OUTPATIENT)
Dept: HEMATOLOGY/ONCOLOGY | Facility: CLINIC | Age: 67
End: 2022-12-01
Payer: MEDICARE

## 2022-12-01 NOTE — TELEPHONE ENCOUNTER
----- Message from Brenda Ramirez sent at 12/1/2022  2:13 PM CST -----  Type:  Sooner Apoointment Request    Caller is requesting a sooner appointment.  Caller declined first available appointment listed below.  Caller will not accept being placed on the waitlist and is requesting a message be sent to doctor.  Name of Caller:patient  When is the first available appointment?12/19  Symptoms:pt not feeling well  Would the patient rather a call back or a response via [facility]? Call back  Best Call Back Number:[phone]  Additional Information: na

## 2022-12-07 DIAGNOSIS — G89.3 NEOPLASM RELATED PAIN: ICD-10-CM

## 2022-12-07 RX ORDER — OXYCODONE HYDROCHLORIDE 20 MG/1
20 TABLET ORAL EVERY 4 HOURS PRN
Qty: 35 TABLET | Refills: 0 | Status: SHIPPED | OUTPATIENT
Start: 2022-12-07 | End: 2022-12-13 | Stop reason: SDUPTHER

## 2022-12-07 NOTE — PROGRESS NOTES
Requesting early refill. Has been taking 5-6/ day since the surgery to place peritoneal drains. Understand this must be terribly painful. He is anticipating the drains to be removed at 12/19 appt with CRS and then anticipates pain usage will decrease. I hope for that too. Will refill today until our next visit then reassess. He understands this is an early refill however I am granting it given the circumstances.

## 2022-12-08 RX ORDER — FENTANYL 25 UG/1
1 PATCH TRANSDERMAL
Qty: 5 PATCH | Refills: 0 | Status: SHIPPED | OUTPATIENT
Start: 2022-12-08 | End: 2022-12-13 | Stop reason: ALTCHOICE

## 2022-12-12 ENCOUNTER — PATIENT OUTREACH (OUTPATIENT)
Dept: ADMINISTRATIVE | Facility: HOSPITAL | Age: 67
End: 2022-12-12
Payer: MEDICARE

## 2022-12-12 NOTE — PROGRESS NOTES
Called patient to confirm hospital follow up scheduled on 12/14/2022.   Patient is not confirmed. Attempted to contact pt to confirm appt. No answer or voice mail.

## 2022-12-13 ENCOUNTER — OFFICE VISIT (OUTPATIENT)
Dept: PALLIATIVE MEDICINE | Facility: CLINIC | Age: 67
End: 2022-12-13
Payer: MEDICARE

## 2022-12-13 VITALS
WEIGHT: 107 LBS | RESPIRATION RATE: 16 BRPM | SYSTOLIC BLOOD PRESSURE: 157 MMHG | DIASTOLIC BLOOD PRESSURE: 98 MMHG | HEART RATE: 81 BPM | HEIGHT: 71 IN | TEMPERATURE: 98 F | BODY MASS INDEX: 14.98 KG/M2

## 2022-12-13 DIAGNOSIS — G89.3 NEOPLASM RELATED PAIN: Primary | ICD-10-CM

## 2022-12-13 DIAGNOSIS — C20 RECTAL ADENOCARCINOMA: ICD-10-CM

## 2022-12-13 DIAGNOSIS — Z51.5 ENCOUNTER FOR PALLIATIVE CARE: ICD-10-CM

## 2022-12-13 DIAGNOSIS — R64 CACHEXIA: ICD-10-CM

## 2022-12-13 PROCEDURE — 1123F PR ADV CARE PLAN DISCUSSED, PLAN OR SURROGATE DOCUMENTED: ICD-10-PCS | Mod: CPTII,S$GLB,, | Performed by: PHYSICIAN ASSISTANT

## 2022-12-13 PROCEDURE — 4010F ACE/ARB THERAPY RXD/TAKEN: CPT | Mod: CPTII,S$GLB,, | Performed by: PHYSICIAN ASSISTANT

## 2022-12-13 PROCEDURE — 1159F MED LIST DOCD IN RCRD: CPT | Mod: CPTII,S$GLB,, | Performed by: PHYSICIAN ASSISTANT

## 2022-12-13 PROCEDURE — 99499 RISK ADDL DX/OHS AUDIT: ICD-10-PCS | Mod: S$GLB,,, | Performed by: PHYSICIAN ASSISTANT

## 2022-12-13 PROCEDURE — 3080F DIAST BP >= 90 MM HG: CPT | Mod: CPTII,S$GLB,, | Performed by: PHYSICIAN ASSISTANT

## 2022-12-13 PROCEDURE — 1160F PR REVIEW ALL MEDS BY PRESCRIBER/CLIN PHARMACIST DOCUMENTED: ICD-10-PCS | Mod: CPTII,S$GLB,, | Performed by: PHYSICIAN ASSISTANT

## 2022-12-13 PROCEDURE — 3077F SYST BP >= 140 MM HG: CPT | Mod: CPTII,S$GLB,, | Performed by: PHYSICIAN ASSISTANT

## 2022-12-13 PROCEDURE — 3288F FALL RISK ASSESSMENT DOCD: CPT | Mod: CPTII,S$GLB,, | Performed by: PHYSICIAN ASSISTANT

## 2022-12-13 PROCEDURE — 1159F PR MEDICATION LIST DOCUMENTED IN MEDICAL RECORD: ICD-10-PCS | Mod: CPTII,S$GLB,, | Performed by: PHYSICIAN ASSISTANT

## 2022-12-13 PROCEDURE — 99499 UNLISTED E&M SERVICE: CPT | Mod: S$GLB,,, | Performed by: PHYSICIAN ASSISTANT

## 2022-12-13 PROCEDURE — 99999 PR PBB SHADOW E&M-EST. PATIENT-LVL V: ICD-10-PCS | Mod: PBBFAC,,, | Performed by: PHYSICIAN ASSISTANT

## 2022-12-13 PROCEDURE — 99214 PR OFFICE/OUTPT VISIT, EST, LEVL IV, 30-39 MIN: ICD-10-PCS | Mod: S$GLB,,, | Performed by: PHYSICIAN ASSISTANT

## 2022-12-13 PROCEDURE — 1101F PR PT FALLS ASSESS DOC 0-1 FALLS W/OUT INJ PAST YR: ICD-10-PCS | Mod: CPTII,S$GLB,, | Performed by: PHYSICIAN ASSISTANT

## 2022-12-13 PROCEDURE — 3008F BODY MASS INDEX DOCD: CPT | Mod: CPTII,S$GLB,, | Performed by: PHYSICIAN ASSISTANT

## 2022-12-13 PROCEDURE — 3288F PR FALLS RISK ASSESSMENT DOCUMENTED: ICD-10-PCS | Mod: CPTII,S$GLB,, | Performed by: PHYSICIAN ASSISTANT

## 2022-12-13 PROCEDURE — 3077F PR MOST RECENT SYSTOLIC BLOOD PRESSURE >= 140 MM HG: ICD-10-PCS | Mod: CPTII,S$GLB,, | Performed by: PHYSICIAN ASSISTANT

## 2022-12-13 PROCEDURE — 3080F PR MOST RECENT DIASTOLIC BLOOD PRESSURE >= 90 MM HG: ICD-10-PCS | Mod: CPTII,S$GLB,, | Performed by: PHYSICIAN ASSISTANT

## 2022-12-13 PROCEDURE — 1160F RVW MEDS BY RX/DR IN RCRD: CPT | Mod: CPTII,S$GLB,, | Performed by: PHYSICIAN ASSISTANT

## 2022-12-13 PROCEDURE — 1123F ACP DISCUSS/DSCN MKR DOCD: CPT | Mod: CPTII,S$GLB,, | Performed by: PHYSICIAN ASSISTANT

## 2022-12-13 PROCEDURE — 1101F PT FALLS ASSESS-DOCD LE1/YR: CPT | Mod: CPTII,S$GLB,, | Performed by: PHYSICIAN ASSISTANT

## 2022-12-13 PROCEDURE — 99214 OFFICE O/P EST MOD 30 MIN: CPT | Mod: S$GLB,,, | Performed by: PHYSICIAN ASSISTANT

## 2022-12-13 PROCEDURE — 3008F PR BODY MASS INDEX (BMI) DOCUMENTED: ICD-10-PCS | Mod: CPTII,S$GLB,, | Performed by: PHYSICIAN ASSISTANT

## 2022-12-13 PROCEDURE — 4010F PR ACE/ARB THEARPY RXD/TAKEN: ICD-10-PCS | Mod: CPTII,S$GLB,, | Performed by: PHYSICIAN ASSISTANT

## 2022-12-13 PROCEDURE — 99999 PR PBB SHADOW E&M-EST. PATIENT-LVL V: CPT | Mod: PBBFAC,,, | Performed by: PHYSICIAN ASSISTANT

## 2022-12-13 RX ORDER — FENTANYL 50 UG/1
1 PATCH TRANSDERMAL
Qty: 10 PATCH | Refills: 0 | Status: SHIPPED | OUTPATIENT
Start: 2022-12-13

## 2022-12-13 RX ORDER — OXYCODONE HYDROCHLORIDE 20 MG/1
20 TABLET ORAL EVERY 4 HOURS PRN
Qty: 150 TABLET | Refills: 0 | Status: SHIPPED | OUTPATIENT
Start: 2022-12-13 | End: 2023-01-07

## 2022-12-13 RX ORDER — MIRTAZAPINE 7.5 MG/1
7.5 TABLET, FILM COATED ORAL NIGHTLY
Qty: 30 TABLET | Refills: 11 | Status: SHIPPED | OUTPATIENT
Start: 2022-12-13 | End: 2023-12-13

## 2022-12-13 NOTE — PROGRESS NOTES
Palliative Medicine  Follow up  Consult Requested By: No ref. provider found  Reason for Consult: Pain management      SUBJECTIVE:   12/13/2022  He comes to clinic with sister Fara. Since our last visit he has been seen in the hospital due to mucositis and rectal abscess. His oral lesions have improved and is told the perineum is healing. He is hopeful to have the drains removed at his follow up visit next week. He is requiring oxycodone 20mg 5x / day due to severe pain with sitting, wound changes, and walking. He describes severe pain with a prolonged recovery period after spikes of pain. Per his PRN usage I would favor increasing the fentanyl dose. He is not eating much and continues to lose weight weekly. He endorses 2 meals a day but his sister says some days he does not eat at all. I explained wound healing is dependent on nutritional status and am concerned he will not be able to resume cancer treatment. He acknowledges this fact but does not seem super motivated to improve at this time. Sister appears to have caregiver burnout as well.     Past visits:   9/20/22: Patient is a 67 y.o. year old male presenting with a history of recurrent metastatic colorectal adenocarcinoma who is now on FOLFIRI plus Avastin who was referred to the PM clinic for pain and symptom management. He is s/p chemotherapy with FOLFOX then loop ileostomy and reversal in 08/20. He has been on surveillance until imaging from 06/22 showed new bilateral pulmonary nodules and multifocal hepatic lesions concerning for metastasis. I explained why I was consulted to participate in the patient's care. We talked about the role palliative care often plays in helping patients with advanced care planning and symptom management. I particularly expressed how important it was to clarify what type of care the patient would like to receive moving forward given the current status. He understands that his cancer is now deemed incurable but treatable. We  talked about the possible outcomes of treatment including staving off progression, symptom management, and possibly shrinking. He admits that this regimen of chemotherapy is harder on him but so far tolerating well. He is hopeful for response to treatment and can buy a lot more time. He has a living will on file and HCPOA which are consistent with his current wishes. He continued to make comments that he was relying on chemotherapy and God and one would be the cure. I transitioned this topic into a code status discussion and he quickly chose DNR/I. We will complete a LaPOST to reflect his wishes. In regards to his pain, his pain is located in the RUQ and can pinpoint the location. He has been taking oxycodone 7.5mg q4hr and sometimes requires a dose at 2-3hr gil. He assumes he is taking 5 doses/ day. He reports a better response to hydrocodone 10mg and felt it lasted longer. His pain is sharp, pinpoint to RUQ, at worst 7/10, with medications resolves. He would like to rotate back to hydrocodone. He reports waking up in severe pain and we talked about short vs long acting opioids and my criteria to initiate. As his pain is nearly constant with frequent PRN dosing I would favor starting a long acting. He previously tried MSER with our palliative physician in 2021 but did not like the way it made him feel. I vote to try fentanyl and would start at 12mcg dose then titrate as needed. We can follow up with a phone call in 2 weeks to determine his response and dictate the refill quantity. He also takes gabapentin 300mg HS which helps with sleep but cannot tell a difference in the numbness in his feet.     4/22/21: 66 yo male with h/o rectal cancer now in remission following surgery and chemotherapy. Seen for f/u palliative visit.   Recently saw Dr. Gonzalez for surveillance c-scope with 3 year f/u scheduled. 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      His main complaint today is right knee pain; he is seeing orthopedics with scope planned    LA  reviewed and summarized:        Past Medical History:   Diagnosis Date    Alcoholism     Anemia     Back pain     Encounter for blood transfusion 2018    Essential hypertension 2/4/2016    GERD (gastroesophageal reflux disease)     Hiatal hernia     Hypertension     diet controlled    Melanoma 06/2021    left cheek    Rectal cancer 9/11/2019     Past Surgical History:   Procedure Laterality Date    ARTHROSCOPY OF KNEE Right 5/5/2021    Procedure: ARTHROSCOPY, KNEE;  Surgeon: Delonte Mcintyre MD;  Location: [facility] OR;  Service: Orthopedics;  Laterality: Right;    COLONOSCOPY N/A 9/3/2019    Procedure: COLONOSCOPY;  Surgeon: Isai Centeno MD;  Location: [facility] ENDO;  Service: Endoscopy;  Laterality: N/A;    COLONOSCOPY N/A 1/10/2020    Procedure: COLONOSCOPY;  Surgeon: Familia Gonzalez MD;  Location: [facility];  Service: General;  Laterality: N/A;    COLONOSCOPY N/A 3/24/2021    Procedure: COLONOSCOPY;  Surgeon: Familia Gonzalez MD;  Location: [facility];  Service: General;  Laterality: N/A;    ESOPHAGOGASTRODUODENOSCOPY N/A 9/3/2019    Procedure: ESOPHAGOGASTRODUODENOSCOPY (EGD);  Surgeon: Isai Centeno MD;  Location: [facility];  Service: Endoscopy;  Laterality: N/A;    EXAMINATION UNDER ANESTHESIA N/A 11/10/2022    Procedure: EXAM UNDER ANESTHESIA;  Surgeon: Familia Gonzalez MD;  Location: [facility];  Service: General;  Laterality: N/A;    EXCISION OF MEDIAL MENISCUS OF KNEE Right 5/5/2021    Procedure: MENISCECTOMY, KNEE, MEDIAL;  Surgeon: Delonte Mcintyre MD;  Location: [facility];  Service: Orthopedics;  Laterality: Right;  partial Lateral    FLEXIBLE SIGMOIDOSCOPY  2/4/2020    Procedure: SIGMOIDOSCOPY, FLEXIBLE;  Surgeon: Familia Gonzalez MD;  Location: [facility];  Service: General;;    ILEOSTOMY Right 2/4/2020    Procedure: CREATION, ILEOSTOMY;  Surgeon: Familia RIVAS  MD Carlos;  Location: [facility] OR;  Service: General;  Laterality: Right;    ILEOSTOMY CLOSURE N/A 8/4/2020    Procedure: CLOSURE, ILEOSTOMY;  Surgeon: Familia Gonzalez MD;  Location: [facility] OR;  Service: General;  Laterality: N/A;    INCISION AND DRAINAGE OF ABSCESS Bilateral 11/10/2022    Procedure: INCISION AND DRAINAGE, ABSCESS;  Surgeon: Familia Gonzalez MD;  Location: [facility] OR;  Service: General;  Laterality: Bilateral;  groin abscess    INCISION AND DRAINAGE OF BACK Left 8/5/2020    Procedure: INCISION AND DRAINAGE, BACK;  Surgeon: Familia Gonzalez MD;  Location: [facility] OR;  Service: General;  Laterality: Left;    INCISION AND DRAINAGE OF PERIRECTAL REGION N/A 11/10/2022    Procedure: INCISION AND DRAINAGE, PERIRECTAL REGION;  Surgeon: Familia Gonzalez MD;  Location: [facility] OR;  Service: General;  Laterality: N/A;    INCISION OF PERIANAL ABSCESS N/A 11/10/2022    Procedure: INCISION, ABSCESS, PERIANAL;  Surgeon: Familia Gonzalez MD;  Location: [facility] OR;  Service: General;  Laterality: N/A;    INJECTION OF ANESTHETIC AGENT AROUND PUDENDAL NERVE N/A 11/10/2022    Procedure: BLOCK, NERVE, PUDENDAL;  Surgeon: Familia Gonzalez MD;  Location: [facility] OR;  Service: General;  Laterality: N/A;    INJECTION OF ANESTHETIC AGENT INTO TISSUE PLANE DEFINED BY TRANSVERSUS ABDOMINIS MUSCLE N/A 2/4/2020    Procedure: BLOCK, TRANSVERSUS ABDOMINIS PLANE;  Surgeon: Familia Gonzalez MD;  Location: [facility] OR;  Service: General;  Laterality: N/A;    INSERTION OF TUNNELED CENTRAL VENOUS CATHETER (CVC) WITH SUBCUTANEOUS PORT Right 2/4/2020    Procedure: INSERTION, PORT-A-CATH;  Surgeon: Familia Gonzalez MD;  Location: [facility] OR;  Service: General;  Laterality: Right;    INSERTION OF TUNNELED CENTRAL VENOUS CATHETER (CVC) WITH SUBCUTANEOUS PORT N/A 8/16/2022    Procedure: [id number];  Surgeon: Familia Gonzalez MD;  Location: [facility] OR;  Service: General;  Laterality: N/A;  right subclavian    JOINT REPLACEMENT Left 2009     Hip    KNEE ARTHROSCOPY W/ PLICA EXCISION Right 5/5/2021    Procedure: EXCISION, PLICA, KNEE, ARTHROSCOPIC;  Surgeon: Delonte Mcintyre MD;  Location: [facility] OR;  Service: Orthopedics;  Laterality: Right;    MOBILIZATION OF SPLENIC FLEXURE  2/4/2020    Procedure: MOBILIZATION, SPLENIC FLEXURE;  Surgeon: Familia Gonzalez MD;  Location: [facility] OR;  Service: General;;    REMOVAL OF HARDWARE FROM HIP Left 1/4/2022    Procedure: REMOVAL, HARDWARE, HIP;  Surgeon: Delonte Mcintyre MD;  Location: [facility] OR;  Service: Orthopedics;  Laterality: Left;     Family History   Problem Relation Age of Onset    Cataracts Mother     Stroke Father     Hypertension Father      Review of patient's allergies indicates:  No Known Allergies    Medications:    Current Outpatient Medications:     ascorbic acid, vitamin C, (VITAMIN C) 1000 MG tablet, Take 1,000 mg by mouth once daily., Disp: , Rfl:     aspirin 81 MG Chew, Take 1 tablet (81 mg total) by mouth once daily., Disp: 30 tablet, Rfl: 0    dexAMETHasone (DECADRON) 4 MG Tab, Take 2 tablets (8 mg total) by mouth once daily. Take as directed on days 2 and 3 of your chemotherapy cycle., Disp: 24 tablet, Rfl: 5    folic acid-vit B6-vit B12 2.5-25-2 mg (FOLBIC OR EQUIV) 2.5-25-2 mg Tab, Take 1 tablet by mouth once daily., Disp: 30 tablet, Rfl: 0    hydrALAZINE (APRESOLINE) 25 MG tablet, Take 1 tablet (25 mg total) by mouth 2 (two) times a day., Disp: 60 tablet, Rfl: 11    loperamide (IMODIUM) 2 mg capsule, Take 1 capsule (2 mg total) by mouth 4 (four) times daily as needed for Diarrhea., Disp: 30 capsule, Rfl: 1    losartan (COZAAR) 25 MG tablet, Take 1 tablet (25 mg total) by mouth once daily., Disp: 30 tablet, Rfl: 1    multivitamin capsule, Take 1 capsule by mouth once daily., Disp: , Rfl:     ondansetron (ZOFRAN-ODT) 8 MG TbDL, Dissolve 1 tablet (8 mg total) by mouth every 8 (eight) hours as needed (nausea/vomiting)., Disp: 60 tablet, Rfl: 5    pantoprazole (PROTONIX) 40 MG tablet,  Take 1 tablet (40 mg total) by mouth once daily., Disp: 30 tablet, Rfl: 1    zinc sulfate (ZINCATE) 50 mg zinc (220 mg) capsule, Take 1 capsule (220 mg total) by mouth once daily., Disp: 15 capsule, Rfl: 0    fentaNYL (DURAGESIC) 50 mcg/hr, Place 1 patch onto the skin every 72 hours., Disp: 10 patch, Rfl: 0    gabapentin (NEURONTIN) 300 MG capsule, Take 1 capsule (300 mg total) by mouth every evening., Disp: 30 capsule, Rfl: 5    magic mouthwash diphen/antac/lidoc, Swish and spit 15mls every 4 hours as needed for mouth ulcers, Disp: 450 mL, Rfl: 1    mirtazapine (REMERON) 7.5 MG Tab, Take 1 tablet (7.5 mg total) by mouth every evening., Disp: 30 tablet, Rfl: 11    oxyCODONE (ROXICODONE) 20 mg Tab immediate release tablet, Take 1 tablet (20 mg total) by mouth every 4 (four) hours as needed for Pain. Max 5 doses/ day, Disp: 150 tablet, Rfl: 0  No current facility-administered medications for this visit.    Facility-Administered Medications Ordered in Other Visits:     0.9%  NaCl infusion, , Intravenous, Continuous, Delonte Mcintyre MD    0.9%  NaCl infusion, , Intravenous, Continuous, Delonte Mcintyre MD    alteplase injection 2 mg, 2 mg, Intra-Catheter, PRN, Mikel Sams MD    chlorhexidine 0.12 % solution 10 mL, 10 mL, Mouth/Throat, On Call Procedure, Delonte Mcintyre MD, 10 mL at 05/05/21 1222    chlorhexidine 0.12 % solution 10 mL, 10 mL, Mouth/Throat, On Call Procedure, Delonte Mcintyre MD, 10 mL at 01/04/22 0628    diphenhydrAMINE injection 25 mg, 25 mg, Intravenous, Q6H PRN, Keli Alcaraz MD    lactated ringers infusion, , Intravenous, Continuous, Familia Gonzalez MD, Stopped at 03/24/21 0825    metoclopramide HCl injection 10 mg, 10 mg, Intravenous, Q10 Min PRN, Keli Alcaraz MD    nozaseptin (NOZIN) nasal , , Each Nostril, On Call Procedure, Delonte Mcintyre MD, Given at 05/05/21 1222    sodium chloride 0.9% flush 10 mL, 10 mL, Intravenous, PRN, Mikel PUENTES  MD Latrell    sodium chloride 0.9% flush 3 mL, 3 mL, Intravenous, PRN, Shilpa Yee MD    sodium chloride 0.9% flush 3 mL, 3 mL, Intravenous, Q8H, Keli Alcaraz MD    OBJECTIVE:     ROS:  Review of Systems   Constitutional:  Negative for appetite change, chills and fever.   HENT:  Negative for mouth sores, sore throat and trouble swallowing.    Respiratory:  Negative for cough and shortness of breath.    Gastrointestinal:  Positive for rectal pain. Negative for abdominal pain, constipation, nausea and vomiting.   Genitourinary:  Negative for difficulty urinating and dysuria.   Musculoskeletal:  Negative for back pain and myalgias.   Skin:  Negative for rash and wound.   Allergic/Immunologic: Negative for immunocompromised state.   Neurological:  Negative for weakness and headaches.   Psychiatric/Behavioral:  Negative for confusion, dysphoric mood and sleep disturbance. The patient is not nervous/anxious.      Review of Symptoms      Symptom Assessment (ESAS 0-10 Scale)  Pain:  9  Dyspnea:  0  Anxiety:  0  Nausea:  4  Depression:  6  Anorexia:  3  Fatigue:  10  Insomnia:  0  Restlessness:  0  Agitation:  0     CAM / Delirium:  Negative  Constipation:  Negative    Anxiety:  Is not nervous/anxious  Constipation:  No constipation    Pain Assessment:    Location(s): buttock    Buttock       Location: generalized        Quality: Sharp        Quantity: 9/10 in intensity        Chronicity: Onset 0 week(s) ago, unchanged        Aggravating Factors: Movement        Alleviating Factors: Opiates        Associated Symptoms: None    ECOG Performance Status thGthrthathdtheth:th th4th Living Arrangements:  Lives with spouse    Psychosocial/Cultural: , one daughter from previous relationship    Advance Care Planning   Advance Directives:   Living Will: Yes        Copy on chart: Yes    LaPOST: Yes    Do Not Resuscitate Status: Yes    Medical Power of : Yes    Agent's Name:  1: wife Akilah Medrano, 2: sister Fara  Serenity    Decision Making:  Patient answered questions  Goals of Care: What is most important right now is to focus on improvement in condition but with limits to invasive therapies. Accordingly, we have decided that the best plan to meet the patient's goals includes continuing with treatment.          Physical Exam:  Vitals: Temp: 97.6 °F (36.4 °C) (12/13/22 1528)  Pulse: 81 (12/13/22 1528)  Resp: 16 (12/13/22 1528)  BP: (!) 157/98 (12/13/22 1528)  Physical Exam  Vitals and nursing note reviewed.   Constitutional:       General: He is not in acute distress.     Appearance: Normal appearance. He is underweight. He is ill-appearing.   HENT:      Head: Normocephalic and atraumatic.   Eyes:      Pupils: Pupils are equal, round, and reactive to light.   Cardiovascular:      Rate and Rhythm: Normal rate and regular rhythm.      Pulses: Normal pulses.      Heart sounds: Normal heart sounds. No murmur heard.  Pulmonary:      Effort: Pulmonary effort is normal. No respiratory distress.      Breath sounds: Normal breath sounds. No wheezing.   Abdominal:      General: Bowel sounds are normal. There is no distension.      Palpations: Abdomen is soft.      Tenderness: There is no abdominal tenderness.   Musculoskeletal:         General: Normal range of motion.      Cervical back: Normal range of motion.      Right lower leg: No edema.      Left lower leg: No edema.   Skin:     General: Skin is warm and dry.   Neurological:      General: No focal deficit present.      Mental Status: He is alert and oriented to person, place, and time. Mental status is at baseline.      Cranial Nerves: No cranial nerve deficit.   Psychiatric:         Mood and Affect: Mood normal.         Behavior: Behavior normal.         Thought Content: Thought content normal.         Judgment: Judgment normal.       Labs and radiology data reviewed    ASSESSMENT/PLAN:     Problem List Items Addressed This Visit          Oncology    Neoplasm related pain -  Primary    Overview     Increase fentanyl 50mcg patch    Continue 20mg q4hr PRN pain- 5 doses/ day currently due to penrose drains. Once these are removed will recommend decreasing back to QID    Pain contract- 9/20/22  UDS collected- next visit  Narcan rx- 9/20/22         Rectal adenocarcinoma    Current Assessment & Plan     Now under the care of Dr. Tuttle    Recent treatment: FOLFIRI plus Avastin, on hold while he convalesces from mucositis and rectal abscess     s/p chemotherapy with FOLFOX then loop ileostomy and reversal in 08/20. Progression noted 6/22 with bilateral pulmonary nodules and multifocal hepatic lesions             Palliative Care    Encounter for palliative care    Overview     HCPOA on file, 1: wife Akilah, 2: sister Fara Interiano on file reflecting wish of DNR/I            Other    Cachexia    Overview     Poor PO intake. Open to restarting Remeron               Follow up: 4 weeks    32 minutes of total time spent on the encounter, which includes face to face time and non-face to face time preparing to see the patient (eg, review of tests), Obtaining and/or reviewing separately obtained history, Documenting clinical information in the electronic or other health record, Independently interpreting results (not separately reported) and communicating results to the patient/family/caregiver, or Care coordination (not separately reported).      Signature: Kristi Wing PA-C

## 2022-12-13 NOTE — ASSESSMENT & PLAN NOTE
Now under the care of Dr. Tuttle    Recent treatment: FOLFIRI plus Avastin, on hold while he convalesces from mucositis and rectal abscess     s/p chemotherapy with FOLFOX then loop ileostomy and reversal in 08/20. Progression noted 6/22 with bilateral pulmonary nodules and multifocal hepatic lesions

## 2022-12-13 NOTE — PROGRESS NOTES
I attempted to contact pt to confirm appt. No answer or voice mail. Unable to leave mess.    Dr Moran staff confirmed appt.

## 2022-12-19 ENCOUNTER — TELEPHONE (OUTPATIENT)
Dept: HEMATOLOGY/ONCOLOGY | Facility: CLINIC | Age: 67
End: 2022-12-19
Payer: MEDICARE

## 2022-12-19 ENCOUNTER — OFFICE VISIT (OUTPATIENT)
Dept: HEMATOLOGY/ONCOLOGY | Facility: CLINIC | Age: 67
End: 2022-12-19
Payer: MEDICARE

## 2022-12-19 ENCOUNTER — LAB VISIT (OUTPATIENT)
Dept: LAB | Facility: HOSPITAL | Age: 67
End: 2022-12-19
Attending: INTERNAL MEDICINE
Payer: MEDICARE

## 2022-12-19 ENCOUNTER — OFFICE VISIT (OUTPATIENT)
Dept: SURGERY | Facility: CLINIC | Age: 67
End: 2022-12-19
Payer: MEDICARE

## 2022-12-19 ENCOUNTER — SOCIAL WORK (OUTPATIENT)
Dept: HEMATOLOGY/ONCOLOGY | Facility: CLINIC | Age: 67
End: 2022-12-19
Payer: MEDICARE

## 2022-12-19 VITALS
HEIGHT: 71 IN | DIASTOLIC BLOOD PRESSURE: 88 MMHG | HEART RATE: 106 BPM | BODY MASS INDEX: 15.4 KG/M2 | WEIGHT: 110 LBS | OXYGEN SATURATION: 100 % | TEMPERATURE: 98 F | SYSTOLIC BLOOD PRESSURE: 144 MMHG

## 2022-12-19 VITALS
HEART RATE: 102 BPM | WEIGHT: 110 LBS | DIASTOLIC BLOOD PRESSURE: 110 MMHG | TEMPERATURE: 98 F | BODY MASS INDEX: 15.34 KG/M2 | SYSTOLIC BLOOD PRESSURE: 149 MMHG

## 2022-12-19 DIAGNOSIS — C20 RECTAL CANCER: ICD-10-CM

## 2022-12-19 DIAGNOSIS — C20 RECTAL CANCER: Primary | ICD-10-CM

## 2022-12-19 DIAGNOSIS — K61.1 RECTAL ABSCESS: ICD-10-CM

## 2022-12-19 DIAGNOSIS — C19 RECURRENT COLORECTAL ADENOCARCINOMA: ICD-10-CM

## 2022-12-19 DIAGNOSIS — K61.1 PERIRECTAL ABSCESS: Primary | ICD-10-CM

## 2022-12-19 DIAGNOSIS — R11.0 NAUSEA: ICD-10-CM

## 2022-12-19 DIAGNOSIS — K12.30 MUCOSITIS: ICD-10-CM

## 2022-12-19 LAB
ALBUMIN SERPL BCP-MCNC: 2.8 G/DL (ref 3.5–5.2)
ALP SERPL-CCNC: 235 U/L (ref 55–135)
ALT SERPL W/O P-5'-P-CCNC: 16 U/L (ref 10–44)
ANION GAP SERPL CALC-SCNC: 14 MMOL/L (ref 8–16)
AST SERPL-CCNC: 33 U/L (ref 10–40)
BASOPHILS # BLD AUTO: 0.05 K/UL (ref 0–0.2)
BASOPHILS NFR BLD: 0.4 % (ref 0–1.9)
BILIRUB SERPL-MCNC: 0.4 MG/DL (ref 0.1–1)
BUN SERPL-MCNC: 6 MG/DL (ref 8–23)
CALCIUM SERPL-MCNC: 10.4 MG/DL (ref 8.7–10.5)
CHLORIDE SERPL-SCNC: 91 MMOL/L (ref 95–110)
CO2 SERPL-SCNC: 28 MMOL/L (ref 23–29)
CREAT SERPL-MCNC: 0.7 MG/DL (ref 0.5–1.4)
DIFFERENTIAL METHOD: ABNORMAL
EOSINOPHIL # BLD AUTO: 0 K/UL (ref 0–0.5)
EOSINOPHIL NFR BLD: 0.3 % (ref 0–8)
ERYTHROCYTE [DISTWIDTH] IN BLOOD BY AUTOMATED COUNT: 18.9 % (ref 11.5–14.5)
EST. GFR  (NO RACE VARIABLE): >60 ML/MIN/1.73 M^2
GLUCOSE SERPL-MCNC: 121 MG/DL (ref 70–110)
HCT VFR BLD AUTO: 34.2 % (ref 40–54)
HGB BLD-MCNC: 11 G/DL (ref 14–18)
IMM GRANULOCYTES # BLD AUTO: 0.07 K/UL (ref 0–0.04)
IMM GRANULOCYTES NFR BLD AUTO: 0.6 % (ref 0–0.5)
LYMPHOCYTES # BLD AUTO: 1.1 K/UL (ref 1–4.8)
LYMPHOCYTES NFR BLD: 9.2 % (ref 18–48)
MCH RBC QN AUTO: 28.4 PG (ref 27–31)
MCHC RBC AUTO-ENTMCNC: 32.2 G/DL (ref 32–36)
MCV RBC AUTO: 88 FL (ref 82–98)
MONOCYTES # BLD AUTO: 1.1 K/UL (ref 0.3–1)
MONOCYTES NFR BLD: 9.1 % (ref 4–15)
NEUTROPHILS # BLD AUTO: 9.6 K/UL (ref 1.8–7.7)
NEUTROPHILS NFR BLD: 80.4 % (ref 38–73)
NRBC BLD-RTO: 0 /100 WBC
PLATELET # BLD AUTO: 600 K/UL (ref 150–450)
PMV BLD AUTO: 8.7 FL (ref 9.2–12.9)
POTASSIUM SERPL-SCNC: 4.4 MMOL/L (ref 3.5–5.1)
PROT SERPL-MCNC: 7.5 G/DL (ref 6–8.4)
RBC # BLD AUTO: 3.88 M/UL (ref 4.6–6.2)
SODIUM SERPL-SCNC: 133 MMOL/L (ref 136–145)
WBC # BLD AUTO: 11.93 K/UL (ref 3.9–12.7)

## 2022-12-19 PROCEDURE — 3077F SYST BP >= 140 MM HG: CPT | Mod: CPTII,S$GLB,, | Performed by: INTERNAL MEDICINE

## 2022-12-19 PROCEDURE — 3288F PR FALLS RISK ASSESSMENT DOCUMENTED: ICD-10-PCS | Mod: CPTII,S$GLB,, | Performed by: INTERNAL MEDICINE

## 2022-12-19 PROCEDURE — 99215 PR OFFICE/OUTPT VISIT, EST, LEVL V, 40-54 MIN: ICD-10-PCS | Mod: S$GLB,,, | Performed by: INTERNAL MEDICINE

## 2022-12-19 PROCEDURE — 99999 PR PBB SHADOW E&M-EST. PATIENT-LVL III: CPT | Mod: PBBFAC,,, | Performed by: COLON & RECTAL SURGERY

## 2022-12-19 PROCEDURE — 3008F BODY MASS INDEX DOCD: CPT | Mod: CPTII,S$GLB,, | Performed by: COLON & RECTAL SURGERY

## 2022-12-19 PROCEDURE — 1157F PR ADVANCE CARE PLAN OR EQUIV PRESENT IN MEDICAL RECORD: ICD-10-PCS | Mod: CPTII,S$GLB,, | Performed by: COLON & RECTAL SURGERY

## 2022-12-19 PROCEDURE — 4010F PR ACE/ARB THEARPY RXD/TAKEN: ICD-10-PCS | Mod: CPTII,S$GLB,, | Performed by: INTERNAL MEDICINE

## 2022-12-19 PROCEDURE — 4010F PR ACE/ARB THEARPY RXD/TAKEN: ICD-10-PCS | Mod: CPTII,S$GLB,, | Performed by: COLON & RECTAL SURGERY

## 2022-12-19 PROCEDURE — 82728 ASSAY OF FERRITIN: CPT | Performed by: INTERNAL MEDICINE

## 2022-12-19 PROCEDURE — 3079F DIAST BP 80-89 MM HG: CPT | Mod: CPTII,S$GLB,, | Performed by: INTERNAL MEDICINE

## 2022-12-19 PROCEDURE — 80053 COMPREHEN METABOLIC PANEL: CPT | Performed by: INTERNAL MEDICINE

## 2022-12-19 PROCEDURE — 3080F DIAST BP >= 90 MM HG: CPT | Mod: CPTII,S$GLB,, | Performed by: COLON & RECTAL SURGERY

## 2022-12-19 PROCEDURE — 99999 PR PBB SHADOW E&M-EST. PATIENT-LVL III: ICD-10-PCS | Mod: PBBFAC,,, | Performed by: COLON & RECTAL SURGERY

## 2022-12-19 PROCEDURE — 3077F SYST BP >= 140 MM HG: CPT | Mod: CPTII,S$GLB,, | Performed by: COLON & RECTAL SURGERY

## 2022-12-19 PROCEDURE — 36415 COLL VENOUS BLD VENIPUNCTURE: CPT | Performed by: INTERNAL MEDICINE

## 2022-12-19 PROCEDURE — 3080F PR MOST RECENT DIASTOLIC BLOOD PRESSURE >= 90 MM HG: ICD-10-PCS | Mod: CPTII,S$GLB,, | Performed by: COLON & RECTAL SURGERY

## 2022-12-19 PROCEDURE — 3008F BODY MASS INDEX DOCD: CPT | Mod: CPTII,S$GLB,, | Performed by: INTERNAL MEDICINE

## 2022-12-19 PROCEDURE — 3008F PR BODY MASS INDEX (BMI) DOCUMENTED: ICD-10-PCS | Mod: CPTII,S$GLB,, | Performed by: COLON & RECTAL SURGERY

## 2022-12-19 PROCEDURE — 3008F PR BODY MASS INDEX (BMI) DOCUMENTED: ICD-10-PCS | Mod: CPTII,S$GLB,, | Performed by: INTERNAL MEDICINE

## 2022-12-19 PROCEDURE — 1157F ADVNC CARE PLAN IN RCRD: CPT | Mod: CPTII,S$GLB,, | Performed by: INTERNAL MEDICINE

## 2022-12-19 PROCEDURE — 1101F PR PT FALLS ASSESS DOC 0-1 FALLS W/OUT INJ PAST YR: ICD-10-PCS | Mod: CPTII,S$GLB,, | Performed by: INTERNAL MEDICINE

## 2022-12-19 PROCEDURE — 99024 PR POST-OP FOLLOW-UP VISIT: ICD-10-PCS | Mod: S$GLB,,, | Performed by: COLON & RECTAL SURGERY

## 2022-12-19 PROCEDURE — 4010F ACE/ARB THERAPY RXD/TAKEN: CPT | Mod: CPTII,S$GLB,, | Performed by: INTERNAL MEDICINE

## 2022-12-19 PROCEDURE — 85025 COMPLETE CBC W/AUTO DIFF WBC: CPT | Performed by: INTERNAL MEDICINE

## 2022-12-19 PROCEDURE — 3077F PR MOST RECENT SYSTOLIC BLOOD PRESSURE >= 140 MM HG: ICD-10-PCS | Mod: CPTII,S$GLB,, | Performed by: COLON & RECTAL SURGERY

## 2022-12-19 PROCEDURE — 1157F ADVNC CARE PLAN IN RCRD: CPT | Mod: CPTII,S$GLB,, | Performed by: COLON & RECTAL SURGERY

## 2022-12-19 PROCEDURE — 4010F ACE/ARB THERAPY RXD/TAKEN: CPT | Mod: CPTII,S$GLB,, | Performed by: COLON & RECTAL SURGERY

## 2022-12-19 PROCEDURE — 1101F PT FALLS ASSESS-DOCD LE1/YR: CPT | Mod: CPTII,S$GLB,, | Performed by: INTERNAL MEDICINE

## 2022-12-19 PROCEDURE — 3079F PR MOST RECENT DIASTOLIC BLOOD PRESSURE 80-89 MM HG: ICD-10-PCS | Mod: CPTII,S$GLB,, | Performed by: INTERNAL MEDICINE

## 2022-12-19 PROCEDURE — 99024 POSTOP FOLLOW-UP VISIT: CPT | Mod: S$GLB,,, | Performed by: COLON & RECTAL SURGERY

## 2022-12-19 PROCEDURE — 1159F PR MEDICATION LIST DOCUMENTED IN MEDICAL RECORD: ICD-10-PCS | Mod: CPTII,S$GLB,, | Performed by: COLON & RECTAL SURGERY

## 2022-12-19 PROCEDURE — 3077F PR MOST RECENT SYSTOLIC BLOOD PRESSURE >= 140 MM HG: ICD-10-PCS | Mod: CPTII,S$GLB,, | Performed by: INTERNAL MEDICINE

## 2022-12-19 PROCEDURE — 3288F FALL RISK ASSESSMENT DOCD: CPT | Mod: CPTII,S$GLB,, | Performed by: INTERNAL MEDICINE

## 2022-12-19 PROCEDURE — 99215 OFFICE O/P EST HI 40 MIN: CPT | Mod: S$GLB,,, | Performed by: INTERNAL MEDICINE

## 2022-12-19 PROCEDURE — 1157F PR ADVANCE CARE PLAN OR EQUIV PRESENT IN MEDICAL RECORD: ICD-10-PCS | Mod: CPTII,S$GLB,, | Performed by: INTERNAL MEDICINE

## 2022-12-19 PROCEDURE — 99999 PR PBB SHADOW E&M-EST. PATIENT-LVL V: CPT | Mod: PBBFAC,,, | Performed by: INTERNAL MEDICINE

## 2022-12-19 PROCEDURE — 84466 ASSAY OF TRANSFERRIN: CPT | Performed by: INTERNAL MEDICINE

## 2022-12-19 PROCEDURE — 99999 PR PBB SHADOW E&M-EST. PATIENT-LVL V: ICD-10-PCS | Mod: PBBFAC,,, | Performed by: INTERNAL MEDICINE

## 2022-12-19 PROCEDURE — 1159F MED LIST DOCD IN RCRD: CPT | Mod: CPTII,S$GLB,, | Performed by: COLON & RECTAL SURGERY

## 2022-12-19 NOTE — PROGRESS NOTES
History & Physical    SUBJECTIVE:     No chief complaint on file.    Ref MD: Isai Centeno MD    History of Present Illness:  Patient is a 67 y.o. male presents for evaluation of a rectal mass.  Patient has been having iron deficiency anemia that did require transfusion around 1 year ago.  He also has had associated hematochezia for over a year now but did improve after he quit drinking beer at 8 months ago but is still intermittently present. This prompted a colonoscopy which was performed on 09/03/2019 where an obstructing rectal mass was seen that appeared malignant was biopsied and could not be traversed.  Patient reports that he had a colonoscopy around 6-7 years ago were some benign polyps were found but no malignancy.  These records are not available.  He states that the hematochezia has recently improved after he quit drinking beer, but is still intermittently present and worsen with the bowel prep from the colonoscopy recently.  He is not on any chronic anticoagulation.  He states he has had normal caliber bowel movements does not feel constipated or obstructed.  He denies any fever, chills, nausea, vomiting, diarrhea, constipation, weight loss, night sweats or any other signs or symptoms.    12/4/19: Completed Neoadj chemoXRT  2/4/2020 : Robotic ultra-low anterior resection with DLI and Mediport placement  June 2020: Completed adjuvant chemotx  8/4/2020: DLI reversal  11/10/22: Perirectal/perineal/inguinal I&D    Interval history:  Since last clinic visit, the patient has done well from a perirectal standpoint.  He is having no further drainage or swelling.  Only pain at the insertion sites. He denies any fever, chills, nausea, vomiting.    Review of patient's allergies indicates:  No Known Allergies    Current Outpatient Medications   Medication Sig Dispense Refill    ascorbic acid, vitamin C, (VITAMIN C) 1000 MG tablet Take 1,000 mg by mouth once daily.      aspirin 81 MG Chew Take 1 tablet (81 mg total)  by mouth once daily. 30 tablet 0    dexAMETHasone (DECADRON) 4 MG Tab Take 2 tablets (8 mg total) by mouth once daily. Take as directed on days 2 and 3 of your chemotherapy cycle. 24 tablet 5    fentaNYL (DURAGESIC) 50 mcg/hr Place 1 patch onto the skin every 72 hours. 10 patch 0    folic acid-vit B6-vit B12 2.5-25-2 mg (FOLBIC OR EQUIV) 2.5-25-2 mg Tab Take 1 tablet by mouth once daily. 30 tablet 0    gabapentin (NEURONTIN) 300 MG capsule Take 1 capsule (300 mg total) by mouth every evening. 30 capsule 5    hydrALAZINE (APRESOLINE) 25 MG tablet Take 1 tablet (25 mg total) by mouth 2 (two) times a day. 60 tablet 11    loperamide (IMODIUM) 2 mg capsule Take 1 capsule (2 mg total) by mouth 4 (four) times daily as needed for Diarrhea. 30 capsule 1    losartan (COZAAR) 25 MG tablet Take 1 tablet (25 mg total) by mouth once daily. 30 tablet 1    magic mouthwash diphen/antac/lidoc Swish and spit 15mls every 4 hours as needed for mouth ulcers 450 mL 1    mirtazapine (REMERON) 7.5 MG Tab Take 1 tablet (7.5 mg total) by mouth every evening. 30 tablet 11    multivitamin capsule Take 1 capsule by mouth once daily.      ondansetron (ZOFRAN-ODT) 8 MG TbDL Dissolve 1 tablet (8 mg total) by mouth every 8 (eight) hours as needed (nausea/vomiting). 60 tablet 5    oxyCODONE (ROXICODONE) 20 mg Tab immediate release tablet Take 1 tablet (20 mg total) by mouth every 4 (four) hours as needed for Pain. Max 5 doses/ day 150 tablet 0    pantoprazole (PROTONIX) 40 MG tablet Take 1 tablet (40 mg total) by mouth once daily. 30 tablet 1    zinc sulfate (ZINCATE) 50 mg zinc (220 mg) capsule Take 1 capsule (220 mg total) by mouth once daily. 15 capsule 0     No current facility-administered medications for this visit.     Facility-Administered Medications Ordered in Other Visits   Medication Dose Route Frequency Provider Last Rate Last Admin    0.9%  NaCl infusion   Intravenous Continuous Delonte Mcintyre MD        0.9%  NaCl infusion    Intravenous Continuous Delonte Mcintyre MD        alteplase injection 2 mg  2 mg Intra-Catheter PRN Mikel Sams MD        chlorhexidine 0.12 % solution 10 mL  10 mL Mouth/Throat On Call Procedure Delonte Mcintyre MD   10 mL at 05/05/21 1222    chlorhexidine 0.12 % solution 10 mL  10 mL Mouth/Throat On Call Procedure Delonte Mcintyre MD   10 mL at 01/04/22 0628    diphenhydrAMINE injection 25 mg  25 mg Intravenous Q6H PRN Keli Alcaraz MD        lactated ringers infusion   Intravenous Continuous Familia Gonzalez MD   Stopped at 03/24/21 0825    metoclopramide HCl injection 10 mg  10 mg Intravenous Q10 Min PRN Keli Alcaraz MD        nozaseptin (NOZIN) nasal    Each Nostril On Call Procedure Delonte Mcintyre MD   Given at 05/05/21 1222    sodium chloride 0.9% flush 10 mL  10 mL Intravenous PRN Mikel Sams MD        sodium chloride 0.9% flush 3 mL  3 mL Intravenous PRN Shilpa Yee MD        sodium chloride 0.9% flush 3 mL  3 mL Intravenous Q8H Keli Alcaraz MD           Past Medical History:   Diagnosis Date    Alcoholism     Anemia     Back pain     Encounter for blood transfusion 2018    Essential hypertension 2/4/2016    GERD (gastroesophageal reflux disease)     Hiatal hernia     Hypertension     diet controlled    Melanoma 06/2021    left cheek    Rectal cancer 9/11/2019     Past Surgical History:   Procedure Laterality Date    ARTHROSCOPY OF KNEE Right 5/5/2021    Procedure: ARTHROSCOPY, KNEE;  Surgeon: Delonte Mcintyre MD;  Location: [facility] [place];  Service: Orthopedics;  Laterality: Right;    COLONOSCOPY N/A 9/3/2019    Procedure: COLONOSCOPY;  Surgeon: Isai Centeno MD;  Location: [facility] ENDO;  Service: Endoscopy;  Laterality: N/A;    COLONOSCOPY N/A 1/10/2020    Procedure: COLONOSCOPY;  Surgeon: Familia Gonzalez MD;  Location: [facility] ENDO;  Service: General;  Laterality: N/A;    COLONOSCOPY N/A 3/24/2021    Procedure: COLONOSCOPY;  Surgeon: Familia  ROB Gonzalez MD;  Location: [facility];  Service: General;  Laterality: N/A;    ESOPHAGOGASTRODUODENOSCOPY N/A 9/3/2019    Procedure: ESOPHAGOGASTRODUODENOSCOPY (EGD);  Surgeon: Isai Centeno MD;  Location: [facility] ENDO;  Service: Endoscopy;  Laterality: N/A;    EXAMINATION UNDER ANESTHESIA N/A 11/10/2022    Procedure: EXAM UNDER ANESTHESIA;  Surgeon: Familia Gonzalez MD;  Location: [facility] OR;  Service: General;  Laterality: N/A;    EXCISION OF MEDIAL MENISCUS OF KNEE Right 5/5/2021    Procedure: MENISCECTOMY, KNEE, MEDIAL;  Surgeon: Delonte Mcintyre MD;  Location: [facility];  Service: Orthopedics;  Laterality: Right;  partial Lateral    FLEXIBLE SIGMOIDOSCOPY  2/4/2020    Procedure: SIGMOIDOSCOPY, FLEXIBLE;  Surgeon: Familia Gonzalez MD;  Location: [facility];  Service: General;;    ILEOSTOMY Right 2/4/2020    Procedure: CREATION, ILEOSTOMY;  Surgeon: Familia Gonzalez MD;  Location: [facility];  Service: General;  Laterality: Right;    ILEOSTOMY CLOSURE N/A 8/4/2020    Procedure: CLOSURE, ILEOSTOMY;  Surgeon: Familia Gonzalez MD;  Location: [facility];  Service: General;  Laterality: N/A;    INCISION AND DRAINAGE OF ABSCESS Bilateral 11/10/2022    Procedure: INCISION AND DRAINAGE, ABSCESS;  Surgeon: Familia Gonzalez MD;  Location: [facility] OR;  Service: General;  Laterality: Bilateral;  groin abscess    INCISION AND DRAINAGE OF BACK Left 8/5/2020    Procedure: INCISION AND DRAINAGE, BACK;  Surgeon: Familia Gonzalez MD;  Location: [facility] OR;  Service: General;  Laterality: Left;    INCISION AND DRAINAGE OF PERIRECTAL REGION N/A 11/10/2022    Procedure: INCISION AND DRAINAGE, PERIRECTAL REGION;  Surgeon: Familia Gonzalez MD;  Location: [facility] OR;  Service: General;  Laterality: N/A;    INCISION OF PERIANAL ABSCESS N/A 11/10/2022    Procedure: INCISION, ABSCESS, PERIANAL;  Surgeon: Familia Gonzalez MD;  Location: [facility];  Service: General;  Laterality: N/A;    INJECTION OF ANESTHETIC AGENT AROUND PUDENDAL NERVE N/A  11/10/2022    Procedure: BLOCK, NERVE, PUDENDAL;  Surgeon: Familia Gonzalez MD;  Location: [facility] OR;  Service: General;  Laterality: N/A;    INJECTION OF ANESTHETIC AGENT INTO TISSUE PLANE DEFINED BY TRANSVERSUS ABDOMINIS MUSCLE N/A 2/4/2020    Procedure: BLOCK, TRANSVERSUS ABDOMINIS PLANE;  Surgeon: Familia Gonzalez MD;  Location: [facility] OR;  Service: General;  Laterality: N/A;    INSERTION OF TUNNELED CENTRAL VENOUS CATHETER (CVC) WITH SUBCUTANEOUS PORT Right 2/4/2020    Procedure: INSERTION, PORT-A-CATH;  Surgeon: Familia Gonzalez MD;  Location: [facility] OR;  Service: General;  Laterality: Right;    INSERTION OF TUNNELED CENTRAL VENOUS CATHETER (CVC) WITH SUBCUTANEOUS PORT N/A 8/16/2022    Procedure: [id number];  Surgeon: Familia Gonzalez MD;  Location: [facility];  Service: General;  Laterality: N/A;  right subclavian    JOINT REPLACEMENT Left 2009    Hip    KNEE ARTHROSCOPY W/ PLICA EXCISION Right 5/5/2021    Procedure: EXCISION, PLICA, KNEE, ARTHROSCOPIC;  Surgeon: Delonte Mcintyre MD;  Location: [facility] OR;  Service: Orthopedics;  Laterality: Right;    MOBILIZATION OF SPLENIC FLEXURE  2/4/2020    Procedure: MOBILIZATION, SPLENIC FLEXURE;  Surgeon: Familia Gonzalez MD;  Location: [facility];  Service: General;;    REMOVAL OF HARDWARE FROM HIP Left 1/4/2022    Procedure: REMOVAL, HARDWARE, HIP;  Surgeon: Delonte Mcintyre MD;  Location: [facility];  Service: Orthopedics;  Laterality: Left;     Family History   Problem Relation Age of Onset    Cataracts Mother     Stroke Father     Hypertension Father      Social History     Tobacco Use    Smoking status: Never    Smokeless tobacco: Never   Substance Use Topics    Alcohol use: Yes    Drug use: No      Review of Systems:  Review of Systems   Constitutional:  Positive for activity change, appetite change, fatigue and unexpected weight change. Negative for chills and fever.   HENT:  Negative for congestion, ear pain, sore throat and trouble swallowing.     Eyes:  Negative for pain, redness and itching.   Respiratory:  Negative for cough, shortness of breath and wheezing.    Cardiovascular:  Negative for chest pain, palpitations and leg swelling.   Gastrointestinal:  Negative for abdominal distention, abdominal pain, anal bleeding, blood in stool, constipation, diarrhea, nausea, rectal pain and vomiting.   Endocrine: Negative for cold intolerance, heat intolerance and polyuria.   Genitourinary:  Negative for dysuria, flank pain, frequency and hematuria.   Musculoskeletal:  Negative for gait problem, joint swelling and neck pain.   Skin:  Negative for color change, rash and wound.   Allergic/Immunologic: Negative for environmental allergies and immunocompromised state.   Neurological:  Negative for dizziness, speech difficulty, weakness and numbness.   Psychiatric/Behavioral:  Negative for agitation, confusion and hallucinations.      OBJECTIVE:     Vital Signs (Most Recent)  Temp: 97.9 °F (36.6 °C) (12/19/22 0816)  Pulse: 102 (12/19/22 0816)  BP: (!) 149/110 (12/19/22 0816)     49.9 kg (110 lb)     Physical Exam:  Physical Exam  Exam conducted with a chaperone present.   Constitutional:       Appearance: He is well-developed.   HENT:      Head: Normocephalic and atraumatic.   Eyes:      Conjunctiva/sclera: Conjunctivae normal.   Neck:      Thyroid: No thyromegaly.   Cardiovascular:      Rate and Rhythm: Normal rate and regular rhythm.   Pulmonary:      Effort: Pulmonary effort is normal. No respiratory distress.   Abdominal:      Comments: Soft, nondistended, nontender; reducible umiblical and previous RLQ ileostomy site hernias; ileostomy site hernia; well-healed incision   Genitourinary:     Comments: Anorectal:  Multiple Penrose drains in place without active purulence, no further induration or fluctuance, tender at the insertion sites (removed at bedside today)  Musculoskeletal:         General: No tenderness. Normal range of motion.      Cervical back: Normal  range of motion.   Skin:     General: Skin is warm and dry.      Capillary Refill: Capillary refill takes less than 2 seconds.      Findings: No rash.   Neurological:      Mental Status: He is alert and oriented to person, place, and time.   Psychiatric:         Behavior: Behavior normal.     Laboratory  Lab Results   Component Value Date    WBC 6.16 11/12/2022    HGB 9.1 (L) 11/12/2022    HCT 28.8 (L) 11/12/2022     11/12/2022    CHOL 180 03/16/2020    TRIG 43 03/16/2020    HDL 81 (H) 03/16/2020    ALT 7 (L) 11/11/2022    AST 10 11/11/2022     11/12/2022    K 3.4 (L) 11/12/2022    CL 98 11/12/2022    CREATININE 0.4 (L) 11/12/2022    BUN 2 (L) 11/12/2022    CO2 28 11/12/2022    TSH 0.917 11/06/2022    PSA 1.4 03/16/2020    INR 1.0 07/20/2022    HGBA1C 5.7 (H) 06/08/2020     Component Ref Range & Units 1 mo ago   (6/13/22) 1 yr ago   (2/22/21) 1 yr ago   (9/1/20) 2 yr ago   (7/13/20) 2 yr ago   (6/22/20) 2 yr ago   (5/4/20) 2 yr ago   (1/27/20)   CEA 0.0 - 5.0 ng/mL 159.9 High   3.0 CM  2.7 CM  6.0 High  CM  5.7 High  CM  4.2 CM  4.5 CM          Diagnostic Results:  Reviewed colonoscopy report and images with rectal mass appreciated      PET CT:  FINDINGS:  Head and neck: Physiologic uptake in the visualized brain parenchyma.  There are no FDG avid cervical lymph nodes.     Chest: Interval development of new bilateral pulmonary nodules.  For example in the right lower lobe series 3, image 158 there is a 4 mm nodule more superiorly in the peripheral right lower lobe on image 156 is a 3-4 mm juxtapleural nodule.  These nodules are not sufficiently FDG avid but fall below the resolution for PET-CT.  Largest nodules is in the left upper lobe on image 146 and measures 7 mm.  This demonstrates max SUV of 1.5, above background parenchyma.     There is unchanged scarring or atelectasis at the left lung base.  Previously described 13 mm ground-glass attenuation in the prior PET-CT in the lateral left upper lobe  is no longer well visualized with mild suspected scarring in this region.  No significant uptake in this region.  No detrimental change in ground-glass density in the right midlung adjacent to the major fissure.  Bilateral patchy ground-glass opacity seen on CT from June 2020 have essentially resolved. No FDG avid axillary mediastinal or hilar adenopathy.     Abdomen/pelvis: New multifocal areas of uptake in the liver are seen.  Largest areas in the inferior right hepatic lobe image 240 axial fused sequence with ill-defined hypodense lesion seen in this area measuring up to 8.7 cm.  Max SUV measures 15.0.  Focus of uptake in the lateral hepatic segment image 219 is seen with max SUV of 13.  Additional focus of uptake on image 206 with max SUV of 9.0.  Hepatic dome lesion uptake with max SUV of 7.0.  No abnormal uptake in the spleen or pancreas or the adrenals.  Postoperative changes of the rectum with primary anastomosis.  On axial fused image 346 there is FDG avid focus with SUV max of 3.4 adjacent to a suture and appears to extend off the posterior right aspect of the rectum into the adjacent soft tissues.  Presacral postsurgical collection with adjacent fat stranding/soft tissue thickening is noted, unchanged.  No significant FDG uptake in this area is seen.     No FDG avid lymphadenopathy.     Bones: Degenerative changes of the spine with osteopenia.  Previously seen left hip orthopedic hardware is no longer visualized.  Chronic appearing fracture deformity the proximal left femur is seen.  No FDG avid osseous lesions.     Miscellaneous: Nonspecific focal uptake in the right hemiscrotum with max SUV of 5.4.  Correlation with nonemergent ultrasound could be performed for further evaluation.  Sebaceous cyst in the posterior left upper back.     Impression:     New bilateral pulmonary nodules and FDG avid multifocal hepatic lesions concerning for metastatic disease.  Small focus of uptake along the posterior right  aspect of the rectum in the region of a suture line raising concern for local recurrence as well.  Nonspecific FDG uptake in the right hemiscrotum.  Correlation with nonemergent ultrasound could be performed for further evaluation.  Additional findings as above      PATHOLOGY:  LIVER MASS, BIOPSY:   Adenocarcinoma consistent with colorectal origin.    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 who is now s/p long-course neoadj chemoXRT which completed on 12/4/19 with partial treatment response on repeat MRI and no evidence of metastatic disease on repeat CT Abd/pelvis who is now s/p robotic ultra-low anterior resection, DLI and mediport placement on 2/4/2020 who has recovered well postop with final path showing T3N0 who has now completed adjuvant chemotx in late June 2020 and is now s/p DLI closure on 8/4/2020 with unfortunate metastatic disease in the liver and lung now s/p perirectal/perineal/inguinal I&D on 11/10/22    - no further surgical intervention required at this time  - penrose drains removed at bedside today.  Discussed with the patient the importance of watching the area to ensure there was no recurrence of this.  - will defer further chemotx to [facility]  - RTC PRN    Familia Gonzalez MD  Colon and Rectal Surgery  [facility]

## 2022-12-19 NOTE — PROGRESS NOTES
Subjective:      DATE OF VISIT: 12/19/22     ?  Patient ID:?Vincent Benton is a 67 y.o. male.?? MR#: [id number]   ?   ? Primary Care Providers:  Shakila Moran DO, DO (General)     CHIEF COMPLAINT: ?? start palliative chemotherapy for recurrent metastatic rectal adenocarcinoma??   ?   ONCOLOGIC DIAGNOSIS:  rectal adenocarcinoma stage IV; initial stage III 2020  ?   CURRENT TREATMENT:  FOLFIRI plus bevacizumab on hold, see below    PAST TREATMENT:   Neoadjuvant chemoradiation with capecitabine, adjuvant FOLFOX x7, 2020  ?   ONCOLOGIC HISTORY:   ?   Oncology History   Rectal cancer   9/11/2019 Initial Diagnosis    Rectal cancer     9/18/2019 Tumor Conference    Multidisciplinary Rectal Cancer Conference - Evaluation and Recommendation Summary    9/17/2019  Vincent Benton  [id number]  63 y.o. male    1. Evaluation    C scope 9/3/19: Obstructing Rectal mass that couldn't be crossed.     Rigid Procto 9/3/19: Left and anterior lesion at 9-10 cm.     Path: Invasive adenocarcinoma, no LVI.     MRI date: 9/9/2019    Tumor Location in Rectum: middle third     Size: 3.6x2.7 cm mass    Nodes: 2 suspected nodes.     Indication of Sphincter Involvement:  Uninvolved    Pretreatment Circumferential Resection Margin (CRM) status:  Not Threatened    Pretreatment (clinical) AJCC Stage:III B vs  IV ?  Stage I  [] I: T1N0M0  [] I: T2N0M0  Stage II  [] IIA: T3N0M0  [] IIB N6qV3L9  [] IIC: E2lA9C0  Stage III  [] IIIA: T1-2N1M0  [] IIIA: C3U3xG1  [] IIIB: T3-L2cF7F5  [x] IIIB: T2-3N2aM0  [] IIIB: T1-2N2bM0  [] IIIC: W5pL2wP4  [] IIIC: T3-6rM8tI7  [] IIIC: I3xX2-1D6   Stage IV  [x] IV: D6-2Y8-3P1l-b    CEA level:     Lab Results   Component Value Date    CEA 3.5 09/03/2019       2. Treatment Recommendation    Neoadjuvant Therapy Recommendation:     Short course radiation and Chemotherapy.     PLAN:    MRI and PET if not able to obtain PET will proceed with biopsy of the lung biopsy.     Colonoscopy during Chemotherapy to rule out  any other lesions.     Anticipated date and type of surgical procedure:     LAR after    Clinical research study eligibility and/or enrollment: Not eligible     10/8/2019 Cancer Staged    Staging form: Colon and Rectum, AJCC 8th Edition  - Clinical stage from 10/8/2019: Stage IIIB (cT3, cN2a, cM0)       10/15/2019 - 10/15/2019 Chemotherapy    Treatment Summary   Plan Name: OP CAPECITABINE 5 DAYS + RADIOTHERAPY  Treatment Goal: Curative  Status: Inactive  Start Date: [No treatment day found]  End Date: [No treatment day found]  Provider: Mikel Sams MD  Chemotherapy: [No matching medication found in this treatment plan]       10/24/2019 - 12/4/2019 Radiation Therapy    Treatment Site Ref. ID Energy Dose/Fx (Gy) #Fx Dose Correction (Gy) Total Dose (Gy) Start Date End Date Elapsed Days   Pelvis Pelvis 6X 1.8 28 / 28 0 50.4 10/24/2019 12/4/2019 41          3/2/2020 - 7/1/2020 Chemotherapy    Treatment Summary   Plan Name: OP FOLFOX 6 Q2W  Treatment Goal: Curative  Status: Inactive  Start Date: 3/2/2020  End Date: 7/1/2020  Provider: Mikel Sams MD  Chemotherapy: fluorouracil injection 710 mg, 400 mg/m2 = 710 mg, Intravenous, Clinic/[facility] 1 time, 8 of 12 cycles  Administration: 710 mg (3/2/2020), 710 mg (3/16/2020), 710 mg (4/6/2020), 710 mg (4/20/2020), 710 mg (5/25/2020), 710 mg (5/12/2020), 710 mg (6/8/2020), 710 mg (6/29/2020)  fluorouracil (ADRUCIL) 2,400 mg/m2 = 4,270 mg in sodium chloride 0.9% 101.2 mL chemo infusion, 2,400 mg/m2 = 4,270 mg, Intravenous, Over 46 hours, 8 of 12 cycles  Administration: 4,270 mg (3/2/2020), 4,270 mg (3/16/2020), 4,270 mg (4/6/2020), 4,270 mg (4/20/2020), 4,270 mg (5/25/2020), 4,270 mg (5/12/2020), 4,270 mg (6/8/2020), 4,270 mg (6/29/2020)  leucovorin calcium 400 mg/m2 = 710 mg in dextrose 5 % 285.5 mL infusion, 400 mg/m2 = 710 mg, Intravenous, Clinic/[facility] 1 time, 8 of 12 cycles  Administration: 710 mg (3/2/2020), 710 mg (3/16/2020), 700 mg (4/6/2020), 710 mg (4/20/2020),  710 mg (5/25/2020), 710 mg (5/12/2020), 710 mg (6/8/2020), 700 mg (6/29/2020)  oxaliplatin (ELOXATIN) 85 mg/m2 = 151 mg in dextrose 5 % 530.2 mL chemo infusion, 85 mg/m2 = 151 mg, Intravenous, [facility] 1 time, 8 of 12 cycles  Administration: 151 mg (3/2/2020), 151 mg (3/16/2020), 150 mg (4/6/2020), 151 mg (4/20/2020), 151 mg (5/25/2020), 151 mg (5/12/2020), 151 mg (6/8/2020), 151 mg (6/29/2020)       8/30/2022 -  Chemotherapy    Treatment Summary   Plan Name: OP COLORECTAL FOLFIRI + BEVACIZUMAB Q2W  Treatment Goal: Palliative  Status: Active  Start Date: 8/30/2022  End Date: 8/17/2023 (Planned)  Provider: Mikel Sams MD  Chemotherapy: fluorouraciL injection 715 mg, 400 mg/m2 = 715 mg, Intravenous, [facility] 1 time, 5 of 26 cycles  Administration: 715 mg (8/30/2022), 715 mg (9/13/2022), 715 mg (9/27/2022), 715 mg (10/11/2022), 670 mg (10/25/2022)  fluorouracil (ADRUCIL) 2,400 mg/m2 = 4,295 mg in sodium chloride 0.9% 100 mL chemo infusion, 2,400 mg/m2 = 4,295 mg, Intravenous, Over 46 hours, 4 of 25 cycles  Administration: 4,295 mg (9/13/2022), 4,295 mg (9/27/2022), 4,295 mg (10/11/2022), 4,000 mg (10/25/2022)  irinotecan (CAMPTOSAR) 180 mg/m2 = 322 mg in sodium chloride 0.9% 516.1 mL chemo infusion, 180 mg/m2 = 322 mg, Intravenous, Clinic/[facility] 1 time, 5 of 26 cycles  Administration: 322 mg (8/30/2022), 322 mg (9/13/2022), 322 mg (9/27/2022), 322 mg (10/11/2022), 300 mg (10/25/2022)  bevacizumab-bvzr 300 mg in sodium chloride 0.9% 100 mL infusion, 326 mg, Intravenous, Clinic/[facility] 1 time, 5 of 26 cycles  Administration: 300 mg (8/30/2022), 300 mg (9/13/2022), 300 mg (9/27/2022), 300 mg (10/11/2022), 300 mg (10/25/2022)          Cancer Staging   Rectal cancer  - Clinical stage from 10/8/2019: Stage IIIB (cT3, cN2a, cM0) - Signed by Steven Valles II, MD on 10/8/2019      History of rectal cancer status post chemoradiation with capecitabine low anterior resection ypT3 N0 7 cycles adjuvant FOLFOX per prior notes.   He had been in surveillance with restaging imaging June 2022 showing new pulmonary nodules biopsy proven recurrent metastatic disease started on FOLFIRI and Avastin; molecular studies positive for mutation NRAS.    HPI    November 2022 interval hospitalization for mucositis and perineal abscess drains in place.  He has been working with wound care at home.  Palliative Care has been following as well assisting with pain management.  Significant difficulty with PO intake despite improvement in mucositis.  Over 40 lb weight loss without significant improvement.    Review of Systems    ?   A comprehensive 14-point review of systems was reviewed with patient and was negative other than as specified above.   ?   Objective:      Physical Exam      ?   Vitals:    12/19/22 0732   BP: (!) 144/88   Pulse: 106   Temp: 97.5 °F (36.4 °C)        ?   ECOG: 3    General appearance: Generally well appearing, in no acute distress, cachectic.   Head, eyes, ears, nose, and throat: moist mucous membranes.   Respiratory:  Normal work of breathing  Abdomen: nontender, nondistended.   Extremities: Warm, without edema.   Neurologic: Alert and oriented. Grossly normal strength, coordination, and gait.   Skin: No rashes, ecchymoses or petechial lesion.   Psychiatric:  Normal mood and affect.    Laboratory:  ?   Lab Visit on 12/19/2022   Component Date Value Ref Range Status    WBC 12/19/2022 11.93  3.90 - 12.70 K/uL Final    RBC 12/19/2022 3.88 (L)  4.60 - 6.20 M/uL Final    Hemoglobin 12/19/2022 11.0 (L)  14.0 - 18.0 g/dL Final    Hematocrit 12/19/2022 34.2 (L)  40.0 - 54.0 % Final    MCV 12/19/2022 88  82 - 98 fL Final    MCH 12/19/2022 28.4  27.0 - 31.0 pg Final    MCHC 12/19/2022 32.2  32.0 - 36.0 g/dL Final    RDW 12/19/2022 18.9 (H)  11.5 - 14.5 % Final    Platelets 12/19/2022 600 (H)  150 - 450 K/uL Final    MPV 12/19/2022 8.7 (L)  9.2 - 12.9 fL Final    Immature Granulocytes 12/19/2022 0.6 (H)  0.0 - 0.5 % Final    Gran # (ANC)  12/19/2022 9.6 (H)  1.8 - 7.7 K/uL Final    Immature Grans (Abs) 12/19/2022 0.07 (H)  0.00 - 0.04 K/uL Final    Lymph # 12/19/2022 1.1  1.0 - 4.8 K/uL Final    Mono # 12/19/2022 1.1 (H)  0.3 - 1.0 K/uL Final    Eos # 12/19/2022 0.0  0.0 - 0.5 K/uL Final    Baso # 12/19/2022 0.05  0.00 - 0.20 K/uL Final    nRBC 12/19/2022 0  0 /100 WBC Final    Gran % 12/19/2022 80.4 (H)  38.0 - 73.0 % Final    Lymph % 12/19/2022 9.2 (L)  18.0 - 48.0 % Final    Mono % 12/19/2022 9.1  4.0 - 15.0 % Final    Eosinophil % 12/19/2022 0.3  0.0 - 8.0 % Final    Basophil % 12/19/2022 0.4  0.0 - 1.9 % Final    Differential Method 12/19/2022 Automated   Final    Sodium 12/19/2022 133 (L)  136 - 145 mmol/L Final    Potassium 12/19/2022 4.4  3.5 - 5.1 mmol/L Final    Chloride 12/19/2022 91 (L)  95 - 110 mmol/L Final    CO2 12/19/2022 28  23 - 29 mmol/L Final    Glucose 12/19/2022 121 (H)  70 - 110 mg/dL Final    BUN 12/19/2022 6 (L)  8 - 23 mg/dL Final    Creatinine 12/19/2022 0.7  0.5 - 1.4 mg/dL Final    Calcium 12/19/2022 10.4  8.7 - 10.5 mg/dL Final    Total Protein 12/19/2022 7.5  6.0 - 8.4 g/dL Final    Albumin 12/19/2022 2.8 (L)  3.5 - 5.2 g/dL Final    Total Bilirubin 12/19/2022 0.4  0.1 - 1.0 mg/dL Final    Alkaline Phosphatase 12/19/2022 235 (H)  55 - 135 U/L Final    AST 12/19/2022 33  10 - 40 U/L Final    ALT 12/19/2022 16  10 - 44 U/L Final    Anion Gap 12/19/2022 14  8 - 16 mmol/L Final    eGFR 12/19/2022 >60  >60 mL/min/1.73 m^2 Final      ?   Lab Results   Component Value Date    .3 (H) 09/19/2022       ?   Imaging:  ?    Results for orders placed or performed during the hospital encounter of 11/08/22 (from the past 2160 hour(s))   CT Abdomen Pelvis  Without Contrast    Impression    Relatively thick walled fluid collection in the pre sacral space situated between the rectosigmoid colon and the distal sacrum measuring 18 x 40 x 45 mm suggestive of abscess.    In the Annalisa - buttock region adjacent to the left gluteal cleft  there is a slight nodular appearance in the subcutaneous region measuring up to 12-14 mm may relate to a boil or sebaceous cyst.  Attention on follow-up and correlate to physical exam    Heterogeneous reticular alveolar opacity in the left posterior lung base consistent with pneumonia.    Postsurgical changes of the bowel    Senescent changes not otherwise specified    Chronic deformity of the left hip status post prosthesis removal    Atherosclerotic disease    All CT scans   are performed using dose optimization techniques including the following: automated exposure control; adjustment of the mA and/or kV; use of iterative reconstruction technique.  Dose modulation was employed for ALARA by means of: Automated exposure control; adjustment of the mA and/or kV according to patient size (this includes techniques or standardized protocols for targeted exams where dose is matched to indication/reason for exam; i.e. extremities or head); and/or use of iterative reconstructive technique.      Electronically signed by: Sea Reis  Date:    11/08/2022  Time:    21:02   CTA Chest Non-Coronary (PE Studies)    Impression    No pulmonary embolism.  No dissection.    Patchy areas of nodular opacities throughout the lung upper lung zone with more reticular subpleural consolidation in the bilateral lower lobes left greater than right consistent with multifocal pneumonia.    Remaining findings as above    All CT scans   are performed using dose optimization techniques including the following: automated exposure control; adjustment of the mA and/or kV; use of iterative reconstruction technique.  Dose modulation was employed for ALARA by means of: Automated exposure control; adjustment of the mA and/or kV according to patient size (this includes techniques or standardized protocols for targeted exams where dose is matched to indication/reason for exam; i.e. extremities or head); and/or use of iterative reconstructive  technique.      Electronically signed by: Sea Reis  Date:    11/08/2022  Time:    23:05     No results found for this or any previous visit (from the past 2160 hour(s)).    No results found for this or any previous visit (from the past 2160 hour(s)).        Pathology:      Reviewed in epic     ?   Assessment/Plan:   Rectal cancer  -     Iron and TIBC; Future; Expected date: 12/19/2022  -     Ferritin; Future; Expected date: 12/19/2022  -     CBC Auto Differential; Future; Expected date: 12/19/2022  -     Comprehensive Metabolic Panel; Future; Expected date: 12/19/2022  -     Ambulatory referral/consult to Oncology Social Work; Future; Expected date: 12/26/2022  -     Ambulatory referral/consult to Hospice; Future; Expected date: 12/26/2022    Nausea    Mucositis    Rectal abscess         1. Rectal cancer    2. Nausea    3. Mucositis    4. Rectal abscess              Plan:     Problem List Items Addressed This Visit          Oncology    Rectal cancer - Primary     Other Visit Diagnoses       Nausea        Mucositis        Rectal abscess                  Recurrent metastatic stage IV rectal adenocarcinoma, metastatic to lung and liver, biopsy proven  Metastatic disease in liver, NRAS mutation positive colon primary oncologist Dr. Sams plans for FOLFIRI and bevacizumab (received initial diagnosis 2020 stage III rectal adenocarcinoma status post neoadjuvant capecitabine based chemoradiation and adjuvant FOLFOX).  cycle 1 day 1 08/30/2022  Unfortunately prolonged admission with significant grade 4 toxicity mucositis and perineal abscess.  Given significant functional decline patient understands risks of additional therapy and has opted no further chemotherapy and hospice which is reasonable given his current clinical situation decline.  Will reassess iron indices and replete if indicated this may give him more energy.  Will may colorectal surgery palliative care and social work aware of patient wishes hospice  referral placed.      Iron deficiency anemia:  Will reassess iron indices and IV iron therapy if indicated    Follow-Up:   Route Chart for Scheduling    Med Onc Chart Routing      Follow up with physician No follow up needed.   Follow up with [facility]    Infusion scheduling note no further chemp; referral to hospice   Injection scheduling note    Labs    Imaging    Pharmacy appointment    Other referrals        Treatment Plan Information   OP COLORECTAL FOLFIRI + BEVACIZUMAB Q2W   Mikel Sams MD   Upcoming Treatment Dates - OP COLORECTAL FOLFIRI + BEVACIZUMAB Q2W    11/8/2022       Chemotherapy       bevacizumab-bvzr 287 mg in sodium chloride 0.9% 111.48 mL infusion       irinotecan (CAMPTOSAR) 180 mg/m2 = 302 mg in sodium chloride 0.9% 500 mL chemo infusion       leucovorin calcium 400 mg/m2 = 670 mg in sodium chloride 0.9% 250 mL infusion       fluorouraciL injection 670 mg       fluorouracil (ADRUCIL) 2,400 mg/m2 = 4,030 mg in sodium chloride 0.9% 180.6 mL chemo infusion       Supportive Care       atropine injection 0.4 mg       Antiemetics       palonosetron (ALOXI) 0.25 mg with Dexamethasone (DECADRON) 12 mg in NS 50 mL IVPB  11/22/2022       Chemotherapy       bevacizumab-bvzr 287 mg in sodium chloride 0.9% 111.48 mL infusion       irinotecan (CAMPTOSAR) 180 mg/m2 = 302 mg in sodium chloride 0.9% 500 mL chemo infusion       leucovorin calcium 400 mg/m2 = 670 mg in sodium chloride 0.9% 250 mL infusion       fluorouraciL injection 670 mg       fluorouracil (ADRUCIL) 2,400 mg/m2 = 4,030 mg in sodium chloride 0.9% 180.6 mL chemo infusion       Supportive Care       atropine injection 0.4 mg       Antiemetics       palonosetron (ALOXI) 0.25 mg with Dexamethasone (DECADRON) 12 mg in NS 50 mL IVPB  12/6/2022       Chemotherapy       bevacizumab-bvzr 287 mg in sodium chloride 0.9% 111.48 mL infusion       irinotecan (CAMPTOSAR) 180 mg/m2 = 302 mg in sodium chloride 0.9% 500 mL chemo infusion       leucovorin  calcium 400 mg/m2 = 670 mg in sodium chloride 0.9% 250 mL infusion       fluorouraciL injection 670 mg       fluorouracil (ADRUCIL) 2,400 mg/m2 = 4,030 mg in sodium chloride 0.9% 180.6 mL chemo infusion       Supportive Care       atropine injection 0.4 mg       Antiemetics       palonosetron (ALOXI) 0.25 mg with Dexamethasone (DECADRON) 12 mg in NS 50 mL IVPB  12/20/2022       Chemotherapy       bevacizumab-bvzr 287 mg in sodium chloride 0.9% 111.48 mL infusion       irinotecan (CAMPTOSAR) 180 mg/m2 = 302 mg in sodium chloride 0.9% 500 mL chemo infusion       leucovorin calcium 400 mg/m2 = 670 mg in sodium chloride 0.9% 250 mL infusion       fluorouraciL injection 670 mg       fluorouracil (ADRUCIL) 2,400 mg/m2 = 4,030 mg in sodium chloride 0.9% 180.6 mL chemo infusion       Supportive Care       atropine injection 0.4 mg       Antiemetics       palonosetron (ALOXI) 0.25 mg with Dexamethasone (DECADRON) 12 mg in NS 50 mL IVPB    Therapy Plan Information  Medications  iron sucrose (VENOFER) 100 mg in sodium chloride 0.9% 100 mL IVPB  100 mg, Intravenous, 1 time a week  Flushes  sodium chloride 0.9% 250 mL flush bag  Intravenous, 1 time a week  sodium chloride 0.9% flush 10 mL  10 mL, Intravenous, 1 time a week  heparin, porcine (PF) 100 unit/mL injection flush 500 Units  500 Units, Intravenous, 1 time a week  alteplase injection 2 mg  2 mg, Intra-Catheter, 1 time a week

## 2022-12-19 NOTE — TELEPHONE ENCOUNTER
SWER called patient today due to SWER being consulted to discuss hospice. SWER did not receive an answer and was unable to leave a VM at this time. SWER will reach out to patient's sister.

## 2022-12-19 NOTE — PROGRESS NOTES
Pt returned SWER's call on today. Pt reports MD discussed hospice with him on today. Pt wants informational from hospice company. Pt reports he is still living with sister so liaison will have to come to [place]. SWER will fax MD's order and clinicals to [facility] at [phone]. SWER will remain available.

## 2022-12-20 LAB
FERRITIN SERPL-MCNC: 1030 NG/ML (ref 20–300)
IRON SERPL-MCNC: 28 UG/DL (ref 45–160)
SATURATED IRON: 17 % (ref 20–50)
TOTAL IRON BINDING CAPACITY: 166 UG/DL (ref 250–450)
TRANSFERRIN SERPL-MCNC: 112 MG/DL (ref 200–375)

## 2023-01-09 ENCOUNTER — DOCUMENTATION ONLY (OUTPATIENT)
Dept: HEMATOLOGY/ONCOLOGY | Facility: CLINIC | Age: 68
End: 2023-01-09
Payer: MEDICARE

## 2023-01-09 NOTE — PROGRESS NOTES
[facility] staff called and reports patient has passed away. SWER will inform medical team.

## 2023-06-16 ENCOUNTER — DOCUMENT SCAN (OUTPATIENT)
Dept: HOME HEALTH SERVICES | Facility: HOSPITAL | Age: 68
End: 2023-06-16
Payer: MEDICARE

## 2024-09-30 NOTE — ASSESSMENT & PLAN NOTE
- s/p robotic resection with ileostomy construction and MediPort insertion on 2/4.  - Routine post-op care per primary team.  - On continuous IV fluids.  - Analgesics as needed  -antiemetics as needed    Date of Discharge:  9/29/2024    Discharge Diagnosis:   Acute thoracic back pain, intractable with failure of outpatient treatment  Acute R flank pain T11/T12  Acute L1 back pain  Osteoporotic compression fracture  History of kyphoplasty  Osteoporosis  Pulmonary HTN  History of nephrectomy  Panlobular COPD with emphysema  Chronic mucopurulent bronchitis  DMII with renal manifestations   DMII with neuropathic manifestations  Diabetic dyslipidemia  GERD with esophagitis  Myofascia pain syndrome  CKDIV  HH  HTN with CKDIV  Chronic constipation  Nicotine dependency with nicotine use disorder, cigarettes  DDD L/S  Endometrial CA HX with ureteral involvement  Constipation       Hospital Course    The patient is a 82 y.o. female who was direct admit with 2 week HX intractable thoracic pain.  2 week history pain thoracic spine at T11 with radiations to R flank. Pain refractory to intervention. Plain films found osteoporotic changes but no Fx. Decompensation of pain with immobility secondary to discomfort with need to directly admit for appropriate intervention. No BRBPR, Hematuria, Exanthem, hemoptysis, LYNN, F, C, N, V, D   Pt was admitted for pain control.  MRI with stable chronic anterior wedging compression fracture at T6 with kyphoplasty.  No acute changes. Multiple levels with DDD.  GI was consulted as her pain localized to her right flank, RLQ.  Pt with h/o chronic abd pain and multiple previous abd surgeries.  status post of a right nephrectomy secondary to cancer got admitted again with the right-sided abdominal pain right flank pain around the right upper quadrant area radiating to the lower part. She described her pain sharp. Unable to identify any aggravating or relieving factor. She had undergone EGD colonoscopy in the past the last upper endoscopy showed a hiatal hernia and esophagus was dilated. GI Suspected that the patient's right sided and lower abdominal pain and flank pain multifactorial may be related to  either adhesion from her previous nephrectomy surgery versus radiculopathy from her back pain. Her colonoscopy lamination was also in the past negative imaging has been unremarkable. She was started on Elavil while continuing on bowel regimen. She will follow-up with the GI clinic as outpatient.  Pt did have some improvement of her pain and began sleeping better with the elavil.  She will be d/c to home and f/u with her PCP and GI.  Of note, pt did have a UA with a culture revealing 50,000 of E coli.  Pt is without sx, so she will not be treated.  Follow for any developing sx    Procedures Performed         Consults:   Consults       Date and Time Order Name Status Description    9/27/2024  8:33 AM Inpatient Gastroenterology Consult Completed     9/25/2024  9:39 AM Inpatient General Surgery Consult Completed             Pertinent Test Results:    Lab Results (most recent)       Procedure Component Value Units Date/Time    POC Glucose 4x Daily Before Meals & at Bedtime [[id number]]  (Abnormal) Collected: 09/29/24 1132    Specimen: Blood Updated: 09/29/24 1135     Glucose 150 mg/dL      Comment: Serial Number: [id number]:  [id number]       POC Glucose 4x Daily Before Meals & at Bedtime [[id number]]  (Abnormal) Collected: 09/29/24 0714    Specimen: Blood Updated: 09/29/24 0716     Glucose 120 mg/dL      Comment: Serial Number: [id number]:  [id number]       Urine Culture - Urine, Urine, Clean Catch [[id number]]  (Abnormal)  (Susceptibility) Collected: 09/26/24 1852    Specimen: Urine, Clean Catch Updated: 09/28/24 1221     Urine Culture 50,000 CFU/mL Escherichia coli ESBL    Narrative:      Colonization of the urinary tract without infection is common. Treatment is discouraged unless the patient is symptomatic, pregnant, or undergoing an invasive urologic procedure.  Recent outcomes data supports the use of pip/tazo in the treatment of susceptible ESBL infections for uncomplicated UTI. Consider use of pip/tazo  as a carbapenem-sparing regimen in applicable patients.    Susceptibility        Escherichia coli ESBL      DEON      Ertapenem Susceptible      Gentamicin Susceptible      Levofloxacin Resistant      Meropenem Susceptible      Nitrofurantoin Susceptible      Piperacillin + Tazobactam Susceptible      Trimethoprim + Sulfamethoxazole Resistant                           Urinalysis, Microscopic Only - Urine, Clean Catch [[id number]]  (Abnormal) Collected: 09/26/24 1852    Specimen: Urine, Clean Catch Updated: 09/26/24 1955     RBC, UA 0-2 /HPF      WBC, UA 11-20 /HPF      Bacteria, UA Trace /HPF      Squamous Epithelial Cells, UA 3-6 /HPF      Transitional Epithelial Cells, UA 0-2 /HPF      Renal Epithelial Cells, UA 0-2 /HPF      Hyaline Casts, UA None Seen /LPF      Methodology Manual Light Microscopy    Urinalysis With Culture If Indicated - Urine, Clean Catch [[id number]]  (Abnormal) Collected: 09/26/24 1852    Specimen: Urine, Clean Catch Updated: 09/26/24 1940     Color, UA Yellow     Appearance, UA Clear     pH, UA 5.5     Specific Gravity, UA 1.015     Glucose, UA Negative     Ketones, UA Negative     Bilirubin, UA Negative     Blood, UA Negative     Protein, UA Negative     Leuk Esterase, UA Moderate (2+)     Nitrite, UA Negative     Urobilinogen, UA 0.2 E.U./dL    Narrative:      In absence of clinical symptoms, the presence of pyuria, bacteria, and/or nitrites on the urinalysis result does not correlate with infection.    Vitamin D,25-Hydroxy [[id number]]  (Normal) Collected: 09/23/24 2058    Specimen: Blood from Arm, Right Updated: 09/24/24 1108     25 Hydroxy, Vitamin D 49.7 ng/ml     Narrative:      Reference Range for Total Vitamin D 25(OH)     Deficiency <20.0 ng/mL   Insufficiency 21-29 ng/mL   Sufficiency  ng/mL  Toxicity >100 ng/ml      Basic Metabolic Panel [[id number]]  (Abnormal) Collected: 09/24/24 0455    Specimen: Blood Updated: 09/24/24 0653     Glucose 193 mg/dL      BUN 21 mg/dL       Creatinine 1.34 mg/dL      Sodium 141 mmol/L      Potassium 4.4 mmol/L      Chloride 106 mmol/L      CO2 27.4 mmol/L      Calcium 9.5 mg/dL      BUN/Creatinine Ratio 15.7     Anion Gap 7.6 mmol/L      eGFR 40.2 mL/min/1.73     Narrative:      GFR Normal >60  Chronic Kidney Disease <60  Kidney Failure <15    The GFR formula is only valid for adults with stable renal function between ages 18 and 70.    Comprehensive Metabolic Panel [[id number]]  (Abnormal) Collected: 09/23/24 2058    Specimen: Blood from Arm, Right Updated: 09/23/24 2315     Glucose 273 mg/dL      BUN 20 mg/dL      Creatinine 1.56 mg/dL      Sodium 143 mmol/L      Potassium 3.6 mmol/L      Chloride 105 mmol/L      CO2 27.0 mmol/L      Calcium 9.8 mg/dL      Total Protein 6.3 g/dL      Albumin 3.7 g/dL      ALT (SGPT) 14 U/L      AST (SGOT) 14 U/L      Alkaline Phosphatase 73 U/L      Total Bilirubin 0.2 mg/dL      Globulin 2.6 gm/dL      A/G Ratio 1.4 g/dL      BUN/Creatinine Ratio 12.8     Anion Gap 11.0 mmol/L      eGFR 33.5 mL/min/1.73     Narrative:      GFR Normal >60  Chronic Kidney Disease <60  Kidney Failure <15    The GFR formula is only valid for adults with stable renal function between ages 18 and 70.    TSH [[id number]]  (Normal) Collected: 09/23/24 2058    Specimen: Blood from Arm, Right Updated: 09/23/24 2315     TSH 0.643 uIU/mL     C-reactive Protein [[id number]]  (Abnormal) Collected: 09/23/24 2058    Specimen: Blood from Arm, Right Updated: 09/23/24 2315     C-Reactive Protein 2.66 mg/dL     PTH, Intact [[id number]]  (Normal) Collected: 09/23/24 2058    Specimen: Blood from Arm, Right Updated: 09/23/24 2307     PTH, Intact 36.1 pg/mL     Narrative:      Results may be falsely decreased if patient taking Biotin.      Extra Tubes [[id number]] Collected: 09/23/24 2058    Specimen: Blood from Arm, Right Updated: 09/23/24 2300    Narrative:      The following orders were created for panel order Extra Tubes.  Procedure                                Abnormality         Status                     ---------                               -----------         ------                     [facility][[id number]]                                   Final result                 Please view results for these tests on the individual orders.    [facility] [[id number]] Collected: 09/23/24 2058    Specimen: Blood from Arm, Right Updated: 09/23/24 2300     Extra Tube Hold for add-ons.     Comment: Auto resulted.       Sedimentation Rate [[id number]]  (Abnormal) Collected: 09/23/24 2058    Specimen: Blood from Arm, Right Updated: 09/23/24 2259     Sed Rate 32 mm/hr     CBC & Differential [[id number]]  (Abnormal) Collected: 09/23/24 2058    Specimen: Blood from Arm, Right Updated: 09/23/24 2249    Narrative:      The following orders were created for panel order CBC & Differential.  Procedure                               Abnormality         Status                     ---------                               -----------         ------                     CBC Auto Differential[[id number]]        Abnormal            Final result                 Please view results for these tests on the individual orders.    CBC Auto Differential [[id number]]  (Abnormal) Collected: 09/23/24 2058    Specimen: Blood from Arm, Right Updated: 09/23/24 2249     WBC 5.36 10*3/mm3      RBC 4.39 10*6/mm3      Hemoglobin 14.4 g/dL      Hematocrit 44.2 %      .7 fL      MCH 32.8 pg      MCHC 32.6 g/dL      RDW 12.9 %      RDW-SD 48.4 fl      MPV 10.4 fL      Platelets 226 10*3/mm3      Neutrophil % 59.0 %      Lymphocyte % 27.6 %      Monocyte % 11.0 %      Eosinophil % 0.9 %      Basophil % 0.6 %      Immature Grans % 0.9 %      Neutrophils, Absolute 3.16 10*3/mm3      Lymphocytes, Absolute 1.48 10*3/mm3      Monocytes, Absolute 0.59 10*3/mm3      Eosinophils, Absolute 0.05 10*3/mm3      Basophils, Absolute 0.03 10*3/mm3      Immature Grans, Absolute 0.05 10*3/mm3      nRBC 0.0 /100 WBC      Calcium, Ionized [[id number]]  (Normal) Collected: 09/23/24 2058    Specimen: Blood from Arm, Right Updated: 09/23/24 2248     Ionized Calcium 1.21 mmol/L              Results for orders placed during the hospital encounter of 01/14/24    Adult Transthoracic Echo Complete W/ Cont if Necessary Per Protocol    Interpretation Summary    Left ventricular systolic function is normal. Calculated left ventricular EF = 51% Left ventricular ejection fraction appears to be 51 - 55%.    Left ventricular diastolic function is consistent with (grade I) impaired relaxation.    The left atrial cavity is mild to moderately dilated.    There is mild calcification of the aortic valve mainly affecting the left coronary and right coronary cusp(s).    Moderate mitral valve regurgitation is present.    Estimated right ventricular systolic pressure from tricuspid regurgitation is normal (<35 mmHg).              Condition on Discharge:  good    Vital Signs  Temp:  [97.6 °F (36.4 °C)] 97.6 °F (36.4 °C)  Heart Rate:  [73-76] 73  Resp:  [12-14] 14  BP: (123-138)/(77-79) 123/77    Physical Exam:     General Appearance:    Alert, cooperative, in no acute distress   Head:    Normocephalic, without obvious abnormality, atraumatic   Eyes:            Lids and lashes normal, conjunctivae and sclerae normal, no   icterus, no pallor, corneas clear, PERRLA   Ears:    Ears appear intact with no abnormalities noted   Throat:   No oral lesions, no thrush, oral mucosa moist   Neck:   No adenopathy, supple, trachea midline, no thyromegaly, no   carotid bruit, no JVD   Lungs:     Clear to auscultation,respirations regular, even and                  unlabored    Heart:    Regular rhythm and normal rate, normal S1 and S2, no            murmur, no gallop, no rub, no click   Chest Wall:    No abnormalities observed   Abdomen:     Normal bowel sounds, no masses, no organomegaly, soft        non-tender, non-distended, no guarding, no rebound                 tenderness   Extremities:   Moves all extremities well, no edema, no cyanosis, no             redness   Pulses:   Pulses palpable and equal bilaterally   Skin:   No bleeding, bruising or rash   Lymph nodes:   No palpable adenopathy   Neurologic:   Cranial nerves 2 - 12 grossly intact, sensation intact, DTR       present and equal bilaterally       Discharge Disposition  Home or Self Care    Discharge Medications     Discharge Medications        New Medications        Instructions Start Date   amitriptyline 50 MG tablet  Commonly known as: ELAVIL   50 mg, Oral, Nightly      pantoprazole 40 MG EC tablet  Commonly known as: PROTONIX   40 mg, Oral, Every Early Morning   Start Date: September 30, 2024            Continue These Medications        Instructions Start Date   acetaminophen 650 MG 8 hr tablet  Commonly known as: TYLENOL   1,300 mg, Oral, Every 8 Hours PRN      albuterol sulfate  (90 Base) MCG/ACT inhaler  Commonly known as: PROVENTIL HFA;VENTOLIN HFA;PROAIR HFA   2 puffs, Inhalation, Every 4 Hours PRN      amLODIPine 10 MG tablet  Commonly known as: NORVASC   1 tablet, Oral, Daily      Anoro Ellipta 62.5-25 MCG/ACT aerosol powder  inhaler  Generic drug: Umeclidinium-Vilanterol   1 puff, Inhalation, Daily - RT, At the same time each day.      aspirin 81 MG tablet   1 tablet, Oral, Nightly      cyanocobalamin 1000 MCG/ML injection   1 mL, Intramuscular, Every 28 Days      Diclofenac Sodium 1 % gel gel  Commonly known as: VOLTAREN   Apply 4 g topically to the appropriate area as directed 3 (Three) Times a Day As Needed (pain). Indications: Joint Damage causing Pain and Loss of Function      doxazosin 2 MG tablet  Commonly known as: CARDURA   1 tablet, Oral, Nightly      estradiol 0.1 MG/GM vaginal cream  Commonly known as: ESTRACE   Insert 1 g into the vagina 3 (Three) Times a Week. Monday, Wednesday and Friday  Indications: VAGINAL HEALTH      Evolocumab solution auto-injector SureClick  injection  Commonly known as: REPATHA   1 mL, Subcutaneous, Every 14 Days      ferrous sulfate 325 (65 FE) MG tablet   325 mg, Oral, Daily With Breakfast      fluticasone-salmeterol 230-21 MCG/ACT inhaler  Commonly known as: ADVAIR HFA   1 puff, Inhalation, Take As Directed, 1 inhalation gram every 6-8 hours      Forteo 600 MCG/2.4ML injection  Generic drug: Teriparatide (Recombinant)   20 mcg, Subcutaneous, Daily      furosemide 20 MG tablet  Commonly known as: LASIX   20 mg, Oral, Daily      gabapentin 100 MG capsule  Commonly known as: NEURONTIN   100 mg, Oral, Nightly PRN      guaiFENesin 600 MG 12 hr tablet  Commonly known as: MUCINEX   1,200 mg, Oral, 2 Times Daily PRN      ipratropium-albuterol 0.5-2.5 mg/3 ml nebulizer  Commonly known as: DUO-NEB   3 mL, Nebulization, 4 Times Daily PRN      Kerendia 10 MG tablet  Generic drug: Finerenone   1 tablet, Oral, Daily      ketoconazole 2 % shampoo  Commonly known as: NIZORAL   Apply 1 Application topically to the appropriate area as directed 2 (Two) Times a Week.      Lantus SoloStar 100 UNIT/ML injection pen  Generic drug: Insulin Glargine   10 Units, Subcutaneous, Nightly      lidocaine 5 %  Commonly known as: LIDODERM   1 patch, Transdermal, Daily PRN, Remove & Discard patch within 12 hours or as directed by MD Mohr 145 MCG capsule capsule  Generic drug: linaclotide   1 capsule, Oral, Daily      methocarbamol 500 MG tablet  Commonly known as: ROBAXIN   500 mg, Oral, 4 Times Daily PRN      polyethylene glycol 17 g packet  Commonly known as: MIRALAX   17 g, Oral, Daily      sertraline 50 MG tablet  Commonly known as: ZOLOFT   1 tablet, Oral, Daily      SITagliptin 100 MG tablet  Commonly known as: JANUVIA   1 tablet, Oral, Daily      traMADol 50 MG tablet  Commonly known as: ULTRAM   2 tablets, Oral, 2 Times Daily PRN             Stop These Medications      carvedilol 3.125 MG tablet  Commonly known as: COREG     ciprofloxacin 250 MG tablet  Commonly known  as: CIPRO     metroNIDAZOLE 500 MG tablet  Commonly known as: FLAGYL     omeprazole 20 MG capsule  Commonly known as: priLOSEC              Discharge Diet:     Activity at Discharge:     Follow-up Appointments  Future Appointments   Date Time Provider Department Center   10/24/2024  2:00 PM Leon Gonzalez DO [facility]   11/5/2024  1:45 PM Geneva Duarte APRN [facility]     Additional Instructions for the Follow-ups that You Need to Schedule       Discharge Follow-up with PCP   As directed       Currently Documented PCP:    Anny Garcia MD    PCP Phone Number:    [phone]     Follow Up Details: 1-2 weeks        Discharge Follow-up with Specialty: GI   As directed      Specialty: GI                Test Results Pending at Discharge       Rosa London MD  09/29/24  21:47 EDT

## (undated) DEVICE — SOL ELECTROLUBE ANTI-STIC

## (undated) DEVICE — STAPLER INT PROX TX 60X3.5MM

## (undated) DEVICE — SEE MEDLINE ITEM 157216

## (undated) DEVICE — DRAPE PLASTIC U 60X72

## (undated) DEVICE — DRAPE ARM DAVINCI XI

## (undated) DEVICE — MANIFOLD 4 PORT

## (undated) DEVICE — SEE MEDLINE ITEM 152739

## (undated) DEVICE — SEAL UNIVERSAL 5MM-8MM XI

## (undated) DEVICE — ELECTRODE BLD EXT 6.50 ST DISP

## (undated) DEVICE — GAUZE SPONGE 4X4 12PLY

## (undated) DEVICE — SUT VICRYL 3-0 27 CT-1

## (undated) DEVICE — Device

## (undated) DEVICE — ELECTRODE REM PLYHSV RETURN 9

## (undated) DEVICE — SEE MEDLINE ITEM 152622

## (undated) DEVICE — APPLICATOR CHLORAPREP ORN 26ML

## (undated) DEVICE — SUPPORT ULNA NERVE PROTECTOR

## (undated) DEVICE — SOL 9P NACL IRR PIC IL

## (undated) DEVICE — COVER LIGHT HANDLE 80/CA

## (undated) DEVICE — SUT VICRYL PLUS 0 CT1 36IN

## (undated) DEVICE — INTERPULSE SET

## (undated) DEVICE — SYR ONLY LUER LOCK 20CC

## (undated) DEVICE — SUT VICRYL 2-0 STRAN J905T

## (undated) DEVICE — NDL SAFETY 25G X 1.5 ECLIPSE

## (undated) DEVICE — SUT VICRYL 2-0 36 CT-1

## (undated) DEVICE — ALCOHOL 70% ISOP RUBBING 4OZ

## (undated) DEVICE — HOOD FLYTE PEELWY STERISHIELD

## (undated) DEVICE — ADHESIVE DERMABOND ADVANCED

## (undated) DEVICE — TUBING HEATED INSUFFLATOR

## (undated) DEVICE — HEADREST ROUND DISP FOAM 9IN

## (undated) DEVICE — SPONGE LAP 18X18 PREWASHED

## (undated) DEVICE — SOL IRR NACL .9% 3000ML

## (undated) DEVICE — SUT MONOCRYL 4.0 PS2 CP496G

## (undated) DEVICE — STAPLER ENDOWRIST 45 BLUE XI

## (undated) DEVICE — SEE MEDLINE ITEM 146322

## (undated) DEVICE — DRAPE STERI LONG

## (undated) DEVICE — SUT CTD VICRYL 2-0 UND BR

## (undated) DEVICE — SUT 1 48IN PDS II VIO MONO

## (undated) DEVICE — GOWN SMARTGOWN LVL4 X-LONG XL

## (undated) DEVICE — SUT VICRYL 1 OB 36 CTX

## (undated) DEVICE — SUT SILK 3-0 SH 18IN BLACK

## (undated) DEVICE — SOL NS 1000CC

## (undated) DEVICE — SUT SILK 0 SH 30IN BLK BR

## (undated) DEVICE — SUT SILK 0 SUTUPAK SA86H

## (undated) DEVICE — ELECTRODE LOOP 20MMX12MM

## (undated) DEVICE — DECANTER VIAL ASEPTIC TRANSFER

## (undated) DEVICE — GLOVE SURGICAL LATEX SZ 7

## (undated) DEVICE — SEE MEDLINE ITEM 146231

## (undated) DEVICE — DRAPE EMERALD 87X114.75X113

## (undated) DEVICE — SUT VICRYL PLUS 3-0 PS2 18

## (undated) DEVICE — TUBE SPEC COLL&TRANSPORT 11ML

## (undated) DEVICE — GOWN POLY REINF BRTH SLV XL

## (undated) DEVICE — SUT VICRYL PLUS 3-0 SH 18IN

## (undated) DEVICE — SPONGE DERMACEA 4X4IN 12PLY

## (undated) DEVICE — SCRUB 10% POVIDONE IODINE 4OZ

## (undated) DEVICE — SUT ETHILON 3-0 PS2 18 BLK

## (undated) DEVICE — DRAPE LITHOTOMY SHEET

## (undated) DEVICE — SYR BULB 60CC IRRIGATION

## (undated) DEVICE — DRAPE MOBILE C-ARM

## (undated) DEVICE — IRRIGATOR ENDOWRIST XI SUCTION

## (undated) DEVICE — COVER OVERHEAD SURG LT BLUE

## (undated) DEVICE — COVER TIP CURVED SCISSORS XI

## (undated) DEVICE — PAD ABD 8X10 STERILE

## (undated) DEVICE — SUT 2/0 30IN SILK BLK BRAI

## (undated) DEVICE — CONTAINER SPECIMEN STRL 4OZ

## (undated) DEVICE — KIT WING PAD POSITIONING

## (undated) DEVICE — SEE MEDLINE ITEM 157027

## (undated) DEVICE — SEE MEDLINE ITEM 157117

## (undated) DEVICE — CONTAINER SPECIMEN OR STER 4OZ

## (undated) DEVICE — SEE MEDLINE ITEM 146345

## (undated) DEVICE — BNDG COFLEX FOAM LF2 ST 4X5YD

## (undated) DEVICE — SWAB CULTURETTE II DUAL

## (undated) DEVICE — SEE MEDLINE ITEM 146292

## (undated) DEVICE — SUT VICRYL 0 SH

## (undated) DEVICE — TAPE SILK 3IN

## (undated) DEVICE — PACK DRAPE PERI/GYN TIBURON

## (undated) DEVICE — TOWEL OR DISP STRL BLUE 4/PK

## (undated) DEVICE — SEALER VESSEL EXTEND

## (undated) DEVICE — SUTURE STRATAFIX PGAPCL 2 UNI

## (undated) DEVICE — SHAVER TOMCAT 4.0

## (undated) DEVICE — CHLORAPREP 10.5 ML APPLICATOR

## (undated) DEVICE — SYR 10CC LUER LOCK

## (undated) DEVICE — PACK BASIC SETUP SC BR

## (undated) DEVICE — KIT ANTIFOG

## (undated) DEVICE — DRAPE HIP TIBURON 87X115X134

## (undated) DEVICE — DRAPE THYROID SOFT STERILE

## (undated) DEVICE — SEE MEDLINE ITEM 152529

## (undated) DEVICE — EVACUATOR KIT SMOKE PLUME AWAY

## (undated) DEVICE — SEE MEDLINE ITEM 157137

## (undated) DEVICE — UNDERGLOVES BIOGEL PI SIZE 7.5

## (undated) DEVICE — SEE MEDLINE ITEM 146420

## (undated) DEVICE — DRAPE ABDOMINAL TIBURON 14X11

## (undated) DEVICE — TAPE SURG MEDIPORE 6X72IN

## (undated) DEVICE — RELOAD PROXIMATE CUT BLUE 75MM

## (undated) DEVICE — DRESSING XEROFORM FOIL PK 1X8

## (undated) DEVICE — NDL SAFETY 22G X 1.5 ECLIPSE

## (undated) DEVICE — DRAPE UINDERBUT GRAD PCH

## (undated) DEVICE — CUTTER PROXIMATE BLUE 75MM

## (undated) DEVICE — ADHESIVE MASTISOL VIAL 48/BX

## (undated) DEVICE — GLOVE SURG BIOGEL LATEX SZ 7.5

## (undated) DEVICE — PAD CAST SPECIALIST STRL 6

## (undated) DEVICE — NDL PNEUMO INSUFFLATI 120MM

## (undated) DEVICE — SUT SILK 3-0 STRANDS 30IN

## (undated) DEVICE — NDL SPINAL 20GX3.5 HUB

## (undated) DEVICE — SYR 30CC LUER LOCK

## (undated) DEVICE — TUBING ARTHRO IRR 4-LEAD

## (undated) DEVICE — SUT SILK 3-0 SH DETACH 30IN

## (undated) DEVICE — SPONGE DERMACEA GAUZE 4X4

## (undated) DEVICE — CLIP HEMO-LOK ML

## (undated) DEVICE — TUBING MEDI-VAC 20FT .25IN

## (undated) DEVICE — APPLICATOR STRL COT 2INNR 6IN

## (undated) DEVICE — SUT 1 36IN PDS II VIO MONO

## (undated) DEVICE — SUT VICRYL 2-0 SH VCP317H

## (undated) DEVICE — SEE MEDLINE ITEM 152523

## (undated) DEVICE — SEE MEDLINE ITEM 157131

## (undated) DEVICE — SUT SILK 2-0 STRANDS 30IN

## (undated) DEVICE — SHEET THYROID W/ISO-BAC

## (undated) DEVICE — SUT VICRYL 3-0 27 SH

## (undated) DEVICE — TIP SUC SIGMOIDOSCOPE 18F 12IN

## (undated) DEVICE — TIP GRASPER FENESTRATED DISP

## (undated) DEVICE — SUT PDS II 1 TP-1 VIL

## (undated) DEVICE — POSITIONER HEAD DONUT 9IN FOAM

## (undated) DEVICE — DRAIN PENROSE XRAY 18 X 1/4 ST

## (undated) DEVICE — SUT PROLENE 0 CT1 30IN BLUE

## (undated) DEVICE — SYR 3CC LUER LOC

## (undated) DEVICE — NDL SPINAL 18GX3.5 SPINOCAN

## (undated) DEVICE — SUT VICRYL BR 1 GEN 27 CT-1

## (undated) DEVICE — DRAPE COLUMN DAVINCI XI

## (undated) DEVICE — STAPLER CIRCULAR 25MM

## (undated) DEVICE — SUT ETHICON 3-0 BLK MONO PS

## (undated) DEVICE — SUT MONOCYRL 4-0 PS2 UND

## (undated) DEVICE — DRAPE UNDER BUTTOCKS SUC PORT

## (undated) DEVICE — STAPLER SKIN PROXIMATE WIDE

## (undated) DEVICE — DRAPE INCISE IOBAN 2 13X13IN

## (undated) DEVICE — SUT PROLENE 2-0 SH 36IN BLU

## (undated) DEVICE — JELLY SURGILUBE LUBE PKT 3GM